# Patient Record
Sex: FEMALE | Race: WHITE | NOT HISPANIC OR LATINO
[De-identification: names, ages, dates, MRNs, and addresses within clinical notes are randomized per-mention and may not be internally consistent; named-entity substitution may affect disease eponyms.]

---

## 2018-05-13 PROBLEM — Z00.00 ENCOUNTER FOR PREVENTIVE HEALTH EXAMINATION: Status: ACTIVE | Noted: 2018-05-13

## 2018-06-12 ENCOUNTER — APPOINTMENT (OUTPATIENT)
Dept: CARDIOLOGY | Facility: CLINIC | Age: 78
End: 2018-06-12
Payer: MEDICARE

## 2018-06-12 ENCOUNTER — NON-APPOINTMENT (OUTPATIENT)
Age: 78
End: 2018-06-12

## 2018-06-12 VITALS
BODY MASS INDEX: 38.82 KG/M2 | WEIGHT: 233 LBS | DIASTOLIC BLOOD PRESSURE: 82 MMHG | SYSTOLIC BLOOD PRESSURE: 142 MMHG | HEART RATE: 82 BPM | HEIGHT: 65 IN

## 2018-06-12 DIAGNOSIS — Z79.899 OTHER LONG TERM (CURRENT) DRUG THERAPY: ICD-10-CM

## 2018-06-12 DIAGNOSIS — R07.9 CHEST PAIN, UNSPECIFIED: ICD-10-CM

## 2018-06-12 DIAGNOSIS — H35.30 UNSPECIFIED MACULAR DEGENERATION: ICD-10-CM

## 2018-06-12 DIAGNOSIS — Z86.79 PERSONAL HISTORY OF OTHER DISEASES OF THE CIRCULATORY SYSTEM: ICD-10-CM

## 2018-06-12 PROCEDURE — 99205 OFFICE O/P NEW HI 60 MIN: CPT

## 2018-06-12 PROCEDURE — 93000 ELECTROCARDIOGRAM COMPLETE: CPT

## 2018-06-12 PROCEDURE — 36415 COLL VENOUS BLD VENIPUNCTURE: CPT

## 2018-06-13 LAB
ALBUMIN SERPL ELPH-MCNC: 4.4 G/DL
ALP BLD-CCNC: 54 U/L
ALT SERPL-CCNC: 23 U/L
ANION GAP SERPL CALC-SCNC: 19 MMOL/L
AST SERPL-CCNC: 24 U/L
BASOPHILS # BLD AUTO: 0.02 K/UL
BASOPHILS NFR BLD AUTO: 0.3 %
BILIRUB SERPL-MCNC: 0.7 MG/DL
BUN SERPL-MCNC: 16 MG/DL
CALCIUM SERPL-MCNC: 9.6 MG/DL
CHLORIDE SERPL-SCNC: 103 MMOL/L
CHOLEST SERPL-MCNC: 165 MG/DL
CHOLEST/HDLC SERPL: 3.1 RATIO
CO2 SERPL-SCNC: 22 MMOL/L
CREAT SERPL-MCNC: 0.97 MG/DL
EOSINOPHIL # BLD AUTO: 0.08 K/UL
EOSINOPHIL NFR BLD AUTO: 1.2 %
GLUCOSE SERPL-MCNC: 90 MG/DL
HBA1C MFR BLD HPLC: 5.5 %
HCT VFR BLD CALC: 46.9 %
HDLC SERPL-MCNC: 54 MG/DL
HGB BLD-MCNC: 14.7 G/DL
IMM GRANULOCYTES NFR BLD AUTO: 0.1 %
LDLC SERPL CALC-MCNC: 91 MG/DL
LYMPHOCYTES # BLD AUTO: 2.15 K/UL
LYMPHOCYTES NFR BLD AUTO: 31.3 %
MAGNESIUM SERPL-MCNC: 2.1 MG/DL
MAN DIFF?: NORMAL
MCHC RBC-ENTMCNC: 29.9 PG
MCHC RBC-ENTMCNC: 31.3 GM/DL
MCV RBC AUTO: 95.5 FL
MONOCYTES # BLD AUTO: 0.43 K/UL
MONOCYTES NFR BLD AUTO: 6.3 %
NEUTROPHILS # BLD AUTO: 4.17 K/UL
NEUTROPHILS NFR BLD AUTO: 60.8 %
NT-PROBNP SERPL-MCNC: 269 PG/ML
PLATELET # BLD AUTO: 245 K/UL
POTASSIUM SERPL-SCNC: 4.7 MMOL/L
PROT SERPL-MCNC: 7.3 G/DL
RBC # BLD: 4.91 M/UL
RBC # FLD: 14 %
SODIUM SERPL-SCNC: 144 MMOL/L
TRIGL SERPL-MCNC: 102 MG/DL
TSH SERPL-ACNC: 2.02 UIU/ML
WBC # FLD AUTO: 6.86 K/UL

## 2018-09-10 ENCOUNTER — APPOINTMENT (OUTPATIENT)
Dept: CARDIOLOGY | Facility: CLINIC | Age: 78
End: 2018-09-10

## 2018-10-04 ENCOUNTER — APPOINTMENT (OUTPATIENT)
Dept: CARDIOLOGY | Facility: CLINIC | Age: 78
End: 2018-10-04

## 2019-01-08 VITALS
HEART RATE: 74 BPM | RESPIRATION RATE: 18 BRPM | OXYGEN SATURATION: 97 % | SYSTOLIC BLOOD PRESSURE: 179 MMHG | TEMPERATURE: 98 F | DIASTOLIC BLOOD PRESSURE: 79 MMHG

## 2019-01-08 RX ORDER — CHLORHEXIDINE GLUCONATE 213 G/1000ML
1 SOLUTION TOPICAL ONCE
Qty: 0 | Refills: 0 | Status: DISCONTINUED | OUTPATIENT
Start: 2019-01-09 | End: 2019-01-09

## 2019-01-08 NOTE — H&P ADULT - ASSESSMENT
77 y/o F, Yakut speaking, with PMHx of HTN, new CHF diagnosis from recent admission @ [facility] on 12/2018 (however EF normal), moderate AS, and Pulm HTN who presented to Dr. Aguirre for f/u c/o persistent SOB since discharge. The patient was diagnosed as new onset heart failure but EF is normal, but given the patient does have moderate AS and pulmonary HTN, her symptoms are more likely secondary to aortic stenosis. She reports that she has been experiencing shortness of breath with associated chest tightness progressively worsening over the last 2 years. In light of patient's risk factors, CCS class III-IV anginal equivalent symptoms, and echocardiogram findings, the pt is referred to [facility] for recommended right and left cardiac catheterization to rule out underlying CAD and further investigate the patient's aortic stenosis/pulmonary HTN.     Patient given ? IVF @40cc/hr for pre-cath hydration.     ASA III and Mallampati III  Consented with use of [place]  #[id number]  Risks & benefits of procedure and alternative therapy have been explained to the patient including but not limited to: allergic reaction, bleeding w/possible need for blood transfusion, infection, renal and vascular compromise, limb damage, arrhythmia, stroke, vessel dissection/perforation, Myocardial infarction, emergent CABG. Informed consent obtained and in chart. 77 y/o F, Afghan speaking, with PMHx of HTN, new CHF diagnosis from recent admission @ [facility] on 12/2018 (however EF normal), moderate AS, and Pulm HTN who presented to Dr. Aguirre for f/u c/o persistent SOB since discharge. The patient was diagnosed as new onset heart failure but EF is normal, but given the patient does have moderate AS and pulmonary HTN, her symptoms are more likely secondary to aortic stenosis. She reports that she has been experiencing shortness of breath with associated chest tightness progressively worsening over the last 2 years. In light of patient's risk factors, CCS class III-IV anginal equivalent symptoms, and echocardiogram findings, the pt is referred to [facility] for recommended right and left cardiac catheterization to rule out underlying CAD and further investigate the patient's aortic stenosis/pulmonary HTN.     Patient loaded with ASA 325mg and Plavix 600mg prior to procedure as per Dr. Aguirre. IVF @40cc/hr for pre-cath hydration.     ASA III and Mallampati III  Consented with use of Afghan  #[id number]  Risks & benefits of procedure and alternative therapy have been explained to the patient including but not limited to: allergic reaction, bleeding w/possible need for blood transfusion, infection, renal and vascular compromise, limb damage, arrhythmia, stroke, vessel dissection/perforation, Myocardial infarction, emergent CABG. Informed consent obtained and in chart.

## 2019-01-08 NOTE — H&P ADULT - HISTORY OF PRESENT ILLNESS
**** SKELETON *****      79yo F with PMHx of HTN, CHF (recent admission @ [facility] on 12/2018), moderate AS, and Pulm HTN who presented to Dr. Aguirre for f/u c/o persistent SOB since discharge. Pt denies CP, palpitations...... During her admission she had an ECHO (12/3/18) revealing EF 55-60%, mild concentric LVH, moderate LA dilation, moderate AS, mild MR, moderate TR, RV systolic pressure 75mmHg.    In the setting of the pts risk factors, CCS class ___ anginal symptoms and abnormal ECHO, pt is referred to [facility] for recommended right and left cardiac catheterization. 79 y/o F with PMHx of HTN, new CHF diagnosis from recent admission @ [facility] on 12/2018, however EF normal, moderate AS, and Pulm HTN who presented to Dr. Aguirre for f/u c/o persistent SOB since discharge. Pt denies CP, palpitations...... During her admission she had an ECHO (12/3/18) revealing EF 55-60%, mild concentric LVH, moderate LA dilation, moderate AS, mild MR, moderate TR, RV systolic pressure 75mmHg. The patient was diagnosed as new onset heart failure but EF is normal, but given the patient does have moderate AS and pulmonary HTN, her symptoms are more likely secondary to aortic stenosis.      In light of patient's risk factors, CCS class III anginal equivalent symptoms and echocardiogram findings, the pt is referred to [facility] for recommended right and left cardiac catheterization to rule out underlying CAD and further investigate the patient's aortic stenosis/pulmonary HTN. 79 y/o F, Nepali speaking, with PMHx of HTN, new CHF diagnosis from recent admission @ [facility] on 12/2018 (however EF normal), moderate AS, and Pulm HTN who presented to Dr. Aguirre for f/u c/o persistent SOB since discharge. During her admission she had an ECHO (12/3/18) revealing EF 55-60%, mild concentric LVH, moderate LA dilation, moderate AS, mild MR, moderate TR, RV systolic pressure 75mmHg. The patient was diagnosed as new onset heart failure but EF is normal, but given the patient does have moderate AS and pulmonary HTN, her symptoms are more likely secondary to aortic stenosis. She reports that she has been experiencing shortness of breath with associated chest tightness progressively worsening over the last 2 years. She can only walk 1/2 block before having to stop secondary to SOB, symptoms relieved with rest. She reports that most recently she has been experiencing intermittent chest tightness at rest lasting several minutes.     In light of patient's risk factors, CCS class III-IV anginal equivalent symptoms, and echocardiogram findings, the pt is referred to [facility] for recommended right and left cardiac catheterization to rule out underlying CAD and further investigate the patient's aortic stenosis/pulmonary HTN.

## 2019-01-09 ENCOUNTER — OUTPATIENT (OUTPATIENT)
Dept: OUTPATIENT SERVICES | Facility: HOSPITAL | Age: 79
LOS: 1 days | Discharge: NURSING, REHAB OR LONG-TERM CARE | End: 2019-01-09
Payer: MEDICARE

## 2019-01-09 LAB
ALBUMIN SERPL ELPH-MCNC: 4.3 G/DL — SIGNIFICANT CHANGE UP (ref 3.3–5)
ALP SERPL-CCNC: 63 U/L — SIGNIFICANT CHANGE UP (ref 40–120)
ALT FLD-CCNC: 17 U/L — SIGNIFICANT CHANGE UP (ref 10–45)
ANION GAP SERPL CALC-SCNC: 11 MMOL/L — SIGNIFICANT CHANGE UP (ref 5–17)
APPEARANCE UR: CLEAR — SIGNIFICANT CHANGE UP
APTT BLD: 31 SEC — SIGNIFICANT CHANGE UP (ref 27.5–36.3)
AST SERPL-CCNC: 14 U/L — SIGNIFICANT CHANGE UP (ref 10–40)
BACTERIA # UR AUTO: PRESENT /HPF
BASOPHILS NFR BLD AUTO: 0.3 % — SIGNIFICANT CHANGE UP (ref 0–2)
BILIRUB SERPL-MCNC: 0.6 MG/DL — SIGNIFICANT CHANGE UP (ref 0.2–1.2)
BILIRUB UR-MCNC: NEGATIVE — SIGNIFICANT CHANGE UP
BLD GP AB SCN SERPL QL: NEGATIVE — SIGNIFICANT CHANGE UP
BUN SERPL-MCNC: 21 MG/DL — SIGNIFICANT CHANGE UP (ref 7–23)
CALCIUM SERPL-MCNC: 9.3 MG/DL — SIGNIFICANT CHANGE UP (ref 8.4–10.5)
CHLORIDE SERPL-SCNC: 103 MMOL/L — SIGNIFICANT CHANGE UP (ref 96–108)
CHOLEST SERPL-MCNC: 136 MG/DL — SIGNIFICANT CHANGE UP (ref 10–199)
CK MB CFR SERPL CALC: 1.2 NG/ML — SIGNIFICANT CHANGE UP (ref 0–6.7)
CK SERPL-CCNC: 82 U/L — SIGNIFICANT CHANGE UP (ref 25–170)
CO2 SERPL-SCNC: 25 MMOL/L — SIGNIFICANT CHANGE UP (ref 22–31)
COLOR SPEC: YELLOW — SIGNIFICANT CHANGE UP
COMMENT - URINE: SIGNIFICANT CHANGE UP
COMMENT - URINE: SIGNIFICANT CHANGE UP
CREAT SERPL-MCNC: 1.01 MG/DL — SIGNIFICANT CHANGE UP (ref 0.5–1.3)
DIFF PNL FLD: ABNORMAL
EOSINOPHIL NFR BLD AUTO: 6.8 % — HIGH (ref 0–6)
EPI CELLS # UR: SIGNIFICANT CHANGE UP /HPF (ref 0–5)
GLUCOSE SERPL-MCNC: 112 MG/DL — HIGH (ref 70–99)
GLUCOSE UR QL: NEGATIVE — SIGNIFICANT CHANGE UP
HBA1C BLD-MCNC: 5.3 % — SIGNIFICANT CHANGE UP (ref 4–5.6)
HCT VFR BLD CALC: 40.7 % — SIGNIFICANT CHANGE UP (ref 34.5–45)
HDLC SERPL-MCNC: 44 MG/DL — LOW
HGB BLD-MCNC: 13.4 G/DL — SIGNIFICANT CHANGE UP (ref 11.5–15.5)
INR BLD: 1.09 — SIGNIFICANT CHANGE UP (ref 0.88–1.16)
KETONES UR-MCNC: NEGATIVE — SIGNIFICANT CHANGE UP
LEUKOCYTE ESTERASE UR-ACNC: ABNORMAL
LIPID PNL WITH DIRECT LDL SERPL: 72 MG/DL — SIGNIFICANT CHANGE UP
LYMPHOCYTES # BLD AUTO: 15.7 % — SIGNIFICANT CHANGE UP (ref 13–44)
MCHC RBC-ENTMCNC: 30.3 PG — SIGNIFICANT CHANGE UP (ref 27–34)
MCHC RBC-ENTMCNC: 32.9 G/DL — SIGNIFICANT CHANGE UP (ref 32–36)
MCV RBC AUTO: 92.1 FL — SIGNIFICANT CHANGE UP (ref 80–100)
MONOCYTES NFR BLD AUTO: 7.3 % — SIGNIFICANT CHANGE UP (ref 2–14)
NEUTROPHILS NFR BLD AUTO: 69.9 % — SIGNIFICANT CHANGE UP (ref 43–77)
NITRITE UR-MCNC: NEGATIVE — SIGNIFICANT CHANGE UP
PH UR: 6 — SIGNIFICANT CHANGE UP (ref 5–8)
PLATELET # BLD AUTO: 289 K/UL — SIGNIFICANT CHANGE UP (ref 150–400)
POTASSIUM SERPL-MCNC: 4.2 MMOL/L — SIGNIFICANT CHANGE UP (ref 3.5–5.3)
POTASSIUM SERPL-SCNC: 4.2 MMOL/L — SIGNIFICANT CHANGE UP (ref 3.5–5.3)
PROT SERPL-MCNC: 7.8 G/DL — SIGNIFICANT CHANGE UP (ref 6–8.3)
PROT UR-MCNC: NEGATIVE MG/DL — SIGNIFICANT CHANGE UP
PROTHROM AB SERPL-ACNC: 12.4 SEC — SIGNIFICANT CHANGE UP (ref 10–12.9)
RBC # BLD: 4.42 M/UL — SIGNIFICANT CHANGE UP (ref 3.8–5.2)
RBC # FLD: 13.1 % — SIGNIFICANT CHANGE UP (ref 10.3–16.9)
RBC CASTS # UR COMP ASSIST: < 5 /HPF — SIGNIFICANT CHANGE UP
RH IG SCN BLD-IMP: POSITIVE — SIGNIFICANT CHANGE UP
SODIUM SERPL-SCNC: 139 MMOL/L — SIGNIFICANT CHANGE UP (ref 135–145)
SP GR SPEC: <=1.005 — SIGNIFICANT CHANGE UP (ref 1–1.03)
TOTAL CHOLESTEROL/HDL RATIO MEASUREMENT: 3.1 RATIO — LOW (ref 3.3–7.1)
TRIGL SERPL-MCNC: 100 MG/DL — SIGNIFICANT CHANGE UP (ref 10–149)
TSH SERPL-MCNC: 1.43 UIU/ML — SIGNIFICANT CHANGE UP (ref 0.35–4.94)
UROBILINOGEN FLD QL: 0.2 E.U./DL — SIGNIFICANT CHANGE UP
WBC # BLD: 7.4 K/UL — SIGNIFICANT CHANGE UP (ref 3.8–10.5)
WBC # FLD AUTO: 7.4 K/UL — SIGNIFICANT CHANGE UP (ref 3.8–10.5)
WBC UR QL: > 10 /HPF

## 2019-01-09 PROCEDURE — 74174 CTA ABD&PLVS W/CONTRAST: CPT | Mod: 26

## 2019-01-09 PROCEDURE — 81001 URINALYSIS AUTO W/SCOPE: CPT

## 2019-01-09 PROCEDURE — 70450 CT HEAD/BRAIN W/O DYE: CPT

## 2019-01-09 PROCEDURE — 86900 BLOOD TYPING SEROLOGIC ABO: CPT

## 2019-01-09 PROCEDURE — 93460 R&L HRT ART/VENTRICLE ANGIO: CPT

## 2019-01-09 PROCEDURE — 74174 CTA ABD&PLVS W/CONTRAST: CPT

## 2019-01-09 PROCEDURE — 93880 EXTRACRANIAL BILAT STUDY: CPT | Mod: 26

## 2019-01-09 PROCEDURE — 93880 EXTRACRANIAL BILAT STUDY: CPT

## 2019-01-09 PROCEDURE — 86901 BLOOD TYPING SEROLOGIC RH(D): CPT

## 2019-01-09 PROCEDURE — 80061 LIPID PANEL: CPT

## 2019-01-09 PROCEDURE — 94150 VITAL CAPACITY TEST: CPT

## 2019-01-09 PROCEDURE — 85730 THROMBOPLASTIN TIME PARTIAL: CPT

## 2019-01-09 PROCEDURE — 99205 OFFICE O/P NEW HI 60 MIN: CPT

## 2019-01-09 PROCEDURE — 82550 ASSAY OF CK (CPK): CPT

## 2019-01-09 PROCEDURE — 36415 COLL VENOUS BLD VENIPUNCTURE: CPT

## 2019-01-09 PROCEDURE — 84443 ASSAY THYROID STIM HORMONE: CPT

## 2019-01-09 PROCEDURE — 70450 CT HEAD/BRAIN W/O DYE: CPT | Mod: 26

## 2019-01-09 PROCEDURE — 82553 CREATINE MB FRACTION: CPT

## 2019-01-09 PROCEDURE — C1894: CPT

## 2019-01-09 PROCEDURE — 86850 RBC ANTIBODY SCREEN: CPT

## 2019-01-09 PROCEDURE — C1887: CPT

## 2019-01-09 PROCEDURE — 80053 COMPREHEN METABOLIC PANEL: CPT

## 2019-01-09 PROCEDURE — 83036 HEMOGLOBIN GLYCOSYLATED A1C: CPT

## 2019-01-09 PROCEDURE — 93005 ELECTROCARDIOGRAM TRACING: CPT

## 2019-01-09 PROCEDURE — 93010 ELECTROCARDIOGRAM REPORT: CPT

## 2019-01-09 PROCEDURE — C1769: CPT

## 2019-01-09 PROCEDURE — 85610 PROTHROMBIN TIME: CPT

## 2019-01-09 PROCEDURE — 75573 CT HRT C+ STRUX CGEN HRT DS: CPT | Mod: 26

## 2019-01-09 PROCEDURE — 75573 CT HRT C+ STRUX CGEN HRT DS: CPT

## 2019-01-09 PROCEDURE — 94010 BREATHING CAPACITY TEST: CPT | Mod: 26

## 2019-01-09 PROCEDURE — 85025 COMPLETE CBC W/AUTO DIFF WBC: CPT

## 2019-01-09 RX ORDER — ASPIRIN/CALCIUM CARB/MAGNESIUM 324 MG
325 TABLET ORAL ONCE
Qty: 0 | Refills: 0 | Status: COMPLETED | OUTPATIENT
Start: 2019-01-09 | End: 2019-01-09

## 2019-01-09 RX ORDER — CLOPIDOGREL BISULFATE 75 MG/1
600 TABLET, FILM COATED ORAL ONCE
Qty: 0 | Refills: 0 | Status: COMPLETED | OUTPATIENT
Start: 2019-01-09 | End: 2019-01-09

## 2019-01-09 RX ORDER — HYDRALAZINE HCL 50 MG
50 TABLET ORAL ONCE
Qty: 0 | Refills: 0 | Status: COMPLETED | OUTPATIENT
Start: 2019-01-09 | End: 2019-01-09

## 2019-01-09 RX ORDER — ACETAMINOPHEN 500 MG
650 TABLET ORAL ONCE
Qty: 0 | Refills: 0 | Status: COMPLETED | OUTPATIENT
Start: 2019-01-09 | End: 2019-01-09

## 2019-01-09 RX ORDER — SODIUM CHLORIDE 9 MG/ML
500 INJECTION INTRAMUSCULAR; INTRAVENOUS; SUBCUTANEOUS
Qty: 0 | Refills: 0 | Status: DISCONTINUED | OUTPATIENT
Start: 2019-01-09 | End: 2019-01-09

## 2019-01-09 RX ORDER — LISINOPRIL 2.5 MG/1
10 TABLET ORAL ONCE
Qty: 0 | Refills: 0 | Status: DISCONTINUED | OUTPATIENT
Start: 2019-01-09 | End: 2019-01-09

## 2019-01-09 RX ORDER — LISINOPRIL 2.5 MG/1
1 TABLET ORAL
Qty: 0 | Refills: 0 | COMMUNITY

## 2019-01-09 RX ORDER — BUDESONIDE AND FORMOTEROL FUMARATE DIHYDRATE 160; 4.5 UG/1; UG/1
2 AEROSOL RESPIRATORY (INHALATION)
Qty: 0 | Refills: 0 | COMMUNITY

## 2019-01-09 RX ORDER — BOSENTAN 125 MG/1
1 TABLET, FILM COATED ORAL
Qty: 0 | Refills: 0 | COMMUNITY

## 2019-01-09 RX ORDER — LISINOPRIL 2.5 MG/1
10 TABLET ORAL ONCE
Qty: 0 | Refills: 0 | Status: COMPLETED | OUTPATIENT
Start: 2019-01-09 | End: 2019-01-09

## 2019-01-09 RX ORDER — CARVEDILOL PHOSPHATE 80 MG/1
25 CAPSULE, EXTENDED RELEASE ORAL ONCE
Qty: 0 | Refills: 0 | Status: COMPLETED | OUTPATIENT
Start: 2019-01-09 | End: 2019-01-09

## 2019-01-09 RX ADMIN — LISINOPRIL 10 MILLIGRAM(S): 2.5 TABLET ORAL at 18:07

## 2019-01-09 RX ADMIN — CLOPIDOGREL BISULFATE 600 MILLIGRAM(S): 75 TABLET, FILM COATED ORAL at 09:59

## 2019-01-09 RX ADMIN — SODIUM CHLORIDE 40 MILLILITER(S): 9 INJECTION INTRAMUSCULAR; INTRAVENOUS; SUBCUTANEOUS at 10:00

## 2019-01-09 RX ADMIN — Medication 50 MILLIGRAM(S): at 18:07

## 2019-01-09 RX ADMIN — CARVEDILOL PHOSPHATE 25 MILLIGRAM(S): 80 CAPSULE, EXTENDED RELEASE ORAL at 18:07

## 2019-01-09 RX ADMIN — Medication 650 MILLIGRAM(S): at 18:41

## 2019-01-09 RX ADMIN — Medication 325 MILLIGRAM(S): at 09:59

## 2019-01-09 NOTE — CONSULT NOTE ADULT - SUBJECTIVE AND OBJECTIVE BOX
Daughter, Jaelyn at bedside, helped with translation     78 year old Luxembourger speaking female with a history of obesity, uncontrolled hypertension, Asthma (without history of intubations), chronic diastolic heart failure with severe aortic stenosis presented today for ambulatory cardiac catherization who is now being evaluated for further evaluation of her valvular disease.     As per the patient’s daughter, the patient has been experiencing dyspnea upon minimal exertion for the last year worsening within the last couple of months. She gets SOB after walking less than ½ block. Symptoms are relieved after a few minutes of rest.  SOB is sometimes associated with intermittent chest tightness. She denies any SOB at rest, palpitations, syncope, dizziness, or LE edema.     The patient was recently admitted to [facility] with uncontrolled HTN with SBP in the 200s and SOB. ECHO on 12/3/18 which showed EF 55% with moderate AS.     Cardiac catherization performed today on 1/9/2019 showed normal coronary arteries with severe aortic stenosis with a mean gradient of 48.1.     The patient lives with her  & extended family in a house. She does not need a walker or cane to ambulate. She remains independent in all her ADLs.     which confirmed severe aortic stenosis with mean gradient of 48       Review of Systems:  Other Review of Systems: All other review of systems negative, except as noted in HPI	      Allergies and Intolerances:        Allergies:  	No Known Allergies:     Home Medications:   * Patient Currently Takes Medications as of 09-Jan-2019 09:46 documented in Structured Notes  · 	carvedilol 25 mg oral tablet: 1 tab(s) orally 2 times a day, Last Dose Taken:    · 	lisinopril 10 mg oral tablet: 1 tab(s) orally once a day, Last Dose Taken:    · 	Ecotrin Adult Low Strength 81 mg oral delayed release tablet: 1 tab(s) orally once a day, Last Dose Taken:    · 	hydrALAZINE 50 mg oral tablet: orally 3 times a day, Last Dose Taken:    · 	hydroCHLOROthiazide 25 mg oral tablet: orally 2 times a day, Last Dose Taken:    · 	umeclidinium 62.5 mcg/inh inhalation powder: Last Dose Taken:    · 	fluticasone-salmeterol 100 mcg-50 mcg inhalation powder: 1 puff(s) inhaled 2 times a day, As Needed, Last Dose Taken:      Patient History:    Past Medical History:  Aortic stenosis    CHF (congestive heart failure)    HTN (hypertension)    Pulmonary HTN.     Past Surgical History:  No significant past surgical history.     Family History:  No pertinent family history in first degree relatives.     Social History:  Social History (marital status, living situation, occupation, tobacco use, alcohol and drug use, and sexual history): Denies smoking, ETOH, or illicit drug use.	     Tobacco Screening:  · Core Measure Site	No	  · Has the patient used tobacco in the past 30 days?	No	    Risk Assessment:    Present on Admission:  Deep Venous Thrombosis	no	  Pulmonary Embolus	no	     Heart Failure:  Does this patient have a history of or has been diagnosed with heart failure? yes.     LV Function Assessment (LVS function was evaluated before arrival and/or during hospitalization) yes.     Is the Ejection Fraction >40% ? yes.     normal LV function.       T/HR/RR/BP:  · Temp (F)	98.5 Degrees F	  · Temp (C)	36.9 Degrees C	  · Heart Rate	74 /min	  · Respiration Rate (breaths/min)	18 /min	  · BP Systolic	 179 mm Hg	  · BP Diastolic	79 mm Hg	  · BP Noninvasive Mean	112 mm Hg	  · SpO2 (%)	97 %	  · O2 delivery	room air	     Physical Exam:  · Constitutional	detailed exam	  · Constitutional Details	obese	  · Eyes	EOMI; PERRL; no drainage or redness	  · ENMT	No oral lesions; no gross abnormalities	  · Neck	No bruits; no thyromegaly or nodules	  · Breasts	not examined	  · Back	No deformity or limitation of movement	  · Respiratory	Breath Sounds equal & clear to percussion & auscultation, no accessory muscle use	  · Cardiovascular	detailed exam	  · Cardiovascular Details	murmur; systolic murmur @RUSB	  · Murmur Timing	systolic	  · Character of Systolic Murmur	crescendo-decrescendo	  · Gastrointestinal	Soft, non-tender, no hepatosplenomegaly, normal bowel sounds	  · Extremities	No cyanosis, clubbing or edema	  · Vascular	detailed exam	  · Carotid Pulse	right normal; left normal; 2+ B/L	  · Radial Pulse	right normal; left normal; 2+ B/L, right radial band in place 	  · Femoral Pulse	right normal; left normal; 2+ B/L	  · DP Pulse	right normal; left normal; 2+ B/L	  · PT Pulse	right normal; left normal; 2+ B/L	  · Neurological	Alert & oriented; no sensory, motor or coordination deficits, normal reflexes	  · Skin	detailed exam	  · Skin Comments	B/L inguinal rash and excoriations, R>L	  · Lymph Nodes	not examined	  · Musculoskeletal	No joint pain, swelling or deformity; no limitation of movement	  · Psychiatric	Affect and characteristics of appearance, verbalizations, behaviors are appropriate	       Labs and Results:  Labs, Radiology, Cardiology, and Other Results: EKG: NSR @ 72 bpm, no evidence of ischemic changes Daughter, Jaelyn at bedside, helped with translation     78 year old Cambodian speaking female with a history of obesity, uncontrolled hypertension, Asthma (without history of intubations), chronic diastolic heart failure with severe aortic stenosis presented today for ambulatory cardiac catherization who is now being evaluated for further evaluation of her valvular disease.     As per the patient’s daughter, the patient has been experiencing dyspnea upon minimal exertion for the last year worsening within the last couple of months. She gets SOB after walking less than ½ block. Symptoms are relieved after a few minutes of rest.  SOB is sometimes associated with intermittent chest tightness. She denies any SOB at rest, palpitations, syncope, dizziness, or LE edema.     The patient was recently admitted to [facility] with uncontrolled HTN with SBP in the 200s and SOB. ECHO on 12/3/18 which showed EF 55% with moderate AS.     Cardiac catherization performed today on 1/9/2019 showed normal coronary arteries with severe aortic stenosis with a mean gradient of 48.1.     The patient lives with her  & extended family in a house. She does not need a walker or cane to ambulate. She remains independent in all her ADLs.     which confirmed severe aortic stenosis with mean gradient of 48       Review of Systems:  Other Review of Systems: All other review of systems negative, except as noted in HPI	      Allergies and Intolerances:        Allergies:  	No Known Allergies:     Home Medications:   * Patient Currently Takes Medications as of 09-Jan-2019 09:46 documented in Structured Notes  · 	carvedilol 25 mg oral tablet: 1 tab(s) orally 2 times a day, Last Dose Taken:    · 	lisinopril 10 mg oral tablet: 1 tab(s) orally once a day, Last Dose Taken:    · 	Ecotrin Adult Low Strength 81 mg oral delayed release tablet: 1 tab(s) orally once a day, Last Dose Taken:    · 	hydrALAZINE 50 mg oral tablet: orally 3 times a day, Last Dose Taken:    · 	hydroCHLOROthiazide 25 mg oral tablet: orally 2 times a day, Last Dose Taken:    · 	umeclidinium 62.5 mcg/inh inhalation powder: Last Dose Taken:    · 	fluticasone-salmeterol 100 mcg-50 mcg inhalation powder: 1 puff(s) inhaled 2 times a day, As Needed, Last Dose Taken:      Patient History:    Past Medical History:  Aortic stenosis    CHF (congestive heart failure)    HTN (hypertension)    Pulmonary HTN.     Past Surgical History:  No significant past surgical history.     Family History:  No pertinent family history in first degree relatives.     Social History:  Social History (marital status, living situation, occupation, tobacco use, alcohol and drug use, and sexual history): Denies smoking, ETOH, or illicit drug use.	     Tobacco Screening:  · Core Measure Site	No	  · Has the patient used tobacco in the past 30 days?	No	    Risk Assessment:    Present on Admission:  Deep Venous Thrombosis	no	  Pulmonary Embolus	no	     Heart Failure:  Does this patient have a history of or has been diagnosed with heart failure? yes.     LV Function Assessment (LVS function was evaluated before arrival and/or during hospitalization) yes.     Is the Ejection Fraction >40% ? yes.     normal LV function.       T/HR/RR/BP:  · Temp (F)	98.5 Degrees F	  · Temp (C)	36.9 Degrees C	  · Heart Rate	74 /min	  · Respiration Rate (breaths/min)	18 /min	  · BP Systolic	 179 mm Hg	  · BP Diastolic	79 mm Hg	  · BP Noninvasive Mean	112 mm Hg	  · SpO2 (%)	97 %	  · O2 delivery	room air	     Physical Exam:  · Constitutional	detailed exam	  · Constitutional Details	obese	  · Eyes	EOMI; PERRL; no drainage or redness	  · ENMT	No oral lesions; no gross abnormalities	  · Neck	No bruits; no thyromegaly or nodules	  · Breasts	not examined	  · Back	No deformity or limitation of movement	  · Respiratory	Breath Sounds equal & clear to percussion & auscultation, no accessory muscle use	  · Cardiovascular	detailed exam	  · Cardiovascular Details	murmur; systolic murmur @RUSB	  · Murmur Timing	systolic	  · Character of Systolic Murmur	crescendo-decrescendo	  · Gastrointestinal	Soft, non-tender, no hepatosplenomegaly, normal bowel sounds	  · Extremities	No cyanosis, clubbing or edema	  · Vascular	detailed exam	  · Carotid Pulse	right normal; left normal; 2+ B/L	  · Radial Pulse	right normal; left normal; 2+ B/L, right radial band in place 	  · Femoral Pulse	right normal; left normal; 2+ B/L	  · DP Pulse	right normal; left normal; 2+ B/L	  · PT Pulse	right normal; left normal; 2+ B/L	  · Neurological	Alert & oriented; no sensory, motor or coordination deficits, normal reflexes	  · Skin	detailed exam	  · Skin Comments	B/L inguinal rash and excoriations, R>L	  · Lymph Nodes	not examined	  · Musculoskeletal	No joint pain, swelling or deformity; no limitation of movement	  · Psychiatric	Affect and characteristics of appearance, verbalizations, behaviors are appropriate	       Labs and Results:  Labs, Radiology, Cardiology, and Other Results: EKG: NSR @ 72 bpm, no evidence of ischemic changes	    KCCQ completed on 1/9/19    5MWT on 1/9/19 was unable to be completed, patient currently on bedrest post catherization

## 2019-01-09 NOTE — CONSULT NOTE ADULT - SUBJECTIVE AND OBJECTIVE BOX
Daughter, Jaelyn at bedside, helped with translation     78 year old Montserratian speaking female with a history of obesity, uncontrolled hypertension, Asthma (without history of intubations), chronic diastolic heart failure with severe aortic stenosis presented today for ambulatory cardiac catherization who is now being evaluated for further evaluation of her valvular disease.  SAVR vs. TAVR     As per the patient’s daughter, the patient has been experiencing dyspnea upon minimal exertion for the last year worsening within the last couple of months. She gets SOB after walking less than ½ block. Symptoms are relieved after a few minutes of rest.  SOB is sometimes associated with intermittent chest tightness. She denies any SOB at rest, palpitations, syncope, dizziness, or LE edema.     The patient was recently admitted to [facility] with uncontrolled HTN with SBP in the 200s and SOB. ECHO on 12/3/18 which showed EF 55% with moderate AS.     Cardiac catherization performed today on 1/9/2019 showed normal coronary arteries with severe aortic stenosis with a mean gradient of 48.1.     The patient lives with her  & extended family in a house. She does not need a walker or cane to ambulate. She remains independent in all her ADLs.     which confirmed severe aortic stenosis with mean gradient of 48       Review of Systems:  Other Review of Systems: All other review of systems negative, except as noted in HPI	      Allergies and Intolerances:        Allergies:  	No Known Allergies:     Home Medications:   * Patient Currently Takes Medications as of 09-Jan-2019 09:46 documented in Structured Notes  · 	carvedilol 25 mg oral tablet: 1 tab(s) orally 2 times a day, Last Dose Taken:    · 	lisinopril 10 mg oral tablet: 1 tab(s) orally once a day, Last Dose Taken:    · 	Ecotrin Adult Low Strength 81 mg oral delayed release tablet: 1 tab(s) orally once a day, Last Dose Taken:    · 	hydrALAZINE 50 mg oral tablet: orally 3 times a day, Last Dose Taken:    · 	hydroCHLOROthiazide 25 mg oral tablet: orally 2 times a day, Last Dose Taken:    · 	umeclidinium 62.5 mcg/inh inhalation powder: Last Dose Taken:    · 	fluticasone-salmeterol 100 mcg-50 mcg inhalation powder: 1 puff(s) inhaled 2 times a day, As Needed, Last Dose Taken:      Patient History:    Past Medical History:  Aortic stenosis    CHF (congestive heart failure)    HTN (hypertension)    Pulmonary HTN.     Past Surgical History:  No significant past surgical history.     Family History:  No pertinent family history in first degree relatives.     Social History:  Social History (marital status, living situation, occupation, tobacco use, alcohol and drug use, and sexual history): Denies smoking, ETOH, or illicit drug use.	     Tobacco Screening:  · Core Measure Site	No	  · Has the patient used tobacco in the past 30 days?	No	    Risk Assessment:    Present on Admission:  Deep Venous Thrombosis	no	  Pulmonary Embolus	no	     Heart Failure:  Does this patient have a history of or has been diagnosed with heart failure? yes.     LV Function Assessment (LVS function was evaluated before arrival and/or during hospitalization) yes.     Is the Ejection Fraction >40% ? yes.     normal LV function.       T/HR/RR/BP:  · Temp (F)	98.5 Degrees F	  · Temp (C)	36.9 Degrees C	  · Heart Rate	74 /min	  · Respiration Rate (breaths/min)	18 /min	  · BP Systolic	 179 mm Hg	  · BP Diastolic	79 mm Hg	  · BP Noninvasive Mean	112 mm Hg	  · SpO2 (%)	97 %	  · O2 delivery	room air	     Physical Exam:  · Constitutional	detailed exam	  · Constitutional Details	obese	  · Eyes	EOMI; PERRL; no drainage or redness	  · ENMT	No oral lesions; no gross abnormalities	  · Neck	No bruits; no thyromegaly or nodules	  · Breasts	not examined	  · Back	No deformity or limitation of movement	  · Respiratory	Breath Sounds equal & clear to percussion & auscultation, no accessory muscle use	  · Cardiovascular	detailed exam	  · Cardiovascular Details	murmur; systolic murmur @RUSB	  · Murmur Timing	systolic	  · Character of Systolic Murmur	crescendo-decrescendo	  · Gastrointestinal	Soft, non-tender, no hepatosplenomegaly, normal bowel sounds	  · Extremities	No cyanosis, clubbing or edema	  · Vascular	detailed exam	  · Carotid Pulse	right normal; left normal; 2+ B/L	  · Radial Pulse	right normal; left normal; 2+ B/L, right radial band in place 	  · Femoral Pulse	right normal; left normal; 2+ B/L	  · DP Pulse	right normal; left normal; 2+ B/L	  · PT Pulse	right normal; left normal; 2+ B/L	  · Neurological	Alert & oriented; no sensory, motor or coordination deficits, normal reflexes	  · Skin	detailed exam	  · Skin Comments	B/L inguinal rash and excoriations, R>L	  · Lymph Nodes	not examined	  · Musculoskeletal	No joint pain, swelling or deformity; no limitation of movement	  · Psychiatric	Affect and characteristics of appearance, verbalizations, behaviors are appropriate	       Labs and Results:  Labs, Radiology, Cardiology, and Other Results: EKG: NSR @ 72 bpm, no evidence of ischemic changes

## 2019-01-09 NOTE — CONSULT NOTE ADULT - ATTENDING COMMENTS
79yo F with chronic diastolic HF, severe AS, high risk for surgery.  Candidate for TAVR after appropriate w/u.  Risks and benefits explained to the patient and the family members.

## 2019-01-09 NOTE — CONSULT NOTE ADULT - ASSESSMENT
78 year old Moroccan speaking female with a history of obesity, uncontrolled hypertension, Asthma (without history of intubations), chronic diastolic heart failure with severe aortic stenosis presented today for ambulatory cardiac catherization who is now being evaluated for further evaluation of her valvular disease. The patient is clinically stable, NYHA Class III.  The cardiac catherization was reviewed by Dr. Alejo which confirmed severe aortic stenosis with mean gradient of 48 mmHg. Given the patient's symptoms and abnormal cardiac catherization, Dr. Alejo recommends intervening on the patient's aortic valve and believes the patient is a good candidate for TAVR.  The 78 year old Mongolian speaking female with a history of obesity, uncontrolled hypertension, Asthma (without history of intubations), chronic diastolic heart failure with severe aortic stenosis presented today for ambulatory cardiac catherization who is now being evaluated for further evaluation of her valvular disease. The patient is clinically stable, NYHA Class III.  The cardiac catherization was reviewed by Dr. Alejo which confirmed severe aortic stenosis with mean gradient of 48 mmHg. Given the patient's symptoms and abnormal cardiac catherization, Dr. Alejo recommends intervening on the patient's aortic valve and believes the patient is a good candidate for TAVR.  The patient was also evaluated by Dr. Ansari from [facility] who deemed the patient high risk for SAVR given age and comorbidities. The patient will have all pre-TAVR testing today including TAVR CTs, carotids, PFTs. The TAVR procedure, including the risks and the benefits, was explained to the patient and she agrees to proceed. All questions answered.     1) preTAVR testing today.  2) Pending review of TAVR CTs, will schedule for TAVR in 1-2 weeks.

## 2019-01-09 NOTE — CONSULT NOTE ADULT - ASSESSMENT
78 year old Micronesian speaking female with a history of obesity, uncontrolled hypertension, Asthma (without history of intubations), chronic diastolic heart failure with severe aortic stenosis presented today for ambulatory cardiac catherization who is now being evaluated for further evaluation of her valvular disease. The patient is clinically stable, NYHA Class III.     Severe Aortic stenosis by cardiac catherization;  high risk for SAVR given age and comorbidities.        Recommend TAVR

## 2019-01-09 NOTE — PROGRESS NOTE ADULT - SUBJECTIVE AND OBJECTIVE BOX
Interventional Cardiology PA SDA Discharge Note    Patient without complaints.     Afebrile, VSS    Ext:    	  		Right             Radial : No   hematoma,  No   bleeding, dressing; C/D/I      Pulses:    intact RAD to baseline     A/P:      79 y/o F, Solomon Islander speaking, with PMHx of HTN, new CHF diagnosis from recent admission @ [facility] on 12/2018 (however EF normal), moderate AS, and Pulm HTN who presented to Dr. Aguirre for f/u c/o persistent SOB since discharge. During her admission she had an ECHO (12/3/18) revealing EF 55-60%, mild concentric LVH, moderate LA dilation, moderate AS, mild MR, moderate TR, RV systolic pressure 75mmHg. The patient was diagnosed as new onset heart failure but EF is normal, but given the patient does have moderate AS and pulmonary HTN, her symptoms are more likely secondary to aortic stenosis. She reports that she has been experiencing shortness of breath with associated chest tightness progressively worsening over the last 2 years. She can only walk 1/2 block before having to stop secondary to SOB, symptoms relieved with rest. She reports that most recently she has been experiencing intermittent chest tightness at rest lasting several minutes.  In light of patient's risk factors, CCS class III-IV anginal equivalent symptoms, and echocardiogram findings, the pt is referred to [facility] for recommended right and left cardiac catheterization to rule out underlying CAD and further investigate the patient's aortic stenosis/pulmonary HTN.  Underwent R/L heart catheterization on 1/9/19 revealing LMCA normal, LAD with minor luminal irregularities, LCx large and normal, RCA large and normal. PA 18, RV 73/13 (21), PA 70/34 (49), PCWP 26, CO 7.3 L/min, CI 3.7, TPG 23, severe AS (mean LV/Ao gradient 48.1 mmHg, SETH 1.1 cm2). Pt referred to Dr. Moon for BAV.        1.	Stable for discharge as per attending Dr. Aguirre after bed rest, pt voids, groin/wrist stable and 30 minutes of ambulation.  2.	Follow-up with Cardiologist Dr. Aguirre and Cardiothoracic Surgeon Dr. Moon in 1-2 weeks  3.	Discharged forms signed and copies in chart Interventional Cardiology PA SDA Discharge Note    Patient without complaints.     Afebrile, VSS    Ext:    	  		Right             Radial : No   hematoma,  No   bleeding, dressing; C/D/I      Pulses:    intact RAD to baseline     A/P:      79 y/o F, Monegasque speaking, with PMHx of HTN, new CHF diagnosis from recent admission @ [facility] on 12/2018 (however EF normal), moderate AS, and Pulm HTN who presented to Dr. Aguirre for f/u c/o persistent SOB since discharge. During her admission she had an ECHO (12/3/18) revealing EF 55-60%, mild concentric LVH, moderate LA dilation, moderate AS, mild MR, moderate TR, RV systolic pressure 75mmHg. The patient was diagnosed as new onset heart failure but EF is normal, but given the patient does have moderate AS and pulmonary HTN, her symptoms are more likely secondary to aortic stenosis. She reports that she has been experiencing shortness of breath with associated chest tightness progressively worsening over the last 2 years. She can only walk 1/2 block before having to stop secondary to SOB, symptoms relieved with rest. She reports that most recently she has been experiencing intermittent chest tightness at rest lasting several minutes.  In light of patient's risk factors, CCS class III-IV anginal equivalent symptoms, and echocardiogram findings, the pt is referred to [facility] for recommended right and left cardiac catheterization to rule out underlying CAD and further investigate the patient's aortic stenosis/pulmonary HTN.  Underwent R/L heart catheterization on 1/9/19 revealing LMCA normal, LAD with minor luminal irregularities, LCx large and normal, RCA large and normal. PA 18, RV 73/13 (21), PA 70/34 (49), PCWP 26, CO 7.3 L/min, CI 3.7, TPG 23, severe AS (mean LV/Ao gradient 48.1 mmHg, SETH 1.1 cm2). Pt referred to Dr. Alejo for BAV and undergoing pre-op w/u prior to discharge.      1.	Stable for discharge as per attending Dr. Aguirre after bed rest, pt voids, wrist stable and 30 minutes of ambulation.  2.	Follow-up with Cardiologist Dr. Aguirre and Cardiothoracic Surgeon Dr. Alejo in 1-2 weeks  3.	Discharged forms signed and copies in chart

## 2019-01-10 DIAGNOSIS — I35.0 NONRHEUMATIC AORTIC (VALVE) STENOSIS: ICD-10-CM

## 2019-01-10 DIAGNOSIS — I10 ESSENTIAL (PRIMARY) HYPERTENSION: ICD-10-CM

## 2019-01-10 DIAGNOSIS — Z79.82 LONG TERM (CURRENT) USE OF ASPIRIN: ICD-10-CM

## 2019-01-10 DIAGNOSIS — E78.5 HYPERLIPIDEMIA, UNSPECIFIED: ICD-10-CM

## 2019-01-10 DIAGNOSIS — I25.10 ATHEROSCLEROTIC HEART DISEASE OF NATIVE CORONARY ARTERY WITHOUT ANGINA PECTORIS: ICD-10-CM

## 2019-01-10 DIAGNOSIS — I27.20 PULMONARY HYPERTENSION, UNSPECIFIED: ICD-10-CM

## 2019-01-23 PROBLEM — I10 ESSENTIAL (PRIMARY) HYPERTENSION: Chronic | Status: ACTIVE | Noted: 2019-01-08

## 2019-01-23 PROBLEM — I35.0 NONRHEUMATIC AORTIC (VALVE) STENOSIS: Chronic | Status: ACTIVE | Noted: 2019-01-08

## 2019-02-05 ENCOUNTER — APPOINTMENT (OUTPATIENT)
Dept: CARDIOTHORACIC SURGERY | Facility: HOSPITAL | Age: 79
End: 2019-02-05
Payer: MEDICARE

## 2019-02-05 ENCOUNTER — INPATIENT (INPATIENT)
Facility: HOSPITAL | Age: 79
LOS: 1 days | Discharge: HOME HEALTH | DRG: 267 | End: 2019-02-07
Attending: INTERNAL MEDICINE | Admitting: INTERNAL MEDICINE
Payer: MEDICARE

## 2019-02-05 VITALS
RESPIRATION RATE: 16 BRPM | DIASTOLIC BLOOD PRESSURE: 91 MMHG | WEIGHT: 222.01 LBS | OXYGEN SATURATION: 98 % | SYSTOLIC BLOOD PRESSURE: 198 MMHG | HEART RATE: 83 BPM | TEMPERATURE: 99 F | HEIGHT: 70 IN

## 2019-02-05 LAB
ALBUMIN SERPL ELPH-MCNC: 3.7 G/DL — SIGNIFICANT CHANGE UP (ref 3.3–5)
ALBUMIN SERPL ELPH-MCNC: 4.6 G/DL — SIGNIFICANT CHANGE UP (ref 3.3–5)
ALP SERPL-CCNC: 39 U/L — LOW (ref 40–120)
ALP SERPL-CCNC: 55 U/L — SIGNIFICANT CHANGE UP (ref 40–120)
ALT FLD-CCNC: 10 U/L — SIGNIFICANT CHANGE UP (ref 10–45)
ALT FLD-CCNC: 13 U/L — SIGNIFICANT CHANGE UP (ref 10–45)
ANION GAP SERPL CALC-SCNC: 10 MMOL/L — SIGNIFICANT CHANGE UP (ref 5–17)
ANION GAP SERPL CALC-SCNC: 13 MMOL/L — SIGNIFICANT CHANGE UP (ref 5–17)
APTT BLD: 27.8 SEC — SIGNIFICANT CHANGE UP (ref 27.5–36.3)
APTT BLD: 32.1 SEC — SIGNIFICANT CHANGE UP (ref 27.5–36.3)
AST SERPL-CCNC: 13 U/L — SIGNIFICANT CHANGE UP (ref 10–40)
AST SERPL-CCNC: 13 U/L — SIGNIFICANT CHANGE UP (ref 10–40)
BILIRUB SERPL-MCNC: 0.5 MG/DL — SIGNIFICANT CHANGE UP (ref 0.2–1.2)
BILIRUB SERPL-MCNC: 0.6 MG/DL — SIGNIFICANT CHANGE UP (ref 0.2–1.2)
BUN SERPL-MCNC: 18 MG/DL — SIGNIFICANT CHANGE UP (ref 7–23)
BUN SERPL-MCNC: 20 MG/DL — SIGNIFICANT CHANGE UP (ref 7–23)
CALCIUM SERPL-MCNC: 8.7 MG/DL — SIGNIFICANT CHANGE UP (ref 8.4–10.5)
CALCIUM SERPL-MCNC: 9.9 MG/DL — SIGNIFICANT CHANGE UP (ref 8.4–10.5)
CHLORIDE SERPL-SCNC: 104 MMOL/L — SIGNIFICANT CHANGE UP (ref 96–108)
CHLORIDE SERPL-SCNC: 107 MMOL/L — SIGNIFICANT CHANGE UP (ref 96–108)
CO2 SERPL-SCNC: 24 MMOL/L — SIGNIFICANT CHANGE UP (ref 22–31)
CO2 SERPL-SCNC: 27 MMOL/L — SIGNIFICANT CHANGE UP (ref 22–31)
CREAT SERPL-MCNC: 0.97 MG/DL — SIGNIFICANT CHANGE UP (ref 0.5–1.3)
CREAT SERPL-MCNC: 1.02 MG/DL — SIGNIFICANT CHANGE UP (ref 0.5–1.3)
GAS PNL BLDA: SIGNIFICANT CHANGE UP
GAS PNL BLDA: SIGNIFICANT CHANGE UP
GLUCOSE BLDC GLUCOMTR-MCNC: 102 MG/DL — HIGH (ref 70–99)
GLUCOSE BLDC GLUCOMTR-MCNC: 95 MG/DL — SIGNIFICANT CHANGE UP (ref 70–99)
GLUCOSE SERPL-MCNC: 103 MG/DL — HIGH (ref 70–99)
GLUCOSE SERPL-MCNC: 104 MG/DL — HIGH (ref 70–99)
HCT VFR BLD CALC: 35.7 % — SIGNIFICANT CHANGE UP (ref 34.5–45)
HCT VFR BLD CALC: 40.3 % — SIGNIFICANT CHANGE UP (ref 34.5–45)
HGB BLD-MCNC: 11.6 G/DL — SIGNIFICANT CHANGE UP (ref 11.5–15.5)
HGB BLD-MCNC: 13.4 G/DL — SIGNIFICANT CHANGE UP (ref 11.5–15.5)
INR BLD: 1.03 — SIGNIFICANT CHANGE UP (ref 0.88–1.16)
INR BLD: 1.15 — SIGNIFICANT CHANGE UP (ref 0.88–1.16)
MAGNESIUM SERPL-MCNC: 1.9 MG/DL — SIGNIFICANT CHANGE UP (ref 1.6–2.6)
MAGNESIUM SERPL-MCNC: 2.2 MG/DL — SIGNIFICANT CHANGE UP (ref 1.6–2.6)
MCHC RBC-ENTMCNC: 30.1 PG — SIGNIFICANT CHANGE UP (ref 27–34)
MCHC RBC-ENTMCNC: 30.2 PG — SIGNIFICANT CHANGE UP (ref 27–34)
MCHC RBC-ENTMCNC: 32.5 GM/DL — SIGNIFICANT CHANGE UP (ref 32–36)
MCHC RBC-ENTMCNC: 33.3 G/DL — SIGNIFICANT CHANGE UP (ref 32–36)
MCV RBC AUTO: 91 FL — SIGNIFICANT CHANGE UP (ref 80–100)
MCV RBC AUTO: 92.5 FL — SIGNIFICANT CHANGE UP (ref 80–100)
NRBC # BLD: 0 /100 WBCS — SIGNIFICANT CHANGE UP (ref 0–0)
NT-PROBNP SERPL-SCNC: 362 PG/ML — HIGH (ref 0–300)
PHOSPHATE SERPL-MCNC: 3.8 MG/DL — SIGNIFICANT CHANGE UP (ref 2.5–4.5)
PLATELET # BLD AUTO: 164 K/UL — SIGNIFICANT CHANGE UP (ref 150–400)
PLATELET # BLD AUTO: 225 K/UL — SIGNIFICANT CHANGE UP (ref 150–400)
POTASSIUM SERPL-MCNC: 3.8 MMOL/L — SIGNIFICANT CHANGE UP (ref 3.5–5.3)
POTASSIUM SERPL-MCNC: 4 MMOL/L — SIGNIFICANT CHANGE UP (ref 3.5–5.3)
POTASSIUM SERPL-SCNC: 3.8 MMOL/L — SIGNIFICANT CHANGE UP (ref 3.5–5.3)
POTASSIUM SERPL-SCNC: 4 MMOL/L — SIGNIFICANT CHANGE UP (ref 3.5–5.3)
PROT SERPL-MCNC: 6.7 G/DL — SIGNIFICANT CHANGE UP (ref 6–8.3)
PROT SERPL-MCNC: 8.2 G/DL — SIGNIFICANT CHANGE UP (ref 6–8.3)
PROTHROM AB SERPL-ACNC: 11.6 SEC — SIGNIFICANT CHANGE UP (ref 10–12.9)
PROTHROM AB SERPL-ACNC: 13 SEC — HIGH (ref 10–12.9)
RBC # BLD: 3.86 M/UL — SIGNIFICANT CHANGE UP (ref 3.8–5.2)
RBC # BLD: 4.43 M/UL — SIGNIFICANT CHANGE UP (ref 3.8–5.2)
RBC # FLD: 13.2 % — SIGNIFICANT CHANGE UP (ref 10.3–14.5)
RBC # FLD: 13.3 % — SIGNIFICANT CHANGE UP (ref 10.3–16.9)
SODIUM SERPL-SCNC: 141 MMOL/L — SIGNIFICANT CHANGE UP (ref 135–145)
SODIUM SERPL-SCNC: 144 MMOL/L — SIGNIFICANT CHANGE UP (ref 135–145)
WBC # BLD: 4 K/UL — SIGNIFICANT CHANGE UP (ref 3.8–10.5)
WBC # BLD: 4.69 K/UL — SIGNIFICANT CHANGE UP (ref 3.8–10.5)
WBC # FLD AUTO: 4 K/UL — SIGNIFICANT CHANGE UP (ref 3.8–10.5)
WBC # FLD AUTO: 4.69 K/UL — SIGNIFICANT CHANGE UP (ref 3.8–10.5)

## 2019-02-05 PROCEDURE — 93306 TTE W/DOPPLER COMPLETE: CPT | Mod: 26

## 2019-02-05 PROCEDURE — 33361 REPLACE AORTIC VALVE PERQ: CPT | Mod: 62,Q0

## 2019-02-05 PROCEDURE — 99233 SBSQ HOSP IP/OBS HIGH 50: CPT | Mod: 25

## 2019-02-05 PROCEDURE — 71045 X-RAY EXAM CHEST 1 VIEW: CPT | Mod: 26

## 2019-02-05 PROCEDURE — 93010 ELECTROCARDIOGRAM REPORT: CPT

## 2019-02-05 RX ORDER — CLOPIDOGREL BISULFATE 75 MG/1
75 TABLET, FILM COATED ORAL DAILY
Qty: 0 | Refills: 0 | Status: DISCONTINUED | OUTPATIENT
Start: 2019-02-06 | End: 2019-02-07

## 2019-02-05 RX ORDER — SODIUM CHLORIDE 9 MG/ML
250 INJECTION INTRAMUSCULAR; INTRAVENOUS; SUBCUTANEOUS
Qty: 0 | Refills: 0 | Status: DISCONTINUED | OUTPATIENT
Start: 2019-02-05 | End: 2019-02-06

## 2019-02-05 RX ORDER — SODIUM CHLORIDE 9 MG/ML
1000 INJECTION INTRAMUSCULAR; INTRAVENOUS; SUBCUTANEOUS
Qty: 0 | Refills: 0 | Status: DISCONTINUED | OUTPATIENT
Start: 2019-02-05 | End: 2019-02-06

## 2019-02-05 RX ORDER — UMECLIDINIUM 62.5 UG/1
0 AEROSOL, POWDER ORAL
Qty: 0 | Refills: 0 | COMMUNITY

## 2019-02-05 RX ORDER — CLOPIDOGREL BISULFATE 75 MG/1
300 TABLET, FILM COATED ORAL ONCE
Qty: 0 | Refills: 0 | Status: COMPLETED | OUTPATIENT
Start: 2019-02-05 | End: 2019-02-05

## 2019-02-05 RX ORDER — DEXTROSE 50 % IN WATER 50 %
25 SYRINGE (ML) INTRAVENOUS
Qty: 0 | Refills: 0 | Status: DISCONTINUED | OUTPATIENT
Start: 2019-02-05 | End: 2019-02-05

## 2019-02-05 RX ORDER — MAGNESIUM SULFATE 500 MG/ML
1 VIAL (ML) INJECTION ONCE
Qty: 0 | Refills: 0 | Status: COMPLETED | OUTPATIENT
Start: 2019-02-05 | End: 2019-02-05

## 2019-02-05 RX ORDER — DEXTROSE 50 % IN WATER 50 %
50 SYRINGE (ML) INTRAVENOUS
Qty: 0 | Refills: 0 | Status: DISCONTINUED | OUTPATIENT
Start: 2019-02-05 | End: 2019-02-05

## 2019-02-05 RX ORDER — ATORVASTATIN CALCIUM 80 MG/1
20 TABLET, FILM COATED ORAL AT BEDTIME
Qty: 0 | Refills: 0 | Status: DISCONTINUED | OUTPATIENT
Start: 2019-02-05 | End: 2019-02-06

## 2019-02-05 RX ORDER — ASPIRIN/CALCIUM CARB/MAGNESIUM 324 MG
325 TABLET ORAL ONCE
Qty: 0 | Refills: 0 | Status: DISCONTINUED | OUTPATIENT
Start: 2019-02-05 | End: 2019-02-05

## 2019-02-05 RX ORDER — ASPIRIN/CALCIUM CARB/MAGNESIUM 324 MG
81 TABLET ORAL DAILY
Qty: 0 | Refills: 0 | Status: DISCONTINUED | OUTPATIENT
Start: 2019-02-06 | End: 2019-02-07

## 2019-02-05 RX ORDER — BUDESONIDE AND FORMOTEROL FUMARATE DIHYDRATE 160; 4.5 UG/1; UG/1
2 AEROSOL RESPIRATORY (INHALATION)
Qty: 0 | Refills: 0 | Status: DISCONTINUED | OUTPATIENT
Start: 2019-02-05 | End: 2019-02-06

## 2019-02-05 RX ORDER — CEFAZOLIN SODIUM 1 G
2000 VIAL (EA) INJECTION EVERY 8 HOURS
Qty: 0 | Refills: 0 | Status: DISCONTINUED | OUTPATIENT
Start: 2019-02-05 | End: 2019-02-05

## 2019-02-05 RX ORDER — CEFAZOLIN SODIUM 1 G
2000 VIAL (EA) INJECTION EVERY 8 HOURS
Qty: 0 | Refills: 0 | Status: COMPLETED | OUTPATIENT
Start: 2019-02-05 | End: 2019-02-07

## 2019-02-05 RX ORDER — GLUCAGON INJECTION, SOLUTION 0.5 MG/.1ML
1 INJECTION, SOLUTION SUBCUTANEOUS ONCE
Qty: 0 | Refills: 0 | Status: DISCONTINUED | OUTPATIENT
Start: 2019-02-05 | End: 2019-02-06

## 2019-02-05 RX ORDER — INSULIN HUMAN 100 [IU]/ML
1 INJECTION, SOLUTION SUBCUTANEOUS
Qty: 100 | Refills: 0 | Status: DISCONTINUED | OUTPATIENT
Start: 2019-02-05 | End: 2019-02-05

## 2019-02-05 RX ORDER — POTASSIUM CHLORIDE 20 MEQ
20 PACKET (EA) ORAL ONCE
Qty: 0 | Refills: 0 | Status: COMPLETED | OUTPATIENT
Start: 2019-02-05 | End: 2019-02-05

## 2019-02-05 RX ORDER — DEXTROSE 50 % IN WATER 50 %
15 SYRINGE (ML) INTRAVENOUS ONCE
Qty: 0 | Refills: 0 | Status: DISCONTINUED | OUTPATIENT
Start: 2019-02-05 | End: 2019-02-07

## 2019-02-05 RX ORDER — INSULIN LISPRO 100/ML
VIAL (ML) SUBCUTANEOUS
Qty: 0 | Refills: 0 | Status: DISCONTINUED | OUTPATIENT
Start: 2019-02-05 | End: 2019-02-07

## 2019-02-05 RX ORDER — PANTOPRAZOLE SODIUM 20 MG/1
40 TABLET, DELAYED RELEASE ORAL DAILY
Qty: 0 | Refills: 0 | Status: DISCONTINUED | OUTPATIENT
Start: 2019-02-05 | End: 2019-02-05

## 2019-02-05 RX ORDER — HEPARIN SODIUM 5000 [USP'U]/ML
5000 INJECTION INTRAVENOUS; SUBCUTANEOUS EVERY 8 HOURS
Qty: 0 | Refills: 0 | Status: DISCONTINUED | OUTPATIENT
Start: 2019-02-05 | End: 2019-02-07

## 2019-02-05 RX ORDER — ACETAMINOPHEN 500 MG
1000 TABLET ORAL ONCE
Qty: 0 | Refills: 0 | Status: COMPLETED | OUTPATIENT
Start: 2019-02-05 | End: 2019-02-05

## 2019-02-05 RX ORDER — SODIUM CHLORIDE 9 MG/ML
1000 INJECTION, SOLUTION INTRAVENOUS
Qty: 0 | Refills: 0 | Status: DISCONTINUED | OUTPATIENT
Start: 2019-02-05 | End: 2019-02-07

## 2019-02-05 RX ORDER — CHLORHEXIDINE GLUCONATE 213 G/1000ML
1 SOLUTION TOPICAL
Qty: 0 | Refills: 0 | Status: DISCONTINUED | OUTPATIENT
Start: 2019-02-05 | End: 2019-02-06

## 2019-02-05 RX ORDER — INFLUENZA VIRUS VACCINE 15; 15; 15; 15 UG/.5ML; UG/.5ML; UG/.5ML; UG/.5ML
0.5 SUSPENSION INTRAMUSCULAR ONCE
Qty: 0 | Refills: 0 | Status: DISCONTINUED | OUTPATIENT
Start: 2019-02-05 | End: 2019-02-06

## 2019-02-05 RX ADMIN — Medication 100 MILLIEQUIVALENT(S): at 16:25

## 2019-02-05 RX ADMIN — Medication 400 MILLIGRAM(S): at 17:16

## 2019-02-05 RX ADMIN — HEPARIN SODIUM 5000 UNIT(S): 5000 INJECTION INTRAVENOUS; SUBCUTANEOUS at 22:09

## 2019-02-05 RX ADMIN — Medication 2000 MILLIGRAM(S): at 20:25

## 2019-02-05 RX ADMIN — SODIUM CHLORIDE 250 MILLILITER(S): 9 INJECTION INTRAMUSCULAR; INTRAVENOUS; SUBCUTANEOUS at 18:39

## 2019-02-05 RX ADMIN — CHLORHEXIDINE GLUCONATE 1 APPLICATION(S): 213 SOLUTION TOPICAL at 22:07

## 2019-02-05 RX ADMIN — Medication 100 GRAM(S): at 18:41

## 2019-02-05 RX ADMIN — ATORVASTATIN CALCIUM 20 MILLIGRAM(S): 80 TABLET, FILM COATED ORAL at 22:09

## 2019-02-05 RX ADMIN — Medication 1000 MILLIGRAM(S): at 17:40

## 2019-02-05 RX ADMIN — CLOPIDOGREL BISULFATE 300 MILLIGRAM(S): 75 TABLET, FILM COATED ORAL at 18:42

## 2019-02-05 NOTE — PROGRESS NOTE ADULT - SUBJECTIVE AND OBJECTIVE BOX
9La acceptance note    Operation / Date:  2/5/19 TF TAVR ( rachael) EF Nl    SUBJECTIVE ASSESSMENT:  78y Female seen and examined. Daugther at bedside. Feels well, denies fever, chest pain, palpitations, SOB, abdominal pain, n/v.        Vital Signs Last 24 Hrs  T(C): 35.9 (05 Feb 2019 14:40), Max: 37.2 (05 Feb 2019 10:34)  T(F): 96.6 (05 Feb 2019 14:40), Max: 99 (05 Feb 2019 10:34)  HR: 70 (05 Feb 2019 16:00) (70 - 83)  BP: 198/91 (05 Feb 2019 10:34) (198/91 - 198/91)  BP(mean): --  RR: 16 (05 Feb 2019 16:00) (16 - 18)  SpO2: 100% (05 Feb 2019 16:00) (92% - 100%)  I&O's Detail    05 Feb 2019 07:01  -  05 Feb 2019 16:04  --------------------------------------------------------  IN:    sodium chloride 0.9%.: 5 mL  Total IN: 5 mL    OUT:  Total OUT: 0 mL    Total NET: 5 mL          CHEST TUBE:  YNo  SERVANDO DRAIN:  NO  EPICARDIAL WIRES: No.  TIE DOWNS: No.  VENTURA: No.    PHYSICAL EXAM:    General: Patient lying comfortably in bed, no acute distress     Neurological: Alert and oriented. No focal neurological deficits     Cardiovascular: S1S2, RRR, no murmurs appreciated on exam     Respiratory: Auscultated from anterior, Clear to ausculation bilaterally, no wheeze/rhonchi/rales    Gastrointestinal: + BS, soft, non tender, non distended     Extremities: Warm and well perfused. No edema, no calf tenderness     Vascular: 2+ Peripheral pulses b/l     Incision Sites: B/l groin: soft, no hematoma, TVP in R groin.  L radial A-line.  R radial band: CDI.     LABS:                        11.6   4.69  )-----------( 164      ( 05 Feb 2019 14:59 )             35.7       COUMADIN:  Yes/No. REASON: .    PT/INR - ( 05 Feb 2019 15:00 )   PT: 13.0 sec;   INR: 1.15          PTT - ( 05 Feb 2019 15:00 )  PTT:27.8 sec    02-05    141  |  107  |  18  ----------------------------<  103<H>  3.8   |  24  |  0.97    Ca    8.7      05 Feb 2019 14:59  Phos  3.8     02-05  Mg     1.9     02-05    TPro  6.7  /  Alb  3.7  /  TBili  0.5  /  DBili  x   /  AST  13  /  ALT  10  /  AlkPhos  39<L>  02-05          MEDICATIONS  (STANDING):  aspirin 325 milliGRAM(s) Oral once  ceFAZolin  Injectable. 2000 milliGRAM(s) IV Push every 8 hours  chlorhexidine 2% Cloths 1 Application(s) Topical <User Schedule>  clopidogrel Tablet 300 milliGRAM(s) Oral once  dextrose 50% Injectable 50 milliLiter(s) IV Push every 15 minutes  dextrose 50% Injectable 25 milliLiter(s) IV Push every 15 minutes  heparin  Injectable 5000 Unit(s) SubCutaneous every 8 hours  influenza   Vaccine 0.5 milliLiter(s) IntraMuscular once  insulin Infusion 1 Unit(s)/Hr (1 mL/Hr) IV Continuous <Continuous>  pantoprazole  Injectable 40 milliGRAM(s) IV Push daily  sodium chloride 0.9%. 1000 milliLiter(s) (10 mL/Hr) IV Continuous <Continuous>    MEDICATIONS  (PRN):        RADIOLOGY & ADDITIONAL TESTS:

## 2019-02-05 NOTE — PROGRESS NOTE ADULT - ASSESSMENT
78 year old Yakut speaking female with a history of obesity, uncontrolled hypertension, Asthma (without history of intubations), chronic diastolic heart failure previously evaluated by Dr. Alejo for severe aortic stenosis. As per the patient’s daughter, the patient has been experiencing dyspnea upon minimal exertion for the last year worsening within the last couple of months. She gets SOB after walking less than ½ block. Symptoms are relieved after a few minutes of rest. The patient was recently admitted to [facility] with uncontrolled HTN with SBP in the 200s and SOB. ECHO on 12/3/18 which showed EF 55% with moderate AS. Cardiac catherization performed on 1/9/2019 showed normal coronary arteries with severe aortic stenosis with a mean gradient of 48. On 2/5/19 patient underwent TF TAVR (rachael) EF Nl.  Brought to [facility] as mini ICU, extubated.    A/P:  Neurovascular: No delirium. Pain well controlled with current regimen.  -PRN's.    Cardiovascular: Hemodynamically stable. HR controlled.  Hx HTn, chornic diastolic HF, severe AS s/p TAVR (rachael) EF Nl  -post op EKG NSR QRS 98ms, will f/u EKG at 6pm, if unchanged will remove TVP per Dr. Alejo  -Continue ASA 81mg daily, plavix 75mg daily tomorrow, statin  - this AM, Plavix 300mg PO tonight when taking PO per dr. Alejo  -Normotensive currently, 130s by arterial line, f/u BP meds  -monitor hr/bp/tele    Respiratory: 02 Sat = 95% HFNC. Hx asthma, MARK per report  -Encourage ambulation, C+DB and Use of IS 10x / hr while awake.  -CXR- stable  -ABG 7.37, 42,78- per Dr. Blum started on HFNC, f/u ABG  -cont nebs    GI: Stable.  -protonix for GI PPX.  -Continue bowel regimen when PO  -ADAT    Renal / : BUN/Cr 18/0.97  -Monitor renal function.  -Monitor I/O's.  -Replete lytes PRN  -no cantu    Endocrine:    -A1c 5.3-ISS  -TSH WNL    Hematologic: H&H 11/35  -f/u AM CBC    ID: Afebrile, WBC 4.6  -complete periop abx  -Observe for SIRS/Sepsis Syndrome.    Prophylaxis:  -DVT prophylaxis with 5000 SubQ Heparin q8h.  -SCD's    Disposition:  -MINI ICU

## 2019-02-05 NOTE — H&P ADULT - ASSESSMENT
78 year old Stateless speaking female with a history of obesity, uncontrolled hypertension, Asthma (without history of intubations), chronic diastolic heart failure with severe aortic stenosis presented today for planned TAVR. The patient is clinically stable, NYHA Class III. High risk for SAVR given age and comorbidities.      Admit under Dr. Alejo via same day surgery.   Consent signed, placed on chart.   Risks/benefits reviewed, patient understands and agrees.   please check CBC, CBP, Mg, Ph, coags, and pBNP on arrival    T&S ordered and blood products placed on hold for OR.    mg to be given in pre op holding   to 9 east post op

## 2019-02-05 NOTE — H&P ADULT - NSHPSOCIALHISTORY_GEN_ALL_CORE
Social History  Smoker:   NO      ETOH Use:   NO   Ilicit Drug Use:   NO  Occupation: doesn't work  Assistant Devices:   None  Lives with:  and extended family

## 2019-02-05 NOTE — H&P ADULT - NSHPREVIEWOFSYSTEMS_GEN_ALL_CORE
Review of Systems  CONSTITUTIONAL:  Denies Fevers / chills, sweats, fatigue, weight loss, weight gain                                      NEURO:  Denies parathesias, seizures, syncope, confusion                                                                                EYES:  Denies Blurry vision, discharge, pain, loss of vision                                                                                    ENMT:  Denies Difficulty hearing, vertigo, dysphagia, epistaxis, recent dental work                                       CV: +MANRIQUE, chest tightness Denies palpitations, orthopnea                                                                                          RESPIRATORY:  Denies Wheezing, SOB, cough / sputum, hemoptysis                                                                GI:  Denies Nausea, vommiting, diarrhea, constipation, melena, difficulty swallowing                                               : Denies Hematuria, dysuria, urgency, incontinence                                                                                         MUSKULOSKELETAL:  Denies arthritis, joint swelling, muscle weakness                                                             SKIN/BREAST:  Denies rash, itching, yenny loss, masses                                                                                            PSYCH:  Denies depresion, anxiety, suicidal ideation                                                                                               HEME/LYMPH:  Denies bruises easily, enlarged lymph nodes, tender lymph nodes                                        ENDOCRINE:  Denies cold intolerance, heat intolerance, polydipsia

## 2019-02-05 NOTE — CHART NOTE - NSCHARTNOTEFT_GEN_A_CORE
Per Dr. Alejo, R femoral TVP and cordis removed, manual pressure held for 20 minutes and hemostasis achieved, without incident. Patient tolerated procedure.

## 2019-02-05 NOTE — CHART NOTE - NSCHARTNOTEFT_GEN_A_CORE
79 yo F with a hx of obesity, uncontrolled hypertension, Asthma (without history of intubations), chronic diastolic heart failure.  The patient has been experiencing dyspnea upon minimal exertion for the last year worsening within the last couple of months. Symptoms are relieved after a few minutes of rest.  SOB is sometimes associated with intermittent chest tightness. The patient was recently admitted to [facility] with uncontrolled HTN with SBP in the 200s and SOB. ECHO on 12/3/18 which showed EF 55% with moderate AS. Cardiac catherization performed on 1/9/2019 showed normal coronary arteries with severe aortic stenosis with a mean gradient of 48.1. She was evaluated by MDT and it was decided to pursue TAVR.  The patient presents today for TAVR.  She was consented and brought to the hybrid lab.  MAC anesthesia performed by anesthesia with radial antonino.  Time out was performed.  The patient was prepped in usual sterile fashion.  R femoral vein 6Fr locking sheath was placed with TVP advanced to RV apex and thresholds tested.  5F Raabe sheath was inserted into the left CFA and 5F pigtail was advanced into the ascending aorta, placed at the level of the right coronary cusp. 8F right CFA sheath was inserted and preclose performed with Proglide system x2. 5Fr pigtail was advanced to the NCC.  Aortography was used to determine coplanar angle. 8F sheath was upsized for the 14F Jain E-sheath over a 0.035” Lunderquist wire. 6Fr slender sheath was inserted into the R radial artery. Heparin administered per laboratory protocol. A Ben[place]ton embolic protection device was delivered over an 0.014" BMW wire and positioned within the great vessels.  The aortic valve was crossed using a 5F AL1 catheter/0.035" Straight stiff glidewire, which was advanced into the LV. Using an exchange length 0.035" J wire, the AL1 catheter was exchanged for a 5F pigtail. An 0.035" Confida wire was positioned within the LV.  The Commander delivery system was advanced with the 23 mm JAMAR 3 valve positioned across the annulus and deployed under rapid pacing. TTE showed trace PVL and aortogram revealed similar findings.  Hemodynamics demonstrated PG and MG < 2 mmHg.  The sentinel embolic protection device was recaptured at this point.  Protamine was administered and the 14F sheath was removed and proglide sutures secured. Hemostasis of L CFA via proglide system. Hemostasis of the R radial was achieved with TR band with 11 ccs of air. The patient's rhythm briefly changed to a LBBB after device deployment but this improved by the end of the case.  It was decided to leave the TVP in place as a result.  The patient tolerated the procedure well.  No complications.    Conclusion:  - Severe symptomatic aortic stenosis with chronic diastolic heart failure s/p successful transfemoral TAVR with 23 mm Jamar III valve; HTN; asthma    Recommendations:  - Routine post cath care  - TR band off in 3 hours  - Aspirin 81 mg daily, plavix 75 mg daily  - TTE in AM  - Maintain TVP, monitor rhythm, ECG in AM.   - Antibiotics per protocol

## 2019-02-05 NOTE — H&P ADULT - NSHPPHYSICALEXAM_GEN_ALL_CORE
T:   HR:   BP:  SpO2:    Physical Exam  CONSTITUTIONAL:                                                              WNL  NEURO:                                                                       WNL                      EYES:                                                                                WNL  ENMT:                                                                               WNL  CV:  +systolic murmur @RUSB  RESPIRATORY:                                                                 WNL  GI:                                                                                     WNL  : VENTURA + / -                                                                  WNL  MUSKULOSKELETAL:                                                       WNL  SKIN / BREAST:                                                                  WNL T: 99.0  HR: 83 NSR  BP: 190/91  SpO2: 99% on room air    Physical Exam  CONSTITUTIONAL:                                                              WNL  NEURO:                                                                       WNL                      EYES:                                                                                WNL  ENMT:                                                                               WNL  CV:  +systolic murmur @RUSB  RESPIRATORY:                                                                 WNL  GI:                                                                                     WNL  : VENTURA + / -                                                                  WNL  MUSKULOSKELETAL:                                                       WNL  SKIN / BREAST:                                                                  WNL

## 2019-02-05 NOTE — H&P ADULT - HISTORY OF PRESENT ILLNESS
Daughter, Jaelyn at bedside, helped with translation     78 year old Swazi speaking female with a history of obesity, uncontrolled hypertension, Asthma (without history of intubations), chronic diastolic heart failure previously evaluated by Dr. Alejo for severe aortic stenosis.    As per the patient’s daughter, the patient has been experiencing dyspnea upon minimal exertion for the last year worsening within the last couple of months. She gets SOB after walking less than ½ block. Symptoms are relieved after a few minutes of rest.  SOB is sometimes associated with intermittent chest tightness. She denies any SOB at rest, palpitations, syncope, dizziness, or LE edema.     The patient was recently admitted to [facility] with uncontrolled HTN with SBP in the 200s and SOB. ECHO on 12/3/18 which showed EF 55% with moderate AS.     Cardiac catherization performed on 1/9/2019 showed normal coronary arteries with severe aortic stenosis with a mean gradient of 48.1.     The patient lives with her  & extended family in a house. She does not need a walker or cane to ambulate. She remains independent in all her ADLs.     Patient seen in same day holding area; Reports no changes to PMHx or medications since last seen by our team. Denies acute or current SOB, chest pain, palpitation, N/V/D, fever/chills, recent illness, or any other concerning symptoms.     Of note patient has not taken her ASA 81 mg for 2 days, she will be given  mg in pre op holding.  She last took her coreg and lisinopril yesterday and took hydralazine this am prior to coming to hospital.

## 2019-02-05 NOTE — H&P ADULT - NSHPLABSRESULTS_GEN_ALL_CORE
EKG: NSR @ 72 bpm, no evidence of ischemic changes    CXR: not done, discussed with Dr. Alejo, and does not need one since had CT chest     CT Head non con 1/9/19  IMPRESSION:     No acute intracranial hemorrhage or CT evidence of recent transcortical   infarction.      CTA C/A/P 1/9/19  IMPRESSION:     4 cm ascending aortic aneurysm. Thickened aortic valve with mild   calcifications.    Mild calcific plaque and tortuosity of the distal aorta and left iliac   arteries. Additional aortic measurements and details as above.    9 mm right lung nodule. Pulmonary consultation and  3 months follow-up   versus PET CT are recommended.    CTA heart 1/9/19  Impression:   1.  Please note this study was not optimized for the evaluation of the   coronary arteries.  2.  Non obstructive disease of proximal LAD, mid LAD, proximal LCX.  3.  Less than 50% stenosis of the LM.  4.  The rest of the coronary segments appear normal.  5.  Mitral annular calcification  6.  Calcification of the aortic valve.  7.  Atherosclerotic disease of the aorta.    Echo report in chart and reviewed    US carotid dopplers  IMPRESSION:  No hemodynamically significant stenosis of either carotid arterial system.

## 2019-02-05 NOTE — PATIENT PROFILE ADULT - NSPROMUTPARTICIPCAREFT_GEN_A_NUR
----- Message from Javier Amador MD sent at 3/22/2018 11:39 AM EDT -----  Contact: JOSELINE (PHARMACIST) WITH [facility] [place] [phone]  Her David looks okay for the hydrocodone and I'm going to continue to prescribe that.      ----- Message -----  From: Shereen Iverson MA  Sent: 3/22/2018  10:29 AM  To: MD DR. DEWAYNE Rivera JAMIE, A [facility]AC[facility] [place], CALLED TODAY ABOUT PATIENT. SHE SAID THAT WHEN THEY RAN HER DAVID FOR HER ADDERALL AND HYDROCODONE, THERE WERE SOME RED FLAGS. SHE SAID THEY ARE THE 5TH PHARMACY THAT PATIENT HAS USED IN THE LAST YEAR TO FILL HER MEDICINES. ALSO, THEY SAID THAT THIS WILL BE THE ONLY TIME THEY WILL FILL HER HYDROCODONE BECAUSE PATIENT EITHER NEEDS TO GO TO PAIN MANAGEMENT FOR THIS, OR HAVE AN APPT WITH YOU TO DISCUSS HOW TO WEAN HER OFF OF THIS DUE TO SOMETHING REGARDING THE SHRUTHI. PLEASE ADVISE WHAT I SHOULD DO, AS PHARMACIST WOULD LIKE A CALL BACK REGARDING WHAT THE NEXT STEP IS GOING TO BE FOR THE PATIENT AS FAR AS HER HYDROCODONE GOES. THANK YOU.          none

## 2019-02-06 LAB
ALBUMIN SERPL ELPH-MCNC: 3.5 G/DL — SIGNIFICANT CHANGE UP (ref 3.3–5)
ALP SERPL-CCNC: 44 U/L — SIGNIFICANT CHANGE UP (ref 40–120)
ALT FLD-CCNC: 9 U/L — LOW (ref 10–45)
ANION GAP SERPL CALC-SCNC: 13 MMOL/L — SIGNIFICANT CHANGE UP (ref 5–17)
APTT BLD: 31.1 SEC — SIGNIFICANT CHANGE UP (ref 27.5–36.3)
AST SERPL-CCNC: 15 U/L — SIGNIFICANT CHANGE UP (ref 10–40)
BILIRUB SERPL-MCNC: 0.6 MG/DL — SIGNIFICANT CHANGE UP (ref 0.2–1.2)
BUN SERPL-MCNC: 14 MG/DL — SIGNIFICANT CHANGE UP (ref 7–23)
CALCIUM SERPL-MCNC: 8.9 MG/DL — SIGNIFICANT CHANGE UP (ref 8.4–10.5)
CHLORIDE SERPL-SCNC: 104 MMOL/L — SIGNIFICANT CHANGE UP (ref 96–108)
CO2 SERPL-SCNC: 23 MMOL/L — SIGNIFICANT CHANGE UP (ref 22–31)
CREAT SERPL-MCNC: 0.87 MG/DL — SIGNIFICANT CHANGE UP (ref 0.5–1.3)
GAS PNL BLDA: SIGNIFICANT CHANGE UP
GLUCOSE BLDC GLUCOMTR-MCNC: 103 MG/DL — HIGH (ref 70–99)
GLUCOSE BLDC GLUCOMTR-MCNC: 121 MG/DL — HIGH (ref 70–99)
GLUCOSE BLDC GLUCOMTR-MCNC: 88 MG/DL — SIGNIFICANT CHANGE UP (ref 70–99)
GLUCOSE BLDC GLUCOMTR-MCNC: 94 MG/DL — SIGNIFICANT CHANGE UP (ref 70–99)
GLUCOSE SERPL-MCNC: 104 MG/DL — HIGH (ref 70–99)
HCT VFR BLD CALC: 34.7 % — SIGNIFICANT CHANGE UP (ref 34.5–45)
HCT VFR BLD CALC: 36.2 % — SIGNIFICANT CHANGE UP (ref 34.5–45)
HGB BLD-MCNC: 11.3 G/DL — LOW (ref 11.5–15.5)
HGB BLD-MCNC: 11.9 G/DL — SIGNIFICANT CHANGE UP (ref 11.5–15.5)
INR BLD: 1.12 — SIGNIFICANT CHANGE UP (ref 0.88–1.16)
LACTATE SERPL-SCNC: 0.7 MMOL/L — SIGNIFICANT CHANGE UP (ref 0.5–2)
MAGNESIUM SERPL-MCNC: 2.2 MG/DL — SIGNIFICANT CHANGE UP (ref 1.6–2.6)
MCHC RBC-ENTMCNC: 30.5 PG — SIGNIFICANT CHANGE UP (ref 27–34)
MCHC RBC-ENTMCNC: 31.2 PG — SIGNIFICANT CHANGE UP (ref 27–34)
MCHC RBC-ENTMCNC: 32.6 GM/DL — SIGNIFICANT CHANGE UP (ref 32–36)
MCHC RBC-ENTMCNC: 32.9 GM/DL — SIGNIFICANT CHANGE UP (ref 32–36)
MCV RBC AUTO: 93.5 FL — SIGNIFICANT CHANGE UP (ref 80–100)
MCV RBC AUTO: 94.8 FL — SIGNIFICANT CHANGE UP (ref 80–100)
NRBC # BLD: 0 /100 WBCS — SIGNIFICANT CHANGE UP (ref 0–0)
NRBC # BLD: 0 /100 WBCS — SIGNIFICANT CHANGE UP (ref 0–0)
PHOSPHATE SERPL-MCNC: 4.3 MG/DL — SIGNIFICANT CHANGE UP (ref 2.5–4.5)
PLATELET # BLD AUTO: 148 K/UL — LOW (ref 150–400)
PLATELET # BLD AUTO: 159 K/UL — SIGNIFICANT CHANGE UP (ref 150–400)
POTASSIUM SERPL-MCNC: 3.7 MMOL/L — SIGNIFICANT CHANGE UP (ref 3.5–5.3)
POTASSIUM SERPL-SCNC: 3.7 MMOL/L — SIGNIFICANT CHANGE UP (ref 3.5–5.3)
PROT SERPL-MCNC: 6.4 G/DL — SIGNIFICANT CHANGE UP (ref 6–8.3)
PROTHROM AB SERPL-ACNC: 12.7 SEC — SIGNIFICANT CHANGE UP (ref 10–12.9)
RBC # BLD: 3.71 M/UL — LOW (ref 3.8–5.2)
RBC # BLD: 3.82 M/UL — SIGNIFICANT CHANGE UP (ref 3.8–5.2)
RBC # FLD: 13.2 % — SIGNIFICANT CHANGE UP (ref 10.3–14.5)
RBC # FLD: 13.2 % — SIGNIFICANT CHANGE UP (ref 10.3–14.5)
SODIUM SERPL-SCNC: 140 MMOL/L — SIGNIFICANT CHANGE UP (ref 135–145)
WBC # BLD: 4.28 K/UL — SIGNIFICANT CHANGE UP (ref 3.8–10.5)
WBC # BLD: 4.34 K/UL — SIGNIFICANT CHANGE UP (ref 3.8–10.5)
WBC # FLD AUTO: 4.28 K/UL — SIGNIFICANT CHANGE UP (ref 3.8–10.5)
WBC # FLD AUTO: 4.34 K/UL — SIGNIFICANT CHANGE UP (ref 3.8–10.5)

## 2019-02-06 PROCEDURE — 93306 TTE W/DOPPLER COMPLETE: CPT | Mod: 26

## 2019-02-06 PROCEDURE — 93010 ELECTROCARDIOGRAM REPORT: CPT

## 2019-02-06 PROCEDURE — 71045 X-RAY EXAM CHEST 1 VIEW: CPT | Mod: 26

## 2019-02-06 RX ORDER — ALBUMIN HUMAN 25 %
250 VIAL (ML) INTRAVENOUS ONCE
Qty: 0 | Refills: 0 | Status: COMPLETED | OUTPATIENT
Start: 2019-02-06 | End: 2019-02-06

## 2019-02-06 RX ORDER — POTASSIUM CHLORIDE 20 MEQ
20 PACKET (EA) ORAL ONCE
Qty: 0 | Refills: 0 | Status: COMPLETED | OUTPATIENT
Start: 2019-02-06 | End: 2019-02-06

## 2019-02-06 RX ORDER — ACETAMINOPHEN 500 MG
1000 TABLET ORAL ONCE
Qty: 0 | Refills: 0 | Status: COMPLETED | OUTPATIENT
Start: 2019-02-06 | End: 2019-02-06

## 2019-02-06 RX ORDER — POTASSIUM CHLORIDE 20 MEQ
20 PACKET (EA) ORAL
Qty: 0 | Refills: 0 | Status: DISCONTINUED | OUTPATIENT
Start: 2019-02-06 | End: 2019-02-06

## 2019-02-06 RX ORDER — HYDRALAZINE HCL 50 MG
25 TABLET ORAL EVERY 8 HOURS
Qty: 0 | Refills: 0 | Status: DISCONTINUED | OUTPATIENT
Start: 2019-02-06 | End: 2019-02-06

## 2019-02-06 RX ORDER — CARVEDILOL PHOSPHATE 80 MG/1
3.12 CAPSULE, EXTENDED RELEASE ORAL EVERY 12 HOURS
Qty: 0 | Refills: 0 | Status: DISCONTINUED | OUTPATIENT
Start: 2019-02-06 | End: 2019-02-07

## 2019-02-06 RX ORDER — FUROSEMIDE 40 MG
20 TABLET ORAL DAILY
Qty: 0 | Refills: 0 | Status: DISCONTINUED | OUTPATIENT
Start: 2019-02-06 | End: 2019-02-07

## 2019-02-06 RX ORDER — ACETAMINOPHEN 500 MG
650 TABLET ORAL EVERY 6 HOURS
Qty: 0 | Refills: 0 | Status: DISCONTINUED | OUTPATIENT
Start: 2019-02-06 | End: 2019-02-07

## 2019-02-06 RX ORDER — CARVEDILOL PHOSPHATE 80 MG/1
6.25 CAPSULE, EXTENDED RELEASE ORAL EVERY 12 HOURS
Qty: 0 | Refills: 0 | Status: DISCONTINUED | OUTPATIENT
Start: 2019-02-06 | End: 2019-02-06

## 2019-02-06 RX ORDER — SODIUM CHLORIDE 9 MG/ML
3 INJECTION INTRAMUSCULAR; INTRAVENOUS; SUBCUTANEOUS EVERY 8 HOURS
Qty: 0 | Refills: 0 | Status: DISCONTINUED | OUTPATIENT
Start: 2019-02-06 | End: 2019-02-07

## 2019-02-06 RX ORDER — POTASSIUM CHLORIDE 20 MEQ
40 PACKET (EA) ORAL ONCE
Qty: 0 | Refills: 0 | Status: DISCONTINUED | OUTPATIENT
Start: 2019-02-06 | End: 2019-02-06

## 2019-02-06 RX ORDER — PANTOPRAZOLE SODIUM 20 MG/1
40 TABLET, DELAYED RELEASE ORAL
Qty: 0 | Refills: 0 | Status: DISCONTINUED | OUTPATIENT
Start: 2019-02-06 | End: 2019-02-07

## 2019-02-06 RX ORDER — DOCUSATE SODIUM 100 MG
100 CAPSULE ORAL THREE TIMES A DAY
Qty: 0 | Refills: 0 | Status: DISCONTINUED | OUTPATIENT
Start: 2019-02-06 | End: 2019-02-07

## 2019-02-06 RX ADMIN — Medication 20 MILLIEQUIVALENT(S): at 07:46

## 2019-02-06 RX ADMIN — Medication 20 MILLIEQUIVALENT(S): at 06:57

## 2019-02-06 RX ADMIN — Medication 2000 MILLIGRAM(S): at 12:00

## 2019-02-06 RX ADMIN — HEPARIN SODIUM 5000 UNIT(S): 5000 INJECTION INTRAVENOUS; SUBCUTANEOUS at 21:46

## 2019-02-06 RX ADMIN — CHLORHEXIDINE GLUCONATE 1 APPLICATION(S): 213 SOLUTION TOPICAL at 05:53

## 2019-02-06 RX ADMIN — Medication 1000 MILLIGRAM(S): at 04:30

## 2019-02-06 RX ADMIN — Medication 125 MILLILITER(S): at 21:15

## 2019-02-06 RX ADMIN — SODIUM CHLORIDE 3 MILLILITER(S): 9 INJECTION INTRAMUSCULAR; INTRAVENOUS; SUBCUTANEOUS at 13:52

## 2019-02-06 RX ADMIN — HEPARIN SODIUM 5000 UNIT(S): 5000 INJECTION INTRAVENOUS; SUBCUTANEOUS at 05:50

## 2019-02-06 RX ADMIN — Medication 650 MILLIGRAM(S): at 21:45

## 2019-02-06 RX ADMIN — PANTOPRAZOLE SODIUM 40 MILLIGRAM(S): 20 TABLET, DELAYED RELEASE ORAL at 12:15

## 2019-02-06 RX ADMIN — SODIUM CHLORIDE 3 MILLILITER(S): 9 INJECTION INTRAMUSCULAR; INTRAVENOUS; SUBCUTANEOUS at 21:46

## 2019-02-06 RX ADMIN — Medication 100 MILLIGRAM(S): at 21:45

## 2019-02-06 RX ADMIN — Medication 2000 MILLIGRAM(S): at 21:46

## 2019-02-06 RX ADMIN — Medication 650 MILLIGRAM(S): at 12:15

## 2019-02-06 RX ADMIN — Medication 125 MILLILITER(S): at 21:55

## 2019-02-06 RX ADMIN — CARVEDILOL PHOSPHATE 3.12 MILLIGRAM(S): 80 CAPSULE, EXTENDED RELEASE ORAL at 09:55

## 2019-02-06 RX ADMIN — Medication 500 MILLILITER(S): at 13:03

## 2019-02-06 RX ADMIN — BUDESONIDE AND FORMOTEROL FUMARATE DIHYDRATE 2 PUFF(S): 160; 4.5 AEROSOL RESPIRATORY (INHALATION) at 05:51

## 2019-02-06 RX ADMIN — CLOPIDOGREL BISULFATE 75 MILLIGRAM(S): 75 TABLET, FILM COATED ORAL at 12:15

## 2019-02-06 RX ADMIN — Medication 400 MILLIGRAM(S): at 03:55

## 2019-02-06 RX ADMIN — Medication 81 MILLIGRAM(S): at 12:15

## 2019-02-06 RX ADMIN — Medication 20 MILLIGRAM(S): at 07:34

## 2019-02-06 RX ADMIN — Medication 650 MILLIGRAM(S): at 22:15

## 2019-02-06 RX ADMIN — Medication 650 MILLIGRAM(S): at 12:30

## 2019-02-06 RX ADMIN — Medication 2000 MILLIGRAM(S): at 04:30

## 2019-02-06 NOTE — PHYSICAL THERAPY INITIAL EVALUATION ADULT - CRITERIA FOR SKILLED THERAPEUTIC INTERVENTIONS
functional limitations in following categories/anticipated discharge recommendation/rehab potential/impairments found/therapy frequency

## 2019-02-06 NOTE — CHART NOTE - NSCHARTNOTEFT_GEN_A_CORE
Called to patients bedside by RN because of blurry vision.  Patient seen and examined with  [id number].  Patient c/o new lightheadedness and blurry vision. Neuro exam, nonfocal, reports no weakness.  Patient given low dose coreg this AM per Dr. Alejo. BP at visit 150s.  Chart reviewed, discussed with Dr. Alejo, likely relative hypotension, no need for kelly push, if persists, may require CT head.  Will continue to assess. Maintain -170.  She remains hemodynamic stable.    Patient also c/o of low back pain and b/l groin pain, since last night. CBC stable, groins soft, no hematoma noted, extremities warm and well perfused.  Dr. Alejo aware, pain meds prn and continue to monitor.

## 2019-02-06 NOTE — PHYSICAL THERAPY INITIAL EVALUATION ADULT - PERTINENT HX OF CURRENT PROBLEM, REHAB EVAL
78 year old female patient who has been experiencing dyspnea upon minimal exertion for the last year worsening within the last couple of months. She gets SOB after walking less than ½ block. Symptoms are relieved after a few minutes of rest.  SOB is sometimes associated with intermittent chest tightness. She denies any SOB at rest, palpitations, syncope, dizziness, or LE edema.

## 2019-02-06 NOTE — PROGRESS NOTE ADULT - ASSESSMENT
78 year old [place]bourgish speaking female with a history of obesity, uncontrolled hypertension, Asthma (without history of intubations), chronic diastolic heart failure previously evaluated by Dr. Alejo for severe aortic stenosis. As per the patient’s daughter, the patient has been experiencing dyspnea upon minimal exertion for the last year worsening within the last couple of months. She gets SOB after walking less than ½ block. Symptoms are relieved after a few minutes of rest. The patient was recently admitted to [facility] with uncontrolled HTN with SBP in the 200s and SOB. ECHO on 12/3/18 which showed EF 55% with moderate AS. Cardiac catherization performed on 1/9/2019 showed normal coronary arteries with severe aortic stenosis with a mean gradient of 48. On 2/5/19 patient underwent TF TAVR (rachael) EF Nl. Intraop transient bundle, narrow on arrival to [facility].  TVP removed POD0. Today POD1.     A/P:  Neurovascular: No delirium. Pain well controlled with current regimen.  -PRN's.    Cardiovascular: Hemodynamically stable. HR controlled.  Hx HTn, chornic diastolic HF, severe AS s/p TAVR (rachael) EF Nl  -post op EKG NSR QRS 98ms, will f/u EKG at 6pm, if unchanged will remove TVP per Dr. Alejo  -Continue ASA 81mg daily, plavix 75mg daily tomorrow, statin  - this AM, Plavix 300mg PO tonight when taking PO per dr. Alejo  -Normotensive currently, 130s by arterial line, f/u BP meds  -monitor hr/bp/tele    Respiratory: 02 Sat = 95% HFNC. Hx asthma, MARK per report  -Encourage ambulation, C+DB and Use of IS 10x / hr while awake.  -CXR- stable  -ABG 7.37, 42,78- per Dr. Blum started on HFNC, f/u ABG  -cont nebs    GI: Stable.  -protonix for GI PPX.  -Continue bowel regimen when PO  -ADAT    Renal / : BUN/Cr 18/0.97  -Monitor renal function.  -Monitor I/O's.  -Replete lytes PRN  -no cantu    Endocrine:    -A1c 5.3-ISS  -TSH WNL    Hematologic: H&H 11/35  -f/u AM CBC    ID: Afebrile, WBC 4.6  -complete periop abx  -Observe for SIRS/Sepsis Syndrome.    Prophylaxis:  -DVT prophylaxis with 5000 SubQ Heparin q8h.  -SCD's    Disposition:  -[facility] 78 year old [place] speaking female with a history of obesity, uncontrolled hypertension, Asthma (without history of intubations), chronic diastolic heart failure previously evaluated by Dr. Alejo for severe aortic stenosis. As per the patient’s daughter, the patient has been experiencing dyspnea upon minimal exertion for the last year worsening within the last couple of months. She gets SOB after walking less than ½ block. Symptoms are relieved after a few minutes of rest. The patient was recently admitted to [facility] with uncontrolled HTN with SBP in the 200s and SOB. ECHO on 12/3/18 which showed EF 55% with moderate AS. Cardiac catherization performed on 1/9/2019 showed normal coronary arteries with severe aortic stenosis with a mean gradient of 48. On 2/5/19 patient underwent TF TAVR (rachael) EF Nl. Intraop transient bundle, narrow on arrival to [facility].  TVP removed POD0. Today POD1. Echo: EF 75%, Hyperdynamic LV, no AI, peak 25mmHg, mean 13mmHg.    A/P:  Neurovascular: No delirium. Pain well controlled with current regimen.  -PRN's.    Cardiovascular: Hemodynamically stable. HR controlled.  Hx HTn, chornic diastolic HF, severe AS s/p TAVR (rachael) EF Nl  -Continue ASA 81mg daily, plavix 75mg daily tomorrow, atorvastatin 20mg qhs  -Dizziness this AM 2/2 relative hypotension after starting low dose coreg/lasix 20mg PO per Dr. Alejo.  Neuro nonfocal, c/o blurry vision and headache, Dr. Alejo aware, given back albumin after Echo results. Symptoms resolving, feels better.  Continue to monitor, no need for head CT, invasive monitoring per Dr. Alejo. Allow permissive -160s  -Continue to monitor BP  -monitor hr/bp/tele    Respiratory: 02 Sat = 98% on RA. Hx asthma, MARK per report  -Encourage ambulation, C+DB and Use of IS 10x / hr while awake.  -CXR- mild congestion  -Lasix 20mg PO started this AM per Dr. Alejo.  -cont nebs    GI: Stable.  -protonix for GI PPX.  -Continue bowel regimen  -PO diet    Renal / : BUN/Cr14/0.87  -Monitor renal function.  -Monitor I/O's.  -Replete lytes PRN    Endocrine:    -A1c 5.3-ISS  -TSH WNL    Hematologic: H&H 11/35  -f/u AM CBC    ID: Afebrile, WBC 4  -complete periop abx  -Observe for SIRS/Sepsis Syndrome.    Prophylaxis:  -DVT prophylaxis with 5000 SubQ Heparin q8h.  -SCD's    Disposition:  -[facility]

## 2019-02-06 NOTE — PROGRESS NOTE ADULT - SUBJECTIVE AND OBJECTIVE BOX
Patient discussed on morning rounds with Dr. Alejo    Operation / Date: 2/5/19 TF TAVR (Jamar) EF 75%    SUBJECTIVE ASSESSMENT:  78y Female seen and examined.  [id number]. This morning patient c/o of dizziness and b/l groin back.  Since then it has improved. She is ambulating with PT, tolderating PO Diet, + gas, no BM. Denies fever, chest pain ,palpitations, SOB, abdominal pain, n/v, weakness, LOC, change in speech.       Vital Signs Last 24 Hrs  T(C): 37.1 (06 Feb 2019 14:01), Max: 37.1 (06 Feb 2019 14:01)  T(F): 98.7 (06 Feb 2019 14:01), Max: 98.7 (06 Feb 2019 14:01)  HR: 84 (06 Feb 2019 14:12) (58 - 102)  BP: 162/75 (06 Feb 2019 14:12) (115/60 - 184/80)  BP(mean): 102 (06 Feb 2019 14:12) (84 - 132)  RR: 16 (06 Feb 2019 12:10) (11 - 159)  SpO2: 98% (06 Feb 2019 14:12) (92% - 100%)  I&O's Detail    05 Feb 2019 07:01  -  06 Feb 2019 07:00  --------------------------------------------------------  IN:    IV PiggyBack: 480 mL    Oral Fluid: 90 mL    sodium chloride 0.9%: 250 mL    sodium chloride 0.9%: 80 mL  Total IN: 900 mL    OUT:    Voided: 700 mL  Total OUT: 700 mL    Total NET: 200 mL      06 Feb 2019 07:01  -  06 Feb 2019 14:37  --------------------------------------------------------  IN:    Oral Fluid: 60 mL    sodium chloride 0.9%: 5 mL  Total IN: 65 mL    OUT:    Voided: 500 mL  Total OUT: 500 mL    Total NET: -435 mL      PHYSICAL EXAM:    General: Patient lying comfortably in bed, no acute distress     Neurological: Alert and oriented. No focal neurological deficits 5/5 UE B/l and LE b/l strength. CN grossly intact.     Cardiovascular: S1S2, RRR, no murmurs appreciated on exam     Respiratory: Clear to ausculation bilaterally, no wheeze/rhonchi/rales    Gastrointestinal: + BS, soft, non tender, non distended     Extremities: Warm and well perfused. Trace b/l LE edema., no calf tenderness     Vascular: 2+ Peripheral pulses b/l     Incision Sites: B/l groin: CDi, soft no hematoma, no bruit, +TTP.     LABS:                        11.9   4.34  )-----------( 148      ( 06 Feb 2019 10:24 )             36.2       COUMADIN:  No    PT/INR - ( 06 Feb 2019 03:35 )   PT: 12.7 sec;   INR: 1.12          PTT - ( 06 Feb 2019 03:35 )  PTT:31.1 sec    02-06    140  |  104  |  14  ----------------------------<  104<H>  3.7   |  23  |  0.87    Ca    8.9      06 Feb 2019 03:35  Phos  4.3     02-06  Mg     2.2     02-06    TPro  6.4  /  Alb  3.5  /  TBili  0.6  /  DBili  x   /  AST  15  /  ALT  9<L>  /  AlkPhos  44  02-06          MEDICATIONS  (STANDING):  albumin human  5% IVPB 250 milliLiter(s) IV Intermittent once  aspirin enteric coated 81 milliGRAM(s) Oral daily  carvedilol 3.125 milliGRAM(s) Oral every 12 hours  ceFAZolin  Injectable. 2000 milliGRAM(s) IV Push every 8 hours  clopidogrel Tablet 75 milliGRAM(s) Oral daily  dextrose 5%. 1000 milliLiter(s) (50 mL/Hr) IV Continuous <Continuous>  docusate sodium 100 milliGRAM(s) Oral three times a day  furosemide    Tablet 20 milliGRAM(s) Oral daily  heparin  Injectable 5000 Unit(s) SubCutaneous every 8 hours  insulin lispro (HumaLOG) corrective regimen sliding scale   SubCutaneous Before meals and at bedtime  pantoprazole    Tablet 40 milliGRAM(s) Oral before breakfast  sodium chloride 0.9% lock flush 3 milliLiter(s) IV Push every 8 hours    MEDICATIONS  (PRN):  acetaminophen   Tablet .. 650 milliGRAM(s) Oral every 6 hours PRN Moderate Pain (4 - 6)  dextrose 40% Gel 15 Gram(s) Oral once PRN Blood Glucose LESS THAN 70 milliGRAM(s)/deciliter        RADIOLOGY & ADDITIONAL TESTS: Patient discussed on morning rounds with Dr. Alejo    Operation / Date: 2/5/19 TF TAVR (Jamar) EF 75%    SUBJECTIVE ASSESSMENT:  78y Female seen and examined.  [id number]. This morning patient c/o of dizziness and b/l groin back.  Since then it has improved. She is ambulating with PT, tolderating PO Diet, + gas, no BM. Denies fever, chest pain ,palpitations, SOB, abdominal pain, n/v, weakness, LOC, change in speech.       Vital Signs Last 24 Hrs  T(C): 37.1 (06 Feb 2019 14:01), Max: 37.1 (06 Feb 2019 14:01)  T(F): 98.7 (06 Feb 2019 14:01), Max: 98.7 (06 Feb 2019 14:01)  HR: 84 (06 Feb 2019 14:12) (58 - 102)  BP: 162/75 (06 Feb 2019 14:12) (115/60 - 184/80)  BP(mean): 102 (06 Feb 2019 14:12) (84 - 132)  RR: 16 (06 Feb 2019 12:10) (11 - 159)  SpO2: 98% (06 Feb 2019 14:12) (92% - 100%)  I&O's Detail    05 Feb 2019 07:01  -  06 Feb 2019 07:00  --------------------------------------------------------  IN:    IV PiggyBack: 480 mL    Oral Fluid: 90 mL    sodium chloride 0.9%: 250 mL    sodium chloride 0.9%: 80 mL  Total IN: 900 mL    OUT:    Voided: 700 mL  Total OUT: 700 mL    Total NET: 200 mL      06 Feb 2019 07:01  -  06 Feb 2019 14:37  --------------------------------------------------------  IN:    Oral Fluid: 60 mL    sodium chloride 0.9%: 5 mL  Total IN: 65 mL    OUT:    Voided: 500 mL  Total OUT: 500 mL    Total NET: -435 mL      PHYSICAL EXAM:    General: Patient lying comfortably in bed, no acute distress     Neurological: Alert and oriented. No focal neurological deficits 5/5 UE B/l and LE b/l strength. CN grossly intact.     Cardiovascular: S1S2, RRR, no murmurs appreciated on exam     Respiratory: Clear to ausculation bilaterally, no wheeze/rhonchi/rales    Gastrointestinal: + BS, soft, non tender, non distended     Extremities: Warm and well perfused. Trace b/l LE edema., no calf tenderness     Vascular: 2+ Peripheral pulses b/l     Incision Sites: B/l groin: CDi, soft no hematoma, no bruit, +TTP.     LABS:                        11.9   4.34  )-----------( 148      ( 06 Feb 2019 10:24 )             36.2       COUMADIN:  No    PT/INR - ( 06 Feb 2019 03:35 )   PT: 12.7 sec;   INR: 1.12          PTT - ( 06 Feb 2019 03:35 )  PTT:31.1 sec    02-06    140  |  104  |  14  ----------------------------<  104<H>  3.7   |  23  |  0.87    Ca    8.9      06 Feb 2019 03:35  Phos  4.3     02-06  Mg     2.2     02-06    TPro  6.4  /  Alb  3.5  /  TBili  0.6  /  DBili  x   /  AST  15  /  ALT  9<L>  /  AlkPhos  44  02-06          MEDICATIONS  (STANDING):  albumin human  5% IVPB 250 milliLiter(s) IV Intermittent once  aspirin enteric coated 81 milliGRAM(s) Oral daily  carvedilol 3.125 milliGRAM(s) Oral every 12 hours  ceFAZolin  Injectable. 2000 milliGRAM(s) IV Push every 8 hours  clopidogrel Tablet 75 milliGRAM(s) Oral daily  dextrose 5%. 1000 milliLiter(s) (50 mL/Hr) IV Continuous <Continuous>  docusate sodium 100 milliGRAM(s) Oral three times a day  furosemide    Tablet 20 milliGRAM(s) Oral daily  heparin  Injectable 5000 Unit(s) SubCutaneous every 8 hours  insulin lispro (HumaLOG) corrective regimen sliding scale   SubCutaneous Before meals and at bedtime  pantoprazole    Tablet 40 milliGRAM(s) Oral before breakfast  sodium chloride 0.9% lock flush 3 milliLiter(s) IV Push every 8 hours    MEDICATIONS  (PRN):  acetaminophen   Tablet .. 650 milliGRAM(s) Oral every 6 hours PRN Moderate Pain (4 - 6)  dextrose 40% Gel 15 Gram(s) Oral once PRN Blood Glucose LESS THAN 70 milliGRAM(s)/deciliter        RADIOLOGY & ADDITIONAL TESTS:  < from: Echocardiogram (02.06.19 @ 11:34) >  There is moderate concentric left ventricular hypertrophy. The left   ventricular   wall motion is normal.  The left ventricle is hyperdynamic and the   overall   ejection fraction is increased (>75%).  The right ventricle is normal in   size   and function.The left atrial size is normal. The mitral inflow pattern is   consistent with impaired left ventricular relaxation with mildly   elevated left   atrial pressure (8-14mmHg).  Right atrial size is normal.Status post   TAVR. 23   mm Jamar Valve is seen in the aortic position. No aortic regurgitation   noted.   The peak pressure gradient is 25 mmHg. The mean pressure gradient is 13   mmHg.   LVOT diameter = 2.0  The calculated aortic valve area using the   continuity   equation is 2.2 cm2.Mitral annular calcification noted. Structurally   normal   mitral valve. No mitral regurgitation noted.  Structurally normal   tricuspid   valve. There is trace tricuspid regurgitation. There was insufficient TR   detectedfrom which to calculate pulmonary artery systolic pressure.   Structurally normal pulmonic valve. No pulmonic regurgitation   noted.There is   no pericardial effusion.The ascending aorta is mild - moderately dilated   and   measures 4.4 cm.    < end of copied text >

## 2019-02-06 NOTE — PHYSICAL THERAPY INITIAL EVALUATION ADULT - ADDITIONAL COMMENTS
Pt has been a home ambulator 2/2 sever SOB and poor endurance. Pt denies DME use, hx of falls and reports having 5 stairs to enter home. Pt lives in 3 unit home that her daughter owns and thus is surrounded by family who she says "spoil" her. Pt understand some English, mostly Indonesian speaking.

## 2019-02-07 ENCOUNTER — TRANSCRIPTION ENCOUNTER (OUTPATIENT)
Age: 79
End: 2019-02-07

## 2019-02-07 VITALS — TEMPERATURE: 97 F

## 2019-02-07 LAB
ALBUMIN SERPL ELPH-MCNC: 3.9 G/DL — SIGNIFICANT CHANGE UP (ref 3.3–5)
ALP SERPL-CCNC: 38 U/L — LOW (ref 40–120)
ALT FLD-CCNC: 6 U/L — LOW (ref 10–45)
ANION GAP SERPL CALC-SCNC: 9 MMOL/L — SIGNIFICANT CHANGE UP (ref 5–17)
AST SERPL-CCNC: 12 U/L — SIGNIFICANT CHANGE UP (ref 10–40)
BILIRUB SERPL-MCNC: 0.6 MG/DL — SIGNIFICANT CHANGE UP (ref 0.2–1.2)
BUN SERPL-MCNC: 14 MG/DL — SIGNIFICANT CHANGE UP (ref 7–23)
CALCIUM SERPL-MCNC: 9.2 MG/DL — SIGNIFICANT CHANGE UP (ref 8.4–10.5)
CHLORIDE SERPL-SCNC: 106 MMOL/L — SIGNIFICANT CHANGE UP (ref 96–108)
CO2 SERPL-SCNC: 26 MMOL/L — SIGNIFICANT CHANGE UP (ref 22–31)
CREAT SERPL-MCNC: 0.89 MG/DL — SIGNIFICANT CHANGE UP (ref 0.5–1.3)
GLUCOSE BLDC GLUCOMTR-MCNC: 102 MG/DL — HIGH (ref 70–99)
GLUCOSE BLDC GLUCOMTR-MCNC: 129 MG/DL — HIGH (ref 70–99)
GLUCOSE SERPL-MCNC: 99 MG/DL — SIGNIFICANT CHANGE UP (ref 70–99)
HCT VFR BLD CALC: 31.7 % — LOW (ref 34.5–45)
HGB BLD-MCNC: 10.6 G/DL — LOW (ref 11.5–15.5)
MAGNESIUM SERPL-MCNC: 2.1 MG/DL — SIGNIFICANT CHANGE UP (ref 1.6–2.6)
MCHC RBC-ENTMCNC: 31.3 PG — SIGNIFICANT CHANGE UP (ref 27–34)
MCHC RBC-ENTMCNC: 33.4 GM/DL — SIGNIFICANT CHANGE UP (ref 32–36)
MCV RBC AUTO: 93.5 FL — SIGNIFICANT CHANGE UP (ref 80–100)
NRBC # BLD: 0 /100 WBCS — SIGNIFICANT CHANGE UP (ref 0–0)
PLATELET # BLD AUTO: 135 K/UL — LOW (ref 150–400)
POTASSIUM SERPL-MCNC: 4.2 MMOL/L — SIGNIFICANT CHANGE UP (ref 3.5–5.3)
POTASSIUM SERPL-SCNC: 4.2 MMOL/L — SIGNIFICANT CHANGE UP (ref 3.5–5.3)
PROT SERPL-MCNC: 6.6 G/DL — SIGNIFICANT CHANGE UP (ref 6–8.3)
RBC # BLD: 3.39 M/UL — LOW (ref 3.8–5.2)
RBC # FLD: 12.7 % — SIGNIFICANT CHANGE UP (ref 10.3–14.5)
SODIUM SERPL-SCNC: 141 MMOL/L — SIGNIFICANT CHANGE UP (ref 135–145)
WBC # BLD: 3.62 K/UL — LOW (ref 3.8–10.5)
WBC # FLD AUTO: 3.62 K/UL — LOW (ref 3.8–10.5)

## 2019-02-07 PROCEDURE — 93010 ELECTROCARDIOGRAM REPORT: CPT

## 2019-02-07 PROCEDURE — 71045 X-RAY EXAM CHEST 1 VIEW: CPT | Mod: 26

## 2019-02-07 PROCEDURE — 99239 HOSP IP/OBS DSCHRG MGMT >30: CPT

## 2019-02-07 PROCEDURE — 99238 HOSP IP/OBS DSCHRG MGMT 30/<: CPT

## 2019-02-07 RX ORDER — LISINOPRIL 2.5 MG/1
1 TABLET ORAL
Qty: 30 | Refills: 0 | OUTPATIENT
Start: 2019-02-07 | End: 2019-03-08

## 2019-02-07 RX ORDER — POLYETHYLENE GLYCOL 3350 17 G/17G
17 POWDER, FOR SOLUTION ORAL
Qty: 119 | Refills: 0
Start: 2019-02-07 | End: 2019-02-13

## 2019-02-07 RX ORDER — DOCUSATE SODIUM 100 MG
1 CAPSULE ORAL
Qty: 21 | Refills: 0 | OUTPATIENT
Start: 2019-02-07 | End: 2019-02-13

## 2019-02-07 RX ORDER — BACITRACIN ZINC 500 UNIT/G
1 OINTMENT IN PACKET (EA) TOPICAL
Qty: 1 | Refills: 0 | OUTPATIENT
Start: 2019-02-07 | End: 2019-03-08

## 2019-02-07 RX ORDER — LISINOPRIL 2.5 MG/1
5 TABLET ORAL DAILY
Qty: 0 | Refills: 0 | Status: DISCONTINUED | OUTPATIENT
Start: 2019-02-07 | End: 2019-02-07

## 2019-02-07 RX ORDER — CLOPIDOGREL BISULFATE 75 MG/1
1 TABLET, FILM COATED ORAL
Qty: 30 | Refills: 0 | OUTPATIENT
Start: 2019-02-07 | End: 2019-03-08

## 2019-02-07 RX ORDER — CARVEDILOL PHOSPHATE 80 MG/1
1 CAPSULE, EXTENDED RELEASE ORAL
Qty: 0 | Refills: 0 | COMMUNITY

## 2019-02-07 RX ORDER — LISINOPRIL 2.5 MG/1
1 TABLET ORAL
Qty: 0 | Refills: 0 | COMMUNITY

## 2019-02-07 RX ORDER — ATORVASTATIN CALCIUM 80 MG/1
1 TABLET, FILM COATED ORAL
Qty: 30 | Refills: 0 | OUTPATIENT
Start: 2019-02-07 | End: 2019-03-08

## 2019-02-07 RX ORDER — HYDROCHLOROTHIAZIDE 25 MG
25 TABLET ORAL DAILY
Qty: 0 | Refills: 0 | Status: DISCONTINUED | OUTPATIENT
Start: 2019-02-07 | End: 2019-02-07

## 2019-02-07 RX ORDER — ACETAMINOPHEN 500 MG
2 TABLET ORAL
Qty: 240 | Refills: 0 | OUTPATIENT
Start: 2019-02-07 | End: 2019-03-08

## 2019-02-07 RX ORDER — ASPIRIN/CALCIUM CARB/MAGNESIUM 324 MG
1 TABLET ORAL
Qty: 0 | Refills: 0 | COMMUNITY

## 2019-02-07 RX ORDER — ASPIRIN/CALCIUM CARB/MAGNESIUM 324 MG
1 TABLET ORAL
Qty: 30 | Refills: 0 | OUTPATIENT
Start: 2019-02-07 | End: 2019-03-08

## 2019-02-07 RX ORDER — CARVEDILOL PHOSPHATE 80 MG/1
1 CAPSULE, EXTENDED RELEASE ORAL
Qty: 60 | Refills: 0 | OUTPATIENT
Start: 2019-02-07 | End: 2019-03-08

## 2019-02-07 RX ORDER — HYDROCHLOROTHIAZIDE 25 MG
12.5 TABLET ORAL DAILY
Qty: 0 | Refills: 0 | Status: DISCONTINUED | OUTPATIENT
Start: 2019-02-07 | End: 2019-02-07

## 2019-02-07 RX ORDER — BACITRACIN ZINC 500 UNIT/G
1 OINTMENT IN PACKET (EA) TOPICAL DAILY
Qty: 0 | Refills: 0 | Status: DISCONTINUED | OUTPATIENT
Start: 2019-02-07 | End: 2019-02-07

## 2019-02-07 RX ORDER — HYDRALAZINE HCL 50 MG
0 TABLET ORAL
Qty: 0 | Refills: 0 | COMMUNITY

## 2019-02-07 RX ORDER — PANTOPRAZOLE SODIUM 20 MG/1
1 TABLET, DELAYED RELEASE ORAL
Qty: 30 | Refills: 0 | OUTPATIENT
Start: 2019-02-07 | End: 2019-03-08

## 2019-02-07 RX ADMIN — HEPARIN SODIUM 5000 UNIT(S): 5000 INJECTION INTRAVENOUS; SUBCUTANEOUS at 05:49

## 2019-02-07 RX ADMIN — Medication 100 MILLIGRAM(S): at 05:49

## 2019-02-07 RX ADMIN — LISINOPRIL 5 MILLIGRAM(S): 2.5 TABLET ORAL at 08:50

## 2019-02-07 RX ADMIN — Medication 25 MILLIGRAM(S): at 11:27

## 2019-02-07 RX ADMIN — SODIUM CHLORIDE 3 MILLILITER(S): 9 INJECTION INTRAMUSCULAR; INTRAVENOUS; SUBCUTANEOUS at 05:44

## 2019-02-07 RX ADMIN — CARVEDILOL PHOSPHATE 3.12 MILLIGRAM(S): 80 CAPSULE, EXTENDED RELEASE ORAL at 06:42

## 2019-02-07 RX ADMIN — Medication 650 MILLIGRAM(S): at 03:15

## 2019-02-07 RX ADMIN — Medication 2000 MILLIGRAM(S): at 05:43

## 2019-02-07 RX ADMIN — Medication 20 MILLIGRAM(S): at 05:49

## 2019-02-07 RX ADMIN — PANTOPRAZOLE SODIUM 40 MILLIGRAM(S): 20 TABLET, DELAYED RELEASE ORAL at 06:42

## 2019-02-07 RX ADMIN — Medication 1 APPLICATION(S): at 11:27

## 2019-02-07 RX ADMIN — CLOPIDOGREL BISULFATE 75 MILLIGRAM(S): 75 TABLET, FILM COATED ORAL at 11:27

## 2019-02-07 RX ADMIN — Medication 81 MILLIGRAM(S): at 11:26

## 2019-02-07 RX ADMIN — Medication 650 MILLIGRAM(S): at 02:56

## 2019-02-07 NOTE — DISCHARGE NOTE ADULT - PATIENT PORTAL LINK FT
You can access the [facility] Patient Portal, offered by [facility], by registering with the following website: [email or web address]

## 2019-02-07 NOTE — DISCHARGE NOTE ADULT - PLAN OF CARE
s/p TAVR - Please follow up with Dr. Alejo on 2/20/19 at 2:30 PM.  The office is located at [facility], [facility], 4th floor. Call us with any questions #[phone].    - Please follow up with Dr. Hussain Peraza on  2/18/19 at 1:15 PM. His office location is listed below.   - Please continue to apply lotion to your feet daily, you should follow up with a podiatrist if your feet continue to have burning sensation.   - Please continue to take your blood pressure at home daily, if you notice your blood pressure elevated > 150 mmHg please call Dr. Alejo's office to adjust your blood pressure medications.       -Walk daily as tolerated and use your incentive spirometer every hour.    -No driving or strenuous activity/exercise for 6 weeks, or until cleared by your surgeon.    -Gently clean your incisions with anti-bacterial soap and water, pat dry.  You may leave them open to air.    -Call your doctor if you have shortness of breath, chest pain not relieved by pain medication, dizziness, fever >101.5, or increased redness or drainage from incisions. - Please follow up with Dr. Alejo on 2/20/19 at 2:30 PM.  The office is located at [facility], [facility], 4th floor. Call us with any questions #[phone].    - Please follow up with Dr. Hussain Peraza on  2/18/19 at 1:15 PM. His office location is listed below.   - Please continue to apply lotion to your feet daily, you should follow up with a podiatrist if your feet continue to have burning sensation.     - Please continue to take your blood pressure at home daily, if you notice your blood pressure elevated > 180 mmHg please call Dr. Alejo's office to adjust your blood pressure medications. Please call Dr. Alejo's office tomorrow and Monday 2/11/19 with your blood pressure readings.     -Walk daily as tolerated and use your incentive spirometer every hour.    -No driving or strenuous activity/exercise for 6 weeks, or until cleared by your surgeon.    -Gently clean your incisions with anti-bacterial soap and water, pat dry.  You may leave them open to air.    -Call your doctor if you have shortness of breath, chest pain not relieved by pain medication, dizziness, fever >101.5, or increased redness or drainage from incisions.

## 2019-02-07 NOTE — DISCHARGE NOTE ADULT - CARE PLAN
Principal Discharge DX:	CHF (congestive heart failure) Principal Discharge DX:	CHF (congestive heart failure)  Goal:	s/p TAVR  Assessment and plan of treatment:	- Please follow up with Dr. Alejo on 2/20/19 at 2:30 PM.  The office is located at [facility], [facility], 4th floor. Call us with any questions #[phone].    - Please follow up with Dr. Hussain Peraza on  2/18/19 at 1:15 PM. His office location is listed below.   - Please continue to apply lotion to your feet daily, you should follow up with a podiatrist if your feet continue to have burning sensation.   - Please continue to take your blood pressure at home daily, if you notice your blood pressure elevated > 150 mmHg please call Dr. Alejo's office to adjust your blood pressure medications.       -Walk daily as tolerated and use your incentive spirometer every hour.    -No driving or strenuous activity/exercise for 6 weeks, or until cleared by your surgeon.    -Gently clean your incisions with anti-bacterial soap and water, pat dry.  You may leave them open to air.    -Call your doctor if you have shortness of breath, chest pain not relieved by pain medication, dizziness, fever >101.5, or increased redness or drainage from incisions. Principal Discharge DX:	CHF (congestive heart failure)  Goal:	s/p TAVR  Assessment and plan of treatment:	- Please follow up with Dr. Alejo on 2/20/19 at 2:30 PM.  The office is located at [facility], [facility], 4th floor. Call us with any questions #[phone].    - Please follow up with Dr. Hussain Peraza on  2/18/19 at 1:15 PM. His office location is listed below.   - Please continue to apply lotion to your feet daily, you should follow up with a podiatrist if your feet continue to have burning sensation.     - Please continue to take your blood pressure at home daily, if you notice your blood pressure elevated > 180 mmHg please call Dr. Alejo's office to adjust your blood pressure medications. Please call Dr. Alejo's office tomorrow and Monday 2/11/19 with your blood pressure readings.     -Walk daily as tolerated and use your incentive spirometer every hour.    -No driving or strenuous activity/exercise for 6 weeks, or until cleared by your surgeon.    -Gently clean your incisions with anti-bacterial soap and water, pat dry.  You may leave them open to air.    -Call your doctor if you have shortness of breath, chest pain not relieved by pain medication, dizziness, fever >101.5, or increased redness or drainage from incisions.

## 2019-02-07 NOTE — DISCHARGE NOTE ADULT - MEDICATION SUMMARY - MEDICATIONS TO TAKE
I will START or STAY ON the medications listed below when I get home from the hospital:    aspirin 81 mg oral delayed release tablet  -- 1 tab(s) by mouth once a day  -- Indication: For Heart disease    acetaminophen 325 mg oral tablet  -- 2 tab(s) by mouth every 6 hours, As needed, Moderate Pain (4 - 6)  -- Indication: For As needed for mild pain    lisinopril 5 mg oral tablet  -- 1 tab(s) by mouth once a day  -- Indication: For Blood pressure    clopidogrel 75 mg oral tablet  -- 1 tab(s) by mouth once a day  -- Indication: For Heart Disease    carvedilol 3.125 mg oral tablet  -- 1 tab(s) by mouth every 12 hours  -- Indication: For Blood pressure    fluticasone-salmeterol 100 mcg-50 mcg inhalation powder  -- 1 puff(s) inhaled 2 times a day, As Needed  -- Indication: For As needed for SOB    bacitracin 500 units/g topical ointment  -- 1 application on skin once a day to bilateral feet  -- Indication: For Dry skin on feet    hydroCHLOROthiazide 25 mg oral tablet  -- 1 tab(s) by mouth once a day  -- Indication: For Blood pressure    docusate sodium 100 mg oral capsule  -- 1 cap(s) by mouth 3 times a day  -- Indication: For Stool softener, hold for loose stool    MiraLax oral powder for reconstitution  -- 17 gram(s) by mouth once a day, As Needed -for constipation   -- Dilute this medication with liquid before administration.  It is very important that you take or use this exactly as directed.  Do not skip doses or discontinue unless directed by your doctor.    -- Indication: For As needed for constipation    pantoprazole 40 mg oral delayed release tablet  -- 1 tab(s) by mouth once a day (before a meal)  -- Indication: For Acid reflux I will START or STAY ON the medications listed below when I get home from the hospital:    aspirin 81 mg oral delayed release tablet  -- 1 tab(s) by mouth once a day  -- Indication: For Heart disease    acetaminophen 325 mg oral tablet  -- 2 tab(s) by mouth every 6 hours, As needed, Moderate Pain (4 - 6)  -- Indication: For As needed for mild pain    lisinopril 5 mg oral tablet  -- 1 tab(s) by mouth once a day  -- Indication: For Blood pressure    atorvastatin 20 mg oral tablet  -- 1 tab(s) by mouth once a day (at bedtime)   -- Avoid grapefruit and grapefruit juice while taking this medication.  Do not take this drug if you are pregnant.  It is very important that you take or use this exactly as directed.  Do not skip doses or discontinue unless directed by your doctor.  Obtain medical advice before taking any non-prescription drugs as some may affect the action of this medication.  Take with food or milk.    -- Indication: For CHolesterol    clopidogrel 75 mg oral tablet  -- 1 tab(s) by mouth once a day  -- Indication: For Heart Disease    carvedilol 3.125 mg oral tablet  -- 1 tab(s) by mouth every 12 hours  -- Indication: For Blood pressure    fluticasone-salmeterol 100 mcg-50 mcg inhalation powder  -- 1 puff(s) inhaled 2 times a day, As Needed  -- Indication: For As needed for SOB    bacitracin 500 units/g topical ointment  -- 1 application on skin once a day to bilateral feet  -- Indication: For Dry skin on feet    hydroCHLOROthiazide 25 mg oral tablet  -- 1 tab(s) by mouth once a day  -- Indication: For Blood pressure    docusate sodium 100 mg oral capsule  -- 1 cap(s) by mouth 3 times a day  -- Indication: For Stool softener, hold for loose stool    MiraLax oral powder for reconstitution  -- 17 gram(s) by mouth once a day, As Needed -for constipation   -- Dilute this medication with liquid before administration.  It is very important that you take or use this exactly as directed.  Do not skip doses or discontinue unless directed by your doctor.    -- Indication: For As needed for constipation    pantoprazole 40 mg oral delayed release tablet  -- 1 tab(s) by mouth once a day (before a meal)  -- Indication: For Acid reflux

## 2019-02-07 NOTE — PROGRESS NOTE ADULT - SUBJECTIVE AND OBJECTIVE BOX
Patient discussed on morning rounds with Dr. Alejo      Operation / Date: 2/5/19 TAVR (jamar)     Surgeon: Dr. Alejo     Referring Physician: Dr. Hussain Yen     SUBJECTIVE ASSESSMENT:  Patient seen this morning at bedside, doing well and not offering any complaints at this time. Has not had a bowel movement since surgery but is passing gas from below, tolerating PO without nausea or vomiting. Denies any chest pain or shortness of breath.     Hospital Course:  78 year old female, with PMHx uncontrolled HTN, asthma, chronic diastolic CHF evaluated by Dr. Alejo for aortic stenosis. She presented to [facility] on 2/5/19 for elective procedure and underwent transfemoral TAVR with jamar valve on 2/5/19 with Dr. Alejo. Procedure was uncomplicated, she was transferred to  as mini ICU post procedure. Groin TVP was removed on POD#0. On POD#1 patient had episode of dizziness with vision change following coreg and lasix which resolved with fluid resuscitation. She continued to remain stable, no further episodes of dizziness and as per Dr. Alejo is ready for discharge home on POD#2. On day of discharge, her blood pressure medications were adjusted and she will be discharged home on coreg 3.125mg BID, Lisinopril 5mg and HTCZ 25mg.     Vital Signs Last 24 Hrs  T(C): 36.2 (07 Feb 2019 09:52), Max: 37.1 (06 Feb 2019 14:01)  T(F): 97.2 (07 Feb 2019 09:52), Max: 98.7 (06 Feb 2019 14:01)  HR: 68 (07 Feb 2019 12:38) (68 - 102)  BP: 148/78 (07 Feb 2019 08:52) (122/67 - 192/79)  BP(mean): 116 (07 Feb 2019 08:52) (66 - 147)  RR: 18 (07 Feb 2019 12:38) (14 - 21)  SpO2: 96% (07 Feb 2019 12:38) (91% - 100%)  I&O's Detail    06 Feb 2019 07:01  -  07 Feb 2019 07:00  --------------------------------------------------------  IN:    Albumin 5%  - 250 mL: 500 mL    IV PiggyBack: 200 mL    Oral Fluid: 1135 mL    sodium chloride 0.9%: 5 mL  Total IN: 1840 mL    OUT:    Voided: 1900 mL  Total OUT: 1900 mL    Total NET: -60 mL      07 Feb 2019 07:01  -  07 Feb 2019 12:55  --------------------------------------------------------  IN:    Oral Fluid: 720 mL  Total IN: 720 mL    OUT:    Voided: 800 mL  Total OUT: 800 mL    Total NET: -80 mL    EPICARDIAL WIRES REMOVED: n/a   TIE DOWNS REMOVED: n/a     PHYSICAL EXAM:    General: Patient sitting comfortably in a chair, no acute distress     Neurological: Alert and oriented. No focal neurological deficits     Cardiovascular: S1S2, RRR, no murmurs appreciated on exam     Respiratory: Clear to ausculation bilaterally     Gastrointestinal: Abdomen soft, non tender, non distended     Extremities: Warm and well perfused. 1+ non pitting edema bilaterally. No calf tenderness   + bilateral feet erythema with cracked skin     Vascular: Peripheral pulses 2+ bilaterally     Incision Sites: Bilateral groin sites C/D/I, no drainage, surrounding erythema or hematoma     LABS:                        10.6   3.62  )-----------( 135      ( 07 Feb 2019 05:30 )             31.7       COUMADIN:  No    PT/INR - ( 06 Feb 2019 03:35 )   PT: 12.7 sec;   INR: 1.12          PTT - ( 06 Feb 2019 03:35 )  PTT:31.1 sec    02-07    141  |  106  |  14  ----------------------------<  99  4.2   |  26  |  0.89    Ca    9.2      07 Feb 2019 05:30  Phos  4.3     02-06  Mg     2.1     02-07    TPro  6.6  /  Alb  3.9  /  TBili  0.6  /  DBili  x   /  AST  12  /  ALT  6<L>  /  AlkPhos  38<L>  02-07          MEDICATIONS  (STANDING): See Med Rec     Discharge CXR: < from: Xray Chest 1 View- PORTABLE-Routine (02.06.19 @ 05:04) >  Support Devices: Mediastinal drains removed  Heart: Cardiomegaly. TAVR. Overall stable.  Mediastinum:  Unremarkable  Lungs/Airways: Question mild pulmonary vascular congestion.  Bones/Soft tissues: Unremarkable    < end of copied text >    Discharge ECHO: < from: Echocardiogram (02.06.19 @ 11:34) >  There is moderate concentric left ventricular hypertrophy. The left   ventricular   wall motion is normal.  The left ventricle is hyperdynamic and the   overall   ejection fraction is increased (>75%).  The right ventricle is normal in   size   and function.The left atrial size is normal. The mitral inflow pattern is   consistent with impaired left ventricular relaxation with mildly   elevated left   atrial pressure (8-14mmHg).  Right atrial size is normal.Status post   TAVR. 23   mm Jamar Valve is seen in the aortic position. No aortic regurgitation   noted.   The peak pressure gradient is 25 mmHg. The mean pressure gradient is 13   mmHg.   LVOT diameter = 2.0  The calculated aortic valve area using the   continuity   equation is 2.2 cm2.Mitral annular calcification noted. Structurally   normal   mitral valve. No mitral regurgitation noted.  Structurally normal   tricuspid   valve. There is trace tricuspid regurgitation. There was insufficient TR   detectedfrom which to calculate pulmonary artery systolic pressure.   Structurally normal pulmonic valve. No pulmonic regurgitation   noted.There is   no pericardial effusion.The ascending aorta is mild - moderately dilated   and   measures 4.4 cm.    < end of copied text >    - Please follow up with Dr. Alejo on 2/20/19 at 2:30 PM.  The office is located at [facility], [facility], 4th floor. Call us with any questions #[phone].  - Please follow up with Dr. Hussain Peraza on  2/18/19 at 1:15 PM. His office location is listed below.   - Please continue to apply lotion to your feet daily, you should follow up with a podiatrist if your feet continue to have burning sensation.   - Please continue to take your blood pressure at home daily, if you notice your blood pressure elevated > 180 mmHg please call Dr. Alejo's office to adjust your blood pressure medications. Please call Dr. Alejo's office tomorrow and Monday 2/11/19 with your blood pressure readings.

## 2019-02-07 NOTE — DISCHARGE NOTE ADULT - HOSPITAL COURSE
78 year old female, with PMHx uncontrolled HTN, asthma, chronic diastolic CHF evaluated by Dr. Alejo for aortic stenosis. She presented to [facility] on 2/5/19 for elective procedure and underwent transfemoral TAVR with rachael valve on 2/5/19 with Dr. Alejo. Procedure was uncomplicated, she was transferred to  as mini ICU post procedure. Groin TVP was removed on POD#0. On POD#1 patient had episode of dizziness with vision change following coreg and lasix which resolved with fluid resuscitation. She continued to remain stable, no further episodes of dizziness and as per Dr. Alejo is ready for discharge home on POD#2. On day of discharge, her blood pressure medications were adjusted and she will be discharged home on _______. 78 year old female, with PMHx uncontrolled HTN, asthma, chronic diastolic CHF evaluated by Dr. Alejo for aortic stenosis. She presented to [facility] on 2/5/19 for elective procedure and underwent transfemoral TAVR with rachael valve on 2/5/19 with Dr. Alejo. Procedure was uncomplicated, she was transferred to  as mini ICU post procedure. Groin TVP was removed on POD#0. On POD#1 patient had episode of dizziness with vision change following coreg and lasix which resolved with fluid resuscitation. She continued to remain stable, no further episodes of dizziness and as per Dr. Alejo is ready for discharge home on POD#2. On day of discharge, her blood pressure medications were adjusted and she will be discharged home on coreg 3.125mg BID, Lisinopril 5mg and HTCZ 25mg.

## 2019-02-07 NOTE — DISCHARGE NOTE ADULT - MEDICATION SUMMARY - MEDICATIONS TO STOP TAKING
I will STOP taking the medications listed below when I get home from the hospital:    carvedilol 25 mg oral tablet  -- 1 tab(s) by mouth 2 times a day    lisinopril 10 mg oral tablet  -- 1 tab(s) by mouth once a day    hydrALAZINE 50 mg oral tablet  -- orally 3 times a day    hydroCHLOROthiazide 25 mg oral tablet  -- orally 2 times a day

## 2019-02-07 NOTE — DISCHARGE NOTE ADULT - CARE PROVIDER_API CALL
Solomon Alejo)  Cardiology; Interventional Cardiology  [address], [address] [place] [address]  Phone: [phone]  Fax: [phone]  Follow Up Time:     Hussain Yen)  Cardiology  Vascular  [address] [address]  Phone: [phone]  Fax: [phone]  Follow Up Time:

## 2019-02-12 ENCOUNTER — INPATIENT (INPATIENT)
Facility: HOSPITAL | Age: 79
LOS: 3 days | Discharge: HOME | DRG: 809 | End: 2019-02-16
Attending: INTERNAL MEDICINE | Admitting: INTERNAL MEDICINE
Payer: MEDICARE

## 2019-02-12 VITALS
HEART RATE: 68 BPM | RESPIRATION RATE: 18 BRPM | SYSTOLIC BLOOD PRESSURE: 140 MMHG | DIASTOLIC BLOOD PRESSURE: 61 MMHG | OXYGEN SATURATION: 100 %

## 2019-02-12 DIAGNOSIS — I35.0 NONRHEUMATIC AORTIC (VALVE) STENOSIS: ICD-10-CM

## 2019-02-12 DIAGNOSIS — Z95.2 PRESENCE OF PROSTHETIC HEART VALVE: Chronic | ICD-10-CM

## 2019-02-12 LAB
ALBUMIN SERPL ELPH-MCNC: 3.8 G/DL — SIGNIFICANT CHANGE UP (ref 3.3–5)
ALP SERPL-CCNC: 44 U/L — SIGNIFICANT CHANGE UP (ref 40–120)
ALT FLD-CCNC: 11 U/L — SIGNIFICANT CHANGE UP (ref 10–45)
ANION GAP SERPL CALC-SCNC: 14 MMOL/L — SIGNIFICANT CHANGE UP (ref 5–17)
APPEARANCE UR: CLEAR — SIGNIFICANT CHANGE UP
APTT BLD: 31.5 SEC — SIGNIFICANT CHANGE UP (ref 27.5–36.3)
AST SERPL-CCNC: 13 U/L — SIGNIFICANT CHANGE UP (ref 10–40)
BASOPHILS # BLD AUTO: 0.05 K/UL — SIGNIFICANT CHANGE UP (ref 0–0.2)
BASOPHILS NFR BLD AUTO: 1.8 % — SIGNIFICANT CHANGE UP (ref 0–2)
BILIRUB SERPL-MCNC: 0.7 MG/DL — SIGNIFICANT CHANGE UP (ref 0.2–1.2)
BILIRUB UR-MCNC: NEGATIVE — SIGNIFICANT CHANGE UP
BLD GP AB SCN SERPL QL: NEGATIVE — SIGNIFICANT CHANGE UP
BUN SERPL-MCNC: 20 MG/DL — SIGNIFICANT CHANGE UP (ref 7–23)
CALCIUM SERPL-MCNC: 8.8 MG/DL — SIGNIFICANT CHANGE UP (ref 8.4–10.5)
CHLORIDE SERPL-SCNC: 101 MMOL/L — SIGNIFICANT CHANGE UP (ref 96–108)
CO2 SERPL-SCNC: 25 MMOL/L — SIGNIFICANT CHANGE UP (ref 22–31)
COLOR SPEC: YELLOW — SIGNIFICANT CHANGE UP
CREAT SERPL-MCNC: 1.27 MG/DL — SIGNIFICANT CHANGE UP (ref 0.5–1.3)
CRP SERPL-MCNC: 5.29 MG/DL — HIGH (ref 0–0.4)
DIFF PNL FLD: ABNORMAL
EOSINOPHIL # BLD AUTO: 0.27 K/UL — SIGNIFICANT CHANGE UP (ref 0–0.5)
EOSINOPHIL NFR BLD AUTO: 10.7 % — HIGH (ref 0–6)
GLUCOSE SERPL-MCNC: 132 MG/DL — HIGH (ref 70–99)
GLUCOSE UR QL: NEGATIVE — SIGNIFICANT CHANGE UP
HBA1C BLD-MCNC: 5 % — SIGNIFICANT CHANGE UP (ref 4–5.6)
HCT VFR BLD CALC: 32.9 % — LOW (ref 34.5–45)
HGB BLD-MCNC: 10.8 G/DL — LOW (ref 11.5–15.5)
INR BLD: 1.09 — SIGNIFICANT CHANGE UP (ref 0.88–1.16)
KETONES UR-MCNC: NEGATIVE — SIGNIFICANT CHANGE UP
LEUKOCYTE ESTERASE UR-ACNC: NEGATIVE — SIGNIFICANT CHANGE UP
LYMPHOCYTES # BLD AUTO: 1.64 K/UL — SIGNIFICANT CHANGE UP (ref 1–3.3)
LYMPHOCYTES # BLD AUTO: 65.2 % — HIGH (ref 13–44)
MAGNESIUM SERPL-MCNC: 1.9 MG/DL — SIGNIFICANT CHANGE UP (ref 1.6–2.6)
MCHC RBC-ENTMCNC: 30.4 PG — SIGNIFICANT CHANGE UP (ref 27–34)
MCHC RBC-ENTMCNC: 32.8 GM/DL — SIGNIFICANT CHANGE UP (ref 32–36)
MCV RBC AUTO: 92.7 FL — SIGNIFICANT CHANGE UP (ref 80–100)
MONOCYTES # BLD AUTO: 0.47 K/UL — SIGNIFICANT CHANGE UP (ref 0–0.9)
MONOCYTES NFR BLD AUTO: 18.7 % — HIGH (ref 2–14)
NEUTROPHILS # BLD AUTO: 0.09 K/UL — LOW (ref 1.8–7.4)
NEUTROPHILS NFR BLD AUTO: 2.7 % — LOW (ref 43–77)
NITRITE UR-MCNC: NEGATIVE — SIGNIFICANT CHANGE UP
NT-PROBNP SERPL-SCNC: 294 PG/ML — SIGNIFICANT CHANGE UP (ref 0–300)
PH UR: 6 — SIGNIFICANT CHANGE UP (ref 5–8)
PLATELET # BLD AUTO: 191 K/UL — SIGNIFICANT CHANGE UP (ref 150–400)
POTASSIUM SERPL-MCNC: 3.4 MMOL/L — LOW (ref 3.5–5.3)
POTASSIUM SERPL-SCNC: 3.4 MMOL/L — LOW (ref 3.5–5.3)
PROT SERPL-MCNC: 7.2 G/DL — SIGNIFICANT CHANGE UP (ref 6–8.3)
PROT UR-MCNC: NEGATIVE MG/DL — SIGNIFICANT CHANGE UP
PROTHROM AB SERPL-ACNC: 12.3 SEC — SIGNIFICANT CHANGE UP (ref 10–12.9)
RBC # BLD: 3.55 M/UL — LOW (ref 3.8–5.2)
RBC # FLD: 12.8 % — SIGNIFICANT CHANGE UP (ref 10.3–14.5)
RH IG SCN BLD-IMP: POSITIVE — SIGNIFICANT CHANGE UP
SODIUM SERPL-SCNC: 140 MMOL/L — SIGNIFICANT CHANGE UP (ref 135–145)
SP GR SPEC: <=1.005 — SIGNIFICANT CHANGE UP (ref 1–1.03)
TROPONIN T SERPL-MCNC: <0.01 NG/ML — SIGNIFICANT CHANGE UP (ref 0–0.01)
UROBILINOGEN FLD QL: 0.2 E.U./DL — SIGNIFICANT CHANGE UP
WBC # BLD: 2.52 K/UL — LOW (ref 3.8–10.5)
WBC # FLD AUTO: 2.52 K/UL — LOW (ref 3.8–10.5)

## 2019-02-12 PROCEDURE — 99233 SBSQ HOSP IP/OBS HIGH 50: CPT

## 2019-02-12 PROCEDURE — 71045 X-RAY EXAM CHEST 1 VIEW: CPT | Mod: 26

## 2019-02-12 RX ORDER — HYDROCHLOROTHIAZIDE 25 MG
25 TABLET ORAL DAILY
Qty: 0 | Refills: 0 | Status: DISCONTINUED | OUTPATIENT
Start: 2019-02-12 | End: 2019-02-13

## 2019-02-12 RX ORDER — ASPIRIN/CALCIUM CARB/MAGNESIUM 324 MG
81 TABLET ORAL DAILY
Qty: 0 | Refills: 0 | Status: DISCONTINUED | OUTPATIENT
Start: 2019-02-12 | End: 2019-02-16

## 2019-02-12 RX ORDER — ACETAMINOPHEN 500 MG
650 TABLET ORAL EVERY 6 HOURS
Qty: 0 | Refills: 0 | Status: DISCONTINUED | OUTPATIENT
Start: 2019-02-12 | End: 2019-02-16

## 2019-02-12 RX ORDER — CLOPIDOGREL BISULFATE 75 MG/1
75 TABLET, FILM COATED ORAL DAILY
Qty: 0 | Refills: 0 | Status: DISCONTINUED | OUTPATIENT
Start: 2019-02-12 | End: 2019-02-13

## 2019-02-12 RX ORDER — CARVEDILOL PHOSPHATE 80 MG/1
3.12 CAPSULE, EXTENDED RELEASE ORAL EVERY 12 HOURS
Qty: 0 | Refills: 0 | Status: DISCONTINUED | OUTPATIENT
Start: 2019-02-12 | End: 2019-02-16

## 2019-02-12 RX ORDER — HEPARIN SODIUM 5000 [USP'U]/ML
5000 INJECTION INTRAVENOUS; SUBCUTANEOUS EVERY 8 HOURS
Qty: 0 | Refills: 0 | Status: DISCONTINUED | OUTPATIENT
Start: 2019-02-12 | End: 2019-02-16

## 2019-02-12 RX ORDER — LISINOPRIL 2.5 MG/1
5 TABLET ORAL DAILY
Qty: 0 | Refills: 0 | Status: DISCONTINUED | OUTPATIENT
Start: 2019-02-12 | End: 2019-02-15

## 2019-02-12 RX ADMIN — HEPARIN SODIUM 5000 UNIT(S): 5000 INJECTION INTRAVENOUS; SUBCUTANEOUS at 22:22

## 2019-02-12 RX ADMIN — CARVEDILOL PHOSPHATE 3.12 MILLIGRAM(S): 80 CAPSULE, EXTENDED RELEASE ORAL at 22:22

## 2019-02-12 NOTE — H&P ADULT - PROBLEM SELECTOR PLAN 1
Admit to [id number] under Dr. Alejo.      HTN, continue w/ PO anti-hypertensives, with intermittent IV doses as needed.      F/U admission labs (CBC, CMP, coags, T&S)    F/U admission CXR, EKG.

## 2019-02-12 NOTE — H&P ADULT - ASSESSMENT
This is an 78 year old female, with PMHx uncontrolled HTN, asthma, chronic HFpEF, aortic stenosis s/p transfemoral TAVR with rachael valve on 2/5/19 with Dr. Alejo.  She was discharged home on POD#2 on coreg 3.125mg BID, Lisinopril 5mg and HTCZ 25mg.  She presented to an OSH ED with sudden onset central chest pain and headache and home BP of 220 systolic.  In ED BP was 194/54.  She was given ASA 325mg and nitroglycerin spray which lowered the severity of her chest pain.  EKG was negative for ST changes, and troponins were 0.1-->0.1.  She had also been c/o headache, blurred vision, dizziness and tremors along with her chest pain that began on the day of presentation (2/11/19) at 4:00am.  She denied any SOB, PND, palpitations, orthopnea, fever/chills or cough.  In the ED she was febrile up to 101.3 and WBC was 2.04.  Lactic acid 1.9-->1.7.  Sepsis protocol was initiated and she was placed on neutropenic isolation.  She is now being transferred to [facility] for further evaluation in light of her recent TAVR.

## 2019-02-12 NOTE — H&P ADULT - NSHPREVIEWOFSYSTEMS_GEN_ALL_CORE
Review of Systems  CONSTITUTIONAL:  Denies Fevers / chills, sweats, fatigue, weight loss, weight gain                                      NEURO:  +headache, +dizziness.  Denies parethesias, seizures, syncope, confusion                                                                                EYES:  +Blurry vision.  Denies discharge, pain, loss of vision                                                                                    ENMT:  Denies Difficulty hearing, vertigo, dysphagia, epistaxis, recent dental work                                       CV:  +Chest pain.  Denies palpitations, MANRIQUE, orthopnea                                                                                          RESPIRATORY:  Denies Wheezing, SOB, cough / sputum, hemoptysis                                                                GI:  Denies Nausea, vomiting, diarrhea, constipation, melena, difficulty swallowing                                               : Denies Hematuria, dysuria, urgency, incontinence                                                                                         MUSCULOSKELETAL:  Denies arthritis, joint swelling, muscle weakness                                                             SKIN/BREAST:  Denies rash, itching, hair loss, masses                                                                                            PSYCH:  Denies depression, anxiety, suicidal ideation                                                                                               HEME/LYMPH:  Denies bruises easily, enlarged lymph nodes, tender lymph nodes                                        ENDOCRINE:  Denies cold intolerance, heat intolerance, polydipsia

## 2019-02-12 NOTE — H&P ADULT - HISTORY OF PRESENT ILLNESS
This is an 78 year old female, with PMHx uncontrolled HTN, asthma, chronic HFpEF, aortic stenosis s/p transfemoral TAVR with rachael valve on 2/5/19 with Dr. Alejo.  She was discharged home on POD#2 on coreg 3.125mg BID, Lisinopril 5mg and HTCZ 25mg.  She presented to an OSH ED with sudden onset central chest pain and headache and home BP of 220 systolic.  In ED BP was 194/54.  She was given ASA 325mg and nitroglycerin spray which lowered the severity of her chest pain.  EKG was negative for ST changes, and troponins were 0.1-->0.1.  She had also been c/o headache, blurred vision, dizziness and tremors along with her chest pain that began on the day of presentation (2/11/19) at 4:00am.  She denied any SOB, PND, palpitations, orthopnea, fever/chills or cough.  In the ED she was febrile up to 101.3 and WBC was 2.04.  Lactic acid 1.9-->1.7.  Sepsis protocol was initiated and she was placed on neutropenic isolation.  She is now being transferred to [facility] for further evaluation in light of her recent TAVR.

## 2019-02-12 NOTE — H&P ADULT - NSHPPHYSICALEXAM_GEN_ALL_CORE
GEN: NAD, looks comfortable  Psych: Mood appropriate  Neuro: A&Ox3.  No focal deficits.  Moving all extremities.   HEENT: No obvious abnormalities  CV: S1S2, regular, no murmurs appreciated.  No carotid bruits.  No JVD  Lungs: Clear B/L.  No wheezing, rales or rhonchi  ABD: Soft, non-tender, non-distended.  +Bowel sounds  EXT: Warm and well perfused.  No peripheral edema noted  Musculoskeletal: Moving all extremities with normal ROM, no joint swelling  PV: Pedal pulses palpable

## 2019-02-13 DIAGNOSIS — J44.9 CHRONIC OBSTRUCTIVE PULMONARY DISEASE, UNSPECIFIED: ICD-10-CM

## 2019-02-13 DIAGNOSIS — I50.32 CHRONIC DIASTOLIC (CONGESTIVE) HEART FAILURE: ICD-10-CM

## 2019-02-13 DIAGNOSIS — I35.0 NONRHEUMATIC AORTIC (VALVE) STENOSIS: ICD-10-CM

## 2019-02-13 DIAGNOSIS — J45.909 UNSPECIFIED ASTHMA, UNCOMPLICATED: ICD-10-CM

## 2019-02-13 DIAGNOSIS — I97.790 OTHER INTRAOPERATIVE CARDIAC FUNCTIONAL DISTURBANCES DURING CARDIAC SURGERY: ICD-10-CM

## 2019-02-13 DIAGNOSIS — E66.9 OBESITY, UNSPECIFIED: ICD-10-CM

## 2019-02-13 DIAGNOSIS — Z00.6 ENCOUNTER FOR EXAMINATION FOR NORMAL COMPARISON AND CONTROL IN CLINICAL RESEARCH PROGRAM: ICD-10-CM

## 2019-02-13 DIAGNOSIS — I11.0 HYPERTENSIVE HEART DISEASE WITH HEART FAILURE: ICD-10-CM

## 2019-02-13 DIAGNOSIS — I44.7 LEFT BUNDLE-BRANCH BLOCK, UNSPECIFIED: ICD-10-CM

## 2019-02-13 DIAGNOSIS — I95.81 POSTPROCEDURAL HYPOTENSION: ICD-10-CM

## 2019-02-13 LAB
ALBUMIN SERPL ELPH-MCNC: 3.9 G/DL — SIGNIFICANT CHANGE UP (ref 3.3–5)
ALP SERPL-CCNC: 42 U/L — SIGNIFICANT CHANGE UP (ref 40–120)
ALT FLD-CCNC: 10 U/L — SIGNIFICANT CHANGE UP (ref 10–45)
ANION GAP SERPL CALC-SCNC: 13 MMOL/L — SIGNIFICANT CHANGE UP (ref 5–17)
APTT BLD: 32.5 SEC — SIGNIFICANT CHANGE UP (ref 27.5–36.3)
AST SERPL-CCNC: 12 U/L — SIGNIFICANT CHANGE UP (ref 10–40)
BASOPHILS # BLD AUTO: 0.03 K/UL — SIGNIFICANT CHANGE UP (ref 0–0.2)
BASOPHILS NFR BLD AUTO: 1.2 % — SIGNIFICANT CHANGE UP (ref 0–2)
BILIRUB SERPL-MCNC: 0.6 MG/DL — SIGNIFICANT CHANGE UP (ref 0.2–1.2)
BUN SERPL-MCNC: 20 MG/DL — SIGNIFICANT CHANGE UP (ref 7–23)
CALCIUM SERPL-MCNC: 9.2 MG/DL — SIGNIFICANT CHANGE UP (ref 8.4–10.5)
CHLORIDE SERPL-SCNC: 100 MMOL/L — SIGNIFICANT CHANGE UP (ref 96–108)
CK MB CFR SERPL CALC: 1 NG/ML — SIGNIFICANT CHANGE UP (ref 0–6.7)
CK SERPL-CCNC: 68 U/L — SIGNIFICANT CHANGE UP (ref 25–170)
CO2 SERPL-SCNC: 25 MMOL/L — SIGNIFICANT CHANGE UP (ref 22–31)
CREAT SERPL-MCNC: 1.06 MG/DL — SIGNIFICANT CHANGE UP (ref 0.5–1.3)
EOSINOPHIL # BLD AUTO: 0.24 K/UL — SIGNIFICANT CHANGE UP (ref 0–0.5)
EOSINOPHIL NFR BLD AUTO: 9.6 % — HIGH (ref 0–6)
GLUCOSE SERPL-MCNC: 155 MG/DL — HIGH (ref 70–99)
HCT VFR BLD CALC: 33.3 % — LOW (ref 34.5–45)
HGB BLD-MCNC: 10.6 G/DL — LOW (ref 11.5–15.5)
IMM GRANULOCYTES NFR BLD AUTO: 0.4 % — SIGNIFICANT CHANGE UP (ref 0–1.5)
INR BLD: 1.11 — SIGNIFICANT CHANGE UP (ref 0.88–1.16)
LYMPHOCYTES # BLD AUTO: 1.43 K/UL — SIGNIFICANT CHANGE UP (ref 1–3.3)
LYMPHOCYTES # BLD AUTO: 57 % — HIGH (ref 13–44)
MAGNESIUM SERPL-MCNC: 2 MG/DL — SIGNIFICANT CHANGE UP (ref 1.6–2.6)
MCHC RBC-ENTMCNC: 30.4 PG — SIGNIFICANT CHANGE UP (ref 27–34)
MCHC RBC-ENTMCNC: 31.8 GM/DL — LOW (ref 32–36)
MCV RBC AUTO: 95.4 FL — SIGNIFICANT CHANGE UP (ref 80–100)
MONOCYTES # BLD AUTO: 0.61 K/UL — SIGNIFICANT CHANGE UP (ref 0–0.9)
MONOCYTES NFR BLD AUTO: 24.3 % — HIGH (ref 2–14)
NEUTROPHILS # BLD AUTO: 0.19 K/UL — LOW (ref 1.8–7.4)
NEUTROPHILS NFR BLD AUTO: 7.5 % — LOW (ref 43–77)
NRBC # BLD: 0 /100 WBCS — SIGNIFICANT CHANGE UP (ref 0–0)
PHOSPHATE SERPL-MCNC: 3.6 MG/DL — SIGNIFICANT CHANGE UP (ref 2.5–4.5)
PLATELET # BLD AUTO: 192 K/UL — SIGNIFICANT CHANGE UP (ref 150–400)
POTASSIUM SERPL-MCNC: 3.8 MMOL/L — SIGNIFICANT CHANGE UP (ref 3.5–5.3)
POTASSIUM SERPL-SCNC: 3.8 MMOL/L — SIGNIFICANT CHANGE UP (ref 3.5–5.3)
PROT SERPL-MCNC: 6.9 G/DL — SIGNIFICANT CHANGE UP (ref 6–8.3)
PROTHROM AB SERPL-ACNC: 12.6 SEC — SIGNIFICANT CHANGE UP (ref 10–12.9)
RBC # BLD: 3.49 M/UL — LOW (ref 3.8–5.2)
RBC # FLD: 13.2 % — SIGNIFICANT CHANGE UP (ref 10.3–14.5)
SODIUM SERPL-SCNC: 138 MMOL/L — SIGNIFICANT CHANGE UP (ref 135–145)
TROPONIN T SERPL-MCNC: <0.01 NG/ML — SIGNIFICANT CHANGE UP (ref 0–0.01)
WBC # BLD: 2.51 K/UL — LOW (ref 3.8–10.5)
WBC # FLD AUTO: 2.51 K/UL — LOW (ref 3.8–10.5)

## 2019-02-13 PROCEDURE — 99223 1ST HOSP IP/OBS HIGH 75: CPT

## 2019-02-13 PROCEDURE — 99222 1ST HOSP IP/OBS MODERATE 55: CPT | Mod: GC

## 2019-02-13 RX ORDER — POTASSIUM CHLORIDE 20 MEQ
40 PACKET (EA) ORAL ONCE
Qty: 0 | Refills: 0 | Status: COMPLETED | OUTPATIENT
Start: 2019-02-13 | End: 2019-02-13

## 2019-02-13 RX ORDER — HYDRALAZINE HCL 50 MG
10 TABLET ORAL ONCE
Qty: 0 | Refills: 0 | Status: COMPLETED | OUTPATIENT
Start: 2019-02-13 | End: 2019-02-13

## 2019-02-13 RX ORDER — CEFEPIME 1 G/1
2000 INJECTION, POWDER, FOR SOLUTION INTRAMUSCULAR; INTRAVENOUS EVERY 8 HOURS
Qty: 0 | Refills: 0 | Status: DISCONTINUED | OUTPATIENT
Start: 2019-02-13 | End: 2019-02-15

## 2019-02-13 RX ORDER — PIPERACILLIN AND TAZOBACTAM 4; .5 G/20ML; G/20ML
4.5 INJECTION, POWDER, LYOPHILIZED, FOR SOLUTION INTRAVENOUS EVERY 8 HOURS
Qty: 0 | Refills: 0 | Status: DISCONTINUED | OUTPATIENT
Start: 2019-02-13 | End: 2019-02-13

## 2019-02-13 RX ORDER — CEFEPIME 1 G/1
2000 INJECTION, POWDER, FOR SOLUTION INTRAMUSCULAR; INTRAVENOUS ONCE
Qty: 0 | Refills: 0 | Status: COMPLETED | OUTPATIENT
Start: 2019-02-13 | End: 2019-02-13

## 2019-02-13 RX ORDER — CEFEPIME 1 G/1
INJECTION, POWDER, FOR SOLUTION INTRAMUSCULAR; INTRAVENOUS
Qty: 0 | Refills: 0 | Status: DISCONTINUED | OUTPATIENT
Start: 2019-02-13 | End: 2019-02-15

## 2019-02-13 RX ORDER — FILGRASTIM 480MCG/1.6
300 VIAL (ML) INJECTION ONCE
Qty: 0 | Refills: 0 | Status: COMPLETED | OUTPATIENT
Start: 2019-02-13 | End: 2019-02-13

## 2019-02-13 RX ORDER — PANTOPRAZOLE SODIUM 20 MG/1
40 TABLET, DELAYED RELEASE ORAL
Qty: 0 | Refills: 0 | Status: DISCONTINUED | OUTPATIENT
Start: 2019-02-13 | End: 2019-02-16

## 2019-02-13 RX ORDER — BUDESONIDE AND FORMOTEROL FUMARATE DIHYDRATE 160; 4.5 UG/1; UG/1
2 AEROSOL RESPIRATORY (INHALATION)
Qty: 0 | Refills: 0 | Status: DISCONTINUED | OUTPATIENT
Start: 2019-02-13 | End: 2019-02-15

## 2019-02-13 RX ORDER — SODIUM CHLORIDE 9 MG/ML
500 INJECTION, SOLUTION INTRAVENOUS ONCE
Qty: 0 | Refills: 0 | Status: COMPLETED | OUTPATIENT
Start: 2019-02-13 | End: 2019-02-13

## 2019-02-13 RX ORDER — POLYETHYLENE GLYCOL 3350 17 G/17G
17 POWDER, FOR SOLUTION ORAL DAILY
Qty: 0 | Refills: 0 | Status: DISCONTINUED | OUTPATIENT
Start: 2019-02-13 | End: 2019-02-16

## 2019-02-13 RX ADMIN — HEPARIN SODIUM 5000 UNIT(S): 5000 INJECTION INTRAVENOUS; SUBCUTANEOUS at 05:53

## 2019-02-13 RX ADMIN — Medication 650 MILLIGRAM(S): at 06:25

## 2019-02-13 RX ADMIN — Medication 10 MILLIGRAM(S): at 18:28

## 2019-02-13 RX ADMIN — HEPARIN SODIUM 5000 UNIT(S): 5000 INJECTION INTRAVENOUS; SUBCUTANEOUS at 12:51

## 2019-02-13 RX ADMIN — Medication 650 MILLIGRAM(S): at 06:05

## 2019-02-13 RX ADMIN — CEFEPIME 100 MILLIGRAM(S): 1 INJECTION, POWDER, FOR SOLUTION INTRAMUSCULAR; INTRAVENOUS at 17:46

## 2019-02-13 RX ADMIN — CEFEPIME 100 MILLIGRAM(S): 1 INJECTION, POWDER, FOR SOLUTION INTRAMUSCULAR; INTRAVENOUS at 22:12

## 2019-02-13 RX ADMIN — POLYETHYLENE GLYCOL 3350 17 GRAM(S): 17 POWDER, FOR SOLUTION ORAL at 13:54

## 2019-02-13 RX ADMIN — Medication 81 MILLIGRAM(S): at 09:15

## 2019-02-13 RX ADMIN — Medication 650 MILLIGRAM(S): at 01:00

## 2019-02-13 RX ADMIN — Medication 40 MILLIEQUIVALENT(S): at 05:53

## 2019-02-13 RX ADMIN — Medication 650 MILLIGRAM(S): at 00:24

## 2019-02-13 RX ADMIN — BUDESONIDE AND FORMOTEROL FUMARATE DIHYDRATE 2 PUFF(S): 160; 4.5 AEROSOL RESPIRATORY (INHALATION) at 18:10

## 2019-02-13 RX ADMIN — HEPARIN SODIUM 5000 UNIT(S): 5000 INJECTION INTRAVENOUS; SUBCUTANEOUS at 22:19

## 2019-02-13 RX ADMIN — PANTOPRAZOLE SODIUM 40 MILLIGRAM(S): 20 TABLET, DELAYED RELEASE ORAL at 13:53

## 2019-02-13 RX ADMIN — LISINOPRIL 5 MILLIGRAM(S): 2.5 TABLET ORAL at 05:53

## 2019-02-13 RX ADMIN — Medication 300 MICROGRAM(S): at 18:54

## 2019-02-13 RX ADMIN — Medication 650 MILLIGRAM(S): at 17:48

## 2019-02-13 RX ADMIN — CARVEDILOL PHOSPHATE 3.12 MILLIGRAM(S): 80 CAPSULE, EXTENDED RELEASE ORAL at 05:53

## 2019-02-13 RX ADMIN — CARVEDILOL PHOSPHATE 3.12 MILLIGRAM(S): 80 CAPSULE, EXTENDED RELEASE ORAL at 17:46

## 2019-02-13 RX ADMIN — Medication 25 MILLIGRAM(S): at 05:53

## 2019-02-13 RX ADMIN — PIPERACILLIN AND TAZOBACTAM 200 GRAM(S): 4; .5 INJECTION, POWDER, LYOPHILIZED, FOR SOLUTION INTRAVENOUS at 12:51

## 2019-02-13 RX ADMIN — SODIUM CHLORIDE 500 MILLILITER(S): 9 INJECTION, SOLUTION INTRAVENOUS at 05:57

## 2019-02-13 RX ADMIN — PIPERACILLIN AND TAZOBACTAM 200 GRAM(S): 4; .5 INJECTION, POWDER, LYOPHILIZED, FOR SOLUTION INTRAVENOUS at 05:56

## 2019-02-13 NOTE — CONSULT NOTE ADULT - ASSESSMENT
ASSESSMENT:  78F with PMH of HTN, Diastolic HF, Severe Aortic stenosis s/p TAVR (2/5/19) presented initially to [place] for fever, headache and central chest pain- patient transferred from [place] in setting of recent TAVR and neutropenic fever.     PLAN: 	  #Neutropenic Fever  Patient complaining of cough. No dysuria or suprapubic tenderness. +Travel to [facility]. No sick contacts. No hx of Neutropenia and not neutropenic on last admission. May be medication induced.   -Switch Zosyn to Cefepime for Pseudomonal coverage as well as in setting of cardiac hx cefepime better for volume status control.   -Please send off procalcitonin  - Please send off fungal studies: beta-d-glucan, galactomannan, serum cryp  -Please send of viral studies:  HIV and RVP      Discussed with Primary team. ID team 1 will continue to follow.
BRITTANY DE LA CRUZ is a 78y Female with history of hypertension, asthma, chronic congestive heart failure, aortic stenosis, s/p TAVR on 2/5/19, discharged on post-op day 2 on Coreg, Lisinopril, HCTZ, aspirin and Clopidogrel and returned on 2/12 with complaints of chest pain, headache, spiking fever, shaking chills and dizziness. She was found to have a blood pressure of 220 systolic and labs were significant for a WBC of 2.04. Prior to the TAVR, the patient had a completely normal CBC with WBC 7.4, Hgb 13.4, HCT 40.7, platelets 289,000. On February 5, the patient received Clopidogrel for the procedure and after. At time of discharge, WBC was 3.62, Hgb 10.6 and platelet count 135,000. The patient was fully cultured on this admission and begun on antibiotics. Cultures and CXR are negative thus far for source of infection. Today, WBC is 2.51, Hgb 10.6, Hct 33.3 and platelets 192K. The absolute neutrophil count is 188. Clopidogrel has been discontinued and antibiotics continue.  .  Severe neutropenia - likely medication induced, i.e. Clopidogrel Rare cases reported of progressive neutropenia and pancytopenia occurring within a short time frame after initiating Clopidogrel. Must consider sepsis as well as acute viral illness as possible etiologies.  Anemia - due to present acute illness and post TAVR  Plan- agree with discontinuation of Clopidogrel. Would give Neupogen 300 micrograms subcutaneously daily. Maintain neutropenic precautions. Daily CBC with daily differential to calculate ANC. Continue antibiotics as per ID. Discussed in detail with cardiovascular PA Anup Small. Will follow with you. Thank you for consultation.

## 2019-02-13 NOTE — CONSULT NOTE ADULT - SUBJECTIVE AND OBJECTIVE BOX
ID CONSULT NOTE    HPI: 78F with PMH of HTN, Diastolic HF, Severe Aortic stenosis s/p TAVR (2/5/19) presented initially to [facility] for fever, headache and central chest pain. Patient reported that she felt generally unwell overall. Chest pain was associated with deep breathing, cough and exertion. Noted that BP was elevated to SBP 190s for which was loaded with asa and given nitroglycerin spray with improved CP. EKG without ST changes, trop at 0.1 but stable. Reported that also she also had cough/nasal congestion. Patient had noted to be febrile with WBC at 2.04- and noted to be neutropenia. Patient started on Abx and was transferred to [facility] in setting of recent TAVR. Noted to have WBC of 2.52 on presentation with ANC of 67. Patient was started on Zosyn. ID consulted in setting of neutropenic fever. Patient was started on HCTZ, Plavix, Carvedilol, atorvastatin, lisinopril, ASA and protonix upon discharge from last admission.     Interval history: States that has had congestion/cough. No abdominal pain. + constipation but no diarrhea/loose stools. Denies dysuria, hematuria or suprapubic tenderness.     Social: Recent travel + for [place]. Denies Tobacco. + glass of wine occassionally. Denies IV or other drugs.     [place]  ID: [id number]    OBJECTIVE  Vitals:  T(C): 36.4 (02-13-19 @ 14:14), Max: 36.4 (02-13-19 @ 14:14)  HR: 64 (02-13-19 @ 12:50) (56 - 68)  BP: 132/60 (02-13-19 @ 12:50) (113/56 - 143/67)  RR: 18 (02-13-19 @ 12:50) (17 - 28)  SpO2: 99% (02-13-19 @ 12:50) (89% - 100%)  Wt(kg): --    I/O:  I&O's Summary    12 Feb 2019 07:01  -  13 Feb 2019 07:00  --------------------------------------------------------  IN: 925 mL / OUT: 1050 mL / NET: -125 mL    13 Feb 2019 07:01  -  13 Feb 2019 16:37  --------------------------------------------------------  IN: 0 mL / OUT: 1600 mL / NET: -1600 mL        PHYSICAL EXAM:  Appearance: NAD. Speaking in full sentences.   HEENT: Conjunctiva clear.   Cardiovascular: RRR  Respiratory: Lungs with fine crackles at bases.   Gastrointestinal:  Soft, nontender. Non-distended. Non-rigid.	  Extremities: No edema b/l. No erythema b/l. LE WWP b/l.  Neurologic:  Alert and awake. Moving all extremities. Following commands. Making eye contact.  	  LABS:                        10.6   2.51  )-----------( 192      ( 13 Feb 2019 10:17 )             33.3     02-13    138  |  100  |  20  ----------------------------<  155<H>  3.8   |  25  |  1.06    Ca    9.2      13 Feb 2019 10:17  Phos  3.6     02-13  Mg     2.0     02-13    TPro  6.9  /  Alb  3.9  /  TBili  0.6  /  DBili  x   /  AST  12  /  ALT  10  /  AlkPhos  42  02-13    PT/INR - ( 13 Feb 2019 10:17 )   PT: 12.6 sec;   INR: 1.11          PTT - ( 13 Feb 2019 10:17 )  PTT:32.5 sec  Urinalysis Basic - ( 12 Feb 2019 22:20 )    Color: Yellow / Appearance: Clear / SG: <=1.005 / pH: x  Gluc: x / Ketone: NEGATIVE  / Bili: Negative / Urobili: 0.2 E.U./dL   Blood: x / Protein: NEGATIVE mg/dL / Nitrite: NEGATIVE   Leuk Esterase: NEGATIVE / RBC: < 5 /HPF / WBC 5-10 /HPF   Sq Epi: x / Non Sq Epi: 0-5 /HPF / Bacteria: Present /HPF        RADIOLOGY & ADDITIONAL TESTS:  Reviewed .    MEDICATIONS  (STANDING):  aspirin enteric coated 81 milliGRAM(s) Oral daily  carvedilol 3.125 milliGRAM(s) Oral every 12 hours  cefepime   IVPB      cefepime   IVPB 2000 milliGRAM(s) IV Intermittent once  cefepime   IVPB 2000 milliGRAM(s) IV Intermittent every 8 hours  heparin  Injectable 5000 Unit(s) SubCutaneous every 8 hours  lisinopril 5 milliGRAM(s) Oral daily  pantoprazole    Tablet 40 milliGRAM(s) Oral before breakfast  polyethylene glycol 3350 17 Gram(s) Oral daily    MEDICATIONS  (PRN):  acetaminophen   Tablet .. 650 milliGRAM(s) Oral every 6 hours PRN Mild Pain (1 - 3)
Hematology Oncology Consult Note    The patient was seen and examined    BRITTANY DE LA CRUZ is a 78y Female with history of hypertension, asthma, chronic congestive heart failure, aortic stenosis, s/p TAVR on 19, discharged on post-op day 2 on Coreg, Lisinopril, HCTZ, aspirin and PLavix and returned on  with complaints of chest pain, headache, spiking fever, shaking chills and dizziness. She was found to have a blood pressure of 220 systolic and labs were significant for a WBC of 2.04. Prior to the TAVR, the patient had a completely normal CBC with WBC 7.4, Hgb 13.4, HCT 40.7, platelets 289,000. On , the patient received Clopridogrel for the procedure and after. At time of discharge, WBC was 3.62, Hgb 10.6 and platelet count 135,000. The patient was fully cultured on this admission and begun on antibiotics. Cultures and CXR are negative thus far for source of infection. Today, WBC is 2.51, Hgb 10.6, Hct 33.3 and platelets 192K. The absolute neutrophil count is 188. Clopridogrel has been discontinued and antibiotics continue.  .    Interval History:  The patient appears comfortable at rest.    The patient denies chest pain or SOB.  No nausea/vomiting/fevers/chills/night sweats at the present time.  Has fatigue, headaches/dizziness.  Appetite is stable without weight loss.  No abdominal pain/diarrhea/constipation.  No melena or hematochezia.    No dysuria/hematuria.  No history of easy bruising/bleeding.  No gingival bleeding or epistaxis.  No leg pain or leg swelling.    ROS is otherwise negative.    PAST MEDICAL & SURGICAL HISTORY:  CHF (congestive heart failure)  Pulmonary HTN  Aortic stenosis  HTN (hypertension)  S/P TAVR (transcatheter aortic valve replacement)      Allergies:Allergies    No Known Allergies    Intolerances          Medications:MEDICATIONS  (STANDING):  aspirin enteric coated 81 milliGRAM(s) Oral daily  buDESOnide  80 MICROgram(s)/formoterol 4.5 MICROgram(s) Inhaler 2 Puff(s) Inhalation two times a day  carvedilol 3.125 milliGRAM(s) Oral every 12 hours  cefepime   IVPB      cefepime   IVPB 2000 milliGRAM(s) IV Intermittent every 8 hours  heparin  Injectable 5000 Unit(s) SubCutaneous every 8 hours  lisinopril 5 milliGRAM(s) Oral daily  pantoprazole    Tablet 40 milliGRAM(s) Oral before breakfast  polyethylene glycol 3350 17 Gram(s) Oral daily    MEDICATIONS  (PRN):  acetaminophen   Tablet .. 650 milliGRAM(s) Oral every 6 hours PRN Mild Pain (1 - 3)    aspirin enteric coated 81 milliGRAM(s) Oral daily  heparin  Injectable 5000 Unit(s) SubCutaneous every 8 hours        Social History:    FAMILY HISTORY:  No pertinent family history in first degree relatives      PHYSICAL EXAM:    T(F): 97.5 (19 @ 14:14), Max: 97.5 (19 @ 14:14)  HR: 72 (19 @ 18:08) (56 - 72)  BP: 163/77 (19 @ 18:08) (113/56 - 169/77)  RR: 19 (19 @ 18:08) (17 - 28)  SpO2: 96% (19 @ 18:08) (89% - 100%)  Wt(kg): --    Daily     Daily Weight in k.5 (2019 05:47)    Gen: well developed, obese, comfortable  HEENT: normocephalic/atraumatic, mild conjunctival pallor, no scleral icterus, no oral thrush/mucosal bleeding/mucositis  Neck: supple, no masses, no JVD  Cardiovascular: RR, nl S1S2, has murmur  Respiratory: clear air entry b/l anteriorly  Gastrointestinal: BS+, obese,soft, NT/ND, no masses, no splenomegaly, no hepatomegaly, no evidence for ascites  Extremities: no clubbing/cyanosis, trace LE edema, no calf tenderness, SCDs in place  Neurological: no focal deficits  Skin: no rash on visible skin, excessive ecchymoses or petechiae  Lymph Nodes:  no significant peripheral adenopathy   Musculoskeletal:  full ROM  Psychiatric:  mood stable            Labs:                          10.6   2.51  )-----------( 192      ( 2019 10:17 )             33.3     CBC Full  -  ( 2019 10:17 )  WBC Count : 2.51 K/uL  Hemoglobin : 10.6 g/dL  Hematocrit : 33.3 %  Platelet Count - Automated : 192 K/uL  Mean Cell Volume : 95.4 fl  Mean Cell Hemoglobin : 30.4 pg  Mean Cell Hemoglobin Concentration : 31.8 gm/dL  Auto Neutrophil # : 0.19 K/uL  Auto Lymphocyte # : 1.43 K/uL  Auto Monocyte # : 0.61 K/uL  Auto Eosinophil # : 0.24 K/uL  Auto Basophil # : 0.03 K/uL  Auto Neutrophil % : 7.5 %  Auto Lymphocyte % : 57.0 %  Auto Monocyte % : 24.3 %  Auto Eosinophil % : 9.6 %  Auto Basophil % : 1.2 %    PT/INR - ( 2019 10:17 )   PT: 12.6 sec;   INR: 1.11          PTT - ( 2019 10:17 )  PTT:32.5 sec    02-    138  |  100  |  20  ----------------------------<  155<H>  3.8   |  25  |  1.06    Ca    9.2      2019 10:17  Phos  3.6     02-13  Mg     2.0     02-13    TPro  6.9  /  Alb  3.9  /  TBili  0.6  /  DBili  x   /  AST  12  /  ALT  10  /  AlkPhos  42  02-13      Urinalysis Basic - ( 2019 22:20 )    Color: Yellow / Appearance: Clear / SG: <=1.005 / pH: x  Gluc: x / Ketone: NEGATIVE  / Bili: Negative / Urobili: 0.2 E.U./dL   Blood: x / Protein: NEGATIVE mg/dL / Nitrite: NEGATIVE   Leuk Esterase: NEGATIVE / RBC: < 5 /HPF / WBC 5-10 /HPF   Sq Epi: x / Non Sq Epi: 0-5 /HPF / Bacteria: Present /HPF        Other Labs:    Cultures:    Pathology:    Imaging Studies:

## 2019-02-13 NOTE — PROVIDER CONTACT NOTE (CHANGE IN STATUS NOTIFICATION) - ASSESSMENT
pt complained of chest pain, but pt is due for bp med, molly chapman aware. pt given routine meds, and ordered for an inhaler as per molly chapman. as per pa, do not do ekg or labs, as he feels its more respiratory related with pt. monitor closely.

## 2019-02-13 NOTE — PROGRESS NOTE ADULT - SUBJECTIVE AND OBJECTIVE BOX
Patient discussed on morning rounds with Dr. Alejo.    Operation / Date: TAVR on 02/05/2019    SUBJECTIVE ASSESSMENT:  Patient seen and examined today at bedside without complaints.     Vital Signs Last 24 Hours  T(C): 36.2 (13 Feb 2019 09:03), Max: 36.3 (13 Feb 2019 05:47)  T(F): 97.2 (13 Feb 2019 09:03), Max: 97.4 (13 Feb 2019 05:47)  HR: 64 (13 Feb 2019 12:50) (56 - 68)  BP: 132/60 (13 Feb 2019 12:50) (113/56 - 143/67)  BP(mean): 78 (13 Feb 2019 05:10) (78 - 96)  RR: 18 (13 Feb 2019 12:50) (17 - 28)  SpO2: 99% (13 Feb 2019 12:50) (89% - 100%)  I&O's Detail    12 Feb 2019 07:01  -  13 Feb 2019 07:00  --------------------------------------------------------  IN:    IV PiggyBack: 100 mL    Lactated Ringers IV Bolus: 500 mL    Oral Fluid: 325 mL  Total IN: 925 mL    OUT:    Voided: 1050 mL  Total OUT: 1050 mL    Total NET: -125 mL      13 Feb 2019 07:01  -  13 Feb 2019 13:26  --------------------------------------------------------  IN:  Total IN: 0 mL    OUT:    Voided: 400 mL  Total OUT: 400 mL    Total NET: -400 mL    CHEST TUBE: No.   SERVANDO DRAIN: No.  EPICARDIAL WIRES: No.  TIE DOWNS: No.  VENTURA: No.    PHYSICAL EXAM:  Appearance: Normal	  HEENT:   Normal oral mucosa, PERRL, EOMI	  Neck: Supple, - JVD; Carotid Bruit   Cardiovascular: Normal S1 S2. No JVD. No murmurs.  Respiratory: Lungs clear to auscultation B/L. No Rales, Rhonchi, Wheezing.	  Gastrointestinal:  Soft, non-tender, + BS.	  Skin: No rashes. No ecchymoses. No cyanosis.  Extremities: Normal range of motion, no clubbing, cyanosis or edema.  Vascular: Peripheral pulses palpable 2+ bilaterally.  Neurologic: Non-focal.  Psychiatry: A & O x 3, mood & affect appropriate.     Blood:                        10.6   2.51  )-----------( 192      ( 13 Feb 2019 10:17 )             33.3     COUMADIN: No.    PT/INR - ( 13 Feb 2019 10:17 )   PT: 12.6 seconds;   INR: 1.11     PTT - ( 13 Feb 2019 10:17 )  PTT:32.5 seconds    02-13    138  |  100  |  20  ----------------------------<  155<H>  3.8   |  25  |  1.06    Phos  3.6     02-13  Mg     2.0     02-13    Urinalysis Basic - ( 12 Feb 2019 22:20 )    Color: Yellow / Appearance: Clear / SG: <=1.005 / pH: x  Gluc: x / Ketone: NEGATIVE  / Bili: Negative / Urobili: 0.2 E.U./dL   Blood: x / Protein: NEGATIVE mg/dL / Nitrite: NEGATIVE   Leuk Esterase: NEGATIVE / RBC: < 5 /HPF / WBC 5-10 /HPF   Sq Epi: x / Non Sq Epi: 0-5 /HPF / Bacteria: Present /HPF    MEDICATIONS  (STANDING):  aspirin enteric coated 81 milliGRAM(s) Oral daily  carvedilol 3.125 milliGRAM(s) Oral every 12 hours  heparin  Injectable 5000 Unit(s) SubCutaneous every 8 hours  lisinopril 5 milliGRAM(s) Oral daily  piperacillin/tazobactam IVPB. 4.5 Gram(s) IV Intermittent every 8 hours    MEDICATIONS  (PRN):  acetaminophen   Tablet .. 650 milliGRAM(s) Oral every 6 hours PRN Mild Pain (1 - 3)    RADIOLOGY & ADDITIONAL TESTS:  See EMR.

## 2019-02-13 NOTE — CONSULT NOTE ADULT - ATTENDING COMMENTS
Agree with above.  Her neutropenia is acute.  Suspect this could be iatrogenic.  She is having neutropenic fevers.  Her chief complaint is her breathing (SOB, cough).  Can stop zosyn and start cefepime for neutropenic coverage.  Cefepime is also less fluid overloading.  Send off viral and fungal studies as above.  Check procalcitonin and sputum culture     ID service will follow

## 2019-02-13 NOTE — PROGRESS NOTE ADULT - ASSESSMENT
This is an 78 year old female, with history of uncontrolled HTN, asthma, chronic HFpEF, aortic stenosis s/p transfemoral TAVR with rachael valve on 02/05/2019 with Dr. Alejo here at [facility]. She was discharged home on POD#2 on coreg 3.125mg BID, Lisinopril 5mg and HTCZ 25mg. She presented to an OSH ED with sudden onset central chest pain and headache and home BP of 220 systolic. In ED BP was 194/54. She was given ASA 325mg and nitroglycerin spray which lowered the severity of her chest pain.  EKG was negative for ST changes, and troponins were 0.1-->0.1.  She had also been c/o headache, blurred vision, dizziness and tremors along with her chest pain that began on the day of presentation (02/11/2019) at 4:00am. She denied any SOB, PND, palpitations, orthopnea, fever/chills or cough.  In the ED she was febrile up to 101.3 and WBC was 2.04.  Lactic acid 1.9-->1.7.  Sepsis protocol was initiated and she was placed on neutropenic isolation.  Patient now transferred to [facility] for further evaluation in light of her recent TAVR. Patient without complaints.     Neurovascular: No delirium. Pain well controlled with current regimen.  -PRN's.    Cardiovascular: Hemodynamically stable. HR controlled. Uncontrolled HTN history, chronic HFpEF, aortic stenosis s/p transfemoral TAVR with rachael valve on 02/05/2019 with Dr. Alejo.   -CTM.    Respiratory: 02 Sat = 98% on RA.  -If on oxygen wean to RA from for O2 Sat > 93%.  -Encourage C+DB and Use of IS 10x / hr while awake.  -CXR.    GI: Stable.  -PPX.  -PO Diet.    Renal / : Stable.  -Monitor renal function.  -Monitor I/O's.    Endocrine: Stable.  -CTM.     Hematologic: See ID.  -CBC.  -Coagulation Panel.    ID: Neutropenic fever. Cause unknown. Plavix held for possible cause.  -Temperature.  -CBC.  -Observe for SIRS/Sepsis Syndrome.  -Hematology consulted f/u recommendations.   -ID consulted f/u recommendations.   -ABX..  -Blood Cx. Pending.   -UA negative.     Prophylaxis:  -DVT prophylaxis with 5000 SubQ Heparin q8h.  -SCD's.    Disposition:  -9L for frequent monitoring.

## 2019-02-14 LAB
ALBUMIN SERPL ELPH-MCNC: 3.8 G/DL — SIGNIFICANT CHANGE UP (ref 3.3–5)
ALP SERPL-CCNC: 43 U/L — SIGNIFICANT CHANGE UP (ref 40–120)
ALT FLD-CCNC: 10 U/L — SIGNIFICANT CHANGE UP (ref 10–45)
ANION GAP SERPL CALC-SCNC: 13 MMOL/L — SIGNIFICANT CHANGE UP (ref 5–17)
APTT BLD: 33.3 SEC — SIGNIFICANT CHANGE UP (ref 27.5–36.3)
AST SERPL-CCNC: 12 U/L — SIGNIFICANT CHANGE UP (ref 10–40)
BASOPHILS # BLD AUTO: 0.07 K/UL — SIGNIFICANT CHANGE UP (ref 0–0.2)
BASOPHILS NFR BLD AUTO: 0.9 % — SIGNIFICANT CHANGE UP (ref 0–2)
BILIRUB SERPL-MCNC: 0.5 MG/DL — SIGNIFICANT CHANGE UP (ref 0.2–1.2)
BUN SERPL-MCNC: 17 MG/DL — SIGNIFICANT CHANGE UP (ref 7–23)
CALCIUM SERPL-MCNC: 9.6 MG/DL — SIGNIFICANT CHANGE UP (ref 8.4–10.5)
CHLORIDE SERPL-SCNC: 101 MMOL/L — SIGNIFICANT CHANGE UP (ref 96–108)
CO2 SERPL-SCNC: 25 MMOL/L — SIGNIFICANT CHANGE UP (ref 22–31)
CREAT SERPL-MCNC: 1.01 MG/DL — SIGNIFICANT CHANGE UP (ref 0.5–1.3)
CRYPTOC AG FLD QL: NEGATIVE — SIGNIFICANT CHANGE UP
EOSINOPHIL # BLD AUTO: 0.19 K/UL — SIGNIFICANT CHANGE UP (ref 0–0.5)
EOSINOPHIL NFR BLD AUTO: 2.6 % — SIGNIFICANT CHANGE UP (ref 0–6)
EXTRA LAVENDER TOP TUBE: SIGNIFICANT CHANGE UP
GLUCOSE SERPL-MCNC: 98 MG/DL — SIGNIFICANT CHANGE UP (ref 70–99)
HCT VFR BLD CALC: 33.5 % — LOW (ref 34.5–45)
HGB BLD-MCNC: 10.7 G/DL — LOW (ref 11.5–15.5)
HIV 1+2 AB+HIV1 P24 AG SERPL QL IA: SIGNIFICANT CHANGE UP
INR BLD: 1.08 — SIGNIFICANT CHANGE UP (ref 0.88–1.16)
LYMPHOCYTES # BLD AUTO: 1.95 K/UL — SIGNIFICANT CHANGE UP (ref 1–3.3)
LYMPHOCYTES # BLD AUTO: 26.3 % — SIGNIFICANT CHANGE UP (ref 13–44)
MAGNESIUM SERPL-MCNC: 2 MG/DL — SIGNIFICANT CHANGE UP (ref 1.6–2.6)
MCHC RBC-ENTMCNC: 30.2 PG — SIGNIFICANT CHANGE UP (ref 27–34)
MCHC RBC-ENTMCNC: 31.9 GM/DL — LOW (ref 32–36)
MCV RBC AUTO: 94.6 FL — SIGNIFICANT CHANGE UP (ref 80–100)
MONOCYTES # BLD AUTO: 1.04 K/UL — HIGH (ref 0–0.9)
MONOCYTES NFR BLD AUTO: 14 % — SIGNIFICANT CHANGE UP (ref 2–14)
NEUTROPHILS # BLD AUTO: 4.03 K/UL — SIGNIFICANT CHANGE UP (ref 1.8–7.4)
NEUTROPHILS NFR BLD AUTO: 27.2 % — LOW (ref 43–77)
NRBC # BLD: 0 /100 WBCS — SIGNIFICANT CHANGE UP (ref 0–0)
PHOSPHATE SERPL-MCNC: 3.5 MG/DL — SIGNIFICANT CHANGE UP (ref 2.5–4.5)
PLATELET # BLD AUTO: 214 K/UL — SIGNIFICANT CHANGE UP (ref 150–400)
POTASSIUM SERPL-MCNC: 4 MMOL/L — SIGNIFICANT CHANGE UP (ref 3.5–5.3)
POTASSIUM SERPL-SCNC: 4 MMOL/L — SIGNIFICANT CHANGE UP (ref 3.5–5.3)
PROCALCITONIN SERPL-MCNC: 0.04 NG/ML — SIGNIFICANT CHANGE UP (ref 0.02–0.1)
PROT SERPL-MCNC: 7 G/DL — SIGNIFICANT CHANGE UP (ref 6–8.3)
PROTHROM AB SERPL-ACNC: 12.2 SEC — SIGNIFICANT CHANGE UP (ref 10–12.9)
RBC # BLD: 3.54 M/UL — LOW (ref 3.8–5.2)
RBC # FLD: 12.9 % — SIGNIFICANT CHANGE UP (ref 10.3–14.5)
SODIUM SERPL-SCNC: 139 MMOL/L — SIGNIFICANT CHANGE UP (ref 135–145)
WBC # BLD: 7.41 K/UL — SIGNIFICANT CHANGE UP (ref 3.8–10.5)
WBC # FLD AUTO: 7.41 K/UL — SIGNIFICANT CHANGE UP (ref 3.8–10.5)

## 2019-02-14 PROCEDURE — 99233 SBSQ HOSP IP/OBS HIGH 50: CPT

## 2019-02-14 PROCEDURE — 99232 SBSQ HOSP IP/OBS MODERATE 35: CPT | Mod: GC

## 2019-02-14 PROCEDURE — 93306 TTE W/DOPPLER COMPLETE: CPT | Mod: 26

## 2019-02-14 RX ADMIN — POLYETHYLENE GLYCOL 3350 17 GRAM(S): 17 POWDER, FOR SOLUTION ORAL at 12:46

## 2019-02-14 RX ADMIN — CEFEPIME 100 MILLIGRAM(S): 1 INJECTION, POWDER, FOR SOLUTION INTRAMUSCULAR; INTRAVENOUS at 22:43

## 2019-02-14 RX ADMIN — CEFEPIME 100 MILLIGRAM(S): 1 INJECTION, POWDER, FOR SOLUTION INTRAMUSCULAR; INTRAVENOUS at 13:39

## 2019-02-14 RX ADMIN — CEFEPIME 100 MILLIGRAM(S): 1 INJECTION, POWDER, FOR SOLUTION INTRAMUSCULAR; INTRAVENOUS at 06:06

## 2019-02-14 RX ADMIN — BUDESONIDE AND FORMOTEROL FUMARATE DIHYDRATE 2 PUFF(S): 160; 4.5 AEROSOL RESPIRATORY (INHALATION) at 17:50

## 2019-02-14 RX ADMIN — BUDESONIDE AND FORMOTEROL FUMARATE DIHYDRATE 2 PUFF(S): 160; 4.5 AEROSOL RESPIRATORY (INHALATION) at 06:06

## 2019-02-14 RX ADMIN — HEPARIN SODIUM 5000 UNIT(S): 5000 INJECTION INTRAVENOUS; SUBCUTANEOUS at 06:31

## 2019-02-14 RX ADMIN — LISINOPRIL 5 MILLIGRAM(S): 2.5 TABLET ORAL at 06:06

## 2019-02-14 RX ADMIN — HEPARIN SODIUM 5000 UNIT(S): 5000 INJECTION INTRAVENOUS; SUBCUTANEOUS at 13:38

## 2019-02-14 RX ADMIN — Medication 81 MILLIGRAM(S): at 12:45

## 2019-02-14 RX ADMIN — CARVEDILOL PHOSPHATE 3.12 MILLIGRAM(S): 80 CAPSULE, EXTENDED RELEASE ORAL at 17:50

## 2019-02-14 RX ADMIN — CARVEDILOL PHOSPHATE 3.12 MILLIGRAM(S): 80 CAPSULE, EXTENDED RELEASE ORAL at 06:06

## 2019-02-14 RX ADMIN — PANTOPRAZOLE SODIUM 40 MILLIGRAM(S): 20 TABLET, DELAYED RELEASE ORAL at 06:06

## 2019-02-14 RX ADMIN — HEPARIN SODIUM 5000 UNIT(S): 5000 INJECTION INTRAVENOUS; SUBCUTANEOUS at 22:42

## 2019-02-14 NOTE — DIETITIAN INITIAL EVALUATION ADULT. - OTHER INFO
79 yo/female with PMHx HTN, asthma, AS s/p TAVR on 2/5/19, readmitted with CP/HA w/c/f neutropenic fever (possibly 2/2 plavix reaction?). Pt seen in room, awake, alert, very pleasant. [place]  ID#[id number]. Pt endorses fair appetite and intake over past few days/week since leaving hospital. Observed 100% intake on breakfast tray, pt endorses good appetite at this time. No N/V/C/D reported at this time. +BM. NKFA. No difficulty chewing or swallowing. Skin noted w/surgical wound. Back and chest pain endorsed; especially w/exertion- RN aware. Pt asking for list of foods to eat upon D/C, receptive to DASH/TLC diet handout (Daughter able to translate english handout for her).

## 2019-02-14 NOTE — DIETITIAN INITIAL EVALUATION ADULT. - ENERGY NEEDS
Height 70"; #; #; 146%IBW  Ideal body weight used for calculations as pt >120% of IBW. Needs estimated for maintenance in older adults; increased for infection/wound healing

## 2019-02-14 NOTE — PROGRESS NOTE ADULT - SUBJECTIVE AND OBJECTIVE BOX
The patient was seen and examined.      BRITTANY DE LA CRUZ is a 78y Female with history of hypertension, asthma, chronic congestive heart failure, aortic stenosis, s/p TAVR on 19, discharged on post-op day 2 on Coreg, Lisinopril, HCTZ, aspirin and Clopidogrel and returned on  with complaints of chest pain, headache, spiking fever, shaking chills and dizziness. She was found to have a blood pressure of 220 systolic and labs were significant for a WBC of 2.04. Prior to the TAVR, the patient had a completely normal CBC with WBC 7.4, Hgb 13.4, HCT 40.7, platelets 289,000. On , the patient received Clopidogrel for the procedure and after. At time of discharge, WBC was 3.62, Hgb 10.6 and platelet count 135,000. The patient was fully cultured on this admission and begun on antibiotics. Cultures and CXR are negative thus far for source of infection. Today, WBC is 7.41, Hgb 10.7, Hct 33.5 and platelets 214K.  Clopidogrel has been discontinued and antibiotics continue.  .        Allergies    No Known Allergies    Intolerances        Medications:  MEDICATIONS  (STANDING):  aspirin enteric coated 81 milliGRAM(s) Oral daily  buDESOnide  80 MICROgram(s)/formoterol 4.5 MICROgram(s) Inhaler 2 Puff(s) Inhalation two times a day  carvedilol 3.125 milliGRAM(s) Oral every 12 hours  cefepime   IVPB      cefepime   IVPB 2000 milliGRAM(s) IV Intermittent every 8 hours  heparin  Injectable 5000 Unit(s) SubCutaneous every 8 hours  lisinopril 5 milliGRAM(s) Oral daily  pantoprazole    Tablet 40 milliGRAM(s) Oral before breakfast  polyethylene glycol 3350 17 Gram(s) Oral daily    MEDICATIONS  (PRN):  acetaminophen   Tablet .. 650 milliGRAM(s) Oral every 6 hours PRN Mild Pain (1 - 3)    aspirin enteric coated 81 milliGRAM(s) Oral daily  heparin  Injectable 5000 Unit(s) SubCutaneous every 8 hours        Interval History: The patient c/o headache when using incentive spirometry, left chest and shoulder pain.  No nausea/vomiting/fevers/chills/night sweats.  No fatigue, has  headaches.  Appetite is stable without weight loss.  No abdominal pain/diarrhea/constipation.  No melena or hematochezia.    No dysuria/hematuria.  No history of easy bruising/bleeding.  No gingival bleeding or epistaxis.  No leg pain or leg swelling.    ROS is otherwise negative.    PHYSICAL EXAM:    T(F): 97 (19 @ 09:54), Max: 98.2 (19 @ 22:07)  HR: 76 (19 @ 05:19) (64 - 86)  BP: 143/65 (19 @ 05:19) (132/60 - 175/77)  RR: 27 (19 @ 05:19) (18 - 40)  SpO2: 98% (19 @ 05:19) (93% - 99%)  Wt(kg): --    Daily     Daily Weight in k.4 (2019 06:19)    Gen: well developed, obese, comfortable  HEENT: normocephalic/atraumatic, mild conjunctival pallor, no scleral icterus, no oral thrush/mucosal bleeding/mucositis  Neck: supple, no masses, no JVD  Cardiovascular: RR, nl S1S2  Respiratory: clear air entry b/l  Gastrointestinal: BS+, obese, soft, NT/ND, no masses, no splenomegaly, no hepatomegaly, no evidence for ascites  Extremities: no clubbing/cyanosis, has LE edema, no calf tenderness  Neurological: no focal deficits  Skin: no rash on visible skin, excessive ecchymoses or petechiae  Lymph Nodes:  no significant peripheral adenopathy   Musculoskeletal:  full ROM  Psychiatric:  mood stable        Labs:                          10.7   7.41  )-----------( 214      ( 2019 05:57 )             33.5     CBC Full  -  ( 2019 05:57 )  WBC Count : 7.41 K/uL  Hemoglobin : 10.7 g/dL  Hematocrit : 33.5 %  Platelet Count - Automated : 214 K/uL  Mean Cell Volume : 94.6 fl  Mean Cell Hemoglobin : 30.2 pg  Mean Cell Hemoglobin Concentration : 31.9 gm/dL  Auto Neutrophil # : x  Auto Lymphocyte # : x  Auto Monocyte # : x  Auto Eosinophil # : x  Auto Basophil # : x  Auto Neutrophil % : x  Auto Lymphocyte % : x  Auto Monocyte % : x  Auto Eosinophil % : x  Auto Basophil % : x    PT/INR - ( 2019 05:57 )   PT: 12.2 sec;   INR: 1.08          PTT - ( 2019 05:57 )  PTT:33.3 sec    -    139  |  101  |  17  ----------------------------<  98  4.0   |  25  |  1.01    Ca    9.6      2019 05:57  Phos  3.5     -  Mg     2.0     -    TPro  7.0  /  Alb  3.8  /  TBili  0.5  /  DBili  x   /  AST  12  /  ALT  10  /  AlkPhos  43  02-14      Urinalysis Basic - ( 2019 22:20 )    Color: Yellow / Appearance: Clear / SG: <=1.005 / pH: x  Gluc: x / Ketone: NEGATIVE  / Bili: Negative / Urobili: 0.2 E.U./dL   Blood: x / Protein: NEGATIVE mg/dL / Nitrite: NEGATIVE   Leuk Esterase: NEGATIVE / RBC: < 5 /HPF / WBC 5-10 /HPF   Sq Epi: x / Non Sq Epi: 0-5 /HPF / Bacteria: Present /HPF        Other Labs:    Cultures:    Pathology:    Imaging Studies:

## 2019-02-14 NOTE — PROGRESS NOTE ADULT - ATTENDING COMMENTS
Agree with above.  Patient afebrile with no source.  WBC has improved.  Procalcitonin is 0.04 which makes bacterial pneumonia less likely.    Can continue Cefepime for now.  If there is no evidence of active infection and she is afebrile x 48 hours and she has recovered her WBC, can stop cefepime and neutropenic fever work up    ID team 1 will follow

## 2019-02-14 NOTE — PROGRESS NOTE ADULT - SUBJECTIVE AND OBJECTIVE BOX
ID CONSULT NOTE    Interval History:     OBJECTIVE  Vitals:  T(C): 36.1 (02-14-19 @ 09:54), Max: 36.8 (02-13-19 @ 22:07)  HR: 72 (02-14-19 @ 09:00) (68 - 86)  BP: 138/73 (02-14-19 @ 09:00) (138/73 - 175/77)  RR: 34 (02-14-19 @ 09:00) (19 - 40)  SpO2: 92% (02-14-19 @ 09:00) (92% - 98%)  Wt(kg): --    I/O:  I&O's Summary    13 Feb 2019 07:01  -  14 Feb 2019 07:00  --------------------------------------------------------  IN: 0 mL / OUT: 2900 mL / NET: -2900 mL    14 Feb 2019 07:01  -  14 Feb 2019 13:42  --------------------------------------------------------  IN: 880 mL / OUT: 200 mL / NET: 680 mL        PHYSICAL EXAM:  Appearance: NAD. Speaking in full sentences.   HEENT: PERRL. No pallor noted.  Conjunctiva clear b/l. Moist oral mucosa.  Cardiovascular: RRR with no murmurs.  Respiratory: Lungs CTAB.   Gastrointestinal:  Soft, nontender. Non-distended. Non-rigid.	  Extremities: No edema b/l. No erythema b/l. LE WWP b/l.  Vascular: DP present.  Neurologic:  Alert and awake. Moving all extremities. Following commands. Making eye contact.  	  LABS:                        10.7   7.41  )-----------( 214      ( 14 Feb 2019 05:57 )             33.5     02-14    139  |  101  |  17  ----------------------------<  98  4.0   |  25  |  1.01    Ca    9.6      14 Feb 2019 05:57  Phos  3.5     02-14  Mg     2.0     02-14    TPro  7.0  /  Alb  3.8  /  TBili  0.5  /  DBili  x   /  AST  12  /  ALT  10  /  AlkPhos  43  02-14    PT/INR - ( 14 Feb 2019 05:57 )   PT: 12.2 sec;   INR: 1.08          PTT - ( 14 Feb 2019 05:57 )  PTT:33.3 sec  Urinalysis Basic - ( 12 Feb 2019 22:20 )    Color: Yellow / Appearance: Clear / SG: <=1.005 / pH: x  Gluc: x / Ketone: NEGATIVE  / Bili: Negative / Urobili: 0.2 E.U./dL   Blood: x / Protein: NEGATIVE mg/dL / Nitrite: NEGATIVE   Leuk Esterase: NEGATIVE / RBC: < 5 /HPF / WBC 5-10 /HPF   Sq Epi: x / Non Sq Epi: 0-5 /HPF / Bacteria: Present /HPF        RADIOLOGY & ADDITIONAL TESTS:  Reviewed .    MEDICATIONS  (STANDING):  aspirin enteric coated 81 milliGRAM(s) Oral daily  buDESOnide  80 MICROgram(s)/formoterol 4.5 MICROgram(s) Inhaler 2 Puff(s) Inhalation two times a day  carvedilol 3.125 milliGRAM(s) Oral every 12 hours  cefepime   IVPB      cefepime   IVPB 2000 milliGRAM(s) IV Intermittent every 8 hours  heparin  Injectable 5000 Unit(s) SubCutaneous every 8 hours  lisinopril 5 milliGRAM(s) Oral daily  pantoprazole    Tablet 40 milliGRAM(s) Oral before breakfast  polyethylene glycol 3350 17 Gram(s) Oral daily    MEDICATIONS  (PRN):  acetaminophen   Tablet .. 650 milliGRAM(s) Oral every 6 hours PRN Mild Pain (1 - 3) ID CONSULT NOTE    Interval History: Reports cough and L shoulder pain. Otherwise no fevers, chills. No dysuria. ROS negative.    OBJECTIVE  Vitals:  T(C): 36.1 (02-14-19 @ 09:54), Max: 36.8 (02-13-19 @ 22:07)  HR: 72 (02-14-19 @ 09:00) (68 - 86)  BP: 138/73 (02-14-19 @ 09:00) (138/73 - 175/77)  RR: 34 (02-14-19 @ 09:00) (19 - 40)  SpO2: 92% (02-14-19 @ 09:00) (92% - 98%)  Wt(kg): --    I/O:  I&O's Summary    13 Feb 2019 07:01  -  14 Feb 2019 07:00  --------------------------------------------------------  IN: 0 mL / OUT: 2900 mL / NET: -2900 mL    14 Feb 2019 07:01  -  14 Feb 2019 13:42  --------------------------------------------------------  IN: 880 mL / OUT: 200 mL / NET: 680 mL        PHYSICAL EXAM:  Appearance: NAD. Speaking in full sentences.   HEENT: PERRL. No pallor noted.  Conjunctiva clear b/l. Moist oral mucosa.  Cardiovascular: RRR with no murmurs.  Respiratory: Lungs CTAB.   Gastrointestinal:  Soft, nontender. Non-distended. Non-rigid.	  Extremities: No edema b/l. No erythema b/l. LE WWP b/l.  Vascular: DP present.  Neurologic:  Alert and awake. Moving all extremities. Following commands. Making eye contact.  	  LABS:                        10.7   7.41  )-----------( 214      ( 14 Feb 2019 05:57 )             33.5     02-14    139  |  101  |  17  ----------------------------<  98  4.0   |  25  |  1.01    Ca    9.6      14 Feb 2019 05:57  Phos  3.5     02-14  Mg     2.0     02-14    TPro  7.0  /  Alb  3.8  /  TBili  0.5  /  DBili  x   /  AST  12  /  ALT  10  /  AlkPhos  43  02-14    PT/INR - ( 14 Feb 2019 05:57 )   PT: 12.2 sec;   INR: 1.08          PTT - ( 14 Feb 2019 05:57 )  PTT:33.3 sec  Urinalysis Basic - ( 12 Feb 2019 22:20 )    Color: Yellow / Appearance: Clear / SG: <=1.005 / pH: x  Gluc: x / Ketone: NEGATIVE  / Bili: Negative / Urobili: 0.2 E.U./dL   Blood: x / Protein: NEGATIVE mg/dL / Nitrite: NEGATIVE   Leuk Esterase: NEGATIVE / RBC: < 5 /HPF / WBC 5-10 /HPF   Sq Epi: x / Non Sq Epi: 0-5 /HPF / Bacteria: Present /HPF        RADIOLOGY & ADDITIONAL TESTS:  Reviewed .    MEDICATIONS  (STANDING):  aspirin enteric coated 81 milliGRAM(s) Oral daily  buDESOnide  80 MICROgram(s)/formoterol 4.5 MICROgram(s) Inhaler 2 Puff(s) Inhalation two times a day  carvedilol 3.125 milliGRAM(s) Oral every 12 hours  cefepime   IVPB      cefepime   IVPB 2000 milliGRAM(s) IV Intermittent every 8 hours  heparin  Injectable 5000 Unit(s) SubCutaneous every 8 hours  lisinopril 5 milliGRAM(s) Oral daily  pantoprazole    Tablet 40 milliGRAM(s) Oral before breakfast  polyethylene glycol 3350 17 Gram(s) Oral daily    MEDICATIONS  (PRN):  acetaminophen   Tablet .. 650 milliGRAM(s) Oral every 6 hours PRN Mild Pain (1 - 3)

## 2019-02-14 NOTE — PROGRESS NOTE ADULT - ASSESSMENT
This is an 78 year old female, with history of uncontrolled HTN, asthma, chronic HFpEF, aortic stenosis s/p transfemoral TAVR with rachael valve on 02/05/2019 with Dr. Alejo here at [facility]. She was discharged home on POD#2 on coreg 3.125mg BID, Lisinopril 5mg and HTCZ 25mg. She presented to an OSH ED with sudden onset central chest pain and headache and home BP of 220 systolic. In ED BP was 194/54. She was given ASA 325mg and nitroglycerin spray which lowered the severity of her chest pain.  EKG was negative for ST changes, and troponins were 0.1-->0.1.  She had also been c/o headache, blurred vision, dizziness and tremors along with her chest pain that began on the day of presentation (02/11/2019) at 4:00am. She denied any SOB, PND, palpitations, orthopnea, fever/chills or cough.  In the ED she was febrile up to 101.3 and WBC was 2.04.  Lactic acid 1.9-->1.7.  Sepsis protocol was initiated and she was placed on neutropenic isolation.  Patient transferred to [facility] for further evaluation in light of her recent TAVR. Started on neupogen, and wbc count responded appropriately and WNL. Heme/onc following, appreciate recs.      Neurovascular: No delirium. Pain well controlled with current regimen.  -Tylenol PRN's.    Cardiovascular: Hemodynamically stable. HR controlled.  - PMHx of Uncontrolled HTN history, chronic HFpEF, aortic stenosis s/p transfemoral TAVR with rachael valve on 02/05/2019 with Dr. Alejo.   - Echo today, f/u results.   - Plavix held, pancytopenia possibly due to initiation of plavix therapy.  - Continue aspirin 81mg PO qd, lisinopril 5mg PO qd and carvedilol 3.125 BID  - Continue to monitor HR/BP/Tele.     Respiratory: 02 Sat = 98% on RA.  -If on oxygen wean to RA from for O2 Sat > 93%.  -Encourage C+DB and Use of IS 10x / hr while awake.  -CXR stable.     GI: Stable.  -GI PPx with pantoprazole.   -PO Diet.    Renal / : BUN/Cr: 17/1.01  -Monitor renal function.  -Monitor I/O's.    Endocrine: Stable.  -A1c: 5.0  - TSH 1.433    Hematologic: H&H: 10.7/33.5 today. See ID for WBC info.   - Heme/Onc consulted - neutropenia likely drug induced - possibly plavix.   - continue neupogen 300mcg qd.  -CBC with diff in AM.     ID: Neutropenic fever. Cause unknown. Plavix held for possible cause.  - Afebrile.   -WBC: 7.41 this AM. On admission WBC: 2.52. Started on neupogen.   -Observe for SIRS/Sepsis Syndrome.  - ID following, appreciate recs.  - Continue cefepime.   - Procalcitonin 0.04.   - HIV neg.  -Blood Cx NGTD.  -UA negative.     Prophylaxis:  -DVT prophylaxis with 5000 SubQ Heparin q8h.   -SCD's.    Disposition:  - Home when medically stable.

## 2019-02-14 NOTE — PROGRESS NOTE ADULT - ASSESSMENT
BRITTANY DE LA CRUZ is a 78y Female with history of hypertension, asthma, chronic congestive heart failure, aortic stenosis, s/p TAVR on 2/5/19, discharged on post-op day 2 on Coreg, Lisinopril, HCTZ, aspirin and Clopidogrel and returned on 2/12 with complaints of chest pain, headache, spiking fever, shaking chills and dizziness. She was found to have a blood pressure of 220 systolic and labs were significant for a WBC of 2.04. Prior to the TAVR, the patient had a completely normal CBC with WBC 7.4, Hgb 13.4, HCT 40.7, platelets 289,000. On February 5, the patient received Clopidogrel for the procedure and after. At time of discharge, WBC was 3.62, Hgb 10.6 and platelet count 135,000. The patient was fully cultured on this admission and begun on antibiotics. Cultures and CXR are negative thus far for source of infection. Today, WBC is 7.41, Hgb 10.7, Hct 33.5 and platelets 214K.  Clopidogrel has been discontinued and antibiotics continue.  .  Severe neutropenia - likely medication induced, i.e. Clopidogrel Rare cases reported of progressive neutropenia and pancytopenia occurring within a short time frame after initiating Clopidogrel. Must consider sepsis as well as acute viral illness as possible etiologies.  Anemia - due to present acute illness and post TAVR  Plan- agree with discontinuation of Clopidogrel. Would give Neupogen 300 micrograms subcutaneously daily. Maintain neutropenic precautions for ANC less than 500.. Daily CBC with daily differential to calculate ANC. Continue antibiotics as per ID.

## 2019-02-14 NOTE — PROGRESS NOTE ADULT - ASSESSMENT
ASSESSMENT:  78F with PMH of HTN, Diastolic HF, Severe Aortic stenosis s/p TAVR (2/5/19) presented initially to [facility] for fever, headache and central chest pain- patient transferred from [facility] in setting of recent TAVR and neutropenic fever. ID consulted for Neutropenic fever.     PLAN: 	  #Neutropenic Fever  Patient complaining of cough. +Travel to [facility]. No sick contacts. No hx of Neutropenia and not neutropenic on last admission. S/p neupogen for which improved ANC 4000 (From 190s) Crypto negative.  HIV negative. Source of infection may be pulmonary but CXR appears overloaded no obvious infiltrate/consolidation. Etiology of neutropenia may be iatrogenic.   -c/w cefepime at this time. Pending another day of assessment of fever and work up of source for duration of therapy   -Please send off procalcitonin  - F/u fungal studies: beta-d-glucan, galactomannan  -Please send off RVP.        Discussed with Primary team. ID team 1 will continue to follow. ASSESSMENT:  78F with PMH of HTN, Diastolic HF, Severe Aortic stenosis s/p TAVR (2/5/19) presented initially to [place] for fever, headache and central chest pain- patient transferred from [place] in setting of recent TAVR and neutropenic fever. ID consulted for Neutropenic fever.     PLAN: 	  #Neutropenic Fever  Patient complaining of cough. +Travel to [facility]. No sick contacts. No hx of Neutropenia and not neutropenic on last admission. S/p neupogen for which improved ANC 4000 (From 190s) Crypto negative.  HIV negative. Source of infection may be pulmonary but CXR appears overloaded no obvious infiltrate/consolidation. Etiology of neutropenia may be iatrogenic.   -c/w cefepime at this time. Pending another day of assessment of fever and work up of source for duration of therapy   - F/u fungal studies: beta-d-glucan, galactomannan  -Please send off RVP.        Discussed with Primary team. ID team 1 will continue to follow.

## 2019-02-14 NOTE — DIETITIAN INITIAL EVALUATION ADULT. - NS AS NUTRI INTERV ED CONTENT
Nutrition relationship to health/disease/Purpose of the nutrition education/Recommended modifications

## 2019-02-14 NOTE — PROGRESS NOTE ADULT - SUBJECTIVE AND OBJECTIVE BOX
Patient discussed on morning rounds with Dr. Alejo    Operation / Date: TAVR on 02/05/2019    SUBJECTIVE ASSESSMENT:  78y Female seen and examined bedside. Patient complaining of generalized left shoulder pain, she states this started a few days ago and has not gotten worse, however she notices it improves during the daytime. Denies chest pain, SOB, palpitations, hemopytsis, N/V/D, abdominal pain, dizziness, fever or chills. Patient is ambulating, tolerating PO diet, and voiding spontaneously.          Vital Signs Last 24 Hrs  T(C): 36.1 (14 Feb 2019 16:38), Max: 36.8 (13 Feb 2019 22:07)  T(F): 97 (14 Feb 2019 16:38), Max: 98.2 (13 Feb 2019 22:07)  HR: 74 (14 Feb 2019 13:30) (68 - 86)  BP: 132/63 (14 Feb 2019 13:30) (132/63 - 175/77)  BP(mean): 89 (14 Feb 2019 00:10) (89 - 111)  RR: 28 (14 Feb 2019 13:30) (19 - 40)  SpO2: 96% (14 Feb 2019 13:30) (92% - 98%)  I&O's Detail    13 Feb 2019 07:01  -  14 Feb 2019 07:00  --------------------------------------------------------  IN:  Total IN: 0 mL    OUT:    Voided: 2900 mL  Total OUT: 2900 mL    Total NET: -2900 mL      14 Feb 2019 07:01  -  14 Feb 2019 16:40  --------------------------------------------------------  IN:    IV PiggyBack: 50 mL    Oral Fluid: 880 mL  Total IN: 930 mL    OUT:    Voided: 200 mL  Total OUT: 200 mL    Total NET: 730 mL      CHEST TUBE: No.   SERVANDO DRAIN: No.  EPICARDIAL WIRES: No.  TIE DOWNS: No.  VENTURA: No.    PHYSICAL EXAM:    General: Patient lying comfortably in bed, no acute distress     Neurological: Alert and oriented. No focal neurological deficits     Cardiovascular: S1S2, RRR, no murmurs appreciated on exam     Respiratory: Clear to ausculation bilaterally, no wheezes rales or rhonchi.     Gastrointestinal: Abdomen soft, non tender, non distended, +BS    Extremities: Warm and well perfused. No peripheral edema or calf tenderness     Vascular: Peripheral pulses 2+ b/l.    Incision Sites: groins stable.     LABS:                        10.7   7.41  )-----------( 214      ( 14 Feb 2019 05:57 )             33.5       COUMADIN:  No.   PT/INR - ( 14 Feb 2019 05:57 )   PT: 12.2 sec;   INR: 1.08          PTT - ( 14 Feb 2019 05:57 )  PTT:33.3 sec    02-14    139  |  101  |  17  ----------------------------<  98  4.0   |  25  |  1.01    Ca    9.6      14 Feb 2019 05:57  Phos  3.5     02-14  Mg     2.0     02-14    TPro  7.0  /  Alb  3.8  /  TBili  0.5  /  DBili  x   /  AST  12  /  ALT  10  /  AlkPhos  43  02-14      Urinalysis Basic - ( 12 Feb 2019 22:20 )    Color: Yellow / Appearance: Clear / SG: <=1.005 / pH: x  Gluc: x / Ketone: NEGATIVE  / Bili: Negative / Urobili: 0.2 E.U./dL   Blood: x / Protein: NEGATIVE mg/dL / Nitrite: NEGATIVE   Leuk Esterase: NEGATIVE / RBC: < 5 /HPF / WBC 5-10 /HPF   Sq Epi: x / Non Sq Epi: 0-5 /HPF / Bacteria: Present /HPF        MEDICATIONS  (STANDING):  aspirin enteric coated 81 milliGRAM(s) Oral daily  buDESOnide  80 MICROgram(s)/formoterol 4.5 MICROgram(s) Inhaler 2 Puff(s) Inhalation two times a day  carvedilol 3.125 milliGRAM(s) Oral every 12 hours  cefepime   IVPB      cefepime   IVPB 2000 milliGRAM(s) IV Intermittent every 8 hours  heparin  Injectable 5000 Unit(s) SubCutaneous every 8 hours  lisinopril 5 milliGRAM(s) Oral daily  pantoprazole    Tablet 40 milliGRAM(s) Oral before breakfast  polyethylene glycol 3350 17 Gram(s) Oral daily    MEDICATIONS  (PRN):  acetaminophen   Tablet .. 650 milliGRAM(s) Oral every 6 hours PRN Mild Pain (1 - 3)        RADIOLOGY & ADDITIONAL TESTS:    < from: Xray Chest 1 View- PORTABLE-Routine (02.12.19 @ 21:07) >    EXAM:  XR CHEST PORTABLE ROUTINE 1V                          PROCEDURE DATE:  02/12/2019          INTERPRETATION:  XR CHEST PORTABLE ROUTINE 1V dated 2/12/2019 9:07 PM    CLINICAL INFORMATION: sepsis    COMPARISON: February 7, 2019    FINDINGS: Heart size is stable. Pulmonary venous congestion is improved.   No focal consolidation or pleural effusion. No pneumothorax. Mild left   base atelectasis.    IMPRESSION: Interval improvement of pulmonary venous congestion. Mild   left base atelectasis.    < end of copied text >

## 2019-02-15 ENCOUNTER — TRANSCRIPTION ENCOUNTER (OUTPATIENT)
Age: 79
End: 2019-02-15

## 2019-02-15 LAB
ANION GAP SERPL CALC-SCNC: 10 MMOL/L — SIGNIFICANT CHANGE UP (ref 5–17)
BASOPHILS # BLD AUTO: 0.05 K/UL — SIGNIFICANT CHANGE UP (ref 0–0.2)
BASOPHILS NFR BLD AUTO: 0.4 % — SIGNIFICANT CHANGE UP (ref 0–2)
BUN SERPL-MCNC: 15 MG/DL — SIGNIFICANT CHANGE UP (ref 7–23)
CALCIUM SERPL-MCNC: 9.7 MG/DL — SIGNIFICANT CHANGE UP (ref 8.4–10.5)
CHLORIDE SERPL-SCNC: 101 MMOL/L — SIGNIFICANT CHANGE UP (ref 96–108)
CO2 SERPL-SCNC: 28 MMOL/L — SIGNIFICANT CHANGE UP (ref 22–31)
CREAT SERPL-MCNC: 0.98 MG/DL — SIGNIFICANT CHANGE UP (ref 0.5–1.3)
EOSINOPHIL # BLD AUTO: 0.36 K/UL — SIGNIFICANT CHANGE UP (ref 0–0.5)
EOSINOPHIL NFR BLD AUTO: 2.8 % — SIGNIFICANT CHANGE UP (ref 0–6)
GALACTOMANNAN AG SERPL-ACNC: <0.5 INDEX — SIGNIFICANT CHANGE UP
GLUCOSE SERPL-MCNC: 103 MG/DL — HIGH (ref 70–99)
HCT VFR BLD CALC: 33.5 % — LOW (ref 34.5–45)
HGB BLD-MCNC: 10.6 G/DL — LOW (ref 11.5–15.5)
IMM GRANULOCYTES NFR BLD AUTO: 0.8 % — SIGNIFICANT CHANGE UP (ref 0–1.5)
LYMPHOCYTES # BLD AUTO: 1.98 K/UL — SIGNIFICANT CHANGE UP (ref 1–3.3)
LYMPHOCYTES # BLD AUTO: 15.7 % — SIGNIFICANT CHANGE UP (ref 13–44)
MAGNESIUM SERPL-MCNC: 2.1 MG/DL — SIGNIFICANT CHANGE UP (ref 1.6–2.6)
MCHC RBC-ENTMCNC: 30 PG — SIGNIFICANT CHANGE UP (ref 27–34)
MCHC RBC-ENTMCNC: 31.6 GM/DL — LOW (ref 32–36)
MCV RBC AUTO: 94.9 FL — SIGNIFICANT CHANGE UP (ref 80–100)
MONOCYTES # BLD AUTO: 1.09 K/UL — HIGH (ref 0–0.9)
MONOCYTES NFR BLD AUTO: 8.6 % — SIGNIFICANT CHANGE UP (ref 2–14)
NEUTROPHILS # BLD AUTO: 9.07 K/UL — HIGH (ref 1.8–7.4)
NEUTROPHILS NFR BLD AUTO: 71.7 % — SIGNIFICANT CHANGE UP (ref 43–77)
NRBC # BLD: 0 /100 WBCS — SIGNIFICANT CHANGE UP (ref 0–0)
PLATELET # BLD AUTO: 210 K/UL — SIGNIFICANT CHANGE UP (ref 150–400)
POTASSIUM SERPL-MCNC: 4.2 MMOL/L — SIGNIFICANT CHANGE UP (ref 3.5–5.3)
POTASSIUM SERPL-SCNC: 4.2 MMOL/L — SIGNIFICANT CHANGE UP (ref 3.5–5.3)
RBC # BLD: 3.53 M/UL — LOW (ref 3.8–5.2)
RBC # FLD: 13.2 % — SIGNIFICANT CHANGE UP (ref 10.3–14.5)
SODIUM SERPL-SCNC: 139 MMOL/L — SIGNIFICANT CHANGE UP (ref 135–145)
WBC # BLD: 12.65 K/UL — HIGH (ref 3.8–10.5)
WBC # FLD AUTO: 12.65 K/UL — HIGH (ref 3.8–10.5)

## 2019-02-15 PROCEDURE — 99233 SBSQ HOSP IP/OBS HIGH 50: CPT

## 2019-02-15 PROCEDURE — 99232 SBSQ HOSP IP/OBS MODERATE 35: CPT | Mod: GC

## 2019-02-15 PROCEDURE — 71045 X-RAY EXAM CHEST 1 VIEW: CPT | Mod: 26

## 2019-02-15 RX ORDER — LISINOPRIL 2.5 MG/1
10 TABLET ORAL DAILY
Qty: 0 | Refills: 0 | Status: DISCONTINUED | OUTPATIENT
Start: 2019-02-15 | End: 2019-02-16

## 2019-02-15 RX ORDER — HYDRALAZINE HCL 50 MG
5 TABLET ORAL ONCE
Qty: 0 | Refills: 0 | Status: COMPLETED | OUTPATIENT
Start: 2019-02-15 | End: 2019-02-15

## 2019-02-15 RX ORDER — HYDROCHLOROTHIAZIDE 25 MG
12.5 TABLET ORAL DAILY
Qty: 0 | Refills: 0 | Status: DISCONTINUED | OUTPATIENT
Start: 2019-02-15 | End: 2019-02-16

## 2019-02-15 RX ORDER — TIOTROPIUM BROMIDE 18 UG/1
1 CAPSULE ORAL; RESPIRATORY (INHALATION) DAILY
Qty: 0 | Refills: 0 | Status: DISCONTINUED | OUTPATIENT
Start: 2019-02-15 | End: 2019-02-16

## 2019-02-15 RX ORDER — LISINOPRIL 2.5 MG/1
5 TABLET ORAL ONCE
Qty: 0 | Refills: 0 | Status: COMPLETED | OUTPATIENT
Start: 2019-02-15 | End: 2019-02-15

## 2019-02-15 RX ADMIN — BUDESONIDE AND FORMOTEROL FUMARATE DIHYDRATE 2 PUFF(S): 160; 4.5 AEROSOL RESPIRATORY (INHALATION) at 05:15

## 2019-02-15 RX ADMIN — HEPARIN SODIUM 5000 UNIT(S): 5000 INJECTION INTRAVENOUS; SUBCUTANEOUS at 15:12

## 2019-02-15 RX ADMIN — CEFEPIME 100 MILLIGRAM(S): 1 INJECTION, POWDER, FOR SOLUTION INTRAMUSCULAR; INTRAVENOUS at 05:14

## 2019-02-15 RX ADMIN — Medication 650 MILLIGRAM(S): at 07:58

## 2019-02-15 RX ADMIN — LISINOPRIL 5 MILLIGRAM(S): 2.5 TABLET ORAL at 05:14

## 2019-02-15 RX ADMIN — Medication 5 MILLIGRAM(S): at 11:26

## 2019-02-15 RX ADMIN — CARVEDILOL PHOSPHATE 3.12 MILLIGRAM(S): 80 CAPSULE, EXTENDED RELEASE ORAL at 18:02

## 2019-02-15 RX ADMIN — HEPARIN SODIUM 5000 UNIT(S): 5000 INJECTION INTRAVENOUS; SUBCUTANEOUS at 05:14

## 2019-02-15 RX ADMIN — LISINOPRIL 10 MILLIGRAM(S): 2.5 TABLET ORAL at 18:01

## 2019-02-15 RX ADMIN — Medication 650 MILLIGRAM(S): at 07:11

## 2019-02-15 RX ADMIN — PANTOPRAZOLE SODIUM 40 MILLIGRAM(S): 20 TABLET, DELAYED RELEASE ORAL at 07:06

## 2019-02-15 RX ADMIN — POLYETHYLENE GLYCOL 3350 17 GRAM(S): 17 POWDER, FOR SOLUTION ORAL at 11:11

## 2019-02-15 RX ADMIN — BUDESONIDE AND FORMOTEROL FUMARATE DIHYDRATE 2 PUFF(S): 160; 4.5 AEROSOL RESPIRATORY (INHALATION) at 18:50

## 2019-02-15 RX ADMIN — HEPARIN SODIUM 5000 UNIT(S): 5000 INJECTION INTRAVENOUS; SUBCUTANEOUS at 21:53

## 2019-02-15 RX ADMIN — Medication 12.5 MILLIGRAM(S): at 18:10

## 2019-02-15 RX ADMIN — LISINOPRIL 5 MILLIGRAM(S): 2.5 TABLET ORAL at 18:01

## 2019-02-15 RX ADMIN — Medication 81 MILLIGRAM(S): at 11:11

## 2019-02-15 RX ADMIN — CARVEDILOL PHOSPHATE 3.12 MILLIGRAM(S): 80 CAPSULE, EXTENDED RELEASE ORAL at 05:14

## 2019-02-15 NOTE — DISCHARGE NOTE ADULT - HOSPITAL COURSE
Readmit for HTN crisis (200/120) / chills / fever / leukopenia.    s/p transfemoral TAVR with rachael valve on 02/05/2019 with Dr. Alejo (EF > 75%).     PMHx uncontrolled HTN, asthma, chronic HFpEF, aortic stenosis s/p transfemoral TAVR with rachael valve on 2/5/19 with Dr. Alejo.  Comes to ED [facility] hypertensive emergency and troponinemia.  Transferred to [facility] from [place]. 78 year old F, PMHx uncontrolled HTN, asthma, chronic HFpEF, aortic stenosis s/p transfemoral TAVR with rachael valve on 02/05/2019 with Dr. Alejo here at [facility]. She was discharged home on POD#2 on coreg 3.125mg BID, Lisinopril 5mg and HTCZ 25mg. She presented to an OSH ED with chest pain, and headache, w/ home BP of 220 systolic. In ED BP was 194/54. EKG was negative for ST changes, and troponins were 0.1-->0.1. In the ED she was febrile up to 101.3 and WBC was 2.04.  Lactic acid 1.9-->1.7.  Sepsis protocol was initiated and she was placed on neutropenic isolation.  Patient transferred to [facility] for further evaluation in light of her recent TAVR. Steven, Dr. Beavers, and ID  consulted. She was started on neupogen, and wbc count responded appropriately and WNL. Neutropenia thought to be 2/2 plavix therapy, therefore it was d/c'd. HTN regimen titrated. Today patient feels well, she is ambulating on RA without assistance, using IS, tolerating PO diet and having normal Bms. Her WBC is 11.65 and BP controlled. Per Dr. Green patient is ready for discharge.

## 2019-02-15 NOTE — DISCHARGE NOTE ADULT - CARE PROVIDER_API CALL
Solomon Alejo)  Cardiology; Interventional Cardiology  [address] [place] [address]  Phone: [phone]  Fax: [phone]  Follow Up Time:     Micheline Beavers)  Hematology; Internal Medicine; Medical Oncology  [address], [address]  [place] [address]  Phone: [phone]  Fax: [phone]  Follow Up Time: Solomon Alejo)  Cardiology; Interventional Cardiology  [address], [address] [place] [address]  Phone: [phone]  Fax: [phone]  Follow Up Time:     Micheline Beavers)  Hematology; Internal Medicine; Medical Oncology  [address], [address]  [place] [address]  Phone: [phone]  Fax: [phone]  Follow Up Time:     Hussain Yen)  Cardiology  Vascular  [address] [address]  Phone: [phone]  Fax: [phone]  Follow Up Time:

## 2019-02-15 NOTE — DISCHARGE NOTE ADULT - CARE PROVIDERS DIRECT ADDRESSES
,[email or web address],DirectAddress_Unknown ,[email or web address],DirectAddress_Unknown,DirectAddress_Unknown

## 2019-02-15 NOTE — DISCHARGE NOTE ADULT - PROVIDER TOKENS
PROVIDER:[TOKEN:[[id number]]],PROVIDER:[TOKEN:[[id number]]] PROVIDER:[TOKEN:[[id number]]],PROVIDER:[TOKEN:[[id number]]],PROVIDER:[TOKEN:[[id number]]]

## 2019-02-15 NOTE — DISCHARGE NOTE ADULT - CARE PLAN
Principal Discharge DX:	HTN (hypertension)  Goal:	To feel better  Assessment and plan of treatment:	-Please follow up with  ___on ___.  The office is located at [facility], [facility], 4th floor. Call us with any questions, #[phone].    -Walk daily as tolerated and use your incentive spirometer every hour.    -No driving or strenuous activity/exercise for 6 weeks, or until cleared by your surgeon.    -You may shower.  Be sure to gently clean your incisions with anti-bacterial soap and water daily, pat dry.  You may leave them open to air.    -Call your doctor if you have shortness of breath, chest pain not relieved by pain medication, dizziness, fever >101.5, or increased redness or drainage from incisions. Principal Discharge DX:	HTN (hypertension)  Goal:	To feel better  Assessment and plan of treatment:	-Please follow up with Dr. Alejo on 2/20/19 at 2:30pm.  The office is located at [facility], 4th floor. Call us with any questions, #[phone].  -Please follow up with Dr. Hussain Peraza (referring MD) on  2/18/19 at 1:15 PM. His office location is listed below.  -Please follow up with Dr. Beavers (Hemetologist) within 1-2 weeks of discharge. Her office information is listed below. If out office does not call you on Tuesday, please call us to schedule an appointment.  -Please continue to take your blood pressure at home daily, if you notice your blood pressure elevated > 180 mmHg please call Dr. Alejo's office to adjust your blood pressure medications. Please call Dr. Alejo's office on Tuesday 2/19/19 with your blood pressure readings.     -Walk daily as tolerated and use your incentive spirometer every hour.    -No driving or strenuous activity/exercise for 6 weeks, or until cleared by your surgeon.    -You may shower.  Be sure to gently clean your incisions with anti-bacterial soap and water daily, pat dry.  You may leave them open to air.    -Call your doctor if you have shortness of breath, chest pain not relieved by pain medication, dizziness, fever >101.5, or increased redness or drainage from incisions.

## 2019-02-15 NOTE — DISCHARGE NOTE ADULT - PATIENT PORTAL LINK FT
You can access the [facility] Patient Portal, offered by [facility], by registering with the following website: [email or web address]

## 2019-02-15 NOTE — PROGRESS NOTE ADULT - SUBJECTIVE AND OBJECTIVE BOX
INTERVAL HPI/OVERNIGHT EVENTS:    Patient was seen and examined at bedside.  Afebrile.  Feels tired.  No SOB or cough     CONSTITUTIONAL:  Negative fever or chills, feels tired, good appetite  EYES:  Negative  blurry vision or double vision  CARDIOVASCULAR:  Negative for chest pain or palpitations  RESPIRATORY:  Negative for cough, wheezing, or SOB   GASTROINTESTINAL:  Negative for nausea, vomiting, diarrhea, constipation, or abdominal pain  GENITOURINARY:  Negative frequency, urgency or dysuria  NEUROLOGIC:  No headache, confusion, dizziness, lightheadedness      ANTIBIOTICS/RELEVANT:    MEDICATIONS  (STANDING):  aspirin enteric coated 81 milliGRAM(s) Oral daily  buDESOnide  80 MICROgram(s)/formoterol 4.5 MICROgram(s) Inhaler 2 Puff(s) Inhalation two times a day  carvedilol 3.125 milliGRAM(s) Oral every 12 hours  cefepime   IVPB      cefepime   IVPB 2000 milliGRAM(s) IV Intermittent every 8 hours  heparin  Injectable 5000 Unit(s) SubCutaneous every 8 hours  lisinopril 5 milliGRAM(s) Oral daily  pantoprazole    Tablet 40 milliGRAM(s) Oral before breakfast  polyethylene glycol 3350 17 Gram(s) Oral daily    MEDICATIONS  (PRN):  acetaminophen   Tablet .. 650 milliGRAM(s) Oral every 6 hours PRN Mild Pain (1 - 3)        Vital Signs Last 24 Hrs  T(C): 36.6 (15 Feb 2019 09:00), Max: 37 (14 Feb 2019 20:30)  T(F): 97.8 (15 Feb 2019 09:00), Max: 98.6 (14 Feb 2019 20:30)  HR: 68 (15 Feb 2019 11:37) (62 - 80)  BP: 154/70 (15 Feb 2019 11:37) (109/54 - 194/83)  BP(mean): 101 (15 Feb 2019 11:37) (77 - 119)  RR: 18 (15 Feb 2019 11:37) (18 - 40)  SpO2: 96% (15 Feb 2019 11:37) (95% - 100%)    PHYSICAL EXAM:  Constitutional:  obese female, non-toxic  Eyes: no icterus   Ear/Nose/Throat:  sinus tenderness on percussion	  Neck:  supple  Respiratory: bibasilar crackles   Cardiovascular: S1S2 RRR, no murmurs  Gastrointestinal:soft, (+) BS, no HSM  Extremities:no edema   Vascular: DP Pulse:	right normal; left normal      LABS:                        10.6   12.65 )-----------( 210      ( 15 Feb 2019 07:06 )             33.5     02-15    139  |  101  |  15  ----------------------------<  103<H>  4.2   |  28  |  0.98    Ca    9.7      15 Feb 2019 07:05  Phos  3.5     02-14  Mg     2.1     02-15    TPro  7.0  /  Alb  3.8  /  TBili  0.5  /  DBili  x   /  AST  12  /  ALT  10  /  AlkPhos  43  02-14    PT/INR - ( 14 Feb 2019 05:57 )   PT: 12.2 sec;   INR: 1.08          PTT - ( 14 Feb 2019 05:57 )  PTT:33.3 sec      MICROBIOLOGY:    Culture - Blood (02.12.19 @ 23:08)    Specimen Source: .Blood Blood-Venous    Culture Results:   No growth at 2 days.      Culture - Blood (02.12.19 @ 22:31)    Specimen Source: .Blood Blood-Venous    Culture Results:   No growth at 2 days.      RADIOLOGY & ADDITIONAL STUDIES:    < from: Xray Chest 1 View- PORTABLE-Routine (02.12.19 @ 21:07) >  : Interval improvement of pulmonary venous congestion. Mild   left base atelectasis.    < end of copied text >

## 2019-02-15 NOTE — PROGRESS NOTE ADULT - ASSESSMENT
IMPRESSION:  Neutropenic fever - resolved.  The fevers have resolved x 48 hours.  Her WBC are elevated.  No source of infection    Recommend:  1.  Neutropenic fever resolved - can stop cefepime now as no source identified    ID will sign off.  Reconsult as needed

## 2019-02-15 NOTE — DISCHARGE NOTE ADULT - PLAN OF CARE
To feel better -Please follow up with  ___on ___.  The office is located at [facility], [facility], 4th floor. Call us with any questions, #[phone].    -Walk daily as tolerated and use your incentive spirometer every hour.    -No driving or strenuous activity/exercise for 6 weeks, or until cleared by your surgeon.    -You may shower.  Be sure to gently clean your incisions with anti-bacterial soap and water daily, pat dry.  You may leave them open to air.    -Call your doctor if you have shortness of breath, chest pain not relieved by pain medication, dizziness, fever >101.5, or increased redness or drainage from incisions. -Please follow up with Dr. Alejo on 2/20/19 at 2:30pm.  The office is located at [facility], [facility], 4th floor. Call us with any questions, #[phone].  -Please follow up with Dr. Hussain Peraza (referring MD) on  2/18/19 at 1:15 PM. His office location is listed below.  -Please follow up with Dr. Beavers (Hemetologist) within 1-2 weeks of discharge. Her office information is listed below. If out office does not call you on Tuesday, please call us to schedule an appointment.  -Please continue to take your blood pressure at home daily, if you notice your blood pressure elevated > 180 mmHg please call Dr. Alejo's office to adjust your blood pressure medications. Please call Dr. Alejo's office on Tuesday 2/19/19 with your blood pressure readings.     -Walk daily as tolerated and use your incentive spirometer every hour.    -No driving or strenuous activity/exercise for 6 weeks, or until cleared by your surgeon.    -You may shower.  Be sure to gently clean your incisions with anti-bacterial soap and water daily, pat dry.  You may leave them open to air.    -Call your doctor if you have shortness of breath, chest pain not relieved by pain medication, dizziness, fever >101.5, or increased redness or drainage from incisions.

## 2019-02-15 NOTE — PROGRESS NOTE ADULT - SUBJECTIVE AND OBJECTIVE BOX
The patient was seen and examined.      BRITTANY DE LA CRUZ is a 78y Female with history of hypertension, asthma, chronic congestive heart failure, aortic stenosis, s/p TAVR on 19, discharged on post-op day 2 on Coreg, Lisinopril, HCTZ, aspirin and Clopidogrel and returned on  with complaints of chest pain, headache, spiking fever, shaking chills and dizziness. She was found to have a blood pressure of 220 systolic and labs were significant for a WBC of 2.04. Prior to the TAVR, the patient had a completely normal CBC with WBC 7.4, Hgb 13.4, HCT 40.7, platelets 289,000. On , the patient received Clopidogrel for the procedure and after. At time of discharge, WBC was 3.62, Hgb 10.6 and platelet count 135,000. The patient was fully cultured on this admission and begun on antibiotics. Cultures and CXR are negative thus far for source of infection. The patient received one dose of filgrastim. Today, WBC is 12.65 , Hgb 10.6, Hct 33.5 and platelets 210K. The ANC is 9070.  Clopidogrel has been discontinued.  .      Allergies    No Known Allergies    Intolerances        Medications:  MEDICATIONS  (STANDING):  aspirin enteric coated 81 milliGRAM(s) Oral daily  buDESOnide  80 MICROgram(s)/formoterol 4.5 MICROgram(s) Inhaler 2 Puff(s) Inhalation two times a day  carvedilol 3.125 milliGRAM(s) Oral every 12 hours  cefepime   IVPB      cefepime   IVPB 2000 milliGRAM(s) IV Intermittent every 8 hours  heparin  Injectable 5000 Unit(s) SubCutaneous every 8 hours  lisinopril 5 milliGRAM(s) Oral daily  pantoprazole    Tablet 40 milliGRAM(s) Oral before breakfast  polyethylene glycol 3350 17 Gram(s) Oral daily    MEDICATIONS  (PRN):  acetaminophen   Tablet .. 650 milliGRAM(s) Oral every 6 hours PRN Mild Pain (1 - 3)    aspirin enteric coated 81 milliGRAM(s) Oral daily  heparin  Injectable 5000 Unit(s) SubCutaneous every 8 hours        Interval History:  Patient is feeling better but complains of fatigue.  The patient denies chest pain or SOB.  No nausea/vomiting/fevers/chills/night sweats.  Has fatigue, no headaches/dizziness.  Appetite is stable without weight loss.  No abdominal pain/diarrhea/ has constipation.  No melena or hematochezia.    No dysuria/hematuria.  No history of easy bruising/bleeding.  No gingival bleeding or epistaxis.  No leg pain or leg swelling.    ROS is otherwise negative.    PHYSICAL EXAM:    T(F): 97.8 (02-15-19 @ 09:00), Max: 98.6 (19 @ 20:30)  HR: 68 (02-15-19 @ 11:37) (62 - 80)  BP: 154/70 (02-15-19 @ 11:37) (109/54 - 194/83)  RR: 18 (02-15-19 @ 11:37) (18 - 40)  SpO2: 96% (02-15-19 @ 11:37) (95% - 100%)  Wt(kg): --    Daily     Daily Weight in k (2019 14:30)    Gen: well developed, obese, comfortable  HEENT: normocephalic/atraumatic, no conjunctival pallor, no scleral icterus, no oral thrush/mucosal bleeding/mucositis  Neck: supple, no masses, no JVD  Cardiovascular: RR, nl S1S2, no murmurs/rubs/gallops  Respiratory: clear air entry b/l anteriorly  Gastrointestinal: BS+, obese soft, NT/ND, no masses, no splenomegaly, no hepatomegaly, no evidence for ascites  Extremities: no clubbing/cyanosis, trace edema, no calf tenderness  Neurological: no focal deficits  Skin: no rash on visible skin, excessive ecchymoses or petechiae  Lymph Nodes:  no significant peripheral adenopathy   Musculoskeletal:  full ROM  Psychiatric:  mood stable        Labs:                          10.6   12.65 )-----------( 210      ( 15 Feb 2019 07:06 )             33.5     CBC Full  -  ( 15 Feb 2019 07:06 )  WBC Count : 12.65 K/uL  Hemoglobin : 10.6 g/dL  Hematocrit : 33.5 %  Platelet Count - Automated : 210 K/uL  Mean Cell Volume : 94.9 fl  Mean Cell Hemoglobin : 30.0 pg  Mean Cell Hemoglobin Concentration : 31.6 gm/dL  Auto Neutrophil # : 9.07 K/uL  Auto Lymphocyte # : 1.98 K/uL  Auto Monocyte # : 1.09 K/uL  Auto Eosinophil # : 0.36 K/uL  Auto Basophil # : 0.05 K/uL  Auto Neutrophil % : 71.7 %  Auto Lymphocyte % : 15.7 %  Auto Monocyte % : 8.6 %  Auto Eosinophil % : 2.8 %  Auto Basophil % : 0.4 %    PT/INR - ( 2019 05:57 )   PT: 12.2 sec;   INR: 1.08          PTT - ( 2019 05:57 )  PTT:33.3 sec    02-15    139  |  101  |  15  ----------------------------<  103<H>  4.2   |  28  |  0.98    Ca    9.7      15 Feb 2019 07:05  Phos  3.5     -  Mg     2.1     02-15    TPro  7.0  /  Alb  3.8  /  TBili  0.5  /  DBili  x   /  AST  12  /  ALT  10  /  AlkPhos  43  02-14          Other Labs:    Cultures:    Pathology:    Imaging Studies:

## 2019-02-15 NOTE — PROGRESS NOTE ADULT - ASSESSMENT
This is an 78 year old female, with history of uncontrolled HTN, asthma, chronic HFpEF, aortic stenosis s/p transfemoral TAVR with rachael valve on 02/05/2019 with Dr. Alejo here at [facility]. She was discharged home on POD#2 on coreg 3.125mg BID, Lisinopril 5mg and HTCZ 25mg. She presented to an OSH ED with sudden onset central chest pain and headache and home BP of 220 systolic. In ED BP was 194/54. She was given ASA 325mg and nitroglycerin spray which lowered the severity of her chest pain.  EKG was negative for ST changes, and troponins were 0.1-->0.1.  She had also been c/o headache, blurred vision, dizziness and tremors along with her chest pain that began on the day of presentation (02/11/2019) at 4:00am. She denied any SOB, PND, palpitations, orthopnea, fever/chills or cough.  In the ED she was febrile up to 101.3 and WBC was 2.04.  Lactic acid 1.9-->1.7.  Sepsis protocol was initiated and she was placed on neutropenic isolation.  Patient transferred to [facility] for further evaluation in light of her recent TAVR. Started on neupogen, and wbc count responded appropriately and WNL. Heme/onc following, appreciate recs.      Neurovascular: No delirium. Pain well controlled with current regimen.  -Tylenol PRN's.    Cardiovascular: Hemodynamically stable. HR controlled.  - PMHx of Uncontrolled HTN history, chronic HFpEF, aortic stenosis s/p transfemoral TAVR with rachael valve on 02/05/2019 with Dr. Alejo.   - Echo results above.   - Plavix held, pancytopenia possibly due to initiation of plavix therapy.  - Continue aspirin 81mg PO qd, lisinopril 5mg PO qd and carvedilol 3.125 BID  - Continue to monitor HR/BP/Tele.     Respiratory: 02 Sat = 98% on RA.  -If on oxygen wean to RA from for O2 Sat > 93%.  -Encourage C+DB and Use of IS 10x / hr while awake.  -CXR stable.     GI: Stable.  -GI PPx with pantoprazole.   -PO Diet.    Renal / : BUN/Cr: 15/0.98  -Monitor renal function.  -Monitor I/O's.    Endocrine: Stable.  -A1c: 5.0  - TSH 1.433    Hematologic: H&H: 10.6/33.5 today. See ID for WBC info.   - Heme/Onc following - neutropenia likely drug induced - plavix.   - Responded well to one dose of Filgrastim. Now normal ANC.  -CBC with diff in AM.     ID: Neutropenic fever on admission. Cause unknown, CXR and UA neg. Plavix held for possible cause.  - Afebrile.   -WBC: 12.65 this AM. On admission WBC: 2.52.   -Observe for SIRS/Sepsis Syndrome.  - ID signed off - neutropenic fever resolved.   - Procalcitonin 0.04.   - HIV neg.  -Blood Cx NGTD.  -UA negative.     Prophylaxis:  -DVT prophylaxis with 5000 SubQ Heparin q8h.   -SCD's.    Disposition:  - Home when medically stable likely tomorrow.

## 2019-02-15 NOTE — PROGRESS NOTE ADULT - SUBJECTIVE AND OBJECTIVE BOX
Patient discussed on morning rounds with Dr. Alejo    Operation / Date: TAVR on 02/05/2019. readmit with neutropenia/fever/HTN crisis.    SUBJECTIVE ASSESSMENT:  78y Female seen and examined bedside. Patient states, "I feel much better today." She continues to complain of left shoulder  pain but says it is significantly better today. She also reports that her headache has subsided. Denies chest pain, SOB, palpitations, hemopytsis, N/V/D, abdominal pain, dizziness, fever or chills.       Vital Signs Last 24 Hrs  T(C): 36.4 (15 Feb 2019 13:51), Max: 37 (14 Feb 2019 20:30)  T(F): 97.6 (15 Feb 2019 13:51), Max: 98.6 (14 Feb 2019 20:30)  HR: 68 (15 Feb 2019 13:00) (62 - 80)  BP: 188/82 (15 Feb 2019 13:00) (109/54 - 194/83)  BP(mean): 118 (15 Feb 2019 13:00) (77 - 119)  RR: 31 (15 Feb 2019 13:00) (18 - 40)  SpO2: 99% (15 Feb 2019 13:00) (95% - 100%)  I&O's Detail    14 Feb 2019 07:01  -  15 Feb 2019 07:00  --------------------------------------------------------  IN:    IV PiggyBack: 150 mL    Oral Fluid: 1080 mL  Total IN: 1230 mL    OUT:    Voided: 1300 mL  Total OUT: 1300 mL    Total NET: -70 mL      15 Feb 2019 07:01  -  15 Feb 2019 14:24  --------------------------------------------------------  IN:    Oral Fluid: 300 mL  Total IN: 300 mL    OUT:    Voided: 500 mL  Total OUT: 500 mL    Total NET: -200 mL      CHEST TUBE: No.   SERVANDO DRAIN: No.  EPICARDIAL WIRES: No.  TIE DOWNS: No.  VENTURA: No.    PHYSICAL EXAM:    General: Patient lying comfortably in bed, no acute distress     Neurological: Alert and oriented. No focal neurological deficits     Cardiovascular: S1S2, RRR, no murmurs appreciated on exam     Respiratory: Clear to ausculation bilaterally, no wheezes rales or rhonchi.     Gastrointestinal: Abdomen soft, non tender, non distended, +BS    Extremities: Warm and well perfused. No peripheral edema or calf tenderness     Vascular: Peripheral pulses 2+ b/l.    Incision Sites: groins stable.         LABS:                        10.6   12.65 )-----------( 210      ( 15 Feb 2019 07:06 )             33.5       COUMADIN:  No.   PT/INR - ( 14 Feb 2019 05:57 )   PT: 12.2 sec;   INR: 1.08          PTT - ( 14 Feb 2019 05:57 )  PTT:33.3 sec    02-15    139  |  101  |  15  ----------------------------<  103<H>  4.2   |  28  |  0.98    Ca    9.7      15 Feb 2019 07:05  Phos  3.5     02-14  Mg     2.1     02-15    TPro  7.0  /  Alb  3.8  /  TBili  0.5  /  DBili  x   /  AST  12  /  ALT  10  /  AlkPhos  43  02-14          MEDICATIONS  (STANDING):  aspirin enteric coated 81 milliGRAM(s) Oral daily  buDESOnide  80 MICROgram(s)/formoterol 4.5 MICROgram(s) Inhaler 2 Puff(s) Inhalation two times a day  carvedilol 3.125 milliGRAM(s) Oral every 12 hours  cefepime   IVPB      cefepime   IVPB 2000 milliGRAM(s) IV Intermittent every 8 hours  heparin  Injectable 5000 Unit(s) SubCutaneous every 8 hours  lisinopril 5 milliGRAM(s) Oral daily  pantoprazole    Tablet 40 milliGRAM(s) Oral before breakfast  polyethylene glycol 3350 17 Gram(s) Oral daily    MEDICATIONS  (PRN):  acetaminophen   Tablet .. 650 milliGRAM(s) Oral every 6 hours PRN Mild Pain (1 - 3)        RADIOLOGY & ADDITIONAL TESTS:    < from: Xray Chest 1 View- PORTABLE-Routine (02.15.19 @ 04:59) >    IMPRESSION: No focal infiltrates. Mild pulmonary vascular congestion.    < end of copied text >    < from: Echocardiogram (02.14.19 @ 16:57) >    EXAM:  ECHOCARDIOGRAM (CARDIOL)                          PROCEDURE DATE:  02/14/2019          INTERPRETATION:  Patient Height: 177.0 cm  Patient Weight: 85.0 kg  Heart Rate: 80 bpm  Systolic Pressure: 130 mmHg  Diastolic Pressure: 70 mmHg  BSA: 2.0m^2  Interpretation Summary  There is moderate concentric left ventricular hypertrophy.The left   ventricular   wall motion is normal.The left ventricle is hyperdynamic and the overall   ejection fraction is increased (>75%).  The right ventricle is normal in   size   and function.The left atrium is moderately dilated.The left atrial volume   index is 43 cc/m2 (normal <34cc/m2)Right atrial size is normal.Status   post   TAVR. 23 mm Jamar Valve is seen in the aortic position. No aortic   regurgitation noted. The peak pressure gradient is 25 mmHg. The mean   pressure   gradient is 13 mmHg. LVOT diameter = 2.0  The calculated aortic valve   area   using the continuity equation is 2.2 cm2.No aortic regurgitation   noted.The   peak pressure gradient is 39 mmHg.The mean pressure gradient is 22   mmHg.The   calculated aortic valve area using the continuity equation is 1.8   cm2.Structurally normal mitral valve.No mitral regurgitation   noted.Structurally normal tricuspid valve.No tricuspid regurgitation   noted.There was insufficient TR detected from which to calculate   pulmonary   artery systolic pressure.  Structurally normal pulmonic valve.No pulmonic   regurgitation noted.The ascending aorta is mild - moderately dilated and   measures 4.4 cm.There is no pericardial effusion.    < end of copied text >

## 2019-02-15 NOTE — PROGRESS NOTE ADULT - ASSESSMENT
BRITTANY DE LA CRUZ is a 78y Female with history of hypertension, asthma, chronic congestive heart failure, aortic stenosis, s/p TAVR on 2/5/19, discharged on post-op day 2 on Coreg, Lisinopril, HCTZ, aspirin and Clopidogrel and returned on 2/12 with complaints of chest pain, headache, spiking fever, shaking chills and dizziness. She was found to have a blood pressure of 220 systolic and labs were significant for a WBC of 2.04. Prior to the TAVR, the patient had a completely normal CBC with WBC 7.4, Hgb 13.4, HCT 40.7, platelets 289,000. On February 5, the patient received Clopidogrel for the procedure and after. At time of discharge, WBC was 3.62, Hgb 10.6 and platelet count 135,000. The patient was fully cultured on this admission and begun on antibiotics. Cultures and CXR are negative thus far for source of infection. Today, WBC is , Hgb 10.7, Hct 33.5 and platelets 214K.  Clopidogrel has been discontinued.  .  Severe neutropenia - likely medication induced, i.e. Clopidogrel Rare cases reported of progressive neutropenia and pancytopenia occurring within a short time frame after initiating Clopidogrel. Now normal with normal ANC after one dose of Filgrastim.   Anemia - due to present  illness and post TAVR  Plan- Agree with discontinuation of Clopidogrel.   Daily CBC with daily differential to calculate ANC. Hematologically much improved. Discussed with MEET Richard.

## 2019-02-15 NOTE — DISCHARGE NOTE ADULT - MEDICATION SUMMARY - MEDICATIONS TO TAKE
I will START or STAY ON the medications listed below when I get home from the hospital:    Aspirin Enteric Coated 81 mg oral delayed release tablet  -- 1 tab(s) by mouth once a day   -- Swallow whole.  Do not crush.  Take with food or milk.    -- Indication: For Heart disease    acetaminophen 325 mg oral tablet  -- 2 tab(s) by mouth every 6 hours, As needed, Mild Pain (1 - 3)  -- Indication: For Pain    lisinopril 10 mg oral tablet  -- 1 tab(s) by mouth once a day  -- Indication: For Blood pressure    atorvastatin 20 mg oral tablet  -- 1 tab(s) by mouth once a day (at bedtime)   -- Avoid grapefruit and grapefruit juice while taking this medication.  Do not take this drug if you are pregnant.  It is very important that you take or use this exactly as directed.  Do not skip doses or discontinue unless directed by your doctor.  Obtain medical advice before taking any non-prescription drugs as some may affect the action of this medication.  Take with food or milk.    -- Indication: For Heart disease    carvedilol 3.125 mg oral tablet  -- 1 tab(s) by mouth every 12 hours  -- Indication: For Blood pressure    fluticasone-salmeterol 100 mcg-50 mcg inhalation powder  -- 1 puff(s) inhaled 2 times a day, As Needed  -- Indication: For COPD    nystatin 100,000 units/g topical powder  -- 1 application on skin 2 times a day  -- Indication: For Rash    hydroCHLOROthiazide 25 mg oral tablet  -- 1 tab(s) by mouth once a day  -- Indication: For Blood pressure    MiraLax oral powder for reconstitution  -- 17 gram(s) by mouth once a day, As Needed -for constipation   -- Dilute this medication with liquid before administration.  It is very important that you take or use this exactly as directed.  Do not skip doses or discontinue unless directed by your doctor.    -- Indication: For Laxative- as needed for constipation    docusate sodium 100 mg oral capsule  -- 1 cap(s) by mouth 3 times a day  -- Indication: For Stool softener - hold for loose stool    pantoprazole 40 mg oral delayed release tablet  -- 1 tab(s) by mouth once a day (before a meal)  -- Indication: For Stomach protection I will START or STAY ON the medications listed below when I get home from the hospital:    Aspirin Enteric Coated 81 mg oral delayed release tablet  -- 1 tab(s) by mouth once a day   -- Swallow whole.  Do not crush.  Take with food or milk.    -- Indication: For Heart disease    acetaminophen 325 mg oral tablet  -- 2 tab(s) by mouth every 6 hours, As needed, Mild Pain (1 - 3)  -- Indication: For Pain    lisinopril 10 mg oral tablet  -- 1 tab(s) by mouth once a day  -- Indication: For Blood pressure    atorvastatin 20 mg oral tablet  -- 1 tab(s) by mouth once a day (at bedtime)   -- Avoid grapefruit and grapefruit juice while taking this medication.  Do not take this drug if you are pregnant.  It is very important that you take or use this exactly as directed.  Do not skip doses or discontinue unless directed by your doctor.  Obtain medical advice before taking any non-prescription drugs as some may affect the action of this medication.  Take with food or milk.    -- Indication: For Heart disease    carvedilol 3.125 mg oral tablet  -- 1 tab(s) by mouth every 12 hours  -- Indication: For Blood pressure    Spiriva Respimat 1.25 mcg/inh inhalation aerosol  -- 2 puff(s) inhaled once a day   -- Check with your doctor before becoming pregnant.  For inhalation only.    -- Indication: For COPD    nystatin 100,000 units/g topical powder  -- 1 application on skin 2 times a day  -- Indication: For Rash    hydroCHLOROthiazide 25 mg oral tablet  -- 1 tab(s) by mouth once a day  -- Indication: For Blood pressure    MiraLax oral powder for reconstitution  -- 17 gram(s) by mouth once a day, As Needed -for constipation   -- Dilute this medication with liquid before administration.  It is very important that you take or use this exactly as directed.  Do not skip doses or discontinue unless directed by your doctor.    -- Indication: For Laxative- as needed for constipation    docusate sodium 100 mg oral capsule  -- 1 cap(s) by mouth 3 times a day  -- Indication: For Stool softener - hold for loose stool    pantoprazole 40 mg oral delayed release tablet  -- 1 tab(s) by mouth once a day (before a meal)  -- Indication: For Stomach protection

## 2019-02-15 NOTE — DISCHARGE NOTE ADULT - MEDICATION SUMMARY - MEDICATIONS TO STOP TAKING
I will STOP taking the medications listed below when I get home from the hospital:    lisinopril 5 mg oral tablet  -- 1 tab(s) by mouth once a day    clopidogrel 75 mg oral tablet  -- 1 tab(s) by mouth once a day

## 2019-02-16 VITALS — TEMPERATURE: 98 F

## 2019-02-16 LAB
ANION GAP SERPL CALC-SCNC: 12 MMOL/L — SIGNIFICANT CHANGE UP (ref 5–17)
BASOPHILS # BLD AUTO: 0.06 K/UL — SIGNIFICANT CHANGE UP (ref 0–0.2)
BASOPHILS NFR BLD AUTO: 0.5 % — SIGNIFICANT CHANGE UP (ref 0–2)
BUN SERPL-MCNC: 17 MG/DL — SIGNIFICANT CHANGE UP (ref 7–23)
CALCIUM SERPL-MCNC: 9.6 MG/DL — SIGNIFICANT CHANGE UP (ref 8.4–10.5)
CHLORIDE SERPL-SCNC: 103 MMOL/L — SIGNIFICANT CHANGE UP (ref 96–108)
CO2 SERPL-SCNC: 27 MMOL/L — SIGNIFICANT CHANGE UP (ref 22–31)
CREAT SERPL-MCNC: 0.93 MG/DL — SIGNIFICANT CHANGE UP (ref 0.5–1.3)
EOSINOPHIL # BLD AUTO: 0.36 K/UL — SIGNIFICANT CHANGE UP (ref 0–0.5)
EOSINOPHIL NFR BLD AUTO: 3.1 % — SIGNIFICANT CHANGE UP (ref 0–6)
FUNGITELL: 53 PG/ML — SIGNIFICANT CHANGE UP
GLUCOSE SERPL-MCNC: 110 MG/DL — HIGH (ref 70–99)
HCT VFR BLD CALC: 33.8 % — LOW (ref 34.5–45)
HGB BLD-MCNC: 10.9 G/DL — LOW (ref 11.5–15.5)
IMM GRANULOCYTES NFR BLD AUTO: 1.2 % — SIGNIFICANT CHANGE UP (ref 0–1.5)
LYMPHOCYTES # BLD AUTO: 2.43 K/UL — SIGNIFICANT CHANGE UP (ref 1–3.3)
LYMPHOCYTES # BLD AUTO: 20.9 % — SIGNIFICANT CHANGE UP (ref 13–44)
MAGNESIUM SERPL-MCNC: 2.2 MG/DL — SIGNIFICANT CHANGE UP (ref 1.6–2.6)
MCHC RBC-ENTMCNC: 30.4 PG — SIGNIFICANT CHANGE UP (ref 27–34)
MCHC RBC-ENTMCNC: 32.2 GM/DL — SIGNIFICANT CHANGE UP (ref 32–36)
MCV RBC AUTO: 94.2 FL — SIGNIFICANT CHANGE UP (ref 80–100)
MONOCYTES # BLD AUTO: 0.92 K/UL — HIGH (ref 0–0.9)
MONOCYTES NFR BLD AUTO: 7.9 % — SIGNIFICANT CHANGE UP (ref 2–14)
NEUTROPHILS # BLD AUTO: 7.74 K/UL — HIGH (ref 1.8–7.4)
NEUTROPHILS NFR BLD AUTO: 66.4 % — SIGNIFICANT CHANGE UP (ref 43–77)
NRBC # BLD: 0 /100 WBCS — SIGNIFICANT CHANGE UP (ref 0–0)
PLATELET # BLD AUTO: 212 K/UL — SIGNIFICANT CHANGE UP (ref 150–400)
POTASSIUM SERPL-MCNC: 4.1 MMOL/L — SIGNIFICANT CHANGE UP (ref 3.5–5.3)
POTASSIUM SERPL-SCNC: 4.1 MMOL/L — SIGNIFICANT CHANGE UP (ref 3.5–5.3)
RBC # BLD: 3.59 M/UL — LOW (ref 3.8–5.2)
RBC # FLD: 12.9 % — SIGNIFICANT CHANGE UP (ref 10.3–14.5)
SODIUM SERPL-SCNC: 142 MMOL/L — SIGNIFICANT CHANGE UP (ref 135–145)
WBC # BLD: 11.65 K/UL — HIGH (ref 3.8–10.5)
WBC # FLD AUTO: 11.65 K/UL — HIGH (ref 3.8–10.5)

## 2019-02-16 PROCEDURE — 99239 HOSP IP/OBS DSCHRG MGMT >30: CPT

## 2019-02-16 PROCEDURE — 99238 HOSP IP/OBS DSCHRG MGMT 30/<: CPT

## 2019-02-16 RX ORDER — ATORVASTATIN CALCIUM 80 MG/1
1 TABLET, FILM COATED ORAL
Qty: 30 | Refills: 0
Start: 2019-02-16 | End: 2019-03-17

## 2019-02-16 RX ORDER — CARVEDILOL PHOSPHATE 80 MG/1
1 CAPSULE, EXTENDED RELEASE ORAL
Qty: 60 | Refills: 0
Start: 2019-02-16 | End: 2019-03-17

## 2019-02-16 RX ORDER — FLUTICASONE PROPIONATE AND SALMETEROL 50; 250 UG/1; UG/1
1 POWDER ORAL; RESPIRATORY (INHALATION)
Qty: 1 | Refills: 0 | OUTPATIENT
Start: 2019-02-16 | End: 2019-03-17

## 2019-02-16 RX ORDER — NYSTATIN CREAM 100000 [USP'U]/G
1 CREAM TOPICAL
Qty: 0 | Refills: 0 | Status: DISCONTINUED | OUTPATIENT
Start: 2019-02-16 | End: 2019-02-16

## 2019-02-16 RX ORDER — LISINOPRIL 2.5 MG/1
1 TABLET ORAL
Qty: 30 | Refills: 0
Start: 2019-02-16 | End: 2019-03-17

## 2019-02-16 RX ORDER — NYSTATIN CREAM 100000 [USP'U]/G
1 CREAM TOPICAL
Qty: 1 | Refills: 0
Start: 2019-02-16 | End: 2019-02-22

## 2019-02-16 RX ORDER — PANTOPRAZOLE SODIUM 20 MG/1
1 TABLET, DELAYED RELEASE ORAL
Qty: 30 | Refills: 0
Start: 2019-02-16 | End: 2019-03-17

## 2019-02-16 RX ORDER — ASPIRIN/CALCIUM CARB/MAGNESIUM 324 MG
1 TABLET ORAL
Qty: 30 | Refills: 0
Start: 2019-02-16 | End: 2019-03-17

## 2019-02-16 RX ORDER — DOCUSATE SODIUM 100 MG
1 CAPSULE ORAL
Qty: 21 | Refills: 0
Start: 2019-02-16 | End: 2019-02-22

## 2019-02-16 RX ORDER — HYDROCHLOROTHIAZIDE 25 MG
12.5 TABLET ORAL ONCE
Qty: 0 | Refills: 0 | Status: COMPLETED | OUTPATIENT
Start: 2019-02-16 | End: 2019-02-16

## 2019-02-16 RX ORDER — TIOTROPIUM BROMIDE 18 UG/1
2 CAPSULE ORAL; RESPIRATORY (INHALATION)
Qty: 1 | Refills: 0
Start: 2019-02-16 | End: 2019-03-17

## 2019-02-16 RX ORDER — FLUTICASONE PROPIONATE AND SALMETEROL 50; 250 UG/1; UG/1
1 POWDER ORAL; RESPIRATORY (INHALATION)
Qty: 0 | Refills: 0 | COMMUNITY

## 2019-02-16 RX ORDER — ACETAMINOPHEN 500 MG
2 TABLET ORAL
Qty: 80 | Refills: 0
Start: 2019-02-16 | End: 2019-02-25

## 2019-02-16 RX ADMIN — LISINOPRIL 10 MILLIGRAM(S): 2.5 TABLET ORAL at 05:18

## 2019-02-16 RX ADMIN — CARVEDILOL PHOSPHATE 3.12 MILLIGRAM(S): 80 CAPSULE, EXTENDED RELEASE ORAL at 05:14

## 2019-02-16 RX ADMIN — HEPARIN SODIUM 5000 UNIT(S): 5000 INJECTION INTRAVENOUS; SUBCUTANEOUS at 05:15

## 2019-02-16 RX ADMIN — Medication 12.5 MILLIGRAM(S): at 05:14

## 2019-02-16 RX ADMIN — Medication 650 MILLIGRAM(S): at 06:02

## 2019-02-16 RX ADMIN — Medication 650 MILLIGRAM(S): at 05:30

## 2019-02-16 RX ADMIN — Medication 12.5 MILLIGRAM(S): at 13:17

## 2019-02-16 RX ADMIN — PANTOPRAZOLE SODIUM 40 MILLIGRAM(S): 20 TABLET, DELAYED RELEASE ORAL at 06:32

## 2019-02-16 RX ADMIN — TIOTROPIUM BROMIDE 1 CAPSULE(S): 18 CAPSULE ORAL; RESPIRATORY (INHALATION) at 06:02

## 2019-02-16 RX ADMIN — Medication 81 MILLIGRAM(S): at 08:55

## 2019-02-16 NOTE — PROGRESS NOTE ADULT - ASSESSMENT
-Please follow up with Dr. Alejo on 2/20/19 at 2:30pm.  The office is located at [facility], [facility], 4th floor. Call us with any questions, #[phone].  -Please follow up with Dr. Hussain Peraza (referring MD) on  2/18/19 at 1:15 PM. His office location is listed below.  -Please follow up with Dr. Beavers (Hemetologist) within 1-2 weeks of discharge. Her office information is listed below. If out office does not call you on Tuesday, please call us to schedule an appointment.  -Please continue to take your blood pressure at home daily, if you notice your blood pressure elevated > 180 mmHg please call Dr. Alejo's office to adjust your blood pressure medications. Please call Dr. Alejo's office on Tuesday 2/19/19 with your blood pressure readings.   -Walk daily as tolerated and use your incentive spirometer every hour.  -No driving or strenuous activity/exercise for 6 weeks, or until cleared by your surgeon.  -You may shower.  Be sure to gently clean your incisions with anti-bacterial soap and water daily, pat dry.  You may leave them open to air.  -Call your doctor if you have shortness of breath, chest pain not relieved by pain medication, dizziness, fever >101.5, or increased redness or drainage from incisions.

## 2019-02-16 NOTE — PROGRESS NOTE ADULT - PROVIDER SPECIALTY LIST ADULT
Heme/Onc
Heme/Onc
Infectious Disease
Infectious Disease
Structural Heart

## 2019-02-16 NOTE — PROGRESS NOTE ADULT - SUBJECTIVE AND OBJECTIVE BOX
Patient discussed on morning rounds with Dr. Green    Operation / Date: TAVR on 02/05/2019. readmit with neutropenia/fever/HTN crisis.    Surgeon: Dr. Alejo    Referring Physician: Dr. Armendariz    SUBJECTIVE ASSESSMENT:  78y Female seen and examined. Interpretation via daugther at bedside. Feels well, is ambulating, using iS, toleratign PO diet, normal BMs. Denies fever, chest pain, palpitations, SOB, abdominal pain, n/v, dizziness, lightheaded.     Hospital Course:  78 year old F, PMHx uncontrolled HTN, asthma, chronic HFpEF, aortic stenosis s/p transfemoral TAVR with jamar valve on 02/05/2019 with Dr. Alejo here at [facility]. She was discharged home on POD#2 on coreg 3.125mg BID, Lisinopril 5mg and HTCZ 25mg. She presented to an OSH ED with chest pain, and headache, w/ home BP of 220 systolic. In ED BP was 194/54. EKG was negative for ST changes, and troponins were 0.1-->0.1. In the ED she was febrile up to 101.3 and WBC was 2.04.  Lactic acid 1.9-->1.7.  Sepsis protocol was initiated and she was placed on neutropenic isolation.  Patient transferred to [facility] for further evaluation in light of her recent TAVR. Heme, Dr. Beavers, and ID  consulted. She was started on neupogen, and wbc count responded appropriately and WNL. Neutropenia thought to be 2/2 plavix therapy, therefore it was d/c'd. HTN regimen titrated. Today patient feels well, she is ambulating on RA without assistance, using IS, tolerating PO diet and having normal Bms. Her WBC is 11.65 and BP controlled. Per Dr. Green patient is ready for discharge.      Vital Signs Last 24 Hrs  T(C): 36.2 (16 Feb 2019 10:48), Max: 36.6 (15 Feb 2019 16:00)  T(F): 97.2 (16 Feb 2019 10:48), Max: 97.9 (15 Feb 2019 20:16)  HR: 72 (16 Feb 2019 14:11) (58 - 89)  BP: 159/73 (16 Feb 2019 14:11) (113/58 - 167/77)  BP(mean): 105 (16 Feb 2019 14:11) (83 - 110)  RR: 19 (16 Feb 2019 14:11) (18 - 29)  SpO2: 97% (16 Feb 2019 14:11) (97% - 98%)  I&O's Detail    15 Feb 2019 07:01  -  16 Feb 2019 07:00  --------------------------------------------------------  IN:    Oral Fluid: 500 mL  Total IN: 500 mL    OUT:    Voided: 1100 mL  Total OUT: 1100 mL    Total NET: -600 mL      PHYSICAL EXAM:    General: Patient lying comfortably in bed, no acute distress     Neurological: Alert and oriented. No focal neurological deficits     Cardiovascular: S1S2, RRR, no murmurs appreciated on exam     Respiratory: Clear to ausculation bilaterally, no wheeze/rhonchi/rales    Gastrointestinal: + BS, soft, non tender, non distended     Extremities: Warm and well perfused. No edema, no calf tenderness     Vascular: 2+ Peripheral pulses b/l     Incision Sites: b/l groin: soft, no hematoma.     LABS:                        10.9   11.65 )-----------( 212      ( 16 Feb 2019 05:42 )             33.8       COUMADIN: No        02-16    142  |  103  |  17  ----------------------------<  110<H>  4.1   |  27  |  0.93    Ca    9.6      16 Feb 2019 05:42  Mg     2.2     02-16            MEDICATIONS  (STANDING):  Aspirin Enteric Coated 81 mg oral delayed release tablet  -- 1 tab(s) by mouth once a day   -- Swallow whole.  Do not crush.  Take with food or milk.    -- Indication: For Heart disease    acetaminophen 325 mg oral tablet  -- 2 tab(s) by mouth every 6 hours, As needed, Mild Pain (1 - 3)  -- Indication: For Pain    lisinopril 10 mg oral tablet  -- 1 tab(s) by mouth once a day  -- Indication: For Blood pressure    atorvastatin 20 mg oral tablet  -- 1 tab(s) by mouth once a day (at bedtime)   -- Avoid grapefruit and grapefruit juice while taking this medication.  Do not take this drug if you are pregnant.  It is very important that you take or use this exactly as directed.  Do not skip doses or discontinue unless directed by your doctor.  Obtain medical advice before taking any non-prescription drugs as some may affect the action of this medication.  Take with food or milk.    -- Indication: For Heart disease    carvedilol 3.125 mg oral tablet  -- 1 tab(s) by mouth every 12 hours  -- Indication: For Blood pressure    fluticasone-salmeterol 100 mcg-50 mcg inhalation powder  -- 1 puff(s) inhaled 2 times a day, As Needed  -- Indication: For COPD    nystatin 100,000 units/g topical powder  -- 1 application on skin 2 times a day  -- Indication: For Rash    hydroCHLOROthiazide 25 mg oral tablet  -- 1 tab(s) by mouth once a day  -- Indication: For Blood pressure    MiraLax oral powder for reconstitution  -- 17 gram(s) by mouth once a day, As Needed -for constipation   -- Dilute this medication with liquid before administration.  It is very important that you take or use this exactly as directed.  Do not skip doses or discontinue unless directed by your doctor.    -- Indication: For Laxative- as needed for constipation    docusate sodium 100 mg oral capsule  -- 1 cap(s) by mouth 3 times a day  -- Indication: For Stool softener - hold for loose stool    pantoprazole 40 mg oral delayed release tablet  -- 1 tab(s) by mouth once a day (before a meal)  -- Indication: For Stomach protection.       Discharge CXR:  < from: Xray Chest 1 View- PORTABLE-Routine (02.15.19 @ 04:59) >  FINDINGS: There is mild pulmonary vascular congestion.  There is no focal   consolidation, pneumothorax, or pleural effusion.  Although evaluation is   limited on AP view, the cardiac silhouette appears normal in size.      IMPRESSION: No focal infiltrates. Mild pulmonary vascular congestion.    < end of copied text >      Discharge ECHO:  < from: Echocardiogram (02.14.19 @ 16:57) >  There is moderate concentric left ventricular hypertrophy.The left   ventricular   wall motion is normal.The left ventricle is hyperdynamic and the overall   ejection fraction is increased (>75%).  The right ventricle is normal in   size   and function.The left atrium is moderately dilated.The left atrial volume   index is 43 cc/m2 (normal <34cc/m2)Right atrial size is normal.Status   post   TAVR. 23 mm Jamar Valve is seen in the aortic position. No aortic   regurgitation noted. The peak pressure gradient is 25 mmHg. The mean   pressure   gradient is 13 mmHg. LVOT diameter = 2.0  The calculated aortic valve   area   using the continuity equation is 2.2 cm2.No aortic regurgitation   noted.The   peak pressure gradient is 39 mmHg.The mean pressure gradient is 22   mmHg.The   calculated aortic valve area using the continuity equation is 1.8   cm2.Structurally normal mitral valve.No mitral regurgitation   noted.Structurally normal tricuspid valve.No tricuspid regurgitation   noted.There was insufficient TR detected from which to calculate   pulmonary   artery systolic pressure.  Structurally normal pulmonic valve.No pulmonic   regurgitation noted.The ascending aorta is mild - moderately dilated and   measures 4.4 cm.There is no pericardial effusion.    < end of copied text > Patient discussed on morning rounds with Dr. Green    Operation / Date: TAVR on 02/05/2019. readmit with neutropenia/fever/HTN crisis.    Surgeon: Dr. Alejo    Referring Physician: Dr. Armendariz    SUBJECTIVE ASSESSMENT:  78y Female seen and examined. Interpretation via daugther at bedside. Feels well, is ambulating, using iS, toleratign PO diet, normal BMs. Denies fever, chest pain, palpitations, SOB, abdominal pain, n/v, dizziness, lightheaded.     Hospital Course:  78 year old F, PMHx uncontrolled HTN, asthma, chronic HFpEF, aortic stenosis s/p transfemoral TAVR with jamar valve on 02/05/2019 with Dr. Alejo here at [facility]. She was discharged home on POD#2 on coreg 3.125mg BID, Lisinopril 5mg and HTCZ 25mg. She presented to an OSH ED with chest pain, and headache, w/ home BP of 220 systolic. In ED BP was 194/54. EKG was negative for ST changes, and troponins were 0.1-->0.1. In the ED she was febrile up to 101.3 and WBC was 2.04.  Lactic acid 1.9-->1.7.  Sepsis protocol was initiated and she was placed on neutropenic isolation.  Patient transferred to [facility] for further evaluation in light of her recent TAVR. Heme, Dr. Beavers, and ID  consulted. She was started on neupogen, and wbc count responded appropriately and WNL. Neutropenia thought to be 2/2 plavix therapy, therefore it was d/c'd. HTN regimen titrated. Today patient feels well, she is ambulating on RA without assistance, using IS, tolerating PO diet and having normal Bms. Her WBC is 11.65 and BP controlled. Per Dr. Green patient is ready for discharge.      Vital Signs Last 24 Hrs  T(C): 36.2 (16 Feb 2019 10:48), Max: 36.6 (15 Feb 2019 16:00)  T(F): 97.2 (16 Feb 2019 10:48), Max: 97.9 (15 Feb 2019 20:16)  HR: 72 (16 Feb 2019 14:11) (58 - 89)  BP: 159/73 (16 Feb 2019 14:11) (113/58 - 167/77)  BP(mean): 105 (16 Feb 2019 14:11) (83 - 110)  RR: 19 (16 Feb 2019 14:11) (18 - 29)  SpO2: 97% (16 Feb 2019 14:11) (97% - 98%)  I&O's Detail    15 Feb 2019 07:01  -  16 Feb 2019 07:00  --------------------------------------------------------  IN:    Oral Fluid: 500 mL  Total IN: 500 mL    OUT:    Voided: 1100 mL  Total OUT: 1100 mL    Total NET: -600 mL      PHYSICAL EXAM:    General: Patient lying comfortably in bed, no acute distress     Neurological: Alert and oriented. No focal neurological deficits     Cardiovascular: S1S2, RRR, no murmurs appreciated on exam     Respiratory: Clear to ausculation bilaterally, no wheeze/rhonchi/rales    Gastrointestinal: + BS, soft, non tender, non distended     Extremities: Warm and well perfused. No edema, no calf tenderness     Vascular: 2+ Peripheral pulses b/l     Incision Sites: b/l groin: soft, no hematoma.     LABS:                        10.9   11.65 )-----------( 212      ( 16 Feb 2019 05:42 )             33.8       COUMADIN: No        02-16    142  |  103  |  17  ----------------------------<  110<H>  4.1   |  27  |  0.93    Ca    9.6      16 Feb 2019 05:42  Mg     2.2     02-16            MEDICATIONS  (STANDING):  Aspirin Enteric Coated 81 mg oral delayed release tablet  -- 1 tab(s) by mouth once a day   -- Swallow whole.  Do not crush.  Take with food or milk.    -- Indication: For Heart disease    acetaminophen 325 mg oral tablet  -- 2 tab(s) by mouth every 6 hours, As needed, Mild Pain (1 - 3)  -- Indication: For Pain    lisinopril 10 mg oral tablet  -- 1 tab(s) by mouth once a day  -- Indication: For Blood pressure    atorvastatin 20 mg oral tablet  -- 1 tab(s) by mouth once a day (at bedtime)   -- Avoid grapefruit and grapefruit juice while taking this medication.  Do not take this drug if you are pregnant.  It is very important that you take or use this exactly as directed.  Do not skip doses or discontinue unless directed by your doctor.  Obtain medical advice before taking any non-prescription drugs as some may affect the action of this medication.  Take with food or milk.    -- Indication: For Heart disease    carvedilol 3.125 mg oral tablet  -- 1 tab(s) by mouth every 12 hours  -- Indication: For Blood pressure    Spiriva Respimat 1.25 mcg/inh inhalation aerosol  -- 2 puff(s) inhaled once a day   -- Check with your doctor before becoming pregnant.  For inhalation only.    -- Indication: For COPD    nystatin 100,000 units/g topical powder  -- 1 application on skin 2 times a day  -- Indication: For Rash    hydroCHLOROthiazide 25 mg oral tablet  -- 1 tab(s) by mouth once a day  -- Indication: For Blood pressure    MiraLax oral powder for reconstitution  -- 17 gram(s) by mouth once a day, As Needed -for constipation   -- Dilute this medication with liquid before administration.  It is very important that you take or use this exactly as directed.  Do not skip doses or discontinue unless directed by your doctor.    -- Indication: For Laxative- as needed for constipation    docusate sodium 100 mg oral capsule  -- 1 cap(s) by mouth 3 times a day  -- Indication: For Stool softener - hold for loose stool    pantoprazole 40 mg oral delayed release tablet  -- 1 tab(s) by mouth once a day (before a meal)  -- Indication: For Stomach protection.    Discharge CXR:  < from: Xray Chest 1 View- PORTABLE-Routine (02.15.19 @ 04:59) >  FINDINGS: There is mild pulmonary vascular congestion.  There is no focal   consolidation, pneumothorax, or pleural effusion.  Although evaluation is   limited on AP view, the cardiac silhouette appears normal in size.      IMPRESSION: No focal infiltrates. Mild pulmonary vascular congestion.    < end of copied text >      Discharge ECHO:  < from: Echocardiogram (02.14.19 @ 16:57) >  There is moderate concentric left ventricular hypertrophy.The left   ventricular   wall motion is normal.The left ventricle is hyperdynamic and the overall   ejection fraction is increased (>75%).  The right ventricle is normal in   size   and function.The left atrium is moderately dilated.The left atrial volume   index is 43 cc/m2 (normal <34cc/m2)Right atrial size is normal.Status   post   TAVR. 23 mm Jamar Valve is seen in the aortic position. No aortic   regurgitation noted. The peak pressure gradient is 25 mmHg. The mean   pressure   gradient is 13 mmHg. LVOT diameter = 2.0  The calculated aortic valve   area   using the continuity equation is 2.2 cm2.No aortic regurgitation   noted.The   peak pressure gradient is 39 mmHg.The mean pressure gradient is 22   mmHg.The   calculated aortic valve area using the continuity equation is 1.8   cm2.Structurally normal mitral valve.No mitral regurgitation   noted.Structurally normal tricuspid valve.No tricuspid regurgitation   noted.There was insufficient TR detected from which to calculate   pulmonary   artery systolic pressure.  Structurally normal pulmonic valve.No pulmonic   regurgitation noted.The ascending aorta is mild - moderately dilated and   measures 4.4 cm.There is no pericardial effusion.    < end of copied text >

## 2019-02-17 LAB
CULTURE RESULTS: SIGNIFICANT CHANGE UP
SPECIMEN SOURCE: SIGNIFICANT CHANGE UP

## 2019-02-18 LAB
CULTURE RESULTS: SIGNIFICANT CHANGE UP
SPECIMEN SOURCE: SIGNIFICANT CHANGE UP

## 2019-02-20 ENCOUNTER — APPOINTMENT (OUTPATIENT)
Dept: CARDIOTHORACIC SURGERY | Facility: CLINIC | Age: 79
End: 2019-02-20

## 2019-02-25 ENCOUNTER — APPOINTMENT (OUTPATIENT)
Dept: CARDIOTHORACIC SURGERY | Facility: CLINIC | Age: 79
End: 2019-02-25
Payer: MEDICARE

## 2019-02-25 ENCOUNTER — OUTPATIENT (OUTPATIENT)
Dept: OUTPATIENT SERVICES | Facility: HOSPITAL | Age: 79
LOS: 1 days | End: 2019-02-25
Payer: MEDICARE

## 2019-02-25 VITALS
TEMPERATURE: 97.5 F | HEART RATE: 73 BPM | BODY MASS INDEX: 36.61 KG/M2 | SYSTOLIC BLOOD PRESSURE: 155 MMHG | RESPIRATION RATE: 18 BRPM | WEIGHT: 220 LBS | DIASTOLIC BLOOD PRESSURE: 72 MMHG | OXYGEN SATURATION: 96 %

## 2019-02-25 DIAGNOSIS — R50.81 FEVER PRESENTING WITH CONDITIONS CLASSIFIED ELSEWHERE: ICD-10-CM

## 2019-02-25 DIAGNOSIS — T45.525A ADVERSE EFFECT OF ANTITHROMBOTIC DRUGS, INITIAL ENCOUNTER: ICD-10-CM

## 2019-02-25 DIAGNOSIS — J44.9 CHRONIC OBSTRUCTIVE PULMONARY DISEASE, UNSPECIFIED: ICD-10-CM

## 2019-02-25 DIAGNOSIS — Z79.82 LONG TERM (CURRENT) USE OF ASPIRIN: ICD-10-CM

## 2019-02-25 DIAGNOSIS — D70.2 OTHER DRUG-INDUCED AGRANULOCYTOSIS: ICD-10-CM

## 2019-02-25 DIAGNOSIS — I16.9 HYPERTENSIVE CRISIS, UNSPECIFIED: ICD-10-CM

## 2019-02-25 DIAGNOSIS — D64.89 OTHER SPECIFIED ANEMIAS: ICD-10-CM

## 2019-02-25 DIAGNOSIS — Z95.2 PRESENCE OF PROSTHETIC HEART VALVE: Chronic | ICD-10-CM

## 2019-02-25 DIAGNOSIS — J45.909 UNSPECIFIED ASTHMA, UNCOMPLICATED: ICD-10-CM

## 2019-02-25 DIAGNOSIS — Z95.3 PRESENCE OF XENOGENIC HEART VALVE: ICD-10-CM

## 2019-02-25 DIAGNOSIS — I50.32 CHRONIC DIASTOLIC (CONGESTIVE) HEART FAILURE: ICD-10-CM

## 2019-02-25 DIAGNOSIS — I11.0 HYPERTENSIVE HEART DISEASE WITH HEART FAILURE: ICD-10-CM

## 2019-02-25 LAB
ALBUMIN SERPL ELPH-MCNC: 4.4 G/DL — SIGNIFICANT CHANGE UP (ref 3.3–5)
ALP SERPL-CCNC: 64 U/L — SIGNIFICANT CHANGE UP (ref 40–120)
ALT FLD-CCNC: 12 U/L — SIGNIFICANT CHANGE UP (ref 10–45)
ANION GAP SERPL CALC-SCNC: 13 MMOL/L — SIGNIFICANT CHANGE UP (ref 5–17)
AST SERPL-CCNC: 14 U/L — SIGNIFICANT CHANGE UP (ref 10–40)
BASOPHILS # BLD AUTO: 0.07 K/UL — SIGNIFICANT CHANGE UP (ref 0–0.2)
BASOPHILS NFR BLD AUTO: 0.9 % — SIGNIFICANT CHANGE UP (ref 0–2)
BILIRUB SERPL-MCNC: 0.4 MG/DL — SIGNIFICANT CHANGE UP (ref 0.2–1.2)
BUN SERPL-MCNC: 17 MG/DL — SIGNIFICANT CHANGE UP (ref 7–23)
CALCIUM SERPL-MCNC: 9.6 MG/DL — SIGNIFICANT CHANGE UP (ref 8.4–10.5)
CHLORIDE SERPL-SCNC: 103 MMOL/L — SIGNIFICANT CHANGE UP (ref 96–108)
CO2 SERPL-SCNC: 25 MMOL/L — SIGNIFICANT CHANGE UP (ref 22–31)
CREAT SERPL-MCNC: 0.87 MG/DL — SIGNIFICANT CHANGE UP (ref 0.5–1.3)
EOSINOPHIL # BLD AUTO: 0.21 K/UL — SIGNIFICANT CHANGE UP (ref 0–0.5)
EOSINOPHIL NFR BLD AUTO: 2.7 % — SIGNIFICANT CHANGE UP (ref 0–6)
GLUCOSE SERPL-MCNC: 107 MG/DL — HIGH (ref 70–99)
HCT VFR BLD CALC: 36 % — SIGNIFICANT CHANGE UP (ref 34.5–45)
HGB BLD-MCNC: 11.7 G/DL — SIGNIFICANT CHANGE UP (ref 11.5–15.5)
IMM GRANULOCYTES NFR BLD AUTO: 0.3 % — SIGNIFICANT CHANGE UP (ref 0–1.5)
LYMPHOCYTES # BLD AUTO: 1.64 K/UL — SIGNIFICANT CHANGE UP (ref 1–3.3)
LYMPHOCYTES # BLD AUTO: 21 % — SIGNIFICANT CHANGE UP (ref 13–44)
MCHC RBC-ENTMCNC: 30.4 PG — SIGNIFICANT CHANGE UP (ref 27–34)
MCHC RBC-ENTMCNC: 32.5 GM/DL — SIGNIFICANT CHANGE UP (ref 32–36)
MCV RBC AUTO: 93.5 FL — SIGNIFICANT CHANGE UP (ref 80–100)
MONOCYTES # BLD AUTO: 0.39 K/UL — SIGNIFICANT CHANGE UP (ref 0–0.9)
MONOCYTES NFR BLD AUTO: 5 % — SIGNIFICANT CHANGE UP (ref 2–14)
NEUTROPHILS # BLD AUTO: 5.48 K/UL — SIGNIFICANT CHANGE UP (ref 1.8–7.4)
NEUTROPHILS NFR BLD AUTO: 70.1 % — SIGNIFICANT CHANGE UP (ref 43–77)
NRBC # BLD: 0 /100 WBCS — SIGNIFICANT CHANGE UP (ref 0–0)
PLATELET # BLD AUTO: 205 K/UL — SIGNIFICANT CHANGE UP (ref 150–400)
POTASSIUM SERPL-MCNC: 4.7 MMOL/L — SIGNIFICANT CHANGE UP (ref 3.5–5.3)
POTASSIUM SERPL-SCNC: 4.7 MMOL/L — SIGNIFICANT CHANGE UP (ref 3.5–5.3)
PROT SERPL-MCNC: 7.6 G/DL — SIGNIFICANT CHANGE UP (ref 6–8.3)
RBC # BLD: 3.85 M/UL — SIGNIFICANT CHANGE UP (ref 3.8–5.2)
RBC # FLD: 13.3 % — SIGNIFICANT CHANGE UP (ref 10.3–14.5)
SODIUM SERPL-SCNC: 141 MMOL/L — SIGNIFICANT CHANGE UP (ref 135–145)
WBC # BLD: 7.81 K/UL — SIGNIFICANT CHANGE UP (ref 3.8–10.5)
WBC # FLD AUTO: 7.81 K/UL — SIGNIFICANT CHANGE UP (ref 3.8–10.5)

## 2019-02-25 PROCEDURE — 80053 COMPREHEN METABOLIC PANEL: CPT

## 2019-02-25 PROCEDURE — 85025 COMPLETE CBC W/AUTO DIFF WBC: CPT

## 2019-02-25 PROCEDURE — 36415 COLL VENOUS BLD VENIPUNCTURE: CPT

## 2019-02-25 PROCEDURE — 99215 OFFICE O/P EST HI 40 MIN: CPT

## 2019-02-26 PROCEDURE — 85027 COMPLETE CBC AUTOMATED: CPT

## 2019-02-26 PROCEDURE — C1887: CPT

## 2019-02-26 PROCEDURE — 83880 ASSAY OF NATRIURETIC PEPTIDE: CPT

## 2019-02-26 PROCEDURE — 93306 TTE W/DOPPLER COMPLETE: CPT

## 2019-02-26 PROCEDURE — 87040 BLOOD CULTURE FOR BACTERIA: CPT

## 2019-02-26 PROCEDURE — 80048 BASIC METABOLIC PNL TOTAL CA: CPT

## 2019-02-26 PROCEDURE — 85730 THROMBOPLASTIN TIME PARTIAL: CPT

## 2019-02-26 PROCEDURE — 83735 ASSAY OF MAGNESIUM: CPT

## 2019-02-26 PROCEDURE — 82803 BLOOD GASES ANY COMBINATION: CPT

## 2019-02-26 PROCEDURE — C1894: CPT

## 2019-02-26 PROCEDURE — 86900 BLOOD TYPING SEROLOGIC ABO: CPT

## 2019-02-26 PROCEDURE — 84100 ASSAY OF PHOSPHORUS: CPT

## 2019-02-26 PROCEDURE — 82962 GLUCOSE BLOOD TEST: CPT

## 2019-02-26 PROCEDURE — 82330 ASSAY OF CALCIUM: CPT

## 2019-02-26 PROCEDURE — 83605 ASSAY OF LACTIC ACID: CPT

## 2019-02-26 PROCEDURE — 84132 ASSAY OF SERUM POTASSIUM: CPT

## 2019-02-26 PROCEDURE — C1769: CPT

## 2019-02-26 PROCEDURE — 86850 RBC ANTIBODY SCREEN: CPT

## 2019-02-26 PROCEDURE — 86140 C-REACTIVE PROTEIN: CPT

## 2019-02-26 PROCEDURE — 86901 BLOOD TYPING SEROLOGIC RH(D): CPT

## 2019-02-26 PROCEDURE — 94640 AIRWAY INHALATION TREATMENT: CPT

## 2019-02-26 PROCEDURE — 82553 CREATINE MB FRACTION: CPT

## 2019-02-26 PROCEDURE — 82550 ASSAY OF CK (CPK): CPT

## 2019-02-26 PROCEDURE — 93005 ELECTROCARDIOGRAM TRACING: CPT

## 2019-02-26 PROCEDURE — 84484 ASSAY OF TROPONIN QUANT: CPT

## 2019-02-26 PROCEDURE — 80053 COMPREHEN METABOLIC PANEL: CPT

## 2019-02-26 PROCEDURE — C1889: CPT

## 2019-02-26 PROCEDURE — 86403 PARTICLE AGGLUT ANTBDY SCRN: CPT

## 2019-02-26 PROCEDURE — P9045: CPT

## 2019-02-26 PROCEDURE — 85610 PROTHROMBIN TIME: CPT

## 2019-02-26 PROCEDURE — 71045 X-RAY EXAM CHEST 1 VIEW: CPT

## 2019-02-26 PROCEDURE — 83036 HEMOGLOBIN GLYCOSYLATED A1C: CPT

## 2019-02-26 PROCEDURE — 81001 URINALYSIS AUTO W/SCOPE: CPT

## 2019-02-26 PROCEDURE — C1884: CPT

## 2019-02-26 PROCEDURE — 97161 PT EVAL LOW COMPLEX 20 MIN: CPT

## 2019-02-26 PROCEDURE — 86923 COMPATIBILITY TEST ELECTRIC: CPT

## 2019-02-26 PROCEDURE — 84145 PROCALCITONIN (PCT): CPT

## 2019-02-26 PROCEDURE — 87389 HIV-1 AG W/HIV-1&-2 AB AG IA: CPT

## 2019-02-26 PROCEDURE — 36415 COLL VENOUS BLD VENIPUNCTURE: CPT

## 2019-02-26 PROCEDURE — 85025 COMPLETE CBC W/AUTO DIFF WBC: CPT

## 2019-02-26 PROCEDURE — 84295 ASSAY OF SERUM SODIUM: CPT

## 2019-02-26 PROCEDURE — C1760: CPT

## 2019-02-26 NOTE — PHYSICAL EXAM
[Normal Appearance] : normal appearance [General Appearance - In No Acute Distress] : no acute distress [Normal Conjunctiva] : the conjunctiva exhibited no abnormalities [Normal Oral Mucosa] : normal oral mucosa [Normal Jugular Venous V Waves Present] : normal jugular venous V waves present [] : no respiratory distress [Respiration, Rhythm And Depth] : normal respiratory rhythm and effort [Auscultation Breath Sounds / Voice Sounds] : lungs were clear to auscultation bilaterally [Heart Rate And Rhythm] : heart rate and rhythm were normal [Heart Sounds] : normal S1 and S2 [Murmurs] : no murmurs present [Edema] : no peripheral edema present [Bowel Sounds] : normal bowel sounds [Abdomen Soft] : soft [Abdomen Tenderness] : non-tender [Abnormal Walk] : normal gait [Skin Color & Pigmentation] : normal skin color and pigmentation [Oriented To Time, Place, And Person] : oriented to person, place, and time

## 2019-02-26 NOTE — HISTORY OF PRESENT ILLNESS
[FreeTextEntry1] : 78 year old [place]ak[place] Speaking female with a history of obesity, uncontrolled hypertension, asthma (no history of intubations) and chronic diastolic heart failure with severe aortic stenosis s/p TF TAVR on 02/5/19 with Jamar 3(23 mm, commercial) who was readmitted with hypertensive urgency and neutropenia (felt to be secondary to Plavix) and presents for follow up after discharge. \par \par The patient tolerated the procedure the TAVR procedure without complications. She was discharged home on POD #2. About one week later, the patient presented to the [facility] ED c/o chest pain and headache with an elevated BP at home. In the ED, her SBP was over 200. She was noted to be febrile to 101.3 and neutropenic with WBC 2.04. Urine and blood cultures were negative. She was started on Neupogen and WBC responded appropriately. The neutropenia was felt to be secondary to Plavix therapy and it was discontinued. The patient was discharged home on 2/16.\par \par Since discharge, the patient states she is feeling better. Her SOB has improved but she still notes MANRIQUE when walking. Her SBP remains uncontrolled at home; ranges 110-170. She has not been taking HCTZ since discharge. The patient denies chest pain, orthopnea, PND, syncope, LE edema and palpitations. She reports some dizziness with changes in position.

## 2019-02-26 NOTE — REVIEW OF SYSTEMS
[Shortness Of Breath] : no shortness of breath [Dyspnea on exertion] : dyspnea during exertion [Chest  Pressure] : no chest pressure [Chest Pain] : no chest pain [Lower Ext Edema] : no extremity edema [Leg Claudication] : no intermittent leg claudication [Palpitations] : no palpitations [Dizziness] : dizziness [Tremor] : no tremor was seen [Numbness (Hypesthesia)] : no numbness [Convulsions] : no convulsions [Tingling (Paresthesia)] : no tingling [Negative] : Psychiatric

## 2019-02-27 DIAGNOSIS — I35.0 NONRHEUMATIC AORTIC (VALVE) STENOSIS: ICD-10-CM

## 2019-02-27 DIAGNOSIS — O99.89 OTHER SPECIFIED DISEASES AND CONDITIONS COMPLICATING PREGNANCY, CHILDBIRTH AND THE PUERPERIUM: ICD-10-CM

## 2019-03-31 ENCOUNTER — FORM ENCOUNTER (OUTPATIENT)
Age: 79
End: 2019-03-31

## 2019-04-01 ENCOUNTER — APPOINTMENT (OUTPATIENT)
Dept: CARDIOTHORACIC SURGERY | Facility: CLINIC | Age: 79
End: 2019-04-01
Payer: MEDICARE

## 2019-04-01 ENCOUNTER — OUTPATIENT (OUTPATIENT)
Dept: OUTPATIENT SERVICES | Facility: HOSPITAL | Age: 79
LOS: 1 days | End: 2019-04-01
Payer: MEDICARE

## 2019-04-01 DIAGNOSIS — Z95.2 PRESENCE OF PROSTHETIC HEART VALVE: Chronic | ICD-10-CM

## 2019-04-01 DIAGNOSIS — I35.9 NONRHEUMATIC AORTIC VALVE DISORDER, UNSPECIFIED: ICD-10-CM

## 2019-04-01 PROCEDURE — 99214 OFFICE O/P EST MOD 30 MIN: CPT

## 2019-04-01 PROCEDURE — 93306 TTE W/DOPPLER COMPLETE: CPT

## 2019-04-01 PROCEDURE — 93010 ELECTROCARDIOGRAM REPORT: CPT | Mod: NC

## 2019-04-01 PROCEDURE — 93005 ELECTROCARDIOGRAM TRACING: CPT

## 2019-04-01 PROCEDURE — 93306 TTE W/DOPPLER COMPLETE: CPT | Mod: 26

## 2019-04-03 VITALS — SYSTOLIC BLOOD PRESSURE: 187 MMHG | DIASTOLIC BLOOD PRESSURE: 85 MMHG | HEART RATE: 70 BPM | OXYGEN SATURATION: 99 %

## 2019-04-03 NOTE — HISTORY OF PRESENT ILLNESS
[FreeTextEntry1] : 78 year old [place] Speaking female with a history of obesity, uncontrolled hypertension, asthma (no history of intubations) and chronic diastolic heart failure with severe aortic stenosis s/p TF TAVR on 02/5/19 with Jamar 3(23 mm, commercial) who was readmitted with hypertensive urgency and neutropenia (felt to be secondary to Plavix) and presents for her one month follow up. \par \par Since her last visit, the patient states she continues to feel better. Her breathing has improved. She denies any CP, SOB at rest or with exertion, palpitations, dizziness, syncope, or LE edema. The patient states she has been taking her BP at home and it ranges between 150-160s.  The patient never started Coreg 6.25 mg PO BID as instructed at her last appt- and has been taking only 3.125 mg PO daily.

## 2019-04-03 NOTE — REVIEW OF SYSTEMS
[Shortness Of Breath] : no shortness of breath [Dyspnea on exertion] : not dyspnea during exertion [Chest  Pressure] : no chest pressure [Chest Pain] : no chest pain [Lower Ext Edema] : no extremity edema [Leg Claudication] : no intermittent leg claudication [Palpitations] : no palpitations [Tremor] : no tremor was seen [Numbness (Hypesthesia)] : no numbness [Convulsions] : no convulsions [Tingling (Paresthesia)] : no tingling

## 2019-04-29 ENCOUNTER — MEDICATION RENEWAL (OUTPATIENT)
Age: 79
End: 2019-04-29

## 2019-04-30 ENCOUNTER — MEDICATION RENEWAL (OUTPATIENT)
Age: 79
End: 2019-04-30

## 2019-05-15 ENCOUNTER — APPOINTMENT (OUTPATIENT)
Dept: PULMONOLOGY | Facility: CLINIC | Age: 79
End: 2019-05-15
Payer: MEDICARE

## 2019-05-15 VITALS
HEART RATE: 67 BPM | DIASTOLIC BLOOD PRESSURE: 88 MMHG | RESPIRATION RATE: 12 BRPM | BODY MASS INDEX: 37.32 KG/M2 | TEMPERATURE: 99.1 F | OXYGEN SATURATION: 95 % | WEIGHT: 224 LBS | SYSTOLIC BLOOD PRESSURE: 140 MMHG | HEIGHT: 65 IN

## 2019-05-15 PROCEDURE — 94729 DIFFUSING CAPACITY: CPT

## 2019-05-15 PROCEDURE — 94727 GAS DIL/WSHOT DETER LNG VOL: CPT

## 2019-05-15 PROCEDURE — 94060 EVALUATION OF WHEEZING: CPT

## 2019-05-15 PROCEDURE — 99205 OFFICE O/P NEW HI 60 MIN: CPT | Mod: 25

## 2019-05-15 NOTE — HISTORY OF PRESENT ILLNESS
[Difficulty Breathing During Exertion] : stable dyspnea on exertion [Cough] : stable coughing [Wheezing] : denies wheezing [Feelings Of Weakness On Exertion] : stable exercise intolerance [Regional Soft Tissue Swelling Both Lower Extremities] : stable lower extremity edema [Chest Pain Or Discomfort] : denies chest pain [Fever] : denies fever [Wt Gain ___ Lbs] : recent [unfilled] ~Upound(s) weight gain [Oxygen] : the patient uses supplemental oxygen [Oxygen Rate  ___ lpm] : Oxygen rate is [unfilled] lpm [NC] : Nasal Cannula [24 hrs] : 24 hours/day [5  -  Severe] : 5, severe [Class III - Marked Limitation in Activity] : III [Never] : was never a smoker [Adherent] : the patient is adherent with ~his/her~ medication regimen [None] : None [Goals--Doing Well] : the patient is doing well with ~his/her~ goals [Obstructive Sleep Apnea] : obstructive sleep apnea [Snoring] : snoring [Witnessed Apneas] : witnessed sleep apnea [Daytime Somnolence] : daytime somnolence [Frequent Nocturnal Awakening] : frequent nocturnal awakening [ESS: ___] : ESS score [unfilled] [Unintentional Sleep While Inactive] : unintentional sleep while inactive [Awakes Unrefreshed] : awakening unrefreshed [Wt Loss ___ Lbs] : no recent weight loss [Good Control] : peak flow has been poor [More Frequent Use Needed Recently] : Patient reports no recent increase in frequency of [Side Effects] : ~He/She~ denies medication side effects [Unintentional Sleep while Active] : no unintentional sleep while active [Awakes with Headache] : no headache upon awakening [Awakening With Dry Mouth] : no dry mouth upon awakening [FreeTextEntry1] : she is for a second opinion.  She is sob walking a small distance.  She knows she is overweight and hard to carry the portable oxygen tank.  She had sputum in the morning.  She does not move much because she does not breath.  She improved after she was on the inhalers in the hospital.  She was on Spiriva when she was in [facility] but was not approved when she came here.  She felt better after the valve procedure.  she used to feel big stone on the chest and that is gone.  She was never have been a second hand smoker She snores at night

## 2019-05-15 NOTE — REVIEW OF SYSTEMS
[Cough] : cough [Sputum] : sputum  [Dyspnea] : dyspnea [Negative] : Sleep Disorder [Hemoptysis] : no hemoptysis [Chest Tightness] : no chest tightness [Pleuritic Pain] : no pleuritic pain [Frequent URIs] : no frequent upper respiratory infections [Wheezing] : no wheezing [Chronically Infected with _____] : no chronic infections

## 2019-05-15 NOTE — PHYSICAL EXAM
[General Appearance - Well Developed] : well developed [General Appearance - Well Nourished] : well nourished [Normal Appearance] : normal appearance [Well Groomed] : well groomed [No Deformities] : no deformities [General Appearance - In No Acute Distress] : no acute distress [Eyelids - No Xanthelasma] : the eyelids demonstrated no xanthelasmas [Normal Conjunctiva] : the conjunctiva exhibited no abnormalities [Neck Cervical Mass (___cm)] : no neck mass was observed [Normal Oropharynx] : normal oropharynx [Neck Appearance] : the appearance of the neck was normal [Jugular Venous Distention Increased] : there was no jugular-venous distention [Thyroid Diffuse Enlargement] : the thyroid was not enlarged [Thyroid Nodule] : there were no palpable thyroid nodules [Heart Rate And Rhythm] : heart rate and rhythm were normal [Heart Sounds] : normal S1 and S2 [Murmurs] : no murmurs present [Auscultation Breath Sounds / Voice Sounds] : lungs were clear to auscultation bilaterally [Respiration, Rhythm And Depth] : normal respiratory rhythm and effort [Exaggerated Use Of Accessory Muscles For Inspiration] : no accessory muscle use [Abdomen Soft] : soft [Abdomen Tenderness] : non-tender [Gait - Sufficient For Exercise Testing] : the gait was sufficient for exercise testing [Abdomen Mass (___ Cm)] : no abdominal mass palpated [Abnormal Walk] : normal gait [Nail Clubbing] : no clubbing of the fingernails [Cyanosis, Localized] : no localized cyanosis [Petechial Hemorrhages (___cm)] : no petechial hemorrhages [Skin Turgor] : normal skin turgor [Skin Color & Pigmentation] : normal skin color and pigmentation [Deep Tendon Reflexes (DTR)] : deep tendon reflexes were 2+ and symmetric [Sensation] : the sensory exam was normal to light touch and pinprick [] : no rash [Impaired Insight] : insight and judgment were intact [Oriented To Time, Place, And Person] : oriented to person, place, and time [No Focal Deficits] : no focal deficits [Affect] : the affect was normal

## 2019-05-29 ENCOUNTER — APPOINTMENT (OUTPATIENT)
Dept: PULMONOLOGY | Facility: CLINIC | Age: 79
End: 2019-05-29
Payer: MEDICARE

## 2019-05-30 ENCOUNTER — APPOINTMENT (OUTPATIENT)
Dept: PULMONOLOGY | Facility: CLINIC | Age: 79
End: 2019-05-30
Payer: MEDICARE

## 2019-05-30 VITALS
HEART RATE: 72 BPM | TEMPERATURE: 98.6 F | WEIGHT: 226 LBS | SYSTOLIC BLOOD PRESSURE: 134 MMHG | DIASTOLIC BLOOD PRESSURE: 84 MMHG | HEIGHT: 65 IN | OXYGEN SATURATION: 95 % | BODY MASS INDEX: 37.65 KG/M2

## 2019-05-30 PROCEDURE — 99214 OFFICE O/P EST MOD 30 MIN: CPT

## 2019-05-30 NOTE — PROCEDURE
[FreeTextEntry1] : Echo 4/190 PASP 29. LVEF 61%. \par \par \par EXAM: CT HEART CONGENITAL IC \par \par *** ADDENDUM 01/10/2019 *** \par \par CT Angiogram of the chest was performed with intravenous contrast. \par 100ml of Omnipaque 350 was injected intravenously. None was discarded. \par Coronal, Saggital and 3D reformats were submitted. \par \par Clinical information: Preop for aortic valve replacement, assess vascular \par anatomy \par \par Comparison: None \par \par FINDINGS: \par \par VESSELS: \par \par Thoracic aorta demonstrates mild calcified mural plaque of the descending \par segment. No intramural hematoma or dissection. Borderline aneurysmal \par ascending aorta measuring up to 4 cm. The aorta measures 2.8 cm at the \par sinuses of Valsalva, 2.2 cm at the arch, 2.5 cm descending thoracic. \par \par INCIDENTAL: \par \par CHEST WALL AND LOWER NECK: Within normal limits \par MEDIASTINUM AND PATRICIA: Within normal limits \par HEART: Thickened and partially calcified aortic valve. Cardiomegaly. \par LUNG AND LARGE AIRWAYS: Bilateral subcentimeter nodules the largest 0.9 cm \par peripheral right lower lobe on 505:55. \par \par LIVER: Within normal limits \par BILE DUCTS: Normal caliber \par GALLBLADDER: No calcified gallstones. Normal caliber wall \par BONES: Within normal limits \par \par IMPRESSION: \par \par 4 cm ascending aortic aneurysm. Thickened aortic valve with mild \par calcifications. \par \par 9 mm right lung nodule. Pulmonary consultation and 3 months follow-up \par versus PET CT are recommended. \par \par Other incidental comments as above. \par \par \par \par *** END OF ADDENDUM 01/10/2019 ***

## 2019-05-30 NOTE — REVIEW OF SYSTEMS
[Hemoptysis] : no hemoptysis [Chest Tightness] : no chest tightness [Pleuritic Pain] : no pleuritic pain [Frequent URIs] : no frequent upper respiratory infections [Wheezing] : no wheezing [Chronically Infected with _____] : no chronic infections

## 2019-05-30 NOTE — HISTORY OF PRESENT ILLNESS
[Wt Loss ___ Lbs] : no recent weight loss [Good Control] : peak flow has been poor [More Frequent Use Needed Recently] : Patient reports no recent increase in frequency of [Side Effects] : ~He/She~ denies medication side effects [Unintentional Sleep while Active] : no unintentional sleep while active [Awakes with Headache] : no headache upon awakening [Awakening With Dry Mouth] : no dry mouth upon awakening [FreeTextEntry1] : she is for a second opinion.  She is sob walking a small distance.  She knows she is overweight and hard to carry the portable oxygen tank.  She had sputum in the morning.  She does not move much because she does not breath.  She improved after she was on the inhalers in the hospital.  She was on Spiriva when she was in [facility] but was not approved when she came here.  She felt better after the valve procedure.  she used to feel big stone on the chest and that is gone.  She was never have been a second hand smoker She snores at night\par \par [5/30/19\par Taking Spiriva 2 puffs daily. Overall patient endorses that her breathing has improved since starting the spiriva. Coughing up more phlegm (white). No wheezing. Denies fever or chills. Not using rescue inhaler \par Can take about 10 steps before she has to stop to catch her breath; but states that her knee pain also makes walking difficult.

## 2019-05-30 NOTE — DISCUSSION/SUMMARY
[FreeTextEntry1] : \par COPD\par - Currently categorized as Gold category  1; FEV1 90%. Overall, patient's breathing is improving with the Spiriva. Patient to continue Spiriva 2 puffs daily & rescue inhaler as needed. PFTs 5/15/19 demonstrates mild obstruction, no response to bronchodilators & mildly decreased DLCO. Lung volumes normal. \par - Advised patient to start walking more as tolerated. We discussed how her dyspnea is multifactorial & due to morbid obesity, deconditioning, & underlying lung disease.  \par - CAT score 8/40\par \par Pulmonary nodule\par - 9 mm right lung nodule from CTA Jan 2019. Repeat CT chest to ensure stability/decreased size of nodule. \par \par MARK\par - Patient to schedule sleep study to evaluate for obstructive sleep apnea\par \par RTC in 3 months

## 2019-06-10 ENCOUNTER — APPOINTMENT (OUTPATIENT)
Dept: SLEEP CENTER | Facility: HOSPITAL | Age: 79
End: 2019-06-10

## 2019-06-10 ENCOUNTER — OUTPATIENT (OUTPATIENT)
Dept: OUTPATIENT SERVICES | Facility: HOSPITAL | Age: 79
LOS: 1 days | End: 2019-06-10
Payer: MEDICARE

## 2019-06-10 DIAGNOSIS — G47.33 OBSTRUCTIVE SLEEP APNEA (ADULT) (PEDIATRIC): ICD-10-CM

## 2019-06-10 DIAGNOSIS — Z95.2 PRESENCE OF PROSTHETIC HEART VALVE: Chronic | ICD-10-CM

## 2019-06-10 PROCEDURE — 95810 POLYSOM 6/> YRS 4/> PARAM: CPT

## 2019-06-10 PROCEDURE — 95810 POLYSOM 6/> YRS 4/> PARAM: CPT | Mod: 26

## 2019-06-15 ENCOUNTER — APPOINTMENT (OUTPATIENT)
Dept: CT IMAGING | Facility: HOSPITAL | Age: 79
End: 2019-06-15
Payer: MEDICARE

## 2019-06-17 ENCOUNTER — FORM ENCOUNTER (OUTPATIENT)
Age: 79
End: 2019-06-17

## 2019-06-18 ENCOUNTER — APPOINTMENT (OUTPATIENT)
Dept: CT IMAGING | Facility: HOSPITAL | Age: 79
End: 2019-06-18

## 2019-06-18 ENCOUNTER — OUTPATIENT (OUTPATIENT)
Dept: OUTPATIENT SERVICES | Facility: HOSPITAL | Age: 79
LOS: 1 days | End: 2019-06-18
Payer: MEDICARE

## 2019-06-18 DIAGNOSIS — Z95.2 PRESENCE OF PROSTHETIC HEART VALVE: Chronic | ICD-10-CM

## 2019-06-18 PROCEDURE — 71250 CT THORAX DX C-: CPT | Mod: 26

## 2019-06-18 PROCEDURE — 71250 CT THORAX DX C-: CPT

## 2019-07-01 ENCOUNTER — OTHER (OUTPATIENT)
Age: 79
End: 2019-07-01

## 2019-08-02 ENCOUNTER — APPOINTMENT (OUTPATIENT)
Dept: SLEEP CENTER | Facility: HOSPITAL | Age: 79
End: 2019-08-02

## 2019-08-02 ENCOUNTER — OUTPATIENT (OUTPATIENT)
Dept: OUTPATIENT SERVICES | Facility: HOSPITAL | Age: 79
LOS: 1 days | End: 2019-08-02
Payer: MEDICARE

## 2019-08-02 DIAGNOSIS — G47.33 OBSTRUCTIVE SLEEP APNEA (ADULT) (PEDIATRIC): ICD-10-CM

## 2019-08-02 DIAGNOSIS — Z95.2 PRESENCE OF PROSTHETIC HEART VALVE: Chronic | ICD-10-CM

## 2019-08-02 PROCEDURE — 95811 POLYSOM 6/>YRS CPAP 4/> PARM: CPT

## 2019-08-02 PROCEDURE — 95811 POLYSOM 6/>YRS CPAP 4/> PARM: CPT | Mod: 26

## 2019-08-17 NOTE — DISCHARGE NOTE ADULT - LEARNING BEHAVIORAL ACTIVITIES TO COPE WITH URGES. FOR EXAMPLE, DISTRACTION AND CHANGING ROUTINES.
POSTOPERTIVE INSTRUCTIONS:      ACTIVITIES   Limit your activity for the rest of the day.  Resume normal activities starting tomorrow. DIET   Start with cool clear liquids (water, apple juice, Pepsi, Coke, gingerale,  Popsicles).  Advance your diet as tolerated to a regular diet.  AVOID HOT DRINKS like coffee, tea, and soup on the day of surgery. SPECIAL CARE NEEDED   Apply bacitracin ointment to the nostrils twice daily for the next week. You will likely see some bloody drainage, and that is normal.  If there is severe bleeding, spray Afrin (OTC) in the nostril and pinch the nose for 5 minutes. If he develops excessive congestion and crusting in the nose, saline sprays (OTC) can be helpful over the first week. MEDICATIONS  Resume your normal medications as prescribed by your primary doctor. Nasal saline spray   Use 2 sprays into each nostril every 1-2 hours if needed for congestion or crusting in the nostril. 3. Afrin Nasal Spray (oxymetazoline)   If you see severe bleeding, you may use 2 sprays into each nostril twice a  day. USE ONLY FOR 3 DAYS. Antibiotic: ____Bacitracin ointment___________.  Begin the morning after surgery. Apply to both nostrils twice daily  Pain medication: _____Tylenol or ibuprofen (OTC)______.  take as directed on the bottle if needed for pain        WHEN TO CALL YOUR DOCTOR   A temperature greater than 100 F or 38 C.  Swelling over the face (forehead, eyes, nose, or cheeks).  Blurred vision.  Stiff neck.  Extreme drowsiness after the first day.  Inability to urinate 8 hours after surgery.  Vomiting lasting more than 4 hours.  Any other symptoms which concern you. If you have any questions or concerns call my clinic at [phone]. Call 101 if you have chest pain or shortness of breath    FOLLOW UP:  Please call Dr. Edgar Davis office on Monday morning after 8am to set up a return appointment for 7-10 days from now. Nenita Caldwell, MD  [facility]  [address], [address], [address]   U) [phone] (R) [phone] Statement Selected

## 2019-10-22 ENCOUNTER — APPOINTMENT (OUTPATIENT)
Dept: PULMONOLOGY | Facility: CLINIC | Age: 79
End: 2019-10-22
Payer: MEDICARE

## 2019-10-22 VITALS
HEIGHT: 65 IN | DIASTOLIC BLOOD PRESSURE: 84 MMHG | SYSTOLIC BLOOD PRESSURE: 130 MMHG | TEMPERATURE: 98.1 F | OXYGEN SATURATION: 97 % | WEIGHT: 226 LBS | BODY MASS INDEX: 37.65 KG/M2 | HEART RATE: 77 BPM

## 2019-10-22 PROCEDURE — 99214 OFFICE O/P EST MOD 30 MIN: CPT

## 2019-10-22 NOTE — PHYSICAL EXAM
[General Appearance - Well Developed] : well developed [Normal Appearance] : normal appearance [Well Groomed] : well groomed [General Appearance - Well Nourished] : well nourished [No Deformities] : no deformities [General Appearance - In No Acute Distress] : no acute distress [Normal Conjunctiva] : the conjunctiva exhibited no abnormalities [Eyelids - No Xanthelasma] : the eyelids demonstrated no xanthelasmas [Normal Oropharynx] : normal oropharynx [Neck Appearance] : the appearance of the neck was normal [Neck Cervical Mass (___cm)] : no neck mass was observed [Jugular Venous Distention Increased] : there was no jugular-venous distention [Thyroid Diffuse Enlargement] : the thyroid was not enlarged [Thyroid Nodule] : there were no palpable thyroid nodules [Heart Rate And Rhythm] : heart rate and rhythm were normal [Heart Sounds] : normal S1 and S2 [Murmurs] : no murmurs present [Respiration, Rhythm And Depth] : normal respiratory rhythm and effort [Exaggerated Use Of Accessory Muscles For Inspiration] : no accessory muscle use [Auscultation Breath Sounds / Voice Sounds] : lungs were clear to auscultation bilaterally [Abdomen Soft] : soft [Abdomen Tenderness] : non-tender [Abdomen Mass (___ Cm)] : no abdominal mass palpated [Abnormal Walk] : normal gait [Gait - Sufficient For Exercise Testing] : the gait was sufficient for exercise testing [Nail Clubbing] : no clubbing of the fingernails [Petechial Hemorrhages (___cm)] : no petechial hemorrhages [Cyanosis, Localized] : no localized cyanosis [Deep Tendon Reflexes (DTR)] : deep tendon reflexes were 2+ and symmetric [Sensation] : the sensory exam was normal to light touch and pinprick [No Focal Deficits] : no focal deficits [Oriented To Time, Place, And Person] : oriented to person, place, and time [Impaired Insight] : insight and judgment were intact [Affect] : the affect was normal [Skin Color & Pigmentation] : normal skin color and pigmentation [Skin Turgor] : normal skin turgor [] : no rash

## 2019-10-22 NOTE — REASON FOR VISIT
[Follow-Up] : a follow-up visit [COPD] : COPD [Shortness of Breath] : shortness of Breath [Family Member] : family member

## 2019-10-22 NOTE — HISTORY OF PRESENT ILLNESS
[Difficulty Breathing During Exertion] : stable dyspnea on exertion [Feelings Of Weakness On Exertion] : stable exercise intolerance [Cough] : stable coughing [Wheezing] : denies wheezing [Chest Pain Or Discomfort] : denies chest pain [Regional Soft Tissue Swelling Both Lower Extremities] : stable lower extremity edema [Wt Gain ___ Lbs] : recent [unfilled] ~Upound(s) weight gain [Fever] : denies fever [Oxygen] : the patient uses supplemental oxygen [Oxygen Rate  ___ lpm] : Oxygen rate is [unfilled] lpm [NC] : Nasal Cannula [24 hrs] : 24 hours/day [5  -  Severe] : 5, severe [Class III - Marked Limitation in Activity] : III [Never] : was never a smoker [None] : None [Adherent] : the patient is adherent with ~his/her~ medication regimen [Goals--Doing Well] : the patient is doing well with ~his/her~ goals [Obstructive Sleep Apnea] : obstructive sleep apnea [Snoring] : snoring [Witnessed Apneas] : witnessed sleep apnea [Frequent Nocturnal Awakening] : frequent nocturnal awakening [Daytime Somnolence] : daytime somnolence [ESS: ___] : ESS score [unfilled] [Unintentional Sleep While Inactive] : unintentional sleep while inactive [Awakes Unrefreshed] : awakening unrefreshed [Wt Loss ___ Lbs] : no recent weight loss [Good Control] : peak flow has been poor [More Frequent Use Needed Recently] : Patient reports no recent increase in frequency of [Side Effects] : ~He/She~ denies medication side effects [Unintentional Sleep while Active] : no unintentional sleep while active [Awakes with Headache] : no headache upon awakening [Awakening With Dry Mouth] : no dry mouth upon awakening [FreeTextEntry1] : she is doing well.  Her nose is dry from the oxygen.  She was in ER once for the BP. She is using the inhaler.  She is walking doing her daily activities with no problem.  No swelling of the leg but the problem is with the knees.  Her appetite is good.

## 2019-10-22 NOTE — ASSESSMENT
[FreeTextEntry1] : COPD\par \par The patient is clinically stable. The patient tolerated the inhaler. Baseline oxygen saturation was normal. Patient had increase in her exercise capacity. Patient refused flu vaccination. Continue Spiriva.\par \par Pulmonary nodule the patient is scheduled for CT scan of the chest in November. Patient is to followup after the CT scan of the chest\par \par Obstructive sleep apnea\par \par I discussed with her the patient a titration study. I ordered his CPAP mask. Informed the daughter to contact me within 2 weeks if she does not hear from the provider

## 2019-12-02 ENCOUNTER — FORM ENCOUNTER (OUTPATIENT)
Age: 79
End: 2019-12-02

## 2019-12-03 ENCOUNTER — APPOINTMENT (OUTPATIENT)
Dept: CT IMAGING | Facility: HOSPITAL | Age: 79
End: 2019-12-03
Payer: MEDICARE

## 2019-12-03 ENCOUNTER — OUTPATIENT (OUTPATIENT)
Dept: OUTPATIENT SERVICES | Facility: HOSPITAL | Age: 79
LOS: 1 days | End: 2019-12-03
Payer: MEDICARE

## 2019-12-03 DIAGNOSIS — Z95.2 PRESENCE OF PROSTHETIC HEART VALVE: Chronic | ICD-10-CM

## 2019-12-03 PROCEDURE — 71250 CT THORAX DX C-: CPT

## 2019-12-03 PROCEDURE — 71250 CT THORAX DX C-: CPT | Mod: 26

## 2019-12-12 ENCOUNTER — APPOINTMENT (OUTPATIENT)
Dept: PULMONOLOGY | Facility: CLINIC | Age: 79
End: 2019-12-12
Payer: MEDICARE

## 2019-12-12 VITALS
BODY MASS INDEX: 37.99 KG/M2 | DIASTOLIC BLOOD PRESSURE: 90 MMHG | TEMPERATURE: 98.4 F | OXYGEN SATURATION: 98 % | WEIGHT: 228 LBS | HEIGHT: 65 IN | HEART RATE: 65 BPM | SYSTOLIC BLOOD PRESSURE: 140 MMHG

## 2019-12-12 DIAGNOSIS — Z28.21 IMMUNIZATION NOT CARRIED OUT BECAUSE OF PATIENT REFUSAL: ICD-10-CM

## 2019-12-12 DIAGNOSIS — R05 COUGH: ICD-10-CM

## 2019-12-12 PROCEDURE — 99214 OFFICE O/P EST MOD 30 MIN: CPT

## 2019-12-12 NOTE — REVIEW OF SYSTEMS
[Sputum] : sputum  [Cough] : cough [Dyspnea] : dyspnea [Negative] : Pulmonary Hypertension [Chest Tightness] : no chest tightness [Hemoptysis] : no hemoptysis [Pleuritic Pain] : no pleuritic pain [Wheezing] : no wheezing [Frequent URIs] : no frequent upper respiratory infections [Chronically Infected with _____] : no chronic infections

## 2019-12-12 NOTE — ASSESSMENT
[FreeTextEntry1] : COPD\par \par The patient is clinically stable. The patient tolerated the inhaler. Baseline oxygen saturation was normal. Patient had increase in her exercise capacity and doing physical therapy. Patient refused flu vaccination. Continue Spiriva. CAT score today 16.  Pt given sample of spiriva and provided correct return demonstration on inhaler use. \par \par Currently categorized as Gold category  1; FEV1 90%. PFTs 5/15/19 demonstrates mild obstruction, no response to bronchodilators & mildly decreased DLCO. Lung volumes normal. \par \par Pulmonary nodule stable there is new area of tree and bud nodules inflammatory in nature.  I provided the pt with nebulizer and 3% saline. Follow up with CT scan in 6 months.  Ordered for 6/2020.\par \par Obstructive sleep apnea\par \par Pt is non compliant with CPAP mask and not tolerating.  She is using the oxygen at night\par \par Walked her hallway maintained oxygen saturation at 97% and HR 84 after ambulation in the hallway. Pt to use oxygen at night

## 2019-12-12 NOTE — REASON FOR VISIT
[Follow-Up] : a follow-up visit [Shortness of Breath] : shortness of Breath [COPD] : COPD [Family Member] : family member

## 2019-12-12 NOTE — HISTORY OF PRESENT ILLNESS
[Difficulty Breathing During Exertion] : stable dyspnea on exertion [Wheezing] : denies wheezing [Feelings Of Weakness On Exertion] : stable exercise intolerance [Cough] : stable coughing [Wt Gain ___ Lbs] : recent [unfilled] ~Upound(s) weight gain [Chest Pain Or Discomfort] : denies chest pain [Fever] : denies fever [Regional Soft Tissue Swelling Both Lower Extremities] : stable lower extremity edema [Oxygen] : the patient uses supplemental oxygen [Oxygen Rate  ___ lpm] : Oxygen rate is [unfilled] lpm [NC] : Nasal Cannula [5  -  Severe] : 5, severe [Class III - Marked Limitation in Activity] : III [24 hrs] : 24 hours/day [Never] : was never a smoker [None] : None [Adherent] : the patient is adherent with ~his/her~ medication regimen [Goals--Doing Well] : the patient is doing well with ~his/her~ goals [Obstructive Sleep Apnea] : obstructive sleep apnea [Snoring] : snoring [Witnessed Apneas] : witnessed sleep apnea [Frequent Nocturnal Awakening] : frequent nocturnal awakening [Daytime Somnolence] : daytime somnolence [ESS: ___] : ESS score [unfilled] [Awakes Unrefreshed] : awakening unrefreshed [Unintentional Sleep While Inactive] : unintentional sleep while inactive [Wt Loss ___ Lbs] : no recent weight loss [More Frequent Use Needed Recently] : Patient reports no recent increase in frequency of [Good Control] : peak flow has been poor [Side Effects] : ~He/She~ denies medication side effects [Unintentional Sleep while Active] : no unintentional sleep while active [Awakes with Headache] : no headache upon awakening [Awakening With Dry Mouth] : no dry mouth upon awakening [FreeTextEntry1] : she is doing well.  She likes the Spiriva sample and feels it has helped her breathing. Once a week she is doing physical therapy and she trying to walk daily.  She uses oxygen as needed. She is not watching her diet.    \par \par She has some occasional cough with sneezing.  With some mucus production in the morning and after she eats with clear sputum.  Denies post nasal gtt or heartburn, fever, wheezing.  She has occasional chest discomfort with burning sensation.  She has difficulty lying flat without 2-3 pillows.  \par \par She has a CPAP machine but can't tolerate.  She uses the oxygen at night. She wakes up coughing but not SOB.

## 2019-12-12 NOTE — PHYSICAL EXAM
[General Appearance - Well Developed] : well developed [General Appearance - Well Nourished] : well nourished [Well Groomed] : well groomed [Normal Appearance] : normal appearance [General Appearance - In No Acute Distress] : no acute distress [No Deformities] : no deformities [Eyelids - No Xanthelasma] : the eyelids demonstrated no xanthelasmas [Normal Conjunctiva] : the conjunctiva exhibited no abnormalities [Neck Cervical Mass (___cm)] : no neck mass was observed [Neck Appearance] : the appearance of the neck was normal [Normal Oropharynx] : normal oropharynx [Thyroid Diffuse Enlargement] : the thyroid was not enlarged [Thyroid Nodule] : there were no palpable thyroid nodules [Jugular Venous Distention Increased] : there was no jugular-venous distention [Heart Rate And Rhythm] : heart rate and rhythm were normal [Heart Sounds] : normal S1 and S2 [Murmurs] : no murmurs present [Respiration, Rhythm And Depth] : normal respiratory rhythm and effort [Exaggerated Use Of Accessory Muscles For Inspiration] : no accessory muscle use [Auscultation Breath Sounds / Voice Sounds] : lungs were clear to auscultation bilaterally [Abdomen Soft] : soft [Abdomen Tenderness] : non-tender [Abdomen Mass (___ Cm)] : no abdominal mass palpated [Abnormal Walk] : normal gait [Gait - Sufficient For Exercise Testing] : the gait was sufficient for exercise testing [Nail Clubbing] : no clubbing of the fingernails [Cyanosis, Localized] : no localized cyanosis [Petechial Hemorrhages (___cm)] : no petechial hemorrhages [No Focal Deficits] : no focal deficits [Deep Tendon Reflexes (DTR)] : deep tendon reflexes were 2+ and symmetric [Sensation] : the sensory exam was normal to light touch and pinprick [Impaired Insight] : insight and judgment were intact [Oriented To Time, Place, And Person] : oriented to person, place, and time [Skin Turgor] : normal skin turgor [Affect] : the affect was normal [Skin Color & Pigmentation] : normal skin color and pigmentation [] : no rash

## 2020-01-31 RX ORDER — SOFT LENS DISINFECTANT
SOLUTION, NON-ORAL MISCELLANEOUS
Qty: 1 | Refills: 0 | Status: ACTIVE | COMMUNITY
Start: 2019-12-12 | End: 1900-01-01

## 2020-05-26 RX ORDER — TIOTROPIUM BROMIDE INHALATION SPRAY 3.12 UG/1
2.5 SPRAY, METERED RESPIRATORY (INHALATION) DAILY
Qty: 1 | Refills: 11 | Status: ACTIVE | COMMUNITY
Start: 2019-12-12 | End: 1900-01-01

## 2020-06-02 ENCOUNTER — APPOINTMENT (OUTPATIENT)
Dept: PULMONOLOGY | Facility: CLINIC | Age: 80
End: 2020-06-02

## 2020-06-02 ENCOUNTER — RESULT REVIEW (OUTPATIENT)
Age: 80
End: 2020-06-02

## 2020-06-02 ENCOUNTER — OUTPATIENT (OUTPATIENT)
Dept: OUTPATIENT SERVICES | Facility: HOSPITAL | Age: 80
LOS: 1 days | End: 2020-06-02

## 2020-06-02 ENCOUNTER — APPOINTMENT (OUTPATIENT)
Dept: CT IMAGING | Facility: CLINIC | Age: 80
End: 2020-06-02
Payer: MEDICARE

## 2020-06-02 DIAGNOSIS — Z95.2 PRESENCE OF PROSTHETIC HEART VALVE: Chronic | ICD-10-CM

## 2020-06-02 PROCEDURE — 71250 CT THORAX DX C-: CPT | Mod: 26

## 2020-06-05 ENCOUNTER — APPOINTMENT (OUTPATIENT)
Dept: PULMONOLOGY | Facility: CLINIC | Age: 80
End: 2020-06-05

## 2020-09-30 NOTE — PRE-OP CHECKLIST - WEIGHT IN LBS
----- Message from CHRYSTAL Good sent at 9/30/2020  9:42 AM CDT -----  Thyroid is normal continue with current medications   222

## 2020-11-13 ENCOUNTER — INPATIENT (INPATIENT)
Facility: HOSPITAL | Age: 80
LOS: 30 days | Discharge: HOME | DRG: 264 | End: 2020-12-14
Attending: COLON & RECTAL SURGERY | Admitting: INTERNAL MEDICINE
Payer: MEDICARE

## 2020-11-13 ENCOUNTER — TRANSCRIPTION ENCOUNTER (OUTPATIENT)
Age: 80
End: 2020-11-13

## 2020-11-13 VITALS
RESPIRATION RATE: 18 BRPM | TEMPERATURE: 98 F | SYSTOLIC BLOOD PRESSURE: 224 MMHG | HEIGHT: 70 IN | WEIGHT: 225.97 LBS | OXYGEN SATURATION: 99 % | HEART RATE: 79 BPM | DIASTOLIC BLOOD PRESSURE: 111 MMHG

## 2020-11-13 DIAGNOSIS — I50.32 CHRONIC DIASTOLIC (CONGESTIVE) HEART FAILURE: ICD-10-CM

## 2020-11-13 DIAGNOSIS — I35.0 NONRHEUMATIC AORTIC (VALVE) STENOSIS: ICD-10-CM

## 2020-11-13 DIAGNOSIS — J45.909 UNSPECIFIED ASTHMA, UNCOMPLICATED: ICD-10-CM

## 2020-11-13 DIAGNOSIS — Z95.2 PRESENCE OF PROSTHETIC HEART VALVE: Chronic | ICD-10-CM

## 2020-11-13 DIAGNOSIS — R07.9 CHEST PAIN, UNSPECIFIED: ICD-10-CM

## 2020-11-13 DIAGNOSIS — R51.9 HEADACHE, UNSPECIFIED: ICD-10-CM

## 2020-11-13 DIAGNOSIS — I16.0 HYPERTENSIVE URGENCY: ICD-10-CM

## 2020-11-13 LAB
ALBUMIN SERPL ELPH-MCNC: 4.4 G/DL — SIGNIFICANT CHANGE UP (ref 3.3–5)
ALP SERPL-CCNC: 71 U/L — SIGNIFICANT CHANGE UP (ref 40–120)
ALT FLD-CCNC: 40 U/L — SIGNIFICANT CHANGE UP (ref 10–45)
ANION GAP SERPL CALC-SCNC: 12 MMOL/L — SIGNIFICANT CHANGE UP (ref 5–17)
ANION GAP SERPL CALC-SCNC: 13 MMOL/L — SIGNIFICANT CHANGE UP (ref 5–17)
APPEARANCE UR: CLEAR — SIGNIFICANT CHANGE UP
APTT BLD: 25.8 SEC — LOW (ref 27.5–35.5)
AST SERPL-CCNC: 29 U/L — SIGNIFICANT CHANGE UP (ref 10–40)
BACTERIA # UR AUTO: PRESENT /HPF
BASOPHILS # BLD AUTO: 0.04 K/UL — SIGNIFICANT CHANGE UP (ref 0–0.2)
BASOPHILS NFR BLD AUTO: 0.6 % — SIGNIFICANT CHANGE UP (ref 0–2)
BILIRUB SERPL-MCNC: 0.4 MG/DL — SIGNIFICANT CHANGE UP (ref 0.2–1.2)
BILIRUB UR-MCNC: NEGATIVE — SIGNIFICANT CHANGE UP
BUN SERPL-MCNC: 24 MG/DL — HIGH (ref 7–23)
BUN SERPL-MCNC: 25 MG/DL — HIGH (ref 7–23)
CALCIUM SERPL-MCNC: 9.3 MG/DL — SIGNIFICANT CHANGE UP (ref 8.4–10.5)
CALCIUM SERPL-MCNC: 9.7 MG/DL — SIGNIFICANT CHANGE UP (ref 8.4–10.5)
CHLORIDE SERPL-SCNC: 100 MMOL/L — SIGNIFICANT CHANGE UP (ref 96–108)
CHLORIDE SERPL-SCNC: 102 MMOL/L — SIGNIFICANT CHANGE UP (ref 96–108)
CHOLEST SERPL-MCNC: 96 MG/DL — SIGNIFICANT CHANGE UP
CK MB CFR SERPL CALC: 1.6 NG/ML — SIGNIFICANT CHANGE UP (ref 0–6.7)
CK SERPL-CCNC: 73 U/L — SIGNIFICANT CHANGE UP (ref 25–170)
CO2 SERPL-SCNC: 25 MMOL/L — SIGNIFICANT CHANGE UP (ref 22–31)
CO2 SERPL-SCNC: 29 MMOL/L — SIGNIFICANT CHANGE UP (ref 22–31)
COLOR SPEC: YELLOW — SIGNIFICANT CHANGE UP
CREAT SERPL-MCNC: 1.05 MG/DL — SIGNIFICANT CHANGE UP (ref 0.5–1.3)
CREAT SERPL-MCNC: 1.2 MG/DL — SIGNIFICANT CHANGE UP (ref 0.5–1.3)
DIFF PNL FLD: ABNORMAL
EOSINOPHIL # BLD AUTO: 0.2 K/UL — SIGNIFICANT CHANGE UP (ref 0–0.5)
EOSINOPHIL NFR BLD AUTO: 3 % — SIGNIFICANT CHANGE UP (ref 0–6)
EPI CELLS # UR: SIGNIFICANT CHANGE UP /HPF (ref 0–5)
GLUCOSE SERPL-MCNC: 113 MG/DL — HIGH (ref 70–99)
GLUCOSE SERPL-MCNC: 116 MG/DL — HIGH (ref 70–99)
GLUCOSE UR QL: NEGATIVE — SIGNIFICANT CHANGE UP
HCT VFR BLD CALC: 34.4 % — LOW (ref 34.5–45)
HCT VFR BLD CALC: 37.3 % — SIGNIFICANT CHANGE UP (ref 34.5–45)
HDLC SERPL-MCNC: 41 MG/DL — LOW
HGB BLD-MCNC: 11 G/DL — LOW (ref 11.5–15.5)
HGB BLD-MCNC: 12 G/DL — SIGNIFICANT CHANGE UP (ref 11.5–15.5)
IMM GRANULOCYTES NFR BLD AUTO: 0.3 % — SIGNIFICANT CHANGE UP (ref 0–1.5)
INR BLD: 1.04 — SIGNIFICANT CHANGE UP (ref 0.88–1.16)
KETONES UR-MCNC: NEGATIVE — SIGNIFICANT CHANGE UP
LEUKOCYTE ESTERASE UR-ACNC: ABNORMAL
LIPID PNL WITH DIRECT LDL SERPL: 39 MG/DL — SIGNIFICANT CHANGE UP
LYMPHOCYTES # BLD AUTO: 1.79 K/UL — SIGNIFICANT CHANGE UP (ref 1–3.3)
LYMPHOCYTES # BLD AUTO: 27 % — SIGNIFICANT CHANGE UP (ref 13–44)
MAGNESIUM SERPL-MCNC: 2.2 MG/DL — SIGNIFICANT CHANGE UP (ref 1.6–2.6)
MCHC RBC-ENTMCNC: 29.9 PG — SIGNIFICANT CHANGE UP (ref 27–34)
MCHC RBC-ENTMCNC: 30.1 PG — SIGNIFICANT CHANGE UP (ref 27–34)
MCHC RBC-ENTMCNC: 32 GM/DL — SIGNIFICANT CHANGE UP (ref 32–36)
MCHC RBC-ENTMCNC: 32.2 GM/DL — SIGNIFICANT CHANGE UP (ref 32–36)
MCV RBC AUTO: 93.5 FL — SIGNIFICANT CHANGE UP (ref 80–100)
MCV RBC AUTO: 93.5 FL — SIGNIFICANT CHANGE UP (ref 80–100)
MONOCYTES # BLD AUTO: 0.58 K/UL — SIGNIFICANT CHANGE UP (ref 0–0.9)
MONOCYTES NFR BLD AUTO: 8.7 % — SIGNIFICANT CHANGE UP (ref 2–14)
NEUTROPHILS # BLD AUTO: 4 K/UL — SIGNIFICANT CHANGE UP (ref 1.8–7.4)
NEUTROPHILS NFR BLD AUTO: 60.4 % — SIGNIFICANT CHANGE UP (ref 43–77)
NITRITE UR-MCNC: NEGATIVE — SIGNIFICANT CHANGE UP
NON HDL CHOLESTEROL: 55 MG/DL — SIGNIFICANT CHANGE UP
NRBC # BLD: 0 /100 WBCS — SIGNIFICANT CHANGE UP (ref 0–0)
NRBC # BLD: 0 /100 WBCS — SIGNIFICANT CHANGE UP (ref 0–0)
NT-PROBNP SERPL-SCNC: 244 PG/ML — SIGNIFICANT CHANGE UP (ref 0–300)
PH UR: 6.5 — SIGNIFICANT CHANGE UP (ref 5–8)
PLATELET # BLD AUTO: 202 K/UL — SIGNIFICANT CHANGE UP (ref 150–400)
PLATELET # BLD AUTO: 202 K/UL — SIGNIFICANT CHANGE UP (ref 150–400)
POTASSIUM SERPL-MCNC: 4 MMOL/L — SIGNIFICANT CHANGE UP (ref 3.5–5.3)
POTASSIUM SERPL-MCNC: 4.8 MMOL/L — SIGNIFICANT CHANGE UP (ref 3.5–5.3)
POTASSIUM SERPL-SCNC: 4 MMOL/L — SIGNIFICANT CHANGE UP (ref 3.5–5.3)
POTASSIUM SERPL-SCNC: 4.8 MMOL/L — SIGNIFICANT CHANGE UP (ref 3.5–5.3)
PROT SERPL-MCNC: 7.5 G/DL — SIGNIFICANT CHANGE UP (ref 6–8.3)
PROT UR-MCNC: NEGATIVE MG/DL — SIGNIFICANT CHANGE UP
PROTHROM AB SERPL-ACNC: 12.5 SEC — SIGNIFICANT CHANGE UP (ref 10.6–13.6)
RBC # BLD: 3.68 M/UL — LOW (ref 3.8–5.2)
RBC # BLD: 3.99 M/UL — SIGNIFICANT CHANGE UP (ref 3.8–5.2)
RBC # FLD: 13.2 % — SIGNIFICANT CHANGE UP (ref 10.3–14.5)
RBC # FLD: 13.3 % — SIGNIFICANT CHANGE UP (ref 10.3–14.5)
RBC CASTS # UR COMP ASSIST: < 5 /HPF — SIGNIFICANT CHANGE UP
SARS-COV-2 RNA SPEC QL NAA+PROBE: SIGNIFICANT CHANGE UP
SODIUM SERPL-SCNC: 140 MMOL/L — SIGNIFICANT CHANGE UP (ref 135–145)
SODIUM SERPL-SCNC: 141 MMOL/L — SIGNIFICANT CHANGE UP (ref 135–145)
SP GR SPEC: 1.01 — SIGNIFICANT CHANGE UP (ref 1–1.03)
TRIGL SERPL-MCNC: 81 MG/DL — SIGNIFICANT CHANGE UP
TROPONIN T SERPL-MCNC: <0.01 NG/ML — SIGNIFICANT CHANGE UP (ref 0–0.01)
TROPONIN T SERPL-MCNC: <0.01 NG/ML — SIGNIFICANT CHANGE UP (ref 0–0.01)
UROBILINOGEN FLD QL: 0.2 E.U./DL — SIGNIFICANT CHANGE UP
WBC # BLD: 6.63 K/UL — SIGNIFICANT CHANGE UP (ref 3.8–10.5)
WBC # BLD: 6.76 K/UL — SIGNIFICANT CHANGE UP (ref 3.8–10.5)
WBC # FLD AUTO: 6.63 K/UL — SIGNIFICANT CHANGE UP (ref 3.8–10.5)
WBC # FLD AUTO: 6.76 K/UL — SIGNIFICANT CHANGE UP (ref 3.8–10.5)
WBC UR QL: < 5 /HPF — SIGNIFICANT CHANGE UP

## 2020-11-13 PROCEDURE — 71046 X-RAY EXAM CHEST 2 VIEWS: CPT | Mod: 26

## 2020-11-13 PROCEDURE — 99222 1ST HOSP IP/OBS MODERATE 55: CPT

## 2020-11-13 PROCEDURE — 93010 ELECTROCARDIOGRAM REPORT: CPT

## 2020-11-13 PROCEDURE — 99223 1ST HOSP IP/OBS HIGH 75: CPT

## 2020-11-13 PROCEDURE — 99285 EMERGENCY DEPT VISIT HI MDM: CPT

## 2020-11-13 PROCEDURE — 93306 TTE W/DOPPLER COMPLETE: CPT | Mod: 26

## 2020-11-13 RX ORDER — TIOTROPIUM BROMIDE 18 UG/1
1 CAPSULE ORAL; RESPIRATORY (INHALATION) DAILY
Refills: 0 | Status: DISCONTINUED | OUTPATIENT
Start: 2020-11-13 | End: 2020-12-02

## 2020-11-13 RX ORDER — HYDRALAZINE HCL 50 MG
1 TABLET ORAL
Qty: 0 | Refills: 0 | DISCHARGE

## 2020-11-13 RX ORDER — CARVEDILOL PHOSPHATE 80 MG/1
6.25 CAPSULE, EXTENDED RELEASE ORAL ONCE
Refills: 0 | Status: COMPLETED | OUTPATIENT
Start: 2020-11-13 | End: 2020-11-13

## 2020-11-13 RX ORDER — HYDROCHLOROTHIAZIDE 25 MG
12.5 TABLET ORAL DAILY
Refills: 0 | Status: DISCONTINUED | OUTPATIENT
Start: 2020-11-14 | End: 2020-11-20

## 2020-11-13 RX ORDER — ENOXAPARIN SODIUM 100 MG/ML
40 INJECTION SUBCUTANEOUS DAILY
Refills: 0 | Status: DISCONTINUED | OUTPATIENT
Start: 2020-11-13 | End: 2020-11-14

## 2020-11-13 RX ORDER — INFLUENZA VIRUS VACCINE 15; 15; 15; 15 UG/.5ML; UG/.5ML; UG/.5ML; UG/.5ML
0.5 SUSPENSION INTRAMUSCULAR ONCE
Refills: 0 | Status: DISCONTINUED | OUTPATIENT
Start: 2020-11-13 | End: 2020-11-13

## 2020-11-13 RX ORDER — ACETAMINOPHEN 500 MG
1000 TABLET ORAL ONCE
Refills: 0 | Status: COMPLETED | OUTPATIENT
Start: 2020-11-13 | End: 2020-11-13

## 2020-11-13 RX ORDER — ASPIRIN/CALCIUM CARB/MAGNESIUM 324 MG
81 TABLET ORAL DAILY
Refills: 0 | Status: DISCONTINUED | OUTPATIENT
Start: 2020-11-13 | End: 2020-11-19

## 2020-11-13 RX ORDER — ATORVASTATIN CALCIUM 80 MG/1
20 TABLET, FILM COATED ORAL AT BEDTIME
Refills: 0 | Status: DISCONTINUED | OUTPATIENT
Start: 2020-11-13 | End: 2020-12-02

## 2020-11-13 RX ORDER — POLYETHYLENE GLYCOL 3350 17 G/17G
17 POWDER, FOR SOLUTION ORAL DAILY
Refills: 0 | Status: DISCONTINUED | OUTPATIENT
Start: 2020-11-13 | End: 2020-12-02

## 2020-11-13 RX ORDER — CARVEDILOL PHOSPHATE 80 MG/1
6.25 CAPSULE, EXTENDED RELEASE ORAL EVERY 12 HOURS
Refills: 0 | Status: DISCONTINUED | OUTPATIENT
Start: 2020-11-13 | End: 2020-11-17

## 2020-11-13 RX ORDER — INFLUENZA VIRUS VACCINE 15; 15; 15; 15 UG/.5ML; UG/.5ML; UG/.5ML; UG/.5ML
0.7 SUSPENSION INTRAMUSCULAR ONCE
Refills: 0 | Status: COMPLETED | OUTPATIENT
Start: 2020-11-13 | End: 2020-11-13

## 2020-11-13 RX ORDER — INFLUENZA VIRUS VACCINE 15; 15; 15; 15 UG/.5ML; UG/.5ML; UG/.5ML; UG/.5ML
0.7 SUSPENSION INTRAMUSCULAR ONCE
Refills: 0 | Status: DISCONTINUED | OUTPATIENT
Start: 2020-11-13 | End: 2020-12-14

## 2020-11-13 RX ORDER — FUROSEMIDE 40 MG
20 TABLET ORAL ONCE
Refills: 0 | Status: COMPLETED | OUTPATIENT
Start: 2020-11-13 | End: 2020-11-13

## 2020-11-13 RX ORDER — LISINOPRIL 2.5 MG/1
10 TABLET ORAL DAILY
Refills: 0 | Status: DISCONTINUED | OUTPATIENT
Start: 2020-11-13 | End: 2020-11-20

## 2020-11-13 RX ADMIN — Medication 1000 MILLIGRAM(S): at 10:50

## 2020-11-13 RX ADMIN — CARVEDILOL PHOSPHATE 6.25 MILLIGRAM(S): 80 CAPSULE, EXTENDED RELEASE ORAL at 01:30

## 2020-11-13 RX ADMIN — Medication 400 MILLIGRAM(S): at 01:29

## 2020-11-13 RX ADMIN — Medication 1 CAPSULE(S): at 07:18

## 2020-11-13 RX ADMIN — CARVEDILOL PHOSPHATE 6.25 MILLIGRAM(S): 80 CAPSULE, EXTENDED RELEASE ORAL at 05:46

## 2020-11-13 RX ADMIN — TIOTROPIUM BROMIDE 1 CAPSULE(S): 18 CAPSULE ORAL; RESPIRATORY (INHALATION) at 11:56

## 2020-11-13 RX ADMIN — ATORVASTATIN CALCIUM 20 MILLIGRAM(S): 80 TABLET, FILM COATED ORAL at 21:30

## 2020-11-13 RX ADMIN — CARVEDILOL PHOSPHATE 6.25 MILLIGRAM(S): 80 CAPSULE, EXTENDED RELEASE ORAL at 17:45

## 2020-11-13 RX ADMIN — Medication 81 MILLIGRAM(S): at 09:52

## 2020-11-13 RX ADMIN — Medication 400 MILLIGRAM(S): at 09:52

## 2020-11-13 RX ADMIN — Medication 1 CAPSULE(S): at 05:43

## 2020-11-13 RX ADMIN — Medication 1000 MILLIGRAM(S): at 03:21

## 2020-11-13 RX ADMIN — ENOXAPARIN SODIUM 40 MILLIGRAM(S): 100 INJECTION SUBCUTANEOUS at 12:24

## 2020-11-13 RX ADMIN — LISINOPRIL 10 MILLIGRAM(S): 2.5 TABLET ORAL at 05:46

## 2020-11-13 RX ADMIN — Medication 20 MILLIGRAM(S): at 05:25

## 2020-11-13 NOTE — ED ADULT NURSE NOTE - NSIMPLEMENTINTERV_GEN_ALL_ED
Implemented All Universal Safety Interventions:  [place] to call system. Call bell, personal items and telephone within reach. Instruct patient to call for assistance. Room bathroom lighting operational. Non-slip footwear when patient is off stretcher. Physically safe environment: no spills, clutter or unnecessary equipment. Stretcher in lowest position, wheels locked, appropriate side rails in place.

## 2020-11-13 NOTE — DISCHARGE NOTE PROVIDER - HOSPITAL COURSE
in progress INCOMPLETE    78 year old Botswanan/English speaking F, PMHx of uncontrolled HTN, hyperlipidemia, asthma (denies hospitalizations, intubations), MARK (on CPAP, noncompliant), chronic diastolic CHF (EF:61% by Echo 4/2019), aortic stenosis s/p transfemoral TAVR with rachael valve on 02/05/2019 with Dr. Alejo @[facility], recent hospitalization at [facility] (for HTN/headache, no changes made to medications, negative CT head/MRI brain, discharged on 11/4/20) who presents to [facility] ED 11/13/20 c/o intermittent chest pain, elevated BP x 1 week and LE edema. In ED, BP:224/111. Patient treated with Coreg 6.25mg PO x 1 dose with improvement in BP 140s/90s and Lasix 20mg IV x1. Patient admitted to [facility] cardiac telemetry for management of hypertensive urgency.   On admission, HTN was managed on home medications of Coreg 6.25mg PO BID, Lisinopril 10mg PO QD, home HCTZ 12.5mg PO QD. Pt should continue on discharge.  Hospital course c/b repeat TTE 11/13 showing: Normal BiV fxn/size, moderate LVH, Grade II LV Diastolic Dysfunction, PEAK TRANSAORTIC GRADIENT 61.78mmHg, mildly dilated LA, no pulmHTN, moderately dilated ascending aorta. (gradient previously 14mmHg on 4/2019). Structural CT surgery consulted and recommended and stated increased gradient was due to either thrombus or failed valve. Structural CT scan 11/16: ___________. Heparin gtt was started 11/14 in the event that increased gradient was due to thrombus and pt experienced an episode of BRBPR x1. GI was consulted and stated that episode was likely 2/2 to supratherapetic PTT. Cleared pt for heparin use if indicated at a lower dose. **************  Patient was seen and examined at the bedside. No complaints at this time. AM labs WNL. No overnight events on tele. VSS. As per  ____, pt is stable for discharge home with instruction to follow up with Dr. Aguirre in 1-2 weeks.   Ms. Segovia is a 78 year old Yoruba/English speaking F, PMHx of uncontrolled HTN, hyperlipidemia, asthma (denies hospitalizations, intubations), MARK (on CPAP, noncompliant), chronic diastolic CHF (EF:61% by Echo 4/2019), aortic stenosis s/p transfemoral TAVR with rachael valve on 02/05/2019 with Dr. Alejo @[facility], recent hospitalization at [facility] (for HTN/headache, no changes made to medications, negative CT head/MRI brain, discharged on 11/4/20) who presents to [facility] ED 11/13/20 c/o intermittent chest pain, elevated BP x 1 week and LE edema. In ED, BP:224/111. Patient treated with Coreg 6.25mg PO x 1 dose with improvement in BP 140s/90s and Lasix 20mg IV x1. Patient admitted to [facility] cardiac telemetry for management of hypertensive urgency.   On admission, HTN was managed on home medications of Coreg 6.25mg PO BID, Lisinopril 10mg PO QD, home HCTZ 12.5mg PO QD. Pt should continue on discharge.  Hospital course c/b repeat TTE 11/13 showing: Normal BiV fxn/size, moderate LVH, Grade II LV Diastolic Dysfunction, PEAK TRANSAORTIC GRADIENT 61.78mmHg, mildly dilated LA, no pulmHTN, moderately dilated ascending aorta. (gradient previously 14mmHg on 4/2019). Structural CT surgery consulted and recommended and stated increased gradient was due to either thrombus or failed valve. Heparin gtt was started 11/14 in the event that increased gradient was due to thrombus and pt experienced an episode of BRBPR x1. GI was consulted and stated that episode was likely 2/2 to supratherapetic PTT. Cleared pt for heparin use if indicated at a lower dose.  On 12/2/2020, Ms. Segovia was transferred to the general surgery service for laparoscopic subtotal colectomy. The surgery was uncomplicated and the patient was successfully extubated in the PACU without any other concerns. From a general surgery perspective, the patient had a normal postoperative course that involved normal return of bowel function, tolerating a low fiber diet well, and good pain control. Cardiology was closely following the patient during the postop period and during that time, Eliquis was held due to bleeding risk.   On 12/10/2020, Ms. Segovia experienced a notable nosebleed for which ENT was consulted. ENT scoped the patient and noted full hemostasis after Afrin administration and silver nitrate application. The patient continued to have persistent headache over the next day, and a head CT was ordered to rule out intracranial hemorrhage, which was negative.  Ms. Segovia was evaluated for discharge on 12/___/2020. At that time, she was doing well and the follow up plans were communicated with her. She required follow up with Dr. Sanford as an outpatient for surgical management of her cancer as well as the cardiology department for both structural heart follow up as well as cardiology follow up.        Ms. Segovia is a 78 year old Armenian/English speaking F, PMHx of uncontrolled HTN, hyperlipidemia, asthma (denies hospitalizations, intubations), MARK (on CPAP, noncompliant), chronic diastolic CHF (EF:61% by Echo 4/2019), aortic stenosis s/p transfemoral TAVR with rachael valve on 02/05/2019 with Dr. Alejo @[facility], recent hospitalization at [facility] (for HTN/headache, no changes made to medications, negative CT head/MRI brain, discharged on 11/4/20) who presents to [facility] ED 11/13/20 c/o intermittent chest pain, elevated BP x 1 week and LE edema. In ED, BP:224/111. Patient treated with Coreg 6.25mg PO x 1 dose with improvement in BP 140s/90s and Lasix 20mg IV x1. Patient admitted to [facility] cardiac telemetry for management of hypertensive urgency.   On admission, HTN was managed on home medications of Coreg 6.25mg PO BID, Lisinopril 10mg PO QD, home HCTZ 12.5mg PO QD. Pt should continue on discharge.  Hospital course c/b repeat TTE 11/13 showing: Normal BiV fxn/size, moderate LVH, Grade II LV Diastolic Dysfunction, PEAK TRANSAORTIC GRADIENT 61.78mmHg, mildly dilated LA, no pulmHTN, moderately dilated ascending aorta. (gradient previously 14mmHg on 4/2019). Structural CT surgery consulted and recommended and stated increased gradient was due to either thrombus or failed valve. Heparin gtt was started 11/14 in the event that increased gradient was due to thrombus and pt experienced an episode of BRBPR x1. GI was consulted and stated that episode was likely 2/2 to supratherapetic PTT. Cleared pt for heparin use if indicated at a lower dose.  On 12/2/2020, Ms. Segovia was transferred to the general surgery service for laparoscopic subtotal colectomy. The surgery was uncomplicated and the patient was successfully extubated in the PACU without any other concerns. From a general surgery perspective, the patient had a normal postoperative course that involved normal return of bowel function, tolerating a low fiber diet well, and good pain control. Cardiology was closely following the patient during the postop period and during that time, Eliquis was held due to bleeding risk.   On 12/10/2020, Ms. Segovia experienced a notable nosebleed for which ENT was consulted. ENT scoped the patient and noted full hemostasis after Afrin administration and silver nitrate application. The patient continued to have persistent headache over the next day, and a head CT was ordered to rule out intracranial hemorrhage, which was negative.  Ms. Segovia was evaluated for discharge on 12/14/2020. At that time, she was doing well and the follow up plans were communicated with her. She required follow up with Dr. Sanford as an outpatient for surgical management of her cancer as well as the cardiology department for both structural heart follow up as well as cardiology follow up.        Ms. Segovia is a 78 year old Hebrew/English speaking F, PMHx of uncontrolled HTN, hyperlipidemia, asthma (denies hospitalizations, intubations), MARK (on CPAP, noncompliant), chronic diastolic CHF (EF:61% by Echo 4/2019), aortic stenosis s/p transfemoral TAVR with rachael valve on 02/05/2019 with Dr. Alejo @[facility], recent hospitalization at [facility] (for HTN/headache, no changes made to medications, negative CT head/MRI brain, discharged on 11/4/20) who presents to [facility] ED 11/13/20 c/o intermittent chest pain, elevated BP x 1 week and LE edema. In ED, BP:224/111. Patient treated with Coreg 6.25mg PO x 1 dose with improvement in BP 140s/90s and Lasix 20mg IV x1. Patient admitted to [facility] cardiac telemetry for management of hypertensive urgency.   On admission, HTN was managed on home medications of Coreg 6.25mg PO BID, Lisinopril 10mg PO QD, home HCTZ 12.5mg PO QD. Pt should continue on discharge.  Hospital course c/b repeat TTE 11/13 showing: Normal BiV fxn/size, moderate LVH, Grade II LV Diastolic Dysfunction, PEAK TRANSAORTIC GRADIENT 61.78mmHg, mildly dilated LA, no pulmHTN, moderately dilated ascending aorta. (gradient previously 14mmHg on 4/2019). Structural CT surgery consulted and recommended and stated increased gradient was due to either thrombus or failed valve. Heparin gtt was started 11/14 in the event that increased gradient was due to thrombus and pt experienced an episode of BRBPR x1. GI was consulted and stated that episode was likely 2/2 to supratherapetic PTT. Cleared pt for heparin use if indicated at a lower dose.  On 12/2/2020, Ms. Segovia was transferred to the general surgery service for laparoscopic subtotal colectomy. The surgery was uncomplicated and the patient was successfully extubated in the PACU without any other concerns. From a general surgery perspective, the patient had a normal postoperative course that involved normal return of bowel function, tolerating a low fiber diet well, and good pain control. Cardiology was closely following the patient during the postop period and during that time, Eliquis was held due to bleeding risk.   On 12/10/2020, Ms. Segovia experienced a notable nosebleed for which ENT was consulted. ENT scoped the patient and noted full hemostasis after Afrin administration and silver nitrate application. The patient continued to have persistent headache over the next day, and a head CT was ordered to rule out intracranial hemorrhage, which was negative.  On 12/13/2020, patient had a hemoglobin of 6.4 from 6.9, patient was given 1 unit of PRBC, repeat hemoglobin returned with 7.3. Patient was complaining of burning abdominal chest pain, EKG was done x 2, cardiology consulted for possible T wave inversion, repeat EKG was normal, as per cardiology nothing to do. From cardiologies perspective as per Dr. Jasso as of 12/14/2020, she was cleared and stable to be sent home and to follow up with Dr. Aguirre (her own cardiologist outpatient). Ms. Segovia was evaluated for discharge on 12/14/2020. At that time, she was doing well and the follow up plans were communicated with her. She required follow up with Dr. Sanford as an outpatient for surgical management of her cancer as well as the cardiology department for both structural heart follow up as well as cardiology follow up.

## 2020-11-13 NOTE — DISCHARGE NOTE PROVIDER - NSDCFUADDINST_GEN_ALL_CORE_FT
Please use a notebook to keep track of when you take your medications. Also keep track of you blood pressure trends in the notebook. Bring the notebook with you to your follow up appointment with your cardiologist. Ms. Segovia,    Thanks for your visit to [facility]. After your discharge, please follow up in the clinic with three important doctors.    1) Your cardiologist, Dr. Aguirre, for follow up of your heart conditions.    2) Your structural heart doctor, Dr. Alejo, for management of your heart conditions.    3) Your surgeon, Dr. Sanford for management of your colorectal cancer.    After your discharge, it is important to follow up with these appointments. Please call the numbers above to schedule your appointments.    Please continue the medications as they are outlined on your discharge medication reconciliation form.     If you experience any new symptoms, please return to the ED.

## 2020-11-13 NOTE — PROGRESS NOTE ADULT - SUBJECTIVE AND OBJECTIVE BOX
Interventional Cardiology PA Adult Progress Note    Subjective Assessment:  	  MEDICATIONS:  carvedilol 6.25 milliGRAM(s) Oral every 12 hours  lisinopril 10 milliGRAM(s) Oral daily  tiotropium 18 MICROgram(s) Capsule 1 Capsule(s) Inhalation daily  polyethylene glycol 3350 17 Gram(s) Oral daily  atorvastatin 20 milliGRAM(s) Oral at bedtime  aspirin enteric coated 81 milliGRAM(s) Oral daily  enoxaparin Injectable 40 milliGRAM(s) SubCutaneous daily  influenza  Vaccine (HIGH DOSE) 0.7 milliLiter(s) IntraMuscular once      PHYSICAL EXAM:  TELEMETRY:  T(C): 36.7 (11-13-20 @ 13:58), Max: 36.7 (11-13-20 @ 00:51)  HR: 70 (11-13-20 @ 17:18) (64 - 79)  BP: 159/74 (11-13-20 @ 17:18) (123/60 - 224/111)  RR: 19 (11-13-20 @ 17:18) (18 - 19)  SpO2: 94% (11-13-20 @ 17:18) (94% - 99%)  Wt(kg): --  I&O's Summary    12 Nov 2020 07:01  -  13 Nov 2020 07:00  --------------------------------------------------------  IN: 0 mL / OUT: 300 mL / NET: -300 mL    13 Nov 2020 07:01  -  13 Nov 2020 18:44  --------------------------------------------------------  IN: 380 mL / OUT: 1400 mL / NET: -1020 mL      Height (cm): 177.8 (11-13 @ 03:58)  Weight (kg): 102.5 (11-13 @ 03:58)  BMI (kg/m2): 32.4 (11-13 @ 03:58)  BSA (m2): 2.2 (11-13 @ 03:58)  Gonzalez:  Central/PICC/Mid Line:                                         Appearance: Normal	  HEENT:   Normal oral mucosa, PERRL, EOMI	  Neck: Supple, + JVD/ - JVD; Carotid Bruit   Cardiovascular: Normal S1 S2, No JVD, No murmurs,   Respiratory: Lungs clear to auscultation/Decreased Breath Sounds/No Rales, Rhonchi, Wheezing	  Gastrointestinal:  Soft, Non-tender, + BS	  Skin: No rashes, No ecchymoses, No cyanosis  Extremities: Normal range of motion, No clubbing, cyanosis or edema  Vascular: Peripheral pulses palpable 2+ bilaterally  Neurologic: Non-focal  Psychiatry: A & O x 3, Mood & affect appropriate      LABS:	 	  CARDIAC MARKERS:                      11.0   6.76  )-----------( 202      ( 13 Nov 2020 08:40 )             34.4     140  |  102  |  24<H>  ----------------------------<  116<H>  4.0   |  25  |  1.05    Ca    9.3      13 Nov 2020 08:40  Mg     2.2     11-13    TPro  7.5  /  Alb  4.4  /  TBili  0.4  /  DBili  x   /  AST  29  /  ALT  40  /  AlkPhos  71  11-13    proBNP: Serum Pro-Brain Natriuretic Peptide: 244 pg/mL (11-13 @ 01:15)    PT/INR - ( 13 Nov 2020 01:16 )   PT: 12.5 sec;   INR: 1.04          PTT - ( 13 Nov 2020 01:16 )  PTT:25.8 sec     Interventional Cardiology PA Adult Progress Note    Subjective Assessment: Pt seen and evaluated at bedside. Pt reports intermittent headache.   	  MEDICATIONS:  carvedilol 6.25 milliGRAM(s) Oral every 12 hours  lisinopril 10 milliGRAM(s) Oral daily  tiotropium 18 MICROgram(s) Capsule 1 Capsule(s) Inhalation daily  polyethylene glycol 3350 17 Gram(s) Oral daily  atorvastatin 20 milliGRAM(s) Oral at bedtime  aspirin enteric coated 81 milliGRAM(s) Oral daily  enoxaparin Injectable 40 milliGRAM(s) SubCutaneous daily  influenza  Vaccine (HIGH DOSE) 0.7 milliLiter(s) IntraMuscular once      PHYSICAL EXAM:  TELEMETRY:  T(C): 36.7 (11-13-20 @ 13:58), Max: 36.7 (11-13-20 @ 00:51)  HR: 70 (11-13-20 @ 17:18) (64 - 79)  BP: 159/74 (11-13-20 @ 17:18) (123/60 - 224/111)  RR: 19 (11-13-20 @ 17:18) (18 - 19)  SpO2: 94% (11-13-20 @ 17:18) (94% - 99%)  Wt(kg): --  I&O's Summary    12 Nov 2020 07:01  -  13 Nov 2020 07:00  --------------------------------------------------------  IN: 0 mL / OUT: 300 mL / NET: -300 mL    13 Nov 2020 07:01  -  13 Nov 2020 18:44  --------------------------------------------------------  IN: 380 mL / OUT: 1400 mL / NET: -1020 mL      Height (cm): 177.8 (11-13 @ 03:58)  Weight (kg): 102.5 (11-13 @ 03:58)  BMI (kg/m2): 32.4 (11-13 @ 03:58)  BSA (m2): 2.2 (11-13 @ 03:58)  Gonzalez:  Central/PICC/Mid Line:                                         Appearance: Normal	  HEENT:   Normal oral mucosa, PERRL, EOMI	  Neck: Supple, - JVD; no Carotid Bruit   Cardiovascular: Normal S1 S2, No JVD, notable holosystolic murmur,   Respiratory: lungs clear to auscultation; suboptimal auscultory exam due to body habitus.   Gastrointestinal:  Soft, Non-tender, + BS	  Skin: No rashes, No ecchymoses, No cyanosis  Extremities: Normal range of motion, No clubbing, cyanosis or edema  Vascular: Peripheral pulses palpable 2+ bilaterally  Neurologic: Non-focal  Psychiatry: A & O x 3, Mood & affect appropriate      LABS:	 	  CARDIAC MARKERS:                      11.0   6.76  )-----------( 202      ( 13 Nov 2020 08:40 )             34.4     140  |  102  |  24<H>  ----------------------------<  116<H>  4.0   |  25  |  1.05    Ca    9.3      13 Nov 2020 08:40  Mg     2.2     11-13    TPro  7.5  /  Alb  4.4  /  TBili  0.4  /  DBili  x   /  AST  29  /  ALT  40  /  AlkPhos  71  11-13    proBNP: Serum Pro-Brain Natriuretic Peptide: 244 pg/mL (11-13 @ 01:15)    PT/INR - ( 13 Nov 2020 01:16 )   PT: 12.5 sec;   INR: 1.04          PTT - ( 13 Nov 2020 01:16 )  PTT:25.8 sec

## 2020-11-13 NOTE — PHYSICAL THERAPY INITIAL EVALUATION ADULT - GENERAL OBSERVATIONS, REHAB EVAL
Pt rcvd in semi supine with +tele, +prima fit, +bed alarm, +hep lock. FIM gait = 5. Pt tolerated session fairly well. Pt c/o HA and dizziness with ambulation, satting 80-90% on RA. Pt A&Ox3, motivated, Mongolian speaking.

## 2020-11-13 NOTE — H&P ADULT - PROBLEM SELECTOR PLAN 3
clinically stable, sating 97-99% on RA, lungs with diminished BS@bases, no JVD or LE edema.   --CXR revealed mild pulmonary vascular congestion. . Will give Lasix 20mg IV x 1 dose.  --reassess in AM need for further standing diuretic. clinically stable, sating 97-99% on RA, lungs with diminished BS@bases, no JVD, trace bilateral LE edema.   --CXR revealed mild pulmonary vascular congestion. . Will give Lasix 20mg IV x 1 dose.  --reassess in AM need for further standing diuretic. Continue ACE, strict I/Os and daily weights.

## 2020-11-13 NOTE — ED ADULT NURSE NOTE - OTHER COMPLAINTS
elevated BP at home, also c/o L sided CP x 1 week. On hypertensive medications, reports compliant. Recent hospitalization at [facility] (11/9)

## 2020-11-13 NOTE — PHYSICAL THERAPY INITIAL EVALUATION ADULT - ADDITIONAL COMMENTS
Pt lives in an apt with elevator, no obligatory MADYSON. Pt states she could ambulate half a block before SOB, no AD. She reports not going outside much in past few months due to COVID-19 pandemic. Pt's family is willing to assist as needed.

## 2020-11-13 NOTE — H&P ADULT - NSICDXPASTMEDICALHX_GEN_ALL_CORE_FT
PAST MEDICAL HISTORY:  Aortic stenosis     CHF (congestive heart failure)     HTN (hypertension)     Pulmonary HTN

## 2020-11-13 NOTE — PROGRESS NOTE ADULT - PROBLEM SELECTOR PLAN 6
- Continue Spiriva inhaler daily      VTE PPx: Lovenox subcut  PT consulted: Home w/ Home PT  Patient lives with daughter Jaelyn, whom is her HHA (40 hrs/week).    CODE: Full.

## 2020-11-13 NOTE — PROGRESS NOTE ADULT - PROBLEM SELECTOR PLAN 2
- ON ADMIT: BP 220s/110s.   - BP well controlled on home regimen.   - CONTINUE: Coreg 6.25mg PO BID, Lisinopril 10mg PO QD.   - Initially held home HCTZ 12.5mg PO QD, as pt received Lasix in ED.   - STARTING home HCTZ 12.5mg PO QD tomorrow.   - Continue to monitor BP; Uptitrate medications if needed.

## 2020-11-13 NOTE — PROGRESS NOTE ADULT - PROBLEM SELECTOR PLAN 4
- Euvolemic on exam; satting well on RA, No JVD.   - CXR w/ mild pulm vascular congestion.   - s/p Lasix 20mg IV x1 in the Ed.   - No need for further Lasix.   - Strict I/Os, daily weights, continue ACE.

## 2020-11-13 NOTE — CONSULT NOTE ADULT - SUBJECTIVE AND OBJECTIVE BOX
Attending: Dr. Alejo     Requesting Physician: Dr. Kessler     HISTORY OF PRESENT ILLNESS:  This is an 79 y/o Albanian speaking female w/ a PMHx of uncontrollde HTN, HLD, asthma (no hospitalizations in past), ?MARK (noncompliant with CPAP), chronic diastolic CHF (Ef 61%), AS (s/p TAVR rachael valve with Dr. Ortez on 2019) who was recently hospitalized at [facility]  to hypertensive emergency (HTN w/ HA). CT head/MRI brain at that time negative and discharged 20. Patient presented to [facility] ED on 20 with complaints of intermittent CP and elevated BP for 1 week. CP pain radiates to arm, below her breast, and substernal. The pain is worse with lying down. The pain is nearly resolved when sitting upright. States that it "comes and goes" and rates it as 6/10 at its worst. Also complains of associated HA.  Endorses mild MANRIQUE. Denies any palpitations, wheezing, abd pain, n/v/d/c, orthopnea, fevers or chills.     PAST MEDICAL & SURGICAL HISTORY:  CHF (congestive heart failure)  Pulmonary HTN  Aortic stenosis  HTN (hypertension)  S/P TAVR (transcatheter aortic valve replacement)    MEDICATIONS  (STANDING):  aspirin enteric coated 81 milliGRAM(s) Oral daily  atorvastatin 20 milliGRAM(s) Oral at bedtime  carvedilol 6.25 milliGRAM(s) Oral every 12 hours  enoxaparin Injectable 40 milliGRAM(s) SubCutaneous daily  influenza  Vaccine (HIGH DOSE) 0.7 milliLiter(s) IntraMuscular once  lisinopril 10 milliGRAM(s) Oral daily  polyethylene glycol 3350 17 Gram(s) Oral daily  tiotropium 18 MICROgram(s) Capsule 1 Capsule(s) Inhalation daily    MEDICATIONS  (PRN):    Allergies  Plavix (Other (Mod to Severe))  Intolerances    SOCIAL HISTORY:  Smoker:  no  ETOH use:  no  Ilicit Drug use: no  Assisted device use (Cane / Walker):  no   Live with: no     FAMILY HISTORY:  No pertinent family history in first degree relatives    Review of Systems:  CONSTITUTIONAL: Denies fevers / chills, sweats, fatigue, weight loss, weight gain                                       NEURO:  Denies parathesias, seizures, syncope, confusion                                                                                  EYES:  Denies blurry vision, discharge, pain, loss of vision                                                                                    ENMT:  Denies difficulty hearing, vertigo, dysphagia, epistaxis, recent dental work                                       CV:  +chest pain, palpitations, MANRIQUE, orthopnea                                                                                           RESPIRATORY:  +SOB, Denies wheezing, cough / sputum, hemoptysis                                                               GI:  Denies nausea, vomiting, diarrhea, constipation, melena                                                                          : Denies hematuria, dysuria, urgency, incontinence                                                                                          MUSKULOSKELETAL:  Denies arthritis, joint swelling, muscle weakness                                                             SKIN/BREAST:  Denies rash, itching, hair loss, masses                                                                                              PSYCH:  Denies depression, anxiety, suicidal ideation                                                                                                HEME/LYMPH:  Denies bruises easily, enlarged lymph nodes, tender lymph nodes                                          ENDOCRINE:  Denies cold intolerance, heat intolerance, polydipsia                                                                      Vital Signs Last 24 Hrs  T(C): 36.7 (2020 13:58), Max: 36.7 (2020 00:51)  T(F): 98 (2020 13:58), Max: 98.1 (2020 00:51)  HR: 70 (2020 17:18) (64 - 79)  BP: 159/74 (2020 17:18) (123/60 - 224/111)  BP(mean): 107 (2020 17:18) (84 - 108)  RR: 19 (2020 17:18) (18 - 19)  SpO2: 94% (2020 17:18) (94% - 99%)    PHYSICAL EXAM:  General: well appearing sitting in bed in NAD   Neurological: AOx3. Motor skills grossly intact  Cardiovascular: Normal S1/S2. Regular rate/rhythm. 4/6 holosystolic murmur heard best at the right upper sternal border.   Respiratory: mild crackles heard at both lung bases, otherwise lungs CTA bilaterally. No wheezing or rales  Gastrointestinal: +BS in all 4 quadrants. Mildly distended. Soft. Non-tender  Extremities: Strength 5/5 b/l upper/lower extremities. Sensation grossly intact upper/lower extremities. Trace LE edema. No calf tenderness.  Vascular: Radial 2+bilaterally, DP 2+ b/l  Incision Sites: none                                                             LABS:             11.0   6.76  )-----------( 202      ( 2020 08:40 )             34.4     11-13    140  |  102  |  24<H>  ----------------------------<  116<H>  4.0   |  25  |  1.05    Ca    9.3      2020 08:40  Mg     2.2     11-13    TPro  7.5  /  Alb  4.4  /  TBili  0.4  /  DBili  x   /  AST  29  /  ALT  40  /  AlkPhos  71  11-1  PT/INR - ( 2020 01:16 )   PT: 12.5 sec;   INR: 1.04          PTT - ( 2020 01:16 )  PTT:25.8 sec  Urinalysis Basic - ( 2020 07:08 )    Color: Yellow / Appearance: Clear / S.010 / pH: x  Gluc: x / Ketone: NEGATIVE  / Bili: Negative / Urobili: 0.2 E.U./dL   Blood: x / Protein: NEGATIVE mg/dL / Nitrite: NEGATIVE   Leuk Esterase: Trace / RBC: < 5 /HPF / WBC < 5 /HPF   Sq Epi: x / Non Sq Epi: 0-5 /HPF / Bacteria: Present /HPF    CARDIAC MARKERS ( 2020 08:40 )  x     / <0.01 ng/mL / 73 U/L / x     / 1.6 ng/mL  CARDIAC MARKERS ( 2020 01:15 )  x     / <0.01 ng/mL / x     / x     / x          RADIOLOGY & ADDITIONAL STUDIES:  Structural heart CT: pending at this time (to be completed on Monday)

## 2020-11-13 NOTE — H&P ADULT - PROBLEM SELECTOR PLAN 6
continue Spiriva inhaler daily    VTE PPx: Lovenox subcut  PT consulted: F/U recs  Patient lives with daughter Jaelyn, whom is her HHA (40 hrs/week).    CODE: Full

## 2020-11-13 NOTE — PHYSICAL THERAPY INITIAL EVALUATION ADULT - PERTINENT HX OF CURRENT PROBLEM, REHAB EVAL
78 year old New Zealander speaking F, PMHx of uncontrolled HTN, hyperlipidemia, asthma, MARK?, chronic diastolic CHF, aortic stenosis s/p transfemoral TAVR with rachael valve on 02/05/19 presents to [facility] ED 11/13/20 c/o intermittent chest pain and elevated BP x 1 week. Patient describes the chest pain as being nonexertional midsternal chest pressure with radiation to RUE, 6/10 in severity.

## 2020-11-13 NOTE — PROGRESS NOTE ADULT - PROBLEM SELECTOR PLAN 1
- s/p transfemoral TAVR with jamar valve on 02/05/2019 with Dr. Alejo @[facility].  - ECHO (11/13/20): Normal BiV fxn/size, moderate LVH, Grade II LV Diastolic Dysfunction, PEAK TRANSAORTIC GRADIENT 61.78mmHg, mildly dilated LA, no pulmHTN, moderately dilated ascending aorta.   - Due to transaortic gradient significantly elevated, CTS (structural) consulted.   - PLAN: per CTS patient needs Structural CT Scan (will be done on MONDAY) to assess if there is a thrombus on aortic valve; F/U CTS recs after structural CT scan is collected.         *** Prior Echo 4/1/2019 revealed moderate concentric LVH, normal LV wall motion, EF:61%. Jamar valve in aortic position, no AR, peak pressure gradient 29mmHg, mean pressure gradient 14mmHg, trace MR, mild TR, PASP 29mmHg.

## 2020-11-13 NOTE — PHYSICAL THERAPY INITIAL EVALUATION ADULT - ACTIVE RANGE OF MOTION EXAMINATION, REHAB EVAL
bilateral lower extremity Active ROM was WNL (within normal limits)/hannah. upper extremity Active ROM was WNL (within normal limits)

## 2020-11-13 NOTE — H&P ADULT - PROBLEM SELECTOR PLAN 4
s/p transfemoral TAVR with jamar valve on 02/05/2019 with Dr. Alejo @[facility].    *** Prior Echo 4/1/2019 revealed moderate concentric LVH, normal LV wall motion, EF:61%. Jamar valve in aortic position, no AR, peak pressure gradient 29mmHg, mean pressure gradient 14mmHg, trace MR, mild TR, PASP 29mmHg.

## 2020-11-13 NOTE — H&P ADULT - ASSESSMENT
78 year old Indonesian speaking F, PMHx of uncontrolled HTN, hyperlipidemia, asthma, chronic diastolic CHF (EF:61% by Echo 4/2019), aortic stenosis s/p transfemoral TAVR with rachael valve on 02/05/2019 with Dr. Alejo @[facility] who presents to [facility] ED 11/13/20 c/o intermittent chest pain and elevated BP x 1 week, BP:224/111, EKG revealed NSR@78BPM with 1st degree AVB, no acute ST/T wave changes. CXR revealed evidence of mild pulmonary vascular congestion. Bibasilar scarring/subsegmental atelectasis. Labs notable for: BUN/Cr 25/1.20, Cardiac enzymes negative x 1 set, , COVID PCR pending.   Patient treated with Coreg 6.25mg PO x 1 dose with improvement in BP 140s/90s.   Patient currently stable, [facility] for management of hypertensive urgency.

## 2020-11-13 NOTE — DISCHARGE NOTE PROVIDER - NSDCCPCAREPLAN_GEN_ALL_CORE_FT
PRINCIPAL DISCHARGE DIAGNOSIS  Diagnosis: Chest pain, unspecified type  Assessment and Plan of Treatment: You presented to the hospital endorsing chest pain. We believe that this was secondary to a signficantly elevated blood pressure. We gave you your home blood pressure medications, and you responded well to them. Your blood pressure is better controlled now. It is important that you have close follow up with your cardiologist, Dr. Aguirre, to monitor your blood pressure and make changes to your blood pressure regimen as needed. For  now, we want you to continue taking: Lisinopril 10mg by mouth once daily. Carvedilol 6.25mg by mouth twice daily, and Hydrochlorothiazide 12.5mg by mouth once daily. It is very important that you take these medications with good compliance. We encourage you to keep track of when you take your medications and monitor your blood pressure. You should write all of this in a notebook to bring with you to your follow up appointment with Dr. Aguirre.   Please make an appointment with Dr. Aguirre within 1-2 weeks of discharge from the hospital.      SECONDARY DISCHARGE DIAGNOSES  Diagnosis: Chronic diastolic congestive heart failure  Assessment and Plan of Treatment: You have dastolic heart failure, which is when your heart muscle is stiff. This can cause fluid to back up into your lungs. You should continue taking your home Lasix 40mg by mouth daily.    Diagnosis: Headache  Assessment and Plan of Treatment: You endorsed having a headache during this admission. You have been given Tylenol and Fioricet. In light of your recent CT and MRI being normal, we do not have acute concerns with your headaches at this time. Please follow up closely with your primary care provider, Dr. Mccloud, in order to further manage your headaches if they persist.    Diagnosis: Asthma  Assessment and Plan of Treatment: Please continue managing your asthma as directed by your outpatient provider.     PRINCIPAL DISCHARGE DIAGNOSIS  Diagnosis: Chest pain, unspecified type  Assessment and Plan of Treatment: You presented to the hospital endorsing chest pain. We believe that this was secondary to a signficantly elevated blood pressure. We gave you your home blood pressure medications, and you responded well to them. Your blood pressure is better controlled now. It is important that you have close follow up with your cardiologist, Dr. Aguirre, to monitor your blood pressure and make changes to your blood pressure regimen as needed. For  now, we want you to continue taking: Lisinopril 10mg by mouth once daily. Carvedilol 6.25mg by mouth twice daily, and Hydrochlorothiazide 12.5mg by mouth once daily. It is very important that you take these medications with good compliance. We encourage you to keep track of when you take your medications and monitor your blood pressure. You should write all of this in a notebook to bring with you to your follow up appointment with Dr. Aguirre.   Please make an appointment with Dr. Aguirre within 1-2 weeks of discharge from the hospital.      SECONDARY DISCHARGE DIAGNOSES  Diagnosis: Aortic stenosis  Assessment and Plan of Treatment: We repeated and echocardiogram on this admission which showed your valve was not working properly. The CT scan of your heart showed that _____.***********    Diagnosis: BRBPR (bright red blood per rectum)  Assessment and Plan of Treatment: You were found to have blood in your stool on this admission after we started a blood thinning medication. This likely occurred because your blood was too thin. We corrected this and your blood count is stable  -Please continue to monitor yourself for blood in your stool and if you notice any consult your PCP or go to the nearest ER for evaluation.    Diagnosis: Chronic diastolic congestive heart failure  Assessment and Plan of Treatment: You have dastolic heart failure, which is when your heart muscle is stiff. This can cause fluid to back up into your lungs.   Guide to CHF management  -Please weigh yourself daily: if you have gained more than 2-3 lbs in one day or 5 lbs in one week contact your doctor immediately as you may be retaining water weight  -Please restrict your salt intake to less than 2 grams a day  -If you develop worsening shortening of breath, leg swelling, fatigue, chest pain, difficulty sleeping at night due to shortness of breath, contact your cardiologist immediately.    Diagnosis: Asthma  Assessment and Plan of Treatment: Please continue managing your asthma as directed by your outpatient provider.    Diagnosis: Headache  Assessment and Plan of Treatment: You endorsed having a headache during this admission. You have been given Tylenol and Fioricet. In light of your recent CT and MRI being normal, we do not have acute concerns with your headaches at this time. Please follow up closely with your primary care provider, Dr. Mccloud, in order to further manage your headaches if they persist.     PRINCIPAL DISCHARGE DIAGNOSIS  Diagnosis: Chest pain, unspecified type  Assessment and Plan of Treatment: You presented to the hospital endorsing chest pain. We believe that this was secondary to a signficantly elevated blood pressure. We gave you your home blood pressure medications, and you responded well to them. Your blood pressure is better controlled now. It is important that you have close follow up with your cardiologist, Dr. Aguirre, to monitor your blood pressure and make changes to your blood pressure regimen as needed. For  now, we want you to continue taking: Lisinopril 10mg by mouth once daily. Hydrochlorothiazide 12.5mg by mouth once daily. It is very important that you take these medications with good compliance. We encourage you to keep track of when you take your medications and monitor your blood pressure. You should write all of this in a notebook to bring with you to your follow up appointment with Dr. Aguirre.   Please make an appointment with Dr. Aguirre within 1-2 weeks of discharge from the hospital.      SECONDARY DISCHARGE DIAGNOSES  Diagnosis: Colon cancer  Assessment and Plan of Treatment:     Diagnosis: BRBPR (bright red blood per rectum)  Assessment and Plan of Treatment: You were found to have blood in your stool on this admission after we started a blood thinning medication. This likely occurred because your blood was too thin. We corrected this and your blood count is stable  -Please continue to monitor yourself for blood in your stool and if you notice any consult your PCP or go to the nearest ER for evaluation.    Diagnosis: Aortic stenosis  Assessment and Plan of Treatment: We repeated and echocardiogram on this admission which showed your valve was not working properly. The CT scan of your heart showed that _____.***********    Diagnosis: Chronic diastolic congestive heart failure  Assessment and Plan of Treatment: You have dastolic heart failure, which is when your heart muscle is stiff. This can cause fluid to back up into your lungs.   Guide to CHF management  -Please weigh yourself daily: if you have gained more than 2-3 lbs in one day or 5 lbs in one week contact your doctor immediately as you may be retaining water weight  -Please restrict your salt intake to less than 2 grams a day  -If you develop worsening shortening of breath, leg swelling, fatigue, chest pain, difficulty sleeping at night due to shortness of breath, contact your cardiologist immediately.    Diagnosis: Asthma  Assessment and Plan of Treatment: Please continue managing your asthma as directed by your outpatient provider.    Diagnosis: Headache  Assessment and Plan of Treatment: You endorsed having a headache during this admission. You have been given Tylenol and Fioricet. In light of your recent CT and MRI being normal, we do not have acute concerns with your headaches at this time. Please follow up closely with your primary care provider, Dr. Mccloud, in order to further manage your headaches if they persist.     PRINCIPAL DISCHARGE DIAGNOSIS  Diagnosis: Chest pain, unspecified type  Assessment and Plan of Treatment: You presented to the hospital endorsing chest pain. We believe that this was secondary to a signficantly elevated blood pressure. We gave you your home blood pressure medications, and you responded well to them. Your blood pressure is better controlled now. It is important that you have close follow up with your cardiologist, Dr. Aguirre, to monitor your blood pressure and make changes to your blood pressure regimen as needed. For  now, we want you to continue taking: Lisinopril 10mg by mouth once daily. Hydrochlorothiazide 12.5mg by mouth once daily. It is very important that you take these medications with good compliance. We encourage you to keep track of when you take your medications and monitor your blood pressure. You should write all of this in a notebook to bring with you to your follow up appointment with Dr. Aguirre.   Please make an appointment with Dr. Aguirre within 1-2 weeks of discharge from the hospital.      SECONDARY DISCHARGE DIAGNOSES  Diagnosis: Colon cancer  Assessment and Plan of Treatment:     Diagnosis: BRBPR (bright red blood per rectum)  Assessment and Plan of Treatment: You were found to have blood in your stool on this admission after we started a blood thinning medication. This likely occurred because your blood was too thin. We corrected this and your blood count is stable  -Please continue to monitor yourself for blood in your stool and if you notice any consult your PCP or go to the nearest ER for evaluation.    Diagnosis: Aortic stenosis  Assessment and Plan of Treatment:     Diagnosis: Chronic diastolic congestive heart failure  Assessment and Plan of Treatment: You have dastolic heart failure, which is when your heart muscle is stiff. This can cause fluid to back up into your lungs.   Guide to CHF management  -Please weigh yourself daily: if you have gained more than 2-3 lbs in one day or 5 lbs in one week contact your doctor immediately as you may be retaining water weight  -Please restrict your salt intake to less than 2 grams a day  -If you develop worsening shortening of breath, leg swelling, fatigue, chest pain, difficulty sleeping at night due to shortness of breath, contact your cardiologist immediately.    Diagnosis: Asthma  Assessment and Plan of Treatment: Please continue managing your asthma as directed by your outpatient provider.    Diagnosis: Headache  Assessment and Plan of Treatment: You endorsed having a headache during this admission. You have been given Tylenol and Fioricet. In light of your recent CT and MRI being normal, we do not have acute concerns with your headaches at this time. Please follow up closely with your primary care provider, Dr. Mccloud, in order to further manage your headaches if they persist.     PRINCIPAL DISCHARGE DIAGNOSIS  Diagnosis: Chest pain, unspecified type  Assessment and Plan of Treatment: You presented to the hospital endorsing chest pain. We believe that this was secondary to a signficantly elevated blood pressure. We gave you your home blood pressure medications, and you responded well to them. Your blood pressure is better controlled now. It is important that you have close follow up with your cardiologist, Dr. Aguirre, to monitor your blood pressure and make changes to your blood pressure regimen as needed. For  now, we want you to continue taking: Lisinopril 10mg by mouth once daily. Hydrochlorothiazide 25mg by mouth once daily, toprolol 50mg daily, lisinopril 20mg daily. It is very important that you take these medications with good compliance. We encourage you to keep track of when you take your medications and monitor your blood pressure. You should write all of this in a notebook to bring with you to your follow up appointment with Dr. Aguirre.   Please make an appointment with Dr. Aguirre within 1-2 weeks of discharge from the hospital.      SECONDARY DISCHARGE DIAGNOSES  Diagnosis: Colon cancer  Assessment and Plan of Treatment:     Diagnosis: BRBPR (bright red blood per rectum)  Assessment and Plan of Treatment: You were found to have blood in your stool on this admission after we started a blood thinning medication. This likely occurred because your blood was too thin. We corrected this and your blood count is stable  -Please continue to monitor yourself for blood in your stool and if you notice any consult your PCP or go to the nearest ER for evaluation.    Diagnosis: Aortic stenosis  Assessment and Plan of Treatment:     Diagnosis: Chronic diastolic congestive heart failure  Assessment and Plan of Treatment: You have dastolic heart failure, which is when your heart muscle is stiff. This can cause fluid to back up into your lungs.   Guide to CHF management  -Please weigh yourself daily: if you have gained more than 2-3 lbs in one day or 5 lbs in one week contact your doctor immediately as you may be retaining water weight  -Please restrict your salt intake to less than 2 grams a day  -If you develop worsening shortening of breath, leg swelling, fatigue, chest pain, difficulty sleeping at night due to shortness of breath, contact your cardiologist immediately.    Diagnosis: Asthma  Assessment and Plan of Treatment: Please continue managing your asthma as directed by your outpatient provider.    Diagnosis: Headache  Assessment and Plan of Treatment: You endorsed having a headache during this admission. You have been given Tylenol and Fioricet. In light of your recent CT and MRI being normal, we do not have acute concerns with your headaches at this time. Please follow up closely with your primary care provider, Dr. Mccloud, in order to further manage your headaches if they persist.

## 2020-11-13 NOTE — PROGRESS NOTE ADULT - PROBLEM SELECTOR PLAN 5
- Pt presented w/ HA.   - Intermittently w/ HA.   - No focal neurodeficits.   - Responds well to IV Tylenol and Fiorocet.   - Continue to monitor.     ** Of note, patient reportedly had normal CT head/MRI Brain@[facility] ~1 week ago.

## 2020-11-13 NOTE — ED PROVIDER NOTE - CLINICAL SUMMARY MEDICAL DECISION MAKING FREE TEXT BOX
left sided chest pain, also elevated BP, no focal neuro defects, no resp distress  -check labs, ekg  -carvedilol  -acetaminophen  -cxr

## 2020-11-13 NOTE — H&P ADULT - ATTENDING COMMENTS
Initial attending contact date 11/13/20   . See PA note written above for details. I reviewed the PA documentation. I have personally seen and examined this patient. I reviewed vitals, labs, medications, cardiac studies, and additional imaging. I agree with the above PA's findings and plans as written above with the following additions/statements.    -Pt admitted with Atypical CP: EKG without ischemic changes. Trop neg x 2, Normal coronaries by cath 2019  -Hypertensive urgency: BP now normalized , Cont carvedilol/lisinopril   -Awaiting ECHO - if normal AV gradients, plan to DC today with fu with Dr Aguirre

## 2020-11-13 NOTE — H&P ADULT - NSHPOUTPATIENTPROVIDERS_GEN_ALL_CORE
Structural heart- Dr. Alejo  Cardiologist- Dr. Hussain Yen Structural heart- Dr. Alejo  Cardiologist- Dr. Carla Joshua- Jaelyn [phone]

## 2020-11-13 NOTE — ED PROVIDER NOTE - OBJECTIVE STATEMENT
80F hx CHF, aortic stenosis (s/p TAVR), htn, pulm htn, c/o elevated BP.  per daughter pt's BP has been very difficult to control over past 2 weeks. states pt on lisinopril 10mg, carvedilol 6.25mg bid, hctz 12.5mg. states pt recently admitted at [facility] for elevated BP and HA.  daughter states she had CT and MRI and was discharged home without any change to her medication.  tonight states she was having left sided chest pain and her BP was elevated.  felt some SOB with pain.  no LE swelling. no vomiting.

## 2020-11-13 NOTE — PROGRESS NOTE ADULT - SUBJECTIVE AND OBJECTIVE BOX
Interventional Cardiology PA Adult Progress Note    Subjective Assessment:  	  MEDICATIONS:  carvedilol 6.25 milliGRAM(s) Oral every 12 hours  lisinopril 10 milliGRAM(s) Oral daily      tiotropium 18 MICROgram(s) Capsule 1 Capsule(s) Inhalation daily      polyethylene glycol 3350 17 Gram(s) Oral daily    atorvastatin 20 milliGRAM(s) Oral at bedtime    aspirin enteric coated 81 milliGRAM(s) Oral daily  enoxaparin Injectable 40 milliGRAM(s) SubCutaneous daily  influenza  Vaccine (HIGH DOSE) 0.7 milliLiter(s) IntraMuscular once      	    [PHYSICAL EXAM:  TELEMETRY:  T(C): 36.5 (11-13-20 @ 06:20), Max: 36.7 (11-13-20 @ 00:51)  HR: 70 (11-13-20 @ 06:20) (64 - 79)  BP: 170/87 (11-13-20 @ 06:20) (128/61 - 224/111)  RR: 18 (11-13-20 @ 06:20) (18 - 18)  SpO2: 96% (11-13-20 @ 06:20) (96% - 99%)  Wt(kg): --  I&O's Summary    12 Nov 2020 07:01  -  13 Nov 2020 07:00  --------------------------------------------------------  IN: 0 mL / OUT: 300 mL / NET: -300 mL      Height (cm): 177.8 (11-13 @ 03:58)  Weight (kg): 102.5 (11-13 @ 03:58)  BMI (kg/m2): 32.4 (11-13 @ 03:58)  BSA (m2): 2.2 (11-13 @ 03:58)  Gonzalez:  Central/PICC/Mid Line:                                         Appearance: Normal	  HEENT:   Normal oral mucosa, PERRL, EOMI	  Neck: Supple, + JVD/ - JVD; Carotid Bruit   Cardiovascular: Normal S1 S2, No JVD, No murmurs,   Respiratory: Lungs clear to auscultation/Decreased Breath Sounds/No Rales, Rhonchi, Wheezing	  Gastrointestinal:  Soft, Non-tender, + BS	  Skin: No rashes, No ecchymoses, No cyanosis  Extremities: Normal range of motion, No clubbing, cyanosis or edema  Vascular: Peripheral pulses palpable 2+ bilaterally  Neurologic: Non-focal  Psychiatry: A & O x 3, Mood & affect appropriate      	    ECG:  	  RADIOLOGY:   DIAGNOSTIC TESTING:  [ ] Echocardiogram:  [ ]  Catheterization:  [ ] Stress Test:    [ ] IRMA  OTHER: 	    LABS:	 	  CARDIAC MARKERS:                                  12.0   6.63  )-----------( 202      ( 13 Nov 2020 01:16 )             37.3     11-13    141  |  100  |  25<H>  ----------------------------<  113<H>  4.8   |  29  |  1.20    Ca    9.7      13 Nov 2020 01:15    TPro  7.5  /  Alb  4.4  /  TBili  0.4  /  DBili  x   /  AST  29  /  ALT  40  /  AlkPhos  71  11-13    proBNP: Serum Pro-Brain Natriuretic Peptide: 244 pg/mL (11-13 @ 01:15)    Lipid Profile:   HgA1c:   TSH:   PT/INR - ( 13 Nov 2020 01:16 )   PT: 12.5 sec;   INR: 1.04          PTT - ( 13 Nov 2020 01:16 )  PTT:25.8 sec    ASSESSMENT/PLAN: 	        DVT ppx:  Dispo:

## 2020-11-13 NOTE — DISCHARGE NOTE PROVIDER - CARE PROVIDER_API CALL
Keshav Aguirre)  Cardiology; Internal Medicine  [address] [place] [address]  Phone: [phone]  Fax: [phone]  Follow Up Time:    Keshav Aguirre)  Cardiology; Internal Medicine  [address] [place] [address]  Phone: [phone]  Fax: [phone]  Follow Up Time:     Solomon Alejo)  Cardiology; Interventional Cardiology  [address] [place] [address]  Phone: [phone]  Fax: [phone]  Follow Up Time: 1 week    Teresa Sanford  COLON/RECTAL SURGERY  [address] [facility], [address] [place] [address]  Phone: [phone]  Fax: [phone]  Follow Up Time: 1 week

## 2020-11-13 NOTE — PHYSICAL THERAPY INITIAL EVALUATION ADULT - CRITERIA FOR SKILLED THERAPEUTIC INTERVENTIONS
functional limitations in following categories/impairments found/therapy frequency/anticipated equipment needs at discharge/anticipated discharge recommendation/risk reduction/prevention/rehab potential/predicted duration of therapy intervention

## 2020-11-13 NOTE — ED ADULT TRIAGE NOTE - OTHER COMPLAINTS
elevated BP at home, also c/o L sided CP x 1 week. On hypertensive medications, reports compliant. Recent hospitalization at [facility] (11/9)

## 2020-11-13 NOTE — ED ADULT NURSE NOTE - OBJECTIVE STATEMENT
pt to ED for chest pain radiating Right hand, high blood pressure started appx 10 days ago. pt on HTN meds, compliant. h/o valve replacement 2yrs ago @[facility], recently [facility]. denies sob, dizziness, numbness.

## 2020-11-13 NOTE — DISCHARGE NOTE PROVIDER - CARE PROVIDERS DIRECT ADDRESSES
,DirectAddress_Unknown ,DirectAddress_Unknown,[email or web address]

## 2020-11-13 NOTE — DISCHARGE NOTE PROVIDER - NSDCFUADDAPPT_GEN_ALL_CORE_FT
(1) Please follow up with cardiologist, Dr. Aguirre, within 1-2 weeks of discharge home from the hospital.   (2) Please make an appointment with your primary care provider, Dr. Mccloud, for further outpatient work up of your headaches if they persist. (1) Please follow up with cardiologist, Dr. Aguirre, within 1-2 weeks of discharge home from the hospital.   (2) Please make an appointment with your primary care provider, Dr. Mccloud, for further outpatient work up of your headaches if they persist.   (3) Please follow up with structural heart doctor, Dr. Alejo, within 1-2 weeks of discharge home from the hospital.

## 2020-11-13 NOTE — H&P ADULT - PROBLEM SELECTOR PLAN 5
continue Spiriva inhaler daily    VTE PPx: Lovenox subcut  PT consulted: F/U recs improved after receiving IV Tylenol and PO Fiorocet in ED.  --no focal neurodeficits, will continue to monitor.  --Patient reportedly had normal CT head/MRI Brain@[facility] ~1 week ago.

## 2020-11-13 NOTE — DISCHARGE NOTE PROVIDER - NSDCCPTREATMENT_GEN_ALL_CORE_FT
PRINCIPAL PROCEDURE  Procedure: Laparoscopic assisted subtotal colectomy  Findings and Treatment:        PRINCIPAL PROCEDURE  Procedure: Laparoscopic assisted subtotal colectomy  Findings and Treatment: Warning Signs:  Please call your doctor or nurse practitioner if you experience the following:  *You experience new chest pain, pressure, squeezing or tightness.  *New or worsening cough, shortness of breath, or wheeze.  *If you are vomiting and cannot keep down fluids or your medications.  *You are getting dehydrated due to continued vomiting, diarrhea, or other reasons. Signs of dehydration include dry mouth, rapid heartbeat, or feeling dizzy or faint when standing.  *You see blood or dark/black material when you vomit or have a bowel movement.  *You experience burning when you urinate, have blood in your urine, or experience a discharge.  *Your pain is not improving within 8-12 hours or is not gone within 24 hours. Call or return immediately if your pain is getting worse, changes location, or moves to your chest or back.  *You have shaking chills, or fever greater than 101.5 degrees Fahrenheit or 38 degrees Celsius.  *Any change in your symptoms, or any new symptoms that concern you.       PRINCIPAL PROCEDURE  Procedure: Laparoscopic assisted subtotal colectomy  Findings and Treatment: Please continue a LOW-FIBER DIET. Listed below are some foods you may eat and those you should avoid.   --Allowed foods:  White bread without nuts and seeds  White rice, plain white pasta, and crackers  Refined hot cereals, such as Cream of Wheat, or cold cereals with less than 1 gram of fiber per serving  Pancakes or waffles made from white refined flour  Most canned or well-cooked vegetables and fruits without skins or seeds  Fruit and vegetable juice with little or no pulp, fruit-flavored drinks, and flavored coombs  Tender meat, poultry, fish, eggs and tofu  Milk and foods made from milk — such as yogurt, pudding, ice cream, cheeses and sour cream — if tolerated  Butter, margarine, oils and salad dressings without seeds  --Foods to avoid:  Whole-wheat or whole-grain breads, cereals and pasta  Brown or wild rice and other whole grains, such as oats, kasha, barley and quinoa  Dried fruits and prune juice  Raw fruit, including those with seeds, skin or membranes, such as berries  Raw or undercooked vegetables, including corn  Dried beans, peas and lentils  Seeds and nuts and foods containing them, including peanut butter and other nut butters  Coconut  Popcorn

## 2020-11-13 NOTE — H&P ADULT - HISTORY OF PRESENT ILLNESS
78 year old Polish speaking F, PMHx of uncontrolled HTN, hyperlipidemia, asthma (denies hospitalizations, intubations), chronic diastolic CHF (EF:61% by Echo 4/2019), aortic stenosis s/p transfemoral TAVR with rachael valve on 02/05/2019 with Dr. Alejo @[facility], recent hospitalization at [facility] (for HTN/headache, no changes made to medications, discharged on 11/4/20) who presents to [facility] ED 11/13/20 c/o intermittent chest pain and elevated BP x 1 week. Patient describes the chest pain as being nonexertional left sided chest pressure with radiation to LUE, 5/10 in severity. Associated symptoms including SOB. Patient denies any N/V, diaphoresis, palpitations, dizziness, headache, PND, orthopnea, LE edema, recent travel or sick contacts. Patient reports compliance with her medications.   In ED, BP:224/111, HR: 70s, RR:18, Temp:98.1F, O2 sat: 99% RA. EKG revealed NSR@78BPM with 1st degree AVB, no acute ST/T wave changes. CXR revealed evidence of mild pulmonary vascular congestion. Bibasilar scarring/subsegmental atelectasis. Labs notable for: BUN/Cr 25/1.20, Cardiac enzymes negative x 1 set, , COVID PCR pending.   Patient treated with Coreg 6.25mg PO x 1 dose with improvement in BP 140s/90s.   Patient currently stable, admitted to [facility] cardiac telemetry for management of hypertensive urgency.     CATH Hx:  @[facility] 1/09/19: Diagnostic right and left heart catheterization revealing LMCA normal, LAD with minor luminal irregularities, LCx large and normal, RCA large and normal. PA 18, RV 73/13 (21), PA 70/34 (49), PCWP 26, CO 7.3 L/min, CI 3.7, TPG 23, severe AS (mean LV/Ao gradient 48.1 mmHg, SETH 1.1 cm2).    78 year old Lao speaking F, PMHx of uncontrolled HTN, hyperlipidemia, asthma (denies hospitalizations, intubations), MARK? (on CPAP, noncompliant), chronic diastolic CHF (EF:61% by Echo 4/2019), aortic stenosis s/p transfemoral TAVR with rachael valve on 02/05/2019 with Dr. Alejo @[facility], recent hospitalization at [facility] (for HTN/headache, no changes made to medications, negative CT head/MRI brain, discharged on 11/4/20) who presents to [facility] ED 11/13/20 c/o intermittent chest pain and elevated BP x 1 week. Patient describes the chest pain as being nonexertional midsternal chest pressure with radiation to RUE, 6/10 in severity. Associated symptoms including SOB, headache and dizziness. Patient denies any N/V, diaphoresis, palpitations, PND, recent travel or sick contacts. Patient admits to chronic bilateral LE edema and 2 pillow orthopnea. Patient reports compliance with her medications.   In ED, BP:224/111, HR: 70s, RR:18, Temp:98.1F, O2 sat: 99% RA. EKG revealed NSR@78BPM with 1st degree AVB, no acute ST/T wave changes. CXR revealed evidence of mild pulmonary vascular congestion. Bibasilar scarring/subsegmental atelectasis. Labs notable for: BUN/Cr 25/1.20, Cardiac enzymes negative x 1 set, , COVID PCR pending.   Patient treated with Coreg 6.25mg PO x 1 dose with improvement in BP 140s/90s.   Patient currently stable, admitted to [facility] cardiac telemetry for management of hypertensive urgency.     CATH Hx:  @[facility] 1/09/19: Diagnostic right and left heart catheterization revealing LMCA normal, LAD with minor luminal irregularities, LCx large and normal, RCA large and normal. PA 18, RV 73/13 (21), PA 70/34 (49), PCWP 26, CO 7.3 L/min, CI 3.7, TPG 23, severe AS (mean LV/Ao gradient 48.1 mmHg, SETH 1.1 cm2).

## 2020-11-13 NOTE — CONSULT NOTE ADULT - ASSESSMENT
This is an 79 y/o Malian speaking female w/ a PMHx of uncontrolled HTN, HLD, asthma (no hospitalizations in past), ?MARK (noncompliant with CPAP), chronic diastolic CHF (Ef 61%), AS (s/p TAVR rachael valve with Dr. Ortez on 2/5/2019) who was recently hospitalized at [facility] 2/2 to hypertensive emergency (HTN w/ HA). CT head/MRI brain at that time negative and discharged 11/4/20. Patient presented to [facility] ED on 11/13/20 with complaints of intermittent CP and elevated BP for 1 week. CP pain radiates to arm, below her breast, and substernal. The pain is worse with lying down. The pain is nearly resolved when sitting upright. States that it "comes and goes" and rates it as 6/10 at its worst. Also complains of associated HA.  Endorses mild MANRIQUE. Denies any palpitations, wheezing, abd pain, n/v/d/c, orthopnea, fevers or chills.     Plan:  Problem 1: aortic stenosis   -s/p TAVR valve in 2019 with Dr. Alejo   -Elevated gradients on TTE this admission in the setting of HTN and CHF exacerbation  -Need to r/o thrombus on leaflet of TAVR valve   -Needs Structural CT scan (order placed, needs to be completed on Monday)   -Case discussed with Dr. Alejo     Problem 2: HTN/hypertensive urgency   -c/w with care per primary team  -c/w coreg, lisinopril   -monitor BP closely and for signs of end organ dysfunction in the setting of hypertensive urgency     Problem 3: HLD   -c/w atorvastatin 20mg at bedtime  -care per primary team     Problem 4: HFpEF   -normal EF (61%)   -c/w BB, ACEI  -diuresis as needed   -care per primary team     Case discussed with Dr. Alejo. Structural heart team will continue to follow. Plan discussed with primary team and agree with current recommendations.    This is an 79 y/o Turkish speaking female w/ a PMHx of uncontrolled HTN, HLD, asthma (no hospitalizations in past), ?MARK (noncompliant with CPAP), chronic diastolic CHF (Ef 61%), AS (s/p TAVR jamar valve with Dr. Ortez on 2/5/2019) who was recently hospitalized at [facility] 2/2 to hypertensive emergency (HTN w/ HA). CT head/MRI brain at that time negative and discharged 11/4/20. Patient presented to [facility] ED on 11/13/20 with complaints of intermittent CP and elevated BP for 1 week. CP pain radiates to arm, below her breast, and substernal. The pain is worse with lying down. The pain is nearly resolved when sitting upright. States that it "comes and goes" and rates it as 6/10 at its worst. Also complains of associated HA.  Endorses mild MANRIQUE. Denies any palpitations, wheezing, abd pain, n/v/d/c, orthopnea, fevers or chills.     Plan:  Problem 1: aortic stenosis (bioprosthetic valve stenosis)  -s/p TAVR valve in 2019 with Dr. Alejo with a Jamar 23mm  -Elevated gradients on TTE this admission in the setting of HTN and CHF exacerbation  -Need to r/o thrombus on leaflet of TAVR valve   -Needs Structural CT scan (order placed, needs to be completed on Monday)   -Case discussed with Dr. Alejo as well    Problem 2: HTN/hypertensive urgency   -c/w with care per primary team  -c/w coreg, lisinopril   -monitor BP closely and for signs of end organ dysfunction in the setting of hypertensive urgency     Problem 3: HLD   -c/w atorvastatin 20mg at bedtime  -care per primary team     Problem 4: HFpEF   -normal EF (61%)   -c/w BB, ACEI  -diuresis as needed   -care per primary team

## 2020-11-13 NOTE — H&P ADULT - PROBLEM SELECTOR PLAN 1
BP 220s/110s upon admission, improved to 140s/90s after receiving Coreg 6.25mg PO x 1 dose in ED.   --will resume home regimen of Coreg 6.25mg PO q 12hrs, Lisinopril 10mg PO daily and Hydralazine 50mg PO TID. Holding home HCTZ 12.5mg for now. Will uptitrate meds PRN. BP 220s/110s upon admission, improved to 140s/90s after receiving Coreg 6.25mg PO x 1 dose in ED.   --will resume home regimen of Coreg 6.25mg PO q 12hrs and Lisinopril 10mg PO daily. Holding home HCTZ 12.5mg for now given patient received Lasix IV x 1 dose. Will uptitrate meds PRN.

## 2020-11-13 NOTE — PROGRESS NOTE ADULT - PROBLEM SELECTOR PLAN 3
- Likely 2/2 HTN urgency; Currently CP free.   - EKG SR w/ 1st degree AV block.   - CE (-).   - Dx R/LHC 1/9/19 non-obstructive (full report below).       **Recent diagnostic R+L heart cath@[facility] 1/9/19 revealed LMCA normal, LAD with minor luminal irregularities, LCx large and normal, RCA large and normal. PA 18, RV 73/13 (21), PA 70/34 (49), PCWP 26, CO 7.3 L/min, CI 3.7, TPG 23, severe AS (mean LV/Ao gradient 48.1 mmHg, SETH 1.1 cm2).

## 2020-11-13 NOTE — PROGRESS NOTE ADULT - ASSESSMENT
78 year old Liberian speaking F, PMHx of uncontrolled HTN, HLD, asthma, chronic diastolic CHF (EF:61% by Echo 4/2019), aortic stenosis s/p transfemoral TAVR with rachael valve on 02/05/2019 with Dr. Alejo @[facility] who presents to [facility] ED 11/13/20 c/o intermittent chest pain and elevated BP x 1 week, BP:224/111, EKG revealed NSR@78BPM with 1st degree AVB, no acute ST/T wave changes. CXR revealed evidence of mild pulmonary vascular congestion. Bibasilar scarring/subsegmental atelectasis. Labs notable for: BUN/Cr 25/1.20, Cardiac enzymes negative x 1 set, , COVID PCR pending.     Pt now admitted to [facility]. BP well controlled on current home regimen. Continue home regimen.     ECHO 11/13/2020 showed elevated gradient over aortic valve (double since last echo). CTS (structural) consulted, and planning for structural CT on Monday. F/U recs

## 2020-11-13 NOTE — ED ADULT NURSE NOTE - CHPI ED NUR SYMPTOMS NEG
no vomiting/no back pain/no shortness of breath/no congestion/no dizziness/no fever/no syncope/no nausea/no diaphoresis/no chills

## 2020-11-13 NOTE — DISCHARGE NOTE PROVIDER - NSDCMRMEDTOKEN_GEN_ALL_CORE_FT
Aspirin Enteric Coated 81 mg oral delayed release tablet: 1 tab(s) orally once a day   atorvastatin 20 mg oral tablet: 1 tab(s) orally once a day (at bedtime)   Coreg 6.25 mg oral tablet: 1 tab(s) orally 2 times a day  hydroCHLOROthiazide 12.5 mg oral capsule: 1 cap(s) orally once a day  lisinopril 10 mg oral tablet: 1 tab(s) orally once a day  MiraLax oral powder for reconstitution: 17 gram(s) orally once a day, As Needed -for constipation   Spiriva Respimat 1.25 mcg/inh inhalation aerosol: 2 puff(s) inhaled once a day    apixaban 5 mg oral tablet: 1 tab(s) orally every 12 hours  Aspirin Enteric Coated 81 mg oral delayed release tablet: 1 tab(s) orally once a day   atorvastatin 20 mg oral tablet: 1 tab(s) orally once a day (at bedtime)   hydroCHLOROthiazide 12.5 mg oral capsule: 1 cap(s) orally once a day  lisinopril 10 mg oral tablet: 1 tab(s) orally once a day  MiraLax oral powder for reconstitution: 17 gram(s) orally once a day, As Needed -for constipation   Spiriva Respimat 1.25 mcg/inh inhalation aerosol: 2 puff(s) inhaled once a day    apixaban 5 mg oral tablet: 1 tab(s) orally every 12 hours  atorvastatin 20 mg oral tablet: 1 tab(s) orally once a day (at bedtime)   hydroCHLOROthiazide 25 mg oral tablet: 1 tab(s) orally once a day  lisinopril 10 mg oral tablet: 1 tab(s) orally once a day  MiraLax oral powder for reconstitution: 17 gram(s) orally once a day, As Needed -for constipation   Spiriva Respimat 1.25 mcg/inh inhalation aerosol: 2 puff(s) inhaled once a day    apixaban 5 mg oral tablet: 1 tab(s) orally every 12 hours  atorvastatin 20 mg oral tablet: 1 tab(s) orally once a day (at bedtime)   hydroCHLOROthiazide 25 mg oral tablet: 1 tab(s) orally once a day   lisinopril 20 mg oral tablet: 1 tab(s) orally once a day   pantoprazole 40 mg oral delayed release tablet: 1 tab(s) orally once a day   Spiriva Respimat 1.25 mcg/inh inhalation aerosol: 2 puff(s) inhaled once a day   Toprol-XL 50 mg oral tablet, extended release: 1 tab(s) orally once a day

## 2020-11-13 NOTE — PHYSICAL THERAPY INITIAL EVALUATION ADULT - IMPAIRMENTS FOUND, PT EVAL
ergonomics and body mechanics/ventilation and respiration/gas exchange/muscle strength/gait, locomotion, and balance/aerobic capacity/endurance

## 2020-11-14 LAB
ALBUMIN SERPL ELPH-MCNC: 4 G/DL — SIGNIFICANT CHANGE UP (ref 3.3–5)
ALP SERPL-CCNC: 59 U/L — SIGNIFICANT CHANGE UP (ref 40–120)
ALT FLD-CCNC: 25 U/L — SIGNIFICANT CHANGE UP (ref 10–45)
ANION GAP SERPL CALC-SCNC: 15 MMOL/L — SIGNIFICANT CHANGE UP (ref 5–17)
APTT BLD: >200 SEC — CRITICAL HIGH (ref 27.5–35.5)
AST SERPL-CCNC: 18 U/L — SIGNIFICANT CHANGE UP (ref 10–40)
BILIRUB SERPL-MCNC: 0.5 MG/DL — SIGNIFICANT CHANGE UP (ref 0.2–1.2)
BUN SERPL-MCNC: 22 MG/DL — SIGNIFICANT CHANGE UP (ref 7–23)
CALCIUM SERPL-MCNC: 9 MG/DL — SIGNIFICANT CHANGE UP (ref 8.4–10.5)
CHLORIDE SERPL-SCNC: 102 MMOL/L — SIGNIFICANT CHANGE UP (ref 96–108)
CO2 SERPL-SCNC: 25 MMOL/L — SIGNIFICANT CHANGE UP (ref 22–31)
CREAT SERPL-MCNC: 1.03 MG/DL — SIGNIFICANT CHANGE UP (ref 0.5–1.3)
GLUCOSE SERPL-MCNC: 100 MG/DL — HIGH (ref 70–99)
HCT VFR BLD CALC: 34.5 % — SIGNIFICANT CHANGE UP (ref 34.5–45)
HCT VFR BLD CALC: 36.3 % — SIGNIFICANT CHANGE UP (ref 34.5–45)
HGB BLD-MCNC: 11.1 G/DL — LOW (ref 11.5–15.5)
HGB BLD-MCNC: 11.5 G/DL — SIGNIFICANT CHANGE UP (ref 11.5–15.5)
MAGNESIUM SERPL-MCNC: 2.2 MG/DL — SIGNIFICANT CHANGE UP (ref 1.6–2.6)
MCHC RBC-ENTMCNC: 29.6 PG — SIGNIFICANT CHANGE UP (ref 27–34)
MCHC RBC-ENTMCNC: 30.1 PG — SIGNIFICANT CHANGE UP (ref 27–34)
MCHC RBC-ENTMCNC: 31.7 GM/DL — LOW (ref 32–36)
MCHC RBC-ENTMCNC: 32.2 GM/DL — SIGNIFICANT CHANGE UP (ref 32–36)
MCV RBC AUTO: 93.3 FL — SIGNIFICANT CHANGE UP (ref 80–100)
MCV RBC AUTO: 93.5 FL — SIGNIFICANT CHANGE UP (ref 80–100)
NRBC # BLD: 0 /100 WBCS — SIGNIFICANT CHANGE UP (ref 0–0)
NRBC # BLD: 0 /100 WBCS — SIGNIFICANT CHANGE UP (ref 0–0)
PLATELET # BLD AUTO: 211 K/UL — SIGNIFICANT CHANGE UP (ref 150–400)
PLATELET # BLD AUTO: 253 K/UL — SIGNIFICANT CHANGE UP (ref 150–400)
POTASSIUM SERPL-MCNC: 4.3 MMOL/L — SIGNIFICANT CHANGE UP (ref 3.5–5.3)
POTASSIUM SERPL-SCNC: 4.3 MMOL/L — SIGNIFICANT CHANGE UP (ref 3.5–5.3)
PROT SERPL-MCNC: 7.2 G/DL — SIGNIFICANT CHANGE UP (ref 6–8.3)
RBC # BLD: 3.69 M/UL — LOW (ref 3.8–5.2)
RBC # BLD: 3.89 M/UL — SIGNIFICANT CHANGE UP (ref 3.8–5.2)
RBC # FLD: 13.4 % — SIGNIFICANT CHANGE UP (ref 10.3–14.5)
RBC # FLD: 13.7 % — SIGNIFICANT CHANGE UP (ref 10.3–14.5)
SODIUM SERPL-SCNC: 142 MMOL/L — SIGNIFICANT CHANGE UP (ref 135–145)
WBC # BLD: 6.61 K/UL — SIGNIFICANT CHANGE UP (ref 3.8–10.5)
WBC # BLD: 9.96 K/UL — SIGNIFICANT CHANGE UP (ref 3.8–10.5)
WBC # FLD AUTO: 6.61 K/UL — SIGNIFICANT CHANGE UP (ref 3.8–10.5)
WBC # FLD AUTO: 9.96 K/UL — SIGNIFICANT CHANGE UP (ref 3.8–10.5)

## 2020-11-14 PROCEDURE — 99222 1ST HOSP IP/OBS MODERATE 55: CPT

## 2020-11-14 PROCEDURE — 99232 SBSQ HOSP IP/OBS MODERATE 35: CPT

## 2020-11-14 RX ORDER — HEPARIN SODIUM 5000 [USP'U]/ML
8500 INJECTION INTRAVENOUS; SUBCUTANEOUS EVERY 6 HOURS
Refills: 0 | Status: DISCONTINUED | OUTPATIENT
Start: 2020-11-14 | End: 2020-11-15

## 2020-11-14 RX ORDER — HEPARIN SODIUM 5000 [USP'U]/ML
4000 INJECTION INTRAVENOUS; SUBCUTANEOUS EVERY 6 HOURS
Refills: 0 | Status: DISCONTINUED | OUTPATIENT
Start: 2020-11-14 | End: 2020-11-15

## 2020-11-14 RX ORDER — HEPARIN SODIUM 5000 [USP'U]/ML
INJECTION INTRAVENOUS; SUBCUTANEOUS
Qty: 25000 | Refills: 0 | Status: DISCONTINUED | OUTPATIENT
Start: 2020-11-14 | End: 2020-11-15

## 2020-11-14 RX ORDER — HEPARIN SODIUM 5000 [USP'U]/ML
8500 INJECTION INTRAVENOUS; SUBCUTANEOUS ONCE
Refills: 0 | Status: COMPLETED | OUTPATIENT
Start: 2020-11-14 | End: 2020-11-14

## 2020-11-14 RX ADMIN — CARVEDILOL PHOSPHATE 6.25 MILLIGRAM(S): 80 CAPSULE, EXTENDED RELEASE ORAL at 18:07

## 2020-11-14 RX ADMIN — ENOXAPARIN SODIUM 40 MILLIGRAM(S): 100 INJECTION SUBCUTANEOUS at 11:26

## 2020-11-14 RX ADMIN — ATORVASTATIN CALCIUM 20 MILLIGRAM(S): 80 TABLET, FILM COATED ORAL at 21:07

## 2020-11-14 RX ADMIN — Medication 81 MILLIGRAM(S): at 09:32

## 2020-11-14 RX ADMIN — HEPARIN SODIUM 1800 UNIT(S)/HR: 5000 INJECTION INTRAVENOUS; SUBCUTANEOUS at 12:13

## 2020-11-14 RX ADMIN — HEPARIN SODIUM 8500 UNIT(S): 5000 INJECTION INTRAVENOUS; SUBCUTANEOUS at 12:13

## 2020-11-14 RX ADMIN — HEPARIN SODIUM 1500 UNIT(S)/HR: 5000 INJECTION INTRAVENOUS; SUBCUTANEOUS at 21:07

## 2020-11-14 RX ADMIN — HEPARIN SODIUM 0 UNIT(S)/HR: 5000 INJECTION INTRAVENOUS; SUBCUTANEOUS at 19:25

## 2020-11-14 RX ADMIN — CARVEDILOL PHOSPHATE 6.25 MILLIGRAM(S): 80 CAPSULE, EXTENDED RELEASE ORAL at 05:29

## 2020-11-14 RX ADMIN — Medication 12.5 MILLIGRAM(S): at 05:29

## 2020-11-14 RX ADMIN — TIOTROPIUM BROMIDE 1 CAPSULE(S): 18 CAPSULE ORAL; RESPIRATORY (INHALATION) at 09:31

## 2020-11-14 RX ADMIN — LISINOPRIL 10 MILLIGRAM(S): 2.5 TABLET ORAL at 05:29

## 2020-11-14 RX ADMIN — POLYETHYLENE GLYCOL 3350 17 GRAM(S): 17 POWDER, FOR SOLUTION ORAL at 09:32

## 2020-11-14 NOTE — PROGRESS NOTE ADULT - ASSESSMENT
78 year old Serbian speaking F, PMHx of uncontrolled HTN, HLD, asthma, chronic diastolic CHF (EF:61% by Echo 4/2019), aortic stenosis s/p transfemoral TAVR with rachael valve on 02/05/2019 with Dr. Alejo @[facility] who presents to [facility] ED 11/13/20 c/o intermittent chest pain and elevated BP x 1 week, BP:224/111, EKG revealed NSR@78BPM with 1st degree AVB, no acute ST/T wave changes. CXR revealed evidence of mild pulmonary vascular congestion. Bibasilar scarring/subsegmental atelectasis. Labs notable for: BUN/Cr 25/1.20, Cardiac enzymes negative x 1 set, , COVID PCR pending.     Pt now admitted to [facility]. BP well controlled on current home regimen. Continue home regimen.     ECHO 11/13/2020 showed elevated gradient over aortic valve (double since last echo). CTS (structural) consulted, and planning for structural CT on Monday. F/U recs    78 year old Libyan speaking F, PMHx of uncontrolled HTN, HLD, asthma, chronic diastolic CHF (EF:61% by Echo 4/2019), aortic stenosis s/p transfemoral TAVR with rachael valve on 02/05/2019 with Dr. Alejo @[facility] who presents to [facility] ED 11/13/20 c/o intermittent chest pain and elevated BP x 1 week. BP initially elevated on arrival however has been stable on home meds. ECHO 11/13/2020 showed elevated gradient over aortic valve of 61.78 (doubled since last echo). Dr. Alejo consulted, and planning for structural CT on Monday, limited TTE to evaluate gradient and heparin gtt in the event that worsening gradient was caused by thrombosis.

## 2020-11-14 NOTE — PROGRESS NOTE ADULT - PROBLEM SELECTOR PLAN 4
- Euvolemic on exam; satting well on RA, No JVD.   - CXR w/ mild pulm vascular congestion.   - s/p Lasix 20mg IV x1 in the Ed.   - No need for further Lasix.   - Strict I/Os, daily weights, continue ACE. - Euvolemic on exam; satting well on RA, No JVD.   - s/p Lasix 20mg IV x1 in the ED. No need for further Lasix.   - Strict I/Os, daily weights, continue ACE.

## 2020-11-14 NOTE — PROGRESS NOTE ADULT - PROBLEM SELECTOR PLAN 1
- s/p transfemoral TAVR with jamar valve on 02/05/2019 with Dr. Alejo @[facility].  - ECHO (11/13/20): Normal BiV fxn/size, moderate LVH, Grade II LV Diastolic Dysfunction, PEAK TRANSAORTIC GRADIENT 61.78mmHg, mildly dilated LA, no pulmHTN, moderately dilated ascending aorta.   - Due to transaortic gradient significantly elevated, CTS (structural) consulted.   - PLAN: per CTS patient needs Structural CT Scan (will be done on MONDAY) to assess if there is a thrombus on aortic valve; F/U CTS recs after structural CT scan is collected.         *** Prior Echo 4/1/2019 revealed moderate concentric LVH, normal LV wall motion, EF:61%. Jamar valve in aortic position, no AR, peak pressure gradient 29mmHg, mean pressure gradient 14mmHg, trace MR, mild TR, PASP 29mmHg. - s/p transfemoral TAVR with rachael valve on 02/05/2019 with Dr. Alejo @[facility].  - ECHO (11/13/20): Normal BiV fxn/size, moderate LVH, Grade II LV Diastolic Dysfunction, PEAK TRANSAORTIC GRADIENT 61.78mmHg, mildly dilated LA, no pulmHTN, moderately dilated ascending aorta. (previously 14mmHg on 4/2019)  -Dr. Alejo rec: Structural CT Scan (will be done on MONDAY) to assess if there is a thrombus on aortic valve, heparin gtt and limited TTE to access gradient. F/U CTS recs after structural CT scan is collected.

## 2020-11-14 NOTE — PROGRESS NOTE ADULT - PROBLEM SELECTOR PLAN 2
- ON ADMIT: BP 220s/110s.   - BP well controlled on home regimen.   - CONTINUE: Coreg 6.25mg PO BID, Lisinopril 10mg PO QD.   - Initially held home HCTZ 12.5mg PO QD, as pt received Lasix in ED.   - STARTING home HCTZ 12.5mg PO QD tomorrow.   - Continue to monitor BP; Uptitrate medications if needed. - BP well controlled on home regimen.   - CONTINUE: Coreg 6.25mg PO BID, Lisinopril 10mg PO QD, home HCTZ 12.5mg PO QD

## 2020-11-14 NOTE — PROGRESS NOTE ADULT - PROBLEM SELECTOR PLAN 5
- Pt presented w/ HA.   - Intermittently w/ HA.   - No focal neurodeficits.   - Responds well to IV Tylenol and Fiorocet.   - Continue to monitor.     ** Of note, patient reportedly [facility] CT [facility] week ago. - Intermittently w/ HA. No focal neurodeficits.   - Responds well to IV Tylenol and Fiorocet.   ** Of note, patient reportedly had normal CT head/MRI Brain @ [facility] ~1 week ago.

## 2020-11-14 NOTE — PROGRESS NOTE ADULT - SUBJECTIVE AND OBJECTIVE BOX
Interventional Cardiology PA Adult Progress Note    Subjective Assessment:  	  MEDICATIONS:  carvedilol 6.25 milliGRAM(s) Oral every 12 hours  hydrochlorothiazide 12.5 milliGRAM(s) Oral daily  lisinopril 10 milliGRAM(s) Oral daily      tiotropium 18 MICROgram(s) Capsule 1 Capsule(s) Inhalation daily      polyethylene glycol 3350 17 Gram(s) Oral daily    atorvastatin 20 milliGRAM(s) Oral at bedtime    aspirin enteric coated 81 milliGRAM(s) Oral daily  enoxaparin Injectable 40 milliGRAM(s) SubCutaneous daily  influenza  Vaccine (HIGH DOSE) 0.7 milliLiter(s) IntraMuscular once      	    [PHYSICAL EXAM:  TELEMETRY:  T(C): 37.2 (11-14-20 @ 09:54), Max: 37.2 (11-14-20 @ 09:54)  HR: 65 (11-14-20 @ 09:28) (65 - 82)  BP: 106/69 (11-14-20 @ 09:28) (106/53 - 162/71)  RR: 16 (11-14-20 @ 09:28) (16 - 19)  SpO2: 96% (11-14-20 @ 09:28) (93% - 97%)  Wt(kg): --  I&O's Summary    13 Nov 2020 07:01  -  14 Nov 2020 07:00  --------------------------------------------------------  IN: 380 mL / OUT: 1800 mL / NET: -1420 mL    14 Nov 2020 07:01  -  14 Nov 2020 11:33  --------------------------------------------------------  IN: 200 mL / OUT: 0 mL / NET: 200 mL        Gonzalez:  Central/PICC/Mid Line:                                         Appearance: Normal	  HEENT:   Normal oral mucosa, PERRL, EOMI	  Neck: Supple, + JVD/ - JVD; Carotid Bruit   Cardiovascular: Normal S1 S2, No JVD, No murmurs,   Respiratory: Lungs clear to auscultation/Decreased Breath Sounds/No Rales, Rhonchi, Wheezing	  Gastrointestinal:  Soft, Non-tender, + BS	  Skin: No rashes, No ecchymoses, No cyanosis  Extremities: Normal range of motion, No clubbing, cyanosis or edema  Vascular: Peripheral pulses palpable 2+ bilaterally  Neurologic: Non-focal  Psychiatry: A & O x 3, Mood & affect appropriate      	    ECG:  	  RADIOLOGY:   DIAGNOSTIC TESTING:  [ ] Echocardiogram:  [ ]  Catheterization:  [ ] Stress Test:    [ ] IRMA  OTHER: 	    LABS:	 	  CARDIAC MARKERS:                                  11.1   6.61  )-----------( 211      ( 14 Nov 2020 06:22 )             34.5     11-14    142  |  102  |  22  ----------------------------<  100<H>  4.3   |  25  |  1.03    Ca    9.0      14 Nov 2020 06:22  Mg     2.2     11-14    TPro  7.2  /  Alb  4.0  /  TBili  0.5  /  DBili  x   /  AST  18  /  ALT  25  /  AlkPhos  59  11-14    proBNP:   Lipid Profile:   HgA1c:   TSH:   PT/INR - ( 13 Nov 2020 01:16 )   PT: 12.5 sec;   INR: 1.04          PTT - ( 13 Nov 2020 01:16 )  PTT:25.8 sec    ASSESSMENT/PLAN: 	        DVT ppx:  Dispo:     Interventional Cardiology PA Adult Progress Note    Subjective Assessment: Patient was seen and examined at the bedside. No complaints at this time. Aware of plan for structural CT scan on Monday. All other ROS negative except those listed in subjective assessment.  	  MEDICATIONS:  carvedilol 6.25 milliGRAM(s) Oral every 12 hours  hydrochlorothiazide 12.5 milliGRAM(s) Oral daily  lisinopril 10 milliGRAM(s) Oral daily  tiotropium 18 MICROgram(s) Capsule 1 Capsule(s) Inhalation daily  polyethylene glycol 3350 17 Gram(s) Oral daily  atorvastatin 20 milliGRAM(s) Oral at bedtime  aspirin enteric coated 81 milliGRAM(s) Oral daily  enoxaparin Injectable 40 milliGRAM(s) SubCutaneous daily  influenza  Vaccine (HIGH DOSE) 0.7 milliLiter(s) IntraMuscular once    [PHYSICAL EXAM:  TELEMETRY:  T(C): 37.2 (11-14-20 @ 09:54), Max: 37.2 (11-14-20 @ 09:54)  HR: 65 (11-14-20 @ 09:28) (65 - 82)  BP: 106/69 (11-14-20 @ 09:28) (106/53 - 162/71)  RR: 16 (11-14-20 @ 09:28) (16 - 19)  SpO2: 96% (11-14-20 @ 09:28) (93% - 97%)  I&O's Summary    13 Nov 2020 07:01  -  14 Nov 2020 07:00  --------------------------------------------------------  IN: 380 mL / OUT: 1800 mL / NET: -1420 mL    14 Nov 2020 07:01  -  14 Nov 2020 11:33  --------------------------------------------------------  IN: 200 mL / OUT: 0 mL / NET: 200 mL                                         Appearance: Normal		  Neck: Supple, - JVD; no Carotid Bruit b/l  Cardiovascular: Normal S1 S2, No M/R/G  Respiratory: Lungs clear to auscultation/No Rales, Rhonchi, Wheezing, crackles  Gastrointestinal:  Soft, Non-tender, ND + BS x4	  Extremities: Normal range of motion, No clubbing, cyanosis or edema b/l upper or lower extremties  Vascular: Peripheral pulses palpable 2+ bilaterally carotids, radial, DP/PT  Neurologic: A & O x 3, Mood & affect appropriate      	    ECG:  	  RADIOLOGY:   DIAGNOSTIC TESTING:  [x ] Echocardiogram: < from: TTE Echo Complete w/o Contrast w/ Doppler (11.13.20 @ 16:09) >  CONCLUSIONS:     1. Normal left and right ventricular size and systolic function.   2. Moderate symmetric left ventricular hypertrophy.   3. Hyperdynamic left ventricular systolic function.   4. 23 mm Jamar valve is noted in the aortic position without any aortic regurgitation. The peak transvalvular velocity is 3.93 m/s, the mean transvalvular gradient is 35.00 mmHg, and the LVOT/AV velocity ratio is 0.30. The peak transaortic gradient is 61.78 mmHg. The transaortic gradients are elevated even in the setting of a TAVR.   5. Grade II left ventricular diastolic dysfunction.   6. Normal right ventricular size and systolic function.   7. Mildly dilated left atrium.   8. No evidence of pulmonary hypertension.   9. No pericardial effusion.  10. Moderately dilated ascending aorta.  11. Compared to the previous TTE performed on 4/1/2019, the transaortic gradients are higher.    < end of copied text >    [ ]  Catheterization:  [ ] Stress Test:    [ ] IRMA  OTHER: 	    LABS:	 	  CARDIAC MARKERS:                        11.1   6.61  )-----------( 211      ( 14 Nov 2020 06:22 )             34.5     11-14    142  |  102  |  22  ----------------------------<  100<H>  4.3   |  25  |  1.03    Ca    9.0      14 Nov 2020 06:22  Mg     2.2     11-14    TPro  7.2  /  Alb  4.0  /  TBili  0.5  /  DBili  x   /  AST  18  /  ALT  25  /  AlkPhos  59  11-14  PT/INR - ( 13 Nov 2020 01:16 )   PT: 12.5 sec;   INR: 1.04          PTT - ( 13 Nov 2020 01:16 )  PTT:25.8 sec

## 2020-11-14 NOTE — PROGRESS NOTE ADULT - PROBLEM SELECTOR PLAN 3
- Likely 2/2 HTN urgency; Currently CP free.   - EKG SR w/ 1st degree AV block.   - CE (-).   - Dx R/LHC 1/9/19 non-obstructive (full report below).       **Recent diagnostic R+L heart cath@[facility] 1/9/19 revealed LMCA normal, LAD with minor luminal irregularities, LCx large and normal, RCA large and normal. PA 18, RV 73/13 (21), PA 70/34 (49), PCWP 26, CO 7.3 L/min, CI 3.7, TPG 23, severe AS (mean LV/Ao gradient 48.1 mmHg, SETH 1.1 cm2). - Likely 2/2 HTN urgency; Currently CP free.   - EKG SR w/ 1st degree AV block. CE (-).   - Dx R/LHC 1/9/19 non-obstructive (full report below).   **Recent diagnostic R+L heart cath@[facility] 1/9/19 revealed LMCA normal, LAD with minor luminal irregularities, LCx large and normal, RCA large and normal. PA 18, RV 73/13 (21), PA 70/34 (49), PCWP 26, CO 7.3 L/min, CI 3.7, TPG 23, severe AS (mean LV/Ao gradient 48.1 mmHg, SETH 1.1 cm2).

## 2020-11-15 DIAGNOSIS — K62.5 HEMORRHAGE OF ANUS AND RECTUM: ICD-10-CM

## 2020-11-15 LAB
ANION GAP SERPL CALC-SCNC: 13 MMOL/L — SIGNIFICANT CHANGE UP (ref 5–17)
APTT BLD: 122.6 SEC — CRITICAL HIGH (ref 27.5–35.5)
BLD GP AB SCN SERPL QL: NEGATIVE — SIGNIFICANT CHANGE UP
BUN SERPL-MCNC: 32 MG/DL — HIGH (ref 7–23)
CALCIUM SERPL-MCNC: 9 MG/DL — SIGNIFICANT CHANGE UP (ref 8.4–10.5)
CHLORIDE SERPL-SCNC: 99 MMOL/L — SIGNIFICANT CHANGE UP (ref 96–108)
CO2 SERPL-SCNC: 26 MMOL/L — SIGNIFICANT CHANGE UP (ref 22–31)
CREAT SERPL-MCNC: 1.09 MG/DL — SIGNIFICANT CHANGE UP (ref 0.5–1.3)
GLUCOSE SERPL-MCNC: 105 MG/DL — HIGH (ref 70–99)
HCT VFR BLD CALC: 31.4 % — LOW (ref 34.5–45)
HCT VFR BLD CALC: 32.3 % — LOW (ref 34.5–45)
HCT VFR BLD CALC: 34.9 % — SIGNIFICANT CHANGE UP (ref 34.5–45)
HGB BLD-MCNC: 10.3 G/DL — LOW (ref 11.5–15.5)
HGB BLD-MCNC: 10.3 G/DL — LOW (ref 11.5–15.5)
HGB BLD-MCNC: 10.9 G/DL — LOW (ref 11.5–15.5)
MAGNESIUM SERPL-MCNC: 2 MG/DL — SIGNIFICANT CHANGE UP (ref 1.6–2.6)
MCHC RBC-ENTMCNC: 29.3 PG — SIGNIFICANT CHANGE UP (ref 27–34)
MCHC RBC-ENTMCNC: 29.5 PG — SIGNIFICANT CHANGE UP (ref 27–34)
MCHC RBC-ENTMCNC: 30 PG — SIGNIFICANT CHANGE UP (ref 27–34)
MCHC RBC-ENTMCNC: 31.2 GM/DL — LOW (ref 32–36)
MCHC RBC-ENTMCNC: 31.9 GM/DL — LOW (ref 32–36)
MCHC RBC-ENTMCNC: 32.8 GM/DL — SIGNIFICANT CHANGE UP (ref 32–36)
MCV RBC AUTO: 91.5 FL — SIGNIFICANT CHANGE UP (ref 80–100)
MCV RBC AUTO: 92 FL — SIGNIFICANT CHANGE UP (ref 80–100)
MCV RBC AUTO: 94.3 FL — SIGNIFICANT CHANGE UP (ref 80–100)
NRBC # BLD: 0 /100 WBCS — SIGNIFICANT CHANGE UP (ref 0–0)
PLATELET # BLD AUTO: 205 K/UL — SIGNIFICANT CHANGE UP (ref 150–400)
PLATELET # BLD AUTO: 214 K/UL — SIGNIFICANT CHANGE UP (ref 150–400)
PLATELET # BLD AUTO: 236 K/UL — SIGNIFICANT CHANGE UP (ref 150–400)
POTASSIUM SERPL-MCNC: 4.1 MMOL/L — SIGNIFICANT CHANGE UP (ref 3.5–5.3)
POTASSIUM SERPL-SCNC: 4.1 MMOL/L — SIGNIFICANT CHANGE UP (ref 3.5–5.3)
RBC # BLD: 3.43 M/UL — LOW (ref 3.8–5.2)
RBC # BLD: 3.51 M/UL — LOW (ref 3.8–5.2)
RBC # BLD: 3.7 M/UL — LOW (ref 3.8–5.2)
RBC # FLD: 13.4 % — SIGNIFICANT CHANGE UP (ref 10.3–14.5)
RBC # FLD: 13.5 % — SIGNIFICANT CHANGE UP (ref 10.3–14.5)
RBC # FLD: 13.5 % — SIGNIFICANT CHANGE UP (ref 10.3–14.5)
RH IG SCN BLD-IMP: POSITIVE — SIGNIFICANT CHANGE UP
SODIUM SERPL-SCNC: 138 MMOL/L — SIGNIFICANT CHANGE UP (ref 135–145)
WBC # BLD: 7.4 K/UL — SIGNIFICANT CHANGE UP (ref 3.8–10.5)
WBC # BLD: 7.61 K/UL — SIGNIFICANT CHANGE UP (ref 3.8–10.5)
WBC # BLD: 8.92 K/UL — SIGNIFICANT CHANGE UP (ref 3.8–10.5)
WBC # FLD AUTO: 7.4 K/UL — SIGNIFICANT CHANGE UP (ref 3.8–10.5)
WBC # FLD AUTO: 7.61 K/UL — SIGNIFICANT CHANGE UP (ref 3.8–10.5)
WBC # FLD AUTO: 8.92 K/UL — SIGNIFICANT CHANGE UP (ref 3.8–10.5)

## 2020-11-15 PROCEDURE — 99233 SBSQ HOSP IP/OBS HIGH 50: CPT

## 2020-11-15 PROCEDURE — 99232 SBSQ HOSP IP/OBS MODERATE 35: CPT

## 2020-11-15 PROCEDURE — 99222 1ST HOSP IP/OBS MODERATE 55: CPT

## 2020-11-15 RX ORDER — HEPARIN SODIUM 5000 [USP'U]/ML
1500 INJECTION INTRAVENOUS; SUBCUTANEOUS
Qty: 25000 | Refills: 0 | Status: DISCONTINUED | OUTPATIENT
Start: 2020-11-15 | End: 2020-11-15

## 2020-11-15 RX ORDER — ACETAMINOPHEN 500 MG
650 TABLET ORAL ONCE
Refills: 0 | Status: COMPLETED | OUTPATIENT
Start: 2020-11-15 | End: 2020-11-15

## 2020-11-15 RX ORDER — HEPARIN SODIUM 5000 [USP'U]/ML
8500 INJECTION INTRAVENOUS; SUBCUTANEOUS EVERY 6 HOURS
Refills: 0 | Status: DISCONTINUED | OUTPATIENT
Start: 2020-11-15 | End: 2020-11-15

## 2020-11-15 RX ORDER — HEPARIN SODIUM 5000 [USP'U]/ML
INJECTION INTRAVENOUS; SUBCUTANEOUS
Qty: 25000 | Refills: 0 | Status: DISCONTINUED | OUTPATIENT
Start: 2020-11-15 | End: 2020-11-15

## 2020-11-15 RX ORDER — HEPARIN SODIUM 5000 [USP'U]/ML
4000 INJECTION INTRAVENOUS; SUBCUTANEOUS EVERY 6 HOURS
Refills: 0 | Status: DISCONTINUED | OUTPATIENT
Start: 2020-11-15 | End: 2020-11-15

## 2020-11-15 RX ADMIN — LISINOPRIL 10 MILLIGRAM(S): 2.5 TABLET ORAL at 06:00

## 2020-11-15 RX ADMIN — POLYETHYLENE GLYCOL 3350 17 GRAM(S): 17 POWDER, FOR SOLUTION ORAL at 12:40

## 2020-11-15 RX ADMIN — TIOTROPIUM BROMIDE 1 CAPSULE(S): 18 CAPSULE ORAL; RESPIRATORY (INHALATION) at 09:53

## 2020-11-15 RX ADMIN — CARVEDILOL PHOSPHATE 6.25 MILLIGRAM(S): 80 CAPSULE, EXTENDED RELEASE ORAL at 18:33

## 2020-11-15 RX ADMIN — Medication 12.5 MILLIGRAM(S): at 06:00

## 2020-11-15 RX ADMIN — Medication 81 MILLIGRAM(S): at 09:53

## 2020-11-15 RX ADMIN — Medication 650 MILLIGRAM(S): at 12:38

## 2020-11-15 RX ADMIN — ATORVASTATIN CALCIUM 20 MILLIGRAM(S): 80 TABLET, FILM COATED ORAL at 21:08

## 2020-11-15 RX ADMIN — Medication 650 MILLIGRAM(S): at 13:24

## 2020-11-15 RX ADMIN — CARVEDILOL PHOSPHATE 6.25 MILLIGRAM(S): 80 CAPSULE, EXTENDED RELEASE ORAL at 06:00

## 2020-11-15 NOTE — PROGRESS NOTE ADULT - PROBLEM SELECTOR PLAN 5
- Intermittently w/ HA. No focal neurodeficits.   - Responds well to IV Tylenol and Fiorocet.   ** Of note, patient reportedly had normal CT head/MRI Brain @ [facility] ~1 week ago. - Euvolemic on exam; satting well on RA, No JVD.   - s/p Lasix 20mg IV x1 in the ED. No need for further Lasix.   - Strict I/Os, daily weights, continue ACE. - Euvolemic on exam; satting well on RA  - s/p Lasix 20mg IV x1 in the ED. No need for further Lasix.   - Strict I/Os, daily weights, continue ACE.

## 2020-11-15 NOTE — CONSULT NOTE ADULT - SUBJECTIVE AND OBJECTIVE BOX
GASTROENTEROLOGY CONSULT NOTE  HPI:  78 year old Thai speaking F, PMHx of uncontrolled HTN, hyperlipidemia, asthma (denies hospitalizations, intubations), MARK? (on CPAP, noncompliant), chronic diastolic CHF (EF:61% by Echo 4/2019), aortic stenosis s/p transfemoral TAVR with rachael valve on 02/05/2019 with Dr. Alejo @[facility], recent hospitalization at [facility] (for HTN/headache, no changes made to medications, negative CT head/MRI brain, discharged on 11/4/20) who presents to [facility] ED 11/13/20 c/o intermittent chest pain and elevated BP x 1 week. Patient describes the chest pain as being nonexertional midsternal chest pressure with radiation to RUE, 6/10 in severity. Associated symptoms including SOB, headache and dizziness. Patient denies any N/V, diaphoresis, palpitations, PND, recent travel or sick contacts. Patient admits to chronic bilateral LE edema and 2 pillow orthopnea. Patient reports compliance with her medications.   In ED, BP:224/111, HR: 70s, RR:18, Temp:98.1F, O2 sat: 99% RA. EKG revealed NSR@78BPM with 1st degree AVB, no acute ST/T wave changes. CXR revealed evidence of mild pulmonary vascular congestion. Bibasilar scarring/subsegmental atelectasis. Labs notable for: BUN/Cr 25/1.20, Cardiac enzymes negative x 1 set, , COVID PCR pending.   Patient treated with Coreg 6.25mg PO x 1 dose with improvement in BP 140s/90s.   Patient currently stable, admitted to [facility] cardiac telemetry for management of hypertensive urgency.     CATH Hx:  @[facility] 1/09/19: Diagnostic right and left heart catheterization revealing LMCA normal, LAD with minor luminal irregularities, LCx large and normal, RCA large and normal. PA 18, RV 73/13 (21), PA 70/34 (49), PCWP 26, CO 7.3 L/min, CI 3.7, TPG 23, severe AS (mean LV/Ao gradient 48.1 mmHg, SETH 1.1 cm2).    (13 Nov 2020 03:58)    GI consulted for BRBPR. Patient seen and examined at bedside.     Allergies    Plavix (Other (Mod to Severe))    Intolerances      Home Medications:  Coreg 6.25 mg oral tablet: 1 tab(s) orally 2 times a day (13 Nov 2020 05:03)  hydroCHLOROthiazide 12.5 mg oral capsule: 1 cap(s) orally once a day (13 Nov 2020 05:03)    MEDICATIONS:  MEDICATIONS  (STANDING):  aspirin enteric coated 81 milliGRAM(s) Oral daily  atorvastatin 20 milliGRAM(s) Oral at bedtime  carvedilol 6.25 milliGRAM(s) Oral every 12 hours  hydrochlorothiazide 12.5 milliGRAM(s) Oral daily  influenza  Vaccine (HIGH DOSE) 0.7 milliLiter(s) IntraMuscular once  lisinopril 10 milliGRAM(s) Oral daily  polyethylene glycol 3350 17 Gram(s) Oral daily  tiotropium 18 MICROgram(s) Capsule 1 Capsule(s) Inhalation daily    MEDICATIONS  (PRN):    PAST MEDICAL & SURGICAL HISTORY:  CHF (congestive heart failure)    Pulmonary HTN    Aortic stenosis    HTN (hypertension)    S/P TAVR (transcatheter aortic valve replacement)      FAMILY HISTORY:  No pertinent family history in first degree relatives      SOCIAL HISTORY:  Tobacco: [ ] Current, [ ] Former, [ ] Never; Pack Years:  Alcohol:  Illicit Drugs:    REVIEW OF SYSTEMS:  CONSTITUTIONAL: No weakness, fevers or chills  HEENT: No visual changes; No vertigo or throat pain   NECK: No pain or stiffness  RESPIRATORY: No cough, wheezing, hemoptysis; No shortness of breath  CARDIOVASCULAR: No chest pain or palpitations  GASTROINTESTINAL: As above.  GENITOURINARY: No dysuria, frequency or hematuria  NEUROLOGICAL: No numbness or weakness  SKIN: No itching, burning, rashes, or lesions   All other 10 review of systems is negative unless indicated above.    Vital Signs Last 24 Hrs  T(C): 36.3 (15 Nov 2020 13:06), Max: 36.8 (15 Nov 2020 09:20)  T(F): 97.3 (15 Nov 2020 13:06), Max: 98.2 (15 Nov 2020 09:20)  HR: 74 (15 Nov 2020 12:25) (64 - 79)  BP: 167/72 (15 Nov 2020 12:25) (109/58 - 167/72)  BP(mean): 104 (15 Nov 2020 12:25) (79 - 110)  RR: 20 (15 Nov 2020 12:25) (16 - 20)  SpO2: 96% (15 Nov 2020 12:25) (95% - 98%)    11-14 @ 07:01  -  11-15 @ 07:00  --------------------------------------------------------  IN: 558 mL / OUT: 750 mL / NET: -192 mL    11-15 @ 07:01  -  11-15 @ 14:25  --------------------------------------------------------  IN: 390 mL / OUT: 700 mL / NET: -310 mL        PHYSICAL EXAM:    General: Well developed; well nourished; in no acute distress  Eyes: Anicteric sclerae, moist conjunctivae  HENT: Moist mucous membranes  Neck: Trachea midline, supple  Lungs: Normal respiratory effort, no intercostal retractions  Cardiovascular: RRR  Abdomen: Soft, non-tender non-distended; Normal bowel sounds; No rebound or guarding  Extremities: Normal range of motion, No clubbing, cyanosis or edema  Neurological: Alert and oriented x3  Skin: Warm and dry. No obvious rash    LABS:                        10.3   7.61  )-----------( 205      ( 15 Nov 2020 06:35 )             32.3     11-15    138  |  99  |  32<H>  ----------------------------<  105<H>  4.1   |  26  |  1.09    Ca    9.0      15 Nov 2020 06:35  Mg     2.0     11-15    TPro  7.2  /  Alb  4.0  /  TBili  0.5  /  DBili  x   /  AST  18  /  ALT  25  /  AlkPhos  59  11-14        PTT - ( 15 Nov 2020 00:59 )  PTT:122.6 sec    RADIOLOGY & ADDITIONAL STUDIES:     Reviewed GASTROENTEROLOGY CONSULT NOTE  HPI:  78 year old Albanian speaking F, PMHx of uncontrolled HTN, hyperlipidemia, asthma (denies hospitalizations, intubations), MARK? (on CPAP, noncompliant), chronic diastolic CHF (EF:61% by Echo 4/2019), aortic stenosis s/p transfemoral TAVR with rachael valve on 02/05/2019 with Dr. Alejo @[facility], recent hospitalization at [facility] (for HTN/headache, no changes made to medications, negative CT head/MRI brain, discharged on 11/4/20) who presents to [facility] ED 11/13/20 c/o intermittent chest pain and elevated BP x 1 week. Patient describes the chest pain as being nonexertional midsternal chest pressure with radiation to RUE, 6/10 in severity. Associated symptoms including SOB, headache and dizziness. Patient denies any N/V, diaphoresis, palpitations, PND, recent travel or sick contacts. Patient admits to chronic bilateral LE edema and 2 pillow orthopnea. Patient reports compliance with her medications.   In ED, BP:224/111, HR: 70s, RR:18, Temp:98.1F, O2 sat: 99% RA. EKG revealed NSR@78BPM with 1st degree AVB, no acute ST/T wave changes. CXR revealed evidence of mild pulmonary vascular congestion. Bibasilar scarring/subsegmental atelectasis. Labs notable for: BUN/Cr 25/1.20, Cardiac enzymes negative x 1 set, , COVID PCR pending.   Patient treated with Coreg 6.25mg PO x 1 dose with improvement in BP 140s/90s.   Patient currently stable, admitted to [facility] cardiac telemetry for management of hypertensive urgency.     CATH Hx:  @[facility] 1/09/19: Diagnostic right and left heart catheterization revealing LMCA normal, LAD with minor luminal irregularities, LCx large and normal, RCA large and normal. PA 18, RV 73/13 (21), PA 70/34 (49), PCWP 26, CO 7.3 L/min, CI 3.7, TPG 23, severe AS (mean LV/Ao gradient 48.1 mmHg, SETH 1.1 cm2).    (13 Nov 2020 03:58)    GI consulted for BRBPR. Patient seen and examined at bedside.     Allergies    Plavix (Other (Mod to Severe))    Intolerances      Home Medications:  Coreg 6.25 mg oral tablet: 1 tab(s) orally 2 times a day (13 Nov 2020 05:03)  hydroCHLOROthiazide 12.5 mg oral capsule: 1 cap(s) orally once a day (13 Nov 2020 05:03)    MEDICATIONS:  MEDICATIONS  (STANDING):  aspirin enteric coated 81 milliGRAM(s) Oral daily  atorvastatin 20 milliGRAM(s) Oral at bedtime  carvedilol 6.25 milliGRAM(s) Oral every 12 hours  hydrochlorothiazide 12.5 milliGRAM(s) Oral daily  influenza  Vaccine (HIGH DOSE) 0.7 milliLiter(s) IntraMuscular once  lisinopril 10 milliGRAM(s) Oral daily  polyethylene glycol 3350 17 Gram(s) Oral daily  tiotropium 18 MICROgram(s) Capsule 1 Capsule(s) Inhalation daily    MEDICATIONS  (PRN):    PAST MEDICAL & SURGICAL HISTORY:  CHF (congestive heart failure)    Pulmonary HTN    Aortic stenosis    HTN (hypertension)    S/P TAVR (transcatheter aortic valve replacement)      FAMILY HISTORY:  No pertinent family history in first degree relatives      SOCIAL HISTORY:  Tobacco: None  Alcohol: None  Illicit Drugs: None    REVIEW OF SYSTEMS:  CONSTITUTIONAL: No weakness, fevers or chills  HEENT: No visual changes; No vertigo or throat pain   NECK: No pain or stiffness  RESPIRATORY: No cough, wheezing, hemoptysis; No shortness of breath  CARDIOVASCULAR: No chest pain or palpitations  GASTROINTESTINAL: As above.  GENITOURINARY: No dysuria, frequency or hematuria  NEUROLOGICAL: No numbness or weakness  SKIN: No itching, burning, rashes, or lesions   All other 10 review of systems is negative unless indicated above.    Vital Signs Last 24 Hrs  T(C): 36.3 (15 Nov 2020 13:06), Max: 36.8 (15 Nov 2020 09:20)  T(F): 97.3 (15 Nov 2020 13:06), Max: 98.2 (15 Nov 2020 09:20)  HR: 74 (15 Nov 2020 12:25) (64 - 79)  BP: 167/72 (15 Nov 2020 12:25) (109/58 - 167/72)  BP(mean): 104 (15 Nov 2020 12:25) (79 - 110)  RR: 20 (15 Nov 2020 12:25) (16 - 20)  SpO2: 96% (15 Nov 2020 12:25) (95% - 98%)    11-14 @ 07:01  -  11-15 @ 07:00  --------------------------------------------------------  IN: 558 mL / OUT: 750 mL / NET: -192 mL    11-15 @ 07:01  -  11-15 @ 14:25  --------------------------------------------------------  IN: 390 mL / OUT: 700 mL / NET: -310 mL        PHYSICAL EXAM:    General: Well developed; well nourished; in no acute distress  Eyes: Anicteric sclerae, moist conjunctivae  HENT: Moist mucous membranes  Neck: Trachea midline, supple  Lungs: Normal respiratory effort, no intercostal retractions  Cardiovascular: RRR  Abdomen: Soft, non-tender non-distended; Normal bowel sounds; No rebound or guarding  Extremities: Normal range of motion, No clubbing, cyanosis or edema  Neurological: Alert and oriented x3  Skin: Warm and dry. No obvious rash    LABS:                        10.3   7.61  )-----------( 205      ( 15 Nov 2020 06:35 )             32.3     11-15    138  |  99  |  32<H>  ----------------------------<  105<H>  4.1   |  26  |  1.09    Ca    9.0      15 Nov 2020 06:35  Mg     2.0     11-15    TPro  7.2  /  Alb  4.0  /  TBili  0.5  /  DBili  x   /  AST  18  /  ALT  25  /  AlkPhos  59  11-14        PTT - ( 15 Nov 2020 00:59 )  PTT:122.6 sec    RADIOLOGY & ADDITIONAL STUDIES:     Reviewed

## 2020-11-15 NOTE — PROGRESS NOTE ADULT - SUBJECTIVE AND OBJECTIVE BOX
Patietn with BRBPR while having a BM. PTT Earlier was supratheraptuic. Heparin held. Will check CBC and will need GI Eval. Can consider resuming heparin at lower dose in AM. HD stable.

## 2020-11-15 NOTE — CONSULT NOTE ADULT - ASSESSMENT
78 year old Greenlandic speaking F, PMHx of uncontrolled HTN, hyperlipidemia, asthma (denies hospitalizations, intubations), MARK? (on CPAP, noncompliant), chronic diastolic CHF (EF:61% by Echo 4/2019), aortic stenosis s/p transfemoral TAVR with rachael valve on 02/05/2019 with Dr. Alejo @[facility], recent hospitalization at [facility] (for HTN/headache, no changes made to medications, negative CT head/MRI brain, discharged on 11/4/20) who presents to [facility] ED 11/13/20 c/o intermittent chest pain and elevated BP x 1 week. GI consulted for BRBPR.     #Hematochezia  - occurred in setting of supratherapeutic aPTT; though patient states did have some episodes at OSH  - Hgb trended down slightly to 10.3 from 11.5  - hemodynamically stable  - if patient needs AC, can restart at lower dose and try to avoid any excursions into supratherapeutic territory; if she has hematochezia on the lower dose as well, then will assess role of endoscopic evaluation     Indigo Gonzalez MD  PGY-4, Gastroenterology Fellow  pager: [phone]

## 2020-11-15 NOTE — PROGRESS NOTE ADULT - PROBLEM SELECTOR PLAN 3
- Likely 2/2 HTN urgency; Currently CP free.   - EKG SR w/ 1st degree AV block. CE (-).   - Dx R/LHC 1/9/19 non-obstructive (full report below).   **Recent diagnostic R+L heart cath@[facility] 1/9/19 revealed LMCA normal, LAD with minor luminal irregularities, LCx large and normal, RCA large and normal. PA 18, RV 73/13 (21), PA 70/34 (49), PCWP 26, CO 7.3 L/min, CI 3.7, TPG 23, severe AS (mean LV/Ao gradient 48.1 mmHg, SETH 1.1 cm2). - BP well controlled on home regimen.   - CONTINUE: Coreg 6.25mg PO BID, Lisinopril 10mg PO QD, home HCTZ 12.5mg PO QD

## 2020-11-15 NOTE — PROGRESS NOTE ADULT - ASSESSMENT
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 would hold heparin gtt for now (no definitive diagnosis of leaflet thrombosis  -Consult GI for BRBPR (several stools per pt/nurse with no blood)  -BP controlled now on current regimen  -hypertensive urgency now controlled with coreg/lisinopril  -HLD on atorvastatin

## 2020-11-15 NOTE — PROVIDER CONTACT NOTE (CHANGE IN STATUS NOTIFICATION) - BACKGROUND
Pt on heparin drip for possible aortic valve thrombus, most recent aPTT was >200, drip was held for 1 hr then restarted at 11/14/2020 @ 2030

## 2020-11-15 NOTE — PROGRESS NOTE ADULT - PROBLEM SELECTOR PLAN 6
- Continue Spiriva inhaler daily      VTE PPx: Lovenox subcut  PT consulted: Home w/ Home PT  Patient lives with daughter Jaelyn, whom is her HHA (40 hrs/week).    CODE: Full. - Intermittently w/ HA. No focal neurodeficits.   - Responds well to IV Tylenol and Fiorocet.   ** Of note, patient reportedly had normal CT head/MRI Brain @ [facility] ~1 week ago.

## 2020-11-15 NOTE — PROGRESS NOTE ADULT - SUBJECTIVE AND OBJECTIVE BOX
Subjective:  - No complaints this morning  - Events/Chart from overnight reviewed    PAST MEDICAL & SURGICAL HISTORY:  CHF (congestive heart failure)    Pulmonary HTN    Aortic stenosis    HTN (hypertension)    S/P TAVR (transcatheter aortic valve replacement)        Vital Signs Last 24 Hrs  T(C): 36.8 (15 Nov 2020 09:20), Max: 36.8 (15 Nov 2020 09:20)  T(F): 98.2 (15 Nov 2020 09:20), Max: 98.2 (15 Nov 2020 09:20)  HR: 64 (15 Nov 2020 09:) (64 - 79)  BP: 109/58 (15 Nov 2020 09:23) (109/58 - 167/71)  BP(mean): 79 (15 Nov 2020 09:23) (79 - 110)  RR: 20 (15 Nov 2020 09:23) (16 - 20)  SpO2: 96% (15 Nov 2020 09:23) (95% - 98%)   I&O's Detail    2020 07:01  -  15 Nov 2020 07:00  --------------------------------------------------------  IN:    Heparin Infusion: 108 mL    Oral Fluid: 450 mL  Total IN: 558 mL    OUT:    Voided (mL): 750 mL  Total OUT: 750 mL    Total NET: -192 mL      15 Nov 2020 07:01  -  15 Nov 2020 10:21  --------------------------------------------------------  IN:    Oral Fluid: 215 mL  Total IN: 215 mL    OUT:    Voided (mL): 500 mL  Total OUT: 500 mL    Total NET: -285 mL        Daily     Daily Weight in k.9 (15 Nov 2020 05:20)    Physical Exam:   GEN: NAD, AAOx3  HEENT: MMM, no icterus  CV: +YOVANA with soft S2  Lung: CTAB  Abd: soft, mildly distended, +BS  Ext: no c/c/e, no groin hematoma  Neuro: no focal neuro deficit    MEDICATIONS  (STANDING):  aspirin enteric coated 81 milliGRAM(s) Oral daily  atorvastatin 20 milliGRAM(s) Oral at bedtime  carvedilol 6.25 milliGRAM(s) Oral every 12 hours  heparin  Infusion.  Unit(s)/Hr (18 mL/Hr) IV Continuous <Continuous>  hydrochlorothiazide 12.5 milliGRAM(s) Oral daily  influenza  Vaccine (HIGH DOSE) 0.7 milliLiter(s) IntraMuscular once  lisinopril 10 milliGRAM(s) Oral daily  polyethylene glycol 3350 17 Gram(s) Oral daily  tiotropium 18 MICROgram(s) Capsule 1 Capsule(s) Inhalation daily      LABS:                        10.3   7.61  )-----------( 205      ( 15 Nov 2020 06:35 )             32.3     11-15    138  |  99  |  32<H>  ----------------------------<  105<H>  4.1   |  26  |  1.09    Ca    9.0      15 Nov 2020 06:35  Mg     2.0     11-15    TPro  7.2  /  Alb  4.0  /  TBili  0.5  /  DBili  x   /  AST  18  /  ALT  25  /  AlkPhos  59  11-14        PTT - ( 15 Nov 2020 00:59 )  PTT:122.6 sec       Subjective:  - Despite several BMs, pt still with complaints of constipation and an episode of BRBPR overnight  - no CP/SOB  - Events/Chart from overnight reviewed    PAST MEDICAL & SURGICAL HISTORY:  CHF (congestive heart failure)    Pulmonary HTN    Aortic stenosis    HTN (hypertension)    S/P TAVR (transcatheter aortic valve replacement)        Vital Signs Last 24 Hrs  T(C): 36.8 (15 Nov 2020 09:20), Max: 36.8 (15 Nov 2020 09:20)  T(F): 98.2 (15 Nov 2020 09:20), Max: 98.2 (15 Nov 2020 09:20)  HR: 64 (15 Nov 2020 09:23) (64 - 79)  BP: 109/58 (15 Nov 2020 09:) (109/58 - 167/71)  BP(mean): 79 (15 Nov 2020 09:) (79 - 110)  RR: 20 (15 Nov 2020 09:23) (16 - 20)  SpO2: 96% (15 Nov 2020 09:23) (95% - 98%)   I&O's Detail    2020 07:01  -  15 Nov 2020 07:00  --------------------------------------------------------  IN:    Heparin Infusion: 108 mL    Oral Fluid: 450 mL  Total IN: 558 mL    OUT:    Voided (mL): 750 mL  Total OUT: 750 mL    Total NET: -192 mL      15 Nov 2020 07:01  -  15 Nov 2020 10:21  --------------------------------------------------------  IN:    Oral Fluid: 215 mL  Total IN: 215 mL    OUT:    Voided (mL): 500 mL  Total OUT: 500 mL    Total NET: -285 mL        Daily     Daily Weight in k.9 (15 Nov 2020 05:20)    Physical Exam:   GEN: NAD, AAOx3  HEENT: MMM, no icterus  CV: +YOVANA with soft S2  Lung: CTAB  Abd: soft, mildly distended, +BS  Ext: no c/c/e, no groin hematoma  Neuro: no focal neuro deficit    MEDICATIONS  (STANDING):  aspirin enteric coated 81 milliGRAM(s) Oral daily  atorvastatin 20 milliGRAM(s) Oral at bedtime  carvedilol 6.25 milliGRAM(s) Oral every 12 hours  heparin  Infusion.  Unit(s)/Hr (18 mL/Hr) IV Continuous <Continuous>  hydrochlorothiazide 12.5 milliGRAM(s) Oral daily  influenza  Vaccine (HIGH DOSE) 0.7 milliLiter(s) IntraMuscular once  lisinopril 10 milliGRAM(s) Oral daily  polyethylene glycol 3350 17 Gram(s) Oral daily  tiotropium 18 MICROgram(s) Capsule 1 Capsule(s) Inhalation daily      LABS:                        10.3   7.61  )-----------( 205      ( 15 Nov 2020 06:35 )             32.3     11-15    138  |  99  |  32<H>  ----------------------------<  105<H>  4.1   |  26  |  1.09    Ca    9.0      15 Nov 2020 06:35  Mg     2.0     -15    TPro  7.2  /  Alb  4.0  /  TBili  0.5  /  DBili  x   /  AST  18  /  ALT  25  /  AlkPhos  59  11-14        PTT - ( 15 Nov 2020 00:59 )  PTT:122.6 sec

## 2020-11-15 NOTE — PROGRESS NOTE ADULT - PROBLEM SELECTOR PLAN 4
- Euvolemic on exam; satting well on RA, No JVD.   - s/p Lasix 20mg IV x1 in the ED. No need for further Lasix.   - Strict I/Os, daily weights, continue ACE. - Likely 2/2 HTN urgency; Currently CP free.   - EKG SR w/ 1st degree AV block. CE (-).   - Dx R/LHC 1/9/19 non-obstructive (full report below).   **Recent diagnostic R+L heart cath@[facility] 1/9/19 revealed LMCA normal, LAD with minor luminal irregularities, LCx large and normal, RCA large and normal. PA 18, RV 73/13 (21), PA 70/34 (49), PCWP 26, CO 7.3 L/min, CI 3.7, TPG 23, severe AS (mean LV/Ao gradient 48.1 mmHg, SETH 1.1 cm2). - Likely 2/2 HTN urgency; RESOLVED  - Dx R/LHC 1/9/19 non-obstructive (full report below).   **Recent diagnostic R+L heart cath@[facility] 1/9/19 revealed LMCA normal, LAD with minor luminal irregularities, LCx large and normal, RCA large and normal. PA 18, RV 73/13 (21), PA 70/34 (49), PCWP 26, CO 7.3 L/min, CI 3.7, TPG 23, severe AS (mean LV/Ao gradient 48.1 mmHg, SETH 1.1 cm2).

## 2020-11-15 NOTE — PROGRESS NOTE ADULT - ASSESSMENT
78 year old Emirati speaking F, PMHx of uncontrolled HTN, HLD, asthma, chronic diastolic CHF (EF:61% by Echo 4/2019), aortic stenosis s/p transfemoral TAVR with archael valve on 02/05/2019 with Dr. Alejo @[facility] who presents to [facility] ED 11/13/20 c/o intermittent chest pain and elevated BP x 1 week. BP initially elevated on arrival however has been stable on home meds. ECHO 11/13/2020 showed elevated gradient over aortic valve of 61.78 (doubled since last echo). Dr. Alejo consulted, and planning for structural CT on Monday, limited TTE to evaluate gradient and heparin gtt in the event that worsening gradient was caused by thrombosis.    78 year old Russian speaking F, PMHx of uncontrolled HTN, HLD, asthma, chronic diastolic CHF (EF:61% by Echo 4/2019), aortic stenosis s/p transfemoral TAVR with rachael valve on 02/05/2019 with Dr. Alejo @[facility] who presents to [facility] ED 11/13/20 c/o intermittent chest pain and elevated BP x 1 week. BP initially elevated on arrival however has been stable on home meds. ECHO 11/13/2020 showed elevated gradient over aortic valve of 61.78 (doubled since last echo). Dr. Alejo consulted, planning for structural CT on Monday (NPO after midnight), limited TTE to evaluate gradient. GI consulted 11/15 in the setting of x1 episode of BRBPR in the setting of supratherapeutic PTT. Per GI can resume heparin gtt in mean time, f/u further recs. 78 year old Puerto Rican speaking F, PMHx of uncontrolled HTN, HLD, asthma, chronic diastolic CHF (EF:61% by Echo 4/2019), aortic stenosis s/p transfemoral TAVR with rachael valve on 02/05/2019 with Dr. Alejo @[facility] who presents to [facility] ED 11/13/20 c/o intermittent chest pain and elevated BP x 1 week. BP initially elevated on arrival however has been stable on home meds. ECHO 11/13/2020 showed elevated gradient over aortic valve of 61.78 (doubled since last echo). Dr. Alejo consulted, planning for structural CT on Monday (NPO after midnight), limited TTE to evaluate gradient. GI consulted 11/15 in the setting of x1 episode of BRBPR in the setting of supratherapeutic PTT. Per CTS hold heparin gtt and will re-eval after CT.

## 2020-11-15 NOTE — PROGRESS NOTE ADULT - PROBLEM SELECTOR PLAN 2
- BP well controlled on home regimen.   - CONTINUE: Coreg 6.25mg PO BID, Lisinopril 10mg PO QD, home HCTZ 12.5mg PO QD -x1 episode of BRBPR on 11/14PM 2/2 to supratherapeutic PTT on heparin gtt  -Per patient, has had a few episodes of BPBPR however this is the first episode here  -GI consulted, f/u recs

## 2020-11-15 NOTE — PROGRESS NOTE ADULT - SUBJECTIVE AND OBJECTIVE BOX
Interventional Cardiology PA Adult Progress Note    Subjective Assessment:  	  MEDICATIONS:  carvedilol 6.25 milliGRAM(s) Oral every 12 hours  hydrochlorothiazide 12.5 milliGRAM(s) Oral daily  lisinopril 10 milliGRAM(s) Oral daily      tiotropium 18 MICROgram(s) Capsule 1 Capsule(s) Inhalation daily      polyethylene glycol 3350 17 Gram(s) Oral daily    atorvastatin 20 milliGRAM(s) Oral at bedtime    aspirin enteric coated 81 milliGRAM(s) Oral daily  heparin   Injectable 8500 Unit(s) IV Push every 6 hours PRN  heparin   Injectable 4000 Unit(s) IV Push every 6 hours PRN  influenza  Vaccine (HIGH DOSE) 0.7 milliLiter(s) IntraMuscular once      	    [PHYSICAL EXAM:  TELEMETRY:  T(C): 36.7 (11-14-20 @ 21:54), Max: 37.2 (11-14-20 @ 09:54)  HR: 71 (11-15-20 @ 05:20) (65 - 79)  BP: 126/60 (11-15-20 @ 05:20) (106/69 - 167/71)  RR: 20 (11-15-20 @ 05:20) (16 - 20)  SpO2: 95% (11-15-20 @ 05:20) (95% - 98%)  Wt(kg): --  I&O's Summary    14 Nov 2020 07:01  -  15 Nov 2020 07:00  --------------------------------------------------------  IN: 558 mL / OUT: 750 mL / NET: -192 mL        Gonzalez:  Central/PICC/Mid Line:                                         Appearance: Normal	  HEENT:   Normal oral mucosa, PERRL, EOMI	  Neck: Supple, + JVD/ - JVD; Carotid Bruit   Cardiovascular: Normal S1 S2, No JVD, No murmurs,   Respiratory: Lungs clear to auscultation/Decreased Breath Sounds/No Rales, Rhonchi, Wheezing	  Gastrointestinal:  Soft, Non-tender, + BS	  Skin: No rashes, No ecchymoses, No cyanosis  Extremities: Normal range of motion, No clubbing, cyanosis or edema  Vascular: Peripheral pulses palpable 2+ bilaterally  Neurologic: Non-focal  Psychiatry: A & O x 3, Mood & affect appropriate      	    ECG:  	  RADIOLOGY:   DIAGNOSTIC TESTING:  [ ] Echocardiogram:  [ ]  Catheterization:  [ ] Stress Test:    [ ] IRMA  OTHER: 	    LABS:	 	  CARDIAC MARKERS:                                  10.3   7.61  )-----------( 205      ( 15 Nov 2020 06:35 )             32.3     11-15    138  |  99  |  32<H>  ----------------------------<  105<H>  4.1   |  26  |  1.09    Ca    9.0      15 Nov 2020 06:35  Mg     2.0     11-15    TPro  7.2  /  Alb  4.0  /  TBili  0.5  /  DBili  x   /  AST  18  /  ALT  25  /  AlkPhos  59  11-14    proBNP:   Lipid Profile:   HgA1c:   TSH:   PTT - ( 15 Nov 2020 00:59 )  PTT:122.6 sec    ASSESSMENT/PLAN: 	        DVT ppx:  Dispo:     Interventional Cardiology PA Adult Progress Note    Subjective Assessment: Patient seen and examined at the bedside. C/o of being nervous to use the bathroom in light of BRBPR yesterday evening. However, mentioned she has had several episodes the past few days since [facility]. Denies any CP/SOB. All other ROS negative except those listed in subjective assessment .  	  MEDICATIONS:  carvedilol 6.25 milliGRAM(s) Oral every 12 hours  hydrochlorothiazide 12.5 milliGRAM(s) Oral daily  lisinopril 10 milliGRAM(s) Oral daily  tiotropium 18 MICROgram(s) Capsule 1 Capsule(s) Inhalation daily  polyethylene glycol 3350 17 Gram(s) Oral daily  atorvastatin 20 milliGRAM(s) Oral at bedtime  aspirin enteric coated 81 milliGRAM(s) Oral daily  heparin   Injectable 8500 Unit(s) IV Push every 6 hours PRN  heparin   Injectable 4000 Unit(s) IV Push every 6 hours PRN  influenza  Vaccine (HIGH DOSE) 0.7 milliLiter(s) IntraMuscular once  	    [PHYSICAL EXAM:  TELEMETRY:  T(C): 36.7 (11-14-20 @ 21:54), Max: 37.2 (11-14-20 @ 09:54)  HR: 71 (11-15-20 @ 05:20) (65 - 79)  BP: 126/60 (11-15-20 @ 05:20) (106/69 - 167/71)  RR: 20 (11-15-20 @ 05:20) (16 - 20)  SpO2: 95% (11-15-20 @ 05:20) (95% - 98%)  I&O's Summary    14 Nov 2020 07:01  -  15 Nov 2020 07:00  --------------------------------------------------------  IN: 558 mL / OUT: 750 mL / NET: -192 mL                                        Appearance: Normal		  Neck: Supple, - JVD; no Carotid Bruit b/l  Cardiovascular: Normal S1 S2, No M/R/G  Respiratory: Lungs clear to auscultation/No Rales, Rhonchi, Wheezing, crackles  Gastrointestinal:  Soft, Non-tender, ND + BS x4	  Extremities: Normal range of motion, No clubbing, cyanosis or edema b/l upper or lower extremties  Vascular: Peripheral pulses palpable 2+ bilaterally carotids, radial, DP/PT  Neurologic: A & O x 3, Mood & affect appropriate  	    ECG:  	  RADIOLOGY:   DIAGNOSTIC TESTING:  [ x] Echocardiogram: < from: TTE Echo Complete w/o Contrast w/ Doppler (11.13.20 @ 16:09) >  CONCLUSIONS:     1. Normal left and right ventricular size and systolic function.   2. Moderate symmetric left ventricular hypertrophy.   3. Hyperdynamic left ventricular systolic function.   4. 23 mm Jamar valve is noted in the aortic position without any aortic regurgitation. The peak transvalvular velocity is 3.93 m/s, the mean transvalvular gradient is 35.00 mmHg, and the LVOT/AV velocity ratio is 0.30. The peak transaortic gradient is 61.78 mmHg. The transaortic gradients are elevated even in the setting of a TAVR.   5. Grade II left ventricular diastolic dysfunction.   6. Normal right ventricular size and systolic function.   7. Mildly dilated left atrium.   8. No evidence of pulmonary hypertension.   9. No pericardial effusion.  10. Moderately dilated ascending aorta.  11. Compared to the previous TTE performed on 4/1/2019, the transaortic gradients are higher.    < end of copied text >    [ ]  Catheterization:  [ ] Stress Test:    [ ] IRMA  OTHER: 	    LABS:	 	  CARDIAC MARKERS:                        10.3   7.61  )-----------( 205      ( 15 Nov 2020 06:35 )             32.3     11-15    138  |  99  |  32<H>  ----------------------------<  105<H>  4.1   |  26  |  1.09    Ca    9.0      15 Nov 2020 06:35  Mg     2.0     11-15    TPro  7.2  /  Alb  4.0  /  TBili  0.5  /  DBili  x   /  AST  18  /  ALT  25  /  AlkPhos  59  11-14  PTT - ( 15 Nov 2020 00:59 )  PTT:122.6 sec

## 2020-11-15 NOTE — PROGRESS NOTE ADULT - PROBLEM SELECTOR PLAN 1
- s/p transfemoral TAVR with rachael valve on 02/05/2019 with Dr. Alejo @[facility].  - ECHO (11/13/20): Normal BiV fxn/size, moderate LVH, Grade II LV Diastolic Dysfunction, PEAK TRANSAORTIC GRADIENT 61.78mmHg, mildly dilated LA, no pulmHTN, moderately dilated ascending aorta. (previously 14mmHg on 4/2019)  -Dr. Alejo rec: Structural CT Scan (will be done on MONDAY) to assess if there is a thrombus on aortic valve, heparin gtt and limited TTE to access gradient. F/U CTS recs after structural CT scan is collected. - s/p transfemoral TAVR with rachael valve on 02/05/2019 with Dr. Alejo @[facility].  - ECHO (11/13/20): Normal BiV fxn/size, moderate LVH, Grade II LV Diastolic Dysfunction, PEAK TRANSAORTIC GRADIENT 61.78mmHg, mildly dilated LA, no pulmHTN, moderately dilated ascending aorta. (previously 14mmHg on 4/2019)  -NPO after midnight for CTS recommended Structural CT Scan on Monday to assess if there is a thrombus on aortic valve, heparin gtt and limited TTE to access gradient. F/U CTS recs after structural CT scan is collected. - s/p transfemoral TAVR with rachael valve on 02/05/2019 with Dr. Alejo @[facility].  - ECHO (11/13/20): Normal BiV fxn/size, moderate LVH, Grade II LV Diastolic Dysfunction, PEAK TRANSAORTIC GRADIENT 61.78mmHg, mildly dilated LA, no pulmHTN, moderately dilated ascending aorta. (previously 14mmHg on 4/2019)  -NPO after midnight for CTS recommended Structural CT Scan on Monday to assess if there is a thrombus on aortic valve, heparin gtt and limited TTE to access gradient. F/U CTS recs after structural CT scan is collected.  -Per CTS, HOLD heparin gtt and restart if indicated by CT scan

## 2020-11-16 LAB
ANION GAP SERPL CALC-SCNC: 13 MMOL/L — SIGNIFICANT CHANGE UP (ref 5–17)
APTT BLD: 34 SEC — SIGNIFICANT CHANGE UP (ref 27.5–35.5)
BUN SERPL-MCNC: 28 MG/DL — HIGH (ref 7–23)
CALCIUM SERPL-MCNC: 9 MG/DL — SIGNIFICANT CHANGE UP (ref 8.4–10.5)
CHLORIDE SERPL-SCNC: 100 MMOL/L — SIGNIFICANT CHANGE UP (ref 96–108)
CK MB CFR SERPL CALC: 1.1 NG/ML — SIGNIFICANT CHANGE UP (ref 0–6.7)
CK SERPL-CCNC: 66 U/L — SIGNIFICANT CHANGE UP (ref 25–170)
CO2 SERPL-SCNC: 24 MMOL/L — SIGNIFICANT CHANGE UP (ref 22–31)
CREAT SERPL-MCNC: 1.16 MG/DL — SIGNIFICANT CHANGE UP (ref 0.5–1.3)
GLUCOSE SERPL-MCNC: 105 MG/DL — HIGH (ref 70–99)
HCT VFR BLD CALC: 31.8 % — LOW (ref 34.5–45)
HCT VFR BLD CALC: 32.2 % — LOW (ref 34.5–45)
HCT VFR BLD CALC: 34 % — LOW (ref 34.5–45)
HGB BLD-MCNC: 10.2 G/DL — LOW (ref 11.5–15.5)
HGB BLD-MCNC: 10.4 G/DL — LOW (ref 11.5–15.5)
HGB BLD-MCNC: 10.9 G/DL — LOW (ref 11.5–15.5)
INR BLD: 1.13 — SIGNIFICANT CHANGE UP (ref 0.88–1.16)
MAGNESIUM SERPL-MCNC: 2 MG/DL — SIGNIFICANT CHANGE UP (ref 1.6–2.6)
MCHC RBC-ENTMCNC: 29.5 PG — SIGNIFICANT CHANGE UP (ref 27–34)
MCHC RBC-ENTMCNC: 29.6 PG — SIGNIFICANT CHANGE UP (ref 27–34)
MCHC RBC-ENTMCNC: 30 PG — SIGNIFICANT CHANGE UP (ref 27–34)
MCHC RBC-ENTMCNC: 32.1 GM/DL — SIGNIFICANT CHANGE UP (ref 32–36)
MCHC RBC-ENTMCNC: 32.1 GM/DL — SIGNIFICANT CHANGE UP (ref 32–36)
MCHC RBC-ENTMCNC: 32.3 GM/DL — SIGNIFICANT CHANGE UP (ref 32–36)
MCV RBC AUTO: 91.5 FL — SIGNIFICANT CHANGE UP (ref 80–100)
MCV RBC AUTO: 92.4 FL — SIGNIFICANT CHANGE UP (ref 80–100)
MCV RBC AUTO: 93.5 FL — SIGNIFICANT CHANGE UP (ref 80–100)
NRBC # BLD: 0 /100 WBCS — SIGNIFICANT CHANGE UP (ref 0–0)
PLATELET # BLD AUTO: 192 K/UL — SIGNIFICANT CHANGE UP (ref 150–400)
PLATELET # BLD AUTO: 233 K/UL — SIGNIFICANT CHANGE UP (ref 150–400)
PLATELET # BLD AUTO: 243 K/UL — SIGNIFICANT CHANGE UP (ref 150–400)
POTASSIUM SERPL-MCNC: 4.3 MMOL/L — SIGNIFICANT CHANGE UP (ref 3.5–5.3)
POTASSIUM SERPL-SCNC: 4.3 MMOL/L — SIGNIFICANT CHANGE UP (ref 3.5–5.3)
PROTHROM AB SERPL-ACNC: 13.5 SEC — SIGNIFICANT CHANGE UP (ref 10.6–13.6)
RBC # BLD: 3.4 M/UL — LOW (ref 3.8–5.2)
RBC # BLD: 3.52 M/UL — LOW (ref 3.8–5.2)
RBC # BLD: 3.68 M/UL — LOW (ref 3.8–5.2)
RBC # FLD: 13.6 % — SIGNIFICANT CHANGE UP (ref 10.3–14.5)
RBC # FLD: 13.7 % — SIGNIFICANT CHANGE UP (ref 10.3–14.5)
RBC # FLD: 13.7 % — SIGNIFICANT CHANGE UP (ref 10.3–14.5)
SODIUM SERPL-SCNC: 137 MMOL/L — SIGNIFICANT CHANGE UP (ref 135–145)
TROPONIN T SERPL-MCNC: <0.01 NG/ML — SIGNIFICANT CHANGE UP (ref 0–0.01)
WBC # BLD: 6.84 K/UL — SIGNIFICANT CHANGE UP (ref 3.8–10.5)
WBC # BLD: 7.43 K/UL — SIGNIFICANT CHANGE UP (ref 3.8–10.5)
WBC # BLD: 7.64 K/UL — SIGNIFICANT CHANGE UP (ref 3.8–10.5)
WBC # FLD AUTO: 6.84 K/UL — SIGNIFICANT CHANGE UP (ref 3.8–10.5)
WBC # FLD AUTO: 7.43 K/UL — SIGNIFICANT CHANGE UP (ref 3.8–10.5)
WBC # FLD AUTO: 7.64 K/UL — SIGNIFICANT CHANGE UP (ref 3.8–10.5)

## 2020-11-16 PROCEDURE — 93308 TTE F-UP OR LMTD: CPT | Mod: 26

## 2020-11-16 PROCEDURE — 75573 CT HRT C+ STRUX CGEN HRT DS: CPT | Mod: 26

## 2020-11-16 PROCEDURE — 99232 SBSQ HOSP IP/OBS MODERATE 35: CPT

## 2020-11-16 PROCEDURE — 93321 DOPPLER ECHO F-UP/LMTD STD: CPT | Mod: 26

## 2020-11-16 RX ORDER — PANTOPRAZOLE SODIUM 20 MG/1
40 TABLET, DELAYED RELEASE ORAL
Refills: 0 | Status: DISCONTINUED | OUTPATIENT
Start: 2020-11-16 | End: 2020-12-02

## 2020-11-16 RX ADMIN — CARVEDILOL PHOSPHATE 6.25 MILLIGRAM(S): 80 CAPSULE, EXTENDED RELEASE ORAL at 18:26

## 2020-11-16 RX ADMIN — PANTOPRAZOLE SODIUM 40 MILLIGRAM(S): 20 TABLET, DELAYED RELEASE ORAL at 21:55

## 2020-11-16 RX ADMIN — Medication 30 MILLILITER(S): at 21:55

## 2020-11-16 RX ADMIN — Medication 81 MILLIGRAM(S): at 11:24

## 2020-11-16 RX ADMIN — Medication 12.5 MILLIGRAM(S): at 05:21

## 2020-11-16 RX ADMIN — TIOTROPIUM BROMIDE 1 CAPSULE(S): 18 CAPSULE ORAL; RESPIRATORY (INHALATION) at 10:29

## 2020-11-16 RX ADMIN — LISINOPRIL 10 MILLIGRAM(S): 2.5 TABLET ORAL at 05:21

## 2020-11-16 RX ADMIN — POLYETHYLENE GLYCOL 3350 17 GRAM(S): 17 POWDER, FOR SOLUTION ORAL at 18:26

## 2020-11-16 RX ADMIN — CARVEDILOL PHOSPHATE 6.25 MILLIGRAM(S): 80 CAPSULE, EXTENDED RELEASE ORAL at 05:21

## 2020-11-16 RX ADMIN — ATORVASTATIN CALCIUM 20 MILLIGRAM(S): 80 TABLET, FILM COATED ORAL at 21:56

## 2020-11-16 NOTE — PROGRESS NOTE ADULT - PROBLEM SELECTOR PLAN 1
- s/p transfemoral TAVR with rachael valve on 02/05/2019 with Dr. Alejo @[facility].  - ECHO (11/13/20): Normal BiV fxn/size, moderate LVH, Grade II LV Diastolic Dysfunction, PEAK TRANSAORTIC GRADIENT 61.78mmHg, mildly dilated LA, no pulmHTN, moderately dilated ascending aorta. (previously 14mmHg on 4/2019)  -NPO after midnight for CTS recommended Structural CT Scan on Monday to assess if there is a thrombus on aortic valve, heparin gtt and limited TTE to access gradient. F/U CTS recs after structural CT scan is collected.  -Per CTS, HOLD heparin gtt and restart if indicated by CT scan - s/p transfemoral TAVR with rachael valve on 02/05/2019 with Dr. Alejo @[facility].  - ECHO (11/13/20): Normal BiV fxn/size, moderate LVH, Grade II LV Diastolic Dysfunction, PEAK TRANSAORTIC GRADIENT 61.78mmHg, mildly dilated LA, no pulmHTN, moderately dilated ascending aorta. (previously 14mmHg on 4/2019)  -Structural CT scan: no thrombus seen. Heparin gtt not required.  -Limited TTE 11/16: gradient 71mmHg  -F/u CTS recs on repeat TAVR vs repair - s/p transfemoral TAVR with rachael valve on 02/05/2019 with Dr. Alejo @[facility].  - ECHO (11/13/20): Normal BiV fxn/size, moderate LVH, Grade II LV Diastolic Dysfunction, PEAK TRANSAORTIC GRADIENT 61.78mmHg, mildly dilated LA, no pulmHTN, moderately dilated ascending aorta. (previously 14mmHg on 4/2019)  -Structural CT scan: no thrombus seen. Heparin gtt not required.  -Limited TTE 11/16: gradient 71mmHg  -NPO after midnight for CTS recommended IRMA. F/u continued CTS recs

## 2020-11-16 NOTE — PROGRESS NOTE ADULT - PROBLEM SELECTOR PLAN 2
-x1 episode of BRBPR on 11/14PM 2/2 to supratherapeutic PTT on heparin gtt  -Per patient, has had a few episodes of BPBPR however this is the first episode here  -GI consulted, f/u recs -x2 episodes of BRBPR on 11/14PM and 11/15 PM 2/2 to supratherapeutic PTT on heparin gtt. Drip since d/c and not indicated as there is no AV thrombus  -Per patient, has had a few episodes of BPBPR however this is the first episode here  -GI consulted: feel episodes likely 2/2 to supratherapetic PTT. Rec repeat labs at 4pm to ensure stable Hb. Can add AC if indicated so long as PTT stable -x2 episodes of BRBPR on 11/14PM and 11/15 PM 2/2 to supratherapeutic PTT on heparin gtt. Drip since d/c and not indicated as there is no AV thrombus  -Per patient, has had a few episodes of BPBPR however this is the first episode here  -GI consulted: feel episodes likely 2/2 to supratherapetic PTT. Repeat labs at 4pm again stable with normalized PTT. Can add AC if indicated so long as PTT stable. -x2 episodes of BRBPR on 11/14PM and 11/15 PM 2/2 to supratherapeutic PTT on heparin gtt. Drip since d/c and not indicated as there is no AV thrombus  -Per patient, has had a few episodes of BPBPR however this is the first episode here  -GI consulted: feel episodes likely 2/2 to supratherapetic PTT. Repeat labs at 4pm again stable with normalized PTT. Can add AC if indicated so long as PTT stable.  -PA alerted pt had 3rd episode of BRBPR @ 6pm on 11/16 - STAT CBC ordered; f/u results  -F/u GI recs -- will likely need EGD/Colonoscopy prior to any surgical intervention.

## 2020-11-16 NOTE — PROGRESS NOTE ADULT - ASSESSMENT
This is an 79 y/o Venezuelan speaking female w/ a PMHx of uncontrolled HTN, HLD, asthma (no hospitalizations in past), ?MARK (noncompliant with CPAP), chronic diastolic CHF (Ef 61%), AS (s/p TAVR jamar valve with Dr. Alejo on 2/5/2019) who was recently hospitalized at [facility] 2/2 to hypertensive emergency (HTN w/ HA). CT head/MRI brain at that time negative and discharged 11/4/20. Patient presented to [facility] ED on 11/13/20 with complaints of intermittent CP and elevated BP for 1 week. Structural heart consulted in setting previosu TAVR with elevated AV gradients on TTE.    Problem 1: aortic stenosis (bioprosthetic valve stenosis)  -s/p TAVR valve in 2019 with Dr. Alejo with a Jamar 23mm  -Elevated gradients on TTE this admission in the setting of HTN and CHF exacerbation  -Need to r/o thrombus on leaflet of TAVR valve   -Structural scan to be completed today 11/16, will follow up results  -Case discussed with Dr. Alejo as well    Problem 2: HTN/hypertensive urgency   -c/w with care per primary team  -c/w coreg, lisinopril   -monitor BP closely and for signs of end organ dysfunction in the setting of hypertensive urgency     Problem 3: HLD   -c/w atorvastatin 20mg at bedtime  -care per primary team     Problem 4: HFpEF   -normal EF (61%)   -c/w BB, ACEI  -diuresis as needed   -care per primary team     Problem 5: BRBPR  -Would hold heparin gtt at this time (no definite diagnosis of thrombosis)  -H&H stable  -Gastro consulted, appreciate reccs   This is an 79 y/o Ethiopian speaking female w/ a PMHx of uncontrolled HTN, HLD, asthma (no hospitalizations in past), ?MARK (noncompliant with CPAP), chronic diastolic CHF (Ef 61%), AS (s/p TAVR jamar valve with Dr. Alejo on 2/5/2019) who was recently hospitalized at [facility] 2/2 to hypertensive emergency (HTN w/ HA). CT head/MRI brain at that time negative and discharged 11/4/20. Patient presented to [facility] ED on 11/13/20 with complaints of intermittent CP and elevated BP for 1 week. Structural heart consulted in setting previosu TAVR with elevated AV gradients on TTE.    Problem 1: aortic stenosis (bioprosthetic valve stenosis)  -s/p TAVR valve in 2019 with Dr. Alejo with a Jamar 23mm  -Elevated gradients on TTE this admission in the setting of HTN and CHF exacerbation  -CT structural completed, no thrombus noted on CT   -NPO after midnight for IRMA to evaluate valve    Problem 2: HTN/hypertensive urgency   -c/w with care per primary team  -c/w coreg, lisinopril   -monitor BP closely and for signs of end organ dysfunction in the setting of hypertensive urgency     Problem 3: HLD   -c/w atorvastatin 20mg at bedtime  -care per primary team     Problem 4: HFpEF   -normal EF (61%)   -c/w BB, ACEI  -diuresis as needed   -care per primary team     Problem 5: BRBPR  -Would hold heparin gtt at this time (no definite diagnosis of thrombosis)  -H&H stable  -Gastro consulted, appreciate reccs

## 2020-11-16 NOTE — PROGRESS NOTE ADULT - SUBJECTIVE AND OBJECTIVE BOX
Interventional Cardiology PA Adult Progress Note    Subjective Assessment:  	  MEDICATIONS:  carvedilol 6.25 milliGRAM(s) Oral every 12 hours  hydrochlorothiazide 12.5 milliGRAM(s) Oral daily  lisinopril 10 milliGRAM(s) Oral daily      tiotropium 18 MICROgram(s) Capsule 1 Capsule(s) Inhalation daily      polyethylene glycol 3350 17 Gram(s) Oral daily    atorvastatin 20 milliGRAM(s) Oral at bedtime    aspirin enteric coated 81 milliGRAM(s) Oral daily  influenza  Vaccine (HIGH DOSE) 0.7 milliLiter(s) IntraMuscular once      	    [PHYSICAL EXAM:  TELEMETRY:  T(C): 36 (11-16-20 @ 09:03), Max: 36.8 (11-15-20 @ 22:01)  HR: 65 (11-16-20 @ 09:35) (64 - 74)  BP: 123/64 (11-16-20 @ 09:35) (116/57 - 167/72)  RR: 17 (11-16-20 @ 09:35) (16 - 20)  SpO2: 95% (11-16-20 @ 09:35) (94% - 96%)  Wt(kg): --  I&O's Summary    15 Nov 2020 07:01  -  16 Nov 2020 07:00  --------------------------------------------------------  IN: 390 mL / OUT: 2700 mL / NET: -2310 mL    16 Nov 2020 07:01  -  16 Nov 2020 11:19  --------------------------------------------------------  IN: 0 mL / OUT: 400 mL / NET: -400 mL        Gonzalez:  Central/PICC/Mid Line:                                         Appearance: Normal	  HEENT:   Normal oral mucosa, PERRL, EOMI	  Neck: Supple, + JVD/ - JVD; Carotid Bruit   Cardiovascular: Normal S1 S2, No JVD, No murmurs,   Respiratory: Lungs clear to auscultation/Decreased Breath Sounds/No Rales, Rhonchi, Wheezing	  Gastrointestinal:  Soft, Non-tender, + BS	  Skin: No rashes, No ecchymoses, No cyanosis  Extremities: Normal range of motion, No clubbing, cyanosis or edema  Vascular: Peripheral pulses palpable 2+ bilaterally  Neurologic: Non-focal  Psychiatry: A & O x 3, Mood & affect appropriate      	    ECG:  	  RADIOLOGY:   DIAGNOSTIC TESTING:  [ ] Echocardiogram:  [ ]  Catheterization:  [ ] Stress Test:    [ ] IRMA  OTHER: 	    LABS:	 	  CARDIAC MARKERS:                                  10.2   6.84  )-----------( 192      ( 16 Nov 2020 05:28 )             31.8     11-16    137  |  100  |  28<H>  ----------------------------<  105<H>  4.3   |  24  |  1.16    Ca    9.0      16 Nov 2020 05:28  Mg     2.0     11-16      proBNP:   Lipid Profile:   HgA1c:   TSH:   PTT - ( 15 Nov 2020 00:59 )  PTT:122.6 sec    ASSESSMENT/PLAN: 	        DVT ppx:  Dispo:     Interventional Cardiology PA Adult Progress Note    Subjective Assessment: Patient was seen and examined at the bedside. No complaints at this time. Aware of plan for CT scan today   	  MEDICATIONS:  carvedilol 6.25 milliGRAM(s) Oral every 12 hours  hydrochlorothiazide 12.5 milliGRAM(s) Oral daily  lisinopril 10 milliGRAM(s) Oral daily      tiotropium 18 MICROgram(s) Capsule 1 Capsule(s) Inhalation daily      polyethylene glycol 3350 17 Gram(s) Oral daily    atorvastatin 20 milliGRAM(s) Oral at bedtime    aspirin enteric coated 81 milliGRAM(s) Oral daily  influenza  Vaccine (HIGH DOSE) 0.7 milliLiter(s) IntraMuscular once      	    [PHYSICAL EXAM:  TELEMETRY:  T(C): 36 (11-16-20 @ 09:03), Max: 36.8 (11-15-20 @ 22:01)  HR: 65 (11-16-20 @ 09:35) (64 - 74)  BP: 123/64 (11-16-20 @ 09:35) (116/57 - 167/72)  RR: 17 (11-16-20 @ 09:35) (16 - 20)  SpO2: 95% (11-16-20 @ 09:35) (94% - 96%)  Wt(kg): --  I&O's Summary    15 Nov 2020 07:01  -  16 Nov 2020 07:00  --------------------------------------------------------  IN: 390 mL / OUT: 2700 mL / NET: -2310 mL    16 Nov 2020 07:01  -  16 Nov 2020 11:19  --------------------------------------------------------  IN: 0 mL / OUT: 400 mL / NET: -400 mL        Gonzalez:  Central/PICC/Mid Line:                                         Appearance: Normal	  HEENT:   Normal oral mucosa, PERRL, EOMI	  Neck: Supple, + JVD/ - JVD; Carotid Bruit   Cardiovascular: Normal S1 S2, No JVD, No murmurs,   Respiratory: Lungs clear to auscultation/Decreased Breath Sounds/No Rales, Rhonchi, Wheezing	  Gastrointestinal:  Soft, Non-tender, + BS	  Skin: No rashes, No ecchymoses, No cyanosis  Extremities: Normal range of motion, No clubbing, cyanosis or edema  Vascular: Peripheral pulses palpable 2+ bilaterally  Neurologic: Non-focal  Psychiatry: A & O x 3, Mood & affect appropriate      	    ECG:  	  RADIOLOGY:   DIAGNOSTIC TESTING:  [ ] Echocardiogram:  [ ]  Catheterization:  [ ] Stress Test:    [ ] IRMA  OTHER: 	    LABS:	 	  CARDIAC MARKERS:                                  10.2   6.84  )-----------( 192      ( 16 Nov 2020 05:28 )             31.8     11-16    137  |  100  |  28<H>  ----------------------------<  105<H>  4.3   |  24  |  1.16    Ca    9.0      16 Nov 2020 05:28  Mg     2.0     11-16      proBNP:   Lipid Profile:   HgA1c:   TSH:   PTT - ( 15 Nov 2020 00:59 )  PTT:122.6 sec    ASSESSMENT/PLAN: 	        DVT ppx:  Dispo:     Interventional Cardiology PA Adult Progress Note    Subjective Assessment: Patient was seen and examined at the bedside. No complaints at this time. Aware of plan for CT scan today and GI re-eval in light of second episode of BRBPR. All other ROS negative except those listed in subjective assessment.   	  MEDICATIONS:  carvedilol 6.25 milliGRAM(s) Oral every 12 hours  hydrochlorothiazide 12.5 milliGRAM(s) Oral daily  lisinopril 10 milliGRAM(s) Oral daily  tiotropium 18 MICROgram(s) Capsule 1 Capsule(s) Inhalation daily  polyethylene glycol 3350 17 Gram(s) Oral daily  atorvastatin 20 milliGRAM(s) Oral at bedtime  aspirin enteric coated 81 milliGRAM(s) Oral daily  influenza  Vaccine (HIGH DOSE) 0.7 milliLiter(s) IntraMuscular once	    [PHYSICAL EXAM:  TELEMETRY:  T(C): 36 (11-16-20 @ 09:03), Max: 36.8 (11-15-20 @ 22:01)  HR: 65 (11-16-20 @ 09:35) (64 - 74)  BP: 123/64 (11-16-20 @ 09:35) (116/57 - 167/72)  RR: 17 (11-16-20 @ 09:35) (16 - 20)  SpO2: 95% (11-16-20 @ 09:35) (94% - 96%)  I&O's Summary    15 Nov 2020 07:01  -  16 Nov 2020 07:00  --------------------------------------------------------  IN: 390 mL / OUT: 2700 mL / NET: -2310 mL    16 Nov 2020 07:01  -  16 Nov 2020 11:19  --------------------------------------------------------  IN: 0 mL / OUT: 400 mL / NET: -400 mL                                  Appearance: Normal		  Neck: Supple, - JVD; no Carotid Bruit b/l  Cardiovascular: Normal S1 S2, No M/R/G  Respiratory: Lungs clear to auscultation/No Rales, Rhonchi, Wheezing, crackles  Gastrointestinal:  Soft, Non-tender, ND + BS x4	  Extremities: Normal range of motion, No clubbing, cyanosis or edema b/l upper or lower extremties  Vascular: Peripheral pulses palpable 2+ bilaterally carotids, radial, DP/PT  Neurologic: A & O x 3, Mood & affect appropriate  	    ECG:  	  RADIOLOGY:   DIAGNOSTIC TESTING:  [ x] Echocardiogram: < from: TTE Echo Complete w/o Contrast w/ Doppler (11.13.20 @ 16:09) >  CONCLUSIONS:     1. Normal left and right ventricular size and systolic function.   2. Moderate symmetric left ventricular hypertrophy.   3. Hyperdynamic left ventricular systolic function.   4. 23 mm Jamar valve is noted in the aortic position without any aortic regurgitation. The peak transvalvular velocity is 3.93 m/s, the mean transvalvular gradient is 35.00 mmHg, and the LVOT/AV velocity ratio is 0.30. The peak transaortic gradient is 61.78 mmHg. The transaortic gradients are elevated even in the setting of a TAVR.   5. Grade II left ventricular diastolic dysfunction.   6. Normal right ventricular size and systolic function.   7. Mildly dilated left atrium.   8. No evidence of pulmonary hypertension.   9. No pericardial effusion.  10. Moderately dilated ascending aorta.  11. Compared to the previous TTE performed on 4/1/2019, the transaortic gradients are higher.    < end of copied text >    [ ]  Catheterization:  [ ] Stress Test:    [ ] IRAM  OTHER: 	    LABS:	 	  CARDIAC MARKERS:                     10.2   6.84  )-----------( 192      ( 16 Nov 2020 05:28 )             31.8     11-16    137  |  100  |  28<H>  ----------------------------<  105<H>  4.3   |  24  |  1.16    Ca    9.0      16 Nov 2020 05:28  Mg     2.0     11-16       Interventional Cardiology PA Adult Progress Note    Subjective Assessment: Patient was seen and examined at the bedside. No complaints at this time. Aware of plan for CT scan today and GI re-eval in light of second episode of BRBPR. All other ROS negative except those listed in subjective assessment.   	  MEDICATIONS:  carvedilol 6.25 milliGRAM(s) Oral every 12 hours  hydrochlorothiazide 12.5 milliGRAM(s) Oral daily  lisinopril 10 milliGRAM(s) Oral daily  tiotropium 18 MICROgram(s) Capsule 1 Capsule(s) Inhalation daily  polyethylene glycol 3350 17 Gram(s) Oral daily  atorvastatin 20 milliGRAM(s) Oral at bedtime  aspirin enteric coated 81 milliGRAM(s) Oral daily  influenza  Vaccine (HIGH DOSE) 0.7 milliLiter(s) IntraMuscular once	    [PHYSICAL EXAM:  TELEMETRY:  T(C): 36 (11-16-20 @ 09:03), Max: 36.8 (11-15-20 @ 22:01)  HR: 65 (11-16-20 @ 09:35) (64 - 74)  BP: 123/64 (11-16-20 @ 09:35) (116/57 - 167/72)  RR: 17 (11-16-20 @ 09:35) (16 - 20)  SpO2: 95% (11-16-20 @ 09:35) (94% - 96%)  I&O's Summary    15 Nov 2020 07:01  -  16 Nov 2020 07:00  --------------------------------------------------------  IN: 390 mL / OUT: 2700 mL / NET: -2310 mL    16 Nov 2020 07:01  -  16 Nov 2020 11:19  --------------------------------------------------------  IN: 0 mL / OUT: 400 mL / NET: -400 mL                                  Appearance: Normal		  Neck: Supple, - JVD; no Carotid Bruit b/l  Cardiovascular: Normal S1 S2, No M/R/G  Respiratory: Lungs clear to auscultation/No Rales, Rhonchi, Wheezing, crackles  Gastrointestinal:  Soft, Non-tender, ND + BS x4	  Extremities: Normal range of motion, No clubbing, cyanosis or edema b/l upper or lower extremties  Vascular: Peripheral pulses palpable 2+ bilaterally carotids, radial, DP/PT  Neurologic: A & O x 3, Mood & affect appropriate  	    ECG:  	  RADIOLOGY:   DIAGNOSTIC TESTING:  [ x] Echocardiogram: < from: TTE Echo Complete w/o Contrast w/ Doppler (11.13.20 @ 16:09) >  CONCLUSIONS:     1. Normal left and right ventricular size and systolic function.   2. Moderate symmetric left ventricular hypertrophy.   3. Hyperdynamic left ventricular systolic function.   4. 23 mm Jamar valve is noted in the aortic position without any aortic regurgitation. The peak transvalvular velocity is 3.93 m/s, the mean transvalvular gradient is 35.00 mmHg, and the LVOT/AV velocity ratio is 0.30. The peak transaortic gradient is 61.78 mmHg. The transaortic gradients are elevated even in the setting of a TAVR.   5. Grade II left ventricular diastolic dysfunction.   6. Normal right ventricular size and systolic function.   7. Mildly dilated left atrium.   8. No evidence of pulmonary hypertension.   9. No pericardial effusion.  10. Moderately dilated ascending aorta.  11. Compared to the previous TTE performed on 4/1/2019, the transaortic gradients are higher.    < end of copied text >    [ ]  Catheterization:  [ ] Stress Test:    [ ] IRMA  OTHER: 	    [ ] Structural CT: < from: CT Heart Congenital w/ IV Cont (11.16.20 @ 12:02) >  Impression:  1.  Please note this study was not optimized for the evaluation of the coronary arteries.  2.  Non obstructive coronary artery disease.  3.  Bioprosthetic aortic valve. No thrombus visualized on aortic valve leaflets.  4.  Calcification of the mitral valve annulus.    < end of copied text >    [ ] Limited TTE 11/16: < from: TTE Limited Echo w/o Cont (11.16.20 @ 12:57) >  CONCLUSIONS:     1. Limited study obtained for evaluation of aortic valve gradients.   2. Hyperdynamic left ventricular systolic function with an intra-cavitary gradient of 31 mmHg.   3. 23 mm Jamar valve is noted in the aortic position without any aortic regurgitation. The peak transvalvular velocity is 4.20 m/s, the mean transvalvular gradient is 35.00 mmHg, and the LVOT/AV velocity ratio is 0.38. The peak transaortic gradient is 70.56 mmHg. The transaortic gradients are elevated, even in the setting of a TAVR. However, hyperdynamic LVEF may contribute to the elevation of the transaortic gradients.   4. Normal right ventricular size and systolic function.   5. No pericardial effusion.   6. Compared to the previous TTE performed on 11/13/2020, there have been no significant interval changes.      < end of copied text >    LABS:	 	  CARDIAC MARKERS:                     10.2   6.84  )-----------( 192      ( 16 Nov 2020 05:28 )             31.8     11-16    137  |  100  |  28<H>  ----------------------------<  105<H>  4.3   |  24  |  1.16    Ca    9.0      16 Nov 2020 05:28  Mg     2.0     11-16

## 2020-11-16 NOTE — PROGRESS NOTE ADULT - SUBJECTIVE AND OBJECTIVE BOX
Patient discussed on morning rounds with       Operation / Date:     SUBJECTIVE ASSESSMENT:  80y Female         Vital Signs Last 24 Hrs  T(C): 36 (16 Nov 2020 09:03), Max: 36.8 (15 Nov 2020 22:01)  T(F): 96.8 (16 Nov 2020 09:03), Max: 98.2 (15 Nov 2020 22:01)  HR: 65 (16 Nov 2020 09:35) (64 - 74)  BP: 123/64 (16 Nov 2020 09:35) (116/57 - 167/72)  BP(mean): 88 (16 Nov 2020 09:35) (78 - 104)  RR: 17 (16 Nov 2020 09:35) (16 - 20)  SpO2: 95% (16 Nov 2020 09:35) (94% - 96%)  I&O's Detail    15 Nov 2020 07:01  -  16 Nov 2020 07:00  --------------------------------------------------------  IN:    Oral Fluid: 390 mL  Total IN: 390 mL    OUT:    Voided (mL): 2700 mL  Total OUT: 2700 mL    Total NET: -2310 mL      16 Nov 2020 07:01  -  16 Nov 2020 12:08  --------------------------------------------------------  IN:  Total IN: 0 mL    OUT:    Voided (mL): 400 mL  Total OUT: 400 mL    Total NET: -400 mL          CHEST TUBE:  Yes/No. AIR LEAKS: Yes/No. Suction / H2O SEAL.   SERVANDO DRAIN:  Yes/No.  EPICARDIAL WIRES: Yes/No.  TIE DOWNS: Yes/No.  VENTURA: Yes/No.    PHYSICAL EXAM:  Neuro: A+O x 3, non-focal, speech clear and intact  HEENT: PERRL, EOMI, oral mucosa pink and moist  Neck: supple, no JVD  CV: regular rate, regular rhythm, +S1S2, +Systolic murmur  Pulm/chest: lung sounds CTA and equal bilaterally, no accessory muscle use noted  Abd: soft, NT, ND, +BS  Ext: QUIROS x 4, no C/C/E  Skin: warm, well perfused, no rashes     LABS:                        10.2   6.84  )-----------( 192      ( 16 Nov 2020 05:28 )             31.8       COUMADIN:  Yes/No. REASON: .    PTT - ( 15 Nov 2020 00:59 )  PTT:122.6 sec    11-16    137  |  100  |  28<H>  ----------------------------<  105<H>  4.3   |  24  |  1.16    Ca    9.0      16 Nov 2020 05:28  Mg     2.0     11-16            MEDICATIONS  (STANDING):  aspirin enteric coated 81 milliGRAM(s) Oral daily  atorvastatin 20 milliGRAM(s) Oral at bedtime  carvedilol 6.25 milliGRAM(s) Oral every 12 hours  hydrochlorothiazide 12.5 milliGRAM(s) Oral daily  influenza  Vaccine (HIGH DOSE) 0.7 milliLiter(s) IntraMuscular once  lisinopril 10 milliGRAM(s) Oral daily  polyethylene glycol 3350 17 Gram(s) Oral daily  tiotropium 18 MICROgram(s) Capsule 1 Capsule(s) Inhalation daily    MEDICATIONS  (PRN):   Patient discussed on morning rounds with Dr. Alejo      SUBJECTIVE ASSESSMENT:  80y Female seen and discussed on rounds the afternoon. Patient tearful, very concerned about readmission for hypertension. Discussed t need for IRMA discussed with patient. Patient states headache and shortness of breath better, Patient denies dizziness, vision changes, chest pain, palpitations, cough, n/v/d,  calf tenderness.       Vital Signs Last 24 Hrs  T(C): 36 (16 Nov 2020 09:03), Max: 36.8 (15 Nov 2020 22:01)  T(F): 96.8 (16 Nov 2020 09:03), Max: 98.2 (15 Nov 2020 22:01)  HR: 65 (16 Nov 2020 09:35) (64 - 74)  BP: 123/64 (16 Nov 2020 09:35) (116/57 - 167/72)  BP(mean): 88 (16 Nov 2020 09:35) (78 - 104)  RR: 17 (16 Nov 2020 09:35) (16 - 20)  SpO2: 95% (16 Nov 2020 09:35) (94% - 96%)  I&O's Detail    15 Nov 2020 07:01  -  16 Nov 2020 07:00  --------------------------------------------------------  IN:    Oral Fluid: 390 mL  Total IN: 390 mL    OUT:    Voided (mL): 2700 mL  Total OUT: 2700 mL    Total NET: -2310 mL      16 Nov 2020 07:01  -  16 Nov 2020 12:08  --------------------------------------------------------  IN:  Total IN: 0 mL    OUT:    Voided (mL): 400 mL  Total OUT: 400 mL    Total NET: -400 mL      PHYSICAL EXAM:  Neuro: A+O x 3, non-focal, speech clear and intact  HEENT: PERRL, EOMI, oral mucosa pink and moist  Neck: supple, no JVD  CV: regular rate, regular rhythm, +S1S2, +Systolic murmur  Pulm/chest: lung sounds CTA and equal bilaterally, no accessory muscle use noted  Abd: soft, NT, ND, +BS  Ext: QUIROS x 4, +1 lower extremity swelling bilaterally  Skin: warm, well perfused, no rashes     LABS:                        10.2   6.84  )-----------( 192      ( 16 Nov 2020 05:28 )             31.8       COUMADIN:  Yes/No. REASON: .    PTT - ( 15 Nov 2020 00:59 )  PTT:122.6 sec    11-16    137  |  100  |  28<H>  ----------------------------<  105<H>  4.3   |  24  |  1.16    Ca    9.0      16 Nov 2020 05:28  Mg     2.0     11-16        MEDICATIONS  (STANDING):  aspirin enteric coated 81 milliGRAM(s) Oral daily  atorvastatin 20 milliGRAM(s) Oral at bedtime  carvedilol 6.25 milliGRAM(s) Oral every 12 hours  hydrochlorothiazide 12.5 milliGRAM(s) Oral daily  influenza  Vaccine (HIGH DOSE) 0.7 milliLiter(s) IntraMuscular once  lisinopril 10 milliGRAM(s) Oral daily  polyethylene glycol 3350 17 Gram(s) Oral daily  tiotropium 18 MICROgram(s) Capsule 1 Capsule(s) Inhalation daily    MEDICATIONS  (PRN):

## 2020-11-16 NOTE — PROGRESS NOTE ADULT - ASSESSMENT
78 year old Tuvaluan speaking F, PMHx of uncontrolled HTN, hyperlipidemia, asthma (denies hospitalizations, intubations), MARK? (on CPAP, noncompliant), chronic diastolic CHF (EF:61% by Echo 4/2019), aortic stenosis s/p transfemoral TAVR with rachael valve on 02/05/2019 with Dr. Alejo @[facility], recent hospitalization at [facility] (for HTN/headache, no changes made to medications, negative CT head/MRI brain, discharged on 11/4/20) who presents to [facility] ED 11/13/20 c/o intermittent chest pain and elevated BP x 1 week. GI consulted for BRBPR.     #Hematochezia  - occurred in setting of supratherapeutic aPTT;   - Hgb stable between 10-11  - hemodynamically stable  - if patient needs AC, can restart at lower dose and try to avoid any excursions into supratherapeutic territory; if she has hematochezia on the lower dose as well, then will assess role of endoscopic evaluation    78 year old Irish speaking F, PMHx of uncontrolled HTN, hyperlipidemia, asthma (denies hospitalizations, intubations), MARK? (on CPAP, noncompliant), chronic diastolic CHF (EF:61% by Echo 4/2019), aortic stenosis s/p transfemoral TAVR with rachael valve on 02/05/2019 with Dr. Alejo @[facility], recent hospitalization at [facility] (for HTN/headache, no changes made to medications, negative CT head/MRI brain, discharged on 11/4/20) who presents to [facility] ED 11/13/20 c/o intermittent chest pain and elevated BP x 1 week. GI consulted for BRBPR.     #Hematochezia  - occurred in setting of supratherapeutic aPTT;   - Hgb stable between 10-11. Pt reports last episode was last night and none since then  - hemodynamically stable  - if patient needs AC, can restart at lower dose and try to avoid any excursions into supratherapeutic territory; if she has hematochezia on the lower dose as well, then will assess role of endoscopic evaluation   - will sign off for now.    Recommendations discussed with primary team  Plan discussed with GI service attending    Cordell Lindsey MD  PGY-4 GI fellow  Pager: [phone]

## 2020-11-16 NOTE — PROGRESS NOTE ADULT - ASSESSMENT
78 year old Luxembourger speaking F, PMHx of uncontrolled HTN, HLD, asthma, chronic diastolic CHF (EF:61% by Echo 4/2019), aortic stenosis s/p transfemoral TAVR with rachael valve on 02/05/2019 with Dr. Alejo @[facility] who presents to [facility] ED 11/13/20 c/o intermittent chest pain and elevated BP x 1 week. BP initially elevated on arrival however has been stable on home meds. ECHO 11/13/2020 showed elevated gradient over aortic valve of 61.78 (doubled since last echo). Dr. Alejo consulted, planning for structural CT on Monday (NPO after midnight), limited TTE to evaluate gradient. GI consulted 11/15 in the setting of x1 episode of BRBPR in the setting of supratherapeutic PTT. Per CTS hold heparin gtt and will re-eval after CT. 78 year old Citizen of [place] speaking F, PMHx of uncontrolled HTN, HLD, asthma, chronic diastolic CHF (EF:61% by Echo 4/2019), aortic stenosis s/p transfemoral TAVR with rachael valve on 02/05/2019 with Dr. Alejo @[facility] who presents to [facility] ED 11/13/20 c/o intermittent chest pain and elevated BP x 1 week. BP initially elevated on arrival however has been stable on home meds. ECHO 11/13/2020 showed elevated gradient over aortic valve of 61.78 (doubled since last echo). Dr. Alejo consulted, s/p structural CT, f/u results. Limited TTE to evaluate gradient. GI consulted 11/15 in the setting of x1 episode of BRBPR in the setting of supratherapeutic PTT. Per CTS hold heparin gtt and will re-eval after CT. 78 year old Citizen of [place] speaking F, PMHx of uncontrolled HTN, HLD, asthma, chronic diastolic CHF (EF:61% by Echo 4/2019), aortic stenosis s/p transfemoral TAVR with rachael valve on 02/05/2019 with Dr. Alejo @[facility] who presents to [facility] ED 11/13/20 c/o intermittent chest pain and elevated BP x 1 week. BP initially elevated on arrival however has been stable on home meds. ECHO 11/13/2020 showed elevated gradient over aortic valve of 61.78 (doubled since last echo). Dr. Alejo consulted. S/p structural CT: negative for thrombus, Limited TTE 11/16: aortic valve gradient 71mmHg. GI consulted 11/15 in the setting of x2 episodes of BRBPR in the setting of supratherapeutic PTT when on heparin gtt. Drip d/c in the setting of no thrombus. F/u CTS on repeat TAVR vs AV repair. 78 year old Lao speaking F, PMHx of uncontrolled HTN, HLD, asthma, chronic diastolic CHF (EF:61% by Echo 4/2019), aortic stenosis s/p transfemoral TAVR with rachael valve on 02/05/2019 with Dr. Alejo @[facility] who presents to [facility] ED 11/13/20 c/o intermittent chest pain and elevated BP x 1 week. BP initially elevated on arrival however has been stable on home meds. ECHO 11/13/2020 showed elevated gradient over aortic valve of 61.78 (doubled since last echo). Dr. Alejo consulted. S/p structural CT: negative for thrombus, Limited TTE 11/16: aortic valve gradient 71mmHg. GI consulted 11/15 in the setting of x2 episodes of BRBPR in the setting of supratherapeutic PTT when on heparin gtt. Drip d/c in the setting of no thrombus. NPO after midnight for CTS recommended IRMA to again r/o thrombus. F/u further CTS recs. 78 year old Latvian speaking F, PMHx of uncontrolled HTN, HLD, asthma, chronic diastolic CHF (EF:61% by Echo 4/2019), aortic stenosis s/p transfemoral TAVR with rachale valve on 02/05/2019 with Dr. Alejo @[facility] who presents to [facility] ED 11/13/20 c/o intermittent chest pain and elevated BP x 1 week. BP initially elevated on arrival however has been stable on home meds. ECHO 11/13/2020 showed elevated gradient over aortic valve of 61.78 (doubled since last echo). Dr. Alejo consulted. S/p structural CT: negative for thrombus, Limited TTE 11/16: aortic valve gradient 71mmHg. GI consulted 11/15 in the setting of x3 episodes of BRBPR in the setting of supratherapeutic PTT when on heparin gtt. Hb has been stable however will likely need EGD/Colonoscopy prior to any surgical intervention. Drip d/c in the setting of no thrombus. NPO after midnight for CTS recommended IRMA to again r/o thrombus. F/u further CTS/GI recs.

## 2020-11-16 NOTE — PROGRESS NOTE ADULT - PROBLEM SELECTOR PLAN 3
- BP well controlled on home regimen.   - CONTINUE: Coreg 6.25mg PO BID, Lisinopril 10mg PO QD, home HCTZ 12.5mg PO QD

## 2020-11-16 NOTE — PROGRESS NOTE ADULT - PROBLEM SELECTOR PLAN 6
- Intermittently w/ HA. No focal neurodeficits.   - Responds well to IV Tylenol and Fiorocet.   ** Of note, patient reportedly had normal CT head/MRI Brain @ [facility] ~1 week ago.

## 2020-11-16 NOTE — PROGRESS NOTE ADULT - PROBLEM SELECTOR PLAN 4
- Likely 2/2 HTN urgency; RESOLVED  - Dx R/LHC 1/9/19 non-obstructive (full report below).   **Recent diagnostic R+L heart cath@[facility] 1/9/19 revealed LMCA normal, LAD with minor luminal irregularities, LCx large and normal, RCA large and normal. PA 18, RV 73/13 (21), PA 70/34 (49), PCWP 26, CO 7.3 L/min, CI 3.7, TPG 23, severe AS (mean LV/Ao gradient 48.1 mmHg, SETH 1.1 cm2).

## 2020-11-16 NOTE — PROGRESS NOTE ADULT - SUBJECTIVE AND OBJECTIVE BOX
GASTROENTEROLOGY PROGRESS NOTE  Patient seen and examined at bedside. Pt reported no BM since yday    PERTINENT REVIEW OF SYSTEMS:  As noted above    Allergies    Plavix (Other (Mod to Severe))    Intolerances      MEDICATIONS:  MEDICATIONS  (STANDING):  aspirin enteric coated 81 milliGRAM(s) Oral daily  atorvastatin 20 milliGRAM(s) Oral at bedtime  carvedilol 6.25 milliGRAM(s) Oral every 12 hours  hydrochlorothiazide 12.5 milliGRAM(s) Oral daily  influenza  Vaccine (HIGH DOSE) 0.7 milliLiter(s) IntraMuscular once  lisinopril 10 milliGRAM(s) Oral daily  polyethylene glycol 3350 17 Gram(s) Oral daily  tiotropium 18 MICROgram(s) Capsule 1 Capsule(s) Inhalation daily    MEDICATIONS  (PRN):    Vital Signs Last 24 Hrs  T(C): 35.9 (16 Nov 2020 14:07), Max: 36.8 (15 Nov 2020 22:01)  T(F): 96.7 (16 Nov 2020 14:07), Max: 98.2 (15 Nov 2020 22:01)  HR: 60 (16 Nov 2020 11:10) (60 - 73)  BP: 118/55 (16 Nov 2020 11:10) (116/57 - 146/65)  BP(mean): 78 (16 Nov 2020 11:10) (78 - 94)  RR: 22 (16 Nov 2020 11:10) (16 - 22)  SpO2: 95% (16 Nov 2020 09:35) (94% - 95%)    11-15 @ 07:01  -  11-16 @ 07:00  --------------------------------------------------------  IN: 390 mL / OUT: 2700 mL / NET: -2310 mL    11-16 @ 07:01  -  11-16 @ 14:30  --------------------------------------------------------  IN: 0 mL / OUT: 400 mL / NET: -400 mL      PHYSICAL EXAM:    General: Well developed; well nourished; in no acute distress  HEENT: MMM, conjunctiva and sclera clear  Gastrointestinal: Soft, obese, non-tender non-distended; No rebound or guarding  Skin: Warm and dry. No obvious rash    LABS:                        10.2   6.84  )-----------( 192      ( 16 Nov 2020 05:28 )             31.8     11-16    137  |  100  |  28<H>  ----------------------------<  105<H>  4.3   |  24  |  1.16    Ca    9.0      16 Nov 2020 05:28  Mg     2.0     11-16      PTT - ( 15 Nov 2020 00:59 )  PTT:122.6 sec                  RADIOLOGY & ADDITIONAL STUDIES:  Reviewed

## 2020-11-17 DIAGNOSIS — R50.9 FEVER, UNSPECIFIED: ICD-10-CM

## 2020-11-17 LAB
ANION GAP SERPL CALC-SCNC: 16 MMOL/L — SIGNIFICANT CHANGE UP (ref 5–17)
APPEARANCE UR: CLEAR — SIGNIFICANT CHANGE UP
BACTERIA # UR AUTO: PRESENT /HPF
BILIRUB UR-MCNC: NEGATIVE — SIGNIFICANT CHANGE UP
BUN SERPL-MCNC: 27 MG/DL — HIGH (ref 7–23)
CALCIUM SERPL-MCNC: 9.5 MG/DL — SIGNIFICANT CHANGE UP (ref 8.4–10.5)
CHLORIDE SERPL-SCNC: 96 MMOL/L — SIGNIFICANT CHANGE UP (ref 96–108)
CO2 SERPL-SCNC: 24 MMOL/L — SIGNIFICANT CHANGE UP (ref 22–31)
COLOR SPEC: YELLOW — SIGNIFICANT CHANGE UP
COMMENT - URINE: SIGNIFICANT CHANGE UP
CREAT SERPL-MCNC: 1.19 MG/DL — SIGNIFICANT CHANGE UP (ref 0.5–1.3)
DIFF PNL FLD: ABNORMAL
EPI CELLS # UR: SIGNIFICANT CHANGE UP /HPF (ref 0–5)
GLUCOSE SERPL-MCNC: 113 MG/DL — HIGH (ref 70–99)
GLUCOSE UR QL: NEGATIVE — SIGNIFICANT CHANGE UP
HCT VFR BLD CALC: 37.4 % — SIGNIFICANT CHANGE UP (ref 34.5–45)
HGB BLD-MCNC: 11.9 G/DL — SIGNIFICANT CHANGE UP (ref 11.5–15.5)
KETONES UR-MCNC: NEGATIVE — SIGNIFICANT CHANGE UP
LEUKOCYTE ESTERASE UR-ACNC: ABNORMAL
MAGNESIUM SERPL-MCNC: 2.2 MG/DL — SIGNIFICANT CHANGE UP (ref 1.6–2.6)
MCHC RBC-ENTMCNC: 29.6 PG — SIGNIFICANT CHANGE UP (ref 27–34)
MCHC RBC-ENTMCNC: 31.8 GM/DL — LOW (ref 32–36)
MCV RBC AUTO: 93 FL — SIGNIFICANT CHANGE UP (ref 80–100)
NITRITE UR-MCNC: NEGATIVE — SIGNIFICANT CHANGE UP
NRBC # BLD: 0 /100 WBCS — SIGNIFICANT CHANGE UP (ref 0–0)
PH UR: 7.5 — SIGNIFICANT CHANGE UP (ref 5–8)
PLATELET # BLD AUTO: 213 K/UL — SIGNIFICANT CHANGE UP (ref 150–400)
POTASSIUM SERPL-MCNC: 4.6 MMOL/L — SIGNIFICANT CHANGE UP (ref 3.5–5.3)
POTASSIUM SERPL-SCNC: 4.6 MMOL/L — SIGNIFICANT CHANGE UP (ref 3.5–5.3)
PROT UR-MCNC: NEGATIVE MG/DL — SIGNIFICANT CHANGE UP
RBC # BLD: 4.02 M/UL — SIGNIFICANT CHANGE UP (ref 3.8–5.2)
RBC # FLD: 13.5 % — SIGNIFICANT CHANGE UP (ref 10.3–14.5)
RBC CASTS # UR COMP ASSIST: < 5 /HPF — SIGNIFICANT CHANGE UP
SODIUM SERPL-SCNC: 136 MMOL/L — SIGNIFICANT CHANGE UP (ref 135–145)
SP GR SPEC: 1.01 — SIGNIFICANT CHANGE UP (ref 1–1.03)
UROBILINOGEN FLD QL: 0.2 E.U./DL — SIGNIFICANT CHANGE UP
WBC # BLD: 11.23 K/UL — HIGH (ref 3.8–10.5)
WBC # FLD AUTO: 11.23 K/UL — HIGH (ref 3.8–10.5)
WBC UR QL: < 5 /HPF — SIGNIFICANT CHANGE UP

## 2020-11-17 PROCEDURE — 93320 DOPPLER ECHO COMPLETE: CPT | Mod: 26

## 2020-11-17 PROCEDURE — 76377 3D RENDER W/INTRP POSTPROCES: CPT | Mod: 26

## 2020-11-17 PROCEDURE — 93312 ECHO TRANSESOPHAGEAL: CPT | Mod: 26

## 2020-11-17 PROCEDURE — 71045 X-RAY EXAM CHEST 1 VIEW: CPT | Mod: 26

## 2020-11-17 PROCEDURE — 99232 SBSQ HOSP IP/OBS MODERATE 35: CPT

## 2020-11-17 RX ORDER — ACETAMINOPHEN 500 MG
650 TABLET ORAL ONCE
Refills: 0 | Status: COMPLETED | OUTPATIENT
Start: 2020-11-17 | End: 2020-11-17

## 2020-11-17 RX ORDER — ACETAMINOPHEN 500 MG
650 TABLET ORAL EVERY 6 HOURS
Refills: 0 | Status: DISCONTINUED | OUTPATIENT
Start: 2020-11-17 | End: 2020-12-02

## 2020-11-17 RX ORDER — CARVEDILOL PHOSPHATE 80 MG/1
6.25 CAPSULE, EXTENDED RELEASE ORAL EVERY 12 HOURS
Refills: 0 | Status: DISCONTINUED | OUTPATIENT
Start: 2020-11-18 | End: 2020-11-21

## 2020-11-17 RX ORDER — SODIUM CHLORIDE 9 MG/ML
500 INJECTION INTRAMUSCULAR; INTRAVENOUS; SUBCUTANEOUS
Refills: 0 | Status: DISCONTINUED | OUTPATIENT
Start: 2020-11-17 | End: 2020-11-17

## 2020-11-17 RX ORDER — CARVEDILOL PHOSPHATE 80 MG/1
6.25 CAPSULE, EXTENDED RELEASE ORAL ONCE
Refills: 0 | Status: COMPLETED | OUTPATIENT
Start: 2020-11-17 | End: 2020-11-17

## 2020-11-17 RX ADMIN — Medication 1 CAPSULE(S): at 21:06

## 2020-11-17 RX ADMIN — Medication 1 CAPSULE(S): at 16:00

## 2020-11-17 RX ADMIN — Medication 81 MILLIGRAM(S): at 15:00

## 2020-11-17 RX ADMIN — ATORVASTATIN CALCIUM 20 MILLIGRAM(S): 80 TABLET, FILM COATED ORAL at 21:06

## 2020-11-17 RX ADMIN — POLYETHYLENE GLYCOL 3350 17 GRAM(S): 17 POWDER, FOR SOLUTION ORAL at 15:00

## 2020-11-17 RX ADMIN — LISINOPRIL 10 MILLIGRAM(S): 2.5 TABLET ORAL at 01:07

## 2020-11-17 RX ADMIN — Medication 650 MILLIGRAM(S): at 09:58

## 2020-11-17 RX ADMIN — TIOTROPIUM BROMIDE 1 CAPSULE(S): 18 CAPSULE ORAL; RESPIRATORY (INHALATION) at 11:04

## 2020-11-17 RX ADMIN — PANTOPRAZOLE SODIUM 40 MILLIGRAM(S): 20 TABLET, DELAYED RELEASE ORAL at 17:24

## 2020-11-17 RX ADMIN — Medication 650 MILLIGRAM(S): at 01:00

## 2020-11-17 RX ADMIN — Medication 650 MILLIGRAM(S): at 00:24

## 2020-11-17 RX ADMIN — Medication 1 CAPSULE(S): at 22:00

## 2020-11-17 RX ADMIN — Medication 1 CAPSULE(S): at 15:00

## 2020-11-17 RX ADMIN — CARVEDILOL PHOSPHATE 6.25 MILLIGRAM(S): 80 CAPSULE, EXTENDED RELEASE ORAL at 06:20

## 2020-11-17 RX ADMIN — PANTOPRAZOLE SODIUM 40 MILLIGRAM(S): 20 TABLET, DELAYED RELEASE ORAL at 06:20

## 2020-11-17 RX ADMIN — Medication 650 MILLIGRAM(S): at 11:00

## 2020-11-17 RX ADMIN — Medication 12.5 MILLIGRAM(S): at 06:20

## 2020-11-17 NOTE — PROGRESS NOTE ADULT - SUBJECTIVE AND OBJECTIVE BOX
Patient discussed on morning rounds with Dr. Alejo    SUBJECTIVE ASSESSMENT:  80y Female following by Structural Heart for AS found on TTE. Patient with IRMA completed today.         Vital Signs Last 24 Hrs  T(C): 36.2 (2020 21:19), Max: 38.4 (2020 10:26)  T(F): 97.1 (2020 21:19), Max: 101.2 (2020 10:26)  HR: 79 (2020 20:20) (71 - 85)  BP: 94/52 (2020 20:20) (86/43 - 197/77)  BP(mean): 70 (2020 20:20) (58 - 110)  RR: 18 (2020 20:20) (16 - 22)  SpO2: 93% (2020 20:20) (90% - 100%)  I&O's Detail    2020 07:01  -  2020 07:00  --------------------------------------------------------  IN:    Oral Fluid: 680 mL  Total IN: 680 mL    OUT:    Voided (mL): 1800 mL  Total OUT: 1800 mL    Total NET: -1120 mL      2020 07:01  -  2020 22:05  --------------------------------------------------------  IN:  Total IN: 0 mL    OUT:    Voided (mL): 450 mL  Total OUT: 450 mL    Total NET: -450 mL        PHYSICAL EXAM:  Neuro: A+O x 3, non-focal, speech clear and intact  HEENT: PERRL, EOMI, oral mucosa pink and moist  Neck: supple, no JVD  CV: regular rate, regular rhythm, +S1S2, +Systolic murmur  Pulm/chest: lung sounds CTA and equal bilaterally, no accessory muscle use noted  Abd: soft, NT, ND, +BS  Ext: QUIROS x 4, +1 lower extremity swelling bilaterally  Skin: warm, well perfused, no rashes         LABS:                        11.9   11.23 )-----------( 213      ( 2020 10:57 )             37.4       COUMADIN:  Yes/No. REASON: .    PT/INR - ( 2020 17:01 )   PT: 13.5 sec;   INR: 1.13          PTT - ( 2020 17:01 )  PTT:34.0 sec        136  |  96  |  27<H>  ----------------------------<  113<H>  4.6   |  24  |  1.19    Ca    9.5      2020 10:57  Mg     2.2             Urinalysis Basic - ( 2020 11:17 )    Color: Yellow / Appearance: Clear / S.015 / pH: x  Gluc: x / Ketone: NEGATIVE  / Bili: Negative / Urobili: 0.2 E.U./dL   Blood: x / Protein: NEGATIVE mg/dL / Nitrite: NEGATIVE   Leuk Esterase: Moderate / RBC: < 5 /HPF / WBC < 5 /HPF   Sq Epi: x / Non Sq Epi: 0-5 /HPF / Bacteria: Present /HPF        MEDICATIONS  (STANDING):  aspirin enteric coated 81 milliGRAM(s) Oral daily  atorvastatin 20 milliGRAM(s) Oral at bedtime  hydrochlorothiazide 12.5 milliGRAM(s) Oral daily  influenza  Vaccine (HIGH DOSE) 0.7 milliLiter(s) IntraMuscular once  lisinopril 10 milliGRAM(s) Oral daily  pantoprazole  Injectable 40 milliGRAM(s) IV Push two times a day  polyethylene glycol 3350 17 Gram(s) Oral daily  tiotropium 18 MICROgram(s) Capsule 1 Capsule(s) Inhalation daily    MEDICATIONS  (PRN):  acetaminophen   Tablet .. 650 milliGRAM(s) Oral every 6 hours PRN Mild Pain (1 - 3), Moderate Pain (4 - 6)  acetaminophen 300 mG/butalbital 50 mG/ caffeine 40 mG 1 Capsule(s) Oral every 6 hours PRN Severe headache  aluminum hydroxide/magnesium hydroxide/simethicone Suspension 30 milliLiter(s) Oral every 6 hours PRN Dyspepsia        RADIOLOGY & ADDITIONAL TESTS:    < from: TTE Limited Echo w/o Cont (20 @ 12:57) >  FINDINGS:    Left Ventricle:  Left ventricular hypertrophy present. Hyperdynamicleft ventricular systolic function with cavity obliteration resulting in an intra-cavitary gradient of 31.00 mmHg. Left ventricular ejection fraction is >75%.    Right Ventricle:  The right ventricle is normal in size. Right ventricular systolic function is normal.    Aortic Valve:  23 mm Jamar valve is noted in the aortic position without any aortic regurgitation. The peak transvalvular velocity is 4.20 m/s, the mean transvalvular gradient is 35.00 mmHg, and the LVOT/AV velocity ratio is 0.38. The peak transaortic gradient is 70.56 mmHg.    Pericardium:  No pericardial effusion is seen.    ---  Scott Heck MD    Electronically signed by Scott Heck MD  Signature Date/Time: 2020/1:51:09 PM        *** Final ***      < end of copied text >    ______________________________________________________  < from: IRMA w/Doppler (20 @ 12:38) >  CONCLUSIONS:     1. Normal left and right ventricular size and systolic function.   2. Mildly dilated left atrium.   3. No LA/RA/MAIK/RAA thrombus seen.   4. No evidence of an intracardiac shunt.   5. 23 mm Jamar valve is noted in the aortic position without any aortic regurgitation.   6. There is slight thickening at the base of the right cusp with probably mild restriction in excursion. The other prosthetic leaflets appear normal.   7. No evidence of pulmonary hypertension.   8. No pericardial effusion.   9. See TTE performed 2020 for TAVR gradients, which were elevated.    -------------  SPECTRAL DOPPLER ANALYSIS:    Mitral Valve:  MV Mean Grad: 2.5 mmHg MV Area, PHT:    Tricuspid Valve and PA/RV Systolic Pressure: TR Max Velocity: 1.90 m/s RA Pressure: RVSP/PASP:      ------------  FINDINGS:    Left Ventricle:  The left ventricle is normal in size and systolic function with a calculated ejection fraction of 65-70%.    Right Ventricle:  The right ventricle is normal in size. Right ventricular systolic function is normal.    Left Atrium:  The left atrium is mildly dilated. No thrombus seen in the left atrium or in the left atrial appendage. Spectral Doppler reveals normal left atrial appendage velocities.    Right Atrium:  The right atrium is normal in size.    Interatrial Septum:  The interatrial septum appears intact. Color flow Doppler reveals no evidence of an interatrial shunt.    Aortic Valve:  23 mm Jamar valve is noted in the aortic position without any aortic regurgitation. There is slight thickening at the base of the right cusp with probably mild restriction in excursion. The other prosthetic leaflets appear normal.    Mitral Valve:  The mitral valve is mildly thickened and calcified. There is mild mitral annular calcification. The mean transvalvular gradient is 2.50 mmHg at a heart rate of 79 bpm. There is trace mitral regurgitation.    Tricuspid Valve:  Structurally normal tricuspid valve with normal leaflet excursion. There is trace tricuspid regurgitation.    Pulmonic Valve:  Structurally normal pulmonic valve with normal leaflet excursion. There is no evidence of pulmonic regurgitation.    Pericardium:  No pericardial effusion is seen. Prominent pericardial fat pad is seen anterior to the right ventricle.    --------------------------------------------------------------------------------  Cat Vaughn MD    Electronically signed by Cat Vaughn MD  Signature Date/Time: 2020/2:56:05 PM        *** Final ***    < end of copied text >      ____________________________________________________________________________  `< from: CT Heart Congenital w/ IV Cont (20 @ 12:02) >  FINDINGS:      Impression:  1.  Please note this study was not optimized for the evaluation of the coronary arteries.  2.  Non obstructive coronary artery disease.  3.  Bioprosthetic aortic valve. No thrombus visualized on aortic valve leaflets.  4.  Calcification of the mitral valve annulus.    Please see separate radiology report for non-coronary findings.  ALLA CASH NP, [facility]      Visualized lungs/pleura: 6 mm solid subpleural nodule in the lingula (4013: 124), tubular nodules along the anterior segment of right upper lobe along the bronchovascular bundle(4013:66-69), are unchanged since 2019, and likely inflammatory. Subsegmental atelectasis in the right middle lobe. Mild cylindrical bronchiectasis in the left lower lobe. No pleural effusion.  Heart and vessels: The heart is normal in size.  No pericardial effusion is seen.   Status post transvascular aortic valvular replacement. Aortic aneurysm of ascending aorta, 4.3 cm, unchanged since prior study. Moderate calcified plaques in the aorta. Pulmonary arteries normal in caliber. No filling defect in the visualized pulmonary arteries.  Mediastium: Mildly prominent right hilar nodes, may be reactive.  Visualized upper abdomen:No focal abnormality.  Soft tissue and bones:Degenerative change in the spine.`    IMPRESSION:  Aortic aneurysm of ascending aorta, stable since prior study.    *** END OF ADDENDUM 2020  ***    < end of copied text >

## 2020-11-17 NOTE — PROGRESS NOTE ADULT - PROBLEM SELECTOR PLAN 6
- Intermittently w/ HA. No focal neurodeficits.   - Responds well to IV Tylenol and Fiorocet.   ** Of note, patient reportedly had normal CT head/MRI Brain @ [facility] ~1 week ago. patient became febrile to 101.2 x1 on 11/17AM w/ associated leukocytosis 11.23.  - Unclear source of infection  - possible UTI, UA: Moderate LE, small blood, bacteria present.  f/u Urine CX  - CT Chest done 11/6:  no signs of infiltrates  - CXR ordered, f/u results  - Blood CX performed, f/u results.   - No Abx for now, monitor fevers.

## 2020-11-17 NOTE — PROGRESS NOTE ADULT - PROBLEM SELECTOR PLAN 7
- Continue Spiriva inhaler daily      VTE PPx: Lovenox subcut  PT consulted: Home w/ Home PT  Patient lives with daughter Jaelyn, whom is her HHA (40 hrs/week).    CODE: Full. - Intermittently w/ HA. No focal neurodeficits.   - Responds well to IV Tylenol and Fiorocet. ordered PRN.   ** Of note, patient reportedly had normal CT head/MRI Brain @ [facility] ~1 week ago.

## 2020-11-17 NOTE — PROGRESS NOTE ADULT - SUBJECTIVE AND OBJECTIVE BOX
Interventional Cardiology PA Adult Progress Note    Subjective Assessment: Patient seen and examined at bedside, she is not feeling well today, w/ a HA, states its her blood pressure despite normal BP readings.  Patient states last BRBPR noted around 12pm when patient straining to have BM  ROS negative except per HPI and subjective  	  MEDICATIONS:  carvedilol 6.25 milliGRAM(s) Oral every 12 hours  hydrochlorothiazide 12.5 milliGRAM(s) Oral daily  lisinopril 10 milliGRAM(s) Oral daily  tiotropium 18 MICROgram(s) Capsule 1 Capsule(s) Inhalation daily  acetaminophen   Tablet .. 650 milliGRAM(s) Oral every 6 hours PRN  acetaminophen 300 mG/butalbital 50 mG/ caffeine 40 mG 1 Capsule(s) Oral every 6 hours PRN  aluminum hydroxide/magnesium hydroxide/simethicone Suspension 30 milliLiter(s) Oral every 6 hours PRN  pantoprazole  Injectable 40 milliGRAM(s) IV Push two times a day  polyethylene glycol 3350 17 Gram(s) Oral daily  atorvastatin 20 milliGRAM(s) Oral at bedtime  aspirin enteric coated 81 milliGRAM(s) Oral daily  influenza  Vaccine (HIGH DOSE) 0.7 milliLiter(s) IntraMuscular once    [PHYSICAL EXAM:  TELEMETRY:  T(C): 38.4 (11-17-20 @ 10:26), Max: 38.4 (11-17-20 @ 10:26)  HR: 82 (11-17-20 @ 14:07) (63 - 85)  BP: 120/59 (11-17-20 @ 14:07) (120/59 - 197/77)  RR: 19 (11-17-20 @ 14:07) (16 - 24)  SpO2: 97% (11-17-20 @ 14:07) (93% - 100%)    I&O's Summary    16 Nov 2020 07:01  -  17 Nov 2020 07:00  --------------------------------------------------------  IN: 680 mL / OUT: 1800 mL / NET: -1120 mL    17 Nov 2020 07:01  -  17 Nov 2020 15:15  --------------------------------------------------------  IN: 0 mL / OUT: 450 mL / NET: -450 mL                                         Appearance: Normal	  HEENT:   Normal oral mucosa, PERRL, EOMI	  Neck: Supple,  - JVD; Carotid Bruit   Cardiovascular: Normal S1 S2, No JVD, No murmurs,   Respiratory: Lungs clear to auscultation//No Rales, Rhonchi, Wheezing	  Gastrointestinal:  Soft, Non-tender, + BS	  Skin: No rashes, No ecchymoses, No cyanosis  Extremities: Normal range of motion, No clubbing, cyanosis or edema  Vascular: Peripheral pulses palpable 2+ bilaterally  Neurologic: Non-focal  Psychiatry: A & O x 3, Mood & affect appropriate    	    LABS:	 	  CARDIAC MARKERS:                          11.9   11.23 )-----------( 213      ( 17 Nov 2020 10:57 )             37.4     11-17    136  |  96  |  27<H>  ----------------------------<  113<H>  4.6   |  24  |  1.19    Ca    9.5      17 Nov 2020 10:57  Mg     2.2     11-17        PT/INR - ( 16 Nov 2020 17:01 )   PT: 13.5 sec;   INR: 1.13          PTT - ( 16 Nov 2020 17:01 )  PTT:34.0 sec    ASSESSMENT/PLAN:

## 2020-11-17 NOTE — PROGRESS NOTE ADULT - PROBLEM SELECTOR PROBLEM 3
Triston's blood sugars were higher than usual; however, on physical exam, Edyta Greenwood has significant lipohypertrophy of b/l upper extremities where Edyta Greenwood always gives his insulin injections  Concern that this is effecting insulin action:   1  Adjust insulin doses:  --Basaglar:  21 units  --Admelo unit per 8 grams of carbohydrates  --Admelo unit per  100 mg/dL  2  Edyta Greenwood must allow mom and dad to give insulin anywhere other than his arms for the next 3 months  2  A1c today is 8%  3  Yearly screening labs completed 2019  4  Follow up in three months     Send in blood sugars in 2 weeks     Hypertensive urgency

## 2020-11-17 NOTE — PROGRESS NOTE ADULT - PROBLEM SELECTOR PLAN 1
- s/p transfemoral TAVR with rachael valve on 02/05/2019 with Dr. Alejo @[facility].  - ECHO (11/13/20): Normal BiV fxn/size, moderate LVH, Grade II LV Diastolic Dysfunction, PEAK TRANSAORTIC GRADIENT 61.78mmHg, mildly dilated LA, no pulmHTN, moderately dilated ascending aorta. (previously 14mmHg on 4/2019)  -Structural CT scan: no thrombus seen. Heparin gtt not required.  -Limited TTE 11/16: gradient 71mmHg  -NPO after midnight for CTS recommended IRMA. F/u continued CTS recs - s/p transfemoral TAVR with rachael valve on 02/05/2019 with Dr. Alejo @[facility].  - ECHO (11/13/20): Normal BiV fxn/size, moderate LVH, Grade II LV Diastolic Dysfunction, PEAK TRANSAORTIC GRADIENT 61.78mmHg, mildly dilated LA, no pulmHTN, moderately dilated ascending aorta. (previously 14mmHg on 4/2019)  - Structural CT scan: no thrombus seen. Heparin gtt stopped.   -Limited TTE 11/16: gradient 71mmHg  -s/p IMRA 11/17:   -NPO after midnight for CTS recommended IRMA. F/u continued CTS recs - s/p transfemoral TAVR with jamar valve on 02/05/2019 with Dr. Alejo @[facility].  - ECHO (11/13/20): Normal BiV fxn/size, moderate LVH, Grade II LV Diastolic Dysfunction, PEAK TRANSAORTIC GRADIENT 61.78mmHg, mildly dilated LA, no pulmHTN, moderately dilated ascending aorta. (previously 14mmHg on 4/2019)  - Structural CT scan: no thrombus seen. Heparin gtt stopped.   -Limited TTE 11/16: gradient 71mmHg  -s/p IRMA 11/17: No LA/RA/MAIK/RAA thrombus seen, No evidence of an intracardiac shunt, 23 mm Jamar valve is noted in the aortic position without any aortic regurgitation, There is slight thickening at the base of the right cusp with probably mild restriction in excursion. The other prosthetic leaflets appear normal.  - f/u CTS recs regarding IRMA read.

## 2020-11-17 NOTE — PROGRESS NOTE ADULT - PROBLEM SELECTOR PLAN 2
-x2 episodes of BRBPR on 11/14PM and 11/15 PM 2/2 to supratherapeutic PTT on heparin gtt. Drip since d/c and not indicated as there is no AV thrombus  -Per patient, has had a few episodes of BPBPR however this is the first episode here  -GI consulted: feel episodes likely 2/2 to supratherapetic PTT. Repeat labs at 4pm again stable with normalized PTT. Can add AC if indicated so long as PTT stable.  -PA alerted pt had 3rd episode of BRBPR @ 6pm on 11/16 - STAT CBC ordered; f/u results  -F/u GI recs -- will likely need EGD/Colonoscopy prior to any surgical intervention. - multiple episodes of BRBPR on 11/14PM and 11/15 PM 2/2 to supratherapeutic PTT on heparin gtt. (has had prior to admission).   - GI service consulted.  Likely in setting supratherapeutic INR, H/H stable.  Per GI no intervention at this time.  - Patient w/ additional episodes or BRBPR in evening on 11/17 after heparin gtt was off and PTT stabalized.  Per patient occurred w/ straining during BM  - GI reconsulted.  Per recs likely 2/2 hemorrhoid.  H/H remains stable.  No further work up indicated.  Signed off.

## 2020-11-17 NOTE — PROGRESS NOTE ADULT - ASSESSMENT
78 year old Hebrew speaking F, PMHx of uncontrolled HTN, HLD, asthma, chronic diastolic CHF (EF:61% by Echo 4/2019), aortic stenosis s/p transfemoral TAVR with rachael valve on 02/05/2019 with Dr. Alejo @[facility] who presents to [facility] ED 11/13/20 c/o intermittent chest pain and elevated BP x 1 week. BP initially elevated on arrival however has been stable on home meds. ECHO 11/13/2020 showed elevated gradient over aortic valve of 61.78 (doubled since last echo). Dr. Alejo consulted. S/p structural CT: negative for thrombus, Limited TTE 11/16: aortic valve gradient 71mmHg. GI consulted 11/15 in the setting of x3 episodes of BRBPR in the setting of supratherapeutic PTT when on heparin gtt. Hb has been stable however will likely need EGD/Colonoscopy prior to any surgical intervention. Drip d/c in the setting of no thrombus. NPO after midnight for CTS recommended IRMA to again r/o thrombus. F/u further CTS/GI recs. 78 year old Serbian speaking F, PMHx of uncontrolled HTN, HLD, asthma, chronic diastolic CHF (EF:61% by Echo 4/2019), aortic stenosis s/p transfemoral TAVR with rachael valve on 02/05/2019 with Dr. Alejo @[facility] who presents to [facility] ED 11/13/20 c/o intermittent chest pain and elevated BP x 1 week. BP initially elevated on arrival however has been stable on home meds. ECHO 11/13/2020 showed elevated gradient over aortic valve of 61.78 (doubled since last echo). Dr. Alejo consulted. S/p structural CT: negative for thrombus, Limited TTE 11/16: aortic valve gradient 71mmHg. GI consulted 11/15 in the setting of  BRBPR in the setting of supratherapeutic PTT while on heparin gtt w/ continued episodes after stopping A/C, w/ stable H/H, and likely 2/2 hemorrhoids and no further recs per GI team.  s/p IRMA as recommended by CTS.  Course complicated by fever and leukocytosis on 11/17.

## 2020-11-17 NOTE — PROGRESS NOTE ADULT - PROBLEM SELECTOR PLAN 5
- Euvolemic on exam; satting well on RA  - s/p Lasix 20mg IV x1 in the ED. No need for further Lasix.   - Strict I/Os, daily weights, continue ACE.

## 2020-11-17 NOTE — PROGRESS NOTE ADULT - PROBLEM SELECTOR PLAN 4
- Likely 2/2 HTN urgency; RESOLVED  - Dx R/LHC 1/9/19 non-obstructive (full report below).   **Recent diagnostic R+L heart cath@[facility] 1/9/19 revealed LMCA normal, LAD with minor luminal irregularities, LCx large and normal, RCA large and normal. PA 18, RV 73/13 (21), PA 70/34 (49), PCWP 26, CO 7.3 L/min, CI 3.7, TPG 23, severe AS (mean LV/Ao gradient 48.1 mmHg, SETH 1.1 cm2).

## 2020-11-17 NOTE — PROGRESS NOTE ADULT - PROBLEM SELECTOR PLAN 3
- BP well controlled on home regimen.   - CONTINUE: Coreg 6.25mg PO BID, Lisinopril 10mg PO QD, home HCTZ 12.5mg PO QD BP stable.   - CONTINUE: Coreg 6.25mg PO BID, Lisinopril 10mg PO QD, home HCTZ 12.5mg PO QD

## 2020-11-17 NOTE — PROGRESS NOTE ADULT - ASSESSMENT
his is an 79 y/o Guatemalan speaking female w/ a PMHx of uncontrolled HTN, HLD, asthma (no hospitalizations in past), ?MARK (noncompliant with CPAP), chronic diastolic CHF (Ef 61%), AS (s/p TAVR jamar valve with Dr. Alejo on 2/5/2019) who was recently hospitalized at [facility] 2/2 to hypertensive emergency (HTN w/ HA). CT head/MRI brain at that time negative and discharged 11/4/20. Patient presented to [facility] ED on 11/13/20 with complaints of intermittent CP and elevated BP for 1 week. Structural heart consulted in setting previou TAVR with elevated AV gradients on TTE.    Problem 1: aortic stenosis (bioprosthetic valve stenosis)  -s/p TAVR valve in 2019 with Dr. Alejo with a Jamar 23mm  -Elevated gradients on TTE this admission in the setting of HTN and CHF exacerbation  -CT structural completed, no thrombus noted on CT   -IRMA completed, as above will be reviewed by structural team    Problem 2: HTN/hypertensive urgency   -c/w with care per primary team  -c/w coreg, lisinopril   -monitor BP closely and for signs of end organ dysfunction in the setting of hypertensive urgency     Problem 3: HLD   -c/w atorvastatin 20mg at bedtime  -care per primary team     Problem 4: HFpEF   -normal EF (61%)   -c/w BB, ACEI  -diuresis as needed   -care per primary team     Problem 5: BRBPR  -Would hold heparin gtt at this time (no definite diagnosis of thrombosis)  -H&H stable  -Gastro consulted, appreciate reccs    Problem 6: FEver  -Febrile to 101.2 this morning  -Agree with pan culture with possible surgical intervention  -care per primary team   his is an 81 y/o [place]inean speaking female w/ a PMHx of uncontrolled HTN, HLD, asthma (no hospitalizations in past), ?MARK (noncompliant with CPAP), chronic diastolic CHF (Ef 61%), AS (s/p TAVR jamar valve with Dr. Alejo on 2/5/2019) who was recently hospitalized at [facility] 2/2 to hypertensive emergency (HTN w/ HA). CT head/MRI brain at that time negative and discharged 11/4/20. Patient presented to [facility] ED on 11/13/20 with complaints of intermittent CP and elevated BP for 1 week. Structural heart consulted in setting previou TAVR with elevated AV gradients on TTE.    Problem 1: aortic stenosis (bioprosthetic valve stenosis)  -s/p TAVR valve in 2019 with Dr. Alejo with a Jamar 23mm  -Elevated gradients on TTE this admission in the setting of HTN and CHF exacerbation  -CT structural completed, no thrombus noted on CT   -IRMA completed, as above will be reviewed by structural team    Problem 2: HTN/hypertensive urgency   -c/w with care per primary team  -c/w coreg, lisinopril   -monitor BP closely and for signs of end organ dysfunction in the setting of hypertensive urgency     Problem 3: HLD   -c/w atorvastatin 20mg at bedtime  -care per primary team     Problem 4: HFpEF   -normal EF (61%)   -c/w BB, ACEI  -diuresis as needed   -care per primary team     Problem 5: BRBPR  -H&H stable  -Gastro consulted, appreciate reccs    Problem 6: FEver  -Febrile to 101.2 this morning  -Agree with pan culture with possible surgical intervention  -care per primary team

## 2020-11-18 LAB
ANION GAP SERPL CALC-SCNC: 14 MMOL/L — SIGNIFICANT CHANGE UP (ref 5–17)
BUN SERPL-MCNC: 35 MG/DL — HIGH (ref 7–23)
CALCIUM SERPL-MCNC: 8.7 MG/DL — SIGNIFICANT CHANGE UP (ref 8.4–10.5)
CHLORIDE SERPL-SCNC: 97 MMOL/L — SIGNIFICANT CHANGE UP (ref 96–108)
CO2 SERPL-SCNC: 25 MMOL/L — SIGNIFICANT CHANGE UP (ref 22–31)
CREAT SERPL-MCNC: 1.77 MG/DL — HIGH (ref 0.5–1.3)
GLUCOSE SERPL-MCNC: 107 MG/DL — HIGH (ref 70–99)
HCT VFR BLD CALC: 29.2 % — LOW (ref 34.5–45)
HGB BLD-MCNC: 9.4 G/DL — LOW (ref 11.5–15.5)
MAGNESIUM SERPL-MCNC: 2.3 MG/DL — SIGNIFICANT CHANGE UP (ref 1.6–2.6)
MCHC RBC-ENTMCNC: 29.8 PG — SIGNIFICANT CHANGE UP (ref 27–34)
MCHC RBC-ENTMCNC: 32.2 GM/DL — SIGNIFICANT CHANGE UP (ref 32–36)
MCV RBC AUTO: 92.7 FL — SIGNIFICANT CHANGE UP (ref 80–100)
NRBC # BLD: 0 /100 WBCS — SIGNIFICANT CHANGE UP (ref 0–0)
PLATELET # BLD AUTO: 194 K/UL — SIGNIFICANT CHANGE UP (ref 150–400)
POTASSIUM SERPL-MCNC: 4.5 MMOL/L — SIGNIFICANT CHANGE UP (ref 3.5–5.3)
POTASSIUM SERPL-SCNC: 4.5 MMOL/L — SIGNIFICANT CHANGE UP (ref 3.5–5.3)
RBC # BLD: 3.15 M/UL — LOW (ref 3.8–5.2)
RBC # FLD: 14.1 % — SIGNIFICANT CHANGE UP (ref 10.3–14.5)
SODIUM SERPL-SCNC: 136 MMOL/L — SIGNIFICANT CHANGE UP (ref 135–145)
WBC # BLD: 6.91 K/UL — SIGNIFICANT CHANGE UP (ref 3.8–10.5)
WBC # FLD AUTO: 6.91 K/UL — SIGNIFICANT CHANGE UP (ref 3.8–10.5)

## 2020-11-18 PROCEDURE — 70450 CT HEAD/BRAIN W/O DYE: CPT | Mod: 26

## 2020-11-18 PROCEDURE — 99232 SBSQ HOSP IP/OBS MODERATE 35: CPT

## 2020-11-18 PROCEDURE — 99233 SBSQ HOSP IP/OBS HIGH 50: CPT

## 2020-11-18 RX ORDER — SODIUM CHLORIDE 9 MG/ML
500 INJECTION INTRAMUSCULAR; INTRAVENOUS; SUBCUTANEOUS
Refills: 0 | Status: DISCONTINUED | OUTPATIENT
Start: 2020-11-18 | End: 2020-11-18

## 2020-11-18 RX ORDER — SOD SULF/SODIUM/NAHCO3/KCL/PEG
4000 SOLUTION, RECONSTITUTED, ORAL ORAL ONCE
Refills: 0 | Status: DISCONTINUED | OUTPATIENT
Start: 2020-11-18 | End: 2020-11-18

## 2020-11-18 RX ORDER — SOD SULF/SODIUM/NAHCO3/KCL/PEG
4000 SOLUTION, RECONSTITUTED, ORAL ORAL ONCE
Refills: 0 | Status: COMPLETED | OUTPATIENT
Start: 2020-11-18 | End: 2020-11-18

## 2020-11-18 RX ORDER — SODIUM CHLORIDE 9 MG/ML
250 INJECTION INTRAMUSCULAR; INTRAVENOUS; SUBCUTANEOUS
Refills: 0 | Status: DISCONTINUED | OUTPATIENT
Start: 2020-11-18 | End: 2020-11-18

## 2020-11-18 RX ADMIN — SODIUM CHLORIDE 50 MILLILITER(S): 9 INJECTION INTRAMUSCULAR; INTRAVENOUS; SUBCUTANEOUS at 11:47

## 2020-11-18 RX ADMIN — Medication 1 CAPSULE(S): at 06:54

## 2020-11-18 RX ADMIN — CARVEDILOL PHOSPHATE 6.25 MILLIGRAM(S): 80 CAPSULE, EXTENDED RELEASE ORAL at 10:30

## 2020-11-18 RX ADMIN — PANTOPRAZOLE SODIUM 40 MILLIGRAM(S): 20 TABLET, DELAYED RELEASE ORAL at 18:00

## 2020-11-18 RX ADMIN — Medication 12.5 MILLIGRAM(S): at 10:30

## 2020-11-18 RX ADMIN — Medication 81 MILLIGRAM(S): at 10:31

## 2020-11-18 RX ADMIN — LISINOPRIL 10 MILLIGRAM(S): 2.5 TABLET ORAL at 10:30

## 2020-11-18 RX ADMIN — CARVEDILOL PHOSPHATE 6.25 MILLIGRAM(S): 80 CAPSULE, EXTENDED RELEASE ORAL at 21:16

## 2020-11-18 RX ADMIN — TIOTROPIUM BROMIDE 1 CAPSULE(S): 18 CAPSULE ORAL; RESPIRATORY (INHALATION) at 11:47

## 2020-11-18 RX ADMIN — PANTOPRAZOLE SODIUM 40 MILLIGRAM(S): 20 TABLET, DELAYED RELEASE ORAL at 05:34

## 2020-11-18 RX ADMIN — SODIUM CHLORIDE 50 MILLILITER(S): 9 INJECTION INTRAMUSCULAR; INTRAVENOUS; SUBCUTANEOUS at 05:34

## 2020-11-18 RX ADMIN — Medication 1 CAPSULE(S): at 09:52

## 2020-11-18 RX ADMIN — POLYETHYLENE GLYCOL 3350 17 GRAM(S): 17 POWDER, FOR SOLUTION ORAL at 11:47

## 2020-11-18 RX ADMIN — ATORVASTATIN CALCIUM 20 MILLIGRAM(S): 80 TABLET, FILM COATED ORAL at 21:16

## 2020-11-18 RX ADMIN — Medication 4000 MILLILITER(S): at 19:27

## 2020-11-18 NOTE — DIETITIAN INITIAL EVALUATION ADULT. - PROBLEM SELECTOR PLAN 4
s/p transfemoral TAVR with jamar valve on 02/05/2019 with Dr. Alejo @[facility].    *** Prior Echo 4/1/2019 revealed moderate concentric LVH, normal LV wall motion, EF:61%. Jamar valve in aortic position, no AR, peak pressure gradient 29mmHg, mean pressure gradient 14mmHg, trace MR, mild TR, PASP 29mmHg.

## 2020-11-18 NOTE — PROGRESS NOTE ADULT - SUBJECTIVE AND OBJECTIVE BOX
GASTROENTEROLOGY PROGRESS NOTE  Patient seen and examined at bedside. No BM in 2 days. Last BM with BRB 2 days back. Hb gradually trended down from 12 to 9.4.     PERTINENT REVIEW OF SYSTEMS:  As noted above    Allergies    Plavix (Other (Mod to Severe))    Intolerances      MEDICATIONS:  MEDICATIONS  (STANDING):  aspirin enteric coated 81 milliGRAM(s) Oral daily  atorvastatin 20 milliGRAM(s) Oral at bedtime  carvedilol 6.25 milliGRAM(s) Oral every 12 hours  hydrochlorothiazide 12.5 milliGRAM(s) Oral daily  influenza  Vaccine (HIGH DOSE) 0.7 milliLiter(s) IntraMuscular once  lisinopril 10 milliGRAM(s) Oral daily  pantoprazole  Injectable 40 milliGRAM(s) IV Push two times a day  polyethylene glycol 3350 17 Gram(s) Oral daily  sodium chloride 0.9%. 250 milliLiter(s) (50 mL/Hr) IV Continuous <Continuous>  tiotropium 18 MICROgram(s) Capsule 1 Capsule(s) Inhalation daily    MEDICATIONS  (PRN):  acetaminophen   Tablet .. 650 milliGRAM(s) Oral every 6 hours PRN Mild Pain (1 - 3), Moderate Pain (4 - 6)  acetaminophen 300 mG/butalbital 50 mG/ caffeine 40 mG 1 Capsule(s) Oral every 6 hours PRN Severe headache  aluminum hydroxide/magnesium hydroxide/simethicone Suspension 30 milliLiter(s) Oral every 6 hours PRN Dyspepsia    Vital Signs Last 24 Hrs  T(C): 37.1 (2020 13:24), Max: 37.3 (2020 10:12)  T(F): 98.7 (2020 13:24), Max: 99.1 (2020 10:12)  HR: 77 (2020 16:12) (74 - 95)  BP: 111/74 (2020 16:12) (88/53 - 186/81)  BP(mean): 91 (2020 13:10) (66 - 91)  RR: 16 (2020 16:12) (16 - 18)  SpO2: 98% (2020 16:12) (93% - 99%)    11-17 @ 07:01  -  -18 @ 07:00  --------------------------------------------------------  IN: 100 mL / OUT: 650 mL / NET: -550 mL     @ 07:01  -   @ 18:07  --------------------------------------------------------  IN: 300 mL / OUT: 0 mL / NET: 300 mL      PHYSICAL EXAM:    General: Well developed; obese; in no acute distress  HEENT: MMM, conjunctiva and sclera clear  Gastrointestinal: Soft non-tender non-distended. No rebound or guarding  Skin: Warm and dry. No obvious rash    LABS:                        9.4    6.91  )-----------( 194      ( 2020 05:59 )             29.2         136  |  97  |  35<H>  ----------------------------<  107<H>  4.5   |  25  |  1.77<H>    Ca    8.7      2020 05:59  Mg     2.3                 Urinalysis Basic - ( 2020 11:17 )    Color: Yellow / Appearance: Clear / S.015 / pH: x  Gluc: x / Ketone: NEGATIVE  / Bili: Negative / Urobili: 0.2 E.U./dL   Blood: x / Protein: NEGATIVE mg/dL / Nitrite: NEGATIVE   Leuk Esterase: Moderate / RBC: < 5 /HPF / WBC < 5 /HPF   Sq Epi: x / Non Sq Epi: 0-5 /HPF / Bacteria: Present /HPF                Culture - Blood (collected 2020 15:07)  Source: .Blood Blood-Peripheral  Preliminary Report (2020 16:01):    No growth at 1 day.    Culture - Blood (collected 2020 15:07)  Source: .Blood Blood-Peripheral  Preliminary Report (2020 16:01):    No growth at 1 day.      RADIOLOGY & ADDITIONAL STUDIES:  Reviewed

## 2020-11-18 NOTE — PROGRESS NOTE ADULT - PROBLEM SELECTOR PLAN 1
- s/p transfemoral TAVR with jamar valve on 02/05/2019 with Dr. Alejo @[facility].  - ECHO (11/13/20): Normal BiV fxn/size, moderate LVH, Grade II LV Diastolic Dysfunction, PEAK TRANSAORTIC GRADIENT 61.78mmHg, mildly dilated LA, no pulmHTN, moderately dilated ascending aorta. (previously 14mmHg on 4/2019)  - Structural CT scan: no thrombus seen. Heparin gtt stopped.   -Limited TTE 11/16: gradient 71mmHg  -s/p IRMA 11/17: No LA/RA/MAIK/RAA thrombus seen, No evidence of an intracardiac shunt, 23 mm Jamar valve is noted in the aortic position without any aortic regurgitation, There is slight thickening at the base of the right cusp with probably mild restriction in excursion. The other prosthetic leaflets appear normal.  - f/u CTS recs regarding IRMA read. - s/p transfemoral TAVR with Jamar valve on 02/05/2019 with Dr. Alejo @[facility].  - ECHO (11/13/20): Normal BiV fxn/size, moderate LVH, Grade II LV Diastolic Dysfunction, PEAK TRANSAORTIC GRADIENT 61.78mmHg, mildly dilated LA, no pulm HTN, moderately dilated ascending aorta. (previously 14mmHg on 4/2019)  - Structural CT scan: no thrombus seen.    -Limited TTE 11/16: gradient 71mmHg  -s/p IRMA 11/17: No LA/RA/MAIK/RAA thrombus seen, No evidence of an intracardiac shunt, 23 mm Jamar valve is noted in the aortic position without any aortic regurgitation, There is slight thickening at the base of the right cusp with probably mild restriction in excursion. The other prosthetic leaflets appear normal.  - Given slight suspicious for thrombus at base of R cusp, per Structural Heart Dr Alejo, would start NOAC if cleared by GI  - Follow up with Dr. Alejo on 12/21/2020 at 10:00AM  -consider discontinuing Aspirin if no longer indicated

## 2020-11-18 NOTE — PROGRESS NOTE ADULT - PROBLEM SELECTOR PLAN 3
BP stable.   - CONTINUE: Coreg 6.25mg PO BID, Lisinopril 10mg PO QD, home HCTZ 12.5mg PO QD Hypotensive SBP 80s this AM.   - S/p IVF hydration NS 50cc/hr x 8hr   - CONTINUE: Coreg 6.25mg PO BID, Lisinopril 10mg PO QD, home HCTZ 12.5mg PO QD

## 2020-11-18 NOTE — PROGRESS NOTE ADULT - PROBLEM SELECTOR PLAN 6
patient became febrile to 101.2 x1 on 11/17AM w/ associated leukocytosis 11.23.  - Unclear source of infection  - possible UTI, UA: Moderate LE, small blood, bacteria present.  f/u Urine CX  - CT Chest done 11/6:  no signs of infiltrates  - CXR ordered, f/u results  - Blood CX performed, f/u results.   - No Abx for now, monitor fevers. Patient became febrile to 101.2 on 11/17AM w/ associated leukocytosis 11.23.  - Self resolved  - Unclear source of infection  - possible UTI, UA: Moderate LE, small blood, bacteria present.  F/u Urine Cx  - CT Chest done 11/6:  no signs of infiltrates  - CXR w/o infiltrates  - Blood Cx NGTD  - No Abx for now, monitor fevers and WBC.

## 2020-11-18 NOTE — DIETITIAN INITIAL EVALUATION ADULT. - PROBLEM SELECTOR PLAN 3
clinically stable, sating 97-99% on RA, lungs with diminished BS@bases, no JVD, trace bilateral LE edema.   --CXR revealed mild pulmonary vascular congestion. . Will give Lasix 20mg IV x 1 dose.  --reassess in AM need for further standing diuretic. Continue ACE, strict I/Os and daily weights.

## 2020-11-18 NOTE — PROGRESS NOTE ADULT - ASSESSMENT
79 y/o [place]an/English speaking female with PMHx of uncontrolled HTN, HLD, asthma (no hospitalizations in past), ?MARK (noncompliant with CPAP), chronic diastolic CHF (EF 61%), AS (s/p TAVR rachael valve with Dr. Alejo on 2/5/2019) who was recently hospitalized at [facility] 2/2 to hypertensive emergency (HTN w/ HA). CT head/MRI brain at that time negative and patient was discharged 11/4/20. Patient presented to [facility] ED on 11/13/20 with complaints of intermittent CP and elevated BP x1 week. Structural Heart Disease consulted in setting of previous TAVR, with now elevated AV gradients on TTE performed on 11/16/20, revealing 23 mm Rachael valve is noted in the aortic position without any aortic regurgitation. The peak transvalvular velocity is 4.20 m/s, the mean transvalvular gradient is 35.00 mmHg, and the LVOT/AV velocity ratio is 0.38. The peak transaortic gradient is 70.56 mmHg. Patient underwent IRMA 11/17/20 revealing normal left and right ventricular size and systolic function. No LA/RA/MAIK/RAA thrombus seen. No evidence of an intracardiac shunt. 23 mm Rachael valve is noted in the aortic position without any aortic regurgitation. There is slight thickening at the base of the right cusp with probably mild restriction in excursion. The other prosthetic leaflets appear normal.    Plan:  Problem 1: s/p TAVR  - Case discussed and patient seen at bedside with Dr. Alejo.   - Patient is s/p TAVR valve in 2019 with Dr. Alejo with a Rachael 23mm, elevated gradients on TTE this admission in the setting of HTN and CHF exacerbation  - IRMA performed on 11/17/20 revealed no intracardiac thrombus, but slight thickening at the base of the right cusp with probably mild restriction in excursion.  - CT cardiac results above.     Recommendations:   - Given slight suspicion of thrombus at the base of the right cusp; recommending A/C, (may need renal adjustment), pending GI clearance.   - Continue BP control (caution with dropping BP too aggressively), HF management, and plan for patient to follow up with Dr. Alejo in 1 month for repeat ECHO.   - There is no further indication for continuing aspirin, from a structural heart standpoint, consider discontinuing Aspirin if no other indication per primary team.       Problem 2: HLD   -c/w atorvastatin 20mg at bedtime  -care per primary team     Problem 3: HFpEF   -normal EF   -c/w BB, ACEi  -diuresis as needed   -care per primary team     Problem 4: BRBPR  -H&H stable  -GI consulted, appreciate recs  - GI clearance prior to NOAC    - on 11/16/20 GI recs included ok for restarting AC at lower dose with avoidance of supratherapeutic territory     81 y/o [place]/English speaking female with PMHx of uncontrolled HTN, HLD, asthma (no hospitalizations in past), ?MARK (noncompliant with CPAP), chronic diastolic CHF (EF 61%), AS (s/p TAVR rachael valve with Dr. Alejo on 2/5/2019) who was recently hospitalized at [facility] 2/2 to hypertensive emergency (HTN w/ HA). CT head/MRI brain at that time negative and patient was discharged 11/4/20. Patient presented to [facility] ED on 11/13/20 with complaints of intermittent CP and elevated BP x1 week. Structural Heart Disease consulted in setting of previous TAVR, with now elevated AV gradients on TTE performed on 11/16/20, revealing 23 mm Rachael valve is noted in the aortic position without any aortic regurgitation. The peak transvalvular velocity is 4.20 m/s, the mean transvalvular gradient is 35.00 mmHg, and the LVOT/AV velocity ratio is 0.38. The peak transaortic gradient is 70.56 mmHg. Patient underwent IRMA 11/17/20 revealing normal left and right ventricular size and systolic function. No LA/RA/MAIK/RAA thrombus seen. No evidence of an intracardiac shunt. 23 mm Rachael valve is noted in the aortic position without any aortic regurgitation. There is slight thickening at the base of the right cusp with probably mild restriction in excursion. The other prosthetic leaflets appear normal.    Plan:  Problem 1: s/p TAVR  - Case discussed and patient seen at bedside with Dr. Alejo.   - Patient is s/p TAVR valve in 2019 with Dr. Alejo with a Rachael 23mm, elevated gradients on TTE this admission in the setting of HTN and CHF exacerbation  - IRMA performed on 11/17/20 revealed no intracardiac thrombus, but slight thickening at the base of the right cusp with probably mild restriction in excursion.  - CT cardiac results above.     Recommendations:   - Given slight suspicion of thrombus at the base of the right cusp; recommending A/C, (may need renal adjustment), pending GI clearance.   - Continue BP control (caution with dropping BP too aggressively), HF management, and plan for patient to follow up with Dr. Alejo in 1 month for repeat ECHO.            -Please follow up with Dr. Alejo on 12/21/2020 at 10:00AM.             -The office is located at [facility], [facility], 4th floor. Call us with any questions #[phone].  - There is no further indication for continuing aspirin, from a structural heart standpoint, consider discontinuing Aspirin if no other indication per primary team.       Problem 2: HLD   -c/w atorvastatin 20mg at bedtime  -care per primary team     Problem 3: HFpEF   -normal EF   -c/w BB, ACEi  -diuresis as needed   -care per primary team     Problem 4: BRBPR  -H&H stable  -GI consulted, appreciate recs  - GI clearance prior to NOAC    - on 11/16/20 GI recs included ok for restarting AC at lower dose with avoidance of supratherapeutic territory     79 y/o Sudanese/English speaking female with PMHx of uncontrolled HTN, HLD, asthma (no hospitalizations in past), ?MARK (noncompliant with CPAP), chronic diastolic CHF (EF 61%), AS (s/p TAVR rachael valve with Dr. Alejo on 2/5/2019) who was recently hospitalized at [facility] 2/2 to hypertensive emergency (HTN w/ HA). CT head/MRI brain at that time negative and patient was discharged 11/4/20. Patient presented to [facility] ED on 11/13/20 with complaints of intermittent CP and elevated BP x1 week. Structural Heart Disease consulted in setting of previous TAVR, with now elevated AV gradients on TTE performed on 11/16/20, revealing 23 mm Rachael valve is noted in the aortic position without any aortic regurgitation. The peak transvalvular velocity is 4.20 m/s, the mean transvalvular gradient is 35.00 mmHg, and the LVOT/AV velocity ratio is 0.38. The peak transaortic gradient is 70.56 mmHg. Patient underwent IRMA 11/17/20 revealing normal left and right ventricular size and systolic function. No LA/RA/MAIK/RAA thrombus seen. No evidence of an intracardiac shunt. 23 mm Rachael valve is noted in the aortic position without any aortic regurgitation. There is slight thickening at the base of the right cusp with probably mild restriction in excursion. The other prosthetic leaflets appear normal.    Plan:  Problem 1: s/p TAVR  - Case discussed and patient seen at bedside with Dr. Alejo.   - Patient is s/p TAVR valve in 2019 with Dr. Alejo with a Rachael 23mm, elevated gradients on TTE this admission in the setting of HTN and CHF exacerbation  - IRMA performed on 11/17/20 revealed no intracardiac thrombus, but slight thickening at the base of the right cusp with probably mild restriction in excursion.  - CT cardiac results above.     Recommendations:   - Given slight suspicion of thrombus at the base of the right cusp; recommending A/C, (may need renal adjustment), pending GI clearance.   - Continue BP control (caution with dropping BP too aggressively), HF management    Problem 2: HLD   -c/w atorvastatin 20mg at bedtime  -care per primary team     Problem 3: HFpEF   -normal EF   -c/w BB, ACEi  -diuresis as needed   -care per primary team     Problem 4: BRBPR  -H&H stable  -GI consulted, appreciate recs  - GI clearance prior to NOAC    - on 11/16/20 GI recs included ok for restarting AC at lower dose with avoidance of supratherapeutic territory

## 2020-11-18 NOTE — DIETITIAN INITIAL EVALUATION ADULT. - PROBLEM SELECTOR PLAN 5
improved after receiving IV Tylenol and PO Fiorocet in ED.  --no focal neurodeficits, will continue to monitor.  --Patient reportedly had normal CT head/MRI Brain@[facility] ~1 week ago.

## 2020-11-18 NOTE — PROGRESS NOTE ADULT - PROBLEM SELECTOR PLAN 7
- Intermittently w/ HA. No focal neurodeficits.   - Responds well to IV Tylenol and Fiorocet. ordered PRN.   ** Of note, patient reportedly had normal CT head/MRI Brain @ [facility] ~1 week ago. - Intermittently w/ HA. No focal neurodeficits.   - Responds well to IV Tylenol and Fiorocet, ordered PRN.   ** Of note, patient reportedly had normal CT head/MRI Brain @ [facility] ~1 week ago.  -Given recurrent HA/dizziness. Repeat CT Head 11/18/20 w/ no evidence of acute intracranial hemorrhage or acute transcortical infarct.

## 2020-11-18 NOTE — DIETITIAN INITIAL EVALUATION ADULT. - OTHER INFO
78 year old Yi speaking F, PMHx of uncontrolled HTN, hyperlipidemia, asthma (denies hospitalizations, intubations), MARK? (on CPAP, noncompliant), chronic diastolic CHF (EF:61% by Echo 4/2019), aortic stenosis s/p transfemoral TAVR with rachael valve on 02/05/2019, recent hospitalization at [facility] (for HTN/headache, no changes made to medications, negative CT head/MRI brain, discharged on 11/4/20) who presents to [facility] ED 11/13/20 c/o intermittent chest pain and elevated BP x1 week. Pt admitted to [facility] cardiac telemetry for management of hypertensive urgency - BP 220s/110s upon admission, improved to 140s/90s after receiving Coreg 6.25mg PO x 1 dose in ED. ECHO 11/13/2020 showed elevated gradient over aortic valve of 61.78 (doubled since last echo). S/p structural CT: negative for thrombus, Limited TTE 11/16: aortic valve gradient 71mmHg. GI consulted 11/15 in the setting of  BRBPR in the setting of supratherapeutic PTT while on heparin gtt with continued episodes after stopping A/C - likely 2/2 hemorrhoids and no further recs per GI team. Now s/p IRMA as recommended by CTS; F/u CTS recs regarding IRMA read. Pt Course complicated by fever and leukocytosis on 11/17. +BM 11/18. No pain. Quique 20. 1+ Edema (right leg; left leg). No pressure ulcers.  Spoke with RN/pt,  phone #[phone]. BP today 132/65. Pt reports good PO intake PTA. Pt reports eating mostly home cooked meals and denies salt use. Reports making homemade soup, denies broth or stock use. Pt does seem to consume high salt items however such a sausage and various meats, cheese. Pt denies consuming fatty foods however also likes fried items. Pt does not feel diet is an issues. Reports High BP O/A 2/2 lack of teeth (?). Despite lack of teeth on top, pt denies issues chewing/ no issues swallowing, NKFA. Ordered for DASH TLC diet, good PO remains. UBW 220pounds, denies wt changes. Admit wt of 226pounds noted, suggesting 6+ increase from stated wt. Assume wt differences 2/2 fluid shfits in CHF Pt.  Please see Below for RD Recs.

## 2020-11-18 NOTE — PROGRESS NOTE ADULT - ASSESSMENT
78 year old Yoruba speaking F, PMHx of uncontrolled HTN, hyperlipidemia, asthma (denies hospitalizations, intubations), MARK? (on CPAP, noncompliant), chronic diastolic CHF (EF:61% by Echo 4/2019), aortic stenosis s/p transfemoral TAVR with rachael valve on 02/05/2019 with Dr. Alejo @[facility], recent hospitalization at [facility] (for HTN/headache, no changes made to medications, negative CT head/MRI brain, discharged on 11/4/20) who presents to [facility] ED 11/13/20 c/o intermittent chest pain and elevated BP x 1 week. GI consulted for BRBPR.     #Hematochezia  # Anemia  - occurred in setting of supratherapeutic aPTT;   - No BM in 2 days. Last BM with BRB 2 days back. Hb gradually trended down from 12 to 9.4. hemodynamically stable  - EGD/colonoscopy tomorrow.  - Please order split-dose prep with 4L GoLytely: starting now  - Clear liquid diet for now. NPO past MN except medications with sips of water and remainining GoLytely). Strict NPO (not even clears) starting 6 am tomorrow.  - Given the suspicion of thrombus at the base of the right cusp by cards please provide risk assessment for sedation and endoscopic work up.  Recommendations discussed with primary team  Plan discussed with GI service attending    Cordell Lindsey MD  PGY-4 GI fellow  Pager: [phone]

## 2020-11-18 NOTE — PROGRESS NOTE ADULT - PROBLEM SELECTOR PLAN 8
- Continue Spiriva inhaler daily    VTE PPx: Lovenox subcut  PT consulted: Home w/ Home PT  Patient lives with daughter Jaelyn, whom is her HHA (40 hrs/week).  CODE: Full. - Continue Spiriva inhaler daily    VTE PPx: Lovenox subQ  PT consulted: Home w/ Home PT  Patient lives with daughter Jaelyn, whom is her HHA (40 hrs/week).  CODE: Full code

## 2020-11-18 NOTE — PROGRESS NOTE ADULT - SUBJECTIVE AND OBJECTIVE BOX
Patient seen with Dr. Alejo    Operation / Date: Recent TAVR (rachael) on 2019 with Dr. Alejo. Structural Heart Disease consulted for recent TAVR and new findings of elevated AV gradients on echocardiogram.     SUBJECTIVE ASSESSMENT:  80y Female seen ambulating in the hu with physical therapy. Patient c/o feeling "winded" and c/o MANRIQUE. She is saturating 97% on RA with ambulation. Also complained of severe headache yesterday (SBP at the time 90mmHg), which improved today (pre-PT ambulation SBP 120mmHg, and post-PT ambulation SBP 180mmHg).         Vital Signs Last 24 Hrs  T(C): 37.3 (2020 10:12), Max: 37.3 (2020 10:12)  T(F): 99.1 (2020 10:12), Max: 99.1 (2020 10:12)  HR: 74 (2020 10:35) (71 - 95)  BP: 122/61 (2020 10:35) (86/43 - 134/63)  BP(mean): 85 (2020 10:35) (58 - 90)  RR: 16 (2020 10:35) (16 - 19)  SpO2: 97% (2020 10:35) (90% - 99%)  I&O's Detail    2020 07:01  -  2020 07:00  --------------------------------------------------------  IN:    sodium chloride 0.9%: 100 mL  Total IN: 100 mL    OUT:    Voided (mL): 650 mL  Total OUT: 650 mL    Total NET: -550 mL          CHEST TUBE:  No.  SERVANDO DRAIN:  No.  EPICARDIAL WIRES: No.  TIE DOWNS: No.  VENTURA:No.    PHYSICAL EXAM:    General: Patient sitting comfortably in the chair, no acute distress   Neurological: Alert and oriented. No focal neurological deficits   Cardiovascular: S1S2, RRR, +YOVANA heard at RUSB   Respiratory: Clear to ausculation bilaterally, no wheezes, rales or rhonchi  Gastrointestinal: Abdomen soft, non tender, non distended   Extremities: Warm and well perfused. trace peripheral edema b/l, nor calf tenderness bilaterally  Vascular: Peripheral pulses palpable bilaterally  LABS:                        9.4    6.91  )-----------( 194      ( 2020 05:59 )             29.2       COUMADIN:  No.    PT/INR - ( 2020 17:01 )   PT: 13.5 sec;   INR: 1.13          PTT - ( 2020 17:01 )  PTT:34.0 sec        136  |  97  |  35<H>  ----------------------------<  107<H>  4.5   |  25  |  1.77<H>    Ca    8.7      2020 05:59  Mg     2.3     18        Urinalysis Basic - ( 2020 11:17 )    Color: Yellow / Appearance: Clear / S.015 / pH: x  Gluc: x / Ketone: NEGATIVE  / Bili: Negative / Urobili: 0.2 E.U./dL   Blood: x / Protein: NEGATIVE mg/dL / Nitrite: NEGATIVE   Leuk Esterase: Moderate / RBC: < 5 /HPF / WBC < 5 /HPF   Sq Epi: x / Non Sq Epi: 0-5 /HPF / Bacteria: Present /HPF        MEDICATIONS  (STANDING):  aspirin enteric coated 81 milliGRAM(s) Oral daily  atorvastatin 20 milliGRAM(s) Oral at bedtime  carvedilol 6.25 milliGRAM(s) Oral every 12 hours  hydrochlorothiazide 12.5 milliGRAM(s) Oral daily  influenza  Vaccine (HIGH DOSE) 0.7 milliLiter(s) IntraMuscular once  lisinopril 10 milliGRAM(s) Oral daily  pantoprazole  Injectable 40 milliGRAM(s) IV Push two times a day  polyethylene glycol 3350 17 Gram(s) Oral daily  sodium chloride 0.9%. 250 milliLiter(s) (50 mL/Hr) IV Continuous <Continuous>  tiotropium 18 MICROgram(s) Capsule 1 Capsule(s) Inhalation daily    MEDICATIONS  (PRN):  acetaminophen   Tablet .. 650 milliGRAM(s) Oral every 6 hours PRN Mild Pain (1 - 3), Moderate Pain (4 - 6)  acetaminophen 300 mG/butalbital 50 mG/ caffeine 40 mG 1 Capsule(s) Oral every 6 hours PRN Severe headache  aluminum hydroxide/magnesium hydroxide/simethicone Suspension 30 milliLiter(s) Oral every 6 hours PRN Dyspepsia        RADIOLOGY & ADDITIONAL TESTS:      < from: TTE Limited Echo w/o Cont (20 @ 12:57) >  FINDINGS:    Left Ventricle:  Left ventricular hypertrophy present. Hyperdynamicleft ventricular systolic function with cavity obliteration resulting in an intra-cavitary gradient of 31.00 mmHg. Left ventricular ejection fraction is >75%.    Right Ventricle:  The right ventricle is normal in size. Right ventricular systolic function is normal.    Aortic Valve:  23 mm Rachael valve is noted in the aortic position without any aortic regurgitation. The peak transvalvular velocity is 4.20 m/s, the mean transvalvular gradient is 35.00 mmHg, and the LVOT/AV velocity ratio is 0.38. The peak transaortic gradient is 70.56 mmHg.    Pericardium:  No pericardial effusion is seen.    ---  Scott Heck MD    Electronically signed by Scott Heck MD  Signature Date/Time: 2020/1:51:09 PM        *** Final ***      < end of copied text >    ______________________________________________________  < from: IRMA w/Doppler (20 @ 12:38) >  CONCLUSIONS:     1. Normal left and right ventricular size and systolic function.   2. Mildly dilated left atrium.   3. No LA/RA/MAIK/RAA thrombus seen.   4. No evidence of an intracardiac shunt.   5. 23 mm Rachael valve is noted in the aortic position without any aortic regurgitation.   6. There is slight thickening at the base of the right cusp with probably mild restriction in excursion. The other prosthetic leaflets appear normal.   7. No evidence of pulmonary hypertension.   8. No pericardial effusion.   9. See TTE performed 2020 for TAVR gradients, which were elevated.    -------------  SPECTRAL DOPPLER ANALYSIS:    Mitral Valve:  MV Mean Grad: 2.5 mmHg MV Area, PHT:    Tricuspid Valve and PA/RV Systolic Pressure: TR Max Velocity: 1.90 m/s RA Pressure: RVSP/PASP:      ------------  FINDINGS:    Left Ventricle:  The left ventricle is normal in size and systolic function with a calculated ejection fraction of 65-70%.    Right Ventricle:  The right ventricle is normal in size. Right ventricular systolic function is normal.    Left Atrium:  The left atrium is mildly dilated. No thrombus seen in the left atrium or in the left atrial appendage. Spectral Doppler reveals normal left atrial appendage velocities.    Right Atrium:  The right atrium is normal in size.    Interatrial Septum:  The interatrial septum appears intact. Color flow Doppler reveals no evidence of an interatrial shunt.    Aortic Valve:  23 mm Rachael valve is noted in the aortic position without any aortic regurgitation. There is slight thickening at the base of the right cusp with probably mild restriction in excursion. The other prosthetic leaflets appear normal.    Mitral Valve:  The mitral valve is mildly thickened and calcified. There is mild mitral annular calcification. The mean transvalvular gradient is 2.50 mmHg at a heart rate of 79 bpm. There is trace mitral regurgitation.    Tricuspid Valve:  Structurally normal tricuspid valve with normal leaflet excursion. There is trace tricuspid regurgitation.    Pulmonic Valve:  Structurally normal pulmonic valve with normal leaflet excursion. There is no evidence of pulmonic regurgitation.    Pericardium:  No pericardial effusion is seen. Prominent pericardial fat pad is seen anterior to the right ventricle.    --------------------------------------------------------------------------------  Cat Vaughn MD    Electronically signed by Cat Vaughn MD  Signature Date/Time: 2020/2:56:05 PM        *** Final ***    < end of copied text >      ____________________________________________________________________________  `< from: CT Heart Congenital w/ IV Cont (20 @ 12:02) >  FINDINGS:      Impression:  1.  Please note this study was not optimized for the evaluation of the coronary arteries.  2.  Non obstructive coronary artery disease.  3.  Bioprosthetic aortic valve. No thrombus visualized on aortic valve leaflets.  4.  Calcification of the mitral valve annulus.    Please see separate radiology report for non-coronary findings.  ALLA CASH NP, [facility]  IMPRESSION:  Aortic aneurysm of ascending aorta, stable since prior study.    *** END OF ADDENDUM 2020  ***    < end of copied text >

## 2020-11-18 NOTE — DIETITIAN INITIAL EVALUATION ADULT. - PROBLEM SELECTOR PLAN 1
BP 220s/110s upon admission, improved to 140s/90s after receiving Coreg 6.25mg PO x 1 dose in ED.   --will resume home regimen of Coreg 6.25mg PO q 12hrs and Lisinopril 10mg PO daily. Holding home HCTZ 12.5mg for now given patient received Lasix IV x 1 dose. Will uptitrate meds PRN.

## 2020-11-18 NOTE — DIETITIAN INITIAL EVALUATION ADULT. - PROBLEM SELECTOR PLAN 2
likely in setting of HTN urgency, currently chest pain free. EKG SR with 1st degree AVB, no ischemic changes. Cardiac enzymes negative x 1 set.  --will F/U cardiac enzymes@5AM.  --obtain Echocardiogram    **Recent diagnostic R+L heart cath@[facility] 1/9/19 revealed LMCA normal, LAD with minor luminal irregularities, LCx large and normal, RCA large and normal. PA 18, RV 73/13 (21), PA 70/34 (49), PCWP 26, CO 7.3 L/min, CI 3.7, TPG 23, severe AS (mean LV/Ao gradient 48.1 mmHg, SETH 1.1 cm2).

## 2020-11-18 NOTE — PROGRESS NOTE ADULT - PROBLEM SELECTOR PLAN 2
- multiple episodes of BRBPR on 11/14PM and 11/15 PM 2/2 to supratherapeutic PTT on heparin gtt. (has had prior to admission).   - GI service consulted.  Likely in setting supratherapeutic INR, H/H stable.  Per GI no intervention at this time.  - Patient w/ additional episodes or BRBPR in evening on 11/17 after heparin gtt was off and PTT stabalized.  Per patient occurred w/ straining during BM  - GI reconsulted.  Per recs likely 2/2 hemorrhoid.  H/H remains stable.  No further work up indicated.  Signed off.

## 2020-11-18 NOTE — PROGRESS NOTE ADULT - SUBJECTIVE AND OBJECTIVE BOX
CARDIOLOGY NP PROGRESS NOTE    Subjective: Pt seen and examined at bedside. Reports feeling pounding HA and dizziness earlier this AM. Denies chest pain, sob, lightheadedness, dizziness, fever, chills, n/v.  Remainder ROS otherwise negative.    Overnight Events:  Hypotensive w/ SBPs in high 80s at 520am. Pt asx. Received IV fluids NS 50cc/hr x 4 hours started. BP medications pushed to 10am and night nurse told to endorse to day nurse to stagger the medications if SBP goes above 110 at 10am.    TELEMETRY: SR 70-80s       VITAL SIGNS:  T(C): 37.1 (11-18-20 @ 13:24), Max: 37.3 (11-18-20 @ 10:12)  HR: 82 (11-18-20 @ 13:10) (71 - 95)  BP: 132/65 (11-18-20 @ 13:10) (86/43 - 186/81)  RR: 16 (11-18-20 @ 13:10) (16 - 18)  SpO2: 97% (11-18-20 @ 13:10) (90% - 99%)  Wt(kg): --    I&O's Summary    17 Nov 2020 07:01  -  18 Nov 2020 07:00  --------------------------------------------------------  IN: 100 mL / OUT: 650 mL / NET: -550 mL    18 Nov 2020 07:01  -  18 Nov 2020 15:10  --------------------------------------------------------  IN: 300 mL / OUT: 0 mL / NET: 300 mL          PHYSICAL EXAM:    General: A/ox 3, No acute Distress  Neck: Supple, NO JVD  Cardiac: S1 S2, grade II/VI systolic murmur   Pulmonary: CTAB, Breathing unlabored, No Rhonchi/Rales/Wheezing  Abdomen: Soft, Non -tender, +BS x 4 quads  Extremities: No Rashes, No edema  Neuro: A/o x 3, No focal deficits          LABS:                          9.4    6.91  )-----------( 194      ( 18 Nov 2020 05:59 )             29.2                              11-18    136  |  97  |  35<H>  ----------------------------<  107<H>  4.5   |  25  |  1.77<H>    Ca    8.7      18 Nov 2020 05:59  Mg     2.3     11-18                              PT/INR - ( 16 Nov 2020 17:01 )   PT: 13.5 sec;   INR: 1.13          PTT - ( 16 Nov 2020 17:01 )  PTT:34.0 sec  CAPILLARY BLOOD GLUCOSE        CARDIAC MARKERS ( 16 Nov 2020 22:21 )  x     / <0.01 ng/mL / 66 U/L / x     / 1.1 ng/mL          Allergies:  Plavix (Other (Mod to Severe))    MEDICATIONS  (STANDING):  aspirin enteric coated 81 milliGRAM(s) Oral daily  atorvastatin 20 milliGRAM(s) Oral at bedtime  carvedilol 6.25 milliGRAM(s) Oral every 12 hours  hydrochlorothiazide 12.5 milliGRAM(s) Oral daily  influenza  Vaccine (HIGH DOSE) 0.7 milliLiter(s) IntraMuscular once  lisinopril 10 milliGRAM(s) Oral daily  pantoprazole  Injectable 40 milliGRAM(s) IV Push two times a day  polyethylene glycol 3350 17 Gram(s) Oral daily  sodium chloride 0.9%. 250 milliLiter(s) (50 mL/Hr) IV Continuous <Continuous>  tiotropium 18 MICROgram(s) Capsule 1 Capsule(s) Inhalation daily    MEDICATIONS  (PRN):  acetaminophen   Tablet .. 650 milliGRAM(s) Oral every 6 hours PRN Mild Pain (1 - 3), Moderate Pain (4 - 6)  acetaminophen 300 mG/butalbital 50 mG/ caffeine 40 mG 1 Capsule(s) Oral every 6 hours PRN Severe headache  aluminum hydroxide/magnesium hydroxide/simethicone Suspension 30 milliLiter(s) Oral every 6 hours PRN Dyspepsia        DIAGNOSTIC TESTS:        CARDIOLOGY NP PROGRESS NOTE    Subjective: Pt seen and examined at bedside. Reports feeling pounding HA and dizziness earlier this AM. Denies chest pain, sob, lightheadedness, dizziness, fever, chills, n/v.  Remainder ROS otherwise negative.    Overnight Events:  Hypotensive w/ SBPs in high 80s at 520am. Pt asx. Received IV fluids NS 50cc/hr x 4 hours started. BP medications rescheduled to 10am and night nurse told to endorse to day nurse to stagger the medications if SBP goes above 110 at 10am.    TELEMETRY: SR 70-80s       VITAL SIGNS:  T(C): 37.1 (11-18-20 @ 13:24), Max: 37.3 (11-18-20 @ 10:12)  HR: 82 (11-18-20 @ 13:10) (71 - 95)  BP: 132/65 (11-18-20 @ 13:10) (86/43 - 186/81)  RR: 16 (11-18-20 @ 13:10) (16 - 18)  SpO2: 97% (11-18-20 @ 13:10) (90% - 99%)  Wt(kg): --    I&O's Summary    17 Nov 2020 07:01  -  18 Nov 2020 07:00  --------------------------------------------------------  IN: 100 mL / OUT: 650 mL / NET: -550 mL    18 Nov 2020 07:01  -  18 Nov 2020 15:10  --------------------------------------------------------  IN: 300 mL / OUT: 0 mL / NET: 300 mL          PHYSICAL EXAM:    General: A/ox 3, No acute Distress  Neck: Supple, NO JVD  Cardiac: S1 S2, grade II/VI systolic murmur   Pulmonary: CTAB, Breathing unlabored, No Rhonchi/Rales/Wheezing  Abdomen: Soft, Non -tender, +BS x 4 quads  Extremities: No Rashes, No edema  Neuro: A/o x 3, No focal deficits          LABS:                          9.4    6.91  )-----------( 194      ( 18 Nov 2020 05:59 )             29.2                              11-18    136  |  97  |  35<H>  ----------------------------<  107<H>  4.5   |  25  |  1.77<H>    Ca    8.7      18 Nov 2020 05:59  Mg     2.3     11-18                              PT/INR - ( 16 Nov 2020 17:01 )   PT: 13.5 sec;   INR: 1.13          PTT - ( 16 Nov 2020 17:01 )  PTT:34.0 sec  CAPILLARY BLOOD GLUCOSE        CARDIAC MARKERS ( 16 Nov 2020 22:21 )  x     / <0.01 ng/mL / 66 U/L / x     / 1.1 ng/mL          Allergies:  Plavix (Other (Mod to Severe))    MEDICATIONS  (STANDING):  aspirin enteric coated 81 milliGRAM(s) Oral daily  atorvastatin 20 milliGRAM(s) Oral at bedtime  carvedilol 6.25 milliGRAM(s) Oral every 12 hours  hydrochlorothiazide 12.5 milliGRAM(s) Oral daily  influenza  Vaccine (HIGH DOSE) 0.7 milliLiter(s) IntraMuscular once  lisinopril 10 milliGRAM(s) Oral daily  pantoprazole  Injectable 40 milliGRAM(s) IV Push two times a day  polyethylene glycol 3350 17 Gram(s) Oral daily  sodium chloride 0.9%. 250 milliLiter(s) (50 mL/Hr) IV Continuous <Continuous>  tiotropium 18 MICROgram(s) Capsule 1 Capsule(s) Inhalation daily    MEDICATIONS  (PRN):  acetaminophen   Tablet .. 650 milliGRAM(s) Oral every 6 hours PRN Mild Pain (1 - 3), Moderate Pain (4 - 6)  acetaminophen 300 mG/butalbital 50 mG/ caffeine 40 mG 1 Capsule(s) Oral every 6 hours PRN Severe headache  aluminum hydroxide/magnesium hydroxide/simethicone Suspension 30 milliLiter(s) Oral every 6 hours PRN Dyspepsia        DIAGNOSTIC TESTS:

## 2020-11-18 NOTE — PROGRESS NOTE ADULT - ASSESSMENT
78 year old Swedish speaking F, PMHx of uncontrolled HTN, HLD, asthma, chronic diastolic CHF (EF:61% by Echo 4/2019), aortic stenosis s/p transfemoral TAVR with rachael valve on 02/05/2019 with Dr. Alejo @[facility] who presents to [facility] ED 11/13/20 c/o intermittent chest pain and elevated BP x 1 week. BP initially elevated on arrival however has been stable on home meds. ECHO 11/13/2020 showed elevated gradient over aortic valve of 61.78 (doubled since last echo). Dr. Alejo consulted. S/p structural CT: negative for thrombus, Limited TTE 11/16: aortic valve gradient 71mmHg. GI consulted 11/15 in the setting of  BRBPR in the setting of supratherapeutic PTT while on heparin gtt w/ continued episodes after stopping A/C, w/ stable H/H, and likely 2/2 hemorrhoids and no further recs per GI team.  s/p IRMA as recommended by CTS.  Course complicated by fever and leukocytosis on 11/17. 78 year old Maltese speaking F, PMHx of uncontrolled HTN, HLD, asthma, chronic diastolic CHF (EF 61% by Echo 4/2019), aortic stenosis s/p transfemoral TAVR with Jamar valve on 02/05/2019 with Dr. Alejo @[facility] who presents to [facility] ED 11/13/20 c/o intermittent chest pain and elevated BP x 1 week. BP initially elevated on arrival however has been stable on home meds. ECHO 11/13/2020 showed elevated gradient over aortic valve of 61.78 (doubled since last echo). Dr. Alejo consulted. S/p structural CT: negative for thrombus, Limited TTE 11/16: aortic valve gradient 71mmHg. GI consulted 11/15 in the setting of BRBPR in the setting of supratherapeutic PTT while on heparin gtt w/ continued episodes after stopping A/C, likely 2/2 hemorrhoids. Now w/ drop in H/h, GI team following. IRMA w/o thrombus, but noting MV thickening. Course complicated by fever and leukocytosis on 11/17. 78 year old Irish speaking F, PMHx of uncontrolled HTN, HLD, asthma, chronic diastolic CHF (EF 61% by Echo 4/2019), aortic stenosis s/p transfemoral TAVR with Jamar valve on 02/05/2019 with Dr. Alejo @[facility] who presents to [facility] ED 11/13/20 c/o intermittent chest pain and elevated BP x 1 week. BP initially elevated on arrival however has been stable on home meds. ECHO 11/13/2020 showed elevated gradient over aortic valve of 61.78 (doubled since last echo). Dr. Alejo consulted. S/p structural CT: negative for thrombus, Limited TTE 11/16: elevated aortic valve gradient 71mmHg. GI consulted 11/15 in the setting of BRBPR in the setting of supratherapeutic PTT while on heparin gtt w/ continued episodes after stopping A/C, likely 2/2 hemorrhoids. IRMA noting AV thickening suspicious for thrombus, to start AC pending GI clearance given acute drop in H/h, GI team reconsulted. Hospital course complicated by fever 101.2 and leukocytosis on 11/17, now resolved.

## 2020-11-18 NOTE — PROGRESS NOTE ADULT - PROBLEM SELECTOR PLAN 4
- Likely 2/2 HTN urgency; RESOLVED  - Dx R/LHC 1/9/19 non-obstructive (full report below).   **Recent diagnostic R+L heart cath@[facility] 1/9/19 revealed LMCA normal, LAD with minor luminal irregularities, LCx large and normal, RCA large and normal. PA 18, RV 73/13 (21), PA 70/34 (49), PCWP 26, CO 7.3 L/min, CI 3.7, TPG 23, severe AS (mean LV/Ao gradient 48.1 mmHg, SETH 1.1 cm2).

## 2020-11-18 NOTE — DIETITIAN INITIAL EVALUATION ADULT. - OTHER CALCULATIONS
IBW used to calculate energy needs due to pt's current body weight exceeding 120% of IBW, adjusted for age and CHF - recommend lower end of kcal needs, fluids per team

## 2020-11-19 ENCOUNTER — RESULT REVIEW (OUTPATIENT)
Age: 80
End: 2020-11-19

## 2020-11-19 LAB
ANION GAP SERPL CALC-SCNC: 13 MMOL/L — SIGNIFICANT CHANGE UP (ref 5–17)
APTT BLD: 30.3 SEC — SIGNIFICANT CHANGE UP (ref 27.5–35.5)
BLD GP AB SCN SERPL QL: NEGATIVE — SIGNIFICANT CHANGE UP
BUN SERPL-MCNC: 29 MG/DL — HIGH (ref 7–23)
CALCIUM SERPL-MCNC: 8.7 MG/DL — SIGNIFICANT CHANGE UP (ref 8.4–10.5)
CHLORIDE SERPL-SCNC: 104 MMOL/L — SIGNIFICANT CHANGE UP (ref 96–108)
CO2 SERPL-SCNC: 24 MMOL/L — SIGNIFICANT CHANGE UP (ref 22–31)
CREAT SERPL-MCNC: 1.17 MG/DL — SIGNIFICANT CHANGE UP (ref 0.5–1.3)
CULTURE RESULTS: SIGNIFICANT CHANGE UP
GLUCOSE SERPL-MCNC: 137 MG/DL — HIGH (ref 70–99)
HCT VFR BLD CALC: 28.7 % — LOW (ref 34.5–45)
HGB BLD-MCNC: 9.5 G/DL — LOW (ref 11.5–15.5)
INR BLD: 1.09 — SIGNIFICANT CHANGE UP (ref 0.88–1.16)
MAGNESIUM SERPL-MCNC: 2.4 MG/DL — SIGNIFICANT CHANGE UP (ref 1.6–2.6)
MCHC RBC-ENTMCNC: 30.2 PG — SIGNIFICANT CHANGE UP (ref 27–34)
MCHC RBC-ENTMCNC: 33.1 GM/DL — SIGNIFICANT CHANGE UP (ref 32–36)
MCV RBC AUTO: 91.1 FL — SIGNIFICANT CHANGE UP (ref 80–100)
NRBC # BLD: 0 /100 WBCS — SIGNIFICANT CHANGE UP (ref 0–0)
PHOSPHATE SERPL-MCNC: 3.6 MG/DL — SIGNIFICANT CHANGE UP (ref 2.5–4.5)
PLATELET # BLD AUTO: 191 K/UL — SIGNIFICANT CHANGE UP (ref 150–400)
POTASSIUM SERPL-MCNC: 4 MMOL/L — SIGNIFICANT CHANGE UP (ref 3.5–5.3)
POTASSIUM SERPL-SCNC: 4 MMOL/L — SIGNIFICANT CHANGE UP (ref 3.5–5.3)
PROTHROM AB SERPL-ACNC: 13 SEC — SIGNIFICANT CHANGE UP (ref 10.6–13.6)
RBC # BLD: 3.15 M/UL — LOW (ref 3.8–5.2)
RBC # FLD: 13.8 % — SIGNIFICANT CHANGE UP (ref 10.3–14.5)
RH IG SCN BLD-IMP: POSITIVE — SIGNIFICANT CHANGE UP
SODIUM SERPL-SCNC: 141 MMOL/L — SIGNIFICANT CHANGE UP (ref 135–145)
SPECIMEN SOURCE: SIGNIFICANT CHANGE UP
WBC # BLD: 5.56 K/UL — SIGNIFICANT CHANGE UP (ref 3.8–10.5)
WBC # FLD AUTO: 5.56 K/UL — SIGNIFICANT CHANGE UP (ref 3.8–10.5)

## 2020-11-19 PROCEDURE — 88305 TISSUE EXAM BY PATHOLOGIST: CPT | Mod: 26

## 2020-11-19 PROCEDURE — 45380 COLONOSCOPY AND BIOPSY: CPT

## 2020-11-19 PROCEDURE — 43235 EGD DIAGNOSTIC BRUSH WASH: CPT

## 2020-11-19 RX ORDER — SODIUM CHLORIDE 9 MG/ML
250 INJECTION INTRAMUSCULAR; INTRAVENOUS; SUBCUTANEOUS
Refills: 0 | Status: DISCONTINUED | OUTPATIENT
Start: 2020-11-20 | End: 2020-11-20

## 2020-11-19 RX ORDER — SODIUM CHLORIDE 9 MG/ML
250 INJECTION INTRAMUSCULAR; INTRAVENOUS; SUBCUTANEOUS
Refills: 0 | Status: DISCONTINUED | OUTPATIENT
Start: 2020-11-19 | End: 2020-11-19

## 2020-11-19 RX ORDER — IOHEXOL 300 MG/ML
30 INJECTION, SOLUTION INTRAVENOUS ONCE
Refills: 0 | Status: COMPLETED | OUTPATIENT
Start: 2020-11-20 | End: 2020-11-20

## 2020-11-19 RX ORDER — IOHEXOL 300 MG/ML
30 INJECTION, SOLUTION INTRAVENOUS ONCE
Refills: 0 | Status: DISCONTINUED | OUTPATIENT
Start: 2020-11-19 | End: 2020-11-19

## 2020-11-19 RX ADMIN — LISINOPRIL 10 MILLIGRAM(S): 2.5 TABLET ORAL at 05:48

## 2020-11-19 RX ADMIN — ATORVASTATIN CALCIUM 20 MILLIGRAM(S): 80 TABLET, FILM COATED ORAL at 21:26

## 2020-11-19 RX ADMIN — Medication 650 MILLIGRAM(S): at 22:15

## 2020-11-19 RX ADMIN — CARVEDILOL PHOSPHATE 6.25 MILLIGRAM(S): 80 CAPSULE, EXTENDED RELEASE ORAL at 21:25

## 2020-11-19 RX ADMIN — Medication 12.5 MILLIGRAM(S): at 05:49

## 2020-11-19 RX ADMIN — TIOTROPIUM BROMIDE 1 CAPSULE(S): 18 CAPSULE ORAL; RESPIRATORY (INHALATION) at 13:55

## 2020-11-19 RX ADMIN — CARVEDILOL PHOSPHATE 6.25 MILLIGRAM(S): 80 CAPSULE, EXTENDED RELEASE ORAL at 13:55

## 2020-11-19 RX ADMIN — Medication 650 MILLIGRAM(S): at 21:31

## 2020-11-19 RX ADMIN — Medication 100 MILLIGRAM(S): at 19:32

## 2020-11-19 RX ADMIN — POLYETHYLENE GLYCOL 3350 17 GRAM(S): 17 POWDER, FOR SOLUTION ORAL at 13:56

## 2020-11-19 RX ADMIN — PANTOPRAZOLE SODIUM 40 MILLIGRAM(S): 20 TABLET, DELAYED RELEASE ORAL at 05:50

## 2020-11-19 RX ADMIN — PANTOPRAZOLE SODIUM 40 MILLIGRAM(S): 20 TABLET, DELAYED RELEASE ORAL at 17:26

## 2020-11-19 NOTE — PROGRESS NOTE ADULT - PROBLEM SELECTOR PLAN 2
- multiple episodes of BRBPR on 11/14PM and 11/15 PM 2/2 to supratherapeutic PTT on heparin gtt. (has had prior to admission).   - GI service consulted.  Likely in setting supratherapeutic INR, H/H stable.  Per GI no intervention at this time.  - Patient w/ additional episodes or BRBPR in evening on 11/17 after heparin gtt was off and PTT stabalized.  Per patient occurred w/ straining during BM  - GI reconsulted.  Per recs likely 2/2 hemorrhoid.  H/H remains stable.  No further work up indicated.  Signed off. - multiple episodes of BRBPR on 11/14PM and 11/15 PM 2/2 to supratherapeutic PTT on heparin gtt. (has had prior to admission).  Recurrent episodes of BRBPR in evening on 11/17 after heparin gtt was off and PTT stabilized.  Per patient occurred w/ straining during BM  - GI reconsulted.   -S/p EGD revealing mild gastropathy.  -S/p Colonoscopy revealing Four large colonic mass lesions with the most proximal one near the cecum (150 cm from the anal verge) and the most proximal one in the transverse colon (100 cm from the anal verge) s/p multiple biopsies and tattooing of the proximal and distal mass. Ulceration and contact bleeding noted form the mass lesions  - Follow up pathology.  -Pending Hemonc and surgical evaluation for likely colon cancer  - F/u CEA   - Pending CT chest/abdomen/pelvis/chest w/ PO and IV contrast  -High risk of GI bleeding on anticoagulation therapy given above findings

## 2020-11-19 NOTE — CONSULT NOTE ADULT - SUBJECTIVE AND OBJECTIVE BOX
CONSULT NOTE    HPI:  78 year old Irish speaking F, PMHx of uncontrolled HTN, hyperlipidemia, asthma (denies hospitalizations, intubations), MARK? (on CPAP, noncompliant), chronic diastolic CHF (EF:61% by Echo 4/2019), aortic stenosis s/p transfemoral TAVR with rachael valve on 02/05/2019 with Dr. Alejo @[facility], recent hospitalization at [facility] (for HTN/headache, no changes made to medications, negative CT head/MRI brain, discharged on 11/4/20) who presents to [facility] ED 11/13/20 c/o intermittent chest pain and elevated BP x 1 week. Patient describes the chest pain as being nonexertional midsternal chest pressure with radiation to RUE, 6/10 in severity. Associated symptoms including SOB, headache and dizziness. Patient denies any N/V, diaphoresis, palpitations, PND, recent travel or sick contacts. Patient admits to chronic bilateral LE edema and 2 pillow orthopnea. Patient reports compliance with her medications.   Surgery Addendum Surgery consulted for evaluation and recs for mass on colonoscopy Patient noted w/ BRBPR after start of AC. GI consulted. Colonoscopy performed on today revealed 4 large colonic mass lesions most proximal near the cecum and the most distal 100cm form the anal verge. Biopsies taken and tattooing of the proximal and distal mass. Ulceration and contact bleeding noted form the masses. When assessed states no additional episodes of bleeding or blood per rectum. Tolerating diet w/ mild lower abdominal pain, no rebound or guarding. Passing flatus.        PAST MEDICAL & SURGICAL HISTORY:  CHF (congestive heart failure)    Pulmonary HTN    Aortic stenosis    HTN (hypertension)    S/P TAVR (transcatheter aortic valve replacement)        PAST SURGICAL HISTORY:     REVIEW OF SYSTEMS:   Pertinent positives/negatives noted in HPI.     HOME MEDICATIONS:    ALLERGIES:  Allergies    Plavix (Other (Mod to Severe))    Intolerances        SOCIAL HISTORY:    FAMILY HISTORY:  No pertinent family history in first degree relatives        Vital Signs Last 24 Hrs  T(C): 36.2 (19 Nov 2020 17:42), Max: 37.1 (18 Nov 2020 21:29)  T(F): 97.2 (19 Nov 2020 17:42), Max: 98.7 (18 Nov 2020 21:29)  HR: 73 (19 Nov 2020 16:35) (63 - 94)  BP: 142/74 (19 Nov 2020 16:35) (142/68 - 167/85)  BP(mean): 102 (19 Nov 2020 16:35) (90 - 119)  RR: 18 (19 Nov 2020 16:35) (16 - 18)  SpO2: 97% (19 Nov 2020 16:35) (97% - 99%)    PHYSICAL EXAM:  General: NAD, resting comfortably in bed  C/V: NSR  Pulm: Nonlabored breathing, no respiratory distress  Abd: soft, non-distended, mild lower abdominal tenderness, most prominent in left  ESTEFANY: External hemorrhoids, no palpable internal masses, stool on glove, no blood  Extrem: WWP, no edema,  Neuro: A/O x 3, CNs II-XII grossly intact, no focal deficits, normal sensation  Pulses: palpable distal pulses     LABS:                        9.5    5.56  )-----------( 191      ( 19 Nov 2020 05:39 )             28.7     11-19    141  |  104  |  29<H>  ----------------------------<  137<H>  4.0   |  24  |  1.17    Ca    8.7      19 Nov 2020 05:39  Phos  3.6     11-19  Mg     2.4     11-19      PT/INR - ( 19 Nov 2020 05:39 )   PT: 13.0 sec;   INR: 1.09          PTT - ( 19 Nov 2020 05:39 )  PTT:30.3 sec      RADIOLOGY AND ADDITIONAL STUDIES

## 2020-11-19 NOTE — CONSULT NOTE ADULT - ASSESSMENT
78 year old Peruvian speaking F, PMHx of uncontrolled HTN, hyperlipidemia, asthma (denies hospitalizations, intubations), MARK? (on CPAP, noncompliant), chronic diastolic CHF (EF:61% by Echo 4/2019), aortic stenosis s/p transfemoral TAVR with rachael valve on 02/05/2019 with Dr. Alejo admitted for CP and  hypertensive urgency. Surgery consulted for management of colonic mass.    Recommend CT Chest A/P  Recommend CEA  Will f/u final pathology  Anticoagulation per primary team  Team 1c will continue to follow  Plan discussed w/ attending and chief resident

## 2020-11-19 NOTE — PROGRESS NOTE ADULT - PROBLEM SELECTOR PLAN 7
- Intermittently w/ HA. No focal neurodeficits.   - Responds well to IV Tylenol and Fiorocet, ordered PRN.   ** Of note, patient reportedly had normal CT head/MRI Brain @ [facility] ~1 week ago.  -Given recurrent HA/dizziness. Repeat CT Head 11/18/20 w/ no evidence of acute intracranial hemorrhage or acute transcortical infarct.

## 2020-11-19 NOTE — PROGRESS NOTE ADULT - PROBLEM SELECTOR PLAN 3
Hypotensive SBP 80s this AM.   - S/p IVF hydration NS 50cc/hr x 8hr   - CONTINUE: Coreg 6.25mg PO BID, Lisinopril 10mg PO QD, home HCTZ 12.5mg PO QD Hypotensive SBP 80s 11/18, resolved s/p IVF hydration NS 50cc/hr x 8hr   - CONTINUE: Coreg 6.25mg PO BID, Lisinopril 10mg PO QD, home HCTZ 12.5mg PO QD

## 2020-11-19 NOTE — PROGRESS NOTE ADULT - PROBLEM SELECTOR PLAN 4
- Likely 2/2 HTN urgency; RESOLVED  - Dx R/LHC 1/9/19 non-obstructive (full report below).   **Recent diagnostic R+L [email or web address] 1/9/19 revealed LMCA normal, LAD with minor luminal irregularities, LCx large and normal, RCA large and normal. PA 18, RV 73/13 (21), PA 70/34 (49), PCWP 26, CO 7.3 L/min, CI 3.7, TPG 23, severe AS (mean LV/Ao gradient 48.1 mmHg, SETH 1.1 cm2).

## 2020-11-19 NOTE — PROGRESS NOTE ADULT - PROBLEM SELECTOR PLAN 1
- s/p transfemoral TAVR with Jamar valve on 02/05/2019 with Dr. Alejo @[facility].  - ECHO (11/13/20): Normal BiV fxn/size, moderate LVH, Grade II LV Diastolic Dysfunction, PEAK TRANSAORTIC GRADIENT 61.78mmHg, mildly dilated LA, no pulm HTN, moderately dilated ascending aorta. (previously 14mmHg on 4/2019)  - Structural CT scan: no thrombus seen.    -Limited TTE 11/16: gradient 71mmHg  -s/p IRMA 11/17: No LA/RA/MAIK/RAA thrombus seen, No evidence of an intracardiac shunt, 23 mm Jamar valve is noted in the aortic position without any aortic regurgitation, There is slight thickening at the base of the right cusp with probably mild restriction in excursion. The other prosthetic leaflets appear normal.  - Given slight suspicious for thrombus at base of R cusp, per Structural Heart Dr Alejo, would start NOAC if cleared by GI  - Follow up with Dr. Alejo on 12/21/2020 at 10:00AM  -consider discontinuing Aspirin if no longer indicated - s/p transfemoral TAVR with Jamar valve on 02/05/2019 with Dr. Alejo @[facility].  - ECHO (11/13/20): Normal BiV fxn/size, moderate LVH, Grade II LV Diastolic Dysfunction, PEAK TRANSAORTIC GRADIENT 61.78mmHg, mildly dilated LA, no pulm HTN, moderately dilated ascending aorta. (previously 14mmHg on 4/2019)  - Structural CT scan: no thrombus seen.    -Limited TTE 11/16: gradient 71mmHg  -s/p IRMA 11/17: No LA/RA/MAIK/RAA thrombus seen, No evidence of an intracardiac shunt, 23 mm Jamar valve is noted in the aortic position without any aortic regurgitation, There is slight thickening at the base of the right cusp with probably mild restriction in excursion. The other prosthetic leaflets appear normal.  - Given slight suspicious for thrombus at base of R cusp, per Structural Heart Dr Alejo, would like to start NOAC if cleared by GI  - NOT cleared to start NOAC given colonic mass lesions found on Cscope  - Follow up with Dr. Alejo on 12/21/2020 at 10:00AM  -D/c'ed Aspirin, not indicated

## 2020-11-19 NOTE — PROGRESS NOTE ADULT - ASSESSMENT
78 year old German speaking F, PMHx of uncontrolled HTN, HLD, asthma, chronic diastolic CHF (EF 61% by Echo 4/2019), aortic stenosis s/p transfemoral TAVR with Jamar valve on 02/05/2019 with Dr. Alejo @[facility] who presents to [facility] ED 11/13/20 c/o intermittent chest pain and elevated BP x 1 week. BP initially elevated on arrival however has been stable on home meds. ECHO 11/13/2020 showed elevated gradient over aortic valve of 61.78 (doubled since last echo). Dr. Alejo consulted. S/p structural CT: negative for thrombus, Limited TTE 11/16: elevated aortic valve gradient 71mmHg. GI consulted 11/15 in the setting of BRBPR in the setting of supratherapeutic PTT while on heparin gtt w/ continued episodes after stopping A/C, likely 2/2 hemorrhoids. IRMA noting AV thickening suspicious for thrombus, to start AC pending GI clearance given acute drop in H/h, GI team reconsulted. Hospital course complicated by fever 101.2 and leukocytosis on 11/17, now resolved. 78 year old Bulgarian speaking F, PMHx of uncontrolled HTN, HLD, asthma, chronic diastolic CHF (EF 61% by Echo 4/2019), aortic stenosis s/p transfemoral TAVR with Jamar valve on 02/05/2019 with Dr. Alejo @[facility] who presents to [facility] ED 11/13/20 c/o intermittent chest pain and elevated BP x 1 week. BP initially elevated on arrival however has been stable on home meds. ECHO 11/13/2020 showed elevated gradient over aortic valve of 61.78 (doubled since last echo). Dr. Alejo consulted. S/p structural CT: negative for thrombus, Limited TTE 11/16: elevated aortic valve gradient 71mmHg. GI consulted 11/15 in the setting of BRBPR in the setting of supratherapeutic PTT while on heparin gtt w/ continued episodes after stopping A/C, likely 2/2 hemorrhoids. IRMA noting AV thickening suspicious for thrombus, recommend to start AC pending GI clearance given acute drop in H/h. GI team reconsulted, s/p EGD/colonoscopy revealing 4 large colonic masses concerning for colon CA. Awaiting further workup of CT Chest/A/P, surgery and oncology consult. Hospital course also complicated by fever 101.2 and leukocytosis on 11/17, now resolved.

## 2020-11-19 NOTE — PROGRESS NOTE ADULT - PROBLEM SELECTOR PLAN 8
- Continue Spiriva inhaler daily    VTE PPx: Lovenox subQ  PT consulted: Home w/ Home PT  Patient lives with daughter Jaelyn, whom is her HHA (40 hrs/week).  CODE: Full code - Continue Spiriva inhaler daily    VTE PPx: SCDs  PT consulted: Home w/ Home PT  Patient lives with daughter Jaelyn, whom is her HHA (40 hrs/week).  CODE: Full code

## 2020-11-19 NOTE — CHART NOTE - NSCHARTNOTEFT_GEN_A_CORE
81 y/o Lithuanian/English speaking female with PMHx of uncontrolled HTN, HLD, asthma (no hospitalizations in past), ?MARK (noncompliant with CPAP), chronic diastolic CHF (EF 61%), AS (s/p TAVR rachael valve with Dr. Alejo on 2/5/2019) who was recently hospitalized at [facility] 2/2 to hypertensive emergency (HTN w/ HA). CT head/MRI brain at that time negative and patient was discharged 11/4/20. Patient presented to [facility] ED on 11/13/20 with complaints of intermittent CP and elevated BP x1 week. Structural Heart Disease consulted in setting of previous TAVR, with now elevated AV gradients on TTE performed on 11/16/20, revealing 23 mm Rachael valve in the aortic position without any aortic regurgitation. The peak transvalvular velocity is 4.20 m/s, the mean transvalvular gradient is 35.00 mmHg, and the LVOT/AV velocity ratio is 0.38. The peak transaortic gradient is 70.56 mmHg. Patient underwent IRMA 11/17/20 revealing normal left and right ventricular size and systolic function. No LA/RA/MAIK/RAA thrombus seen. No evidence of an intracardiac shunt. 23 mm Rachael valve is noted in the aortic position without any aortic regurgitation. There is slight thickening at the base of the right cusp with probably mild restriction in excursion. The other prosthetic leaflets appear normal. Of note while inpatient GI was consulted for BRBPRN in the setting of subtherapeutic PTT while on heparin gtt. The patient underwent a colonoscopy on 11/19 which revealed four large colonic mass lesions concerning for colon cancer.     Plan:  - Case discussed with Dr. Alejo/ Dr. Rogers   - Colonoscopy revealed large friable masses  - Patient is therefore contraindicated for anticoagulation given elevated risk of bleeding   - Continue BP control, HF management per primary team  - Pending discussion for potential further procedural intervention for thickened aortic valve/ suspicion of thrombus at base of right cusp 81 y/o Gabonese/English speaking female with PMHx of uncontrolled HTN, HLD, asthma (no hospitalizations in past), ?MARK (noncompliant with CPAP), chronic diastolic CHF (EF 61%), AS (s/p TAVR rachael valve with Dr. Alejo on 2/5/2019) who was recently hospitalized at [facility] 2/2 to hypertensive emergency (HTN w/ HA). CT head/MRI brain at that time negative and patient was discharged 11/4/20. Patient presented to [facility] ED on 11/13/20 with complaints of intermittent CP and elevated BP x1 week. Structural Heart Disease consulted in setting of previous TAVR, with now elevated AV gradients on TTE performed on 11/16/20, revealing 23 mm Rachael valve in the aortic position without any aortic regurgitation. The peak transvalvular velocity is 4.20 m/s, the mean transvalvular gradient is 35.00 mmHg, and the LVOT/AV velocity ratio is 0.38. The peak transaortic gradient is 70.56 mmHg. Patient underwent IRMA 11/17/20 revealing normal left and right ventricular size and systolic function. No LA/RA/MAIK/RAA thrombus seen. No evidence of an intracardiac shunt. 23 mm Rachael valve is noted in the aortic position without any aortic regurgitation. There is slight thickening at the base of the right cusp with probably mild restriction in excursion. The other prosthetic leaflets appear normal. Of note while inpatient GI was consulted for BRBPRN in the setting of subtherapeutic PTT while on heparin gtt. The patient underwent a colonoscopy on 11/19 which revealed four large colonic mass lesions concerning for colon cancer.     Plan:  - Case discussed with Dr. Alejo/ Dr. Rogers   - Colonoscopy revealed large friable masses  - Patient is therefore contraindicated for anticoagulation given elevated risk of bleeding   - Continue BP control, HF management per primary team  - Pending discussion for potential further procedural intervention for thickened aortic valve/ suspicion of thrombus at base of right cusp    Patient seen and examined with PA/fellow bedside and discussed during rounds.  PA/fellow note read, including vitals, physical findings, laboratory data, and radiological reports.   Revisions included below. Direct personal management at bedside and extensive interpretation of the data performed.  Plan was outlined and discussed in details with the multidisciplinary team.  Decision making of high complexity.     81 y/o Gabonese speaking female w/ a PMHx of uncontrolled HTN, HLD, asthma (no hospitalizations in past), ?MARK (noncompliant with CPAP), chronic diastolic CHF (Ef 61%), AS (s/p TAVR rachael valve 2/5/2019) who was recently hospitalized at [facility] 2/2 to hypertensive emergency (HTN w/ HA). CT head/MRI brain at that time negative. Patient presented to [facility] ED on 11/13/20 with complaints of intermittent CP and elevated BP for 1 week. TTE revealed elevated gradients across of TAVR THV increased from baseline mean 14mmHg to 32mmHg, nml EF. CTA performed with likely leaflet hypoattentuation at base of leaflets with remaining portion of leaflets not hypoattentuated. 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 s/p heparin gtt complicated by GIB. IRMA showed thickening at base with normal remaining portion of leaflets. Colonscopy revealed 4 large colonic friable masses concerning for malignancy.  -CTA abd/pelvis, PET; onco/onco sx evaluatio  -needs a/c to see if resolution of TAVR valve findings howevere limited by GIB/colonic malignancy  -continuing to evaluate for medical management with definitive GI procedure with cardiac anesthesia vs TAVR Mary Ann/high risk SAVR    I was physically present for the key portions of the evaluation and management (E/M) service provided.  I agree with the above history, physical, and plan which I have reviewed and edited where appropriate.     15 minutes spent on total encounter; more than 50% of the visit was spent counseling and/or coordinating care by the attending physician.

## 2020-11-19 NOTE — PROGRESS NOTE ADULT - PROBLEM SELECTOR PLAN 6
Patient became febrile to 101.2 on 11/17AM w/ associated leukocytosis 11.23.  - Self resolved  - Unclear source of infection  - possible UTI, UA: Moderate LE, small blood, bacteria present.  F/u Urine Cx  - CT Chest done 11/6:  no signs of infiltrates  - CXR w/o infiltrates  - Blood Cx NGTD  - No Abx for now, monitor fevers and WBC.

## 2020-11-19 NOTE — PROGRESS NOTE ADULT - SUBJECTIVE AND OBJECTIVE BOX
CARDIOLOGY NP PROGRESS NOTE    Subjective: Pt seen and examined at bedside. Had more BRBPR last night, drank bowel prep. Denies chest pain, sob, lightheadedness, dizziness, palpitations, fever, chills. Remainder ROS otherwise negative.    Overnight Events: NPO s/p MN for EGD/C-scope today.    TELEMETRY: SR 70s         VITAL SIGNS:  T(C): 36.3 (11-19-20 @ 04:30), Max: 37.1 (11-18-20 @ 13:24)  HR: 86 (11-19-20 @ 05:47) (76 - 94)  BP: 145/63 (11-19-20 @ 05:47) (111/74 - 186/81)  RR: 16 (11-19-20 @ 05:47) (16 - 16)  SpO2: 99% (11-19-20 @ 05:47) (97% - 99%)  Wt(kg): --    I&O's Summary    18 Nov 2020 07:01  -  19 Nov 2020 07:00  --------------------------------------------------------  IN: 330 mL / OUT: 0 mL / NET: 330 mL    19 Nov 2020 07:01  -  19 Nov 2020 11:15  --------------------------------------------------------  IN: 0 mL / OUT: 300 mL / NET: -300 mL          PHYSICAL EXAM:    General: A/ox 3, No acute Distress  Neck: Supple, NO JVD  Cardiac: S1 S2, grade II/VI systolic murmur   Pulmonary: CTAB, Breathing unlabored, No Rhonchi/Rales/Wheezing  Abdomen: Soft, Non -tender, +BS x 4 quads  Extremities: No Rashes, No edema  Neuro: A/o x 3, No focal deficits          LABS:                          9.5    5.56  )-----------( 191      ( 19 Nov 2020 05:39 )             28.7                              11-19    141  |  104  |  29<H>  ----------------------------<  137<H>  4.0   |  24  |  1.17    Ca    8.7      19 Nov 2020 05:39  Phos  3.6     11-19  Mg     2.4     11-19                              PT/INR - ( 19 Nov 2020 05:39 )   PT: 13.0 sec;   INR: 1.09          PTT - ( 19 Nov 2020 05:39 )  PTT:30.3 sec  CAPILLARY BLOOD GLUCOSE                Allergies:  Plavix (Other (Mod to Severe))    MEDICATIONS  (STANDING):  aspirin enteric coated 81 milliGRAM(s) Oral daily  atorvastatin 20 milliGRAM(s) Oral at bedtime  carvedilol 6.25 milliGRAM(s) Oral every 12 hours  hydrochlorothiazide 12.5 milliGRAM(s) Oral daily  influenza  Vaccine (HIGH DOSE) 0.7 milliLiter(s) IntraMuscular once  lisinopril 10 milliGRAM(s) Oral daily  pantoprazole  Injectable 40 milliGRAM(s) IV Push two times a day  polyethylene glycol 3350 17 Gram(s) Oral daily  tiotropium 18 MICROgram(s) Capsule 1 Capsule(s) Inhalation daily    MEDICATIONS  (PRN):  acetaminophen   Tablet .. 650 milliGRAM(s) Oral every 6 hours PRN Mild Pain (1 - 3), Moderate Pain (4 - 6)  acetaminophen 300 mG/butalbital 50 mG/ caffeine 40 mG 1 Capsule(s) Oral every 6 hours PRN Severe headache  aluminum hydroxide/magnesium hydroxide/simethicone Suspension 30 milliLiter(s) Oral every 6 hours PRN Dyspepsia        DIAGNOSTIC TESTS:        CARDIOLOGY NP PROGRESS NOTE    Subjective: Pt seen and examined at bedside. Had more BRBPR last night, drank bowel prep last night. Denies chest pain, sob, lightheadedness, dizziness, palpitations, fever, chills. Remainder ROS otherwise negative.    Overnight Events: NPO s/p MN for EGD/C-scope today.    TELEMETRY: SR 70s         VITAL SIGNS:  T(C): 36.3 (11-19-20 @ 04:30), Max: 37.1 (11-18-20 @ 13:24)  HR: 86 (11-19-20 @ 05:47) (76 - 94)  BP: 145/63 (11-19-20 @ 05:47) (111/74 - 186/81)  RR: 16 (11-19-20 @ 05:47) (16 - 16)  SpO2: 99% (11-19-20 @ 05:47) (97% - 99%)  Wt(kg): --    I&O's Summary    18 Nov 2020 07:01  -  19 Nov 2020 07:00  --------------------------------------------------------  IN: 330 mL / OUT: 0 mL / NET: 330 mL    19 Nov 2020 07:01  -  19 Nov 2020 11:15  --------------------------------------------------------  IN: 0 mL / OUT: 300 mL / NET: -300 mL          PHYSICAL EXAM:    General: A/ox 3, No acute Distress  Neck: Supple, NO JVD  Cardiac: S1 S2, grade II/VI systolic murmur   Pulmonary: CTAB, Breathing unlabored, No Rhonchi/Rales/Wheezing  Abdomen: Soft, Non -tender, +BS x 4 quads  Extremities: No Rashes, No edema  Neuro: A/o x 3, No focal deficits          LABS:                          9.5    5.56  )-----------( 191      ( 19 Nov 2020 05:39 )             28.7                              11-19    141  |  104  |  29<H>  ----------------------------<  137<H>  4.0   |  24  |  1.17    Ca    8.7      19 Nov 2020 05:39  Phos  3.6     11-19  Mg     2.4     11-19                              PT/INR - ( 19 Nov 2020 05:39 )   PT: 13.0 sec;   INR: 1.09          PTT - ( 19 Nov 2020 05:39 )  PTT:30.3 sec  CAPILLARY BLOOD GLUCOSE                Allergies:  Plavix (Other (Mod to Severe))    MEDICATIONS  (STANDING):  aspirin enteric coated 81 milliGRAM(s) Oral daily  atorvastatin 20 milliGRAM(s) Oral at bedtime  carvedilol 6.25 milliGRAM(s) Oral every 12 hours  hydrochlorothiazide 12.5 milliGRAM(s) Oral daily  influenza  Vaccine (HIGH DOSE) 0.7 milliLiter(s) IntraMuscular once  lisinopril 10 milliGRAM(s) Oral daily  pantoprazole  Injectable 40 milliGRAM(s) IV Push two times a day  polyethylene glycol 3350 17 Gram(s) Oral daily  tiotropium 18 MICROgram(s) Capsule 1 Capsule(s) Inhalation daily    MEDICATIONS  (PRN):  acetaminophen   Tablet .. 650 milliGRAM(s) Oral every 6 hours PRN Mild Pain (1 - 3), Moderate Pain (4 - 6)  acetaminophen 300 mG/butalbital 50 mG/ caffeine 40 mG 1 Capsule(s) Oral every 6 hours PRN Severe headache  aluminum hydroxide/magnesium hydroxide/simethicone Suspension 30 milliLiter(s) Oral every 6 hours PRN Dyspepsia        DIAGNOSTIC TESTS:

## 2020-11-19 NOTE — CHART NOTE - NSCHARTNOTEFT_GEN_A_CORE
EGD:    Normal except mild gastropathy.     Colonoscopy:    impressions:	  1. Four large colonic mass lesions with the most proximal one near the cecum (150 cm from the anal verge) and the most proximal one in the transverse colon (100 cm from the anal verge) s/p multiple biopsies and tattooing of the proximal and distal mass.   2. Ulceration and contact bleeding noted form the mass lesions.               recommendation:	  1. Follow up pathology.  2. Hemonc and surgical evaluation for likely colon cancer  3. CEA, Contrast CT abdomen/pelvis/chest (time it prior to HD)  4. Weigh risk/benefits of GI bleeding vs stroke for anticoagulation therapy. EGD:    Normal except mild gastropathy.     Colonoscopy:    impressions:	  1. Four large colonic mass lesions with the most proximal one near the cecum (150 cm from the anal verge) and the most distal one in the transverse colon (100 cm from the anal verge) s/p multiple biopsies and tattooing of the proximal and distal mass.   2. Ulceration and contact bleeding noted form the mass lesions.               recommendation:	  1. Follow up pathology.  2. Hemonc and surgical evaluation for likely colon cancer  3. CEA, Contrast CT abdomen/pelvis/chest (time it prior to HD)  4. Weigh risk/benefits of GI bleeding vs stroke for anticoagulation therapy.

## 2020-11-20 DIAGNOSIS — I48.91 UNSPECIFIED ATRIAL FIBRILLATION: ICD-10-CM

## 2020-11-20 LAB
ANION GAP SERPL CALC-SCNC: 13 MMOL/L — SIGNIFICANT CHANGE UP (ref 5–17)
BUN SERPL-MCNC: 31 MG/DL — HIGH (ref 7–23)
CALCIUM SERPL-MCNC: 9 MG/DL — SIGNIFICANT CHANGE UP (ref 8.4–10.5)
CEA SERPL-MCNC: 23.1 NG/ML — HIGH (ref 0–3.8)
CHLORIDE SERPL-SCNC: 100 MMOL/L — SIGNIFICANT CHANGE UP (ref 96–108)
CO2 SERPL-SCNC: 25 MMOL/L — SIGNIFICANT CHANGE UP (ref 22–31)
CREAT SERPL-MCNC: 1.31 MG/DL — HIGH (ref 0.5–1.3)
GLUCOSE SERPL-MCNC: 123 MG/DL — HIGH (ref 70–99)
HCT VFR BLD CALC: 28.8 % — LOW (ref 34.5–45)
HGB BLD-MCNC: 9.3 G/DL — LOW (ref 11.5–15.5)
MAGNESIUM SERPL-MCNC: 2.2 MG/DL — SIGNIFICANT CHANGE UP (ref 1.6–2.6)
MCHC RBC-ENTMCNC: 29.5 PG — SIGNIFICANT CHANGE UP (ref 27–34)
MCHC RBC-ENTMCNC: 32.3 GM/DL — SIGNIFICANT CHANGE UP (ref 32–36)
MCV RBC AUTO: 91.4 FL — SIGNIFICANT CHANGE UP (ref 80–100)
NRBC # BLD: 0 /100 WBCS — SIGNIFICANT CHANGE UP (ref 0–0)
PLATELET # BLD AUTO: 223 K/UL — SIGNIFICANT CHANGE UP (ref 150–400)
POTASSIUM SERPL-MCNC: 4.2 MMOL/L — SIGNIFICANT CHANGE UP (ref 3.5–5.3)
POTASSIUM SERPL-SCNC: 4.2 MMOL/L — SIGNIFICANT CHANGE UP (ref 3.5–5.3)
RBC # BLD: 3.15 M/UL — LOW (ref 3.8–5.2)
RBC # FLD: 13.9 % — SIGNIFICANT CHANGE UP (ref 10.3–14.5)
SARS-COV-2 IGG SERPL QL IA: POSITIVE
SARS-COV-2 IGM SERPL IA-ACNC: 4.2 INDEX — HIGH
SODIUM SERPL-SCNC: 138 MMOL/L — SIGNIFICANT CHANGE UP (ref 135–145)
SURGICAL PATHOLOGY STUDY: SIGNIFICANT CHANGE UP
WBC # BLD: 9.57 K/UL — SIGNIFICANT CHANGE UP (ref 3.8–10.5)
WBC # FLD AUTO: 9.57 K/UL — SIGNIFICANT CHANGE UP (ref 3.8–10.5)

## 2020-11-20 PROCEDURE — 99222 1ST HOSP IP/OBS MODERATE 55: CPT | Mod: GC

## 2020-11-20 PROCEDURE — 99232 SBSQ HOSP IP/OBS MODERATE 35: CPT

## 2020-11-20 PROCEDURE — 93010 ELECTROCARDIOGRAM REPORT: CPT

## 2020-11-20 PROCEDURE — 74177 CT ABD & PELVIS W/CONTRAST: CPT | Mod: 26

## 2020-11-20 PROCEDURE — 71260 CT THORAX DX C+: CPT | Mod: 26

## 2020-11-20 RX ORDER — DILTIAZEM HCL 120 MG
30 CAPSULE, EXT RELEASE 24 HR ORAL EVERY 6 HOURS
Refills: 0 | Status: DISCONTINUED | OUTPATIENT
Start: 2020-11-20 | End: 2020-11-21

## 2020-11-20 RX ORDER — METOPROLOL TARTRATE 50 MG
5 TABLET ORAL ONCE
Refills: 0 | Status: COMPLETED | OUTPATIENT
Start: 2020-11-20 | End: 2020-11-20

## 2020-11-20 RX ORDER — SODIUM CHLORIDE 9 MG/ML
500 INJECTION INTRAMUSCULAR; INTRAVENOUS; SUBCUTANEOUS
Refills: 0 | Status: DISCONTINUED | OUTPATIENT
Start: 2020-11-20 | End: 2020-11-21

## 2020-11-20 RX ORDER — DILTIAZEM HCL 120 MG
10 CAPSULE, EXT RELEASE 24 HR ORAL ONCE
Refills: 0 | Status: COMPLETED | OUTPATIENT
Start: 2020-11-20 | End: 2020-11-20

## 2020-11-20 RX ADMIN — Medication 10 MILLIGRAM(S): at 17:49

## 2020-11-20 RX ADMIN — Medication 100 MILLIGRAM(S): at 02:58

## 2020-11-20 RX ADMIN — TIOTROPIUM BROMIDE 1 CAPSULE(S): 18 CAPSULE ORAL; RESPIRATORY (INHALATION) at 11:57

## 2020-11-20 RX ADMIN — Medication 5 MILLIGRAM(S): at 16:30

## 2020-11-20 RX ADMIN — CARVEDILOL PHOSPHATE 6.25 MILLIGRAM(S): 80 CAPSULE, EXTENDED RELEASE ORAL at 22:31

## 2020-11-20 RX ADMIN — PANTOPRAZOLE SODIUM 40 MILLIGRAM(S): 20 TABLET, DELAYED RELEASE ORAL at 06:45

## 2020-11-20 RX ADMIN — ATORVASTATIN CALCIUM 20 MILLIGRAM(S): 80 TABLET, FILM COATED ORAL at 22:31

## 2020-11-20 RX ADMIN — Medication 12.5 MILLIGRAM(S): at 06:46

## 2020-11-20 RX ADMIN — Medication 5 MILLIGRAM(S): at 15:55

## 2020-11-20 RX ADMIN — Medication 100 MILLIGRAM(S): at 00:19

## 2020-11-20 RX ADMIN — PANTOPRAZOLE SODIUM 40 MILLIGRAM(S): 20 TABLET, DELAYED RELEASE ORAL at 16:30

## 2020-11-20 RX ADMIN — Medication 30 MILLIGRAM(S): at 19:48

## 2020-11-20 RX ADMIN — Medication 100 MILLIGRAM(S): at 19:48

## 2020-11-20 RX ADMIN — SODIUM CHLORIDE 50 MILLILITER(S): 9 INJECTION INTRAMUSCULAR; INTRAVENOUS; SUBCUTANEOUS at 11:56

## 2020-11-20 RX ADMIN — IOHEXOL 30 MILLILITER(S): 300 INJECTION, SOLUTION INTRAVENOUS at 07:26

## 2020-11-20 RX ADMIN — LISINOPRIL 10 MILLIGRAM(S): 2.5 TABLET ORAL at 06:46

## 2020-11-20 RX ADMIN — Medication 30 MILLIGRAM(S): at 23:41

## 2020-11-20 RX ADMIN — CARVEDILOL PHOSPHATE 6.25 MILLIGRAM(S): 80 CAPSULE, EXTENDED RELEASE ORAL at 15:09

## 2020-11-20 NOTE — PROGRESS NOTE ADULT - PROBLEM SELECTOR PLAN 5
- Euvolemic on exam; satting well on RA  - s/p Lasix 20mg IV x1 in the ED. No need for further Lasix.   - Strict I/Os, daily weights, continue ACE. - Likely 2/2 HTN urgency; RESOLVED  - Dx R/LHC 1/9/19 non-obstructive (full report below).   **Recent diagnostic R+L heart [email or web address] 1/9/19 revealed LMCA normal, LAD with minor luminal irregularities, LCx large and normal, RCA large and normal. PA 18, RV 73/13 (21), PA 70/34 (49), PCWP 26, CO 7.3 L/min, CI 3.7, TPG 23, severe AS (mean LV/Ao gradient 48.1 mmHg, SETH 1.1 cm2).

## 2020-11-20 NOTE — PROGRESS NOTE ADULT - PROBLEM SELECTOR PLAN 3
Hypotensive SBP 80s 11/18, resolved s/p IVF hydration NS 50cc/hr x 8hr   - CONTINUE: Coreg 6.25mg PO BID, Lisinopril 10mg PO QD, home HCTZ 12.5mg PO QD Prior hx of palpitations per pt, unclear if previous Hx of Afib.  Found to be in Afib RVR w/ -130s after CT Scan on 11/20, given Lopressor 5mg IV x 2 w/ minimal improvement in HR. Given Cardizem 10mg IV x1 w/ HR improved to 90-120s.   -CHADsVASc 4, HASBLED 3.   -c/w coreg 6.25mg BID  -Started PO Cardizem 30mg PO q6h  -As discussed w/ Dr Ruiz, given new findings of 4 large friable colonic lesions and BRBRP even while off anticoagulation, pt is high risk for major bleeding and not appropriate to be started on AC

## 2020-11-20 NOTE — PROGRESS NOTE ADULT - SUBJECTIVE AND OBJECTIVE BOX
CARDIOLOGY NP PROGRESS NOTE    Subjective:   Remainder ROS otherwise negative.    Overnight Events:     TELEMETRY:    EKG:      VITAL SIGNS:  T(C): 36.2 (11-20-20 @ 09:16), Max: 37.3 (11-19-20 @ 21:35)  HR: 125 (11-20-20 @ 17:48) (61 - 135)  BP: 121/85 (11-20-20 @ 17:48) (110/72 - 161/70)  RR: 19 (11-20-20 @ 17:48) (18 - 19)  SpO2: 95% (11-20-20 @ 17:48) (93% - 100%)  Wt(kg): --    I&O's Summary    19 Nov 2020 07:01  -  20 Nov 2020 07:00  --------------------------------------------------------  IN: 0 mL / OUT: 300 mL / NET: -300 mL          PHYSICAL EXAM:    General: A/ox 3, No acute Distress  Neck: Supple, NO JVD  Cardiac: S1 S2, No M/R/G  Pulmonary: CTAB, Breathing unlabored, No Rhonchi/Rales/Wheezing  Abdomen: Soft, Non -tender, +BS x 4 quads  Extremities: No Rashes, No edema  Neuro: A/o x 3, No focal deficits          LABS:                          9.3    9.57  )-----------( 223      ( 20 Nov 2020 07:54 )             28.8                              11-20    138  |  100  |  31<H>  ----------------------------<  123<H>  4.2   |  25  |  1.31<H>    Ca    9.0      20 Nov 2020 07:53  Phos  3.6     11-19  Mg     2.2     11-20                              PT/INR - ( 19 Nov 2020 05:39 )   PT: 13.0 sec;   INR: 1.09          PTT - ( 19 Nov 2020 05:39 )  PTT:30.3 sec  CAPILLARY BLOOD GLUCOSE                Allergies:  Plavix (Other (Mod to Severe))    MEDICATIONS  (STANDING):  atorvastatin 20 milliGRAM(s) Oral at bedtime  carvedilol 6.25 milliGRAM(s) Oral every 12 hours  hydrochlorothiazide 12.5 milliGRAM(s) Oral daily  influenza  Vaccine (HIGH DOSE) 0.7 milliLiter(s) IntraMuscular once  lisinopril 10 milliGRAM(s) Oral daily  pantoprazole  Injectable 40 milliGRAM(s) IV Push two times a day  polyethylene glycol 3350 17 Gram(s) Oral daily  sodium chloride 0.9%. 500 milliLiter(s) (50 mL/Hr) IV Continuous <Continuous>  tiotropium 18 MICROgram(s) Capsule 1 Capsule(s) Inhalation daily    MEDICATIONS  (PRN):  acetaminophen   Tablet .. 650 milliGRAM(s) Oral every 6 hours PRN Mild Pain (1 - 3), Moderate Pain (4 - 6)  acetaminophen 300 mG/butalbital 50 mG/ caffeine 40 mG 1 Capsule(s) Oral every 6 hours PRN Severe headache  aluminum hydroxide/magnesium hydroxide/simethicone Suspension 30 milliLiter(s) Oral every 6 hours PRN Dyspepsia  benzonatate 100 milliGRAM(s) Oral three times a day PRN Cough  guaiFENesin   Syrup  (Sugar-Free) 100 milliGRAM(s) Oral every 6 hours PRN Cough        DIAGNOSTIC TESTS:        CARDIOLOGY NP PROGRESS NOTE    Subjective: Pt seen and examined at bedside. Reports feeling well this AM, Went for CT Scan chest/a/p and after CT Scan felt palpitations, dizziness and HA. Found to be in Afib RVR w/ -130s. Remainder ROS otherwise negative.    Overnight Events: NPO s/p MN for CT chest/a/p w/ contrast.  Found to be in Afib RVR w/ -130s after CT Scan, given Lopressor 5mg IV x 2 w/ minimal improvement in HR. Given Cardizem 10mg IV x1 w/ HR improved to 90-120s. Started PO Cardizem 30mg PO q6h and D/c'ed lisinopril and HCTz.    TELEMETRY: SR 50-60s in AM, currently in Afib RVR w/ -130s           VITAL SIGNS:  T(C): 36.2 (11-20-20 @ 09:16), Max: 37.3 (11-19-20 @ 21:35)  HR: 125 (11-20-20 @ 17:48) (61 - 135)  BP: 121/85 (11-20-20 @ 17:48) (110/72 - 161/70)  RR: 19 (11-20-20 @ 17:48) (18 - 19)  SpO2: 95% (11-20-20 @ 17:48) (93% - 100%)  Wt(kg): --    I&O's Summary    19 Nov 2020 07:01  -  20 Nov 2020 07:00  --------------------------------------------------------  IN: 0 mL / OUT: 300 mL / NET: -300 mL          PHYSICAL EXAM:    General: A/ox 3, No acute Distress, obese  Neck: Supple, NO JVD  Cardiac: S1 S2, grade II/VI systolic murmur   Pulmonary: CTAB, Breathing unlabored, No Rhonchi/Rales/Wheezing  Abdomen: Soft, Non -tender, +BS x 4 quads  Extremities: No Rashes, No edema  Neuro: A/o x 3, No focal deficits          LABS:                          9.3    9.57  )-----------( 223      ( 20 Nov 2020 07:54 )             28.8                              11-20    138  |  100  |  31<H>  ----------------------------<  123<H>  4.2   |  25  |  1.31<H>    Ca    9.0      20 Nov 2020 07:53  Phos  3.6     11-19  Mg     2.2     11-20                              PT/INR - ( 19 Nov 2020 05:39 )   PT: 13.0 sec;   INR: 1.09          PTT - ( 19 Nov 2020 05:39 )  PTT:30.3 sec  CAPILLARY BLOOD GLUCOSE                Allergies:  Plavix (Other (Mod to Severe))    MEDICATIONS  (STANDING):  atorvastatin 20 milliGRAM(s) Oral at bedtime  carvedilol 6.25 milliGRAM(s) Oral every 12 hours  hydrochlorothiazide 12.5 milliGRAM(s) Oral daily  influenza  Vaccine (HIGH DOSE) 0.7 milliLiter(s) IntraMuscular once  lisinopril 10 milliGRAM(s) Oral daily  pantoprazole  Injectable 40 milliGRAM(s) IV Push two times a day  polyethylene glycol 3350 17 Gram(s) Oral daily  sodium chloride 0.9%. 500 milliLiter(s) (50 mL/Hr) IV Continuous <Continuous>  tiotropium 18 MICROgram(s) Capsule 1 Capsule(s) Inhalation daily    MEDICATIONS  (PRN):  acetaminophen   Tablet .. 650 milliGRAM(s) Oral every 6 hours PRN Mild Pain (1 - 3), Moderate Pain (4 - 6)  acetaminophen 300 mG/butalbital 50 mG/ caffeine 40 mG 1 Capsule(s) Oral every 6 hours PRN Severe headache  aluminum hydroxide/magnesium hydroxide/simethicone Suspension 30 milliLiter(s) Oral every 6 hours PRN Dyspepsia  benzonatate 100 milliGRAM(s) Oral three times a day PRN Cough  guaiFENesin   Syrup  (Sugar-Free) 100 milliGRAM(s) Oral every 6 hours PRN Cough        DIAGNOSTIC TESTS:     < from: CT Abdomen and Pelvis w/ Oral Cont and w/ IV Cont (11.20.20 @ 14:01) >  IMPRESSION:    2 cm segment of annular wall thickening of transverse colon, suspicious for neoplasm.    2.6 cm focal area of wall thickening in the ascending colon may also represent another neoplasm.    No colonic obstruction.    No evidence of distant metastasis.    Status post TAVR. Mild cardiomegaly and mild pulmonary congestion.    < end of copied text >

## 2020-11-20 NOTE — PROGRESS NOTE ADULT - PROBLEM SELECTOR PLAN 7
- Intermittently w/ HA. No focal neurodeficits.   - Responds well to IV Tylenol and Fiorocet, ordered PRN.   ** Of note, patient reportedly had normal CT head/MRI Brain @ [facility] ~1 week ago.  -Given recurrent HA/dizziness. Repeat CT Head 11/18/20 w/ no evidence of acute intracranial hemorrhage or acute transcortical infarct. Patient became febrile to 101.2 on 11/17AM w/ associated leukocytosis 11.23.  - Self resolved  - Unclear source of infection  - possible UTI, UA: Moderate LE, small blood, bacteria present.  F/u Urine Cx  - CT Chest done 11/6:  no signs of infiltrates  - CXR w/o infiltrates  - Blood Cx NGTD  - No Abx for now, monitor fevers and WBC.

## 2020-11-20 NOTE — PROGRESS NOTE ADULT - ASSESSMENT
78 year old Burmese speaking F, PMHx of uncontrolled HTN, hyperlipidemia, asthma (denies hospitalizations, intubations), MARK? (on CPAP, noncompliant), chronic diastolic CHF (EF:61% by Echo 4/2019), aortic stenosis s/p transfemoral TAVR with rachael valve on 02/05/2019 with Dr. Alejo admitted for CP and hypertensive urgency. S/p colonoscopy 11/19 which revealed 4 large colonic masses (from cecum to transverse colon) biopsies taken. CEA elevated 23.1. Surgery consulted for management of colonic mass.    F/u CT Chest A/P  Will f/u final pathology  Anticoagulation per primary team  Team 1c will continue to follow  D/w attending

## 2020-11-20 NOTE — PROGRESS NOTE ADULT - ASSESSMENT
78 year old Spanish speaking F, PMHx of uncontrolled HTN, HLD, asthma, chronic diastolic CHF (EF 61% by Echo 4/2019), aortic stenosis s/p transfemoral TAVR with Jamar valve on 02/05/2019 with Dr. Alejo @[facility] who presents to [facility] ED 11/13/20 c/o intermittent chest pain and elevated BP x 1 week. BP initially elevated on arrival however has been stable on home meds. ECHO 11/13/2020 showed elevated gradient over aortic valve of 61.78 (doubled since last echo). Dr. Alejo consulted. S/p structural CT: negative for thrombus, Limited TTE 11/16: elevated aortic valve gradient 71mmHg. GI consulted 11/15 in the setting of BRBPR in the setting of supratherapeutic PTT while on heparin gtt w/ continued episodes after stopping A/C, likely 2/2 hemorrhoids. IRMA noting AV thickening suspicious for thrombus, recommend to start AC pending GI clearance given acute drop in H/h. GI team reconsulted, s/p EGD/colonoscopy revealing 4 large colonic masses concerning for colon CA. Awaiting further workup of CT Chest/A/P, surgery and oncology consult. Hospital course also complicated by fever 101.2 and leukocytosis on 11/17, now resolved. 78 year old Danish speaking F, PMHx of uncontrolled HTN, HLD, asthma, chronic diastolic CHF (EF 61% by Echo 4/2019), aortic stenosis s/p transfemoral TAVR with Jamar valve on 02/05/2019 with Dr. Alejo @[facility] who presents to [facility] ED 11/13/20 c/o intermittent chest pain and elevated BP x 1 week. BP initially elevated on arrival however has been stable on home meds. ECHO 11/13/2020 showed elevated gradient over aortic valve of 61.78 (doubled since last echo). Dr. Alejo consulted. S/p structural CT: negative for thrombus, Limited TTE 11/16: elevated aortic valve gradient 71mmHg. GI consulted 11/15 in the setting of BRBPR in the setting of supratherapeutic PTT while on heparin gtt w/ continued episodes after stopping A/C, likely 2/2 hemorrhoids. IRMA noting AV thickening suspicious for thrombus, recommend to start AC pending GI clearance given acute drop in H/h. GI team reconsulted, s/p EGD/colonoscopy revealing 4 large colonic masses concerning for colon CA. Unable to be on AC given high risk for bleeding. Awaiting further workup of colon masses, surgery and oncology consults. Hospital course also complicated by transient fever 101.2 and leukocytosis on 11/17, self resolved; course c/b symptomatic Afib -130s. 78 year old Romansh speaking F, PMHx of uncontrolled HTN, HLD, asthma, chronic diastolic CHF (EF 61% by Echo 4/2019), aortic stenosis s/p transfemoral TAVR with Jamar valve on 02/05/2019 with Dr. Alejo @[facility] who presents to [facility] ED 11/13/20 c/o intermittent chest pain and elevated BP x 1 week. BP initially elevated on arrival however has been stable on home meds. ECHO 11/13/2020 showed elevated gradient over aortic valve of 61.78 (doubled since last echo). Dr. Alejo consulted. S/p structural CT: negative for thrombus, Limited TTE 11/16: elevated aortic valve gradient 71mmHg. GI consulted 11/15 in the setting of BRBPR in the setting of supratherapeutic PTT while on heparin gtt w/ continued episodes after stopping A/C, likely 2/2 hemorrhoids. IRMA noting AV thickening suspicious for thrombus, recommend to start AC pending GI clearance given acute drop in H/h. GI team reconsulted, s/p EGD/colonoscopy revealing 4 large colonic masses concerning for colon CA. Unable to be on AC given high risk for bleeding. Awaiting further workup of colon masses, surgery and oncology consults. Hospital course also complicated by transient fever 101.2 and leukocytosis on 11/17, self resolved; course c/b symptomatic Afib -130s, started on rate control strategy.

## 2020-11-20 NOTE — PROGRESS NOTE ADULT - PROBLEM SELECTOR PLAN 1
- s/p transfemoral TAVR with Jamar valve on 02/05/2019 with Dr. Alejo @[facility].  - ECHO (11/13/20): Normal BiV fxn/size, moderate LVH, Grade II LV Diastolic Dysfunction, PEAK TRANSAORTIC GRADIENT 61.78mmHg, mildly dilated LA, no pulm HTN, moderately dilated ascending aorta. (previously 14mmHg on 4/2019)  - Structural CT scan: no thrombus seen.    -Limited TTE 11/16: gradient 71mmHg  -s/p IRMA 11/17: No LA/RA/MAIK/RAA thrombus seen, No evidence of an intracardiac shunt, 23 mm Jamar valve is noted in the aortic position without any aortic regurgitation, There is slight thickening at the base of the right cusp with probably mild restriction in excursion. The other prosthetic leaflets appear normal.  - Given slight suspicious for thrombus at base of R cusp, per Structural Heart Dr Alejo, would like to start NOAC if cleared by GI  - NOT cleared to start NOAC given colonic mass lesions found on Cscope  - Follow up with Dr. Alejo on 12/21/2020 at 10:00AM  -D/c'ed Aspirin, not indicated - s/p transfemoral TAVR with Jamar valve on 02/05/2019 with Dr. Alejo @[facility].  - ECHO (11/13/20): Normal BiV fxn/size, moderate LVH, Grade II LV Diastolic Dysfunction, PEAK TRANSAORTIC GRADIENT 61.78mmHg, mildly dilated LA, no pulm HTN, moderately dilated ascending aorta. (previously 14mmHg on 4/2019)  - Structural CT scan: no thrombus seen.    -Limited TTE 11/16: gradient 71mmHg  -s/p IRMA 11/17: No LA/RA/MAIK/RAA thrombus seen, No evidence of an intracardiac shunt, 23 mm Jamar valve is noted in the aortic position without any aortic regurgitation, There is slight thickening at the base of the right cusp with probably mild restriction in excursion. The other prosthetic leaflets appear normal.  - Given slight suspicious for thrombus at base of R cusp, per Structural Heart Dr Alejo, would like to start NOAC if cleared by GI.   - Given high risk for bleeding in setting of 4 large friable colonic lesions, pt is NOT cleared to start NOAC given high risk of bleeding   -D/c'ed Aspirin per Dr Ruiz, not indicated at this time  -F/u Dr Alejo for potential further procedural intervention for thickened aortic valve/ suspicion of thrombus at base of right cusp. Will need further discussion w/ Oncology and colorectal surgery

## 2020-11-20 NOTE — PROGRESS NOTE ADULT - PROBLEM SELECTOR PLAN 2
- multiple episodes of BRBPR on 11/14PM and 11/15 PM 2/2 to supratherapeutic PTT on heparin gtt. (has had prior to admission).  Recurrent episodes of BRBPR in evening on 11/17 after heparin gtt was off and PTT stabilized.  Per patient occurred w/ straining during BM  - GI reconsulted.   -S/p EGD revealing mild gastropathy.  -S/p Colonoscopy revealing Four large colonic mass lesions with the most proximal one near the cecum (150 cm from the anal verge) and the most proximal one in the transverse colon (100 cm from the anal verge) s/p multiple biopsies and tattooing of the proximal and distal mass. Ulceration and contact bleeding noted form the mass lesions  - Follow up pathology.  -Pending Hemonc and surgical evaluation for likely colon cancer  - F/u CEA   - Pending CT chest/abdomen/pelvis/chest w/ PO and IV contrast  -High risk of GI bleeding on anticoagulation therapy given above findings - multiple episodes of BRBPR on 11/14PM and 11/15 PM 2/2 to supratherapeutic PTT on heparin gtt. (has had prior to admission). Also had recurrent episodes of BRBPR in evening on 11/17 after heparin gtt was off and PTT stabilized.  Per patient occurred w/ straining during BM  - GI following  -S/p EGD revealing mild gastropathy.  -S/p Colonoscopy revealing Four large colonic mass lesions with the most proximal one near the cecum (150 cm from the anal verge) and the most proximal one in the transverse colon (100 cm from the anal verge) s/p multiple biopsies and tattooing of the proximal and distal mass. Ulceration and contact bleeding noted form the mass lesions  - Follow up pathology.  -Pending Heme/onc and surgical evaluation for likely colon cancer  - elevated CEA 23.1  - CT chest/abdomen/pelvis 11/20 s/f 2 cm segment of annular wall thickening of transverse colon, suspicious for neoplasm. 2.6 cm focal area of wall thickening in the ascending colon may also represent another neoplasm. No colonic obstruction. No evidence of distant metastasis.  -High risk of GI bleeding on anticoagulation therapy given above findings - multiple episodes of BRBPR on 11/14PM and 11/15 PM 2/2 to supratherapeutic PTT on heparin gtt. (has had prior to admission). Also had recurrent episodes of BRBPR in evening on 11/17 after heparin gtt was off and PTT stabilized.  Per patient occurred w/ straining during BM  - GI following  -S/p EGD revealing mild gastropathy.  -S/p Colonoscopy revealing Four large colonic mass lesions with the most proximal one near the cecum (150 cm from the anal verge) and the most proximal one in the transverse colon (100 cm from the anal verge) s/p multiple biopsies and tattooing of the proximal and distal mass. Ulceration and contact bleeding noted form the mass lesions  - Follow up pathology.  - Pending Heme/onc and Colorectal surgical evaluation for likely colon cancer  - elevated CEA 23.1  - CT chest/abdomen/pelvis 11/20 s/f 2 cm segment of annular wall thickening of transverse colon, suspicious for neoplasm. 2.6 cm focal area of wall thickening in the ascending colon may also represent another neoplasm. No colonic obstruction. No evidence of distant metastasis.  -High risk of GI bleeding on anticoagulation therapy given above findings

## 2020-11-20 NOTE — PROGRESS NOTE ADULT - SUBJECTIVE AND OBJECTIVE BOX
GASTROENTEROLOGY PROGRESS NOTE  Patient seen and examined at bedside. No new complaints    PERTINENT REVIEW OF SYSTEMS:  As noted above    Allergies    Plavix (Other (Mod to Severe))    Intolerances      MEDICATIONS:  MEDICATIONS  (STANDING):  atorvastatin 20 milliGRAM(s) Oral at bedtime  carvedilol 6.25 milliGRAM(s) Oral every 12 hours  diltiazem    Tablet 30 milliGRAM(s) Oral every 6 hours  influenza  Vaccine (HIGH DOSE) 0.7 milliLiter(s) IntraMuscular once  pantoprazole  Injectable 40 milliGRAM(s) IV Push two times a day  polyethylene glycol 3350 17 Gram(s) Oral daily  sodium chloride 0.9%. 500 milliLiter(s) (50 mL/Hr) IV Continuous <Continuous>  tiotropium 18 MICROgram(s) Capsule 1 Capsule(s) Inhalation daily    MEDICATIONS  (PRN):  acetaminophen   Tablet .. 650 milliGRAM(s) Oral every 6 hours PRN Mild Pain (1 - 3), Moderate Pain (4 - 6)  acetaminophen 300 mG/butalbital 50 mG/ caffeine 40 mG 1 Capsule(s) Oral every 6 hours PRN Severe headache  aluminum hydroxide/magnesium hydroxide/simethicone Suspension 30 milliLiter(s) Oral every 6 hours PRN Dyspepsia  benzonatate 100 milliGRAM(s) Oral three times a day PRN Cough  guaiFENesin   Syrup  (Sugar-Free) 100 milliGRAM(s) Oral every 6 hours PRN Cough    Vital Signs Last 24 Hrs  T(C): 36.9 (20 Nov 2020 18:23), Max: 37.3 (19 Nov 2020 21:35)  T(F): 98.4 (20 Nov 2020 18:23), Max: 99.1 (19 Nov 2020 21:35)  HR: 125 (20 Nov 2020 17:48) (61 - 135)  BP: 121/85 (20 Nov 2020 17:48) (110/72 - 161/70)  BP(mean): 100 (20 Nov 2020 17:48) (80 - 112)  RR: 19 (20 Nov 2020 17:48) (18 - 19)  SpO2: 95% (20 Nov 2020 17:48) (93% - 100%)    11-19 @ 07:01  -  11-20 @ 07:00  --------------------------------------------------------  IN: 0 mL / OUT: 300 mL / NET: -300 mL    11-20 @ 07:01  -  11-20 @ 19:29  --------------------------------------------------------  IN: 180 mL / OUT: 0 mL / NET: 180 mL      PHYSICAL EXAM:    General: Well developed; obese; in no acute distress  HEENT: MMM, conjunctiva and sclera clear  Gastrointestinal: Soft non-tender non-distended; No rebound or guarding  Skin: Warm and dry. No obvious rash    LABS:                        9.3    9.57  )-----------( 223      ( 20 Nov 2020 07:54 )             28.8     11-20    138  |  100  |  31<H>  ----------------------------<  123<H>  4.2   |  25  |  1.31<H>    Ca    9.0      20 Nov 2020 07:53  Phos  3.6     11-19  Mg     2.2     11-20      PT/INR - ( 19 Nov 2020 05:39 )   PT: 13.0 sec;   INR: 1.09          PTT - ( 19 Nov 2020 05:39 )  PTT:30.3 sec                  Culture - Urine (collected 18 Nov 2020 01:20)  Source: .Urine Clean Catch (Midstream)  Final Report (19 Nov 2020 08:40):    Specimen appears CONTAMINATED. Lab suggests repeat clean catch specimen.      RADIOLOGY & ADDITIONAL STUDIES:  Reviewed

## 2020-11-20 NOTE — PROGRESS NOTE ADULT - PROBLEM SELECTOR PLAN 8
- Continue Spiriva inhaler daily    VTE PPx: SCDs  PT consulted: Home w/ Home PT  Patient lives with daughter Jaelyn, whom is her HHA (40 hrs/week).  CODE: Full code - Intermittently w/ HA. No focal neurodeficits.   - Responds well to IV Tylenol and Fiorocet, ordered PRN.   ** Of note, patient reportedly had normal CT head/MRI Brain @ [facility] ~1 week ago.  -Given recurrent HA/dizziness. Repeat CT Head 11/18/20 w/ no evidence of acute intracranial hemorrhage or acute transcortical infarct.

## 2020-11-20 NOTE — CONSULT NOTE ADULT - SUBJECTIVE AND OBJECTIVE BOX
[facility]    BRITTANY DE LA CRUZ  MRN-[id number]    HPI: 78 year old Khmer woman with PMHx of uncontrolled HTN, hyperlipidemia, asthma (denies hospitalizations, intubations), MARK? (on CPAP, noncompliant), chronic diastolic CHF (EF:61% by Echo 2019), aortic stenosis s/p transfemoral TAVR with rachael valve on 2019 with Dr. Alejo @[facility], recent hospitalization at [facility] (for HTN/headache) admitted to [facility] with intermittent chest pain and elevated BP x 1 week. She developed hematochezia while on anticoagulation. Colonoscopy performed on  demonstrated four large colonic mass lesions with the most proximal one near the cecum (150 cm from the anal verge) and the most distal one in the transverse colon (100 cm from the anal verge) s/p multiple biopsies and tattooing of the proximal and distal mass.   CEA 23.1  The patient never had screening colonoscopy  No family history of GI malignancies.   Daughter  of breast cancer at the age 40    ROS:  + chest pain and SOB on admission   No nausea/vomiting  + lower abdminal pain  + hematochezia  No weight loss  + fatigue  No leg pain or leg swelling.    ROS is otherwise negative.    PMH/PSH:  PAST MEDICAL & SURGICAL HISTORY:  CHF (congestive heart failure)    Pulmonary HTN    Aortic stenosis    HTN (hypertension)    S/P TAVR (transcatheter aortic valve replacement)    No significant past surgical history    Medications:  MEDICATIONS  (STANDING):  atorvastatin 20 milliGRAM(s) Oral at bedtime  carvedilol 6.25 milliGRAM(s) Oral every 12 hours  hydrochlorothiazide 12.5 milliGRAM(s) Oral daily  influenza  Vaccine (HIGH DOSE) 0.7 milliLiter(s) IntraMuscular once  lisinopril 10 milliGRAM(s) Oral daily  pantoprazole  Injectable 40 milliGRAM(s) IV Push two times a day  polyethylene glycol 3350 17 Gram(s) Oral daily  sodium chloride 0.9%. 250 milliLiter(s) (50 mL/Hr) IV Continuous <Continuous>  tiotropium 18 MICROgram(s) Capsule 1 Capsule(s) Inhalation daily    MEDICATIONS  (PRN):  acetaminophen   Tablet .. 650 milliGRAM(s) Oral every 6 hours PRN Mild Pain (1 - 3), Moderate Pain (4 - 6)  acetaminophen 300 mG/butalbital 50 mG/ caffeine 40 mG 1 Capsule(s) Oral every 6 hours PRN Severe headache  aluminum hydroxide/magnesium hydroxide/simethicone Suspension 30 milliLiter(s) Oral every 6 hours PRN Dyspepsia  benzonatate 100 milliGRAM(s) Oral three times a day PRN Cough  guaiFENesin   Syrup  (Sugar-Free) 100 milliGRAM(s) Oral every 6 hours PRN Cough      Plavix (Other (Mod to Severe))    Allergies    Plavix (Other (Mod to Severe))    Intolerances    Exam:  Vital Signs Last 24 Hrs  T(C): 36.2 (20 @ 04:30), Max: 37.3 (20 @ 21:35)  T(F): 97.1 (20 @ 04:30), Max: 99.1 (20 @ 21:35)  HR: 61 (20 @ 04:54) (61 - 74)  BP: 113/60 (20 @ 04:54) (113/60 - 166/78)  BP(mean): 80 (20 @ 04:54) (80 - 112)  RR: 18 (20 @ 04:54) (18 - 18)  SpO2: 100% (- @ 04:54) (97% - 100%)  elderly obese woman  HEENT: pink mucosae, anicteric sclerae  No palpable peripheral lymphadenopathy  COR: regular rhythm rate, no murmurs, rubs or gallops  PULMO: clear to auscultation B/L  ABD: soft, no palpable masses, no splenomegaly  EXT: no edema  NEURO: intact    Labs:  CBC Full  -  ( 2020 05:39 )  WBC Count : 5.56 K/uL  RBC Count : 3.15 M/uL  Hemoglobin : 9.5 g/dL  Hematocrit : 28.7 %  Platelet Count - Automated : 191 K/uL  Mean Cell Volume : 91.1 fl  Mean Cell Hemoglobin : 30.2 pg  Mean Cell Hemoglobin Concentration : 33.1 gm/dL  Auto Neutrophil # : x  Auto Lymphocyte # : x  Auto Monocyte # : x  Auto Eosinophil # : x  Auto Basophil # : x  Auto Neutrophil % : x  Auto Lymphocyte % : x  Auto Monocyte % : x  Auto Eosinophil % : x  Auto Basophil % : x    PT/INR - ( 2020 05:39 )   PT: 13.0 sec;   INR: 1.09          PTT - ( 2020 05:39 )  PTT:30.3 sec        141  |  104  |  29<H>  ----------------------------<  137<H>  4.0   |  24  |  1.17    Ca    8.7      2020 05:39  Phos  3.6       Mg     2.4         Pertinent imaging studies: reviewed    Assessment:    Colon masses    Plan:  Newly discovered multiple suspicious colon masses  CEA 23.1  Await pathology  Surgical evaluation in progress  The patient will need staging work up CT C/AP and PET scan  Further recommendations to follow after the above work up is completed    Thank you    Stefano Mckeon MD  [phone]

## 2020-11-20 NOTE — PROGRESS NOTE ADULT - PROBLEM SELECTOR PLAN 4
- Likely 2/2 HTN urgency; RESOLVED  - Dx R/LHC 1/9/19 non-obstructive (full report below).   **Recent diagnostic R+L heart cath@[facility] 1/9/19 revealed LMCA normal, LAD with minor luminal irregularities, LCx large and normal, RCA large and normal. PA 18, RV 73/13 (21), PA 70/34 (49), PCWP 26, CO 7.3 L/min, CI 3.7, TPG 23, severe AS (mean LV/Ao gradient 48.1 mmHg, SETH 1.1 cm2). Hypotensive SBP 80s 11/18, resolved s/p IVF hydration NS 50cc/hr x 8hr   - CONTINUE: Coreg 6.25mg PO BID  -start Cardizem 30mg q6h  -D/c'ed Lisinopril 10mg PO QD, home HCTZ 12.5mg PO QD to allow for uptitration of cardizem for rate control

## 2020-11-20 NOTE — PROGRESS NOTE ADULT - ASSESSMENT
78 year old Turkish speaking F, PMHx of uncontrolled HTN, hyperlipidemia, asthma (denies hospitalizations, intubations), MARK? (on CPAP, noncompliant), chronic diastolic CHF (EF:61% by Echo 4/2019), aortic stenosis s/p transfemoral TAVR with rachael valve on 02/05/2019 with Dr. Alejo @[facility], recent hospitalization at [facility] (for HTN/headache, no changes made to medications, negative CT head/MRI brain, discharged on 11/4/20) who presents to [facility] ED 11/13/20 c/o intermittent chest pain and elevated BP x 1 week. GI consulted for BRBPR.     #Hematochezia  # Anemia  occurred in setting of supratherapeutic aPTT;     s/p EGD and cscopy on 11/19/20:    EGD: Normal except mild gastropathy.     Colonoscopy:	  1. Four large colonic mass lesions with the most proximal one near the cecum (150 cm from the anal verge) and the most distal one in the transverse colon (100 cm from the anal verge) s/p multiple biopsies and tattooing of the proximal and distal mass.   2. Ulceration and contact bleeding noted form the mass lesions.        Path: moderately diff adenoca in ascending and transverse lesions. CEA elevated           No distant mets on CT  - further care per Hemonc and surgical teams- PPI daily  - will sign off. call back PRN    Recommendations discussed with primary team  Plan discussed with GI service attending    Cordell Lindsey MD  PGY-4 GI fellow  Pager: [phone]

## 2020-11-20 NOTE — PROGRESS NOTE ADULT - PROBLEM SELECTOR PLAN 9
- Continue Spiriva inhaler daily    VTE PPx: SCDs  PT consulted: Home w/ Home PT  Patient lives with daughter Jaelyn, whom is her HHA (40 hrs/week).  CODE: Full code

## 2020-11-20 NOTE — PROGRESS NOTE ADULT - SUBJECTIVE AND OBJECTIVE BOX
SUBJECTIVE: Patient feels well overall and states that she has a lot of energy. OOB to chair. +BM/F, no reported blood. C/o mild headache and mild generalized abd discomfort which changes with positioning. Also states she had chest pain overnight however this has resolved.     carvedilol 6.25 milliGRAM(s) Oral every 12 hours  hydrochlorothiazide 12.5 milliGRAM(s) Oral daily  lisinopril 10 milliGRAM(s) Oral daily    MEDICATIONS  (PRN):  acetaminophen   Tablet .. 650 milliGRAM(s) Oral every 6 hours PRN Mild Pain (1 - 3), Moderate Pain (4 - 6)  acetaminophen 300 mG/butalbital 50 mG/ caffeine 40 mG 1 Capsule(s) Oral every 6 hours PRN Severe headache  aluminum hydroxide/magnesium hydroxide/simethicone Suspension 30 milliLiter(s) Oral every 6 hours PRN Dyspepsia  benzonatate 100 milliGRAM(s) Oral three times a day PRN Cough  guaiFENesin   Syrup  (Sugar-Free) 100 milliGRAM(s) Oral every 6 hours PRN Cough      I&O's Detail    19 Nov 2020 07:01  -  20 Nov 2020 07:00  --------------------------------------------------------  IN:  Total IN: 0 mL    OUT:    Voided (mL): 300 mL  Total OUT: 300 mL    Total NET: -300 mL          Vital Signs Last 24 Hrs  T(C): 36.2 (20 Nov 2020 09:16), Max: 37.3 (19 Nov 2020 21:35)  T(F): 97.2 (20 Nov 2020 09:16), Max: 99.1 (19 Nov 2020 21:35)  HR: 61 (20 Nov 2020 08:49) (61 - 74)  BP: 161/70 (20 Nov 2020 08:49) (113/60 - 166/78)  BP(mean): 80 (20 Nov 2020 04:54) (80 - 112)  RR: 18 (20 Nov 2020 08:49) (18 - 18)  SpO2: 100% (20 Nov 2020 08:49) (97% - 100%)    GENERAL: NAD, Resting comfortably in bed, awake, opens eyes spontaneously  HEENT: NCAT, MMM, Normal conjunctiva, PERRL  RESP: Nonlabored breathing, No respiratory distress  CARD: Normal rate, Normal peripheral perfusion  GI: Soft, ND, NT, No guarding, No rebound tenderness, scattered ecchymoses   EXTREM: WWP, No edema, No gross deformity of extremities  SKIN: No rashes, no lesions  NEURO: AAOx3, No focal motor or sensory deficits  PSYCH: Affect and characteristics of appearance, verbalizations, and behaviors are appropriate    LABS:                        9.3    9.57  )-----------( 223      ( 20 Nov 2020 07:54 )             28.8     11-20    138  |  100  |  31<H>  ----------------------------<  123<H>  4.2   |  25  |  1.31<H>    Ca    9.0      20 Nov 2020 07:53  Phos  3.6     11-19  Mg     2.2     11-20      PT/INR - ( 19 Nov 2020 05:39 )   PT: 13.0 sec;   INR: 1.09          PTT - ( 19 Nov 2020 05:39 )  PTT:30.3 sec      RADIOLOGY & ADDITIONAL STUDIES:      Culture - Urine (collected 11-18-20 @ 01:20)  Source: .Urine Clean Catch (Midstream)  Final Report (11-19-20 @ 08:40):    Specimen appears CONTAMINATED. Lab suggests repeat clean catch specimen.    Culture - Blood (collected 11-17-20 @ 15:07)  Source: .Blood Blood-Peripheral  Preliminary Report (11-19-20 @ 16:00):    No growth at 2 days.    Culture - Blood (collected 11-17-20 @ 15:07)  Source: .Blood Blood-Peripheral  Preliminary Report (11-19-20 @ 16:00):    No growth at 2 days.

## 2020-11-21 LAB
ANION GAP SERPL CALC-SCNC: 12 MMOL/L — SIGNIFICANT CHANGE UP (ref 5–17)
BUN SERPL-MCNC: 32 MG/DL — HIGH (ref 7–23)
CALCIUM SERPL-MCNC: 9.1 MG/DL — SIGNIFICANT CHANGE UP (ref 8.4–10.5)
CHLORIDE SERPL-SCNC: 101 MMOL/L — SIGNIFICANT CHANGE UP (ref 96–108)
CO2 SERPL-SCNC: 24 MMOL/L — SIGNIFICANT CHANGE UP (ref 22–31)
CREAT SERPL-MCNC: 1.31 MG/DL — HIGH (ref 0.5–1.3)
GLUCOSE SERPL-MCNC: 105 MG/DL — HIGH (ref 70–99)
HCT VFR BLD CALC: 30.8 % — LOW (ref 34.5–45)
HGB BLD-MCNC: 9.7 G/DL — LOW (ref 11.5–15.5)
MAGNESIUM SERPL-MCNC: 2.1 MG/DL — SIGNIFICANT CHANGE UP (ref 1.6–2.6)
MCHC RBC-ENTMCNC: 29.4 PG — SIGNIFICANT CHANGE UP (ref 27–34)
MCHC RBC-ENTMCNC: 31.5 GM/DL — LOW (ref 32–36)
MCV RBC AUTO: 93.3 FL — SIGNIFICANT CHANGE UP (ref 80–100)
NRBC # BLD: 0 /100 WBCS — SIGNIFICANT CHANGE UP (ref 0–0)
PHOSPHATE SERPL-MCNC: 4.1 MG/DL — SIGNIFICANT CHANGE UP (ref 2.5–4.5)
PLATELET # BLD AUTO: 232 K/UL — SIGNIFICANT CHANGE UP (ref 150–400)
POTASSIUM SERPL-MCNC: 3.9 MMOL/L — SIGNIFICANT CHANGE UP (ref 3.5–5.3)
POTASSIUM SERPL-SCNC: 3.9 MMOL/L — SIGNIFICANT CHANGE UP (ref 3.5–5.3)
RBC # BLD: 3.3 M/UL — LOW (ref 3.8–5.2)
RBC # FLD: 13.9 % — SIGNIFICANT CHANGE UP (ref 10.3–14.5)
SODIUM SERPL-SCNC: 137 MMOL/L — SIGNIFICANT CHANGE UP (ref 135–145)
WBC # BLD: 7.96 K/UL — SIGNIFICANT CHANGE UP (ref 3.8–10.5)
WBC # FLD AUTO: 7.96 K/UL — SIGNIFICANT CHANGE UP (ref 3.8–10.5)

## 2020-11-21 PROCEDURE — 99233 SBSQ HOSP IP/OBS HIGH 50: CPT

## 2020-11-21 RX ORDER — DIGOXIN 250 MCG
0.5 TABLET ORAL ONCE
Refills: 0 | Status: COMPLETED | OUTPATIENT
Start: 2020-11-21 | End: 2020-11-21

## 2020-11-21 RX ORDER — METOPROLOL TARTRATE 50 MG
25 TABLET ORAL EVERY 6 HOURS
Refills: 0 | Status: DISCONTINUED | OUTPATIENT
Start: 2020-11-21 | End: 2020-11-21

## 2020-11-21 RX ORDER — METOPROLOL TARTRATE 50 MG
25 TABLET ORAL EVERY 6 HOURS
Refills: 0 | Status: DISCONTINUED | OUTPATIENT
Start: 2020-11-21 | End: 2020-11-22

## 2020-11-21 RX ORDER — SODIUM CHLORIDE 9 MG/ML
1000 INJECTION INTRAMUSCULAR; INTRAVENOUS; SUBCUTANEOUS
Refills: 0 | Status: DISCONTINUED | OUTPATIENT
Start: 2020-11-21 | End: 2020-11-21

## 2020-11-21 RX ADMIN — Medication 100 MILLIGRAM(S): at 02:55

## 2020-11-21 RX ADMIN — Medication 100 MILLIGRAM(S): at 16:25

## 2020-11-21 RX ADMIN — PANTOPRAZOLE SODIUM 40 MILLIGRAM(S): 20 TABLET, DELAYED RELEASE ORAL at 18:35

## 2020-11-21 RX ADMIN — CARVEDILOL PHOSPHATE 6.25 MILLIGRAM(S): 80 CAPSULE, EXTENDED RELEASE ORAL at 11:09

## 2020-11-21 RX ADMIN — Medication 0.5 MILLIGRAM(S): at 21:26

## 2020-11-21 RX ADMIN — POLYETHYLENE GLYCOL 3350 17 GRAM(S): 17 POWDER, FOR SOLUTION ORAL at 11:59

## 2020-11-21 RX ADMIN — Medication 25 MILLIGRAM(S): at 23:03

## 2020-11-21 RX ADMIN — Medication 30 MILLIGRAM(S): at 06:05

## 2020-11-21 RX ADMIN — Medication 30 MILLIGRAM(S): at 18:35

## 2020-11-21 RX ADMIN — SODIUM CHLORIDE 100 MILLILITER(S): 9 INJECTION INTRAMUSCULAR; INTRAVENOUS; SUBCUTANEOUS at 12:11

## 2020-11-21 RX ADMIN — PANTOPRAZOLE SODIUM 40 MILLIGRAM(S): 20 TABLET, DELAYED RELEASE ORAL at 06:05

## 2020-11-21 RX ADMIN — ATORVASTATIN CALCIUM 20 MILLIGRAM(S): 80 TABLET, FILM COATED ORAL at 23:03

## 2020-11-21 RX ADMIN — Medication 25 MILLIGRAM(S): at 16:25

## 2020-11-21 RX ADMIN — TIOTROPIUM BROMIDE 1 CAPSULE(S): 18 CAPSULE ORAL; RESPIRATORY (INHALATION) at 12:00

## 2020-11-21 NOTE — PROGRESS NOTE ADULT - SUBJECTIVE AND OBJECTIVE BOX
Patient discussed on morning rounds with Dr. Alejo    SUBJECTIVE ASSESSMENT:  80y Female resting in bed.  Pt seen/examined with Dr. Alejo.  Patient's daughter called on speakerphone to help translate and discuss plan.  Pt feeling well.  No events overnight.  No complaints.  Pt has no SOB at rest, orthopnea, PND, chest pain/tightness, dizziness.        Vital Signs Last 24 Hrs  T(C): 36.4 (21 Nov 2020 09:40), Max: 37.6 (21 Nov 2020 05:40)  T(F): 97.6 (21 Nov 2020 09:40), Max: 99.6 (21 Nov 2020 05:40)  HR: 137 (21 Nov 2020 12:03) (109 - 139)  BP: 95/70 (21 Nov 2020 12:03) (86/57 - 146/92)  BP(mean): 78 (21 Nov 2020 12:03) (69 - 112)  RR: 16 (21 Nov 2020 12:03) (16 - 19)  SpO2: 96% (21 Nov 2020 12:03) (93% - 99%)  I&O's Detail    20 Nov 2020 07:01  -  21 Nov 2020 07:00  --------------------------------------------------------  IN:    Oral Fluid: 180 mL  Total IN: 180 mL    OUT:    Voided (mL): 400 mL  Total OUT: 400 mL    Total NET: -220 mL      21 Nov 2020 07:01  -  21 Nov 2020 14:03  --------------------------------------------------------  IN:    sodium chloride 0.9%: 100 mL  Total IN: 100 mL    OUT:  Total OUT: 0 mL    Total NET: 100 mL          CHEST TUBE:  No.  SERVANDO DRAIN:  No.  EPICARDIAL WIRES: No.  TIE DOWNS:/No.  VENTURA: No.    PHYSICAL EXAM:    General: NAD    Neurological: A&O x 3 no focal defects     Cardiovascular: RRR + Harsh YOVANA R sternal border     Respiratory: decreased breath sounds at bases, otherwise CTA     Gastrointestinal: obese, soft, no palpable masses     Extremities: +1 edema lower extremities bilaterally    Vascular: extremities warm    Incision Sites: none     LABS:                        9.7    7.96  )-----------( 232      ( 21 Nov 2020 07:29 )             30.8         11-21    137  |  101  |  32<H>  ----------------------------<  105<H>  3.9   |  24  |  1.31<H>    Ca    9.1      21 Nov 2020 07:29  Phos  4.1     11-21  Mg     2.1     11-21            MEDICATIONS  (STANDING):  atorvastatin 20 milliGRAM(s) Oral at bedtime  diltiazem    Tablet 30 milliGRAM(s) Oral every 6 hours  influenza  Vaccine (HIGH DOSE) 0.7 milliLiter(s) IntraMuscular once  pantoprazole  Injectable 40 milliGRAM(s) IV Push two times a day  polyethylene glycol 3350 17 Gram(s) Oral daily  sodium chloride 0.9%. 1000 milliLiter(s) (100 mL/Hr) IV Continuous <Continuous>  tiotropium 18 MICROgram(s) Capsule 1 Capsule(s) Inhalation daily    MEDICATIONS  (PRN):  acetaminophen   Tablet .. 650 milliGRAM(s) Oral every 6 hours PRN Mild Pain (1 - 3), Moderate Pain (4 - 6)  acetaminophen 300 mG/butalbital 50 mG/ caffeine 40 mG 1 Capsule(s) Oral every 6 hours PRN Severe headache  aluminum hydroxide/magnesium hydroxide/simethicone Suspension 30 milliLiter(s) Oral every 6 hours PRN Dyspepsia  benzonatate 100 milliGRAM(s) Oral three times a day PRN Cough  guaiFENesin   Syrup  (Sugar-Free) 100 milliGRAM(s) Oral every 6 hours PRN Cough        RADIOLOGY & ADDITIONAL TESTS:

## 2020-11-21 NOTE — PROGRESS NOTE ADULT - ASSESSMENT
78 year old Slovak speaking F, PMHx of uncontrolled HTN, HLD, asthma, chronic diastolic CHF (EF 61% by Echo 4/2019), aortic stenosis s/p transfemoral TAVR with Jamar valve on 02/05/2019 with Dr. Alejo @[facility] who presents to [facility] ED 11/13/20 c/o intermittent chest pain and elevated BP x 1 week. BP initially elevated on arrival however has been stable on home meds. ECHO 11/13/2020 showed elevated gradient over aortic valve of 61.78 (doubled since last echo). Dr. Alejo consulted. S/p structural CT: negative for thrombus, Limited TTE 11/16: elevated aortic valve gradient 71mmHg. GI consulted 11/15 in the setting of BRBPR in the setting of supratherapeutic PTT while on heparin gtt w/ continued episodes after stopping A/C, likely 2/2 hemorrhoids. IRMA noting AV thickening suspicious for thrombus, recommend to start AC pending GI clearance given acute drop in H/h. GI team reconsulted, s/p EGD/colonoscopy revealing 4 large colonic masses concerning for colon CA. Unable to be on AC given high risk for bleeding. Awaiting further workup of colon masses, and recs from surgery and oncology consults. Hospital course c/b new Afib -130s, w/ hypotension, started on rate control strategy.

## 2020-11-21 NOTE — PROGRESS NOTE ADULT - PROBLEM SELECTOR PLAN 3
Prior hx of palpitations per pt, unclear if previous Hx of Afib.  Found to be in Afib RVR w/ -130s after CT Scan on 11/20, given Lopressor 5mg IV x 2 w/ minimal improvement in HR. Given Cardizem 10mg IV x1 w/ HR improved to 90-120s.   - CHADsVASc 4, HASBLED 3. Holding A/C in setting of bleeding risk.   - Coreg discontinued due to lower BP  - Continue Cardizem 30mg PO q6hrs as BP tolerates.  - Started on lopressor 25mg PO 6hrs for better rate control if blood pressure is tolerating.  - Consider Digoxin.

## 2020-11-21 NOTE — PROGRESS NOTE ADULT - PROBLEM SELECTOR PLAN 7
Patient became febrile to 101.2 on 11/17AM w/ associated leukocytosis 11.23.  - Self resolved  - Unclear source of infection  - possible UTI, UA: Moderate LE, small blood, bacteria present.  Urine CX contaminated.   - CT Chest done 11/6:  no signs of infiltrates  - CXR w/o infiltrates  - Blood Cx NGTD  - No Abx for now, monitor fevers and WBC.

## 2020-11-21 NOTE — PROGRESS NOTE ADULT - PROBLEM SELECTOR PLAN 1
- s/p transfemoral TAVR with Jamar valve on 02/05/2019 with Dr. Alejo @[facility].  - ECHO (11/13/20): Normal BiV fxn/size, moderate LVH, Grade II LV Diastolic Dysfunction, PEAK TRANSAORTIC GRADIENT 61.78mmHg, mildly dilated LA, no pulm HTN, moderately dilated ascending aorta. (previously 14mmHg on 4/2019)  - Structural CT scan: no thrombus seen.    - Limited TTE 11/16: gradient 71mmHg  -s/p IRMA 11/17: No LA/RA/MAIK/RAA thrombus seen, No evidence of an intracardiac shunt, 23 mm Jamar valve is noted in the aortic position without any aortic regurgitation, There is slight thickening at the base of the right cusp with probably mild restriction in excursion. The other prosthetic leaflets appear normal.  - Given slight suspicious for thrombus at base of R cusp, per Structural Heart Dr Alejo, would like to start NOAC if cleared by GI.   - Given high risk for bleeding in setting of 4 large friable colonic lesions, pt is NOT cleared to start NOAC given high risk of bleeding   -F/u Dr Alejo for potential further procedural intervention for thickened aortic valve/ suspicion of thrombus at base of right cusp. Will need further discussion w/ Oncology and colorectal surgery

## 2020-11-21 NOTE — PROGRESS NOTE ADULT - PROBLEM SELECTOR PLAN 8
- Intermittently w/ HA. No focal neurodeficits.   - Responds well to IV Tylenol and Fiorocet, ordered PRN.   ** Of note, patient reportedly had normal CT head/MRI Brain @ [facility] ~1 week ago.  -Given recurrent HA/dizziness. Repeat CT Head 11/18/20 w/ no evidence of acute intracranial hemorrhage or acute transcortical infarct.

## 2020-11-21 NOTE — PROGRESS NOTE ADULT - PROBLEM SELECTOR PLAN 4
Initially admitted w/ hypertensive emergency, now having episodes of hypotension.   - Hypotensive SBP 80s 11/18, resolved s/p IVF hydration NS 50cc/hr x 8hr  - Again hypotensive SBP 80-100s w/ Rapid Afib.    - Discontinued Coreg 6.25mg PO BID, Lisinopril 10mg PO QD, and home HCTZ 12.5mg PO QD.   - continue Cardizem 30mg q6h, and started on Toprol XL 25mg PO q6hrs as BP is tolerating.

## 2020-11-21 NOTE — PROGRESS NOTE ADULT - PROBLEM SELECTOR PLAN 2
- multiple episodes of BRBPR on 11/14PM and 11/15 PM 2/2 to supratherapeutic PTT on heparin gtt. (has had prior to admission). Also had recurrent episodes of BRBPR in evening on 11/17 after heparin gtt was off and PTT stabilized.  Per patient occurred w/ straining during BM  - S/p EGD revealing mild gastropathy.  - S/p Colonoscopy revealing Four large colonic mass lesions with the most proximal one near the cecum (150 cm from the anal verge) and the most proximal one in the transverse colon (100 cm from the anal verge) s/p multiple biopsies and tattooing of the proximal and distal mass. Ulceration and contact bleeding noted form the mass lesions  - pathology consistent with adenocarcinoma  - elevated CEA 23.1  - CT chest/abdomen/pelvis 11/20 s/f 2 cm segment of annular wall thickening of transverse colon, suspicious for neoplasm. 2.6 cm focal area of wall thickening in the ascending colon may also represent another neoplasm. No colonic obstruction. No evidence of distant metastasis.  - High risk of GI bleeding on anticoagulation therapy given above findings  - GI service signed off.   - Pending Heme/onc and Colorectal surgical evaluation for likely colon cancer  - Per Onc recs, PET Scan ordered for staging (NPO on Sunday night)

## 2020-11-21 NOTE — PROGRESS NOTE ADULT - ASSESSMENT
78 year old Uzbek speaking F, PMHx of uncontrolled HTN, hyperlipidemia, asthma (denies hospitalizations, intubations), MARK? (on CPAP, noncompliant), chronic diastolic CHF (EF:61% by Echo 4/2019), aortic stenosis s/p transfemoral TAVR with rachael valve on 02/05/2019 with Dr. Alejo @[facility], recent hospitalization at [facility] (for HTN/headache, no changes made to medications, negative CT head/MRI brain, discharged on 11/4/20) who presents to [facility] ED 11/13/20 c/o intermittent chest pain and elevated BP x 1 week. GI consulted for BRBPR.     #Hematochezia  # Anemia  occurred in setting of supratherapeutic aPTT;     s/p EGD and cscopy on 11/19/20:  EGD: Normal except mild gastropathy.   Colonoscopy:	  1. Four large colonic mass lesions with the most proximal one near the cecum (150 cm from the anal verge) and the most distal one in the transverse colon (100 cm from the anal verge) s/p multiple biopsies and tattooing of the proximal and distal mass.   2. Ulceration and contact bleeding noted form the mass lesions.        Path: moderately diff adenoca in ascending and transverse lesions.   CEA elevated        No distant mets on CT  - discussed the findings at length with the patient in her language (Uzbek) using  services  - further care per Hemonc and surgical teams- PPI daily  - will sign off. call back PRN    Recommendations discussed with primary team  Plan discussed with GI service attending    Cordell Lindsey MD  PGY-4 GI fellow  Pager: [phone]

## 2020-11-21 NOTE — PROGRESS NOTE ADULT - ASSESSMENT
79 y/o [place]araguan/English speaking female with PMHx of uncontrolled HTN, HLD, asthma (no hospitalizations in past), ?MARK (noncompliant with CPAP), chronic diastolic CHF (EF 61%), AS (s/p TAVR rachael valve with Dr. Alejo on 2/5/2019) who was recently hospitalized at [facility] 2/2 to hypertensive emergency (HTN w/ HA). CT head/MRI brain at that time negative and patient was discharged 11/4/20. Patient presented to [facility] ED on 11/13/20 with complaints of intermittent CP and elevated BP x1 week. Structural Heart Disease consulted in setting of previous TAVR, with now elevated AV gradients on TTE performed on 11/16/20, revealing 23 mm Rachael valve in the aortic position without any aortic regurgitation. The peak transvalvular velocity is 4.20 m/s, the mean transvalvular gradient is 35.00 mmHg, and the LVOT/AV velocity ratio is 0.38. The peak transaortic gradient is 70.56 mmHg. Patient underwent IRMA 11/17/20 revealing normal left and right ventricular size and systolic function. No LA/RA/MAIK/RAA thrombus seen. No evidence of an intracardiac shunt. 23 mm Rachael valve is noted in the aortic position without any aortic regurgitation. There is slight thickening at the base of the right cusp with probably mild restriction in excursion. The other prosthetic leaflets appear normal. Of note while inpatient GI was consulted for BRBPRN in the setting of subtherapeutic PTT while on heparin gtt. The patient underwent a colonoscopy on 11/19 which revealed four large colonic mass lesions concerning for colon cancer.     Assesment:  80y Female        Plan:  Problem 1: Thickness at base of right coronary cusp of TAVR  - Await Heme/Onc workup and plan for treatment   - Holding anticoagulation in light of large friable masses on colonoscopy  - Plan: Anticoagulate vs Cath and determine gradients vs Valve in Valve TAVR     Problem 2:  Colon masses  - Await full workup, staging, prognosis, treatment plan per Heme/Onc, Surg/Onc, GI    Problem 3: HTN   - Management per primary team      I have reviewed clinical labs tests and reports, radiology tests and reports, as well as old patient medical records, and discussed with the referring physician.

## 2020-11-21 NOTE — PROGRESS NOTE ADULT - PROBLEM SELECTOR PROBLEM 1
Patient Information     Patient Name MRN Sex Jewels Valerio [id number] Female 1957      Discharge Summaries by Wander Meza MD at 2017  1:28 PM     Author:  Wander Meza MD Service:  (none) Author Type:  Physician     Filed:  2017  1:49 PM Date of Service:  2017  1:28 PM Status:  Signed     :  Wander Meza MD (Physician)            HOSPITAL DISCHARGE SUMMARY    Patient Name: Jewels Ferrer  YOB: 1957  Age: 60 y.o.  Medical Record Number: [id number]  Primary Physician: Brock Browne MD  Phone: [phone]  Admission Date: 2017  Discharge Date/Date of Service: 2017    She will be discharged from [facility] to home.    DISCHARGE DIAGNOSIS:  Cholelithiasis with acute on chronic cholelithiasis, acute, POA    Assessment: sx's of abd pain began yesterday and worsened after eating dinner last night; no pain this morning, last pain med given at 9:30 last night; normal WBC count times two; normal AST, ALT and t bile times two; CT Abd/Pelv showed:      Severe obstructive sleep apnea,chronic    Assessment: pt declines CPAP use at home    HYPERTENSION, chronic    Assessment: acutely controlled    Paroxysmal atrial fibrillation, chronic    Assessment: 2017 ECG shows NSR; chronic anticoagulation with xarelto; xarelto last taken the morning of 2017    Diastolic heart failure, chronic    Assessment: stable; normal LVEF s/p TTE 2017     Non-rheumatic mitral regurgitation, chronic    Assessment: mild to moderate s/p TTE 2017    RBBB (right bundle branch block), chronic     Hx of G E R D    BRIEF HOSPITAL COURSE:  Pt developed stomach pains this past year.  She was found to have gall stones.  She was evaluated by Dr York at [facility] in early 2017.  She was told to come back if the stomach pain came back.  The day of her ER visit she started to note upper stomach pain.  It got worse later in the evening after eating at  "her mothers birthday party.  She came to the ER.  CT of Abd/Pelv showed: Gallbladder wall thickening with pericholecystic fluid.  Additionally there is layering gallbladder sludge or gallstones.  Findings are suggestive of acute cholecystitis.  Lab assessment showed: normal cbc and cmp.  She was given dilaudid iv 0.5 mg once in the ER.  She was assessed by Dr Hayden who placed her on med/surg.  She was given ivf's and clear liquid diet.  Over her 36 hour hospital stay she needed dilaudid 0.4 mg iv once yesterday afternoon.  She has done well since.  She has had 2 full liquid meals today without any nausea or pain.  She feels ready to go home today with the plan of returning on Monday for surgery with Dr Arvizu, arranged by Dr Hayden.    DISCHARGE EXAM:   General Appearance: comfortable  Chest/Respiratory Exam: CTA bilaterally; no crackles, wheezes or rhonchi  Gastrointestinal Exam: not distended; normal bs; no tenderness with palp in all quadrants    PROCEDURES PERFORMED DURING HOSPITALIZATION: None    COMPLICATIONS IN HOSPITAL: None    PERTINENT FINDINGS/RESULTS AT DISCHARGE:   /55  Pulse 68  Temp 96.2  F (35.7  C)  Resp 18  Ht 1.6 m (5' 3\")  Wt 60.8 kg (134 lb 0.6 oz)  SpO2 92%  Breastfeeding? No  BMI 23.74 kg/m2   Vitals:        12/08/17 2220 12/09/17 0046 12/09/17 0304 12/09/17 0851   BP:   119/55    Pulse: 62  67 68   Resp: 17  18 18   Temp: 97.7  F (36.5  C)  96.2  F (35.7  C)    SpO2: 96%  98% 92%   Weight:  60.8 kg (134 lb 0.6 oz)     Height:         Patient Vitals for the past 72 hrs:   Weight   12/09/17 0046 60.8 kg (134 lb 0.6 oz)   12/08/17 0054 60.1 kg (132 lb 7.9 oz)   12/07/17 2016 59 kg (130 lb)       Latest Laboratory Results:  CBC: Recent Labs        12/08/17 0416 12/07/17 2126   WBC  7.6  8.5   HGB  12.5  14.1   HCT  38.5  43.0   MCV  85  85   PLT  215  231     INR:   Recent Labs       12/08/17 0416   INR  1.0    Recent Labs        12/08/17   0416  12/07/17   2126   SODIUM  " 141  143   POTASSIUM  4.0  4.0   CHLORIDE  106  105   QR1IDWKF  27  25   BUN  12  17   CREATININE  0.40 L  0.49 L   GLUCOSE  149 H  155 H   CALCIUM  9.0  9.3   MAGNESIUM  2.1  2.0     Recent Labs        12/08/17   0416  12/07/17   2126   ALBUMIN  3.4 L  3.6   BILITOTAL  0.3  0.3   ALT  19  22   AST  19  21   PROTEIN  4.8 L  6.2 L     IMPORTANT PENDING TEST RESULTS: None    CONDITION AT DISCHARGE:    Improving    DISCHARGE ORDERS     Your Home Medicines      CHANGE how you take these medicines       Instructions    acetaminophen 500 mg tablet   What changed:  how much to take   Commonly known as:  TYLENOL EXTRA STRENGTH    Take 1-2 tablets by mouth every 6 hours if needed. Max acetaminophen dose: 4000mg in 24 hrs.         CONTINUE taking these medicines       Instructions    atorvastatin 10 mg tablet   For diagnoses:  Pure hypercholesterolemia   Commonly known as:  LIPITOR    Take 1 tablet by mouth at bedtime.       furosemide 20 mg tablet   For diagnoses:  Pulmonary hypertension, Diastolic heart failure, unspecified heart failure chronicity (HC)   Commonly known as:  LASIX    Take 1 tablet by mouth every morning.   Doctor's comments:  Please call Jewels and let her know when Rx is ready to be picked up.  Thank you       lisinopril 5 mg tablet   For diagnoses:  Pulmonary hypertension   Commonly known as:  PRINIVIL; ZESTRIL    Take 1 tablet by mouth once daily.       metoprolol tartrate 25 mg tablet   For diagnoses:  Pulmonary hypertension   Commonly known as:  LOPRESSOR    TAKE 1 TABLET BY MOUTH TWICE DAILY         STOP taking these medicines          XARELTO 20 mg tablet   Generic drug:  rivaroxaban            Where to get your medicines      Prescriptions for these medicines or supplies were NOT printed nor sent to your preferred pharmacy. Check with your doctor if you have questions.     ! Check with your doctor if you have questions.      acetaminophen 500 mg tablet           After Discharge Orders and  Aortic stenosis Instructions     Follow up appointment(s):       Return on Monday, , as instructed by nursing staff in order to have surgery with [facility].       Other Diet       Important: low fat diet.  Follow instructions provided by your nurse.       Up as Tolerated       Slowly return to your regular level of activity. Save your energy by spreading out activities that make you tired.  Rest as needed.       When should you be concerned?       Your health care provider is:  Brock Browne MD    Please call your health care provider if:    - you feel you are getting worse or having an increase in problems    - nausea (upset stomach) and vomiting and/or diarrhea that will not stop  - severe pain that is not relieved by medicine, rest or ice    Call 911 if you feel you are having a medical emergency.       Why you were at the hospital       You were in the hospital for monitoring and control of stomach pain from your gallbladder.               DME: none.    Total time spent on date of discharge for evaluation of patient/review of medications/review of final test results/review of pending test results/coordination of care/discharge plannin minutes    Physician(s) in addition to primary physician who should receive a copy:  Dr Arvizu, Gen Surg  Dr Hayden, Gen Surg    Wander Meza MD

## 2020-11-21 NOTE — PROGRESS NOTE ADULT - SUBJECTIVE AND OBJECTIVE BOX
GASTROENTEROLOGY PROGRESS NOTE  Patient seen and examined at bedside. No new complaints.    PERTINENT REVIEW OF SYSTEMS:  As noted above    Allergies    Plavix (Other (Mod to Severe))    Intolerances      MEDICATIONS:  MEDICATIONS  (STANDING):  atorvastatin 20 milliGRAM(s) Oral at bedtime  diltiazem    Tablet 30 milliGRAM(s) Oral every 6 hours  influenza  Vaccine (HIGH DOSE) 0.7 milliLiter(s) IntraMuscular once  metoprolol tartrate 25 milliGRAM(s) Oral every 6 hours  pantoprazole  Injectable 40 milliGRAM(s) IV Push two times a day  polyethylene glycol 3350 17 Gram(s) Oral daily  sodium chloride 0.9%. 1000 milliLiter(s) (100 mL/Hr) IV Continuous <Continuous>  tiotropium 18 MICROgram(s) Capsule 1 Capsule(s) Inhalation daily    MEDICATIONS  (PRN):  acetaminophen   Tablet .. 650 milliGRAM(s) Oral every 6 hours PRN Mild Pain (1 - 3), Moderate Pain (4 - 6)  acetaminophen 300 mG/butalbital 50 mG/ caffeine 40 mG 1 Capsule(s) Oral every 6 hours PRN Severe headache  aluminum hydroxide/magnesium hydroxide/simethicone Suspension 30 milliLiter(s) Oral every 6 hours PRN Dyspepsia  benzonatate 100 milliGRAM(s) Oral three times a day PRN Cough  guaiFENesin   Syrup  (Sugar-Free) 100 milliGRAM(s) Oral every 6 hours PRN Cough    Vital Signs Last 24 Hrs  T(C): 37.1 (21 Nov 2020 14:27), Max: 37.6 (21 Nov 2020 05:40)  T(F): 98.7 (21 Nov 2020 14:27), Max: 99.6 (21 Nov 2020 05:40)  HR: 134 (21 Nov 2020 15:28) (109 - 139)  BP: 128/77 (21 Nov 2020 15:28) (86/57 - 136/66)  BP(mean): 96 (21 Nov 2020 15:28) (69 - 100)  RR: 19 (21 Nov 2020 15:28) (16 - 19)  SpO2: 97% (21 Nov 2020 15:28) (93% - 98%)    11-20 @ 07:01  -  11-21 @ 07:00  --------------------------------------------------------  IN: 180 mL / OUT: 400 mL / NET: -220 mL    11-21 @ 07:01  -  11-21 @ 16:18  --------------------------------------------------------  IN: 500 mL / OUT: 0 mL / NET: 500 mL      PHYSICAL EXAM:    General: Well developed; well nourished; in no acute distress  HEENT: MMM, conjunctiva and sclera clear  Gastrointestinal: Soft non-tender non-distended; Normal bowel sounds; No hepatosplenomegaly. No rebound or guarding  Skin: Warm and dry. No obvious rash    LABS:                        9.7    7.96  )-----------( 232      ( 21 Nov 2020 07:29 )             30.8     11-21    137  |  101  |  32<H>  ----------------------------<  105<H>  3.9   |  24  |  1.31<H>    Ca    9.1      21 Nov 2020 07:29  Phos  4.1     11-21  Mg     2.1     11-21                        RADIOLOGY & ADDITIONAL STUDIES:  Reviewed

## 2020-11-21 NOTE — PROGRESS NOTE ADULT - SUBJECTIVE AND OBJECTIVE BOX
Interventional Cardiology PA Adult Progress Note    Subjective Assessment: Patient seen and examined at bedside, she is feeling well without complaints  denies any dizziness.   ROS negative except per HPI and subjective  	  MEDICATIONS:  diltiazem    Tablet 30 milliGRAM(s) Oral every 6 hours  metoprolol tartrate 25 milliGRAM(s) Oral every 6 hours  benzonatate 100 milliGRAM(s) Oral three times a day PRN  guaiFENesin   Syrup  (Sugar-Free) 100 milliGRAM(s) Oral every 6 hours PRN  tiotropium 18 MICROgram(s) Capsule 1 Capsule(s) Inhalation daily  acetaminophen   Tablet .. 650 milliGRAM(s) Oral every 6 hours PRN  acetaminophen 300 mG/butalbital 50 mG/ caffeine 40 mG 1 Capsule(s) Oral every 6 hours PRN  aluminum hydroxide/magnesium hydroxide/simethicone Suspension 30 milliLiter(s) Oral every 6 hours PRN  pantoprazole  Injectable 40 milliGRAM(s) IV Push two times a day  polyethylene glycol 3350 17 Gram(s) Oral daily  atorvastatin 20 milliGRAM(s) Oral at bedtime  influenza  Vaccine (HIGH DOSE) 0.7 milliLiter(s) IntraMuscular once  sodium chloride 0.9%. 1000 milliLiter(s) IV Continuous <Continuous>    [PHYSICAL EXAM:  TELEMETRY:  T(C): 37.1 (11-21-20 @ 14:27), Max: 37.6 (11-21-20 @ 05:40)  HR: 134 (11-21-20 @ 15:28) (109 - 139)  BP: 128/77 (11-21-20 @ 15:28) (86/57 - 146/92)  RR: 19 (11-21-20 @ 15:28) (16 - 19)  SpO2: 97% (11-21-20 @ 15:28) (93% - 98%)    I&O's Summary    20 Nov 2020 07:01  -  21 Nov 2020 07:00  --------------------------------------------------------  IN: 180 mL / OUT: 400 mL / NET: -220 mL    21 Nov 2020 07:01  -  21 Nov 2020 15:37  --------------------------------------------------------  IN: 100 mL / OUT: 0 mL / NET: 100 mL                                     Appearance: Normal	  HEENT:   Normal oral mucosa, PERRL, EOMI	  Neck: Supple, - JVD; Carotid Bruit   Cardiovascular: Normal S1 S2, No JVD, No murmurs,   Respiratory: Lungs clear to auscultation//No Rales, Rhonchi, Wheezing	  Gastrointestinal:  Soft, Non-tender, + BS	  Skin: No rashes, No ecchymoses, No cyanosis  Extremities: Normal range of motion, No clubbing, cyanosis or edema  Vascular: Peripheral pulses palpable 2+ bilaterally  Neurologic: Non-focal  Psychiatry: A & O x 3, Mood & affect appropriate        LABS:	 	  CARDIAC MARKERS:                            9.7    7.96  )-----------( 232      ( 21 Nov 2020 07:29 )             30.8     11-21    137  |  101  |  32<H>  ----------------------------<  105<H>  3.9   |  24  |  1.31<H>    Ca    9.1      21 Nov 2020 07:29  Phos  4.1     11-21  Mg     2.1     11-21      proBNP:   Lipid Profile:   HgA1c:   TSH:       ASSESSMENT/PLAN: 	        DVT ppx:  Dispo:

## 2020-11-22 LAB
ANION GAP SERPL CALC-SCNC: 7 MMOL/L — SIGNIFICANT CHANGE UP (ref 5–17)
BLD GP AB SCN SERPL QL: NEGATIVE — SIGNIFICANT CHANGE UP
BUN SERPL-MCNC: 27 MG/DL — HIGH (ref 7–23)
CALCIUM SERPL-MCNC: 8.9 MG/DL — SIGNIFICANT CHANGE UP (ref 8.4–10.5)
CHLORIDE SERPL-SCNC: 108 MMOL/L — SIGNIFICANT CHANGE UP (ref 96–108)
CO2 SERPL-SCNC: 23 MMOL/L — SIGNIFICANT CHANGE UP (ref 22–31)
CREAT SERPL-MCNC: 1.32 MG/DL — HIGH (ref 0.5–1.3)
CULTURE RESULTS: SIGNIFICANT CHANGE UP
CULTURE RESULTS: SIGNIFICANT CHANGE UP
DIGOXIN SERPL-MCNC: 1.2 NG/ML — SIGNIFICANT CHANGE UP (ref 0.8–2)
GLUCOSE SERPL-MCNC: 117 MG/DL — HIGH (ref 70–99)
HCT VFR BLD CALC: 31.6 % — LOW (ref 34.5–45)
HCT VFR BLD CALC: 33.9 % — LOW (ref 34.5–45)
HGB BLD-MCNC: 10.1 G/DL — LOW (ref 11.5–15.5)
HGB BLD-MCNC: 11 G/DL — LOW (ref 11.5–15.5)
MAGNESIUM SERPL-MCNC: 1.9 MG/DL — SIGNIFICANT CHANGE UP (ref 1.6–2.6)
MCHC RBC-ENTMCNC: 29.4 PG — SIGNIFICANT CHANGE UP (ref 27–34)
MCHC RBC-ENTMCNC: 30 PG — SIGNIFICANT CHANGE UP (ref 27–34)
MCHC RBC-ENTMCNC: 32 GM/DL — SIGNIFICANT CHANGE UP (ref 32–36)
MCHC RBC-ENTMCNC: 32.4 GM/DL — SIGNIFICANT CHANGE UP (ref 32–36)
MCV RBC AUTO: 92.1 FL — SIGNIFICANT CHANGE UP (ref 80–100)
MCV RBC AUTO: 92.4 FL — SIGNIFICANT CHANGE UP (ref 80–100)
NRBC # BLD: 0 /100 WBCS — SIGNIFICANT CHANGE UP (ref 0–0)
NRBC # BLD: 0 /100 WBCS — SIGNIFICANT CHANGE UP (ref 0–0)
PLATELET # BLD AUTO: 227 K/UL — SIGNIFICANT CHANGE UP (ref 150–400)
PLATELET # BLD AUTO: 268 K/UL — SIGNIFICANT CHANGE UP (ref 150–400)
POTASSIUM SERPL-MCNC: 4.3 MMOL/L — SIGNIFICANT CHANGE UP (ref 3.5–5.3)
POTASSIUM SERPL-SCNC: 4.3 MMOL/L — SIGNIFICANT CHANGE UP (ref 3.5–5.3)
RBC # BLD: 3.43 M/UL — LOW (ref 3.8–5.2)
RBC # BLD: 3.67 M/UL — LOW (ref 3.8–5.2)
RBC # FLD: 13.6 % — SIGNIFICANT CHANGE UP (ref 10.3–14.5)
RBC # FLD: 13.7 % — SIGNIFICANT CHANGE UP (ref 10.3–14.5)
RH IG SCN BLD-IMP: POSITIVE — SIGNIFICANT CHANGE UP
SODIUM SERPL-SCNC: 138 MMOL/L — SIGNIFICANT CHANGE UP (ref 135–145)
SPECIMEN SOURCE: SIGNIFICANT CHANGE UP
SPECIMEN SOURCE: SIGNIFICANT CHANGE UP
WBC # BLD: 7.94 K/UL — SIGNIFICANT CHANGE UP (ref 3.8–10.5)
WBC # BLD: 8.98 K/UL — SIGNIFICANT CHANGE UP (ref 3.8–10.5)
WBC # FLD AUTO: 7.94 K/UL — SIGNIFICANT CHANGE UP (ref 3.8–10.5)
WBC # FLD AUTO: 8.98 K/UL — SIGNIFICANT CHANGE UP (ref 3.8–10.5)

## 2020-11-22 PROCEDURE — 99233 SBSQ HOSP IP/OBS HIGH 50: CPT

## 2020-11-22 RX ORDER — MAGNESIUM OXIDE 400 MG ORAL TABLET 241.3 MG
400 TABLET ORAL ONCE
Refills: 0 | Status: DISCONTINUED | OUTPATIENT
Start: 2020-11-22 | End: 2020-11-22

## 2020-11-22 RX ORDER — METOPROLOL TARTRATE 50 MG
50 TABLET ORAL THREE TIMES A DAY
Refills: 0 | Status: DISCONTINUED | OUTPATIENT
Start: 2020-11-22 | End: 2020-11-22

## 2020-11-22 RX ORDER — DIGOXIN 250 MCG
0.12 TABLET ORAL DAILY
Refills: 0 | Status: DISCONTINUED | OUTPATIENT
Start: 2020-11-23 | End: 2020-11-23

## 2020-11-22 RX ORDER — DIGOXIN 250 MCG
0.25 TABLET ORAL DAILY
Refills: 0 | Status: DISCONTINUED | OUTPATIENT
Start: 2020-11-22 | End: 2020-11-22

## 2020-11-22 RX ORDER — METOPROLOL TARTRATE 50 MG
50 TABLET ORAL EVERY 6 HOURS
Refills: 0 | Status: DISCONTINUED | OUTPATIENT
Start: 2020-11-22 | End: 2020-11-29

## 2020-11-22 RX ORDER — MAGNESIUM OXIDE 400 MG ORAL TABLET 241.3 MG
400 TABLET ORAL ONCE
Refills: 0 | Status: COMPLETED | OUTPATIENT
Start: 2020-11-22 | End: 2020-11-22

## 2020-11-22 RX ADMIN — MAGNESIUM OXIDE 400 MG ORAL TABLET 400 MILLIGRAM(S): 241.3 TABLET ORAL at 12:44

## 2020-11-22 RX ADMIN — Medication 30 MILLILITER(S): at 17:33

## 2020-11-22 RX ADMIN — Medication 650 MILLIGRAM(S): at 20:50

## 2020-11-22 RX ADMIN — Medication 25 MILLIGRAM(S): at 06:45

## 2020-11-22 RX ADMIN — Medication 100 MILLIGRAM(S): at 09:39

## 2020-11-22 RX ADMIN — Medication 0.25 MILLIGRAM(S): at 10:23

## 2020-11-22 RX ADMIN — PANTOPRAZOLE SODIUM 40 MILLIGRAM(S): 20 TABLET, DELAYED RELEASE ORAL at 06:45

## 2020-11-22 RX ADMIN — Medication 50 MILLIGRAM(S): at 12:44

## 2020-11-22 RX ADMIN — Medication 650 MILLIGRAM(S): at 21:34

## 2020-11-22 RX ADMIN — ATORVASTATIN CALCIUM 20 MILLIGRAM(S): 80 TABLET, FILM COATED ORAL at 20:49

## 2020-11-22 RX ADMIN — POLYETHYLENE GLYCOL 3350 17 GRAM(S): 17 POWDER, FOR SOLUTION ORAL at 12:44

## 2020-11-22 RX ADMIN — Medication 50 MILLIGRAM(S): at 20:49

## 2020-11-22 RX ADMIN — Medication 100 MILLIGRAM(S): at 15:07

## 2020-11-22 RX ADMIN — Medication 30 MILLILITER(S): at 09:39

## 2020-11-22 RX ADMIN — TIOTROPIUM BROMIDE 1 CAPSULE(S): 18 CAPSULE ORAL; RESPIRATORY (INHALATION) at 09:32

## 2020-11-22 RX ADMIN — Medication 100 MILLIGRAM(S): at 03:41

## 2020-11-22 RX ADMIN — PANTOPRAZOLE SODIUM 40 MILLIGRAM(S): 20 TABLET, DELAYED RELEASE ORAL at 17:33

## 2020-11-22 NOTE — PROGRESS NOTE ADULT - PROBLEM SELECTOR PLAN 7
RESOLVED; Patient became febrile to 101.2 on 11/17 AM w/ associated leukocytosis 11.23  - self resolved  - unclear source of infection  - possible UTI, UA w/ moderate leuks, small blood, bacteria present, and Urine CX contaminated  - CT Chest 11/6: no signs of infiltrates  - CXR w/o infiltrates  - Blood Cx NGTD  - No Abx for now, monitor fevers and WBC.

## 2020-11-22 NOTE — PROGRESS NOTE ADULT - PROBLEM SELECTOR PLAN 1
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 Alejo at [facility]  - Echo 11/13/2020: normal BiV fxn/size, moderate LVH, grade II LV diastolic dysfunction, PEAK TRANSAORTIC GRADIENT 61.78 mmHg, mildly dilated LA, no pulm HTN, moderately dilated ascending aorta (previously 14mmHg on 4/2019)  - Structural CT scan: no thrombus seen.    - Limited TTE 11/16:  aortic valve gradient 71 mmHg  - IRMA 11/17: no LA/RA/MAIK/RAA thrombus seen, no evidence of an intracardiac shunt, 23 mm Jamar valve is noted in the aortic position w/o any aortic regurgitation, slight thickening at the base of the right cusp w/ probably mild restriction in excursion, other prosthetic leaflets appear normal  - given slight suspicious for thrombus at base of R cusp, per Structural Heart Dr Alejo, would like to start NOAC if cleared by GI  - HOWEVER, given high risk for bleeding in setting of 4 large friable colonic lesions, patient is NOT cleared to start NOAC given high risk of bleeding   - F/u Dr. Alejo for potential further procedural intervention for thickened aortic valve/suspicion of thrombus at base of right cusp; will need further discussion w/ Oncology and colorectal surgery

## 2020-11-22 NOTE — PROGRESS NOTE ADULT - PROBLEM SELECTOR PLAN 2
Multiple episodes of BRBPR on 11/14 PM and 11/15 PM due to supratherapeutic PTT on Heparin gtt (has had prior to admission)  - also had recurrent episodes of BRBPR in evening on 11/17 after Heparin gtt was off and PTT stabilized, and per patient occurred w/ straining during BM  - s/p EGD revealing mild gastropathy  - s/p Colonoscopy revealing four large colonic mass lesions w/ the most proximal one near the cecum (150 cm from the anal verge) and the most proximal one in the transverse colon (100 cm from the anal verge), s/p multiple biopsies and tattooing of the proximal and distal mass; ulceration and contact bleeding noted from the mass lesions  - pathology consistent w/ adenocarcinoma  - elevated CEA 23.1  - CT Chest/Abdomen/Pelvis 11/20: 2 cm segment of annular wall thickening of transverse colon, suspicious for neoplasm, 2.6 cm focal area of wall thickening in the ascending colon may also represent another neoplasm, no colonic obstruction, no evidence of distant metastasis  - high risk of GI bleeding on anticoagulation therapy given above findings  - GI service signed off.   - Pending Heme/Onc and Colorectal surgical evaluation for likely colon cancer  - Per Onc recs, PET Scan ordered for staging, NPO after MN

## 2020-11-22 NOTE — PROGRESS NOTE ADULT - PROBLEM SELECTOR PLAN 8
Intermittently w/ HA, but no focal neurodeficits  - Responds well to Tylenol and Fiorocet, ordered PRN   - Of note, patient reportedly had normal CT Head/MRI Brain at [facility] ~1 week ago  - given recurrent HA/dizziness, repeat CT Head 11/18/2020 w/ no evidence of acute intracranial hemorrhage or acute transcortical infarct

## 2020-11-22 NOTE — PROGRESS NOTE ADULT - ASSESSMENT
77 y/o Cameroonian speaking female, w/ PMHx uncontrolled HTN, HLD, asthma, chronic diastolic CHF (EF 61% by Echo 4/2019), and aortic stenosis (s/p transfemoral TAVR with Jamar valve on 02/05/2019 with Dr. Alejo at [facility]) who presented to [facility] ED 11/13/2020 c/o intermittent chest pain and elevated BP x 1 week. BP initially elevated on arrival; however, BP has been stable on home meds since admission. Echocardiogram 11/13/2020 showed elevated gradient over aortic valve of 61.78 (doubled since last echo). Dr. Alejo consulted and patient is s/p structural CT which was negative for thrombus. Limited TTE 11/16/2020 elevated aortic valve gradient 71 mmHg. GI consulted on 11/15/2020 due to BRBPR in the setting of supratherapeutic PTT while on Heparin gtt w/ continued episodes after stopping A/C, likely 2/2 hemorrhoids. IRMA noting AV thickening suspicious for thrombus, recommend to start AC pending GI clearance given acute drop in H/H. GI team reconsulted, patient now s/p EGD/Colonoscopy revealing 4 large colonic masses concerning for colon cancer; therefore, patient is unable to be on AC given high risk for bleeding. Patient is now awaiting further workup of colon masses, and recommendations from surgery and oncology consults. Hospital course further c/b new on set A-fib w/ RVR and -130s, w/ associated hypotension, and patient started on rate control strategy. On 11/21/2020 evening, patient's HR remained in 130's w/ hypotension despite IV fluids and Lopressor 25 mg TID. As per Dr. Deleon, patient was given Digoxin 0.5 mg IV x 1 and digoxin level was checked 6 hours later (ordered for AM labs). Digoxin level on 11/22 AM was 1.2 and patient was initiated on Digoxin 0.25 mg daily, and Lopressor was uptitrated to 50 mg TID.

## 2020-11-22 NOTE — PROGRESS NOTE ADULT - SUBJECTIVE AND OBJECTIVE BOX
Interventional Cardiology PA Adult Progress Note    C.C.: Chest pain in setting of Hypertensive Urgency     Subjective Assessment: Patient seen and examined at bedside this morning. Patient appears in good spirits and denies any complaints upon exam, stating she feels well.     ROS Negative except as per Subjective and HPI  	  MEDICATIONS:  digoxin     Tablet 0.25 milliGRAM(s) Oral daily  metoprolol tartrate 50 milliGRAM(s) Oral three times a day  benzonatate 100 milliGRAM(s) Oral three times a day PRN  guaiFENesin   Syrup  (Sugar-Free) 100 milliGRAM(s) Oral every 6 hours PRN  tiotropium 18 MICROgram(s) Capsule 1 Capsule(s) Inhalation daily  acetaminophen   Tablet .. 650 milliGRAM(s) Oral every 6 hours PRN  acetaminophen 300 mG/butalbital 50 mG/ caffeine 40 mG 1 Capsule(s) Oral every 6 hours PRN  aluminum hydroxide/magnesium hydroxide/simethicone Suspension 30 milliLiter(s) Oral every 6 hours PRN  pantoprazole  Injectable 40 milliGRAM(s) IV Push two times a day  polyethylene glycol 3350 17 Gram(s) Oral daily  atorvastatin 20 milliGRAM(s) Oral at bedtime  influenza  Vaccine (HIGH DOSE) 0.7 milliLiter(s) IntraMuscular once      PHYSICAL EXAM:  TELEMETRY: No overnight events, however HR remained elevated.   T(C): 36.6 (11-22-20 @ 09:01), Max: 37.3 (11-21-20 @ 21:15)  HR: 110 (11-22-20 @ 10:26) (110 - 137)  BP: 121/77 (11-22-20 @ 10:26) (76/55 - 141/78)  RR: 18 (11-22-20 @ 10:26) (16 - 19)  SpO2: 95% (11-22-20 @ 10:26) (95% - 98%)  Wt(kg): --  I&O's Summary    21 Nov 2020 07:01  -  22 Nov 2020 07:00  --------------------------------------------------------  IN: 1160 mL / OUT: 550 mL / NET: 610 mL    22 Nov 2020 07:01  -  22 Nov 2020 11:28  --------------------------------------------------------  IN: 180 mL / OUT: 0 mL / NET: 180 mL                                          Appearance: NAD  HEENT: Normal oral mucosa, PERRL, EOMI	  Neck: Supple, - JVD  Cardiovascular: Normal S1/S2, No JVD, No murmurs   Respiratory: Lungs clear to auscultation, No Rales, Rhonchi, Wheezing	  Gastrointestinal:  Soft, Non-tender  Skin: No rashes, No ecchymoses, No cyanosis  Extremities: Normal range of motion, No clubbing, cyanosis or edema  Vascular: Peripheral pulses palpable 2+ bilaterally  Neurologic: Non-focal  Psychiatry: A & O x 3, Mood & affect appropriate    	    ECG:  	  RADIOLOGY:   DIAGNOSTIC TESTING:  [ ] Echocardiogram:  [ ] Catheterization:  [ ] Stress Test:    [ ] IRMA  OTHER: 	    LABS:	 	                 10.1   7.94  )-----------( 227      ( 22 Nov 2020 04:25 )             31.6     11-22    138  |  108  |  27<H>  ----------------------------<  117<H>  4.3   |  23  |  1.32<H>    Ca    8.9      22 Nov 2020 04:25  Phos  4.1     11-21  Mg     1.9     11-22

## 2020-11-22 NOTE — PROGRESS NOTE ADULT - PROBLEM SELECTOR PLAN 6
Euvolemic on exam; satting well on RA  - s/p Lasix 20mg IV x1 in the ED; no need for further Lasix.   - strict I/Os, daily weights, continue ACE.

## 2020-11-22 NOTE — PROGRESS NOTE ADULT - PROBLEM SELECTOR PLAN 4
Initially admitted w/ hypertensive emergency, now having episodes of hypotension  - hypotensive SBP 80s 11/18, resolved s/p IVF hydration NS 50cc/hr x 8hr  - again hypotensive SBP 80-100s w/ Rapid A-Fib on 11/21  - discontinued Coreg 6.25 mg PO BID, Lisinopril 10mg PO QD, and home HCTZ 12.5mg PO QD.   - current regimen: Lopressor 50 mg TID

## 2020-11-22 NOTE — PROGRESS NOTE ADULT - PROBLEM SELECTOR PLAN 3
Prior hx of palpitations per patient, unclear if previous Hx of A-fib  - found to be in A-fib w/ RVR w/ -130s after CT Scan on 11/20, given Lopressor 5 mg IV x 2 w/ minimal improvement in HR; given Cardizem 10 mg IV x 1 w/ HR improved to 90-120s.   - CHADsVASc 4, HASBLED 3; HOWEVER, holding A/C in setting of bleeding risk   - Coreg discontinued due to lower BP  - initially started on Lopressor 25 mg PO 6hrs for better rate control if blood pressure is tolerating  - overnight 11/21, given Digoxin 0.5 mg IV and Digoxin Level w/ morning labs 1.2   - on 11/22, uptitrated to Lopressor 50 mg TID and started on Digoxin 0.25 mg daily   - continue to monitor HR

## 2020-11-23 LAB
ANION GAP SERPL CALC-SCNC: 12 MMOL/L — SIGNIFICANT CHANGE UP (ref 5–17)
BUN SERPL-MCNC: 25 MG/DL — HIGH (ref 7–23)
CALCIUM SERPL-MCNC: 9.1 MG/DL — SIGNIFICANT CHANGE UP (ref 8.4–10.5)
CHLORIDE SERPL-SCNC: 104 MMOL/L — SIGNIFICANT CHANGE UP (ref 96–108)
CO2 SERPL-SCNC: 23 MMOL/L — SIGNIFICANT CHANGE UP (ref 22–31)
CREAT SERPL-MCNC: 1.09 MG/DL — SIGNIFICANT CHANGE UP (ref 0.5–1.3)
GLUCOSE SERPL-MCNC: 112 MG/DL — HIGH (ref 70–99)
HCT VFR BLD CALC: 35.3 % — SIGNIFICANT CHANGE UP (ref 34.5–45)
HGB BLD-MCNC: 11.3 G/DL — LOW (ref 11.5–15.5)
MAGNESIUM SERPL-MCNC: 2.1 MG/DL — SIGNIFICANT CHANGE UP (ref 1.6–2.6)
MCHC RBC-ENTMCNC: 29.4 PG — SIGNIFICANT CHANGE UP (ref 27–34)
MCHC RBC-ENTMCNC: 32 GM/DL — SIGNIFICANT CHANGE UP (ref 32–36)
MCV RBC AUTO: 91.9 FL — SIGNIFICANT CHANGE UP (ref 80–100)
NRBC # BLD: 0 /100 WBCS — SIGNIFICANT CHANGE UP (ref 0–0)
PLATELET # BLD AUTO: 321 K/UL — SIGNIFICANT CHANGE UP (ref 150–400)
POTASSIUM SERPL-MCNC: 4.7 MMOL/L — SIGNIFICANT CHANGE UP (ref 3.5–5.3)
POTASSIUM SERPL-SCNC: 4.7 MMOL/L — SIGNIFICANT CHANGE UP (ref 3.5–5.3)
RBC # BLD: 3.84 M/UL — SIGNIFICANT CHANGE UP (ref 3.8–5.2)
RBC # FLD: 13.6 % — SIGNIFICANT CHANGE UP (ref 10.3–14.5)
SODIUM SERPL-SCNC: 139 MMOL/L — SIGNIFICANT CHANGE UP (ref 135–145)
WBC # BLD: 10.56 K/UL — HIGH (ref 3.8–10.5)
WBC # FLD AUTO: 10.56 K/UL — HIGH (ref 3.8–10.5)

## 2020-11-23 PROCEDURE — 99233 SBSQ HOSP IP/OBS HIGH 50: CPT

## 2020-11-23 PROCEDURE — 78815 PET IMAGE W/CT SKULL-THIGH: CPT | Mod: 26

## 2020-11-23 RX ORDER — DILTIAZEM HCL 120 MG
30 CAPSULE, EXT RELEASE 24 HR ORAL EVERY 8 HOURS
Refills: 0 | Status: DISCONTINUED | OUTPATIENT
Start: 2020-11-23 | End: 2020-11-26

## 2020-11-23 RX ORDER — NYSTATIN CREAM 100000 [USP'U]/G
1 CREAM TOPICAL
Refills: 0 | Status: DISCONTINUED | OUTPATIENT
Start: 2020-11-23 | End: 2020-12-02

## 2020-11-23 RX ORDER — DILTIAZEM HCL 120 MG
30 CAPSULE, EXT RELEASE 24 HR ORAL EVERY 8 HOURS
Refills: 0 | Status: DISCONTINUED | OUTPATIENT
Start: 2020-11-23 | End: 2020-11-23

## 2020-11-23 RX ORDER — DIGOXIN 250 MCG
0.12 TABLET ORAL ONCE
Refills: 0 | Status: COMPLETED | OUTPATIENT
Start: 2020-11-23 | End: 2020-11-23

## 2020-11-23 RX ORDER — NYSTATIN CREAM 100000 [USP'U]/G
1 CREAM TOPICAL ONCE
Refills: 0 | Status: DISCONTINUED | OUTPATIENT
Start: 2020-11-23 | End: 2020-11-23

## 2020-11-23 RX ADMIN — NYSTATIN CREAM 1 APPLICATION(S): 100000 CREAM TOPICAL at 17:01

## 2020-11-23 RX ADMIN — Medication 30 MILLIGRAM(S): at 15:02

## 2020-11-23 RX ADMIN — Medication 50 MILLIGRAM(S): at 05:15

## 2020-11-23 RX ADMIN — PANTOPRAZOLE SODIUM 40 MILLIGRAM(S): 20 TABLET, DELAYED RELEASE ORAL at 05:58

## 2020-11-23 RX ADMIN — Medication 50 MILLIGRAM(S): at 11:23

## 2020-11-23 RX ADMIN — Medication 50 MILLIGRAM(S): at 17:04

## 2020-11-23 RX ADMIN — PANTOPRAZOLE SODIUM 40 MILLIGRAM(S): 20 TABLET, DELAYED RELEASE ORAL at 17:04

## 2020-11-23 RX ADMIN — Medication 30 MILLIGRAM(S): at 21:52

## 2020-11-23 RX ADMIN — Medication 0.12 MILLIGRAM(S): at 09:48

## 2020-11-23 RX ADMIN — Medication 100 MILLIGRAM(S): at 21:55

## 2020-11-23 RX ADMIN — Medication 0.12 MILLIGRAM(S): at 05:19

## 2020-11-23 RX ADMIN — ATORVASTATIN CALCIUM 20 MILLIGRAM(S): 80 TABLET, FILM COATED ORAL at 21:52

## 2020-11-23 RX ADMIN — TIOTROPIUM BROMIDE 1 CAPSULE(S): 18 CAPSULE ORAL; RESPIRATORY (INHALATION) at 09:48

## 2020-11-23 RX ADMIN — NYSTATIN CREAM 1 APPLICATION(S): 100000 CREAM TOPICAL at 05:15

## 2020-11-23 NOTE — PROGRESS NOTE ADULT - PROBLEM SELECTOR PLAN 2
Multiple episodes of BRBPR on 11/14 PM and 11/15 PM due to supratherapeutic PTT on Heparin gtt (has had prior to admission)  - also had recurrent episodes of BRBPR in evening on 11/17 after Heparin gtt was off and PTT stabilized, and per patient occurred w/ straining during BM  - s/p EGD revealing mild gastropathy  - s/p Colonoscopy revealing four large colonic mass lesions w/ the most proximal one near the cecum (150 cm from the anal verge) and the most proximal one in the transverse colon (100 cm from the anal verge), s/p multiple biopsies and tattooing of the proximal and distal mass; ulceration and contact bleeding noted from the mass lesions  - pathology consistent w/ adenocarcinoma  - elevated CEA 23.1  - CT Chest/Abdomen/Pelvis 11/20: 2 cm segment of annular wall thickening of transverse colon, suspicious for neoplasm, 2.6 cm focal area of wall thickening in the ascending colon may also represent another neoplasm, no colonic obstruction, no evidence of distant metastasis  - high risk of GI bleeding on anticoagulation therapy given above findings  - GI service signed off.   - Pending Heme/Onc and Colorectal surgical evaluation for likely colon cancer  - PET Scan performed, results consistent w/ known malignacy and no evidence of spread  - f/u Heme/Onc and Surgery for further recommendations

## 2020-11-23 NOTE — PROGRESS NOTE ADULT - ASSESSMENT
79 y/o Ghanaian/English speaking female with PMHx of uncontrolled HTN, HLD, asthma (no hospitalizations in past), ?MARK (noncompliant with CPAP), chronic diastolic CHF (EF 61%), AS (s/p TAVR rachael valve with Dr. Alejo on 2/5/2019) who was recently hospitalized at [facility] 2/2 to hypertensive emergency (HTN w/ HA). CT head/MRI brain at that time negative and patient was discharged 11/4/20. Patient presented to [facility] ED on 11/13/20 with complaints of intermittent CP and elevated BP x1 week. Structural Heart Disease consulted in setting of previous TAVR. TTE 11/16/20 demonstrated MG 35.00 mmHg PG 70, and the LVOT/AV velocity ratio is 0.38. IRMA 11/17/20 revealing normal left and right ventricular size and systolic function, no LA/RA/MAIK/RAA thrombus seen, no aortic regurgitation, slight thickening at the base of the right cusp with probably mild restriction in excursion. The other prosthetic leaflets appear normal. Of note while inpatient GI was consulted for BRBPRN in the setting of subtherapeutic PTT while on heparin gtt. The patient underwent a colonoscopy on 11/19 which revealed four large colonic mass lesions concerning for colon cancer.     Plan:  Problem 1: Thickness at base of right coronary cusp of TAVR  - Discussed with Dr. Alejo, thickening likely represents thrombus  - Not cleared for AC per GI findings of large friable colonic mass, at risk for bleed  - Valve in valve TAVR would be high risk given patients anatomy and proximity of RCA.  - Will defer to general surgery for OR plan to resect mass. After the operation will treat thrombus with AC. No surgical intervention for TAVR thickening planned at this time.     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  - PET scan preformed today. Focal FDG uptake in transverse and ascending colon. Uptake in basilar pulmonary lymph node, paratracheal lymph nodes, hilar lymph nodes and peripancreatic lymph nodes likely benign per radiology report  - F/u plan for OR and treatment by General Surgery/Oncology teams    Problem 3: HTN   - Continue Management per primary team    Problem 4: Afib with RVR  - Continue to titrate beta blocker/cardizem as tolerated   - Holding AC 2/2 colonic mass       I have reviewed clinical labs tests and reports, radiology tests and reports, as well as old patient medical records, and discussed with the referring physician.       79 y/o Lebanese/English speaking female with PMHx of uncontrolled HTN, HLD, asthma (no hospitalizations in past), ?MARK (noncompliant with CPAP), chronic diastolic CHF (EF 61%), AS (s/p TAVR rachael valve with Dr. Alejo on 2/5/2019) who was recently hospitalized at [facility] 2/2 to hypertensive emergency (HTN w/ HA). CT head/MRI brain at that time negative and patient was discharged 11/4/20. Patient presented to [facility] ED on 11/13/20 with complaints of intermittent CP and elevated BP x1 week. Structural Heart Disease consulted in setting of previous TAVR. TTE 11/16/20 demonstrated MG 35.00 mmHg PG 70, and the LVOT/AV velocity ratio is 0.38. IRMA 11/17/20 revealing normal left and right ventricular size and systolic function, no LA/RA/MAIK/RAA thrombus seen, no aortic regurgitation, slight thickening at the base of the right cusp with probably mild restriction in excursion. The other prosthetic leaflets appear normal. Of note while inpatient GI was consulted for BRBPRN in the setting of subtherapeutic PTT while on heparin gtt. The patient underwent a colonoscopy on 11/19 which revealed four large colonic mass lesions concerning for colon cancer.     Plan:  Problem 1: Thickness at base of right coronary cusp of TAVR  - Discussed with Dr. Alejo, thickening likely represents thrombus  - Not cleared for AC per GI findings of large friable colonic mass, at risk for bleed  - Valve in valve TAVR would be high risk given patients anatomy and proximity of RCA.  - Will defer to general surgery for OR plan to resect mass. After the operation will treat thrombus with AC. No surgical intervention for TAVR thickening planned at this time.     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  - PET scan preformed today. Focal FDG uptake in transverse and ascending colon. Uptake in basilar pulmonary lymph node, paratracheal lymph nodes, hilar lymph nodes and peripancreatic lymph nodes likely benign per radiology report  - F/u plan for OR and treatment by General Surgery/Oncology teams  - If pt to go to OR with general surgery recommend cardiac anesthesiologist for procedure     Problem 3: HTN   - Continue Management per primary team    Problem 4: Afib with RVR  - Continue to titrate beta blocker/cardizem as tolerated   - Holding AC 2/2 colonic mass       I have reviewed clinical labs tests and reports, radiology tests and reports, as well as old patient medical records, and discussed with the referring physician.

## 2020-11-23 NOTE — PROGRESS NOTE ADULT - SUBJECTIVE AND OBJECTIVE BOX
SUBJECTIVE: Patient seen and examined bedside by chief resident.    digoxin     Tablet 0.125 milliGRAM(s) Oral daily  metoprolol tartrate 50 milliGRAM(s) Oral every 6 hours    MEDICATIONS  (PRN):  acetaminophen   Tablet .. 650 milliGRAM(s) Oral every 6 hours PRN Mild Pain (1 - 3), Moderate Pain (4 - 6)  acetaminophen 300 mG/butalbital 50 mG/ caffeine 40 mG 1 Capsule(s) Oral every 6 hours PRN Severe headache  aluminum hydroxide/magnesium hydroxide/simethicone Suspension 30 milliLiter(s) Oral every 6 hours PRN Dyspepsia  benzonatate 100 milliGRAM(s) Oral three times a day PRN Cough  guaiFENesin   Syrup  (Sugar-Free) 100 milliGRAM(s) Oral every 6 hours PRN Cough      I&O's Detail    21 Nov 2020 07:01  -  22 Nov 2020 07:00  --------------------------------------------------------  IN:    Oral Fluid: 360 mL    sodium chloride 0.9%: 800 mL  Total IN: 1160 mL    OUT:    Voided (mL): 550 mL  Total OUT: 550 mL    Total NET: 610 mL      22 Nov 2020 07:01  -  23 Nov 2020 06:01  --------------------------------------------------------  IN:    Oral Fluid: 840 mL  Total IN: 840 mL    OUT:    Voided (mL): 1000 mL  Total OUT: 1000 mL    Total NET: -160 mL          Vital Signs Last 24 Hrs  T(C): 36.5 (23 Nov 2020 04:30), Max: 37.3 (22 Nov 2020 17:26)  T(F): 97.7 (23 Nov 2020 04:30), Max: 99.1 (22 Nov 2020 17:26)  HR: 104 (23 Nov 2020 05:20) (104 - 129)  BP: 119/75 (23 Nov 2020 05:20) (115/83 - 170/95)  BP(mean): 93 (23 Nov 2020 05:20) (93 - 122)  RR: 19 (23 Nov 2020 05:20) (18 - 20)  SpO2: 98% (23 Nov 2020 05:20) (95% - 98%)    General: NAD, resting comfortably in bed  C/V: Regular rate  Pulm: Nonlabored breathing, no respiratory distress  Abd: soft, ND, NT  Extrem: WWP, no edema, SCDs in place    LABS:                        11.0   8.98  )-----------( 268      ( 22 Nov 2020 22:33 )             33.9     11-22    138  |  108  |  27<H>  ----------------------------<  117<H>  4.3   |  23  |  1.32<H>    Ca    8.9      22 Nov 2020 04:25  Phos  4.1     11-21  Mg     1.9     11-22            RADIOLOGY & ADDITIONAL STUDIES:  CT Abdomen and Pelvis w/ Oral Cont and w/ IV Cont:   EXAM:  CT ABDOMEN AND PELVIS OC IC                          EXAM:  CT CHEST OC IC                          PROCEDURE DATE:  11/20/2020          INTERPRETATION:  CT of the chest, abdomen and pelvis with contrast dated 11/20/2020 2:01 PM    INDICATION: Multiple colonic mass is seen on colonoscopy, biopsy demonstrated adenocarcinoma.    TECHNIQUE: CT of the chest, abdomen and pelvis was performed using intravenous contrast.  Axial, sagittal and coronal images were produced and reviewed.    INTRAVENOUS CONTRAST: Optiray 350. 95 cc injected. 5 cc discarded.    PRIOR STUDIES: 11/16/2020, 6/2/2020 and 1/9/2019    FINDINGS: The patient is status post TAVR. The heart is mildly enlarged. There is lipomatous hypertrophy of interatrial septum. Ascending aorta is again aneurysmal measuring 4.4 cm. No pericardial effusion is seen. Mildly enlarged right paratracheal lymph node measuring 1.2 cm short axis is unchanged since 2019. There are again mildly enlarged bilateral hilar lymph nodes. There is no axillary lymphadenopathy.    Evaluation of the pulmonary parenchyma his limited by respiratory motion. Lungs are hypoinflated. Groundglass opacities in both lungs could be due to congestion and air trapping. Atelectatic changes are seen in the lung bases. Thickened interlobular septae are noted in the lung bases likely due to congestion. 6 mm nodule in the right lower lobe image 201 series 4 is unchanged. 11 mm nodule in the right lower lobe image 180 series 4 is unchanged. No pleural effusions are seen.    Images of the upper abdomen demonstrate no focal hepatic abnormalities.   No radiopaque stones are seen in the gallbladder.  The pancreas is normal in appearance.  Spleen is borderline enlarged measuring 13.8 cm. No focal splenic abnormalities are seen.    The right adrenal gland is unremarkable. Stable hypodense nodular thickening of the left adrenal gland. There is a 1.1 cm cyst in the right kidney. The kidneys are otherwise unremarkable. No aortic aneurysm is seen. No retroperitoneallymphadenopathy is seen.    Evaluation of the bowel demonstrates a 2 cm segment of annular thickening of the midportion of the transverse colon. There is a 2.6 cm focal area of wall thickening in the ascending colon. There is no bowel obstruction or free air. No ascites is seen. No enlarged mesenteric lymph nodes are identified. A normal appendix is visible.    Images of the pelvis demonstrate no bladder abnormality. No uterine or adnexal mass is seen.    Evaluation of the osseous structures demonstrates scoliosis and degenerative changes.      IMPRESSION:    2 cm segment of annular wall thickening of transverse colon, suspicious for neoplasm.    2.6 cm focal area of wall thickening in the ascending colon may also represent another neoplasm.    No colonic obstruction.    No evidence of distant metastasis.    Status post TAVR. Mild cardiomegaly and mild pulmonary congestion.      Pathology:  1. Ascending colon mass, biopsy:  -Moderately differentiated adenocarcinoma    2. Transverse colon lesion, biopsy:  -Superficial fragments of villous adenoma    3. Transverse colon mass 3, biopsy:  -Moderately differentiated adenocarcinoma    4. Transverse colon mass 4, biopsy:  -Fragments of tubular adenoma with focal high-grade  dysplasia   SUBJECTIVE: Patient c/o headache and mild abdominal pain (infrequent, right sided), BM with blood 2 days ago, tolerating diet, NPO for PET today. No chest pain or SOB, no palpitations.    digoxin     Tablet 0.125 milliGRAM(s) Oral daily  metoprolol tartrate 50 milliGRAM(s) Oral every 6 hours    MEDICATIONS  (PRN):  acetaminophen   Tablet .. 650 milliGRAM(s) Oral every 6 hours PRN Mild Pain (1 - 3), Moderate Pain (4 - 6)  acetaminophen 300 mG/butalbital 50 mG/ caffeine 40 mG 1 Capsule(s) Oral every 6 hours PRN Severe headache  aluminum hydroxide/magnesium hydroxide/simethicone Suspension 30 milliLiter(s) Oral every 6 hours PRN Dyspepsia  benzonatate 100 milliGRAM(s) Oral three times a day PRN Cough  guaiFENesin   Syrup  (Sugar-Free) 100 milliGRAM(s) Oral every 6 hours PRN Cough      I&O's Detail    21 Nov 2020 07:01  -  22 Nov 2020 07:00  --------------------------------------------------------  IN:    Oral Fluid: 360 mL    sodium chloride 0.9%: 800 mL  Total IN: 1160 mL    OUT:    Voided (mL): 550 mL  Total OUT: 550 mL    Total NET: 610 mL      22 Nov 2020 07:01  -  23 Nov 2020 06:01  --------------------------------------------------------  IN:    Oral Fluid: 840 mL  Total IN: 840 mL    OUT:    Voided (mL): 1000 mL  Total OUT: 1000 mL    Total NET: -160 mL          Vital Signs Last 24 Hrs  T(C): 36.5 (23 Nov 2020 04:30), Max: 37.3 (22 Nov 2020 17:26)  T(F): 97.7 (23 Nov 2020 04:30), Max: 99.1 (22 Nov 2020 17:26)  HR: 104 (23 Nov 2020 05:20) (104 - 129)  BP: 119/75 (23 Nov 2020 05:20) (115/83 - 170/95)  BP(mean): 93 (23 Nov 2020 05:20) (93 - 122)  RR: 19 (23 Nov 2020 05:20) (18 - 20)  SpO2: 98% (23 Nov 2020 05:20) (95% - 98%)    General: NAD, resting comfortably in chair  C/V: Tachycardic  Pulm: Nonlabored breathing, no respiratory distress  Abd: soft, ND, slightly TTP R sided abdomen, b/l ecchymosis   Extrem: WWP, no edema, SCDs in place    LABS:                        11.0   8.98  )-----------( 268      ( 22 Nov 2020 22:33 )             33.9     11-22    138  |  108  |  27<H>  ----------------------------<  117<H>  4.3   |  23  |  1.32<H>    Ca    8.9      22 Nov 2020 04:25  Phos  4.1     11-21  Mg     1.9     11-22            RADIOLOGY & ADDITIONAL STUDIES:  CT Abdomen and Pelvis w/ Oral Cont and w/ IV Cont:   EXAM:  CT ABDOMEN AND PELVIS OC IC                          EXAM:  CT CHEST OC IC                          PROCEDURE DATE:  11/20/2020          INTERPRETATION:  CT of the chest, abdomen and pelvis with contrast dated 11/20/2020 2:01 PM    INDICATION: Multiple colonic mass is seen on colonoscopy, biopsy demonstrated adenocarcinoma.    TECHNIQUE: CT of the chest, abdomen and pelvis was performed using intravenous contrast.  Axial, sagittal and coronal images were produced and reviewed.    INTRAVENOUS CONTRAST: Optiray 350. 95 cc injected. 5 cc discarded.    PRIOR STUDIES: 11/16/2020, 6/2/2020 and 1/9/2019    FINDINGS: The patient is status post TAVR. The heart is mildly enlarged. There is lipomatous hypertrophy of interatrial septum. Ascending aorta is again aneurysmal measuring 4.4 cm. No pericardial effusion is seen. Mildly enlarged right paratracheal lymph node measuring 1.2 cm short axis is unchanged since 2019. There are again mildly enlarged bilateral hilar lymph nodes. There is no axillary lymphadenopathy.    Evaluation of the pulmonary parenchyma his limited by respiratory motion. Lungs are hypoinflated. Groundglass opacities in both lungs could be due to congestion and air trapping. Atelectatic changes are seen in the lung bases. Thickened interlobular septae are noted in the lung bases likely due to congestion. 6 mm nodule in the right lower lobe image 201 series 4 is unchanged. 11 mm nodule in the right lower lobe image 180 series 4 is unchanged. No pleural effusions are seen.    Images of the upper abdomen demonstrate no focal hepatic abnormalities.   No radiopaque stones are seen in the gallbladder.  The pancreas is normal in appearance.  Spleen is borderline enlarged measuring 13.8 cm. No focal splenic abnormalities are seen.    The right adrenal gland is unremarkable. Stable hypodense nodular thickening of the left adrenal gland. There is a 1.1 cm cyst in the right kidney. The kidneys are otherwise unremarkable. No aortic aneurysm is seen. No retroperitoneallymphadenopathy is seen.    Evaluation of the bowel demonstrates a 2 cm segment of annular thickening of the midportion of the transverse colon. There is a 2.6 cm focal area of wall thickening in the ascending colon. There is no bowel obstruction or free air. No ascites is seen. No enlarged mesenteric lymph nodes are identified. A normal appendix is visible.    Images of the pelvis demonstrate no bladder abnormality. No uterine or adnexal mass is seen.    Evaluation of the osseous structures demonstrates scoliosis and degenerative changes.      IMPRESSION:    2 cm segment of annular wall thickening of transverse colon, suspicious for neoplasm.    2.6 cm focal area of wall thickening in the ascending colon may also represent another neoplasm.    No colonic obstruction.    No evidence of distant metastasis.    Status post TAVR. Mild cardiomegaly and mild pulmonary congestion.      Pathology:  1. Ascending colon mass, biopsy:  -Moderately differentiated adenocarcinoma    2. Transverse colon lesion, biopsy:  -Superficial fragments of villous adenoma    3. Transverse colon mass 3, biopsy:  -Moderately differentiated adenocarcinoma    4. Transverse colon mass 4, biopsy:  -Fragments of tubular adenoma with focal high-grade  dysplasia

## 2020-11-23 NOTE — PROGRESS NOTE ADULT - SUBJECTIVE AND OBJECTIVE BOX
Patient discussed on morning rounds with Dr. Alejo     Operation / Date:  s/p TAVR 2019    SUBJECTIVE ASSESSMENT:  80y Female seen and examined after PET scan. She feels well, offers no new complaints.         Vital Signs Last 24 Hrs  T(C): 36.3 (23 Nov 2020 17:30), Max: 36.8 (23 Nov 2020 09:51)  T(F): 97.4 (23 Nov 2020 17:30), Max: 98.2 (23 Nov 2020 09:51)  HR: 116 (23 Nov 2020 17:05) (101 - 129)  BP: 124/88 (23 Nov 2020 17:05) (114/72 - 170/95)  BP(mean): 99 (23 Nov 2020 17:05) (86 - 122)  RR: 17 (23 Nov 2020 17:05) (17 - 19)  SpO2: 95% (23 Nov 2020 17:05) (95% - 98%)  I&O's Detail    22 Nov 2020 07:01  -  23 Nov 2020 07:00  --------------------------------------------------------  IN:    Oral Fluid: 840 mL  Total IN: 840 mL    OUT:    Voided (mL): 1800 mL  Total OUT: 1800 mL    Total NET: -960 mL      23 Nov 2020 07:01  -  23 Nov 2020 18:57  --------------------------------------------------------  IN:  Total IN: 0 mL    OUT:    Oral Fluid: 0 mL    Voided (mL): 900 mL  Total OUT: 900 mL    Total NET: -900 mL          CHEST TUBE:  no   SERVANDO DRAIN:  No.  EPICARDIAL WIRES: No.  TIE DOWNS:No.  VENTURA: No.    PHYSICAL EXAM:  General: Sitting in chair, NAD  Neurological: A&O x3, no focal deficits  Cardiovascular: Tachycardic irregular rhythm   Respiratory: Non-labored breathing  Gastrointestinal:  soft, NT/ND   Extremities: WWP, no pitting edema, no calf tenderness or erythema    Vascular: 2+radial pulses b/l, 2+DP pulses b/l    Incision: MDI, CD&I, no discharge, no evidence of dehiscence, no sternal click     General:     Neurological:    Cardiovascular:    Respiratory:    Gastrointestinal:    Extremities:    Vascular:    Incision Sites:    LABS:                        11.3   10.56 )-----------( 321      ( 23 Nov 2020 06:31 )             35.3       COUMADIN:  Yes/No. REASON: .        11-23    139  |  104  |  25<H>  ----------------------------<  112<H>  4.7   |  23  |  1.09    Ca    9.1      23 Nov 2020 06:31  Mg     2.1     11-23            MEDICATIONS  (STANDING):  atorvastatin 20 milliGRAM(s) Oral at bedtime  diltiazem    Tablet 30 milliGRAM(s) Oral every 8 hours  influenza  Vaccine (HIGH DOSE) 0.7 milliLiter(s) IntraMuscular once  metoprolol tartrate 50 milliGRAM(s) Oral every 6 hours  nystatin Powder 1 Application(s) Topical two times a day  pantoprazole  Injectable 40 milliGRAM(s) IV Push two times a day  polyethylene glycol 3350 17 Gram(s) Oral daily  tiotropium 18 MICROgram(s) Capsule 1 Capsule(s) Inhalation daily    MEDICATIONS  (PRN):  acetaminophen   Tablet .. 650 milliGRAM(s) Oral every 6 hours PRN Mild Pain (1 - 3), Moderate Pain (4 - 6)  acetaminophen 300 mG/butalbital 50 mG/ caffeine 40 mG 1 Capsule(s) Oral every 6 hours PRN Severe headache  aluminum hydroxide/magnesium hydroxide/simethicone Suspension 30 milliLiter(s) Oral every 6 hours PRN Dyspepsia  benzonatate 100 milliGRAM(s) Oral three times a day PRN Cough  guaiFENesin   Syrup  (Sugar-Free) 100 milliGRAM(s) Oral every 6 hours PRN Cough        RADIOLOGY & ADDITIONAL TESTS:     Patient discussed on morning rounds with Dr. Alejo     Operation / Date:  s/p TAVR 2019    SUBJECTIVE ASSESSMENT:  80y Female seen and examined after PET scan. She feels well, offers no new complaints.         Vital Signs Last 24 Hrs  T(C): 36.3 (23 Nov 2020 17:30), Max: 36.8 (23 Nov 2020 09:51)  T(F): 97.4 (23 Nov 2020 17:30), Max: 98.2 (23 Nov 2020 09:51)  HR: 116 (23 Nov 2020 17:05) (101 - 129)  BP: 124/88 (23 Nov 2020 17:05) (114/72 - 170/95)  BP(mean): 99 (23 Nov 2020 17:05) (86 - 122)  RR: 17 (23 Nov 2020 17:05) (17 - 19)  SpO2: 95% (23 Nov 2020 17:05) (95% - 98%)  I&O's Detail    22 Nov 2020 07:01  -  23 Nov 2020 07:00  --------------------------------------------------------  IN:    Oral Fluid: 840 mL  Total IN: 840 mL    OUT:    Voided (mL): 1800 mL  Total OUT: 1800 mL    Total NET: -960 mL      23 Nov 2020 07:01  -  23 Nov 2020 18:57  --------------------------------------------------------  IN:  Total IN: 0 mL    OUT:    Oral Fluid: 0 mL    Voided (mL): 900 mL  Total OUT: 900 mL    Total NET: -900 mL          CHEST TUBE:  no   SERVANDO DRAIN:  No.  EPICARDIAL WIRES: No.  TIE DOWNS:No.  VENTURA: No.    PHYSICAL EXAM:  General: Sitting in chair, NAD  HEENT: NCAT MMM  Neck: supple   Neurological: A&O x3, no focal deficits  Cardiovascular: Tachycardic irregular rhythm   Respiratory: Non-labored breathing  Gastrointestinal:  soft, NT/ND   Extremities: P    LABS:                        11.3   10.56 )-----------( 321      ( 23 Nov 2020 06:31 )             35.3       COUMADIN:  No        11-23    139  |  104  |  25<H>  ----------------------------<  112<H>  4.7   |  23  |  1.09    Ca    9.1      23 Nov 2020 06:31  Mg     2.1     11-23            MEDICATIONS  (STANDING):  atorvastatin 20 milliGRAM(s) Oral at bedtime  diltiazem    Tablet 30 milliGRAM(s) Oral every 8 hours  influenza  Vaccine (HIGH DOSE) 0.7 milliLiter(s) IntraMuscular once  metoprolol tartrate 50 milliGRAM(s) Oral every 6 hours  nystatin Powder 1 Application(s) Topical two times a day  pantoprazole  Injectable 40 milliGRAM(s) IV Push two times a day  polyethylene glycol 3350 17 Gram(s) Oral daily  tiotropium 18 MICROgram(s) Capsule 1 Capsule(s) Inhalation daily    MEDICATIONS  (PRN):  acetaminophen   Tablet .. 650 milliGRAM(s) Oral every 6 hours PRN Mild Pain (1 - 3), Moderate Pain (4 - 6)  acetaminophen 300 mG/butalbital 50 mG/ caffeine 40 mG 1 Capsule(s) Oral every 6 hours PRN Severe headache  aluminum hydroxide/magnesium hydroxide/simethicone Suspension 30 milliLiter(s) Oral every 6 hours PRN Dyspepsia  benzonatate 100 milliGRAM(s) Oral three times a day PRN Cough  guaiFENesin   Syrup  (Sugar-Free) 100 milliGRAM(s) Oral every 6 hours PRN Cough        RADIOLOGY & ADDITIONAL TESTS:  IRMA: Thickening around R coronary cusp

## 2020-11-23 NOTE — PROGRESS NOTE ADULT - ASSESSMENT
77 y/o Jamaican speaking female, w/ PMHx uncontrolled HTN, HLD, asthma, chronic diastolic CHF (EF 61% by Echo 4/2019), and aortic stenosis (s/p transfemoral TAVR with Jamar valve on 02/05/2019 with Dr. Alejo at [facility]) who presented to [facility] ED 11/13/2020 c/o intermittent chest pain and elevated BP x 1 week. BP initially elevated on arrival; however, BP has been stable on home meds since admission. Echocardiogram 11/13/2020 showed elevated gradient over aortic valve of 61.78 (doubled since last echo). Dr. Alejo consulted and patient is s/p structural CT which was negative for thrombus. Limited TTE 11/16/2020 elevated aortic valve gradient 71 mmHg. GI consulted on 11/15/2020 due to BRBPR in the setting of supratherapeutic PTT while on Heparin gtt w/ continued episodes after stopping A/C, likely 2/2 hemorrhoids. IRMA noting AV thickening suspicious for thrombus, recommend to start AC pending GI clearance given acute drop in H/H. GI team reconsulted, patient now s/p EGD/Colonoscopy revealing 4 large colonic masses concerning for colon cancer; therefore, patient is unable to be on AC given high risk for bleeding. Patient is now awaiting further workup of colon masses, and recommendations from surgery and oncology consults. Hospital course further c/b new on set A-fib w/ RVR and -130s, w/ associated hypotension, and patient started on rate control strategy. On 11/21/2020 evening, patient's HR remained in 130's w/ hypotension despite IV fluids and Lopressor 25 mg TID. Patient initially started on Digoxin as per Dr. Deleon's recommendations; however, patient noted to have mild rash on her upper extremities and stomach and complaining of itching on 11/23 and this was thought to be due to an allergic reaction to Digoxin and, thus, Digoxin was discontinued. Patient's Lopressor uptitrated to 50 mg TID on 11/22 and patient was initiated on Cardizem 30 mg q8 hours as per Dr. Kessler's recommendations on 11/23. CTS was consulted for elevated aortic valve gradients and stated nothing to do while inpatient and follow up as an outpatient.  PET scan for staging of colon cancer performed on 11/23 which showed focal uptake in transverse colon/ascending colon corresponds w/ known malignancy and no evidence of spread. Heme/Onc and Surgery made aware and further plans for intervention/treatment are to be determined.

## 2020-11-23 NOTE — PROGRESS NOTE ADULT - PROBLEM SELECTOR PLAN 1
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 Alejo at [facility]  - Echo 11/13/2020: normal BiV fxn/size, moderate LVH, grade II LV diastolic dysfunction, PEAK TRANSAORTIC GRADIENT 61.78 mmHg, mildly dilated LA, no pulm HTN, moderately dilated ascending aorta (previously 14mmHg on 4/2019)  - Structural CT scan: no thrombus seen.    - Limited TTE 11/16:  aortic valve gradient 71 mmHg  - IRMA 11/17: no LA/RA/MAIK/RAA thrombus seen, no evidence of an intracardiac shunt, 23 mm Jamar valve is noted in the aortic position w/o any aortic regurgitation, slight thickening at the base of the right cusp w/ probably mild restriction in excursion, other prosthetic leaflets appear normal  - given slight suspicious for thrombus at base of R cusp, per Structural Heart Dr Alejo, would like to start NOAC if cleared by GI  - HOWEVER, given high risk for bleeding in setting of 4 large friable colonic lesions, patient is NOT cleared to start NOAC given high risk of bleeding   - CTS stated nothing to do while inpatient, follow up as outpatient for further evaluation and management

## 2020-11-23 NOTE — PROGRESS NOTE ADULT - PROBLEM SELECTOR PLAN 8
Intermittently w/ HA, but no focal neurodeficits  - Responds well to Tylenol and Fiorocet, ordered PRN   - Of note, patient reportedly had normal CT Head/MRI Brain at [facility] ~1 week ago  - given recurrent HA/dizziness, repeat CT Head 11/18/2020 w/ no evidence of acute intracranial hemorrhage or acute transcortical infarct

## 2020-11-23 NOTE — CHART NOTE - NSCHARTNOTEFT_GEN_A_CORE
Admitting Diagnosis:   Patient is a 80y old  Female who presents with a chief complaint of HTN urgency (2020 06:01)      PAST MEDICAL & SURGICAL HISTORY:  CHF (congestive heart failure)    Pulmonary HTN    Aortic stenosis    HTN (hypertension)    S/P TAVR (transcatheter aortic valve replacement)        Current Nutrition Order:  NPO MN for PET Scan --> advanced back to DASH after test    PO Intake: Good (%) [   ]  Fair (50-75%) [   ] Poor (<25%) [   ]- NA     GI Issues: No N/V/C/D reported at this time.   Discomfort from gas  BM     Pain: No pain endorsed    Skin Integrity: Quique 22, no edema present  Intact pressure-wise     Labs:       139  |  104  |  25<H>  ----------------------------<  112<H>  4.7   |  23  |  1.09    Ca    9.1      2020 06:31  Mg     2.1           CAPILLARY BLOOD GLUCOSE      POCT Blood Glucose.: 108 mg/dL (2020 11:15)      Medications:  MEDICATIONS  (STANDING):  atorvastatin 20 milliGRAM(s) Oral at bedtime  diltiazem    Tablet 30 milliGRAM(s) Oral every 8 hours  influenza  Vaccine (HIGH DOSE) 0.7 milliLiter(s) IntraMuscular once  metoprolol tartrate 50 milliGRAM(s) Oral every 6 hours  nystatin Powder 1 Application(s) Topical two times a day  pantoprazole  Injectable 40 milliGRAM(s) IV Push two times a day  polyethylene glycol 3350 17 Gram(s) Oral daily  tiotropium 18 MICROgram(s) Capsule 1 Capsule(s) Inhalation daily    MEDICATIONS  (PRN):  acetaminophen   Tablet .. 650 milliGRAM(s) Oral every 6 hours PRN Mild Pain (1 - 3), Moderate Pain (4 - 6)  acetaminophen 300 mG/butalbital 50 mG/ caffeine 40 mG 1 Capsule(s) Oral every 6 hours PRN Severe headache  aluminum hydroxide/magnesium hydroxide/simethicone Suspension 30 milliLiter(s) Oral every 6 hours PRN Dyspepsia  benzonatate 100 milliGRAM(s) Oral three times a day PRN Cough  guaiFENesin   Syrup  (Sugar-Free) 100 milliGRAM(s) Oral every 6 hours PRN Cough      Weight:  Daily     Daily Weight in k.4 (2020 04:30)    Weight Change: 1kg weight loss from admit, likely fluid loss from diuresis     Nutrition Focused Physical Exam: Completed [   ]  Not Pertinent [X   ]    Estimated energy needs: Height 70"; .5kg (admit); IBW 68kg; 151%IBW; BMI 32.4  IBW used to calculate energy needs due to pt's current body weight exceeding 120% of IBW, adjusted for age, 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 Fluids per team 2/2 CHF  Calories: 25-30 kcal/kg = 6117-4659 kcal/day  Protein: 1.2-1.4 g/kg = 82-95g protein/day     Subjective: 78 year old Malian speaking F, PMHx of uncontrolled HTN, hyperlipidemia, asthma (denies hospitalizations, intubations), MARK? (on CPAP, noncompliant), chronic diastolic CHF (EF:61% by Echo 2019), aortic stenosis s/p transfemoral TAVR with rachael valve on 2019 with Dr. Alejo @[facility], recent hospitalization at [facility] (for HTN/headache, no changes made to medications, negative CT head/MRI brain, discharged on 20) who presents to [facility] ED 20 c/o intermittent chest pain and elevated BP x 1 week. GI consulted for BRBPR. S/p EGD/C-scope on  and found to have 4 large colon masses, which were biopsied. Pathology significant for moderately diff adenoca in ascending and transverse lesions. CEA elevated. S/p PET scan today. Pt continues to be in Afib- started on lopressor and dig. Pt seen in room s/p PET scan, awake, alert, very pleasant. She declined use of  at this time. She reported eating well over the past few days, and was looking forward to eating this afternoon (NPO from MN on). She denied N/V/C/D or pain. She did report some abdominal discomfort from gas build up, but felt that moving around would help. Afebrile this AM. Spent time reviewing protein options for her, and encouraged her to continue with low sodium foods. Tiana SPENCERL. Will continue to follow per RD protocol.     Previous Nutrition Diagnosis:  Overweight/Obesity RT presumed intake>EER AEB BMI 32.4.   Active [ X  ]  Resolved [   ]    If resolved, new PES:  Increased protein-calorie needs RT increased demand for protein-calorie intake AEB hypermetabolic state    Goal: Pt will consistently meet % of EER through healthy PO intake     Recommendations:  1. Please add Ensure Pudding BID (340kcal, 8g pro) and encourage PO intake  2. Monitor lytes and replete prn  3. Pain and bowel regimens per team   4. Reinforce diet ed as appropriate   *d/w PA     Education: Discussed low sodium and high protein foods.     Risk Level: High [   ] Moderate [ X  ] Low [   ]

## 2020-11-23 NOTE — PROGRESS NOTE ADULT - PROBLEM SELECTOR PLAN 3
Prior hx of palpitations per patient, unclear if previous Hx of A-fib  - found to be in A-fib w/ RVR w/ -130s after CT Scan on 11/20, given Lopressor 5 mg IV x 2 w/ minimal improvement in HR; given Cardizem 10 mg IV x 1 w/ HR improved to 90-120s.   - CHADsVASc 4, HASBLED 3; HOWEVER, holding A/C in setting of bleeding risk   - Coreg discontinued due to lower BP  - initially started on Lopressor 25 mg PO 6hrs for better rate control if blood pressure is tolerating  - overnight 11/21, given Digoxin 0.5 mg IV and Digoxin Level w/ morning labs 1.2   - on 11/22, uptitrated to Lopressor 50 mg TID and started on Digoxin 0.25 mg daily  - on 11/23, patient developed mild rash and itching sensation and thought to be in the setting of allergic reaction to Digoxin   - Digoxin d/c, patient started on Cardizem 30 mg q8 hours    - continue to monitor HR

## 2020-11-23 NOTE — PROGRESS NOTE ADULT - PROBLEM SELECTOR PLAN 9
- Continue Spiriva inhaler daily      VTE PPx: SCDs  PT consulted: Home w/ Home PT  Patient lives with daughter Jaelyn, whom is her HHA (40 hrs/week).  CODE: Full code  Dispo: pending further Heme/Onc and Surgery recs

## 2020-11-23 NOTE — PROGRESS NOTE ADULT - ASSESSMENT
78 year old Ecuadorean speaking F, PMHx of uncontrolled HTN, hyperlipidemia, asthma (denies hospitalizations, intubations), MARK? (on CPAP, noncompliant), chronic diastolic CHF (EF:61% by Echo 4/2019), aortic stenosis s/p transfemoral TAVR with rachael valve on 02/05/2019 with Dr. Alejo admitted for CP and hypertensive urgency. S/p colonoscopy 11/19 which revealed 4 large colonic masses (from cecum to transverse colon) biopsies taken. CEA elevated 23.1. No evidence of distant mets on CT. Surgery consulted for management of colonic mass. Currently with rapid afib.    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  - Need to determine whether cardiac issues would prevent pt from going to OR for colon mass  - Anticoagulation per primary team  - Team 1c will continue to follow  - Further recommendations pending discussion with attending 78 year old East Timorese speaking F, PMHx of uncontrolled HTN, hyperlipidemia, asthma (denies hospitalizations, intubations), MARK? (on CPAP, noncompliant), chronic diastolic CHF (EF:61% by Echo 4/2019), aortic stenosis s/p transfemoral TAVR with rachael valve on 02/05/2019 with Dr. Alejo admitted for CP and hypertensive urgency. S/p colonoscopy 11/19 which revealed 4 large colonic masses (from cecum to transverse colon) biopsies taken. CEA elevated 23.1. No evidence of distant mets on CT. Surgery consulted for management of colonic mass. Currently with rapid afib.    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  - Need to determine whether cardiac issues would prevent pt from going to OR for colon mass  - Anticoagulation per primary team  - Team 1c will continue to follow  - Further recommendations pending discussion with attending

## 2020-11-23 NOTE — PROGRESS NOTE ADULT - PROBLEM SELECTOR PLAN 4
Initially admitted w/ hypertensive emergency, now having episodes of hypotension  - hypotensive SBP 80s 11/18, resolved s/p IVF hydration NS 50cc/hr x 8hr  - again hypotensive SBP 80-100s w/ Rapid A-Fib on 11/21  - discontinued Coreg 6.25 mg PO BID, Lisinopril 10mg PO QD, and home HCTZ 12.5mg PO QD.   - current regimen: Lopressor 50 mg TID, Cardizem 30 mg q8 hours

## 2020-11-23 NOTE — PROGRESS NOTE ADULT - SUBJECTIVE AND OBJECTIVE BOX
Interventional Cardiology PA Adult Progress Note    C.C.: Chest Pain    Subjective Assessment: Patient seen and examined at bedside this morning. Patient in good spirits upon exam, well appearing, and denies any active complaints.     ROS Negative except as per Subjective and HPI  	  MEDICATIONS:  diltiazem    Tablet 30 milliGRAM(s) Oral every 8 hours  metoprolol tartrate 50 milliGRAM(s) Oral every 6 hours  benzonatate 100 milliGRAM(s) Oral three times a day PRN  guaiFENesin   Syrup  (Sugar-Free) 100 milliGRAM(s) Oral every 6 hours PRN  tiotropium 18 MICROgram(s) Capsule 1 Capsule(s) Inhalation daily  acetaminophen   Tablet .. 650 milliGRAM(s) Oral every 6 hours PRN  acetaminophen 300 mG/butalbital 50 mG/ caffeine 40 mG 1 Capsule(s) Oral every 6 hours PRN  aluminum hydroxide/magnesium hydroxide/simethicone Suspension 30 milliLiter(s) Oral every 6 hours PRN  pantoprazole  Injectable 40 milliGRAM(s) IV Push two times a day  polyethylene glycol 3350 17 Gram(s) Oral daily  atorvastatin 20 milliGRAM(s) Oral at bedtime  influenza  Vaccine (HIGH DOSE) 0.7 milliLiter(s) IntraMuscular once  nystatin Powder 1 Application(s) Topical two times a day      PHYSICAL EXAM:  TELEMETRY: No overnight events.   T(C): 36.8 (11-23-20 @ 09:51), Max: 37.3 (11-22-20 @ 17:26)  HR: 111 (11-23-20 @ 14:53) (101 - 129)  BP: 153/86 (11-23-20 @ 14:53) (114/72 - 170/95)  RR: 18 (11-23-20 @ 14:53) (18 - 19)  SpO2: 98% (11-23-20 @ 14:53) (97% - 98%)  Wt(kg): --  I&O's Summary    22 Nov 2020 07:01  -  23 Nov 2020 07:00  --------------------------------------------------------  IN: 840 mL / OUT: 1800 mL / NET: -960 mL    23 Nov 2020 07:01  -  23 Nov 2020 16:12  --------------------------------------------------------  IN: 0 mL / OUT: 900 mL / NET: -900 mL                                           Appearance: NAD  HEENT: Normal oral mucosa, PERRL, EOMI	  Neck: Supple, - JVD   Cardiovascular: Irregularly irregular rhythm, No JVD, No murmurs  Respiratory: Lungs clear to auscultation, No Rales, Rhonchi, Wheezing	  Gastrointestinal:  Soft, Non-tender	  Skin: Mild rash noted on bilateral arms and stomach  Extremities: Normal range of motion, No clubbing, cyanosis or edema  Vascular: Peripheral pulses palpable 2+ bilaterally  Neurologic: Non-focal  Psychiatry: A & O x 3, Mood & affect appropriate      	    ECG:  	  RADIOLOGY:   DIAGNOSTIC TESTING:  [ ] Echocardiogram:  [ ] Catheterization:  [ ] Stress Test:    [ ] IRMA  	    LABS:	 	                 11.3   10.56 )-----------( 321      ( 23 Nov 2020 06:31 )             35.3     11-23    139  |  104  |  25<H>  ----------------------------<  112<H>  4.7   |  23  |  1.09    Ca    9.1      23 Nov 2020 06:31  Mg     2.1     11-23

## 2020-11-24 LAB
ANION GAP SERPL CALC-SCNC: 14 MMOL/L — SIGNIFICANT CHANGE UP (ref 5–17)
BUN SERPL-MCNC: 22 MG/DL — SIGNIFICANT CHANGE UP (ref 7–23)
CALCIUM SERPL-MCNC: 8.8 MG/DL — SIGNIFICANT CHANGE UP (ref 8.4–10.5)
CHLORIDE SERPL-SCNC: 103 MMOL/L — SIGNIFICANT CHANGE UP (ref 96–108)
CO2 SERPL-SCNC: 22 MMOL/L — SIGNIFICANT CHANGE UP (ref 22–31)
CREAT SERPL-MCNC: 1.07 MG/DL — SIGNIFICANT CHANGE UP (ref 0.5–1.3)
GLUCOSE SERPL-MCNC: 113 MG/DL — HIGH (ref 70–99)
HCT VFR BLD CALC: 32.8 % — LOW (ref 34.5–45)
HGB BLD-MCNC: 10.4 G/DL — LOW (ref 11.5–15.5)
MAGNESIUM SERPL-MCNC: 2 MG/DL — SIGNIFICANT CHANGE UP (ref 1.6–2.6)
MCHC RBC-ENTMCNC: 29.4 PG — SIGNIFICANT CHANGE UP (ref 27–34)
MCHC RBC-ENTMCNC: 31.7 GM/DL — LOW (ref 32–36)
MCV RBC AUTO: 92.7 FL — SIGNIFICANT CHANGE UP (ref 80–100)
NRBC # BLD: 0 /100 WBCS — SIGNIFICANT CHANGE UP (ref 0–0)
PLATELET # BLD AUTO: 208 K/UL — SIGNIFICANT CHANGE UP (ref 150–400)
POTASSIUM SERPL-MCNC: 4.5 MMOL/L — SIGNIFICANT CHANGE UP (ref 3.5–5.3)
POTASSIUM SERPL-SCNC: 4.5 MMOL/L — SIGNIFICANT CHANGE UP (ref 3.5–5.3)
RBC # BLD: 3.54 M/UL — LOW (ref 3.8–5.2)
RBC # FLD: 13.7 % — SIGNIFICANT CHANGE UP (ref 10.3–14.5)
SODIUM SERPL-SCNC: 139 MMOL/L — SIGNIFICANT CHANGE UP (ref 135–145)
WBC # BLD: 8.73 K/UL — SIGNIFICANT CHANGE UP (ref 3.8–10.5)
WBC # FLD AUTO: 8.73 K/UL — SIGNIFICANT CHANGE UP (ref 3.8–10.5)

## 2020-11-24 PROCEDURE — 99233 SBSQ HOSP IP/OBS HIGH 50: CPT

## 2020-11-24 RX ORDER — DIPHENHYDRAMINE HCL 50 MG
12.5 CAPSULE ORAL ONCE
Refills: 0 | Status: COMPLETED | OUTPATIENT
Start: 2020-11-24 | End: 2020-11-24

## 2020-11-24 RX ORDER — HYDROCORTISONE 1 %
1 OINTMENT (GRAM) TOPICAL
Refills: 0 | Status: DISCONTINUED | OUTPATIENT
Start: 2020-11-24 | End: 2020-12-02

## 2020-11-24 RX ORDER — DIPHENHYDRAMINE HCL 50 MG
25 CAPSULE ORAL EVERY 4 HOURS
Refills: 0 | Status: DISCONTINUED | OUTPATIENT
Start: 2020-11-24 | End: 2020-12-02

## 2020-11-24 RX ADMIN — Medication 100 MILLIGRAM(S): at 07:10

## 2020-11-24 RX ADMIN — Medication 12.5 MILLIGRAM(S): at 07:51

## 2020-11-24 RX ADMIN — Medication 50 MILLIGRAM(S): at 05:49

## 2020-11-24 RX ADMIN — ATORVASTATIN CALCIUM 20 MILLIGRAM(S): 80 TABLET, FILM COATED ORAL at 21:14

## 2020-11-24 RX ADMIN — NYSTATIN CREAM 1 APPLICATION(S): 100000 CREAM TOPICAL at 17:02

## 2020-11-24 RX ADMIN — Medication 25 MILLIGRAM(S): at 13:20

## 2020-11-24 RX ADMIN — NYSTATIN CREAM 1 APPLICATION(S): 100000 CREAM TOPICAL at 05:49

## 2020-11-24 RX ADMIN — PANTOPRAZOLE SODIUM 40 MILLIGRAM(S): 20 TABLET, DELAYED RELEASE ORAL at 17:01

## 2020-11-24 RX ADMIN — Medication 1 APPLICATION(S): at 17:01

## 2020-11-24 RX ADMIN — PANTOPRAZOLE SODIUM 40 MILLIGRAM(S): 20 TABLET, DELAYED RELEASE ORAL at 05:49

## 2020-11-24 RX ADMIN — TIOTROPIUM BROMIDE 1 CAPSULE(S): 18 CAPSULE ORAL; RESPIRATORY (INHALATION) at 10:49

## 2020-11-24 RX ADMIN — Medication 650 MILLIGRAM(S): at 22:15

## 2020-11-24 RX ADMIN — Medication 50 MILLIGRAM(S): at 00:05

## 2020-11-24 RX ADMIN — Medication 50 MILLIGRAM(S): at 21:15

## 2020-11-24 RX ADMIN — Medication 100 MILLIGRAM(S): at 21:15

## 2020-11-24 RX ADMIN — Medication 650 MILLIGRAM(S): at 21:15

## 2020-11-24 RX ADMIN — Medication 50 MILLIGRAM(S): at 13:20

## 2020-11-24 RX ADMIN — Medication 30 MILLIGRAM(S): at 21:14

## 2020-11-24 RX ADMIN — Medication 30 MILLIGRAM(S): at 16:43

## 2020-11-24 RX ADMIN — Medication 30 MILLIGRAM(S): at 05:49

## 2020-11-24 NOTE — PROGRESS NOTE ADULT - PROBLEM SELECTOR PLAN 8
Intermittently w/ HA, but no focal neurodeficits  - Responds well to Tylenol and Fiorocet, ordered PRN   - Of note, patient reportedly had normal CT Head/MRI Brain at [facility] ~1 week ago  - given recurrent HA/dizziness, repeat CT Head 11/18/2020 w/ no evidence of acute intracranial hemorrhage or acute transcortical infarct

## 2020-11-24 NOTE — CHART NOTE - NSCHARTNOTEFT_GEN_A_CORE
81 y/o Rwandan/English speaking female with PMHx of uncontrolled HTN, HLD, asthma (no hospitalizations in past), ?MARK (noncompliant with CPAP), chronic diastolic CHF (EF 61%), AS (s/p TAVR rachael valve with Dr. Alejo on 2/5/2019) who was recently hospitalized at [facility] 2/2 to hypertensive emergency (HTN w/ HA). CT head/MRI brain at that time negative and patient was discharged 11/4/20. Patient presented to [facility] ED on 11/13/20 with complaints of intermittent CP and elevated BP x1 week. Structural Heart Disease consulted in setting of previous TAVR. TTE 11/16/20 demonstrated MG 35.00 mmHg PG 70, and the LVOT/AV velocity ratio is 0.38. IRMA 11/17/20 revealing normal left and right ventricular size and systolic function, no LA/RA/MAIK/RAA thrombus seen, no aortic regurgitation, slight thickening at the base of the right cusp with probably mild restriction in excursion. The other prosthetic leaflets appear normal. Of note while inpatient GI was consulted for BRBPRN in the setting of subtherapeutic PTT while on heparin gtt. The patient underwent a colonoscopy on 11/19 which revealed four large, friable colonic mass lesions concerning for colon cancer.       Plan:   - Case discussed with Dr. Alejo  - Patient is planned for OR with general surgery for GI onco surgery   - Please ensure patient has cardiac anesthesia for procedure  - Initiate AC for suspected leaflet thrombus of AV once patient is s/p GI surgery and can tolerate   - Valve in valve TAVR high risk given patient's anatomy and proximity of RCA  - No intervention at this time, will follow leaflet thrombus as outpatient and treat with AC once cleared  - Structural heart to sign off, please reconsult as indicated  - Please make outpatient appointment with Dr. Alejo once patient planned for discharge

## 2020-11-24 NOTE — PROGRESS NOTE ADULT - ASSESSMENT
78 year old Armenian speaking F, PMHx of uncontrolled HTN, hyperlipidemia, asthma (denies hospitalizations, intubations), MARK? (on CPAP, noncompliant), chronic diastolic CHF (EF:61% by Echo 4/2019), aortic stenosis s/p transfemoral TAVR with rachael valve on 02/05/2019 with Dr. Alejo admitted for CP and hypertensive urgency. S/p colonoscopy 11/19 which revealed 4 large colonic masses (from cecum to transverse colon) biopsies taken. CEA elevated 23.1. No evidence of distant mets on CT. Surgery consulted for management of colonic mass.     PET scan: Focal FDG uptake in transverse and ascending colon. 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    Recommendations:   - Patient needs formal medical clearance/risk stratification, and medical optimization prior to proceeding with subtotal colectomy. Possible OR next week.   - Anticoagulation per primary team  - Team 1c will continue to follow  - D/w attending

## 2020-11-24 NOTE — PROGRESS NOTE ADULT - PROBLEM SELECTOR PLAN 1
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 Alejo at [facility]  - Echo 11/13/2020: normal BiV fxn/size, moderate LVH, grade II LV diastolic dysfunction, PEAK TRANSAORTIC GRADIENT 61.78 mmHg, mildly dilated LA, no pulm HTN, moderately dilated ascending aorta (previously 14mmHg on 4/2019)  - Structural CT scan: no thrombus seen.    - Limited TTE 11/16:  aortic valve gradient 71 mmHg  - IRMA 11/17: no LA/RA/MAIK/RAA thrombus seen, no evidence of an intracardiac shunt, 23 mm Jamar valve is noted in the aortic position w/o any aortic regurgitation, slight thickening at the base of the right cusp w/ probably mild restriction in excursion, other prosthetic leaflets appear normal  - given slight suspicious for thrombus at base of R cusp, per Structural Heart Dr Alejo, would like to start NOAC if cleared by GI  - HOWEVER, given high risk for bleeding in setting of 4 large friable colonic lesions, patient is NOT cleared to start NOAC given high risk of bleeding   - CTS stated nothing to do while inpatient, follow up as outpatient for further evaluation and management

## 2020-11-24 NOTE — PROGRESS NOTE ADULT - SUBJECTIVE AND OBJECTIVE BOX
SUBJECTIVE: Patient has no new complaints today. Reports mild R sided abd discomfort and headache, unchanged from yesterday. Denies CP, SOB, palpitations, dizziness, lightheadedness. Had BM this AM without blood. Currently NPO but previously tolerating diet no N/V.     diltiazem    Tablet 30 milliGRAM(s) Oral every 8 hours  metoprolol tartrate 50 milliGRAM(s) Oral every 6 hours    MEDICATIONS  (PRN):  acetaminophen   Tablet .. 650 milliGRAM(s) Oral every 6 hours PRN Mild Pain (1 - 3), Moderate Pain (4 - 6)  acetaminophen 300 mG/butalbital 50 mG/ caffeine 40 mG 1 Capsule(s) Oral every 6 hours PRN Severe headache  aluminum hydroxide/magnesium hydroxide/simethicone Suspension 30 milliLiter(s) Oral every 6 hours PRN Dyspepsia  benzonatate 100 milliGRAM(s) Oral three times a day PRN Cough  guaiFENesin   Syrup  (Sugar-Free) 100 milliGRAM(s) Oral every 6 hours PRN Cough      I&O's Detail    23 Nov 2020 07:01  -  24 Nov 2020 07:00  --------------------------------------------------------  IN:  Total IN: 0 mL    OUT:    Oral Fluid: 0 mL    Voided (mL): 1100 mL  Total OUT: 1100 mL    Total NET: -1100 mL          Vital Signs Last 24 Hrs  T(C): 36.7 (24 Nov 2020 05:00), Max: 36.8 (23 Nov 2020 09:51)  T(F): 98.1 (24 Nov 2020 05:00), Max: 98.2 (23 Nov 2020 09:51)  HR: 63 (24 Nov 2020 05:23) (63 - 121)  BP: 123/62 (24 Nov 2020 05:23) (114/72 - 153/86)  BP(mean): 87 (24 Nov 2020 05:23) (86 - 116)  RR: 18 (24 Nov 2020 05:23) (17 - 18)  SpO2: 97% (24 Nov 2020 05:23) (95% - 98%)    GENERAL: NAD, Resting comfortably in chair, awake, opens eyes spontaneously  HEENT: NCAT, MMM, Normal conjunctiva, PERRL  RESP: Nonlabored breathing, No respiratory distress  CARD: Normal rate, Normal peripheral perfusion  GI: Soft, ND, slightly TTP R sided abdomen, No guarding, No rebound tenderness  EXTREM: WWP, No edema, No gross deformity of extremities  SKIN: No rashes, no lesions  NEURO: AAOx3, No focal motor or sensory deficits  PSYCH: Affect and characteristics of appearance, verbalizations, and behaviors are appropriate    LABS:                        10.4   8.73  )-----------( 208      ( 24 Nov 2020 06:29 )             32.8     11-24    139  |  103  |  22  ----------------------------<  113<H>  4.5   |  22  |  1.07    Ca    8.8      24 Nov 2020 06:29  Mg     2.0     11-24            RADIOLOGY & ADDITIONAL STUDIES:  EXAM: PETCT SKUL-THI ONC FDG INIT    PROCEDURE DATE: 11/23/2020        INTERPRETATION: PET-CT Scan    History: 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 Baseline study to help determine initial treatment strategy.    Procedure: Following at least 4 hour fasting, the patient's blood glucose was 108 mg/dl. The patient was injected with 4. mCi of F-18-FDG.    After resting in a quiet room for about one hour, the patient was positioned on the scanning table and images were obtained using standard PET/CT technique from the mid thigh to the skull base.    Simultaneous low-dose CT scanning is used for attenuation correction and localization.    PET images were reconstructed in axial, sagittal and coronal planes using CT based attenuation correction. Images were displayed as PET, CT and fused data sets as well as maximum intensity pixel projections.    The standard uptake values (SUV) reported below are maximum values within the region of interest, expressed in gm/mL.    Comparison: CT chest, abdomen and pelvis 11/20/2020, CT chest 6/18/2019, CT coronary and CT abdomen pelvis 1/9/2019    Findings:    Lower head and neck: Normal.    Lungs and large airways: There is mild FDG uptake within an 8 mm right lower lobe pulmonary nodule (series 4 image 51) (SUV 3.2), this nodule is stable since CT chest 1/9/2019. Additional smaller non-FDG avid including a 4 mm right upper lobe pulmonary nodule (series 4 image 36) also stable from 6/18/2019.    Pleura: No pleural effusion.    Mediastinum and hilar regions: There is FDG uptake within bilateral hilar lymph nodes with the highest SUV 9.1 on the left. There is FDG uptake within a 1.2 cm right paratracheal lymph node (SUV 5.9).    Heart and pericardium: Status post TAVR. No pericardial effusion.    Vessels: Atherosclerotic disease of aorta is major branches.    Liver: Normal.    Gallbladder: No radiopaque stones gallbladder.    Spleen: Normal.    Pancreas: Normal.    Adrenal glands: Mild FDG uptake within nodular thickening of the left adrenal gland which is stable since 1/9/2019 and is favored to represent benign etiology.    Kidneys: Normal.    Abdominal and pelvic adenopathy: There is FDG uptake within a 8mm peripancreatic lymph node (series 3 image 76, SUV 6.2).    Ascites: None.    Gastrointestinal tract: Focal FDG uptake within the transverse colon (SUV 22.8) corresponding to patient's known malignancy. Focal FDG uptake within the ascending colon (SUV 41.5) corresponding to patient's known malignancy.    Pelvic organs: No abnormal FDG uptake    Soft tissues: There is mild FDG uptake within subcentimeter bilateral axillary lymph nodes (highest SUV 3.7 within a left axillary lymph node.    Bones: No pathology FDG uptake      Impression:    Focal FDG uptake within the transverse colon and ascending colon corresponding to patient's known malignancy.    Focal FDG uptake within an 8 mm right basilar pulmonary lymph node which is stable dating back to 1/9/2019 and is favored to represent benign etiology. Additional subcentimeter pulmonary nodules are below PET resolution.    Symmetric FDG uptake within bilateral hilar lymph nodes as well as a right paratracheal lymph node. Mild FDG uptake within nonenlarged bilateral axillary lymph nodes. The right paratracheal lymph node has been stable since 1/9/2019. Given the symmetric appearance of the hilar lymph nodes and the stability of the right paratracheal lymph node, may represent postinfectious/inflammatory etiology. Attention on follow-up imaging is recommended.    FDG uptake within an 8 mm peripancreatic lymph node which is stable since 1/9/2019 and again favored to represent benign in etiology although attention on follow-up imaging is recommended.

## 2020-11-24 NOTE — PROGRESS NOTE ADULT - ASSESSMENT
79 y/o [place] speaking female, w/ PMHx uncontrolled HTN, HLD, asthma, chronic diastolic CHF (EF 61% by Echo 4/2019), and aortic stenosis (s/p transfemoral TAVR with Jamar valve on 02/05/2019 with Dr. Alejo at [facility]) who presented to [facility] ED 11/13/2020 c/o intermittent chest pain and elevated BP x 1 week. BP initially elevated on arrival; however, BP has been stable on home meds since admission. Echocardiogram 11/13/2020 showed elevated gradient over aortic valve of 61.78 (doubled since last echo). Dr. Alejo consulted and patient is s/p structural CT which was negative for thrombus. Limited TTE 11/16/2020 elevated aortic valve gradient 71 mmHg. GI consulted on 11/15/2020 due to BRBPR in the setting of supratherapeutic PTT while on Heparin gtt w/ continued episodes after stopping A/C, likely 2/2 hemorrhoids. IRMA noting AV thickening suspicious for thrombus, recommend to start AC pending GI clearance given acute drop in H/H. GI team reconsulted, patient now s/p EGD/Colonoscopy revealing 4 large colonic masses concerning for colon cancer; therefore, patient is unable to be on AC given high risk for bleeding. Patient is now awaiting further workup of colon masses, and recommendations from surgery and oncology consults. Hospital course further c/b new on set A-fib w/ RVR and -130s, w/ associated hypotension, and patient started on rate control strategy. On 11/21/2020 evening, patient's HR remained in 130's w/ hypotension despite IV fluids and Lopressor 25 mg TID. Patient initially started on Digoxin as per Dr. Deleon's recommendations; however, patient developed rash and itching likely due to allergic reaction to Digoxin. Patient continued to complain of this sensation on 11/24 and was started on PRN Benadryl as well as Hydrocortisone topical cream. Patient was initiated on Cardizem 30 mg q8 hours and uptitrated to Lopressor 50 mg q6 hours, and on 11/24 AM patient was noted to have converted to NSR. PET scan was performed on 11/23 and awaiting Heme/Onc attending to review results for further recommendations. Heme/Onc and Surgery continuing to follow for management of colon cancer/colon masses.

## 2020-11-24 NOTE — PROGRESS NOTE ADULT - PROBLEM SELECTOR PLAN 3
RESOLVED, NOW IN NSR; Prior hx of palpitations per patient, unclear if previous Hx of A-fib  - found to be in A-fib w/ RVR w/ -130s after CT Scan on 11/20, given Lopressor 5 mg IV x 2 w/ minimal improvement in HR; given Cardizem 10 mg IV x 1 w/ HR improved to 90-120s.   - CHADsVASc 4, HASBLED 3; HOWEVER, holding A/C in setting of bleeding risk   - Coreg discontinued due to lower BP  - initially started on Lopressor 25 mg PO 6hrs for better rate control if blood pressure is tolerating  - overnight 11/21, given Digoxin 0.5 mg IV and Digoxin Level w/ morning labs 1.2   - on 11/22, uptitrated to Lopressor 50 mg q6 hours and started on Digoxin 0.25 mg daily  - on 11/23, patient developed mild rash and itching sensation and thought to be in the setting of allergic reaction to Digoxin   - Digoxin d/c, patient started on Cardizem 30 mg q8 hours    - continue to monitor HR, now in NSR

## 2020-11-24 NOTE — PROGRESS NOTE ADULT - SUBJECTIVE AND OBJECTIVE BOX
Interventional Cardiology PA Adult Progress Note    C.C.: Chest Pain    Subjective Assessment: Patient seen and examined at bedside this morning. Patient denies any active complaints, states her itching from yesterday is improving after Benadryl and being given lotion, and patient is in good spirits.     ROS Negative except as per Subjective and HPI  	  MEDICATIONS:  diltiazem    Tablet 30 milliGRAM(s) Oral every 8 hours  metoprolol tartrate 50 milliGRAM(s) Oral every 6 hours  benzonatate 100 milliGRAM(s) Oral three times a day PRN  guaiFENesin   Syrup  (Sugar-Free) 100 milliGRAM(s) Oral every 6 hours PRN  tiotropium 18 MICROgram(s) Capsule 1 Capsule(s) Inhalation daily  acetaminophen   Tablet .. 650 milliGRAM(s) Oral every 6 hours PRN  acetaminophen 300 mG/butalbital 50 mG/ caffeine 40 mG 1 Capsule(s) Oral every 6 hours PRN  aluminum hydroxide/magnesium hydroxide/simethicone Suspension 30 milliLiter(s) Oral every 6 hours PRN  pantoprazole  Injectable 40 milliGRAM(s) IV Push two times a day  polyethylene glycol 3350 17 Gram(s) Oral daily  atorvastatin 20 milliGRAM(s) Oral at bedtime  influenza  Vaccine (HIGH DOSE) 0.7 milliLiter(s) IntraMuscular once  nystatin Powder 1 Application(s) Topical two times a day      PHYSICAL EXAM:  TELEMETRY: No overnight events, patient is now in NSR.   T(C): 36.8 (11-24-20 @ 10:00), Max: 36.8 (11-24-20 @ 10:00)  HR: 70 (11-24-20 @ 09:09) (63 - 121)  BP: 143/62 (11-24-20 @ 09:09) (114/72 - 153/86)  RR: 16 (11-24-20 @ 09:09) (16 - 18)  SpO2: 98% (11-24-20 @ 09:09) (95% - 98%)  Wt(kg): --  I&O's Summary    23 Nov 2020 07:01  -  24 Nov 2020 07:00  --------------------------------------------------------  IN: 0 mL / OUT: 1100 mL / NET: -1100 mL      Height (cm): 177.8 (11-24 @ 07:04)  Weight (kg): 101 (11-24 @ 07:04)  BMI (kg/m2): 31.9 (11-24 @ 07:04)  BSA (m2): 2.18 (11-24 @ 07:04)                                         Appearance: Normal	  HEENT:   Normal oral mucosa, PERRL, EOMI	  Neck: Supple, + JVD/ - JVD; Carotid Bruit   Cardiovascular: Normal S1 S2, No JVD, No murmurs,   Respiratory: Lungs clear to auscultation/Decreased Breath Sounds/No Rales, Rhonchi, Wheezing	  Gastrointestinal:  Soft, Non-tender, + BS	  Skin: No rashes, No ecchymoses, No cyanosis  Extremities: Normal range of motion, No clubbing, cyanosis or edema  Vascular: Peripheral pulses palpable 2+ bilaterally  Neurologic: Non-focal  Psychiatry: A & O x 3, Mood & affect appropriate      	    ECG:  	  RADIOLOGY:   DIAGNOSTIC TESTING:  [ ] Echocardiogram:  [ ] Catheterization:  [ ] Stress Test:    [ ] IRMA  	    LABS:	 	                 10.4   8.73  )-----------( 208      ( 24 Nov 2020 06:29 )             32.8     11-24    139  |  103  |  22  ----------------------------<  113<H>  4.5   |  22  |  1.07    Ca    8.8      24 Nov 2020 06:29  Mg     2.0     11-24           Interventional Cardiology PA Adult Progress Note    C.C.: Chest Pain    Subjective Assessment: Patient seen and examined at bedside this morning. Patient denies any active complaints, states her itching from yesterday is improving after Benadryl and being given lotion, and patient is in good spirits.     ROS Negative except as per Subjective and HPI  	  MEDICATIONS:  diltiazem    Tablet 30 milliGRAM(s) Oral every 8 hours  metoprolol tartrate 50 milliGRAM(s) Oral every 6 hours  benzonatate 100 milliGRAM(s) Oral three times a day PRN  guaiFENesin   Syrup  (Sugar-Free) 100 milliGRAM(s) Oral every 6 hours PRN  tiotropium 18 MICROgram(s) Capsule 1 Capsule(s) Inhalation daily  acetaminophen   Tablet .. 650 milliGRAM(s) Oral every 6 hours PRN  acetaminophen 300 mG/butalbital 50 mG/ caffeine 40 mG 1 Capsule(s) Oral every 6 hours PRN  aluminum hydroxide/magnesium hydroxide/simethicone Suspension 30 milliLiter(s) Oral every 6 hours PRN  pantoprazole  Injectable 40 milliGRAM(s) IV Push two times a day  polyethylene glycol 3350 17 Gram(s) Oral daily  atorvastatin 20 milliGRAM(s) Oral at bedtime  influenza  Vaccine (HIGH DOSE) 0.7 milliLiter(s) IntraMuscular once  nystatin Powder 1 Application(s) Topical two times a day      PHYSICAL EXAM:  TELEMETRY: No overnight events, patient is now in NSR.   T(C): 36.8 (11-24-20 @ 10:00), Max: 36.8 (11-24-20 @ 10:00)  HR: 70 (11-24-20 @ 09:09) (63 - 121)  BP: 143/62 (11-24-20 @ 09:09) (114/72 - 153/86)  RR: 16 (11-24-20 @ 09:09) (16 - 18)  SpO2: 98% (11-24-20 @ 09:09) (95% - 98%)  Wt(kg): --  I&O's Summary    23 Nov 2020 07:01  -  24 Nov 2020 07:00  --------------------------------------------------------  IN: 0 mL / OUT: 1100 mL / NET: -1100 mL      Height (cm): 177.8 (11-24 @ 07:04)  Weight (kg): 101 (11-24 @ 07:04)  BMI (kg/m2): 31.9 (11-24 @ 07:04)  BSA (m2): 2.18 (11-24 @ 07:04)                                         Appearance: NAD	  HEENT: Normal oral mucosa, PERRL, EOMI	  Neck: Supple, - JVD  Cardiovascular: Normal S1/S2, No JVD, No murmurs  Respiratory: Lungs clear to auscultation, No Rales, Rhonchi, Wheezing	  Gastrointestinal:  Soft, Non-tender, + BS	  Skin: Mild diffuse erythematous rash   Extremities: Normal range of motion, No clubbing, cyanosis or edema  Vascular: Peripheral pulses palpable 2+ bilaterally  Neurologic: Non-focal  Psychiatry: A & O x 3, Mood & affect appropriate      	    ECG:  	  RADIOLOGY:   DIAGNOSTIC TESTING:  [ ] Echocardiogram:  [ ] Catheterization:  [ ] Stress Test:    [ ] IRMA  	    LABS:	 	                 10.4   8.73  )-----------( 208      ( 24 Nov 2020 06:29 )             32.8     11-24    139  |  103  |  22  ----------------------------<  113<H>  4.5   |  22  |  1.07    Ca    8.8      24 Nov 2020 06:29  Mg     2.0     11-24

## 2020-11-25 LAB
ANION GAP SERPL CALC-SCNC: 12 MMOL/L — SIGNIFICANT CHANGE UP (ref 5–17)
BASOPHILS # BLD AUTO: 0.04 K/UL — SIGNIFICANT CHANGE UP (ref 0–0.2)
BASOPHILS NFR BLD AUTO: 0.5 % — SIGNIFICANT CHANGE UP (ref 0–2)
BUN SERPL-MCNC: 20 MG/DL — SIGNIFICANT CHANGE UP (ref 7–23)
CALCIUM SERPL-MCNC: 8.8 MG/DL — SIGNIFICANT CHANGE UP (ref 8.4–10.5)
CHLORIDE SERPL-SCNC: 101 MMOL/L — SIGNIFICANT CHANGE UP (ref 96–108)
CO2 SERPL-SCNC: 24 MMOL/L — SIGNIFICANT CHANGE UP (ref 22–31)
CREAT SERPL-MCNC: 1.13 MG/DL — SIGNIFICANT CHANGE UP (ref 0.5–1.3)
EOSINOPHIL # BLD AUTO: 0.3 K/UL — SIGNIFICANT CHANGE UP (ref 0–0.5)
EOSINOPHIL NFR BLD AUTO: 4 % — SIGNIFICANT CHANGE UP (ref 0–6)
GLUCOSE SERPL-MCNC: 114 MG/DL — HIGH (ref 70–99)
HCT VFR BLD CALC: 29.5 % — LOW (ref 34.5–45)
HGB BLD-MCNC: 9.1 G/DL — LOW (ref 11.5–15.5)
IMM GRANULOCYTES NFR BLD AUTO: 0.5 % — SIGNIFICANT CHANGE UP (ref 0–1.5)
LYMPHOCYTES # BLD AUTO: 1.57 K/UL — SIGNIFICANT CHANGE UP (ref 1–3.3)
LYMPHOCYTES # BLD AUTO: 21.2 % — SIGNIFICANT CHANGE UP (ref 13–44)
MAGNESIUM SERPL-MCNC: 2.1 MG/DL — SIGNIFICANT CHANGE UP (ref 1.6–2.6)
MCHC RBC-ENTMCNC: 28.8 PG — SIGNIFICANT CHANGE UP (ref 27–34)
MCHC RBC-ENTMCNC: 30.8 GM/DL — LOW (ref 32–36)
MCV RBC AUTO: 93.4 FL — SIGNIFICANT CHANGE UP (ref 80–100)
MONOCYTES # BLD AUTO: 0.7 K/UL — SIGNIFICANT CHANGE UP (ref 0–0.9)
MONOCYTES NFR BLD AUTO: 9.4 % — SIGNIFICANT CHANGE UP (ref 2–14)
NEUTROPHILS # BLD AUTO: 4.76 K/UL — SIGNIFICANT CHANGE UP (ref 1.8–7.4)
NEUTROPHILS NFR BLD AUTO: 64.4 % — SIGNIFICANT CHANGE UP (ref 43–77)
NRBC # BLD: 0 /100 WBCS — SIGNIFICANT CHANGE UP (ref 0–0)
PLATELET # BLD AUTO: 255 K/UL — SIGNIFICANT CHANGE UP (ref 150–400)
POTASSIUM SERPL-MCNC: 4.4 MMOL/L — SIGNIFICANT CHANGE UP (ref 3.5–5.3)
POTASSIUM SERPL-SCNC: 4.4 MMOL/L — SIGNIFICANT CHANGE UP (ref 3.5–5.3)
RBC # BLD: 3.16 M/UL — LOW (ref 3.8–5.2)
RBC # FLD: 13.9 % — SIGNIFICANT CHANGE UP (ref 10.3–14.5)
SODIUM SERPL-SCNC: 137 MMOL/L — SIGNIFICANT CHANGE UP (ref 135–145)
WBC # BLD: 7.41 K/UL — SIGNIFICANT CHANGE UP (ref 3.8–10.5)
WBC # FLD AUTO: 7.41 K/UL — SIGNIFICANT CHANGE UP (ref 3.8–10.5)

## 2020-11-25 PROCEDURE — 99223 1ST HOSP IP/OBS HIGH 75: CPT | Mod: GC

## 2020-11-25 PROCEDURE — 99233 SBSQ HOSP IP/OBS HIGH 50: CPT | Mod: GC,57

## 2020-11-25 PROCEDURE — 99233 SBSQ HOSP IP/OBS HIGH 50: CPT

## 2020-11-25 RX ADMIN — Medication 650 MILLIGRAM(S): at 20:32

## 2020-11-25 RX ADMIN — Medication 30 MILLIGRAM(S): at 13:49

## 2020-11-25 RX ADMIN — Medication 50 MILLIGRAM(S): at 05:25

## 2020-11-25 RX ADMIN — PANTOPRAZOLE SODIUM 40 MILLIGRAM(S): 20 TABLET, DELAYED RELEASE ORAL at 18:06

## 2020-11-25 RX ADMIN — Medication 1 APPLICATION(S): at 05:15

## 2020-11-25 RX ADMIN — Medication 30 MILLIGRAM(S): at 05:14

## 2020-11-25 RX ADMIN — Medication 25 MILLIGRAM(S): at 09:50

## 2020-11-25 RX ADMIN — NYSTATIN CREAM 1 APPLICATION(S): 100000 CREAM TOPICAL at 18:07

## 2020-11-25 RX ADMIN — POLYETHYLENE GLYCOL 3350 17 GRAM(S): 17 POWDER, FOR SOLUTION ORAL at 12:37

## 2020-11-25 RX ADMIN — Medication 50 MILLIGRAM(S): at 12:37

## 2020-11-25 RX ADMIN — NYSTATIN CREAM 1 APPLICATION(S): 100000 CREAM TOPICAL at 05:15

## 2020-11-25 RX ADMIN — TIOTROPIUM BROMIDE 1 CAPSULE(S): 18 CAPSULE ORAL; RESPIRATORY (INHALATION) at 09:23

## 2020-11-25 RX ADMIN — Medication 25 MILLIGRAM(S): at 18:06

## 2020-11-25 RX ADMIN — PANTOPRAZOLE SODIUM 40 MILLIGRAM(S): 20 TABLET, DELAYED RELEASE ORAL at 05:14

## 2020-11-25 RX ADMIN — Medication 1 APPLICATION(S): at 18:06

## 2020-11-25 RX ADMIN — ATORVASTATIN CALCIUM 20 MILLIGRAM(S): 80 TABLET, FILM COATED ORAL at 21:26

## 2020-11-25 RX ADMIN — Medication 100 MILLIGRAM(S): at 18:10

## 2020-11-25 RX ADMIN — Medication 650 MILLIGRAM(S): at 21:09

## 2020-11-25 NOTE — PROGRESS NOTE ADULT - PROBLEM SELECTOR PLAN 2
Multiple episodes of BRBPR on 11/14 PM and 11/15 PM due to supratherapeutic PTT on Heparin gtt (has had prior to admission)  - also had recurrent episodes of BRBPR in evening on 11/17 after Heparin gtt was off and PTT stabilized, and per patient occurred w/ straining during BM  - s/p EGD revealing mild gastropathy  - s/p Colonoscopy revealing four large colonic mass lesions w/ the most proximal one near the cecum (150 cm from the anal verge) and the most proximal one in the transverse colon (100 cm from the anal verge), s/p multiple biopsies and tattooing of the proximal and distal mass; ulceration and contact bleeding noted from the mass lesions  - pathology consistent w/ adenocarcinoma  - elevated CEA 23.1  - CT Chest/Abdomen/Pelvis 11/20: 2 cm segment of annular wall thickening of transverse colon, suspicious for neoplasm, 2.6 cm focal area of wall thickening in the ascending colon may also represent another neoplasm, no colonic obstruction, no evidence of distant metastasis  - high risk of GI bleeding on anticoagulation therapy given above findings  - GI service signed off.   - Pending Heme/Onc and Colorectal surgical evaluation for likely colon cancer  - PET Scan performed, results consistent w/ known malignacy and no evidence of spread  - f/u Heme/Onc and Surgery for further recommendations Multiple episodes of BRBPR on 11/14 PM and 11/15 PM due to supratherapeutic PTT on Heparin gtt (has had prior to admission)  - also had recurrent episodes of BRBPR in evening on 11/17 after Heparin gtt was off and PTT stabilized, and per patient occurred w/ straining during BM  - s/p EGD revealing mild gastropathy  - s/p Colonoscopy revealing four large colonic mass lesions w/ the most proximal one near the cecum (150 cm from the anal verge) and the most proximal one in the transverse colon (100 cm from the anal verge), s/p multiple biopsies and tattooing of the proximal and distal mass; ulceration and contact bleeding noted from the mass lesions  - pathology consistent w/ adenocarcinoma  - elevated CEA 23.1  - CT Chest/Abdomen/Pelvis 11/20: 2 cm segment of annular wall thickening of transverse colon, suspicious for neoplasm, 2.6 cm focal area of wall thickening in the ascending colon may also represent another neoplasm, no colonic obstruction, no evidence of distant metastasis  - high risk of GI bleeding on anticoagulation therapy given above findings  - GI service signed off   - PET Scan performed, results consistent w/ known malignacy and no evidence of spread  - Surgery planning for colectomy 12/2/2020   - Cardiac clearance attestation written by Dr. Kessler, Pulm and medicine consulted for clearance  - Heme/onc recommending possible tissue evaluation of hilar nodes, most likely after colectomy  -Continue to follow Heme/onc and surgery rec’s, and f/u Pulm and medicine clearance

## 2020-11-25 NOTE — PROGRESS NOTE ADULT - PROBLEM SELECTOR PLAN 8
Intermittently w/ HA, but no focal neurodeficits  - Responds well to Tylenol and Fiorocet, ordered PRN   - Of note, patient reportedly had normal CT Head/MRI Brain at [facility] ~1 week ago  - given recurrent HA/dizziness, repeat CT Head 11/18/2020 w/ no evidence of acute intracranial hemorrhage or acute transcortical infarct

## 2020-11-25 NOTE — PROGRESS NOTE ADULT - ASSESSMENT
79 y/o [place]ian speaking female, w/ PMHx uncontrolled HTN, HLD, asthma, chronic diastolic CHF (EF 61% by Echo 4/2019), and aortic stenosis (s/p transfemoral TAVR with Jamar valve on 02/05/2019 with Dr. Alejo at [facility]) who presented to [facility] ED 11/13/2020 c/o intermittent chest pain and elevated BP x 1 week. BP initially elevated on arrival; however, BP has been stable on home meds since admission. Echocardiogram 11/13/2020 showed elevated gradient over aortic valve of 61.78 (doubled since last echo). Dr. Alejo consulted and patient is s/p structural CT which was negative for thrombus. Limited TTE 11/16/2020 elevated aortic valve gradient 71 mmHg. GI consulted on 11/15/2020 due to BRBPR in the setting of supratherapeutic PTT while on Heparin gtt w/ continued episodes after stopping A/C, likely 2/2 hemorrhoids. IRMA noting AV thickening suspicious for thrombus, recommend to start AC pending GI clearance given acute drop in H/H. GI team reconsulted, patient now s/p EGD/Colonoscopy revealing 4 large colonic masses concerning for colon cancer; therefore, patient is unable to be on AC given high risk for bleeding. Patient is now awaiting further workup of colon masses, and recommendations from surgery and oncology consults. Hospital course further c/b new on set A-fib w/ RVR and -130s, w/ associated hypotension, and patient started on rate control strategy. On 11/21/2020 evening, patient's HR remained in 130's w/ hypotension despite IV fluids and Lopressor 25 mg TID. Patient initially started on Digoxin as per Dr. Deleon's recommendations; however, patient developed rash and itching likely due to allergic reaction to Digoxin. Patient continued to complain of this sensation on 11/24 and was started on PRN Benadryl as well as Hydrocortisone topical cream. Patient was initiated on Cardizem 30 mg q8 hours and uptitrated to Lopressor 50 mg q6 hours, and on 11/24 AM patient was noted to have converted to NSR. PET scan was performed on 11/23 and surgery now recommending colon resection 12/2/2020, with prior cardiac/pulm/medicine clearance.

## 2020-11-25 NOTE — PROGRESS NOTE ADULT - SUBJECTIVE AND OBJECTIVE BOX
SUBJECTIVE: Patient c/o itchiness (diffuse, mild). Denies abd and chest pain. Poor appetite but no N/V when eating. +flatus, +BM (nonbloody). Denies SOB, palpitations, dizziness, lightheadedness.     diltiazem    Tablet 30 milliGRAM(s) Oral every 8 hours  metoprolol tartrate 50 milliGRAM(s) Oral every 6 hours    MEDICATIONS  (PRN):  acetaminophen   Tablet .. 650 milliGRAM(s) Oral every 6 hours PRN Mild Pain (1 - 3), Moderate Pain (4 - 6)  acetaminophen 300 mG/butalbital 50 mG/ caffeine 40 mG 1 Capsule(s) Oral every 6 hours PRN Severe headache  aluminum hydroxide/magnesium hydroxide/simethicone Suspension 30 milliLiter(s) Oral every 6 hours PRN Dyspepsia  benzonatate 100 milliGRAM(s) Oral three times a day PRN Cough  diphenhydrAMINE 25 milliGRAM(s) Oral every 4 hours PRN Rash and/or Itching  guaiFENesin   Syrup  (Sugar-Free) 100 milliGRAM(s) Oral every 6 hours PRN Cough      I&O's Detail    23 Nov 2020 07:01  -  24 Nov 2020 07:00  --------------------------------------------------------  IN:  Total IN: 0 mL    OUT:    Oral Fluid: 0 mL    Voided (mL): 1100 mL  Total OUT: 1100 mL    Total NET: -1100 mL          Vital Signs Last 24 Hrs  T(C): 37.2 (24 Nov 2020 21:26), Max: 37.2 (24 Nov 2020 13:28)  T(F): 98.9 (24 Nov 2020 21:26), Max: 98.9 (24 Nov 2020 13:28)  HR: 63 (25 Nov 2020 05:24) (53 - 70)  BP: 139/63 (25 Nov 2020 05:11) (115/54 - 143/62)  BP(mean): 90 (25 Nov 2020 05:11) (78 - 98)  RR: 18 (25 Nov 2020 05:24) (16 - 18)  SpO2: 96% (25 Nov 2020 05:24) (96% - 98%)    GENERAL: NAD, Resting comfortably in chair, awake, opens eyes spontaneously  HEENT: NCAT, MMM, Normal conjunctiva, PERRL  RESP: Nonlabored breathing, No respiratory distress  CARD: Normal rate, Normal peripheral perfusion  GI: Soft, ND, slightly TTP R sided abdomen, No guarding, No rebound tenderness  EXTREM: WWP, No edema, No gross deformity of extremities  SKIN: No rashes, no lesions  NEURO: AAOx3, No focal motor or sensory deficits  PSYCH: Affect and characteristics of appearance, verbalizations, and behaviors are appropriate    LABS:                        10.4   8.73  )-----------( 208      ( 24 Nov 2020 06:29 )             32.8     11-24    139  |  103  |  22  ----------------------------<  113<H>  4.5   |  22  |  1.07    Ca    8.8      24 Nov 2020 06:29  Mg     2.0     11-24            RADIOLOGY & ADDITIONAL STUDIES:

## 2020-11-25 NOTE — PROGRESS NOTE ADULT - PROBLEM SELECTOR PLAN 9
- Continue Spiriva inhaler daily      VTE PPx: SCDs  PT consulted: Home w/ Home PT  Patient lives with daughter Jaelyn, whom is her HHA (40 hrs/week).  CODE: Full code  Dispo: pending further Heme/Onc and Surgery recs - Continue Spiriva inhaler daily      VTE PPx: SCDs  PT consulted: Home w/ Home PT  Patient lives with daughter Jaelyn, whom is her HHA (40 hrs/week).  CODE: Full code  Dispo: colectomy planned for 12/2/2020 with prior pulm/medicine clearance

## 2020-11-25 NOTE — PROGRESS NOTE ADULT - SUBJECTIVE AND OBJECTIVE BOX
Interventional Cardiology PA Adult Progress Note    C.C.:     Subjective Assessment:      ROS Negative except as per Subjective and HPI  	  MEDICATIONS:  diltiazem    Tablet 30 milliGRAM(s) Oral every 8 hours  metoprolol tartrate 50 milliGRAM(s) Oral every 6 hours      benzonatate 100 milliGRAM(s) Oral three times a day PRN  guaiFENesin   Syrup  (Sugar-Free) 100 milliGRAM(s) Oral every 6 hours PRN  tiotropium 18 MICROgram(s) Capsule 1 Capsule(s) Inhalation daily    acetaminophen   Tablet .. 650 milliGRAM(s) Oral every 6 hours PRN  acetaminophen 300 mG/butalbital 50 mG/ caffeine 40 mG 1 Capsule(s) Oral every 6 hours PRN  diphenhydrAMINE 25 milliGRAM(s) Oral every 4 hours PRN    aluminum hydroxide/magnesium hydroxide/simethicone Suspension 30 milliLiter(s) Oral every 6 hours PRN  pantoprazole  Injectable 40 milliGRAM(s) IV Push two times a day  polyethylene glycol 3350 17 Gram(s) Oral daily    atorvastatin 20 milliGRAM(s) Oral at bedtime    hydrocortisone 1% Cream 1 Application(s) Topical two times a day  influenza  Vaccine (HIGH DOSE) 0.7 milliLiter(s) IntraMuscular once  nystatin Powder 1 Application(s) Topical two times a day      	    [PHYSICAL EXAM:  TELEMETRY:  T(C): 36.7 (11-25-20 @ 10:04), Max: 37.2 (11-24-20 @ 13:28)  HR: 52 (11-25-20 @ 08:15) (52 - 69)  BP: 125/61 (11-25-20 @ 08:15) (115/54 - 141/63)  RR: 18 (11-25-20 @ 08:15) (16 - 18)  SpO2: 94% (11-25-20 @ 08:15) (94% - 98%)  Wt(kg): --  I&O's Summary    25 Nov 2020 07:01  -  25 Nov 2020 10:53  --------------------------------------------------------  IN: 240 mL / OUT: 0 mL / NET: 240 mL        Gonzalez:  Central/PICC/Mid Line:                                         Appearance: Normal	  HEENT:   Normal oral mucosa, PERRL, EOMI	  Neck: Supple, + JVD/ - JVD; Carotid Bruit   Cardiovascular: Normal S1 S2, No JVD, No murmurs,   Respiratory: Lungs clear to auscultation/Decreased Breath Sounds/No Rales, Rhonchi, Wheezing	  Gastrointestinal:  Soft, Non-tender, + BS	  Skin: No rashes, No ecchymoses, No cyanosis  Extremities: Normal range of motion, No clubbing, cyanosis or edema  Vascular: Peripheral pulses palpable 2+ bilaterally  Neurologic: Non-focal  Psychiatry: A & O x 3, Mood & affect appropriate      	    ECG:  	  RADIOLOGY:   DIAGNOSTIC TESTING:  [ ] Echocardiogram:  [ ]  Catheterization:  [ ] Stress Test:    [ ] IRMA  OTHER: 	    LABS:	 	  CARDIAC MARKERS:                                  9.1    7.41  )-----------( 255      ( 25 Nov 2020 06:48 )             29.5     11-25    137  |  101  |  20  ----------------------------<  114<H>  4.4   |  24  |  1.13    Ca    8.8      25 Nov 2020 06:48  Mg     2.1     11-25      proBNP:   Lipid Profile:   HgA1c:   TSH:       ASSESSMENT/PLAN: 	        DVT ppx:  Dispo:     Interventional Cardiology PA Adult Progress Note    Subjective Assessment:  Pt seen and examined at bedside this am. She denies CP, SOB, palpitations, orthopnea. Pt does endorse mild, R lower back pain.     ROS Negative except as per Subjective and HPI  	  MEDICATIONS:  diltiazem    Tablet 30 milliGRAM(s) Oral every 8 hours  metoprolol tartrate 50 milliGRAM(s) Oral every 6 hours  benzonatate 100 milliGRAM(s) Oral three times a day PRN  guaiFENesin   Syrup  (Sugar-Free) 100 milliGRAM(s) Oral every 6 hours PRN  tiotropium 18 MICROgram(s) Capsule 1 Capsule(s) Inhalation daily  acetaminophen   Tablet .. 650 milliGRAM(s) Oral every 6 hours PRN  acetaminophen 300 mG/butalbital 50 mG/ caffeine 40 mG 1 Capsule(s) Oral every 6 hours PRN  diphenhydrAMINE 25 milliGRAM(s) Oral every 4 hours PRN  aluminum hydroxide/magnesium hydroxide/simethicone Suspension 30 milliLiter(s) Oral every 6 hours PRN  pantoprazole  Injectable 40 milliGRAM(s) IV Push two times a day  polyethylene glycol 3350 17 Gram(s) Oral daily  atorvastatin 20 milliGRAM(s) Oral at bedtime  hydrocortisone 1% Cream 1 Application(s) Topical two times a day  influenza  Vaccine (HIGH DOSE) 0.7 milliLiter(s) IntraMuscular once  nystatin Powder 1 Application(s) Topical two times a day  	    [PHYSICAL EXAM:  TELEMETRY:  T(C): 36.7 (11-25-20 @ 10:04), Max: 37.2 (11-24-20 @ 13:28)  HR: 52 (11-25-20 @ 08:15) (52 - 69)  BP: 125/61 (11-25-20 @ 08:15) (115/54 - 141/63)  RR: 18 (11-25-20 @ 08:15) (16 - 18)  SpO2: 94% (11-25-20 @ 08:15) (94% - 98%)  Wt(kg): --  I&O's Summary    25 Nov 2020 07:01  -  25 Nov 2020 10:53  --------------------------------------------------------  IN: 240 mL / OUT: 0 mL / NET: 240 mL        Gonzalez:  Central/PICC/Mid Line:                                         Appearance: Normal	  HEENT:   Normal oral mucosa, PERRL, EOMI	  Neck: Supple, - JVD  Cardiovascular: Normal S1 S2, No murmurs,   Respiratory: Lungs clear to auscultation. No Rales, Rhonchi, Wheezing	  Gastrointestinal:  Soft, Non-tender, + BS	  Skin: No rashes, No ecchymoses, No cyanosis  Extremities: Trace edema in LE b/l  Neurologic: Non-focal  Psychiatry: A & O x 3, Mood & affect appropriate      	    ECG:  	  RADIOLOGY:   DIAGNOSTIC TESTING:  [ ] Echocardiogram:  [ ]  Catheterization:  [ ] Stress Test:    [ ] IRMA  OTHER: 	    LABS:	 	  CARDIAC MARKERS:                                  9.1    7.41  )-----------( 255      ( 25 Nov 2020 06:48 )             29.5     11-25    137  |  101  |  20  ----------------------------<  114<H>  4.4   |  24  |  1.13    Ca    8.8      25 Nov 2020 06:48  Mg     2.1     11-25      proBNP:   Lipid Profile:   HgA1c:   TSH:       ASSESSMENT/PLAN: 	        DVT ppx:  Dispo:

## 2020-11-25 NOTE — CONSULT NOTE ADULT - ASSESSMENT
78 year old Persian speaking F, PMHx of uncontrolled HTN, hyperlipidemia, COPD(denies hospitalizations, intubations), MARK (on CPAP, noncompliant), chronic diastolic CHF (EF:61% by Echo 4/2019), aortic stenosis s/p transfemoral TAVR with rachael valve on 02/05/2019 with Dr. Alejo @[facility], recent hospitalization at [facility] (for HTN/headache, no changes made to medications, negative CT head/MRI brain, discharged on 11/4/20) who presents to [facility] ED 11/13/20 c/o intermittent chest pain and elevated BP x 1 week found to have new GGO bilateral lower lobes pulmonary consult for preoperative clearance for subtotal colectomy for adenocarcinoma of colon.     #Preoperative clearance for subtotal colectomy     Mallimpadi score  RCRI  mets   Ariscat  Leia Branch    #COPD      #MARK   78 year old Irish speaking F, PMHx of uncontrolled HTN, hyperlipidemia, COPD(denies hospitalizations, intubations), MARK (on CPAP, noncompliant), chronic diastolic CHF (EF:61% by Echo 4/2019), aortic stenosis s/p transfemoral TAVR with rachael valve on 02/05/2019 with Dr. Alejo @[facility], recent hospitalization at [facility] (for HTN/headache, no changes made to medications, negative CT head/MRI brain, discharged on 11/4/20) who presents to [facility] ED 11/13/20 c/o intermittent chest pain and elevated BP x 1 week found to have new GGO bilateral lower lobes pulmonary consult for preoperative clearance for subtotal colectomy for adenocarcinoma of colon.     #Preoperative clearance for subtotal colectomy     Mallimpadi score class 3  Ariscat 58, 42.1 % risk of postoperative pulmonary complications respiratory failure, respiratory infection, pleural effusion, atelectasis, pneumothorax, bronchospasm, aspiration pneumonitis (high risk)  Arozullah score 26, 5% risk postoperative respiratory failure   High risk procedure, mets score 4  encourage IS after procedure, if wheezing albuterol inhaler  if signs of hypoxia without wheezing, CPAP machine after procedure  minimize sedation postoperative, MARK precautions    #COPD  -continue with spiriva 2 puffs per day, if wheezing can give proair prn    #MARK  -patient dx w/ sleep studies in 2019 outpatient, desaturation 80% at nighttime, requires 5 cm H20 on outpatient records, postoperative patient will need MARK protocol, if signs of desaturation will need CPAP machine. Keep patient     #B/L GGO and pulmonary nodules  -differentials includes atelectasis, encourage IS, less likely fluid overload as patient without JVD or LE swelling  -6 mm nodule RLL and 11 mm nodule RLL, PET scan with uptake on RLL nodule 8 mm and parapancreatic 8 mm lesion, stable since 1/2019.      78 year old Occitan speaking F, PMHx of uncontrolled HTN, hyperlipidemia, COPD(denies hospitalizations, intubations), MARK (on CPAP, noncompliant), chronic diastolic CHF (EF:61% by Echo 4/2019), aortic stenosis s/p transfemoral TAVR with rachael valve on 02/05/2019 with Dr. Alejo @[facility], recent hospitalization at [facility] (for HTN/headache, no changes made to medications, negative CT head/MRI brain, discharged on 11/4/20) who presents to [facility] ED 11/13/20 c/o intermittent chest pain and elevated BP x 1 week found to have new GGO bilateral lower lobes pulmonary consult for preoperative clearance for subtotal colectomy for adenocarcinoma of colon.     #Preoperative clearance for subtotal colectomy     Mallimpadi score class 3  Ariscat 58, 42.1 % risk of postoperative pulmonary complications respiratory failure, respiratory infection, pleural effusion, atelectasis, pneumothorax, bronchospasm, aspiration pneumonitis (high risk)  Arozullah score 26, 5% risk postoperative respiratory failure   High risk procedure, mets score 4  encourage IS after procedure, if wheezing albuterol inhaler  if signs of hypoxia without wheezing, CPAP machine after procedure  minimize sedation postoperative, MARK precautions    #COPD  -continue with spiriva 2 puffs per day, if wheezing can give proair prn    #MARK  -patient dx w/ sleep studies in 2019 outpatient, desaturation 80% at nighttime, requires 5 cm H20 on outpatient records, postoperative patient will need MARK protocol, if signs of desaturation will need CPAP machine. Keep patient     #B/L GGO and pulmonary nodules  -differentials includes atelectasis vs prior COVID infection (igG positive), vs less likely fluid overload as no JVD or LE swelling  -6 mm nodule RLL and 11 mm nodule RLL, PET scan with uptake on RLL nodule 8 mm and parapancreatic 8 mm lesion, stable since 1/2019.      78 year old Azeri speaking F, PMHx of uncontrolled HTN, hyperlipidemia, COPD(denies hospitalizations, intubations), MARK (on CPAP, noncompliant), chronic diastolic CHF (EF:61% by Echo 4/2019), aortic stenosis s/p transfemoral TAVR with rachael valve on 02/05/2019 with Dr. Alejo @[facility], recent hospitalization at [facility] (for HTN/headache, no changes made to medications, negative CT head/MRI brain, discharged on 11/4/20) who presents to [facility] ED 11/13/20 c/o intermittent chest pain and elevated BP x 1 week found to have new GGO bilateral lower lobes pulmonary consult for preoperative clearance for subtotal colectomy for adenocarcinoma of colon.     #Preoperative clearance for subtotal colectomy     Mallimpadi score class 3  Ariscat 58, 42.1 % risk of postoperative pulmonary complications respiratory failure, respiratory infection, pleural effusion, atelectasis, pneumothorax, bronchospasm, aspiration pneumonitis (high risk)  Arozullah score 26, 5% risk postoperative respiratory failure   High risk procedure, mets score 4  encourage IS after procedure, if wheezing albuterol nebulizer prn  minimize sedation postoperative, extubate to bipap, MARK protocol.    #COPD  -continue with spiriva 2 puffs per day, if wheezing can give albuterol nebulizer prn    #MARK  -patient dx w/ sleep studies in 2019 outpatient, desaturation 80% at nighttime, requires 5 cm H20 on outpatient records  -extubate to bipap as above, MARK protocol    #B/L GGO and pulmonary nodules  -differentials includes atelectasis vs pulmonary congestion less likely prior COVID infection (igG positive)  -manage fluid status per cardiology team given AS  -6 mm nodule RLL and 11 mm nodule RLL, PET scan with uptake on RLL nodule 8 mm and parapancreatic 8 mm lesion, stable since 1/2019 can have outpatient follow up.      78 year old Welsh speaking F, PMHx of uncontrolled HTN, hyperlipidemia, COPD(denies hospitalizations, intubations), MARK (on CPAP, noncompliant), chronic diastolic CHF (EF:61% by Echo 4/2019), aortic stenosis s/p transfemoral TAVR with rachael valve on 02/05/2019 with Dr. Alejo @[facility], recent hospitalization at [facility] (for HTN/headache, no changes made to medications, negative CT head/MRI brain, discharged on 11/4/20) who presents to [facility] ED 11/13/20 c/o intermittent chest pain and elevated BP x 1 week found to have new GGO bilateral lower lobes pulmonary consult for preoperative clearance for subtotal colectomy for adenocarcinoma of colon.     #Preoperative clearance for subtotal colectomy   Patient has stable COPD, currently well controlled and not in exacerbation. Patient additionally has MARK, noncompliant with home CPAP.   -recommend continuing Spiriva with duonebs PRN for wheezing/SOB  -recommend observing patient for 4 hours post procedure in setting of MARK     #COPD  as above    #MARK  as above    #B/L GGO and pulmonary nodules  -differentials includes atelectasis vs pulmonary congestion less likely prior COVID infection (igG positive)  -manage fluid status per cardiology team given AS  -6 mm nodule RLL and 11 mm nodule RLL, PET scan with uptake on RLL nodule 8 mm and parapancreatic 8 mm lesion, stable since 1/2019 can have outpatient follow up.      unavailable when evaluating patient. Will re-evaluate on Friday.

## 2020-11-25 NOTE — PROGRESS NOTE ADULT - PROBLEM SELECTOR PLAN 4
Initially admitted w/ hypertensive emergency, now having episodes of hypotension  - hypotensive SBP 80s 11/18, resolved s/p IVF hydration NS 50cc/hr x 8hr  - again hypotensive SBP 80-100s w/ Rapid A-Fib on 11/21  - discontinued Coreg 6.25 mg PO BID, Lisinopril 10mg PO QD, and home HCTZ 12.5mg PO QD.   - current regimen: Lopressor 50 mg q6 hours, Cardizem 30 mg q8 hours Initially admitted w/ hypertensive emergency, now having episodes of hypotension  - hypotensive SBP 80s 11/18, resolved s/p IVF hydration NS 50cc/hr x 8hr  - again hypotensive SBP 80-100s w/ Rapid A-Fib on 11/21  - discontinued Coreg 6.25 mg PO BID, Lisinopril 10mg PO QD, and home HCTZ 12.5mg PO QD.   - current regimen: Lopressor 50 mg q6 hours, Cardizem 30 mg q8 hours.

## 2020-11-25 NOTE — CONSULT NOTE ADULT - SUBJECTIVE AND OBJECTIVE BOX
INCOMPLETE NOTE  HPI:  78 year old Macedonian speaking F, PMHx of uncontrolled HTN, hyperlipidemia, COPD(denies hospitalizations, intubations), MARK (on CPAP, noncompliant), chronic diastolic CHF (EF:61% by Echo 4/2019), aortic stenosis s/p transfemoral TAVR with rachael valve on 02/05/2019 with Dr. Alejo @[facility], recent hospitalization at [facility] (for HTN/headache, no changes made to medications, negative CT head/MRI brain, discharged on 11/4/20) who presents to [facility] ED 11/13/20 c/o intermittent chest pain and elevated BP x 1 week. Patient had chest pain as nonexertional and midsternal in nature. She states that her pain is now since resolved. She had a IRMA that shows increased transaortic valvular gradient of 62 mm Hg from 31 mm Hg 2-2019. She was on heparin gtt due to thickened aortic valve concern for thrombus and afib w/ RVR, but developed BRBPR. After GI did c-scope and EGD found 4 large colonic masses concern for cancer. 11/21 pt was started on digoxin due to hypotension w/ lopressor despite fluids, but developed rash and this was d/chrissie. Patient 11/24 on lopressor 50 Q6 and cardizem 30 Q8 tolerated well and is NSR. She follows outpatient w/ Dr. More for MARK, but does not use mask due to discomfort. She had PFTs that shows FEV1 90%, DLCO mildly reduced c/w obstructive pattern but not reversible w/ bronchodilators. She is GOLD class 1 for COPD (5-2019), uses spiriva.     CATH Hx:  @[facility] 1/09/19: Diagnostic right and left heart catheterization revealing LMCA normal, LAD with minor luminal irregularities, LCx large and normal, RCA large and normal. PA 18, RV 73/13 (21), PA 70/34 (49), PCWP 26, CO 7.3 L/min, CI 3.7, TPG 23, severe AS (mean LV/Ao gradient 48.1 mmHg, SETH 1.1 cm2).    (13 Nov 2020 03:58    Vital Signs Last 24 Hrs  T(C): 37.3 (25 Nov 2020 13:49), Max: 37.3 (25 Nov 2020 13:49)  T(F): 99.2 (25 Nov 2020 13:49), Max: 99.2 (25 Nov 2020 13:49)  HR: 62 (25 Nov 2020 12:14) (52 - 63)  BP: 156/77 (25 Nov 2020 12:14) (115/54 - 156/77)  BP(mean): 106 (25 Nov 2020 12:14) (78 - 106)  RR: 18 (25 Nov 2020 12:14) (16 - 18)  SpO2: 97% (25 Nov 2020 12:14) (94% - 98%) room air      Allergies  Digox (Rash; Urticaria; Hives)  Plavix (Other (Mod to Severe))          FAMILY HISTORY:  No pertinent family history in first degree relatives      PAST MEDICAL & SURGICAL HISTORY:  CHF (congestive heart failure)    Pulmonary HTN    Aortic stenosis  HTN (hypertension)  S/P TAVR (transcatheter aortic valve replacement)      SOCIAL HISTORY:    PHYSICAL EXAM    Lab Results:                        9.1    7.41  )-----------( 255      ( 25 Nov 2020 06:48 )             29.5     11-25    137  |  101  |  20  ----------------------------<  114<H>  4.4   |  24  |  1.13    Ca    8.8      25 Nov 2020 06:48  Mg     2.1     11-25        < from: CT Chest w/ Oral Cont and w/ IV Cont (11.20.20 @ 14:01) >  IMPRESSION:    2 cm segment of annular wall thickening of transverse colon, suspicious for neoplasm.    2.6 cm focal area of wall thickening in the ascending colon may also represent another neoplasm.    No colonic obstruction.    No evidence of distant metastasis.    Status post TAVR. Mild cardiomegaly and mild pulmonary congestion.          Thank you for the opportunity to participate in the care of this patient.    < end of copied text >     INCOMPLETE NOTE    Peruvian  used  HPI:  78 year old Peruvian speaking F, PMHx of uncontrolled HTN, hyperlipidemia, COPD(denies hospitalizations, intubations), MARK (on CPAP, noncompliant), chronic diastolic CHF (EF:61% by Echo 4/2019), aortic stenosis s/p transfemoral TAVR with rachael valve on 02/05/2019 with Dr. Alejo @[facility], recent hospitalization at [facility] (for HTN/headache, no changes made to medications, negative CT head/MRI brain, discharged on 11/4/20) who presents to [facility] ED 11/13/20 c/o intermittent chest pain and elevated BP x 1 week. Patient had chest pain as nonexertional and midsternal in nature. She states that her pain is now since resolved. She had a IRMA that shows increased transaortic valvular gradient of 62 mm Hg from 31 mm Hg 2-2019. She was on heparin gtt due to thickened aortic valve concern for thrombus and afib w/ RVR, but developed BRBPR. After GI did c-scope and EGD found 4 large colonic masses concern for cancer. 11/21 pt was started on digoxin due to hypotension w/ lopressor despite fluids, but developed rash and this was d/chrissie. Patient 11/24 on lopressor 50 Q6 and cardizem 30 Q8 tolerated well and is NSR.     She follows outpatient w/ Dr. More for MARK, but does not use mask due to discomfort.  She had PFTs that shows FEV1 90%, DLCO mildly reduced c/w obstructive pattern but not reversible w/ bronchodilators. She is GOLD class 1 for COPD (5-2019), uses spiriva for COPD 2 puffs daily. Denies having ever been intubated for respiratory difficulty. 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   Can walk 1/2 a block but then gets tired after.     ROS: Denies joint swelling, skin rashes, fevers, chills, nausea, vomiting, or diarrhea  Has dry cough occasionally.       CATH Hx:  @[facility] 1/09/19: Diagnostic right and left heart catheterization revealing LMCA normal, LAD with minor luminal irregularities, LCx large and normal, RCA large and normal. PA 18, RV 73/13 (21), PA 70/34 (49), PCWP 26, CO 7.3 L/min, CI 3.7, TPG 23, severe AS (mean LV/Ao gradient 48.1 mmHg, SETH 1.1 cm2).    (13 Nov 2020 03:58    Vital Signs Last 24 Hrs  T(C): 37.3 (25 Nov 2020 13:49), Max: 37.3 (25 Nov 2020 13:49)  T(F): 99.2 (25 Nov 2020 13:49), Max: 99.2 (25 Nov 2020 13:49)  HR: 62 (25 Nov 2020 12:14) (52 - 63)  BP: 156/77 (25 Nov 2020 12:14) (115/54 - 156/77)  BP(mean): 106 (25 Nov 2020 12:14) (78 - 106)  RR: 18 (25 Nov 2020 12:14) (16 - 18)  SpO2: 97% (25 Nov 2020 12:14) (94% - 98%) room air    Allergies  Digox (Rash; Urticaria; Hives)  Plavix (Other (Mod to Severe))      FAMILY HISTORY:  Father passed away from stroke  Sister has anemia  Sister uses spiriva as well      PAST MEDICAL & SURGICAL HISTORY:  dCHF (congestive heart failure)  MARK  COPD  AS    Aortic stenosis  HTN (hypertension)  S/P TAVR (transcatheter aortic valve replacement)      SOCIAL HISTORY:  denies tobacco, illicit substances, or heavy alcohol use    PHYSICAL EXAM  PHYSICAL EXAM:  GENERAL: NAD, well-developed  HEAD:  Atraumatic, Normocephalic  EYES: EOMI, PERRLA, conjunctiva and sclera clear  NECK: Supple, No JVD  CHEST/LUNG: Clear to auscultation bilaterally; No wheeze, saturation 97% RA, and 94-96% on ambulation RA.   HEART: Regular rate and rhythm; No murmurs, rubs, or gallops  ABDOMEN: Soft, Nontender, Nondistended; Bowel sounds present  EXTREMITIES:  2+ Peripheral Pulses, No clubbing, cyanosis, or edema  PSYCH: AAOx3  NEUROLOGY: non-focal  SKIN: No rashes or lesions    Lab Results:                        9.1    7.41  )-----------( 255      ( 25 Nov 2020 06:48 )             29.5     11-25    137  |  101  |  20  ----------------------------<  114<H>  4.4   |  24  |  1.13    Ca    8.8      25 Nov 2020 06:48  Mg     2.1     11-25        < from: CT Chest w/ Oral Cont and w/ IV Cont (11.20.20 @ 14:01) >  IMPRESSION:    2 cm segment of annular wall thickening of transverse colon, suspicious for neoplasm.    2.6 cm focal area of wall thickening in the ascending colon may also represent another neoplasm.    No colonic obstruction.    No evidence of distant metastasis.    Status post TAVR. Mild cardiomegaly and mild pulmonary congestion.      Thank you for the opportunity to participate in the care of this patient.    < end of copied text >     Nigerian  used  HPI:  78 year old Nigerian speaking F, PMHx of uncontrolled HTN, hyperlipidemia, COPD(denies hospitalizations, intubations), MARK (on CPAP, noncompliant), chronic diastolic CHF (EF:61% by Echo 4/2019), aortic stenosis s/p transfemoral TAVR with rachael valve on 02/05/2019 with Dr. Alejo @[facility], recent hospitalization at [facility] (for HTN/headache, no changes made to medications, negative CT head/MRI brain, discharged on 11/4/20) who presents to [facility] ED 11/13/20 c/o intermittent chest pain and elevated BP x 1 week. Patient had chest pain as nonexertional and midsternal in nature. She states that her pain is now since resolved. She had a IRMA that shows increased transaortic valvular gradient of 62 mm Hg from 31 mm Hg 2-2019. She was on heparin gtt due to thickened aortic valve concern for thrombus and afib w/ RVR, but developed BRBPR. After GI did c-scope and EGD found 4 large colonic masses concern for cancer. 11/21 pt was started on digoxin due to hypotension w/ lopressor despite fluids, but developed rash and this was d/chrissie. Patient 11/24 on lopressor 50 Q6 and cardizem 30 Q8 tolerated well and is NSR.     She follows outpatient w/ Dr. More for MARK, but does not use mask due to discomfort.  She had PFTs that shows FEV1 90%, DLCO mildly reduced c/w obstructive pattern but not reversible w/ bronchodilators. She is GOLD class 1 for COPD (5-2019), uses spiriva for COPD 2 puffs daily. Denies having ever been intubated for respiratory difficulty. 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   Can walk 1/2 a block but then gets tired after.     ROS: Denies joint swelling, skin rashes, fevers, chills, nausea, vomiting, or diarrhea  Has dry cough occasionally.       CATH Hx:  @[facility] 1/09/19: Diagnostic right and left heart catheterization revealing LMCA normal, LAD with minor luminal irregularities, LCx large and normal, RCA large and normal. PA 18, RV 73/13 (21), PA 70/34 (49), PCWP 26, CO 7.3 L/min, CI 3.7, TPG 23, severe AS (mean LV/Ao gradient 48.1 mmHg, SETH 1.1 cm2).    (13 Nov 2020 03:58    Vital Signs Last 24 Hrs  T(C): 37.3 (25 Nov 2020 13:49), Max: 37.3 (25 Nov 2020 13:49)  T(F): 99.2 (25 Nov 2020 13:49), Max: 99.2 (25 Nov 2020 13:49)  HR: 62 (25 Nov 2020 12:14) (52 - 63)  BP: 156/77 (25 Nov 2020 12:14) (115/54 - 156/77)  BP(mean): 106 (25 Nov 2020 12:14) (78 - 106)  RR: 18 (25 Nov 2020 12:14) (16 - 18)  SpO2: 97% (25 Nov 2020 12:14) (94% - 98%) room air    Allergies  Digox (Rash; Urticaria; Hives)  Plavix (Other (Mod to Severe))      FAMILY HISTORY:  Father passed away from stroke  Sister has anemia  Sister uses spiriva as well      PAST MEDICAL & SURGICAL HISTORY:  dCHF (congestive heart failure)  MARK  COPD  AS    Aortic stenosis  HTN (hypertension)  S/P TAVR (transcatheter aortic valve replacement)      SOCIAL HISTORY:  denies tobacco, illicit substances, or heavy alcohol use    PHYSICAL EXAM  PHYSICAL EXAM:  GENERAL: NAD, well-developed  HEAD:  Atraumatic, Normocephalic  EYES: EOMI, PERRLA, conjunctiva and sclera clear  NECK: Supple, No JVD  CHEST/LUNG: Clear to auscultation bilaterally; No wheeze, saturation 97% RA, and 94-96% on ambulation RA.   HEART: Regular rate and rhythm; No murmurs, rubs, or gallops  ABDOMEN: Soft, Nontender, Nondistended; Bowel sounds present  EXTREMITIES:  2+ Peripheral Pulses, No clubbing, cyanosis, or edema  PSYCH: AAOx3  NEUROLOGY: non-focal  SKIN: No rashes or lesions    Lab Results:                        9.1    7.41  )-----------( 255      ( 25 Nov 2020 06:48 )             29.5     11-25    137  |  101  |  20  ----------------------------<  114<H>  4.4   |  24  |  1.13    Ca    8.8      25 Nov 2020 06:48  Mg     2.1     11-25        < from: CT Chest w/ Oral Cont and w/ IV Cont (11.20.20 @ 14:01) >  IMPRESSION:    2 cm segment of annular wall thickening of transverse colon, suspicious for neoplasm.    2.6 cm focal area of wall thickening in the ascending colon may also represent another neoplasm.    No colonic obstruction.    No evidence of distant metastasis.    Status post TAVR. Mild cardiomegaly and mild pulmonary congestion.      Thank you for the opportunity to participate in the care of this patient.    < end of copied text >

## 2020-11-25 NOTE — PROGRESS NOTE ADULT - PROBLEM SELECTOR PLAN 3
RESOLVED, NOW IN NSR; Prior hx of palpitations per patient, unclear if previous Hx of A-fib  - found to be in A-fib w/ RVR w/ -130s after CT Scan on 11/20, given Lopressor 5 mg IV x 2 w/ minimal improvement in HR; given Cardizem 10 mg IV x 1 w/ HR improved to 90-120s.   - CHADsVASc 4, HASBLED 3; HOWEVER, holding A/C in setting of bleeding risk   - Coreg discontinued due to lower BP  - initially started on Lopressor 25 mg PO 6hrs for better rate control if blood pressure is tolerating  - overnight 11/21, given Digoxin 0.5 mg IV and Digoxin Level w/ morning labs 1.2   - on 11/22, uptitrated to Lopressor 50 mg q6 hours and started on Digoxin 0.25 mg daily  - on 11/23, patient developed mild rash and itching sensation and thought to be in the setting of allergic reaction to Digoxin   - Digoxin d/c, patient started on Cardizem 30 mg q8 hours    - continue to monitor HR, now in NSR RESOLVED, NOW IN NSR; Prior hx of palpitations per patient, unclear if previous Hx of A-fib  - found to be in A-fib w/ RVR w/ -130s after CT Scan on 11/20, given Lopressor 5 mg IV x 2 w/ minimal improvement in HR; given Cardizem 10 mg IV x 1 w/ HR improved to 90-120s.   - CHADsVASc 4, HASBLED 3; HOWEVER, holding A/C in setting of bleeding risk   - Coreg discontinued due to lower BP  - overnight 11/21, given Digoxin 0.5 mg IV and Digoxin Level w/ morning labs 1.2   - on 11/22 started on Digoxin 0.25 mg daily  - on 11/23, patient developed mild rash and itching sensation and thought to be in the setting of allergic reaction to Digoxin   - Digoxin d/c, patient started on Cardizem 30 mg q8 hours    - c/w Lopressor 50mg Q6H  - continue to monitor HR, now in NSR.

## 2020-11-25 NOTE — PROGRESS NOTE ADULT - SUBJECTIVE AND OBJECTIVE BOX
[facility]    BRITTANY DE LA CRUZ  MRN-[id number]    INTERVAL HX: The patient feels OK. LEss SOB. No abdominal pain. 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 PET scan with mild uptake in the hilar nodes    HPI: 78 year old Azerbaijani woman with PMHx of uncontrolled HTN, hyperlipidemia, asthma (denies hospitalizations, intubations), MARK? (on CPAP, noncompliant), chronic diastolic CHF (EF:61% by Echo 2019), aortic stenosis s/p transfemoral TAVR with rachael valve on 2019 with Dr. Alejo @[facility], recent hospitalization at [facility] (for HTN/headache) admitted to [facility] with intermittent chest pain and elevated BP x 1 week. She developed hematochezia while on anticoagulation. Colonoscopy performed on  demonstrated four large colonic mass lesions with the most proximal one near the cecum (150 cm from the anal verge) and the most distal one in the transverse colon (100 cm from the anal verge) s/p multiple biopsies and tattooing of the proximal and distal mass.   CEA 23.1  The patient never had screening colonoscopy  No family history of GI malignancies.   Daughter  of breast cancer at the age 40    ROS:  + chest pain and SOB on admission   No nausea/vomiting  + lower abdminal pain  + hematochezia  No weight loss  + fatigue  No leg pain or leg swelling.    ROS is otherwise negative.    PMH/PSH:  PAST MEDICAL & SURGICAL HISTORY:  CHF (congestive heart failure)    Pulmonary HTN    Aortic stenosis    HTN (hypertension)    S/P TAVR (transcatheter aortic valve replacement)    No significant past surgical history    Medications:  MEDICATIONS  (STANDING):  atorvastatin 20 milliGRAM(s) Oral at bedtime  carvedilol 6.25 milliGRAM(s) Oral every 12 hours  hydrochlorothiazide 12.5 milliGRAM(s) Oral daily  influenza  Vaccine (HIGH DOSE) 0.7 milliLiter(s) IntraMuscular once  lisinopril 10 milliGRAM(s) Oral daily  pantoprazole  Injectable 40 milliGRAM(s) IV Push two times a day  polyethylene glycol 3350 17 Gram(s) Oral daily  sodium chloride 0.9%. 250 milliLiter(s) (50 mL/Hr) IV Continuous <Continuous>  tiotropium 18 MICROgram(s) Capsule 1 Capsule(s) Inhalation daily    MEDICATIONS  (PRN):  acetaminophen   Tablet .. 650 milliGRAM(s) Oral every 6 hours PRN Mild Pain (1 - 3), Moderate Pain (4 - 6)  acetaminophen 300 mG/butalbital 50 mG/ caffeine 40 mG 1 Capsule(s) Oral every 6 hours PRN Severe headache  aluminum hydroxide/magnesium hydroxide/simethicone Suspension 30 milliLiter(s) Oral every 6 hours PRN Dyspepsia  benzonatate 100 milliGRAM(s) Oral three times a day PRN Cough  guaiFENesin   Syrup  (Sugar-Free) 100 milliGRAM(s) Oral every 6 hours PRN Cough      Plavix (Other (Mod to Severe))    Allergies    Plavix (Other (Mod to Severe))    Intolerances    Exam:  Vital Signs Last 24 Hrs  T(C): 36.8 (2020 06:27), Max: 37.2 (2020 13:28)  T(F): 98.2 (2020 06:27), Max: 98.9 (2020 13:28)  HR: 63 (2020 05:24) (53 - 70)  BP: 139/63 (2020 05:11) (115/54 - 143/62)  BP(mean): 90 (2020 05:11) (78 - 98)  RR: 18 (2020 05:24) (16 - 18)  SpO2: 96% (2020 05:24) (96% - 98%)  elderly obese woman  HEENT: pink mucosae, anicteric sclerae  No palpable peripheral lymphadenopathy  COR: regular rhythm rate, no murmurs, rubs or gallops  PULMO: clear to auscultation B/L  ABD: soft, no palpable masses, no splenomegaly  EXT: no edema  NEURO: intact    Labs:                        9.1    7.41  )-----------( 255      ( 2020 06:48 )             29.5         CBC Full  -  ( 2020 06:48 )  WBC Count : 7.41 K/uL  RBC Count : 3.16 M/uL  Hemoglobin : 9.1 g/dL  Hematocrit : 29.5 %  Platelet Count - Automated : 255 K/uL  Mean Cell Volume : 93.4 fl  Mean Cell Hemoglobin : 28.8 pg  Mean Cell Hemoglobin Concentration : 30.8 gm/dL  Auto Neutrophil # : 4.76 K/uL  Auto Lymphocyte # : 1.57 K/uL  Auto Monocyte # : 0.70 K/uL  Auto Eosinophil # : 0.30 K/uL  Auto Basophil # : 0.04 K/uL  Auto Neutrophil % : 64.4 %  Auto Lymphocyte % : 21.2 %  Auto Monocyte % : 9.4 %  Auto Eosinophil % : 4.0 %  Auto Basophil % : 0.5 %            139  |  103  |  22  ----------------------------<  113<H>  4.5   |  22  |  1.07    Ca    8.8      2020 06:29  Mg     2.0                   Pertinent imaging studies: reviewed    Assessment:    Colon masses    Plan:  Newly diagnosed colon cancer  We reviewed and discussed path and PET scan  Additional molecular studies will be requested (MSI, KRAS, NRAS, BRAF)  FDG uptake in hilar nodes most suspicious for inflammatory etiology  We might want to consider tissue evaluation, but the patient will need colon resection due to ongoing bleeding and need for anticoagulation   Management was discussed with colorectal surgeon Dr. Sanford and cardiology Dr. Kessler    Thank you    Stefano Mckeon MD  [phone]

## 2020-11-25 NOTE — CONSULT NOTE ADULT - ATTENDING COMMENTS
Seen/discussed with fellow on 11/15  Agree with above
79 y/o F with pmhx of severe AS s/p TAVR in 2/2019 with a Jamar 23mmHg here with acute on chronic diastolic heart failure in the setting of elevated AV gradients on TTE  -AV gradient mean from 13mmHg to 35mmHg  -Likely leaflet thrombosis vs bioprosthetic valve failure   -Plan for CT Structural on Monday and possibly IRMA pending those results  -Would start heparin gtt   -BP controlled now on current regimen  -NO Plavix (neutropenia last admission from plavix)
COPD, MARK.  Well controlled copd, stable to undergo planned surgery, may use nebs pots procedure  Noncompliant with cpap, standard MARK perioperative precautions.

## 2020-11-25 NOTE — PROGRESS NOTE ADULT - ASSESSMENT
78 year old Danish speaking F, PMHx of uncontrolled HTN, hyperlipidemia, asthma (denies hospitalizations, intubations), MARK? (on CPAP, noncompliant), chronic diastolic CHF (EF:61% by Echo 4/2019), aortic stenosis s/p transfemoral TAVR with rachael valve on 02/05/2019 with Dr. Alejo admitted for CP and hypertensive urgency. S/p colonoscopy 11/19 which revealed 4 large colonic masses (from cecum to transverse colon) biopsies taken. CEA elevated 23.1. No evidence of distant mets on CT. Surgery consulted for management of colonic mass.     PET scan: Focal FDG uptake in transverse and ascending colon. 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    Recommendations:   - Patient needs formal medical clearance/risk stratification, and medical optimization prior to proceeding with laparoscopic possible open partial colectomy, possible subtotal colectomy. Possible OR next week.   - Anticoagulation per primary team  - Team 1c will continue to follow  - D/w attending

## 2020-11-25 NOTE — PROGRESS NOTE ADULT - PROBLEM SELECTOR PLAN 1
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 Alejo at [facility]  - Echo 11/13/2020: normal BiV fxn/size, moderate LVH, grade II LV diastolic dysfunction, PEAK TRANSAORTIC GRADIENT 61.78 mmHg, mildly dilated LA, no pulm HTN, moderately dilated ascending aorta (previously 14mmHg on 4/2019)  - Structural CT scan: no thrombus seen.    - Limited TTE 11/16:  aortic valve gradient 71 mmHg  - IRMA 11/17: no LA/RA/MAIK/RAA thrombus seen, no evidence of an intracardiac shunt, 23 mm Jamar valve is noted in the aortic position w/o any aortic regurgitation, slight thickening at the base of the right cusp w/ probably mild restriction in excursion, other prosthetic leaflets appear normal  - given slight suspicious for thrombus at base of R cusp, per Structural Heart Dr Alejo, would like to start NOAC if cleared by GI  - HOWEVER, given high risk for bleeding in setting of 4 large friable colonic lesions, patient is NOT cleared to start NOAC given high risk of bleeding   - CTS stated nothing to do while inpatient, follow up as outpatient for further evaluation and management S/p transfemoral TAVR w/ Jamar valve on 02/05/2019 w/ Dr. Alejo at [facility]  - Echo 11/13/2020: normal BiV fxn/size, moderate LVH, grade II LV diastolic dysfunction, PEAK TRANSAORTIC GRADIENT 61.78 mmHg, mildly dilated LA, no pulm HTN, moderately dilated ascending aorta (previously 14mmHg on 4/2019)  - Structural CT scan: no thrombus seen.    - Limited TTE 11/16:  aortic valve gradient 71 mmHg  - Official IRMA 11/17: no thrombus seen, no evidence of an intracardiac shunt, 23 mm Jamar valve is noted in the aortic position w/o any aortic regurgitation, slight thickening at the base of the right cusp w/ probably mild restriction in excursion, other prosthetic leaflets appear normal  - given slight suspicious for thrombus at base of R cusp, per Structural Heart Dr Alejo, would like to start NOAC if cleared by GI  - HOWEVER, given high risk for bleeding in setting of 4 large friable colonic lesions, patient is NOT cleared to start NOAC given high risk of bleeding   - CTS stated nothing to do while inpatient, follow up as outpatient for further evaluation and management.

## 2020-11-26 LAB
ANION GAP SERPL CALC-SCNC: 10 MMOL/L — SIGNIFICANT CHANGE UP (ref 5–17)
BUN SERPL-MCNC: 15 MG/DL — SIGNIFICANT CHANGE UP (ref 7–23)
CALCIUM SERPL-MCNC: 9 MG/DL — SIGNIFICANT CHANGE UP (ref 8.4–10.5)
CHLORIDE SERPL-SCNC: 102 MMOL/L — SIGNIFICANT CHANGE UP (ref 96–108)
CO2 SERPL-SCNC: 26 MMOL/L — SIGNIFICANT CHANGE UP (ref 22–31)
CREAT SERPL-MCNC: 1.08 MG/DL — SIGNIFICANT CHANGE UP (ref 0.5–1.3)
GLUCOSE SERPL-MCNC: 125 MG/DL — HIGH (ref 70–99)
HCT VFR BLD CALC: 30.2 % — LOW (ref 34.5–45)
HGB BLD-MCNC: 9.5 G/DL — LOW (ref 11.5–15.5)
MAGNESIUM SERPL-MCNC: 2.1 MG/DL — SIGNIFICANT CHANGE UP (ref 1.6–2.6)
MCHC RBC-ENTMCNC: 29.7 PG — SIGNIFICANT CHANGE UP (ref 27–34)
MCHC RBC-ENTMCNC: 31.5 GM/DL — LOW (ref 32–36)
MCV RBC AUTO: 94.4 FL — SIGNIFICANT CHANGE UP (ref 80–100)
NRBC # BLD: 0 /100 WBCS — SIGNIFICANT CHANGE UP (ref 0–0)
PLATELET # BLD AUTO: 243 K/UL — SIGNIFICANT CHANGE UP (ref 150–400)
POTASSIUM SERPL-MCNC: 4.4 MMOL/L — SIGNIFICANT CHANGE UP (ref 3.5–5.3)
POTASSIUM SERPL-SCNC: 4.4 MMOL/L — SIGNIFICANT CHANGE UP (ref 3.5–5.3)
RBC # BLD: 3.2 M/UL — LOW (ref 3.8–5.2)
RBC # FLD: 13.8 % — SIGNIFICANT CHANGE UP (ref 10.3–14.5)
SODIUM SERPL-SCNC: 138 MMOL/L — SIGNIFICANT CHANGE UP (ref 135–145)
WBC # BLD: 7.16 K/UL — SIGNIFICANT CHANGE UP (ref 3.8–10.5)
WBC # FLD AUTO: 7.16 K/UL — SIGNIFICANT CHANGE UP (ref 3.8–10.5)

## 2020-11-26 PROCEDURE — 99233 SBSQ HOSP IP/OBS HIGH 50: CPT

## 2020-11-26 RX ORDER — DILTIAZEM HCL 120 MG
30 CAPSULE, EXT RELEASE 24 HR ORAL
Refills: 0 | Status: DISCONTINUED | OUTPATIENT
Start: 2020-11-26 | End: 2020-11-29

## 2020-11-26 RX ADMIN — Medication 30 MILLIGRAM(S): at 06:21

## 2020-11-26 RX ADMIN — ATORVASTATIN CALCIUM 20 MILLIGRAM(S): 80 TABLET, FILM COATED ORAL at 21:12

## 2020-11-26 RX ADMIN — PANTOPRAZOLE SODIUM 40 MILLIGRAM(S): 20 TABLET, DELAYED RELEASE ORAL at 06:22

## 2020-11-26 RX ADMIN — Medication 100 MILLIGRAM(S): at 21:17

## 2020-11-26 RX ADMIN — PANTOPRAZOLE SODIUM 40 MILLIGRAM(S): 20 TABLET, DELAYED RELEASE ORAL at 18:01

## 2020-11-26 RX ADMIN — Medication 30 MILLIGRAM(S): at 14:37

## 2020-11-26 RX ADMIN — Medication 1 APPLICATION(S): at 06:21

## 2020-11-26 RX ADMIN — NYSTATIN CREAM 1 APPLICATION(S): 100000 CREAM TOPICAL at 06:21

## 2020-11-26 RX ADMIN — NYSTATIN CREAM 1 APPLICATION(S): 100000 CREAM TOPICAL at 18:01

## 2020-11-26 RX ADMIN — Medication 50 MILLIGRAM(S): at 18:01

## 2020-11-26 RX ADMIN — POLYETHYLENE GLYCOL 3350 17 GRAM(S): 17 POWDER, FOR SOLUTION ORAL at 08:59

## 2020-11-26 RX ADMIN — Medication 50 MILLIGRAM(S): at 06:21

## 2020-11-26 RX ADMIN — Medication 25 MILLIGRAM(S): at 00:42

## 2020-11-26 RX ADMIN — Medication 100 MILLIGRAM(S): at 08:59

## 2020-11-26 RX ADMIN — Medication 1 APPLICATION(S): at 18:01

## 2020-11-26 RX ADMIN — Medication 50 MILLIGRAM(S): at 12:30

## 2020-11-26 RX ADMIN — TIOTROPIUM BROMIDE 1 CAPSULE(S): 18 CAPSULE ORAL; RESPIRATORY (INHALATION) at 08:59

## 2020-11-26 RX ADMIN — Medication 50 MILLIGRAM(S): at 23:57

## 2020-11-26 NOTE — PROGRESS NOTE ADULT - PROBLEM SELECTOR PLAN 3
In NSR currently  Prior hx of palpitations per patient, unclear if previous Hx of A-fib  - found to be in A-fib w/ RVR w/ -130s after CT Scan on 11/20, given Lopressor 5 mg IV x 2 w/ minimal improvement in HR; given Cardizem 10 mg IV x 1 w/ HR improved to 90-120s.   - CHADsVASc 4, HASBLED 3; HOWEVER, holding A/C in setting of bleeding risk   - Coreg discontinued due to lower BP  - overnight 11/21, given Digoxin 0.5 mg IV and Digoxin Level w/ morning labs 1.2   - on 11/22 started on Digoxin 0.25 mg daily  - on 11/23, patient developed mild rash and itching sensation and thought to be in the setting of allergic reaction to Digoxin   - Digoxin d/c, patient started on Cardizem   - c/w cardizem 30mg BID (dec from TID on 11/26)  - c/w Lopressor 50mg Q6H  - continue to monitor HR, now in NSR.  -call EP in am for possible amio initiation

## 2020-11-26 NOTE — PROGRESS NOTE ADULT - PROBLEM SELECTOR PLAN 2
Multiple episodes of BRBPR on 11/14 PM and 11/15 PM due to supratherapeutic PTT on Heparin gtt (has had prior to admission)  - also had recurrent episodes of BRBPR in evening on 11/17 after Heparin gtt was off and PTT stabilized, and per patient occurred w/ straining during BM  - s/p EGD revealing mild gastropathy  - s/p Colonoscopy revealing four large colonic mass lesions w/ the most proximal one near the cecum (150 cm from the anal verge) and the most proximal one in the transverse colon (100 cm from the anal verge), s/p multiple biopsies and tattooing of the proximal and distal mass; ulceration and contact bleeding noted from the mass lesions  - pathology consistent w/ adenocarcinoma  - elevated CEA 23.1  - CT Chest/Abdomen/Pelvis 11/20: 2 cm segment of annular wall thickening of transverse colon, suspicious for neoplasm, 2.6 cm focal area of wall thickening in the ascending colon may also represent another neoplasm, no colonic obstruction, no evidence of distant metastasis  - high risk of GI bleeding on anticoagulation therapy given above findings  - GI service signed off   - PET Scan performed, results consistent w/ known malignacy and no evidence of spread  - Surgery planning for colectomy 12/2/2020   - Cardiac clearance attestation written by Dr. Kessler, Pulm and medicine consulted for clearance  - Heme/onc recommending possible tissue evaluation of hilar nodes, most likely after colectomy  -Continue to follow Heme/onc and surgery rec’s, and f/u Pulm and medicine clearance

## 2020-11-26 NOTE — PROGRESS NOTE ADULT - SUBJECTIVE AND OBJECTIVE BOX
SUBJECTIVE: Patient seen and examined bedside. Denies abd and chest pain. -N/-V; reports not having a huge appetite. +flatus, +BM (nonbloody). Denies SOB, palpitations, dizziness, lightheadedness.     diltiazem    Tablet 30 milliGRAM(s) Oral every 8 hours  metoprolol tartrate 50 milliGRAM(s) Oral every 6 hours    MEDICATIONS  (PRN):  acetaminophen   Tablet .. 650 milliGRAM(s) Oral every 6 hours PRN Mild Pain (1 - 3), Moderate Pain (4 - 6)  acetaminophen 300 mG/butalbital 50 mG/ caffeine 40 mG 1 Capsule(s) Oral every 6 hours PRN Severe headache  aluminum hydroxide/magnesium hydroxide/simethicone Suspension 30 milliLiter(s) Oral every 6 hours PRN Dyspepsia  benzonatate 100 milliGRAM(s) Oral three times a day PRN Cough  diphenhydrAMINE 25 milliGRAM(s) Oral every 4 hours PRN Rash and/or Itching  guaiFENesin   Syrup  (Sugar-Free) 100 milliGRAM(s) Oral every 6 hours PRN Cough    PHYSICAL EXAM  GENERAL: NAD, Resting comfortably in chair, awake, opens eyes spontaneously  HEENT: NCAT, MMM, Normal conjunctiva, PERRL  RESP: Nonlabored breathing, No respiratory distress  CARD: Normal rate, Normal peripheral perfusion  GI: Soft, ND, slightly TTP R sided abdomen, No guarding, No rebound tenderness  EXTREM: WWP, No edema, No gross deformity of extremities  SKIN: No rashes, no lesions  NEURO: AAOx3, No focal motor or sensory deficits  PSYCH: Affect and characteristics of appearance, verbalizations, and behaviors are appropriate      I&O's Detail    25 Nov 2020 07:01  -  26 Nov 2020 07:00  --------------------------------------------------------  IN:    Oral Fluid: 358 mL  Total IN: 358 mL    OUT:  Total OUT: 0 mL    Total NET: 358 mL      26 Nov 2020 07:01  -  26 Nov 2020 13:18  --------------------------------------------------------  IN:    Oral Fluid: 710 mL  Total IN: 710 mL    OUT:    Voided (mL): 450 mL  Total OUT: 450 mL    Total NET: 260 mL          Vital Signs Last 24 Hrs  T(C): 37.3 (26 Nov 2020 09:50), Max: 37.3 (25 Nov 2020 13:49)  T(F): 99.2 (26 Nov 2020 09:50), Max: 99.2 (25 Nov 2020 13:49)  HR: 63 (26 Nov 2020 12:30) (51 - 63)  BP: 192/77 (26 Nov 2020 12:30) (123/58 - 192/77)  BP(mean): 111 (26 Nov 2020 12:30) (83 - 111)  RR: 16 (26 Nov 2020 12:30) (16 - 18)  SpO2: 96% (26 Nov 2020 12:30) (91% - 97%)    General: NAD, resting comfortably in bed  C/V: NSR  Pulm: Nonlabored breathing, no respiratory distress  Abd: soft, NT/ND  Extrem: WWP, no edema, SCDs in place    LABS:                        9.5    7.16  )-----------( 243      ( 26 Nov 2020 08:59 )             30.2     11-26    138  |  102  |  15  ----------------------------<  125<H>  4.4   |  26  |  1.08    Ca    9.0      26 Nov 2020 08:59  Mg     2.1     11-26

## 2020-11-26 NOTE — PROGRESS NOTE ADULT - ASSESSMENT
78 year old Guatemalan speaking F, PMHx of uncontrolled HTN, hyperlipidemia, asthma (denies hospitalizations, intubations), MARK? (on CPAP, noncompliant), chronic diastolic CHF (EF:61% by Echo 4/2019), aortic stenosis s/p transfemoral TAVR with rachael valve on 02/05/2019 with Dr. Alejo admitted for CP and hypertensive urgency. S/p colonoscopy 11/19 which revealed 4 large colonic masses (from cecum to transverse colon) biopsies taken. CEA elevated 23.1. No evidence of distant mets on CT. Surgery consulted for management of colonic mass.     PET scan: Focal FDG uptake in transverse and ascending colon. 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    Recommendations:   - Patient needs formal medical clearance/risk stratification, and medical optimization prior to proceeding with laparoscopic possible open partial colectomy, possible subtotal colectomy. Possible OR next week (12/2/20).  - Anticoagulation per primary team  - Team 1c will continue to follow

## 2020-11-26 NOTE — PROGRESS NOTE ADULT - PROBLEM SELECTOR PLAN 1
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 Alejo at [facility]  - Echo 11/13/2020: normal BiV fxn/size, moderate LVH, grade II LV diastolic dysfunction, PEAK TRANSAORTIC GRADIENT 61.78 mmHg, mildly dilated LA, no pulm HTN, moderately dilated ascending aorta (previously 14mmHg on 4/2019)  - Structural CT scan: no thrombus seen.    - Limited TTE 11/16:  aortic valve gradient 71 mmHg  - Official IRMA 11/17: no thrombus seen, no evidence of an intracardiac shunt, 23 mm Jamar valve is noted in the aortic position w/o any aortic regurgitation, slight thickening at the base of the right cusp w/ probably mild restriction in excursion, other prosthetic leaflets appear normal  - given slight suspicious for thrombus at base of R cusp, per Structural Heart Dr Alejo, would like to start NOAC if cleared by GI  - HOWEVER, given high risk for bleeding in setting of 4 large friable colonic lesions, patient is NOT cleared to start NOAC given high risk of bleeding   - CTS stated nothing to do while inpatient, follow up as outpatient for further evaluation and management.

## 2020-11-26 NOTE — PROGRESS NOTE ADULT - PROBLEM SELECTOR PLAN 8
Intermittently w/ HA, but no focal neurodeficits  - Responds well to Tylenol and Fiorocet, ordered PRN   - Of note, patient reportedly had normal CT Head/MRI Brain at [facility] ~1 week ago  - given recurrent HA/dizziness, repeat CT Head 11/18/2020 w/ no evidence of acute intracranial hemorrhage or acute transcortical infarct

## 2020-11-26 NOTE — PROGRESS NOTE ADULT - ASSESSMENT
79 y/o Gibraltarian speaking female, w/ PMHx uncontrolled HTN, HLD, asthma, chronic diastolic CHF (EF 61% by Echo 4/2019), and aortic stenosis (s/p transfemoral TAVR with Jamar valve on 02/05/2019 with Dr. Alejo at [facility]) who presented to [facility] ED 11/13/2020 c/o intermittent chest pain and elevated BP x 1 week. BP initially elevated on arrival; however, BP has been stable on home meds since admission. Echocardiogram 11/13/2020 showed elevated gradient over aortic valve of 61.78 (doubled since last echo). Dr. Alejo consulted and patient is s/p structural CT which was negative for thrombus. Limited TTE 11/16/2020 elevated aortic valve gradient 71 mmHg. GI consulted on 11/15/2020 due to BRBPR in the setting of supratherapeutic PTT while on Heparin gtt w/ continued episodes after stopping A/C, likely 2/2 hemorrhoids. IRMA noting AV thickening suspicious for thrombus, recommend to start AC pending GI clearance given acute drop in H/H. GI team reconsulted, patient now s/p EGD/Colonoscopy revealing 4 large colonic masses concerning for colon cancer; therefore, patient is unable to be on AC given high risk for bleeding. Patient is now awaiting further workup of colon masses, and recommendations from surgery and oncology consults. Hospital course further c/b new on set A-fib w/ RVR and -130s, w/ associated hypotension, and patient started on rate control strategy. On 11/21/2020 evening, patient's HR remained in 130's w/ hypotension despite IV fluids and Lopressor 25 mg TID. Patient initially started on Digoxin as per Dr. Deleon's recommendations; however, patient developed rash and itching likely due to allergic reaction to Digoxin. Patient continued to complain of this sensation on 11/24 and was started on PRN Benadryl as well as Hydrocortisone topical cream. Patient was initiated on Cardizem 30 mg q8 hours and uptitrated to Lopressor 50 mg q6 hours, and on 11/24 AM patient was noted to have converted to NSR. PET scan was performed on 11/23 and surgery now recommending colon resection 12/2/2020, with prior cardiac/pulm/medicine clearance.

## 2020-11-26 NOTE — PROGRESS NOTE ADULT - PROBLEM SELECTOR PLAN 4
Initially admitted w/ hypertensive emergency, now having episodes of hypotension  - hypotensive SBP 80s 11/18, resolved s/p IVF hydration NS 50cc/hr x 8hr  - again hypotensive SBP 80-100s w/ Rapid A-Fib on 11/21  - discontinued Coreg 6.25 mg PO BID, Lisinopril 10mg PO QD, and home HCTZ 12.5mg PO QD.   - current regimen: Lopressor 50 mg q6 hours, Cardizem 30 mg q8 hours.

## 2020-11-26 NOTE — PROGRESS NOTE ADULT - SUBJECTIVE AND OBJECTIVE BOX
Interventional Cardiology PA Adult Progress Note    Subjective Assessment:  Pt seen and examined at bedside this am. She is without complaints.     ROS Negative except as per Subjective and HPI  	  MEDICATIONS:  diltiazem    Tablet 30 milliGRAM(s) Oral every 8 hours  metoprolol tartrate 50 milliGRAM(s) Oral every 6 hours  benzonatate 100 milliGRAM(s) Oral three times a day PRN  guaiFENesin   Syrup  (Sugar-Free) 100 milliGRAM(s) Oral every 6 hours PRN  tiotropium 18 MICROgram(s) Capsule 1 Capsule(s) Inhalation daily  acetaminophen   Tablet .. 650 milliGRAM(s) Oral every 6 hours PRN  acetaminophen 300 mG/butalbital 50 mG/ caffeine 40 mG 1 Capsule(s) Oral every 6 hours PRN  diphenhydrAMINE 25 milliGRAM(s) Oral every 4 hours PRN  aluminum hydroxide/magnesium hydroxide/simethicone Suspension 30 milliLiter(s) Oral every 6 hours PRN  pantoprazole  Injectable 40 milliGRAM(s) IV Push two times a day  polyethylene glycol 3350 17 Gram(s) Oral daily  atorvastatin 20 milliGRAM(s) Oral at bedtime  hydrocortisone 1% Cream 1 Application(s) Topical two times a day  influenza  Vaccine (HIGH DOSE) 0.7 milliLiter(s) IntraMuscular once  nystatin Powder 1 Application(s) Topical two times a day      	    [PHYSICAL EXAM:  TELEMETRY:  T(C): 37.2 (11-26-20 @ 16:57), Max: 37.3 (11-26-20 @ 09:50)  HR: 62 (11-26-20 @ 18:19) (53 - 63)  BP: 140/60 (11-26-20 @ 18:19) (123/66 - 192/77)  RR: 16 (11-26-20 @ 18:19) (16 - 18)  SpO2: 96% (11-26-20 @ 18:19) (96% - 97%)  Wt(kg): --  I&O's Summary    25 Nov 2020 07:01  -  26 Nov 2020 07:00  --------------------------------------------------------  IN: 358 mL / OUT: 0 mL / NET: 358 mL    26 Nov 2020 07:01  -  26 Nov 2020 18:54  --------------------------------------------------------  IN: 710 mL / OUT: 450 mL / NET: 260 mL        Gonzalez:  Central/PICC/Mid Line:                                         Appearance: Normal	  HEENT:   Normal oral mucosa, PERRL, EOMI	  Neck: Supple,- JVD;    Cardiovascular: Normal S1 S2, No murmurs,   Respiratory: Lungs clear to auscultation. No Rales, Rhonchi, Wheezing	  Gastrointestinal:  Soft, Non-tender, + BS	  Skin: No rashes, No ecchymoses, No cyanosis  Extremities: Normal range of motion, No clubbing, cyanosis or edema  Vascular: Peripheral pulses palpable 2+ bilaterally  Neurologic: Non-focal  Psychiatry: A & O x 3, Mood & affect appropriate      	    ECG:  	  RADIOLOGY:   DIAGNOSTIC TESTING:  [ ] Echocardiogram:  [ ]  Catheterization:  [ ] Stress Test:    [ ] IRMA  OTHER: 	    LABS:	 	  CARDIAC MARKERS:                                  9.5    7.16  )-----------( 243      ( 26 Nov 2020 08:59 )             30.2     11-26    138  |  102  |  15  ----------------------------<  125<H>  4.4   |  26  |  1.08    Ca    9.0      26 Nov 2020 08:59  Mg     2.1     11-26      proBNP:   Lipid Profile:   HgA1c:   TSH:       ASSESSMENT/PLAN: 	        DVT ppx:  Dispo:

## 2020-11-26 NOTE — PROGRESS NOTE ADULT - PROBLEM SELECTOR PLAN 9
- Continue Spiriva inhaler daily      VTE PPx: SCDs  PT consulted: Home w/ Home PT  Patient lives with daughter Jaelyn, whom is her HHA (40 hrs/week).  CODE: Full code  Dispo: colectomy planned for 12/2/2020 with prior pulm/medicine clearance

## 2020-11-27 LAB
ANION GAP SERPL CALC-SCNC: 10 MMOL/L — SIGNIFICANT CHANGE UP (ref 5–17)
BUN SERPL-MCNC: 18 MG/DL — SIGNIFICANT CHANGE UP (ref 7–23)
CALCIUM SERPL-MCNC: 8.9 MG/DL — SIGNIFICANT CHANGE UP (ref 8.4–10.5)
CHLORIDE SERPL-SCNC: 103 MMOL/L — SIGNIFICANT CHANGE UP (ref 96–108)
CO2 SERPL-SCNC: 26 MMOL/L — SIGNIFICANT CHANGE UP (ref 22–31)
CREAT SERPL-MCNC: 1.11 MG/DL — SIGNIFICANT CHANGE UP (ref 0.5–1.3)
GLUCOSE SERPL-MCNC: 102 MG/DL — HIGH (ref 70–99)
HCT VFR BLD CALC: 28.2 % — LOW (ref 34.5–45)
HCT VFR BLD CALC: 29.4 % — LOW (ref 34.5–45)
HGB BLD-MCNC: 8.9 G/DL — LOW (ref 11.5–15.5)
HGB BLD-MCNC: 9.2 G/DL — LOW (ref 11.5–15.5)
MAGNESIUM SERPL-MCNC: 2.1 MG/DL — SIGNIFICANT CHANGE UP (ref 1.6–2.6)
MCHC RBC-ENTMCNC: 29.2 PG — SIGNIFICANT CHANGE UP (ref 27–34)
MCHC RBC-ENTMCNC: 29.9 PG — SIGNIFICANT CHANGE UP (ref 27–34)
MCHC RBC-ENTMCNC: 31.3 GM/DL — LOW (ref 32–36)
MCHC RBC-ENTMCNC: 31.6 GM/DL — LOW (ref 32–36)
MCV RBC AUTO: 93.3 FL — SIGNIFICANT CHANGE UP (ref 80–100)
MCV RBC AUTO: 94.6 FL — SIGNIFICANT CHANGE UP (ref 80–100)
NRBC # BLD: 0 /100 WBCS — SIGNIFICANT CHANGE UP (ref 0–0)
NRBC # BLD: 0 /100 WBCS — SIGNIFICANT CHANGE UP (ref 0–0)
PLATELET # BLD AUTO: 204 K/UL — SIGNIFICANT CHANGE UP (ref 150–400)
PLATELET # BLD AUTO: 205 K/UL — SIGNIFICANT CHANGE UP (ref 150–400)
POTASSIUM SERPL-MCNC: 4.4 MMOL/L — SIGNIFICANT CHANGE UP (ref 3.5–5.3)
POTASSIUM SERPL-SCNC: 4.4 MMOL/L — SIGNIFICANT CHANGE UP (ref 3.5–5.3)
RBC # BLD: 2.98 M/UL — LOW (ref 3.8–5.2)
RBC # BLD: 3.15 M/UL — LOW (ref 3.8–5.2)
RBC # FLD: 13.6 % — SIGNIFICANT CHANGE UP (ref 10.3–14.5)
RBC # FLD: 13.8 % — SIGNIFICANT CHANGE UP (ref 10.3–14.5)
SODIUM SERPL-SCNC: 139 MMOL/L — SIGNIFICANT CHANGE UP (ref 135–145)
WBC # BLD: 6.67 K/UL — SIGNIFICANT CHANGE UP (ref 3.8–10.5)
WBC # BLD: 6.96 K/UL — SIGNIFICANT CHANGE UP (ref 3.8–10.5)
WBC # FLD AUTO: 6.67 K/UL — SIGNIFICANT CHANGE UP (ref 3.8–10.5)
WBC # FLD AUTO: 6.96 K/UL — SIGNIFICANT CHANGE UP (ref 3.8–10.5)

## 2020-11-27 PROCEDURE — 99233 SBSQ HOSP IP/OBS HIGH 50: CPT

## 2020-11-27 PROCEDURE — 99232 SBSQ HOSP IP/OBS MODERATE 35: CPT | Mod: GC

## 2020-11-27 RX ORDER — FUROSEMIDE 40 MG
40 TABLET ORAL ONCE
Refills: 0 | Status: COMPLETED | OUTPATIENT
Start: 2020-11-27 | End: 2020-11-27

## 2020-11-27 RX ORDER — FAMOTIDINE 10 MG/ML
20 INJECTION INTRAVENOUS DAILY
Refills: 0 | Status: DISCONTINUED | OUTPATIENT
Start: 2020-11-27 | End: 2020-11-27

## 2020-11-27 RX ORDER — HYDROCHLOROTHIAZIDE 25 MG
12.5 TABLET ORAL DAILY
Refills: 0 | Status: DISCONTINUED | OUTPATIENT
Start: 2020-11-27 | End: 2020-11-29

## 2020-11-27 RX ADMIN — Medication 650 MILLIGRAM(S): at 16:02

## 2020-11-27 RX ADMIN — Medication 650 MILLIGRAM(S): at 21:51

## 2020-11-27 RX ADMIN — Medication 50 MILLIGRAM(S): at 17:59

## 2020-11-27 RX ADMIN — ATORVASTATIN CALCIUM 20 MILLIGRAM(S): 80 TABLET, FILM COATED ORAL at 21:49

## 2020-11-27 RX ADMIN — Medication 40 MILLIGRAM(S): at 10:18

## 2020-11-27 RX ADMIN — Medication 30 MILLIGRAM(S): at 17:59

## 2020-11-27 RX ADMIN — Medication 650 MILLIGRAM(S): at 01:30

## 2020-11-27 RX ADMIN — Medication 1 APPLICATION(S): at 17:59

## 2020-11-27 RX ADMIN — PANTOPRAZOLE SODIUM 40 MILLIGRAM(S): 20 TABLET, DELAYED RELEASE ORAL at 06:54

## 2020-11-27 RX ADMIN — NYSTATIN CREAM 1 APPLICATION(S): 100000 CREAM TOPICAL at 07:01

## 2020-11-27 RX ADMIN — NYSTATIN CREAM 1 APPLICATION(S): 100000 CREAM TOPICAL at 17:59

## 2020-11-27 RX ADMIN — Medication 650 MILLIGRAM(S): at 22:23

## 2020-11-27 RX ADMIN — Medication 12.5 MILLIGRAM(S): at 12:36

## 2020-11-27 RX ADMIN — Medication 650 MILLIGRAM(S): at 15:13

## 2020-11-27 RX ADMIN — Medication 1 APPLICATION(S): at 07:01

## 2020-11-27 RX ADMIN — Medication 30 MILLIGRAM(S): at 06:54

## 2020-11-27 RX ADMIN — TIOTROPIUM BROMIDE 1 CAPSULE(S): 18 CAPSULE ORAL; RESPIRATORY (INHALATION) at 09:31

## 2020-11-27 RX ADMIN — Medication 100 MILLIGRAM(S): at 18:07

## 2020-11-27 RX ADMIN — PANTOPRAZOLE SODIUM 40 MILLIGRAM(S): 20 TABLET, DELAYED RELEASE ORAL at 17:59

## 2020-11-27 RX ADMIN — Medication 650 MILLIGRAM(S): at 00:31

## 2020-11-27 RX ADMIN — POLYETHYLENE GLYCOL 3350 17 GRAM(S): 17 POWDER, FOR SOLUTION ORAL at 09:32

## 2020-11-27 NOTE — PROGRESS NOTE ADULT - ASSESSMENT
77 y/o Bhutanese speaking female, w/ PMHx uncontrolled HTN, HLD, asthma, chronic diastolic CHF (EF 61% by Echo 4/2019), and aortic stenosis (s/p transfemoral TAVR with Jamar valve on 02/05/2019 with Dr. Alejo at [facility]) who presented to [facility] ED 11/13/2020 c/o intermittent chest pain and elevated BP x 1 week. BP initially elevated on arrival; however, BP has been stable on home meds since admission. Echocardiogram 11/13/2020 showed elevated gradient over aortic valve of 61.78 (doubled since last echo). Dr. Alejo consulted and patient is s/p structural CT which was negative for thrombus. Limited TTE 11/16/2020 elevated aortic valve gradient 71 mmHg. GI consulted on 11/15/2020 due to BRBPR in the setting of supratherapeutic PTT while on Heparin gtt w/ continued episodes after stopping A/C, likely 2/2 hemorrhoids. IRMA noting AV thickening suspicious for thrombus, recommend to start AC pending GI clearance given acute drop in H/H. GI team reconsulted, patient now s/p EGD/Colonoscopy revealing 4 large colonic masses concerning for colon cancer; therefore, patient is unable to be on AC given high risk for bleeding. Patient is now awaiting further workup of colon masses, and recommendations from surgery and oncology consults. Hospital course further c/b new on set A-fib w/ RVR and -130s, w/ associated hypotension, and patient started on rate control strategy. On 11/21/2020 evening, patient's HR remained in 130's w/ hypotension despite IV fluids and Lopressor 25 mg TID. Patient initially started on Digoxin as per Dr. Deleon's recommendations; however, patient developed rash and itching likely due to allergic reaction to Digoxin. Patient continued to complain of this sensation on 11/24 and was started on PRN Benadryl as well as Hydrocortisone topical cream. Patient was initiated on Cardizem 30 mg q8 hours and uptitrated to Lopressor 50 mg q6 hours, and on 11/24 AM patient was noted to have converted to NSR. PET scan was performed on 11/23 and surgery now recommending colon resection 12/2/2020, with prior cardiac/pulm/medicine clearance.      77 y/o Armenian speaking female, w/ PMHx uncontrolled HTN, HLD, asthma, chronic diastolic CHF (EF 61% by Echo 4/2019), and aortic stenosis (s/p transfemoral TAVR with Jamar valve on 02/05/2019 with Dr. Alejo at [facility]) who presented to [facility] ED 11/13/2020 c/o intermittent chest pain and elevated BP x 1 week. BP initially elevated on arrival; however, BP has been stable on home meds since admission. Echocardiogram 11/13/2020 showed elevated gradient over aortic valve of 61.78 (doubled since last echo). Dr. Alejo consulted and patient is s/p structural CT which was negative for thrombus. Limited TTE 11/16/2020 elevated aortic valve gradient 71 mmHg. GI consulted on 11/15/2020 due to BRBPR in the setting of supratherapeutic PTT while on Heparin gtt w/ continued episodes after stopping A/C, likely 2/2 hemorrhoids. IRMA noting AV thickening suspicious for thrombus, recommend to start AC pending GI clearance given acute drop in H/H. GI team reconsulted, patient now s/p EGD/Colonoscopy revealing 4 large colonic masses concerning for colon cancer; therefore, patient is unable to be on AC given high risk for bleeding. Patient is now awaiting further workup of colon masses, and recommendations from surgery and oncology consults. Hospital course further c/b new on set A-fib w/ RVR and -130s, w/ associated hypotension, and patient started on rate control strategy. On 11/21/2020 evening, patient's HR remained in 130's w/ hypotension despite IV fluids and Lopressor 25 mg TID. Patient initially started on Digoxin as per Dr. Deleon's recommendations; however, patient developed rash and itching likely due to allergic reaction to Digoxin. Patient continued to complain of this sensation on 11/24 and was started on PRN Benadryl as well as Hydrocortisone topical cream. Patient was initiated on Cardizem 30 mg q8 hours and uptitrated to Lopressor 50 mg q6 hours, and on 11/24 AM patient was noted to have converted to NSR. PET scan was performed on 11/23 and surgery now recommending colon resection 12/2/2020, with prior cardiac/pulm/medicine clearance. 79 y/o Malian speaking female, w/ PMHx uncontrolled HTN, HLD, asthma, chronic diastolic CHF (EF 61% by Echo 4/2019), and aortic stenosis (s/p transfemoral TAVR with Jamar valve on 02/05/2019 with Dr. Alejo at [facility]) who presented to [facility] ED 11/13/2020 c/o intermittent chest pain and elevated BP x 1 week. BP initially elevated on arrival; however, BP has been stable on home meds since admission. Echocardiogram 11/13/2020 showed elevated gradient over aortic valve of 61.78 (doubled since last echo). Dr. Alejo consulted and patient is s/p structural CT which was negative for thrombus. Limited TTE 11/16/2020 elevated aortic valve gradient 71 mmHg. GI consulted on 11/15/2020 due to BRBPR in the setting of supratherapeutic PTT while on Heparin gtt w/ continued episodes after stopping A/C, likely 2/2 hemorrhoids. IRMA noting AV thickening suspicious for thrombus, recommend to start AC pending GI clearance given acute drop in H/H. GI team reconsulted, patient now s/p EGD/Colonoscopy revealing 4 large colonic masses concerning for colon cancer; therefore, patient is unable to be on AC given high risk for bleeding. Patient is now awaiting further workup of colon masses, and recommendations from surgery and oncology consults. Hospital course further c/b new on set A-fib w/ RVR and -130s, w/ associated hypotension, and patient started on rate control strategy. On 11/21/2020 evening, patient's HR remained in 130's w/ hypotension despite IV fluids and Lopressor 25 mg TID. Patient initially started on Digoxin as per Dr. Deleon's recommendations; however, patient developed rash and itching likely due to allergic reaction to Digoxin. Patient continued to complain of this sensation on 11/24 and was started on PRN Benadryl as well as Hydrocortisone topical cream. Patient was initiated on Cardizem 30 mg q8 hours and uptitrated to Lopressor 50 mg q6 hours, and on 11/24 AM patient was noted to have converted to NSR. PET scan was performed on 11/23 and surgery now recommending colon resection 12/2/2020. 79 y/o [place] Irish speaking female, w/ PMHx uncontrolled HTN, HLD, asthma, chronic diastolic CHF (EF 61% by Echo 4/2019), and aortic stenosis (s/p transfemoral TAVR with Jamar valve 02/2019 with Dr. Alejo) admitted for hypertensive urgency with CP, but BPs have been labile throughout admission. Echo 11/13 showed elevated gradient over aortic valve of 61.78 (doubled since last echo). Dr. Alejo consulted and patient is s/p structural CT which was negative for thrombus. Limited TTE 11/16 elevated aortic valve gradient 71 mmHg. Hospital course c/b on 11/15 due to BRBPR while on Heparin gtt w/ continued episodes after stopping A/C. IRMA noting AV thickening suspicious for thrombus, recommend to start AC pending GI clearance given acute drop in H/H. GI team consulted for a/c, patient now s/p EGD/Colonoscopy revealing 4 large colonic masses concerning for colon cancer; therefore AC c/i. Heme/onc and surgery consulted for further w/u. Hospital course further c/b new on set A-fib w/ RVR and -130s, w/ associated hypotension, and patient started on rate control strategy. Digoxin was started on 11/21 given difficult rate control, but d/c after pt developed rash which has since cleared. Patient is now rate controlled w/ Cardizem 30 mg q12h and Lopressor 50mg q6h and converted to NSR on 11/24. PET scan was performed on 11/23 and surgery now recommending colon resection 12/2/2020.

## 2020-11-27 NOTE — PROGRESS NOTE ADULT - SUBJECTIVE AND OBJECTIVE BOX
Interventional Cardiology PA Adult Progress Note    **INCOMPLETE**    Subjective Assessment:  O/N: Pt bradycardic to 37 in am while sleeping.   Pt seen and examined at bedside this am. She reports feeling well. Denies CP, SOB, palpitations, lightheadedness, dizziness, orthopnea, subjective fevers, chills, cough.    ROS Negative except as per Subjective and HPI  	  MEDICATIONS:  diltiazem    Tablet 30 milliGRAM(s) Oral two times a day  metoprolol tartrate 50 milliGRAM(s) Oral every 6 hours  benzonatate 100 milliGRAM(s) Oral three times a day PRN  guaiFENesin   Syrup  (Sugar-Free) 100 milliGRAM(s) Oral every 6 hours PRN  tiotropium 18 MICROgram(s) Capsule 1 Capsule(s) Inhalation daily  acetaminophen   Tablet .. 650 milliGRAM(s) Oral every 6 hours PRN  acetaminophen 300 mG/butalbital 50 mG/ caffeine 40 mG 1 Capsule(s) Oral every 6 hours PRN  diphenhydrAMINE 25 milliGRAM(s) Oral every 4 hours PRN  aluminum hydroxide/magnesium hydroxide/simethicone Suspension 30 milliLiter(s) Oral every 6 hours PRN  pantoprazole  Injectable 40 milliGRAM(s) IV Push two times a day  polyethylene glycol 3350 17 Gram(s) Oral daily  atorvastatin 20 milliGRAM(s) Oral at bedtime  hydrocortisone 1% Cream 1 Application(s) Topical two times a day  influenza  Vaccine (HIGH DOSE) 0.7 milliLiter(s) IntraMuscular once  nystatin Powder 1 Application(s) Topical two times a day	    [PHYSICAL EXAM:  TELEMETRY:  T(C): 36.5 (11-27-20 @ 04:30), Max: 37.3 (11-26-20 @ 09:50)  HR: 55 (11-27-20 @ 05:00) (53 - 63)  BP: 131/60 (11-27-20 @ 05:00) (131/60 - 192/77)  RR: 18 (11-27-20 @ 05:00) (16 - 18)  SpO2: 96% (11-27-20 @ 05:00) (94% - 99%)  Wt(kg): --  I&O's Summary    26 Nov 2020 07:01  -  27 Nov 2020 07:00  --------------------------------------------------------  IN: 910 mL / OUT: 450 mL / NET: 460 mL        Gonzalez:  Central/PICC/Mid Line:                                         Appearance: Normal	  HEENT:   Normal oral mucosa, PERRL, EOMI	  Neck: Supple, - JVD  Cardiovascular: Normal  No murmurs,   Respiratory: Lungs clear to auscultation. No Rales, Rhonchi, Wheezing	  Gastrointestinal:  Soft, Non-tender, + BS	  Skin: No rashes, No ecchymoses, No cyanosis  Extremities: Normal range of motion, No clubbing, cyanosis or edema  Neurologic: Non-focal  Psychiatry: A & O x 3, Mood & affect appropriate      	    ECG:  	  RADIOLOGY:   DIAGNOSTIC TESTING:  [ ] Echocardiogram:  [ ]  Catheterization:  [ ] Stress Test:    [ ] IRMA  OTHER: 	    LABS:	 	  CARDIAC MARKERS:                                  8.9    6.96  )-----------( 204      ( 27 Nov 2020 05:48 )             28.2     11-27    139  |  103  |  18  ----------------------------<  102<H>  4.4   |  26  |  1.11    Ca    8.9      27 Nov 2020 05:48  Mg     2.1     11-27      proBNP:   Lipid Profile:   HgA1c:   TSH:       ASSESSMENT/PLAN: 	        DVT ppx:  Dispo:     Interventional Cardiology PA Adult Progress Note    Subjective Assessment:  O/N: Pt bradycardic to 37 in am while sleeping.   Pt seen and examined at bedside this am. She reports "chest burning" in the middle of the night. Also admits to some dizziness. Denies SOB, palpitations, orthopnea, n/d.    ROS Negative except as per Subjective and HPI  	  MEDICATIONS:  diltiazem    Tablet 30 milliGRAM(s) Oral two times a day  metoprolol tartrate 50 milliGRAM(s) Oral every 6 hours  benzonatate 100 milliGRAM(s) Oral three times a day PRN  guaiFENesin   Syrup  (Sugar-Free) 100 milliGRAM(s) Oral every 6 hours PRN  tiotropium 18 MICROgram(s) Capsule 1 Capsule(s) Inhalation daily  acetaminophen   Tablet .. 650 milliGRAM(s) Oral every 6 hours PRN  acetaminophen 300 mG/butalbital 50 mG/ caffeine 40 mG 1 Capsule(s) Oral every 6 hours PRN  diphenhydrAMINE 25 milliGRAM(s) Oral every 4 hours PRN  aluminum hydroxide/magnesium hydroxide/simethicone Suspension 30 milliLiter(s) Oral every 6 hours PRN  pantoprazole  Injectable 40 milliGRAM(s) IV Push two times a day  polyethylene glycol 3350 17 Gram(s) Oral daily  atorvastatin 20 milliGRAM(s) Oral at bedtime  hydrocortisone 1% Cream 1 Application(s) Topical two times a day  influenza  Vaccine (HIGH DOSE) 0.7 milliLiter(s) IntraMuscular once  nystatin Powder 1 Application(s) Topical two times a day	    [PHYSICAL EXAM:  TELEMETRY:  T(C): 36.5 (11-27-20 @ 04:30), Max: 37.3 (11-26-20 @ 09:50)  HR: 55 (11-27-20 @ 05:00) (53 - 63)  BP: 131/60 (11-27-20 @ 05:00) (131/60 - 192/77)  RR: 18 (11-27-20 @ 05:00) (16 - 18)  SpO2: 96% (11-27-20 @ 05:00) (94% - 99%)  Wt(kg): --  I&O's Summary    26 Nov 2020 07:01  -  27 Nov 2020 07:00  --------------------------------------------------------  IN: 910 mL / OUT: 450 mL / NET: 460 mL        Gonzalez:  Central/PICC/Mid Line:                                         Appearance: Normal	  HEENT:   Normal oral mucosa, PERRL, EOMI	  Neck: Supple, - JVD  Cardiovascular: Normal  No murmurs,   Respiratory: Lungs clear to auscultation. No Rales, Rhonchi, Wheezing	  Gastrointestinal:  Soft, Non-tender, + BS	  Skin: No rashes, No ecchymoses, No cyanosis  Extremities: 1+ pitting edema in LE b/l  Neurologic: Non-focal  Psychiatry: A & O x 3, Mood & affect appropriate      	    ECG:  	  RADIOLOGY:   DIAGNOSTIC TESTING:  [ ] Echocardiogram:  [ ]  Catheterization:  [ ] Stress Test:    [ ] IRMA  OTHER: 	    LABS:	 	  CARDIAC MARKERS:                                  8.9    6.96  )-----------( 204      ( 27 Nov 2020 05:48 )             28.2     11-27    139  |  103  |  18  ----------------------------<  102<H>  4.4   |  26  |  1.11    Ca    8.9      27 Nov 2020 05:48  Mg     2.1     11-27      proBNP:   Lipid Profile:   HgA1c:   TSH:       ASSESSMENT/PLAN: 	        DVT ppx:  Dispo:

## 2020-11-27 NOTE — PROGRESS NOTE ADULT - PROBLEM SELECTOR PLAN 6
Euvolemic on exam; satting well on RA  - s/p Lasix 20mg IV x1 in the ED; no need for further Lasix.   - strict I/Os, daily weights, continue ACE. - s/p Lasix 20mg IV x1 in the ED  - Pt with slightly worsened LE edema (1+) on exam 11/27. Lungs CTA, no JVD, no SOB -- lasix 40mg IV push x 1 ordered, and home HCTZ 12.5mg QD restarted  - strict I/Os, daily weights - Echo with normal LV/RV size/systolic function. Grade II diastolic dyfunction and AS as above  - Home HCTZ 12.5mg qd held 2/2 hypotension originally  - Pt euvolemic throughout majority of admission, only requiring one dose lasix 20mg IVP in ED  - 11/27: Pt with slightly worsened LE edema (1+) on exam . Lungs CTA, no JVD or SOB -- one time dose of lasix 40mg IV push given  -Home HCTZ 12.5mg qd restarted 11/27  - strict I/Os, daily weights

## 2020-11-27 NOTE — PROGRESS NOTE ADULT - SUBJECTIVE AND OBJECTIVE BOX
PULMONARY CONSULT SERVICE FOLLOW-UP NOTE    INTERVAL HPI:  Reviewed chart and overnight events; patient seen and examined at bedside. Patient states she feels unwell overall, complains of mild SOB and abdominal pain.     MEDICATIONS:  Pulmonary:  benzonatate 100 milliGRAM(s) Oral three times a day PRN  guaiFENesin   Syrup  (Sugar-Free) 100 milliGRAM(s) Oral every 6 hours PRN  tiotropium 18 MICROgram(s) Capsule 1 Capsule(s) Inhalation daily    Antimicrobials:    Anticoagulants:    Cardiac:  diltiazem    Tablet 30 milliGRAM(s) Oral two times a day  hydrochlorothiazide 12.5 milliGRAM(s) Oral daily  metoprolol tartrate 50 milliGRAM(s) Oral every 6 hours      Allergies    Digox (Rash; Urticaria; Hives)  Plavix (Other (Mod to Severe))    Intolerances    Vital Signs Last 24 Hrs  T(C): 36.6 (27 Nov 2020 18:19), Max: 37.3 (26 Nov 2020 21:06)  T(F): 97.9 (27 Nov 2020 18:19), Max: 99.2 (26 Nov 2020 21:06)  HR: 68 (27 Nov 2020 17:54) (51 - 87)  BP: 168/69 (27 Nov 2020 17:54) (131/60 - 195/88)  BP(mean): 97 (27 Nov 2020 12:20) (87 - 101)  RR: 17 (27 Nov 2020 17:54) (16 - 18)  SpO2: 98% (27 Nov 2020 17:54) (94% - 99%)    11-26 @ 07:01 - 11-27 @ 07:00  --------------------------------------------------------  IN: 910 mL / OUT: 450 mL / NET: 460 mL    11-27 @ 07:01 - 11-27 @ 19:43  --------------------------------------------------------  IN: 400 mL / OUT: 1400 mL / NET: -1000 mL    PHYSICAL EXAM:  Constitutional: Obese, WDWN  HEENT: NC/AT; PERRL, anicteric sclera; MMM  Neck: supple  Cardiovascular: +S1/S2, RRR  Respiratory: CTA B/L; no W/R/R  Gastrointestinal: soft, NT/ND  Extremities: WWP; no edema, clubbing or cyanosis  Vascular: 2+ radial pulses B/L  Neurological: awake and alert; QUIROS    LABS:  CBC Full  -  ( 27 Nov 2020 17:09 )  WBC Count : 6.67 K/uL  RBC Count : 3.15 M/uL  Hemoglobin : 9.2 g/dL  Hematocrit : 29.4 %  Platelet Count - Automated : 205 K/uL  Mean Cell Volume : 93.3 fl  Mean Cell Hemoglobin : 29.2 pg  Mean Cell Hemoglobin Concentration : 31.3 gm/dL  Auto Neutrophil # : x  Auto Lymphocyte # : x  Auto Monocyte # : x  Auto Eosinophil # : x  Auto Basophil # : x  Auto Neutrophil % : x  Auto Lymphocyte % : x  Auto Monocyte % : x  Auto Eosinophil % : x  Auto Basophil % : x    11-27    139  |  103  |  18  ----------------------------<  102<H>  4.4   |  26  |  1.11    Ca    8.9      27 Nov 2020 05:48  Mg     2.1     11-27    RADIOLOGY & ADDITIONAL STUDIES:

## 2020-11-27 NOTE — PROGRESS NOTE ADULT - PROBLEM SELECTOR PLAN 3
In NSR currently  Prior hx of palpitations per patient, unclear if previous Hx of A-fib  - found to be in A-fib w/ RVR w/ -130s after CT Scan on 11/20, given Lopressor 5 mg IV x 2 w/ minimal improvement in HR; given Cardizem 10 mg IV x 1 w/ HR improved to 90-120s.   - CHADsVASc 4, HASBLED 3; HOWEVER, holding A/C in setting of bleeding risk   - Coreg discontinued due to lower BP  - overnight 11/21, given Digoxin 0.5 mg IV and Digoxin Level w/ morning labs 1.2   - on 11/22 started on Digoxin 0.25 mg daily  - on 11/23, patient developed mild rash and itching sensation and thought to be in the setting of allergic reaction to Digoxin   - Digoxin d/c, patient started on Cardizem   - c/w cardizem 30mg BID (dec from TID on 11/26)  - c/w Lopressor 50mg Q6H  - continue to monitor HR, now in NSR.  -call EP in am for possible amio initiation In NSR currently rates 50s-60s   Prior hx of palpitations per patient, unclear if previous Hx of A-fib  - found to be in A-fib w/ RVR w/ -130s after CT Scan on 11/20, given Lopressor 5 mg IV x 2 w/ minimal improvement in HR; given Cardizem 10 mg IV x 1 w/ HR improved to 90-120s.   - CHADsVASc 4, HASBLED 3; HOWEVER, holding A/C in setting of bleeding risk   - Coreg discontinued due to lower BP  - overnight 11/21, given Digoxin 0.5 mg IV and Digoxin Level w/ morning labs 1.2   - on 11/22 started on Digoxin 0.25 mg daily  - on 11/23, patient developed mild rash and itching sensation and thought to be in the setting of allergic reaction to Digoxin   - Digoxin d/c, patient started on Cardizem   - c/w cardizem 30mg BID (dec from TID on 11/26)  - c/w Lopressor 50mg Q6H  - continue to monitor HR, now in NSR. In NSR currently rates 50s-60s   Prior hx of palpitations per patient, unclear if previous Hx of A-fib  - found to be in A-fib w/ RVR w/ -130s after CT Scan on 11/20, rate was difficult to control, but eventually controlled w/ diltiazem and lopressor  - CHADsVASc 4, HASBLED 3; HOWEVER, holding A/C in setting of bleeding risk   - Coreg discontinued due to lower BP  - Digoxin started but d/c'd as pt developed a rash   - c/w cardizem 30mg BID (dec from TID on 11/26)  - c/w Lopressor 50mg Q6H  - continue to monitor HR, now in NSR.

## 2020-11-27 NOTE — PROGRESS NOTE ADULT - PROBLEM SELECTOR PLAN 4
Initially admitted w/ hypertensive emergency, now having episodes of hypotension  - hypotensive SBP 80s 11/18, resolved s/p IVF hydration NS 50cc/hr x 8hr  - again hypotensive SBP 80-100s w/ Rapid A-Fib on 11/21  - discontinued Coreg 6.25 mg PO BID, Lisinopril 10mg PO QD, and home HCTZ 12.5mg PO QD.   - current regimen: Lopressor 50 mg q6 hours, Cardizem 30 mg q8 hours. Initially admitted w/ hypertensive emergency, now having episodes of hypotension  - hypotensive SBP 80s 11/18, resolved s/p IVF hydration NS 50cc/hr x 8hr  - again hypotensive SBP 80-100s w/ Rapid A-Fib on 11/21  - discontinued Coreg 6.25 mg PO BID, Lisinopril 10mg PO QD  - current regimen: Lopressor 50 mg q6 hours, Cardizem 30 mg q12 hours, HCTZ 12.5 mg daily. Initially admitted w/ hypertensive emergency, then having episodes of hypotension  - hypotensive SBP 80s 11/18, resolved s/p IVF hydration NS 50cc/hr x 8hr  - again hypotensive SBP 80-100s 2/2 Rapid A-Fib on 11/21  - discontinued Coreg 6.25 mg PO BID, Lisinopril 10mg PO QD  - current regimen: Lopressor 50 mg q6 hours, Cardizem 30 mg q12 hours, HCTZ 12.5 mg daily. Initially admitted w/ hypertensive urgency with CP then having episodes of hypotension  - hypotensive SBP 80s 11/18, resolved with gentle hydration  - again hypotensive SBP 80-100s 2/2 Rapid A-Fib on 11/21  - discontinued Coreg 6.25 mg PO BID, Lisinopril 10mg PO QD  - current regimen: Lopressor 50 mg q6 hours, Cardizem 30 mg q12 hours, HCTZ 12.5 mg daily.

## 2020-11-27 NOTE — PROGRESS NOTE ADULT - PROBLEM SELECTOR PLAN 5
RESOLVED; Likely due to HTN urgency  - diagnostic R/LHC 1/9/2019 w/ non-obstructive disease Pt presenting with CP likely secondary to HTN urgency  - diagnostic R/LHC 1/9/2019 w/ non-obstructive disease  - pain has subsided. but pt endorsing burning CP on 11/26 at night, likely GERD  - pt on protonix

## 2020-11-27 NOTE — PROGRESS NOTE ADULT - ASSESSMENT
78 year old Maori speaking F, PMHx of uncontrolled HTN, hyperlipidemia, COPD(denies hospitalizations, intubations), MARK (on CPAP, noncompliant), chronic diastolic CHF (EF:61% by Echo 4/2019), aortic stenosis s/p transfemoral TAVR with rachael valve on 02/05/2019 with Dr. Alejo @[facility], recent hospitalization at [facility] (for HTN/headache, no changes made to medications, negative CT head/MRI brain, discharged on 11/4/20) who presents to [facility] ED 11/13/20 c/o intermittent chest pain and elevated BP x 1 week found to have new GGO bilateral lower lobes pulmonary consult for preoperative clearance for subtotal colectomy for adenocarcinoma of colon.     #Preoperative clearance for subtotal colectomy   Patient has stable COPD, currently well controlled and not in exacerbation. Patient additionally has MARK, noncompliant with home CPAP.   -recommend continuing Spiriva with duonebs PRN for wheezing/SOB  -recommend duonebs post procedure  -recommend observing patient for 4 hours post procedure in setting of MARK     #COPD  as above    #MARK  as above    #B/L GGO and pulmonary nodules  -differentials includes atelectasis vs pulmonary congestion less likely prior COVID infection (igG positive)  -manage fluid status per cardiology team given AS  -6 mm nodule RLL and 11 mm nodule RLL, PET scan with uptake on RLL nodule 8 mm and parapancreatic 8 mm lesion, stable since 1/2019 can have outpatient follow up.     -s/e/d with Dr. Mcclain

## 2020-11-27 NOTE — PROGRESS NOTE ADULT - PROBLEM SELECTOR PLAN 9
- Continue Spiriva inhaler daily      VTE PPx: SCDs  PT consulted: Home w/ Home PT  Patient lives with daughter Jaelyn, whom is her HHA (40 hrs/week).  CODE: Full code  Dispo: colectomy planned for 12/2/2020 with prior pulm/medicine clearance - Continue Spiriva inhaler daily      VTE PPx: SCDs  PT consulted: Home w/ Home PT  Patient lives with daughter Jaelyn, whom is her HHA (40 hrs/week).  CODE: Full code  Dispo: colectomy planned for 12/2/2020

## 2020-11-27 NOTE — PROGRESS NOTE ADULT - PROBLEM SELECTOR PLAN 7
RESOLVED; Patient became febrile to 101.2 on 11/17 AM w/ associated leukocytosis 11.23  - self resolved  - unclear source of infection  - possible UTI, UA w/ moderate leuks, small blood, bacteria present, and Urine CX contaminated  - CT Chest 11/6: no signs of infiltrates  - CXR w/o infiltrates  - Blood Cx NGTD  - No Abx for now, monitor fevers and WBC. Intermittently w/ HA, but no focal neurodeficits  - Responds well to Tylenol and Fiorocet, ordered PRN   - Of note, patient reportedly had normal CT Head/MRI Brain at [facility] ~1 week ago  - given recurrent HA/dizziness, repeat CT Head 11/18/2020 w/ no evidence of acute intracranial hemorrhage or acute transcortical infarct Intermittently w/ HA, but no focal neurodeficits  - Responds well to Tylenol and Fiorocet, ordered PRN   - Of note, patient reportedly had normal CT Head/MRI Brain at [facility] within last month  - given recurrent HA/dizziness, repeat CT Head 11/18/2020 w/ no evidence of acute intracranial hemorrhage or acute transcortical infarct

## 2020-11-27 NOTE — PROGRESS NOTE ADULT - PROBLEM SELECTOR PLAN 1
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 Alejo at [facility]  - Echo 11/13/2020: normal BiV fxn/size, moderate LVH, grade II LV diastolic dysfunction, PEAK TRANSAORTIC GRADIENT 61.78 mmHg, mildly dilated LA, no pulm HTN, moderately dilated ascending aorta (previously 14mmHg on 4/2019)  - Structural CT scan: no thrombus seen.    - Limited TTE 11/16:  aortic valve gradient 71 mmHg  - Official IRMA 11/17: no thrombus seen, no evidence of an intracardiac shunt, 23 mm Jamar valve is noted in the aortic position w/o any aortic regurgitation, slight thickening at the base of the right cusp w/ probably mild restriction in excursion, other prosthetic leaflets appear normal  - given slight suspicious for thrombus at base of R cusp, per Structural Heart Dr Alejo, would like to start NOAC if cleared by GI  - HOWEVER, given high risk for bleeding in setting of 4 large friable colonic lesions, patient is NOT cleared to start NOAC given high risk of bleeding   - CTS stated nothing to do while inpatient, follow up as outpatient for further evaluation and management. 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 Alejo at [facility]  - Echo 11/13/2020: normal BiV fxn/size, moderate LVH, grade II LV diastolic dysfunction, PEAK TRANSAORTIC GRADIENT 61.78 mmHg, mildly dilated LA, no pulm HTN, moderately dilated ascending aorta (previously 14mmHg on 4/2019)  - Structural CT scan: no thrombus seen.    - Limited TTE 11/16:  aortic valve gradient 71 mmHg  - Official IRMA 11/17: no thrombus seen, no evidence of an intracardiac shunt, 23 mm Jamar valve is noted in the aortic position w/o any aortic regurgitation, slight thickening at the base of the right cusp w/ probably mild restriction in excursion, other prosthetic leaflets appear normal  - given slight suspicious for thrombus at base of R cusp, per Structural Heart Dr Alejo, but pt not cleared by GI for a/c given friable colonic lesions  - HOWEVER, given high risk for bleeding in setting of 4 large friable colonic lesions, patient is NOT cleared to start NOAC given high risk of bleeding   - CTS stated nothing to do while inpatient, follow up as outpatient for further evaluation and management. 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 Alejo at [facility]  - Echo 11/13/2020: normal BiV fxn/size, moderate LVH, grade II LV diastolic dysfunction, PEAK TRANSAORTIC GRADIENT 61.78 mmHg, mildly dilated LA, no pulm HTN, moderately dilated ascending aorta (previously 14mmHg on 4/2019)  - Structural CT scan: no thrombus seen.    - Limited TTE 11/16:  aortic valve gradient 71 mmHg  - Official IRMA 11/17: no thrombus seen, no evidence of an intracardiac shunt, 23 mm Jamar valve is noted in the aortic position w/o any aortic regurgitation, slight thickening at the base of the right cusp w/ probably mild restriction in excursion, other prosthetic leaflets appear normal  - given slight suspicious for thrombus at base of R cusp, per Structural Heart Dr Alejo, but pt not cleared by GI for a/c given friable colonic lesions  - CTS stated nothing to do while inpatient, follow up as outpatient for further evaluation and management. 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 Alejo at [facility]  - Echo 11/13/2020 showed PEAK TRANSAORTIC GRADIENT 61.78 mmHg  - Structural CT scan: no thrombus seen.    - Limited TTE 11/16 showed aortic valve gradient 71 mmHg  - Official IRMA 11/17 revealed no thrombus, no shunts, and Jamar valve with slight thickening at base of right cusp with probable mild restricted excursion with otherwise no abnormalities  - slight suspicion for thrombus at base of R cusp but pt not cleared by GI for a/c given friable colonic lesions  - CTS stated nothing to do while inpatient, follow up as outpatient for further evaluation and management.

## 2020-11-27 NOTE — PROGRESS NOTE ADULT - PROBLEM SELECTOR PLAN 8
Intermittently w/ HA, but no focal neurodeficits  - Responds well to Tylenol and Fiorocet, ordered PRN   - Of note, patient reportedly had normal CT Head/MRI Brain at [facility] ~1 week ago  - given recurrent HA/dizziness, repeat CT Head 11/18/2020 w/ no evidence of acute intracranial hemorrhage or acute transcortical infarct - Continue Spiriva inhaler daily      VTE PPx: SCDs  PT consulted: Home w/ Home PT  Patient lives with daughter Jaelyn, whom is her HHA (40 hrs/week).  CODE: Full code  Dispo: colectomy planned for 12/2/2020

## 2020-11-28 LAB
A1C WITH ESTIMATED AVERAGE GLUCOSE RESULT: 5.4 % — SIGNIFICANT CHANGE UP (ref 4–5.6)
ALBUMIN SERPL ELPH-MCNC: 3.4 G/DL — SIGNIFICANT CHANGE UP (ref 3.3–5)
ALP SERPL-CCNC: 58 U/L — SIGNIFICANT CHANGE UP (ref 40–120)
ALT FLD-CCNC: 25 U/L — SIGNIFICANT CHANGE UP (ref 10–45)
ANION GAP SERPL CALC-SCNC: 12 MMOL/L — SIGNIFICANT CHANGE UP (ref 5–17)
AST SERPL-CCNC: 18 U/L — SIGNIFICANT CHANGE UP (ref 10–40)
BASOPHILS # BLD AUTO: 0.04 K/UL — SIGNIFICANT CHANGE UP (ref 0–0.2)
BASOPHILS NFR BLD AUTO: 0.7 % — SIGNIFICANT CHANGE UP (ref 0–2)
BILIRUB SERPL-MCNC: 0.4 MG/DL — SIGNIFICANT CHANGE UP (ref 0.2–1.2)
BUN SERPL-MCNC: 16 MG/DL — SIGNIFICANT CHANGE UP (ref 7–23)
CALCIUM SERPL-MCNC: 9.1 MG/DL — SIGNIFICANT CHANGE UP (ref 8.4–10.5)
CHLORIDE SERPL-SCNC: 102 MMOL/L — SIGNIFICANT CHANGE UP (ref 96–108)
CO2 SERPL-SCNC: 29 MMOL/L — SIGNIFICANT CHANGE UP (ref 22–31)
CREAT SERPL-MCNC: 1.1 MG/DL — SIGNIFICANT CHANGE UP (ref 0.5–1.3)
EOSINOPHIL # BLD AUTO: 0.21 K/UL — SIGNIFICANT CHANGE UP (ref 0–0.5)
EOSINOPHIL NFR BLD AUTO: 3.9 % — SIGNIFICANT CHANGE UP (ref 0–6)
ESTIMATED AVERAGE GLUCOSE: 108 MG/DL — SIGNIFICANT CHANGE UP (ref 68–114)
GLUCOSE SERPL-MCNC: 106 MG/DL — HIGH (ref 70–99)
HCT VFR BLD CALC: 29 % — LOW (ref 34.5–45)
HGB BLD-MCNC: 9.1 G/DL — LOW (ref 11.5–15.5)
IMM GRANULOCYTES NFR BLD AUTO: 0.4 % — SIGNIFICANT CHANGE UP (ref 0–1.5)
LYMPHOCYTES # BLD AUTO: 1.37 K/UL — SIGNIFICANT CHANGE UP (ref 1–3.3)
LYMPHOCYTES # BLD AUTO: 25.3 % — SIGNIFICANT CHANGE UP (ref 13–44)
MAGNESIUM SERPL-MCNC: 2.1 MG/DL — SIGNIFICANT CHANGE UP (ref 1.6–2.6)
MCHC RBC-ENTMCNC: 29.1 PG — SIGNIFICANT CHANGE UP (ref 27–34)
MCHC RBC-ENTMCNC: 31.4 GM/DL — LOW (ref 32–36)
MCV RBC AUTO: 92.7 FL — SIGNIFICANT CHANGE UP (ref 80–100)
MONOCYTES # BLD AUTO: 0.62 K/UL — SIGNIFICANT CHANGE UP (ref 0–0.9)
MONOCYTES NFR BLD AUTO: 11.5 % — SIGNIFICANT CHANGE UP (ref 2–14)
NEUTROPHILS # BLD AUTO: 3.15 K/UL — SIGNIFICANT CHANGE UP (ref 1.8–7.4)
NEUTROPHILS NFR BLD AUTO: 58.2 % — SIGNIFICANT CHANGE UP (ref 43–77)
NRBC # BLD: 0 /100 WBCS — SIGNIFICANT CHANGE UP (ref 0–0)
PLATELET # BLD AUTO: 190 K/UL — SIGNIFICANT CHANGE UP (ref 150–400)
POTASSIUM SERPL-MCNC: 4 MMOL/L — SIGNIFICANT CHANGE UP (ref 3.5–5.3)
POTASSIUM SERPL-SCNC: 4 MMOL/L — SIGNIFICANT CHANGE UP (ref 3.5–5.3)
PROT SERPL-MCNC: 6.5 G/DL — SIGNIFICANT CHANGE UP (ref 6–8.3)
RBC # BLD: 3.13 M/UL — LOW (ref 3.8–5.2)
RBC # FLD: 13.9 % — SIGNIFICANT CHANGE UP (ref 10.3–14.5)
SODIUM SERPL-SCNC: 143 MMOL/L — SIGNIFICANT CHANGE UP (ref 135–145)
WBC # BLD: 5.41 K/UL — SIGNIFICANT CHANGE UP (ref 3.8–10.5)
WBC # FLD AUTO: 5.41 K/UL — SIGNIFICANT CHANGE UP (ref 3.8–10.5)

## 2020-11-28 PROCEDURE — 99233 SBSQ HOSP IP/OBS HIGH 50: CPT

## 2020-11-28 RX ADMIN — Medication 1 APPLICATION(S): at 06:13

## 2020-11-28 RX ADMIN — Medication 650 MILLIGRAM(S): at 21:27

## 2020-11-28 RX ADMIN — Medication 30 MILLILITER(S): at 21:29

## 2020-11-28 RX ADMIN — Medication 30 MILLIGRAM(S): at 06:11

## 2020-11-28 RX ADMIN — ATORVASTATIN CALCIUM 20 MILLIGRAM(S): 80 TABLET, FILM COATED ORAL at 21:24

## 2020-11-28 RX ADMIN — Medication 30 MILLIGRAM(S): at 17:16

## 2020-11-28 RX ADMIN — TIOTROPIUM BROMIDE 1 CAPSULE(S): 18 CAPSULE ORAL; RESPIRATORY (INHALATION) at 11:24

## 2020-11-28 RX ADMIN — PANTOPRAZOLE SODIUM 40 MILLIGRAM(S): 20 TABLET, DELAYED RELEASE ORAL at 06:11

## 2020-11-28 RX ADMIN — POLYETHYLENE GLYCOL 3350 17 GRAM(S): 17 POWDER, FOR SOLUTION ORAL at 11:23

## 2020-11-28 RX ADMIN — Medication 650 MILLIGRAM(S): at 22:27

## 2020-11-28 RX ADMIN — Medication 50 MILLIGRAM(S): at 17:16

## 2020-11-28 RX ADMIN — NYSTATIN CREAM 1 APPLICATION(S): 100000 CREAM TOPICAL at 17:19

## 2020-11-28 RX ADMIN — Medication 12.5 MILLIGRAM(S): at 06:11

## 2020-11-28 RX ADMIN — Medication 50 MILLIGRAM(S): at 11:23

## 2020-11-28 RX ADMIN — PANTOPRAZOLE SODIUM 40 MILLIGRAM(S): 20 TABLET, DELAYED RELEASE ORAL at 17:15

## 2020-11-28 RX ADMIN — NYSTATIN CREAM 1 APPLICATION(S): 100000 CREAM TOPICAL at 06:13

## 2020-11-28 NOTE — PROGRESS NOTE ADULT - PROBLEM SELECTOR PLAN 6
- Echo with normal LV/RV size/systolic function. Grade II diastolic dyfunction and AS as above  - Home HCTZ 12.5mg qd held 2/2 hypotension originally  - Pt euvolemic throughout majority of admission, only requiring one dose lasix 20mg IVP in ED  - 11/27: Pt with slightly worsened LE edema (1+) on exam . Lungs CTA, no JVD or SOB -- one time dose of lasix 40mg IV push given  -Home HCTZ 12.5mg qd restarted 11/27  - strict I/Os, daily weights - Echo with normal LV/RV size/systolic function. Grade II diastolic dyfunction and AS as above  - s/p Lasix 20 mg stat at initial admit, requiring no further diuresis until 11/27; pt found with +1 B/L LE Edema and mildly elevated BP, s/p Lasix 40mg IV x 1 dose.   -Euvolemic on exam today, no SOB/JVD, no increased WOB, satting 96 % RA.     -Restarted HCTZ 12.5mg daily.   - strict I/Os, daily weights, net negative 1.2L in 24H

## 2020-11-28 NOTE — PROGRESS NOTE ADULT - PROBLEM SELECTOR PLAN 8
- Continue Spiriva inhaler daily      VTE PPx: SCDs  PT consulted: Home w/ Home PT  Patient lives with daughter Jaelyn, whom is her HHA (40 hrs/week).  CODE: Full code  Dispo: colectomy planned for 12/2/2020 - Continue Spiriva inhaler daily      VTE PPx: SCDs  PT evaluated:  Recs for home PT  Patient lives with daughter Jaelyn, whom is her HHA (40 hrs/week).  CODE: Full code  Dispo: Elective Colectomy 12/2/2020

## 2020-11-28 NOTE — PROGRESS NOTE ADULT - PROBLEM SELECTOR PLAN 4
Initially admitted w/ hypertensive urgency with CP then having episodes of hypotension  - hypotensive SBP 80s 11/18, resolved with gentle hydration  - again hypotensive SBP 80-100s 2/2 Rapid A-Fib on 11/21  - discontinued Coreg 6.25 mg PO BID, Lisinopril 10mg PO QD  - current regimen: Lopressor 50 mg q6 hours, Cardizem 30 mg q12 hours, HCTZ 12.5 mg daily. -Resolved, now with stable BP (130 -140's)  -Labile BP noted during most of admit.  One episode of Hypotension noted on 11/18 (SBP 80s).  Resolved with gentle hydration  -D/C Coreg 6.25 mg PO BID, switched to L  -Lisinopril 10mg PO QD and   - current regimen: Lopressor 50 mg q6 hours, Cardizem 30 mg q12 hours, HCTZ 12.5 mg daily. -Resolved, now with stable BP (130 -140's)  -Labile BP noted during admit.   One episode of Hypotension noted on 11/18 (SBP 80s).  Resolved with gentle hydration  -D/C Coreg 6.25 mg PO BID, now switched to Lopressor 50mg q6h  -c/w Lisinopril 10mg PO QD   -Restarted hctz 12.5mg daily on 11/28  -Monitor BP

## 2020-11-28 NOTE — PROGRESS NOTE ADULT - ASSESSMENT
77 y/o American speaking female, w/ PMHx uncontrolled HTN, HLD, asthma, chronic diastolic CHF (EF 61% by Echo 4/2019), and aortic stenosis (s/p transfemoral TAVR with Jamar valve 02/2019 with Dr. Alejo) admitted with CP and  hypertensive urgency,  Hospital course complicated with GIB after initiation of Heparin 2/2 Thrombus on IRMA. Subsequent EGD/Colonoscopy revealed 4 large colonic masses, with PET Scan concerning for cancer. Pt also found with new onset AFIB, Digoxin initiated for rate control, now D/C 2/2 rash.  Pt now awaiting Colectomy on Wednesday, 12/2/20.      79 y/o Japanese speaking female, w/ PMHx uncontrolled HTN, HLD, asthma, chronic diastolic CHF (EF 61% by Echo 4/2019), and aortic stenosis (s/p transfemoral TAVR with Jamar valve 02/2019 with Dr. Alejo) admitted with CP and  Hypertensive urgency,  Hospital course complicated with GIB after initiation of Heparin 2/2 Thrombus on IRMA. Subsequent EGD/Colonoscopy revealed 4 large colonic masses, with PET Scan concerning for cancer. Pt also found with new onset AFIB, Digoxin initiated for rate control, now D/C 2/2 rash.  Pt now awaiting Colectomy on Wednesday, 12/2/20.      79 y/o [place] speaking female, w/ PMHx uncontrolled HTN, HLD, asthma, chronic diastolic CHF (EF 61% by Echo 4/2019), and aortic stenosis (s/p transfemoral TAVR with Jamar valve 02/2019 with Dr. Alejo) admitted with CP and  Hypertensive urgency,  Hospital course complicated with GIB after initiation of Heparin 2/2 Thrombus on IRMA. Subsequent EGD/Colonoscopy revealed 4 large colonic masses, with PET Scan concerning for cancer. Pt also found with new onset AFIB, Digoxin initiated for rate control, D/C 2/2 rash, now maintaining NSR.  Pt  medically optimized from a cardiac standpoint, now awaiting Colectomy on Wednesday, 12/2/20.  Awaiting official medicine clearance.       77 y/o [place] speaking female, w/ PMHx uncontrolled HTN, HLD, asthma, chronic diastolic CHF (EF 61% by Echo 4/2019), and aortic stenosis (s/p transfemoral TAVR with Jamar valve 02/2019 with Dr. Alejo) admitted with CP and  Hypertensive urgency,  Hospital course complicated with GIB after initiation of Heparin 2/2 Thrombus on IRMA. Subsequent EGD/Colonoscopy revealed 4 large colonic masses, with PET Scan concerning for cancer. Pt also found with new onset AFIB, Digoxin initiated for rate control, D/C 2/2 rash, now maintaining NSR.  Pt  medically optimized from a cardiac standpoint, now awaiting Colectomy on Wednesday, 12/2/20.  Awaiting official medicine clearance for surgery.

## 2020-11-28 NOTE — PROGRESS NOTE ADULT - PROBLEM SELECTOR PLAN 7
Intermittently w/ HA, but no focal neurodeficits  - Responds well to Tylenol and Fiorocet, ordered PRN   - Of note, patient reportedly had normal CT Head/MRI Brain at [facility] within last month  - given recurrent HA/dizziness, repeat CT Head 11/18/2020 w/ no evidence of acute intracranial hemorrhage or acute transcortical infarct

## 2020-11-28 NOTE — PROGRESS NOTE ADULT - PROBLEM SELECTOR PLAN 1
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 Alejo at [facility]  - Echo 11/13/2020 showed PEAK TRANSAORTIC GRADIENT 61.78 mmHg  - Structural CT scan: no thrombus seen.    - Limited TTE 11/16 showed aortic valve gradient 71 mmHg  - Official IRMA 11/17 revealed no thrombus, no shunts, and Jamar valve with slight thickening at base of right cusp with probable mild restricted excursion with otherwise no abnormalities  - slight suspicion for thrombus at base of R cusp but pt not cleared by GI for a/c given friable colonic lesions  - CTS stated nothing to do while inpatient, follow up as outpatient for further evaluation and management. 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 Alejo at [facility]  -Echo 11/13/2020 showed PEAK TRANSAORTIC GRADIENT 61.78 mmHg  -Structural CT scan: no thrombus seen.    -Limited TTE 11/16 showed aortic valve gradient 71 mmHg  -Official IRMA 11/17 revealed no thrombus, no shunts, and Jamar valve with slight thickening at base of right cusp with probable mild restricted excursion with otherwise no abnormalities  -slight suspicion for thrombus at base of R cusp but pt not cleared by GI for a/c given friable colonic lesions  -CTS stated nothing to do while inpatient, follow up as outpatient for further evaluation and management.

## 2020-11-28 NOTE — PROGRESS NOTE ADULT - PROBLEM SELECTOR PLAN 2
Pt with multiple episodes of BRBPR prior to and during admission  - s/p EGD revealing mild gastropathy  - s/p Colonoscopy revealing four large colonic mass lesions from cecum to transverse colon  - Biopsy pathology consistent w/ adenocarcinoma  - elevated CEA 23.1  - CT Chest/Abdomen/Pelvis 11/20 again with suspicion for neoplasm in ascending and transverse colon as well as ground glass opacities b/l in lungs   - GI service signed off   - PET Scan consistent w/ known malignacy and no evidence of spread (multiple other areas up uptake without suspicion of malignancy in report)  - Surgery planning for colectomy 12/2/2020   - Pt has been cleared by primary team and pulm, states medicine clearance not needed  - Heme/onc stating possible tissue evaluation of hilar nodes, most likely after colectomy  -H/H downtrending over past few days, currently 8.9/28  -Repeat Hgb 9.2, likely stable  -Will monitor and transfuse for hgb<8.5  -Continue to follow Heme/onc and surgery rec’s Multiple episodes of BRBPR prior to and during admission, H/H downtrending over past few days, now stable (9.1/29.0).  Consider transfusion 1U PRBC if hgb < 8.5 in anticipation of upcoming surgery.  -s/p EGD revealing mild gastropathy.   -s/p Colonoscopy revealing four large colonic mass lesions from cecum to transverse colon  -Biopsy pathology consistent w/ adenocarcinoma  -Elevated CEA 23.1  -CT Chest/Abdomen/Pelvis 11/20 again with suspicion for neoplasm in ascending and transverse colon as well as ground glass opacities B/L in lungs   -PET Scan consistent w/ known malignancy and no evidence of spread (multiple other areas up uptake without suspicion of malignancy in report)  -Planned Colectomy 12/2/2020 with surgery.  Cleared from Cardiology and Pulm standpoint. Awaiting official medicine clearance.    -Heme/onc following:  Will possibly do tissue evaluation of hilar nodes, post Colectomy  -Continue to follow Heme/onc and surgery recs. Multiple episodes of BRBPR prior to and during admission, H/H downtrending over past few days (9.5 to 8.9), now stable today (9.1/29.0).    -Consider transfusion 1U PRBC if hgb < 8.5 in anticipation of upcoming surgery.  -s/p EGD revealing mild gastropathy.   -s/p Colonoscopy revealing four large colonic mass lesions from cecum to transverse colon  -Biopsy pathology consistent w/ adenocarcinoma  -Elevated CEA 23.1  -CT Chest/Abdomen/Pelvis 11/20 again with suspicion for neoplasm in ascending and transverse colon as well as ground glass opacities B/L in lungs   -PET Scan consistent w/ known malignancy and no evidence of spread (multiple other areas up uptake without suspicion of malignancy in report)  -Planned Colectomy 12/2/2020 with surgery.  Cleared from Cardiology and Pulm standpoint. Awaiting official medicine clearance.    -Heme/onc following:  Will possibly do tissue evaluation of hilar nodes, post Colectomy  -Continue to follow Heme/onc and surgery recs.

## 2020-11-28 NOTE — PROGRESS NOTE ADULT - PROBLEM SELECTOR PLAN 3
In NSR currently rates 50s-60s   Prior hx of palpitations per patient, unclear if previous Hx of A-fib  - found to be in A-fib w/ RVR w/ -130s after CT Scan on 11/20, rate was difficult to control, but eventually controlled w/ diltiazem and lopressor  - CHADsVASc 4, HASBLED 3; HOWEVER, holding A/C in setting of bleeding risk   - Coreg discontinued due to lower BP  - Digoxin started but d/c'd as pt developed a rash   - c/w cardizem 30mg BID (dec from TID on 11/26)  - c/w Lopressor 50mg Q6H  - continue to monitor HR, now in NSR. -NSR currently rates 50s-60s. Asx   -Prior hx of palpitations per patient, unclear if previous Hx of A-fib  -Found in  AF rVR, -130s after CT Scan on 11/20, difficulty controlling on Dilitazem and Lopressor, Digoxin initiated, now D/C 2/2 rash.   -Coreg D/C 2/2 to low BP, switched to Lopressor 50mg q6h for rate control.   -c/w cardizem 30mg BID (dec from TID on 11/26)  -CHADsVASc 4, HASBLED 3; HOWEVER, holding A/C in setting of bleeding risk   -c/w Tele monitor -NSR currently rates 50s-60s. Asx   -Prior hx of palpitations per patient, unclear if previous Hx of A-fib  -Found in AF rVR, -130s after CT Scan on 11/20, difficulty controlling HR on Dilitazem and Lopressor, Digoxin initiated, now D/C 2/2 rash.   -Coreg D/C 2/2 to low BP, switched to Lopressor 50mg q6h for rate control.   -c/w Diltiazem 30mg BID (dec from TID on 11/26)  -CHADsVASc 4, HASBLED 3; HOWEVER, holding A/C in setting of bleeding risk   -c/w Tele monitor

## 2020-11-28 NOTE — PROGRESS NOTE ADULT - PROBLEM SELECTOR PLAN 5
Pt presenting with CP likely secondary to HTN urgency  - diagnostic R/LHC 1/9/2019 w/ non-obstructive disease  - pain has subsided. but pt endorsing burning CP on 11/26 at night, likely GERD  - pt on protonix Pt presenting with CP likely secondary to HTN urgency, now resolved.   - s/p Diagnostic R/LHC 1/9/2019 w/ non-obstructive disease  - h/o GERD, one burning CP overnight on 11/26 is possibly in the setting of GERD.    - c/w Pantoprazole 40mg IV BID.

## 2020-11-28 NOTE — PROGRESS NOTE ADULT - SUBJECTIVE AND OBJECTIVE BOX
S: Pt seen and examined bedside.  Patient denies C/P, SOB, N/V, dizziness, palpitations, and diaphoresis.  Pt denies fever/chills, dysuria, abdominal pain, diarrhea, and cough  12 Point ROS otherwise negative except as per HPI/subjective.     O: Vital Signs Last 24 Hrs  T(C): 36.9 (2020 06:59), Max: 36.9 (2020 13:27)  T(F): 98.4 (2020 06:59), Max: 98.4 (2020 13:27)  HR: 56 (2020 05:34) (51 - 87)  BP: 137/64 (2020 05:34) (137/64 - 195/88)  BP(mean): 97 (2020 12:20) (92 - 97)  RR: 16 (2020 05:34) (16 - 18)  SpO2: 93% (2020 05:34) (93% - 98%)    PHYSICAL EXAM:  GEN: NAD  HEENT: No JVD  PULM:  CTA B/L  CARD:  RRR, S1 and S2   ABD: +BS, NT, soft/ND	  EXT: No Edema B/L LE  NEURO: A+Ox3, no focal deficit  PSYCH: Mood Appropriate    LABS:                        9.1    5.41  )-----------( 190      ( 2020 06:18 )             29.0         143  |  102  |  16  ----------------------------<  106<H>  4.0   |  29  |  1.10    Ca    9.1      2020 06:18  Mg     2.1         TPro  6.5  /  Alb  3.4  /  TBili  0.4  /  DBili  x   /  AST  18  /  ALT  25  /  AlkPhos  58   @ 07:01  -   @ 07:00  --------------------------------------------------------  IN: 600 mL / OUT: 1800 mL / NET: -1200 mL      Daily     Daily Weight in k.2 (2020 06:59)   S: Pt seen, found sitting up comfortably in chair eating breakfast at bedside. Pt currently denies C/P, SOB, palpitations, diaphoresis, dizziness, fever/chills, cough, dysuria, abdominal pain, diarrhea or any other complaint at this time.   12 Point ROS otherwise negative except as per HPI/subjective.     O: Vital Signs Last 24 Hrs  T(C): 36.9 (2020 06:59), Max: 36.9 (2020 13:27)  T(F): 98.4 (2020 06:59), Max: 98.4 (2020 13:27)  HR: 56 (2020 05:34) (51 - 87)  BP: 137/64 (2020 05:34) (137/64 - 195/88)  BP(mean): 97 (2020 12:20) (92 - 97)  RR: 16 (2020 05:34) (16 - 18)  SpO2: 93% (2020 05:34) (93% - 98%)    PHYSICAL EXAM:  GEN: NAD  HEENT: Supple, No JVD B/L  PULM:  CTA B/L, no RRW B/L  CARD:  SR, S1 and S2   ABD: +BS, NT, soft/ND	  EXT: +1 B/L LE Edema   NEURO: A+Ox3, no focal deficit  PSYCH: Mood Appropriate    LABS:                        9.1    5.41  )-----------( 190      ( 2020 06:18 )             29.0         143  |  102  |  16  ----------------------------<  106<H>  4.0   |  29  |  1.10    Ca    9.1      2020 06:18  Mg     2.1         TPro  6.5  /  Alb  3.4  /  TBili  0.4  /  DBili  x   /  AST  18  /  ALT  25  /  AlkPhos  58   @ 07:01  -   @ 07:00  --------------------------------------------------------  IN: 600 mL / OUT: 1800 mL / NET: -1200 mL    Daily Weight in k.2 (2020 06:59)   S: Pt seen, found sitting up comfortably in chair eating breakfast at bedside. Pt currently denies C/P, SOB, palpitations, diaphoresis, dizziness, fever/chills, cough, dysuria, abdominal pain, diarrhea or any other complaint at this time.     12 Point ROS otherwise negative except as per HPI/subjective.     O: Vital Signs Last 24 Hrs  T(C): 36.9 (2020 06:59), Max: 36.9 (2020 13:27)  T(F): 98.4 (2020 06:59), Max: 98.4 (2020 13:27)  HR: 56 (2020 05:34) (51 - 87)  BP: 137/64 (2020 05:34) (137/64 - 195/88)  BP(mean): 97 (2020 12:20) (92 - 97)  RR: 16 (2020 05:34) (16 - 18)  SpO2: 93% (2020 05:34) (93% - 98%)    PHYSICAL EXAM:  GEN: NAD  HEENT: Supple, No JVD B/L  PULM:  CTA B/L, no RRW B/L  CARD:  SR, S1 and S2   ABD: +BS, NT, soft/ND	  EXT: +1 B/L LE Edema   NEURO: A+Ox3, no focal deficit  PSYCH: Mood Appropriate    LABS:                        9.1    5.41  )-----------( 190      ( 2020 06:18 )             29.0         143  |  102  |  16  ----------------------------<  106<H>  4.0   |  29  |  1.10    Ca    9.1      2020 06:18  Mg     2.1         TPro  6.5  /  Alb  3.4  /  TBili  0.4  /  DBili  x   /  AST  18  /  ALT  25  /  AlkPhos  58   @ 07:01  -   @ 07:00  --------------------------------------------------------  IN: 600 mL / OUT: 1800 mL / NET: -1200 mL    Daily Weight in k.2 (2020 06:59)

## 2020-11-29 LAB
ANION GAP SERPL CALC-SCNC: 13 MMOL/L — SIGNIFICANT CHANGE UP (ref 5–17)
BUN SERPL-MCNC: 12 MG/DL — SIGNIFICANT CHANGE UP (ref 7–23)
CALCIUM SERPL-MCNC: 9.3 MG/DL — SIGNIFICANT CHANGE UP (ref 8.4–10.5)
CHLORIDE SERPL-SCNC: 100 MMOL/L — SIGNIFICANT CHANGE UP (ref 96–108)
CK MB CFR SERPL CALC: 1.3 NG/ML — SIGNIFICANT CHANGE UP (ref 0–6.7)
CK SERPL-CCNC: 41 U/L — SIGNIFICANT CHANGE UP (ref 25–170)
CO2 SERPL-SCNC: 25 MMOL/L — SIGNIFICANT CHANGE UP (ref 22–31)
CREAT SERPL-MCNC: 1.13 MG/DL — SIGNIFICANT CHANGE UP (ref 0.5–1.3)
GLUCOSE SERPL-MCNC: 106 MG/DL — HIGH (ref 70–99)
HCT VFR BLD CALC: 31.6 % — LOW (ref 34.5–45)
HGB BLD-MCNC: 10.1 G/DL — LOW (ref 11.5–15.5)
MAGNESIUM SERPL-MCNC: 2.1 MG/DL — SIGNIFICANT CHANGE UP (ref 1.6–2.6)
MCHC RBC-ENTMCNC: 29.8 PG — SIGNIFICANT CHANGE UP (ref 27–34)
MCHC RBC-ENTMCNC: 32 GM/DL — SIGNIFICANT CHANGE UP (ref 32–36)
MCV RBC AUTO: 93.2 FL — SIGNIFICANT CHANGE UP (ref 80–100)
NRBC # BLD: 0 /100 WBCS — SIGNIFICANT CHANGE UP (ref 0–0)
PLATELET # BLD AUTO: 242 K/UL — SIGNIFICANT CHANGE UP (ref 150–400)
POTASSIUM SERPL-MCNC: 4 MMOL/L — SIGNIFICANT CHANGE UP (ref 3.5–5.3)
POTASSIUM SERPL-SCNC: 4 MMOL/L — SIGNIFICANT CHANGE UP (ref 3.5–5.3)
RBC # BLD: 3.39 M/UL — LOW (ref 3.8–5.2)
RBC # FLD: 13.9 % — SIGNIFICANT CHANGE UP (ref 10.3–14.5)
SODIUM SERPL-SCNC: 138 MMOL/L — SIGNIFICANT CHANGE UP (ref 135–145)
TROPONIN T SERPL-MCNC: 0.02 NG/ML — HIGH (ref 0–0.01)
WBC # BLD: 5.86 K/UL — SIGNIFICANT CHANGE UP (ref 3.8–10.5)
WBC # FLD AUTO: 5.86 K/UL — SIGNIFICANT CHANGE UP (ref 3.8–10.5)

## 2020-11-29 PROCEDURE — 99233 SBSQ HOSP IP/OBS HIGH 50: CPT

## 2020-11-29 PROCEDURE — 93010 ELECTROCARDIOGRAM REPORT: CPT

## 2020-11-29 RX ORDER — ACETAMINOPHEN 500 MG
1000 TABLET ORAL ONCE
Refills: 0 | Status: COMPLETED | OUTPATIENT
Start: 2020-11-29 | End: 2020-11-29

## 2020-11-29 RX ORDER — KETOROLAC TROMETHAMINE 30 MG/ML
15 SYRINGE (ML) INJECTION ONCE
Refills: 0 | Status: DISCONTINUED | OUTPATIENT
Start: 2020-11-29 | End: 2020-11-29

## 2020-11-29 RX ORDER — METOPROLOL TARTRATE 50 MG
50 TABLET ORAL EVERY 6 HOURS
Refills: 0 | Status: DISCONTINUED | OUTPATIENT
Start: 2020-11-29 | End: 2020-12-02

## 2020-11-29 RX ORDER — HYDROCHLOROTHIAZIDE 25 MG
25 TABLET ORAL DAILY
Refills: 0 | Status: DISCONTINUED | OUTPATIENT
Start: 2020-11-30 | End: 2020-12-02

## 2020-11-29 RX ORDER — HYDRALAZINE HCL 50 MG
25 TABLET ORAL ONCE
Refills: 0 | Status: COMPLETED | OUTPATIENT
Start: 2020-11-29 | End: 2020-11-29

## 2020-11-29 RX ORDER — HYDRALAZINE HCL 50 MG
25 TABLET ORAL
Refills: 0 | Status: DISCONTINUED | OUTPATIENT
Start: 2020-11-29 | End: 2020-12-02

## 2020-11-29 RX ADMIN — Medication 650 MILLIGRAM(S): at 07:45

## 2020-11-29 RX ADMIN — Medication 50 MILLIGRAM(S): at 06:07

## 2020-11-29 RX ADMIN — Medication 50 MILLIGRAM(S): at 17:20

## 2020-11-29 RX ADMIN — Medication 1 APPLICATION(S): at 18:07

## 2020-11-29 RX ADMIN — Medication 100 MILLIGRAM(S): at 06:21

## 2020-11-29 RX ADMIN — Medication 650 MILLIGRAM(S): at 15:42

## 2020-11-29 RX ADMIN — Medication 650 MILLIGRAM(S): at 18:07

## 2020-11-29 RX ADMIN — PANTOPRAZOLE SODIUM 40 MILLIGRAM(S): 20 TABLET, DELAYED RELEASE ORAL at 06:07

## 2020-11-29 RX ADMIN — Medication 30 MILLIGRAM(S): at 06:07

## 2020-11-29 RX ADMIN — NYSTATIN CREAM 1 APPLICATION(S): 100000 CREAM TOPICAL at 06:08

## 2020-11-29 RX ADMIN — Medication 25 MILLIGRAM(S): at 22:29

## 2020-11-29 RX ADMIN — Medication 25 MILLIGRAM(S): at 12:30

## 2020-11-29 RX ADMIN — Medication 15 MILLIGRAM(S): at 06:21

## 2020-11-29 RX ADMIN — Medication 12.5 MILLIGRAM(S): at 06:07

## 2020-11-29 RX ADMIN — Medication 1000 MILLIGRAM(S): at 02:03

## 2020-11-29 RX ADMIN — PANTOPRAZOLE SODIUM 40 MILLIGRAM(S): 20 TABLET, DELAYED RELEASE ORAL at 17:20

## 2020-11-29 RX ADMIN — Medication 15 MILLIGRAM(S): at 06:51

## 2020-11-29 RX ADMIN — ATORVASTATIN CALCIUM 20 MILLIGRAM(S): 80 TABLET, FILM COATED ORAL at 22:29

## 2020-11-29 RX ADMIN — Medication 50 MILLIGRAM(S): at 11:44

## 2020-11-29 RX ADMIN — Medication 30 MILLILITER(S): at 17:28

## 2020-11-29 RX ADMIN — Medication 1 APPLICATION(S): at 06:08

## 2020-11-29 RX ADMIN — Medication 1000 MILLIGRAM(S): at 01:13

## 2020-11-29 RX ADMIN — Medication 650 MILLIGRAM(S): at 11:31

## 2020-11-29 RX ADMIN — POLYETHYLENE GLYCOL 3350 17 GRAM(S): 17 POWDER, FOR SOLUTION ORAL at 15:46

## 2020-11-29 RX ADMIN — NYSTATIN CREAM 1 APPLICATION(S): 100000 CREAM TOPICAL at 18:07

## 2020-11-29 NOTE — PROGRESS NOTE ADULT - PROBLEM SELECTOR PLAN 7
Intermittently w/ HA, but no focal neurodeficits  - Responds well to Tylenol and Fiorocet, ordered PRN   - Of note, patient reportedly had normal CT Head/MRI Brain at [facility] within last month  - given recurrent HA/dizziness, repeat CT Head 11/18/2020 w/ no evidence of acute intracranial hemorrhage or acute transcortical infarct Intermittently w/ HA, but no focal neuro deficits  - Responds well to Tylenol and Fiorocet, ordered PRN   - Of note, patient reportedly had normal CT Head/MRI Brain at [facility] within last month  - given recurrent HA/dizziness, repeat CT Head 11/18/2020 w/ no evidence of acute intracranial hemorrhage or acute transcortical infarct Intermittently w/ HA, but no focal neuro deficits.  repeat CT Head 11/18/2020 w/ no evidence of acute intracranial hemorrhage or acute transcortical infarct  - Responds well to Tylenol and Fiorocet, ordered PRN

## 2020-11-29 NOTE — PROGRESS NOTE ADULT - PROBLEM SELECTOR PLAN 2
Multiple episodes of BRBPR prior to and during admission, H/H downtrending over past few days (9.5 to 8.9), now stable today at Hb 10.1/31.6  -Consider transfusion 1U PRBC if hgb < 8.5 in anticipation of upcoming surgery.  -s/p EGD revealing mild gastropathy.   -s/p Colonoscopy revealing four large colonic mass lesions from cecum to transverse colon  -Biopsy pathology consistent w/ adenocarcinoma  -Elevated CEA 23.1  -CT Chest/Abdomen/Pelvis 11/20 again with suspicion for neoplasm in ascending and transverse colon as well as ground glass opacities B/L in lungs   -PET Scan consistent w/ known malignancy and no evidence of spread (multiple other areas up uptake without suspicion of malignancy in report)  -Planned Colectomy 12/2/2020 with surgery.  Cleared from Cardiology and Pulm standpoint. Awaiting official medicine clearance.    -Heme/onc following:  Will possibly do tissue evaluation of hilar nodes, post Colectomy  -Continue to follow Heme/onc and surgery recs.

## 2020-11-29 NOTE — PROGRESS NOTE ADULT - PROBLEM SELECTOR PLAN 1
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 Alejo at [facility]  -Echo 11/13/2020 showed PEAK TRANSAORTIC GRADIENT 61.78 mmHg  -Structural CT scan: no thrombus seen.    -Limited TTE 11/16 showed aortic valve gradient 71 mmHg  -Official IRMA 11/17 revealed no thrombus, no shunts, and Jamar valve with slight thickening at base of right cusp with probable mild restricted excursion with otherwise no abnormalities  -slight suspicion for thrombus at base of R cusp but pt not cleared by GI for a/c given friable colonic lesions  -CTS stated nothing to do while inpatient, follow up as outpatient for further evaluation and management.

## 2020-11-29 NOTE — PROGRESS NOTE ADULT - PROBLEM SELECTOR PLAN 4
-Resolved, now with stable BP (130 -140's)  -Labile BP noted during admit.   One episode of Hypotension noted on 11/18 (SBP 80s).  Resolved with gentle hydration  -D/C Coreg 6.25 mg PO BID, now switched to Lopressor 50mg q6h  -c/w Lisinopril 10mg PO QD   -Restarted hctz 12.5mg daily on 11/28  -Monitor BP Pt with intermittent elevated BPs with SBP 180s. Holding ACEi perioperatively due to risk of vasoplegia  - c/w Lopressor 50mg q6h w/ hold HR <50, SBP <110  - started hydralazine 25mg TID w/ hold HR <50, SBP <110  - c/w HCTZ 12.5mg daily on 11/28  - Monitor BP

## 2020-11-29 NOTE — PROGRESS NOTE ADULT - SUBJECTIVE AND OBJECTIVE BOX
O/N Events: Around midnight, patient complained of LUQ abdominal pain, musculoskeletal type pain, which was reproducible on palpation. 1g IV tylenol given and patient again complained of similar pain in the morning, for which he was 15mg IV toradol x1 given.     Subjective/ROS: Denies HA, CP, SOB, n/v, changes in bowel/urinary habits.  12pt ROS otherwise negative.    VITALS  Vital Signs Last 24 Hrs  T(C): 36.3 (29 Nov 2020 04:23), Max: 37 (28 Nov 2020 22:08)  T(F): 97.3 (29 Nov 2020 04:23), Max: 98.6 (28 Nov 2020 22:08)  HR: 63 (29 Nov 2020 05:38) (55 - 65)  BP: 192/86 (29 Nov 2020 05:38) (121/59 - 192/86)  BP(mean): 124 (29 Nov 2020 05:38) (85 - 124)  RR: 18 (29 Nov 2020 05:38) (16 - 18)  SpO2: 97% (29 Nov 2020 05:38) (95% - 98%)    CAPILLARY BLOOD GLUCOSE          PHYSICAL EXAM  General: A&Ox3; NAD  Head: NC/AT; MMM; PERRL; EOMI;  Neck: Supple; no JVD  Respiratory: CTA B/L; no wheezes/crackles   Cardiovascular: Regular rhythm/rate; S1/S2   Gastrointestinal: Soft; NTND; normoactive BS  Extremities: WWP; 1+ bilateral LE edema  Neurological:  CNII-XII grossly intact; no obvious focal deficits    MEDICATIONS  (STANDING):  atorvastatin 20 milliGRAM(s) Oral at bedtime  diltiazem    Tablet 30 milliGRAM(s) Oral two times a day  hydrochlorothiazide 12.5 milliGRAM(s) Oral daily  hydrocortisone 1% Cream 1 Application(s) Topical two times a day  influenza  Vaccine (HIGH DOSE) 0.7 milliLiter(s) IntraMuscular once  metoprolol tartrate 50 milliGRAM(s) Oral every 6 hours  nystatin Powder 1 Application(s) Topical two times a day  pantoprazole  Injectable 40 milliGRAM(s) IV Push two times a day  polyethylene glycol 3350 17 Gram(s) Oral daily  tiotropium 18 MICROgram(s) Capsule 1 Capsule(s) Inhalation daily    MEDICATIONS  (PRN):  acetaminophen   Tablet .. 650 milliGRAM(s) Oral every 6 hours PRN Mild Pain (1 - 3), Moderate Pain (4 - 6)  acetaminophen 300 mG/butalbital 50 mG/ caffeine 40 mG 1 Capsule(s) Oral every 6 hours PRN Severe headache  aluminum hydroxide/magnesium hydroxide/simethicone Suspension 30 milliLiter(s) Oral every 6 hours PRN Dyspepsia  benzonatate 100 milliGRAM(s) Oral three times a day PRN Cough  diphenhydrAMINE 25 milliGRAM(s) Oral every 4 hours PRN Rash and/or Itching  guaiFENesin   Syrup  (Sugar-Free) 100 milliGRAM(s) Oral every 6 hours PRN Cough      Digox (Rash; Urticaria; Hives)  Plavix (Other (Mod to Severe))      LABS                        10.1   5.86  )-----------( 242      ( 29 Nov 2020 06:16 )             31.6     11-29    138  |  100  |  12  ----------------------------<  106<H>  4.0   |  25  |  1.13    Ca    9.3      29 Nov 2020 06:15  Mg     2.1     11-29    TPro  6.5  /  Alb  3.4  /  TBili  0.4  /  DBili  x   /  AST  18  /  ALT  25  /  AlkPhos  58  11-28              IMAGING/EKG/ETC  EKG:  Xray:  CT:  MRI: O/N Events: Around midnight, patient complained of LUQ abdominal pain, musculoskeletal type pain, which was reproducible on palpation. 1g IV tylenol given and patient again complained of similar pain in the morning, for which he was 15mg IV toradol x1 given.     Subjective/ROS: States her abdominal pain is improved but still reports residual  Denies HA, CP, SOB, n/v, changes in bowel/urinary habits.  12pt ROS otherwise negative.    VITALS  Vital Signs Last 24 Hrs  T(C): 36.3 (29 Nov 2020 04:23), Max: 37 (28 Nov 2020 22:08)  T(F): 97.3 (29 Nov 2020 04:23), Max: 98.6 (28 Nov 2020 22:08)  HR: 63 (29 Nov 2020 05:38) (55 - 65)  BP: 192/86 (29 Nov 2020 05:38) (121/59 - 192/86)  BP(mean): 124 (29 Nov 2020 05:38) (85 - 124)  RR: 18 (29 Nov 2020 05:38) (16 - 18)  SpO2: 97% (29 Nov 2020 05:38) (95% - 98%)    CAPILLARY BLOOD GLUCOSE          PHYSICAL EXAM  General: A&Ox3; NAD  Head: NC/AT; MMM; PERRL; EOMI;  Neck: Supple; no JVD  Respiratory: CTA B/L; no wheezes/crackles   Cardiovascular: Regular rhythm/rate; S1/S2   Gastrointestinal: Soft; NTND; normoactive BS  Extremities: WWP; 1+ bilateral LE edema  Neurological:  CNII-XII grossly intact; no obvious focal deficits    MEDICATIONS  (STANDING):  atorvastatin 20 milliGRAM(s) Oral at bedtime  diltiazem    Tablet 30 milliGRAM(s) Oral two times a day  hydrochlorothiazide 12.5 milliGRAM(s) Oral daily  hydrocortisone 1% Cream 1 Application(s) Topical two times a day  influenza  Vaccine (HIGH DOSE) 0.7 milliLiter(s) IntraMuscular once  metoprolol tartrate 50 milliGRAM(s) Oral every 6 hours  nystatin Powder 1 Application(s) Topical two times a day  pantoprazole  Injectable 40 milliGRAM(s) IV Push two times a day  polyethylene glycol 3350 17 Gram(s) Oral daily  tiotropium 18 MICROgram(s) Capsule 1 Capsule(s) Inhalation daily    MEDICATIONS  (PRN):  acetaminophen   Tablet .. 650 milliGRAM(s) Oral every 6 hours PRN Mild Pain (1 - 3), Moderate Pain (4 - 6)  acetaminophen 300 mG/butalbital 50 mG/ caffeine 40 mG 1 Capsule(s) Oral every 6 hours PRN Severe headache  aluminum hydroxide/magnesium hydroxide/simethicone Suspension 30 milliLiter(s) Oral every 6 hours PRN Dyspepsia  benzonatate 100 milliGRAM(s) Oral three times a day PRN Cough  diphenhydrAMINE 25 milliGRAM(s) Oral every 4 hours PRN Rash and/or Itching  guaiFENesin   Syrup  (Sugar-Free) 100 milliGRAM(s) Oral every 6 hours PRN Cough      Digox (Rash; Urticaria; Hives)  Plavix (Other (Mod to Severe))      LABS                        10.1   5.86  )-----------( 242      ( 29 Nov 2020 06:16 )             31.6     11-29    138  |  100  |  12  ----------------------------<  106<H>  4.0   |  25  |  1.13    Ca    9.3      29 Nov 2020 06:15  Mg     2.1     11-29    TPro  6.5  /  Alb  3.4  /  TBili  0.4  /  DBili  x   /  AST  18  /  ALT  25  /  AlkPhos  58  11-28              IMAGING/EKG/ETC  EKG:  Xray:  CT:  MRI: O/N Events: Around midnight, patient complained of LUQ abdominal pain, musculoskeletal type pain, which was reproducible on palpation. 1g IV tylenol given and patient again complained of similar pain in the morning, for which he was 15mg IV toradol x1 given.     Subjective/ROS: States her abdominal pain is improved but still rates at 6/10, pain responded to toradol. Denies any alleviating or exacerbating factors. Has been having regular BMs. Also reports a mild headache this morning, but denies any CP, SOB, n/v, changes in bowel/urinary habits.  12pt ROS otherwise negative.    VITALS  Vital Signs Last 24 Hrs  T(C): 36.3 (29 Nov 2020 04:23), Max: 37 (28 Nov 2020 22:08)  T(F): 97.3 (29 Nov 2020 04:23), Max: 98.6 (28 Nov 2020 22:08)  HR: 63 (29 Nov 2020 05:38) (55 - 65)  BP: 192/86 (29 Nov 2020 05:38) (121/59 - 192/86)  BP(mean): 124 (29 Nov 2020 05:38) (85 - 124)  RR: 18 (29 Nov 2020 05:38) (16 - 18)  SpO2: 97% (29 Nov 2020 05:38) (95% - 98%)    CAPILLARY BLOOD GLUCOSE          PHYSICAL EXAM  General: A&Ox3; NAD  Head: NC/AT; MMM; PERRL; EOMI;  Neck: Supple; no JVD  Respiratory: CTA B/L; no wheezes/crackles   Cardiovascular: Regular rhythm/rate; S1/S2   Gastrointestinal: Soft; mild LUQ/Left lower posterior chest tenderness, no rebound or guarding; normoactive BS  Extremities: WWP; 1+ bilateral LE edema  Neurological:  CNII-XII grossly intact; no obvious focal deficits    MEDICATIONS  (STANDING):  atorvastatin 20 milliGRAM(s) Oral at bedtime  diltiazem    Tablet 30 milliGRAM(s) Oral two times a day  hydrochlorothiazide 12.5 milliGRAM(s) Oral daily  hydrocortisone 1% Cream 1 Application(s) Topical two times a day  influenza  Vaccine (HIGH DOSE) 0.7 milliLiter(s) IntraMuscular once  metoprolol tartrate 50 milliGRAM(s) Oral every 6 hours  nystatin Powder 1 Application(s) Topical two times a day  pantoprazole  Injectable 40 milliGRAM(s) IV Push two times a day  polyethylene glycol 3350 17 Gram(s) Oral daily  tiotropium 18 MICROgram(s) Capsule 1 Capsule(s) Inhalation daily    MEDICATIONS  (PRN):  acetaminophen   Tablet .. 650 milliGRAM(s) Oral every 6 hours PRN Mild Pain (1 - 3), Moderate Pain (4 - 6)  acetaminophen 300 mG/butalbital 50 mG/ caffeine 40 mG 1 Capsule(s) Oral every 6 hours PRN Severe headache  aluminum hydroxide/magnesium hydroxide/simethicone Suspension 30 milliLiter(s) Oral every 6 hours PRN Dyspepsia  benzonatate 100 milliGRAM(s) Oral three times a day PRN Cough  diphenhydrAMINE 25 milliGRAM(s) Oral every 4 hours PRN Rash and/or Itching  guaiFENesin   Syrup  (Sugar-Free) 100 milliGRAM(s) Oral every 6 hours PRN Cough      Digox (Rash; Urticaria; Hives)  Plavix (Other (Mod to Severe))      LABS                        10.1   5.86  )-----------( 242      ( 29 Nov 2020 06:16 )             31.6     11-29    138  |  100  |  12  ----------------------------<  106<H>  4.0   |  25  |  1.13    Ca    9.3      29 Nov 2020 06:15  Mg     2.1     11-29    TPro  6.5  /  Alb  3.4  /  TBili  0.4  /  DBili  x   /  AST  18  /  ALT  25  /  AlkPhos  58  11-28              IMAGING/EKG/ETC  EKG:  Xray:  CT:  MRI: O/N Events: Around midnight, patient complained of LUQ abdominal pain, musculoskeletal type pain, which was reproducible on palpation. 1g IV tylenol given and patient again complained of similar pain in the morning, for which he was 15mg IV toradol x1 given.     Subjective/ROS: States her abdominal pain is improved but still rates at 6/10, pain responded to toradol. Denies any alleviating or exacerbating factors. Has been having regular BMs. Also reports a mild headache this morning, but denies any CP, SOB, n/v, changes in bowel/urinary habits.  12pt ROS otherwise negative.    VITALS  Vital Signs Last 24 Hrs  T(C): 36.3 (29 Nov 2020 04:23), Max: 37 (28 Nov 2020 22:08)  T(F): 97.3 (29 Nov 2020 04:23), Max: 98.6 (28 Nov 2020 22:08)  HR: 63 (29 Nov 2020 05:38) (55 - 65)  BP: 192/86 (29 Nov 2020 05:38) (121/59 - 192/86)  BP(mean): 124 (29 Nov 2020 05:38) (85 - 124)  RR: 18 (29 Nov 2020 05:38) (16 - 18)  SpO2: 97% (29 Nov 2020 05:38) (95% - 98%)    CAPILLARY BLOOD GLUCOSE    PHYSICAL EXAM  General: A&Ox3; NAD  Head: NC/AT; MMM; PERRL; EOMI;  Neck: Supple; no JVD  Respiratory: CTA B/L; no wheezes/crackles   Cardiovascular: Regular rhythm/rate; S1/S2   Gastrointestinal: Soft; mild LUQ/Left lower posterior chest tenderness, no rebound or guarding; normoactive BS  Extremities: WWP; 1+ bilateral LE edema  Neurological:  CNII-XII grossly intact; no obvious focal deficits    MEDICATIONS  (STANDING):  atorvastatin 20 milliGRAM(s) Oral at bedtime  diltiazem    Tablet 30 milliGRAM(s) Oral two times a day  hydrochlorothiazide 12.5 milliGRAM(s) Oral daily  hydrocortisone 1% Cream 1 Application(s) Topical two times a day  influenza  Vaccine (HIGH DOSE) 0.7 milliLiter(s) IntraMuscular once  metoprolol tartrate 50 milliGRAM(s) Oral every 6 hours  nystatin Powder 1 Application(s) Topical two times a day  pantoprazole  Injectable 40 milliGRAM(s) IV Push two times a day  polyethylene glycol 3350 17 Gram(s) Oral daily  tiotropium 18 MICROgram(s) Capsule 1 Capsule(s) Inhalation daily    MEDICATIONS  (PRN):  acetaminophen   Tablet .. 650 milliGRAM(s) Oral every 6 hours PRN Mild Pain (1 - 3), Moderate Pain (4 - 6)  acetaminophen 300 mG/butalbital 50 mG/ caffeine 40 mG 1 Capsule(s) Oral every 6 hours PRN Severe headache  aluminum hydroxide/magnesium hydroxide/simethicone Suspension 30 milliLiter(s) Oral every 6 hours PRN Dyspepsia  benzonatate 100 milliGRAM(s) Oral three times a day PRN Cough  diphenhydrAMINE 25 milliGRAM(s) Oral every 4 hours PRN Rash and/or Itching  guaiFENesin   Syrup  (Sugar-Free) 100 milliGRAM(s) Oral every 6 hours PRN Cough      Digox (Rash; Urticaria; Hives)  Plavix (Other (Mod to Severe))      LABS                        10.1   5.86  )-----------( 242      ( 29 Nov 2020 06:16 )             31.6     11-29    138  |  100  |  12  ----------------------------<  106<H>  4.0   |  25  |  1.13    Ca    9.3      29 Nov 2020 06:15  Mg     2.1     11-29    TPro  6.5  /  Alb  3.4  /  TBili  0.4  /  DBili  x   /  AST  18  /  ALT  25  /  AlkPhos  58  11-28

## 2020-11-29 NOTE — PROGRESS NOTE ADULT - PROBLEM SELECTOR PLAN 8
- Continue Spiriva inhaler daily      VTE PPx: SCDs  PT evaluated:  Recs for home PT  Patient lives with daughter Jaelyn, whom is her HHA (40 hrs/week).  CODE: Full code  Dispo: Elective Colectomy 12/2/2020

## 2020-11-29 NOTE — PROGRESS NOTE ADULT - ASSESSMENT
80F, [place] speaking PMHx uncontrolled HTN, HLD, asthma, chronic diastolic CHF (EF 61% by Echo 4/2019), and aortic stenosis (s/p transfemoral TAVR with Jamar valve 02/2019 with Dr. Alejo) admitted with CP and  Hypertensive urgency,  Hospital course complicated with GIB after initiation of Heparin 2/2 Thrombus on IRMA. Subsequent EGD/Colonoscopy revealed 4 large colonic masses, with PET Scan concerning for cancer. Pt also found with new onset AFIB, Digoxin initiated for rate control, D/C 2/2 rash, now maintaining NSR.  Pt  medically optimized from a cardiac standpoint, now awaiting Colectomy on Wednesday, 12/2/20.  Awaiting official medicine clearance for surgery.     **INCOMPLETE** 80F, [place] speaking PMHx uncontrolled HTN, HLD, asthma, chronic diastolic CHF (EF 61% by Echo 4/2019), and aortic stenosis (s/p transfemoral TAVR with Jamar valve 02/2019 with Dr. Alejo) admitted with CP and  Hypertensive urgency,  Hospital course complicated with GIB after initiation of Heparin 2/2 Thrombus on IRMA. Subsequent EGD/Colonoscopy revealed 4 large colonic masses, with PET Scan concerning for cancer. Pt also found with new onset AFIB, Digoxin initiated for rate control, D/C 2/2 rash, now maintaining NSR.  Pt  medically optimized from a cardiac standpoint, now awaiting Colectomy on Wednesday, 12/2/20.  Awaiting official medicine clearance for surgery.

## 2020-11-29 NOTE — PROGRESS NOTE ADULT - PROBLEM SELECTOR PLAN 3
-NSR currently rates 50s-60s. Asx   -Prior hx of palpitations per patient, unclear if previous Hx of A-fib  -Found in AF rVR, -130s after CT Scan on 11/20, difficulty controlling HR on Dilitazem and Lopressor, Digoxin initiated, now D/C 2/2 rash.   -Coreg D/C 2/2 to low BP, switched to Lopressor 50mg q6h for rate control.   -c/w Diltiazem 30mg BID (dec from TID on 11/26)  -CHADsVASc 4, HASBLED 3; HOWEVER, holding A/C in setting of bleeding risk   -c/w Tele monitor

## 2020-11-29 NOTE — PROGRESS NOTE ADULT - PROBLEM SELECTOR PLAN 5
Pt presenting with CP likely secondary to HTN urgency, now resolved.   - s/p Diagnostic R/LHC 1/9/2019 w/ non-obstructive disease  - h/o GERD, one burning CP overnight on 11/26 is possibly in the setting of GERD.    - c/w Pantoprazole 40mg IV BID.

## 2020-11-29 NOTE — PROGRESS NOTE ADULT - PROBLEM SELECTOR PLAN 6
- Echo with normal LV/RV size/systolic function. Grade II diastolic dyfunction and AS as above  - s/p Lasix 20 mg stat at initial admit, requiring no further diuresis until 11/27; pt found with +1 B/L LE Edema and mildly elevated BP, s/p Lasix 40mg IV x 1 dose.   -Euvolemic on exam today, no SOB/JVD, no increased WOB, satting 96 % RA.     -Restarted HCTZ 12.5mg daily.   - strict I/Os, daily weights, net negative 1.2L in 24H Echo with normal LV/RV size/systolic function. Grade II diastolic dysfunction and AS as above. s/p Lasix 20 mg stat at initial admit, requiring no further diuresis until 11/27; pt found with +1 B/L LE Edema and mildly elevated BP, s/p Lasix 40mg IV x 1 dose.   -Euvolemic on exam today, no SOB/JVD, no increased WOB, satting 96 % RA.     -Restarted HCTZ 12.5mg daily.   - strict I/Os, daily weights, net negative 1.2L in 24H

## 2020-11-30 LAB
ANION GAP SERPL CALC-SCNC: 12 MMOL/L — SIGNIFICANT CHANGE UP (ref 5–17)
BASOPHILS # BLD AUTO: 0.05 K/UL — SIGNIFICANT CHANGE UP (ref 0–0.2)
BASOPHILS NFR BLD AUTO: 0.8 % — SIGNIFICANT CHANGE UP (ref 0–2)
BUN SERPL-MCNC: 16 MG/DL — SIGNIFICANT CHANGE UP (ref 7–23)
CALCIUM SERPL-MCNC: 8.8 MG/DL — SIGNIFICANT CHANGE UP (ref 8.4–10.5)
CHLORIDE SERPL-SCNC: 100 MMOL/L — SIGNIFICANT CHANGE UP (ref 96–108)
CK MB CFR SERPL CALC: 1 NG/ML — SIGNIFICANT CHANGE UP (ref 0–6.7)
CK SERPL-CCNC: 44 U/L — SIGNIFICANT CHANGE UP (ref 25–170)
CO2 SERPL-SCNC: 28 MMOL/L — SIGNIFICANT CHANGE UP (ref 22–31)
CREAT SERPL-MCNC: 1.19 MG/DL — SIGNIFICANT CHANGE UP (ref 0.5–1.3)
EOSINOPHIL # BLD AUTO: 0.22 K/UL — SIGNIFICANT CHANGE UP (ref 0–0.5)
EOSINOPHIL NFR BLD AUTO: 3.6 % — SIGNIFICANT CHANGE UP (ref 0–6)
GLUCOSE SERPL-MCNC: 105 MG/DL — HIGH (ref 70–99)
HCT VFR BLD CALC: 29 % — LOW (ref 34.5–45)
HGB BLD-MCNC: 9 G/DL — LOW (ref 11.5–15.5)
IMM GRANULOCYTES NFR BLD AUTO: 0.3 % — SIGNIFICANT CHANGE UP (ref 0–1.5)
LYMPHOCYTES # BLD AUTO: 1.38 K/UL — SIGNIFICANT CHANGE UP (ref 1–3.3)
LYMPHOCYTES # BLD AUTO: 22.3 % — SIGNIFICANT CHANGE UP (ref 13–44)
MAGNESIUM SERPL-MCNC: 2.5 MG/DL — SIGNIFICANT CHANGE UP (ref 1.6–2.6)
MCHC RBC-ENTMCNC: 29 PG — SIGNIFICANT CHANGE UP (ref 27–34)
MCHC RBC-ENTMCNC: 31 GM/DL — LOW (ref 32–36)
MCV RBC AUTO: 93.5 FL — SIGNIFICANT CHANGE UP (ref 80–100)
MONOCYTES # BLD AUTO: 0.61 K/UL — SIGNIFICANT CHANGE UP (ref 0–0.9)
MONOCYTES NFR BLD AUTO: 9.9 % — SIGNIFICANT CHANGE UP (ref 2–14)
NEUTROPHILS # BLD AUTO: 3.91 K/UL — SIGNIFICANT CHANGE UP (ref 1.8–7.4)
NEUTROPHILS NFR BLD AUTO: 63.1 % — SIGNIFICANT CHANGE UP (ref 43–77)
NRBC # BLD: 0 /100 WBCS — SIGNIFICANT CHANGE UP (ref 0–0)
PHOSPHATE SERPL-MCNC: 4.2 MG/DL — SIGNIFICANT CHANGE UP (ref 2.5–4.5)
PLATELET # BLD AUTO: 192 K/UL — SIGNIFICANT CHANGE UP (ref 150–400)
POTASSIUM SERPL-MCNC: 4.2 MMOL/L — SIGNIFICANT CHANGE UP (ref 3.5–5.3)
POTASSIUM SERPL-SCNC: 4.2 MMOL/L — SIGNIFICANT CHANGE UP (ref 3.5–5.3)
RBC # BLD: 3.1 M/UL — LOW (ref 3.8–5.2)
RBC # FLD: 13.9 % — SIGNIFICANT CHANGE UP (ref 10.3–14.5)
SODIUM SERPL-SCNC: 140 MMOL/L — SIGNIFICANT CHANGE UP (ref 135–145)
TROPONIN T SERPL-MCNC: 0.01 NG/ML — SIGNIFICANT CHANGE UP (ref 0–0.01)
WBC # BLD: 6.19 K/UL — SIGNIFICANT CHANGE UP (ref 3.8–10.5)
WBC # FLD AUTO: 6.19 K/UL — SIGNIFICANT CHANGE UP (ref 3.8–10.5)

## 2020-11-30 RX ORDER — NEOMYCIN SULFATE 500 MG/1
1000 TABLET ORAL
Refills: 0 | Status: DISCONTINUED | OUTPATIENT
Start: 2020-11-30 | End: 2020-12-01

## 2020-11-30 RX ORDER — METRONIDAZOLE 500 MG
500 TABLET ORAL
Refills: 0 | Status: DISCONTINUED | OUTPATIENT
Start: 2020-12-01 | End: 2020-12-01

## 2020-11-30 RX ORDER — SOD SULF/SODIUM/NAHCO3/KCL/PEG
4000 SOLUTION, RECONSTITUTED, ORAL ORAL
Refills: 0 | Status: DISCONTINUED | OUTPATIENT
Start: 2020-12-01 | End: 2020-12-01

## 2020-11-30 RX ADMIN — POLYETHYLENE GLYCOL 3350 17 GRAM(S): 17 POWDER, FOR SOLUTION ORAL at 13:49

## 2020-11-30 RX ADMIN — Medication 50 MILLIGRAM(S): at 18:04

## 2020-11-30 RX ADMIN — Medication 25 MILLIGRAM(S): at 07:08

## 2020-11-30 RX ADMIN — ATORVASTATIN CALCIUM 20 MILLIGRAM(S): 80 TABLET, FILM COATED ORAL at 22:26

## 2020-11-30 RX ADMIN — PANTOPRAZOLE SODIUM 40 MILLIGRAM(S): 20 TABLET, DELAYED RELEASE ORAL at 18:04

## 2020-11-30 RX ADMIN — Medication 25 MILLIGRAM(S): at 07:09

## 2020-11-30 RX ADMIN — Medication 25 MILLIGRAM(S): at 13:49

## 2020-11-30 RX ADMIN — Medication 50 MILLIGRAM(S): at 05:49

## 2020-11-30 RX ADMIN — Medication 50 MILLIGRAM(S): at 13:49

## 2020-11-30 RX ADMIN — Medication 50 MILLIGRAM(S): at 00:06

## 2020-11-30 RX ADMIN — Medication 1 APPLICATION(S): at 05:49

## 2020-11-30 RX ADMIN — NEOMYCIN SULFATE 1000 MILLIGRAM(S): 500 TABLET ORAL at 22:26

## 2020-11-30 RX ADMIN — Medication 1 APPLICATION(S): at 18:03

## 2020-11-30 RX ADMIN — Medication 25 MILLIGRAM(S): at 22:26

## 2020-11-30 RX ADMIN — TIOTROPIUM BROMIDE 1 CAPSULE(S): 18 CAPSULE ORAL; RESPIRATORY (INHALATION) at 10:04

## 2020-11-30 RX ADMIN — NYSTATIN CREAM 1 APPLICATION(S): 100000 CREAM TOPICAL at 18:03

## 2020-11-30 RX ADMIN — PANTOPRAZOLE SODIUM 40 MILLIGRAM(S): 20 TABLET, DELAYED RELEASE ORAL at 05:46

## 2020-11-30 RX ADMIN — NYSTATIN CREAM 1 APPLICATION(S): 100000 CREAM TOPICAL at 07:10

## 2020-11-30 NOTE — PROGRESS NOTE ADULT - PROBLEM SELECTOR PLAN 4
Pt presenting with CP likely secondary to HTN urgency, now resolved.   - s/p Diagnostic R/LHC 1/9/2019 w/ non-obstructive disease  - h/o GERD, one burning CP overnight on 11/26 is possibly in the setting of GERD.    - c/w Pantoprazole 40mg IV BID. Presented w/CP likely secondary to HTN urgency, now resolved.   - s/p Diagnostic R/LHC 1/9/2019 w/ non-obstructive disease  - h/o GERD, one burning CP overnight on 11/26 is possibly in the setting of GERD.    - c/w Pantoprazole 40mg IV BID.

## 2020-11-30 NOTE — PROGRESS NOTE ADULT - PROBLEM SELECTOR PLAN 2
-NSR currently rates 50s-60s. Asx   -Prior hx of palpitations per patient, unclear if previous Hx of A-fib  -Found in AF rVR, -130s after CT Scan on 11/20, difficulty controlling HR on Dilitazem and Lopressor, Digoxin initiated, now D/C 2/2 rash.   -Coreg D/C 2/2 to low BP, switched to Lopressor 50mg q6h for rate control.   -c/w Diltiazem 30mg BID (dec from TID on 11/26)  -CHADsVASc 4, HASBLED 3; HOWEVER, holding A/C in setting of bleeding risk   -c/w Tele monitor Had new onset Afib RVR after CT Scan on 11/20. Prior hx palpitations, however, unclear if hx Afib.     NSR currently rates 50s-60s. Asx     difficulty controlling HR on Dilitazem and Lopressor, Digoxin initiated, now D/C 2/2 rash.   -Coreg D/C 2/2 to low BP, switched to Lopressor 50mg q6h for rate control.   -c/w Diltiazem 30mg BID (dec from TID on 11/26)  -CHADsVASc 4, HASBLED 3; HOWEVER, holding A/C in setting of bleeding risk   -c/w Tele monitor Had new onset Afib RVR after CT Scan on 11/20. Prior hx palpitations, however, unclear if hx Afib.  - difficulty controlling HR on Diltiazem and Lopressor, Digoxin initiated, now D/C 2/2 rash.   - Coreg D/C 2/2 to low BP, switched to Lopressor 50mg q6h for rate control.   - CHADsVASc 4, HASBLED 3; HOWEVER, holding A/C in setting of bleeding risk   - remains in SR (HR 60s)

## 2020-11-30 NOTE — CHART NOTE - NSCHARTNOTEFT_GEN_A_CORE
Admitting Diagnosis:   Patient is a 80y old  Female who presents with a chief complaint of HTN urgency (2020 12:04)    PAST MEDICAL & SURGICAL HISTORY:  CHF (congestive heart failure)  Pulmonary HTN  Aortic stenosis  HTN (hypertension)  S/P TAVR (transcatheter aortic valve replacement)    Current Nutrition Order: Full Liquid diet    PO Intake: Good (%) [   ]  Fair (50-75%) [ x  ] Poor (<25%) [   ]. Pt reports not consuming breakfast, ate Vanilla pudding and orange jello for lunch. Bedside tray had untouched applesauce and juice.    GI Issues:   NO N/V/C at this time.  Pt reports +Diarrhea x1 this morning.     Pain:  pt not reporting pain at this time.    Skin Integrity:  Rash on breast  trace edema on b/l foot.  No pressure ulcers  Quique Score 21    Labs:       140  |  100  |  16  ----------------------------<  105<H>  4.2   |  28  |  1.19    Ca    8.8      2020 06:20  Phos  4.2     11-30  Mg     2.5     11-30      CAPILLARY BLOOD GLUCOSE  POCT Blood Glucose.: 143 mg/dL (2020 17:09)    Medications:  MEDICATIONS  (STANDING):  atorvastatin 20 milliGRAM(s) Oral at bedtime  hydrALAZINE 25 milliGRAM(s) Oral <User Schedule>  hydrochlorothiazide 25 milliGRAM(s) Oral daily  hydrocortisone 1% Cream 1 Application(s) Topical two times a day  influenza  Vaccine (HIGH DOSE) 0.7 milliLiter(s) IntraMuscular once  metoprolol tartrate 50 milliGRAM(s) Oral every 6 hours  nystatin Powder 1 Application(s) Topical two times a day  pantoprazole  Injectable 40 milliGRAM(s) IV Push two times a day  polyethylene glycol 3350 17 Gram(s) Oral daily  tiotropium 18 MICROgram(s) Capsule 1 Capsule(s) Inhalation daily    MEDICATIONS  (PRN):  acetaminophen   Tablet .. 650 milliGRAM(s) Oral every 6 hours PRN Mild Pain (1 - 3), Moderate Pain (4 - 6)  acetaminophen 300 mG/butalbital 50 mG/ caffeine 40 mG 1 Capsule(s) Oral every 6 hours PRN Severe headache  aluminum hydroxide/magnesium hydroxide/simethicone Suspension 30 milliLiter(s) Oral every 6 hours PRN Dyspepsia  benzonatate 100 milliGRAM(s) Oral three times a day PRN Cough  diphenhydrAMINE 25 milliGRAM(s) Oral every 4 hours PRN Rash and/or Itching  guaiFENesin   Syrup  (Sugar-Free) 100 milliGRAM(s) Oral every 6 hours PRN Cough      Weight:  Daily Weight in k.4 (2020 06:44)  Daily Weight in k.4 (2020 04:30)    Weight Change: 1kg weight loss from admit, likely fluid loss from diuresis     Nutrition Focused Physical Exam: Completed [   ]  Not Pertinent [X   ]    Estimated energy needs:   Height 70"; .5kg (admit); IBW 68kg; 151%IBW; BMI 32.4  IBW used to calculate energy needs due to pt's current body weight exceeding 120% of IBW, adjusted for age, 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 Fluids per team 2/2 CHF  Calories: 25-30 kcal/kg = 6807-6227 kcal/day  Protein: 1.2-1.4 g/kg = 82-95g protein/day     Subjective:   78 year old Swazi speaking F, PMHx of uncontrolled HTN, hyperlipidemia, asthma (denies hospitalizations, intubations), MARK? (on CPAP, noncompliant), chronic diastolic CHF (EF:61% by Echo 2019), aortic stenosis s/p transfemoral TAVR with rachael valve on 2019 with Dr. Alejo @[facility], recent hospitalization at [facility] (for HTN/headache, no changes made to medications, negative CT head/MRI brain, discharged on 20) who presents to [facility] ED 20 c/o intermittent chest pain and elevated BP x 1 week. GI consulted for BRBPR. S/p EGD/C-scope on  and found to have 4 large colon masses, which were biopsied. Pathology significant for moderately diff adenoca in ascending and transverse lesions. Pt continues to be in Afib- started on lopressor and dig. Pt developed rash and itching likely due to allergic reaction to Digoxin. Pt continued to complain of this sensation on  and was started on PRN Benadryl as well as Hydrocortisone topical cream. Pt was initiated on Cardizem and uptitrated to Lopressor 50 mg q6 hours, and on  AM patient was noted to have converted to NSR. Pt is s/p PET scan performed on  and surgery now recommending colon resection 2020. Pt on bowel prep with Full Liquid diet today () and bowel prep tomorrow() with Clear Liquid Diet in am w/ Golytely 400ml.    Visited pt in room, resting in bed. Pt currently on full liquid diet in preparation for surgery . Pt was able to communicate in English and denies eating breakfast, consuming only vanilla pudding and orange jello for lunch, noted applesauce/juice at bedside tray. Pt denies N/V/C, with diarrhea x1 this AM. Pt denies pain at this time. Discussed diet progression after surgery, pt indicated understanding non-verbally. Please see below for recs. RD to f/u per protocol.    Previous Nutrition Diagnosis:  Increased protein-calorie needs RT increased demand for protein-calorie intake AEB hypermetabolic state    Active [x ]  Resolved [   ]    If resolved, new PES: Inadequate Oral intake RT pt meeting <75% of EER AEB pt on FLD.    Goal: Pt to meet % of EER when medically feasible.     Recommendations:  1. Recommend advancing diet to DASH/TLC when medically feasible.  >> Appreciate RD consult for diet recs.  2. Monitor lytes and replete prn  3. Pain and bowel regimens per team   4. Reinforce diet ed as appropriate     Education: Discussed diet progression s/p colon resection.    Risk Level: High [ x  ] Moderate [ ] Low [   ]. Admitting Diagnosis:   Patient is a 80y old  Female who presents with a chief complaint of HTN urgency (2020 12:04)    PAST MEDICAL & SURGICAL HISTORY:  CHF (congestive heart failure)  Pulmonary HTN  Aortic stenosis  HTN (hypertension)  S/P TAVR (transcatheter aortic valve replacement)    Current Nutrition Order: CstCHO - No snack, Full Liquid diet     PO Intake: Good (%) [   ]  Fair (50-75%) [   ] Poor (<25%) [  x ]. Pt reports not consuming breakfast, ate Vanilla pudding and orange jello for lunch. Bedside tray had untouched applesauce and juice.    GI Issues:   NO N/V/C at this time.  Pt reports +Diarrhea x1 this morning.     Pain:  pt not reporting pain at this time.    Skin Integrity:  Rash on breast  trace edema on b/l foot.  No pressure ulcers  Quique Score 21    Labs:   11-30    140  |  100  |  16  ----------------------------<  105<H>  4.2   |  28  |  1.19    Ca    8.8      2020 06:20  Phos  4.2     11-30  Mg     2.5     11-30      CAPILLARY BLOOD GLUCOSE  POCT Blood Glucose.: 143 mg/dL (2020 17:09)    Medications:  MEDICATIONS  (STANDING):  atorvastatin 20 milliGRAM(s) Oral at bedtime  hydrALAZINE 25 milliGRAM(s) Oral <User Schedule>  hydrochlorothiazide 25 milliGRAM(s) Oral daily  hydrocortisone 1% Cream 1 Application(s) Topical two times a day  influenza  Vaccine (HIGH DOSE) 0.7 milliLiter(s) IntraMuscular once  metoprolol tartrate 50 milliGRAM(s) Oral every 6 hours  nystatin Powder 1 Application(s) Topical two times a day  pantoprazole  Injectable 40 milliGRAM(s) IV Push two times a day  polyethylene glycol 3350 17 Gram(s) Oral daily  tiotropium 18 MICROgram(s) Capsule 1 Capsule(s) Inhalation daily    MEDICATIONS  (PRN):  acetaminophen   Tablet .. 650 milliGRAM(s) Oral every 6 hours PRN Mild Pain (1 - 3), Moderate Pain (4 - 6)  acetaminophen 300 mG/butalbital 50 mG/ caffeine 40 mG 1 Capsule(s) Oral every 6 hours PRN Severe headache  aluminum hydroxide/magnesium hydroxide/simethicone Suspension 30 milliLiter(s) Oral every 6 hours PRN Dyspepsia  benzonatate 100 milliGRAM(s) Oral three times a day PRN Cough  diphenhydrAMINE 25 milliGRAM(s) Oral every 4 hours PRN Rash and/or Itching  guaiFENesin   Syrup  (Sugar-Free) 100 milliGRAM(s) Oral every 6 hours PRN Cough      Weight:  Daily Weight in k.4 (2020 06:44)  Daily Weight in k.4 (2020 04:30)    Weight Change: 1kg weight loss from admit, likely fluid loss from diuresis     Nutrition Focused Physical Exam: Completed [   ]  Not Pertinent [X   ]    Estimated energy needs:   Height 70"; .5kg (admit); IBW 68kg; 151%IBW; BMI 32.4  IBW used to calculate energy needs due to pt's current body weight exceeding 120% of IBW, adjusted for age, 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 Fluids per team 2/2 CHF  Calories: 25-30 kcal/kg = 6987-5098 kcal/day  Protein: 1.2-1.4 g/kg = 82-95g protein/day     Subjective:   78 year old Maori speaking F, PMHx of uncontrolled HTN, hyperlipidemia, asthma (denies hospitalizations, intubations), MARK? (on CPAP, noncompliant), chronic diastolic CHF (EF:61% by Echo 2019), aortic stenosis s/p transfemoral TAVR with rachael valve on 2019 with Dr. Alejo @[facility], recent hospitalization at [facility] (for HTN/headache, no changes made to medications, negative CT head/MRI brain, discharged on 20) who presents to [facility] ED 20 c/o intermittent chest pain and elevated BP x 1 week. GI consulted for BRBPR. S/p EGD/C-scope on  and found to have 4 large colon masses, which were biopsied. Pathology significant for moderately diff adenoca in ascending and transverse lesions. Pt continues to be in Afib- started on lopressor and dig. Pt developed rash and itching likely due to allergic reaction to Digoxin. Pt continued to complain of this sensation on  and was started on PRN Benadryl as well as Hydrocortisone topical cream. Pt was initiated on Cardizem and uptitrated to Lopressor 50 mg q6 hours, and on  AM patient was noted to have converted to NSR. Pt is s/p PET scan performed on  and surgery now recommending colon resection 2020. Pt on bowel prep with Full Liquid diet today () and bowel prep tomorrow() with Clear Liquid Diet in am w/ Golytely 400ml.    Visited pt in room, resting in bed. Pt currently on full liquid diet in preparation for surgery . Pt was able to communicate in English and denies eating breakfast, consuming only vanilla pudding and orange jello for lunch, noted applesauce/juice at bedside tray. Pt denies N/V/C, with diarrhea x1 this AM. Pt denies pain at this time. Discussed diet progression after surgery, pt indicated understanding non-verbally. Please see below for recs. RD to f/u per protocol.    Previous Nutrition Diagnosis:  Increased protein-calorie needs RT increased demand for protein-calorie intake AEB hypermetabolic state    Active [x ]  Resolved [   ]    If resolved, new PES: Inadequate Oral intake RT pt meeting <75% of EER AEB pt on FLD.    Goal: Pt to meet % of EER when medically feasible.     Recommendations:  1. Recommend advancing diet to CLD when medically feasible post surgery.  >> Recommend advancing to low-fiber DASH/TLC diet when medically feasible. Appreciate RD consult for diet recs.  2. Monitor lytes and replete prn  3. Pain and bowel regimens per team   4. Reinforce diet ed as appropriate   5. RD to remain available prn.    Education: Discussed diet progression s/p colon resection.    Risk Level: High [ x  ] Moderate [ ] Low [   ].

## 2020-11-30 NOTE — PROGRESS NOTE ADULT - PROBLEM SELECTOR PLAN 5
Pt with intermittent elevated BPs with SBP 180s. Holding ACEi perioperatively due to risk of vasoplegia  - c/w Lopressor 50mg q6h w/ hold HR <50, SBP <110  - started hydralazine 25mg TID w/ hold HR <50, SBP <110  - c/w HCTZ 12.5mg daily on 11/28  - Monitor BP S/p transfemoral TAVR w/ Jamar valve on 02/05/2019 w/ Dr. Alejo at [facility]  -Echo 11/13/2020 showed PEAK TRANSAORTIC GRADIENT 61.78 mmHg  -Structural CT scan: no thrombus seen.    -Limited TTE 11/16 showed aortic valve gradient 71 mmHg  -Official IRMA 11/17 revealed no thrombus, no shunts, and Jamar valve with slight thickening at base of right cusp with probable mild restricted excursion with otherwise no abnormalities  -slight suspicion for thrombus at base of R cusp but pt not cleared by GI for a/c given friable colonic lesions  -CTS stated nothing to do while inpatient, follow up as outpatient for further evaluation and management. 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 Alejo at [facility]  - Echo 11/13/2020 showed PEAK TRANSAORTIC GRADIENT 61.78 mmHg  - Structural CT scan: no thrombus seen.    - Limited TTE 11/16 showed aortic valve gradient 71 mmHg  - Official IRMA 11/17 revealed no thrombus, no shunts, and Jamar valve with slight thickening at base of right cusp with probable mild restricted excursion with otherwise no abnormalities  - slight suspicion for thrombus at base of R cusp but pt not cleared by GI for a/c given friable colonic lesions  - CTS stated nothing to do while inpatient, follow up as outpatient for further evaluation and management.

## 2020-11-30 NOTE — PROGRESS NOTE ADULT - SUBJECTIVE AND OBJECTIVE BOX
----- Message from Brenden May MD sent at 11/27/2017 12:49 PM CST -----  Results reviewed     STATUS POST:      INTERVAL HPI/OVERNIGHT EVENTS:      SUBJECTIVE:     hydrALAZINE 25 milliGRAM(s) Oral <User Schedule>  hydrochlorothiazide 25 milliGRAM(s) Oral daily  metoprolol tartrate 50 milliGRAM(s) Oral every 6 hours      Vital Signs Last 24 Hrs  T(C): 36.2 (30 Nov 2020 04:05), Max: 37.1 (29 Nov 2020 08:51)  T(F): 97.2 (30 Nov 2020 04:05), Max: 98.7 (29 Nov 2020 08:51)  HR: 60 (30 Nov 2020 05:44) (51 - 62)  BP: 123/64 (30 Nov 2020 05:44) (115/69 - 181/77)  BP(mean): 92 (29 Nov 2020 17:18) (83 - 111)  RR: 16 (30 Nov 2020 05:44) (16 - 18)  SpO2: 95% (30 Nov 2020 05:44) (94% - 100%)  I&O's Detail    28 Nov 2020 07:01  -  29 Nov 2020 07:00  --------------------------------------------------------  IN:    Oral Fluid: 900 mL  Total IN: 900 mL    OUT:    Voided (mL): 2195 mL  Total OUT: 2195 mL    Total NET: -1295 mL      29 Nov 2020 07:01  -  30 Nov 2020 06:53  --------------------------------------------------------  IN:    Oral Fluid: 540 mL  Total IN: 540 mL    OUT:    Voided (mL): 150 mL  Total OUT: 150 mL    Total NET: 390 mL          Physical Exam    LABS:                        10.1   5.86  )-----------( 242      ( 29 Nov 2020 06:16 )             31.6     11-29    138  |  100  |  12  ----------------------------<  106<H>  4.0   |  25  |  1.13    Ca    9.3      29 Nov 2020 06:15  Mg     2.1     11-29            RADIOLOGY & ADDITIONAL STUDIES:   SUBJECTIVE: Examine at the bedside resting comfortably. Nausea but no emesis. Non bloody BM overnight. Tolerating diet.     hydrALAZINE 25 milliGRAM(s) Oral <User Schedule>  hydrochlorothiazide 25 milliGRAM(s) Oral daily  metoprolol tartrate 50 milliGRAM(s) Oral every 6 hours      Vital Signs Last 24 Hrs  T(C): 36.2 (30 Nov 2020 04:05), Max: 37.1 (29 Nov 2020 08:51)  T(F): 97.2 (30 Nov 2020 04:05), Max: 98.7 (29 Nov 2020 08:51)  HR: 60 (30 Nov 2020 05:44) (51 - 62)  BP: 123/64 (30 Nov 2020 05:44) (115/69 - 181/77)  BP(mean): 92 (29 Nov 2020 17:18) (83 - 111)  RR: 16 (30 Nov 2020 05:44) (16 - 18)  SpO2: 95% (30 Nov 2020 05:44) (94% - 100%)  I&O's Detail    28 Nov 2020 07:01  -  29 Nov 2020 07:00  --------------------------------------------------------  IN:    Oral Fluid: 900 mL  Total IN: 900 mL    OUT:    Voided (mL): 2195 mL  Total OUT: 2195 mL    Total NET: -1295 mL      29 Nov 2020 07:01  -  30 Nov 2020 06:53  --------------------------------------------------------  IN:    Oral Fluid: 540 mL  Total IN: 540 mL    OUT:    Voided (mL): 150 mL  Total OUT: 150 mL    Total NET: 390 mL    PHYSICAL EXAM:  General: NAD, resting comfortably in bed  C/V: NSR  Pulm: Nonlabored breathing, no respiratory distress  Abd: soft, non-distended, mild epigastric tenderness, no rebound or guarding   Extrem: WWP, no edema,  Neuro: A/O x 3, CNs II-XII grossly intact, no focal deficits, normal sensation  Pulses: palpable distal pulses     LABS:                        10.1   5.86  )-----------( 242      ( 29 Nov 2020 06:16 )             31.6     11-29    138  |  100  |  12  ----------------------------<  106<H>  4.0   |  25  |  1.13    Ca    9.3      29 Nov 2020 06:15  Mg     2.1     11-29            RADIOLOGY & ADDITIONAL STUDIES:

## 2020-11-30 NOTE — PROGRESS NOTE ADULT - PROBLEM SELECTOR PLAN 3
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 Alejo at [facility]  -Echo 11/13/2020 showed PEAK TRANSAORTIC GRADIENT 61.78 mmHg  -Structural CT scan: no thrombus seen.    -Limited TTE 11/16 showed aortic valve gradient 71 mmHg  -Official IRMA 11/17 revealed no thrombus, no shunts, and Jamar valve with slight thickening at base of right cusp with probable mild restricted excursion with otherwise no abnormalities  -slight suspicion for thrombus at base of R cusp but pt not cleared by GI for a/c given friable colonic lesions  -CTS stated nothing to do while inpatient, follow up as outpatient for further evaluation and management. Pt with intermittent elevated BPs with SBP 180s. Holding ACEi perioperatively due to risk of vasoplegia  - c/w Lopressor 50mg q6h w/ hold HR <50, SBP <110  - started hydralazine 25mg TID w/ hold HR <50, SBP <110  - c/w HCTZ 12.5mg daily on 11/28  - Monitor BP Pt with intermittent elevated BPs- SBP 180s during hospitalization. Holding ACEi perioperatively due to risk of vasoplegia  - SBP 120s-140s  - c/w Lopressor 50mg q6h w/ hold HR <50, SBP <110  - c/w hydralazine 25mg TID w/ hold HR <50, SBP <110  - c/w HCTZ 25mg qd

## 2020-11-30 NOTE — PROGRESS NOTE ADULT - SUBJECTIVE AND OBJECTIVE BOX
CARDIOLOGY NP PROGRESS NOTE    Subjective: Received pt awake in bed in NAD. States her abdominal pain has improved and only has pain/tenderness with palpation. Denies CP/SOB, dizziness/diaphoresis, n/v, palpitations.   Remainder ROS otherwise negative.    Overnight Events:  No acute events overnight     TELEMETRY: SR (HR 60s)        VITAL SIGNS:  T(C): 36.8 (11-30-20 @ 10:54), Max: 36.8 (11-30-20 @ 10:54)  HR: 54 (11-30-20 @ 08:37) (54 - 62)  BP: 138/68 (11-30-20 @ 08:37) (123/64 - 161/72)  RR: 18 (11-30-20 @ 08:37) (16 - 18)  SpO2: 97% (11-30-20 @ 08:37) (94% - 100%)  Wt(kg): --    I&O's Summary    29 Nov 2020 07:01  -  30 Nov 2020 07:00  --------------------------------------------------------  IN: 540 mL / OUT: 750 mL / NET: -210 mL    30 Nov 2020 07:01  -  30 Nov 2020 12:05  --------------------------------------------------------  IN: 180 mL / OUT: 0 mL / NET: 180 mL          PHYSICAL EXAM:    General: A/ox 3, No acute Distress  Neck: Supple, NO JVD  Cardiac: S1 S2, No M/R/G  Pulmonary: CTAB, Breathing unlabored, No Rhonchi/Rales/Wheezing  Abdomen: Soft, Tender w/palpation.  +BS x 4 quads  Extremities: No Rashes, +1 edema BL LE   Neuro: A/o x 3, No focal deficits          LABS:                          9.0    6.19  )-----------( 192      ( 30 Nov 2020 06:20 )             29.0                              11-30    140  |  100  |  16  ----------------------------<  105<H>  4.2   |  28  |  1.19    Ca    8.8      30 Nov 2020 06:20  Phos  4.2     11-30  Mg     2.5     11-30                                CAPILLARY BLOOD GLUCOSE      POCT Blood Glucose.: 143 mg/dL (29 Nov 2020 17:09)    CARDIAC MARKERS ( 30 Nov 2020 06:20 )  x     / 0.01 ng/mL / 44 U/L / x     / 1.0 ng/mL  CARDIAC MARKERS ( 29 Nov 2020 18:30 )  x     / 0.02 ng/mL / 41 U/L / x     / 1.3 ng/mL  CARDIAC MARKERS ( 29 Nov 2020 11:21 )  x     / 0.02 ng/mL / 59 U/L / x     / 1.3 ng/mL          Allergies:  Digox (Rash; Urticaria; Hives)  Plavix (Other (Mod to Severe))    MEDICATIONS  (STANDING):  atorvastatin 20 milliGRAM(s) Oral at bedtime  hydrALAZINE 25 milliGRAM(s) Oral <User Schedule>  hydrochlorothiazide 25 milliGRAM(s) Oral daily  hydrocortisone 1% Cream 1 Application(s) Topical two times a day  influenza  Vaccine (HIGH DOSE) 0.7 milliLiter(s) IntraMuscular once  metoprolol tartrate 50 milliGRAM(s) Oral every 6 hours  nystatin Powder 1 Application(s) Topical two times a day  pantoprazole  Injectable 40 milliGRAM(s) IV Push two times a day  polyethylene glycol 3350 17 Gram(s) Oral daily  tiotropium 18 MICROgram(s) Capsule 1 Capsule(s) Inhalation daily    MEDICATIONS  (PRN):  acetaminophen   Tablet .. 650 milliGRAM(s) Oral every 6 hours PRN Mild Pain (1 - 3), Moderate Pain (4 - 6)  acetaminophen 300 mG/butalbital 50 mG/ caffeine 40 mG 1 Capsule(s) Oral every 6 hours PRN Severe headache  aluminum hydroxide/magnesium hydroxide/simethicone Suspension 30 milliLiter(s) Oral every 6 hours PRN Dyspepsia  benzonatate 100 milliGRAM(s) Oral three times a day PRN Cough  diphenhydrAMINE 25 milliGRAM(s) Oral every 4 hours PRN Rash and/or Itching  guaiFENesin   Syrup  (Sugar-Free) 100 milliGRAM(s) Oral every 6 hours PRN Cough        DIAGNOSTIC TESTS:

## 2020-11-30 NOTE — PROGRESS NOTE ADULT - ASSESSMENT
Plan discussed with patient; plan discussed previously with daughter  Plan for OR Wednesday 12/2/2020  Full liquid diet today  Bowel prep tomorrrow  Medical/cardiology clearance  Discussed with chief surgical resident Dr Martinez

## 2020-11-30 NOTE — CHART NOTE - NSCHARTNOTEFT_GEN_A_CORE
Plan for patient to go for Colectomy on Wednesday 12/2/209. Has known hx diastolic HF (EF 61%)- s/p IV Lasix 20mg initially on admission, and is now euvolemic. In terms of her Afib- she is now on metoprolol tartrate 50mg q6H for rate control (HR 60s); CHADsVaSc 4 and HASBLED 3, however, holding A/C in setting of her risk for bleeding. Patient cleared from Cardiology for Surgery as d/w Dr. Ruiz. Plan for patient to go for Colectomy on Wednesday 12/2/209. Has known hx diastolic HF (EF 61%)- s/p IV Lasix 20mg initially on admission, and is now euvolemic. In terms of her Afib- she is now on metoprolol tartrate 50mg q6H for rate control (HR 60s); CHADsVaSc 4 and HASBLED 3, however, holding A/C in setting of her risk for bleeding.  She also has known hx AS s/p transfemoral TAVR w/Jamar valve 2/5/2019 w/Dr. Alejo- ECHO this admission showed peak gradient 61.78 on 11/13/20 and ECHO 11/16 w/aortic valve gradient 71mmhg. Per CT surgery nothing to do inpatient and will follow up outpatient. Patient cleared from Cardiology for Surgery as d/w Dr. Ruiz. Plan for patient to go for Colectomy on Wednesday 12/2/20. Has known hx diastolic HF (EF 61%)- s/p IV Lasix 20mg initially on admission, and is now euvolemic. In terms of her Afib- she is now on metoprolol tartrate 50mg q6H for rate control (HR 60s); CHADsVaSc 4 and HASBLED 3, however, holding A/C in setting of her risk for bleeding.  She also has known hx AS s/p transfemoral TAVR w/Jamar valve 2/5/2019 w/Dr. Alejo- ECHO this admission showed peak gradient 61.78 on 11/13/20 and ECHO 11/16 w/aortic valve gradient 71mmhg. Per CT surgery nothing to do inpatient and will follow up outpatient. Patient cleared from Cardiology for Surgery as d/w Dr. Ruiz. Plan for patient to go for Colectomy on Wednesday 12/2/20. Has known hx diastolic HF (EF 61%)- s/p IV Lasix 20mg initially on admission, and is now euvolemic. In terms of her Afib- she is now on metoprolol tartrate 50mg q6H for rate control (HR 60s); CHADsVaSc 4 and HASBLED 3, however, holding A/C in setting of her risk for bleeding.  She also has known hx AS s/p transfemoral TAVR w/Jamar valve 2/5/2019 w/Dr. Alejo- ECHO this admission showed peak gradient 61.78 on 11/13/20 and ECHO 11/16 w/aortic valve gradient 71mmhg suggesting bioprosthetic valve stenosis. She is moderate risk for this high risk surgery. She is optimized medically. Per CT surgery nothing to do inpatient and will follow up outpatient.

## 2020-11-30 NOTE — PROGRESS NOTE ADULT - PROBLEM SELECTOR PLAN 7
Intermittently w/ HA, but no focal neuro deficits.  repeat CT Head 11/18/2020 w/ no evidence of acute intracranial hemorrhage or acute transcortical infarct  - Responds well to Tylenol and Fiorocet, ordered PRN Intermittently w/ HA, but no focal neuro deficits.  repeat CT Head 11/18/2020 w/ no evidence of acute intracranial hemorrhage or acute transcortical infarct  - Responds well to Tylenol and Fioricet, ordered PRN

## 2020-11-30 NOTE — PROGRESS NOTE ADULT - PROBLEM SELECTOR PLAN 6
Echo with normal LV/RV size/systolic function. Grade II diastolic dysfunction and AS as above. s/p Lasix 20 mg stat at initial admit, requiring no further diuresis until 11/27; pt found with +1 B/L LE Edema and mildly elevated BP, s/p Lasix 40mg IV x 1 dose.   -Euvolemic on exam today, no SOB/JVD, no increased WOB, satting 96 % RA.     -Restarted HCTZ 12.5mg daily.   - strict I/Os, daily weights, net negative 1.2L in 24H Known hx diastolic HF. s/p Lasix 20 mg stat at initial admit, requiring no further diuresis until 11/27;  s/p Lasix 40mg IV, now euvolemic on exam.   - Echo 11/13: normal LV/RV size/systolic function. Grade II diastolic dysfunction and AS as above.   - c/w HCTZ 25mg qd  - strict I/Os, daily weights, net negative 9L since admission

## 2020-11-30 NOTE — PROGRESS NOTE ADULT - PROBLEM SELECTOR PLAN 1
Multiple episodes of BRBPR prior to and during admission, H/H downtrending over past few days (9.5 to 8.9), now stable today at Hb 10.1/31.6  -Consider transfusion 1U PRBC if hgb < 8.5 in anticipation of upcoming surgery.  -s/p EGD revealing mild gastropathy.   -s/p Colonoscopy revealing four large colonic mass lesions from cecum to transverse colon  -Biopsy pathology consistent w/ adenocarcinoma  -Elevated CEA 23.1  -CT Chest/Abdomen/Pelvis 11/20 again with suspicion for neoplasm in ascending and transverse colon as well as ground glass opacities B/L in lungs   -PET Scan consistent w/ known malignancy and no evidence of spread (multiple other areas up uptake without suspicion of malignancy in report)  -Planned Colectomy 12/2/2020 with surgery.  Cleared from Cardiology and Pulm standpoint. Awaiting official medicine clearance.    -Heme/onc following:  Will possibly do tissue evaluation of hilar nodes, post Colectomy  -Continue to follow Heme/onc and surgery recs. Multiple episodes BRBPR prior to and during admission, H/H downtrended to 8.9.   - hgb 9 today.   - Consider transfusion 1U PRBC if hgb < 8.5 in anticipation of upcoming surgery.  - s/p EGD revealing mild gastropathy.   - s/p Colonoscopy revealing four large colonic mass lesions from cecum to transverse colon  - Biopsy pathology consistent w/ adenocarcinoma  - Elevated CEA 23.1  - CT Chest/Abdomen/Pelvis 11/20 again with suspicion for neoplasm in ascending and transverse colon as well as ground glass opacities B/L in lungs   - PET Scan consistent w/ known malignancy and no evidence of spread (multiple other areas up uptake without suspicion of malignancy in report)  - Planned Colectomy 12/2/2020 with surgery.  Cleared from Cardiology and Pulm standpoint  - Heme/onc following:  Will possibly do tissue evaluation of hilar nodes, post Colectomy

## 2020-11-30 NOTE — PROGRESS NOTE ADULT - ASSESSMENT
78 year old East Timorese speaking F, PMHx of uncontrolled HTN, hyperlipidemia, asthma (denies hospitalizations, intubations), MARK? (on CPAP, noncompliant), chronic diastolic CHF (EF:61% by Echo 4/2019), aortic stenosis s/p transfemoral TAVR with rachael valve on 02/05/2019 with Dr. Alejo admitted for CP and hypertensive urgency. S/p colonoscopy 11/19 which revealed 4 large colonic masses (from cecum to transverse colon) biopsies taken. CEA elevated 23.1. No evidence of distant mets on CT. Surgery consulted for management of colonic mass. Clinically stable. Planned for OR Wednesday 12/2/20      -Official documented cardiology or medicine pre operative risk stratification  -Full Liquid diet today  -Bowel prep tomorrow CLD in am w/ Golytely 400ml, Neomycin 1gm PO @ 1pm, 2pm, 10pm, and metronidazole 500mg PO @ 1pm 2pm 10pm   -Plan discussed w/ attending and chief resident

## 2020-11-30 NOTE — PROGRESS NOTE ADULT - SUBJECTIVE AND OBJECTIVE BOX
CRS Attending  Patient seen and examined on morning rounds  Discussed with weekend covering provider Dr Montoya    Patient without complaints  Having bowel movements    Vital Signs Last 24 Hrs  T(C): 36.2 (30 Nov 2020 04:05), Max: 37.1 (29 Nov 2020 08:51)  T(F): 97.2 (30 Nov 2020 04:05), Max: 98.7 (29 Nov 2020 08:51)  HR: 60 (30 Nov 2020 05:44) (51 - 62)  BP: 123/64 (30 Nov 2020 05:44) (115/69 - 181/77)  BP(mean): 92 (29 Nov 2020 17:18) (83 - 111)  RR: 16 (30 Nov 2020 05:44) (16 - 18)  SpO2: 95% (30 Nov 2020 05:44) (94% - 100%)    I&O's Summary    29 Nov 2020 07:01  -  30 Nov 2020 07:00  --------------------------------------------------------  IN: 540 mL / OUT: 750 mL / NET: -210 mL    Gen NAD  Abdomen obese, soft                          9.0    6.19  )-----------( 192      ( 30 Nov 2020 06:20 )             29.0   11-30    140  |  100  |  16  ----------------------------<  105<H>  4.2   |  28  |  1.19    Ca    8.8      30 Nov 2020 06:20  Phos  4.2     11-30  Mg     2.5     11-30

## 2020-11-30 NOTE — PROGRESS NOTE ADULT - ASSESSMENT
80F, Citizen of [place] speaking PMHx uncontrolled HTN, HLD, asthma, chronic diastolic CHF (EF 61% by Echo 4/2019), and aortic stenosis (s/p transfemoral TAVR with Jamar valve 02/2019 with Dr. Alejo) admitted 11/13/20 w/CP and HTN urgency whose hospital course c/b GIB after initiation Heparin 2/2 Thrombus on IRMA- s/p EGD/Colonoscopy w/4 large colonic masses w/PET scan concerning for CA. Hospital course also c/b new onset Afib s/p dig, now in SR, awaiting colectomy Wednesday 12/2/20.      80F, Turkmen speaking PMHx uncontrolled HTN, HLD, asthma, chronic diastolic CHF (EF 61% by Echo 4/2019), and aortic stenosis (s/p transfemoral TAVR with Jamar valve 02/2019 with Dr. Alejo) admitted 11/13/20 w/CP and HTN urgency whose hospital course c/b GIB after initiation Heparin 2/2 Thrombus on IRMA- s/p EGD/Colonoscopy w/4 large colonic masses w/PET scan concerning for CA. Hospital course also c/b new onset Afib s/p dig, now in SR, awaiting colectomy Wednesday 12/2/20.

## 2020-12-01 ENCOUNTER — TRANSCRIPTION ENCOUNTER (OUTPATIENT)
Age: 80
End: 2020-12-01

## 2020-12-01 LAB
ANION GAP SERPL CALC-SCNC: 13 MMOL/L — SIGNIFICANT CHANGE UP (ref 5–17)
BUN SERPL-MCNC: 14 MG/DL — SIGNIFICANT CHANGE UP (ref 7–23)
CALCIUM SERPL-MCNC: 9.1 MG/DL — SIGNIFICANT CHANGE UP (ref 8.4–10.5)
CHLORIDE SERPL-SCNC: 96 MMOL/L — SIGNIFICANT CHANGE UP (ref 96–108)
CO2 SERPL-SCNC: 27 MMOL/L — SIGNIFICANT CHANGE UP (ref 22–31)
CREAT SERPL-MCNC: 1.18 MG/DL — SIGNIFICANT CHANGE UP (ref 0.5–1.3)
GLUCOSE SERPL-MCNC: 102 MG/DL — HIGH (ref 70–99)
HCT VFR BLD CALC: 30.9 % — LOW (ref 34.5–45)
HGB BLD-MCNC: 9.6 G/DL — LOW (ref 11.5–15.5)
MAGNESIUM SERPL-MCNC: 2.3 MG/DL — SIGNIFICANT CHANGE UP (ref 1.6–2.6)
MCHC RBC-ENTMCNC: 29.1 PG — SIGNIFICANT CHANGE UP (ref 27–34)
MCHC RBC-ENTMCNC: 31.1 GM/DL — LOW (ref 32–36)
MCV RBC AUTO: 93.6 FL — SIGNIFICANT CHANGE UP (ref 80–100)
NRBC # BLD: 0 /100 WBCS — SIGNIFICANT CHANGE UP (ref 0–0)
PLATELET # BLD AUTO: 223 K/UL — SIGNIFICANT CHANGE UP (ref 150–400)
POTASSIUM SERPL-MCNC: 4.4 MMOL/L — SIGNIFICANT CHANGE UP (ref 3.5–5.3)
POTASSIUM SERPL-SCNC: 4.4 MMOL/L — SIGNIFICANT CHANGE UP (ref 3.5–5.3)
RBC # BLD: 3.3 M/UL — LOW (ref 3.8–5.2)
RBC # FLD: 13.9 % — SIGNIFICANT CHANGE UP (ref 10.3–14.5)
SODIUM SERPL-SCNC: 136 MMOL/L — SIGNIFICANT CHANGE UP (ref 135–145)
WBC # BLD: 6.65 K/UL — SIGNIFICANT CHANGE UP (ref 3.8–10.5)
WBC # FLD AUTO: 6.65 K/UL — SIGNIFICANT CHANGE UP (ref 3.8–10.5)

## 2020-12-01 PROCEDURE — 93010 ELECTROCARDIOGRAM REPORT: CPT

## 2020-12-01 RX ORDER — SOD SULF/SODIUM/NAHCO3/KCL/PEG
2000 SOLUTION, RECONSTITUTED, ORAL ORAL ONCE
Refills: 0 | Status: COMPLETED | OUTPATIENT
Start: 2020-12-01 | End: 2020-12-01

## 2020-12-01 RX ORDER — METRONIDAZOLE 500 MG
1000 TABLET ORAL
Refills: 0 | Status: COMPLETED | OUTPATIENT
Start: 2020-12-01 | End: 2020-12-01

## 2020-12-01 RX ORDER — NEOMYCIN SULFATE 500 MG/1
1000 TABLET ORAL
Refills: 0 | Status: COMPLETED | OUTPATIENT
Start: 2020-12-01 | End: 2020-12-01

## 2020-12-01 RX ADMIN — NEOMYCIN SULFATE 1000 MILLIGRAM(S): 500 TABLET ORAL at 15:20

## 2020-12-01 RX ADMIN — NYSTATIN CREAM 1 APPLICATION(S): 100000 CREAM TOPICAL at 17:52

## 2020-12-01 RX ADMIN — Medication 25 MILLIGRAM(S): at 06:00

## 2020-12-01 RX ADMIN — Medication 50 MILLIGRAM(S): at 12:03

## 2020-12-01 RX ADMIN — Medication 30 MILLILITER(S): at 12:03

## 2020-12-01 RX ADMIN — PANTOPRAZOLE SODIUM 40 MILLIGRAM(S): 20 TABLET, DELAYED RELEASE ORAL at 17:52

## 2020-12-01 RX ADMIN — Medication 50 MILLIGRAM(S): at 17:52

## 2020-12-01 RX ADMIN — Medication 1000 MILLIGRAM(S): at 15:20

## 2020-12-01 RX ADMIN — Medication 1000 MILLIGRAM(S): at 23:09

## 2020-12-01 RX ADMIN — Medication 25 MILLIGRAM(S): at 06:01

## 2020-12-01 RX ADMIN — Medication 30 MILLILITER(S): at 17:50

## 2020-12-01 RX ADMIN — Medication 2000 MILLILITER(S): at 07:04

## 2020-12-01 RX ADMIN — Medication 1 APPLICATION(S): at 17:52

## 2020-12-01 RX ADMIN — Medication 1000 MILLIGRAM(S): at 17:51

## 2020-12-01 RX ADMIN — Medication 50 MILLIGRAM(S): at 23:10

## 2020-12-01 RX ADMIN — TIOTROPIUM BROMIDE 1 CAPSULE(S): 18 CAPSULE ORAL; RESPIRATORY (INHALATION) at 10:08

## 2020-12-01 RX ADMIN — Medication 50 MILLIGRAM(S): at 06:01

## 2020-12-01 RX ADMIN — Medication 25 MILLIGRAM(S): at 15:19

## 2020-12-01 RX ADMIN — Medication 5 MILLIGRAM(S): at 10:08

## 2020-12-01 RX ADMIN — NEOMYCIN SULFATE 1000 MILLIGRAM(S): 500 TABLET ORAL at 17:51

## 2020-12-01 RX ADMIN — PANTOPRAZOLE SODIUM 40 MILLIGRAM(S): 20 TABLET, DELAYED RELEASE ORAL at 06:00

## 2020-12-01 RX ADMIN — Medication 25 MILLIGRAM(S): at 21:22

## 2020-12-01 RX ADMIN — ATORVASTATIN CALCIUM 20 MILLIGRAM(S): 80 TABLET, FILM COATED ORAL at 21:22

## 2020-12-01 RX ADMIN — NEOMYCIN SULFATE 1000 MILLIGRAM(S): 500 TABLET ORAL at 23:10

## 2020-12-01 RX ADMIN — Medication 50 MILLIGRAM(S): at 00:14

## 2020-12-01 RX ADMIN — Medication 30 MILLILITER(S): at 06:01

## 2020-12-01 RX ADMIN — NYSTATIN CREAM 1 APPLICATION(S): 100000 CREAM TOPICAL at 06:02

## 2020-12-01 RX ADMIN — Medication 1 APPLICATION(S): at 06:02

## 2020-12-01 RX ADMIN — Medication 30 MILLILITER(S): at 00:13

## 2020-12-01 RX ADMIN — POLYETHYLENE GLYCOL 3350 17 GRAM(S): 17 POWDER, FOR SOLUTION ORAL at 12:03

## 2020-12-01 NOTE — PROGRESS NOTE ADULT - PROBLEM SELECTOR PLAN 1
Multiple episodes BRBPR prior to and during admission, H/H downtrended to 8.9.   - hgb 9.6 today.   - Consider transfusion 1U PRBC if hgb < 8.5 in anticipation of upcoming surgery.  - s/p EGD revealing mild gastropathy.   - s/p Colonoscopy revealing four large colonic mass lesions from cecum to transverse colon  - Biopsy pathology consistent w/ adenocarcinoma  - Elevated CEA 23.1  - CT Chest/Abdomen/Pelvis 11/20 again with suspicion for neoplasm in ascending and transverse colon as well as ground glass opacities B/L in lungs   - PET Scan consistent w/ known malignancy and no evidence of spread (multiple other areas up uptake without suspicion of malignancy in report)  - Planned Colectomy 12/2/2020 with surgery.  Cleared from Cardiology and Pulm standpoint  - Heme/onc following:  Will possibly do tissue evaluation of hilar nodes, post Colectomy

## 2020-12-01 NOTE — PROGRESS NOTE ADULT - ASSESSMENT
80F, Solomon Islander speaking PMHx uncontrolled HTN, HLD, asthma, chronic diastolic CHF (EF 61% by Echo 4/2019), and aortic stenosis (s/p transfemoral TAVR with Jamar valve 02/2019 with Dr. Alejo) admitted 11/13/20 w/CP and HTN urgency whose hospital course c/b new onset Afib s/p dig, now in SR and also c/b GIB after initiation Heparin 2/2 Thrombus on IRMA- s/p EGD/Colonoscopy w/4 large colonic masses w/PET scan concerning for CA, awaiting colectomy Wednesday 12/2/20.

## 2020-12-01 NOTE — PROGRESS NOTE ADULT - PROBLEM SELECTOR PLAN 7
Intermittently w/ HA, but no focal neuro deficits.  repeat CT Head 11/18/2020 w/ no evidence of acute intracranial hemorrhage or acute transcortical infarct  - Responds well to Tylenol and Fioricet, ordered PRN

## 2020-12-01 NOTE — PROGRESS NOTE ADULT - PROBLEM SELECTOR PLAN 4
Presented w/CP likely secondary to HTN urgency, now resolved.   - s/p Diagnostic R/LHC 1/9/2019 w/ non-obstructive disease  - h/o GERD, one burning CP overnight on 11/26 is possibly in the setting of GERD.  - c/o intermittent CP throughout hospitalization- no acute ishcemic changes noted on EKG     - c/w Pantoprazole 40mg IV BID.  - c/w Maalox PRN

## 2020-12-01 NOTE — PROGRESS NOTE ADULT - PROBLEM SELECTOR PLAN 5
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 Alejo at [facility]  - Echo 11/13/2020 showed PEAK TRANSAORTIC GRADIENT 61.78 mmHg  - Structural CT scan: no thrombus seen.    - Limited TTE 11/16 showed aortic valve gradient 71 mmHg  - Official IRMA 11/17 revealed no thrombus, no shunts, and Jamar valve with slight thickening at base of right cusp with probable mild restricted excursion with otherwise no abnormalities  - slight suspicion for thrombus at base of R cusp but pt not cleared by GI for a/c given friable colonic lesions  - CTS stated nothing to do while inpatient, follow up as outpatient for further evaluation and management.

## 2020-12-01 NOTE — PROGRESS NOTE ADULT - SUBJECTIVE AND OBJECTIVE BOX
INTERVAL HPI/OVERNIGHT EVENTS: mild cp o/n EKG unchanged, trop in AM 0.01.     SUBJECTIVE: Examined at the bedside resting comfortably. Denies nausea and emesis. Mild lower abdominal discomfort unchanged. Passing flatus w/ 3 dark stools. Tolerating diet.    hydrALAZINE 25 milliGRAM(s) Oral <User Schedule>  hydrochlorothiazide 25 milliGRAM(s) Oral daily  metoprolol tartrate 50 milliGRAM(s) Oral every 6 hours  metroNIDAZOLE    Tablet 500 milliGRAM(s) Oral <User Schedule>  neomycin 1000 milliGRAM(s) Oral <User Schedule>      Vital Signs Last 24 Hrs  T(C): 36.4 (01 Dec 2020 06:08), Max: 37.2 (30 Nov 2020 21:49)  T(F): 97.6 (01 Dec 2020 06:08), Max: 99 (30 Nov 2020 21:49)  HR: 67 (01 Dec 2020 05:58) (52 - 86)  BP: 165/74 (01 Dec 2020 05:58) (127/61 - 165/74)  BP(mean): 88 (30 Nov 2020 18:01) (88 - 98)  RR: 18 (01 Dec 2020 05:58) (18 - 20)  SpO2: 96% (01 Dec 2020 05:58) (94% - 99%)  I&O's Detail    30 Nov 2020 07:01  -  01 Dec 2020 07:00  --------------------------------------------------------  IN:    Oral Fluid: 300 mL  Total IN: 300 mL    OUT:    Voided (mL): 1600 mL  Total OUT: 1600 mL    Total NET: -1300 mL      PHYSICAL EXAM:  General: NAD, resting comfortably in bed  C/V: NSR  Pulm: Nonlabored breathing, no respiratory distress  Abd: soft, non-distended, mild tenderness, unchanged, no rebound or guarding  Extrem: WWP, no edema,  Pulses: palpable distal pulses     LABS:                        9.0    6.19  )-----------( 192      ( 30 Nov 2020 06:20 )             29.0     11-30    140  |  100  |  16  ----------------------------<  105<H>  4.2   |  28  |  1.19    Ca    8.8      30 Nov 2020 06:20  Phos  4.2     11-30  Mg     2.5     11-30            RADIOLOGY & ADDITIONAL STUDIES:

## 2020-12-01 NOTE — PROGRESS NOTE ADULT - PROBLEM SELECTOR PLAN 6
Known hx diastolic HF. s/p Lasix 20 mg stat at initial admit, requiring no further diuresis until 11/27;  s/p Lasix 40mg IV, now euvolemic on exam.   - Echo 11/13: normal LV/RV size/systolic function. Grade II diastolic dysfunction and AS as above.   - c/w HCTZ 25mg qd  - strict I/Os, daily weights, net negative 10.7L since admission

## 2020-12-01 NOTE — PROGRESS NOTE ADULT - PROBLEM SELECTOR PLAN 3
Pt with intermittent elevated BPs- SBP 180s during hospitalization. Holding ACEi perioperatively due to risk of vasoplegia  - SBP 140s-160s  - c/w metoprolol tartrate 50mg q6h  - c/w hydralazine 25mg TID   - c/w HCTZ 25mg qd

## 2020-12-01 NOTE — PROGRESS NOTE ADULT - PROBLEM SELECTOR PLAN 2
Had new onset Afib RVR after CT Scan on 11/20. Prior hx palpitations, however, unclear if hx Afib.  - difficulty controlling HR on Diltiazem and Lopressor, Digoxin initiated, now D/C 2/2 rash.   - c/w metoprolol tartrate 50mg q6h for rate control.   - CHADsVASc 4, HASBLED 3; HOWEVER, holding A/C in setting of bleeding risk   - remains in SB- SR (HR 50s- 60s)

## 2020-12-01 NOTE — PROGRESS NOTE ADULT - SUBJECTIVE AND OBJECTIVE BOX
CARDIOLOGY NP PROGRESS NOTE    Subjective:  Received pt awake in chair in NAD. Patient started bowel prep- states feeling well. Denies CP/SOB, dizziness/diaphoresis, n/v, palpitations.  Remainder ROS otherwise negative.    Overnight Events:  : Pt c/o same intermittent cp throughout admission. EKG nonischemic. PRN maalox given    TELEMETRY: SB- SR (HR 50s-60s)        VITAL SIGNS:  T(C): 36.6 (12-01-20 @ 13:06), Max: 37.2 (11-30-20 @ 21:49)  HR: 74 (12-01-20 @ 12:22) (52 - 76)  BP: 163/91 (12-01-20 @ 12:22) (127/61 - 165/74)  RR: 18 (12-01-20 @ 12:02) (18 - 20)  SpO2: 96% (12-01-20 @ 12:22) (94% - 99%)  Wt(kg): --    I&O's Summary    30 Nov 2020 07:01  -  01 Dec 2020 07:00  --------------------------------------------------------  IN: 300 mL / OUT: 1600 mL / NET: -1300 mL          PHYSICAL EXAM:    General: A/ox 3, No acute Distress  Neck: Supple, NO JVD  Cardiac: S1 S2, No M/R/G  Pulmonary: CTAB, Breathing unlabored, No Rhonchi/Rales/Wheezing  Abdomen: Soft, Tender w/palpation.  +BS x 4 quads  Extremities: No Rashes, +1 edema BL LE   Neuro: A/o x 3, No focal deficits          LABS:                          9.6    6.65  )-----------( 223      ( 01 Dec 2020 07:45 )             30.9                              12-01    136  |  96  |  14  ----------------------------<  102<H>  4.4   |  27  |  1.18    Ca    9.1      01 Dec 2020 07:45  Phos  4.2     11-30  Mg     2.3     12-01                                CAPILLARY BLOOD GLUCOSE        CARDIAC MARKERS ( 30 Nov 2020 06:20 )  x     / 0.01 ng/mL / 44 U/L / x     / 1.0 ng/mL  CARDIAC MARKERS ( 29 Nov 2020 18:30 )  x     / 0.02 ng/mL / 41 U/L / x     / 1.3 ng/mL          Allergies:  Digox (Rash; Urticaria; Hives)  Plavix (Other (Mod to Severe))    MEDICATIONS  (STANDING):  atorvastatin 20 milliGRAM(s) Oral at bedtime  hydrALAZINE 25 milliGRAM(s) Oral <User Schedule>  hydrochlorothiazide 25 milliGRAM(s) Oral daily  hydrocortisone 1% Cream 1 Application(s) Topical two times a day  influenza  Vaccine (HIGH DOSE) 0.7 milliLiter(s) IntraMuscular once  metoprolol tartrate 50 milliGRAM(s) Oral every 6 hours  metroNIDAZOLE    Tablet 1000 milliGRAM(s) Oral <User Schedule>  neomycin 1000 milliGRAM(s) Oral <User Schedule>  nystatin Powder 1 Application(s) Topical two times a day  pantoprazole  Injectable 40 milliGRAM(s) IV Push two times a day  polyethylene glycol 3350 17 Gram(s) Oral daily  tiotropium 18 MICROgram(s) Capsule 1 Capsule(s) Inhalation daily    MEDICATIONS  (PRN):  acetaminophen   Tablet .. 650 milliGRAM(s) Oral every 6 hours PRN Mild Pain (1 - 3), Moderate Pain (4 - 6)  acetaminophen 300 mG/butalbital 50 mG/ caffeine 40 mG 1 Capsule(s) Oral every 6 hours PRN Severe headache  aluminum hydroxide/magnesium hydroxide/simethicone Suspension 30 milliLiter(s) Oral every 6 hours PRN Dyspepsia  benzonatate 100 milliGRAM(s) Oral three times a day PRN Cough  diphenhydrAMINE 25 milliGRAM(s) Oral every 4 hours PRN Rash and/or Itching  guaiFENesin   Syrup  (Sugar-Free) 100 milliGRAM(s) Oral every 6 hours PRN Cough        DIAGNOSTIC TESTS:

## 2020-12-01 NOTE — PROGRESS NOTE ADULT - ASSESSMENT
78 year old Somali speaking F, PMHx of uncontrolled HTN, hyperlipidemia, asthma (denies hospitalizations, intubations), MARK? (on CPAP, noncompliant), chronic diastolic CHF (EF:61% by Echo 4/2019), aortic stenosis s/p transfemoral TAVR with rachael valve on 02/05/2019 with Dr. Alejo admitted for CP and hypertensive urgency. S/p colonoscopy 11/19 which revealed 4 large colonic masses (from cecum to transverse colon) biopsies taken. CEA elevated 23.1. No evidence of distant mets on CT. Surgery consulted for management of colonic mass. Clinically stable. Planned for OR Wednesday 12/2/20      -CLD in am w/ Bowel Prep: Golytely 400ml + Dulcolax suppository, Neomycin 1gm PO @ 1pm, 2pm, 10pm, and metronidazole 500mg PO @ 1pm 2pm 10pm   -NPO after midnight  -Pre op: CBC BMP, Mg, Phos, Coags (PT, PTT, INR), Type and Screen  -Plan discussed w/ attending and chief resident 78 year old Panamanian speaking F, PMHx of uncontrolled HTN, hyperlipidemia, asthma (denies hospitalizations, intubations), MARK? (on CPAP, noncompliant), chronic diastolic CHF (EF:61% by Echo 4/2019), aortic stenosis s/p transfemoral TAVR with rachael valve on 02/05/2019 with Dr. Alejo admitted for CP and hypertensive urgency. S/p colonoscopy 11/19 which revealed 4 large colonic masses (from cecum to transverse colon) biopsies taken. CEA elevated 23.1. No evidence of distant mets on CT. Surgery consulted for management of colonic mass. Clinically stable. Planned for OR Wednesday 12/2/20      -CLD in am w/ Bowel Prep: Golytely 400ml + Dulcolax tablet @ 10am, Neomycin 1gm PO @ 3pm, 7pm, 111pm, and metronidazole 500mg PO @ 3pm 7pm 11pm   -NPO after midnight  -Pre op: CBC BMP, Mg, Phos, Coags (PT, PTT, INR), Type and Screen  -Plan discussed w/ attending and chief resident

## 2020-12-02 ENCOUNTER — APPOINTMENT (OUTPATIENT)
Dept: COLORECTAL SURGERY | Facility: HOSPITAL | Age: 80
End: 2020-12-02
Payer: MEDICARE

## 2020-12-02 ENCOUNTER — RESULT REVIEW (OUTPATIENT)
Age: 80
End: 2020-12-02

## 2020-12-02 LAB
ALBUMIN SERPL ELPH-MCNC: 3.3 G/DL — SIGNIFICANT CHANGE UP (ref 3.3–5)
ALP SERPL-CCNC: 55 U/L — SIGNIFICANT CHANGE UP (ref 40–120)
ALT FLD-CCNC: 20 U/L — SIGNIFICANT CHANGE UP (ref 10–45)
ANION GAP SERPL CALC-SCNC: 13 MMOL/L — SIGNIFICANT CHANGE UP (ref 5–17)
ANION GAP SERPL CALC-SCNC: 16 MMOL/L — SIGNIFICANT CHANGE UP (ref 5–17)
APTT BLD: 31.1 SEC — SIGNIFICANT CHANGE UP (ref 27.5–35.5)
AST SERPL-CCNC: 21 U/L — SIGNIFICANT CHANGE UP (ref 10–40)
BASE EXCESS BLDA CALC-SCNC: -2.7 MMOL/L — LOW (ref -2–3)
BILIRUB SERPL-MCNC: 0.6 MG/DL — SIGNIFICANT CHANGE UP (ref 0.2–1.2)
BLD GP AB SCN SERPL QL: NEGATIVE — SIGNIFICANT CHANGE UP
BUN SERPL-MCNC: 13 MG/DL — SIGNIFICANT CHANGE UP (ref 7–23)
BUN SERPL-MCNC: 14 MG/DL — SIGNIFICANT CHANGE UP (ref 7–23)
CALCIUM SERPL-MCNC: 8.4 MG/DL — SIGNIFICANT CHANGE UP (ref 8.4–10.5)
CALCIUM SERPL-MCNC: 9 MG/DL — SIGNIFICANT CHANGE UP (ref 8.4–10.5)
CHLORIDE SERPL-SCNC: 101 MMOL/L — SIGNIFICANT CHANGE UP (ref 96–108)
CHLORIDE SERPL-SCNC: 104 MMOL/L — SIGNIFICANT CHANGE UP (ref 96–108)
CO2 SERPL-SCNC: 20 MMOL/L — LOW (ref 22–31)
CO2 SERPL-SCNC: 23 MMOL/L — SIGNIFICANT CHANGE UP (ref 22–31)
CREAT SERPL-MCNC: 1.04 MG/DL — SIGNIFICANT CHANGE UP (ref 0.5–1.3)
CREAT SERPL-MCNC: 1.26 MG/DL — SIGNIFICANT CHANGE UP (ref 0.5–1.3)
GLUCOSE BLDC GLUCOMTR-MCNC: 179 MG/DL — HIGH (ref 70–99)
GLUCOSE BLDC GLUCOMTR-MCNC: 183 MG/DL — HIGH (ref 70–99)
GLUCOSE BLDC GLUCOMTR-MCNC: 185 MG/DL — HIGH (ref 70–99)
GLUCOSE SERPL-MCNC: 184 MG/DL — HIGH (ref 70–99)
GLUCOSE SERPL-MCNC: 99 MG/DL — SIGNIFICANT CHANGE UP (ref 70–99)
HCO3 BLDA-SCNC: 22 MMOL/L — SIGNIFICANT CHANGE UP (ref 21–28)
HCT VFR BLD CALC: 29.8 % — LOW (ref 34.5–45)
HCT VFR BLD CALC: 31.2 % — LOW (ref 34.5–45)
HGB BLD-MCNC: 9.6 G/DL — LOW (ref 11.5–15.5)
HGB BLD-MCNC: 9.8 G/DL — LOW (ref 11.5–15.5)
INR BLD: 1.07 — SIGNIFICANT CHANGE UP (ref 0.88–1.16)
MAGNESIUM SERPL-MCNC: 1.7 MG/DL — SIGNIFICANT CHANGE UP (ref 1.6–2.6)
MAGNESIUM SERPL-MCNC: 2.1 MG/DL — SIGNIFICANT CHANGE UP (ref 1.6–2.6)
MCHC RBC-ENTMCNC: 29.5 PG — SIGNIFICANT CHANGE UP (ref 27–34)
MCHC RBC-ENTMCNC: 30.1 PG — SIGNIFICANT CHANGE UP (ref 27–34)
MCHC RBC-ENTMCNC: 31.4 GM/DL — LOW (ref 32–36)
MCHC RBC-ENTMCNC: 32.2 GM/DL — SIGNIFICANT CHANGE UP (ref 32–36)
MCV RBC AUTO: 93.4 FL — SIGNIFICANT CHANGE UP (ref 80–100)
MCV RBC AUTO: 94 FL — SIGNIFICANT CHANGE UP (ref 80–100)
NRBC # BLD: 0 /100 WBCS — SIGNIFICANT CHANGE UP (ref 0–0)
NRBC # BLD: 0 /100 WBCS — SIGNIFICANT CHANGE UP (ref 0–0)
PCO2 BLDA: 39 MMHG — SIGNIFICANT CHANGE UP (ref 32–45)
PH BLDA: 7.37 — SIGNIFICANT CHANGE UP (ref 7.35–7.45)
PHOSPHATE SERPL-MCNC: 5 MG/DL — HIGH (ref 2.5–4.5)
PLATELET # BLD AUTO: 212 K/UL — SIGNIFICANT CHANGE UP (ref 150–400)
PLATELET # BLD AUTO: 233 K/UL — SIGNIFICANT CHANGE UP (ref 150–400)
PO2 BLDA: 99 MMHG — SIGNIFICANT CHANGE UP (ref 83–108)
POTASSIUM SERPL-MCNC: 3.8 MMOL/L — SIGNIFICANT CHANGE UP (ref 3.5–5.3)
POTASSIUM SERPL-MCNC: 4.1 MMOL/L — SIGNIFICANT CHANGE UP (ref 3.5–5.3)
POTASSIUM SERPL-SCNC: 3.8 MMOL/L — SIGNIFICANT CHANGE UP (ref 3.5–5.3)
POTASSIUM SERPL-SCNC: 4.1 MMOL/L — SIGNIFICANT CHANGE UP (ref 3.5–5.3)
PROT SERPL-MCNC: 6.8 G/DL — SIGNIFICANT CHANGE UP (ref 6–8.3)
PROTHROM AB SERPL-ACNC: 12.8 SEC — SIGNIFICANT CHANGE UP (ref 10.6–13.6)
RBC # BLD: 3.19 M/UL — LOW (ref 3.8–5.2)
RBC # BLD: 3.32 M/UL — LOW (ref 3.8–5.2)
RBC # FLD: 13.6 % — SIGNIFICANT CHANGE UP (ref 10.3–14.5)
RBC # FLD: 13.8 % — SIGNIFICANT CHANGE UP (ref 10.3–14.5)
RH IG SCN BLD-IMP: POSITIVE — SIGNIFICANT CHANGE UP
SAO2 % BLDA: 97 % — SIGNIFICANT CHANGE UP (ref 95–100)
SODIUM SERPL-SCNC: 137 MMOL/L — SIGNIFICANT CHANGE UP (ref 135–145)
SODIUM SERPL-SCNC: 140 MMOL/L — SIGNIFICANT CHANGE UP (ref 135–145)
WBC # BLD: 16.22 K/UL — HIGH (ref 3.8–10.5)
WBC # BLD: 6.96 K/UL — SIGNIFICANT CHANGE UP (ref 3.8–10.5)
WBC # FLD AUTO: 16.22 K/UL — HIGH (ref 3.8–10.5)
WBC # FLD AUTO: 6.96 K/UL — SIGNIFICANT CHANGE UP (ref 3.8–10.5)

## 2020-12-02 PROCEDURE — 99232 SBSQ HOSP IP/OBS MODERATE 35: CPT | Mod: GC

## 2020-12-02 PROCEDURE — 71045 X-RAY EXAM CHEST 1 VIEW: CPT | Mod: 26

## 2020-12-02 PROCEDURE — 44213 LAP MOBIL SPLENIC FL ADD-ON: CPT | Mod: GC

## 2020-12-02 PROCEDURE — 44205 LAP COLECTOMY PART W/ILEUM: CPT | Mod: GC

## 2020-12-02 PROCEDURE — 99222 1ST HOSP IP/OBS MODERATE 55: CPT | Mod: GC

## 2020-12-02 PROCEDURE — 88309 TISSUE EXAM BY PATHOLOGIST: CPT | Mod: 26

## 2020-12-02 PROCEDURE — 71045 X-RAY EXAM CHEST 1 VIEW: CPT | Mod: 26,77

## 2020-12-02 RX ORDER — KETOROLAC TROMETHAMINE 30 MG/ML
15 SYRINGE (ML) INJECTION EVERY 8 HOURS
Refills: 0 | Status: DISCONTINUED | OUTPATIENT
Start: 2020-12-02 | End: 2020-12-03

## 2020-12-02 RX ORDER — HYDROMORPHONE HYDROCHLORIDE 2 MG/ML
0.5 INJECTION INTRAMUSCULAR; INTRAVENOUS; SUBCUTANEOUS EVERY 6 HOURS
Refills: 0 | Status: DISCONTINUED | OUTPATIENT
Start: 2020-12-02 | End: 2020-12-03

## 2020-12-02 RX ORDER — DEXTROSE 50 % IN WATER 50 %
12.5 SYRINGE (ML) INTRAVENOUS ONCE
Refills: 0 | Status: DISCONTINUED | OUTPATIENT
Start: 2020-12-02 | End: 2020-12-06

## 2020-12-02 RX ORDER — ACETAMINOPHEN 500 MG
650 TABLET ORAL EVERY 6 HOURS
Refills: 0 | Status: DISCONTINUED | OUTPATIENT
Start: 2020-12-02 | End: 2020-12-03

## 2020-12-02 RX ORDER — HEPARIN SODIUM 5000 [USP'U]/ML
5000 INJECTION INTRAVENOUS; SUBCUTANEOUS EVERY 8 HOURS
Refills: 0 | Status: DISCONTINUED | OUTPATIENT
Start: 2020-12-02 | End: 2020-12-07

## 2020-12-02 RX ORDER — SODIUM CHLORIDE 9 MG/ML
1000 INJECTION, SOLUTION INTRAVENOUS
Refills: 0 | Status: DISCONTINUED | OUTPATIENT
Start: 2020-12-02 | End: 2020-12-06

## 2020-12-02 RX ORDER — DEXTROSE 50 % IN WATER 50 %
25 SYRINGE (ML) INTRAVENOUS ONCE
Refills: 0 | Status: DISCONTINUED | OUTPATIENT
Start: 2020-12-02 | End: 2020-12-06

## 2020-12-02 RX ORDER — DEXTROSE 50 % IN WATER 50 %
15 SYRINGE (ML) INTRAVENOUS ONCE
Refills: 0 | Status: DISCONTINUED | OUTPATIENT
Start: 2020-12-02 | End: 2020-12-06

## 2020-12-02 RX ORDER — GLUCAGON INJECTION, SOLUTION 0.5 MG/.1ML
1 INJECTION, SOLUTION SUBCUTANEOUS ONCE
Refills: 0 | Status: DISCONTINUED | OUTPATIENT
Start: 2020-12-02 | End: 2020-12-06

## 2020-12-02 RX ORDER — SODIUM CHLORIDE 9 MG/ML
1000 INJECTION, SOLUTION INTRAVENOUS
Refills: 0 | Status: DISCONTINUED | OUTPATIENT
Start: 2020-12-02 | End: 2020-12-05

## 2020-12-02 RX ORDER — GABAPENTIN 400 MG/1
600 CAPSULE ORAL ONCE
Refills: 0 | Status: DISCONTINUED | OUTPATIENT
Start: 2020-12-02 | End: 2020-12-02

## 2020-12-02 RX ORDER — BUPIVACAINE 13.3 MG/ML
20 INJECTION, SUSPENSION, LIPOSOMAL INFILTRATION ONCE
Refills: 0 | Status: DISCONTINUED | OUTPATIENT
Start: 2020-12-02 | End: 2020-12-14

## 2020-12-02 RX ORDER — ALBUTEROL 90 UG/1
1 AEROSOL, METERED ORAL EVERY 4 HOURS
Refills: 0 | Status: DISCONTINUED | OUTPATIENT
Start: 2020-12-02 | End: 2020-12-14

## 2020-12-02 RX ORDER — METOPROLOL TARTRATE 50 MG
50 TABLET ORAL EVERY 6 HOURS
Refills: 0 | Status: DISCONTINUED | OUTPATIENT
Start: 2020-12-02 | End: 2020-12-05

## 2020-12-02 RX ORDER — INSULIN LISPRO 100/ML
VIAL (ML) SUBCUTANEOUS
Refills: 0 | Status: DISCONTINUED | OUTPATIENT
Start: 2020-12-02 | End: 2020-12-06

## 2020-12-02 RX ORDER — TIOTROPIUM BROMIDE 18 UG/1
1 CAPSULE ORAL; RESPIRATORY (INHALATION) DAILY
Refills: 0 | Status: DISCONTINUED | OUTPATIENT
Start: 2020-12-02 | End: 2020-12-14

## 2020-12-02 RX ORDER — ACETAMINOPHEN 500 MG
1000 TABLET ORAL ONCE
Refills: 0 | Status: DISCONTINUED | OUTPATIENT
Start: 2020-12-02 | End: 2020-12-02

## 2020-12-02 RX ORDER — HEPARIN SODIUM 5000 [USP'U]/ML
5000 INJECTION INTRAVENOUS; SUBCUTANEOUS ONCE
Refills: 0 | Status: DISCONTINUED | OUTPATIENT
Start: 2020-12-02 | End: 2020-12-02

## 2020-12-02 RX ORDER — PANTOPRAZOLE SODIUM 20 MG/1
40 TABLET, DELAYED RELEASE ORAL DAILY
Refills: 0 | Status: DISCONTINUED | OUTPATIENT
Start: 2020-12-02 | End: 2020-12-14

## 2020-12-02 RX ORDER — MAGNESIUM SULFATE 500 MG/ML
2 VIAL (ML) INJECTION ONCE
Refills: 0 | Status: COMPLETED | OUTPATIENT
Start: 2020-12-02 | End: 2020-12-02

## 2020-12-02 RX ORDER — IPRATROPIUM/ALBUTEROL SULFATE 18-103MCG
3 AEROSOL WITH ADAPTER (GRAM) INHALATION EVERY 6 HOURS
Refills: 0 | Status: DISCONTINUED | OUTPATIENT
Start: 2020-12-02 | End: 2020-12-14

## 2020-12-02 RX ADMIN — Medication 2: at 16:56

## 2020-12-02 RX ADMIN — HYDROMORPHONE HYDROCHLORIDE 0.5 MILLIGRAM(S): 2 INJECTION INTRAMUSCULAR; INTRAVENOUS; SUBCUTANEOUS at 17:01

## 2020-12-02 RX ADMIN — Medication 25 MILLIGRAM(S): at 05:23

## 2020-12-02 RX ADMIN — HYDROMORPHONE HYDROCHLORIDE 0.5 MILLIGRAM(S): 2 INJECTION INTRAMUSCULAR; INTRAVENOUS; SUBCUTANEOUS at 17:32

## 2020-12-02 RX ADMIN — Medication 50 MILLIGRAM(S): at 05:23

## 2020-12-02 RX ADMIN — HEPARIN SODIUM 5000 UNIT(S): 5000 INJECTION INTRAVENOUS; SUBCUTANEOUS at 22:51

## 2020-12-02 RX ADMIN — Medication 50 GRAM(S): at 17:32

## 2020-12-02 RX ADMIN — Medication 50 MILLIGRAM(S): at 22:51

## 2020-12-02 RX ADMIN — SODIUM CHLORIDE 40 MILLILITER(S): 9 INJECTION, SOLUTION INTRAVENOUS at 16:45

## 2020-12-02 RX ADMIN — Medication 2: at 23:06

## 2020-12-02 RX ADMIN — Medication 650 MILLIGRAM(S): at 23:07

## 2020-12-02 RX ADMIN — Medication 15 MILLIGRAM(S): at 22:51

## 2020-12-02 RX ADMIN — Medication 15 MILLIGRAM(S): at 23:08

## 2020-12-02 RX ADMIN — HYDROMORPHONE HYDROCHLORIDE 0.5 MILLIGRAM(S): 2 INJECTION INTRAMUSCULAR; INTRAVENOUS; SUBCUTANEOUS at 23:35

## 2020-12-02 RX ADMIN — Medication 650 MILLIGRAM(S): at 22:51

## 2020-12-02 RX ADMIN — PANTOPRAZOLE SODIUM 40 MILLIGRAM(S): 20 TABLET, DELAYED RELEASE ORAL at 05:23

## 2020-12-02 NOTE — PROGRESS NOTE ADULT - ASSESSMENT
79 y/o woman s/p sub total colectomy    Plan:  Pain/nausea control PRN  Diet: CLD/IVF  Home meds  Incentive spirometer/OOB/Ambulate  AM labs  Gonzalez

## 2020-12-02 NOTE — CHART NOTE - NSCHARTNOTEFT_GEN_A_CORE
80F, Somali speaking PMHx uncontrolled HTN, HLD, asthma, chronic diastolic CHF (EF 61% by Echo 4/2019), and aortic stenosis (s/p transfemoral TAVR with Jamar valve 02/2019 with Dr. Alejo) admitted to cardiac tele 11/13/20 w/ CP and HTN urgency whose hospital course c/b new onset Afib s/p digoxin, now in SR. Hospital course c/b GIB after initiation Heparin gtt 2/2 possible thrombus on IRMA - s/p EGD/Colonoscopy w/ 4 large colonic masses w/ PET scan concerning for CA. Pt taken to OR for colectomy earlier this AM prior to this provider's arrival. Pt to be transferred to SICU service after surgery. 80F, Martiniquais speaking PMHx uncontrolled HTN, HLD, asthma, chronic diastolic CHF (EF 61% by Echo 4/2019), and aortic stenosis (s/p transfemoral TAVR with Jamar valve 02/2019 with Dr. Alejo) admitted to cardiac tele 11/13/20 w/ CP and HTN urgency whose hospital course c/b new onset Afib s/p digoxin, now in SR. Hospital course c/b GIB after initiation Heparin gtt 2/2 possible thrombus on IRMA - s/p EGD/Colonoscopy w/ 4 large colonic masses w/ PET scan concerning for CA. Pt taken to OR for colectomy earlier this AM prior to this provider's arrival. Pt to be transferred from cardiac tele to SICU service after surgery.

## 2020-12-02 NOTE — PROGRESS NOTE ADULT - SUBJECTIVE AND OBJECTIVE BOX
Surgery Post-Op Note    Pre-Op Dx: Colon Cancer  Procedure: Laparoscopic assisted subtotal colectomy      Surgeon: Dr. Sanford    Subjective: Examined the ICU. States she has moderate abdominal pain. Denies cp, sob, nausea, emesis. No acute complaints      Vital Signs Last 24 Hrs  T(C): 36.8 (02 Dec 2020 06:25), Max: 36.8 (01 Dec 2020 18:03)  T(F): 98.2 (02 Dec 2020 06:25), Max: 98.2 (01 Dec 2020 18:03)  HR: 61 (02 Dec 2020 05:27) (54 - 61)  BP: 133/70 (02 Dec 2020 05:27) (124/69 - 152/60)  BP(mean): 92 (01 Dec 2020 17:50) (92 - 92)  RR: 18 (02 Dec 2020 05:27) (18 - 18)  SpO2: 95% (02 Dec 2020 05:27) (95% - 97%)    Physical Exam:  General: NAD, resting comfortably in bed  Pulmonary: Nonlabored breathing, no respiratory distress  Cardiovascular: NSR  Abdominal: soft, non distended, appropriately tender along incisions. midline and port incisions c/d/i w/ dermabond, no erythema or drainage  Extremities: WWP, normal strength  Neuro: A/O x 3, CNs II-XII grossly intact, no focal deficits, normal sensation  Pulses: palpable distal pulses  : Gonzalez in place w/ gupta colored urine     LABS:                        9.8    6.96  )-----------( 212      ( 02 Dec 2020 05:23 )             31.2     12-02    140  |  104  |  13  ----------------------------<  99  4.1   |  23  |  1.04    Ca    9.0      02 Dec 2020 05:23  Mg     2.1     12-02      PT/INR - ( 02 Dec 2020 06:50 )   PT: 12.8 sec;   INR: 1.07          PTT - ( 02 Dec 2020 06:50 )  PTT:31.1 sec  CAPILLARY BLOOD GLUCOSE            ABO Interpretation: O (12-02 @ 05:34)        Radiology and Additional Studies:

## 2020-12-02 NOTE — BRIEF OPERATIVE NOTE - OPERATION/FINDINGS
Laparoscopic subtotal colectomy (R colectomy, transverse colectomy, ileocolic anastomosis distal ileum to descending colon). Identified previously tattooed areas, resection taken proximal and distal to these areas >5cm, dense adhesions of splenic flexure to spleen. Anastomosis performed extracorporally with staples in a side-to-side fashion, common enterotomy closed with staples and surgical clips. Fascia closed with looped running PDS.

## 2020-12-02 NOTE — PROGRESS NOTE ADULT - ASSESSMENT
78 year old Kiswahili speaking F, PMHx of uncontrolled HTN, hyperlipidemia, COPD(denies hospitalizations, intubations), MARK (on CPAP, noncompliant), chronic diastolic CHF (EF:61% by Echo 4/2019), aortic stenosis s/p transfemoral TAVR with rachael valve on 02/05/2019 with Dr. Alejo @[facility], recent hospitalization at [facility] (for HTN/headache, no changes made to medications, negative CT head/MRI brain, discharged on 11/4/20) who presents to [facility] ED 11/13/20 c/o intermittent chest pain and elevated BP x 1 week found to have new GGO bilateral lower lobes pulmonary consult for preoperative clearance for subtotal colectomy for adenocarcinoma of colon.     #Postoperative status post colectomy  -Patient with PFTs with obstructive pattern, no history of smoking and this was not reversible w/ albuterol  -continue with home spiriva  -patinet currently not wheezing on exam    #MARK  -patient without apneic episodes status post procedure  -AHA 10.6, mild MARK  -continue MARK precautions

## 2020-12-02 NOTE — PROGRESS NOTE ADULT - ASSESSMENT
79 y/o Samoan speaking F, PMHx of uncontrolled HTN, hyperlipidemia, asthma (denies hospitalizations, intubations), MARK? (on CPAP, noncompliant), chronic diastolic CHF (EF:61% by Echo 4/2019), aortic stenosis s/p transfemoral TAVR with rachael valve on 02/05/2019 with Dr. Alejo admitted for CP and hypertensive urgency. S/p colonoscopy 11/19 which revealed 4 large colonic masses (from cecum to transverse colon) biopsies taken. CEA elevated 23.1. No evidence of distant mets on CT. Surgery consulted for management of colonic mass. Clinically stable.    Planned for Colectomy  Surgery Team 1c will continue to follow

## 2020-12-02 NOTE — CONSULT NOTE ADULT - SUBJECTIVE AND OBJECTIVE BOX
SICU CONSULT NOTE    HPI:  78 year old Luxembourger speaking F, PMHx of uncontrolled HTN, HLD, asthma, chronic diastolic CHF (EF 61% by Echo 4/2019), aortic stenosis s/p transfemoral TAVR with Jamar valve on 02/05/2019 with Dr. Alejo @[facility] who presents to [facility] ED 11/13/20 c/o intermittent chest pain and elevated BP x 1 week. BP initially elevated on arrival however has been stable on home meds. ECHO 11/13/2020 showed elevated gradient over aortic valve of 61.78 (doubled since last echo). Dr. Alejo consulted. S/p structural CT: negative for thrombus, Limited TTE 11/16: elevated aortic valve gradient 71mmHg. GI consulted 11/15 in the setting of BRBPR in the setting of supratherapeutic PTT while on heparin gtt w/ continued episodes after stopping A/C, likely 2/2 hemorrhoids. IRMA noting AV thickening suspicious for thrombus, recommend to start AC pending GI clearance given acute drop in H/h. GI team reconsulted, s/p EGD/colonoscopy revealing 4 large colonic masses concerning for colon CA. Hospital course also c/b new onset Afib s/p dig, now in SR.    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 Elevated CEA 23.1. PET showed focal FDG uptake in transverse and ascending colon. Uptake in basilar pulmonary lymph node, paratracheal lymph nodes, hilar lymph nodes and peripancreatic lymph nodes likely benign per radiology.    SICU ADDENDUM: Patient arrives to SICU extubated, off pressors, in stable condition s/p X    PAST MEDICAL & SURGICAL HISTORY:  CHF (congestive heart failure)  Pulmonary HTN  Aortic stenosis  HTN (hypertension)  S/P TAVR (transcatheter aortic valve replacement)    REVIEW OF SYSTEMS:   Pertinent positives/negatives noted in HPI.     HOME MEDICATIONS:  Home Medications:  Coreg 6.25 mg oral tablet: 1 tab(s) orally 2 times a day (13 Nov 2020 05:03)  hydroCHLOROthiazide 12.5 mg oral capsule: 1 cap(s) orally once a day (13 Nov 2020 05:03)    MEDICATIONS  (STANDING):  atorvastatin 20 milliGRAM(s) Oral at bedtime  hydrALAZINE 25 milliGRAM(s) Oral <User Schedule>  hydrochlorothiazide 25 milliGRAM(s) Oral daily  hydrocortisone 1% Cream 1 Application(s) Topical two times a day  influenza  Vaccine (HIGH DOSE) 0.7 milliLiter(s) IntraMuscular once  metoprolol tartrate 50 milliGRAM(s) Oral every 6 hours  metroNIDAZOLE    Tablet 1000 milliGRAM(s) Oral <User Schedule>  neomycin 1000 milliGRAM(s) Oral <User Schedule>  nystatin Powder 1 Application(s) Topical two times a day  pantoprazole  Injectable 40 milliGRAM(s) IV Push two times a day  polyethylene glycol 3350 17 Gram(s) Oral daily  tiotropium 18 MICROgram(s) Capsule 1 Capsule(s) Inhalation daily    ALLERGIES:  Allergies    Digox (Rash; Urticaria; Hives)  Plavix (Other (Mod to Severe))    Intolerances    FAMILY HISTORY:  No pertinent family history in first degree relatives    Vital Signs Last 24 Hrs  T(C): 36.8 (02 Dec 2020 06:25), Max: 36.8 (01 Dec 2020 18:03)  T(F): 98.2 (02 Dec 2020 06:25), Max: 98.2 (01 Dec 2020 18:03)  HR: 61 (02 Dec 2020 05:27) (54 - 61)  BP: 133/70 (02 Dec 2020 05:27) (124/69 - 152/60)  BP(mean): 92 (01 Dec 2020 17:50) (90 - 92)  RR: 18 (02 Dec 2020 05:27) (18 - 18)  SpO2: 95% (02 Dec 2020 05:27) (95% - 98%)    PHYSICAL EXAM:      LABS:                        9.8    6.96  )-----------( 212      ( 02 Dec 2020 05:23 )             31.2     12-02    140  |  104  |  13  ----------------------------<  99  4.1   |  23  |  1.04    Ca    9.0      02 Dec 2020 05:23  Mg     2.1     12-02      PT/INR - ( 02 Dec 2020 06:50 )   PT: 12.8 sec;   INR: 1.07          PTT - ( 02 Dec 2020 06:50 )  PTT:31.1 sec        78F (Luxembourger/English speaking) PMHx of uncontrolled HTN, HLD, asthma, chronic diastolic CHF, aortic stenosis s/p TAVR (02/19), initially presented to ED w/ CP and HTN on 11/13, found to have elevated AoV gradient w/ workup c/f possible thrombus. 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 Admitted to SICU for postoperative hemodynamic monitoring.     NEURO: Dilaudid, tylenol, headaches during this admission managed w/ fioricet prn (CT wnl)  CV: goal MAP >65  - new onset afib preoperatively: c/w metoprolol 50q6, s/p dig (d/c 2/2 rash), difficulty controlling HR on dilt and lopressor, CHADsVASc 4  - HTN: hypertensive urgency preop on metop, hydralazine 25 TID, hctz 25qd  - Chest pain: now resolved, possibly from GERD, R/LHC 1/9/2019 w/ non-obstructive disease, no acute ischemic changes noted on EKG preop  - Aortic stenosis: s/p TAVR 2/6/19, IRMA 11/17 showing no thrombus, no shunts, slight suspicion for thrombus at base of R cusp  - Chronic diastolic CHF: Echo 11/13: normal LV/RV size/systolic function. Grade II diastolic dysfunction and AS as above.   PULM: asthma, duonebs  GI/FEN: NPO, LR@140, Protonix, gastropathy on EGD  : Gonzalez  ENDO: ISS  HEME: Preop Hgb 9.8, f/u postop labs, transfuse for Hgb <8, BRBPR 2/2 colon masses. Heme/onc following, will possibly due tissue eval of hilar nodes post colectomy, holding AC postop  ID: Monitor for s/s of infection, WBC wnl  PPx: SCDs  LINES: PIV  WOUNDS/DRAINS:  PT/OT: Not ordered     SICU CONSULT NOTE    HPI:  78 year old Bahamian speaking F, PMHx of uncontrolled HTN, HLD, asthma, chronic diastolic CHF (EF 61% by Echo 4/2019), aortic stenosis s/p transfemoral TAVR with Jamar valve on 02/05/2019 with Dr. Alejo @[facility] who presents to [facility] ED 11/13/20 c/o intermittent chest pain and elevated BP x 1 week. BP initially elevated on arrival however has been stable on home meds. ECHO 11/13/2020 showed elevated gradient over aortic valve of 61.78 (doubled since last echo). Dr. Alejo consulted. S/p structural CT: negative for thrombus, Limited TTE 11/16: elevated aortic valve gradient 71mmHg. GI consulted 11/15 in the setting of BRBPR in the setting of supratherapeutic PTT while on heparin gtt w/ continued episodes after stopping A/C, likely 2/2 hemorrhoids. IRMA noting AV thickening suspicious for thrombus, recommend to start AC pending GI clearance given acute drop in H/h. GI team reconsulted, s/p EGD/colonoscopy revealing 4 large colonic masses concerning for colon CA. Hospital course also c/b new onset Afib s/p dig, now in SR.    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 Elevated CEA 23.1. PET showed focal FDG uptake in transverse and ascending colon. Uptake in basilar pulmonary lymph node, paratracheal lymph nodes, hilar lymph nodes and peripancreatic lymph nodes likely benign per radiology.    SICU ADDENDUM: Patient arrives to SICU extubated, off pressors, in stable condition s/p laparoscopic subtotal colectomy (R and transverse colectomy, ileocolic anastomosis distal ilium to descending colon). EBL 50, IVF 2.3L, .    PAST MEDICAL & SURGICAL HISTORY:  CHF (congestive heart failure)  Pulmonary HTN  Aortic stenosis  HTN (hypertension)  S/P TAVR (transcatheter aortic valve replacement)    REVIEW OF SYSTEMS:   Pertinent positives/negatives noted in HPI.     HOME MEDICATIONS:  Home Medications:  Coreg 6.25 mg oral tablet: 1 tab(s) orally 2 times a day (13 Nov 2020 05:03)  hydroCHLOROthiazide 12.5 mg oral capsule: 1 cap(s) orally once a day (13 Nov 2020 05:03)    MEDICATIONS  (STANDING):  atorvastatin 20 milliGRAM(s) Oral at bedtime  hydrALAZINE 25 milliGRAM(s) Oral <User Schedule>  hydrochlorothiazide 25 milliGRAM(s) Oral daily  hydrocortisone 1% Cream 1 Application(s) Topical two times a day  influenza  Vaccine (HIGH DOSE) 0.7 milliLiter(s) IntraMuscular once  metoprolol tartrate 50 milliGRAM(s) Oral every 6 hours  metroNIDAZOLE    Tablet 1000 milliGRAM(s) Oral <User Schedule>  neomycin 1000 milliGRAM(s) Oral <User Schedule>  nystatin Powder 1 Application(s) Topical two times a day  pantoprazole  Injectable 40 milliGRAM(s) IV Push two times a day  polyethylene glycol 3350 17 Gram(s) Oral daily  tiotropium 18 MICROgram(s) Capsule 1 Capsule(s) Inhalation daily    ALLERGIES:  Allergies    Digox (Rash; Urticaria; Hives)  Plavix (Other (Mod to Severe))    Intolerances    FAMILY HISTORY:  No pertinent family history in first degree relatives    Vital Signs Last 24 Hrs  T(C): 36.8 (02 Dec 2020 06:25), Max: 36.8 (01 Dec 2020 18:03)  T(F): 98.2 (02 Dec 2020 06:25), Max: 98.2 (01 Dec 2020 18:03)  HR: 61 (02 Dec 2020 05:27) (54 - 61)  BP: 133/70 (02 Dec 2020 05:27) (124/69 - 152/60)  BP(mean): 92 (01 Dec 2020 17:50) (90 - 92)  RR: 18 (02 Dec 2020 05:27) (18 - 18)  SpO2: 95% (02 Dec 2020 05:27) (95% - 98%)    PHYSICAL EXAM:      LABS:                        9.8    6.96  )-----------( 212      ( 02 Dec 2020 05:23 )             31.2     12-02    140  |  104  |  13  ----------------------------<  99  4.1   |  23  |  1.04    Ca    9.0      02 Dec 2020 05:23  Mg     2.1     12-02      PT/INR - ( 02 Dec 2020 06:50 )   PT: 12.8 sec;   INR: 1.07          PTT - ( 02 Dec 2020 06:50 )  PTT:31.1 sec        78F (Bahamian/English speaking) PMHx of uncontrolled HTN, HLD, asthma, chronic diastolic CHF, aortic stenosis s/p TAVR (02/19), initially presented to ED w/ CP and HTN on 11/13, found to have elevated AoV gradient w/ workup c/f possible thrombus. 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 (12/2). Admitted to SICU for postoperative hemodynamic monitoring.     NEURO: Dilaudid, tylenol, headaches during this admission managed w/ fioricet prn (CT wnl)  CV: goal MAP >65  - new onset afib preoperatively: c/w metoprolol 50q6, s/p dig (d/c 2/2 rash), difficulty controlling HR on dilt and lopressor, CHADsVASc 4  - HTN: hypertensive urgency preop on metop, hydralazine 25 TID, hctz 25qd  - Chest pain: now resolved, possibly from GERD, R/LHC 1/9/2019 w/ non-obstructive disease, no acute ischemic changes noted on EKG preop  - Aortic stenosis: s/p TAVR 2/6/19, IRMA 11/17 showing no thrombus, no shunts, slight suspicion for thrombus at base of R cusp  - Chronic diastolic CHF: Echo 11/13: normal LV/RV size/systolic function. Grade II diastolic dysfunction and AS as above.   PULM: asthma, duonebs  GI/FEN: NPO, LR@140, Protonix, gastropathy on EGD  : Gonzalez  ENDO: ISS  HEME: Preop Hgb 9.8, f/u postop labs, transfuse for Hgb <8, BRBPR 2/2 colon masses. Heme/onc following, will possibly due tissue eval of hilar nodes post colectomy, holding AC postop  ID: Monitor for s/s of infection, WBC wnl  PPx: SCDs  LINES: PIV  WOUNDS/DRAINS:  PT/OT: Not ordered     SICU CONSULT NOTE    HPI:  80 year old Italian speaking F, PMHx of uncontrolled HTN, HLD, asthma, chronic diastolic CHF (EF 61% by Echo 4/2019), aortic stenosis s/p transfemoral TAVR with Jamar valve on 02/05/2019 with Dr. Alejo @[facility] who presents to [facility] ED 11/13/20 c/o intermittent chest pain and elevated BP x 1 week. BP initially elevated on arrival however has been stable on home meds. ECHO 11/13/2020 showed elevated gradient over aortic valve of 61.78 (doubled since last echo). Dr. Alejo consulted. S/p structural CT: negative for thrombus, Limited TTE 11/16: elevated aortic valve gradient 71mmHg. GI consulted 11/15 in the setting of BRBPR in the setting of supratherapeutic PTT while on heparin gtt w/ continued episodes after stopping A/C, likely 2/2 hemorrhoids. IRMA noting AV thickening suspicious for thrombus, recommend to start AC pending GI clearance given acute drop in H/h. GI team reconsulted, s/p EGD/colonoscopy revealing 4 large colonic masses concerning for colon CA. Hospital course also c/b new onset Afib s/p dig, now in SR.    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 Elevated CEA 23.1. PET showed focal FDG uptake in transverse and ascending colon. Uptake in basilar pulmonary lymph node, paratracheal lymph nodes, hilar lymph nodes and peripancreatic lymph nodes likely benign per radiology.    SICU ADDENDUM: Patient arrives to SICU extubated, off pressors, in stable condition s/p laparoscopic subtotal colectomy (R and transverse colectomy, ileocolic anastomosis distal ilium to descending colon). EBL 50, IVF 2.3L, .    PAST MEDICAL & SURGICAL HISTORY:  CHF (congestive heart failure)  Pulmonary HTN  Aortic stenosis  HTN (hypertension)  S/P TAVR (transcatheter aortic valve replacement)    REVIEW OF SYSTEMS:   Pertinent positives/negatives noted in HPI.     HOME MEDICATIONS:  Home Medications:  Coreg 6.25 mg oral tablet: 1 tab(s) orally 2 times a day (13 Nov 2020 05:03)  hydroCHLOROthiazide 12.5 mg oral capsule: 1 cap(s) orally once a day (13 Nov 2020 05:03)    MEDICATIONS  (STANDING):  atorvastatin 20 milliGRAM(s) Oral at bedtime  hydrALAZINE 25 milliGRAM(s) Oral <User Schedule>  hydrochlorothiazide 25 milliGRAM(s) Oral daily  hydrocortisone 1% Cream 1 Application(s) Topical two times a day  influenza  Vaccine (HIGH DOSE) 0.7 milliLiter(s) IntraMuscular once  metoprolol tartrate 50 milliGRAM(s) Oral every 6 hours  metroNIDAZOLE    Tablet 1000 milliGRAM(s) Oral <User Schedule>  neomycin 1000 milliGRAM(s) Oral <User Schedule>  nystatin Powder 1 Application(s) Topical two times a day  pantoprazole  Injectable 40 milliGRAM(s) IV Push two times a day  polyethylene glycol 3350 17 Gram(s) Oral daily  tiotropium 18 MICROgram(s) Capsule 1 Capsule(s) Inhalation daily    ALLERGIES:  Allergies    Digox (Rash; Urticaria; Hives)  Plavix (Other (Mod to Severe))    Intolerances    FAMILY HISTORY:  No pertinent family history in first degree relatives    Vital Signs Last 24 Hrs  T(C): 36.8 (02 Dec 2020 06:25), Max: 36.8 (01 Dec 2020 18:03)  T(F): 98.2 (02 Dec 2020 06:25), Max: 98.2 (01 Dec 2020 18:03)  HR: 61 (02 Dec 2020 05:27) (54 - 61)  BP: 133/70 (02 Dec 2020 05:27) (124/69 - 152/60)  BP(mean): 92 (01 Dec 2020 17:50) (90 - 92)  RR: 18 (02 Dec 2020 05:27) (18 - 18)  SpO2: 95% (02 Dec 2020 05:27) (95% - 98%)    PHYSICAL EXAM:      LABS:                        9.8    6.96  )-----------( 212      ( 02 Dec 2020 05:23 )             31.2     12-02    140  |  104  |  13  ----------------------------<  99  4.1   |  23  |  1.04    Ca    9.0      02 Dec 2020 05:23  Mg     2.1     12-02      PT/INR - ( 02 Dec 2020 06:50 )   PT: 12.8 sec;   INR: 1.07          PTT - ( 02 Dec 2020 06:50 )  PTT:31.1 sec        80F (Italian/English speaking) PMHx of uncontrolled HTN, HLD, asthma, chronic diastolic CHF, aortic stenosis s/p TAVR (02/19), initially presented to ED w/ CP and HTN on 11/13, found to have elevated AoV gradient w/ workup c/f possible thrombus. 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 (12/2). Admitted to SICU for postoperative hemodynamic monitoring.     NEURO: Dilaudid, tylenol, headaches during this admission managed w/ fioricet prn (CT wnl)  CV: goal MAP >65  - new onset afib preoperatively: c/w metoprolol 50q6, s/p dig (d/c 2/2 rash), difficulty controlling HR on dilt and lopressor, CHADsVASc 4  - HTN: hypertensive urgency preop on metop, hydralazine 25 TID, hctz 25qd  - Chest pain: now resolved, possibly from GERD, R/LHC 1/9/2019 w/ non-obstructive disease, no acute ischemic changes noted on EKG preop  - Aortic stenosis: s/p TAVR 2/6/19, IRMA 11/17 showing no thrombus, no shunts, slight suspicion for thrombus at base of R cusp  - Chronic diastolic CHF: Echo 11/13: normal LV/RV size/systolic function. Grade II diastolic dysfunction and AS as above.   PULM: asthma, duonebs  GI/FEN: NPO, LR@140, Protonix, gastropathy on EGD  : Gonzalez  ENDO: ISS  HEME: Preop Hgb 9.8, f/u postop labs, transfuse for Hgb <8, BRBPR 2/2 colon masses. Heme/onc following, will possibly due tissue eval of hilar nodes post colectomy, holding AC postop  ID: Monitor for s/s of infection, WBC wnl  PPx: SCDs  LINES: PIV  WOUNDS/DRAINS:  PT/OT: Not ordered     SICU CONSULT NOTE    HPI:  80 year old Macanese speaking F, PMHx of uncontrolled HTN, HLD, asthma, chronic diastolic CHF (EF 61% by Echo 4/2019), aortic stenosis s/p transfemoral TAVR with Jamar valve on 02/05/2019 with Dr. Alejo @[facility] who presents to [facility] ED 11/13/20 c/o intermittent chest pain and elevated BP x 1 week. BP initially elevated on arrival however has been stable on home meds. ECHO 11/13/2020 showed elevated gradient over aortic valve of 61.78 (doubled since last echo). Dr. Alejo consulted. S/p structural CT: negative for thrombus, Limited TTE 11/16: elevated aortic valve gradient 71mmHg. GI consulted 11/15 in the setting of BRBPR in the setting of supratherapeutic PTT while on heparin gtt w/ continued episodes after stopping A/C, likely 2/2 hemorrhoids. IRMA noting AV thickening suspicious for thrombus, recommend to start AC pending GI clearance given acute drop in H/h. GI team reconsulted, s/p EGD/colonoscopy revealing 4 large colonic masses concerning for colon CA. Hospital course also c/b new onset Afib s/p dig, now in SR.    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 Elevated CEA 23.1. PET showed focal FDG uptake in transverse and ascending colon. Uptake in basilar pulmonary lymph node, paratracheal lymph nodes, hilar lymph nodes and peripancreatic lymph nodes likely benign per radiology.    SICU ADDENDUM: Patient arrives to SICU extubated, off pressors, in stable condition s/p laparoscopic subtotal colectomy (R and transverse colectomy, ileocolic anastomosis distal ilium to descending colon). EBL 50, IVF 2.3L, .    PAST MEDICAL & SURGICAL HISTORY:  CHF (congestive heart failure)  Pulmonary HTN  Aortic stenosis  HTN (hypertension)  S/P TAVR (transcatheter aortic valve replacement)    REVIEW OF SYSTEMS:   Pertinent positives/negatives noted in HPI.     HOME MEDICATIONS:  Home Medications:  Coreg 6.25 mg oral tablet: 1 tab(s) orally 2 times a day (13 Nov 2020 05:03)  hydroCHLOROthiazide 12.5 mg oral capsule: 1 cap(s) orally once a day (13 Nov 2020 05:03)    MEDICATIONS  (STANDING):  atorvastatin 20 milliGRAM(s) Oral at bedtime  hydrALAZINE 25 milliGRAM(s) Oral <User Schedule>  hydrochlorothiazide 25 milliGRAM(s) Oral daily  hydrocortisone 1% Cream 1 Application(s) Topical two times a day  influenza  Vaccine (HIGH DOSE) 0.7 milliLiter(s) IntraMuscular once  metoprolol tartrate 50 milliGRAM(s) Oral every 6 hours  metroNIDAZOLE    Tablet 1000 milliGRAM(s) Oral <User Schedule>  neomycin 1000 milliGRAM(s) Oral <User Schedule>  nystatin Powder 1 Application(s) Topical two times a day  pantoprazole  Injectable 40 milliGRAM(s) IV Push two times a day  polyethylene glycol 3350 17 Gram(s) Oral daily  tiotropium 18 MICROgram(s) Capsule 1 Capsule(s) Inhalation daily    ALLERGIES:  Allergies    Digox (Rash; Urticaria; Hives)  Plavix (Other (Mod to Severe))    Intolerances    FAMILY HISTORY:  No pertinent family history in first degree relatives    Vital Signs Last 24 Hrs  T(C): 36.8 (02 Dec 2020 06:25), Max: 36.8 (01 Dec 2020 18:03)  T(F): 98.2 (02 Dec 2020 06:25), Max: 98.2 (01 Dec 2020 18:03)  HR: 61 (02 Dec 2020 05:27) (54 - 61)  BP: 133/70 (02 Dec 2020 05:27) (124/69 - 152/60)  BP(mean): 92 (01 Dec 2020 17:50) (90 - 92)  RR: 18 (02 Dec 2020 05:27) (18 - 18)  SpO2: 95% (02 Dec 2020 05:27) (95% - 98%)    PHYSICAL EXAM:  T(C): 36.8 (12-02-20 @ 06:25), Max: 36.8 (12-01-20 @ 18:03)  HR: 61 (12-02-20 @ 05:27) (54 - 61)  BP: 133/70 (12-02-20 @ 05:27) (124/69 - 152/60)  RR: 18 (12-02-20 @ 05:27) (18 - 18)  SpO2: 95% (12-02-20 @ 05:27) (95% - 97%)    GENERAL: NAD, Resting comfortably in bed, asleep, responds to command  HEENT: NCAT, MMM, Normal conjunctiva, PERRL  RESP: Nonlabored breathing, No respiratory distress, CTAB  CARD: Normal rate, Normal peripheral perfusion  GI: Soft, obese, mildly distended, ATTP, No guarding, No rebound tenderness, incisions c/d/i  EXTREM: WWP, No edema, No gross deformity of extremities  SKIN: No rashes, no lesions  NEURO: AAOx3, No focal motor or sensory deficits    LABS:                        9.8    6.96  )-----------( 212      ( 02 Dec 2020 05:23 )             31.2     12-02    140  |  104  |  13  ----------------------------<  99  4.1   |  23  |  1.04    Ca    9.0      02 Dec 2020 05:23  Mg     2.1     12-02      PT/INR - ( 02 Dec 2020 06:50 )   PT: 12.8 sec;   INR: 1.07          PTT - ( 02 Dec 2020 06:50 )  PTT:31.1 sec        80F (Macanese/English speaking) PMHx of uncontrolled HTN, HLD, asthma, chronic diastolic CHF, aortic stenosis s/p TAVR (02/19), initially presented to ED w/ CP and HTN on 11/13, found to have elevated AoV gradient w/ workup c/f possible thrombus. 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 (12/2). Admitted to SICU for postoperative hemodynamic monitoring.     NEURO: Dilaudid, tylenol, headaches during this admission managed w/ fioricet prn (CT wnl)  CV: goal MAP >65  - new onset afib preoperatively: c/w lopressor 50q6, s/p dig (d/c 2/2 rash), CHADsVASc 4  - HTN: hypertensive urgency preop on metop, hydralazine 25 TID, hctz 25qd. Holding hydral and hctz for now  - Chest pain: now resolved, possibly from GERD, R/LHC 1/9/2019 w/ non-obstructive disease, no acute ischemic changes noted on EKG preop  - Aortic stenosis: s/p TAVR 2/6/19, IRMA 11/17 showing no thrombus, no shunts, slight suspicion for thrombus at base of R cusp  - Chronic diastolic CHF: Echo 11/13: normal LV/RV size/systolic function. Grade II diastolic dysfunction and AS as above.   PULM: asthma, duonebs  GI/FEN: NPO, LR@40, Protonix, gastropathy on EGD  : Gonzalez  ENDO: ISS  HEME: Preop Hgb 9.8, f/u postop labs, transfuse for Hgb <8, BRBPR 2/2 colon masses. Heme/onc following, will possibly due tissue eval of hilar nodes post colectomy, holding AC postop  ID: Monitor for s/s of infection, WBC wnl  PPx: SCDs, HSQ  LINES: PIVx2, antonino (12/2)   WOUNDS/DRAINS: midline incision  PT/OT: Not ordered     SICU CONSULT NOTE    HPI:  80 year old Swiss speaking F, PMHx of uncontrolled HTN, HLD, asthma, chronic diastolic CHF (EF 61% by Echo 4/2019), aortic stenosis s/p transfemoral TAVR with Jamar valve on 02/05/2019 with Dr. Alejo @[facility] who presents to [facility] ED 11/13/20 c/o intermittent chest pain and elevated BP x 1 week. BP initially elevated on arrival however has been stable on home meds. ECHO 11/13/2020 showed elevated gradient over aortic valve of 61.78 (doubled since last echo). Dr. Alejo consulted. S/p structural CT: negative for thrombus, Limited TTE 11/16: elevated aortic valve gradient 71mmHg. GI consulted 11/15 in the setting of BRBPR in the setting of supratherapeutic PTT while on heparin gtt w/ continued episodes after stopping A/C, likely 2/2 hemorrhoids. IRMA noting AV thickening suspicious for thrombus, recommend to start AC pending GI clearance given acute drop in H/h. GI team reconsulted, s/p EGD/colonoscopy revealing 4 large colonic masses concerning for colon CA. Hospital course also c/b new onset Afib s/p dig, now in SR.    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 Elevated CEA 23.1. PET showed focal FDG uptake in transverse and ascending colon. Uptake in basilar pulmonary lymph node, paratracheal lymph nodes, hilar lymph nodes and peripancreatic lymph nodes likely benign per radiology.    SICU ADDENDUM: Patient arrives to SICU extubated, off pressors, in stable condition s/p laparoscopic subtotal colectomy (R and transverse colectomy, ileocolic anastomosis distal ilium to descending colon). EBL 50, IVF 2.3L, .    PAST MEDICAL & SURGICAL HISTORY:  CHF (congestive heart failure)  Pulmonary HTN  Aortic stenosis  HTN (hypertension)  S/P TAVR (transcatheter aortic valve replacement)    REVIEW OF SYSTEMS:   Pertinent positives/negatives noted in HPI.     HOME MEDICATIONS:  Home Medications:  Coreg 6.25 mg oral tablet: 1 tab(s) orally 2 times a day (13 Nov 2020 05:03)  hydroCHLOROthiazide 12.5 mg oral capsule: 1 cap(s) orally once a day (13 Nov 2020 05:03)    MEDICATIONS  (STANDING):  atorvastatin 20 milliGRAM(s) Oral at bedtime  hydrALAZINE 25 milliGRAM(s) Oral <User Schedule>  hydrochlorothiazide 25 milliGRAM(s) Oral daily  hydrocortisone 1% Cream 1 Application(s) Topical two times a day  influenza  Vaccine (HIGH DOSE) 0.7 milliLiter(s) IntraMuscular once  metoprolol tartrate 50 milliGRAM(s) Oral every 6 hours  metroNIDAZOLE    Tablet 1000 milliGRAM(s) Oral <User Schedule>  neomycin 1000 milliGRAM(s) Oral <User Schedule>  nystatin Powder 1 Application(s) Topical two times a day  pantoprazole  Injectable 40 milliGRAM(s) IV Push two times a day  polyethylene glycol 3350 17 Gram(s) Oral daily  tiotropium 18 MICROgram(s) Capsule 1 Capsule(s) Inhalation daily    ALLERGIES:  Allergies    Digox (Rash; Urticaria; Hives)  Plavix (Other (Mod to Severe))    Intolerances    FAMILY HISTORY:  No pertinent family history in first degree relatives    Vital Signs Last 24 Hrs  T(C): 36.8 (02 Dec 2020 06:25), Max: 36.8 (01 Dec 2020 18:03)  T(F): 98.2 (02 Dec 2020 06:25), Max: 98.2 (01 Dec 2020 18:03)  HR: 61 (02 Dec 2020 05:27) (54 - 61)  BP: 133/70 (02 Dec 2020 05:27) (124/69 - 152/60)  BP(mean): 92 (01 Dec 2020 17:50) (90 - 92)  RR: 18 (02 Dec 2020 05:27) (18 - 18)  SpO2: 95% (02 Dec 2020 05:27) (95% - 98%)    PHYSICAL EXAM:  T(C): 36.8 (12-02-20 @ 06:25), Max: 36.8 (12-01-20 @ 18:03)  HR: 61 (12-02-20 @ 05:27) (54 - 61)  BP: 133/70 (12-02-20 @ 05:27) (124/69 - 152/60)  RR: 18 (12-02-20 @ 05:27) (18 - 18)  SpO2: 95% (12-02-20 @ 05:27) (95% - 97%)    GENERAL: NAD, Resting comfortably in bed, asleep, responds to command  HEENT: NCAT, MMM, Normal conjunctiva, PERRL  RESP: Nonlabored breathing, No respiratory distress, CTAB  CARD: Normal rate, Normal peripheral perfusion  GI: Soft, obese, mildly distended, ATTP, No guarding, No rebound tenderness, incisions c/d/i  EXTREM: WWP, No edema, No gross deformity of extremities  SKIN: No rashes, no lesions  NEURO: AAOx3, No focal motor or sensory deficits    LABS:                        9.8    6.96  )-----------( 212      ( 02 Dec 2020 05:23 )             31.2     12-02    140  |  104  |  13  ----------------------------<  99  4.1   |  23  |  1.04    Ca    9.0      02 Dec 2020 05:23  Mg     2.1     12-02      PT/INR - ( 02 Dec 2020 06:50 )   PT: 12.8 sec;   INR: 1.07          PTT - ( 02 Dec 2020 06:50 )  PTT:31.1 sec        80F (Swiss/English speaking) PMHx of uncontrolled HTN, HLD, asthma, chronic diastolic CHF, aortic stenosis s/p TAVR (02/19), initially presented to ED w/ CP and HTN on 11/13, found to have elevated AoV gradient w/ workup c/f possible thrombus. 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 (12/2). Admitted to SICU for postoperative hemodynamic monitoring.     NEURO: Dilaudid, tylenol, headaches during this admission managed w/ fioricet prn (CT wnl)  CV: goal MAP >65  - new onset afib preoperatively: c/w lopressor 50q6, s/p dig (d/c 2/2 rash), CHADsVASc 4  - HTN: hypertensive urgency preop on metop, hydralazine 25 TID, hctz 25qd. Holding hydral and hctz for now  - Chest pain: now resolved, possibly from GERD, R/LHC 1/9/2019 w/ non-obstructive disease, no acute ischemic changes noted on EKG preop  - Aortic stenosis: s/p TAVR 2/6/19, IRMA 11/17 showing no thrombus, no shunts, slight suspicion for thrombus at base of R cusp  - Chronic diastolic CHF: Echo 11/13: normal LV/RV size/systolic function. Grade II diastolic dysfunction and AS as above.   PULM: asthma, duonebs  GI/FEN: NPO, LR@40, Protonix, gastropathy on EGD  : Gonzalez, s/p 10 lasix at conclusion of OR  ENDO: ISS  HEME: Preop Hgb 9.8, f/u postop labs, transfuse for Hgb <8, BRBPR 2/2 colon masses. Heme/onc following, will possibly due tissue eval of hilar nodes post colectomy, holding AC postop  ID: Monitor for s/s of infection, WBC wnl  PPx: SCDs, HSQ  LINES: PIVx2, antonino (12/2)   WOUNDS/DRAINS: midline incision  PT/OT: Not ordered

## 2020-12-02 NOTE — PRE-OP CHECKLIST - 1.
floor RN informed me kulwinder to remove patient's rings on left 4 finger.Documented on surgical team monitoring flow sheet and OR Tramaine morgan was made aware floor RN Britt  informed me unable to remove patient's rings on left 4 finger.Documented on surgical team monitoring flow sheet and OR Nurse cathy was made aware

## 2020-12-02 NOTE — PROGRESS NOTE ADULT - SUBJECTIVE AND OBJECTIVE BOX
Interval history: patient had laparoscopic subtotal colectomy, minimal blood loss.   Patient is resting on room air 96%.     She follows outpatient w/ Dr. More for MARK, but does not use mask due to discomfort. Last sleep study with apnea hypopnea index 10.6 (mild)     She had PFTs that shows FEV1 90%, DLCO mildly reduced c/w obstructive pattern but not reversible w/ bronchodilators, uses spiriva for COPD 2 puffs daily. Denies having ever been intubated for       Vital Signs Last 24 Hrs  T(C): 37.3 (25 Nov 2020 13:49), Max: 37.3 (25 Nov 2020 13:49)  T(F): 99.2 (25 Nov 2020 13:49), Max: 99.2 (25 Nov 2020 13:49)  HR: 62 (25 Nov 2020 12:14) (52 - 63)  BP: 156/77 (25 Nov 2020 12:14) (115/54 - 156/77)  BP(mean): 106 (25 Nov 2020 12:14) (78 - 106)  RR: 18 (25 Nov 2020 12:14) (16 - 18)  SpO2: 97% (25 Nov 2020 12:14) (94% - 98%) room air    Allergies  Digox (Rash; Urticaria; Hives)  Plavix (Other (Mod to Severe))    FAMILY HISTORY:  Father passed away from stroke  Sister has anemia  Sister uses spiriva as well    PAST MEDICAL & SURGICAL HISTORY:  dCHF (congestive heart failure)  MARK  COPD  AS    Aortic stenosis  HTN (hypertension)  S/P TAVR (transcatheter aortic valve replacement)      SOCIAL HISTORY:  denies tobacco, illicit substances, or heavy alcohol use    PHYSICAL EXAM  PHYSICAL EXAM:  GENERAL: NAD, well-developed  HEAD:  Atraumatic, Normocephalic  EYES: EOMI, PERRLA, conjunctiva and sclera clear  NECK: Supple, No JVD  CHEST/LUNG: Clear to auscultation bilaterally; No wheezing auscultated  HEART: Regular rate and rhythm; No murmurs, rubs, or gallops  ABDOMEN: s/p colectomy with colostomy pouch  EXTREMITIES:  2+ Peripheral Pulses, No clubbing, cyanosis, or edema  SKIN: No rashes or lesions  Neuro: Patient moves all four extremities, currently sleeping post procedure.     .  LABS:                         9.6    16.22 )-----------( 233      ( 02 Dec 2020 16:21 )             29.8     12-02    137  |  101  |  14  ----------------------------<  184<H>  3.8   |  20<L>  |  1.26    Ca    8.4      02 Dec 2020 16:21  Phos  5.0     12-02  Mg     1.7     12-02    TPro  6.8  /  Alb  3.3  /  TBili  0.6  /  DBili  x   /  AST  21  /  ALT  20  /  AlkPhos  55  12-02    PT/INR - ( 02 Dec 2020 06:50 )   PT: 12.8 sec;   INR: 1.07          PTT - ( 02 Dec 2020 06:50 )  PTT:31.1 sec    < from: CT Chest w/ Oral Cont and w/ IV Cont (11.20.20 @ 14:01) >  IMPRESSION:  2 cm segment of annular wall thickening of transverse colon, suspicious for neoplasm.  2.6 cm focal area of wall thickening in the ascending colon may also represent another neoplasm.  No colonic obstruction.  No evidence of distant metastasis.  Status post TAVR. Mild cardiomegaly and mild pulmonary congestion.  Thank you for the opportunity to participate in the care of this patient.     Interval history: patient had laparoscopic subtotal colectomy, minimal blood loss.   Patient is resting on room air 96%.     She follows outpatient w/ Dr. More for MARK, but does not use mask due to discomfort. Last sleep study with apnea hypopnea index 10.6 (mild)     She had PFTs that shows FEV1 90%, DLCO mildly reduced c/w obstructive pattern but not reversible w/ bronchodilators, uses spiriva for COPD 2 puffs daily. Denies having ever been intubated for COPD      Vital Signs Last 24 Hrs  T(C): 37.3 (25 Nov 2020 13:49), Max: 37.3 (25 Nov 2020 13:49)  T(F): 99.2 (25 Nov 2020 13:49), Max: 99.2 (25 Nov 2020 13:49)  HR: 62 (25 Nov 2020 12:14) (52 - 63)  BP: 156/77 (25 Nov 2020 12:14) (115/54 - 156/77)  BP(mean): 106 (25 Nov 2020 12:14) (78 - 106)  RR: 18 (25 Nov 2020 12:14) (16 - 18)  SpO2: 97% (25 Nov 2020 12:14) (94% - 98%) room air    Allergies  Digox (Rash; Urticaria; Hives)  Plavix (Other (Mod to Severe))    FAMILY HISTORY:  Father passed away from stroke  Sister has anemia  Sister uses spiriva as well    PAST MEDICAL & SURGICAL HISTORY:  dCHF (congestive heart failure)  MARK  COPD  AS    Aortic stenosis  HTN (hypertension)  S/P TAVR (transcatheter aortic valve replacement)      SOCIAL HISTORY:  denies tobacco, illicit substances, or heavy alcohol use    PHYSICAL EXAM  PHYSICAL EXAM:  GENERAL: NAD, well-developed  HEAD:  Atraumatic, Normocephalic  EYES: EOMI, PERRLA, conjunctiva and sclera clear  NECK: Supple, No JVD  CHEST/LUNG: Clear to auscultation bilaterally; No wheezing auscultated  HEART: Regular rate and rhythm; No murmurs, rubs, or gallops  ABDOMEN: s/p colectomy with colostomy pouch  EXTREMITIES:  2+ Peripheral Pulses, No clubbing, cyanosis, or edema  SKIN: No rashes or lesions  Neuro: Patient moves all four extremities, currently sleeping post procedure.     .  LABS:                         9.6    16.22 )-----------( 233      ( 02 Dec 2020 16:21 )             29.8     12-02    137  |  101  |  14  ----------------------------<  184<H>  3.8   |  20<L>  |  1.26    Ca    8.4      02 Dec 2020 16:21  Phos  5.0     12-02  Mg     1.7     12-02    TPro  6.8  /  Alb  3.3  /  TBili  0.6  /  DBili  x   /  AST  21  /  ALT  20  /  AlkPhos  55  12-02    PT/INR - ( 02 Dec 2020 06:50 )   PT: 12.8 sec;   INR: 1.07          PTT - ( 02 Dec 2020 06:50 )  PTT:31.1 sec    < from: CT Chest w/ Oral Cont and w/ IV Cont (11.20.20 @ 14:01) >  IMPRESSION:  2 cm segment of annular wall thickening of transverse colon, suspicious for neoplasm.  2.6 cm focal area of wall thickening in the ascending colon may also represent another neoplasm.  No colonic obstruction.  No evidence of distant metastasis.  Status post TAVR. Mild cardiomegaly and mild pulmonary congestion.  Thank you for the opportunity to participate in the care of this patient.

## 2020-12-02 NOTE — PROGRESS NOTE ADULT - SUBJECTIVE AND OBJECTIVE BOX
INTERVAL HPI/OVERNIGHT EVENTS: Pre opped for AM this am     SUBJECTIVE: Examined at the bedside resting comfortably. Pain well controlled. Mildly nauseas unchanged. Denies abdominal pain, cp, sob. no acute complaints         Vital Signs Last 24 Hrs  T(C): 36.8 (02 Dec 2020 06:25), Max: 36.8 (01 Dec 2020 18:03)  T(F): 98.2 (02 Dec 2020 06:25), Max: 98.2 (01 Dec 2020 18:03)  HR: 61 (02 Dec 2020 05:27) (52 - 76)  BP: 133/70 (02 Dec 2020 05:27) (124/69 - 163/91)  BP(mean): 92 (01 Dec 2020 17:50) (90 - 92)  RR: 18 (02 Dec 2020 05:27) (18 - 18)  SpO2: 95% (02 Dec 2020 05:27) (95% - 98%)  I&O's Detail      Physical Exam  General: NAD, resting comfortably in bed  C/V: NSR  Pulm: Nonlabored breathing, no respiratory distress  Abd: soft, non-tender, non-distended.  Extrem: WWP, no edema,  Pulses: palpable distal pulses    LABS:                        9.8    6.96  )-----------( 212      ( 02 Dec 2020 05:23 )             31.2     12-02    140  |  104  |  13  ----------------------------<  99  4.1   |  23  |  1.04    Ca    9.0      02 Dec 2020 05:23  Mg     2.1     12-02      PT/INR - ( 02 Dec 2020 06:50 )   PT: 12.8 sec;   INR: 1.07          PTT - ( 02 Dec 2020 06:50 )  PTT:31.1 sec      RADIOLOGY & ADDITIONAL STUDIES:

## 2020-12-03 LAB
ALBUMIN SERPL ELPH-MCNC: 3.3 G/DL — SIGNIFICANT CHANGE UP (ref 3.3–5)
ALP SERPL-CCNC: 52 U/L — SIGNIFICANT CHANGE UP (ref 40–120)
ALT FLD-CCNC: 19 U/L — SIGNIFICANT CHANGE UP (ref 10–45)
ANION GAP SERPL CALC-SCNC: 11 MMOL/L — SIGNIFICANT CHANGE UP (ref 5–17)
APPEARANCE UR: CLEAR — SIGNIFICANT CHANGE UP
APPEARANCE UR: CLEAR — SIGNIFICANT CHANGE UP
AST SERPL-CCNC: 20 U/L — SIGNIFICANT CHANGE UP (ref 10–40)
BACTERIA # UR AUTO: PRESENT /HPF
BACTERIA # UR AUTO: PRESENT /HPF
BILIRUB SERPL-MCNC: 0.4 MG/DL — SIGNIFICANT CHANGE UP (ref 0.2–1.2)
BILIRUB UR-MCNC: NEGATIVE — SIGNIFICANT CHANGE UP
BILIRUB UR-MCNC: NEGATIVE — SIGNIFICANT CHANGE UP
BUN SERPL-MCNC: 18 MG/DL — SIGNIFICANT CHANGE UP (ref 7–23)
CALCIUM SERPL-MCNC: 8.4 MG/DL — SIGNIFICANT CHANGE UP (ref 8.4–10.5)
CHLORIDE SERPL-SCNC: 100 MMOL/L — SIGNIFICANT CHANGE UP (ref 96–108)
CHLORIDE UR-SCNC: 20 MMOL/L — SIGNIFICANT CHANGE UP
CO2 SERPL-SCNC: 24 MMOL/L — SIGNIFICANT CHANGE UP (ref 22–31)
COLOR SPEC: YELLOW — SIGNIFICANT CHANGE UP
COLOR SPEC: YELLOW — SIGNIFICANT CHANGE UP
COMMENT - URINE: SIGNIFICANT CHANGE UP
COMMENT - URINE: SIGNIFICANT CHANGE UP
CREAT ?TM UR-MCNC: 197 MG/DL — SIGNIFICANT CHANGE UP
CREAT SERPL-MCNC: 1.62 MG/DL — HIGH (ref 0.5–1.3)
DIFF PNL FLD: ABNORMAL
DIFF PNL FLD: NEGATIVE — SIGNIFICANT CHANGE UP
EPI CELLS # UR: SIGNIFICANT CHANGE UP /HPF (ref 0–5)
EPI CELLS # UR: SIGNIFICANT CHANGE UP /HPF (ref 0–5)
GLUCOSE BLDC GLUCOMTR-MCNC: 111 MG/DL — HIGH (ref 70–99)
GLUCOSE BLDC GLUCOMTR-MCNC: 129 MG/DL — HIGH (ref 70–99)
GLUCOSE BLDC GLUCOMTR-MCNC: 133 MG/DL — HIGH (ref 70–99)
GLUCOSE BLDC GLUCOMTR-MCNC: 140 MG/DL — HIGH (ref 70–99)
GLUCOSE BLDC GLUCOMTR-MCNC: 144 MG/DL — HIGH (ref 70–99)
GLUCOSE SERPL-MCNC: 144 MG/DL — HIGH (ref 70–99)
GLUCOSE UR QL: NEGATIVE — SIGNIFICANT CHANGE UP
GLUCOSE UR QL: NEGATIVE — SIGNIFICANT CHANGE UP
HCT VFR BLD CALC: 26.2 % — LOW (ref 34.5–45)
HGB BLD-MCNC: 8.4 G/DL — LOW (ref 11.5–15.5)
HYALINE CASTS # UR AUTO: ABNORMAL /LPF (ref 0–2)
KETONES UR-MCNC: NEGATIVE — SIGNIFICANT CHANGE UP
KETONES UR-MCNC: NEGATIVE — SIGNIFICANT CHANGE UP
LEUKOCYTE ESTERASE UR-ACNC: NEGATIVE — SIGNIFICANT CHANGE UP
LEUKOCYTE ESTERASE UR-ACNC: NEGATIVE — SIGNIFICANT CHANGE UP
MAGNESIUM SERPL-MCNC: 2.4 MG/DL — SIGNIFICANT CHANGE UP (ref 1.6–2.6)
MCHC RBC-ENTMCNC: 29.7 PG — SIGNIFICANT CHANGE UP (ref 27–34)
MCHC RBC-ENTMCNC: 32.1 GM/DL — SIGNIFICANT CHANGE UP (ref 32–36)
MCV RBC AUTO: 92.6 FL — SIGNIFICANT CHANGE UP (ref 80–100)
NITRITE UR-MCNC: NEGATIVE — SIGNIFICANT CHANGE UP
NITRITE UR-MCNC: NEGATIVE — SIGNIFICANT CHANGE UP
NRBC # BLD: 0 /100 WBCS — SIGNIFICANT CHANGE UP (ref 0–0)
OSMOLALITY UR: 415 MOSM/KG — SIGNIFICANT CHANGE UP (ref 300–900)
PH UR: 5 — SIGNIFICANT CHANGE UP (ref 5–8)
PH UR: 5.5 — SIGNIFICANT CHANGE UP (ref 5–8)
PHOSPHATE SERPL-MCNC: 5.3 MG/DL — HIGH (ref 2.5–4.5)
PLATELET # BLD AUTO: 203 K/UL — SIGNIFICANT CHANGE UP (ref 150–400)
POTASSIUM SERPL-MCNC: 4.2 MMOL/L — SIGNIFICANT CHANGE UP (ref 3.5–5.3)
POTASSIUM SERPL-SCNC: 4.2 MMOL/L — SIGNIFICANT CHANGE UP (ref 3.5–5.3)
PROT SERPL-MCNC: 6.6 G/DL — SIGNIFICANT CHANGE UP (ref 6–8.3)
PROT UR-MCNC: ABNORMAL MG/DL
PROT UR-MCNC: ABNORMAL MG/DL
RBC # BLD: 2.83 M/UL — LOW (ref 3.8–5.2)
RBC # FLD: 14 % — SIGNIFICANT CHANGE UP (ref 10.3–14.5)
RBC CASTS # UR COMP ASSIST: < 5 /HPF — SIGNIFICANT CHANGE UP
RBC CASTS # UR COMP ASSIST: < 5 /HPF — SIGNIFICANT CHANGE UP
SODIUM SERPL-SCNC: 135 MMOL/L — SIGNIFICANT CHANGE UP (ref 135–145)
SODIUM UR-SCNC: <20 MMOL/L — SIGNIFICANT CHANGE UP
SP GR SPEC: >=1.03 — SIGNIFICANT CHANGE UP (ref 1–1.03)
SP GR SPEC: >=1.03 — SIGNIFICANT CHANGE UP (ref 1–1.03)
UROBILINOGEN FLD QL: 0.2 E.U./DL — SIGNIFICANT CHANGE UP
UROBILINOGEN FLD QL: 0.2 E.U./DL — SIGNIFICANT CHANGE UP
UUN UR-MCNC: 425 MG/DL — SIGNIFICANT CHANGE UP
WBC # BLD: 11.83 K/UL — HIGH (ref 3.8–10.5)
WBC # FLD AUTO: 11.83 K/UL — HIGH (ref 3.8–10.5)
WBC UR QL: < 5 /HPF — SIGNIFICANT CHANGE UP
WBC UR QL: < 5 /HPF — SIGNIFICANT CHANGE UP

## 2020-12-03 PROCEDURE — 99232 SBSQ HOSP IP/OBS MODERATE 35: CPT | Mod: GC

## 2020-12-03 PROCEDURE — 99233 SBSQ HOSP IP/OBS HIGH 50: CPT | Mod: GC

## 2020-12-03 RX ORDER — HYDROMORPHONE HYDROCHLORIDE 2 MG/ML
0.25 INJECTION INTRAMUSCULAR; INTRAVENOUS; SUBCUTANEOUS EVERY 6 HOURS
Refills: 0 | Status: DISCONTINUED | OUTPATIENT
Start: 2020-12-03 | End: 2020-12-05

## 2020-12-03 RX ORDER — ONDANSETRON 8 MG/1
4 TABLET, FILM COATED ORAL EVERY 6 HOURS
Refills: 0 | Status: DISCONTINUED | OUTPATIENT
Start: 2020-12-03 | End: 2020-12-03

## 2020-12-03 RX ORDER — ONDANSETRON 8 MG/1
4 TABLET, FILM COATED ORAL EVERY 6 HOURS
Refills: 0 | Status: DISCONTINUED | OUTPATIENT
Start: 2020-12-03 | End: 2020-12-14

## 2020-12-03 RX ORDER — ACETAMINOPHEN 500 MG
1000 TABLET ORAL EVERY 6 HOURS
Refills: 0 | Status: DISCONTINUED | OUTPATIENT
Start: 2020-12-03 | End: 2020-12-14

## 2020-12-03 RX ADMIN — Medication 3 MILLILITER(S): at 09:26

## 2020-12-03 RX ADMIN — HYDROMORPHONE HYDROCHLORIDE 0.5 MILLIGRAM(S): 2 INJECTION INTRAMUSCULAR; INTRAVENOUS; SUBCUTANEOUS at 07:38

## 2020-12-03 RX ADMIN — Medication 3 MILLILITER(S): at 16:20

## 2020-12-03 RX ADMIN — Medication 3 MILLILITER(S): at 21:09

## 2020-12-03 RX ADMIN — Medication 50 MILLIGRAM(S): at 17:09

## 2020-12-03 RX ADMIN — Medication 1000 MILLIGRAM(S): at 12:00

## 2020-12-03 RX ADMIN — HYDROMORPHONE HYDROCHLORIDE 0.25 MILLIGRAM(S): 2 INJECTION INTRAMUSCULAR; INTRAVENOUS; SUBCUTANEOUS at 21:09

## 2020-12-03 RX ADMIN — Medication 3 MILLILITER(S): at 05:32

## 2020-12-03 RX ADMIN — Medication 1000 MILLIGRAM(S): at 11:24

## 2020-12-03 RX ADMIN — HYDROMORPHONE HYDROCHLORIDE 0.5 MILLIGRAM(S): 2 INJECTION INTRAMUSCULAR; INTRAVENOUS; SUBCUTANEOUS at 07:36

## 2020-12-03 RX ADMIN — HYDROMORPHONE HYDROCHLORIDE 0.25 MILLIGRAM(S): 2 INJECTION INTRAMUSCULAR; INTRAVENOUS; SUBCUTANEOUS at 22:50

## 2020-12-03 RX ADMIN — ONDANSETRON 4 MILLIGRAM(S): 8 TABLET, FILM COATED ORAL at 10:13

## 2020-12-03 RX ADMIN — Medication 50 MILLIGRAM(S): at 05:12

## 2020-12-03 RX ADMIN — Medication 15 MILLIGRAM(S): at 05:12

## 2020-12-03 RX ADMIN — Medication 650 MILLIGRAM(S): at 05:29

## 2020-12-03 RX ADMIN — Medication 15 MILLIGRAM(S): at 05:29

## 2020-12-03 RX ADMIN — HYDROMORPHONE HYDROCHLORIDE 0.5 MILLIGRAM(S): 2 INJECTION INTRAMUSCULAR; INTRAVENOUS; SUBCUTANEOUS at 00:12

## 2020-12-03 RX ADMIN — Medication 3 MILLILITER(S): at 00:10

## 2020-12-03 RX ADMIN — Medication 1000 MILLIGRAM(S): at 17:21

## 2020-12-03 RX ADMIN — HYDROMORPHONE HYDROCHLORIDE 0.25 MILLIGRAM(S): 2 INJECTION INTRAMUSCULAR; INTRAVENOUS; SUBCUTANEOUS at 14:36

## 2020-12-03 RX ADMIN — HEPARIN SODIUM 5000 UNIT(S): 5000 INJECTION INTRAVENOUS; SUBCUTANEOUS at 21:09

## 2020-12-03 RX ADMIN — HEPARIN SODIUM 5000 UNIT(S): 5000 INJECTION INTRAVENOUS; SUBCUTANEOUS at 05:12

## 2020-12-03 RX ADMIN — PANTOPRAZOLE SODIUM 40 MILLIGRAM(S): 20 TABLET, DELAYED RELEASE ORAL at 11:23

## 2020-12-03 RX ADMIN — Medication 1000 MILLIGRAM(S): at 17:10

## 2020-12-03 RX ADMIN — HYDROMORPHONE HYDROCHLORIDE 0.25 MILLIGRAM(S): 2 INJECTION INTRAMUSCULAR; INTRAVENOUS; SUBCUTANEOUS at 15:36

## 2020-12-03 RX ADMIN — HEPARIN SODIUM 5000 UNIT(S): 5000 INJECTION INTRAVENOUS; SUBCUTANEOUS at 13:52

## 2020-12-03 RX ADMIN — SODIUM CHLORIDE 40 MILLILITER(S): 9 INJECTION, SOLUTION INTRAVENOUS at 07:36

## 2020-12-03 RX ADMIN — Medication 650 MILLIGRAM(S): at 05:11

## 2020-12-03 NOTE — PROGRESS NOTE ADULT - ASSESSMENT
78 year old Kyrgyz speaking F, PMHx of uncontrolled HTN, hyperlipidemia, COPD(denies hospitalizations, intubations), MARK (on CPAP, noncompliant), chronic diastolic CHF (EF:61% by Echo 4/2019), aortic stenosis s/p transfemoral TAVR with rachael valve on 02/05/2019 with Dr. Aleoj [facility], recent hospitalization at [facility] (for HTN/headache, no changes made to medications, negative CT head/MRI brain, discharged on 11/4/20) who presents to [facility] ED 11/13/20 c/o intermittent chest pain and elevated BP x 1 week found to have new GGO bilateral lower lobes pulmonary consult for preoperative clearance for subtotal colectomy for adenocarcinoma of colon.     #Postoperative status post colectomy  -Patient with PFTs with obstructive pattern, no history of smoking and this was not reversible w/ albuterol  -continue with home spiriva + duonebs PRN   -decreased breath sounds at bases, likely atelectatic lung   -c/w incentive spirometry + analgesics   -ambulate and oob to chair   -f/u with Dr. Bere More outpatient     #MARK  -patient without apneic episodes status post procedure  -AHA 10.6, mild MARK  -continue MARK precautions    Please call with additional questions

## 2020-12-03 NOTE — PROGRESS NOTE ADULT - SUBJECTIVE AND OBJECTIVE BOX
STATUS POST:  POD # 1 s/p Lap Subtotal Colectomy      SUBJECTIVE: Examined with chief resident at the bedside, resting comfortably. This morning, she feels well; her pain is well-controlled. Mild nausea but no emesis vomiting. Passing flatus and having BMs. No acute complaints.     heparin   Injectable 5000 Unit(s) SubCutaneous every 8 hours  metoprolol tartrate 50 milliGRAM(s) Oral every 6 hours      Vital Signs Last 24 Hrs  T(C): 36.8 (03 Dec 2020 17:29), Max: 36.9 (03 Dec 2020 05:38)  T(F): 98.3 (03 Dec 2020 17:29), Max: 98.5 (03 Dec 2020 05:38)  HR: 58 (03 Dec 2020 20:28) (51 - 75)  BP: 139/60 (03 Dec 2020 20:28) (92/46 - 166/66)  BP(mean): 86 (03 Dec 2020 20:28) (66 - 95)  RR: 16 (03 Dec 2020 20:28) (11 - 31)  SpO2: 99% (03 Dec 2020 20:28) (92% - 100%)  I&O's Detail    02 Dec 2020 07:01  -  03 Dec 2020 07:00  --------------------------------------------------------  IN:    IV PiggyBack: 50 mL    Lactated Ringers: 600 mL  Total IN: 650 mL    OUT:    Indwelling Catheter - Urethral (mL): 945 mL  Total OUT: 945 mL    Total NET: -295 mL      03 Dec 2020 07:01  -  03 Dec 2020 22:03  --------------------------------------------------------  IN:    Lactated Ringers: 520 mL    Oral Fluid: 800 mL  Total IN: 1320 mL    OUT:    Indwelling Catheter - Urethral (mL): 410 mL  Total OUT: 410 mL    Total NET: 910 mL          Physical Exam  General: NAD, resting comfortably in bed  C/V: NSR  Pulm: Nonlabored breathing, no respiratory distress  Abd: soft, non-distended, appropriate incisional tenderness  Extrem: WWP, no edema,  Neuro: A/O x 3, CNs II-XII grossly intact, no focal deficits, normal sensation  Pulses: palpable distal pulses    LABS:                        8.4    11.83 )-----------( 203      ( 03 Dec 2020 05:32 )             26.2     12-03    135  |  100  |  18  ----------------------------<  144<H>  4.2   |  24  |  1.62<H>    Ca    8.4      03 Dec 2020 05:32  Phos  5.3     12-03  Mg     2.4     12-03    TPro  6.6  /  Alb  3.3  /  TBili  0.4  /  DBili  x   /  AST  20  /  ALT  19  /  AlkPhos  52  12-03    PT/INR - ( 02 Dec 2020 06:50 )   PT: 12.8 sec;   INR: 1.07          PTT - ( 02 Dec 2020 06:50 )  PTT:31.1 sec  Urinalysis Basic - ( 03 Dec 2020 16:56 )    Color: Yellow / Appearance: Clear / SG: >=1.030 / pH: x  Gluc: x / Ketone: NEGATIVE  / Bili: Negative / Urobili: 0.2 E.U./dL   Blood: x / Protein: Trace mg/dL / Nitrite: NEGATIVE   Leuk Esterase: NEGATIVE / RBC: < 5 /HPF / WBC < 5 /HPF   Sq Epi: x / Non Sq Epi: 0-5 /HPF / Bacteria: Present /HPF        RADIOLOGY & ADDITIONAL STUDIES:

## 2020-12-03 NOTE — PROGRESS NOTE ADULT - SUBJECTIVE AND OBJECTIVE BOX
PULMONARY CONSULT SERVICE FOLLOW-UP NOTE    INTERVAL HPI:  Reviewed chart and overnight events; patient seen and examined at bedside. Patient complaining of abdominal pain and tess incisional tenderness. No dyspnea or difficulty breathing. Shallow breathing due to pain. No cough, fever, chills.     MEDICATIONS:  Pulmonary:  ALBUTerol    90 MICROgram(s) HFA Inhaler 1 Puff(s) Inhalation every 4 hours  albuterol/ipratropium for Nebulization 3 milliLiter(s) Nebulizer every 6 hours  tiotropium 18 MICROgram(s) Capsule 1 Capsule(s) Inhalation daily    Antimicrobials:    Anticoagulants:  heparin   Injectable 5000 Unit(s) SubCutaneous every 8 hours    Cardiac:  metoprolol tartrate 50 milliGRAM(s) Oral every 6 hours      Allergies    Digox (Rash; Urticaria; Hives)  Plavix (Other (Mod to Severe))    Intolerances        Vital Signs Last 24 Hrs  T(C): 36.4 (03 Dec 2020 09:18), Max: 37.1 (02 Dec 2020 16:30)  T(F): 97.6 (03 Dec 2020 09:18), Max: 98.7 (02 Dec 2020 16:30)  HR: 60 (03 Dec 2020 09:00) (51 - 68)  BP: 138/63 (03 Dec 2020 09:00) (92/46 - 151/66)  BP(mean): 91 (03 Dec 2020 09:00) (66 - 98)  RR: 29 (03 Dec 2020 09:00) (11 - 29)  SpO2: 99% (03 Dec 2020 09:00) (92% - 99%)    12-02 @ 07:01 - 12-03 @ 07:00  --------------------------------------------------------  IN: 650 mL / OUT: 945 mL / NET: -295 mL    12-03 @ 07:01  -  12-03 @ 09:51  --------------------------------------------------------  IN: 40 mL / OUT: 0 mL / NET: 40 mL        PHYSICAL EXAM  PHYSICAL EXAM:  GENERAL: NAD, well-developed  HEAD:  Atraumatic, Normocephalic  EYES: EOMI, PERRLA, conjunctiva and sclera clear  NECK: Supple, No JVD  CHEST/LUNG: Decreased breath sounds b/l bases   HEART: Regular rate and rhythm; No murmurs, rubs, or gallops  ABDOMEN: s/p colectomy with colostomy pouch  EXTREMITIES:  2+ Peripheral Pulses, No clubbing, cyanosis, or edema  SKIN: No rashes or lesions  Neuro: Patient moves all four extremities, currently sleeping post procedure.     LABS:  ABG - ( 02 Dec 2020 16:21 )  pH, Arterial: 7.37  pH, Blood: x     /  pCO2: 39    /  pO2: 99    / HCO3: 22    / Base Excess: -2.7  /  SaO2: 97                  CBC Full  -  ( 03 Dec 2020 05:32 )  WBC Count : 11.83 K/uL  RBC Count : 2.83 M/uL  Hemoglobin : 8.4 g/dL  Hematocrit : 26.2 %  Platelet Count - Automated : 203 K/uL  Mean Cell Volume : 92.6 fl  Mean Cell Hemoglobin : 29.7 pg  Mean Cell Hemoglobin Concentration : 32.1 gm/dL  Auto Neutrophil # : x  Auto Lymphocyte # : x  Auto Monocyte # : x  Auto Eosinophil # : x  Auto Basophil # : x  Auto Neutrophil % : x  Auto Lymphocyte % : x  Auto Monocyte % : x  Auto Eosinophil % : x  Auto Basophil % : x    12-03    135  |  100  |  18  ----------------------------<  144<H>  4.2   |  24  |  1.62<H>    Ca    8.4      03 Dec 2020 05:32  Phos  5.3     12-03  Mg     2.4     12-03    TPro  6.6  /  Alb  3.3  /  TBili  0.4  /  DBili  x   /  AST  20  /  ALT  19  /  AlkPhos  52  12-03    PT/INR - ( 02 Dec 2020 06:50 )   PT: 12.8 sec;   INR: 1.07          PTT - ( 02 Dec 2020 06:50 )  PTT:31.1 sec                  RADIOLOGY & ADDITIONAL STUDIES:

## 2020-12-03 NOTE — CHART NOTE - NSCHARTNOTEFT_GEN_A_CORE
Admitting Diagnosis:   Patient is a 80y old  Female who presents with a chief complaint of HTN urgency (03 Dec 2020 09:51)    PAST MEDICAL & SURGICAL HISTORY:  CHF (congestive heart failure)    Pulmonary HTN    Aortic stenosis    HTN (hypertension)    S/P TAVR (transcatheter aortic valve replacement)    Current Nutrition Order:   Diet, Clear Liquid (12-02-20 @ 16:11)    PO Intake: Good (%) [   ]  Fair (50-75%) [   ] Poor (<25%) [   ]-pt drinking clear liquids per RN    GI Issues: +nausea (likely 2/2 multiple pain medications on empty stomach per MD at AM rounds), no vomiting noted, last BM 12/1, no flatus per chart    Pain: pt endorses abdominal discomfort per RN    Skin Integrity: Quique score 17; 1+ trace generalized edema    Labs:   12-03    135  |  100  |  18  ----------------------------<  144<H>  4.2   |  24  |  1.62<H>    Ca    8.4      03 Dec 2020 05:32  Phos  5.3     12-03  Mg     2.4     12-03    TPro  6.6  /  Alb  3.3  /  TBili  0.4  /  DBili  x   /  AST  20  /  ALT  19  /  AlkPhos  52  12-03    CAPILLARY BLOOD GLUCOSE  POCT Blood Glucose.: 140 mg/dL (03 Dec 2020 08:55)  POCT Blood Glucose.: 144 mg/dL (03 Dec 2020 05:19)  POCT Blood Glucose.: 185 mg/dL (02 Dec 2020 23:04)  POCT Blood Glucose.: 179 mg/dL (02 Dec 2020 21:15)  POCT Blood Glucose.: 183 mg/dL (02 Dec 2020 16:55)    Medications:  MEDICATIONS  (STANDING):  acetaminophen   Tablet .. 1000 milliGRAM(s) Oral every 6 hours  ALBUTerol    90 MICROgram(s) HFA Inhaler 1 Puff(s) Inhalation every 4 hours  albuterol/ipratropium for Nebulization 3 milliLiter(s) Nebulizer every 6 hours  BUpivacaine liposome 1.3% Injectable (no eMAR) 20 milliLiter(s) Local Injection once  dextrose 40% Gel 15 Gram(s) Oral once  dextrose 5%. 1000 milliLiter(s) (50 mL/Hr) IV Continuous <Continuous>  dextrose 5%. 1000 milliLiter(s) (100 mL/Hr) IV Continuous <Continuous>  dextrose 50% Injectable 25 Gram(s) IV Push once  dextrose 50% Injectable 12.5 Gram(s) IV Push once  dextrose 50% Injectable 25 Gram(s) IV Push once  glucagon  Injectable 1 milliGRAM(s) IntraMuscular once  heparin   Injectable 5000 Unit(s) SubCutaneous every 8 hours  influenza  Vaccine (HIGH DOSE) 0.7 milliLiter(s) IntraMuscular once  insulin lispro (ADMELOG) corrective regimen sliding scale   SubCutaneous Before meals and at bedtime  lactated ringers. 1000 milliLiter(s) (40 mL/Hr) IV Continuous <Continuous>  metoprolol tartrate 50 milliGRAM(s) Oral every 6 hours  pantoprazole  Injectable 40 milliGRAM(s) IV Push daily  tiotropium 18 MICROgram(s) Capsule 1 Capsule(s) Inhalation daily    MEDICATIONS  (PRN):  HYDROmorphone  Injectable 0.25 milliGRAM(s) IV Push every 6 hours PRN Moderate Pain (4 - 6)  ondansetron Injectable 4 milliGRAM(s) IV Push every 6 hours PRN Nausea and/or Vomiting    Weight:  101.4kg (11/13)  100.1kg (11/14)  100.9kg (11/15, 11/16, 11/17)  100.3kg (11/18)  100.4kg (11/19)  100.9kg (11/20)  100.2kg (11/21)  101.7kg (11/22)  101.4kg (11/23)  101kg (11/24)  102.3kg (11/25)  102.4kg (11/26)  102.6kg (11/27)  101.2kg (11/28)  100.9kg (11/29)  100.4kg (11/30)  100kg (12/1)  99.4kg (12/2)    Weight Change: varying weights likely 2/2 fluid shifts 2/2 hx CHF. Recommend continue to trend daily weights for further assessment.    Nutrition Focused Physical Exam: Completed [   ]  Not Pertinent [X ]    Estimated energy needs:   Height 70"; .5kg (admit); IBW 68kg; 151%IBW; BMI 32.4  IBW used to calculate energy needs due to pt's current body weight exceeding 120% of IBW, adjusted for age, 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 Fluids per team 2/2 CHF  Calories: 25-30 kcal/kg = 6431-8054 kcal/day  Protein: 1.2-1.5 g/kg = 82-102g protein/day     Subjective: 80F ([place]ian/English speaking) PMHx of uncontrolled HTN, HLD, asthma, chronic diastolic CHF, aortic stenosis s/p TAVR (02/19), initially presented to ED w/ CP and HTN on 11/13, found to have elevated AoV gradient w/ workup c/f possible thrombus. 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 Admitted to SICU for postoperative hemodynamic monitoring. BIBIANA likely post operative, plan to keep current maintenance fluids given hx of diastolic HF. MAP 69, satting well on 2L nasal canula.     Pt sleeping at this time and noted to be in pain this AM. Obtained information from RN. +nausea, no vomiting, likely 2/2 taking multiple pain medication on empty stomach. Drinking CLD. Please see below for full nutritional recommendations- d/w team. RD to monitor and f/u per protocol.    Previous Nutrition Diagnosis:  Increased protein-calorie needs RT increased demand for protein-calorie intake AEB hypermetabolic state  Active [X]  Resolved [   ]    PES: Inadequate Oral intake RT pt meeting <75% of EER AEB pt on FLD-N/A    Goal: Diet to advance within 24-48h. Pt to meet >/=75%EER PO with good tolerance when diet advanced.    Recommendations:  1. Recommend continue CLD as medically appropriate/tolerated  >>monitor need to add no concentrated phos to diet order or order a phos binder 2/2 hyperphosphatemia  >> Recommend advancing to low-fiber, DASH/TLC diet when medically feasible  2. Monitor labs (BMP, lytes); replete lytes prn  3. Trend daily weights    Education: N/A at this time, pt sleeping and in pain; RD to f/u for further diet education    Risk Level: High [ X ]  Moderate [  ]  Low [   ]

## 2020-12-03 NOTE — PROGRESS NOTE ADULT - SUBJECTIVE AND OBJECTIVE BOX
INTERVAL/OVERNIGHT EVENTS: ON: EKG is normal, NAEON    SUBJECTIVE:     POD # 1 from lap subtotal colectomy (R and transverse colon)  SICU Day # 2    Neurologic Medications  acetaminophen   Tablet .. 650 milliGRAM(s) Oral every 6 hours  HYDROmorphone  Injectable 0.5 milliGRAM(s) IV Push every 6 hours PRN Severe Pain (7 - 10)  ketorolac   Injectable 15 milliGRAM(s) IV Push every 8 hours    Respiratory Medications  ALBUTerol    90 MICROgram(s) HFA Inhaler 1 Puff(s) Inhalation every 4 hours  albuterol/ipratropium for Nebulization 3 milliLiter(s) Nebulizer every 6 hours  tiotropium 18 MICROgram(s) Capsule 1 Capsule(s) Inhalation daily    Cardiovascular Medications  metoprolol tartrate 50 milliGRAM(s) Oral every 6 hours    Gastrointestinal Medications  dextrose 5%. 1000 milliLiter(s) IV Continuous <Continuous>  dextrose 5%. 1000 milliLiter(s) IV Continuous <Continuous>  lactated ringers. 1000 milliLiter(s) IV Continuous <Continuous>  pantoprazole  Injectable 40 milliGRAM(s) IV Push daily    Genitourinary Medications    Hematologic/Oncologic Medications  heparin   Injectable 5000 Unit(s) SubCutaneous every 8 hours  influenza  Vaccine (HIGH DOSE) 0.7 milliLiter(s) IntraMuscular once    Antimicrobial/Immunologic Medications    Endocrine/Metabolic Medications  dextrose 40% Gel 15 Gram(s) Oral once  dextrose 50% Injectable 25 Gram(s) IV Push once  dextrose 50% Injectable 12.5 Gram(s) IV Push once  dextrose 50% Injectable 25 Gram(s) IV Push once  glucagon  Injectable 1 milliGRAM(s) IntraMuscular once  insulin lispro (ADMELOG) corrective regimen sliding scale   SubCutaneous Before meals and at bedtime    Topical/Other Medications  BUpivacaine liposome 1.3% Injectable (no eMAR) 20 milliLiter(s) Local Injection once      MEDICATIONS  (PRN):  HYDROmorphone  Injectable 0.5 milliGRAM(s) IV Push every 6 hours PRN Severe Pain (7 - 10)      I&O's Detail    02 Dec 2020 07:01  -  03 Dec 2020 06:24  --------------------------------------------------------  IN:    IV PiggyBack: 50 mL    Lactated Ringers: 560 mL  Total IN: 610 mL    OUT:    Indwelling Catheter - Urethral (mL): 865 mL  Total OUT: 865 mL    Total NET: -255 mL          Vital Signs Last 24 Hrs  T(C): 36.9 (03 Dec 2020 05:38), Max: 37.1 (02 Dec 2020 16:30)  T(F): 98.5 (03 Dec 2020 05:38), Max: 98.7 (02 Dec 2020 16:30)  HR: 56 (03 Dec 2020 05:00) (51 - 68)  BP: 119/58 (03 Dec 2020 05:00) (99/51 - 151/66)  BP(mean): 83 (03 Dec 2020 05:00) (74 - 98)  RR: 20 (03 Dec 2020 05:00) (11 - 22)  SpO2: 98% (03 Dec 2020 05:00) (95% - 99%)    GENERAL: NAD, resting comfortably in bed  HEENT: NCAT, MMM  C/V: Normal rate, normal peripheral perfusion  PULM: Nonlabored breathing, no respiratory distress,   ABD: Soft, ND, NT, no rebound tenderness, no guarding  EXTREM: WWP, no edema, SCDs in place  NEURO: No focal deficits    LABS:                        8.4    11.83 )-----------( 203      ( 03 Dec 2020 05:32 )             26.2     12-03    135  |  100  |  18  ----------------------------<  144<H>  4.2   |  24  |  1.62<H>    Ca    8.4      03 Dec 2020 05:32  Phos  5.3     12-03  Mg     2.4     12-03    TPro  6.6  /  Alb  3.3  /  TBili  0.4  /  DBili  x   /  AST  20  /  ALT  19  /  AlkPhos  52  12-03    PT/INR - ( 02 Dec 2020 06:50 )   PT: 12.8 sec;   INR: 1.07          PTT - ( 02 Dec 2020 06:50 )  PTT:31.1 sec      RADIOLOGY & ADDITIONAL STUDIES:     INTERVAL/OVERNIGHT EVENTS: ON: EKG is normal, NAEON    SUBJECTIVE: No issues ON per nurse. Pt reports some generalized abd pain, no other issues. Denies SOB, CP, HA, not passing flatus or BM. Romeo CLD w/o n/v.     POD # 1 from lap subtotal colectomy (R and transverse colon)  SICU Day # 2    Neurologic Medications  acetaminophen   Tablet .. 650 milliGRAM(s) Oral every 6 hours  HYDROmorphone  Injectable 0.5 milliGRAM(s) IV Push every 6 hours PRN Severe Pain (7 - 10)  ketorolac   Injectable 15 milliGRAM(s) IV Push every 8 hours    Respiratory Medications  ALBUTerol    90 MICROgram(s) HFA Inhaler 1 Puff(s) Inhalation every 4 hours  albuterol/ipratropium for Nebulization 3 milliLiter(s) Nebulizer every 6 hours  tiotropium 18 MICROgram(s) Capsule 1 Capsule(s) Inhalation daily    Cardiovascular Medications  metoprolol tartrate 50 milliGRAM(s) Oral every 6 hours    Gastrointestinal Medications  dextrose 5%. 1000 milliLiter(s) IV Continuous <Continuous>  dextrose 5%. 1000 milliLiter(s) IV Continuous <Continuous>  lactated ringers. 1000 milliLiter(s) IV Continuous <Continuous>  pantoprazole  Injectable 40 milliGRAM(s) IV Push daily    Genitourinary Medications    Hematologic/Oncologic Medications  heparin   Injectable 5000 Unit(s) SubCutaneous every 8 hours  influenza  Vaccine (HIGH DOSE) 0.7 milliLiter(s) IntraMuscular once    Antimicrobial/Immunologic Medications    Endocrine/Metabolic Medications  dextrose 40% Gel 15 Gram(s) Oral once  dextrose 50% Injectable 25 Gram(s) IV Push once  dextrose 50% Injectable 12.5 Gram(s) IV Push once  dextrose 50% Injectable 25 Gram(s) IV Push once  glucagon  Injectable 1 milliGRAM(s) IntraMuscular once  insulin lispro (ADMELOG) corrective regimen sliding scale   SubCutaneous Before meals and at bedtime    Topical/Other Medications  BUpivacaine liposome 1.3% Injectable (no eMAR) 20 milliLiter(s) Local Injection once      MEDICATIONS  (PRN):  HYDROmorphone  Injectable 0.5 milliGRAM(s) IV Push every 6 hours PRN Severe Pain (7 - 10)      I&O's Detail    02 Dec 2020 07:01  -  03 Dec 2020 06:24  --------------------------------------------------------  IN:    IV PiggyBack: 50 mL    Lactated Ringers: 560 mL  Total IN: 610 mL    OUT:    Indwelling Catheter - Urethral (mL): 865 mL  Total OUT: 865 mL    Total NET: -255 mL          Vital Signs Last 24 Hrs  T(C): 36.9 (03 Dec 2020 05:38), Max: 37.1 (02 Dec 2020 16:30)  T(F): 98.5 (03 Dec 2020 05:38), Max: 98.7 (02 Dec 2020 16:30)  HR: 56 (03 Dec 2020 05:00) (51 - 68)  BP: 119/58 (03 Dec 2020 05:00) (99/51 - 151/66)  BP(mean): 83 (03 Dec 2020 05:00) (74 - 98)  RR: 20 (03 Dec 2020 05:00) (11 - 22)  SpO2: 98% (03 Dec 2020 05:00) (95% - 99%)    GENERAL: NAD, resting comfortably in bed  HEENT: NCAT, MMM  C/V: Normal rate, normal S1/S2, holosystolic murmur over 2nd R ICS  PULM: Nonlabored breathing, no respiratory distress, CTAB, no wheezing, rales or ronchi   ABD: Soft, ND, TTP in LLQ and RUQ, no rebound tenderness, no guarding. Incisions c/d/i  EXTREM: WWP, palpable DP pulses b/l, no edema, SCDs in place  NEURO: No focal deficits    LABS:                        8.4    11.83 )-----------( 203      ( 03 Dec 2020 05:32 )             26.2     12-03    135  |  100  |  18  ----------------------------<  144<H>  4.2   |  24  |  1.62<H>    Ca    8.4      03 Dec 2020 05:32  Phos  5.3     12-03  Mg     2.4     12-03    TPro  6.6  /  Alb  3.3  /  TBili  0.4  /  DBili  x   /  AST  20  /  ALT  19  /  AlkPhos  52  12-03    PT/INR - ( 02 Dec 2020 06:50 )   PT: 12.8 sec;   INR: 1.07          PTT - ( 02 Dec 2020 06:50 )  PTT:31.1 sec      RADIOLOGY & ADDITIONAL STUDIES:     INTERVAL/OVERNIGHT EVENTS: ON: EKG is normal, NAEON    SUBJECTIVE: No issues ON per nurse. Pt reports some generalized abd pain, no other issues. Denies SOB, CP, HA, not passing flatus or BM. Romeo CLD w/o n/v.     POD # 1 from lap subtotal colectomy (R and transverse colon)  SICU Day # 2    Neurologic Medications  acetaminophen   Tablet .. 650 milliGRAM(s) Oral every 6 hours  HYDROmorphone  Injectable 0.5 milliGRAM(s) IV Push every 6 hours PRN Severe Pain (7 - 10)  ketorolac   Injectable 15 milliGRAM(s) IV Push every 8 hours    Respiratory Medications  ALBUTerol    90 MICROgram(s) HFA Inhaler 1 Puff(s) Inhalation every 4 hours  albuterol/ipratropium for Nebulization 3 milliLiter(s) Nebulizer every 6 hours  tiotropium 18 MICROgram(s) Capsule 1 Capsule(s) Inhalation daily    Cardiovascular Medications  metoprolol tartrate 50 milliGRAM(s) Oral every 6 hours    Gastrointestinal Medications  dextrose 5%. 1000 milliLiter(s) IV Continuous <Continuous>  dextrose 5%. 1000 milliLiter(s) IV Continuous <Continuous>  lactated ringers. 1000 milliLiter(s) IV Continuous <Continuous>  pantoprazole  Injectable 40 milliGRAM(s) IV Push daily    Genitourinary Medications    Hematologic/Oncologic Medications  heparin   Injectable 5000 Unit(s) SubCutaneous every 8 hours  influenza  Vaccine (HIGH DOSE) 0.7 milliLiter(s) IntraMuscular once    Antimicrobial/Immunologic Medications    Endocrine/Metabolic Medications  dextrose 40% Gel 15 Gram(s) Oral once  dextrose 50% Injectable 25 Gram(s) IV Push once  dextrose 50% Injectable 12.5 Gram(s) IV Push once  dextrose 50% Injectable 25 Gram(s) IV Push once  glucagon  Injectable 1 milliGRAM(s) IntraMuscular once  insulin lispro (ADMELOG) corrective regimen sliding scale   SubCutaneous Before meals and at bedtime    Topical/Other Medications  BUpivacaine liposome 1.3% Injectable (no eMAR) 20 milliLiter(s) Local Injection once      MEDICATIONS  (PRN):  HYDROmorphone  Injectable 0.5 milliGRAM(s) IV Push every 6 hours PRN Severe Pain (7 - 10)      I&O's Detail    02 Dec 2020 07:01  -  03 Dec 2020 06:24  --------------------------------------------------------  IN:    IV PiggyBack: 50 mL    Lactated Ringers: 560 mL  Total IN: 610 mL    OUT:    Indwelling Catheter - Urethral (mL): 865 mL  Total OUT: 865 mL    Total NET: -255 mL          Vital Signs Last 24 Hrs  T(C): 36.9 (03 Dec 2020 05:38), Max: 37.1 (02 Dec 2020 16:30)  T(F): 98.5 (03 Dec 2020 05:38), Max: 98.7 (02 Dec 2020 16:30)  HR: 56 (03 Dec 2020 05:00) (51 - 68)  BP: 119/58 (03 Dec 2020 05:00) (99/51 - 151/66)  BP(mean): 83 (03 Dec 2020 05:00) (74 - 98)  RR: 20 (03 Dec 2020 05:00) (11 - 22)  SpO2: 98% (03 Dec 2020 05:00) (95% - 99%)    GENERAL: NAD, resting comfortably in bed  HEENT: NCAT, MMM  C/V: Normal rate, normal S1/S2, crescendo/decrescendo murmur over 2nd R ICS  PULM: Nonlabored breathing, no respiratory distress, CTAB, no wheezing, rales or ronchi   ABD: Soft, ND, TTP in LLQ and RUQ, no rebound tenderness, no guarding. Incisions c/d/i  EXTREM: WWP, palpable DP pulses b/l, no edema, SCDs in place  NEURO: No focal deficits    LABS:                        8.4    11.83 )-----------( 203      ( 03 Dec 2020 05:32 )             26.2     12-03    135  |  100  |  18  ----------------------------<  144<H>  4.2   |  24  |  1.62<H>    Ca    8.4      03 Dec 2020 05:32  Phos  5.3     12-03  Mg     2.4     12-03    TPro  6.6  /  Alb  3.3  /  TBili  0.4  /  DBili  x   /  AST  20  /  ALT  19  /  AlkPhos  52  12-03    PT/INR - ( 02 Dec 2020 06:50 )   PT: 12.8 sec;   INR: 1.07          PTT - ( 02 Dec 2020 06:50 )  PTT:31.1 sec      RADIOLOGY & ADDITIONAL STUDIES:     INTERVAL/OVERNIGHT EVENTS: ON: EKG is normal, NAEON    SUBJECTIVE: No issues ON per nurse. Pt reports some generalized abd pain, no other issues. Denies SOB, CP, HA, not passing flatus or BM. Romeo CLD w/o n/v.     POD # 1 from lap subtotal colectomy (R and transverse colon)  SICU Day # 2    Neurologic Medications  acetaminophen   Tablet .. 650 milliGRAM(s) Oral every 6 hours  HYDROmorphone  Injectable 0.5 milliGRAM(s) IV Push every 6 hours PRN Severe Pain (7 - 10)  ketorolac   Injectable 15 milliGRAM(s) IV Push every 8 hours    Respiratory Medications  ALBUTerol    90 MICROgram(s) HFA Inhaler 1 Puff(s) Inhalation every 4 hours  albuterol/ipratropium for Nebulization 3 milliLiter(s) Nebulizer every 6 hours  tiotropium 18 MICROgram(s) Capsule 1 Capsule(s) Inhalation daily    Cardiovascular Medications  metoprolol tartrate 50 milliGRAM(s) Oral every 6 hours    Gastrointestinal Medications  dextrose 5%. 1000 milliLiter(s) IV Continuous <Continuous>  dextrose 5%. 1000 milliLiter(s) IV Continuous <Continuous>  lactated ringers. 1000 milliLiter(s) IV Continuous <Continuous>  pantoprazole  Injectable 40 milliGRAM(s) IV Push daily    Genitourinary Medications    Hematologic/Oncologic Medications  heparin   Injectable 5000 Unit(s) SubCutaneous every 8 hours  influenza  Vaccine (HIGH DOSE) 0.7 milliLiter(s) IntraMuscular once    Antimicrobial/Immunologic Medications    Endocrine/Metabolic Medications  dextrose 40% Gel 15 Gram(s) Oral once  dextrose 50% Injectable 25 Gram(s) IV Push once  dextrose 50% Injectable 12.5 Gram(s) IV Push once  dextrose 50% Injectable 25 Gram(s) IV Push once  glucagon  Injectable 1 milliGRAM(s) IntraMuscular once  insulin lispro (ADMELOG) corrective regimen sliding scale   SubCutaneous Before meals and at bedtime    Topical/Other Medications  BUpivacaine liposome 1.3% Injectable (no eMAR) 20 milliLiter(s) Local Injection once      MEDICATIONS  (PRN):  HYDROmorphone  Injectable 0.5 milliGRAM(s) IV Push every 6 hours PRN Severe Pain (7 - 10)      I&O's Detail    02 Dec 2020 07:01  -  03 Dec 2020 06:24  --------------------------------------------------------  IN:    IV PiggyBack: 50 mL    Lactated Ringers: 560 mL  Total IN: 610 mL    OUT:    Indwelling Catheter - Urethral (mL): 865 mL  Total OUT: 865 mL    Total NET: -255 mL          Vital Signs Last 24 Hrs  T(C): 36.9 (03 Dec 2020 05:38), Max: 37.1 (02 Dec 2020 16:30)  T(F): 98.5 (03 Dec 2020 05:38), Max: 98.7 (02 Dec 2020 16:30)  HR: 56 (03 Dec 2020 05:00) (51 - 68)  BP: 119/58 (03 Dec 2020 05:00) (99/51 - 151/66)  BP(mean): 83 (03 Dec 2020 05:00) (74 - 98)  RR: 20 (03 Dec 2020 05:00) (11 - 22)  SpO2: 98% (03 Dec 2020 05:00) (95% - 99%)    GENERAL: NAD, resting comfortably in bed  HEENT: NCAT, MMM  C/V: Normal rate, normal S1/S2, crescendo/decrescendo murmur over 2nd R ICS  PULM: Nonlabored breathing, no respiratory distress, CTAB, no wheezing, rales or ronchi   ABD: Soft, ND, TTP in LLQ and RUQ, no rebound tenderness, no guarding. Incisions c/d/i  EXTREM: WWP, palpable DP pulses b/l, no edema, SCDs in place  NEURO: No focal deficits  SKIN: no rash  : cantu in place    LABS:                        8.4    11.83 )-----------( 203      ( 03 Dec 2020 05:32 )             26.2     12-03    135  |  100  |  18  ----------------------------<  144<H>  4.2   |  24  |  1.62<H>    Ca    8.4      03 Dec 2020 05:32  Phos  5.3     12-03  Mg     2.4     12-03    TPro  6.6  /  Alb  3.3  /  TBili  0.4  /  DBili  x   /  AST  20  /  ALT  19  /  AlkPhos  52  12-03    PT/INR - ( 02 Dec 2020 06:50 )   PT: 12.8 sec;   INR: 1.07          PTT - ( 02 Dec 2020 06:50 )  PTT:31.1 sec      RADIOLOGY & ADDITIONAL STUDIES:

## 2020-12-03 NOTE — PROGRESS NOTE ADULT - ASSESSMENT
80F (Upper sor[place]/English speaking) PMHx of uncontrolled HTN, HLD, asthma, chronic diastolic CHF, aortic stenosis s/p TAVR (02/19), initially presented to ED w/ CP and HTN on 11/13, found to have elevated AoV gradient w/ workup c/f possible thrombus. 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 (12/2). Admitted to SICU for postoperative hemodynamic monitoring.     NEURO: Dilaudid, tylenol, headaches during this admission managed w/ fioricet prn (CT wnl)  CV: goal MAP >65  - new onset afib preoperatively: c/w lopressor 50q6, s/p dig (d/c 2/2 rash), CHADsVASc 4  - HTN: hypertensive urgency preop on metop, hydralazine 25 TID, hctz 25qd. Holding hydral and hctz for now  - Chest pain: now resolved, possibly from GERD, R/LHC 1/9/2019 w/ non-obstructive disease, no acute ischemic changes noted on EKG preop  - Aortic stenosis: s/p TAVR 2/6/19, IRMA 11/17 showing no thrombus, no shunts, slight suspicion for thrombus at base of R cusp  - Chronic diastolic CHF: Echo 11/13: normal LV/RV size/systolic function. Grade II diastolic dysfunction and AS as above.   PULM: asthma, duonebs  GI/FEN: NPO, LR@40, Protonix, gastropathy on EGD  : Gonzalez  ENDO: ISS  HEME: Preop Hgb 9.8, f/u postop labs, transfuse for Hgb <8, BRBPR 2/2 colon masses. Heme/onc following, will possibly due tissue eval of hilar nodes post colectomy, holding AC postop  ID: Monitor for s/s of infection, WBC wnl  PPx: SCDs, HSQ  LINES: PIVx2, antonino (12/2)   WOUNDS/DRAINS: midline incision  PT/OT: Not ordered   80F (Danish/English speaking) PMHx of uncontrolled HTN, HLD, asthma, chronic diastolic CHF, aortic stenosis s/p TAVR (02/19), initially presented to ED w/ CP and HTN on 11/13, found to have elevated AoV gradient w/ workup c/f possible thrombus. 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 (12/2). Admitted to SICU for postoperative hemodynamic monitoring. NAEON. Pt NSR w/ good BP control.    NEURO: Dilaudid, tylenol, headaches during this admission managed w/ fioricet prn (CT wnl)  CV: goal MAP >65  - new onset afib preoperatively: c/w lopressor 50q6, s/p dig (d/c 2/2 rash), CHADsVASc 4  - HTN: hypertensive urgency preop on metop, hydralazine 25 TID, hctz 25qd. Holding hydral and hctz for now  - Chest pain: now resolved, possibly from GERD, R/LHC 1/9/2019 w/ non-obstructive disease, no acute ischemic changes noted on EKG preop  - Aortic stenosis: s/p TAVR 2/6/19, IRMA 11/17 showing no thrombus, no shunts, slight suspicion for thrombus at base of R cusp  - Chronic diastolic CHF: Echo 11/13: normal LV/RV size/systolic function. Grade II diastolic dysfunction and AS as above.   PULM: asthma, duonebs  GI/FEN: NPO, LR@40, Protonix, gastropathy on EGD  : Gonzalez  ENDO: ISS  HEME: Preop Hgb 9.8, f/u postop labs, transfuse for Hgb <8, BRBPR 2/2 colon masses. Heme/onc following, will possibly due tissue eval of hilar nodes post colectomy, holding AC postop  ID: Monitor for s/s of infection, WBC wnl  PPx: SCDs, HSQ  LINES: PIVx2, antonino (12/2)   WOUNDS/DRAINS: midline incision  PT/OT: Not ordered          Incomplete!!!     80F (Romansh/English speaking) PMHx of uncontrolled HTN, HLD, asthma, chronic diastolic CHF, aortic stenosis s/p TAVR (02/19), initially presented to ED w/ CP and HTN on 11/13, found to have elevated AoV gradient w/ workup c/f possible thrombus. 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 (12/2). Admitted to SICU for postoperative hemodynamic monitoring. NAEON. Pt NSR w/ good BP control. BIBIANA likely post operative, will keep current mainentance fluids given hx of diastolic HF.    NEURO: Dilaudid, tylenol, headaches during this admission managed w/ fioricet prn (CT wnl)  CV: goal MAP >65, currently NSR  - new onset afib preoperatively: c/w lopressor 50q6, s/p dig (d/c 2/2 rash), CHADsVASc 4  - HTN: hypertensive urgency preop on metop, hydralazine 25 TID, hctz 25qd. Holding hydral and hctz for now  - Chest pain: now resolved, possibly from GERD, R/LHC 1/9/2019 w/ non-obstructive disease, no acute ischemic changes noted on EKG preop  - Aortic stenosis: s/p TAVR 2/6/19, IRMA 11/17 showing no thrombus, no shunts, slight suspicion for thrombus at base of R cusp  - Chronic diastolic CHF: Echo 11/13: normal LV/RV size/systolic function. Grade II diastolic dysfunction and AS as above.   PULM: asthma, duonebs  GI/FEN: NPO, LR@40, Protonix, gastropathy on EGD  : Keep cantu given BIBIANA. Obtain UA, urine elytes, stop toradol  ENDO: ISS  HEME: Preop Hgb 9.8, f/u postop labs, transfuse for Hgb <8, BRBPR 2/2 colon masses. Heme/onc following, will possibly due tissue eval of hilar nodes post colectomy, holding AC postop  ID: Monitor for s/s of infection, WBC wnl  PPx: SCDs, HSQ  LINES: PIVx2, antonino (12/2)   WOUNDS/DRAINS: midline incision  PT/OT: Order PT          Incomplete!!!     80F (Polish/English speaking) PMHx of uncontrolled HTN, HLD, asthma, chronic diastolic CHF, aortic stenosis s/p TAVR (02/19), initially presented to ED w/ CP and HTN on 11/13, found to have elevated AoV gradient w/ workup c/f possible thrombus. 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 (12/2). Admitted to SICU for postoperative hemodynamic monitoring. NAEON. Pt NSR w/ good BP control. BIBIANA likely post operative, will keep current mainentance fluids given hx of diastolic HF now with BIBIANA    NEURO: Dilaudid, tylenol, headaches during this admission managed w/ fioricet prn (CT wnl)  CV: goal MAP >65, currently NSR  - new onset afib preoperatively: c/w lopressor 50q6, s/p dig (d/c 2/2 rash), CHADsVASc 4  - HTN: hypertensive urgency preop on metop, hydralazine 25 TID, hctz 25qd. Holding hydral and hctz for now  - Chest pain: now resolved, possibly from GERD, R/LHC 1/9/2019 w/ non-obstructive disease, no acute ischemic changes noted on EKG preop  - Aortic stenosis: s/p TAVR 2/6/19, IRMA 11/17 showing no thrombus, no shunts, slight suspicion for thrombus at base of R cusp  - Chronic diastolic CHF: Echo 11/13: normal LV/RV size/systolic function. Grade II diastolic dysfunction and AS as above.   PULM: asthma, duonebs  GI/FEN: NPO, LR@40, Protonix, gastropathy on EGD  : Keep cantu given BIBIANA. Obtain UA, urine elytes, stop toradol  ENDO: ISS  HEME: Preop Hgb 9.8, f/u postop labs, transfuse for Hgb <8, BRBPR 2/2 colon masses. Heme/onc following, will possibly due tissue eval of hilar nodes post colectomy, holding AC postop  ID: Monitor for s/s of infection, WBC wnl  PPx: SCDs, HSQ  LINES: PIVx2, antonino (12/2)   WOUNDS/DRAINS: midline incision  PT/OT: Order PT

## 2020-12-03 NOTE — PROGRESS NOTE ADULT - ASSESSMENT
81 y/o Monegasque speaking F, PMHx of uncontrolled HTN, hyperlipidemia, asthma (denies hospitalizations, intubations), MARK? (on CPAP, noncompliant), chronic diastolic CHF (EF:61% by Echo 4/2019), aortic stenosis s/p transfemoral TAVR with rachael valve on 02/05/2019 with Dr. Alejo admitted for CP and hypertensive urgency. S/p colonoscopy 11/19 which revealed 4 large colonic masses (from cecum to transverse colon) biopsies taken. CEA elevated 23.1. No evidence of distant mets on CT. Underwent subtotal colectomy on 12/2. Clinically stable    CLD as tolerated  f/u cards recs given history  OOB and ambulating  Can d/c cantu if ambulating  Plan discussed w/ attending and chief resident

## 2020-12-04 LAB
ANION GAP SERPL CALC-SCNC: 12 MMOL/L — SIGNIFICANT CHANGE UP (ref 5–17)
BUN SERPL-MCNC: 23 MG/DL — SIGNIFICANT CHANGE UP (ref 7–23)
CALCIUM SERPL-MCNC: 8.2 MG/DL — LOW (ref 8.4–10.5)
CHLORIDE SERPL-SCNC: 97 MMOL/L — SIGNIFICANT CHANGE UP (ref 96–108)
CHLORIDE UR-SCNC: 20 MMOL/L — SIGNIFICANT CHANGE UP
CO2 SERPL-SCNC: 24 MMOL/L — SIGNIFICANT CHANGE UP (ref 22–31)
CREAT ?TM UR-MCNC: 119 MG/DL — SIGNIFICANT CHANGE UP
CREAT SERPL-MCNC: 1.2 MG/DL — SIGNIFICANT CHANGE UP (ref 0.5–1.3)
CREAT SERPL-MCNC: 2.03 MG/DL — HIGH (ref 0.5–1.3)
GLUCOSE BLDC GLUCOMTR-MCNC: 103 MG/DL — HIGH (ref 70–99)
GLUCOSE BLDC GLUCOMTR-MCNC: 106 MG/DL — HIGH (ref 70–99)
GLUCOSE BLDC GLUCOMTR-MCNC: 117 MG/DL — HIGH (ref 70–99)
GLUCOSE BLDC GLUCOMTR-MCNC: 152 MG/DL — HIGH (ref 70–99)
GLUCOSE SERPL-MCNC: 92 MG/DL — SIGNIFICANT CHANGE UP (ref 70–99)
HCT VFR BLD CALC: 27.2 % — LOW (ref 34.5–45)
HGB BLD-MCNC: 8.3 G/DL — LOW (ref 11.5–15.5)
MAGNESIUM SERPL-MCNC: 2.4 MG/DL — SIGNIFICANT CHANGE UP (ref 1.6–2.6)
MCHC RBC-ENTMCNC: 29 PG — SIGNIFICANT CHANGE UP (ref 27–34)
MCHC RBC-ENTMCNC: 30.5 GM/DL — LOW (ref 32–36)
MCV RBC AUTO: 95.1 FL — SIGNIFICANT CHANGE UP (ref 80–100)
NRBC # BLD: 0 /100 WBCS — SIGNIFICANT CHANGE UP (ref 0–0)
OSMOLALITY UR: 184 MOSM/KG — LOW (ref 300–900)
PHOSPHATE SERPL-MCNC: 4.5 MG/DL — SIGNIFICANT CHANGE UP (ref 2.5–4.5)
PLATELET # BLD AUTO: 217 K/UL — SIGNIFICANT CHANGE UP (ref 150–400)
POTASSIUM SERPL-MCNC: 3.9 MMOL/L — SIGNIFICANT CHANGE UP (ref 3.5–5.3)
POTASSIUM SERPL-SCNC: 3.9 MMOL/L — SIGNIFICANT CHANGE UP (ref 3.5–5.3)
RBC # BLD: 2.86 M/UL — LOW (ref 3.8–5.2)
RBC # FLD: 14.3 % — SIGNIFICANT CHANGE UP (ref 10.3–14.5)
SODIUM SERPL-SCNC: 133 MMOL/L — LOW (ref 135–145)
SODIUM UR-SCNC: 24 MMOL/L — SIGNIFICANT CHANGE UP
WBC # BLD: 11.39 K/UL — HIGH (ref 3.8–10.5)
WBC # FLD AUTO: 11.39 K/UL — HIGH (ref 3.8–10.5)

## 2020-12-04 PROCEDURE — 76775 US EXAM ABDO BACK WALL LIM: CPT | Mod: 26

## 2020-12-04 PROCEDURE — 99223 1ST HOSP IP/OBS HIGH 75: CPT

## 2020-12-04 PROCEDURE — 99233 SBSQ HOSP IP/OBS HIGH 50: CPT

## 2020-12-04 RX ORDER — SODIUM CHLORIDE 9 MG/ML
1000 INJECTION INTRAMUSCULAR; INTRAVENOUS; SUBCUTANEOUS ONCE
Refills: 0 | Status: COMPLETED | OUTPATIENT
Start: 2020-12-04 | End: 2020-12-04

## 2020-12-04 RX ADMIN — HEPARIN SODIUM 5000 UNIT(S): 5000 INJECTION INTRAVENOUS; SUBCUTANEOUS at 06:00

## 2020-12-04 RX ADMIN — Medication 1000 MILLIGRAM(S): at 11:48

## 2020-12-04 RX ADMIN — HYDROMORPHONE HYDROCHLORIDE 0.25 MILLIGRAM(S): 2 INJECTION INTRAMUSCULAR; INTRAVENOUS; SUBCUTANEOUS at 06:14

## 2020-12-04 RX ADMIN — Medication 1000 MILLIGRAM(S): at 23:24

## 2020-12-04 RX ADMIN — Medication 2: at 11:47

## 2020-12-04 RX ADMIN — HYDROMORPHONE HYDROCHLORIDE 0.25 MILLIGRAM(S): 2 INJECTION INTRAMUSCULAR; INTRAVENOUS; SUBCUTANEOUS at 07:14

## 2020-12-04 RX ADMIN — Medication 3 MILLILITER(S): at 04:30

## 2020-12-04 RX ADMIN — Medication 3 MILLILITER(S): at 22:00

## 2020-12-04 RX ADMIN — HEPARIN SODIUM 5000 UNIT(S): 5000 INJECTION INTRAVENOUS; SUBCUTANEOUS at 13:54

## 2020-12-04 RX ADMIN — HYDROMORPHONE HYDROCHLORIDE 0.25 MILLIGRAM(S): 2 INJECTION INTRAMUSCULAR; INTRAVENOUS; SUBCUTANEOUS at 12:13

## 2020-12-04 RX ADMIN — Medication 1000 MILLIGRAM(S): at 17:12

## 2020-12-04 RX ADMIN — HEPARIN SODIUM 5000 UNIT(S): 5000 INJECTION INTRAVENOUS; SUBCUTANEOUS at 22:00

## 2020-12-04 RX ADMIN — SODIUM CHLORIDE 500 MILLILITER(S): 9 INJECTION INTRAMUSCULAR; INTRAVENOUS; SUBCUTANEOUS at 16:06

## 2020-12-04 RX ADMIN — Medication 1000 MILLIGRAM(S): at 00:25

## 2020-12-04 RX ADMIN — Medication 50 MILLIGRAM(S): at 00:25

## 2020-12-04 RX ADMIN — Medication 50 MILLIGRAM(S): at 11:49

## 2020-12-04 RX ADMIN — Medication 1000 MILLIGRAM(S): at 05:07

## 2020-12-04 RX ADMIN — Medication 1000 MILLIGRAM(S): at 11:49

## 2020-12-04 RX ADMIN — HYDROMORPHONE HYDROCHLORIDE 0.25 MILLIGRAM(S): 2 INJECTION INTRAMUSCULAR; INTRAVENOUS; SUBCUTANEOUS at 13:40

## 2020-12-04 RX ADMIN — Medication 3 MILLILITER(S): at 15:24

## 2020-12-04 RX ADMIN — PANTOPRAZOLE SODIUM 40 MILLIGRAM(S): 20 TABLET, DELAYED RELEASE ORAL at 11:48

## 2020-12-04 RX ADMIN — Medication 1000 MILLIGRAM(S): at 17:42

## 2020-12-04 RX ADMIN — Medication 50 MILLIGRAM(S): at 17:12

## 2020-12-04 RX ADMIN — Medication 3 MILLILITER(S): at 09:25

## 2020-12-04 RX ADMIN — Medication 50 MILLIGRAM(S): at 06:14

## 2020-12-04 RX ADMIN — Medication 1000 MILLIGRAM(S): at 01:10

## 2020-12-04 NOTE — CONSULT NOTE ADULT - SUBJECTIVE AND OBJECTIVE BOX
Patient is a 80y old  Female who presents with a chief complaint of HTN urgency (04 Dec 2020 13:29)    HPI:  80F w/HFpEF (61%), AS s/p TAVR, pAF (new), HTN, HLD, asthma adm to cardiac tele on 11/13 w/chest pain & hypertensive urgency. Course c/b new onset AF and GIB after hep gtt initiation 2/2 leaflet thrombus on IRMA. 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 Patient s/p laparoscopic subtotal colectomy (R and transverse colectomy, ileocolic anastomosis) on 12/2. Noted uptrend in creatinine from baseline 1 up to 2s. UrNa <20 suggesting decreased renal perfusion. Renal consulted for non oliguric BIBIANA.    PAST MEDICAL & SURGICAL HISTORY:  CHF (congestive heart failure)    Pulmonary HTN    Aortic stenosis    HTN (hypertension)    S/P TAVR (transcatheter aortic valve replacement)        Allergies:  Digox (Rash; Urticaria; Hives)  Plavix (Other (Mod to Severe))      Home Medications:   acetaminophen   Tablet .. 1000 milliGRAM(s) Oral every 6 hours  ALBUTerol    90 MICROgram(s) HFA Inhaler 1 Puff(s) Inhalation every 4 hours  albuterol/ipratropium for Nebulization 3 milliLiter(s) Nebulizer every 6 hours  BUpivacaine liposome 1.3% Injectable (no eMAR) 20 milliLiter(s) Local Injection once  dextrose 40% Gel 15 Gram(s) Oral once  dextrose 5%. 1000 milliLiter(s) IV Continuous <Continuous>  dextrose 5%. 1000 milliLiter(s) IV Continuous <Continuous>  dextrose 50% Injectable 25 Gram(s) IV Push once  dextrose 50% Injectable 12.5 Gram(s) IV Push once  dextrose 50% Injectable 25 Gram(s) IV Push once  glucagon  Injectable 1 milliGRAM(s) IntraMuscular once  heparin   Injectable 5000 Unit(s) SubCutaneous every 8 hours  HYDROmorphone  Injectable 0.25 milliGRAM(s) IV Push every 6 hours PRN  influenza  Vaccine (HIGH DOSE) 0.7 milliLiter(s) IntraMuscular once  insulin lispro (ADMELOG) corrective regimen sliding scale   SubCutaneous Before meals and at bedtime  lactated ringers. 1000 milliLiter(s) IV Continuous <Continuous>  metoprolol tartrate 50 milliGRAM(s) Oral every 6 hours  ondansetron Injectable 4 milliGRAM(s) IV Push every 6 hours PRN  pantoprazole  Injectable 40 milliGRAM(s) IV Push daily  tiotropium 18 MICROgram(s) Capsule 1 Capsule(s) Inhalation daily      Hospital Medications:   MEDICATIONS  (STANDING):  acetaminophen   Tablet .. 1000 milliGRAM(s) Oral every 6 hours  ALBUTerol    90 MICROgram(s) HFA Inhaler 1 Puff(s) Inhalation every 4 hours  albuterol/ipratropium for Nebulization 3 milliLiter(s) Nebulizer every 6 hours  BUpivacaine liposome 1.3% Injectable (no eMAR) 20 milliLiter(s) Local Injection once  dextrose 40% Gel 15 Gram(s) Oral once  dextrose 5%. 1000 milliLiter(s) (50 mL/Hr) IV Continuous <Continuous>  dextrose 5%. 1000 milliLiter(s) (100 mL/Hr) IV Continuous <Continuous>  dextrose 50% Injectable 25 Gram(s) IV Push once  dextrose 50% Injectable 12.5 Gram(s) IV Push once  dextrose 50% Injectable 25 Gram(s) IV Push once  glucagon  Injectable 1 milliGRAM(s) IntraMuscular once  heparin   Injectable 5000 Unit(s) SubCutaneous every 8 hours  influenza  Vaccine (HIGH DOSE) 0.7 milliLiter(s) IntraMuscular once  insulin lispro (ADMELOG) corrective regimen sliding scale   SubCutaneous Before meals and at bedtime  lactated ringers. 1000 milliLiter(s) (40 mL/Hr) IV Continuous <Continuous>  metoprolol tartrate 50 milliGRAM(s) Oral every 6 hours  pantoprazole  Injectable 40 milliGRAM(s) IV Push daily  tiotropium 18 MICROgram(s) Capsule 1 Capsule(s) Inhalation daily      SOCIAL HISTORY:  Denies ETOh, Smoking,     Family History:  FAMILY HISTORY:  No pertinent family history in first degree relatives        ROS:  RESPIRATORY: No shortness of breaths  CARDIOVASCULAR: No Chest pain  ABD: + abd pain  MUSCULOSKELETAL: No leg swelling    VITALS:  T(F): 97.9 (12-04-20 @ 13:25), Max: 98.5 (12-03-20 @ 18:38)  HR: 54 (12-04-20 @ 15:14)  BP: 116/56 (12-04-20 @ 15:14)  RR: 18 (12-04-20 @ 15:14)  SpO2: 99% (12-04-20 @ 15:14)  Wt(kg): --    12-03 @ 07:01 - 12-04 @ 07:00  --------------------------------------------------------  IN: 1600 mL / OUT: 1010 mL / NET: 590 mL    12-04 @ 07:01 - 12-04 @ 15:38  --------------------------------------------------------  IN: 320 mL / OUT: 425 mL / NET: -105 mL        CAPILLARY BLOOD GLUCOSE      POCT Blood Glucose.: 152 mg/dL (04 Dec 2020 11:28)  POCT Blood Glucose.: 106 mg/dL (04 Dec 2020 06:25)  POCT Blood Glucose.: 129 mg/dL (03 Dec 2020 22:24)  POCT Blood Glucose.: 133 mg/dL (03 Dec 2020 22:06)  POCT Blood Glucose.: 111 mg/dL (03 Dec 2020 17:14)    PHYSICAL EXAM:  GENERAL: Alert, awake, oriented x3 on NC 2L  CHEST/LUNG: Bibasilar rales  HEART: Regular rate and rhythm, no murmur  ABDOMEN: Soft, nontender, non distended, clean incision  EXTREMITIES: no oedema    LABS:  12-04    133<L>  |  97  |  23  ----------------------------<  92  3.9   |  24  |  2.03<H>    Ca    8.2<L>      04 Dec 2020 05:31  Phos  4.5     12-04  Mg     2.4     12-04    TPro  6.6  /  Alb  3.3  /  TBili  0.4  /  DBili      /  AST  20  /  ALT  19  /  AlkPhos  52  12-03    Creatinine Trend: 2.03 <--, 1.62 <--, 1.26 <--, 1.04 <--, 1.18 <--, 1.19 <--, 1.13 <--, 1.10 <--                        8.3    11.39 )-----------( 217      ( 04 Dec 2020 05:31 )             27.2       Urine Studies:  Urinalysis Basic - ( 03 Dec 2020 21:59 )    Color: Yellow / Appearance: Clear / SG: >=1.030 / pH:   Gluc:  / Ketone: NEGATIVE  / Bili: Negative / Urobili: 0.2 E.U./dL   Blood:  / Protein: Trace mg/dL / Nitrite: NEGATIVE   Leuk Esterase: NEGATIVE / RBC: < 5 /HPF / WBC < 5 /HPF   Sq Epi:  / Non Sq Epi: 0-5 /HPF / Bacteria: Present /HPF      Sodium, Random Urine: <20 mmol/L (12-03 @ 16:56)  Creatinine, Random Urine: 197 mg/dL (12-03 @ 16:56)  Osmolality, Random Urine: 415 mosm/kg (12-03 @ 16:56)  Chloride, Random Urine: 20 mmol/L (12-03 @ 16:56)        12-03-20 @ 07:01  -  12-04-20 @ 07:00  --------------------------------------------------------  IN: 1600 mL / OUT: 1010 mL / NET: 590 mL    12-04-20 @ 07:01  -  12-04-20 @ 15:38  --------------------------------------------------------  IN: 320 mL / OUT: 425 mL / NET: -105 mL        RADIOLOGY & ADDITIONAL STUDIES:    EKG findings reviewed.    Imaging Personally Reviewed:  [x] YES  [ ] NO    Consultant(s) and primary physician Notes Reviewed:  [x] YES  [ ] NO    Care Discussed with Primary team/ Consultants/Other Providers [x] YES  [ ] NO    Assessment/Plan:   80F w/HFpEF (61%), AS s/p TAVR, pAF (new), HTN, HLD, asthma adm to cardiac tele on 11/13 w/chest pain & hypertensive urgency. Course c/b new onset AF and GIB after hep gtt initiation 2/2 leaflet thrombus on IRMA. 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 Patient s/p laparoscopic subtotal colectomy (R and transverse colectomy, ileocolic anastomosis) on 12/2. Noted uptrend in creatinine from baseline 1 up to 2s. UrNa <20 suggesting decreased renal perfusion. Renal consulted for non oliguric BIBIANA.    Non Oliguric BIBIANA likely perfusional vs cardiorenal vs ATN    Baseline creat- 1  UrNa suggesting low flow state to kidneys, pt on LR @40cc/hr, recommend 1L saline bolus with repeat Urlytes to see if fluid responsive  Repeat UrNa, UrCreat, UrOsm, UrCl  Trend daily BMP with phos, Urlytes  Renal imaging unremarkable; bladder scan to r/o obstruction  Dc PPI- consider H2 blocker and avoid NSAIDs and nephrotoxic agents    Electrolytes acceptable: hypoNa noted 2/2 to intravascular volume depletion  BP: controlled  Anaemia- check iron panel, ferritin  BMD: noted    Daily weights, strict I&Os, renally dose meds avoid nephrotoxic meds    Thank you for the opportunity to participate in the care of your patient. The nephrology service remains available to assist with any questions or concerns. Please feel free to reach us by paging the on-call nephrology fellow for urgent issues or as below.     Yaritza Diaz M.D.   PGY-4  Pager: [phone]

## 2020-12-04 NOTE — PROGRESS NOTE ADULT - ASSESSMENT
80F (Macedonian/English speaking) PMHx of uncontrolled HTN, HLD, asthma, chronic diastolic CHF, aortic stenosis s/p TAVR (02/19), initially presented to ED w/ CP and HTN on 11/13, found to have elevated AoV gradient w/ workup c/f possible thrombus. 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 (12/2). Admitted to SICU for postoperative hemodynamic monitoring. Pt HD stable, now stepped down to telemetry.    NEURO: Dilaudid, tylenol, headaches during this admission managed w/ fioricet prn (CT wnl)  CV: goal MAP >65  - new onset afib preoperatively: c/w lopressor 50q6, s/p dig (d/c 2/2 rash), CHADsVASc 4  - HTN: hypertensive urgency preop on metop, hydralazine 25 TID, hctz 25qd. Holding hydral and hctz for now  - Chest pain: now resolved, possibly from GERD, R/LHC 1/9/2019 w/ non-obstructive disease, no acute ischemic changes noted on EKG preop  - Aortic stenosis: s/p TAVR 2/6/19, IRMA 11/17 showing no thrombus, no shunts, slight suspicion for thrombus at base of R cusp  - Chronic diastolic CHF: Echo 11/13: normal LV/RV size/systolic function. Grade II diastolic dysfunction and AS as above.   PULM: asthma, duonebs  GI/FEN: CLD LR@40, Protonix, gastropathy on EGD  : Gonzalez removed this am, TOV 2pm  ENDO: ISS  HEME: Preop Hgb 9.8, f/u postop labs, transfuse for Hgb <8, BRBPR 2/2 colon masses. Heme/onc following, will possibly due tissue eval of hilar nodes post colectomy, holding AC postop  PPx: SCDs, HSQ  PT/OT: Ordered 12/3  Cr uptrending, f/u renal recs, f/u renal US  F/u Cards consult

## 2020-12-04 NOTE — CONSULT NOTE ADULT - SUBJECTIVE AND OBJECTIVE BOX
HPI:  78 year old Indian speaking F, PMHx of uncontrolled HTN, hyperlipidemia, asthma (denies hospitalizations, intubations), MARK? (on CPAP, noncompliant), chronic diastolic CHF (EF:61% by Echo 4/2019), aortic stenosis s/p transfemoral TAVR with rachael valve on 02/05/2019 with Dr. Alejo @[facility], recent hospitalization at [facility] (for HTN/headache, no changes made to medications, negative CT head/MRI brain, discharged on 11/4/20) who presents to [facility] ED 11/13/20 c/o intermittent chest pain and elevated BP x 1 week. Patient describes the chest pain as being nonexertional midsternal chest pressure with radiation to RUE, 6/10 in severity. Associated symptoms including SOB, headache and dizziness. Patient denies any N/V, diaphoresis, palpitations, PND, recent travel or sick contacts. Patient admits to chronic bilateral LE edema and 2 pillow orthopnea. Patient reports compliance with her medications.   In ED, BP:224/111, HR: 70s, RR:18, Temp:98.1F, O2 sat: 99% RA. EKG revealed NSR@78BPM with 1st degree AVB, no acute ST/T wave changes. CXR revealed evidence of mild pulmonary vascular congestion. Bibasilar scarring/subsegmental atelectasis. Labs notable for: BUN/Cr 25/1.20, Cardiac enzymes negative x 1 set, , COVID PCR pending.   Patient treated with Coreg 6.25mg PO x 1 dose with improvement in BP 140s/90s.   Patient currently stable, admitted to [facility] cardiac telemetry for management of hypertensive urgency.     CATH Hx:  @[facility] 1/09/19: Diagnostic right and left heart catheterization revealing LMCA normal, LAD with minor luminal irregularities, LCx large and normal, RCA large and normal. PA 18, RV 73/13 (21), PA 70/34 (49), PCWP 26, CO 7.3 L/min, CI 3.7, TPG 23, severe AS (mean LV/Ao gradient 48.1 mmHg, SETH 1.1 cm2).    (13 Nov 2020 03:58)      ROS: A 10-point review of systems was otherwise negative.    PAST MEDICAL & SURGICAL HISTORY:  CHF (congestive heart failure)    Pulmonary HTN    Aortic stenosis    HTN (hypertension)    S/P TAVR (transcatheter aortic valve replacement)        SOCIAL HISTORY:  FAMILY HISTORY:  No pertinent family history in first degree relatives        ALLERGIES: 	  Digox (Rash; Urticaria; Hives)  Plavix (Other (Mod to Severe))            MEDICATIONS:  acetaminophen   Tablet .. 1000 milliGRAM(s) Oral every 6 hours  ALBUTerol    90 MICROgram(s) HFA Inhaler 1 Puff(s) Inhalation every 4 hours  albuterol/ipratropium for Nebulization 3 milliLiter(s) Nebulizer every 6 hours  BUpivacaine liposome 1.3% Injectable (no eMAR) 20 milliLiter(s) Local Injection once  dextrose 40% Gel 15 Gram(s) Oral once  dextrose 5%. 1000 milliLiter(s) IV Continuous <Continuous>  dextrose 5%. 1000 milliLiter(s) IV Continuous <Continuous>  dextrose 50% Injectable 25 Gram(s) IV Push once  dextrose 50% Injectable 12.5 Gram(s) IV Push once  dextrose 50% Injectable 25 Gram(s) IV Push once  glucagon  Injectable 1 milliGRAM(s) IntraMuscular once  heparin   Injectable 5000 Unit(s) SubCutaneous every 8 hours  HYDROmorphone  Injectable 0.25 milliGRAM(s) IV Push every 6 hours PRN  influenza  Vaccine (HIGH DOSE) 0.7 milliLiter(s) IntraMuscular once  insulin lispro (ADMELOG) corrective regimen sliding scale   SubCutaneous Before meals and at bedtime  lactated ringers. 1000 milliLiter(s) IV Continuous <Continuous>  metoprolol tartrate 50 milliGRAM(s) Oral every 6 hours  ondansetron Injectable 4 milliGRAM(s) IV Push every 6 hours PRN  pantoprazole  Injectable 40 milliGRAM(s) IV Push daily  tiotropium 18 MICROgram(s) Capsule 1 Capsule(s) Inhalation daily      PHYSICAL EXAM:  T(C): 36.6 (12-04-20 @ 13:25), Max: 36.9 (12-03-20 @ 18:38)  HR: 64 (12-04-20 @ 12:02) (53 - 75)  BP: 117/60 (12-04-20 @ 12:02) (110/58 - 166/66)  RR: 18 (12-04-20 @ 12:02) (16 - 25)  SpO2: 99% (12-04-20 @ 12:02) (97% - 100%)  Wt(kg): --    12-03-20 @ 07:01  -  12-04-20 @ 07:00  --------------------------------------------------------  IN: 1600 mL / OUT: 1010 mL / NET: 590 mL    12-04-20 @ 07:01  -  12-04-20 @ 13:28  --------------------------------------------------------  IN: 160 mL / OUT: 200 mL / NET: -40 mL        GEN: Awake, comfortable. NAD.   HEENT: NCAT, PERRL, EOMI. Mucosa moist.   NECK: Supple, no JVD.   RESP: CTA b/l  CV: RRR, normal s1/s2. No m/r/g.  ABD: Soft, NTND. BS+  EXT: Warm. No edema, clubbing, or cyanosis.   NEURO: AAOx3. No focal deficits.    I&O's Summary    03 Dec 2020 07:01  -  04 Dec 2020 07:00  --------------------------------------------------------  IN: 1600 mL / OUT: 1010 mL / NET: 590 mL    04 Dec 2020 07:01  -  04 Dec 2020 13:28  --------------------------------------------------------  IN: 160 mL / OUT: 200 mL / NET: -40 mL        	  LABS:	 	    CARDIAC MARKERS:                                  8.3    11.39 )-----------( 217      ( 04 Dec 2020 05:31 )             27.2     12-04    133<L>  |  97  |  23  ----------------------------<  92  3.9   |  24  |  2.03<H>    Ca    8.2<L>      04 Dec 2020 05:31  Phos  4.5     12-04  Mg     2.4     12-04    TPro  6.6  /  Alb  3.3  /  TBili  0.4  /  DBili  x   /  AST  20  /  ALT  19  /  AlkPhos  52  12-03    proBNP:   Lipid Profile:   HgA1c:   TSH:     TELEMETRY: 	    ECG:  	  RADIOLOGY:   ECHO:  STRESS:  CATH:

## 2020-12-04 NOTE — PROGRESS NOTE ADULT - SUBJECTIVE AND OBJECTIVE BOX
INTERVAL HPI/OVERNIGHT EVENTS: stepped down, rey clears    STATUS POST:      POST OPERATIVE DAY #:     SUBJECTIVE: Pt seen and examined at bedside this am by surgery team. Tolerating diet, pain well controlled. Denies f/n/v/cp/sob.    MEDICATIONS  (STANDING):  acetaminophen   Tablet .. 1000 milliGRAM(s) Oral every 6 hours  ALBUTerol    90 MICROgram(s) HFA Inhaler 1 Puff(s) Inhalation every 4 hours  albuterol/ipratropium for Nebulization 3 milliLiter(s) Nebulizer every 6 hours  BUpivacaine liposome 1.3% Injectable (no eMAR) 20 milliLiter(s) Local Injection once  dextrose 40% Gel 15 Gram(s) Oral once  dextrose 5%. 1000 milliLiter(s) (50 mL/Hr) IV Continuous <Continuous>  dextrose 5%. 1000 milliLiter(s) (100 mL/Hr) IV Continuous <Continuous>  dextrose 50% Injectable 25 Gram(s) IV Push once  dextrose 50% Injectable 12.5 Gram(s) IV Push once  dextrose 50% Injectable 25 Gram(s) IV Push once  glucagon  Injectable 1 milliGRAM(s) IntraMuscular once  heparin   Injectable 5000 Unit(s) SubCutaneous every 8 hours  influenza  Vaccine (HIGH DOSE) 0.7 milliLiter(s) IntraMuscular once  insulin lispro (ADMELOG) corrective regimen sliding scale   SubCutaneous Before meals and at bedtime  lactated ringers. 1000 milliLiter(s) (40 mL/Hr) IV Continuous <Continuous>  metoprolol tartrate 50 milliGRAM(s) Oral every 6 hours  pantoprazole  Injectable 40 milliGRAM(s) IV Push daily  tiotropium 18 MICROgram(s) Capsule 1 Capsule(s) Inhalation daily    MEDICATIONS  (PRN):  HYDROmorphone  Injectable 0.25 milliGRAM(s) IV Push every 6 hours PRN Moderate Pain (4 - 6)  ondansetron Injectable 4 milliGRAM(s) IV Push every 6 hours PRN Nausea and/or Vomiting      Vital Signs Last 24 Hrs  T(C): 36.6 (04 Dec 2020 09:37), Max: 36.9 (03 Dec 2020 18:38)  T(F): 97.9 (04 Dec 2020 09:37), Max: 98.5 (03 Dec 2020 18:38)  HR: 64 (04 Dec 2020 12:02) (53 - 75)  BP: 117/60 (04 Dec 2020 12:02) (109/55 - 166/66)  BP(mean): 87 (04 Dec 2020 12:02) (73 - 95)  RR: 18 (04 Dec 2020 12:02) (16 - 31)  SpO2: 99% (04 Dec 2020 12:02) (97% - 100%)    PHYSICAL EXAM:      Constitutional: A&Ox3    Breasts:    Respiratory: non labored breathing, no respiratory distress    Cardiovascular: NSR, RRR    Gastrointestinal:                 Incision:    Genitourinary:    Extremities: (-) edema                  I&O's Detail    03 Dec 2020 07:01  -  04 Dec 2020 07:00  --------------------------------------------------------  IN:    Lactated Ringers: 800 mL    Oral Fluid: 800 mL  Total IN: 1600 mL    OUT:    Indwelling Catheter - Urethral (mL): 1010 mL  Total OUT: 1010 mL    Total NET: 590 mL      04 Dec 2020 07:01  -  04 Dec 2020 12:51  --------------------------------------------------------  IN:    Lactated Ringers: 160 mL  Total IN: 160 mL    OUT:    Voided (mL): 200 mL  Total OUT: 200 mL    Total NET: -40 mL          LABS:                        8.3    11.39 )-----------( 217      ( 04 Dec 2020 05:31 )             27.2     12-04    133<L>  |  97  |  23  ----------------------------<  92  3.9   |  24  |  2.03<H>    Ca    8.2<L>      04 Dec 2020 05:31  Phos  4.5     12-04  Mg     2.4     12-04    TPro  6.6  /  Alb  3.3  /  TBili  0.4  /  DBili  x   /  AST  20  /  ALT  19  /  AlkPhos  52  12-03      Urinalysis Basic - ( 03 Dec 2020 21:59 )    Color: Yellow / Appearance: Clear / SG: >=1.030 / pH: x  Gluc: x / Ketone: NEGATIVE  / Bili: Negative / Urobili: 0.2 E.U./dL   Blood: x / Protein: Trace mg/dL / Nitrite: NEGATIVE   Leuk Esterase: NEGATIVE / RBC: < 5 /HPF / WBC < 5 /HPF   Sq Epi: x / Non Sq Epi: 0-5 /HPF / Bacteria: Present /HPF        RADIOLOGY & ADDITIONAL STUDIES:

## 2020-12-04 NOTE — PROGRESS NOTE ADULT - ASSESSMENT
A/P: 80F w/HFpEF (61%), AS s/p TAVR, pAF (new), HTN, HLD, asthma adm to cardiac tele on 11/13 w/chest pain & hypertensive urgency. Course c/b new onset AF and GIB after hep gtt initiation 2/2 leaflet thrombus on IRMA. 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 Patient s/p laparoscopic subtotal colectomy (R and transverse colectomy, ileocolic anastomosis) on 12/2. Cardiology consult service now following for cardiac optimization post-surgery. A/P: 80F w/HFpEF (61%), AS s/p TAVR, pAF (new), HTN, HLD, asthma adm to cardiac tele on 11/13 w/chest pain & hypertensive urgency. Course c/b new onset AF and GIB after hep gtt initiation 2/2 leaflet thrombus on IRMA. 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 Patient s/p laparoscopic subtotal colectomy (R and transverse colectomy, ileocolic anastomosis) on 12/2. Cardiology consult service now following for cardiac optimization post-surgery.     #Leaflet thrombus  - would like to start full dose anticoagulation w/a NOAC (e.g. eliquis 5mg bid). Awaiting clearance from primary team    #HFpEF - appears relatively euvolemi  - hold diuretic for now    #pAF - [id number] - 4. No digoxin 2/2 rash  - c/w lopressor 50mg q6h  - will start eliquis 5mg bid as above when safe from surgical/bleeding perspective    #HTN  - hold medications for now  - will reintroduce lisinopril 10mg PO QD as needed    Recommendations are final when signed by attending.    --  Hever Duncan MD  Cardiology PGY5

## 2020-12-04 NOTE — PROGRESS NOTE ADULT - SUBJECTIVE AND OBJECTIVE BOX
INTERVAL HPI/OVERNIGHT EVENTS: stepped down, rey clears    STATUS POST:  laparoscopic subtotal colectomy (R and transverse colectomy, ileocolic anastomosis distal ileum to descending colon) 12/2    POST OPERATIVE DAY #: 2    SUBJECTIVE: Pt seen and examined at bedside this am by surgery team. Reports dry cough however no respiratory issues, encouraged IS. Tolerating diet, pain well controlled. -F/-BM. Denies f/n/v/cp/sob.    MEDICATIONS  (STANDING):  acetaminophen   Tablet .. 1000 milliGRAM(s) Oral every 6 hours  ALBUTerol    90 MICROgram(s) HFA Inhaler 1 Puff(s) Inhalation every 4 hours  albuterol/ipratropium for Nebulization 3 milliLiter(s) Nebulizer every 6 hours  BUpivacaine liposome 1.3% Injectable (no eMAR) 20 milliLiter(s) Local Injection once  dextrose 40% Gel 15 Gram(s) Oral once  dextrose 5%. 1000 milliLiter(s) (50 mL/Hr) IV Continuous <Continuous>  dextrose 5%. 1000 milliLiter(s) (100 mL/Hr) IV Continuous <Continuous>  dextrose 50% Injectable 25 Gram(s) IV Push once  dextrose 50% Injectable 12.5 Gram(s) IV Push once  dextrose 50% Injectable 25 Gram(s) IV Push once  glucagon  Injectable 1 milliGRAM(s) IntraMuscular once  heparin   Injectable 5000 Unit(s) SubCutaneous every 8 hours  influenza  Vaccine (HIGH DOSE) 0.7 milliLiter(s) IntraMuscular once  insulin lispro (ADMELOG) corrective regimen sliding scale   SubCutaneous Before meals and at bedtime  lactated ringers. 1000 milliLiter(s) (40 mL/Hr) IV Continuous <Continuous>  metoprolol tartrate 50 milliGRAM(s) Oral every 6 hours  pantoprazole  Injectable 40 milliGRAM(s) IV Push daily  tiotropium 18 MICROgram(s) Capsule 1 Capsule(s) Inhalation daily    MEDICATIONS  (PRN):  HYDROmorphone  Injectable 0.25 milliGRAM(s) IV Push every 6 hours PRN Moderate Pain (4 - 6)  ondansetron Injectable 4 milliGRAM(s) IV Push every 6 hours PRN Nausea and/or Vomiting      Vital Signs Last 24 Hrs  T(C): 36.7 (04 Dec 2020 04:19), Max: 36.9 (03 Dec 2020 18:38)  T(F): 98 (04 Dec 2020 04:19), Max: 98.5 (03 Dec 2020 18:38)  HR: 53 (04 Dec 2020 08:30) (52 - 75)  BP: 145/63 (04 Dec 2020 08:30) (99/52 - 166/66)  BP(mean): 87 (04 Dec 2020 03:52) (72 - 95)  RR: 18 (04 Dec 2020 08:30) (14 - 31)  SpO2: 99% (04 Dec 2020 08:30) (96% - 100%)    PHYSICAL EXAM:      Constitutional: A&Ox3, NAD    Respiratory: non labored breathing, no respiratory distress    Cardiovascular: NSR, RRR    Gastrointestinal: soft, nd, appropriately ttp to incisions. Dressings c/d/i. Midline incision w/ small area of induration suggestive of hematoma    Extremities: wwp, no calf tenderness or edema. SCDs in place       I&O's Detail    03 Dec 2020 07:01  -  04 Dec 2020 07:00  --------------------------------------------------------  IN:    Lactated Ringers: 800 mL    Oral Fluid: 800 mL  Total IN: 1600 mL    OUT:    Indwelling Catheter - Urethral (mL): 1010 mL  Total OUT: 1010 mL    Total NET: 590 mL          LABS:                        8.3    11.39 )-----------( 217      ( 04 Dec 2020 05:31 )             27.2     12-04    133<L>  |  97  |  23  ----------------------------<  92  3.9   |  24  |  2.03<H>    Ca    8.2<L>      04 Dec 2020 05:31  Phos  4.5     12-04  Mg     2.4     12-04    TPro  6.6  /  Alb  3.3  /  TBili  0.4  /  DBili  x   /  AST  20  /  ALT  19  /  AlkPhos  52  12-03      Urinalysis Basic - ( 03 Dec 2020 21:59 )    Color: Yellow / Appearance: Clear / SG: >=1.030 / pH: x  Gluc: x / Ketone: NEGATIVE  / Bili: Negative / Urobili: 0.2 E.U./dL   Blood: x / Protein: Trace mg/dL / Nitrite: NEGATIVE   Leuk Esterase: NEGATIVE / RBC: < 5 /HPF / WBC < 5 /HPF   Sq Epi: x / Non Sq Epi: 0-5 /HPF / Bacteria: Present /HPF        RADIOLOGY & ADDITIONAL STUDIES: INTERVAL HPI/OVERNIGHT EVENTS: stepped down, rey clears    STATUS POST:  laparoscopic subtotal colectomy (R and transverse colectomy, ileocolic anastomosis distal ileum to descending colon) 12/2    POST OPERATIVE DAY #: 2    SUBJECTIVE: Pt seen and examined at bedside this am by surgery team. Reports dry cough however no respiratory issues, encouraged IS. Tolerating diet, pain well controlled. -F/-BM. Denies f/n/v/cp/sob.    MEDICATIONS  (STANDING):  acetaminophen   Tablet .. 1000 milliGRAM(s) Oral every 6 hours  ALBUTerol    90 MICROgram(s) HFA Inhaler 1 Puff(s) Inhalation every 4 hours  albuterol/ipratropium for Nebulization 3 milliLiter(s) Nebulizer every 6 hours  BUpivacaine liposome 1.3% Injectable (no eMAR) 20 milliLiter(s) Local Injection once  dextrose 40% Gel 15 Gram(s) Oral once  dextrose 5%. 1000 milliLiter(s) (50 mL/Hr) IV Continuous <Continuous>  dextrose 5%. 1000 milliLiter(s) (100 mL/Hr) IV Continuous <Continuous>  dextrose 50% Injectable 25 Gram(s) IV Push once  dextrose 50% Injectable 12.5 Gram(s) IV Push once  dextrose 50% Injectable 25 Gram(s) IV Push once  glucagon  Injectable 1 milliGRAM(s) IntraMuscular once  heparin   Injectable 5000 Unit(s) SubCutaneous every 8 hours  influenza  Vaccine (HIGH DOSE) 0.7 milliLiter(s) IntraMuscular once  insulin lispro (ADMELOG) corrective regimen sliding scale   SubCutaneous Before meals and at bedtime  lactated ringers. 1000 milliLiter(s) (40 mL/Hr) IV Continuous <Continuous>  metoprolol tartrate 50 milliGRAM(s) Oral every 6 hours  pantoprazole  Injectable 40 milliGRAM(s) IV Push daily  tiotropium 18 MICROgram(s) Capsule 1 Capsule(s) Inhalation daily    MEDICATIONS  (PRN):  HYDROmorphone  Injectable 0.25 milliGRAM(s) IV Push every 6 hours PRN Moderate Pain (4 - 6)  ondansetron Injectable 4 milliGRAM(s) IV Push every 6 hours PRN Nausea and/or Vomiting      Vital Signs Last 24 Hrs  T(C): 36.7 (04 Dec 2020 04:19), Max: 36.9 (03 Dec 2020 18:38)  T(F): 98 (04 Dec 2020 04:19), Max: 98.5 (03 Dec 2020 18:38)  HR: 53 (04 Dec 2020 08:30) (52 - 75)  BP: 145/63 (04 Dec 2020 08:30) (99/52 - 166/66)  BP(mean): 87 (04 Dec 2020 03:52) (72 - 95)  RR: 18 (04 Dec 2020 08:30) (14 - 31)  SpO2: 99% (04 Dec 2020 08:30) (96% - 100%)    PHYSICAL EXAM:      Constitutional: A&Ox3, NAD    Respiratory: non labored breathing, no respiratory distress    Cardiovascular: NSR, RRR    Gastrointestinal: soft, nd, appropriately ttp to incisions. Dressings c/d/i. Midline incision w/ small area of induration suggestive of hematoma, appropriately ttp.    Extremities: wwp, no calf tenderness or edema. SCDs in place       I&O's Detail    03 Dec 2020 07:01  -  04 Dec 2020 07:00  --------------------------------------------------------  IN:    Lactated Ringers: 800 mL    Oral Fluid: 800 mL  Total IN: 1600 mL    OUT:    Indwelling Catheter - Urethral (mL): 1010 mL  Total OUT: 1010 mL    Total NET: 590 mL          LABS:                        8.3    11.39 )-----------( 217      ( 04 Dec 2020 05:31 )             27.2     12-04    133<L>  |  97  |  23  ----------------------------<  92  3.9   |  24  |  2.03<H>    Ca    8.2<L>      04 Dec 2020 05:31  Phos  4.5     12-04  Mg     2.4     12-04    TPro  6.6  /  Alb  3.3  /  TBili  0.4  /  DBili  x   /  AST  20  /  ALT  19  /  AlkPhos  52  12-03      Urinalysis Basic - ( 03 Dec 2020 21:59 )    Color: Yellow / Appearance: Clear / SG: >=1.030 / pH: x  Gluc: x / Ketone: NEGATIVE  / Bili: Negative / Urobili: 0.2 E.U./dL   Blood: x / Protein: Trace mg/dL / Nitrite: NEGATIVE   Leuk Esterase: NEGATIVE / RBC: < 5 /HPF / WBC < 5 /HPF   Sq Epi: x / Non Sq Epi: 0-5 /HPF / Bacteria: Present /HPF        RADIOLOGY & ADDITIONAL STUDIES:

## 2020-12-04 NOTE — PROGRESS NOTE ADULT - SUBJECTIVE AND OBJECTIVE BOX
INTERVAL HPI/OVERNIGHT EVENTS: Patient seen and examined at bedside. No acute events overnight.    VITAL SIGNS:  T(F): 97.9 (12-04-20 @ 13:25)  HR: 64 (12-04-20 @ 12:02)  BP: 117/60 (12-04-20 @ 12:02)  RR: 18 (12-04-20 @ 12:02)  SpO2: 99% (12-04-20 @ 12:02)  Wt(kg): --    12-03-20 @ 07:01  -  12-04-20 @ 07:00  --------------------------------------------------------  IN: 1600 mL / OUT: 1010 mL / NET: 590 mL    12-04-20 @ 07:01  -  12-04-20 @ 13:29  --------------------------------------------------------  IN: 160 mL / OUT: 200 mL / NET: -40 mL        PHYSICAL EXAM:    GEN: Awake, comfortable. NAD.   HEENT: NCAT, PERRL, EOMI. Mucosa moist.   NECK: Supple, no JVD.   RESP: CTA b/l  CV: RRR, normal s1/s2. No m/r/g.  ABD: Soft, NTND. BS+  EXT: Warm. No edema, clubbing, or cyanosis.   NEURO: AAOx3. No focal deficits.    MEDICATIONS  (STANDING):  acetaminophen   Tablet .. 1000 milliGRAM(s) Oral every 6 hours  ALBUTerol    90 MICROgram(s) HFA Inhaler 1 Puff(s) Inhalation every 4 hours  albuterol/ipratropium for Nebulization 3 milliLiter(s) Nebulizer every 6 hours  BUpivacaine liposome 1.3% Injectable (no eMAR) 20 milliLiter(s) Local Injection once  dextrose 40% Gel 15 Gram(s) Oral once  dextrose 5%. 1000 milliLiter(s) (50 mL/Hr) IV Continuous <Continuous>  dextrose 5%. 1000 milliLiter(s) (100 mL/Hr) IV Continuous <Continuous>  dextrose 50% Injectable 25 Gram(s) IV Push once  dextrose 50% Injectable 12.5 Gram(s) IV Push once  dextrose 50% Injectable 25 Gram(s) IV Push once  glucagon  Injectable 1 milliGRAM(s) IntraMuscular once  heparin   Injectable 5000 Unit(s) SubCutaneous every 8 hours  influenza  Vaccine (HIGH DOSE) 0.7 milliLiter(s) IntraMuscular once  insulin lispro (ADMELOG) corrective regimen sliding scale   SubCutaneous Before meals and at bedtime  lactated ringers. 1000 milliLiter(s) (40 mL/Hr) IV Continuous <Continuous>  metoprolol tartrate 50 milliGRAM(s) Oral every 6 hours  pantoprazole  Injectable 40 milliGRAM(s) IV Push daily  tiotropium 18 MICROgram(s) Capsule 1 Capsule(s) Inhalation daily    MEDICATIONS  (PRN):  HYDROmorphone  Injectable 0.25 milliGRAM(s) IV Push every 6 hours PRN Moderate Pain (4 - 6)  ondansetron Injectable 4 milliGRAM(s) IV Push every 6 hours PRN Nausea and/or Vomiting      Allergies    Digox (Rash; Urticaria; Hives)  Plavix (Other (Mod to Severe))    Intolerances        LABS:                        8.3    11.39 )-----------( 217      ( 04 Dec 2020 05:31 )             27.2     12-04    133<L>  |  97  |  23  ----------------------------<  92  3.9   |  24  |  2.03<H>    Ca    8.2<L>      04 Dec 2020 05:31  Phos  4.5     12-04  Mg     2.4     12-04    TPro  6.6  /  Alb  3.3  /  TBili  0.4  /  DBili  x   /  AST  20  /  ALT  19  /  AlkPhos  52  12-03      Urinalysis Basic - ( 03 Dec 2020 21:59 )    Color: Yellow / Appearance: Clear / SG: >=1.030 / pH: x  Gluc: x / Ketone: NEGATIVE  / Bili: Negative / Urobili: 0.2 E.U./dL   Blood: x / Protein: Trace mg/dL / Nitrite: NEGATIVE   Leuk Esterase: NEGATIVE / RBC: < 5 /HPF / WBC < 5 /HPF   Sq Epi: x / Non Sq Epi: 0-5 /HPF / Bacteria: Present /HPF            EKG:    Echo:    Cath:    NST:    RADIOLOGY & ADDITIONAL TESTS:   INTERVAL HPI/OVERNIGHT EVENTS: Patient seen and examined at bedside. No acute events overnight.    VITAL SIGNS:  T(F): 97.9 (12-04-20 @ 13:25)  HR: 64 (12-04-20 @ 12:02)  BP: 117/60 (12-04-20 @ 12:02)  RR: 18 (12-04-20 @ 12:02)  SpO2: 99% (12-04-20 @ 12:02)  Wt(kg): --    12-03-20 @ 07:01  -  12-04-20 @ 07:00  --------------------------------------------------------  IN: 1600 mL / OUT: 1010 mL / NET: 590 mL    12-04-20 @ 07:01  -  12-04-20 @ 13:29  --------------------------------------------------------  IN: 160 mL / OUT: 200 mL / NET: -40 mL        PHYSICAL EXAM:    GEN: Awake, comfortable. NAD.   HEENT: NCAT, PERRL, EOMI. Mucosa moist.   NECK: Supple, no JVD.   RESP: CTA b/l  CV: RRR, normal s1/s2. No m/r/g.  ABD: Soft, NTND. BS+  EXT: Warm. No edema, clubbing, or cyanosis.   NEURO: AAOx3. No focal deficits.    HOME MEDS:  ASA 81  Lipitor 20  Coreg 6.25 q12h  HCTZ 12.5  Lisinopril 10      MEDICATIONS  (STANDING):  acetaminophen   Tablet .. 1000 milliGRAM(s) Oral every 6 hours  ALBUTerol    90 MICROgram(s) HFA Inhaler 1 Puff(s) Inhalation every 4 hours  albuterol/ipratropium for Nebulization 3 milliLiter(s) Nebulizer every 6 hours  BUpivacaine liposome 1.3% Injectable (no eMAR) 20 milliLiter(s) Local Injection once  dextrose 40% Gel 15 Gram(s) Oral once  dextrose 5%. 1000 milliLiter(s) (50 mL/Hr) IV Continuous <Continuous>  dextrose 5%. 1000 milliLiter(s) (100 mL/Hr) IV Continuous <Continuous>  dextrose 50% Injectable 25 Gram(s) IV Push once  dextrose 50% Injectable 12.5 Gram(s) IV Push once  dextrose 50% Injectable 25 Gram(s) IV Push once  glucagon  Injectable 1 milliGRAM(s) IntraMuscular once  heparin   Injectable 5000 Unit(s) SubCutaneous every 8 hours  influenza  Vaccine (HIGH DOSE) 0.7 milliLiter(s) IntraMuscular once  insulin lispro (ADMELOG) corrective regimen sliding scale   SubCutaneous Before meals and at bedtime  lactated ringers. 1000 milliLiter(s) (40 mL/Hr) IV Continuous <Continuous>  metoprolol tartrate 50 milliGRAM(s) Oral every 6 hours  pantoprazole  Injectable 40 milliGRAM(s) IV Push daily  tiotropium 18 MICROgram(s) Capsule 1 Capsule(s) Inhalation daily    MEDICATIONS  (PRN):  HYDROmorphone  Injectable 0.25 milliGRAM(s) IV Push every 6 hours PRN Moderate Pain (4 - 6)  ondansetron Injectable 4 milliGRAM(s) IV Push every 6 hours PRN Nausea and/or Vomiting      Allergies    Digox (Rash; Urticaria; Hives)  Plavix (Other (Mod to Severe))    Intolerances        LABS:                        8.3    11.39 )-----------( 217      ( 04 Dec 2020 05:31 )             27.2     12-04    133<L>  |  97  |  23  ----------------------------<  92  3.9   |  24  |  2.03<H>    Ca    8.2<L>      04 Dec 2020 05:31  Phos  4.5     12-04  Mg     2.4     12-04    TPro  6.6  /  Alb  3.3  /  TBili  0.4  /  DBili  x   /  AST  20  /  ALT  19  /  AlkPhos  52  12-03      Urinalysis Basic - ( 03 Dec 2020 21:59 )    Color: Yellow / Appearance: Clear / SG: >=1.030 / pH: x  Gluc: x / Ketone: NEGATIVE  / Bili: Negative / Urobili: 0.2 E.U./dL   Blood: x / Protein: Trace mg/dL / Nitrite: NEGATIVE   Leuk Esterase: NEGATIVE / RBC: < 5 /HPF / WBC < 5 /HPF   Sq Epi: x / Non Sq Epi: 0-5 /HPF / Bacteria: Present /HPF            EKG:    Echo: < from: TTE Echo Complete w/o Contrast w/ Doppler (11.13.20 @ 16:09) >  --------------------------------------------------------------------------------  CONCLUSIONS:     1. Normal left and right ventricular size and systolic function.   2. Moderate symmetric left ventricular hypertrophy.   3. Hyperdynamic left ventricular systolic function.   4. 23 mm Jamar valve is noted in the aortic position without any aortic regurgitation. The peak transvalvular velocity is 3.93 m/s, the mean transvalvular gradient is 35.00 mmHg, and the LVOT/AV velocity ratio is 0.30. The peak transaortic gradient is 61.78 mmHg. The transaortic gradients are elevated even in the setting of a TAVR.   5. Grade II left ventricular diastolic dysfunction.   6. Normal right ventricular size and systolic function.   7. Mildly dilated left atrium.   8. No evidence of pulmonary hypertension.   9. No pericardial effusion.  10. Moderately dilated ascending aorta.  11. Compared to the previous TTE performed on 4/1/2019, the transaortic gradients are higher.    --------------------------------------------------------------------------------    < end of copied text >  t< from: TTE Limited Echo w/o Cont (11.16.20 @ 12:57) >  --------------------------------------------------------------------------------  CONCLUSIONS:     1. Limited study obtained for evaluation of aortic valve gradients.   2. Hyperdynamic left ventricular systolic function with an intra-cavitary gradient of 31 mmHg.   3. 23 mm Jamar valve is noted in the aortic position without any aortic regurgitation. The peak transvalvular velocity is 4.20 m/s, the mean transvalvular gradient is 35.00 mmHg, and the LVOT/AV velocity ratio is 0.38. The peak transaortic gradient is 70.56 mmHg. The transaortic gradients are elevated, even in the setting of a TAVR. However, hyperdynamic LVEF may contribute to the elevation of the transaortic gradients.   4. Normal right ventricular size and systolic function.   5. No pericardial effusion.   6. Compared to the previous TTE performed on 11/13/2020, there have been no significant interval changes.    --------------------------------------------------------------------------------    < end of copied text >    < from: IRMA w/Doppler (11.17.20 @ 12:38) >  --------------------------------------------------------------------------------    CONCLUSIONS:     1. Normal left and right ventricular size and systolic function.   2. Mildly dilated left atrium.   3. No LA/RA/MAIK/RAA thrombus seen.   4. No evidence of an intracardiac shunt.   5. 23 mm Jamar valve is noted in the aortic position without any aortic regurgitation.   6. There is slight thickening at the base of the right cusp with probably mild restriction in excursion. The other prosthetic leaflets appear normal.   7. No evidence of pulmonary hypertension.   8. No pericardial effusion.   9. See TTE performed 11/16/2020 for TAVR gradients, which were elevated.    -------------------------------------------------------------------------------    < end of copied text >      Cath: [facility] 1/09/19: Diagnostic right and left heart catheterization revealing LMCA normal, LAD with minor luminal irregularities, LCx large and normal, RCA large and normal. PA 18, RV 73/13 (21), PA 70/34 (49), PCWP 26, CO 7.3 L/min, CI 3.7, TPG 23, severe AS (mean LV/Ao gradient 48.1 mmHg, SETH 1.1 cm2).     NST:    RADIOLOGY & ADDITIONAL TESTS:   INTERVAL HPI/OVERNIGHT EVENTS: Patient seen and examined at bedside. Denies chest pain, palpitations, SOB>    VITAL SIGNS:  T(F): 97.9 (12-04-20 @ 13:25)  HR: 64 (12-04-20 @ 12:02)  BP: 117/60 (12-04-20 @ 12:02)  RR: 18 (12-04-20 @ 12:02)  SpO2: 99% (12-04-20 @ 12:02)  Wt(kg): --    12-03-20 @ 07:01  -  12-04-20 @ 07:00  --------------------------------------------------------  IN: 1600 mL / OUT: 1010 mL / NET: 590 mL    12-04-20 @ 07:01  -  12-04-20 @ 13:29  --------------------------------------------------------  IN: 160 mL / OUT: 200 mL / NET: -40 mL        PHYSICAL EXAM:    GEN: Awake, comfortable in chair. NAD.   HEENT: NCAT, PERRL, EOMI. Mucosa moist.   NECK: Supple, no JVD.   RESP: Slight crackles at bases  CV: RRR, normal s1/s2. No m/r/g.  ABD: Soft, NTND. BS+  EXT: Warm. No edema, clubbing, or cyanosis.   NEURO: AAOx3. No focal deficits.    HOME MEDS:  ASA 81  Lipitor 20  Coreg 6.25 q12h  HCTZ 12.5  Lisinopril 10      MEDICATIONS  (STANDING):  acetaminophen   Tablet .. 1000 milliGRAM(s) Oral every 6 hours  ALBUTerol    90 MICROgram(s) HFA Inhaler 1 Puff(s) Inhalation every 4 hours  albuterol/ipratropium for Nebulization 3 milliLiter(s) Nebulizer every 6 hours  BUpivacaine liposome 1.3% Injectable (no eMAR) 20 milliLiter(s) Local Injection once  dextrose 40% Gel 15 Gram(s) Oral once  dextrose 5%. 1000 milliLiter(s) (50 mL/Hr) IV Continuous <Continuous>  dextrose 5%. 1000 milliLiter(s) (100 mL/Hr) IV Continuous <Continuous>  dextrose 50% Injectable 25 Gram(s) IV Push once  dextrose 50% Injectable 12.5 Gram(s) IV Push once  dextrose 50% Injectable 25 Gram(s) IV Push once  glucagon  Injectable 1 milliGRAM(s) IntraMuscular once  heparin   Injectable 5000 Unit(s) SubCutaneous every 8 hours  influenza  Vaccine (HIGH DOSE) 0.7 milliLiter(s) IntraMuscular once  insulin lispro (ADMELOG) corrective regimen sliding scale   SubCutaneous Before meals and at bedtime  lactated ringers. 1000 milliLiter(s) (40 mL/Hr) IV Continuous <Continuous>  metoprolol tartrate 50 milliGRAM(s) Oral every 6 hours  pantoprazole  Injectable 40 milliGRAM(s) IV Push daily  tiotropium 18 MICROgram(s) Capsule 1 Capsule(s) Inhalation daily    MEDICATIONS  (PRN):  HYDROmorphone  Injectable 0.25 milliGRAM(s) IV Push every 6 hours PRN Moderate Pain (4 - 6)  ondansetron Injectable 4 milliGRAM(s) IV Push every 6 hours PRN Nausea and/or Vomiting      Allergies    Digox (Rash; Urticaria; Hives)  Plavix (Other (Mod to Severe))    Intolerances        LABS:                        8.3    11.39 )-----------( 217      ( 04 Dec 2020 05:31 )             27.2     12-04    133<L>  |  97  |  23  ----------------------------<  92  3.9   |  24  |  2.03<H>    Ca    8.2<L>      04 Dec 2020 05:31  Phos  4.5     12-04  Mg     2.4     12-04    TPro  6.6  /  Alb  3.3  /  TBili  0.4  /  DBili  x   /  AST  20  /  ALT  19  /  AlkPhos  52  12-03      Urinalysis Basic - ( 03 Dec 2020 21:59 )    Color: Yellow / Appearance: Clear / SG: >=1.030 / pH: x  Gluc: x / Ketone: NEGATIVE  / Bili: Negative / Urobili: 0.2 E.U./dL   Blood: x / Protein: Trace mg/dL / Nitrite: NEGATIVE   Leuk Esterase: NEGATIVE / RBC: < 5 /HPF / WBC < 5 /HPF   Sq Epi: x / Non Sq Epi: 0-5 /HPF / Bacteria: Present /HPF            EKG:    Echo: < from: TTE Echo Complete w/o Contrast w/ Doppler (11.13.20 @ 16:09) >  --------------------------------------------------------------------------------  CONCLUSIONS:     1. Normal left and right ventricular size and systolic function.   2. Moderate symmetric left ventricular hypertrophy.   3. Hyperdynamic left ventricular systolic function.   4. 23 mm Jamar valve is noted in the aortic position without any aortic regurgitation. The peak transvalvular velocity is 3.93 m/s, the mean transvalvular gradient is 35.00 mmHg, and the LVOT/AV velocity ratio is 0.30. The peak transaortic gradient is 61.78 mmHg. The transaortic gradients are elevated even in the setting of a TAVR.   5. Grade II left ventricular diastolic dysfunction.   6. Normal right ventricular size and systolic function.   7. Mildly dilated left atrium.   8. No evidence of pulmonary hypertension.   9. No pericardial effusion.  10. Moderately dilated ascending aorta.  11. Compared to the previous TTE performed on 4/1/2019, the transaortic gradients are higher.    --------------------------------------------------------------------------------    < end of copied text >  t< from: TTE Limited Echo w/o Cont (11.16.20 @ 12:57) >  --------------------------------------------------------------------------------  CONCLUSIONS:     1. Limited study obtained for evaluation of aortic valve gradients.   2. Hyperdynamic left ventricular systolic function with an intra-cavitary gradient of 31 mmHg.   3. 23 mm Jamar valve is noted in the aortic position without any aortic regurgitation. The peak transvalvular velocity is 4.20 m/s, the mean transvalvular gradient is 35.00 mmHg, and the LVOT/AV velocity ratio is 0.38. The peak transaortic gradient is 70.56 mmHg. The transaortic gradients are elevated, even in the setting of a TAVR. However, hyperdynamic LVEF may contribute to the elevation of the transaortic gradients.   4. Normal right ventricular size and systolic function.   5. No pericardial effusion.   6. Compared to the previous TTE performed on 11/13/2020, there have been no significant interval changes.    --------------------------------------------------------------------------------    < end of copied text >    < from: IRMA w/Doppler (11.17.20 @ 12:38) >  --------------------------------------------------------------------------------    CONCLUSIONS:     1. Normal left and right ventricular size and systolic function.   2. Mildly dilated left atrium.   3. No LA/RA/MAIK/RAA thrombus seen.   4. No evidence of an intracardiac shunt.   5. 23 mm Jamar valve is noted in the aortic position without any aortic regurgitation.   6. There is slight thickening at the base of the right cusp with probably mild restriction in excursion. The other prosthetic leaflets appear normal.   7. No evidence of pulmonary hypertension.   8. No pericardial effusion.   9. See TTE performed 11/16/2020 for TAVR gradients, which were elevated.    -------------------------------------------------------------------------------    < end of copied text >      Cath: [facility] 1/09/19: Diagnostic right and left heart catheterization revealing LMCA normal, LAD with minor luminal irregularities, LCx large and normal, RCA large and normal. PA 18, RV 73/13 (21), PA 70/34 (49), PCWP 26, CO 7.3 L/min, CI 3.7, TPG 23, severe AS (mean LV/Ao gradient 48.1 mmHg, SETH 1.1 cm2).     NST:    RADIOLOGY & ADDITIONAL TESTS:

## 2020-12-05 LAB
ANION GAP SERPL CALC-SCNC: 11 MMOL/L — SIGNIFICANT CHANGE UP (ref 5–17)
BUN SERPL-MCNC: 13 MG/DL — SIGNIFICANT CHANGE UP (ref 7–23)
CALCIUM SERPL-MCNC: 8.1 MG/DL — LOW (ref 8.4–10.5)
CHLORIDE SERPL-SCNC: 104 MMOL/L — SIGNIFICANT CHANGE UP (ref 96–108)
CO2 SERPL-SCNC: 24 MMOL/L — SIGNIFICANT CHANGE UP (ref 22–31)
CREAT SERPL-MCNC: 1.06 MG/DL — SIGNIFICANT CHANGE UP (ref 0.5–1.3)
GLUCOSE BLDC GLUCOMTR-MCNC: 101 MG/DL — HIGH (ref 70–99)
GLUCOSE BLDC GLUCOMTR-MCNC: 121 MG/DL — HIGH (ref 70–99)
GLUCOSE BLDC GLUCOMTR-MCNC: 136 MG/DL — HIGH (ref 70–99)
GLUCOSE BLDC GLUCOMTR-MCNC: 149 MG/DL — HIGH (ref 70–99)
GLUCOSE SERPL-MCNC: 89 MG/DL — SIGNIFICANT CHANGE UP (ref 70–99)
HCT VFR BLD CALC: 23.8 % — LOW (ref 34.5–45)
HGB BLD-MCNC: 7.4 G/DL — LOW (ref 11.5–15.5)
MAGNESIUM SERPL-MCNC: 2.2 MG/DL — SIGNIFICANT CHANGE UP (ref 1.6–2.6)
MCHC RBC-ENTMCNC: 29.4 PG — SIGNIFICANT CHANGE UP (ref 27–34)
MCHC RBC-ENTMCNC: 31.1 GM/DL — LOW (ref 32–36)
MCV RBC AUTO: 94.4 FL — SIGNIFICANT CHANGE UP (ref 80–100)
NRBC # BLD: 0 /100 WBCS — SIGNIFICANT CHANGE UP (ref 0–0)
PHOSPHATE SERPL-MCNC: 2.8 MG/DL — SIGNIFICANT CHANGE UP (ref 2.5–4.5)
PLATELET # BLD AUTO: 149 K/UL — LOW (ref 150–400)
POTASSIUM SERPL-MCNC: 3.8 MMOL/L — SIGNIFICANT CHANGE UP (ref 3.5–5.3)
POTASSIUM SERPL-SCNC: 3.8 MMOL/L — SIGNIFICANT CHANGE UP (ref 3.5–5.3)
RBC # BLD: 2.52 M/UL — LOW (ref 3.8–5.2)
RBC # FLD: 14 % — SIGNIFICANT CHANGE UP (ref 10.3–14.5)
SODIUM SERPL-SCNC: 139 MMOL/L — SIGNIFICANT CHANGE UP (ref 135–145)
WBC # BLD: 6.27 K/UL — SIGNIFICANT CHANGE UP (ref 3.8–10.5)
WBC # FLD AUTO: 6.27 K/UL — SIGNIFICANT CHANGE UP (ref 3.8–10.5)

## 2020-12-05 PROCEDURE — 71045 X-RAY EXAM CHEST 1 VIEW: CPT | Mod: 26

## 2020-12-05 PROCEDURE — 99232 SBSQ HOSP IP/OBS MODERATE 35: CPT | Mod: GC

## 2020-12-05 PROCEDURE — 99232 SBSQ HOSP IP/OBS MODERATE 35: CPT

## 2020-12-05 RX ORDER — METOPROLOL TARTRATE 50 MG
50 TABLET ORAL DAILY
Refills: 0 | Status: DISCONTINUED | OUTPATIENT
Start: 2020-12-05 | End: 2020-12-11

## 2020-12-05 RX ORDER — OXYCODONE HYDROCHLORIDE 5 MG/1
5 TABLET ORAL EVERY 4 HOURS
Refills: 0 | Status: DISCONTINUED | OUTPATIENT
Start: 2020-12-05 | End: 2020-12-09

## 2020-12-05 RX ORDER — METOPROLOL TARTRATE 50 MG
50 TABLET ORAL DAILY
Refills: 0 | Status: DISCONTINUED | OUTPATIENT
Start: 2020-12-05 | End: 2020-12-05

## 2020-12-05 RX ORDER — ATORVASTATIN CALCIUM 80 MG/1
20 TABLET, FILM COATED ORAL AT BEDTIME
Refills: 0 | Status: DISCONTINUED | OUTPATIENT
Start: 2020-12-05 | End: 2020-12-14

## 2020-12-05 RX ORDER — ASPIRIN/CALCIUM CARB/MAGNESIUM 324 MG
81 TABLET ORAL DAILY
Refills: 0 | Status: DISCONTINUED | OUTPATIENT
Start: 2020-12-05 | End: 2020-12-11

## 2020-12-05 RX ORDER — OXYCODONE HYDROCHLORIDE 5 MG/1
10 TABLET ORAL EVERY 6 HOURS
Refills: 0 | Status: DISCONTINUED | OUTPATIENT
Start: 2020-12-05 | End: 2020-12-11

## 2020-12-05 RX ORDER — POTASSIUM CHLORIDE 20 MEQ
20 PACKET (EA) ORAL ONCE
Refills: 0 | Status: COMPLETED | OUTPATIENT
Start: 2020-12-05 | End: 2020-12-05

## 2020-12-05 RX ORDER — HYDROMORPHONE HYDROCHLORIDE 2 MG/ML
0.5 INJECTION INTRAMUSCULAR; INTRAVENOUS; SUBCUTANEOUS ONCE
Refills: 0 | Status: DISCONTINUED | OUTPATIENT
Start: 2020-12-05 | End: 2020-12-05

## 2020-12-05 RX ADMIN — Medication 20 MILLIEQUIVALENT(S): at 08:47

## 2020-12-05 RX ADMIN — OXYCODONE HYDROCHLORIDE 10 MILLIGRAM(S): 5 TABLET ORAL at 17:46

## 2020-12-05 RX ADMIN — Medication 1000 MILLIGRAM(S): at 17:46

## 2020-12-05 RX ADMIN — Medication 3 MILLILITER(S): at 21:01

## 2020-12-05 RX ADMIN — Medication 3 MILLILITER(S): at 05:08

## 2020-12-05 RX ADMIN — HYDROMORPHONE HYDROCHLORIDE 0.5 MILLIGRAM(S): 2 INJECTION INTRAMUSCULAR; INTRAVENOUS; SUBCUTANEOUS at 09:02

## 2020-12-05 RX ADMIN — Medication 1000 MILLIGRAM(S): at 11:15

## 2020-12-05 RX ADMIN — HEPARIN SODIUM 5000 UNIT(S): 5000 INJECTION INTRAVENOUS; SUBCUTANEOUS at 05:07

## 2020-12-05 RX ADMIN — Medication 3 MILLILITER(S): at 17:45

## 2020-12-05 RX ADMIN — HEPARIN SODIUM 5000 UNIT(S): 5000 INJECTION INTRAVENOUS; SUBCUTANEOUS at 21:01

## 2020-12-05 RX ADMIN — Medication 81 MILLIGRAM(S): at 11:15

## 2020-12-05 RX ADMIN — Medication 3 MILLILITER(S): at 11:15

## 2020-12-05 RX ADMIN — Medication 1000 MILLIGRAM(S): at 12:15

## 2020-12-05 RX ADMIN — OXYCODONE HYDROCHLORIDE 10 MILLIGRAM(S): 5 TABLET ORAL at 18:46

## 2020-12-05 RX ADMIN — OXYCODONE HYDROCHLORIDE 10 MILLIGRAM(S): 5 TABLET ORAL at 12:15

## 2020-12-05 RX ADMIN — Medication 1000 MILLIGRAM(S): at 23:00

## 2020-12-05 RX ADMIN — Medication 1000 MILLIGRAM(S): at 06:17

## 2020-12-05 RX ADMIN — HEPARIN SODIUM 5000 UNIT(S): 5000 INJECTION INTRAVENOUS; SUBCUTANEOUS at 13:54

## 2020-12-05 RX ADMIN — Medication 50 MILLIGRAM(S): at 05:08

## 2020-12-05 RX ADMIN — Medication 1000 MILLIGRAM(S): at 18:46

## 2020-12-05 RX ADMIN — ATORVASTATIN CALCIUM 20 MILLIGRAM(S): 80 TABLET, FILM COATED ORAL at 21:01

## 2020-12-05 RX ADMIN — Medication 1000 MILLIGRAM(S): at 05:08

## 2020-12-05 RX ADMIN — OXYCODONE HYDROCHLORIDE 10 MILLIGRAM(S): 5 TABLET ORAL at 11:15

## 2020-12-05 RX ADMIN — PANTOPRAZOLE SODIUM 40 MILLIGRAM(S): 20 TABLET, DELAYED RELEASE ORAL at 11:14

## 2020-12-05 RX ADMIN — HYDROMORPHONE HYDROCHLORIDE 0.5 MILLIGRAM(S): 2 INJECTION INTRAMUSCULAR; INTRAVENOUS; SUBCUTANEOUS at 08:47

## 2020-12-05 RX ADMIN — Medication 1000 MILLIGRAM(S): at 23:24

## 2020-12-05 RX ADMIN — Medication 1000 MILLIGRAM(S): at 00:01

## 2020-12-05 RX ADMIN — Medication 50 MILLIGRAM(S): at 00:02

## 2020-12-05 NOTE — PROGRESS NOTE ADULT - SUBJECTIVE AND OBJECTIVE BOX
STATUS POST:  laparoscopic subtotal colectomy (R and transverse colectomy, ileocolic anastomosis distal ileum to descending colon) 12/2     SUBJECTIVE: Patient seen and examined bedside by chief resident. O/N: Cr 1.20 (2.03), good urine o/p, VSS. This AM, denies n/v, no flatus. Had one BM yesterday. Reporting abd pain.     heparin   Injectable 5000 Unit(s) SubCutaneous every 8 hours  metoprolol tartrate 50 milliGRAM(s) Oral every 6 hours      Vital Signs Last 24 Hrs  T(C): 36.8 (05 Dec 2020 05:04), Max: 37.1 (04 Dec 2020 22:15)  T(F): 98.3 (05 Dec 2020 05:04), Max: 98.8 (04 Dec 2020 22:15)  HR: 58 (05 Dec 2020 03:35) (54 - 82)  BP: 128/58 (05 Dec 2020 03:35) (116/55 - 158/70)  BP(mean): 83 (05 Dec 2020 03:35) (79 - 101)  RR: 17 (05 Dec 2020 03:35) (16 - 20)  SpO2: 100% (05 Dec 2020 03:35) (99% - 100%)  I&O's Detail    04 Dec 2020 07:01  -  05 Dec 2020 07:00  --------------------------------------------------------  IN:    Lactated Ringers: 800 mL    Sodium Chloride 0.9% Bolus: 1000 mL  Total IN: 1800 mL    OUT:    Voided (mL): 2375 mL  Total OUT: 2375 mL    Total NET: -575 mL          Physical Exam:  General: No acute distress, resting comfortably in bed  C/V: normal sinus rhythm  Pulm: Nonlabored breathing, no respiratory distress  Abd: soft, non-tender, distended, incisions clean/dry/intact.  Extrem: warm and well perfused, no edema, SCDs in place    LABS:                        7.4    6.27  )-----------( 149      ( 05 Dec 2020 05:55 )             23.8     12-05    139  |  104  |  13  ----------------------------<  89  3.8   |  24  |  1.06    Ca    8.1<L>      05 Dec 2020 05:55  Phos  2.8     12-05  Mg     2.2     12-05        Urinalysis Basic - ( 03 Dec 2020 21:59 )    Color: Yellow / Appearance: Clear / SG: >=1.030 / pH: x  Gluc: x / Ketone: NEGATIVE  / Bili: Negative / Urobili: 0.2 E.U./dL   Blood: x / Protein: Trace mg/dL / Nitrite: NEGATIVE   Leuk Esterase: NEGATIVE / RBC: < 5 /HPF / WBC < 5 /HPF   Sq Epi: x / Non Sq Epi: 0-5 /HPF / Bacteria: Present /HPF        RADIOLOGY & ADDITIONAL STUDIES:

## 2020-12-05 NOTE — PROGRESS NOTE ADULT - ASSESSMENT
80F w/HFpEF (61%), AS s/p TAVR, pAF (new), HTN, HLD, asthma adm to cardiac tele on 11/13 w/chest pain & hypertensive urgency. Course c/b new onset AF and GIB after hep gtt initiation 2/2 leaflet thrombus on IRMA. 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 Patient s/p laparoscopic subtotal colectomy (R and transverse colectomy, ileocolic anastomosis) on 12/2. Noted uptrend in creatinine from baseline 1 up to 2s. UrNa <20 suggesting decreased renal perfusion. Renal consulted for non oliguric BIBIANA.    #Non Oliguric BIBIANA likely pre-renal given improvement w/IVF     Baseline creat ~1  UrNa suggesting low flow state to kidneys, pt on LR @40cc/hr, s/p 1L saline bolus w/improvement in Cr   Repeat UrNa, UrCreat, UrOsm, UrCl daily   Trend daily BMP with phos, Urlytes  Renal imaging unremarkable; bladder scan to r/o obstruction  Dc PPI- consider H2 blocker and avoid NSAIDs and nephrotoxic agents     Electrolytes acceptable   BP: controlled  Anemia- check iron panel, ferritin  BMD: Ca and Phos noted    Thank you for the opportunity to participate in the care of your patient. The nephrology service remains available to assist with any questions or concerns. Please feel free to reach us by paging the on-call nephrology fellow for urgent issues or as below.     Deangelo Diaz M.D.   PGY-4, Nephrology Fellow   C: [phone]   P: [phone]

## 2020-12-05 NOTE — PROGRESS NOTE ADULT - SUBJECTIVE AND OBJECTIVE BOX
INTERVAL EVENTS:  No events. Tele: NSR.      MEDICATIONS  (STANDING):  acetaminophen   Tablet .. 1000 milliGRAM(s) Oral every 6 hours  ALBUTerol    90 MICROgram(s) HFA Inhaler 1 Puff(s) Inhalation every 4 hours  albuterol/ipratropium for Nebulization 3 milliLiter(s) Nebulizer every 6 hours  BUpivacaine liposome 1.3% Injectable (no eMAR) 20 milliLiter(s) Local Injection once  dextrose 40% Gel 15 Gram(s) Oral once  dextrose 5%. 1000 milliLiter(s) (50 mL/Hr) IV Continuous <Continuous>  dextrose 5%. 1000 milliLiter(s) (100 mL/Hr) IV Continuous <Continuous>  dextrose 50% Injectable 25 Gram(s) IV Push once  dextrose 50% Injectable 12.5 Gram(s) IV Push once  dextrose 50% Injectable 25 Gram(s) IV Push once  glucagon  Injectable 1 milliGRAM(s) IntraMuscular once  heparin   Injectable 5000 Unit(s) SubCutaneous every 8 hours  influenza  Vaccine (HIGH DOSE) 0.7 milliLiter(s) IntraMuscular once  insulin lispro (ADMELOG) corrective regimen sliding scale   SubCutaneous Before meals and at bedtime  lactated ringers. 1000 milliLiter(s) (40 mL/Hr) IV Continuous <Continuous>  metoprolol tartrate 50 milliGRAM(s) Oral every 6 hours  pantoprazole  Injectable 40 milliGRAM(s) IV Push daily  tiotropium 18 MICROgram(s) Capsule 1 Capsule(s) Inhalation daily    MEDICATIONS  (PRN):  ondansetron Injectable 4 milliGRAM(s) IV Push every 6 hours PRN Nausea and/or Vomiting  oxyCODONE    IR 10 milliGRAM(s) Oral every 6 hours PRN Severe Pain (7 - 10)  oxyCODONE    IR 5 milliGRAM(s) Oral every 4 hours PRN Moderate Pain (4 - 6)      Home Medications:  Coreg 6.25 mg oral tablet: 1 tab(s) orally 2 times a day (13 Nov 2020 05:03)  hydroCHLOROthiazide 12.5 mg oral capsule: 1 cap(s) orally once a day (13 Nov 2020 05:03)      Vital Signs Last 24 Hrs  T(C): 36.8 (05 Dec 2020 05:04), Max: 37.1 (04 Dec 2020 22:15)  T(F): 98.3 (05 Dec 2020 05:04), Max: 98.8 (04 Dec 2020 22:15)  HR: 52 (05 Dec 2020 08:15) (52 - 82)  BP: 138/65 (05 Dec 2020 08:15) (116/55 - 158/70)  BP(mean): 93 (05 Dec 2020 08:15) (79 - 101)  RR: 17 (05 Dec 2020 03:35) (16 - 20)  SpO2: 100% (05 Dec 2020 08:15) (99% - 100%)     PHYSICAL EXAM:  NAD  CTAB. Decreased at bases.  RRR  Trace LE edema    LABS:                        7.4    6.27  )-----------( 149      ( 05 Dec 2020 05:55 )             23.8     12-05    139  |  104  |  13  ----------------------------<  89  3.8   |  24  |  1.06    Ca    8.1<L>      05 Dec 2020 05:55  Phos  2.8     12-05  Mg     2.2     12-05            Urinalysis Basic - ( 03 Dec 2020 21:59 )    Color: Yellow / Appearance: Clear / SG: >=1.030 / pH: x  Gluc: x / Ketone: NEGATIVE  / Bili: Negative / Urobili: 0.2 E.U./dL   Blood: x / Protein: Trace mg/dL / Nitrite: NEGATIVE   Leuk Esterase: NEGATIVE / RBC: < 5 /HPF / WBC < 5 /HPF   Sq Epi: x / Non Sq Epi: 0-5 /HPF / Bacteria: Present /HPF      I&O's Summary    04 Dec 2020 07:01  -  05 Dec 2020 07:00  --------------------------------------------------------  IN: 1800 mL / OUT: 2375 mL / NET: -575 mL      A/P:  A/P: 80F w/HFpEF (61%), AS s/p TAVR, pAF (new), HTN, HLD, asthma adm to cardiac tele on 11/13 w/chest pain & hypertensive urgency. Course c/b new onset AF and GIB after hep gtt initiation 2/2 leaflet thrombus on IRMA. 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 Patient s/p laparoscopic subtotal colectomy (R and transverse colectomy, ileocolic anastomosis) on 12/2. Cardiology consult service now following for cardiac optimization post-surgery.     #Leaflet thrombus  - would like to start full dose anticoagulation w/a NOAC (e.g. eliquis 5mg bid). Awaiting clearance from primary team    #HFpEF - appears relatively euvolemic  - hold diuretic for now    #pAF - [id number] - 4. No digoxin 2/2 rash  - c/w lopressor 50mg q6h  - will start eliquis 5mg bid as above when safe from surgical/bleeding perspective    #HTN  - hold medications for now  - will reintroduce lisinopril 10mg PO QD as needed    recs not final until d/w attending  Chi Donahue MD PGY4   INTERVAL EVENTS:  No events. Tele: NSR.      MEDICATIONS  (STANDING):  acetaminophen   Tablet .. 1000 milliGRAM(s) Oral every 6 hours  ALBUTerol    90 MICROgram(s) HFA Inhaler 1 Puff(s) Inhalation every 4 hours  albuterol/ipratropium for Nebulization 3 milliLiter(s) Nebulizer every 6 hours  BUpivacaine liposome 1.3% Injectable (no eMAR) 20 milliLiter(s) Local Injection once  dextrose 40% Gel 15 Gram(s) Oral once  dextrose 5%. 1000 milliLiter(s) (50 mL/Hr) IV Continuous <Continuous>  dextrose 5%. 1000 milliLiter(s) (100 mL/Hr) IV Continuous <Continuous>  dextrose 50% Injectable 25 Gram(s) IV Push once  dextrose 50% Injectable 12.5 Gram(s) IV Push once  dextrose 50% Injectable 25 Gram(s) IV Push once  glucagon  Injectable 1 milliGRAM(s) IntraMuscular once  heparin   Injectable 5000 Unit(s) SubCutaneous every 8 hours  influenza  Vaccine (HIGH DOSE) 0.7 milliLiter(s) IntraMuscular once  insulin lispro (ADMELOG) corrective regimen sliding scale   SubCutaneous Before meals and at bedtime  lactated ringers. 1000 milliLiter(s) (40 mL/Hr) IV Continuous <Continuous>  metoprolol tartrate 50 milliGRAM(s) Oral every 6 hours  pantoprazole  Injectable 40 milliGRAM(s) IV Push daily  tiotropium 18 MICROgram(s) Capsule 1 Capsule(s) Inhalation daily    MEDICATIONS  (PRN):  ondansetron Injectable 4 milliGRAM(s) IV Push every 6 hours PRN Nausea and/or Vomiting  oxyCODONE    IR 10 milliGRAM(s) Oral every 6 hours PRN Severe Pain (7 - 10)  oxyCODONE    IR 5 milliGRAM(s) Oral every 4 hours PRN Moderate Pain (4 - 6)      Home Medications:  Coreg 6.25 mg oral tablet: 1 tab(s) orally 2 times a day (13 Nov 2020 05:03)  hydroCHLOROthiazide 12.5 mg oral capsule: 1 cap(s) orally once a day (13 Nov 2020 05:03)      Vital Signs Last 24 Hrs  T(C): 36.8 (05 Dec 2020 05:04), Max: 37.1 (04 Dec 2020 22:15)  T(F): 98.3 (05 Dec 2020 05:04), Max: 98.8 (04 Dec 2020 22:15)  HR: 52 (05 Dec 2020 08:15) (52 - 82)  BP: 138/65 (05 Dec 2020 08:15) (116/55 - 158/70)  BP(mean): 93 (05 Dec 2020 08:15) (79 - 101)  RR: 17 (05 Dec 2020 03:35) (16 - 20)  SpO2: 100% (05 Dec 2020 08:15) (99% - 100%)     PHYSICAL EXAM:  NAD  CTAB. Decreased at bases.  RRR  Trace LE edema    LABS:                        7.4    6.27  )-----------( 149      ( 05 Dec 2020 05:55 )             23.8     12-05    139  |  104  |  13  ----------------------------<  89  3.8   |  24  |  1.06    Ca    8.1<L>      05 Dec 2020 05:55  Phos  2.8     12-05  Mg     2.2     12-05            Urinalysis Basic - ( 03 Dec 2020 21:59 )    Color: Yellow / Appearance: Clear / SG: >=1.030 / pH: x  Gluc: x / Ketone: NEGATIVE  / Bili: Negative / Urobili: 0.2 E.U./dL   Blood: x / Protein: Trace mg/dL / Nitrite: NEGATIVE   Leuk Esterase: NEGATIVE / RBC: < 5 /HPF / WBC < 5 /HPF   Sq Epi: x / Non Sq Epi: 0-5 /HPF / Bacteria: Present /HPF      I&O's Summary    04 Dec 2020 07:01  -  05 Dec 2020 07:00  --------------------------------------------------------  IN: 1800 mL / OUT: 2375 mL / NET: -575 mL      A/P:  A/P: 80F w/HFpEF (61%), AS s/p TAVR, pAF (new), HTN, HLD, asthma adm to cardiac tele on 11/13 w/chest pain & hypertensive urgency. Course c/b new onset AF and GIB after hep gtt initiation 2/2 leaflet thrombus on IRMA. 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 Patient s/p laparoscopic subtotal colectomy (R and transverse colectomy, ileocolic anastomosis) on 12/2. Cardiology consult service now following for cardiac optimization post-surgery.     #Leaflet thrombus  - would like to start full dose anticoagulation w/a NOAC (e.g. eliquis 5mg bid). Awaiting clearance from primary team    #HFpEF - appears relatively euvolemic  - hold diuretic for now    #pAF - [id number] - 4. No digoxin 2/2 rash  - c/w lopressor 50mg q6h  - will start eliquis 5mg bid as above when safe from surgical/bleeding perspective    #HTN  - hold medications for now  - will reintroduce lisinopril 10mg PO QD as needed    d/w cardiology attending  Chi Donahue MD PGY4   INTERVAL EVENTS:  No events. Tele: NSR.      MEDICATIONS  (STANDING):  acetaminophen   Tablet .. 1000 milliGRAM(s) Oral every 6 hours  ALBUTerol    90 MICROgram(s) HFA Inhaler 1 Puff(s) Inhalation every 4 hours  albuterol/ipratropium for Nebulization 3 milliLiter(s) Nebulizer every 6 hours  BUpivacaine liposome 1.3% Injectable (no eMAR) 20 milliLiter(s) Local Injection once  dextrose 40% Gel 15 Gram(s) Oral once  dextrose 5%. 1000 milliLiter(s) (50 mL/Hr) IV Continuous <Continuous>  dextrose 5%. 1000 milliLiter(s) (100 mL/Hr) IV Continuous <Continuous>  dextrose 50% Injectable 25 Gram(s) IV Push once  dextrose 50% Injectable 12.5 Gram(s) IV Push once  dextrose 50% Injectable 25 Gram(s) IV Push once  glucagon  Injectable 1 milliGRAM(s) IntraMuscular once  heparin   Injectable 5000 Unit(s) SubCutaneous every 8 hours  influenza  Vaccine (HIGH DOSE) 0.7 milliLiter(s) IntraMuscular once  insulin lispro (ADMELOG) corrective regimen sliding scale   SubCutaneous Before meals and at bedtime  lactated ringers. 1000 milliLiter(s) (40 mL/Hr) IV Continuous <Continuous>  metoprolol tartrate 50 milliGRAM(s) Oral every 6 hours  pantoprazole  Injectable 40 milliGRAM(s) IV Push daily  tiotropium 18 MICROgram(s) Capsule 1 Capsule(s) Inhalation daily    MEDICATIONS  (PRN):  ondansetron Injectable 4 milliGRAM(s) IV Push every 6 hours PRN Nausea and/or Vomiting  oxyCODONE    IR 10 milliGRAM(s) Oral every 6 hours PRN Severe Pain (7 - 10)  oxyCODONE    IR 5 milliGRAM(s) Oral every 4 hours PRN Moderate Pain (4 - 6)      Home Medications:  Coreg 6.25 mg oral tablet: 1 tab(s) orally 2 times a day (13 Nov 2020 05:03)  hydroCHLOROthiazide 12.5 mg oral capsule: 1 cap(s) orally once a day (13 Nov 2020 05:03)      Vital Signs Last 24 Hrs  T(C): 36.8 (05 Dec 2020 05:04), Max: 37.1 (04 Dec 2020 22:15)  T(F): 98.3 (05 Dec 2020 05:04), Max: 98.8 (04 Dec 2020 22:15)  HR: 52 (05 Dec 2020 08:15) (52 - 82)  BP: 138/65 (05 Dec 2020 08:15) (116/55 - 158/70)  BP(mean): 93 (05 Dec 2020 08:15) (79 - 101)  RR: 17 (05 Dec 2020 03:35) (16 - 20)  SpO2: 100% (05 Dec 2020 08:15) (99% - 100%)     PHYSICAL EXAM:  NAD  CTAB. Decreased at bases.  RRR  Trace LE edema    LABS:                        7.4    6.27  )-----------( 149      ( 05 Dec 2020 05:55 )             23.8     12-05    139  |  104  |  13  ----------------------------<  89  3.8   |  24  |  1.06    Ca    8.1<L>      05 Dec 2020 05:55  Phos  2.8     12-05  Mg     2.2     12-05            Urinalysis Basic - ( 03 Dec 2020 21:59 )    Color: Yellow / Appearance: Clear / SG: >=1.030 / pH: x  Gluc: x / Ketone: NEGATIVE  / Bili: Negative / Urobili: 0.2 E.U./dL   Blood: x / Protein: Trace mg/dL / Nitrite: NEGATIVE   Leuk Esterase: NEGATIVE / RBC: < 5 /HPF / WBC < 5 /HPF   Sq Epi: x / Non Sq Epi: 0-5 /HPF / Bacteria: Present /HPF      I&O's Summary    04 Dec 2020 07:01  -  05 Dec 2020 07:00  --------------------------------------------------------  IN: 1800 mL / OUT: 2375 mL / NET: -575 mL      A/P:  A/P: 80F w/HFpEF (61%), AS s/p TAVR, pAF (new), HTN, HLD, asthma adm to cardiac tele on 11/13 w/chest pain & hypertensive urgency. Course c/b new onset AF and GIB after hep gtt initiation 2/2 leaflet thrombus on IRMA. 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 Patient s/p laparoscopic subtotal colectomy (R and transverse colectomy, ileocolic anastomosis) on 12/2. Cardiology consult service now following for cardiac optimization post-surgery.     #Leaflet thrombus  - would like to start full dose anticoagulation w/a NOAC (e.g. eliquis 5mg bid). Awaiting clearance from primary team    #HFpEF - borderline euvolemic  - stop IVF.   - start lasix 20 IV daily    #pAF - [id number] - 4. No digoxin 2/2 rash  - switch BB to toprol xl 50 daily (holding parameter HR<50)  - will start eliquis 5mg bid as above when safe from surgical/bleeding perspective    #HTN  - hold medications for now  - will reintroduce lisinopril 10mg PO QD as needed    d/w cardiology attending  Chi Donahue MD PGY4

## 2020-12-05 NOTE — PROGRESS NOTE ADULT - ASSESSMENT
80F ([place]/English speaking) PMHx of uncontrolled HTN, HLD, asthma, chronic diastolic CHF, aortic stenosis s/p TAVR (02/19), initially presented to ED w/ CP and HTN on 11/13, found to have elevated AoV gradient w/ workup c/f possible thrombus. 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 Pt tolerating CLD w/o n/v. Has appropriate abd pain. Will switch to PO pain meds. Will restart home meds as appropriate.    pain/nausea control  home meds as appropriate  CLD LR@40, Protonix, gastropathy on EGD  PPx: SCDs, HSQ  PT/OT: Ordered 12/3  Creatining downtrending (1.06 this AM)  F/u Cards consult

## 2020-12-05 NOTE — PROGRESS NOTE ADULT - SUBJECTIVE AND OBJECTIVE BOX
Patient is a 80y Female seen and evaluated at bedside.   no complaints  denies CP SOB  feels okay except NGT bothering her   BP acceptable   ~2L UOP / 24h     Meds:    acetaminophen   Tablet .. 1000 every 6 hours  ALBUTerol    90 MICROgram(s) HFA Inhaler 1 every 4 hours  albuterol/ipratropium for Nebulization 3 every 6 hours  aspirin enteric coated 81 daily  atorvastatin 20 at bedtime  BUpivacaine liposome 1.3% Injectable (no eMAR) 20 once  dextrose 40% Gel 15 once  dextrose 5%. 1000 <Continuous>  dextrose 5%. 1000 <Continuous>  dextrose 50% Injectable 25 once  dextrose 50% Injectable 25 once  dextrose 50% Injectable 12.5 once  glucagon  Injectable 1 once  heparin   Injectable 5000 every 8 hours  influenza  Vaccine (HIGH DOSE) 0.7 once  insulin lispro (ADMELOG) corrective regimen sliding scale  Before meals and at bedtime  lactated ringers. 1000 <Continuous>  metoprolol tartrate 50 every 6 hours  ondansetron Injectable 4 every 6 hours PRN  oxyCODONE    IR 10 every 6 hours PRN  oxyCODONE    IR 5 every 4 hours PRN  pantoprazole  Injectable 40 daily  tiotropium 18 MICROgram(s) Capsule 1 daily      T(C): , Max: 37.1 (12-04-20 @ 22:15)  T(F): , Max: 98.8 (12-04-20 @ 22:15)  HR: 58 (12-05-20 @ 13:52)  BP: 119/56 (12-05-20 @ 13:52)  BP(mean): 80 (12-05-20 @ 13:52)  RR: 17 (12-05-20 @ 13:52)  SpO2: 99% (12-05-20 @ 13:52)  Wt(kg): --    12-04 @ 07:01  -  12-05 @ 07:00  --------------------------------------------------------  IN: 1800 mL / OUT: 2375 mL / NET: -575 mL    12-05 @ 07:01  -  12-05 @ 15:13  --------------------------------------------------------  IN: 800 mL / OUT: 400 mL / NET: 400 mL          Review of Systems:  CONSTITUTIONAL: No fever   CARDIOVASCULAR: No chest pain or shortness of breath  GASTROINTESTINAL: No abdominal pain, No nausea or vomiting, No diarrhea  GENITOURINARY: No dysuria       PHYSICAL EXAM:  GENERAL: Alert, awake, oriented x3 on NC 2L O2   CHEST/LUNG: Bibasilar rales  HEART: Regular rate and rhythm, no murmur  ABDOMEN: Soft, nontender, non distended, clean incision  EXTREMITIES: no oedema      LABS:                        7.4    6.27  )-----------( 149      ( 05 Dec 2020 05:55 )             23.8     12-05    139  |  104  |  13  ----------------------------<  89  3.8   |  24  |  1.06    Ca    8.1<L>      05 Dec 2020 05:55  Phos  2.8     12-05  Mg     2.2     12-05          Urinalysis Basic - ( 03 Dec 2020 21:59 )    Color: Yellow / Appearance: Clear / SG: >=1.030 / pH: x  Gluc: x / Ketone: NEGATIVE  / Bili: Negative / Urobili: 0.2 E.U./dL   Blood: x / Protein: Trace mg/dL / Nitrite: NEGATIVE   Leuk Esterase: NEGATIVE / RBC: < 5 /HPF / WBC < 5 /HPF   Sq Epi: x / Non Sq Epi: 0-5 /HPF / Bacteria: Present /HPF      Osmolality, Random Urine: 184 mosm/kg (12-04 @ 18:38)  Sodium, Random Urine: 24 mmol/L (12-04 @ 16:06)  Chloride, Random Urine: 20 mmol/L (12-04 @ 16:06)  Creatinine, Random Urine: 119 mg/dL (12-04 @ 16:06)  Sodium, Random Urine: <20 mmol/L (12-03 @ 16:56)  Creatinine, Random Urine: 197 mg/dL (12-03 @ 16:56)  Osmolality, Random Urine: 415 mosm/kg (12-03 @ 16:56)  Chloride, Random Urine: 20 mmol/L (12-03 @ 16:56)        RADIOLOGY & ADDITIONAL STUDIES:

## 2020-12-06 LAB
ANION GAP SERPL CALC-SCNC: 8 MMOL/L — SIGNIFICANT CHANGE UP (ref 5–17)
BUN SERPL-MCNC: 8 MG/DL — SIGNIFICANT CHANGE UP (ref 7–23)
CALCIUM SERPL-MCNC: 8.2 MG/DL — LOW (ref 8.4–10.5)
CHLORIDE SERPL-SCNC: 104 MMOL/L — SIGNIFICANT CHANGE UP (ref 96–108)
CO2 SERPL-SCNC: 25 MMOL/L — SIGNIFICANT CHANGE UP (ref 22–31)
CREAT SERPL-MCNC: 0.9 MG/DL — SIGNIFICANT CHANGE UP (ref 0.5–1.3)
GLUCOSE SERPL-MCNC: 97 MG/DL — SIGNIFICANT CHANGE UP (ref 70–99)
HCT VFR BLD CALC: 23.6 % — LOW (ref 34.5–45)
HGB BLD-MCNC: 7.2 G/DL — LOW (ref 11.5–15.5)
MAGNESIUM SERPL-MCNC: 2 MG/DL — SIGNIFICANT CHANGE UP (ref 1.6–2.6)
MCHC RBC-ENTMCNC: 29.1 PG — SIGNIFICANT CHANGE UP (ref 27–34)
MCHC RBC-ENTMCNC: 30.5 GM/DL — LOW (ref 32–36)
MCV RBC AUTO: 95.5 FL — SIGNIFICANT CHANGE UP (ref 80–100)
NRBC # BLD: 0 /100 WBCS — SIGNIFICANT CHANGE UP (ref 0–0)
PHOSPHATE SERPL-MCNC: 2.9 MG/DL — SIGNIFICANT CHANGE UP (ref 2.5–4.5)
PLATELET # BLD AUTO: 145 K/UL — LOW (ref 150–400)
POTASSIUM SERPL-MCNC: 3.6 MMOL/L — SIGNIFICANT CHANGE UP (ref 3.5–5.3)
POTASSIUM SERPL-SCNC: 3.6 MMOL/L — SIGNIFICANT CHANGE UP (ref 3.5–5.3)
RBC # BLD: 2.47 M/UL — LOW (ref 3.8–5.2)
RBC # FLD: 14 % — SIGNIFICANT CHANGE UP (ref 10.3–14.5)
SODIUM SERPL-SCNC: 137 MMOL/L — SIGNIFICANT CHANGE UP (ref 135–145)
WBC # BLD: 5.8 K/UL — SIGNIFICANT CHANGE UP (ref 3.8–10.5)
WBC # FLD AUTO: 5.8 K/UL — SIGNIFICANT CHANGE UP (ref 3.8–10.5)

## 2020-12-06 RX ORDER — POTASSIUM PHOSPHATE, MONOBASIC POTASSIUM PHOSPHATE, DIBASIC 236; 224 MG/ML; MG/ML
15 INJECTION, SOLUTION INTRAVENOUS ONCE
Refills: 0 | Status: COMPLETED | OUTPATIENT
Start: 2020-12-06 | End: 2020-12-06

## 2020-12-06 RX ADMIN — Medication 1000 MILLIGRAM(S): at 11:22

## 2020-12-06 RX ADMIN — OXYCODONE HYDROCHLORIDE 10 MILLIGRAM(S): 5 TABLET ORAL at 11:22

## 2020-12-06 RX ADMIN — ATORVASTATIN CALCIUM 20 MILLIGRAM(S): 80 TABLET, FILM COATED ORAL at 21:00

## 2020-12-06 RX ADMIN — Medication 1000 MILLIGRAM(S): at 22:00

## 2020-12-06 RX ADMIN — Medication 3 MILLILITER(S): at 21:00

## 2020-12-06 RX ADMIN — OXYCODONE HYDROCHLORIDE 10 MILLIGRAM(S): 5 TABLET ORAL at 17:22

## 2020-12-06 RX ADMIN — OXYCODONE HYDROCHLORIDE 10 MILLIGRAM(S): 5 TABLET ORAL at 18:22

## 2020-12-06 RX ADMIN — HEPARIN SODIUM 5000 UNIT(S): 5000 INJECTION INTRAVENOUS; SUBCUTANEOUS at 21:00

## 2020-12-06 RX ADMIN — Medication 1000 MILLIGRAM(S): at 05:30

## 2020-12-06 RX ADMIN — OXYCODONE HYDROCHLORIDE 10 MILLIGRAM(S): 5 TABLET ORAL at 02:42

## 2020-12-06 RX ADMIN — Medication 1000 MILLIGRAM(S): at 22:30

## 2020-12-06 RX ADMIN — PANTOPRAZOLE SODIUM 40 MILLIGRAM(S): 20 TABLET, DELAYED RELEASE ORAL at 11:22

## 2020-12-06 RX ADMIN — HEPARIN SODIUM 5000 UNIT(S): 5000 INJECTION INTRAVENOUS; SUBCUTANEOUS at 16:40

## 2020-12-06 RX ADMIN — Medication 1000 MILLIGRAM(S): at 17:40

## 2020-12-06 RX ADMIN — Medication 3 MILLILITER(S): at 16:00

## 2020-12-06 RX ADMIN — Medication 1000 MILLIGRAM(S): at 12:24

## 2020-12-06 RX ADMIN — Medication 3 MILLILITER(S): at 11:22

## 2020-12-06 RX ADMIN — OXYCODONE HYDROCHLORIDE 10 MILLIGRAM(S): 5 TABLET ORAL at 12:24

## 2020-12-06 RX ADMIN — HEPARIN SODIUM 5000 UNIT(S): 5000 INJECTION INTRAVENOUS; SUBCUTANEOUS at 05:00

## 2020-12-06 RX ADMIN — OXYCODONE HYDROCHLORIDE 10 MILLIGRAM(S): 5 TABLET ORAL at 02:10

## 2020-12-06 RX ADMIN — Medication 50 MILLIGRAM(S): at 05:00

## 2020-12-06 RX ADMIN — Medication 3 MILLILITER(S): at 05:00

## 2020-12-06 RX ADMIN — Medication 81 MILLIGRAM(S): at 11:22

## 2020-12-06 RX ADMIN — Medication 1000 MILLIGRAM(S): at 16:40

## 2020-12-06 RX ADMIN — Medication 1000 MILLIGRAM(S): at 05:00

## 2020-12-06 RX ADMIN — POTASSIUM PHOSPHATE, MONOBASIC POTASSIUM PHOSPHATE, DIBASIC 62.5 MILLIMOLE(S): 236; 224 INJECTION, SOLUTION INTRAVENOUS at 11:23

## 2020-12-06 NOTE — PROGRESS NOTE ADULT - ASSESSMENT
79 yo F ([place]/English speaking) w/ PMH of uncontrolled HTN, HLD, asthma, chronic diastolic CHF, aortic stenosis s/p TAVR (02/19), initially presented to the ED w/ CP and HTN on 11/13, found to have elevated AoV gradient w/ workup c/f possible thrombus. 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 The patient is tolerating CLD w/o N/V. Will restart home meds as appropriate.    pain/nausea control  home meds as appropriate  CLD   Protonix  SCDs, SQH for DVT ppx.  PT/OT: Ordered 12/3  Creatinine downtrending (0.9 this AM)  OOBA/IS

## 2020-12-06 NOTE — PROGRESS NOTE ADULT - SUBJECTIVE AND OBJECTIVE BOX
POD:   Procedure:     SUBJECTIVE: Patient seen and examined by chief resident on morning rounds.        Vital Signs Last 24 Hrs  T(C): 36.8 (06 Dec 2020 05:00), Max: 36.8 (05 Dec 2020 18:05)  T(F): 98.2 (06 Dec 2020 05:00), Max: 98.2 (05 Dec 2020 18:05)  HR: 66 (06 Dec 2020 04:33) (52 - 68)  BP: 136/61 (06 Dec 2020 04:33) (119/56 - 149/67)  BP(mean): 88 (06 Dec 2020 04:33) (80 - 96)  RR: 17 (06 Dec 2020 04:33) (17 - 18)  SpO2: 99% (06 Dec 2020 04:33) (90% - 100%)    Physical Exam:  General: NAD  Pulmonary: Nonlabored breathing, no respiratory distress  Abdominal: soft, nondistended, nontender with no rebound or guarding  Extremities: WWP, normal strength, no clubbing/cyanosis/edema  Neuro: A/O x3    Lines/drains/tubes:    I&O's Summary    04 Dec 2020 07:01  -  05 Dec 2020 07:00  --------------------------------------------------------  IN: 1800 mL / OUT: 2375 mL / NET: -575 mL    05 Dec 2020 07:01  -  06 Dec 2020 06:05  --------------------------------------------------------  IN: 1000 mL / OUT: 1475 mL / NET: -475 mL        LABS:                        7.2    5.80  )-----------( 145      ( 06 Dec 2020 05:48 )             23.6     12-05    139  |  104  |  13  ----------------------------<  89  3.8   |  24  |  1.06    Ca    8.1<L>      05 Dec 2020 05:55  Phos  2.8     12-05  Mg     2.2     12-05          CAPILLARY BLOOD GLUCOSE      POCT Blood Glucose.: 149 mg/dL (05 Dec 2020 21:34)  POCT Blood Glucose.: 136 mg/dL (05 Dec 2020 16:11)  POCT Blood Glucose.: 121 mg/dL (05 Dec 2020 11:29)  POCT Blood Glucose.: 101 mg/dL (05 Dec 2020 06:15)        RADIOLOGY & ADDITIONAL STUDIES:     SUBJECTIVE: Overnight there were no significant events, this morning the patient is feeling well. Overnight CXR was done, which demonstrated atelectasis and minor cephalization. The patient was examined at the bedside by the chief resident. She denies having chest pain, SOB, nausea, vomiting, dizziness, chills.    Vital Signs Last 24 Hrs  T(C): 36.8 (06 Dec 2020 05:00), Max: 36.8 (05 Dec 2020 18:05)  T(F): 98.2 (06 Dec 2020 05:00), Max: 98.2 (05 Dec 2020 18:05)  HR: 66 (06 Dec 2020 04:33) (52 - 68)  BP: 136/61 (06 Dec 2020 04:33) (119/56 - 149/67)  BP(mean): 88 (06 Dec 2020 04:33) (80 - 96)  RR: 17 (06 Dec 2020 04:33) (17 - 18)  SpO2: 99% (06 Dec 2020 04:33) (90% - 100%)    Physical Exam:  General: NAD, resting comfortably in the bed  Pulmonary: Nonlabored breathing, no respiratory distress  Abdominal: soft, NT/ND, no rebound tenderness, guarding, rigidity, incisions C/D/I  Extremities: WWP, normal strength, no clubbing/cyanosis/edema  Neuro: AAOx3    Lines/drains/tubes:    I&O's Summary    04 Dec 2020 07:01  -  05 Dec 2020 07:00  --------------------------------------------------------  IN: 1800 mL / OUT: 2375 mL / NET: -575 mL    05 Dec 2020 07:01  -  06 Dec 2020 06:05  --------------------------------------------------------  IN: 1000 mL / OUT: 1475 mL / NET: -475 mL        LABS:                        7.2    5.80  )-----------( 145      ( 06 Dec 2020 05:48 )             23.6     12-05    139  |  104  |  13  ----------------------------<  89  3.8   |  24  |  1.06    Ca    8.1<L>      05 Dec 2020 05:55  Phos  2.8     12-05  Mg     2.2     12-05          CAPILLARY BLOOD GLUCOSE      POCT Blood Glucose.: 149 mg/dL (05 Dec 2020 21:34)  POCT Blood Glucose.: 136 mg/dL (05 Dec 2020 16:11)  POCT Blood Glucose.: 121 mg/dL (05 Dec 2020 11:29)  POCT Blood Glucose.: 101 mg/dL (05 Dec 2020 06:15)        RADIOLOGY & ADDITIONAL STUDIES:

## 2020-12-06 NOTE — PROGRESS NOTE ADULT - SUBJECTIVE AND OBJECTIVE BOX
Feels better than yesterday. No flatus or BM yet  Tolerating clears, but had mild nausea yesterday  AFVSS  Abd soft, minimal distention, Incisions clean    A/P: POD 4 subtotal colectomy.  Awaiting bowel function  1. Continue clears  2. OOB ambulating  3. SQH  4. IV is HL

## 2020-12-07 LAB
ANION GAP SERPL CALC-SCNC: 10 MMOL/L — SIGNIFICANT CHANGE UP (ref 5–17)
APTT BLD: 153.5 SEC — CRITICAL HIGH (ref 27.5–35.5)
APTT BLD: 31.6 SEC — SIGNIFICANT CHANGE UP (ref 27.5–35.5)
BUN SERPL-MCNC: 5 MG/DL — LOW (ref 7–23)
CALCIUM SERPL-MCNC: 8.3 MG/DL — LOW (ref 8.4–10.5)
CHLORIDE SERPL-SCNC: 104 MMOL/L — SIGNIFICANT CHANGE UP (ref 96–108)
CO2 SERPL-SCNC: 24 MMOL/L — SIGNIFICANT CHANGE UP (ref 22–31)
CREAT SERPL-MCNC: 0.83 MG/DL — SIGNIFICANT CHANGE UP (ref 0.5–1.3)
GLUCOSE SERPL-MCNC: 100 MG/DL — HIGH (ref 70–99)
HCT VFR BLD CALC: 23.2 % — LOW (ref 34.5–45)
HGB BLD-MCNC: 7.2 G/DL — LOW (ref 11.5–15.5)
INR BLD: 1.1 — SIGNIFICANT CHANGE UP (ref 0.88–1.16)
MAGNESIUM SERPL-MCNC: 1.9 MG/DL — SIGNIFICANT CHANGE UP (ref 1.6–2.6)
MCHC RBC-ENTMCNC: 29.8 PG — SIGNIFICANT CHANGE UP (ref 27–34)
MCHC RBC-ENTMCNC: 31 GM/DL — LOW (ref 32–36)
MCV RBC AUTO: 95.9 FL — SIGNIFICANT CHANGE UP (ref 80–100)
NRBC # BLD: 0 /100 WBCS — SIGNIFICANT CHANGE UP (ref 0–0)
PHOSPHATE SERPL-MCNC: 3.1 MG/DL — SIGNIFICANT CHANGE UP (ref 2.5–4.5)
PLATELET # BLD AUTO: 147 K/UL — LOW (ref 150–400)
POTASSIUM SERPL-MCNC: 3.6 MMOL/L — SIGNIFICANT CHANGE UP (ref 3.5–5.3)
POTASSIUM SERPL-SCNC: 3.6 MMOL/L — SIGNIFICANT CHANGE UP (ref 3.5–5.3)
PROTHROM AB SERPL-ACNC: 13.1 SEC — SIGNIFICANT CHANGE UP (ref 10.6–13.6)
RBC # BLD: 2.42 M/UL — LOW (ref 3.8–5.2)
RBC # FLD: 13.8 % — SIGNIFICANT CHANGE UP (ref 10.3–14.5)
SODIUM SERPL-SCNC: 138 MMOL/L — SIGNIFICANT CHANGE UP (ref 135–145)
WBC # BLD: 5.28 K/UL — SIGNIFICANT CHANGE UP (ref 3.8–10.5)
WBC # FLD AUTO: 5.28 K/UL — SIGNIFICANT CHANGE UP (ref 3.8–10.5)

## 2020-12-07 PROCEDURE — 99233 SBSQ HOSP IP/OBS HIGH 50: CPT

## 2020-12-07 RX ORDER — HEPARIN SODIUM 5000 [USP'U]/ML
1800 INJECTION INTRAVENOUS; SUBCUTANEOUS
Qty: 25000 | Refills: 0 | Status: DISCONTINUED | OUTPATIENT
Start: 2020-12-07 | End: 2020-12-07

## 2020-12-07 RX ORDER — POTASSIUM CHLORIDE 20 MEQ
40 PACKET (EA) ORAL ONCE
Refills: 0 | Status: COMPLETED | OUTPATIENT
Start: 2020-12-07 | End: 2020-12-07

## 2020-12-07 RX ORDER — GABAPENTIN 400 MG/1
300 CAPSULE ORAL ONCE
Refills: 0 | Status: COMPLETED | OUTPATIENT
Start: 2020-12-07 | End: 2020-12-07

## 2020-12-07 RX ORDER — HEPARIN SODIUM 5000 [USP'U]/ML
1800 INJECTION INTRAVENOUS; SUBCUTANEOUS
Qty: 25000 | Refills: 0 | Status: DISCONTINUED | OUTPATIENT
Start: 2020-12-07 | End: 2020-12-08

## 2020-12-07 RX ORDER — FUROSEMIDE 40 MG
20 TABLET ORAL DAILY
Refills: 0 | Status: DISCONTINUED | OUTPATIENT
Start: 2020-12-07 | End: 2020-12-10

## 2020-12-07 RX ORDER — HYDRALAZINE HCL 50 MG
10 TABLET ORAL ONCE
Refills: 0 | Status: COMPLETED | OUTPATIENT
Start: 2020-12-07 | End: 2020-12-07

## 2020-12-07 RX ADMIN — Medication 50 MILLIGRAM(S): at 05:09

## 2020-12-07 RX ADMIN — GABAPENTIN 300 MILLIGRAM(S): 400 CAPSULE ORAL at 23:17

## 2020-12-07 RX ADMIN — OXYCODONE HYDROCHLORIDE 10 MILLIGRAM(S): 5 TABLET ORAL at 18:40

## 2020-12-07 RX ADMIN — OXYCODONE HYDROCHLORIDE 10 MILLIGRAM(S): 5 TABLET ORAL at 17:40

## 2020-12-07 RX ADMIN — Medication 3 MILLILITER(S): at 21:56

## 2020-12-07 RX ADMIN — Medication 1000 MILLIGRAM(S): at 17:30

## 2020-12-07 RX ADMIN — Medication 10 MILLIGRAM(S): at 21:13

## 2020-12-07 RX ADMIN — OXYCODONE HYDROCHLORIDE 10 MILLIGRAM(S): 5 TABLET ORAL at 04:00

## 2020-12-07 RX ADMIN — Medication 1000 MILLIGRAM(S): at 11:39

## 2020-12-07 RX ADMIN — Medication 3 MILLILITER(S): at 05:00

## 2020-12-07 RX ADMIN — Medication 1000 MILLIGRAM(S): at 23:17

## 2020-12-07 RX ADMIN — Medication 1000 MILLIGRAM(S): at 12:41

## 2020-12-07 RX ADMIN — OXYCODONE HYDROCHLORIDE 5 MILLIGRAM(S): 5 TABLET ORAL at 22:56

## 2020-12-07 RX ADMIN — Medication 20 MILLIGRAM(S): at 10:02

## 2020-12-07 RX ADMIN — Medication 1000 MILLIGRAM(S): at 18:30

## 2020-12-07 RX ADMIN — PANTOPRAZOLE SODIUM 40 MILLIGRAM(S): 20 TABLET, DELAYED RELEASE ORAL at 11:39

## 2020-12-07 RX ADMIN — HEPARIN SODIUM 5000 UNIT(S): 5000 INJECTION INTRAVENOUS; SUBCUTANEOUS at 05:09

## 2020-12-07 RX ADMIN — OXYCODONE HYDROCHLORIDE 5 MILLIGRAM(S): 5 TABLET ORAL at 21:56

## 2020-12-07 RX ADMIN — Medication 3 MILLILITER(S): at 11:39

## 2020-12-07 RX ADMIN — Medication 81 MILLIGRAM(S): at 11:39

## 2020-12-07 RX ADMIN — Medication 3 MILLILITER(S): at 17:30

## 2020-12-07 RX ADMIN — OXYCODONE HYDROCHLORIDE 10 MILLIGRAM(S): 5 TABLET ORAL at 11:39

## 2020-12-07 RX ADMIN — Medication 1000 MILLIGRAM(S): at 05:09

## 2020-12-07 RX ADMIN — OXYCODONE HYDROCHLORIDE 10 MILLIGRAM(S): 5 TABLET ORAL at 12:41

## 2020-12-07 RX ADMIN — Medication 1000 MILLIGRAM(S): at 05:40

## 2020-12-07 RX ADMIN — Medication 40 MILLIEQUIVALENT(S): at 11:39

## 2020-12-07 RX ADMIN — HEPARIN SODIUM 18 UNIT(S)/HR: 5000 INJECTION INTRAVENOUS; SUBCUTANEOUS at 11:58

## 2020-12-07 RX ADMIN — OXYCODONE HYDROCHLORIDE 10 MILLIGRAM(S): 5 TABLET ORAL at 03:22

## 2020-12-07 RX ADMIN — ATORVASTATIN CALCIUM 20 MILLIGRAM(S): 80 TABLET, FILM COATED ORAL at 21:56

## 2020-12-07 NOTE — PROGRESS NOTE ADULT - SUBJECTIVE AND OBJECTIVE BOX
INTERVAL HPI/OVERNIGHT EVENTS: Patient seen and examined at bedside. No acute events overnight.    VITAL SIGNS:  T(F): 97.6 (12-07-20 @ 13:40)  HR: 66 (12-07-20 @ 14:09)  BP: 166/74 (12-07-20 @ 14:09)  RR: 17 (12-07-20 @ 14:09)  SpO2: 94% (12-07-20 @ 14:09)  Wt(kg): --    12-06-20 @ 07:01  -  12-07-20 @ 07:00  --------------------------------------------------------  IN: 700 mL / OUT: 2125 mL / NET: -1425 mL    12-07-20 @ 07:01  -  12-07-20 @ 16:48  --------------------------------------------------------  IN: 552 mL / OUT: 650 mL / NET: -98 mL        PHYSICAL EXAM:    GEN: Awake, comfortable. NAD.   HEENT: NCAT, PERRL, EOMI. Mucosa moist.   NECK: Supple, no JVD.   RESP: CTA b/l  CV: RRR, normal s1/s2. No m/r/g.  ABD: Soft, NTND. BS+  EXT: Warm. No edema, clubbing, or cyanosis.   NEURO: AAOx3. No focal deficits.    MEDICATIONS  (STANDING):  acetaminophen   Tablet .. 1000 milliGRAM(s) Oral every 6 hours  ALBUTerol    90 MICROgram(s) HFA Inhaler 1 Puff(s) Inhalation every 4 hours  albuterol/ipratropium for Nebulization 3 milliLiter(s) Nebulizer every 6 hours  aspirin enteric coated 81 milliGRAM(s) Oral daily  atorvastatin 20 milliGRAM(s) Oral at bedtime  BUpivacaine liposome 1.3% Injectable (no eMAR) 20 milliLiter(s) Local Injection once  furosemide   Injectable 20 milliGRAM(s) IV Push daily  heparin  Infusion 1800 Unit(s)/Hr (18 mL/Hr) IV Continuous <Continuous>  influenza  Vaccine (HIGH DOSE) 0.7 milliLiter(s) IntraMuscular once  metoprolol succinate ER 50 milliGRAM(s) Oral daily  pantoprazole  Injectable 40 milliGRAM(s) IV Push daily  tiotropium 18 MICROgram(s) Capsule 1 Capsule(s) Inhalation daily    MEDICATIONS  (PRN):  ondansetron Injectable 4 milliGRAM(s) IV Push every 6 hours PRN Nausea and/or Vomiting  oxyCODONE    IR 10 milliGRAM(s) Oral every 6 hours PRN Severe Pain (7 - 10)  oxyCODONE    IR 5 milliGRAM(s) Oral every 4 hours PRN Moderate Pain (4 - 6)      Allergies    Digox (Rash; Urticaria; Hives)  Plavix (Other (Mod to Severe))    Intolerances        LABS:                        7.2    5.28  )-----------( 147      ( 07 Dec 2020 05:53 )             23.2     12-07    138  |  104  |  5<L>  ----------------------------<  100<H>  3.6   |  24  |  0.83    Ca    8.3<L>      07 Dec 2020 05:53  Phos  3.1     12-07  Mg     1.9     12-07      PT/INR - ( 07 Dec 2020 10:26 )   PT: 13.1 sec;   INR: 1.10          PTT - ( 07 Dec 2020 10:26 )  PTT:31.6 sec          EKG:    Echo:    Cath:    NST:    RADIOLOGY & ADDITIONAL TESTS:   INTERVAL HPI/OVERNIGHT EVENTS: Patient seen and examined at bedside. Denies chest pain, palpitations, SOB.    VITAL SIGNS:  T(F): 97.6 (12-07-20 @ 13:40)  HR: 66 (12-07-20 @ 14:09)  BP: 166/74 (12-07-20 @ 14:09)  RR: 17 (12-07-20 @ 14:09)  SpO2: 94% (12-07-20 @ 14:09)  Wt(kg): --    12-06-20 @ 07:01  -  12-07-20 @ 07:00  --------------------------------------------------------  IN: 700 mL / OUT: 2125 mL / NET: -1425 mL    12-07-20 @ 07:01  -  12-07-20 @ 16:48  --------------------------------------------------------  IN: 552 mL / OUT: 650 mL / NET: -98 mL        PHYSICAL EXAM:    GEN: Awake, comfortable in chair. NAD.   HEENT: NCAT, PERRL, EOMI. Mucosa moist.   NECK: Supple, no JVD.   RESP: Slight crackles at bases  CV: RRR, normal s1/s2. No m/r/g.  ABD: Soft, NTND. BS+  EXT: Warm. Trace pedal/distal LE edema. No clubbing, or cyanosis.   NEURO: AAOx3. No focal deficits.    HOME MEDS:  ASA 81  Lipitor 20  Coreg 6.25 q12h  HCTZ 12.5  Lisinopril 10    MEDICATIONS  (STANDING):  acetaminophen   Tablet .. 1000 milliGRAM(s) Oral every 6 hours  ALBUTerol    90 MICROgram(s) HFA Inhaler 1 Puff(s) Inhalation every 4 hours  albuterol/ipratropium for Nebulization 3 milliLiter(s) Nebulizer every 6 hours  aspirin enteric coated 81 milliGRAM(s) Oral daily  atorvastatin 20 milliGRAM(s) Oral at bedtime  BUpivacaine liposome 1.3% Injectable (no eMAR) 20 milliLiter(s) Local Injection once  furosemide   Injectable 20 milliGRAM(s) IV Push daily  heparin  Infusion 1800 Unit(s)/Hr (18 mL/Hr) IV Continuous <Continuous>  influenza  Vaccine (HIGH DOSE) 0.7 milliLiter(s) IntraMuscular once  metoprolol succinate ER 50 milliGRAM(s) Oral daily  pantoprazole  Injectable 40 milliGRAM(s) IV Push daily  tiotropium 18 MICROgram(s) Capsule 1 Capsule(s) Inhalation daily    MEDICATIONS  (PRN):  ondansetron Injectable 4 milliGRAM(s) IV Push every 6 hours PRN Nausea and/or Vomiting  oxyCODONE    IR 10 milliGRAM(s) Oral every 6 hours PRN Severe Pain (7 - 10)  oxyCODONE    IR 5 milliGRAM(s) Oral every 4 hours PRN Moderate Pain (4 - 6)      Allergies    Digox (Rash; Urticaria; Hives)  Plavix (Other (Mod to Severe))    Intolerances        LABS:                        7.2    5.28  )-----------( 147      ( 07 Dec 2020 05:53 )             23.2     12-07    138  |  104  |  5<L>  ----------------------------<  100<H>  3.6   |  24  |  0.83    Ca    8.3<L>      07 Dec 2020 05:53  Phos  3.1     12-07  Mg     1.9     12-07      PT/INR - ( 07 Dec 2020 10:26 )   PT: 13.1 sec;   INR: 1.10          PTT - ( 07 Dec 2020 10:26 )  PTT:31.6 sec          EKG:    Echo: < from: TTE Echo Complete w/o Contrast w/ Doppler (11.13.20 @ 16:09) >  --------------------------------------------------------------------------------  CONCLUSIONS:     1. Normal left and right ventricular size and systolic function.   2. Moderate symmetric left ventricular hypertrophy.   3. Hyperdynamic left ventricular systolic function.   4. 23 mm Jamar valve is noted in the aortic position without any aortic regurgitation. The peak transvalvular velocity is 3.93 m/s, the mean transvalvular gradient is 35.00 mmHg, and the LVOT/AV velocity ratio is 0.30. The peak transaortic gradient is 61.78 mmHg. The transaortic gradients are elevated even in the setting of a TAVR.   5. Grade II left ventricular diastolic dysfunction.   6. Normal right ventricular size and systolic function.   7. Mildly dilated left atrium.   8. No evidence of pulmonary hypertension.   9. No pericardial effusion.  10. Moderately dilated ascending aorta.  11. Compared to the previous TTE performed on 4/1/2019, the transaortic gradients are higher.    --------------------------------------------------------------------------------    < end of copied text >  < from: TTE Limited Echo w/o Cont (11.16.20 @ 12:57) >  --------------------------------------------------------------------------------  CONCLUSIONS:     1. Limited study obtained for evaluation of aortic valve gradients.   2. Hyperdynamic left ventricular systolic function with an intra-cavitary gradient of 31 mmHg.   3. 23 mm Jamar valve is noted in the aortic position without any aortic regurgitation. The peak transvalvular velocity is 4.20 m/s, the mean transvalvular gradient is 35.00 mmHg, and the LVOT/AV velocity ratio is 0.38. The peak transaortic gradient is 70.56 mmHg. The transaortic gradients are elevated, even in the setting of a TAVR. However, hyperdynamic LVEF may contribute to the elevation of the transaortic gradients.   4. Normal right ventricular size and systolic function.   5. No pericardial effusion.   6. Compared to the previous TTE performed on 11/13/2020, there have been no significant interval changes.    --------------------------------------------------------------------------------    < end of copied text >    < from: IRMA w/Doppler (11.17.20 @ 12:38) >  --------------------------------------------------------------------------------    CONCLUSIONS:     1. Normal left and right ventricular size and systolic function.   2. Mildly dilated left atrium.   3. No LA/RA/MAIK/RAA thrombus seen.   4. No evidence of an intracardiac shunt.   5. 23 mm Jamar valve is noted in the aortic position without any aortic regurgitation.   6. There is slight thickening at the base of the right cusp with probably mild restriction in excursion. The other prosthetic leaflets appear normal.   7. No evidence of pulmonary hypertension.   8. No pericardial effusion.   9. See TTE performed 11/16/2020 for TAVR gradients, which were elevated.    -------------------------------------------------------------------------------    < end of copied text >    Cath: [facility] 1/09/19: Diagnostic right and left heart catheterization revealing LMCA normal, LAD with minor luminal irregularities, LCx large and normal, RCA large and normal. PA 18, RV 73/13 (21), PA 70/34 (49), PCWP 26, CO 7.3 L/min, CI 3.7, TPG 23, severe AS (mean LV/Ao gradient 48.1 mmHg, SETH 1.1 cm2).     NST:    RADIOLOGY & ADDITIONAL TESTS:

## 2020-12-07 NOTE — CHART NOTE - NSCHARTNOTEFT_GEN_A_CORE
Admitting Diagnosis:   Patient is a 80y old  Female who presents with a chief complaint of HTN urgency (07 Dec 2020 09:31)      PAST MEDICAL & SURGICAL HISTORY:  CHF (congestive heart failure)    Pulmonary HTN    Aortic stenosis    HTN (hypertension)    S/P TAVR (transcatheter aortic valve replacement)        Current Nutrition Order:  Low fiber (adv. this am) had clears for breakfast    PO Intake: Good (%) [   ]  Fair (50-75%) [   ] Poor (<25%) [  x ]  Had tea only this am    GI Issues:   Denies N/V  +1 small BM O/n  Ordered for zofran, protonix  Not on bowel regimen    Pain:  No pain reported  Receiving low dose oxycodone per RN    Skin Integrity:  Surgical incision    Labs:   12-07    138  |  104  |  5<L>  ----------------------------<  100<H>  3.6   |  24  |  0.83    Ca    8.3<L>      07 Dec 2020 05:53  Phos  3.1     12-07  Mg     1.9     12-07      CAPILLARY BLOOD GLUCOSE          Medications:  MEDICATIONS  (STANDING):  acetaminophen   Tablet .. 1000 milliGRAM(s) Oral every 6 hours  ALBUTerol    90 MICROgram(s) HFA Inhaler 1 Puff(s) Inhalation every 4 hours  albuterol/ipratropium for Nebulization 3 milliLiter(s) Nebulizer every 6 hours  aspirin enteric coated 81 milliGRAM(s) Oral daily  atorvastatin 20 milliGRAM(s) Oral at bedtime  BUpivacaine liposome 1.3% Injectable (no eMAR) 20 milliLiter(s) Local Injection once  furosemide   Injectable 20 milliGRAM(s) IV Push daily  heparin  Infusion 1800 Unit(s)/Hr (18 mL/Hr) IV Continuous <Continuous>  influenza  Vaccine (HIGH DOSE) 0.7 milliLiter(s) IntraMuscular once  metoprolol succinate ER 50 milliGRAM(s) Oral daily  pantoprazole  Injectable 40 milliGRAM(s) IV Push daily  tiotropium 18 MICROgram(s) Capsule 1 Capsule(s) Inhalation daily    MEDICATIONS  (PRN):  ondansetron Injectable 4 milliGRAM(s) IV Push every 6 hours PRN Nausea and/or Vomiting  oxyCODONE    IR 10 milliGRAM(s) Oral every 6 hours PRN Severe Pain (7 - 10)  oxyCODONE    IR 5 milliGRAM(s) Oral every 4 hours PRN Moderate Pain (4 - 6)    Admitted Anthropometrics:  Height: 5'10", IBW 150lbs+/-10%, %%, BMI 32    Weight: 222lbs (11/13)  Daily     Weight Change:   100.1kg (11/14)  100.9kg (11/15, 11/16, 11/17)  100.3kg (11/18)  100.4kg (11/19)  100.9kg (11/20)  100.2kg (11/21)  101.7kg (11/22)  101.4kg (11/23)  101kg (11/24)  102.3kg (11/25)  102.4kg (11/26)  102.6kg (11/27)  101.2kg (11/28)  100.9kg (11/29)  100.4kg (11/30)  100kg (12/1)  99.4kg (12/2)  Appears to be relatively stable    Estimated energy needs:   IBW used for calculations as pt >120% of IBW   Nutrient needs based on [facility] standards of care for maintenance in older adults.   Adjusted for age, adenocarcinoma post-op healing and CHF. Fluids per team 2/2 CHF  Calories: 25-30 kcal/kg = 6758-2914 kcal/day  Protein: 1.2-1.5 g/kg = 82-102g protein/day     Subjective:   80F (Burkinan/English speaking) PMHx of uncontrolled HTN, HLD, asthma, chronic diastolic CHF, aortic stenosis s/p TAVR (02/19), initially presented to ED w/ CP and HTN on 11/13, found to have elevated AoV gradient w/ workup c/f possible thrombus. 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 Admitted to SICU for postoperative hemodynamic monitoring. Stepped down to 8LA 12/3.     Pt seen in room, resting in chair- passed along phone to speak w/ daughter. Informed patient and patients daughter about diet advancing from clears to full liquids this morning and additional options available to her. Daughter expressed concern over pt saying she was feeling weak. Discussed more substantial options on a full liquid diet and likely adv to low fiber once tolerance is demonstrated. D/w team 1 adding on ONS for additional nutrition support. Order placed for MD verification for addition of Ensure High Protein. Please obtain updated weight when pt gets back in bed. RD to follow.      Previous Nutrition Diagnosis:   Inadequate Oral intake RT pt meeting <75% of EER AEB pt on FLD  Active [x   ]  Resolved [   ]    If resolved, new PES:     Goal:  Pt to consistently meet % of estimated needs PO     Recommendations:  1. Recommend adding on Ensure High Protein TID   2. Encourage more nutrient dense foods on FLD (soups, yogurt, pudding)  3. Pain and bowel regimen per team  4. BMP, BG Q6hrs, CBC per MD discretion     Education:   full liquid diet     Risk Level: High [ x  ] Moderate [   ] Low [   ]

## 2020-12-07 NOTE — PROGRESS NOTE ADULT - ASSESSMENT
A/P: 80F w/HFpEF (61%), AS s/p TAVR, pAF (new), HTN, HLD, asthma adm to cardiac tele on 11/13 w/chest pain & hypertensive urgency. Course c/b new onset AF and GIB after hep gtt initiation 2/2 leaflet thrombus on IRMA. 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 Patient s/p laparoscopic subtotal colectomy (R and transverse colectomy, ileocolic anastomosis) on 12/2. Cardiology consult service now following for cardiac optimization post-surgery.     #Leaflet thrombus  - c/w hep gtt  - will switch to NOAC (eliquis 5mg bid) at later point  - maintain Hb > 7    #HFpEF - appears relatively euvolemic  - c/w lasix 20mg IV QD    #pAF - [id number] - 4. No digoxin 2/2 rash  - c/w lopressor 50mg q6h  - c/w hep gtt; will start eliquis 5mg bid as above when safe from surgical/bleeding perspective    #HTN  - hold medications for now  - will reintroduce lisinopril 10mg PO QD as needed    Recommendations are final when signed by attending.    --  Hever Duncan MD  Cardiology PGY5

## 2020-12-07 NOTE — PROGRESS NOTE ADULT - ASSESSMENT
80F (Ukrainian/English speaking) PMHx of uncontrolled HTN, HLD, asthma, chronic diastolic CHF, aortic stenosis s/p TAVR (02/19), initially presented to ED w/ CP and HTN on 11/13, found to have elevated AoV gradient w/ workup c/f possible thrombus. 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 (12/2). Admitted to SICU for postoperative hemodynamic monitoring.     NEURO: Dilaudid, tylenol,  PULM: asthma, duonebs  GI/FEN: FLD, Protonix, gastropathy on EGD  : voiding  ENDO: ISS  ID: Monitor for s/s of infection, WBC wnl  PPx: SCDs, HSQ  LINES: PIVx2, antonino (12/2)   WOUNDS/DRAINS: midline incision  PT/OT: Ordered 12/3   80F (Cameroonian/English speaking) PMHx of uncontrolled HTN, HLD, asthma, chronic diastolic CHF, aortic stenosis s/p TAVR (02/19), initially presented to ED w/ CP and HTN on 11/13, found to have elevated AoV gradient w/ workup c/f possible thrombus. 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 (12/2). Admitted to SICU for postoperative hemodynamic monitoring.     start hep gtt and transition to Eliquis once consistent BF.   start daily lasix  NEURO: Dilaudid, tylenol,  PULM: asthma, duonebs  GI/FEN: FLD, Protonix, gastropathy on EGD  : voiding  ENDO: ISS  ID: Monitor for s/s of infection, WBC wnl  PPx: SCDs, HSQ  LINES: PIVx2, antonino (12/2)   WOUNDS/DRAINS: midline incision  PT/OT: Ordered 12/3   80F (North Korean/English speaking) PMHx of uncontrolled HTN, HLD, asthma, chronic diastolic CHF, aortic stenosis s/p TAVR (02/19), initially presented to ED w/ CP and HTN on 11/13, found to have elevated AoV gradient w/ workup c/f possible thrombus. 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 (12/2). Admitted to SICU for postoperative hemodynamic monitoring, now stepdown to tele    start hep gtt and transition to Eliquis once consistent BF.   start daily lasix  NEURO: Dilaudid, tylenol,  CV: keep hgb >7 per cards  PULM: asthma, duonebs  GI/FEN: FLD, Protonix, gastropathy on EGD  : voiding  ENDO: ISS  ID: Monitor for s/s of infection, WBC wnl  PPx: SCDs, HSQ  LINES: PIVx2, antonino (12/2)   WOUNDS/DRAINS: midline incision  PT/OT: Ordered 12/3

## 2020-12-08 LAB
ANION GAP SERPL CALC-SCNC: 9 MMOL/L — SIGNIFICANT CHANGE UP (ref 5–17)
APTT BLD: 117 SEC — HIGH (ref 27.5–35.5)
APTT BLD: 68.8 SEC — HIGH (ref 27.5–35.5)
APTT BLD: 73.2 SEC — HIGH (ref 27.5–35.5)
APTT BLD: 82.8 SEC — HIGH (ref 27.5–35.5)
BLD GP AB SCN SERPL QL: NEGATIVE — SIGNIFICANT CHANGE UP
BUN SERPL-MCNC: 6 MG/DL — LOW (ref 7–23)
CALCIUM SERPL-MCNC: 8.6 MG/DL — SIGNIFICANT CHANGE UP (ref 8.4–10.5)
CHLORIDE SERPL-SCNC: 104 MMOL/L — SIGNIFICANT CHANGE UP (ref 96–108)
CO2 SERPL-SCNC: 26 MMOL/L — SIGNIFICANT CHANGE UP (ref 22–31)
CREAT SERPL-MCNC: 0.8 MG/DL — SIGNIFICANT CHANGE UP (ref 0.5–1.3)
GLUCOSE BLDC GLUCOMTR-MCNC: 115 MG/DL — HIGH (ref 70–99)
GLUCOSE BLDC GLUCOMTR-MCNC: 115 MG/DL — HIGH (ref 70–99)
GLUCOSE SERPL-MCNC: 105 MG/DL — HIGH (ref 70–99)
HCT VFR BLD CALC: 23.1 % — LOW (ref 34.5–45)
HGB BLD-MCNC: 7.1 G/DL — LOW (ref 11.5–15.5)
MAGNESIUM SERPL-MCNC: 1.8 MG/DL — SIGNIFICANT CHANGE UP (ref 1.6–2.6)
MCHC RBC-ENTMCNC: 29.1 PG — SIGNIFICANT CHANGE UP (ref 27–34)
MCHC RBC-ENTMCNC: 30.7 GM/DL — LOW (ref 32–36)
MCV RBC AUTO: 94.7 FL — SIGNIFICANT CHANGE UP (ref 80–100)
NRBC # BLD: 0 /100 WBCS — SIGNIFICANT CHANGE UP (ref 0–0)
PHOSPHATE SERPL-MCNC: 3.4 MG/DL — SIGNIFICANT CHANGE UP (ref 2.5–4.5)
PLATELET # BLD AUTO: 149 K/UL — LOW (ref 150–400)
POTASSIUM SERPL-MCNC: 3.6 MMOL/L — SIGNIFICANT CHANGE UP (ref 3.5–5.3)
POTASSIUM SERPL-SCNC: 3.6 MMOL/L — SIGNIFICANT CHANGE UP (ref 3.5–5.3)
RBC # BLD: 2.44 M/UL — LOW (ref 3.8–5.2)
RBC # FLD: 13.8 % — SIGNIFICANT CHANGE UP (ref 10.3–14.5)
RH IG SCN BLD-IMP: POSITIVE — SIGNIFICANT CHANGE UP
SODIUM SERPL-SCNC: 139 MMOL/L — SIGNIFICANT CHANGE UP (ref 135–145)
WBC # BLD: 5.36 K/UL — SIGNIFICANT CHANGE UP (ref 3.8–10.5)
WBC # FLD AUTO: 5.36 K/UL — SIGNIFICANT CHANGE UP (ref 3.8–10.5)

## 2020-12-08 RX ORDER — MAGNESIUM SULFATE 500 MG/ML
1 VIAL (ML) INJECTION ONCE
Refills: 0 | Status: COMPLETED | OUTPATIENT
Start: 2020-12-08 | End: 2020-12-08

## 2020-12-08 RX ORDER — HEPARIN SODIUM 5000 [USP'U]/ML
1300 INJECTION INTRAVENOUS; SUBCUTANEOUS
Qty: 25000 | Refills: 0 | Status: DISCONTINUED | OUTPATIENT
Start: 2020-12-08 | End: 2020-12-09

## 2020-12-08 RX ORDER — POTASSIUM CHLORIDE 20 MEQ
40 PACKET (EA) ORAL ONCE
Refills: 0 | Status: COMPLETED | OUTPATIENT
Start: 2020-12-08 | End: 2020-12-08

## 2020-12-08 RX ORDER — HYDRALAZINE HCL 50 MG
10 TABLET ORAL ONCE
Refills: 0 | Status: COMPLETED | OUTPATIENT
Start: 2020-12-08 | End: 2020-12-08

## 2020-12-08 RX ORDER — HEPARIN SODIUM 5000 [USP'U]/ML
1300 INJECTION INTRAVENOUS; SUBCUTANEOUS
Qty: 25000 | Refills: 0 | Status: DISCONTINUED | OUTPATIENT
Start: 2020-12-08 | End: 2020-12-08

## 2020-12-08 RX ADMIN — Medication 50 MILLIGRAM(S): at 06:25

## 2020-12-08 RX ADMIN — Medication 1000 MILLIGRAM(S): at 17:09

## 2020-12-08 RX ADMIN — Medication 1000 MILLIGRAM(S): at 07:28

## 2020-12-08 RX ADMIN — Medication 1000 MILLIGRAM(S): at 18:08

## 2020-12-08 RX ADMIN — Medication 3 MILLILITER(S): at 15:43

## 2020-12-08 RX ADMIN — Medication 3 MILLILITER(S): at 22:17

## 2020-12-08 RX ADMIN — ATORVASTATIN CALCIUM 20 MILLIGRAM(S): 80 TABLET, FILM COATED ORAL at 22:17

## 2020-12-08 RX ADMIN — Medication 1000 MILLIGRAM(S): at 23:13

## 2020-12-08 RX ADMIN — Medication 20 MILLIGRAM(S): at 06:24

## 2020-12-08 RX ADMIN — Medication 40 MILLIEQUIVALENT(S): at 08:49

## 2020-12-08 RX ADMIN — PANTOPRAZOLE SODIUM 40 MILLIGRAM(S): 20 TABLET, DELAYED RELEASE ORAL at 11:58

## 2020-12-08 RX ADMIN — Medication 1000 MILLIGRAM(S): at 11:58

## 2020-12-08 RX ADMIN — Medication 1000 MILLIGRAM(S): at 06:25

## 2020-12-08 RX ADMIN — Medication 81 MILLIGRAM(S): at 11:58

## 2020-12-08 RX ADMIN — Medication 3 MILLILITER(S): at 05:24

## 2020-12-08 RX ADMIN — Medication 10 MILLIGRAM(S): at 12:42

## 2020-12-08 RX ADMIN — Medication 3 MILLILITER(S): at 10:08

## 2020-12-08 RX ADMIN — Medication 100 GRAM(S): at 08:49

## 2020-12-08 RX ADMIN — Medication 1000 MILLIGRAM(S): at 00:20

## 2020-12-08 NOTE — PROGRESS NOTE ADULT - ASSESSMENT
80F (Djiboutian/English speaking) PMHx of uncontrolled HTN, HLD, asthma, chronic diastolic CHF, aortic stenosis s/p TAVR (02/19), initially presented to ED w/ CP and HTN on 11/13, found to have elevated AoV gradient w/ workup c/f possible thrombus. 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 (12/2). Admitted to SICU for postoperative hemodynamic monitoring.     NEURO: Dilaudid, tylenol, headaches during this admission managed w/ fioricet prn (CT wnl)  CV: goal MAP >65  - new onset afib preoperatively: c/w lopressor 50q6, s/p dig (d/c 2/2 rash), CHADsVASc 4  - HTN: hypertensive urgency preop on metop, hydralazine 25 TID, hctz 25qd. Holding hydral and hctz for now  - Chest pain: now resolved, possibly from GERD, R/LHC 1/9/2019 w/ non-obstructive disease, no acute ischemic changes noted on EKG preop  - Aortic stenosis: s/p TAVR 2/6/19, IRMA 11/17 showing no thrombus, no shunts, slight suspicion for thrombus at base of R cusp  - Chronic diastolic CHF: Echo 11/13: normal LV/RV size/systolic function. Grade II diastolic dysfunction and AS as above.   PULM: asthma, duonebs  GI/FEN: CLD, Protonix, gastropathy on EGD  : voiding  ENDO: ISS  HEME: Preop Hgb 9.8, f/u postop labs, transfuse for Hgb <8, BRBPR 2/2 colon masses. Heme/onc following, will possibly due tissue eval of hilar nodes post colectomy,  ID: Monitor for s/s of infection, WBC wnl  PPx: SCDs, HSQ  LINES: PIVx2, antonino (12/2)   WOUNDS/DRAINS: midline incision  PT/OT: Home with PT

## 2020-12-08 NOTE — PROGRESS NOTE ADULT - SUBJECTIVE AND OBJECTIVE BOX
INTERVAL HPI/OVERNIGHT EVENTS: 18:00 aPTT 153.5 hep gtt held 1 hr and restarted at 15ml/hr, hydralazine 10mg x 1 sbp 171, 00:00 aPTT: 82.8, no change in hep gtt, adequate urine o/p     STATUS POST:  subtotal colectomy    POST OPERATIVE DAY #: 6    SUBJECTIVE:  pt seen at bedside, no complaints at this time. tolerating liquids. +small BM    MEDICATIONS  (STANDING):  acetaminophen   Tablet .. 1000 milliGRAM(s) Oral every 6 hours  ALBUTerol    90 MICROgram(s) HFA Inhaler 1 Puff(s) Inhalation every 4 hours  albuterol/ipratropium for Nebulization 3 milliLiter(s) Nebulizer every 6 hours  aspirin enteric coated 81 milliGRAM(s) Oral daily  atorvastatin 20 milliGRAM(s) Oral at bedtime  BUpivacaine liposome 1.3% Injectable (no eMAR) 20 milliLiter(s) Local Injection once  furosemide   Injectable 20 milliGRAM(s) IV Push daily  heparin  Infusion 1800 Unit(s)/Hr (15 mL/Hr) IV Continuous <Continuous>  influenza  Vaccine (HIGH DOSE) 0.7 milliLiter(s) IntraMuscular once  metoprolol succinate ER 50 milliGRAM(s) Oral daily  pantoprazole  Injectable 40 milliGRAM(s) IV Push daily  tiotropium 18 MICROgram(s) Capsule 1 Capsule(s) Inhalation daily    MEDICATIONS  (PRN):  ondansetron Injectable 4 milliGRAM(s) IV Push every 6 hours PRN Nausea and/or Vomiting  oxyCODONE    IR 10 milliGRAM(s) Oral every 6 hours PRN Severe Pain (7 - 10)  oxyCODONE    IR 5 milliGRAM(s) Oral every 4 hours PRN Moderate Pain (4 - 6)      Vital Signs Last 24 Hrs  T(C): 36.6 (08 Dec 2020 04:37), Max: 36.8 (07 Dec 2020 09:03)  T(F): 97.9 (08 Dec 2020 04:37), Max: 98.3 (07 Dec 2020 09:03)  HR: 70 (08 Dec 2020 04:49) (60 - 84)  BP: 144/65 (08 Dec 2020 04:49) (144/65 - 171/76)  BP(mean): 94 (08 Dec 2020 04:49) (94 - 109)  RR: 18 (08 Dec 2020 04:49) (17 - 18)  SpO2: 96% (08 Dec 2020 04:49) (94% - 100%)    PHYSICAL EXAM:      Constitutional: A&Ox3    Respiratory: non labored breathing, no respiratory distress    Cardiovascular: NSR, RRR    Gastrointestinal: soft, mildly distended, nontender, incisions c/d/i    Extremities: (-) edema                  I&O's Detail    07 Dec 2020 07:01  -  08 Dec 2020 07:00  --------------------------------------------------------  IN:    Heparin: 261 mL    Oral Fluid: 480 mL  Total IN: 741 mL    OUT:    Voided (mL): 2350 mL  Total OUT: 2350 mL    Total NET: -1609 mL          LABS:                        7.1    5.36  )-----------( 149      ( 08 Dec 2020 06:51 )             23.1     12-07    138  |  104  |  5<L>  ----------------------------<  100<H>  3.6   |  24  |  0.83    Ca    8.3<L>      07 Dec 2020 05:53  Phos  3.1     12-07  Mg     1.9     12-07      PT/INR - ( 07 Dec 2020 10:26 )   PT: 13.1 sec;   INR: 1.10          PTT - ( 08 Dec 2020 00:11 )  PTT:82.8 sec      RADIOLOGY & ADDITIONAL STUDIES:

## 2020-12-09 ENCOUNTER — TRANSCRIPTION ENCOUNTER (OUTPATIENT)
Age: 80
End: 2020-12-09

## 2020-12-09 LAB
ALBUMIN SERPL ELPH-MCNC: 3.3 G/DL — SIGNIFICANT CHANGE UP (ref 3.3–5)
ALP SERPL-CCNC: 63 U/L — SIGNIFICANT CHANGE UP (ref 40–120)
ALT FLD-CCNC: 15 U/L — SIGNIFICANT CHANGE UP (ref 10–45)
ANION GAP SERPL CALC-SCNC: 10 MMOL/L — SIGNIFICANT CHANGE UP (ref 5–17)
ANION GAP SERPL CALC-SCNC: 12 MMOL/L — SIGNIFICANT CHANGE UP (ref 5–17)
APTT BLD: 36 SEC — HIGH (ref 27.5–35.5)
APTT BLD: 71.7 SEC — HIGH (ref 27.5–35.5)
APTT BLD: 76.7 SEC — HIGH (ref 27.5–35.5)
AST SERPL-CCNC: 18 U/L — SIGNIFICANT CHANGE UP (ref 10–40)
BILIRUB SERPL-MCNC: 0.5 MG/DL — SIGNIFICANT CHANGE UP (ref 0.2–1.2)
BUN SERPL-MCNC: 6 MG/DL — LOW (ref 7–23)
BUN SERPL-MCNC: 8 MG/DL — SIGNIFICANT CHANGE UP (ref 7–23)
CALCIUM SERPL-MCNC: 8.8 MG/DL — SIGNIFICANT CHANGE UP (ref 8.4–10.5)
CALCIUM SERPL-MCNC: 9.1 MG/DL — SIGNIFICANT CHANGE UP (ref 8.4–10.5)
CHLORIDE SERPL-SCNC: 101 MMOL/L — SIGNIFICANT CHANGE UP (ref 96–108)
CHLORIDE SERPL-SCNC: 102 MMOL/L — SIGNIFICANT CHANGE UP (ref 96–108)
CK MB CFR SERPL CALC: 1.3 NG/ML — SIGNIFICANT CHANGE UP (ref 0–6.7)
CK SERPL-CCNC: 71 U/L — SIGNIFICANT CHANGE UP (ref 25–170)
CO2 SERPL-SCNC: 25 MMOL/L — SIGNIFICANT CHANGE UP (ref 22–31)
CO2 SERPL-SCNC: 26 MMOL/L — SIGNIFICANT CHANGE UP (ref 22–31)
CREAT SERPL-MCNC: 0.87 MG/DL — SIGNIFICANT CHANGE UP (ref 0.5–1.3)
CREAT SERPL-MCNC: 1.01 MG/DL — SIGNIFICANT CHANGE UP (ref 0.5–1.3)
GLUCOSE SERPL-MCNC: 121 MG/DL — HIGH (ref 70–99)
GLUCOSE SERPL-MCNC: 97 MG/DL — SIGNIFICANT CHANGE UP (ref 70–99)
HCT VFR BLD CALC: 23.1 % — LOW (ref 34.5–45)
HCT VFR BLD CALC: 24.3 % — LOW (ref 34.5–45)
HGB BLD-MCNC: 7.1 G/DL — LOW (ref 11.5–15.5)
HGB BLD-MCNC: 7.5 G/DL — LOW (ref 11.5–15.5)
INR BLD: 1.56 — HIGH (ref 0.88–1.16)
MAGNESIUM SERPL-MCNC: 1.8 MG/DL — SIGNIFICANT CHANGE UP (ref 1.6–2.6)
MCHC RBC-ENTMCNC: 28.5 PG — SIGNIFICANT CHANGE UP (ref 27–34)
MCHC RBC-ENTMCNC: 29.1 PG — SIGNIFICANT CHANGE UP (ref 27–34)
MCHC RBC-ENTMCNC: 30.7 GM/DL — LOW (ref 32–36)
MCHC RBC-ENTMCNC: 30.9 GM/DL — LOW (ref 32–36)
MCV RBC AUTO: 92.4 FL — SIGNIFICANT CHANGE UP (ref 80–100)
MCV RBC AUTO: 94.7 FL — SIGNIFICANT CHANGE UP (ref 80–100)
NRBC # BLD: 0 /100 WBCS — SIGNIFICANT CHANGE UP (ref 0–0)
NRBC # BLD: 0 /100 WBCS — SIGNIFICANT CHANGE UP (ref 0–0)
PHOSPHATE SERPL-MCNC: 3.5 MG/DL — SIGNIFICANT CHANGE UP (ref 2.5–4.5)
PLATELET # BLD AUTO: 165 K/UL — SIGNIFICANT CHANGE UP (ref 150–400)
PLATELET # BLD AUTO: 221 K/UL — SIGNIFICANT CHANGE UP (ref 150–400)
POTASSIUM SERPL-MCNC: 3.7 MMOL/L — SIGNIFICANT CHANGE UP (ref 3.5–5.3)
POTASSIUM SERPL-MCNC: 4.2 MMOL/L — SIGNIFICANT CHANGE UP (ref 3.5–5.3)
POTASSIUM SERPL-SCNC: 3.7 MMOL/L — SIGNIFICANT CHANGE UP (ref 3.5–5.3)
POTASSIUM SERPL-SCNC: 4.2 MMOL/L — SIGNIFICANT CHANGE UP (ref 3.5–5.3)
PROT SERPL-MCNC: 6.9 G/DL — SIGNIFICANT CHANGE UP (ref 6–8.3)
PROTHROM AB SERPL-ACNC: 18.3 SEC — HIGH (ref 10.6–13.6)
RBC # BLD: 2.44 M/UL — LOW (ref 3.8–5.2)
RBC # BLD: 2.63 M/UL — LOW (ref 3.8–5.2)
RBC # FLD: 13.8 % — SIGNIFICANT CHANGE UP (ref 10.3–14.5)
RBC # FLD: 14 % — SIGNIFICANT CHANGE UP (ref 10.3–14.5)
SODIUM SERPL-SCNC: 138 MMOL/L — SIGNIFICANT CHANGE UP (ref 135–145)
SODIUM SERPL-SCNC: 138 MMOL/L — SIGNIFICANT CHANGE UP (ref 135–145)
TROPONIN T SERPL-MCNC: <0.01 NG/ML — SIGNIFICANT CHANGE UP (ref 0–0.01)
WBC # BLD: 5.28 K/UL — SIGNIFICANT CHANGE UP (ref 3.8–10.5)
WBC # BLD: 6.98 K/UL — SIGNIFICANT CHANGE UP (ref 3.8–10.5)
WBC # FLD AUTO: 5.28 K/UL — SIGNIFICANT CHANGE UP (ref 3.8–10.5)
WBC # FLD AUTO: 6.98 K/UL — SIGNIFICANT CHANGE UP (ref 3.8–10.5)

## 2020-12-09 PROCEDURE — 99233 SBSQ HOSP IP/OBS HIGH 50: CPT

## 2020-12-09 PROCEDURE — 93010 ELECTROCARDIOGRAM REPORT: CPT

## 2020-12-09 RX ORDER — LABETALOL HCL 100 MG
5 TABLET ORAL ONCE
Refills: 0 | Status: COMPLETED | OUTPATIENT
Start: 2020-12-09 | End: 2020-12-09

## 2020-12-09 RX ORDER — RACEPINEPHRINE HCL 2.25 %
1 SOLUTION, NON-ORAL TOPICAL ONCE
Refills: 0 | Status: DISCONTINUED | OUTPATIENT
Start: 2020-12-09 | End: 2020-12-10

## 2020-12-09 RX ORDER — HYDRALAZINE HCL 50 MG
5 TABLET ORAL ONCE
Refills: 0 | Status: COMPLETED | OUTPATIENT
Start: 2020-12-09 | End: 2020-12-09

## 2020-12-09 RX ORDER — POTASSIUM CHLORIDE 20 MEQ
20 PACKET (EA) ORAL ONCE
Refills: 0 | Status: COMPLETED | OUTPATIENT
Start: 2020-12-09 | End: 2020-12-09

## 2020-12-09 RX ORDER — APIXABAN 2.5 MG/1
5 TABLET, FILM COATED ORAL
Refills: 0 | Status: DISCONTINUED | OUTPATIENT
Start: 2020-12-09 | End: 2020-12-10

## 2020-12-09 RX ORDER — OXYMETAZOLINE HYDROCHLORIDE 0.5 MG/ML
1 SPRAY NASAL
Refills: 0 | Status: DISCONTINUED | OUTPATIENT
Start: 2020-12-09 | End: 2020-12-10

## 2020-12-09 RX ADMIN — Medication 3 MILLILITER(S): at 10:32

## 2020-12-09 RX ADMIN — ONDANSETRON 4 MILLIGRAM(S): 8 TABLET, FILM COATED ORAL at 18:26

## 2020-12-09 RX ADMIN — Medication 20 MILLIGRAM(S): at 05:36

## 2020-12-09 RX ADMIN — PANTOPRAZOLE SODIUM 40 MILLIGRAM(S): 20 TABLET, DELAYED RELEASE ORAL at 14:09

## 2020-12-09 RX ADMIN — OXYCODONE HYDROCHLORIDE 5 MILLIGRAM(S): 5 TABLET ORAL at 12:07

## 2020-12-09 RX ADMIN — HEPARIN SODIUM 13 UNIT(S)/HR: 5000 INJECTION INTRAVENOUS; SUBCUTANEOUS at 05:00

## 2020-12-09 RX ADMIN — Medication 20 MILLIEQUIVALENT(S): at 07:33

## 2020-12-09 RX ADMIN — Medication 5 MILLIGRAM(S): at 23:50

## 2020-12-09 RX ADMIN — OXYCODONE HYDROCHLORIDE 10 MILLIGRAM(S): 5 TABLET ORAL at 05:00

## 2020-12-09 RX ADMIN — Medication 1000 MILLIGRAM(S): at 07:00

## 2020-12-09 RX ADMIN — Medication 1000 MILLIGRAM(S): at 16:05

## 2020-12-09 RX ADMIN — Medication 5 MILLIGRAM(S): at 23:39

## 2020-12-09 RX ADMIN — Medication 5 MILLIGRAM(S): at 14:55

## 2020-12-09 RX ADMIN — Medication 1000 MILLIGRAM(S): at 18:09

## 2020-12-09 RX ADMIN — OXYCODONE HYDROCHLORIDE 5 MILLIGRAM(S): 5 TABLET ORAL at 12:05

## 2020-12-09 RX ADMIN — Medication 81 MILLIGRAM(S): at 09:46

## 2020-12-09 RX ADMIN — Medication 1 APPLICATION(S): at 23:52

## 2020-12-09 RX ADMIN — Medication 1000 MILLIGRAM(S): at 09:47

## 2020-12-09 RX ADMIN — Medication 1000 MILLIGRAM(S): at 09:46

## 2020-12-09 RX ADMIN — Medication 1000 MILLIGRAM(S): at 05:36

## 2020-12-09 RX ADMIN — Medication 5 MILLIGRAM(S): at 23:22

## 2020-12-09 RX ADMIN — Medication 3 MILLILITER(S): at 16:04

## 2020-12-09 RX ADMIN — OXYCODONE HYDROCHLORIDE 10 MILLIGRAM(S): 5 TABLET ORAL at 03:11

## 2020-12-09 RX ADMIN — Medication 3 MILLILITER(S): at 05:36

## 2020-12-09 RX ADMIN — APIXABAN 5 MILLIGRAM(S): 2.5 TABLET, FILM COATED ORAL at 10:32

## 2020-12-09 RX ADMIN — OXYMETAZOLINE HYDROCHLORIDE 1 SPRAY(S): 0.5 SPRAY NASAL at 23:30

## 2020-12-09 RX ADMIN — Medication 50 MILLIGRAM(S): at 05:36

## 2020-12-09 RX ADMIN — APIXABAN 5 MILLIGRAM(S): 2.5 TABLET, FILM COATED ORAL at 16:36

## 2020-12-09 RX ADMIN — Medication 1000 MILLIGRAM(S): at 00:15

## 2020-12-09 NOTE — PROGRESS NOTE ADULT - SUBJECTIVE AND OBJECTIVE BOX
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 Reporting chest pain, no SOB or other acute complaints. Will obtain EKG stat. Reporting low appetite, pain well controlled. +F/+BM. Denies f/n/v/cp/sob.    MEDICATIONS  (STANDING):  acetaminophen   Tablet .. 1000 milliGRAM(s) Oral every 6 hours  ALBUTerol    90 MICROgram(s) HFA Inhaler 1 Puff(s) Inhalation every 4 hours  albuterol/ipratropium for Nebulization 3 milliLiter(s) Nebulizer every 6 hours  aspirin enteric coated 81 milliGRAM(s) Oral daily  atorvastatin 20 milliGRAM(s) Oral at bedtime  BUpivacaine liposome 1.3% Injectable (no eMAR) 20 milliLiter(s) Local Injection once  furosemide   Injectable 20 milliGRAM(s) IV Push daily  heparin  Infusion 1300 Unit(s)/Hr (13 mL/Hr) IV Continuous <Continuous>  influenza  Vaccine (HIGH DOSE) 0.7 milliLiter(s) IntraMuscular once  metoprolol succinate ER 50 milliGRAM(s) Oral daily  pantoprazole  Injectable 40 milliGRAM(s) IV Push daily  tiotropium 18 MICROgram(s) Capsule 1 Capsule(s) Inhalation daily    MEDICATIONS  (PRN):  ondansetron Injectable 4 milliGRAM(s) IV Push every 6 hours PRN Nausea and/or Vomiting  oxyCODONE    IR 10 milliGRAM(s) Oral every 6 hours PRN Severe Pain (7 - 10)  oxyCODONE    IR 5 milliGRAM(s) Oral every 4 hours PRN Moderate Pain (4 - 6)      Vital Signs Last 24 Hrs  T(C): 36.8 (09 Dec 2020 05:04), Max: 36.8 (08 Dec 2020 21:03)  T(F): 98.2 (09 Dec 2020 05:04), Max: 98.2 (08 Dec 2020 21:03)  HR: 70 (09 Dec 2020 05:03) (70 - 76)  BP: 134/63 (09 Dec 2020 05:03) (134/63 - 178/78)  BP(mean): 90 (09 Dec 2020 05:03) (90 - 112)  RR: 18 (09 Dec 2020 05:03) (17 - 18)  SpO2: 98% (09 Dec 2020 05:03) (97% - 100%)    PHYSICAL EXAM:      Constitutional: A&Ox3, NAD    Respiratory: non labored breathing, no respiratory distress    Cardiovascular: NSR, RRR    Gastrointestinal: abdomen soft, mildly distended, appropriately ttp to incisions. Dressings c/d/i.     Genitourinary: Primafit in place     Extremities: wwp, no calf tenderness or edema. SCDs in place       I&O's Detail    08 Dec 2020 07:01  -  09 Dec 2020 07:00  --------------------------------------------------------  IN:    Heparin: 286 mL    IV PiggyBack: 100 mL  Total IN: 386 mL    OUT:    Voided (mL): 2200 mL  Total OUT: 2200 mL    Total NET: -1814 mL          LABS:                        7.1    5.28  )-----------( 165      ( 09 Dec 2020 05:55 )             23.1     12-09    138  |  102  |  6<L>  ----------------------------<  97  3.7   |  26  |  0.87    Ca    8.8      09 Dec 2020 05:55  Phos  3.5     12-09  Mg     1.8     12-09      PT/INR - ( 07 Dec 2020 10:26 )   PT: 13.1 sec;   INR: 1.10          PTT - ( 09 Dec 2020 05:55 )  PTT:76.7 sec      RADIOLOGY & ADDITIONAL STUDIES: INTERVAL HPI/OVERNIGHT EVENTS: 20:00 aPTT: 73.2, 00:00 aPTT: 71.7, rey diet, good urine o/p, VSS     STATUS POST:  12/2: Subtotal colectomy (for 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 Reporting chest pain, no SOB or other acute complaints. Will obtain EKG stat. Reporting low appetite, pain well controlled. +F/+BM. Denies f/n/v/sob.    MEDICATIONS  (STANDING):  acetaminophen   Tablet .. 1000 milliGRAM(s) Oral every 6 hours  ALBUTerol    90 MICROgram(s) HFA Inhaler 1 Puff(s) Inhalation every 4 hours  albuterol/ipratropium for Nebulization 3 milliLiter(s) Nebulizer every 6 hours  aspirin enteric coated 81 milliGRAM(s) Oral daily  atorvastatin 20 milliGRAM(s) Oral at bedtime  BUpivacaine liposome 1.3% Injectable (no eMAR) 20 milliLiter(s) Local Injection once  furosemide   Injectable 20 milliGRAM(s) IV Push daily  heparin  Infusion 1300 Unit(s)/Hr (13 mL/Hr) IV Continuous <Continuous>  influenza  Vaccine (HIGH DOSE) 0.7 milliLiter(s) IntraMuscular once  metoprolol succinate ER 50 milliGRAM(s) Oral daily  pantoprazole  Injectable 40 milliGRAM(s) IV Push daily  tiotropium 18 MICROgram(s) Capsule 1 Capsule(s) Inhalation daily    MEDICATIONS  (PRN):  ondansetron Injectable 4 milliGRAM(s) IV Push every 6 hours PRN Nausea and/or Vomiting  oxyCODONE    IR 10 milliGRAM(s) Oral every 6 hours PRN Severe Pain (7 - 10)  oxyCODONE    IR 5 milliGRAM(s) Oral every 4 hours PRN Moderate Pain (4 - 6)      Vital Signs Last 24 Hrs  T(C): 36.8 (09 Dec 2020 05:04), Max: 36.8 (08 Dec 2020 21:03)  T(F): 98.2 (09 Dec 2020 05:04), Max: 98.2 (08 Dec 2020 21:03)  HR: 70 (09 Dec 2020 05:03) (70 - 76)  BP: 134/63 (09 Dec 2020 05:03) (134/63 - 178/78)  BP(mean): 90 (09 Dec 2020 05:03) (90 - 112)  RR: 18 (09 Dec 2020 05:03) (17 - 18)  SpO2: 98% (09 Dec 2020 05:03) (97% - 100%)    PHYSICAL EXAM:      Constitutional: A&Ox3, NAD    Respiratory: non labored breathing, no respiratory distress    Cardiovascular: NSR, RRR    Gastrointestinal: abdomen soft, mildly distended, appropriately ttp to incisions. Dressings c/d/i.     Genitourinary: Primafit in place     Extremities: wwp, no calf tenderness or edema. SCDs in place       I&O's Detail    08 Dec 2020 07:01  -  09 Dec 2020 07:00  --------------------------------------------------------  IN:    Heparin: 286 mL    IV PiggyBack: 100 mL  Total IN: 386 mL    OUT:    Voided (mL): 2200 mL  Total OUT: 2200 mL    Total NET: -1814 mL          LABS:                        7.1    5.28  )-----------( 165      ( 09 Dec 2020 05:55 )             23.1     12-09    138  |  102  |  6<L>  ----------------------------<  97  3.7   |  26  |  0.87    Ca    8.8      09 Dec 2020 05:55  Phos  3.5     12-09  Mg     1.8     12-09      PT/INR - ( 07 Dec 2020 10:26 )   PT: 13.1 sec;   INR: 1.10          PTT - ( 09 Dec 2020 05:55 )  PTT:76.7 sec      RADIOLOGY & ADDITIONAL STUDIES:

## 2020-12-09 NOTE — PROGRESS NOTE ADULT - SUBJECTIVE AND OBJECTIVE BOX
INTERVAL HPI/OVERNIGHT EVENTS: Patient seen and examined at bedside. No acute events overnight.    VITAL SIGNS:  T(F): 98.6 (12-09-20 @ 17:28)  HR: 78 (12-09-20 @ 18:19)  BP: 142/95 (12-09-20 @ 18:19)  RR: 18 (12-09-20 @ 18:19)  SpO2: 98% (12-09-20 @ 18:19)  Wt(kg): --    12-08-20 @ 07:01  -  12-09-20 @ 07:00  --------------------------------------------------------  IN: 386 mL / OUT: 2700 mL / NET: -2314 mL    12-09-20 @ 07:01  -  12-09-20 @ 18:33  --------------------------------------------------------  IN: 360 mL / OUT: 600 mL / NET: -240 mL        PHYSICAL EXAM:    GEN: Awake, comfortable. NAD.   HEENT: NCAT, PERRL, EOMI. Mucosa moist.   NECK: Supple, no JVD.   RESP: CTA b/l  CV: RRR, normal s1/s2. No m/r/g.  ABD: Soft, NTND. BS+  EXT: Warm. No edema, clubbing, or cyanosis.   NEURO: AAOx3. No focal deficits.    MEDICATIONS  (STANDING):  acetaminophen   Tablet .. 1000 milliGRAM(s) Oral every 6 hours  ALBUTerol    90 MICROgram(s) HFA Inhaler 1 Puff(s) Inhalation every 4 hours  albuterol/ipratropium for Nebulization 3 milliLiter(s) Nebulizer every 6 hours  apixaban 5 milliGRAM(s) Oral two times a day  aspirin enteric coated 81 milliGRAM(s) Oral daily  atorvastatin 20 milliGRAM(s) Oral at bedtime  BUpivacaine liposome 1.3% Injectable (no eMAR) 20 milliLiter(s) Local Injection once  furosemide   Injectable 20 milliGRAM(s) IV Push daily  influenza  Vaccine (HIGH DOSE) 0.7 milliLiter(s) IntraMuscular once  metoprolol succinate ER 50 milliGRAM(s) Oral daily  pantoprazole  Injectable 40 milliGRAM(s) IV Push daily  tiotropium 18 MICROgram(s) Capsule 1 Capsule(s) Inhalation daily    MEDICATIONS  (PRN):  ondansetron Injectable 4 milliGRAM(s) IV Push every 6 hours PRN Nausea and/or Vomiting  oxyCODONE    IR 10 milliGRAM(s) Oral every 6 hours PRN Severe Pain (7 - 10)  oxyCODONE    IR 5 milliGRAM(s) Oral every 4 hours PRN Moderate Pain (4 - 6)      Allergies    Digox (Rash; Urticaria; Hives)  Plavix (Other (Mod to Severe))    Intolerances        LABS:                        7.1    5.28  )-----------( 165      ( 09 Dec 2020 05:55 )             23.1     12-09    138  |  102  |  6<L>  ----------------------------<  97  3.7   |  26  |  0.87    Ca    8.8      09 Dec 2020 05:55  Phos  3.5     12-09  Mg     1.8     12-09      PTT - ( 09 Dec 2020 05:55 )  PTT:76.7 sec    CARDIAC MARKERS ( 09 Dec 2020 09:05 )  x     / <0.01 ng/mL / 71 U/L / x     / 1.3 ng/mL        EKG:    Echo:    Cath:    NST:    RADIOLOGY & ADDITIONAL TESTS:   INTERVAL HPI/OVERNIGHT EVENTS: Patient seen and examined at bedside. Complaining of pain in chest and epigastric region. No palpitations, no SOB.    VITAL SIGNS:  T(F): 98.6 (12-09-20 @ 17:28)  HR: 78 (12-09-20 @ 18:19)  BP: 142/95 (12-09-20 @ 18:19)  RR: 18 (12-09-20 @ 18:19)  SpO2: 98% (12-09-20 @ 18:19)  Wt(kg): --    12-08-20 @ 07:01  -  12-09-20 @ 07:00  --------------------------------------------------------  IN: 386 mL / OUT: 2700 mL / NET: -2314 mL    12-09-20 @ 07:01  -  12-09-20 @ 18:33  --------------------------------------------------------  IN: 360 mL / OUT: 600 mL / NET: -240 mL        PHYSICAL EXAM:    GEN: Awake, comfortable in chair. NAD.   HEENT: NCAT, PERRL, EOMI. Mucosa moist.   NECK: Supple, no JVD.   RESP: Slight crackles at bases  CV: RRR, normal s1/s2. No m/r/g.  ABD: Soft, NTND. BS+  EXT: Warm. Trace pedal/distal LE edema. No clubbing, or cyanosis.   NEURO: AAOx3. No focal deficits.    HOME MEDS:  ASA 81  Lipitor 20  Coreg 6.25 q12h  HCTZ 12.5  Lisinopril 10    MEDICATIONS  (STANDING):  acetaminophen   Tablet .. 1000 milliGRAM(s) Oral every 6 hours  ALBUTerol    90 MICROgram(s) HFA Inhaler 1 Puff(s) Inhalation every 4 hours  albuterol/ipratropium for Nebulization 3 milliLiter(s) Nebulizer every 6 hours  apixaban 5 milliGRAM(s) Oral two times a day  aspirin enteric coated 81 milliGRAM(s) Oral daily  atorvastatin 20 milliGRAM(s) Oral at bedtime  BUpivacaine liposome 1.3% Injectable (no eMAR) 20 milliLiter(s) Local Injection once  furosemide   Injectable 20 milliGRAM(s) IV Push daily  influenza  Vaccine (HIGH DOSE) 0.7 milliLiter(s) IntraMuscular once  metoprolol succinate ER 50 milliGRAM(s) Oral daily  pantoprazole  Injectable 40 milliGRAM(s) IV Push daily  tiotropium 18 MICROgram(s) Capsule 1 Capsule(s) Inhalation daily    MEDICATIONS  (PRN):  ondansetron Injectable 4 milliGRAM(s) IV Push every 6 hours PRN Nausea and/or Vomiting  oxyCODONE    IR 10 milliGRAM(s) Oral every 6 hours PRN Severe Pain (7 - 10)  oxyCODONE    IR 5 milliGRAM(s) Oral every 4 hours PRN Moderate Pain (4 - 6)      Allergies    Digox (Rash; Urticaria; Hives)  Plavix (Other (Mod to Severe))    Intolerances        LABS:                        7.1    5.28  )-----------( 165      ( 09 Dec 2020 05:55 )             23.1     12-09    138  |  102  |  6<L>  ----------------------------<  97  3.7   |  26  |  0.87    Ca    8.8      09 Dec 2020 05:55  Phos  3.5     12-09  Mg     1.8     12-09      PTT - ( 09 Dec 2020 05:55 )  PTT:76.7 sec    CARDIAC MARKERS ( 09 Dec 2020 09:05 )  x     / <0.01 ng/mL / 71 U/L / x     / 1.3 ng/mL        EKG: NSR    Echo: < from: TTE Echo Complete w/o Contrast w/ Doppler (11.13.20 @ 16:09) >  --------------------------------------------------------------------------------  CONCLUSIONS:     1. Normal left and right ventricular size and systolic function.   2. Moderate symmetric left ventricular hypertrophy.   3. Hyperdynamic left ventricular systolic function.   4. 23 mm Jamar valve is noted in the aortic position without any aortic regurgitation. The peak transvalvular velocity is 3.93 m/s, the mean transvalvular gradient is 35.00 mmHg, and the LVOT/AV velocity ratio is 0.30. The peak transaortic gradient is 61.78 mmHg. The transaortic gradients are elevated even in the setting of a TAVR.   5. Grade II left ventricular diastolic dysfunction.   6. Normal right ventricular size and systolic function.   7. Mildly dilated left atrium.   8. No evidence of pulmonary hypertension.   9. No pericardial effusion.  10. Moderately dilated ascending aorta.  11. Compared to the previous TTE performed on 4/1/2019, the transaortic gradients are higher.    --------------------------------------------------------------------------------    < end of copied text >  < from: TTE Limited Echo w/o Cont (11.16.20 @ 12:57) >  --------------------------------------------------------------------------------  CONCLUSIONS:     1. Limited study obtained for evaluation of aortic valve gradients.   2. Hyperdynamic left ventricular systolic function with an intra-cavitary gradient of 31 mmHg.   3. 23 mm Jamar valve is noted in the aortic position without any aortic regurgitation. The peak transvalvular velocity is 4.20 m/s, the mean transvalvular gradient is 35.00 mmHg, and the LVOT/AV velocity ratio is 0.38. The peak transaortic gradient is 70.56 mmHg. The transaortic gradients are elevated, even in the setting of a TAVR. However, hyperdynamic LVEF may contribute to the elevation of the transaortic gradients.   4. Normal right ventricular size and systolic function.   5. No pericardial effusion.   6. Compared to the previous TTE performed on 11/13/2020, there have been no significant interval changes.    --------------------------------------------------------------------------------    < end of copied text >    < from: IRMA w/Doppler (11.17.20 @ 12:38) >  --------------------------------------------------------------------------------    CONCLUSIONS:     1. Normal left and right ventricular size and systolic function.   2. Mildly dilated left atrium.   3. No LA/RA/MAIK/RAA thrombus seen.   4. No evidence of an intracardiac shunt.   5. 23 mm Jamar valve is noted in the aortic position without any aortic regurgitation.   6. There is slight thickening at the base of the right cusp with probably mild restriction in excursion. The other prosthetic leaflets appear normal.   7. No evidence of pulmonary hypertension.   8. No pericardial effusion.   9. See TTE performed 11/16/2020 for TAVR gradients, which were elevated.    -------------------------------------------------------------------------------    < end of copied text >    Cath: [facility] 1/09/19: Diagnostic right and left heart catheterization revealing LMCA normal, LAD with minor luminal irregularities, LCx large and normal, RCA large and normal. PA 18, RV 73/13 (21), PA 70/34 (49), PCWP 26, CO 7.3 L/min, CI 3.7, TPG 23, severe AS (mean LV/Ao gradient 48.1 mmHg, SETH 1.1 cm2).

## 2020-12-09 NOTE — PROGRESS NOTE ADULT - ASSESSMENT
A/P: 80F w/HFpEF (61%), AS s/p TAVR, pAF (new), HTN, HLD, asthma adm to cardiac tele on 11/13 w/chest pain & hypertensive urgency. Course c/b new onset AF and GIB after hep gtt initiation 2/2 leaflet thrombus on IRMA. 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 Patient s/p laparoscopic subtotal colectomy (R and transverse colectomy, ileocolic anastomosis) on 12/2. Cardiology consult service now following for cardiac optimization post-surgery.     #Chest pain - ECG done in morning and afternoon showed NSR. Patient's cardiac enzymes (-). Low suspicion for ACS    #Leaflet thrombus  - c/w eliquis 5mg bid  - maintain Hb > 7  - please ensure post-discharge f/u with Structural Heart    #HFpEF - appears relatively euvolemic  - d/c IV lasix and start torsemide 10mg PO QD. Can be discharged on this regimen.    #pAF - [id number] - 4. No digoxin 2/2 rash  - c/w Toprol 50 QD  - c/w eliquis 5mg bid    #HTN  - can restart lisinopril 10mg PO QD     Recommendations are final when signed by attending.    --  Hever Duncan MD  Cardiology PGY5

## 2020-12-09 NOTE — CHART NOTE - NSCHARTNOTEFT_GEN_A_CORE
Admitting Diagnosis:   Patient is a 80y old  Female who presents with a chief complaint of HTN urgency (09 Dec 2020 07:18)      PAST MEDICAL & SURGICAL HISTORY:  CHF (congestive heart failure)    Pulmonary HTN    Aortic stenosis    HTN (hypertension)    S/P TAVR (transcatheter aortic valve replacement)        Current Nutrition Order:  Low fiber    PO Intake: Good (%) [ x  ]  Fair (50-75%) [   ] Poor (<25%) [   ]  Eating everything    GI Issues:   Denies N/V  +BM/+F  Ordered for zofran, protonix  Not on bowel regimen    Pain:  No pain reported    Skin Integrity:  Surgical incision      Labs:   12-09    138  |  102  |  6<L>  ----------------------------<  97  3.7   |  26  |  0.87    Ca    8.8      09 Dec 2020 05:55  Phos  3.5     12-09  Mg     1.8     12-09      CAPILLARY BLOOD GLUCOSE      POCT Blood Glucose.: 115 mg/dL (08 Dec 2020 17:06)      Medications:  MEDICATIONS  (STANDING):  acetaminophen   Tablet .. 1000 milliGRAM(s) Oral every 6 hours  ALBUTerol    90 MICROgram(s) HFA Inhaler 1 Puff(s) Inhalation every 4 hours  albuterol/ipratropium for Nebulization 3 milliLiter(s) Nebulizer every 6 hours  apixaban 5 milliGRAM(s) Oral two times a day  aspirin enteric coated 81 milliGRAM(s) Oral daily  atorvastatin 20 milliGRAM(s) Oral at bedtime  BUpivacaine liposome 1.3% Injectable (no eMAR) 20 milliLiter(s) Local Injection once  furosemide   Injectable 20 milliGRAM(s) IV Push daily  influenza  Vaccine (HIGH DOSE) 0.7 milliLiter(s) IntraMuscular once  metoprolol succinate ER 50 milliGRAM(s) Oral daily  pantoprazole  Injectable 40 milliGRAM(s) IV Push daily  tiotropium 18 MICROgram(s) Capsule 1 Capsule(s) Inhalation daily    MEDICATIONS  (PRN):  ondansetron Injectable 4 milliGRAM(s) IV Push every 6 hours PRN Nausea and/or Vomiting  oxyCODONE    IR 10 milliGRAM(s) Oral every 6 hours PRN Severe Pain (7 - 10)  oxyCODONE    IR 5 milliGRAM(s) Oral every 4 hours PRN Moderate Pain (4 - 6)      Admitted Anthropometrics:  Height: 5'10", IBW 150lbs+/-10%, %%, BMI 32    Weight: 222lbs (11/13)  Daily     Weight Change:   100.1kg (11/14)  100.9kg (11/15, 11/16, 11/17)  100.3kg (11/18)  100.4kg (11/19)  100.9kg (11/20)  100.2kg (11/21)  101.7kg (11/22)  101.4kg (11/23)  101kg (11/24)  102.3kg (11/25)  102.4kg (11/26)  102.6kg (11/27)  101.2kg (11/28)  100.9kg (11/29)  100.4kg (11/30)  100kg (12/1)  99.4kg (12/2)  Appears to be relatively stable    Estimated energy needs:   IBW used for calculations as pt >120% of IBW   Nutrient needs based on [facility] standards of care for maintenance in older adults.   Adjusted for age, adenocarcinoma post-op healing and CHF. Fluids per team 2/2 CHF  Calories: 25-30 kcal/kg = 1951-4297 kcal/day  Protein: 1.2-1.5 g/kg = 82-102g protein/day     Subjective:   80F ([place]/English speaking) PMHx of uncontrolled HTN, HLD, asthma, chronic diastolic CHF, aortic stenosis s/p TAVR (02/19), initially presented to ED w/ CP and HTN on 11/13, found to have elevated AoV gradient w/ workup c/f possible thrombus. 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 Admitted to SICU for postoperative hemodynamic monitoring. Stepped down to 8LA 12/3. Discharge planning.     Pt seen in room, asleep in bed, not arousable to verbal stimuli. Since last RD assessment, diet has been adv. to low fiber. Observed 100% of breakfast consumed on tray table. No apparent GI distress, had both flatus and BM this am. Notable labs: BUN 6, Glu 115, 115, 49mg/dL. Continue w/ low fiber x2 weeks post-op. RD to follow.     Previous Nutrition Diagnosis:  Inadequate Oral intake RT pt meeting <75% of EER AEB pt on FLD  Active [   ]  Resolved [ x  ]    If resolved, new PES:   increased needs RT increased demand for protein post-op AEB s/p colectomy    Goal:  Pt to consistently meet % of estimated needs PO     Recommendations:  1. Continue on current diet order  2. Appreciate support and encouragement at meals  3. Pain and bowel regimen per team  4. BMP, BG Q6hrs, CBC per MD discretion     Education:   N/A asleep    Risk Level: High [  ] Moderate [ x  ] Low [   ].

## 2020-12-09 NOTE — CHART NOTE - NSCHARTNOTEFT_GEN_A_CORE
Around 11 PM, Nurse Aranda on shift called in regards to patient Rotariu whom started to experience epistaxis from her nose. Pressure was applied with a towel to the site of bleeding to help with coagulation, after 20 minutes, patient was continuously bleeding, STAT CBC/CMP/Coags were ordered, H/H returned with 7.5/24.3, Coags slightly elevated aPTT/PT/INR 36/18.3/1.56. Afrin+ nasal epinphrine + silver nitrate sticks were ordered to help with bleeding, initially starting with afrin, followed by epinephrine, and achieved hemostasis with silver nitrate. Patient blood pressure was elevated to 190s and high as 210s during the episode, Hydralazine 5mg x 2 doses was given, plus an additional 5mg labetalol was provided to help control blood pressure, achieving a systolic blood pressure of 138/65 HR 82.

## 2020-12-09 NOTE — PROGRESS NOTE ADULT - ASSESSMENT
80F ([place]wegian/English speaking) PMHx of uncontrolled HTN, HLD, asthma, chronic diastolic CHF, aortic stenosis s/p TAVR (02/19), initially presented to ED w/ CP and HTN on 11/13, found to have elevated AoV gradient w/ workup c/f possible thrombus. 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 (12/2). Admitted to SICU for postoperative hemodynamic monitoring, now stepped down to telemetry for further management.    NEURO: Dilaudid, tylenol, headaches during this admission managed w/ fioricet prn (CT wnl)  CV: goal MAP >65  - new onset afib preoperatively: c/w toprol 50qd, s/p dig (d/c 2/2 rash), CHADsVASc 4  - HTN: hypertensive urgency preop on metop, hydralazine 25 TID, hctz 25qd. Holding hydral and hctz for now  - Chest pain: now resolved, possibly from GERD, R/LHC 1/9/2019 w/ non-obstructive disease, no acute ischemic changes noted on EKG preop  - Aortic stenosis: s/p TAVR 2/6/19, IRMA 11/17 showing no thrombus, no shunts, slight suspicion for thrombus at base of R cusp  - Chronic diastolic CHF: Echo 11/13: normal LV/RV size/systolic function. Grade II diastolic dysfunction and AS as above.   PULM: asthma, duonebs  GI/FEN: LFD, Protonix, gastropathy on EGD  : voiding  ENDO: ISS  HEME: Preop Hgb 9.8, f/u postop labs, transfuse for Hgb <8, BRBPR 2/2 colon masses. Heme/onc following, will possibly due tissue eval of hilar nodes post colectomy,  ID: Monitor for s/s of infection, WBC wnl  PPx: SCDs, Hep gtt (pTT this AM therapeutic, QD pTT checks)  LINES: PIVx2, antonino (12/2)   WOUNDS/DRAINS: midline incision  PT/OT: Home with PT   80F (Malaysian/English speaking) PMHx of uncontrolled HTN, HLD, asthma, chronic diastolic CHF, aortic stenosis s/p TAVR (02/19), initially presented to ED w/ CP and HTN on 11/13, found to have elevated AoV gradient w/ workup c/f possible thrombus. 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 (12/2). Admitted to SICU for postoperative hemodynamic monitoring, now stepped down to telemetry for further management.    AM EKG unchanged, f/u stat cardiac enzymes   NEURO: Dilaudid, tylenol, headaches during this admission managed w/ fioricet prn (CT wnl)  CV: goal MAP >65  - new onset afib preoperatively: c/w toprol 50qd, s/p dig (d/c 2/2 rash), CHADsVASc 4  - HTN: hypertensive urgency preop on metop, hydralazine 25 TID, hctz 25qd. Holding hydral and hctz for now  - Chest pain: now resolved, possibly from GERD, R/LHC 1/9/2019 w/ non-obstructive disease, no acute ischemic changes noted on EKG preop  - Aortic stenosis: s/p TAVR 2/6/19, IRMA 11/17 showing no thrombus, no shunts, slight suspicion for thrombus at base of R cusp  - Chronic diastolic CHF: Echo 11/13: normal LV/RV size/systolic function. Grade II diastolic dysfunction and AS as above.   PULM: asthma, duonebs  GI/FEN: LFD, Protonix, gastropathy on EGD  : voiding  ENDO: ISS  HEME: Preop Hgb 9.8, f/u postop labs, transfuse for Hgb <8, BRBPR 2/2 colon masses. Heme/onc following, will possibly due tissue eval of hilar nodes post colectomy,  ID: Monitor for s/s of infection, WBC wnl  PPx: SCDs, Hep gtt (pTT this AM therapeutic, QD pTT checks)  LINES: PIVx2, antonino (12/2)   WOUNDS/DRAINS: midline incision  PT/OT: Home with PT

## 2020-12-09 NOTE — DISCHARGE NOTE NURSING/CASE MANAGEMENT/SOCIAL WORK - NSDCFUADDAPPT_GEN_ALL_CORE_FT
(1) Please follow up with cardiologist, Dr. Aguirre, within 1-2 weeks of discharge home from the hospital.   (2) Please make an appointment with your primary care provider, Dr. Mccloud, for further outpatient work up of your headaches if they persist.

## 2020-12-09 NOTE — DISCHARGE NOTE NURSING/CASE MANAGEMENT/SOCIAL WORK - PATIENT PORTAL LINK FT
You can access the FollowMyHealth Patient Portal offered by [facility] by registering at the following website: [email or web address]. By joining "[employer]"’s FollowMyHealth portal, you will also be able to view your health information using other applications (apps) compatible with our system.

## 2020-12-10 LAB
ANION GAP SERPL CALC-SCNC: 13 MMOL/L — SIGNIFICANT CHANGE UP (ref 5–17)
BUN SERPL-MCNC: 7 MG/DL — SIGNIFICANT CHANGE UP (ref 7–23)
CALCIUM SERPL-MCNC: 8.9 MG/DL — SIGNIFICANT CHANGE UP (ref 8.4–10.5)
CHLORIDE SERPL-SCNC: 100 MMOL/L — SIGNIFICANT CHANGE UP (ref 96–108)
CO2 SERPL-SCNC: 24 MMOL/L — SIGNIFICANT CHANGE UP (ref 22–31)
CREAT SERPL-MCNC: 0.95 MG/DL — SIGNIFICANT CHANGE UP (ref 0.5–1.3)
GLUCOSE SERPL-MCNC: 102 MG/DL — HIGH (ref 70–99)
HCT VFR BLD CALC: 24.5 % — LOW (ref 34.5–45)
HGB BLD-MCNC: 7.5 G/DL — LOW (ref 11.5–15.5)
MAGNESIUM SERPL-MCNC: 1.7 MG/DL — SIGNIFICANT CHANGE UP (ref 1.6–2.6)
MCHC RBC-ENTMCNC: 28.8 PG — SIGNIFICANT CHANGE UP (ref 27–34)
MCHC RBC-ENTMCNC: 30.6 GM/DL — LOW (ref 32–36)
MCV RBC AUTO: 94.2 FL — SIGNIFICANT CHANGE UP (ref 80–100)
NRBC # BLD: 0 /100 WBCS — SIGNIFICANT CHANGE UP (ref 0–0)
PHOSPHATE SERPL-MCNC: 3.4 MG/DL — SIGNIFICANT CHANGE UP (ref 2.5–4.5)
PLATELET # BLD AUTO: 192 K/UL — SIGNIFICANT CHANGE UP (ref 150–400)
POTASSIUM SERPL-MCNC: 3.7 MMOL/L — SIGNIFICANT CHANGE UP (ref 3.5–5.3)
POTASSIUM SERPL-SCNC: 3.7 MMOL/L — SIGNIFICANT CHANGE UP (ref 3.5–5.3)
RBC # BLD: 2.6 M/UL — LOW (ref 3.8–5.2)
RBC # FLD: 13.8 % — SIGNIFICANT CHANGE UP (ref 10.3–14.5)
SODIUM SERPL-SCNC: 137 MMOL/L — SIGNIFICANT CHANGE UP (ref 135–145)
SURGICAL PATHOLOGY STUDY: SIGNIFICANT CHANGE UP
WBC # BLD: 6.41 K/UL — SIGNIFICANT CHANGE UP (ref 3.8–10.5)
WBC # FLD AUTO: 6.41 K/UL — SIGNIFICANT CHANGE UP (ref 3.8–10.5)

## 2020-12-10 PROCEDURE — 99233 SBSQ HOSP IP/OBS HIGH 50: CPT

## 2020-12-10 RX ORDER — OXYMETAZOLINE HYDROCHLORIDE 0.5 MG/ML
1 SPRAY NASAL
Refills: 0 | Status: COMPLETED | OUTPATIENT
Start: 2020-12-10 | End: 2020-12-13

## 2020-12-10 RX ORDER — AMLODIPINE BESYLATE 2.5 MG/1
5 TABLET ORAL DAILY
Refills: 0 | Status: DISCONTINUED | OUTPATIENT
Start: 2020-12-10 | End: 2020-12-10

## 2020-12-10 RX ORDER — LABETALOL HCL 100 MG
5 TABLET ORAL ONCE
Refills: 0 | Status: COMPLETED | OUTPATIENT
Start: 2020-12-10 | End: 2020-12-10

## 2020-12-10 RX ORDER — LISINOPRIL 2.5 MG/1
10 TABLET ORAL DAILY
Refills: 0 | Status: DISCONTINUED | OUTPATIENT
Start: 2020-12-10 | End: 2020-12-10

## 2020-12-10 RX ORDER — LISINOPRIL 2.5 MG/1
20 TABLET ORAL DAILY
Refills: 0 | Status: DISCONTINUED | OUTPATIENT
Start: 2020-12-10 | End: 2020-12-11

## 2020-12-10 RX ORDER — HYDROCHLOROTHIAZIDE 25 MG
12.5 TABLET ORAL ONCE
Refills: 0 | Status: COMPLETED | OUTPATIENT
Start: 2020-12-10 | End: 2020-12-10

## 2020-12-10 RX ORDER — POTASSIUM CHLORIDE 20 MEQ
40 PACKET (EA) ORAL ONCE
Refills: 0 | Status: COMPLETED | OUTPATIENT
Start: 2020-12-10 | End: 2020-12-10

## 2020-12-10 RX ORDER — HYDROCHLOROTHIAZIDE 25 MG
12.5 TABLET ORAL DAILY
Refills: 0 | Status: DISCONTINUED | OUTPATIENT
Start: 2020-12-10 | End: 2020-12-11

## 2020-12-10 RX ORDER — MAGNESIUM SULFATE 500 MG/ML
1 VIAL (ML) INJECTION ONCE
Refills: 0 | Status: COMPLETED | OUTPATIENT
Start: 2020-12-10 | End: 2020-12-10

## 2020-12-10 RX ORDER — SODIUM CHLORIDE 0.65 %
2 AEROSOL, SPRAY (ML) NASAL
Refills: 0 | Status: DISCONTINUED | OUTPATIENT
Start: 2020-12-10 | End: 2020-12-14

## 2020-12-10 RX ADMIN — Medication 1000 MILLIGRAM(S): at 17:38

## 2020-12-10 RX ADMIN — Medication 2 SPRAY(S): at 14:48

## 2020-12-10 RX ADMIN — Medication 81 MILLIGRAM(S): at 13:10

## 2020-12-10 RX ADMIN — PANTOPRAZOLE SODIUM 40 MILLIGRAM(S): 20 TABLET, DELAYED RELEASE ORAL at 13:10

## 2020-12-10 RX ADMIN — Medication 100 GRAM(S): at 09:50

## 2020-12-10 RX ADMIN — Medication 40 MILLIEQUIVALENT(S): at 09:50

## 2020-12-10 RX ADMIN — LISINOPRIL 20 MILLIGRAM(S): 2.5 TABLET ORAL at 09:50

## 2020-12-10 RX ADMIN — Medication 1000 MILLIGRAM(S): at 18:04

## 2020-12-10 RX ADMIN — Medication 1000 MILLIGRAM(S): at 17:44

## 2020-12-10 RX ADMIN — Medication 5 MILLIGRAM(S): at 07:31

## 2020-12-10 RX ADMIN — Medication 1000 MILLIGRAM(S): at 13:09

## 2020-12-10 RX ADMIN — Medication 12.5 MILLIGRAM(S): at 19:32

## 2020-12-10 RX ADMIN — Medication 5 MILLIGRAM(S): at 05:12

## 2020-12-10 RX ADMIN — Medication 12.5 MILLIGRAM(S): at 09:50

## 2020-12-10 RX ADMIN — Medication 2 SPRAY(S): at 17:44

## 2020-12-10 RX ADMIN — Medication 10 MILLIGRAM(S): at 19:32

## 2020-12-10 RX ADMIN — OXYMETAZOLINE HYDROCHLORIDE 1 SPRAY(S): 0.5 SPRAY NASAL at 06:14

## 2020-12-10 RX ADMIN — OXYMETAZOLINE HYDROCHLORIDE 1 SPRAY(S): 0.5 SPRAY NASAL at 17:44

## 2020-12-10 RX ADMIN — Medication 20 MILLIGRAM(S): at 06:14

## 2020-12-10 RX ADMIN — Medication 3 MILLILITER(S): at 17:44

## 2020-12-10 NOTE — PROGRESS NOTE ADULT - SUBJECTIVE AND OBJECTIVE BOX
INTERVAL HPI/OVERNIGHT EVENTS: Patient seen and examined at bedside this morning. Complaining of burning discomfort from head to abdomen. Concerned about epistaxis last night.    VITAL SIGNS:  T(F): 98.2 (12-10-20 @ 13:52)  HR: 86 (12-10-20 @ 16:46)  BP: 141/65 (12-10-20 @ 16:46)  RR: 17 (12-10-20 @ 16:46)  SpO2: 95% (12-10-20 @ 16:46)  Wt(kg): --    12-09-20 @ 07:01  -  12-10-20 @ 07:00  --------------------------------------------------------  IN: 360 mL / OUT: 2400 mL / NET: -2040 mL    12-10-20 @ 07:01  -  12-10-20 @ 16:50  --------------------------------------------------------  IN: 290 mL / OUT: 0 mL / NET: 290 mL        PHYSICAL EXAM:    GEN: Awake, comfortable in chair. NAD.   HEENT: NCAT, PERRL, EOMI. Mucosa moist.   NECK: Supple, no JVD.   RESP: Slight crackles at bases  CV: RRR, normal s1/s2. No m/r/g.  ABD: Soft, NTND. BS+  EXT: Warm. Trace pedal/distal LE edema. No clubbing, or cyanosis.   NEURO: AAOx3. No focal deficits.    HOME MEDS:  ASA 81  Lipitor 20  Coreg 6.25 q12h  HCTZ 12.5  Lisinopril 10    MEDICATIONS  (STANDING):  acetaminophen   Tablet .. 1000 milliGRAM(s) Oral every 6 hours  ALBUTerol    90 MICROgram(s) HFA Inhaler 1 Puff(s) Inhalation every 4 hours  albuterol/ipratropium for Nebulization 3 milliLiter(s) Nebulizer every 6 hours  aspirin enteric coated 81 milliGRAM(s) Oral daily  atorvastatin 20 milliGRAM(s) Oral at bedtime  BUpivacaine liposome 1.3% Injectable (no eMAR) 20 milliLiter(s) Local Injection once  Epinephrine 1:1000 nasal 10 milliLiter(s) 10 milliLiter(s) Both Nostrils two times a day  furosemide   Injectable 20 milliGRAM(s) IV Push daily  influenza  Vaccine (HIGH DOSE) 0.7 milliLiter(s) IntraMuscular once  lisinopril 20 milliGRAM(s) Oral daily  metoprolol succinate ER 50 milliGRAM(s) Oral daily  oxymetazoline 0.05% Nasal Spray 1 Spray(s) Both Nostrils two times a day  pantoprazole  Injectable 40 milliGRAM(s) IV Push daily  sodium chloride 0.65% Nasal 2 Spray(s) Both Nostrils four times a day  tiotropium 18 MICROgram(s) Capsule 1 Capsule(s) Inhalation daily    MEDICATIONS  (PRN):  ondansetron Injectable 4 milliGRAM(s) IV Push every 6 hours PRN Nausea and/or Vomiting  oxyCODONE    IR 10 milliGRAM(s) Oral every 6 hours PRN Severe Pain (7 - 10)  oxyCODONE    IR 5 milliGRAM(s) Oral every 4 hours PRN Moderate Pain (4 - 6)      Allergies    Digox (Rash; Urticaria; Hives)  Plavix (Other (Mod to Severe))    Intolerances        LABS:                        7.5    6.41  )-----------( 192      ( 10 Dec 2020 06:02 )             24.5     12-10    137  |  100  |  7   ----------------------------<  102<H>  3.7   |  24  |  0.95    Ca    8.9      10 Dec 2020 06:02  Phos  3.4     12-10  Mg     1.7     12-10    TPro  6.9  /  Alb  3.3  /  TBili  0.5  /  DBili  x   /  AST  18  /  ALT  15  /  AlkPhos  63  12-09    PT/INR - ( 09 Dec 2020 23:07 )   PT: 18.3 sec;   INR: 1.56          PTT - ( 09 Dec 2020 23:07 )  PTT:36.0 sec    CARDIAC MARKERS ( 09 Dec 2020 09:05 )  x     / <0.01 ng/mL / 71 U/L / x     / 1.3 ng/mL        EKG:    Echo: < from: TTE Echo Complete w/o Contrast w/ Doppler (11.13.20 @ 16:09) >  --------------------------------------------------------------------------------  CONCLUSIONS:     1. Normal left and right ventricular size and systolic function.   2. Moderate symmetric left ventricular hypertrophy.   3. Hyperdynamic left ventricular systolic function.   4. 23 mm Jamar valve is noted in the aortic position without any aortic regurgitation. The peak transvalvular velocity is 3.93 m/s, the mean transvalvular gradient is 35.00 mmHg, and the LVOT/AV velocity ratio is 0.30. The peak transaortic gradient is 61.78 mmHg. The transaortic gradients are elevated even in the setting of a TAVR.   5. Grade II left ventricular diastolic dysfunction.   6. Normal right ventricular size and systolic function.   7. Mildly dilated left atrium.   8. No evidence of pulmonary hypertension.   9. No pericardial effusion.  10. Moderately dilated ascending aorta.  11. Compared to the previous TTE performed on 4/1/2019, the transaortic gradients are higher.    --------------------------------------------------------------------------------    < end of copied text >  < from: TTE Limited Echo w/o Cont (11.16.20 @ 12:57) >  --------------------------------------------------------------------------------  CONCLUSIONS:     1. Limited study obtained for evaluation of aortic valve gradients.   2. Hyperdynamic left ventricular systolic function with an intra-cavitary gradient of 31 mmHg.   3. 23 mm Jamar valve is noted in the aortic position without any aortic regurgitation. The peak transvalvular velocity is 4.20 m/s, the mean transvalvular gradient is 35.00 mmHg, and the LVOT/AV velocity ratio is 0.38. The peak transaortic gradient is 70.56 mmHg. The transaortic gradients are elevated, even in the setting of a TAVR. However, hyperdynamic LVEF may contribute to the elevation of the transaortic gradients.   4. Normal right ventricular size and systolic function.   5. No pericardial effusion.   6. Compared to the previous TTE performed on 11/13/2020, there have been no significant interval changes.    --------------------------------------------------------------------------------    < end of copied text >    < from: IRMA w/Doppler (11.17.20 @ 12:38) >  --------------------------------------------------------------------------------    CONCLUSIONS:     1. Normal left and right ventricular size and systolic function.   2. Mildly dilated left atrium.   3. No LA/RA/MAIK/RAA thrombus seen.   4. No evidence of an intracardiac shunt.   5. 23 mm Jamar valve is noted in the aortic position without any aortic regurgitation.   6. There is slight thickening at the base of the right cusp with probably mild restriction in excursion. The other prosthetic leaflets appear normal.   7. No evidence of pulmonary hypertension.   8. No pericardial effusion.   9. See TTE performed 11/16/2020 for TAVR gradients, which were elevated.    -------------------------------------------------------------------------------    < end of copied text >    Cath: [facility] 1/09/19: Diagnostic right and left heart catheterization revealing LMCA normal, LAD with minor luminal irregularities, LCx large and normal, RCA large and normal. PA 18, RV 73/13 (21), PA 70/34 (49), PCWP 26, CO 7.3 L/min, CI 3.7, TPG 23, severe AS (mean LV/Ao gradient 48.1 mmHg, SETH 1.1 cm2). :    Cath:    NST:    RADIOLOGY & ADDITIONAL TESTS:

## 2020-12-10 NOTE — CONSULT NOTE ADULT - REASON FOR ADMISSION
HTN urgency

## 2020-12-10 NOTE — PROGRESS NOTE ADULT - ASSESSMENT
80F ([place]/English speaking) PMHx of uncontrolled HTN, HLD, asthma, chronic diastolic CHF, aortic stenosis s/p TAVR (02/19), initially presented to ED w/ CP and HTN on 11/13, found to have elevated AoV gradient w/ workup c/f possible thrombus. 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 (12/2). Admitted to SICU for postoperative hemodynamic monitoring, now stepped down to telemetry for further management.    NEURO: Dilaudid, tylenol, headaches during this admission managed w/ fioricet prn (CT wnl)  CV: goal MAP >65  - new onset afib preoperatively: c/w toprol 50qd, s/p dig (d/c 2/2 rash), CHADsVASc 4  - HTN: hypertensive urgency preop on metop, hydralazine 25 TID, hctz 25qd. Holding hydral and hctz for now  - Chest pain: now resolved, possibly from GERD, R/LHC 1/9/2019 w/ non-obstructive disease, no acute ischemic changes noted on EKG preop  - Aortic stenosis: s/p TAVR 2/6/19, IRMA 11/17 showing no thrombus, no shunts, slight suspicion for thrombus at base of R cusp  - Chronic diastolic CHF: Echo 11/13: normal LV/RV size/systolic function. Grade II diastolic dysfunction and AS as above.   PULM: asthma, duonebs  GI/FEN: LFD, Protonix, gastropathy on EGD  : voiding  ENDO: ISS  HEME: transfuse for Hgb <8, BRBPR 2/2 colon masses. Heme/onc following, will possibly due tissue eval of hilar nodes post colectomy,  ID: Monitor for s/s of infection, WBC wnl  PPx: SCDs, Eliquis   LINES: PIVx2, antonino (12/2)   WOUNDS/DRAINS: midline incision  PT/OT: Home with PT

## 2020-12-10 NOTE — PROVIDER CONTACT NOTE (CHANGE IN STATUS NOTIFICATION) - BACKGROUND
Pt is s/p subtotal colectomy for colon CA. Recently dc'd from hep drip for new onset a-fib, and transitioned to eliquis.

## 2020-12-10 NOTE — CONSULT NOTE ADULT - SUBJECTIVE AND OBJECTIVE BOX
HPI:  80F ([place]/English speaking) PMHx of uncontrolled HTN, HLD, asthma, chronic diastolic CHF, aortic stenosis s/p TAVR (02/19), initially presented to ED w/ CP and HTN on 11/13, found to have elevated AoV gradient w/ workup c/f possible thrombus. 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 ENT consulted for epistaxis starting yesterday evening. Per primary team, SBP at that time was in 200s and epistaxis was adequately controlled by primary team with afrin and silver nitrate. Per chart review, patient was on heparin drip until 12/9 at which point she was transitioned to eliquis 5 BID, now discontinued. Pt remains on aspirin and continued to bleed this AM. H/H stable at 7.5/24, platelets wnl, INR 1.5, Cr wnl.     Allergies    Digox (Rash; Urticaria; Hives)  Plavix (Other (Mod to Severe))    Intolerances        PAST MEDICAL & SURGICAL HISTORY:  CHF (congestive heart failure)    Pulmonary HTN    Aortic stenosis    HTN (hypertension)    S/P TAVR (transcatheter aortic valve replacement)      Hematologic/Anticoagulation:  aspirin enteric coated 81 milliGRAM(s) Oral daily      Pain medications/Neuro:  acetaminophen   Tablet .. 1000 milliGRAM(s) Oral every 6 hours  ondansetron Injectable 4 milliGRAM(s) IV Push every 6 hours PRN  oxyCODONE    IR 10 milliGRAM(s) Oral every 6 hours PRN  oxyCODONE    IR 5 milliGRAM(s) Oral every 4 hours PRN      IV fluids:      Endocrine Medications:   atorvastatin 20 milliGRAM(s) Oral at bedtime      All other standing medications:   ALBUTerol    90 MICROgram(s) HFA Inhaler 1 Puff(s) Inhalation every 4 hours  albuterol/ipratropium for Nebulization 3 milliLiter(s) Nebulizer every 6 hours  BUpivacaine liposome 1.3% Injectable (no eMAR) 20 milliLiter(s) Local Injection once  Epinephrine 1:1000 nasal 10 milliLiter(s) 10 milliLiter(s) Both Nostrils two times a day  furosemide   Injectable 20 milliGRAM(s) IV Push daily  influenza  Vaccine (HIGH DOSE) 0.7 milliLiter(s) IntraMuscular once  lisinopril 20 milliGRAM(s) Oral daily  metoprolol succinate ER 50 milliGRAM(s) Oral daily  oxymetazoline 0.05% Nasal Spray 1 Spray(s) Both Nostrils two times a day  pantoprazole  Injectable 40 milliGRAM(s) IV Push daily  sodium chloride 0.65% Nasal 2 Spray(s) Both Nostrils four times a day  tiotropium 18 MICROgram(s) Capsule 1 Capsule(s) Inhalation daily      All other PRN medications:      Vital Signs Last 24 Hrs  T(C): 36.8 (10 Dec 2020 09:43), Max: 37.2 (09 Dec 2020 22:04)  T(F): 98.3 (10 Dec 2020 09:43), Max: 98.9 (09 Dec 2020 22:04)  HR: 82 (10 Dec 2020 08:15) (64 - 94)  BP: 145/65 (10 Dec 2020 08:15) (138/65 - 198/92)  BP(mean): 103 (10 Dec 2020 08:15) (97 - 118)  RR: 17 (10 Dec 2020 08:15) (17 - 18)  SpO2: 93% (10 Dec 2020 08:15) (92% - 100%)    LABS:  CBC-    12-10    137  |  100  |  7   ----------------------------<  102<H>  3.7   |  24  |  0.95    Ca    8.9      10 Dec 2020 06:02  Phos  3.4     12-10  Mg     1.7     12-10    TPro  6.9  /  Alb  3.3  /  TBili  0.5  /  DBili  x   /  AST  18  /  ALT  15  /  AlkPhos  63  12-09    Coagulation Studies-  PT/INR - ( 09 Dec 2020 23:07 )   PT: 18.3 sec;   INR: 1.56          PTT - ( 09 Dec 2020 23:07 )  PTT:36.0 sec  Endocrine Panel-  --  --  8.9 mg/dL  --  --  9.1 mg/dL  --  --  8.8 mg/dL        PHYSICAL EXAM:    ENT EXAM-   Constitutional: Well-developed, well-nourished.  NAD  Head:  normocephalic, atraumatic.   Nose:  bleeding noted along L anterior septum posterior to area previously cauterized. Clot present bilaterally,  OC/OP:  Blood clot present along posterior pharyngeal wall    MULTISYSTEM EXAM-  Neuro/Psych:  A&O x 3.    Cranial nerves: 2-12 grossly intact bilaterally.  Eyes:  EOMI, no nystagmus.  Pulm:  No dyspnea, non-labored breathing      Procedure: Control of Epistaxis  Afrin/lido soaked pledgets were applied to bilateral nasal cavities and direct pressure was held to anterior nose for ~15 minutes. Upon removal of pledgets, bilateral nasal cavities were suctioned to remove all clot. There was no active bleeding noted in the R nasal cavity. There was bleeding noted along the septum in the L nasal cavity. Silver nitrate was applied to the area of bleeding and a strip of surgicel was placed along the nasal floor, extending to nasopharynx. At this point, there was no additional bleeding anteriorly or along posterior pharyngeal wall. Hemostasis was again confirmed after ~15 minutes.

## 2020-12-10 NOTE — PROGRESS NOTE ADULT - ASSESSMENT
A/P: 80F w/HFpEF (61%), AS s/p TAVR, pAF (new), HTN, HLD, asthma adm to cardiac tele on 11/13 w/chest pain & hypertensive urgency. Course c/b new onset AF and GIB after hep gtt initiation 2/2 leaflet thrombus on IRMA. 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 Patient s/p laparoscopic subtotal colectomy (R and transverse colectomy, ileocolic anastomosis) on 12/2. Cardiology consult service now following for cardiac optimization post-surgery.    #Leaflet thrombus  - c/w eliquis 5mg bid  - maintain Hb > 7  - please ensure post-discharge f/u with Structural [facility]    #TAVR  - c/w ASA 81    #Acute on chronic HFpEF - resolving. Close to euvolemia; has some pedal/LE edema.   - d/c IV lasix and start torsemide 10mg PO QD. Can be discharged on this regimen.    #pAF - [id number] - 4. No digoxin 2/2 rash  - c/w Toprol 50 QD  - c/w eliquis 5mg bid    #HTN  - start lisinopril 20mg PO QD  - start HCTZ 12.5mg PO QD     Recommendations are final when signed by attending.    --  Hever Duncan MD  Cardiology PGY5

## 2020-12-10 NOTE — CONSULT NOTE ADULT - ASSESSMENT
80F ([place] citizen/English speaking) PMHx of uncontrolled HTN, HLD, asthma, chronic diastolic CHF, aortic stenosis s/p TAVR (02/19), initially presented to ED w/ CP and HTN on 11/13, found to have elevated AoV gradient w/ workup c/f possible thrombus. 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    Bleeding resolved at this time  Aggressive blood pressure management: epistaxis will likely recur in the event of elevated BP>160   Nasal saline sprays QID  Afrin BID x3 days only, counseled patient to use afrin if rebleeding resumes  Avoid nasal cannula--humidified face tent preferred   Consider holding aspirin if medically able  Will discuss case with Dr. Micah Moran ENT at [phone] with any questions/concerns.

## 2020-12-10 NOTE — PROGRESS NOTE ADULT - SUBJECTIVE AND OBJECTIVE BOX
INTERVAL HPI/OVERNIGHT EVENTS: Epistaxis around 10PM, stat CBC/BMP/Coags sent, H/H 7.5/24.3, SBP to 200s, HR 100s, Hydralazine 5mg x 2, Labetol 5mg x 1 give, repeat /65, HR 82, treated with Afrin, Epinephrine solution & silver nitrate sticks to stop bleeding, EKG: NSR, VSS    STATUS POST:  12/2: Subtotal colectomy (for differentiated adenocarcinoma in ascending and transverse colon)     POST OPERATIVE DAY #: 8    SUBJECTIVE: Pt seen and examined at bedside this am by surgery team. Tolerating diet, pain well controlled. Denies f/n/v/cp/sob.    MEDICATIONS  (STANDING):  acetaminophen   Tablet .. 1000 milliGRAM(s) Oral every 6 hours  ALBUTerol    90 MICROgram(s) HFA Inhaler 1 Puff(s) Inhalation every 4 hours  albuterol/ipratropium for Nebulization 3 milliLiter(s) Nebulizer every 6 hours  apixaban 5 milliGRAM(s) Oral two times a day  aspirin enteric coated 81 milliGRAM(s) Oral daily  atorvastatin 20 milliGRAM(s) Oral at bedtime  BUpivacaine liposome 1.3% Injectable (no eMAR) 20 milliLiter(s) Local Injection once  Epinephrine 1:1000 nasal 10 milliLiter(s) 10 milliLiter(s) Both Nostrils two times a day  furosemide   Injectable 20 milliGRAM(s) IV Push daily  influenza  Vaccine (HIGH DOSE) 0.7 milliLiter(s) IntraMuscular once  lisinopril 10 milliGRAM(s) Oral daily  metoprolol succinate ER 50 milliGRAM(s) Oral daily  oxymetazoline 0.05% Nasal Spray 1 Spray(s) Both Nostrils two times a day  pantoprazole  Injectable 40 milliGRAM(s) IV Push daily  tiotropium 18 MICROgram(s) Capsule 1 Capsule(s) Inhalation daily    MEDICATIONS  (PRN):  ondansetron Injectable 4 milliGRAM(s) IV Push every 6 hours PRN Nausea and/or Vomiting  oxyCODONE    IR 10 milliGRAM(s) Oral every 6 hours PRN Severe Pain (7 - 10)  oxyCODONE    IR 5 milliGRAM(s) Oral every 4 hours PRN Moderate Pain (4 - 6)      Vital Signs Last 24 Hrs  T(C): 36.8 (10 Dec 2020 04:39), Max: 37.2 (09 Dec 2020 22:04)  T(F): 98.2 (10 Dec 2020 04:39), Max: 98.9 (09 Dec 2020 22:04)  HR: 94 (10 Dec 2020 07:25) (64 - 94)  BP: 175/80 (10 Dec 2020 07:25) (138/65 - 198/92)  BP(mean): 115 (10 Dec 2020 07:25) (97 - 118)  RR: 17 (10 Dec 2020 07:25) (17 - 18)  SpO2: 95% (10 Dec 2020 07:25) (92% - 100%)    PHYSICAL EXAM:    Constitutional: A&Ox3, NAD. No signs of active epistaxis     Respiratory: non labored breathing, no respiratory distress    Cardiovascular: NSR, RRR    Gastrointestinal:  abdomen soft, mildly distended, appropriately ttp to incisions. Dressings c/d/i.     Genitourinary: Primafit in place     Extremities: wwp, no calf tenderness or edema. SCDs in place     I&O's Detail    09 Dec 2020 07:01  -  10 Dec 2020 07:00  --------------------------------------------------------  IN:    Oral Fluid: 360 mL  Total IN: 360 mL    OUT:    Voided (mL): 2400 mL  Total OUT: 2400 mL    Total NET: -2040 mL          LABS:                        7.5    6.41  )-----------( 192      ( 10 Dec 2020 06:02 )             24.5     12-10    137  |  100  |  7   ----------------------------<  102<H>  3.7   |  24  |  0.95    Ca    8.9      10 Dec 2020 06:02  Phos  3.4     12-10  Mg     1.7     12-10    TPro  6.9  /  Alb  3.3  /  TBili  0.5  /  DBili  x   /  AST  18  /  ALT  15  /  AlkPhos  63  12-09    PT/INR - ( 09 Dec 2020 23:07 )   PT: 18.3 sec;   INR: 1.56          PTT - ( 09 Dec 2020 23:07 )  PTT:36.0 sec      RADIOLOGY & ADDITIONAL STUDIES:

## 2020-12-10 NOTE — PROVIDER CONTACT NOTE (MEDICATION) - BACKGROUND
Pt had bleeding last night from the nose, some went to the back of the throat, no signs of aspiration noted.

## 2020-12-10 NOTE — PROVIDER CONTACT NOTE (CHANGE IN STATUS NOTIFICATION) - BACKGROUND
P Pt s/p subtotal colectomy for colon CA, on eliquis, started nose bleed around 22 08pm. Now with perfuse bleeding.

## 2020-12-11 LAB
ANION GAP SERPL CALC-SCNC: 14 MMOL/L — SIGNIFICANT CHANGE UP (ref 5–17)
BUN SERPL-MCNC: 11 MG/DL — SIGNIFICANT CHANGE UP (ref 7–23)
CALCIUM SERPL-MCNC: 9.3 MG/DL — SIGNIFICANT CHANGE UP (ref 8.4–10.5)
CHLORIDE SERPL-SCNC: 97 MMOL/L — SIGNIFICANT CHANGE UP (ref 96–108)
CO2 SERPL-SCNC: 25 MMOL/L — SIGNIFICANT CHANGE UP (ref 22–31)
CREAT SERPL-MCNC: 1.26 MG/DL — SIGNIFICANT CHANGE UP (ref 0.5–1.3)
GLUCOSE SERPL-MCNC: 108 MG/DL — HIGH (ref 70–99)
HCT VFR BLD CALC: 24.2 % — LOW (ref 34.5–45)
HGB BLD-MCNC: 7.4 G/DL — LOW (ref 11.5–15.5)
MAGNESIUM SERPL-MCNC: 2 MG/DL — SIGNIFICANT CHANGE UP (ref 1.6–2.6)
MCHC RBC-ENTMCNC: 28.5 PG — SIGNIFICANT CHANGE UP (ref 27–34)
MCHC RBC-ENTMCNC: 30.6 GM/DL — LOW (ref 32–36)
MCV RBC AUTO: 93.1 FL — SIGNIFICANT CHANGE UP (ref 80–100)
NRBC # BLD: 0 /100 WBCS — SIGNIFICANT CHANGE UP (ref 0–0)
PHOSPHATE SERPL-MCNC: 4.4 MG/DL — SIGNIFICANT CHANGE UP (ref 2.5–4.5)
PLATELET # BLD AUTO: 269 K/UL — SIGNIFICANT CHANGE UP (ref 150–400)
POTASSIUM SERPL-MCNC: 4.2 MMOL/L — SIGNIFICANT CHANGE UP (ref 3.5–5.3)
POTASSIUM SERPL-SCNC: 4.2 MMOL/L — SIGNIFICANT CHANGE UP (ref 3.5–5.3)
RBC # BLD: 2.6 M/UL — LOW (ref 3.8–5.2)
RBC # FLD: 14.1 % — SIGNIFICANT CHANGE UP (ref 10.3–14.5)
SODIUM SERPL-SCNC: 136 MMOL/L — SIGNIFICANT CHANGE UP (ref 135–145)
WBC # BLD: 8.52 K/UL — SIGNIFICANT CHANGE UP (ref 3.8–10.5)
WBC # FLD AUTO: 8.52 K/UL — SIGNIFICANT CHANGE UP (ref 3.8–10.5)

## 2020-12-11 PROCEDURE — 99233 SBSQ HOSP IP/OBS HIGH 50: CPT

## 2020-12-11 PROCEDURE — 70450 CT HEAD/BRAIN W/O DYE: CPT | Mod: 26

## 2020-12-11 RX ORDER — CARVEDILOL PHOSPHATE 80 MG/1
1 CAPSULE, EXTENDED RELEASE ORAL
Qty: 0 | Refills: 0 | DISCHARGE

## 2020-12-11 RX ORDER — HYDROCHLOROTHIAZIDE 25 MG
25 TABLET ORAL DAILY
Refills: 0 | Status: DISCONTINUED | OUTPATIENT
Start: 2020-12-11 | End: 2020-12-14

## 2020-12-11 RX ORDER — APIXABAN 2.5 MG/1
1 TABLET, FILM COATED ORAL
Qty: 0 | Refills: 0 | DISCHARGE
Start: 2020-12-11

## 2020-12-11 RX ORDER — APIXABAN 2.5 MG/1
5 TABLET, FILM COATED ORAL EVERY 12 HOURS
Refills: 0 | Status: DISCONTINUED | OUTPATIENT
Start: 2020-12-11 | End: 2020-12-14

## 2020-12-11 RX ORDER — METOPROLOL TARTRATE 50 MG
50 TABLET ORAL DAILY
Refills: 0 | Status: DISCONTINUED | OUTPATIENT
Start: 2020-12-11 | End: 2020-12-14

## 2020-12-11 RX ORDER — LISINOPRIL 2.5 MG/1
20 TABLET ORAL DAILY
Refills: 0 | Status: DISCONTINUED | OUTPATIENT
Start: 2020-12-11 | End: 2020-12-14

## 2020-12-11 RX ADMIN — Medication 12.5 MILLIGRAM(S): at 06:33

## 2020-12-11 RX ADMIN — Medication 3 MILLILITER(S): at 06:32

## 2020-12-11 RX ADMIN — Medication 2 SPRAY(S): at 13:45

## 2020-12-11 RX ADMIN — Medication 3 MILLILITER(S): at 17:10

## 2020-12-11 RX ADMIN — APIXABAN 5 MILLIGRAM(S): 2.5 TABLET, FILM COATED ORAL at 09:24

## 2020-12-11 RX ADMIN — Medication 3 MILLILITER(S): at 22:46

## 2020-12-11 RX ADMIN — Medication 1000 MILLIGRAM(S): at 07:28

## 2020-12-11 RX ADMIN — Medication 3 MILLILITER(S): at 12:00

## 2020-12-11 RX ADMIN — Medication 1000 MILLIGRAM(S): at 17:10

## 2020-12-11 RX ADMIN — Medication 1000 MILLIGRAM(S): at 06:32

## 2020-12-11 RX ADMIN — Medication 2 SPRAY(S): at 17:11

## 2020-12-11 RX ADMIN — OXYMETAZOLINE HYDROCHLORIDE 1 SPRAY(S): 0.5 SPRAY NASAL at 17:11

## 2020-12-11 RX ADMIN — OXYCODONE HYDROCHLORIDE 10 MILLIGRAM(S): 5 TABLET ORAL at 10:02

## 2020-12-11 RX ADMIN — OXYMETAZOLINE HYDROCHLORIDE 1 SPRAY(S): 0.5 SPRAY NASAL at 06:32

## 2020-12-11 RX ADMIN — OXYCODONE HYDROCHLORIDE 10 MILLIGRAM(S): 5 TABLET ORAL at 09:24

## 2020-12-11 RX ADMIN — ATORVASTATIN CALCIUM 20 MILLIGRAM(S): 80 TABLET, FILM COATED ORAL at 22:46

## 2020-12-11 RX ADMIN — Medication 81 MILLIGRAM(S): at 14:21

## 2020-12-11 RX ADMIN — PANTOPRAZOLE SODIUM 40 MILLIGRAM(S): 20 TABLET, DELAYED RELEASE ORAL at 14:21

## 2020-12-11 RX ADMIN — APIXABAN 5 MILLIGRAM(S): 2.5 TABLET, FILM COATED ORAL at 17:10

## 2020-12-11 RX ADMIN — Medication 1000 MILLIGRAM(S): at 17:44

## 2020-12-11 RX ADMIN — OXYCODONE HYDROCHLORIDE 10 MILLIGRAM(S): 5 TABLET ORAL at 02:00

## 2020-12-11 RX ADMIN — Medication 1000 MILLIGRAM(S): at 00:30

## 2020-12-11 RX ADMIN — Medication 2 SPRAY(S): at 06:32

## 2020-12-11 NOTE — PROGRESS NOTE ADULT - SUBJECTIVE AND OBJECTIVE BOX
INTERVAL EVENTS:    Pt seen/examined at bedside earlier today, no QUIROS o/n. C/o severe HA, worst on R-side. Denies CP, palp, SOB. Satting well on RA. No further epistaxis    MEDICATIONS  (STANDING):  acetaminophen   Tablet .. 1000 milliGRAM(s) Oral every 6 hours  ALBUTerol    90 MICROgram(s) HFA Inhaler 1 Puff(s) Inhalation every 4 hours  albuterol/ipratropium for Nebulization 3 milliLiter(s) Nebulizer every 6 hours  apixaban 5 milliGRAM(s) Oral every 12 hours  aspirin enteric coated 81 milliGRAM(s) Oral daily  atorvastatin 20 milliGRAM(s) Oral at bedtime  BUpivacaine liposome 1.3% Injectable (no eMAR) 20 milliLiter(s) Local Injection once  hydrochlorothiazide 12.5 milliGRAM(s) Oral daily  influenza  Vaccine (HIGH DOSE) 0.7 milliLiter(s) IntraMuscular once  lisinopril 20 milliGRAM(s) Oral daily  metoprolol succinate ER 50 milliGRAM(s) Oral daily  oxymetazoline 0.05% Nasal Spray 1 Spray(s) Both Nostrils two times a day  pantoprazole  Injectable 40 milliGRAM(s) IV Push daily  sodium chloride 0.65% Nasal 2 Spray(s) Both Nostrils four times a day  tiotropium 18 MICROgram(s) Capsule 1 Capsule(s) Inhalation daily    MEDICATIONS  (PRN):  ondansetron Injectable 4 milliGRAM(s) IV Push every 6 hours PRN Nausea and/or Vomiting  oxyCODONE    IR 10 milliGRAM(s) Oral every 6 hours PRN Severe Pain (7 - 10)  oxyCODONE    IR 5 milliGRAM(s) Oral every 4 hours PRN Moderate Pain (4 - 6)      Vital Signs Last 24 Hrs  T(C): 37 (11 Dec 2020 17:54), Max: 37.2 (10 Dec 2020 18:04)  T(F): 98.6 (11 Dec 2020 17:54), Max: 99 (10 Dec 2020 22:16)  HR: 82 (11 Dec 2020 16:58) (80 - 88)  BP: 130/60 (11 Dec 2020 16:58) (109/56 - 130/60)  BP(mean): 79 (11 Dec 2020 06:20) (79 - 81)  RR: 17 (11 Dec 2020 16:58) (16 - 17)  SpO2: 97% (11 Dec 2020 16:58) (94% - 99%)     PHYSICAL EXAM:  GEN: Awake, alert. NAD but tearful from HA. Obese.  HEENT: NCAT, PERRL, EOMI. Mucosa moist. No JVD.  RESP: CTA b/l  CV: RRR. Normal S1/S2. No m/r/g.  ABD: Soft. NT/ND. BS+  EXT: Warm. No edema, clubbing, or cyanosis.   NEURO: AAOx3. No focal deficits.     LABS:                        7.4    8.52  )-----------( 269      ( 11 Dec 2020 06:36 )             24.2     12-11    136  |  97  |  11  ----------------------------<  108<H>  4.2   |  25  |  1.26    Ca    9.3      11 Dec 2020 06:36  Phos  4.4     12-11  Mg     2.0     12-11    TPro  6.9  /  Alb  3.3  /  TBili  0.5  /  DBili  x   /  AST  18  /  ALT  15  /  AlkPhos  63  12-09        PT/INR - ( 09 Dec 2020 23:07 )   PT: 18.3 sec;   INR: 1.56          PTT - ( 09 Dec 2020 23:07 )  PTT:36.0 sec    I&O's Summary    10 Dec 2020 07:01  -  11 Dec 2020 07:00  --------------------------------------------------------  IN: 290 mL / OUT: 2000 mL / NET: -1710 mL    11 Dec 2020 07:01  -  11 Dec 2020 17:56  --------------------------------------------------------  IN: 320 mL / OUT: 1200 mL / NET: -880 mL      BNP  RADIOLOGY & ADDITIONAL STUDIES:    TELEMETRY: NSR, no events    EKG:

## 2020-12-11 NOTE — PROGRESS NOTE ADULT - ASSESSMENT
A/P: 80F w/HFpEF (61%), AS s/p TAVR, pAF (new), HTN, HLD, asthma adm to cardiac tele on 11/13 w/chest pain & hypertensive urgency. Course c/b new onset AF and GIB after hep gtt initiation 2/2 leaflet thrombus on IRMA. 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 Patient s/p laparoscopic subtotal colectomy (R and transverse colectomy, ileocolic anastomosis) on 12/2. Cardiology consult service now following for cardiac optimization post-surgery.    #Severe AS s/p TAVR (23mm Jamar 3) c/b Likely Leaflet Thrombus  - CCTA showed possible hypoattenuation at base of AV leaflets (HALT), IRMA w/thickening at base as well.,   - C/w Eliquis 5mg PO BID, can d/c ASA81  - Please ensure post-discharge f/u with [facility] Dr. Alejo    #HTN Urgency c/b Acute Decompensated HFpEF  - Resolved, BP better controlled, euvolemic, satting well on RA  - Diuresed 1.7L yesterday (21L for hosp course)  - Recommend d/c torsemide, increase HCTZ to home dose 25mg PO qd  - C/w Lisinopril 20mg PO qd, can uptitrate if needed as outpatient    #pAF - [id number] - 4. No digoxin 2/2 rash  - c/w Toprol 50 QD  - c/w Eliquis 5mg bid    Discharge Meds: Eliquis 5mg PO BID, Toprol 50mg PO qd, Lisinopril 20mg PO qd, HCTZ 25mg PO qd    At d/c, pt should f/u w/gen cards:  Dr. Keshav Aguirre  [address] [place] [address]  [phone]    Pt seen and d/w consult attending.    --  Brian Cheatham MD  Cardiology PGY-6

## 2020-12-11 NOTE — PROGRESS NOTE ADULT - SUBJECTIVE AND OBJECTIVE BOX
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 Reports no acute complaints, sitting up comfortably. Denies active nose bleeding. Tolerating diet, pain well controlled. +F/+BM. Denies f/n/v/cp/sob.    MEDICATIONS  (STANDING):  acetaminophen   Tablet .. 1000 milliGRAM(s) Oral every 6 hours  ALBUTerol    90 MICROgram(s) HFA Inhaler 1 Puff(s) Inhalation every 4 hours  albuterol/ipratropium for Nebulization 3 milliLiter(s) Nebulizer every 6 hours  aspirin enteric coated 81 milliGRAM(s) Oral daily  atorvastatin 20 milliGRAM(s) Oral at bedtime  BUpivacaine liposome 1.3% Injectable (no eMAR) 20 milliLiter(s) Local Injection once  hydrochlorothiazide 12.5 milliGRAM(s) Oral daily  influenza  Vaccine (HIGH DOSE) 0.7 milliLiter(s) IntraMuscular once  lisinopril 20 milliGRAM(s) Oral daily  metoprolol succinate ER 50 milliGRAM(s) Oral daily  oxymetazoline 0.05% Nasal Spray 1 Spray(s) Both Nostrils two times a day  pantoprazole  Injectable 40 milliGRAM(s) IV Push daily  sodium chloride 0.65% Nasal 2 Spray(s) Both Nostrils four times a day  tiotropium 18 MICROgram(s) Capsule 1 Capsule(s) Inhalation daily  torsemide 10 milliGRAM(s) Oral daily    MEDICATIONS  (PRN):  ondansetron Injectable 4 milliGRAM(s) IV Push every 6 hours PRN Nausea and/or Vomiting  oxyCODONE    IR 10 milliGRAM(s) Oral every 6 hours PRN Severe Pain (7 - 10)  oxyCODONE    IR 5 milliGRAM(s) Oral every 4 hours PRN Moderate Pain (4 - 6)      Vital Signs Last 24 Hrs  T(C): 37 (11 Dec 2020 05:11), Max: 37.2 (10 Dec 2020 18:04)  T(F): 98.6 (11 Dec 2020 05:11), Max: 99 (10 Dec 2020 22:16)  HR: 80 (11 Dec 2020 06:20) (80 - 86)  BP: 114/55 (11 Dec 2020 06:20) (109/56 - 147/67)  BP(mean): 79 (11 Dec 2020 06:20) (79 - 103)  RR: 17 (11 Dec 2020 06:20) (17 - 17)  SpO2: 97% (11 Dec 2020 06:20) (93% - 99%)    PHYSICAL EXAM:      Constitutional: A&Ox3, NAD. No signs of active epistaxis     Respiratory: non labored breathing, no respiratory distress    Cardiovascular: NSR, RRR    Gastrointestinal:  abdomen soft, mildly distended, appropriately ttp to incisions. Dressings c/d/i.     Genitourinary: Primafit in place     Extremities: wwp, no calf tenderness or edema. SCDs in place       I&O's Detail    10 Dec 2020 07:01  -  11 Dec 2020 07:00  --------------------------------------------------------  IN:    IV PiggyBack: 50 mL    Oral Fluid: 240 mL  Total IN: 290 mL    OUT:    Voided (mL): 2000 mL  Total OUT: 2000 mL    Total NET: -1710 mL          LABS:                        7.4    8.52  )-----------( 269      ( 11 Dec 2020 06:36 )             24.2     12-11    136  |  97  |  11  ----------------------------<  108<H>  4.2   |  25  |  1.26    Ca    9.3      11 Dec 2020 06:36  Phos  4.4     12-11  Mg     2.0     12-11    TPro  6.9  /  Alb  3.3  /  TBili  0.5  /  DBili  x   /  AST  18  /  ALT  15  /  AlkPhos  63  12-09    PT/INR - ( 09 Dec 2020 23:07 )   PT: 18.3 sec;   INR: 1.56          PTT - ( 09 Dec 2020 23:07 )  PTT:36.0 sec      RADIOLOGY & ADDITIONAL STUDIES:

## 2020-12-11 NOTE — PROGRESS NOTE ADULT - ASSESSMENT
80F ([place]/English speaking) PMHx of uncontrolled HTN, HLD, asthma, chronic diastolic CHF, aortic stenosis s/p TAVR (02/19), initially presented to ED w/ CP and HTN on 11/13, found to have elevated AoV gradient w/ workup c/f possible thrombus. 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 (12/2). Admitted to SICU for postoperative hemodynamic monitoring.     NEURO: Dilaudid, tylenol, headaches during this admission managed w/ fioricet prn (CT wnl)  CV: goal MAP >65  - new onset afib preoperatively: c/w toprol 50qd, s/p dig (d/c 2/2 rash), CHADsVASc 4  - HTN: hypertensive urgency preop on metop, hydralazine 25 TID, hctz 25qd. Holding hydral   - Chest pain: now resolved, possibly from GERD, R/LHC 1/9/2019 w/ non-obstructive disease, no acute ischemic changes noted on EKG preop  - Aortic stenosis: s/p TAVR 2/6/19, IRMA 11/17 showing no thrombus, no shunts, slight suspicion for thrombus at base of R cusp  - Chronic diastolic CHF: Echo 11/13: normal LV/RV size/systolic function. Grade II diastolic dysfunction and AS as above.   PULM: asthma, duonebs  GI/FEN: LFD, Protonix, gastropathy on EGD  : voiding  ENDO: ISS  HEME: transfuse for Hgb <8, BRBPR 2/2 colon masses. No active blood per rectum. Heme/onc following, will possible tissue eval of hilar nodes post colectomy,  ID: Monitor for s/s of infection, WBC wnl  PPx: SCDs, ASA, holding Eliquis  LINES: PIVx2, antonino (12/2)   WOUNDS/DRAINS: midline incision  PT/OT: Home with PT

## 2020-12-12 LAB
ANION GAP SERPL CALC-SCNC: 12 MMOL/L — SIGNIFICANT CHANGE UP (ref 5–17)
BLD GP AB SCN SERPL QL: NEGATIVE — SIGNIFICANT CHANGE UP
BUN SERPL-MCNC: 9 MG/DL — SIGNIFICANT CHANGE UP (ref 7–23)
CALCIUM SERPL-MCNC: 8.6 MG/DL — SIGNIFICANT CHANGE UP (ref 8.4–10.5)
CHLORIDE SERPL-SCNC: 97 MMOL/L — SIGNIFICANT CHANGE UP (ref 96–108)
CO2 SERPL-SCNC: 27 MMOL/L — SIGNIFICANT CHANGE UP (ref 22–31)
CREAT SERPL-MCNC: 1.12 MG/DL — SIGNIFICANT CHANGE UP (ref 0.5–1.3)
GLUCOSE SERPL-MCNC: 112 MG/DL — HIGH (ref 70–99)
HCT VFR BLD CALC: 22.7 % — LOW (ref 34.5–45)
HGB BLD-MCNC: 6.9 G/DL — CRITICAL LOW (ref 11.5–15.5)
MAGNESIUM SERPL-MCNC: 1.8 MG/DL — SIGNIFICANT CHANGE UP (ref 1.6–2.6)
MCHC RBC-ENTMCNC: 29 PG — SIGNIFICANT CHANGE UP (ref 27–34)
MCHC RBC-ENTMCNC: 30.4 GM/DL — LOW (ref 32–36)
MCV RBC AUTO: 95.4 FL — SIGNIFICANT CHANGE UP (ref 80–100)
NRBC # BLD: 0 /100 WBCS — SIGNIFICANT CHANGE UP (ref 0–0)
PHOSPHATE SERPL-MCNC: 4.1 MG/DL — SIGNIFICANT CHANGE UP (ref 2.5–4.5)
PLATELET # BLD AUTO: 215 K/UL — SIGNIFICANT CHANGE UP (ref 150–400)
POTASSIUM SERPL-MCNC: 4.3 MMOL/L — SIGNIFICANT CHANGE UP (ref 3.5–5.3)
POTASSIUM SERPL-SCNC: 4.3 MMOL/L — SIGNIFICANT CHANGE UP (ref 3.5–5.3)
RBC # BLD: 2.38 M/UL — LOW (ref 3.8–5.2)
RBC # FLD: 14.2 % — SIGNIFICANT CHANGE UP (ref 10.3–14.5)
RH IG SCN BLD-IMP: POSITIVE — SIGNIFICANT CHANGE UP
SODIUM SERPL-SCNC: 136 MMOL/L — SIGNIFICANT CHANGE UP (ref 135–145)
WBC # BLD: 7.2 K/UL — SIGNIFICANT CHANGE UP (ref 3.8–10.5)
WBC # FLD AUTO: 7.2 K/UL — SIGNIFICANT CHANGE UP (ref 3.8–10.5)

## 2020-12-12 RX ORDER — MAGNESIUM SULFATE 500 MG/ML
1 VIAL (ML) INJECTION ONCE
Refills: 0 | Status: COMPLETED | OUTPATIENT
Start: 2020-12-12 | End: 2020-12-12

## 2020-12-12 RX ADMIN — Medication 100 GRAM(S): at 09:21

## 2020-12-12 RX ADMIN — Medication 2 SPRAY(S): at 18:32

## 2020-12-12 RX ADMIN — OXYMETAZOLINE HYDROCHLORIDE 1 SPRAY(S): 0.5 SPRAY NASAL at 05:42

## 2020-12-12 RX ADMIN — Medication 3 MILLILITER(S): at 12:54

## 2020-12-12 RX ADMIN — Medication 2 SPRAY(S): at 05:40

## 2020-12-12 RX ADMIN — Medication 3 MILLILITER(S): at 18:31

## 2020-12-12 RX ADMIN — Medication 1000 MILLIGRAM(S): at 06:07

## 2020-12-12 RX ADMIN — Medication 1000 MILLIGRAM(S): at 00:17

## 2020-12-12 RX ADMIN — LISINOPRIL 20 MILLIGRAM(S): 2.5 TABLET ORAL at 05:40

## 2020-12-12 RX ADMIN — Medication 3 MILLILITER(S): at 23:21

## 2020-12-12 RX ADMIN — Medication 50 MILLIGRAM(S): at 12:54

## 2020-12-12 RX ADMIN — Medication 1000 MILLIGRAM(S): at 13:30

## 2020-12-12 RX ADMIN — ATORVASTATIN CALCIUM 20 MILLIGRAM(S): 80 TABLET, FILM COATED ORAL at 21:10

## 2020-12-12 RX ADMIN — Medication 3 MILLILITER(S): at 05:40

## 2020-12-12 RX ADMIN — OXYMETAZOLINE HYDROCHLORIDE 1 SPRAY(S): 0.5 SPRAY NASAL at 18:32

## 2020-12-12 RX ADMIN — Medication 2 SPRAY(S): at 12:55

## 2020-12-12 RX ADMIN — Medication 25 MILLIGRAM(S): at 05:40

## 2020-12-12 RX ADMIN — Medication 1000 MILLIGRAM(S): at 18:31

## 2020-12-12 RX ADMIN — Medication 1000 MILLIGRAM(S): at 05:40

## 2020-12-12 RX ADMIN — Medication 2 SPRAY(S): at 00:17

## 2020-12-12 RX ADMIN — PANTOPRAZOLE SODIUM 40 MILLIGRAM(S): 20 TABLET, DELAYED RELEASE ORAL at 12:55

## 2020-12-12 RX ADMIN — APIXABAN 5 MILLIGRAM(S): 2.5 TABLET, FILM COATED ORAL at 05:40

## 2020-12-12 RX ADMIN — APIXABAN 5 MILLIGRAM(S): 2.5 TABLET, FILM COATED ORAL at 18:32

## 2020-12-12 RX ADMIN — Medication 1000 MILLIGRAM(S): at 01:00

## 2020-12-12 RX ADMIN — Medication 1000 MILLIGRAM(S): at 12:54

## 2020-12-12 RX ADMIN — Medication 1000 MILLIGRAM(S): at 19:15

## 2020-12-12 NOTE — PROGRESS NOTE ADULT - ASSESSMENT
80F w/ HFpEF (61%), AS s/p TAVR, pAF (new), HTN, HLD, asthma adm to cardiac tele on 11/13 w/chest pain & hypertensive urgency. Course c/b new onset AF and GIB after hep gtt initiation 2/2 leaflet thrombus on IRMA. 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 Patient s/p laparoscopic subtotal colectomy (R and transverse colectomy, ileocolic anastomosis) on 12/2. Cardiology consult service now following for cardiac optimization post-surgery.    #Severe AS s/p TAVR (23mm Jamar 3) c/b Likely Leaflet Thrombus  - CCTA showed possible hypoattenuation at base of AV leaflets (HALT), IRMA w/thickening at base as well.,   - c/w eliquis at the discretion of surgery team considering Hb now 6.9  - DC ASA 81mg daily  - Please ensure post-discharge f/u with [facility] Dr. Alejo    #HTN Urgency c/b Acute Decompensated HFpEF  - Resolved, BP better controlled, euvolemic, satting well on RA  - c/w HCTZ 25mg daily  - C/w Lisinopril 20mg PO qd, can uptitrate if needed as outpatient    #pAF - [id number] - 4. No digoxin 2/2 rash  - c/w Toprol 50 QD  - c/w Eliquis 5mg bid    Discharge Meds: Eliquis 5mg PO BID, Toprol 50mg PO qd, Lisinopril 20mg PO qd, HCTZ 25mg PO qd    At d/c, pt should f/u w/gen cards:  Dr. Keshav Aguirre  [address] [place] [address] [phone]    Adolph Nava MD  d/w Dr. Luciano

## 2020-12-12 NOTE — PROGRESS NOTE ADULT - ASSESSMENT
80F ([place]/English speaking) PMHx of uncontrolled HTN, HLD, asthma, chronic diastolic CHF, aortic stenosis s/p TAVR (02/19), initially presented to ED w/ CP and HTN on 11/13, found to have elevated AoV gradient w/ workup c/f possible thrombus. 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 (12/2). Admitted to SICU for postoperative hemodynamic monitoring.     NEURO: Dilaudid, tylenol, headaches during this admission managed w/ fioricet prn (CT wnl)  CV: goal MAP >65  - new onset afib preoperatively: c/w toprol 50qd, s/p dig (d/c 2/2 rash), CHADsVASc 4  - HTN: hypertensive urgency preop on metop, hydralazine 25 TID, hctz 25qd. Holding hydral   - Chest pain: now resolved, possibly from GERD, R/LHC 1/9/2019 w/ non-obstructive disease, no acute ischemic changes noted on EKG preop  - Aortic stenosis: s/p TAVR 2/6/19, IRMA 11/17 showing no thrombus, no shunts, slight suspicion for thrombus at base of R cusp  - Chronic diastolic CHF: Echo 11/13: normal LV/RV size/systolic function. Grade II diastolic dysfunction and AS as above.   PULM: asthma, duonebs  GI/FEN: LFD, Protonix, gastropathy on EGD  : voiding  ENDO: ISS  HEME: transfuse for Hgb <8, BRBPR 2/2 colon masses. No active blood per rectum. Heme/onc following, will possible tissue eval of hilar nodes post colectomy,  ID: Monitor for s/s of infection, WBC wnl  PPx: SCDs, ASA, holding Eliquis  LINES: PIVx2, antonino (12/2)   WOUNDS/DRAINS: midline incision  PT/OT: Home with PT

## 2020-12-12 NOTE — PROVIDER CONTACT NOTE (CRITICAL VALUE NOTIFICATION) - ACTION/TREATMENT ORDERED:
Team will round in the AM @ 730 and will speak with the pt. Will paged the team when the pt has active nose bleed.

## 2020-12-12 NOTE — PROGRESS NOTE ADULT - ASSESSMENT
POD#10 s/p subtotal colectomy with GLADYS, HTN now improved on current antihypertensive regimen.  As per Dr. Sanford's plan, would be OK to discharge patient today now that BP better controlled and headache improved/stroke workup negative.

## 2020-12-12 NOTE — PROGRESS NOTE ADULT - SUBJECTIVE AND OBJECTIVE BOX
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 ON: BERYL, As per cards: dc'ed aspirin, inc HTCZ to 25mg daily, VSS. This AM, pt had BM, reports some abdominal pain after eating, taking PO slowly.     apixaban 5 milliGRAM(s) Oral every 12 hours  hydrochlorothiazide 25 milliGRAM(s) Oral daily  lisinopril 20 milliGRAM(s) Oral daily  metoprolol succinate ER 50 milliGRAM(s) Oral daily      Vital Signs Last 24 Hrs  T(C): 37 (12 Dec 2020 09:03), Max: 37 (11 Dec 2020 17:54)  T(F): 98.6 (12 Dec 2020 09:03), Max: 98.6 (11 Dec 2020 17:54)  HR: 90 (12 Dec 2020 08:32) (82 - 92)  BP: 130/59 (12 Dec 2020 08:32) (122/58 - 139/63)  BP(mean): 84 (12 Dec 2020 03:58) (84 - 90)  RR: 16 (12 Dec 2020 08:32) (16 - 18)  SpO2: 96% (12 Dec 2020 08:32) (95% - 99%)  I&O's Detail    11 Dec 2020 07:01  -  12 Dec 2020 07:00  --------------------------------------------------------  IN:    Oral Fluid: 950 mL  Total IN: 950 mL    OUT:    Voided (mL): 2950 mL  Total OUT: 2950 mL    Total NET: -2000 mL          Physical Exam:  General: No acute distress, resting comfortably in bed  C/V: normal sinus rhythm  Pulm: Nonlabored breathing, no respiratory distress  Abd: soft, non-tender, non-distended, incisions clean/dry/intact.  Extrem: SCDs in place    LABS:                        6.9    7.20  )-----------( 215      ( 12 Dec 2020 05:55 )             22.7     12-12    136  |  97  |  9   ----------------------------<  112<H>  4.3   |  27  |  1.12    Ca    8.6      12 Dec 2020 05:55  Phos  4.1     12-12  Mg     1.8     12-12            RADIOLOGY & ADDITIONAL STUDIES:

## 2020-12-12 NOTE — PROGRESS NOTE ADULT - SUBJECTIVE AND OBJECTIVE BOX
Cardiology Consult    O/N: no acute events  Interval History: she reports feeling a bit better. no complaints of dyspnea or chest pain. primary complaint is post op pain which she states she can manage.     OBJECTIVE  Vitals:  T(C): 37 (12-12-20 @ 09:03), Max: 37 (12-11-20 @ 17:54)  HR: 88 (12-12-20 @ 12:15) (82 - 92)  BP: 144/63 (12-12-20 @ 12:15) (122/58 - 144/63)  RR: 17 (12-12-20 @ 12:15) (16 - 18)  SpO2: 95% (12-12-20 @ 12:15) (95% - 99%)  Wt(kg): --    I/O:  I&O's Summary    11 Dec 2020 07:01  -  12 Dec 2020 07:00  --------------------------------------------------------  IN: 950 mL / OUT: 2950 mL / NET: -2000 mL    12 Dec 2020 07:01  -  12 Dec 2020 13:09  --------------------------------------------------------  IN: 100 mL / OUT: 800 mL / NET: -700 mL        PHYSICAL EXAM:  Appearance: NAD. Speaking in full sentences.   HEENT: No pallor noted.  Conjunctiva clear b/l. Moist oral mucosa.  Cardiovascular: RRR +S1 3/6 systolic murmur throughout precordium ,, diminished S2  Respiratory: Lungs CTAB. deminished BS at b/l bases  Gastrointestinal:  Soft, nontender. Non-distended. Non-rigid.	  Extremities: 2+ BLLE pitting edema to mid shin. No erythema b/l. LE WWP b/l.  Vascular: DP intact  Neurologic:  Alert and awake. Moving all extremities. Following commands.   	  LABS:                        6.9    7.20  )-----------( 215      ( 12 Dec 2020 05:55 )             22.7     12-12    136  |  97  |  9   ----------------------------<  112<H>  4.3   |  27  |  1.12    Ca    8.6      12 Dec 2020 05:55  Phos  4.1     12-12  Mg     1.8     12-12            RADIOLOGY & ADDITIONAL TESTS:  Reviewed .    MEDICATIONS  (STANDING):  acetaminophen   Tablet .. 1000 milliGRAM(s) Oral every 6 hours  ALBUTerol    90 MICROgram(s) HFA Inhaler 1 Puff(s) Inhalation every 4 hours  albuterol/ipratropium for Nebulization 3 milliLiter(s) Nebulizer every 6 hours  apixaban 5 milliGRAM(s) Oral every 12 hours  atorvastatin 20 milliGRAM(s) Oral at bedtime  BUpivacaine liposome 1.3% Injectable (no eMAR) 20 milliLiter(s) Local Injection once  hydrochlorothiazide 25 milliGRAM(s) Oral daily  influenza  Vaccine (HIGH DOSE) 0.7 milliLiter(s) IntraMuscular once  lisinopril 20 milliGRAM(s) Oral daily  metoprolol succinate ER 50 milliGRAM(s) Oral daily  oxymetazoline 0.05% Nasal Spray 1 Spray(s) Both Nostrils two times a day  pantoprazole  Injectable 40 milliGRAM(s) IV Push daily  sodium chloride 0.65% Nasal 2 Spray(s) Both Nostrils four times a day  tiotropium 18 MICROgram(s) Capsule 1 Capsule(s) Inhalation daily    MEDICATIONS  (PRN):  ondansetron Injectable 4 milliGRAM(s) IV Push every 6 hours PRN Nausea and/or Vomiting  oxyCODONE    IR 10 milliGRAM(s) Oral every 6 hours PRN Severe Pain (7 - 10)  oxyCODONE    IR 5 milliGRAM(s) Oral every 4 hours PRN Moderate Pain (4 - 6)

## 2020-12-12 NOTE — PROGRESS NOTE ADULT - ASSESSMENT
80F ([place]/English speaking) PMHx of uncontrolled HTN, HLD, asthma, chronic diastolic CHF, aortic stenosis s/p TAVR (02/19), initially presented to ED w/ CP and HTN on 11/13, found to have elevated AoV gradient w/ workup c/f possible thrombus. 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 Pt without epistaxis overnight. Had a BM this AM. Hgb 6.9 but appears chronically anemic, holding off transfusion per attending. WBC nl.    Pain/nausea control  Cards following, aspirin stopped, started on HCTZ 25mg for HTN, on eliquis  duonebs as needed for asthma  LFD, Protonix, gastropathy on EGD  ISS  SCDs/IS/OOBA  PT: Home w/ HPT

## 2020-12-12 NOTE — PROGRESS NOTE ADULT - SUBJECTIVE AND OBJECTIVE BOX
Attending Surgeon Progress Note (Covering for Dr. Sanofrd)    STATUS POST:  subtotal colectomy  POST OPERATIVE DAY #: 10    SUBJECTIVE: headache improved  Flatus: Y  Bowel movement: Y  Voiding spontaneously: Y  Pain controlled: YES   Nausea: NO            Vomiting: NO  Diarrhea: NO         OOB/Ambulating: Y    MEDICATIONS  (STANDING):  acetaminophen   Tablet .. 1000 milliGRAM(s) Oral every 6 hours  ALBUTerol    90 MICROgram(s) HFA Inhaler 1 Puff(s) Inhalation every 4 hours  albuterol/ipratropium for Nebulization 3 milliLiter(s) Nebulizer every 6 hours  apixaban 5 milliGRAM(s) Oral every 12 hours  atorvastatin 20 milliGRAM(s) Oral at bedtime  BUpivacaine liposome 1.3% Injectable (no eMAR) 20 milliLiter(s) Local Injection once  hydrochlorothiazide 25 milliGRAM(s) Oral daily  influenza  Vaccine (HIGH DOSE) 0.7 milliLiter(s) IntraMuscular once  lisinopril 20 milliGRAM(s) Oral daily  metoprolol succinate ER 50 milliGRAM(s) Oral daily  oxymetazoline 0.05% Nasal Spray 1 Spray(s) Both Nostrils two times a day  pantoprazole  Injectable 40 milliGRAM(s) IV Push daily  sodium chloride 0.65% Nasal 2 Spray(s) Both Nostrils four times a day  tiotropium 18 MICROgram(s) Capsule 1 Capsule(s) Inhalation daily    MEDICATIONS  (PRN):  ondansetron Injectable 4 milliGRAM(s) IV Push every 6 hours PRN Nausea and/or Vomiting  oxyCODONE    IR 10 milliGRAM(s) Oral every 6 hours PRN Severe Pain (7 - 10)  oxyCODONE    IR 5 milliGRAM(s) Oral every 4 hours PRN Moderate Pain (4 - 6)      Vital Signs Last 24 Hrs  T(C): 36.2 (12 Dec 2020 05:00), Max: 37 (11 Dec 2020 17:54)  T(F): 97.1 (12 Dec 2020 05:00), Max: 98.6 (11 Dec 2020 17:54)  HR: 90 (12 Dec 2020 03:58) (82 - 92)  BP: 122/58 (12 Dec 2020 03:58) (122/58 - 139/63)  BP(mean): 84 (12 Dec 2020 03:58) (84 - 90)  RR: 18 (12 Dec 2020 03:58) (16 - 18)  SpO2: 99% (12 Dec 2020 03:58) (95% - 99%)    I&O's Detail    10 Dec 2020 07:01  -  11 Dec 2020 07:00  --------------------------------------------------------  IN:    IV PiggyBack: 50 mL    Oral Fluid: 240 mL  Total IN: 290 mL    OUT:    Voided (mL): 2000 mL  Total OUT: 2000 mL    Total NET: -1710 mL      11 Dec 2020 07:01  -  12 Dec 2020 06:33  --------------------------------------------------------  IN:    Oral Fluid: 710 mL  Total IN: 710 mL    OUT:    Voided (mL): 2650 mL  Total OUT: 2650 mL    Total NET: -1940 mL          PHYSICAL EXAM:  Alert, oriented  Lungs:  CTA bilaterally, good inspiratory effort  Cor:  RRR  Abdomen:  soft, nondistended, nontender, +bowel sounds, incisions clean dry, intact with no erythema  Ext:  no edema, well-perfused      LABS:                        6.9    7.20  )-----------( 215      ( 12 Dec 2020 05:55 )             22.7     12-12    136  |  97  |  9   ----------------------------<  112<H>  4.3   |  27  |  1.12    Ca    8.6      12 Dec 2020 05:55  Phos  4.1     12-12  Mg     1.8     12-12

## 2020-12-12 NOTE — PROVIDER CONTACT NOTE (CRITICAL VALUE NOTIFICATION) - ASSESSMENT
HR 80, /91 RA 19 100%. Pt is currently on albuterol treatment, resting comfortably. No c/o of pain or discomfort at this moment. Overnight, pt had scant blood tinged sputum and from nasal upon wiping it. No active bleeding found. Surgical site CDI.

## 2020-12-12 NOTE — PROGRESS NOTE ADULT - SUBJECTIVE AND OBJECTIVE BOX
STATUS POST:       SUBJECTIVE: Patient seen and examined bedside by chief resident.     apixaban 5 milliGRAM(s) Oral every 12 hours  hydrochlorothiazide 25 milliGRAM(s) Oral daily  lisinopril 20 milliGRAM(s) Oral daily  metoprolol succinate ER 50 milliGRAM(s) Oral daily      Vital Signs Last 24 Hrs  T(C): 36.2 (12 Dec 2020 05:00), Max: 37 (11 Dec 2020 17:54)  T(F): 97.1 (12 Dec 2020 05:00), Max: 98.6 (11 Dec 2020 17:54)  HR: 90 (12 Dec 2020 03:58) (80 - 92)  BP: 122/58 (12 Dec 2020 03:58) (114/55 - 139/63)  BP(mean): 84 (12 Dec 2020 03:58) (79 - 90)  RR: 18 (12 Dec 2020 03:58) (16 - 18)  SpO2: 99% (12 Dec 2020 03:58) (95% - 99%)  I&O's Detail    10 Dec 2020 07:01  -  11 Dec 2020 07:00  --------------------------------------------------------  IN:    IV PiggyBack: 50 mL    Oral Fluid: 240 mL  Total IN: 290 mL    OUT:    Voided (mL): 2000 mL  Total OUT: 2000 mL    Total NET: -1710 mL      11 Dec 2020 07:01  -  12 Dec 2020 06:12  --------------------------------------------------------  IN:    Oral Fluid: 710 mL  Total IN: 710 mL    OUT:    Voided (mL): 2650 mL  Total OUT: 2650 mL    Total NET: -1940 mL          Physical Exam:  General: No acute distress, resting comfortably in bed  C/V: normal sinus rhythm  Pulm: Nonlabored breathing, no respiratory distress  Abd: soft, non-tender, non-distended, incisions clean/dry/intact.  Extrem: warm and well perfused, no edema, SCDs in place    LABS:                        7.4    8.52  )-----------( 269      ( 11 Dec 2020 06:36 )             24.2     12-11    136  |  97  |  11  ----------------------------<  108<H>  4.2   |  25  |  1.26    Ca    9.3      11 Dec 2020 06:36  Phos  4.4     12-11  Mg     2.0     12-11            RADIOLOGY & ADDITIONAL STUDIES:

## 2020-12-13 LAB
ANION GAP SERPL CALC-SCNC: 13 MMOL/L — SIGNIFICANT CHANGE UP (ref 5–17)
BUN SERPL-MCNC: 10 MG/DL — SIGNIFICANT CHANGE UP (ref 7–23)
CALCIUM SERPL-MCNC: 8.8 MG/DL — SIGNIFICANT CHANGE UP (ref 8.4–10.5)
CHLORIDE SERPL-SCNC: 97 MMOL/L — SIGNIFICANT CHANGE UP (ref 96–108)
CO2 SERPL-SCNC: 26 MMOL/L — SIGNIFICANT CHANGE UP (ref 22–31)
CREAT SERPL-MCNC: 1.21 MG/DL — SIGNIFICANT CHANGE UP (ref 0.5–1.3)
GLUCOSE SERPL-MCNC: 101 MG/DL — HIGH (ref 70–99)
HCT VFR BLD CALC: 21 % — CRITICAL LOW (ref 34.5–45)
HCT VFR BLD CALC: 23.8 % — LOW (ref 34.5–45)
HGB BLD-MCNC: 6.4 G/DL — CRITICAL LOW (ref 11.5–15.5)
HGB BLD-MCNC: 7.3 G/DL — LOW (ref 11.5–15.5)
MAGNESIUM SERPL-MCNC: 2.2 MG/DL — SIGNIFICANT CHANGE UP (ref 1.6–2.6)
MCHC RBC-ENTMCNC: 28.6 PG — SIGNIFICANT CHANGE UP (ref 27–34)
MCHC RBC-ENTMCNC: 29.1 PG — SIGNIFICANT CHANGE UP (ref 27–34)
MCHC RBC-ENTMCNC: 30.5 GM/DL — LOW (ref 32–36)
MCHC RBC-ENTMCNC: 30.7 GM/DL — LOW (ref 32–36)
MCV RBC AUTO: 93.8 FL — SIGNIFICANT CHANGE UP (ref 80–100)
MCV RBC AUTO: 94.8 FL — SIGNIFICANT CHANGE UP (ref 80–100)
NRBC # BLD: 0 /100 WBCS — SIGNIFICANT CHANGE UP (ref 0–0)
NRBC # BLD: 0 /100 WBCS — SIGNIFICANT CHANGE UP (ref 0–0)
PHOSPHATE SERPL-MCNC: 4.6 MG/DL — HIGH (ref 2.5–4.5)
PLATELET # BLD AUTO: 237 K/UL — SIGNIFICANT CHANGE UP (ref 150–400)
PLATELET # BLD AUTO: 237 K/UL — SIGNIFICANT CHANGE UP (ref 150–400)
POTASSIUM SERPL-MCNC: 4.3 MMOL/L — SIGNIFICANT CHANGE UP (ref 3.5–5.3)
POTASSIUM SERPL-SCNC: 4.3 MMOL/L — SIGNIFICANT CHANGE UP (ref 3.5–5.3)
RBC # BLD: 2.24 M/UL — LOW (ref 3.8–5.2)
RBC # BLD: 2.51 M/UL — LOW (ref 3.8–5.2)
RBC # FLD: 14.6 % — HIGH (ref 10.3–14.5)
RBC # FLD: 14.7 % — HIGH (ref 10.3–14.5)
SODIUM SERPL-SCNC: 136 MMOL/L — SIGNIFICANT CHANGE UP (ref 135–145)
WBC # BLD: 7.41 K/UL — SIGNIFICANT CHANGE UP (ref 3.8–10.5)
WBC # BLD: 8.67 K/UL — SIGNIFICANT CHANGE UP (ref 3.8–10.5)
WBC # FLD AUTO: 7.41 K/UL — SIGNIFICANT CHANGE UP (ref 3.8–10.5)
WBC # FLD AUTO: 8.67 K/UL — SIGNIFICANT CHANGE UP (ref 3.8–10.5)

## 2020-12-13 RX ADMIN — Medication 1000 MILLIGRAM(S): at 18:15

## 2020-12-13 RX ADMIN — Medication 1000 MILLIGRAM(S): at 01:00

## 2020-12-13 RX ADMIN — Medication 1000 MILLIGRAM(S): at 05:16

## 2020-12-13 RX ADMIN — Medication 1000 MILLIGRAM(S): at 17:45

## 2020-12-13 RX ADMIN — Medication 2 SPRAY(S): at 05:16

## 2020-12-13 RX ADMIN — APIXABAN 5 MILLIGRAM(S): 2.5 TABLET, FILM COATED ORAL at 05:16

## 2020-12-13 RX ADMIN — Medication 1000 MILLIGRAM(S): at 00:16

## 2020-12-13 RX ADMIN — Medication 3 MILLILITER(S): at 12:38

## 2020-12-13 RX ADMIN — Medication 2 SPRAY(S): at 12:39

## 2020-12-13 RX ADMIN — OXYMETAZOLINE HYDROCHLORIDE 1 SPRAY(S): 0.5 SPRAY NASAL at 05:16

## 2020-12-13 RX ADMIN — APIXABAN 5 MILLIGRAM(S): 2.5 TABLET, FILM COATED ORAL at 17:45

## 2020-12-13 RX ADMIN — ATORVASTATIN CALCIUM 20 MILLIGRAM(S): 80 TABLET, FILM COATED ORAL at 22:08

## 2020-12-13 RX ADMIN — Medication 50 MILLIGRAM(S): at 12:38

## 2020-12-13 RX ADMIN — LISINOPRIL 20 MILLIGRAM(S): 2.5 TABLET ORAL at 05:16

## 2020-12-13 RX ADMIN — Medication 2 SPRAY(S): at 00:16

## 2020-12-13 RX ADMIN — Medication 1000 MILLIGRAM(S): at 06:17

## 2020-12-13 RX ADMIN — Medication 2 SPRAY(S): at 17:45

## 2020-12-13 RX ADMIN — Medication 3 MILLILITER(S): at 05:16

## 2020-12-13 RX ADMIN — Medication 3 MILLILITER(S): at 17:45

## 2020-12-13 RX ADMIN — Medication 1000 MILLIGRAM(S): at 13:11

## 2020-12-13 RX ADMIN — Medication 25 MILLIGRAM(S): at 05:16

## 2020-12-13 RX ADMIN — Medication 1000 MILLIGRAM(S): at 12:38

## 2020-12-13 RX ADMIN — PANTOPRAZOLE SODIUM 40 MILLIGRAM(S): 20 TABLET, DELAYED RELEASE ORAL at 12:38

## 2020-12-13 NOTE — PROVIDER CONTACT NOTE (CRITICAL VALUE NOTIFICATION) - ACTION/TREATMENT ORDERED:
Will adminster 1 U RBC once the order approved. Will draw CBC again to check h&h once the transfusion completed

## 2020-12-13 NOTE — PROVIDER CONTACT NOTE (OTHER) - ACTION/TREATMENT ORDERED:
Team 1 aware; no interventions at this time; pain is thought to be r/t incision and cardiac hx will continue to monitor patient

## 2020-12-13 NOTE — PROGRESS NOTE ADULT - SUBJECTIVE AND OBJECTIVE BOX
SUBJECTIVE: Pt seen and examined at bedside with chief. Pt denies any complaints. Pain well controlled. Tolerating diet without N/V. Admits to BF. Denies cp,sob.       MEDICATIONS  (STANDING):  acetaminophen   Tablet .. 1000 milliGRAM(s) Oral every 6 hours  ALBUTerol    90 MICROgram(s) HFA Inhaler 1 Puff(s) Inhalation every 4 hours  albuterol/ipratropium for Nebulization 3 milliLiter(s) Nebulizer every 6 hours  apixaban 5 milliGRAM(s) Oral every 12 hours  atorvastatin 20 milliGRAM(s) Oral at bedtime  BUpivacaine liposome 1.3% Injectable (no eMAR) 20 milliLiter(s) Local Injection once  hydrochlorothiazide 25 milliGRAM(s) Oral daily  influenza  Vaccine (HIGH DOSE) 0.7 milliLiter(s) IntraMuscular once  lisinopril 20 milliGRAM(s) Oral daily  metoprolol succinate ER 50 milliGRAM(s) Oral daily  pantoprazole  Injectable 40 milliGRAM(s) IV Push daily  sodium chloride 0.65% Nasal 2 Spray(s) Both Nostrils four times a day  tiotropium 18 MICROgram(s) Capsule 1 Capsule(s) Inhalation daily    MEDICATIONS  (PRN):  ondansetron Injectable 4 milliGRAM(s) IV Push every 6 hours PRN Nausea and/or Vomiting      Vital Signs Last 24 Hrs  T(C): 36.8 (13 Dec 2020 05:04), Max: 37.1 (12 Dec 2020 14:24)  T(F): 98.3 (13 Dec 2020 05:04), Max: 98.7 (12 Dec 2020 14:24)  HR: 83 (13 Dec 2020 04:54) (83 - 94)  BP: 118/57 (13 Dec 2020 04:54) (113/55 - 145/69)  BP(mean): 84 (13 Dec 2020 04:54) (83 - 92)  RR: 17 (13 Dec 2020 04:54) (16 - 18)  SpO2: 94% (13 Dec 2020 04:54) (92% - 96%)    PHYSICAL EXAM:      Constitutional: A&Ox3    Respiratory: non labored breathing, no respiratory distress    Cardiovascular: NSR, RRR    Gastrointestinal: soft ND, NT                 Incision: CDI    Genitourinary: voiding     Extremities: (-) edema                  I&O's Detail    12 Dec 2020 07:01  -  13 Dec 2020 07:00  --------------------------------------------------------  IN:    IV PiggyBack: 100 mL    Oral Fluid: 240 mL  Total IN: 340 mL    OUT:    Voided (mL): 3000 mL  Total OUT: 3000 mL    Total NET: -2660 mL          LABS:                        6.4    7.41  )-----------( 237      ( 13 Dec 2020 06:21 )             21.0     12-13    136  |  97  |  10  ----------------------------<  101<H>  4.3   |  26  |  1.21    Ca    8.8      13 Dec 2020 06:21  Phos  4.6     12-13  Mg     2.2     12-13            RADIOLOGY & ADDITIONAL STUDIES:

## 2020-12-13 NOTE — PROVIDER CONTACT NOTE (OTHER) - ASSESSMENT
pt had chest pressure complaints in am, ekg was done, team 1 was made aware during change of shift. later as day went on, she complained of pressure in her chest when cardiology rounded, another ekg was done, pt was given hydralazine for high bp earlier which made bp more stable, and ekg was fine as per cardiolgy, pt stated feeling better after.
PT is awake and oriented; no acute distress noted; pt describes pain as dull and persistent and graded 7/10; pt verbalized pain has been about the same throughout the day

## 2020-12-13 NOTE — PROGRESS NOTE ADULT - ASSESSMENT
80F (Nepalese/English speaking) PMHx of uncontrolled HTN, HLD, asthma, chronic diastolic CHF, aortic stenosis s/p TAVR (02/19), initially presented to ED w/ CP and HTN on 11/13, found to have elevated AoV gradient w/ workup c/f possible thrombus. 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 (12/2). Admitted to SICU for postoperative hemodynamic monitoring now step down, clinically doing well, hgb trending down will transfused 1 unit    1 unit PRBC  post transfusion cbc  LRD  Eliquis, metop, HCTZ, lisinopril, statin  cbc in am  likely d/c tm if hgb stable  Dispo: home with HPT

## 2020-12-14 VITALS
DIASTOLIC BLOOD PRESSURE: 74 MMHG | TEMPERATURE: 98 F | SYSTOLIC BLOOD PRESSURE: 145 MMHG | HEART RATE: 81 BPM | OXYGEN SATURATION: 95 % | RESPIRATION RATE: 17 BRPM

## 2020-12-14 LAB
CK MB CFR SERPL CALC: 1.1 NG/ML — SIGNIFICANT CHANGE UP (ref 0–6.7)
CK SERPL-CCNC: 54 U/L — SIGNIFICANT CHANGE UP (ref 25–170)
HCT VFR BLD CALC: 23.8 % — LOW (ref 34.5–45)
HGB BLD-MCNC: 7.2 G/DL — LOW (ref 11.5–15.5)
MCHC RBC-ENTMCNC: 28.7 PG — SIGNIFICANT CHANGE UP (ref 27–34)
MCHC RBC-ENTMCNC: 30.3 GM/DL — LOW (ref 32–36)
MCV RBC AUTO: 94.8 FL — SIGNIFICANT CHANGE UP (ref 80–100)
NRBC # BLD: 0 /100 WBCS — SIGNIFICANT CHANGE UP (ref 0–0)
PLATELET # BLD AUTO: 244 K/UL — SIGNIFICANT CHANGE UP (ref 150–400)
RBC # BLD: 2.51 M/UL — LOW (ref 3.8–5.2)
RBC # FLD: 15.1 % — HIGH (ref 10.3–14.5)
TROPONIN T SERPL-MCNC: <0.01 NG/ML — SIGNIFICANT CHANGE UP (ref 0–0.01)
WBC # BLD: 8.86 K/UL — SIGNIFICANT CHANGE UP (ref 3.8–10.5)
WBC # FLD AUTO: 8.86 K/UL — SIGNIFICANT CHANGE UP (ref 3.8–10.5)

## 2020-12-14 PROCEDURE — 93010 ELECTROCARDIOGRAM REPORT: CPT | Mod: 76

## 2020-12-14 PROCEDURE — 93308 TTE F-UP OR LMTD: CPT | Mod: 26

## 2020-12-14 PROCEDURE — 99233 SBSQ HOSP IP/OBS HIGH 50: CPT

## 2020-12-14 PROCEDURE — 93321 DOPPLER ECHO F-UP/LMTD STD: CPT | Mod: 26

## 2020-12-14 RX ORDER — PANTOPRAZOLE SODIUM 20 MG/1
1 TABLET, DELAYED RELEASE ORAL
Qty: 30 | Refills: 0
Start: 2020-12-14 | End: 2021-01-12

## 2020-12-14 RX ORDER — FAMOTIDINE 10 MG/ML
20 INJECTION INTRAVENOUS ONCE
Refills: 0 | Status: COMPLETED | OUTPATIENT
Start: 2020-12-14 | End: 2020-12-14

## 2020-12-14 RX ORDER — METOPROLOL TARTRATE 50 MG
1 TABLET ORAL
Qty: 30 | Refills: 0
Start: 2020-12-14 | End: 2021-01-12

## 2020-12-14 RX ORDER — LISINOPRIL 2.5 MG/1
1 TABLET ORAL
Qty: 30 | Refills: 0
Start: 2020-12-14 | End: 2021-01-12

## 2020-12-14 RX ADMIN — Medication 1000 MILLIGRAM(S): at 11:31

## 2020-12-14 RX ADMIN — APIXABAN 5 MILLIGRAM(S): 2.5 TABLET, FILM COATED ORAL at 06:07

## 2020-12-14 RX ADMIN — ONDANSETRON 4 MILLIGRAM(S): 8 TABLET, FILM COATED ORAL at 01:38

## 2020-12-14 RX ADMIN — FAMOTIDINE 20 MILLIGRAM(S): 10 INJECTION INTRAVENOUS at 01:38

## 2020-12-14 RX ADMIN — APIXABAN 5 MILLIGRAM(S): 2.5 TABLET, FILM COATED ORAL at 17:09

## 2020-12-14 RX ADMIN — Medication 1000 MILLIGRAM(S): at 17:09

## 2020-12-14 RX ADMIN — Medication 3 MILLILITER(S): at 05:38

## 2020-12-14 RX ADMIN — Medication 3 MILLILITER(S): at 11:31

## 2020-12-14 RX ADMIN — LISINOPRIL 20 MILLIGRAM(S): 2.5 TABLET ORAL at 06:07

## 2020-12-14 RX ADMIN — Medication 2 SPRAY(S): at 00:15

## 2020-12-14 RX ADMIN — Medication 1000 MILLIGRAM(S): at 17:26

## 2020-12-14 RX ADMIN — Medication 1000 MILLIGRAM(S): at 07:00

## 2020-12-14 RX ADMIN — Medication 1000 MILLIGRAM(S): at 12:10

## 2020-12-14 RX ADMIN — Medication 1000 MILLIGRAM(S): at 01:00

## 2020-12-14 RX ADMIN — Medication 25 MILLIGRAM(S): at 06:07

## 2020-12-14 RX ADMIN — Medication 1000 MILLIGRAM(S): at 06:08

## 2020-12-14 RX ADMIN — PANTOPRAZOLE SODIUM 40 MILLIGRAM(S): 20 TABLET, DELAYED RELEASE ORAL at 11:31

## 2020-12-14 RX ADMIN — Medication 1000 MILLIGRAM(S): at 00:17

## 2020-12-14 RX ADMIN — Medication 3 MILLILITER(S): at 23:55

## 2020-12-14 RX ADMIN — Medication 50 MILLIGRAM(S): at 06:07

## 2020-12-14 RX ADMIN — Medication 2 SPRAY(S): at 06:07

## 2020-12-14 NOTE — PROVIDER CONTACT NOTE (CHANGE IN STATUS NOTIFICATION) - ACTION/TREATMENT ORDERED:
DC heparin drip
Pepcid ordered  Zofran given for nausea
Resident saw patient at bedside, more bleeding noted, towels provided, and pressure reinforced.
Packing placed, pressure applied, several meds ordered to stop bleed and lower BP. Hydralazine x2, Labetolol x1. Silver nitrate, affrin nasal spray, and epinephrine nasal spray (SEE MAR). Same was given. Bleeding stopped for now, BP WNL, patient placed on bedrest, and BP monitoring. Pt stabilized with close monitoring.

## 2020-12-14 NOTE — PROVIDER CONTACT NOTE (CHANGE IN STATUS NOTIFICATION) - RECOMMENDATIONS
Pt instructed to apply pressure. Recommend for resident to see patient at bedside.
RN held heparin drip
clinical assessment
Pt placed back in bed, Resident at bedside right away.

## 2020-12-14 NOTE — PROGRESS NOTE ADULT - ASSESSMENT
80F (Icelandic/English speaking) PMHx of uncontrolled HTN, HLD, asthma, chronic diastolic CHF, aortic stenosis s/p TAVR (02/19), initially presented to ED w/ CP and HTN on 11/13, found to have elevated AoV gradient w/ workup c/f possible thrombus. 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 (12/2). Admitted to SICU for postoperative hemodynamic monitoring now step down, clinically doing well, hgb trending down, s/p 1 unit of PRBC. this am hgb 7.2    LRD  Eliquis, Metop, HCTZ, Lisinopril, Statin  likely d/c today, pending cards final recs.  Dispo: home with [facility]

## 2020-12-14 NOTE — PROGRESS NOTE ADULT - ATTENDING COMMENTS
Initial attending contact date 11/23/20     . See PA note written above for details. I reviewed the PA documentation. I have personally seen and examined this patient. I reviewed vitals, labs, medications, cardiac studies, and additional imaging. I agree with the above PA's findings and plans as written above with the following additions/statements.    -Rash resolving with DC of Dig. Cont Benadryl prn with hydrocortisone cream  -P Afib: maintaining NSR.  Cont cardizem 30 mg po q 8 in addition to lopressor to maintain NSR  -Cont hold AC given colonic mass  -Euvolemic on exam, Cont to monitor Crt, if up trending hold bumex  -BP controlled  -PET performed- with hilar lymphadenopathy which will need possible bx post colon mass removal per Onc  -Elevated AV gradient noted on echo due to thickened/possible microemboli at ring of AV, however AV leaflets with normal opening. Given this, no need for immediate CTS intervention. Pt currently in NSR with cardizem/lopressor. Pt optimized medically from cardiac standpoint for colonic mass removal with cardiac anesthesia. Plan for OR next week
CRS Attending  Late note entry  Patient seen and examined on morning rounds, discussed with surgical housestaff Dr Pereira.  I had an extensive discussion with the patient, and with her daughter Jaelyn over telephone, regarding her diagnosis and treatment options. Discussed consideration for proceeding with laparoscopic possible open partial colectomy, possible subtotal colectomy.  The nature of the procedure as well as preoperative and postoperative care and expectations were discussed.  The associated risks, benefits, and alternatives of the procedure have been outlined, discussed, and reviewed with the patient and her daughter. These risks including but not limited to bleeding, infection, injury to adjacent organs, anastomotic leak, need for secondary surgery, need for stoma creation, incisional dehiscence, incisional hernia, change in bowel habits, change in urinary function, nerve injury, as well as the risk of heart and lung complications, stroke, DVT/PE and death were detailed. The possible need for ICU care postoperatively discussed.   All questions answered, the patient and her daughter expressed understanding, and consent to the planned procedure.  Case discussed with and to be reviewed by Dr Thom Joaquin,  of anesthesia. Cardiac anesthesiologist requested.  Surgical planning for colon resection next week, Wednesday 12/2/2020.  Continue supportive care and medical optimization.  Surgical team will continue to follow for further preoperative preparation and instructions.
CRS Attending  Patient seen and examined on morning rounds  Discussed with chief resident Dr Martinez - overnight elevated BP and nose bleed  Doing well from GI standpoint - denies abdominal pain, no nausea/vomit, tolerated low residue diet, had nonbloody BM last evening  Afebrile  Gen NAD  Abdomen soft obese nontender, incisions intact no erythema  - continue supportive surgical care  - f/u cardiology recs
CRS Attending  Patient seen and examined on morning rounds  No acute events overnight  C/o dizziness after receiving pain meds for incisional pain  No nausea, decreased appetite, has taken sips of water  Afebrile  VS reviewed  Gen NAD, OOBTC  Abdomen soft obese, appropriate incisional tenderness  - Clears as tolerated  - await GI function  - OOB  - ICU care appreciated  - Discussed with chief resident Dr Martinez
CRS Attending  Patient seen and examined on morning rounds, agree with housestaff assessment and plan as above  Discussed with weekend coverage Dr Méndez  Discussed with chief resident Dr Martinez  F/u cardiology recommendations  Discharge planning
CRS Attending  Patient seen and examined on morning rounds.  Discussed with surgical housestaff Dr Pereira at time of his assessment.  Medical/cardiac clearance in preparation for surgical planning.  Discussed with patient surgical management of multiple colonic masses/cancer. Recommend laparoscopic possible open partial colectomy, possible subtotal colectomy. Risks/benefits discussed with patient. All questions answered.  Will discuss with anesthesia department to coordinate care and plan for OR when patient medically optimized.  Continue supportive care per primary team.
CRS Attending  Patient seen and examined on morning rounds. Discussed over phone with patient's daughter Jaelyn, who communicated with patient in English and Lithuanian  Tolerating full liquids. Denies nausea or vomit. Requests more solid diet  Reports small BM  Voiding  Afebrile  Gen NAD, comfortable in chair, eating breakfast tray  Abdomen obese, soft, appropriate tess-incisional tenderness, no rebound  - advance diet as tolerated, trial of low residue  - cardiology input appreciate  - physical therapy  - discussed with surgical housestaff caring for zaki
CRS Attending  Patient seen on rounds  Discussed with chief resident Dr Martinez  C/o head pain -frontal, radiating to jaw and neck, no relieved with tylenol or narcotic   Tolerating diet  having BMs, nonbloody  Afebrile  Gen NAD  Abdomen soft nontender  - diet as tolerated  - continue supportive care  - CT head  - f/u cardiogy recs  - CRS weekend coverage Dr Méndez
Covering for Dr. Sanford.    Reports dizziness when standing. Passing some flatus  TOlerating clears  AFVSS  u/o excellent  Abd soft, mild distention, incision clean    Cr 1    A/P: POD 3 subtotal colectomy. Awaiting better bowel function  1. Continue clear liquids, observe distention  2. OOB, PT  3. Monitor u/o   4. SQH, IS
No postoperative complications. Empiric CPAP while in hospital, CPAP of 8 cm H2O. C/w . F/u with Dr. Bere More as outpt.
Patient optimized from pulmonary perspective. Please, see previous note.  Chest pressure seems to be in setting of uncontrolled hypertension. No change in respiratory management.
Patient seen and examined with PA/fellow bedside and discussed during rounds.  PA/fellow note read, including vitals, physical findings, laboratory data, and radiological reports.   Revisions included below. Direct personal management at bedside and extensive interpretation of the data performed.  Plan was outlined and discussed in details with the multidisciplinary team.  Decision making of high complexity.     79 y/o Albanian speaking female w/ a PMHx of uncontrolled HTN, HLD, asthma (no hospitalizations in past), ?MARK (noncompliant with CPAP), chronic diastolic CHF (Ef 61%), AS (s/p TAVR rachael valve 2/5/2019) who was recently hospitalized at [facility] 2/2 to hypertensive emergency (HTN w/ HA). CT head/MRI brain at that time negative. Patient presented to [facility] ED on 11/13/20 with complaints of intermittent CP and elevated BP for 1 week. TTE revealed elevated gradients across of TAVR THV increased from baseline mean 14mmHg to 32mmHg, nml EF. CTA performed with likely leaflet hypoattentuation at base of leaflets with remaining portion of leaflets not hypoattentuated. 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 s/p heparin gtt complicated by GIB. IRMA showed thickening at base with normal remaining portion of leaflets. Colonscopy revealed 4 large colonic friable masses concerning for malignancy.  -awaiting final onco/onco sx workup  -needs a/c to see if resolution of TAVR valve findings howevere limited by GIB/colonic malignancy  -continuing to evaluate for medical management with definitive GI procedure with cardiac anesthesia vs TAVR Mary Ann/high risk SAVR    I was physically present for the key portions of the evaluation and management (E/M) service provided.  I agree with the above history, physical, and plan which I have reviewed and edited where appropriate.     15 minutes spent on total encounter; more than 50% of the visit was spent counseling and/or coordinating care by the attending physician.
Please refer to CVD fellow note written above for details.  I reviewed the fellow's documentation, and I have personally seen and examined the patient. I have reviewed vitals, labs, medications, cardiac studies and additional imaging. I agree with the findings and plans as written
Please refer to CVD fellow note written above for details.  I reviewed the fellow's documentation, and I have personally seen and examined the patient. I have reviewed vitals, labs, medications, cardiac studies and additional imaging. I agree with the findings and plans as written above
See CVD Fellow note written above, for details. I reviewed the fellow's documentation. I have personally seen and examined this patient today on bedside consult rounds. I have reviewed vitals, labs, medications, cardiac studies and additional imaging.  I agree with the findings and plans as written above with the following additions/amendments:  Maintain cardiac regimen  Toprol 50XL daily, HCTZ 25 daily, LIsinopril 20 daily  Atorva 20, ASA 81  Case discussed with Dr. Alejo  -->repeat echo done, hbg goal >9 per CTSx   Patient to follow up with Dr. Aguirre as outpatient within 1-2 weeks of discharge   Thank you for this consult. Cardiology service will sign off at this time. Please reconsult with any additional questions.  GELY Guardado.  Cardiology Attending
-Pt. seen and examined  -Currently in no distress, but does appear overloaded on exam  -Would start Lasix 20 IV daily and uptitrate to BID as PO intake increases; Please d/c standing IVFs  -?AV leaflet thrombus, also w/ pAF but currently in NSR - Would start NOAC for A/C when appropriate post-operatively  -Can change Lopressor 50q6H to Toprol 50 daily  -Will continue to follow    Girma Jerez MD  Cardiology Attending
CRS Attending  Late note entry  Patient seen and examined on morning rounds  Abdomen soft obese appropriately tender  I&Os reviewed  Cr elevated   F/u renal lytes, US  Clears as tolerated, await GI function  tele monitoring  OOB, ambulate  Discussed with senior resident Dr Roy  CRS weekend coverage Dr rBian 12/4-12/6
CRS Attending  Patient seen and examined on rounds  No acute events  Had 2 small BMs  Advanced to full liquids and tolerating  Afebrile  Sitting comfortably in chair  Abdomen obese soft, appropriate incisional tenderness  OOB, ambulate  advance diet as tolerated  Restart anticoagulation per cardiology recommendations  Continue supportive care  Discussed with senior resident Dr Roy
CRS attending  patient seen and examined on morning rounds  discussed with housestaff Dr Marquez  Bowel prep and preop for OR tomorrow  All questions answered, patient expressed understanding and is agreeable to plan
No postoperative complications. Empiric CPAP while in hospital, CPAP of 8 cm H2O. C/w . F/u with Dr. Bere More as outpt.
Patient seen and examined with PA/fellow bedside and discussed during rounds.  PA/fellow note read, including vitals, physical findings, laboratory data, and radiological reports.   Revisions included below. Direct personal management at bedside and extensive interpretation of the data performed.  Plan was outlined and discussed in details with the multidisciplinary team.  Decision making of high complexity.     79 y/o Mongolian speaking female w/ a PMHx of uncontrolled HTN, HLD, asthma (no hospitalizations in past), ?MARK (noncompliant with CPAP), chronic diastolic CHF (Ef 61%), AS (s/p TAVR rachael valve 2/5/2019) who was recently hospitalized at [facility] 2/2 to hypertensive emergency (HTN w/ HA). CT head/MRI brain at that time negative. Patient presented to [facility] ED on 11/13/20 with complaints of intermittent CP and elevated BP for 1 week. TTE revealed elevated gradients across of TAVR THV increased from baseline mean 14mmHg to 32mmHg, nml EF. CTA performed with likely leaflet hypoattentuation at base of leaflets with remaining portion of leaflets not hypoattentuated. 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 s/p heparin gtt complicated by GIB. IRMA showed thickening at base with normal remaining portion of leaflets  -workup for GIB on a/c  -needs a/c to see if resolution of TAVR valve findigns, howevere limited by GIB  -continuing to evaluate for medical management vs TAVR Mary Ann vs high risk SAVR    I was physically present for the key portions of the evaluation and management (E/M) service provided.  I agree with the above history, physical, and plan which I have reviewed and edited where appropriate.     15 minutes spent on total encounter; more than 50% of the visit was spent counseling and/or coordinating care by the attending physician.
Please refer to CVD fellow note written above for details.  I reviewed the fellow's documentation, and I have personally seen and examined the patient. I have reviewed vitals, labs, medications, cardiac studies and additional imaging. I agree with the findings and plans as written above with the following additions/amendments:
Please refer to CVD fellow note written above for details.  I reviewed the fellow's documentation, and I have personally seen and examined the patient. I have reviewed vitals, labs, medications, cardiac studies and additional imaging. I agree with the findings and plans as written above.
CRS Attending  Discussed with surgical resident Dr Pereira at the time of his evaluation on morning rounds.  Patient currently off unit at radiology at time of afternoon rounds.  Will f/u PET results and discuss further management with patient.  Continue medical and cardiac optimization, risk stratification for surgical management.
CRS Attending  Late note entry  Seen and examined on morning rounds  Discussed with surgical housestaff and agree with above
Patient seen and examined with PA/fellow bedside and discussed during rounds.  PA/fellow note read, including vitals, physical findings, laboratory data, and radiological reports.   Revisions included below. Direct personal management at bedside and extensive interpretation of the data performed.  Plan was outlined and discussed in details with the multidisciplinary team.  Decision making of high complexity.     79 y/o Belarusian speaking female w/ a PMHx of uncontrolled HTN, HLD, asthma (no hospitalizations in past), ?MARK (noncompliant with CPAP), chronic diastolic CHF (Ef 61%), AS (s/p TAVR rachael valve 2/5/2019) who was recently hospitalized at [facility] 2/2 to hypertensive emergency (HTN w/ HA). CT head/MRI brain at that time negative. Patient presented to [facility] ED on 11/13/20 with complaints of intermittent CP and elevated BP for 1 week. TTE revealed elevated gradients across of TAVR THV increased from baseline mean 14mmHg to 32mmHg, nml EF. CTA performed with likely leaflet hypoattentuation at base of leaflets with remaining portion of leaflets not hypoattentuated. 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 s/p heparin gtt complicated by GIB. IRMA showed thickening at base with normal remaining portion of leaflets  -workup for GIB on a/c  -needs a/c to see if resolution of TAVR valve findigns, howevere limited by GIB  -continuing to evaluate for medical management vs TAVR Mary Ann vs high risk SAVR    I was physically present for the key portions of the evaluation and management (E/M) service provided.  I agree with the above history, physical, and plan which I have reviewed and edited where appropriate.     15 minutes spent on total encounter; more than 50% of the visit was spent counseling and/or coordinating care by the attending physician.
Patient seen and examined with PA/fellow bedside and discussed during rounds.  PA/fellow note read, including vitals, physical findings, laboratory data, and radiological reports.   Revisions included below. Direct personal management at bedside and extensive interpretation of the data performed.  Plan was outlined and discussed in details with the multidisciplinary team.  Decision making of high complexity.     79 y/o Sinhala speaking female w/ a PMHx of uncontrolled HTN, HLD, asthma (no hospitalizations in past), ?MARK (noncompliant with CPAP), chronic diastolic CHF (Ef 61%), AS (s/p TAVR rachael valve 2/5/2019) who was recently hospitalized at [facility] 2/2 to hypertensive emergency (HTN w/ HA). CT head/MRI brain at that time negative. Patient presented to [facility] ED on 11/13/20 with complaints of intermittent CP and elevated BP for 1 week. TTE revealed elevated gradients across of TAVR THV increased from baseline mean 14mmHg to 32mmHg, nml EF. CTA performed with likely leaflet hypoattentuation at base of leaflets with remaining portion of leaflets not hypoattentuated. 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 s/p heparin gtt complicated by GIB.   -obtain IRMA to better assess etiology of inc gradients  -workup for GIB on a/c  -will need a/c if leaflet thrombus confirmed vs TAVR Mary Ann vs high risk SAVR    I was physically present for the key portions of the evaluation and management (E/M) service provided.  I agree with the above history, physical, and plan which I have reviewed and edited where appropriate.     15 minutes spent on total encounter; more than 50% of the visit was spent counseling and/or coordinating care by the attending physician.
Patient seen and examined with PA/fellow bedside and discussed during rounds.  PA/fellow note read, including vitals, physical findings, laboratory data, and radiological reports.   Revisions included below. Direct personal management at bedside and extensive interpretation of the data performed.  Plan was outlined and discussed in details with the multidisciplinary team.  Decision making of high complexity.     79 y/o Yoruba speaking female w/ a PMHx of uncontrolled HTN, HLD, asthma (no hospitalizations in past), ?MARK (noncompliant with CPAP), chronic diastolic CHF (Ef 61%), AS (s/p TAVR rachael valve 2/5/2019) who was recently hospitalized at [facility] 2/2 to hypertensive emergency (HTN w/ HA). CT head/MRI brain at that time negative. Patient presented to [facility] ED on 11/13/20 with complaints of intermittent CP and elevated BP for 1 week. TTE revealed elevated gradients across of TAVR THV increased from baseline mean 14mmHg to 32mmHg, nml EF. CTA performed with likely leaflet hypoattentuation at base of leaflets with remaining portion of leaflets not hypoattentuated. 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 s/p heparin gtt complicated by GIB. IRMA showed thickening at base with normal remaining portion of leaflets. Colonscopy revealed 4 large colonic friable masses concerning for malignancy. CTA without evidence of metastatic disease. now in afib.  -awaiting final onco/onco sx workup - PET scan; will likely benefit from surgical resection  -TAVR Mary Ann based on CTA findings, pt would be high risk for coronary occlusion without the ability for coronary protection given that she cannot get DAPT/have stent placement; pt is also high risk for SAVR. discussed at length with CV surgyer and MDT. given above findings and TTE showed moderate-severe gradients (likely leaflet thrombus etiology), would recommend proceeding with curative GI onco surgery (awaiting final plan) with cardiac anestehsia and plan to start a/c when safe from bleeding standpoint. pt now with afib, hypercoag state given malignancy, and valve findings, all indications for a/c. will reassess TAVR THV after a/c.     I was physically present for the key portions of the evaluation and management (E/M) service provided.  I agree with the above history, physical, and plan which I have reviewed and edited where appropriate.     15 minutes spent on total encounter; more than 50% of the visit was spent counseling and/or coordinating care by the attending physician.
See fellow note written above for details. I reviewed the fellow documentation. I reviewed vitals, labs, medications, cardiac studies, and additional imaging. I agree with the above fellow's findings and plans as written above
Initial attending contact date 11/23/20     . See PA note written above for details. I reviewed the PA documentation. I have personally seen and examined this patient. I reviewed vitals, labs, medications, cardiac studies, and additional imaging. I agree with the above PA's findings and plans as written above with the following additions/statements.    -P Afib: maintaining NSR.  Cont cardizem 30 mg po q 8 in addition to lopressor to maintain NSR. Sinus leobardo in 30s however while asleep.  -Cont hold AC given colonic mass  -With 1+ pitting edema this am, Lasix 40mg IV x 1 today  -BP mildly elevated: given LE edema today, re-start HCTZ 12.5 mg po qd 11/28  -Hgb noted to trend downwards (8.9 from 9.5). Recheck H/H this afternoon. If hgb < 8.5, transfuse 1 u prbc in anticipation of upcoming surgery.  -Elevated AV gradient noted on echo due to thickened/possible microemboli at ring of AV, however AV leaflets with normal opening. Given this, no need for immediate CTS intervention. Pt currently in NSR with cardizem/lopressor. Pt optimized medically from cardiac standpoint for colonic mass removal with cardiac anesthesia. Plan for OR 12/2  -Awaiting official medicine clearance (called again today for official note)
Initial attending contact date 11/23/20     . See PA note written above for details. I reviewed the PA documentation. I have personally seen and examined this patient. I reviewed vitals, labs, medications, cardiac studies, and additional imaging. I agree with the above PA's findings and plans as written above with the following additions/statements.    -Pt with diffuse erythematous rash upper extremities, abdomen this am, started 2 days ago  -Likely allergic reaction to dig, DC Dig. Start cardizem 30 mg po q 8 in addition to lopressor for better rate control  -Cont hold AC given colonic mass  -Euvolemic on exam  -BP controlled  -PET performed today - awaiting review by Onc for staging/further tx of colon Ca including resection  -Elevated AV gradient noted on echo due to thickened/possible microemboli at ring of AV, however AV leaflets with normal opening. Given this, no need for immediate CTS intervention. With improved rate control with cardizem/lopressor pt will be optimized medically from cardiac standpoint for colonic mass removal with cardiac anesthesia.
blood pressure is better after hydration   on digoxin  increase lopressor to 50 po q 6   awaiting pet scan
rapid afib due to dehydration start ivf and lopressor  hold diltiazem  if continues to be tachy add digoxin
Initial attending contact date 11/23/20     . See PA note written above for details. I reviewed the PA documentation. I have personally seen and examined this patient. I reviewed vitals, labs, medications, cardiac studies, and additional imaging. I agree with the above PA's findings and plans as written above with the following additions/statements.    -Rash resolved with DC of Dig. Today in NSR. Cont cardizem 30 mg po q 8 in addition to lopressor to maintain NSR  -Cont hold AC given colonic mass  -Euvolemic on exam  -BP controlled  -PET performed- with hilar lymphadenopathy which will need bx post colon mass removal per Onc  -Elevated AV gradient noted on echo due to thickened/possible microemboli at ring of AV, however AV leaflets with normal opening. Given this, no need for immediate CTS intervention. Pt currently in NSR with cardizem/lopressor. Pt optimized medically from cardiac standpoint for colonic mass removal with cardiac anesthesia. Date TBD post discussion between Onc and surgery team.   -Discussed with Dr Mckeon
See the Resident note written above, for details. I reviewed the resident documentation.  I have personally seen and examined this patient today 11/29. I have reviewed vitals, labs, medications, cardiac studies and additional imaging.  I agree with the resident's findings and plans as written above with the following additions/amendments:  78F with Uncontrolled HTN, HLD, asthma, chronic diastolic CHFpEF 61% and AS s/p TAVR with Jamar valve 02/05/2019 (Sapna) who presented to [facility] ED 11/13/2020 with HTN urgency, chest pain.  Course c/b A fib with RVR, BRBPR sec to hemorrhoids, elevated AV mean gradients thought to be due to microthrombi/thickened AV ring (leaflets with normal opening), incidentally found to have colonic masses with high suspicion for CA   Patient reports vague abdominal, LUQ pain, shortness of breath and chest discomfort in context of HTN urgency 181/77 ()  Patient euvolemic and compensated on exam, saturations 98% on RA, repeat EKG 12/29 non ischemia no e/o infarction  Plan for:  Lopressor 50 q6hr, hold HR<50 SBP<110  DC Cardizem given HR low 50s  Hydralazine 25 TID for BP control  HCTZ 12.5 daily for BP control  Holding home ACEI (lininopril 10) tses op to minimize risk of vasoplegia  Holding AC given upcoming surgery for colonic mass, maintain Hbg>8.5  Troponin elevation in context of HTN urgency, will trend to peak and achieve strict BP control today  GELY Guardado.  Cardiology Attending
See PA note written above, for details. I reviewed the PA documentation.  I have personally seen and examined this patient today. I reviewed vitals, labs, medications, cardiac studies and additional imaging.  I agree with the PA's findings and plans as written above with the following additions/amendments:  STRUCTURAL CT SCAN today unrevealing  NPO pMN tonight for IRMA ingrid 11/17 as per d/w CTSx  DC heparin gtt as no definitive thrombus seen on echo imaging as of yet, will re-eval post IRMA   -->GI has cleared patient for A/C should it be needed in future  GELY Guardado.  Cardiology Attending
79 y/o uncontrolled HTN, HLD, asthma, chronic diastolic CHF (EF 61% by Echo 4/2019), aortic stenosis s/p TAVR with Jamar valve who now presents with bioprosthetic aortic valve stenosis with significantly elevated gradients and complicated by BRBPR, who is planned to undergo colonscopy. RCRI score is 1, and she is a moderate risk candidate for this low risk procedure. She underwent a IRMA that showed normal LV function and elevated transaortic gradients. No further cardiac work up needed.
See PA note written above, for details. I reviewed the PA documentation.  I have personally seen and examined this patient today. I reviewed vitals, labs, medications, cardiac studies and additional imaging.  I agree with the PA's findings and plans as written above with the following additions/amendments:  78F with Uncontrolled HTN, HLD, asthma, chronic diastolic CHFpEF 61% and AS s/p TAVR with Jamar valve 02/05/2019 (Gregsue) who presented to [facility] ED 11/13/2020 with HTN urgency, chest pain.  Course c/b A fib with RVR, BRBPR sec to hemorrhoids, elevated AV mean gradients thought to be due to microthrombi/thickened AV ring (leaflets with normal opening), incidentally found to have colonic masses with high suspicion for CA   Patient with no cardiopulmonary complaints today, anticipating surgery next week  Patient euvolemic and compensated on exam today  Plan for:  Cardizem 30mg BID, hold HR<50 SBP<110  Lopressor 50 q6hr, hold HR<50 SBP<110  Holding AC given upcoming surgery for colonic mass  Resume HCTZ 12.5 daily today 11/28  Holding home ACEI (lininopril 10) tess op to minimize risk of vasoplegia  maintain Hbg>8.5  Pulm and cards pre op risk assessments given for surgery (notes in chart). patient optimized from cardiac standpoint with no additional cardiac testing required prior to surgery planned for 12/2  GELY Guardado.  Cardiology Attending

## 2020-12-14 NOTE — PROGRESS NOTE ADULT - REASON FOR ADMISSION
HTN urgency

## 2020-12-14 NOTE — PROGRESS NOTE ADULT - NSHPATTENDINGPLANDISCUSS_GEN_ALL_CORE
the patient, CTSx Dr. Alejo and cardiac care team the patient, CTSx Dr. Alejo and primary  medical team

## 2020-12-14 NOTE — PROVIDER CONTACT NOTE (CHANGE IN STATUS NOTIFICATION) - ASSESSMENT
PT alert and oriented x4, saturated tissues noted with blood. BP elevated to the 170's. see MAR.
Pt asymptomatic, VS stable as documented
VSS, patient does not appear in distress. Patient stated it is not the first time having this type of chest pain.
Pt dripping a lot of blood from b.l nares. BP now in the 190's systolic, SEE flowsheet.

## 2020-12-14 NOTE — PROVIDER CONTACT NOTE (CHANGE IN STATUS NOTIFICATION) - SITUATION
Patient stated "chest pain" on anterior left chest wall above the breast, 10/10 "burning" pain and 9/10 sharp headache. Scant amount of blood tinged sputum consistent throughout the past few days.

## 2020-12-14 NOTE — PROGRESS NOTE ADULT - SUBJECTIVE AND OBJECTIVE BOX
Cardiology fellow Progress note    Subjective/Interval events: Overnight events reviewed. Patient denies chest pain, shortness of breath, orthopnea, paroxysmal nocturnal dyspnea, ankle swelling, lightheadedness, dizziness. Pts Hb stable in mid 7s, no more bleeding issues at this time on Eliquis     ROS: A 10-point review of systems was otherwise negative.    PHYSICAL EXAM:  T(C): 36.7 (12-14-20 @ 16:46), Max: 36.9 (12-14-20 @ 00:25)  HR: 81 (12-14-20 @ 16:46) (73 - 97)  BP: 145/74 (12-14-20 @ 16:46) (100/60 - 145/74)  RR: 17 (12-14-20 @ 16:46) (16 - 18)  SpO2: 95% (12-14-20 @ 16:46) (94% - 99%)    GEN: Awake, Breathing comfortably on. NAD.   HEENT: Mucosa moist. No JVD.   RESP: Bibasilar Inspiratory crackles.   CV: RRR, normal s1/s2. IV YOVANA @ RUSB with radiation to carotids   ABD: Soft, NTND. BS+  EXT: Warm. No edema, PP 2+  NEURO: AAOx3. No focal deficits.      I&O's Summary    13 Dec 2020 07:01  -  14 Dec 2020 07:00  --------------------------------------------------------  IN: 780 mL / OUT: 3700 mL / NET: -2920 mL    14 Dec 2020 07:01  -  14 Dec 2020 19:52  --------------------------------------------------------  IN: 360 mL / OUT: 900 mL / NET: -540 mL    - Replete electrolytes to maintain K>4, Mg>2  - Incentive Spirometry. Encourage patient to get out of bed.

## 2020-12-14 NOTE — PROGRESS NOTE ADULT - SUBJECTIVE AND OBJECTIVE BOX
SUBJECTIVE: Pt seen at bedside while eating breakfast. Pt continues to complain of burning chest pain (previously cardiac workup all negative). Tolerating diet, without N/V. Admit to flatus and normal BM's. Denies shortness of breath, fever, chills.    MEDICATIONS  (STANDING):  acetaminophen   Tablet .. 1000 milliGRAM(s) Oral every 6 hours  ALBUTerol    90 MICROgram(s) HFA Inhaler 1 Puff(s) Inhalation every 4 hours  albuterol/ipratropium for Nebulization 3 milliLiter(s) Nebulizer every 6 hours  apixaban 5 milliGRAM(s) Oral every 12 hours  atorvastatin 20 milliGRAM(s) Oral at bedtime  BUpivacaine liposome 1.3% Injectable (no eMAR) 20 milliLiter(s) Local Injection once  hydrochlorothiazide 25 milliGRAM(s) Oral daily  influenza  Vaccine (HIGH DOSE) 0.7 milliLiter(s) IntraMuscular once  lisinopril 20 milliGRAM(s) Oral daily  metoprolol succinate ER 50 milliGRAM(s) Oral daily  pantoprazole  Injectable 40 milliGRAM(s) IV Push daily  sodium chloride 0.65% Nasal 2 Spray(s) Both Nostrils four times a day  tiotropium 18 MICROgram(s) Capsule 1 Capsule(s) Inhalation daily    MEDICATIONS  (PRN):  ondansetron Injectable 4 milliGRAM(s) IV Push every 6 hours PRN Nausea and/or Vomiting      Vital Signs Last 24 Hrs  T(C): 36.8 (14 Dec 2020 05:00), Max: 36.9 (13 Dec 2020 10:15)  T(F): 98.3 (14 Dec 2020 05:00), Max: 98.5 (13 Dec 2020 10:15)  HR: 81 (14 Dec 2020 05:00) (75 - 97)  BP: 117/71 (14 Dec 2020 05:00) (117/71 - 154/65)  BP(mean): 86 (14 Dec 2020 05:00) (86 - 95)  RR: 18 (14 Dec 2020 05:00) (16 - 18)  SpO2: 95% (14 Dec 2020 05:00) (94% - 99%)    PHYSICAL EXAM:      Constitutional: A&Ox3    Respiratory: non labored breathing, no respiratory distress    Cardiovascular: NSR, RRR    Gastrointestinal: soft ND, NT                 Incision: CDI    Genitourinary: Voiding     Extremities: (-) edema                  I&O's Detail    13 Dec 2020 07:01  -  14 Dec 2020 07:00  --------------------------------------------------------  IN:    Oral Fluid: 480 mL    PRBCs (Packed Red Blood Cells): 300 mL  Total IN: 780 mL    OUT:    Voided (mL): 3700 mL  Total OUT: 3700 mL    Total NET: -2920 mL          LABS:                        7.2    8.86  )-----------( 244      ( 14 Dec 2020 05:46 )             23.8     12-13    136  |  97  |  10  ----------------------------<  101<H>  4.3   |  26  |  1.21    Ca    8.8      13 Dec 2020 06:21  Phos  4.6     12-13  Mg     2.2     12-13            RADIOLOGY & ADDITIONAL STUDIES:

## 2020-12-14 NOTE — CHART NOTE - NSCHARTNOTEFT_GEN_A_CORE
80 year old female s/p TAVR with Dr. Alejo on 2/5/2019 now admitted in the setting of colon CA s/p laparoscopic subtotal colectomy (R and transverse colectomy, ileocolic anastomosis) on 12/2/2020. Patient planned for discharge home today per primary team. Seen this afternoon at bedside, states she had episode of chest pressure and SOB overnight.     - Suspected leaflet thrombosis on recent TTE this admission, continue eliquis. Due to worsening symptoms overnight please obtain new TTE to assess aortic valve gradients prior to discharge today.     - Patient has appointment to follow up with Dr. Alejo on 12/21/2020 at 10:00 AM. The office is located at [address] [place] [address]. Patient can call [phone] with any questions 80 year old female s/p TAVR with Dr. Alejo on 2/5/2019 now admitted in the setting of colon CA s/p laparoscopic subtotal colectomy (R and transverse colectomy, ileocolic anastomosis) on 12/2/2020. Patient planned for discharge home today per primary team. Seen this afternoon at bedside, states she had episode of chest pressure and SOB overnight.     - Suspected leaflet thrombosis on recent TTE this admission, continue eliquis. Due to worsening symptoms overnight please obtain new TTE to assess aortic valve gradients prior to discharge today.   - Recommend PRBC transfusion prior to discharge,  H/H 7/23 today, would likely improve symptoms   - Patient has appointment to follow up with Dr. Alejo on 12/21/2020 at 10:00 AM. The office is located at [address] [place] [address]. Patient can call [phone] with any questions

## 2020-12-14 NOTE — PROGRESS NOTE ADULT - ASSESSMENT
Echo with severely depressed LV function with EF 25% with severe MR; aggressive diuresis with IV Diamox and IV Bumex continued and negative approximately 15 liters since admission; effusion improving as well as peripheral edema; do not expect peripheral edema to fully resolve given severe TR; CVP appears inaccurate and likely skewed given severe TR; will repeat IV Diamox and Bumex today but definitely approaching a euvolemic state; ARB/ACEI held due to marginal BP and elevated creatinine; BB held due to marginal BP as well and Dobutamine use; will monitor BP and attempt to initiate BB in next 24-48 hours depending on BP;  okay to proceed with GI procedure tomorrow from a cardiac standpoint with anesthesia use; HF with improved compensation and improved compared to admission; will remain in the ICU for now and anticipate transfer to the floor after enteroscopy completed    80F w/ HFpEF (61%), AS s/p TAVR, pAF (new), HTN, HLD, asthma adm to cardiac tele on 11/13 w/chest pain & hypertensive urgency. Course c/b new onset AF and GIB after hep gtt initiation 2/2 leaflet thrombus on IRMA. 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 Patient s/p laparoscopic subtotal colectomy (R and transverse colectomy, ileocolic anastomosis) on 12/2. Cardiology consult service now following for cardiac optimization post-surgery. 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  - c/w HCTZ 25 mg po qd, Lisinopril 20 mg po qd, Toprol XL 50 mg po qd    #pAF - [id number] - 4. No digoxin 2/2 rash  - c/w Toprol 50 QD  - c/w Eliquis 5mg bid    Discharge Meds: Eliquis 5mg PO BID, Toprol 50mg PO qd, Lisinopril 20mg PO qd, HCTZ 25mg PO qd    At d/c, pt should f/u w/gen cards:  Dr. Keshav Aguirre  [address] [place] [address]  [phone]    Will sign off at this time as there no active cardiac issues that are being managed by the cardiology consult service. Please call us back if there any further questions or concerns  Plan discussed with primary team after rounds with cardiology attending. Please refer to cardiology attending addendum for additional recs.   Thank you for allowing to participate in the care of this patient    KANA Arango  Cardiology Fellow, PGY 7

## 2020-12-14 NOTE — CHART NOTE - NSCHARTNOTESELECT_GEN_ALL_CORE
Epistaxis/Event Note
Event Note/Cardiology Clearance
GI procedure
Nutrition Follow Up/Nutrition Services
Nutrition Services
Nutrition Services
Nutrition Services/Nutrition Follow up
Nutrition Services/follow up
Off Service Note
Off Service Note/Structural Heart
Off Service Note/Structural Heart
Structural Heart Team/Off Service Note

## 2020-12-15 ENCOUNTER — NON-APPOINTMENT (OUTPATIENT)
Age: 80
End: 2020-12-15

## 2020-12-15 RX ORDER — APIXABAN 5 MG/1
5 TABLET, FILM COATED ORAL
Qty: 20 | Refills: 0 | Status: ACTIVE | COMMUNITY
Start: 2020-12-15 | End: 1900-01-01

## 2020-12-16 RX ORDER — APIXABAN 2.5 MG/1
1 TABLET, FILM COATED ORAL
Qty: 60 | Refills: 0
Start: 2020-12-16 | End: 2021-01-14

## 2020-12-18 DIAGNOSIS — I48.91 UNSPECIFIED ATRIAL FIBRILLATION: ICD-10-CM

## 2020-12-18 DIAGNOSIS — C18.9 MALIGNANT NEOPLASM OF COLON, UNSPECIFIED: ICD-10-CM
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
## 2020-12-18 DIAGNOSIS — K92.1 MELENA: ICD-10-CM

## 2020-12-18 DIAGNOSIS — Z95.3 PRESENCE OF XENOGENIC HEART VALVE: ICD-10-CM

## 2020-12-18 DIAGNOSIS — D69.6 THROMBOCYTOPENIA, UNSPECIFIED: ICD-10-CM

## 2020-12-18 DIAGNOSIS — Z91.19 PATIENT'S NONCOMPLIANCE WITH OTHER MEDICAL TREATMENT AND REGIMEN: ICD-10-CM

## 2020-12-18 DIAGNOSIS — T82.867A THROMBOSIS DUE TO CARDIAC PROSTHETIC DEVICES, IMPLANTS AND GRAFTS, INITIAL ENCOUNTER: ICD-10-CM

## 2020-12-18 DIAGNOSIS — K31.9 DISEASE OF STOMACH AND DUODENUM, UNSPECIFIED: ICD-10-CM

## 2020-12-18 DIAGNOSIS — I16.0 HYPERTENSIVE URGENCY: ICD-10-CM

## 2020-12-18 DIAGNOSIS — R79.1 ABNORMAL COAGULATION PROFILE: ICD-10-CM

## 2020-12-18 DIAGNOSIS — I50.33 ACUTE ON CHRONIC DIASTOLIC (CONGESTIVE) HEART FAILURE: ICD-10-CM

## 2020-12-18 DIAGNOSIS — I11.0 HYPERTENSIVE HEART DISEASE WITH HEART FAILURE: ICD-10-CM

## 2020-12-18 DIAGNOSIS — T45.515A ADVERSE EFFECT OF ANTICOAGULANTS, INITIAL ENCOUNTER: ICD-10-CM

## 2020-12-18 DIAGNOSIS — N39.0 URINARY TRACT INFECTION, SITE NOT SPECIFIED: ICD-10-CM
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
## 2020-12-18 DIAGNOSIS — E66.9 OBESITY, UNSPECIFIED: ICD-10-CM

## 2020-12-18 DIAGNOSIS — R51.9 HEADACHE, UNSPECIFIED: ICD-10-CM

## 2020-12-18 DIAGNOSIS — R59.9 ENLARGED LYMPH NODES, UNSPECIFIED: ICD-10-CM

## 2020-12-18 DIAGNOSIS — K21.9 GASTRO-ESOPHAGEAL REFLUX DISEASE WITHOUT ESOPHAGITIS: ICD-10-CM

## 2020-12-18 DIAGNOSIS — J98.11 ATELECTASIS: ICD-10-CM

## 2020-12-18 DIAGNOSIS — Z20.828 CONTACT WITH AND (SUSPECTED) EXPOSURE TO OTHER VIRAL COMMUNICABLE DISEASES: ICD-10-CM

## 2020-12-18 DIAGNOSIS — K66.0 PERITONEAL ADHESIONS (POSTPROCEDURAL) (POSTINFECTION): ICD-10-CM

## 2020-12-18 DIAGNOSIS — Z99.89 DEPENDENCE ON OTHER ENABLING MACHINES AND DEVICES: ICD-10-CM

## 2020-12-18 DIAGNOSIS — J45.909 UNSPECIFIED ASTHMA, UNCOMPLICATED: ICD-10-CM

## 2020-12-18 DIAGNOSIS — Z79.82 LONG TERM (CURRENT) USE OF ASPIRIN: ICD-10-CM

## 2020-12-18 DIAGNOSIS — N17.9 ACUTE KIDNEY FAILURE, UNSPECIFIED: ICD-10-CM

## 2020-12-18 DIAGNOSIS — R04.0 EPISTAXIS: ICD-10-CM

## 2020-12-18 DIAGNOSIS — E78.5 HYPERLIPIDEMIA, UNSPECIFIED: ICD-10-CM

## 2020-12-18 DIAGNOSIS — G47.33 OBSTRUCTIVE SLEEP APNEA (ADULT) (PEDIATRIC): ICD-10-CM

## 2020-12-21 ENCOUNTER — APPOINTMENT (OUTPATIENT)
Dept: CARDIOTHORACIC SURGERY | Facility: CLINIC | Age: 80
End: 2020-12-21

## 2020-12-22 ENCOUNTER — RX RENEWAL (OUTPATIENT)
Age: 80
End: 2020-12-22

## 2020-12-28 ENCOUNTER — APPOINTMENT (OUTPATIENT)
Dept: CARDIOTHORACIC SURGERY | Facility: CLINIC | Age: 80
End: 2020-12-28
Payer: MEDICARE

## 2020-12-28 ENCOUNTER — APPOINTMENT (OUTPATIENT)
Dept: COLORECTAL SURGERY | Facility: CLINIC | Age: 80
End: 2020-12-28
Payer: MEDICARE

## 2020-12-28 VITALS
HEART RATE: 94 BPM | SYSTOLIC BLOOD PRESSURE: 158 MMHG | BODY MASS INDEX: 34.99 KG/M2 | OXYGEN SATURATION: 97 % | HEIGHT: 65 IN | DIASTOLIC BLOOD PRESSURE: 83 MMHG | TEMPERATURE: 97.2 F | WEIGHT: 210 LBS

## 2020-12-28 VITALS — BODY MASS INDEX: 38.16 KG/M2 | HEIGHT: 62.2 IN

## 2020-12-28 DIAGNOSIS — C18.9 MALIGNANT NEOPLASM OF COLON, UNSPECIFIED: ICD-10-CM

## 2020-12-28 PROCEDURE — 99024 POSTOP FOLLOW-UP VISIT: CPT

## 2020-12-28 PROCEDURE — 99214 OFFICE O/P EST MOD 30 MIN: CPT | Mod: 95

## 2020-12-28 NOTE — HISTORY OF PRESENT ILLNESS
[FreeTextEntry1] : 79 yo female presents with daughter for postoperative visit \par s/p laparoscopic assisted subtotal colectomy with ileo -descending anastomosis for ascending and transverse colon cancers 12/2/2020\par \par Surgical Final Report\par \par Final Diagnosis\par Colon, right and transverse; subtotal colectomy:\par - Invasive adenocarcinoma, moderately differentiated, two separate primary tumors.\par - Tumor #1 located 37.0 cm distal to the proximal margin and is 2.9 cm in greatest dimension.\par - Tumor invades the muscularis propria.\par - No lymphovascular or perineural invasion identified.\par - Margins negative for carcinoma.\par - Tumor #2 located 64.0 cm distal to the proximal margin and is 2.7 cm in greatest dimension.\par - Tumor invades the muscularis propria.\par - No lymphovascular or perineural invasion identified.\par - Margins negative for carcinoma.\par - Fifty-one lymph nodes negative for carcinoma (0/51).\par - Multiple tubular adenoma, the largest with high-grade dysplasia and located distal to Tumor #1 and proximal to Tumor #2.\par - Appendix with fibrous obliteration of lumen.\par - Portion of ileum without significant histologic abnormality.\par - See two Synoptic Reports below.\par - Microsatellite instability (MSI) results will be issued as an addendum.\par \par Tumor 1:\par Procedure\par Subtotal colectomy\par Tumor Site\par Ascending colon\par Tumor Size\par Greatest dimension: 2.9 cm\par Macroscopic Tumor Perforation\par Not identified\par Histologic Type\par Adenocarcinoma\par Histologic Grade\par \par Surgical Final Report\par G2: Moderately differentiated\par Tumor Extension\par Tumor invades muscularis propria\par Margins\par All margins are uninvolved by invasive carcinoma, high grade\par dysplasia / intramucosal carcinoma, and low grade dysplasia\par Margins examined: Proximal, distal, mesenteric\par Treatment Effect\par No known presurgical therapy\par Lymphovascular Invasion\par Not identified\par Perineural Invasion\par Not identified\par Tumor Budding\par Low score (0-4)\par Tumor Deposits\par Not identified\par Regional Lymph Nodes\par Number of Lymph Nodes Involved: 0\par Number of Lymph Nodes Examined: 51\par Pathologic Stage Classification (pTNM, AJCC 8th Edition)\par TNM Descriptors\par \par Primary Tumor (pT)\par pT2:  Tumor invades the muscularis propria\par Regional Lymph Nodes (pN)\par pN0:  No regional lymph node metastasis\par \par Tumor 2:\par Procedure\par Subtotal colectomy\par Tumor Site\par Transverse colon\par Tumor Size\par Greatest dimension: 2.7 cm\par Macroscopic Tumor Perforation\par Not identified\par Histologic Type\par Adenocarcinoma\par Histologic Grade\par G2: Moderately differentiated\par Tumor Extension\par Tumor invades muscularis propria\par Margins\par All margins are uninvolved by invasive carcinoma, high grade\par dysplasia / intramucosal carcinoma, and low grade dysplasia\par Margins examined: Proximal, distal, mesenteric\par Treatment Effect\par \par Surgical Final Report\par No known presurgical therapy\par Lymphovascular Invasion\par Not identified\par Perineural Invasion\par Not identified\par Tumor Budding\par High score (10 or more)\par Tumor Deposits\par Not identified\par Regional Lymph Nodes\par Number of Lymph Nodes Involved: 0\par Number of Lymph Nodes Examined: 51\par Pathologic Stage Classification (pTNM, AJCC 8th Edition)\par TNM Descriptors\par Primary Tumor (pT)\par pT2:  Tumor invades the muscularis propria\par Regional Lymph Nodes (pN)\par pN0:  No regional lymph node metastasis\par \par Patient reports she is doing well. Appetite low and energy improving\par Reports watery stool approximately 2 hours after eating.\par Has been eating soup, blended chicken (due to limited dentition), dumplings, sushi, cabbage rolls. Drinks lots of juice.\par Reports abdominal cramping when moving. Denies fever, n/v or constipation\par BH: 3-4 times a day, watery\par Denies fiber supplement or anti-diarrheal use

## 2020-12-28 NOTE — ASSESSMENT
[FreeTextEntry1] : Recovering well, patient and daughter reassured reassured\par Pathology discussed. F/u with Dr Mckeon (med onc)\par Continue to liberalize diet. Increase dietary fiber, consider supplement.\par Continue to avoid heavy lifting and strenuous activity for 6-8 weeks postop\par Over the counter pain control prn\par Local wound care discussed\par F/u with cardiology - per patient and daughter they did telehealth today, and have in person f/u scheduled in January 2021\par All questions were answered, patient expressed understanding, and is agreeable to this plan.\par

## 2020-12-28 NOTE — PHYSICAL EXAM
[FreeTextEntry1] : Gen no acute distress, alert and oriented\par Psych calm, pleasant demeanor, responding appropriately to question\par Well nourished\par Comfortable in chair\par Ambulating without assistance\par Nonlabored breathing\par Abdomen obese, soft nontender, no hernias, well healing incisions intact without erythema or flucutance or induration\par

## 2020-12-31 NOTE — HISTORY OF PRESENT ILLNESS
[FreeTextEntry1] : \par This visit was provided via telehealth using real-time 2-way audio visual technology. The patient, BRITTANY DE LA CRUZ, was located at home, [address] [place] [address] , at the time of the visit. The provider was located at [address] [place] [address]. The patient, BRITTANY DE LA CRUZ, Dr. Solomon Alejo and ENA MANCINI all participated in the telehealth encounter. Verbal consent given on 12/28/2020 by BRITTANY DE LA CRUZ.\par \par 80 year old Tristanian Speaking female with a history of obesity, uncontrolled hypertension, asthma (no history of intubations) and chronic diastolic heart failure with severe aortic stenosis s/p TF TAVR on 02/5/19 with Jamar 3(23 mm, commercial) who was recently admitted on 11/13/2020 for colon CA s/p laparoscopic subtotal colectomy (R and transverse colectomy, ileocolic anastomosis) on 12/2/2020 who presents for follow up after discharge. \par \par The patient originally to [facility] complaining of elevated BP, she was found to be hypertensive with SBP 200s.  An ECHO was done which showed 23 mm Jamar 3 valve is noted in the aortic position without any aortic regurgitation. The peak transaortic velocity is 4.64 m/s, the peak gradient is 86.12 mmHg. The mean transaortic gradient is 55.00 mmHg. CT cardiac showed showed possible leaflet valve thrombus and the patient was started on IV heparin gtt.  Patient had an episode of BRBPR, GI was consulted and CT showed possible neoplasm. The patient was diagnosed with colon cancer and underwent subtotal colectomy.  \par \par Since discharge, the patient she is feeling better.  She denies any SOB at rest or with exertion, chest pressure, syncope, dizziness, or LE edema. She denies any signs of bleeding and is scheduled for GI/Heme follow up. She states her BP has been under control ranging from 120s-130s. \par

## 2020-12-31 NOTE — REVIEW OF SYSTEMS
[Dizziness] : dizziness [Negative] : Psychiatric [Shortness Of Breath] : no shortness of breath [Dyspnea on exertion] : not dyspnea during exertion [Chest  Pressure] : no chest pressure [Chest Pain] : no chest pain [Lower Ext Edema] : no extremity edema [Leg Claudication] : no intermittent leg claudication [Palpitations] : no palpitations [Tremor] : no tremor was seen [Numbness (Hypesthesia)] : no numbness [Convulsions] : no convulsions [Tingling (Paresthesia)] : no tingling

## 2021-01-05 PROCEDURE — 93308 TTE F-UP OR LMTD: CPT

## 2021-01-05 PROCEDURE — 87086 URINE CULTURE/COLONY COUNT: CPT

## 2021-01-05 PROCEDURE — 85025 COMPLETE CBC W/AUTO DIFF WBC: CPT

## 2021-01-05 PROCEDURE — 86923 COMPATIBILITY TEST ELECTRIC: CPT

## 2021-01-05 PROCEDURE — 81001 URINALYSIS AUTO W/SCOPE: CPT

## 2021-01-05 PROCEDURE — 84300 ASSAY OF URINE SODIUM: CPT

## 2021-01-05 PROCEDURE — 78815 PET IMAGE W/CT SKULL-THIGH: CPT

## 2021-01-05 PROCEDURE — 93306 TTE W/DOPPLER COMPLETE: CPT

## 2021-01-05 PROCEDURE — 82962 GLUCOSE BLOOD TEST: CPT

## 2021-01-05 PROCEDURE — 83036 HEMOGLOBIN GLYCOSYLATED A1C: CPT

## 2021-01-05 PROCEDURE — 84484 ASSAY OF TROPONIN QUANT: CPT

## 2021-01-05 PROCEDURE — 83935 ASSAY OF URINE OSMOLALITY: CPT

## 2021-01-05 PROCEDURE — 82570 ASSAY OF URINE CREATININE: CPT

## 2021-01-05 PROCEDURE — 71046 X-RAY EXAM CHEST 2 VIEWS: CPT

## 2021-01-05 PROCEDURE — P9016: CPT

## 2021-01-05 PROCEDURE — 84100 ASSAY OF PHOSPHORUS: CPT

## 2021-01-05 PROCEDURE — U0003: CPT

## 2021-01-05 PROCEDURE — 97116 GAIT TRAINING THERAPY: CPT

## 2021-01-05 PROCEDURE — 93005 ELECTROCARDIOGRAM TRACING: CPT

## 2021-01-05 PROCEDURE — 85730 THROMBOPLASTIN TIME PARTIAL: CPT

## 2021-01-05 PROCEDURE — 80061 LIPID PANEL: CPT

## 2021-01-05 PROCEDURE — 87040 BLOOD CULTURE FOR BACTERIA: CPT

## 2021-01-05 PROCEDURE — 76775 US EXAM ABDO BACK WALL LIM: CPT

## 2021-01-05 PROCEDURE — 97110 THERAPEUTIC EXERCISES: CPT

## 2021-01-05 PROCEDURE — 93321 DOPPLER ECHO F-UP/LMTD STD: CPT

## 2021-01-05 PROCEDURE — 82378 CARCINOEMBRYONIC ANTIGEN: CPT

## 2021-01-05 PROCEDURE — 99285 EMERGENCY DEPT VISIT HI MDM: CPT | Mod: 25

## 2021-01-05 PROCEDURE — 85610 PROTHROMBIN TIME: CPT

## 2021-01-05 PROCEDURE — 93312 ECHO TRANSESOPHAGEAL: CPT

## 2021-01-05 PROCEDURE — 86769 SARS-COV-2 COVID-19 ANTIBODY: CPT

## 2021-01-05 PROCEDURE — 82436 ASSAY OF URINE CHLORIDE: CPT

## 2021-01-05 PROCEDURE — 86900 BLOOD TYPING SEROLOGIC ABO: CPT

## 2021-01-05 PROCEDURE — 82550 ASSAY OF CK (CPK): CPT

## 2021-01-05 PROCEDURE — 88305 TISSUE EXAM BY PATHOLOGIST: CPT

## 2021-01-05 PROCEDURE — A9552: CPT

## 2021-01-05 PROCEDURE — 36430 TRANSFUSION BLD/BLD COMPNT: CPT

## 2021-01-05 PROCEDURE — 36415 COLL VENOUS BLD VENIPUNCTURE: CPT

## 2021-01-05 PROCEDURE — 82803 BLOOD GASES ANY COMBINATION: CPT

## 2021-01-05 PROCEDURE — 80048 BASIC METABOLIC PNL TOTAL CA: CPT

## 2021-01-05 PROCEDURE — 96365 THER/PROPH/DIAG IV INF INIT: CPT

## 2021-01-05 PROCEDURE — 75573 CT HRT C+ STRUX CGEN HRT DS: CPT

## 2021-01-05 PROCEDURE — 88309 TISSUE EXAM BY PATHOLOGIST: CPT

## 2021-01-05 PROCEDURE — 84540 ASSAY OF URINE/UREA-N: CPT

## 2021-01-05 PROCEDURE — 94640 AIRWAY INHALATION TREATMENT: CPT

## 2021-01-05 PROCEDURE — 80162 ASSAY OF DIGOXIN TOTAL: CPT

## 2021-01-05 PROCEDURE — 80053 COMPREHEN METABOLIC PANEL: CPT

## 2021-01-05 PROCEDURE — 70450 CT HEAD/BRAIN W/O DYE: CPT

## 2021-01-05 PROCEDURE — 71045 X-RAY EXAM CHEST 1 VIEW: CPT

## 2021-01-05 PROCEDURE — 86850 RBC ANTIBODY SCREEN: CPT

## 2021-01-05 PROCEDURE — 82565 ASSAY OF CREATININE: CPT

## 2021-01-05 PROCEDURE — 71260 CT THORAX DX C+: CPT

## 2021-01-05 PROCEDURE — 86901 BLOOD TYPING SEROLOGIC RH(D): CPT

## 2021-01-05 PROCEDURE — 97162 PT EVAL MOD COMPLEX 30 MIN: CPT

## 2021-01-05 PROCEDURE — 74177 CT ABD & PELVIS W/CONTRAST: CPT

## 2021-01-05 PROCEDURE — 83735 ASSAY OF MAGNESIUM: CPT

## 2021-01-05 PROCEDURE — 97164 PT RE-EVAL EST PLAN CARE: CPT

## 2021-01-05 PROCEDURE — 96366 THER/PROPH/DIAG IV INF ADDON: CPT

## 2021-01-05 PROCEDURE — 82553 CREATINE MB FRACTION: CPT

## 2021-01-05 PROCEDURE — 85027 COMPLETE CBC AUTOMATED: CPT

## 2021-01-05 PROCEDURE — 83880 ASSAY OF NATRIURETIC PEPTIDE: CPT

## 2021-02-01 ENCOUNTER — NON-APPOINTMENT (OUTPATIENT)
Age: 81
End: 2021-02-01

## 2021-02-01 ENCOUNTER — APPOINTMENT (OUTPATIENT)
Dept: CARDIOTHORACIC SURGERY | Facility: CLINIC | Age: 81
End: 2021-02-01
Payer: MEDICARE

## 2021-02-28 ENCOUNTER — FORM ENCOUNTER (OUTPATIENT)
Age: 81
End: 2021-02-28

## 2021-03-01 ENCOUNTER — APPOINTMENT (OUTPATIENT)
Dept: CARDIOTHORACIC SURGERY | Facility: CLINIC | Age: 81
End: 2021-03-01
Payer: MEDICARE

## 2021-03-01 ENCOUNTER — OUTPATIENT (OUTPATIENT)
Dept: OUTPATIENT SERVICES | Facility: HOSPITAL | Age: 81
LOS: 1 days | End: 2021-03-01
Payer: MEDICARE

## 2021-03-01 DIAGNOSIS — I50.32 CHRONIC DIASTOLIC (CONGESTIVE) HEART FAILURE: ICD-10-CM

## 2021-03-01 DIAGNOSIS — Z95.2 PRESENCE OF PROSTHETIC HEART VALVE: Chronic | ICD-10-CM

## 2021-03-01 DIAGNOSIS — I35.0 NONRHEUMATIC AORTIC (VALVE) STENOSIS: ICD-10-CM

## 2021-03-01 PROCEDURE — 93005 ELECTROCARDIOGRAM TRACING: CPT

## 2021-03-01 PROCEDURE — 99213 OFFICE O/P EST LOW 20 MIN: CPT

## 2021-03-01 PROCEDURE — 93306 TTE W/DOPPLER COMPLETE: CPT

## 2021-03-01 PROCEDURE — 93010 ELECTROCARDIOGRAM REPORT: CPT

## 2021-03-01 PROCEDURE — 93306 TTE W/DOPPLER COMPLETE: CPT | Mod: 26

## 2021-03-02 VITALS
HEART RATE: 96 BPM | OXYGEN SATURATION: 97 % | SYSTOLIC BLOOD PRESSURE: 187 MMHG | RESPIRATION RATE: 15 BRPM | DIASTOLIC BLOOD PRESSURE: 85 MMHG

## 2021-03-02 PROBLEM — I50.32 CHRONIC DIASTOLIC HEART FAILURE: Status: ACTIVE | Noted: 2021-03-02

## 2021-03-02 NOTE — PHYSICAL EXAM
[Normal Appearance] : normal appearance [General Appearance - In No Acute Distress] : no acute distress [Normal Conjunctiva] : the conjunctiva exhibited no abnormalities [Normal Jugular Venous A Waves Present] : normal jugular venous A waves present [Normal Jugular Venous V Waves Present] : normal jugular venous V waves present [Respiration, Rhythm And Depth] : normal respiratory rhythm and effort [Exaggerated Use Of Accessory Muscles For Inspiration] : no accessory muscle use [Arterial Pulses Normal] : the arterial pulses were normal [Abdomen Soft] : soft [Abdomen Tenderness] : non-tender [Abnormal Walk] : normal gait [Nail Clubbing] : no clubbing of the fingernails [Cyanosis, Localized] : no localized cyanosis [Skin Color & Pigmentation] : normal skin color and pigmentation [] : no rash [Oriented To Time, Place, And Person] : oriented to person, place, and time

## 2021-03-04 NOTE — REVIEW OF SYSTEMS
[see HPI] : see HPI [Dyspnea on exertion] : dyspnea during exertion [Palpitations] : palpitations [Negative] : Neurological [Shortness Of Breath] : no shortness of breath [Chest  Pressure] : no chest pressure [Chest Pain] : no chest pain [Lower Ext Edema] : no extremity edema [Leg Claudication] : no intermittent leg claudication [Tremor] : no tremor was seen [Numbness (Hypesthesia)] : no numbness [Convulsions] : no convulsions [Tingling (Paresthesia)] : no tingling

## 2021-03-04 NOTE — HISTORY OF PRESENT ILLNESS
[FreeTextEntry1] : 80 year old English Speaking female with a history of obesity, uncontrolled hypertension, asthma (no history of intubations) and chronic diastolic heart failure with severe aortic stenosis s/p TF TAVR on 02/5/19 with Jamar 3(23 mm, commercial) who was recently admitted on 11/13/2020 for colon CA s/p laparoscopic subtotal colectomy (R and transverse colectomy, ileocolic anastomosis) on 12/2/2020 who presents for follow up. \par \par Since her last appointment, the patient states she has been experiencing increased shortness of breath after walking a half of a block.  She states she has been taking her medications as prescribed and denies any bleeding complications.  She has been having panic attacks at home with palpitations.  She denies chest pain, shortness of breath at rest, dizziness, syncope, orthopnea, PND, or LE edema.  \par

## 2021-03-08 ENCOUNTER — APPOINTMENT (OUTPATIENT)
Dept: CARDIOTHORACIC SURGERY | Facility: CLINIC | Age: 81
End: 2021-03-08
Payer: MEDICARE

## 2021-03-08 PROCEDURE — 99214 OFFICE O/P EST MOD 30 MIN: CPT | Mod: 95

## 2021-03-10 NOTE — HISTORY OF PRESENT ILLNESS
[FreeTextEntry1] : \par This visit was provided via telehealth using real-time 2-way audio visual technology. The patient, BRITTANY DE LA CRUZ, was located at home, [address] [place] [address] , at the time of the visit. The provider was located at [address] [place] [address]. The patient, BRITTANY DE LA CRUZ, patient's daughter, Dr. Solomon Alejo and ENA MANCINI all participated in the telehealth encounter. Verbal consent given on 03/08/2021 by BRITTANY DE LA CRUZ.\par \par Daughter, helped with translation \par \par 80 year old Khmer Speaking female with a history of obesity, uncontrolled hypertension, asthma (no history of intubations) and chronic diastolic heart failure with severe aortic stenosis s/p TF TAVR on 02/5/19 with Jamar 3(23 mm, commercial) who was recently admitted on 11/13/2020 for colon CA s/p laparoscopic subtotal colectomy (R and transverse colectomy, ileocolic anastomosis) on 12/2/2020 who presents for follow up. \par \par Since her last appointment, the patient states she is feeling much better. Her breathing continues to improve and she denies any SOB at rest or with exertion, chest pain, palpitations, dizziness, syncope, or LE edema. She has been taking a BP log which ranges SBP 150s-160s with HR 80s.  She has not been started yet on coumadin as the PCP has not reached out to her. \par

## 2021-03-10 NOTE — REVIEW OF SYSTEMS
[see HPI] : see HPI [Dyspnea on exertion] : dyspnea during exertion [Palpitations] : palpitations [Negative] : Psychiatric [Shortness Of Breath] : no shortness of breath [Chest  Pressure] : no chest pressure [Chest Pain] : no chest pain [Lower Ext Edema] : no extremity edema [Leg Claudication] : no intermittent leg claudication [Tremor] : no tremor was seen [Numbness (Hypesthesia)] : no numbness [Convulsions] : no convulsions [Tingling (Paresthesia)] : no tingling

## 2021-03-15 ENCOUNTER — APPOINTMENT (OUTPATIENT)
Dept: CARDIOTHORACIC SURGERY | Facility: CLINIC | Age: 81
End: 2021-03-15
Payer: MEDICARE

## 2021-03-15 PROCEDURE — 99213 OFFICE O/P EST LOW 20 MIN: CPT | Mod: 95

## 2021-03-17 NOTE — HISTORY OF PRESENT ILLNESS
[FreeTextEntry1] : Daughter, helped with translation \par \par \par This visit was provided via telehealth using real-time 2-way audio visual technology. The patient, BRITTANY DE LA CRUZ, was located at home, [address] [place] [address] , at the time of the visit. The provider was located at [address] [place] [address]. The patient, BRITTANY DE LA CRUZ, Dr. Solomon Alejo and ENA MANCINI all participated in the telehealth encounter. Verbal consent given on 03/15/2021 by BRITTANY DE LA CRUZ.\par \par \par \par 80 year old Haitian Speaking female with a history of obesity, uncontrolled hypertension, asthma (no history of intubations) and chronic diastolic heart failure with severe aortic stenosis s/p TF TAVR on 02/5/19 with Jamar 3(23 mm, commercial) who was recently admitted on 11/13/2020 for colon CA s/p laparoscopic subtotal colectomy (R and transverse colectomy, ileocolic anastomosis) on 12/2/2020 who presents for follow up. \par \par Since her last appointment, the patient continues to improve.  She was switched from Eliquis to coumadin on 03/10, however has not had her INR checked. She did not increase her BB as recommended in her last visit.  She has been taking a blood pressure log- SBPs 130s-150s, HR 70-80s.

## 2021-04-28 ENCOUNTER — INPATIENT (INPATIENT)
Facility: HOSPITAL | Age: 81
LOS: 8 days | Discharge: HOME HEALTH | DRG: 287 | End: 2021-05-07
Attending: INTERNAL MEDICINE | Admitting: INTERNAL MEDICINE
Payer: MEDICARE

## 2021-04-28 VITALS
HEIGHT: 70 IN | HEART RATE: 107 BPM | SYSTOLIC BLOOD PRESSURE: 210 MMHG | OXYGEN SATURATION: 94 % | DIASTOLIC BLOOD PRESSURE: 110 MMHG | TEMPERATURE: 99 F | RESPIRATION RATE: 26 BRPM

## 2021-04-28 DIAGNOSIS — D64.9 ANEMIA, UNSPECIFIED: ICD-10-CM

## 2021-04-28 DIAGNOSIS — E78.5 HYPERLIPIDEMIA, UNSPECIFIED: ICD-10-CM

## 2021-04-28 DIAGNOSIS — I16.0 HYPERTENSIVE URGENCY: ICD-10-CM

## 2021-04-28 DIAGNOSIS — J81.1 CHRONIC PULMONARY EDEMA: ICD-10-CM

## 2021-04-28 DIAGNOSIS — I35.0 NONRHEUMATIC AORTIC (VALVE) STENOSIS: ICD-10-CM

## 2021-04-28 DIAGNOSIS — J44.9 CHRONIC OBSTRUCTIVE PULMONARY DISEASE, UNSPECIFIED: ICD-10-CM

## 2021-04-28 DIAGNOSIS — Z95.2 PRESENCE OF PROSTHETIC HEART VALVE: Chronic | ICD-10-CM

## 2021-04-28 DIAGNOSIS — C18.9 MALIGNANT NEOPLASM OF COLON, UNSPECIFIED: ICD-10-CM

## 2021-04-28 LAB
ALBUMIN SERPL ELPH-MCNC: 4.2 G/DL — SIGNIFICANT CHANGE UP (ref 3.3–5)
ALP SERPL-CCNC: 96 U/L — SIGNIFICANT CHANGE UP (ref 40–120)
ALT FLD-CCNC: 36 U/L — SIGNIFICANT CHANGE UP (ref 10–45)
ANION GAP SERPL CALC-SCNC: 13 MMOL/L — SIGNIFICANT CHANGE UP (ref 5–17)
APPEARANCE UR: CLEAR — SIGNIFICANT CHANGE UP
APTT BLD: 33.7 SEC — SIGNIFICANT CHANGE UP (ref 27.5–35.5)
AST SERPL-CCNC: 36 U/L — SIGNIFICANT CHANGE UP (ref 10–40)
BACTERIA # UR AUTO: PRESENT /HPF
BASE EXCESS BLDV CALC-SCNC: -1.7 MMOL/L — SIGNIFICANT CHANGE UP
BASOPHILS # BLD AUTO: 0.04 K/UL — SIGNIFICANT CHANGE UP (ref 0–0.2)
BASOPHILS NFR BLD AUTO: 0.4 % — SIGNIFICANT CHANGE UP (ref 0–2)
BILIRUB SERPL-MCNC: 0.3 MG/DL — SIGNIFICANT CHANGE UP (ref 0.2–1.2)
BILIRUB UR-MCNC: NEGATIVE — SIGNIFICANT CHANGE UP
BUN SERPL-MCNC: 14 MG/DL — SIGNIFICANT CHANGE UP (ref 7–23)
CALCIUM SERPL-MCNC: 9.3 MG/DL — SIGNIFICANT CHANGE UP (ref 8.4–10.5)
CHLORIDE SERPL-SCNC: 103 MMOL/L — SIGNIFICANT CHANGE UP (ref 96–108)
CK MB CFR SERPL CALC: 2 NG/ML — SIGNIFICANT CHANGE UP (ref 0–6.7)
CK SERPL-CCNC: 124 U/L — SIGNIFICANT CHANGE UP (ref 25–170)
CK SERPL-CCNC: 129 U/L — SIGNIFICANT CHANGE UP (ref 25–170)
CO2 SERPL-SCNC: 23 MMOL/L — SIGNIFICANT CHANGE UP (ref 22–31)
COLOR SPEC: YELLOW — SIGNIFICANT CHANGE UP
COMMENT - URINE: SIGNIFICANT CHANGE UP
CREAT SERPL-MCNC: 1.02 MG/DL — SIGNIFICANT CHANGE UP (ref 0.5–1.3)
CRP SERPL-MCNC: 2.1 MG/L — SIGNIFICANT CHANGE UP (ref 0–4)
D DIMER BLD IA.RAPID-MCNC: <150 NG/ML DDU — SIGNIFICANT CHANGE UP
DIFF PNL FLD: NEGATIVE — SIGNIFICANT CHANGE UP
EOSINOPHIL # BLD AUTO: 0.04 K/UL — SIGNIFICANT CHANGE UP (ref 0–0.5)
EOSINOPHIL NFR BLD AUTO: 0.4 % — SIGNIFICANT CHANGE UP (ref 0–6)
EPI CELLS # UR: SIGNIFICANT CHANGE UP /HPF (ref 0–5)
FERRITIN SERPL-MCNC: 13 NG/ML — LOW (ref 15–150)
FIBRINOGEN PPP-MCNC: 310 MG/DL — SIGNIFICANT CHANGE UP (ref 258–438)
GLUCOSE SERPL-MCNC: 142 MG/DL — HIGH (ref 70–99)
GLUCOSE UR QL: NEGATIVE — SIGNIFICANT CHANGE UP
HCO3 BLDV-SCNC: 24 MMOL/L — SIGNIFICANT CHANGE UP (ref 20–27)
HCT VFR BLD CALC: 26.9 % — LOW (ref 34.5–45)
HGB BLD-MCNC: 7.6 G/DL — LOW (ref 11.5–15.5)
IMM GRANULOCYTES NFR BLD AUTO: 0.4 % — SIGNIFICANT CHANGE UP (ref 0–1.5)
INR BLD: 1.88 — HIGH (ref 0.88–1.16)
KETONES UR-MCNC: NEGATIVE — SIGNIFICANT CHANGE UP
LACTATE SERPL-SCNC: 2.7 MMOL/L — HIGH (ref 0.5–2)
LDH SERPL L TO P-CCNC: 366 U/L — HIGH (ref 50–242)
LEUKOCYTE ESTERASE UR-ACNC: ABNORMAL
LYMPHOCYTES # BLD AUTO: 0.78 K/UL — LOW (ref 1–3.3)
LYMPHOCYTES # BLD AUTO: 8.7 % — LOW (ref 13–44)
MCHC RBC-ENTMCNC: 21.7 PG — LOW (ref 27–34)
MCHC RBC-ENTMCNC: 28.3 GM/DL — LOW (ref 32–36)
MCV RBC AUTO: 76.6 FL — LOW (ref 80–100)
MONOCYTES # BLD AUTO: 0.45 K/UL — SIGNIFICANT CHANGE UP (ref 0–0.9)
MONOCYTES NFR BLD AUTO: 5 % — SIGNIFICANT CHANGE UP (ref 2–14)
NEUTROPHILS # BLD AUTO: 7.6 K/UL — HIGH (ref 1.8–7.4)
NEUTROPHILS NFR BLD AUTO: 85.1 % — HIGH (ref 43–77)
NITRITE UR-MCNC: NEGATIVE — SIGNIFICANT CHANGE UP
NRBC # BLD: 0 /100 WBCS — SIGNIFICANT CHANGE UP (ref 0–0)
NT-PROBNP SERPL-SCNC: 560 PG/ML — HIGH (ref 0–300)
PCO2 BLDV: 43 MMHG — SIGNIFICANT CHANGE UP (ref 41–51)
PH BLDV: 7.36 — SIGNIFICANT CHANGE UP (ref 7.32–7.43)
PH UR: 6.5 — SIGNIFICANT CHANGE UP (ref 5–8)
PLATELET # BLD AUTO: 290 K/UL — SIGNIFICANT CHANGE UP (ref 150–400)
PO2 BLDV: 52 MMHG — SIGNIFICANT CHANGE UP
POTASSIUM SERPL-MCNC: 4.3 MMOL/L — SIGNIFICANT CHANGE UP (ref 3.5–5.3)
POTASSIUM SERPL-SCNC: 4.3 MMOL/L — SIGNIFICANT CHANGE UP (ref 3.5–5.3)
PROT SERPL-MCNC: 7.6 G/DL — SIGNIFICANT CHANGE UP (ref 6–8.3)
PROT UR-MCNC: NEGATIVE MG/DL — SIGNIFICANT CHANGE UP
PROTHROM AB SERPL-ACNC: 21.9 SEC — HIGH (ref 10.6–13.6)
RBC # BLD: 3.51 M/UL — LOW (ref 3.8–5.2)
RBC # FLD: 18.7 % — HIGH (ref 10.3–14.5)
RBC CASTS # UR COMP ASSIST: < 5 /HPF — SIGNIFICANT CHANGE UP
SAO2 % BLDV: 81 % — SIGNIFICANT CHANGE UP
SARS-COV-2 RNA SPEC QL NAA+PROBE: SIGNIFICANT CHANGE UP
SODIUM SERPL-SCNC: 139 MMOL/L — SIGNIFICANT CHANGE UP (ref 135–145)
SP GR SPEC: 1.01 — SIGNIFICANT CHANGE UP (ref 1–1.03)
TROPONIN T SERPL-MCNC: 0.01 NG/ML — SIGNIFICANT CHANGE UP (ref 0–0.01)
TROPONIN T SERPL-MCNC: 0.01 NG/ML — SIGNIFICANT CHANGE UP (ref 0–0.01)
UROBILINOGEN FLD QL: 0.2 E.U./DL — SIGNIFICANT CHANGE UP
WBC # BLD: 8.95 K/UL — SIGNIFICANT CHANGE UP (ref 3.8–10.5)
WBC # FLD AUTO: 8.95 K/UL — SIGNIFICANT CHANGE UP (ref 3.8–10.5)
WBC UR QL: < 5 /HPF — SIGNIFICANT CHANGE UP

## 2021-04-28 PROCEDURE — 71045 X-RAY EXAM CHEST 1 VIEW: CPT | Mod: 26,59

## 2021-04-28 PROCEDURE — 99291 CRITICAL CARE FIRST HOUR: CPT | Mod: CS,25

## 2021-04-28 PROCEDURE — 99221 1ST HOSP IP/OBS SF/LOW 40: CPT

## 2021-04-28 PROCEDURE — 93010 ELECTROCARDIOGRAM REPORT: CPT | Mod: 59

## 2021-04-28 PROCEDURE — 71045 X-RAY EXAM CHEST 1 VIEW: CPT | Mod: 26

## 2021-04-28 RX ORDER — METOPROLOL TARTRATE 50 MG
50 TABLET ORAL DAILY
Refills: 0 | Status: DISCONTINUED | OUTPATIENT
Start: 2021-04-28 | End: 2021-05-05

## 2021-04-28 RX ORDER — WARFARIN SODIUM 2.5 MG/1
7.5 TABLET ORAL ONCE
Refills: 0 | Status: COMPLETED | OUTPATIENT
Start: 2021-04-28 | End: 2021-04-28

## 2021-04-28 RX ORDER — TIOTROPIUM BROMIDE 18 UG/1
1 CAPSULE ORAL; RESPIRATORY (INHALATION) DAILY
Refills: 0 | Status: DISCONTINUED | OUTPATIENT
Start: 2021-04-28 | End: 2021-05-07

## 2021-04-28 RX ORDER — NITROGLYCERIN 6.5 MG
0.4 CAPSULE, EXTENDED RELEASE ORAL ONCE
Refills: 0 | Status: COMPLETED | OUTPATIENT
Start: 2021-04-28 | End: 2021-04-28

## 2021-04-28 RX ORDER — LISINOPRIL 2.5 MG/1
20 TABLET ORAL ONCE
Refills: 0 | Status: DISCONTINUED | OUTPATIENT
Start: 2021-04-28 | End: 2021-04-28

## 2021-04-28 RX ORDER — LISINOPRIL 2.5 MG/1
20 TABLET ORAL DAILY
Refills: 0 | Status: DISCONTINUED | OUTPATIENT
Start: 2021-04-28 | End: 2021-05-01

## 2021-04-28 RX ORDER — MAGNESIUM OXIDE 400 MG ORAL TABLET 241.3 MG
400 TABLET ORAL ONCE
Refills: 0 | Status: COMPLETED | OUTPATIENT
Start: 2021-04-28 | End: 2021-04-28

## 2021-04-28 RX ORDER — ACETAMINOPHEN 500 MG
650 TABLET ORAL ONCE
Refills: 0 | Status: DISCONTINUED | OUTPATIENT
Start: 2021-04-28 | End: 2021-04-28

## 2021-04-28 RX ORDER — FUROSEMIDE 40 MG
40 TABLET ORAL ONCE
Refills: 0 | Status: COMPLETED | OUTPATIENT
Start: 2021-04-28 | End: 2021-04-28

## 2021-04-28 RX ORDER — ASPIRIN/CALCIUM CARB/MAGNESIUM 324 MG
325 TABLET ORAL ONCE
Refills: 0 | Status: COMPLETED | OUTPATIENT
Start: 2021-04-28 | End: 2021-04-28

## 2021-04-28 RX ORDER — ATORVASTATIN CALCIUM 80 MG/1
20 TABLET, FILM COATED ORAL AT BEDTIME
Refills: 0 | Status: DISCONTINUED | OUTPATIENT
Start: 2021-04-28 | End: 2021-05-07

## 2021-04-28 RX ORDER — NITROGLYCERIN 6.5 MG
0.4 CAPSULE, EXTENDED RELEASE ORAL
Refills: 0 | Status: COMPLETED | OUTPATIENT
Start: 2021-04-28 | End: 2021-05-02

## 2021-04-28 RX ORDER — FUROSEMIDE 40 MG
20 TABLET ORAL ONCE
Refills: 0 | Status: COMPLETED | OUTPATIENT
Start: 2021-04-28 | End: 2021-04-28

## 2021-04-28 RX ORDER — HYDROCHLOROTHIAZIDE 25 MG
12.5 TABLET ORAL DAILY
Refills: 0 | Status: DISCONTINUED | OUTPATIENT
Start: 2021-04-28 | End: 2021-04-28

## 2021-04-28 RX ORDER — FUROSEMIDE 40 MG
40 TABLET ORAL EVERY 12 HOURS
Refills: 0 | Status: DISCONTINUED | OUTPATIENT
Start: 2021-04-29 | End: 2021-04-30

## 2021-04-28 RX ORDER — PANTOPRAZOLE SODIUM 20 MG/1
40 TABLET, DELAYED RELEASE ORAL
Refills: 0 | Status: DISCONTINUED | OUTPATIENT
Start: 2021-04-28 | End: 2021-05-05

## 2021-04-28 RX ORDER — MAGNESIUM OXIDE 400 MG ORAL TABLET 241.3 MG
400 TABLET ORAL EVERY 24 HOURS
Refills: 0 | Status: DISCONTINUED | OUTPATIENT
Start: 2021-04-29 | End: 2021-05-07

## 2021-04-28 RX ORDER — METOPROLOL TARTRATE 50 MG
50 TABLET ORAL ONCE
Refills: 0 | Status: DISCONTINUED | OUTPATIENT
Start: 2021-04-28 | End: 2021-04-28

## 2021-04-28 RX ORDER — HYDROCHLOROTHIAZIDE 25 MG
12.5 TABLET ORAL ONCE
Refills: 0 | Status: DISCONTINUED | OUTPATIENT
Start: 2021-04-28 | End: 2021-04-28

## 2021-04-28 RX ORDER — NITROGLYCERIN 6.5 MG
50 CAPSULE, EXTENDED RELEASE ORAL
Qty: 50 | Refills: 0 | Status: DISCONTINUED | OUTPATIENT
Start: 2021-04-28 | End: 2021-04-28

## 2021-04-28 RX ADMIN — Medication 40 MILLIGRAM(S): at 17:48

## 2021-04-28 RX ADMIN — MAGNESIUM OXIDE 400 MG ORAL TABLET 400 MILLIGRAM(S): 241.3 TABLET ORAL at 14:15

## 2021-04-28 RX ADMIN — Medication 0.4 MILLIGRAM(S): at 16:50

## 2021-04-28 RX ADMIN — Medication 0.4 MILLIGRAM(S): at 21:38

## 2021-04-28 RX ADMIN — Medication 15 MICROGRAM(S)/MIN: at 06:51

## 2021-04-28 RX ADMIN — LISINOPRIL 20 MILLIGRAM(S): 2.5 TABLET ORAL at 14:19

## 2021-04-28 RX ADMIN — Medication 40 MILLIGRAM(S): at 06:44

## 2021-04-28 RX ADMIN — Medication 50 MILLIGRAM(S): at 14:19

## 2021-04-28 RX ADMIN — Medication 15 MICROGRAM(S)/MIN: at 07:04

## 2021-04-28 RX ADMIN — ATORVASTATIN CALCIUM 20 MILLIGRAM(S): 80 TABLET, FILM COATED ORAL at 22:45

## 2021-04-28 RX ADMIN — Medication 0.4 MILLIGRAM(S): at 06:40

## 2021-04-28 RX ADMIN — Medication 325 MILLIGRAM(S): at 06:38

## 2021-04-28 RX ADMIN — TIOTROPIUM BROMIDE 1 CAPSULE(S): 18 CAPSULE ORAL; RESPIRATORY (INHALATION) at 22:46

## 2021-04-28 RX ADMIN — Medication 12.5 MILLIGRAM(S): at 16:13

## 2021-04-28 RX ADMIN — Medication 15 MICROGRAM(S)/MIN: at 06:41

## 2021-04-28 RX ADMIN — Medication 20 MILLIGRAM(S): at 06:43

## 2021-04-28 RX ADMIN — WARFARIN SODIUM 7.5 MILLIGRAM(S): 2.5 TABLET ORAL at 17:48

## 2021-04-28 NOTE — H&P ADULT - NSHPLABSRESULTS_GEN_ALL_CORE
CBC Full  -  ( 28 Apr 2021 06:18 )  WBC Count : 8.95 K/uL  RBC Count : 3.51 M/uL  Hemoglobin : 7.6 g/dL  Hematocrit : 26.9 %  Platelet Count - Automated : 290 K/uL  Mean Cell Volume : 76.6 fl  Mean Cell Hemoglobin : 21.7 pg  Mean Cell Hemoglobin Concentration : 28.3 gm/dL  Auto Neutrophil # : 7.60 K/uL  Auto Lymphocyte # : 0.78 K/uL  Auto Monocyte # : 0.45 K/uL  Auto Eosinophil # : 0.04 K/uL  Auto Basophil # : 0.04 K/uL  Auto Neutrophil % : 85.1 %  Auto Lymphocyte % : 8.7 %  Auto Monocyte % : 5.0 %  Auto Eosinophil % : 0.4 %  Auto Basophil % : 0.4 %    04-28    139  |  103  |  14  ----------------------------<  142<H>  4.3   |  23  |  1.02    Ca    9.3      28 Apr 2021 06:18  Phos  3.3     04-28  Mg     1.9     04-28    TPro  7.6  /  Alb  4.2  /  TBili  0.3  /  DBili  x   /  AST  36  /  ALT  36  /  AlkPhos  96  04-28    CARDIAC MARKERS ( 28 Apr 2021 06:18 )  x     / 0.01 ng/mL / 124 U/L / x     / x        COVID-19 PCR: NotDetec (28 Apr 2021 06:18)  COVID-19 PCR: NotDetec (13 Nov 2020 01:39)

## 2021-04-28 NOTE — ED PROVIDER NOTE - OBJECTIVE STATEMENT
80F PMH HTN, HLD, asthma (denies hospitalizations, intubations), MARK (on CPAP, noncompliant), CHF, AS s/p transfemoral TAVR (02/05/2019, Dr. Alejo), colectomy (?cancer) p/w SOB. Pt has 2-3d of worsening SOB. Also intermittent chest pressure (none currently), occasionally radiating to back. Also w/ cough. Also subjective fever.   Denies abd pain, NVD, urinary complaints, focal weakness/numbness, black/bloody stool.   Pt has never tested COVID+. Has not taken COVID vaccine.  meds: lisinopril 20mg qd, pantoprazole 40mg qd, HCTZ 12.5mg qam, spiriva,  metoprolol succinate 50mg qd, atorvastatin 20mg qd, mag-oxide 400mg qd, ativan 0.5mg qd, coumadin 5mg English/German, prefers daughter at bedside to assist w/ translation.   80F PMH HTN, HLD, asthma (denies hospitalizations, intubations), MARK (on CPAP, noncompliant), CHF, AS s/p transfemoral TAVR (02/05/2019, Dr. Alejo), colectomy (?cancer) p/w SOB. Pt has 2-3d of worsening SOB. Also intermittent chest pressure (none currently), occasionally radiating to back. Also w/ cough. Also subjective fever today. Usual SBP ~140s. Adherent to meds.   Denies abd pain, NVD, urinary complaints, focal weakness/numbness, black/bloody stool.   Pt has never tested COVID+. Has not taken COVID vaccine.  meds: lisinopril 20mg qd, pantoprazole 40mg qd, HCTZ 12.5mg qam, spiriva,  metoprolol succinate 50mg qd, atorvastatin 20mg qd, mag-oxide 400mg qd, ativan 0.5mg qd, coumadin 5mg

## 2021-04-28 NOTE — CONSULT NOTE ADULT - ASSESSMENT
80 year old F, Hebrew/English speaking, PMHx HTN, HLD, AS (s/p TAVR 02/2019, on Coumadin at home), COPD (on 2L home O2 at baseline), GERD, and colon cancer (s/p colectomy) who presented to [facility] ED c/o 3 days of worsening SOB/MANRIQUE and chest pressure. Patient placed on BiPAP, diuresed, and transient nitrogtt in ED with improvement transitioned to 3L NC and satting well. Patient was discontinued from Nitro gtt. Admitted to cardiology/telemetry for HF management. D, Dr. Alejo consulted for hx TAVR and evaluation.     Plan:  Problem 1: s/p TAVR  -      Problem 2:      Problem 3:      Problem 4:    I have reviewed clinical labs tests and reports, radiology tests and reports, as well as old patient medical records, and discussed with the refering physician.     80 year old F, Ukrainian/English speaking, PMHx HTN, HLD, AS (s/p TAVR 02/2019, on Coumadin at home), COPD (on 2L home O2 at baseline), GERD, and colon cancer (s/p colectomy) who presented to [facility] ED c/o 3 days of worsening SOB/MANRIQUE and chest pressure. Patient placed on BiPAP, diuresed, and transient nitrogtt in ED with improvement transitioned to 3L NC and satting well. Patient was discontinued from Nitro gtt. Admitted to cardiology/telemetry for HF management. D, Dr. Alejo consulted for hx TAVR and evaluation.     Plan:  Problem 1: s/p TAVR  -Discussed with Dr. Alejo  -Transfer patient to Dr. 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 Alejo for now.   -Continue diuresis, hold A/C for now  -Echo in AM      Problem 2: Anemia  -FOBT  -GI w/u  -hemodynamically stable, no signs of GIB      Problem 3: HTN  -Metoprolol 50mg QD, Lisinopril 20mg QD daily- consider stopping lisinopril for more room for diuresis  -D/C HCTZ      Problem 4: HLD  -Statin    I have reviewed clinical labs tests and reports, radiology tests and reports, as well as old patient medical records, and discussed with the refering physician.

## 2021-04-28 NOTE — H&P ADULT - PROBLEM SELECTOR PLAN 2
initially, improved to 140's after Lasix   - initiated on Nitro gtt by CCU fellow in ED, now off   - ordered for additional Lasix 40 mg IV once at 6 PM   - home meds: Toprol XL 50 mg daily, HCTZ 12.5 mg daily, Lisinopril 20 mg daily  - continue home medications   - montior BP  - continue telemetry monitoring

## 2021-04-28 NOTE — H&P ADULT - PROBLEM SELECTOR PLAN 3
S/p TAVR in 2019, f/u Dr. Alejo   - patient on Coumadin 7.5 mg daily at home for "spots of blood on the valve" as per patient's daughter   - per patient's daughter, patient has been seeing Dr. Alejo's team as an outpatient for possible need for further intervention   - continue Coumadin 7.5 mg  - check daily INR   - CTS following, f/u recs

## 2021-04-28 NOTE — H&P ADULT - PROBLEM SELECTOR PLAN 4
Lipid panel ordered for AM labs   - home meds: Atorvastatin 20 mg daily   - continue home medications

## 2021-04-28 NOTE — H&P ADULT - HISTORY OF PRESENT ILLNESS
79 y/o French/English speaking female w/ PMHx HTN, HLD, AS (s/p TAVR 02/2019, f/u Dr. Alejo, on Coumadin at home), COPD (on 2L home O2 at baseline), GERD, and colon cancer (s/p colectomy) who presented to [facility] ED c/o 3 days of worsening SOB/MANRIQUE and chest pressure. Patient described chest pressure as "foot stomping on chest", intermittent, w/ occasional radiation to the back but denies any other radiation of sensation. Patient also stated her SOB/MANRIQUE has worsened to the point where she now gets short of breath with just walking around the house and states she was not able to even speak this morning due to shortness of breath. Patient denied any syncope, dizziness, abdominal pain, nausea/vomiting, melena, BRBPR, LE edema, fever/chills/cough, recent travel, or known sick contacts. Patient reports that she is compliant w/ her home medications and her normal SBP ranges 140's at home.     In ED, VS initially showed temp 99 F,  bpm, /110 mmHg, 26 RR, SPO2 94% on room air. EKG showed NSR at 96 bpm w/o acute ischemic changes. Labs significant for H/H 7.6/26.9 (Hgb baseline ~7's), trop negative x 1, D-dimer negative , , and COVID PCR negative. UA showed trace leukocytes, present bacteria, negative nitrites, and patient denied any urinary symptoms. CXR showed b/l opacities/pleural effusions, left upper lobe nodular opacity, stable cardiomegaly, thoracic aortic calcification, and dextro scoliosis. Patient was initially placed on BiPAP and given Nitro SL 0.4 mg x 1, Lasix 20 mg IV x 1, Lasix 40 mg IV x 1, and initiated on Nitro gtt per CCU fellow and was being evaluated for CCU admission. Patient voided ~1.7 L in ED, SOB improved and patient denied any current chest pain symptoms, and patient was transitioned to 3L NC and satting well. Patient was discontinued from Nitro gtt. Patient is now admitted to cardiology/telemetry for further management of suspected pulmonary edema vs. fluid overload.

## 2021-04-28 NOTE — ED PROVIDER NOTE - CLINICAL SUMMARY MEDICAL DECISION MAKING FREE TEXT BOX
80F PMH HTN, HLD, asthma (denies hospitalizations, intubations), MARK (on CPAP, noncompliant), CHF, AS s/p transfemoral TAVR (02/05/2019, Dr. Alejo), colectomy (?cancer) p/w SOB. Pt has 2-3d of worsening SOB. Also intermittent chest pressure (none currently), occasionally radiating to back. Also w/ cough. Also subjective fever today. Usual SBP ~140s. Adherent to meds.   Hypertensive, mild tachycardia. Tachypnea, much worse w/ miniaml exertion, afebrile, satting well on RA. Exam as above.   Given SLN w/ minimal improvement in BP and tachypnea.  Will start on BIPAP, nitro gtt. lasix. am meds.   Labs.  ddx: Likely hypertensive emergency/pulmonary edema, possible infectious aspect.   Cards consulted.

## 2021-04-28 NOTE — H&P ADULT - NSICDXPASTMEDICALHX_GEN_ALL_CORE_FT
PAST MEDICAL HISTORY:  Aortic stenosis     COPD (chronic obstructive pulmonary disease)     GERD (gastroesophageal reflux disease)     HTN (hypertension)     Hyperlipidemia     Pulmonary HTN

## 2021-04-28 NOTE — H&P ADULT - PROBLEM SELECTOR PLAN 7
F/u Dr. More as outpatient   - home meds: Tiotropium 2.5 mcg inhalation 2 puffs daily   - ordered for Spiriva daily       VTE PPX: Coumadin   Dispo: pending CTS recs/volume status     Case d/w Dr. Ruiz

## 2021-04-28 NOTE — ED PROVIDER NOTE - PROGRESS NOTE DETAILS
Klepfish: holding po meds (starting bipap). Klepfish: pt feeling and looking much better on bipap. cxr w/ likely pulm edema. BP imroving. hgb 7.6 at baseline. Rest of labs pending. Pending reassessment/cards. Received SO from Dr. Bailey and MEET Booker, currently awaiting cards dispo. CCU vs cards tele for admission. Pt currently well appearing in NAD on bipap.

## 2021-04-28 NOTE — H&P ADULT - PROBLEM SELECTOR PLAN 1
Presented w/ 3 days of worsening MANRIQUE and intermittent chest pressure   - reports MANRIQUE w/ walking around house, worsened to the point where she could not speak this morning   - recent Echo (03/2021) showed moderate concentric LVH, EF 71%, normal RV size and function, TAVR in aortic position, mild pulm HTN (PASP 44 mmHg), pericardial fat pad anterior to RV, aortic root borderline dilated   - , trop negative x 1, D-dimer negative   - CXR showed b/l opacities/pleural effusions, left upper lobe nodular opacity, stable cardiomegaly   - COVID PCR negative, UA trace leuks and present bacteria (denies symptoms)  - initial VS significant for /110 mmHg and 26 RR  - given Lasix 60 mg IV (total) in ED, started on Nitro gtt initially, and placed on BiPAP Presented w/ 3 days of worsening MANRIQUE and intermittent chest pressure   - reports MANRIQUE w/ walking around house, worsened to the point where she could not speak this morning   - recent Echo (03/2021) showed moderate concentric LVH, EF 71%, normal RV size and function, TAVR in aortic position, mild pulm HTN (PASP 44 mmHg), pericardial fat pad anterior to RV, aortic root borderline dilated   - , trop negative x 1, D-dimer negative   - CXR showed b/l opacities/pleural effusions, left upper lobe nodular opacity, stable cardiomegaly   - COVID PCR negative, UA trace leuks and present bacteria (denies symptoms)  - initial VS significant for /110 mmHg and 26 RR  - given Lasix 60 mg IV (total) in ED, started on Nitro gtt initially, and placed on BiPAP  - patient negative ~1.7 L in ED, BP and RR improved, Nitro gtt off, weaned to 3L NC and satting 100%  - ordered for additional Lasix 40 mg IV x 1 at 6 PM   - assess volume status/need for further diuretics in morning   - no repeat echo needed   - strict I's and O's, daily weight, core measures, fluid restriction Presented w/ 3 days of worsening MANRIQUE and intermittent chest pressure   - reports MANRIQUE w/ walking around house, worsened to the point where she could not speak this morning   - cardiac cath from 01/2019 w/ non-obstructive CAD (prior to TAVR as part of workup)  - recent Echo (03/2021) showed moderate concentric LVH, EF 71%, normal RV size and function, TAVR in aortic position, mild pulm HTN (PASP 44 mmHg), pericardial fat pad anterior to RV, aortic root borderline dilated   - , trop negative x 1, D-dimer negative   - CXR showed b/l opacities/pleural effusions, left upper lobe nodular opacity, stable cardiomegaly   - COVID PCR negative, UA trace leuks and present bacteria (denies symptoms)  - initial VS significant for /110 mmHg and 26 RR  - given Lasix 60 mg IV (total) in ED, started on Nitro gtt initially, and placed on BiPAP  - patient negative ~1.7 L in ED, BP and RR improved, Nitro gtt off, weaned to 3L NC and satting 100%  - ordered for additional Lasix 40 mg IV x 1 at 6 PM   - assess volume status/need for further diuretics in morning   - no repeat echo needed   - strict I's and O's, daily weight, core measures, fluid restriction

## 2021-04-28 NOTE — ED ADULT NURSE NOTE - OBJECTIVE STATEMENT
Per pt's daughter, pt c/o elevated bp and SOB for the last 3 days. EKG done and read by Dr. Bailey.  Placed on continuous cardiac monitoring and pulse oximetry. All procedures explained to daughter since pt is Setswana speaking and daughter is translating.

## 2021-04-28 NOTE — H&P ADULT - ASSESSMENT
81 y/o Tongan/English speaking female w/ PMHx HTN, HLD, AS (s/p TAVR 02/2019, f/u Dr. Alejo, on Coumadin at home), COPD (on 2L home O2 at baseline), GERD, and colon cancer (s/p colectomy) who presented w/ worsening SOB/MANRIQUE, currently unable to walk around house w/o experiencing SOB, and associated intermittent chest pressure. Patient denied any additional symptoms and reported compliance w/ home medications. In ED, VS initially showed temp 99 F,  bpm, /110 mmHg, 26 RR, SPO2 94% on room air. EKG showed NSR at 96 bpm w/o acute ischemic changes. Labs significant for H/H 7.6/26.9 (Hgb baseline ~7's), trop negative x 1, D-dimer negative , , and COVID PCR negative. UA showed trace leukocytes, present bacteria, negative nitrites, and patient denied any urinary symptoms. CXR showed b/l opacities/pleural effusions, left upper lobe nodular opacity, stable cardiomegaly, thoracic aortic calcification, and dextro scoliosis. Patient was initially placed on BiPAP and given Nitro SL 0.4 mg x 1, Lasix 20 mg IV x 1, Lasix 40 mg IV x 1, and initiated on Nitro gtt per CCU fellow and was being evaluated for CCU admission. Patient voided ~1.7 L in ED, SOB improved and patient denied any current chest pain symptoms, and patient was transitioned to 3L NC and satting well. Patient was discontinued from Nitro gtt. Patient is now admitted to cardiology/telemetry for further management of suspected pulmonary edema vs. fluid overload.

## 2021-04-29 LAB
A1C WITH ESTIMATED AVERAGE GLUCOSE RESULT: 5.2 % — SIGNIFICANT CHANGE UP (ref 4–5.6)
ANION GAP SERPL CALC-SCNC: 13 MMOL/L — SIGNIFICANT CHANGE UP (ref 5–17)
APTT BLD: 33.1 SEC — SIGNIFICANT CHANGE UP (ref 27.5–35.5)
BASOPHILS # BLD AUTO: 0.04 K/UL — SIGNIFICANT CHANGE UP (ref 0–0.2)
BASOPHILS NFR BLD AUTO: 0.6 % — SIGNIFICANT CHANGE UP (ref 0–2)
BLD GP AB SCN SERPL QL: NEGATIVE — SIGNIFICANT CHANGE UP
BUN SERPL-MCNC: 15 MG/DL — SIGNIFICANT CHANGE UP (ref 7–23)
CALCIUM SERPL-MCNC: 9.4 MG/DL — SIGNIFICANT CHANGE UP (ref 8.4–10.5)
CHLORIDE SERPL-SCNC: 98 MMOL/L — SIGNIFICANT CHANGE UP (ref 96–108)
CHOLEST SERPL-MCNC: 116 MG/DL — SIGNIFICANT CHANGE UP
CO2 SERPL-SCNC: 28 MMOL/L — SIGNIFICANT CHANGE UP (ref 22–31)
COVID-19 SPIKE DOMAIN AB INTERP: POSITIVE
COVID-19 SPIKE DOMAIN ANTIBODY RESULT: >250 U/ML — HIGH
CREAT SERPL-MCNC: 1.09 MG/DL — SIGNIFICANT CHANGE UP (ref 0.5–1.3)
CULTURE RESULTS: SIGNIFICANT CHANGE UP
EOSINOPHIL # BLD AUTO: 0.11 K/UL — SIGNIFICANT CHANGE UP (ref 0–0.5)
EOSINOPHIL NFR BLD AUTO: 1.6 % — SIGNIFICANT CHANGE UP (ref 0–6)
ESTIMATED AVERAGE GLUCOSE: 103 MG/DL — SIGNIFICANT CHANGE UP (ref 68–114)
FERRITIN SERPL-MCNC: 14 NG/ML — LOW (ref 15–150)
GLUCOSE SERPL-MCNC: 104 MG/DL — HIGH (ref 70–99)
HCT VFR BLD CALC: 28.4 % — LOW (ref 34.5–45)
HDLC SERPL-MCNC: 47 MG/DL — LOW
HGB BLD-MCNC: 7.9 G/DL — LOW (ref 11.5–15.5)
IMM GRANULOCYTES NFR BLD AUTO: 0.4 % — SIGNIFICANT CHANGE UP (ref 0–1.5)
INR BLD: 1.77 — HIGH (ref 0.88–1.16)
IRON SATN MFR SERPL: 29 UG/DL — LOW (ref 30–160)
IRON SATN MFR SERPL: 7 % — LOW (ref 14–50)
LIPID PNL WITH DIRECT LDL SERPL: 48 MG/DL — SIGNIFICANT CHANGE UP
LYMPHOCYTES # BLD AUTO: 1.84 K/UL — SIGNIFICANT CHANGE UP (ref 1–3.3)
LYMPHOCYTES # BLD AUTO: 27 % — SIGNIFICANT CHANGE UP (ref 13–44)
MAGNESIUM SERPL-MCNC: 2 MG/DL — SIGNIFICANT CHANGE UP (ref 1.6–2.6)
MCHC RBC-ENTMCNC: 21.2 PG — LOW (ref 27–34)
MCHC RBC-ENTMCNC: 27.8 GM/DL — LOW (ref 32–36)
MCV RBC AUTO: 76.3 FL — LOW (ref 80–100)
MONOCYTES # BLD AUTO: 0.43 K/UL — SIGNIFICANT CHANGE UP (ref 0–0.9)
MONOCYTES NFR BLD AUTO: 6.3 % — SIGNIFICANT CHANGE UP (ref 2–14)
NEUTROPHILS # BLD AUTO: 4.37 K/UL — SIGNIFICANT CHANGE UP (ref 1.8–7.4)
NEUTROPHILS NFR BLD AUTO: 64.1 % — SIGNIFICANT CHANGE UP (ref 43–77)
NON HDL CHOLESTEROL: 69 MG/DL — SIGNIFICANT CHANGE UP
NRBC # BLD: 0 /100 WBCS — SIGNIFICANT CHANGE UP (ref 0–0)
PLATELET # BLD AUTO: 293 K/UL — SIGNIFICANT CHANGE UP (ref 150–400)
POTASSIUM SERPL-MCNC: 3.9 MMOL/L — SIGNIFICANT CHANGE UP (ref 3.5–5.3)
POTASSIUM SERPL-SCNC: 3.9 MMOL/L — SIGNIFICANT CHANGE UP (ref 3.5–5.3)
PROTHROM AB SERPL-ACNC: 20.7 SEC — HIGH (ref 10.6–13.6)
RBC # BLD: 3.72 M/UL — LOW (ref 3.8–5.2)
RBC # FLD: 18.9 % — HIGH (ref 10.3–14.5)
RETICS #: 85.5 K/UL — SIGNIFICANT CHANGE UP (ref 25–125)
RETICS/RBC NFR: 2.3 % — SIGNIFICANT CHANGE UP (ref 0.5–2.5)
RH IG SCN BLD-IMP: POSITIVE — SIGNIFICANT CHANGE UP
SARS-COV-2 IGG+IGM SERPL QL IA: >250 U/ML — HIGH
SARS-COV-2 IGG+IGM SERPL QL IA: POSITIVE
SODIUM SERPL-SCNC: 139 MMOL/L — SIGNIFICANT CHANGE UP (ref 135–145)
SPECIMEN SOURCE: SIGNIFICANT CHANGE UP
TIBC SERPL-MCNC: 410 UG/DL — SIGNIFICANT CHANGE UP (ref 220–430)
TRANSFERRIN SERPL-MCNC: 325 MG/DL — SIGNIFICANT CHANGE UP (ref 200–360)
TRIGL SERPL-MCNC: 107 MG/DL — SIGNIFICANT CHANGE UP
UIBC SERPL-MCNC: 381 UG/DL — HIGH (ref 110–370)
WBC # BLD: 6.82 K/UL — SIGNIFICANT CHANGE UP (ref 3.8–10.5)
WBC # FLD AUTO: 6.82 K/UL — SIGNIFICANT CHANGE UP (ref 3.8–10.5)

## 2021-04-29 PROCEDURE — 93306 TTE W/DOPPLER COMPLETE: CPT | Mod: 26

## 2021-04-29 PROCEDURE — 99233 SBSQ HOSP IP/OBS HIGH 50: CPT

## 2021-04-29 PROCEDURE — 99222 1ST HOSP IP/OBS MODERATE 55: CPT

## 2021-04-29 RX ORDER — PREGABALIN 225 MG/1
1000 CAPSULE ORAL ONCE
Refills: 0 | Status: COMPLETED | OUTPATIENT
Start: 2021-04-29 | End: 2021-04-29

## 2021-04-29 RX ORDER — FOLIC ACID 0.8 MG
1 TABLET ORAL DAILY
Refills: 0 | Status: DISCONTINUED | OUTPATIENT
Start: 2021-04-29 | End: 2021-05-07

## 2021-04-29 RX ORDER — IRON SUCROSE 20 MG/ML
200 INJECTION, SOLUTION INTRAVENOUS ONCE
Refills: 0 | Status: COMPLETED | OUTPATIENT
Start: 2021-04-29 | End: 2021-04-29

## 2021-04-29 RX ADMIN — MAGNESIUM OXIDE 400 MG ORAL TABLET 400 MILLIGRAM(S): 241.3 TABLET ORAL at 09:02

## 2021-04-29 RX ADMIN — PREGABALIN 1000 MICROGRAM(S): 225 CAPSULE ORAL at 19:04

## 2021-04-29 RX ADMIN — IRON SUCROSE 110 MILLIGRAM(S): 20 INJECTION, SOLUTION INTRAVENOUS at 19:04

## 2021-04-29 RX ADMIN — TIOTROPIUM BROMIDE 1 CAPSULE(S): 18 CAPSULE ORAL; RESPIRATORY (INHALATION) at 09:02

## 2021-04-29 RX ADMIN — Medication 1 TABLET(S): at 19:03

## 2021-04-29 RX ADMIN — PANTOPRAZOLE SODIUM 40 MILLIGRAM(S): 20 TABLET, DELAYED RELEASE ORAL at 07:05

## 2021-04-29 RX ADMIN — Medication 40 MILLIGRAM(S): at 16:02

## 2021-04-29 RX ADMIN — Medication 50 MILLIGRAM(S): at 07:05

## 2021-04-29 RX ADMIN — ATORVASTATIN CALCIUM 20 MILLIGRAM(S): 80 TABLET, FILM COATED ORAL at 21:53

## 2021-04-29 RX ADMIN — Medication 40 MILLIGRAM(S): at 07:05

## 2021-04-29 RX ADMIN — Medication 1 MILLIGRAM(S): at 19:04

## 2021-04-29 RX ADMIN — LISINOPRIL 20 MILLIGRAM(S): 2.5 TABLET ORAL at 07:05

## 2021-04-29 NOTE — PROGRESS NOTE ADULT - ASSESSMENT
80 year old F, Lao/English speaking, PMHx HTN, HLD, AS (s/p TAVR 02/2019, on Coumadin at home), COPD (on 2L home O2 at baseline), GERD, and colon cancer (s/p colectomy) who presented to [facility] ED c/o 3 days of worsening SOB/MANRIQUE and chest pressure. Patient placed on BiPAP, diuresed, and transient nitrogtt in ED with improvement transitioned to 3L NC and satting well. Patient was discontinued from Nitro gtt. Admitted to cardiology/telemetry for HF management. SHD, Dr. Alejo consulted for hx TAVR and evaluation.     ==== Neurovascular ====  -No delirium, pain well managed on current regimen  -C/w PRNs for Pain control  -Monitor neuro status    ==== Respiratory ====  -Saturates well on RA     -AM CXR stable, repeat in AM  -Encourage IS 10x/hour while awake, Cough and deep breathing exercises  -Monitor respiratory status via SpO2    ==== Cardiovascular ====  Monitor on Tele  Continue aspirin 81mg QD  Continue Statin  Continue Toprol XL 50mg QD  Continue lisinopril 20mg QD  Continue Lasix 40mg Q12hrs IV    ==== GI ====   -Tolerating PO  -Prophylaxis: Protonix  -C/w bowel regimen  -GI consult for anemia, appreciate recs    ==== /Renal ====  -BUN/Cr: 15/1.04  -Trend Cr on AM labs  -Replete electrolytes as needed    ==== ID ====   Afebrile, asymptomatic  -WBC: 6.82  -Continue to monitor for SIRS/Sepsis syndrome while inpatient    ==== Endocrine ====   -A1C: pending, no h/o DM  -TSH: pending, no h/o thyroid disease     ==== Hematologic ====   -H/H: 7.9/28.4  -CBC, chem in AM  -DVT ppx: SCDs    ==== Disposition Planning ====   Telemetry

## 2021-04-29 NOTE — CONSULT NOTE ADULT - ATTENDING COMMENTS
used .  Apparenlty only one lab since her cancer surgery.   We don't have access to this.  Story seems to fit more with a lack of iron repletion rather than a "new" anemia at this point.

## 2021-04-29 NOTE — PROGRESS NOTE ADULT - SUBJECTIVE AND OBJECTIVE BOX
Patient discussed on morning rounds with Dr. Alejo    Operation / Date: Preop Heart failure exacrebation    SUBJECTIVE ASSESSMENT:  80y Female seen and examined at bedside.  Patient with no complaints.  Denies any GI bleeding.          Vital Signs Last 24 Hrs  T(C): 36.1 (2021 10:36), Max: 36.9 (2021 18:10)  T(F): 96.9 (2021 10:36), Max: 98.5 (2021 18:10)  HR: 76 (2021 11:58) (65 - 78)  BP: 128/63 (2021 11:58) (107/55 - 145/62)  BP(mean): 89 (2021 11:58) (79 - 100)  RR: 19 (2021 11:58) (16 - 20)  SpO2: 98% (2021 11:58) (94% - 100%)  I&O's Detail    2021 07:01  -  2021 07:00  --------------------------------------------------------  IN:    Oral Fluid: 180 mL  Total IN: 180 mL    OUT:    Voided (mL): 3000 mL  Total OUT: 3000 mL    Total NET: -2820 mL      2021 07:01  -  2021 15:29  --------------------------------------------------------  IN:  Total IN: 0 mL    OUT:    Oral Fluid: 0 mL  Total OUT: 0 mL    Total NET: 0 mL      PHYSICAL EXAM:    GEN: NAD, looks comfortable  Psych: Mood appropriate  Neuro: A&Ox3.  No focal deficits.  Moving all extremities.   HEENT: No obvious abnormalities  CV: S1S2, regular, + systolic murmur appreciated.  No carotid bruits.  No JVD  Lungs: +bibasalar crackles  ABD: Soft, non-tender, non-distended.  +Bowel sounds  EXT: Warm and well perfused.  1= peripheral edema noted  Musculoskeletal: Moving all extremities with normal ROM, no joint swelling  PV: Pedal pulses palpable     LABS:                        7.9    6.82  )-----------( 293      ( 2021 06:35 )             28.4       COUMADIN:  Yes/No. REASON: .    PT/INR - ( 2021 06:35 )   PT: 20.7 sec;   INR: 1.77          PTT - ( 2021 06:35 )  PTT:33.1 sec        139  |  98  |  15  ----------------------------<  104<H>  3.9   |  28  |  1.09    Ca    9.4      2021 06:35  Phos  3.3       Mg     2.0         TPro  7.6  /  Alb  4.2  /  TBili  0.3  /  DBili  x   /  AST  36  /  ALT  36  /  AlkPhos  96        Urinalysis Basic - ( 2021 08:07 )    Color: Yellow / Appearance: Clear / S.015 / pH: x  Gluc: x / Ketone: NEGATIVE  / Bili: Negative / Urobili: 0.2 E.U./dL   Blood: x / Protein: NEGATIVE mg/dL / Nitrite: NEGATIVE   Leuk Esterase: Trace / RBC: < 5 /HPF / WBC < 5 /HPF   Sq Epi: x / Non Sq Epi: 0-5 /HPF / Bacteria: Present /HPF        MEDICATIONS  (STANDING):  atorvastatin 20 milliGRAM(s) Oral at bedtime  furosemide   Injectable 40 milliGRAM(s) IV Push every 12 hours  lisinopril 20 milliGRAM(s) Oral daily  magnesium oxide 400 milliGRAM(s) Oral every 24 hours  metoprolol succinate ER 50 milliGRAM(s) Oral daily  pantoprazole    Tablet 40 milliGRAM(s) Oral before breakfast  tiotropium 18 MICROgram(s) Capsule 1 Capsule(s) Inhalation daily    MEDICATIONS  (PRN):  LORazepam     Tablet 0.5 milliGRAM(s) Oral three times a day PRN Anxiety  nitroglycerin     SubLingual 0.4 milliGRAM(s) SubLingual every 5 minutes PRN Chest Pain        RADIOLOGY & ADDITIONAL TESTS:

## 2021-04-29 NOTE — CONSULT NOTE ADULT - ASSESSMENT
80F w/ PMHx of HTN, HLD, AS s/p TAVR on coumadin 19, COPD, chronic resp failure, colon cancer s/p subtotal colectomy 12/20 p/w 3 days of worsening SOB and chest pressure. GI consulted for iron deficiency anemia.    #FEROZ  Patient with recent hospitalization found to have colon cancer with subsequent subtotal colectomy 12/20.  At discharge, patient with hgb in the 7s.  Patient now returns with SOB/MANRIQUE, and chest pressure with continue anemia.  Ferritin <20 but patient denies melena or hematochezia.  Rectal exam was unremarkable but no stool in rectal vault.  Finding is likely chronic iron deficiency anemia, but would verify no recent lab with higher level of hemoglobin to suggest other etiology of anemia  -Trend CBC  -Continue iron repletion  -Monitor BM  -Obtain outpatient lab prior to admission  -Will need outpatient followup for surveillance colonoscopy    Recommendations d/w primary team  Case d/w svc attg

## 2021-04-30 LAB
ANION GAP SERPL CALC-SCNC: 14 MMOL/L — SIGNIFICANT CHANGE UP (ref 5–17)
APTT BLD: 32.4 SEC — SIGNIFICANT CHANGE UP (ref 27.5–35.5)
BUN SERPL-MCNC: 18 MG/DL — SIGNIFICANT CHANGE UP (ref 7–23)
CALCIUM SERPL-MCNC: 9 MG/DL — SIGNIFICANT CHANGE UP (ref 8.4–10.5)
CHLORIDE SERPL-SCNC: 98 MMOL/L — SIGNIFICANT CHANGE UP (ref 96–108)
CO2 SERPL-SCNC: 27 MMOL/L — SIGNIFICANT CHANGE UP (ref 22–31)
CREAT SERPL-MCNC: 1.17 MG/DL — SIGNIFICANT CHANGE UP (ref 0.5–1.3)
GLUCOSE SERPL-MCNC: 123 MG/DL — HIGH (ref 70–99)
HAPTOGLOB SERPL-MCNC: 109 MG/DL — SIGNIFICANT CHANGE UP (ref 34–200)
HCT VFR BLD CALC: 26.9 % — LOW (ref 34.5–45)
HGB BLD-MCNC: 7.6 G/DL — LOW (ref 11.5–15.5)
INR BLD: 1.52 — HIGH (ref 0.88–1.16)
MAGNESIUM SERPL-MCNC: 2 MG/DL — SIGNIFICANT CHANGE UP (ref 1.6–2.6)
MCHC RBC-ENTMCNC: 21.4 PG — LOW (ref 27–34)
MCHC RBC-ENTMCNC: 28.3 GM/DL — LOW (ref 32–36)
MCV RBC AUTO: 75.8 FL — LOW (ref 80–100)
NRBC # BLD: 0 /100 WBCS — SIGNIFICANT CHANGE UP (ref 0–0)
OB PNL STL: POSITIVE
PLATELET # BLD AUTO: 259 K/UL — SIGNIFICANT CHANGE UP (ref 150–400)
POTASSIUM SERPL-MCNC: 4.2 MMOL/L — SIGNIFICANT CHANGE UP (ref 3.5–5.3)
POTASSIUM SERPL-SCNC: 4.2 MMOL/L — SIGNIFICANT CHANGE UP (ref 3.5–5.3)
PROTHROM AB SERPL-ACNC: 17.9 SEC — HIGH (ref 10.6–13.6)
RBC # BLD: 3.55 M/UL — LOW (ref 3.8–5.2)
RBC # FLD: 18.6 % — HIGH (ref 10.3–14.5)
RETICS #: 84.5 K/UL — SIGNIFICANT CHANGE UP (ref 25–125)
RETICS/RBC NFR: 2.4 % — SIGNIFICANT CHANGE UP (ref 0.5–2.5)
SODIUM SERPL-SCNC: 139 MMOL/L — SIGNIFICANT CHANGE UP (ref 135–145)
TSH SERPL-MCNC: 1.23 UIU/ML — SIGNIFICANT CHANGE UP (ref 0.27–4.2)
WBC # BLD: 6.73 K/UL — SIGNIFICANT CHANGE UP (ref 3.8–10.5)
WBC # FLD AUTO: 6.73 K/UL — SIGNIFICANT CHANGE UP (ref 3.8–10.5)

## 2021-04-30 PROCEDURE — 99233 SBSQ HOSP IP/OBS HIGH 50: CPT

## 2021-04-30 PROCEDURE — 99231 SBSQ HOSP IP/OBS SF/LOW 25: CPT

## 2021-04-30 PROCEDURE — 71045 X-RAY EXAM CHEST 1 VIEW: CPT | Mod: 26

## 2021-04-30 RX ORDER — BUMETANIDE 0.25 MG/ML
1 INJECTION INTRAMUSCULAR; INTRAVENOUS EVERY 12 HOURS
Refills: 0 | Status: DISCONTINUED | OUTPATIENT
Start: 2021-04-30 | End: 2021-05-01

## 2021-04-30 RX ORDER — ACETAMINOPHEN 500 MG
650 TABLET ORAL EVERY 6 HOURS
Refills: 0 | Status: DISCONTINUED | OUTPATIENT
Start: 2021-04-30 | End: 2021-05-05

## 2021-04-30 RX ORDER — POTASSIUM CHLORIDE 20 MEQ
20 PACKET (EA) ORAL DAILY
Refills: 0 | Status: DISCONTINUED | OUTPATIENT
Start: 2021-04-30 | End: 2021-05-02

## 2021-04-30 RX ORDER — IRON SUCROSE 20 MG/ML
200 INJECTION, SOLUTION INTRAVENOUS
Refills: 0 | Status: COMPLETED | OUTPATIENT
Start: 2021-04-30 | End: 2021-05-04

## 2021-04-30 RX ORDER — IRON SUCROSE 20 MG/ML
200 INJECTION, SOLUTION INTRAVENOUS ONCE
Refills: 0 | Status: DISCONTINUED | OUTPATIENT
Start: 2021-04-30 | End: 2021-04-30

## 2021-04-30 RX ORDER — ALBUMIN HUMAN 25 %
250 VIAL (ML) INTRAVENOUS ONCE
Refills: 0 | Status: COMPLETED | OUTPATIENT
Start: 2021-04-30 | End: 2021-04-30

## 2021-04-30 RX ORDER — NYSTATIN CREAM 100000 [USP'U]/G
1 CREAM TOPICAL
Refills: 0 | Status: DISCONTINUED | OUTPATIENT
Start: 2021-04-30 | End: 2021-05-05

## 2021-04-30 RX ADMIN — LISINOPRIL 20 MILLIGRAM(S): 2.5 TABLET ORAL at 05:06

## 2021-04-30 RX ADMIN — IRON SUCROSE 110 MILLIGRAM(S): 20 INJECTION, SOLUTION INTRAVENOUS at 18:33

## 2021-04-30 RX ADMIN — Medication 650 MILLIGRAM(S): at 15:25

## 2021-04-30 RX ADMIN — BUMETANIDE 1 MILLIGRAM(S): 0.25 INJECTION INTRAMUSCULAR; INTRAVENOUS at 13:46

## 2021-04-30 RX ADMIN — MAGNESIUM OXIDE 400 MG ORAL TABLET 400 MILLIGRAM(S): 241.3 TABLET ORAL at 08:56

## 2021-04-30 RX ADMIN — ATORVASTATIN CALCIUM 20 MILLIGRAM(S): 80 TABLET, FILM COATED ORAL at 21:21

## 2021-04-30 RX ADMIN — Medication 1 TABLET(S): at 08:56

## 2021-04-30 RX ADMIN — Medication 650 MILLIGRAM(S): at 14:25

## 2021-04-30 RX ADMIN — NYSTATIN CREAM 1 APPLICATION(S): 100000 CREAM TOPICAL at 18:34

## 2021-04-30 RX ADMIN — Medication 650 MILLIGRAM(S): at 21:21

## 2021-04-30 RX ADMIN — Medication 0.5 MILLIGRAM(S): at 11:36

## 2021-04-30 RX ADMIN — TIOTROPIUM BROMIDE 1 CAPSULE(S): 18 CAPSULE ORAL; RESPIRATORY (INHALATION) at 08:58

## 2021-04-30 RX ADMIN — PANTOPRAZOLE SODIUM 40 MILLIGRAM(S): 20 TABLET, DELAYED RELEASE ORAL at 05:07

## 2021-04-30 RX ADMIN — Medication 40 MILLIGRAM(S): at 05:05

## 2021-04-30 RX ADMIN — Medication 1 MILLIGRAM(S): at 08:58

## 2021-04-30 RX ADMIN — Medication 50 MILLIGRAM(S): at 05:05

## 2021-04-30 RX ADMIN — Medication 650 MILLIGRAM(S): at 22:00

## 2021-04-30 RX ADMIN — Medication 20 MILLIEQUIVALENT(S): at 08:58

## 2021-04-30 RX ADMIN — Medication 125 MILLILITER(S): at 16:25

## 2021-04-30 NOTE — CONSULT NOTE ADULT - SUBJECTIVE AND OBJECTIVE BOX
HPI:  80F w/ PMHx of HTN, HLD, AS s/p TAVR on coumadin 19, COPD, chronic resp failure, colon cancer s/p subtotal colectomy 12/20 p/w 3 days of worsening SOB and chest pressure.  Patient reports doing well since surgery in December.  However, around 3d prior to admission, she had difficulty breathing with chest pain.  SOB worse with exertion.  Patient was admitted from suspected pulm edema vs fluid overload.  As patient was also found to have chronic anemia, GI was consulted.      Patient denies taking iron at home.  She denies having hematochezia or melena.      Allergies    Digox (Rash; Urticaria; Hives)  Plavix (Other (Mod to Severe))    Intolerances      Home Medications:  atorvastatin 20 mg oral tablet: 1 tab(s) orally once a day (28 Apr 2021 13:24)  Coumadin 7.5 mg oral tablet: 1 tab(s) orally once a day (28 Apr 2021 13:24)  hydroCHLOROthiazide 12.5 mg oral capsule: 1 cap(s) orally once a day (28 Apr 2021 13:24)  lisinopril 20 mg oral tablet: 1 tab(s) orally once a day (28 Apr 2021 13:24)  LORazepam 0.5 mg oral tablet: 1 tab(s) orally 3 times a day (28 Apr 2021 13:24)  magnesium oxide 400 mg (241.3 mg elemental magnesium) oral tablet: 1 tab(s) orally once a day (28 Apr 2021 13:24)  nitroglycerin 0.4 mg sublingual tablet: 1 tab(s) sublingual every 5 minutes, As Needed (28 Apr 2021 13:24)  Protonix 40 mg oral delayed release tablet: 1 tab(s) orally once a day (28 Apr 2021 13:24)  tiotropium 2.5 mcg/inh inhalation aerosol: 2 puff(s) inhaled once a day (28 Apr 2021 13:24)  Toprol-XL 50 mg oral tablet, extended release: 1 tab(s) orally once a day (28 Apr 2021 13:24)    MEDICATIONS:  MEDICATIONS  (STANDING):  atorvastatin 20 milliGRAM(s) Oral at bedtime  folic acid 1 milliGRAM(s) Oral daily  furosemide   Injectable 40 milliGRAM(s) IV Push every 12 hours  lisinopril 20 milliGRAM(s) Oral daily  magnesium oxide 400 milliGRAM(s) Oral every 24 hours  metoprolol succinate ER 50 milliGRAM(s) Oral daily  multivitamin 1 Tablet(s) Oral daily  pantoprazole    Tablet 40 milliGRAM(s) Oral before breakfast  tiotropium 18 MICROgram(s) Capsule 1 Capsule(s) Inhalation daily    MEDICATIONS  (PRN):  LORazepam     Tablet 0.5 milliGRAM(s) Oral three times a day PRN Anxiety  nitroglycerin     SubLingual 0.4 milliGRAM(s) SubLingual every 5 minutes PRN Chest Pain    PAST MEDICAL & SURGICAL HISTORY:  HTN (hypertension)    Aortic stenosis    Pulmonary HTN    Hyperlipidemia    COPD (chronic obstructive pulmonary disease)    GERD (gastroesophageal reflux disease)    S/P TAVR (transcatheter aortic valve replacement)      FAMILY HISTORY:  No pertinent family history in first degree relatives      SOCIAL HISTORY:  Tobacco: denies  Alcohol:denies  Illicit Drugs:denies    REVIEW OF SYSTEMS:  All other 10 review of systems is negative except as indicated in HPI    Vital Signs Last 24 Hrs  T(C): 36.6 (29 Apr 2021 18:28), Max: 36.9 (28 Apr 2021 22:04)  T(F): 97.9 (29 Apr 2021 18:28), Max: 98.5 (28 Apr 2021 22:04)  HR: 76 (29 Apr 2021 16:02) (70 - 76)  BP: 107/53 (29 Apr 2021 16:02) (107/53 - 145/62)  BP(mean): 76 (29 Apr 2021 16:02) (76 - 100)  RR: 19 (29 Apr 2021 16:02) (16 - 19)  SpO2: 98% (29 Apr 2021 16:02) (94% - 100%)    04-28 @ 07:01  -  04-29 @ 07:00  --------------------------------------------------------  IN: 180 mL / OUT: 3000 mL / NET: -2820 mL    04-29 @ 07:01 - 04-29 @ 19:48  --------------------------------------------------------  IN: 0 mL / OUT: 650 mL / NET: -650 mL        PHYSICAL EXAM:    General: Well developed; well nourished; in no acute distress  Eyes: moist conjunctivae, sclerae anicteric  HENT: Moist mucous membranes, tongue midline  Neck: Trachea midline, supple  Lungs: Normal respiratory effort and no intercostal retractions  Cardiovascular: RRR, S1S2  Abdomen: Soft, non-tender non-distended; Normal bowel sounds; No rebound or guarding  Rectal exam: No stool in rectal vault  Extremities: +edema  Neurological: Alert and oriented x3, nonfocal exam  Skin: Warm and dry. No obvious rash    LABS:                        7.9    6.82  )-----------( 293      ( 29 Apr 2021 06:35 )             28.4     04-29    139  |  98  |  15  ----------------------------<  104<H>  3.9   |  28  |  1.09    Ca    9.4      29 Apr 2021 06:35  Phos  3.3     04-28  Mg     2.0     04-29    TPro  7.6  /  Alb  4.2  /  TBili  0.3  /  DBili  x   /  AST  36  /  ALT  36  /  AlkPhos  96  04-28        PT/INR - ( 29 Apr 2021 06:35 )   PT: 20.7 sec;   INR: 1.77          PTT - ( 29 Apr 2021 06:35 )  PTT:33.1 sec    Culture - Blood (collected 28 Apr 2021 12:10)  Source: .Blood Blood-Peripheral  Preliminary Report (29 Apr 2021 13:00):    No growth at 1 day.    Culture - Blood (collected 28 Apr 2021 12:10)  Source: .Blood Blood-Peripheral  Preliminary Report (29 Apr 2021 13:00):    No growth at 1 day.    Culture - Urine (collected 28 Apr 2021 08:37)  Source: .Urine Clean Catch (Midstream)  Final Report (29 Apr 2021 08:46):    Insignificant amount of mixed growth.      RADIOLOGY & ADDITIONAL STUDIES:   Reviewed
81 y/o Danish/English speaking female w/ PMHx HTN, HLD, AS (s/p TAVR 02/2019, f/u Dr. Alejo, on Coumadin at home), COPD (on 2L home O2 at baseline), GERD, and colon cancer (s/p colectomy) who presented to [facility] ED c/o 3 days of worsening SOB/MANRIQUE and chest pressure. Patient described chest pressure as "foot stomping on chest", intermittent, w/ occasional radiation to the back but denies any other radiation of sensation. Patient also stated her SOB/MANRIQUE has worsened to the point where she now gets short of breath with just walking around the house and states she was not able to even speak this morning due to shortness of breath.    PAST MEDICAL HISTORY:  Aortic stenosis   COPD (chronic obstructive pulmonary disease)   GERD (gastroesophageal reflux disease)   HTN (hypertension)   Hyperlipidemia   Pulmonary HTN.     PAST SURGICAL HISTORY:  S/P TAVR (transcatheter aortic valve replacement).     FAMILY HISTORY:  No pertinent family history in first degree relatives.   No Pertinent Family History in first degree relatives of: CAD/MI.    Patient denied any ETOH use, tobacco use, or illicit drug use.    Home Medications:  atorvastatin 20 mg oral tablet: 1 tab(s) orally once a day (28 Apr 2021 13:24)  Coumadin 7.5 mg oral tablet: 1 tab(s) orally once a day (28 Apr 2021 13:24)  hydroCHLOROthiazide 12.5 mg oral capsule: 1 cap(s) orally once a day (28 Apr 2021 13:24)  lisinopril 20 mg oral tablet: 1 tab(s) orally once a day (28 Apr 2021 13:24)  LORazepam 0.5 mg oral tablet: 1 tab(s) orally 3 times a day (28 Apr 2021 13:24)  magnesium oxide 400 mg (241.3 mg elemental magnesium) oral tablet: 1 tab(s) orally once a day (28 Apr 2021 13:24)  nitroglycerin 0.4 mg sublingual tablet: 1 tab(s) sublingual every 5 minutes, As Needed (28 Apr 2021 13:24)  Protonix 40 mg oral delayed release tablet: 1 tab(s) orally once a day (28 Apr 2021 13:24)  tiotropium 2.5 mcg/inh inhalation aerosol: 2 puff(s) inhaled once a day (28 Apr 2021 13:24)  Toprol-XL 50 mg oral tablet, extended release: 1 tab(s) orally once a day (28 Apr 2021 13:24)    Allergies  Digox (Rash; Urticaria; Hives)  Plavix (Other (Mod to Severe)    Vital Signs Last 24 Hrs  T(C): 36.9 (30 Apr 2021 14:06), Max: 36.9 (30 Apr 2021 14:06)  T(F): 98.4 (30 Apr 2021 14:06), Max: 98.4 (30 Apr 2021 14:06)  HR: 74 (30 Apr 2021 13:42) (66 - 76)  BP: 115/56 (30 Apr 2021 13:42) (96/52 - 134/60)  BP(mean): 81 (30 Apr 2021 13:42) (76 - 87)  RR: 19 (30 Apr 2021 13:42) (16 - 19)  SpO2: 100% (30 Apr 2021 13:42) (96% - 100%)    Labs: reviewed    Imaging: reviewed            
Surgeon: Dr. Alejo    Requesting Physician: Dr. Ruiz    HISTORY OF PRESENT ILLNESS (Need 4):  80 year old F, Indonesian/English speaking, PMHx HTN, HLD, AS (s/p TAVR 2019, on Coumadin at home), COPD (on 2L home O2 at baseline), GERD, and colon cancer (s/p colectomy) who presented to [facility] ED c/o 3 days of worsening SOB/MANRIQUE and chest pressure. Patient described chest pressure as "foot stomping on chest", intermittent, w/ occasional radiation to the back but denies any other radiation of sensation. Patient also stated her SOB/MANRIQUE has worsened to the point where she now gets short of breath with just walking around the house and states she was not able to even speak this morning due to shortness of breath. Patient denied any syncope, dizziness, abdominal pain, nausea/vomiting, melena, BRBPR, LE edema, fever/chills/cough, recent travel, or known sick contacts. Patient reports that she is compliant w/ her home medications and her normal SBP ranges 140's at home.  In ED, VS initially showed temp 99 F,  bpm, /110 mmHg, 26 RR, SPO2 94% on room air. EKG showed NSR at 96 bpm w/o acute ischemic changes. Labs significant for H/H 7.6/26.9 (Hgb baseline ~7's), trop negative x 1, D-dimer negative , , and COVID PCR negative. UA showed trace leukocytes, present bacteria, negative nitrites, and patient denied any urinary symptoms. CXR showed b/l opacities/pleural effusions, left upper lobe nodular opacity, stable cardiomegaly, thoracic aortic calcification, and dextro scoliosis. Patient was initially placed on BiPAP and given Nitro SL 0.4 mg x 1, Lasix 20 mg IV x 1, Lasix 40 mg IV x 1, and initiated on Nitro gtt per CCU fellow and was being evaluated for CCU admission. Patient voided ~1.7 L in ED, SOB improved and patient denied any current chest pain symptoms, and patient was transitioned to 3L NC and satting well. Patient was discontinued from Nitro gtt. Patient is now admitted to cardiology/telemetry for further management of suspected pulmonary edema vs. fluid overload. SHD, Dr. Alejo consulted for hx TAVR and evaluation. At visit, patient daugther at  bedside, patient in NAD, saturating well on 3LNC, speaking in full sentences, HD stable. No change in medical history as noted above. She reports she is compliant with all her medications. Currently denies fever, chest pain, palpitations, SOB, abdominal pain, n/v, dizziness.       PAST MEDICAL & SURGICAL HISTORY:  HTN (hypertension)    Aortic stenosis    Pulmonary HTN    Hyperlipidemia    COPD (chronic obstructive pulmonary disease)    GERD (gastroesophageal reflux disease)    S/P TAVR (transcatheter aortic valve replacement)        MEDICATIONS  (STANDING):  atorvastatin 20 milliGRAM(s) Oral at bedtime  furosemide   Injectable 40 milliGRAM(s) IV Push once  hydrochlorothiazide 12.5 milliGRAM(s) Oral daily  lisinopril 20 milliGRAM(s) Oral daily  magnesium oxide 400 milliGRAM(s) Oral every 24 hours  metoprolol succinate ER 50 milliGRAM(s) Oral daily  nitroglycerin  Infusion 50 MICROgram(s)/Min (15 mL/Hr) IV Continuous <Continuous>  pantoprazole    Tablet 40 milliGRAM(s) Oral before breakfast  tiotropium 18 MICROgram(s) Capsule 1 Capsule(s) Inhalation daily  warfarin 7.5 milliGRAM(s) Oral once    MEDICATIONS  (PRN):  LORazepam     Tablet 0.5 milliGRAM(s) Oral three times a day PRN Anxiety  nitroglycerin     SubLingual 0.4 milliGRAM(s) SubLingual every 5 minutes PRN Chest Pain      Allergies    Digox (Rash; Urticaria; Hives)  Plavix (Other (Mod to Severe))    Intolerances        SOCIAL HISTORY:  Smoker: No  ETOH use:  NO  Ilicit Drug use:NO  Occupation: n/a  Assisted device use (Cane / Walker): none, does report in the last several days breathing has gotten so bad she has not been able to walk.   Live with: Daughter     FAMILY HISTORY:  No pertinent family history in first degree relatives        Review of Systems (Need 10):  CONSTITUTIONAL: Denies fevers / chills, sweats, fatigue, weight loss, weight gain                                       NEURO:  Denies parathesias, seizures, syncope, confusion                                                                                  EYES:  Denies blurry vision, discharge, pain, loss of vision                                                                                    ENMT:  Denies difficulty hearing, vertigo, dysphagia, epistaxis, recent dental work                                       CV:  +MANRIQUE, chest discomfort, Denies palpitations, orthopnea                                                                                           RESPIRATORY:  +SOB, Denies Wheezing, cough / sputum, hemoptysis                                                               GI:  Denies nausea, vomiting, diarrhea, constipation, melena                                                                          : Denies hematuria, dysuria, urgency, incontinence                                                                                          MUSKULOSKELETAL:  Denies arthritis, joint swelling, muscle weakness                                                             SKIN/BREAST:  Denies rash, itching, hair loss, masses                                                                                              PSYCH:  Denies depression, anxiety, suicidal ideation                                                                                                HEME/LYMPH:  Denies bruises easily, enlarged lymph nodes, tender lymph nodes                                          ENDOCRINE:  Denies cold intolerance, heat intolerance, polydipsia                                                                      Vital Signs Last 24 Hrs  T(C): 36.6 (2021 14:28), Max: 37.7 (2021 05:48)  T(F): 97.8 (2021 14:28), Max: 99.8 (2021 05:48)  HR: 79 (2021 14:28) (60 - 107)  BP: 136/66 (2021 14:28) (136/66 - 210/110)  BP(mean): 97 (2021 13:09) (97 - 110)  RR: 20 (2021 14:28) (16 - 26)  SpO2: 100% (2021 14:28) (94% - 100%)    Physical Exam (Need 8)  CONSTITUTIONAL:    Sitting comfortably in bed, nad, obese                                                                         NEURO: AAOx3, no neuro deficits noted, CN grossly intact                      EYES:   WNL  ENMT:  WNL  CV:  S1S2, rrr, +YOVANA  RESPIRATORY:   bibasilar crackles, poor effort  GI:     +Bs, soft, nt/nd  : no cantu, differed   MUSKULOSKELETAL:   WWp, 1 + b/l LE edema, no calf tenderness, palpable peripheral pulses b/l   SKIN / BREAST:           WNL                                                          LABS:                        7.6    8.95  )-----------( 290      ( 2021 06:18 )             26.9         139  |  103  |  14  ----------------------------<  142<H>  4.3   |  23  |  1.02    Ca    9.3      2021 06:18  Phos  3.3     -  Mg     1.9         TPro  7.6  /  Alb  4.2  /  TBili  0.3  /  DBili  x   /  AST  36  /  ALT  36  /  AlkPhos  96      PT/INR - ( 2021 06:18 )   PT: 21.9 sec;   INR: 1.88          PTT - ( 2021 06:18 )  PTT:33.7 sec  Urinalysis Basic - ( 2021 08:07 )    Color: Yellow / Appearance: Clear / S.015 / pH: x  Gluc: x / Ketone: NEGATIVE  / Bili: Negative / Urobili: 0.2 E.U./dL   Blood: x / Protein: NEGATIVE mg/dL / Nitrite: NEGATIVE   Leuk Esterase: Trace / RBC: < 5 /HPF / WBC < 5 /HPF   Sq Epi: x / Non Sq Epi: 0-5 /HPF / Bacteria: Present /HPF      CARDIAC MARKERS ( 2021 13:32 )  x     / 0.01 ng/mL / 129 U/L / x     / 2.0 ng/mL  CARDIAC MARKERS ( 2021 06:18 )  x     / 0.01 ng/mL / 124 U/L / x     / x              RADIOLOGY & ADDITIONAL STUDIES:  CAROTID U/S:    CXR:  xra< from: Xray Chest 1 View-PORTABLE IMMEDIATE (21 @ 06:00) >  impression: Bilateral opacities/pleural effusions. Left upper lobe nodular opacity. Stable cardiomegaly, thoracic aortic calcification. Stable bony structures. Dextro scoliosis.    < end of copied text >      CT Scan:    EKG:      TTE / IRMA:    Cardiac Cath:

## 2021-04-30 NOTE — PROGRESS NOTE ADULT - ASSESSMENT
80 year old F, Japanese/English speaking, PMHx HTN, HLD, AS (s/p TAVR 02/2019, on Coumadin at home), COPD (on 2L home O2 at baseline), GERD, and colon cancer (s/p colectomy) who presented to [facility] ED c/o 3 days of worsening SOB/MANRIQUE and chest pressure. Patient placed on BiPAP, diuresed, and transient nitrogtt in ED with improvement transitioned to 3L NC and satting well. Patient was discontinued from Nitro gtt. Admitted to cardiology/telemetry for HF management. SHD, Dr. Alejo consulted for hx TAVR and evaluation.     ==== Neurovascular ====  -No delirium, pain well managed on current regimen  -C/w PRNs for Pain control  -Monitor neuro status    ==== Respiratory ====  -Saturates well on RA     -AM CXR stable, repeat in AM  -Encourage IS 10x/hour while awake, Cough and deep breathing exercises  -Monitor respiratory status via SpO2    ==== Cardiovascular ====  Monitor on Tele  Continue aspirin 81mg QD  Continue Statin  Continue Toprol XL 50mg QD  Continue lisinopril 20mg QD  Switch to bumex 1mg BID    ==== GI ====   -Tolerating PO  -Prophylaxis: Protonix  -C/w bowel regimen  -GI consult for anemia, appreciate recs    ==== /Renal ====  -BUN/Cr: 18/1.17  -Trend Cr on AM labs  -Replete electrolytes as needed    ==== ID ====   Afebrile, asymptomatic  -WBC: 6.73  -Continue to monitor for SIRS/Sepsis syndrome while inpatient    ==== Endocrine ====   -A1C: 5.2, no h/o DM  -TSH: 1.230, no h/o thyroid disease     ==== Hematologic ====   -H/H: 7.6/26.9  -CBC, chem in AM  -DVT ppx: SCDs    ==== Disposition Planning ====   Telemetry

## 2021-04-30 NOTE — PROGRESS NOTE ADULT - SUBJECTIVE AND OBJECTIVE BOX
Pt seen and examined at bedside.  One BM overnight, uncertain if any melena or hematochezia.  Not witnessed.  Hemoglobin remains stable and now on iron repletion.  She also noted some chest discomfort overnight, feels better at the time of evaluation.     Allergies    Digox (Rash; Urticaria; Hives)  Plavix (Other (Mod to Severe))    Intolerances      MEDICATIONS:  MEDICATIONS  (STANDING):  atorvastatin 20 milliGRAM(s) Oral at bedtime  buMETAnide Injectable 1 milliGRAM(s) IV Push every 12 hours  folic acid 1 milliGRAM(s) Oral daily  iron sucrose IVPB 200 milliGRAM(s) IV Intermittent once  lisinopril 20 milliGRAM(s) Oral daily  magnesium oxide 400 milliGRAM(s) Oral every 24 hours  metoprolol succinate ER 50 milliGRAM(s) Oral daily  multivitamin 1 Tablet(s) Oral daily  pantoprazole    Tablet 40 milliGRAM(s) Oral before breakfast  potassium chloride    Tablet ER 20 milliEquivalent(s) Oral daily  tiotropium 18 MICROgram(s) Capsule 1 Capsule(s) Inhalation daily    MEDICATIONS  (PRN):  LORazepam     Tablet 0.5 milliGRAM(s) Oral three times a day PRN Anxiety  nitroglycerin     SubLingual 0.4 milliGRAM(s) SubLingual every 5 minutes PRN Chest Pain    Vital Signs Last 24 Hrs  T(C): 36.4 (30 Apr 2021 09:05), Max: 36.7 (29 Apr 2021 22:02)  T(F): 97.5 (30 Apr 2021 09:05), Max: 98 (29 Apr 2021 22:02)  HR: 67 (30 Apr 2021 05:23) (67 - 76)  BP: 112/59 (30 Apr 2021 05:23) (96/52 - 130/66)  BP(mean): 82 (30 Apr 2021 05:23) (76 - 89)  RR: 16 (30 Apr 2021 05:23) (16 - 19)  SpO2: 98% (30 Apr 2021 05:23) (98% - 98%)    04-29 @ 07:01  -  04-30 @ 07:00  --------------------------------------------------------  IN: 180 mL / OUT: 650 mL / NET: -470 mL    04-30 @ 07:01  -  04-30 @ 09:38  --------------------------------------------------------  IN: 180 mL / OUT: 0 mL / NET: 180 mL      PHYSICAL EXAM:    General: Well developed; well nourished; in no acute distress  HEENT: MMM, conjunctiva and sclera clear on NC  Gastrointestinal: Soft non-tender non-distended; No rebound or guarding  Skin: Warm and dry. No obvious rash    LABS:                        7.6    6.73  )-----------( 259      ( 30 Apr 2021 06:30 )             26.9     04-30    139  |  98  |  18  ----------------------------<  123<H>  4.2   |  27  |  1.17    Ca    9.0      30 Apr 2021 06:30  Mg     2.0     04-30      PT/INR - ( 30 Apr 2021 06:30 )   PT: 17.9 sec;   INR: 1.52          PTT - ( 30 Apr 2021 06:30 )  PTT:32.4 sec                  Culture - Blood (collected 28 Apr 2021 12:10)  Source: .Blood Blood-Peripheral  Preliminary Report (29 Apr 2021 13:00):    No growth at 1 day.    Culture - Blood (collected 28 Apr 2021 12:10)  Source: .Blood Blood-Peripheral  Preliminary Report (29 Apr 2021 13:00):    No growth at 1 day.    Culture - Urine (collected 28 Apr 2021 08:37)  Source: .Urine Clean Catch (Midstream)  Final Report (29 Apr 2021 08:46):    Insignificant amount of mixed growth.      RADIOLOGY & ADDITIONAL STUDIES: reviewed

## 2021-04-30 NOTE — PROGRESS NOTE ADULT - ASSESSMENT
80F w/ PMHx of HTN, HLD, AS s/p TAVR on coumadin 19, COPD, chronic resp failure, colon cancer s/p subtotal colectomy 12/20 p/w 3 days of worsening SOB and chest pressure. GI consulted for iron deficiency anemia.    #FEROZ  Patient with recent hospitalization found to have colon cancer with subsequent subtotal colectomy 12/20.  At discharge, patient with hgb in the 7s.  Patient now returns with SOB/MANRIQUE, and chest pressure with persistent anemia.  Ferritin <20 but patient denies melena or hematochezia.  Rectal exam was unremarkable but no stool in rectal vault.  Finding is likely chronic iron deficiency anemia, but would verify no recent lab with higher level of hemoglobin to suggest other etiology of anemia  -Trend CBC  -Continue iron repletion  -Monitor BM  -Obtain outpatient lab prior to admission  -Will need outpatient followup for surveillance colonoscopy    Please reach out to GI team if outpatient lab with hgb >8   80F w/ PMHx of HTN, HLD, AS s/p TAVR on coumadin 19, COPD, chronic resp failure, colon cancer s/p subtotal colectomy 12/20 p/w 3 days of worsening SOB and chest pressure. GI consulted for iron deficiency anemia.    #FEROZ  Patient with recent hospitalization found to have colon cancer with subsequent subtotal colectomy 12/20.  At discharge, patient with hgb in the 7s.  Patient now returns with SOB/MANRIQUE, and chest pressure with persistent anemia.  Ferritin <20 but patient denies melena or hematochezia.  Rectal exam was unremarkable but no stool in rectal vault.  Finding is likely chronic iron deficiency anemia, but would verify no recent lab with higher level of hemoglobin to suggest other etiology of anemia  -Trend CBC  -Continue iron repletion  -Monitor BM  -Obtain outpatient lab prior to admission, please notify GI team if hemoglobin higher than current baseline prior to admission  -Will need outpatient followup for surveillance colonoscopy    GI will sign off, please reconsult as needed    Recommendations d/w primary team  Case d/w svc attg   80F w/ PMHx of HTN, HLD, AS s/p TAVR on coumadin 19, COPD, chronic resp failure, colon cancer s/p subtotal colectomy 12/20 p/w 3 days of worsening SOB and chest pressure. GI consulted for iron deficiency anemia.    #FEROZ  Patient with recent hospitalization found to have colon cancer with subsequent subtotal colectomy 12/20.  At discharge, patient with hgb in the 7s.  Patient now returns with SOB/MANRIQUE, and chest pressure with persistent anemia.  Ferritin <20 but patient denies melena or hematochezia.  Rectal exam was unremarkable but no stool in rectal vault.  Finding is likely chronic iron deficiency anemia, but would verify no recent lab with higher level of hemoglobin to suggest other etiology of anemia  -Trend CBC  -Continue iron repletion  -Monitor BM  -Obtain outpatient lab prior to admission, please notify GI team if hemoglobin higher than current baseline prior to admission  -Will need outpatient followup for surveillance colonoscopy    GI will sign off, please reconsult as needed    Recommendations d/w primary team  Case d/w [facility] attg    ADDENDUM:  - called back by primary team for +FOBT  - important to note that FOBT is not a clinical test to assess for active GI hemorrhage; it is a screening tool for CRC  - furthermore, it is well documented that overutilization and inappropriate utilization of the FOBT occurs in the inpatient setting, most recently in a 2020 Shyam et al paper from the [facility]  - further yet, patient has received multiple medications during this admission that would cause a false positive on FOBT  - no changes to above mgmt due to a +FOBT  - please re-consult as needed

## 2021-04-30 NOTE — PROGRESS NOTE ADULT - SUBJECTIVE AND OBJECTIVE BOX
Patient discussed on morning rounds with Dr. Alejo    Operation / Date: management of aortic stenosis     SUBJECTIVE ASSESSMENT:  80y Female         Vital Signs Last 24 Hrs  T(C): 36.4 (30 Apr 2021 09:05), Max: 36.7 (29 Apr 2021 22:02)  T(F): 97.5 (30 Apr 2021 09:05), Max: 98 (29 Apr 2021 22:02)  HR: 72 (30 Apr 2021 08:55) (67 - 76)  BP: 132/60 (30 Apr 2021 08:55) (96/52 - 132/60)  BP(mean): 87 (30 Apr 2021 08:55) (76 - 87)  RR: 19 (30 Apr 2021 08:55) (16 - 19)  SpO2: 96% (30 Apr 2021 08:55) (96% - 98%)  I&O's Detail    29 Apr 2021 07:01  -  30 Apr 2021 07:00  --------------------------------------------------------  IN:    Oral Fluid: 180 mL  Total IN: 180 mL    OUT:    Voided (mL): 650 mL  Total OUT: 650 mL    Total NET: -470 mL      30 Apr 2021 07:01  -  30 Apr 2021 12:28  --------------------------------------------------------  IN:    Oral Fluid: 180 mL  Total IN: 180 mL    OUT:    Voided (mL): 650 mL  Total OUT: 650 mL    Total NET: -470 mL          CHEST TUBE:  Yes/No. AIR LEAKS: Yes/No. Suction / H2O SEAL.   SERVANDO DRAIN:  Yes/No.  EPICARDIAL WIRES: Yes/No.  TIE DOWNS: Yes/No.  VENTURA: Yes/No.    PHYSICAL EXAM:    General:     Neurological:    Cardiovascular:    Respiratory:    Gastrointestinal:    Extremities:    Vascular:    Incision Sites:    LABS:                        7.6    6.73  )-----------( 259      ( 30 Apr 2021 06:30 )             26.9       COUMADIN:  Yes/No. REASON: .    PT/INR - ( 30 Apr 2021 06:30 )   PT: 17.9 sec;   INR: 1.52          PTT - ( 30 Apr 2021 06:30 )  PTT:32.4 sec    04-30    139  |  98  |  18  ----------------------------<  123<H>  4.2   |  27  |  1.17    Ca    9.0      30 Apr 2021 06:30  Mg     2.0     04-30            MEDICATIONS  (STANDING):  atorvastatin 20 milliGRAM(s) Oral at bedtime  buMETAnide Injectable 1 milliGRAM(s) IV Push every 12 hours  folic acid 1 milliGRAM(s) Oral daily  iron sucrose IVPB 200 milliGRAM(s) IV Intermittent once  lisinopril 20 milliGRAM(s) Oral daily  magnesium oxide 400 milliGRAM(s) Oral every 24 hours  metoprolol succinate ER 50 milliGRAM(s) Oral daily  multivitamin 1 Tablet(s) Oral daily  pantoprazole    Tablet 40 milliGRAM(s) Oral before breakfast  potassium chloride    Tablet ER 20 milliEquivalent(s) Oral daily  tiotropium 18 MICROgram(s) Capsule 1 Capsule(s) Inhalation daily    MEDICATIONS  (PRN):  LORazepam     Tablet 0.5 milliGRAM(s) Oral three times a day PRN Anxiety  nitroglycerin     SubLingual 0.4 milliGRAM(s) SubLingual every 5 minutes PRN Chest Pain        RADIOLOGY & ADDITIONAL TESTS:

## 2021-04-30 NOTE — CONSULT NOTE ADULT - ASSESSMENT
79 yo F with hx as above being seen for anemia.     Anemia- Hgb 7.6, microcytic. Ferritin 14 and saturation of 7%.   Clear iron def.   For IV iron- Venofer 200mg every 48 hours. Scheduled for 3 doses for now. Will reassess early next week to see if needs further therapy.   B12 & Folate levels and hemolytic panel added to labs.     Stool Occult Blood testing positive. Hx of Colon ca. On anticoagulation with Warfarin.   Needs a GI eval.   If possible would stop AC or switch to a short acting agent.   Discussed with Cardio-thoracic team.     Will follow with you.  Thank you.    Ben Guzman MD  [phone]

## 2021-04-30 NOTE — PROGRESS NOTE ADULT - SUBJECTIVE AND OBJECTIVE BOX
Patient discussed on morning rounds with Dr. Alejo    Operation / Date: preop TAVR valve failure    SUBJECTIVE ASSESSMENT:  80y Female seen and examined at bedside.   #[id number].          Vital Signs Last 24 Hrs  T(C): 36.4 (30 Apr 2021 09:05), Max: 36.7 (29 Apr 2021 22:02)  T(F): 97.5 (30 Apr 2021 09:05), Max: 98 (29 Apr 2021 22:02)  HR: 72 (30 Apr 2021 08:55) (67 - 76)  BP: 132/60 (30 Apr 2021 08:55) (96/52 - 132/60)  BP(mean): 87 (30 Apr 2021 08:55) (76 - 87)  RR: 19 (30 Apr 2021 08:55) (16 - 19)  SpO2: 96% (30 Apr 2021 08:55) (96% - 98%)  I&O's Detail    29 Apr 2021 07:01  -  30 Apr 2021 07:00  --------------------------------------------------------  IN:    Oral Fluid: 180 mL  Total IN: 180 mL    OUT:    Voided (mL): 650 mL  Total OUT: 650 mL    Total NET: -470 mL      30 Apr 2021 07:01  -  30 Apr 2021 12:50  --------------------------------------------------------  IN:    Oral Fluid: 180 mL  Total IN: 180 mL    OUT:    Voided (mL): 650 mL  Total OUT: 650 mL    Total NET: -470 mL          CHEST TUBE:  Yes/No. AIR LEAKS: Yes/No. Suction / H2O SEAL.   SERVANDO DRAIN:  Yes/No.  EPICARDIAL WIRES: Yes/No.  TIE DOWNS: Yes/No.  VENTURA: Yes/No.    PHYSICAL EXAM:    General:     Neurological:    Cardiovascular:    Respiratory:    Gastrointestinal:    Extremities:    Vascular:    Incision Sites:    LABS:                        7.6    6.73  )-----------( 259      ( 30 Apr 2021 06:30 )             26.9       COUMADIN:  Yes/No. REASON: .    PT/INR - ( 30 Apr 2021 06:30 )   PT: 17.9 sec;   INR: 1.52          PTT - ( 30 Apr 2021 06:30 )  PTT:32.4 sec    04-30    139  |  98  |  18  ----------------------------<  123<H>  4.2   |  27  |  1.17    Ca    9.0      30 Apr 2021 06:30  Mg     2.0     04-30            MEDICATIONS  (STANDING):  atorvastatin 20 milliGRAM(s) Oral at bedtime  buMETAnide Injectable 1 milliGRAM(s) IV Push every 12 hours  folic acid 1 milliGRAM(s) Oral daily  iron sucrose IVPB 200 milliGRAM(s) IV Intermittent once  lisinopril 20 milliGRAM(s) Oral daily  magnesium oxide 400 milliGRAM(s) Oral every 24 hours  metoprolol succinate ER 50 milliGRAM(s) Oral daily  multivitamin 1 Tablet(s) Oral daily  pantoprazole    Tablet 40 milliGRAM(s) Oral before breakfast  potassium chloride    Tablet ER 20 milliEquivalent(s) Oral daily  tiotropium 18 MICROgram(s) Capsule 1 Capsule(s) Inhalation daily    MEDICATIONS  (PRN):  LORazepam     Tablet 0.5 milliGRAM(s) Oral three times a day PRN Anxiety  nitroglycerin     SubLingual 0.4 milliGRAM(s) SubLingual every 5 minutes PRN Chest Pain        RADIOLOGY & ADDITIONAL TESTS:     Patient discussed on morning rounds with Dr. Alejo    Operation / Date: preop TAVR valve failure    SUBJECTIVE ASSESSMENT:  80y Female seen and examined at bedside.   #[id number].  Patient states she feels better than when she arrived in the hospital.  Patient denies chest pain, shortness of breath, nausea, vomiting.        Vital Signs Last 24 Hrs  T(C): 36.4 (30 Apr 2021 09:05), Max: 36.7 (29 Apr 2021 22:02)  T(F): 97.5 (30 Apr 2021 09:05), Max: 98 (29 Apr 2021 22:02)  HR: 72 (30 Apr 2021 08:55) (67 - 76)  BP: 132/60 (30 Apr 2021 08:55) (96/52 - 132/60)  BP(mean): 87 (30 Apr 2021 08:55) (76 - 87)  RR: 19 (30 Apr 2021 08:55) (16 - 19)  SpO2: 96% (30 Apr 2021 08:55) (96% - 98%)  I&O's Detail    29 Apr 2021 07:01  -  30 Apr 2021 07:00  --------------------------------------------------------  IN:    Oral Fluid: 180 mL  Total IN: 180 mL    OUT:    Voided (mL): 650 mL  Total OUT: 650 mL    Total NET: -470 mL      30 Apr 2021 07:01  -  30 Apr 2021 12:50  --------------------------------------------------------  IN:    Oral Fluid: 180 mL  Total IN: 180 mL    OUT:    Voided (mL): 650 mL  Total OUT: 650 mL    Total NET: -470 mL        PHYSICAL EXAM:    GEN: NAD, looks comfortable  Psych: Mood appropriate  Neuro: A&Ox3.  No focal deficits.  Moving all extremities.   HEENT: No obvious abnormalities  CV: S1S2, regular, + systolic murmur appreciated.  No carotid bruits.  No JVD  Lungs: +bibasalar crackles  ABD: Soft, non-tender, non-distended.  +Bowel sounds  EXT: Warm and well perfused.  1= peripheral edema noted  Musculoskeletal: Moving all extremities with normal ROM, no joint swelling  PV: Pedal pulses palpable       LABS:                        7.6    6.73  )-----------( 259      ( 30 Apr 2021 06:30 )             26.9       COUMADIN:  No. REASON: .    PT/INR - ( 30 Apr 2021 06:30 )   PT: 17.9 sec;   INR: 1.52          PTT - ( 30 Apr 2021 06:30 )  PTT:32.4 sec    04-30    139  |  98  |  18  ----------------------------<  123<H>  4.2   |  27  |  1.17    Ca    9.0      30 Apr 2021 06:30  Mg     2.0     04-30            MEDICATIONS  (STANDING):  atorvastatin 20 milliGRAM(s) Oral at bedtime  buMETAnide Injectable 1 milliGRAM(s) IV Push every 12 hours  folic acid 1 milliGRAM(s) Oral daily  iron sucrose IVPB 200 milliGRAM(s) IV Intermittent once  lisinopril 20 milliGRAM(s) Oral daily  magnesium oxide 400 milliGRAM(s) Oral every 24 hours  metoprolol succinate ER 50 milliGRAM(s) Oral daily  multivitamin 1 Tablet(s) Oral daily  pantoprazole    Tablet 40 milliGRAM(s) Oral before breakfast  potassium chloride    Tablet ER 20 milliEquivalent(s) Oral daily  tiotropium 18 MICROgram(s) Capsule 1 Capsule(s) Inhalation daily    MEDICATIONS  (PRN):  LORazepam     Tablet 0.5 milliGRAM(s) Oral three times a day PRN Anxiety  nitroglycerin     SubLingual 0.4 milliGRAM(s) SubLingual every 5 minutes PRN Chest Pain        RADIOLOGY & ADDITIONAL TESTS:

## 2021-04-30 NOTE — PROGRESS NOTE ADULT - SUBJECTIVE AND OBJECTIVE BOX
Patient discussed on morning rounds with       Operation / Date:     SUBJECTIVE ASSESSMENT:  80y Female         Vital Signs Last 24 Hrs  T(C): 36.4 (30 Apr 2021 09:05), Max: 36.7 (29 Apr 2021 22:02)  T(F): 97.5 (30 Apr 2021 09:05), Max: 98 (29 Apr 2021 22:02)  HR: 67 (30 Apr 2021 05:23) (67 - 76)  BP: 112/59 (30 Apr 2021 05:23) (96/52 - 130/66)  BP(mean): 82 (30 Apr 2021 05:23) (76 - 89)  RR: 16 (30 Apr 2021 05:23) (16 - 19)  SpO2: 98% (30 Apr 2021 05:23) (98% - 98%)  I&O's Detail    29 Apr 2021 07:01  -  30 Apr 2021 07:00  --------------------------------------------------------  IN:    Oral Fluid: 180 mL  Total IN: 180 mL    OUT:    Voided (mL): 650 mL  Total OUT: 650 mL    Total NET: -470 mL      30 Apr 2021 07:01  -  30 Apr 2021 09:34  --------------------------------------------------------  IN:    Oral Fluid: 180 mL  Total IN: 180 mL    OUT:  Total OUT: 0 mL    Total NET: 180 mL          CHEST TUBE:  Yes/No. AIR LEAKS: Yes/No. Suction / H2O SEAL.   SERVANDO DRAIN:  Yes/No.  EPICARDIAL WIRES: Yes/No.  TIE DOWNS: Yes/No.  VENTURA: Yes/No.    PHYSICAL EXAM:    General:     Neurological:    Cardiovascular:    Respiratory:    Gastrointestinal:    Extremities:    Vascular:    Incision Sites:    LABS:                        7.6    6.73  )-----------( 259      ( 30 Apr 2021 06:30 )             26.9       COUMADIN:  Yes/No. REASON: .    PT/INR - ( 30 Apr 2021 06:30 )   PT: 17.9 sec;   INR: 1.52          PTT - ( 30 Apr 2021 06:30 )  PTT:32.4 sec    04-30    139  |  98  |  18  ----------------------------<  123<H>  4.2   |  27  |  1.17    Ca    9.0      30 Apr 2021 06:30  Mg     2.0     04-30            MEDICATIONS  (STANDING):  atorvastatin 20 milliGRAM(s) Oral at bedtime  buMETAnide Injectable 1 milliGRAM(s) IV Push every 12 hours  folic acid 1 milliGRAM(s) Oral daily  iron sucrose IVPB 200 milliGRAM(s) IV Intermittent once  lisinopril 20 milliGRAM(s) Oral daily  magnesium oxide 400 milliGRAM(s) Oral every 24 hours  metoprolol succinate ER 50 milliGRAM(s) Oral daily  multivitamin 1 Tablet(s) Oral daily  pantoprazole    Tablet 40 milliGRAM(s) Oral before breakfast  potassium chloride    Tablet ER 20 milliEquivalent(s) Oral daily  tiotropium 18 MICROgram(s) Capsule 1 Capsule(s) Inhalation daily    MEDICATIONS  (PRN):  LORazepam     Tablet 0.5 milliGRAM(s) Oral three times a day PRN Anxiety  nitroglycerin     SubLingual 0.4 milliGRAM(s) SubLingual every 5 minutes PRN Chest Pain        RADIOLOGY & ADDITIONAL TESTS:

## 2021-05-01 LAB
ANION GAP SERPL CALC-SCNC: 13 MMOL/L — SIGNIFICANT CHANGE UP (ref 5–17)
APTT BLD: 32.5 SEC — SIGNIFICANT CHANGE UP (ref 27.5–35.5)
BASOPHILS # BLD AUTO: 0.03 K/UL — SIGNIFICANT CHANGE UP (ref 0–0.2)
BASOPHILS NFR BLD AUTO: 0.5 % — SIGNIFICANT CHANGE UP (ref 0–2)
BUN SERPL-MCNC: 22 MG/DL — SIGNIFICANT CHANGE UP (ref 7–23)
CALCIUM SERPL-MCNC: 8.8 MG/DL — SIGNIFICANT CHANGE UP (ref 8.4–10.5)
CHLORIDE SERPL-SCNC: 97 MMOL/L — SIGNIFICANT CHANGE UP (ref 96–108)
CO2 SERPL-SCNC: 28 MMOL/L — SIGNIFICANT CHANGE UP (ref 22–31)
CREAT SERPL-MCNC: 1.23 MG/DL — SIGNIFICANT CHANGE UP (ref 0.5–1.3)
EOSINOPHIL # BLD AUTO: 0.12 K/UL — SIGNIFICANT CHANGE UP (ref 0–0.5)
EOSINOPHIL NFR BLD AUTO: 2 % — SIGNIFICANT CHANGE UP (ref 0–6)
FOLATE SERPL-MCNC: >20 NG/ML — SIGNIFICANT CHANGE UP
GLUCOSE SERPL-MCNC: 101 MG/DL — HIGH (ref 70–99)
HCT VFR BLD CALC: 27 % — LOW (ref 34.5–45)
HCT VFR BLD CALC: 30.4 % — LOW (ref 34.5–45)
HGB BLD-MCNC: 7.6 G/DL — LOW (ref 11.5–15.5)
HGB BLD-MCNC: 8.8 G/DL — LOW (ref 11.5–15.5)
IMM GRANULOCYTES NFR BLD AUTO: 0.3 % — SIGNIFICANT CHANGE UP (ref 0–1.5)
INR BLD: 1.37 — HIGH (ref 0.88–1.16)
LYMPHOCYTES # BLD AUTO: 1.2 K/UL — SIGNIFICANT CHANGE UP (ref 1–3.3)
LYMPHOCYTES # BLD AUTO: 20 % — SIGNIFICANT CHANGE UP (ref 13–44)
MAGNESIUM SERPL-MCNC: 2.1 MG/DL — SIGNIFICANT CHANGE UP (ref 1.6–2.6)
MCHC RBC-ENTMCNC: 21.7 PG — LOW (ref 27–34)
MCHC RBC-ENTMCNC: 22.2 PG — LOW (ref 27–34)
MCHC RBC-ENTMCNC: 28.1 GM/DL — LOW (ref 32–36)
MCHC RBC-ENTMCNC: 28.9 GM/DL — LOW (ref 32–36)
MCV RBC AUTO: 76.8 FL — LOW (ref 80–100)
MCV RBC AUTO: 76.9 FL — LOW (ref 80–100)
MONOCYTES # BLD AUTO: 0.51 K/UL — SIGNIFICANT CHANGE UP (ref 0–0.9)
MONOCYTES NFR BLD AUTO: 8.5 % — SIGNIFICANT CHANGE UP (ref 2–14)
NEUTROPHILS # BLD AUTO: 4.13 K/UL — SIGNIFICANT CHANGE UP (ref 1.8–7.4)
NEUTROPHILS NFR BLD AUTO: 68.7 % — SIGNIFICANT CHANGE UP (ref 43–77)
NRBC # BLD: 0 /100 WBCS — SIGNIFICANT CHANGE UP (ref 0–0)
NRBC # BLD: 0 /100 WBCS — SIGNIFICANT CHANGE UP (ref 0–0)
PLATELET # BLD AUTO: 263 K/UL — SIGNIFICANT CHANGE UP (ref 150–400)
PLATELET # BLD AUTO: 263 K/UL — SIGNIFICANT CHANGE UP (ref 150–400)
POTASSIUM SERPL-MCNC: 4.1 MMOL/L — SIGNIFICANT CHANGE UP (ref 3.5–5.3)
POTASSIUM SERPL-SCNC: 4.1 MMOL/L — SIGNIFICANT CHANGE UP (ref 3.5–5.3)
PROTHROM AB SERPL-ACNC: 16.2 SEC — HIGH (ref 10.6–13.6)
RBC # BLD: 3.51 M/UL — LOW (ref 3.8–5.2)
RBC # BLD: 3.96 M/UL — SIGNIFICANT CHANGE UP (ref 3.8–5.2)
RBC # FLD: 18.5 % — HIGH (ref 10.3–14.5)
RBC # FLD: 18.5 % — HIGH (ref 10.3–14.5)
SODIUM SERPL-SCNC: 138 MMOL/L — SIGNIFICANT CHANGE UP (ref 135–145)
VIT B12 SERPL-MCNC: 402 PG/ML — SIGNIFICANT CHANGE UP (ref 232–1245)
WBC # BLD: 6.01 K/UL — SIGNIFICANT CHANGE UP (ref 3.8–10.5)
WBC # BLD: 8.84 K/UL — SIGNIFICANT CHANGE UP (ref 3.8–10.5)
WBC # FLD AUTO: 6.01 K/UL — SIGNIFICANT CHANGE UP (ref 3.8–10.5)
WBC # FLD AUTO: 8.84 K/UL — SIGNIFICANT CHANGE UP (ref 3.8–10.5)

## 2021-05-01 PROCEDURE — 99232 SBSQ HOSP IP/OBS MODERATE 35: CPT

## 2021-05-01 PROCEDURE — 71045 X-RAY EXAM CHEST 1 VIEW: CPT | Mod: 26

## 2021-05-01 RX ORDER — FUROSEMIDE 40 MG
20 TABLET ORAL ONCE
Refills: 0 | Status: COMPLETED | OUTPATIENT
Start: 2021-05-01 | End: 2021-05-01

## 2021-05-01 RX ORDER — NITROGLYCERIN 6.5 MG
0.4 CAPSULE, EXTENDED RELEASE ORAL ONCE
Refills: 0 | Status: COMPLETED | OUTPATIENT
Start: 2021-05-01 | End: 2021-05-01

## 2021-05-01 RX ORDER — CEPHALEXIN 500 MG
250 CAPSULE ORAL EVERY 6 HOURS
Refills: 0 | Status: COMPLETED | OUTPATIENT
Start: 2021-05-01 | End: 2021-05-06

## 2021-05-01 RX ORDER — FUROSEMIDE 40 MG
40 TABLET ORAL EVERY 12 HOURS
Refills: 0 | Status: DISCONTINUED | OUTPATIENT
Start: 2021-05-01 | End: 2021-05-02

## 2021-05-01 RX ADMIN — Medication 20 MILLIEQUIVALENT(S): at 12:09

## 2021-05-01 RX ADMIN — Medication 650 MILLIGRAM(S): at 22:46

## 2021-05-01 RX ADMIN — NYSTATIN CREAM 1 APPLICATION(S): 100000 CREAM TOPICAL at 06:49

## 2021-05-01 RX ADMIN — Medication 20 MILLIGRAM(S): at 20:15

## 2021-05-01 RX ADMIN — Medication 1 TABLET(S): at 12:09

## 2021-05-01 RX ADMIN — Medication 50 MILLIGRAM(S): at 06:47

## 2021-05-01 RX ADMIN — LISINOPRIL 20 MILLIGRAM(S): 2.5 TABLET ORAL at 06:47

## 2021-05-01 RX ADMIN — TIOTROPIUM BROMIDE 1 CAPSULE(S): 18 CAPSULE ORAL; RESPIRATORY (INHALATION) at 12:11

## 2021-05-01 RX ADMIN — PANTOPRAZOLE SODIUM 40 MILLIGRAM(S): 20 TABLET, DELAYED RELEASE ORAL at 06:47

## 2021-05-01 RX ADMIN — Medication 40 MILLIGRAM(S): at 17:46

## 2021-05-01 RX ADMIN — Medication 1 MILLIGRAM(S): at 12:09

## 2021-05-01 RX ADMIN — BUMETANIDE 1 MILLIGRAM(S): 0.25 INJECTION INTRAMUSCULAR; INTRAVENOUS at 00:41

## 2021-05-01 RX ADMIN — MAGNESIUM OXIDE 400 MG ORAL TABLET 400 MILLIGRAM(S): 241.3 TABLET ORAL at 12:09

## 2021-05-01 RX ADMIN — Medication 0.4 MILLIGRAM(S): at 05:46

## 2021-05-01 RX ADMIN — NYSTATIN CREAM 1 APPLICATION(S): 100000 CREAM TOPICAL at 17:48

## 2021-05-01 NOTE — PROGRESS NOTE ADULT - ASSESSMENT
80 year old F, Mongolian/English speaking, PMHx HTN, HLD, AS (s/p TAVR 02/2019, on Coumadin at home), COPD (on 2L home O2 at baseline), GERD, and colon cancer (s/p colectomy) who presented to [facility] ED c/o 3 days of worsening SOB/MANRIQUE and chest pressure. Patient placed on BiPAP, diuresed, and transient nitrogtt in ED with improvement transitioned to 3L NC and satting well. Patient was discontinued from Nitro gtt. Admitted to cardiology/telemetry for HF management. SHD, Dr. Alejo consulted for hx TAVR and evaluation.     ==== Neurovascular ====  -No delirium, pain well managed on current regimen  -C/w PRNs for Pain control  -Monitor neuro status    ==== Respiratory ====  -Saturates well on RA     -AM CXR stable, repeat in AM  -Encourage IS 10x/hour while awake, Cough and deep breathing exercises  -Monitor respiratory status via SpO2    ==== Cardiovascular ====  Monitor on Tele  Continue aspirin 81mg QD  Continue Statin  Continue Toprol XL 50mg QD  Hold lisinopril  Negative 1.2L, switch to PO Lasix    ==== GI ====   -Tolerating PO  -Prophylaxis: Protonix  -C/w bowel regimen  -GI consult for anemia, appreciate recs    ==== /Renal ====  -BUN/Cr: 22/1.23  -Trend Cr on AM labs  -Replete electrolytes as needed    ==== ID ====   Afebrile, asymptomatic  -WBC: 6.01  -Continue to monitor for SIRS/Sepsis syndrome while inpatient    ==== Endocrine ====   -A1C: 5.2, no h/o DM  -TSH: 1.230, no h/o thyroid disease     ==== Hematologic ====   -H/H: 7.6/27, transfuse 2 units today with diuretics  -Positive FOB  -CBC, chem in AM  -DVT ppx: SCDs    ==== Disposition Planning ====   Telemetry

## 2021-05-01 NOTE — PROGRESS NOTE ADULT - SUBJECTIVE AND OBJECTIVE BOX
`Patient discussed on morning rounds with Dr. Alejo     Operation / Date: management of failed TAVR valve    SUBJECTIVE ASSESSMENT:  80y Female seen and examined at bedside.          Vital Signs Last 24 Hrs  T(C): 36.7 (01 May 2021 09:25), Max: 37.1 (01 May 2021 06:16)  T(F): 98 (01 May 2021 09:25), Max: 98.7 (01 May 2021 06:16)  HR: 74 (01 May 2021 08:41) (66 - 84)  BP: 122/59 (01 May 2021 08:41) (112/58 - 134/60)  BP(mean): 83 (30 Apr 2021 16:22) (77 - 87)  RR: 16 (01 May 2021 08:41) (16 - 19)  SpO2: 98% (01 May 2021 08:41) (96% - 100%)  I&O's Detail    30 Apr 2021 07:01  -  01 May 2021 07:00  --------------------------------------------------------  IN:    Oral Fluid: 600 mL  Total IN: 600 mL    OUT:    Voided (mL): 1800 mL  Total OUT: 1800 mL    Total NET: -1200 mL      01 May 2021 07:01  -  01 May 2021 11:23  --------------------------------------------------------  IN:    Oral Fluid: 180 mL  Total IN: 180 mL    OUT:  Total OUT: 0 mL    Total NET: 180 mL          CHEST TUBE:  Yes/No. AIR LEAKS: Yes/No. Suction / H2O SEAL.   SERVANDO DRAIN:  Yes/No.  EPICARDIAL WIRES: Yes/No.  TIE DOWNS: Yes/No.  VENTURA: Yes/No.    PHYSICAL EXAM:    General:     Neurological:    Cardiovascular:    Respiratory:    Gastrointestinal:    Extremities:    Vascular:    Incision Sites:    LABS:                        7.6    6.01  )-----------( 263      ( 01 May 2021 07:37 )             27.0       COUMADIN:  Yes/No. REASON: .    PT/INR - ( 01 May 2021 07:37 )   PT: 16.2 sec;   INR: 1.37          PTT - ( 01 May 2021 07:37 )  PTT:32.5 sec    05-01    138  |  97  |  22  ----------------------------<  101<H>  4.1   |  28  |  1.23    Ca    8.8      01 May 2021 07:37  Mg     2.1     05-01            MEDICATIONS  (STANDING):  atorvastatin 20 milliGRAM(s) Oral at bedtime  folic acid 1 milliGRAM(s) Oral daily  furosemide    Tablet 40 milliGRAM(s) Oral every 12 hours  iron sucrose IVPB 200 milliGRAM(s) IV Intermittent every 48 hours  magnesium oxide 400 milliGRAM(s) Oral every 24 hours  metoprolol succinate ER 50 milliGRAM(s) Oral daily  multivitamin 1 Tablet(s) Oral daily  nystatin Powder 1 Application(s) Topical two times a day  pantoprazole    Tablet 40 milliGRAM(s) Oral before breakfast  potassium chloride    Tablet ER 20 milliEquivalent(s) Oral daily  tiotropium 18 MICROgram(s) Capsule 1 Capsule(s) Inhalation daily    MEDICATIONS  (PRN):  acetaminophen   Tablet .. 650 milliGRAM(s) Oral every 6 hours PRN Moderate Pain (4 - 6)  LORazepam     Tablet 0.5 milliGRAM(s) Oral three times a day PRN Anxiety  nitroglycerin     SubLingual 0.4 milliGRAM(s) SubLingual every 5 minutes PRN Chest Pain        RADIOLOGY & ADDITIONAL TESTS:     `Patient discussed on morning rounds with Dr. Alejo     Operation / Date: management of failed TAVR valve    SUBJECTIVE ASSESSMENT:  80y Female seen and examined at bedside.  Patient complained of chest pain when walking to bathroom in early AM.  Resolved with nitro.  Now being transfused 1 unit PRBC.  Currently denies chest pain, shortness of breath, nausea, vomiting.          Vital Signs Last 24 Hrs  T(C): 36.7 (01 May 2021 09:25), Max: 37.1 (01 May 2021 06:16)  T(F): 98 (01 May 2021 09:25), Max: 98.7 (01 May 2021 06:16)  HR: 74 (01 May 2021 08:41) (66 - 84)  BP: 122/59 (01 May 2021 08:41) (112/58 - 134/60)  BP(mean): 83 (30 Apr 2021 16:22) (77 - 87)  RR: 16 (01 May 2021 08:41) (16 - 19)  SpO2: 98% (01 May 2021 08:41) (96% - 100%)  I&O's Detail    30 Apr 2021 07:01  -  01 May 2021 07:00  --------------------------------------------------------  IN:    Oral Fluid: 600 mL  Total IN: 600 mL    OUT:    Voided (mL): 1800 mL  Total OUT: 1800 mL    Total NET: -1200 mL      01 May 2021 07:01  -  01 May 2021 11:23  --------------------------------------------------------  IN:    Oral Fluid: 180 mL  Total IN: 180 mL    OUT:  Total OUT: 0 mL    Total NET: 180 mL        PHYSICAL EXAM:    GEN: NAD, looks comfortable  Psych: Mood appropriate  Neuro: A&Ox3.  No focal deficits.  Moving all extremities.   HEENT: No obvious abnormalities  CV: S1S2, regular, + systolic murmur appreciated.  No carotid bruits.  No JVD  Lungs: +bibasalar crackles  ABD: Soft, non-tender, non-distended.  +Bowel sounds  EXT: Warm and well perfused.  1+peripheral edema noted  Musculoskeletal: Moving all extremities with normal ROM, no joint swelling  PV: Pedal pulses palpable     LABS:                        7.6    6.01  )-----------( 263      ( 01 May 2021 07:37 )             27.0       COUMADIN:  No. REASON: .    PT/INR - ( 01 May 2021 07:37 )   PT: 16.2 sec;   INR: 1.37          PTT - ( 01 May 2021 07:37 )  PTT:32.5 sec    05-01    138  |  97  |  22  ----------------------------<  101<H>  4.1   |  28  |  1.23    Ca    8.8      01 May 2021 07:37  Mg     2.1     05-01            MEDICATIONS  (STANDING):  atorvastatin 20 milliGRAM(s) Oral at bedtime  folic acid 1 milliGRAM(s) Oral daily  furosemide    Tablet 40 milliGRAM(s) Oral every 12 hours  iron sucrose IVPB 200 milliGRAM(s) IV Intermittent every 48 hours  magnesium oxide 400 milliGRAM(s) Oral every 24 hours  metoprolol succinate ER 50 milliGRAM(s) Oral daily  multivitamin 1 Tablet(s) Oral daily  nystatin Powder 1 Application(s) Topical two times a day  pantoprazole    Tablet 40 milliGRAM(s) Oral before breakfast  potassium chloride    Tablet ER 20 milliEquivalent(s) Oral daily  tiotropium 18 MICROgram(s) Capsule 1 Capsule(s) Inhalation daily    MEDICATIONS  (PRN):  acetaminophen   Tablet .. 650 milliGRAM(s) Oral every 6 hours PRN Moderate Pain (4 - 6)  LORazepam     Tablet 0.5 milliGRAM(s) Oral three times a day PRN Anxiety  nitroglycerin     SubLingual 0.4 milliGRAM(s) SubLingual every 5 minutes PRN Chest Pain        RADIOLOGY & ADDITIONAL TESTS:

## 2021-05-02 LAB
ANION GAP SERPL CALC-SCNC: 12 MMOL/L — SIGNIFICANT CHANGE UP (ref 5–17)
APTT BLD: 30 SEC — SIGNIFICANT CHANGE UP (ref 27.5–35.5)
BUN SERPL-MCNC: 26 MG/DL — HIGH (ref 7–23)
CALCIUM SERPL-MCNC: 9.3 MG/DL — SIGNIFICANT CHANGE UP (ref 8.4–10.5)
CHLORIDE SERPL-SCNC: 99 MMOL/L — SIGNIFICANT CHANGE UP (ref 96–108)
CO2 SERPL-SCNC: 28 MMOL/L — SIGNIFICANT CHANGE UP (ref 22–31)
CREAT SERPL-MCNC: 1.35 MG/DL — HIGH (ref 0.5–1.3)
GLUCOSE SERPL-MCNC: 99 MG/DL — SIGNIFICANT CHANGE UP (ref 70–99)
HCT VFR BLD CALC: 31.7 % — LOW (ref 34.5–45)
HGB BLD-MCNC: 9.6 G/DL — LOW (ref 11.5–15.5)
INR BLD: 1.15 — SIGNIFICANT CHANGE UP (ref 0.88–1.16)
MAGNESIUM SERPL-MCNC: 2.2 MG/DL — SIGNIFICANT CHANGE UP (ref 1.6–2.6)
MCHC RBC-ENTMCNC: 23.5 PG — LOW (ref 27–34)
MCHC RBC-ENTMCNC: 30.3 GM/DL — LOW (ref 32–36)
MCV RBC AUTO: 77.7 FL — LOW (ref 80–100)
NRBC # BLD: 0 /100 WBCS — SIGNIFICANT CHANGE UP (ref 0–0)
PLATELET # BLD AUTO: 237 K/UL — SIGNIFICANT CHANGE UP (ref 150–400)
POTASSIUM SERPL-MCNC: 4.1 MMOL/L — SIGNIFICANT CHANGE UP (ref 3.5–5.3)
POTASSIUM SERPL-SCNC: 4.1 MMOL/L — SIGNIFICANT CHANGE UP (ref 3.5–5.3)
PROTHROM AB SERPL-ACNC: 13.7 SEC — HIGH (ref 10.6–13.6)
RBC # BLD: 4.08 M/UL — SIGNIFICANT CHANGE UP (ref 3.8–5.2)
RBC # FLD: 19.3 % — HIGH (ref 10.3–14.5)
SODIUM SERPL-SCNC: 139 MMOL/L — SIGNIFICANT CHANGE UP (ref 135–145)
WBC # BLD: 7.75 K/UL — SIGNIFICANT CHANGE UP (ref 3.8–10.5)
WBC # FLD AUTO: 7.75 K/UL — SIGNIFICANT CHANGE UP (ref 3.8–10.5)

## 2021-05-02 PROCEDURE — 71045 X-RAY EXAM CHEST 1 VIEW: CPT | Mod: 26

## 2021-05-02 PROCEDURE — 99232 SBSQ HOSP IP/OBS MODERATE 35: CPT

## 2021-05-02 RX ORDER — HEPARIN SODIUM 5000 [USP'U]/ML
5000 INJECTION INTRAVENOUS; SUBCUTANEOUS EVERY 8 HOURS
Refills: 0 | Status: DISCONTINUED | OUTPATIENT
Start: 2021-05-02 | End: 2021-05-05

## 2021-05-02 RX ORDER — ALBUMIN HUMAN 25 %
250 VIAL (ML) INTRAVENOUS ONCE
Refills: 0 | Status: COMPLETED | OUTPATIENT
Start: 2021-05-02 | End: 2021-05-02

## 2021-05-02 RX ADMIN — Medication 1 TABLET(S): at 11:04

## 2021-05-02 RX ADMIN — ATORVASTATIN CALCIUM 20 MILLIGRAM(S): 80 TABLET, FILM COATED ORAL at 07:52

## 2021-05-02 RX ADMIN — NYSTATIN CREAM 1 APPLICATION(S): 100000 CREAM TOPICAL at 18:52

## 2021-05-02 RX ADMIN — HEPARIN SODIUM 5000 UNIT(S): 5000 INJECTION INTRAVENOUS; SUBCUTANEOUS at 13:56

## 2021-05-02 RX ADMIN — Medication 40 MILLIGRAM(S): at 05:39

## 2021-05-02 RX ADMIN — Medication 250 MILLILITER(S): at 08:56

## 2021-05-02 RX ADMIN — Medication 0.4 MILLIGRAM(S): at 01:22

## 2021-05-02 RX ADMIN — NYSTATIN CREAM 1 APPLICATION(S): 100000 CREAM TOPICAL at 05:40

## 2021-05-02 RX ADMIN — IRON SUCROSE 110 MILLIGRAM(S): 20 INJECTION, SOLUTION INTRAVENOUS at 17:42

## 2021-05-02 RX ADMIN — Medication 250 MILLIGRAM(S): at 17:42

## 2021-05-02 RX ADMIN — Medication 1 MILLIGRAM(S): at 11:04

## 2021-05-02 RX ADMIN — Medication 650 MILLIGRAM(S): at 07:54

## 2021-05-02 RX ADMIN — Medication 250 MILLIGRAM(S): at 11:04

## 2021-05-02 RX ADMIN — Medication 250 MILLIGRAM(S): at 04:28

## 2021-05-02 RX ADMIN — Medication 250 MILLIGRAM(S): at 23:01

## 2021-05-02 RX ADMIN — Medication 50 MILLIGRAM(S): at 05:40

## 2021-05-02 RX ADMIN — HEPARIN SODIUM 5000 UNIT(S): 5000 INJECTION INTRAVENOUS; SUBCUTANEOUS at 23:00

## 2021-05-02 RX ADMIN — Medication 250 MILLIGRAM(S): at 07:52

## 2021-05-02 RX ADMIN — TIOTROPIUM BROMIDE 1 CAPSULE(S): 18 CAPSULE ORAL; RESPIRATORY (INHALATION) at 10:09

## 2021-05-02 RX ADMIN — MAGNESIUM OXIDE 400 MG ORAL TABLET 400 MILLIGRAM(S): 241.3 TABLET ORAL at 10:51

## 2021-05-02 RX ADMIN — ATORVASTATIN CALCIUM 20 MILLIGRAM(S): 80 TABLET, FILM COATED ORAL at 23:01

## 2021-05-02 RX ADMIN — PANTOPRAZOLE SODIUM 40 MILLIGRAM(S): 20 TABLET, DELAYED RELEASE ORAL at 05:39

## 2021-05-02 NOTE — PROGRESS NOTE ADULT - SUBJECTIVE AND OBJECTIVE BOX
Patient discussed on morning rounds with Dr. Alejo    Operation / Date: s/p TAVR 2019, readmit with failed TAVR valve    SUBJECTIVE ASSESSMENT:  80y Female assessed at bedside this morning. Patient had an episode of chest pain overnight which resolved after sublingual nitro. She denies chest pain this morning. She states her breathing is good, denies fever, chills, nausea, vomiting.     Vital Signs Last 24 Hrs  T(C): 36.6 (02 May 2021 14:00), Max: 37 (02 May 2021 10:09)  T(F): 97.9 (02 May 2021 14:00), Max: 98.6 (02 May 2021 10:09)  HR: 71 (02 May 2021 13:00) (61 - 75)  BP: 115/58 (02 May 2021 13:00) (111/55 - 154/69)  BP(mean): 83 (02 May 2021 13:00) (78 - 99)  RR: 17 (02 May 2021 13:00) (1 - 22)  SpO2: 94% (02 May 2021 13:00) (94% - 97%)  I&O's Detail    01 May 2021 07:01  -  02 May 2021 07:00  --------------------------------------------------------  IN:    Albumin 5%  - 250 mL: 125 mL    Oral Fluid: 180 mL    PRBCs (Packed Red Blood Cells): 400 mL  Total IN: 705 mL    OUT:    Voided (mL): 1400 mL  Total OUT: 1400 mL    Total NET: -695 mL      02 May 2021 07:01  -  02 May 2021 16:27  --------------------------------------------------------  IN:    Albumin 5%  - 250 mL: 250 mL  Total IN: 250 mL    OUT:    Voided (mL): 400 mL  Total OUT: 400 mL    Total NET: -150 mL    CHEST TUBE:  No  SERVANDO DRAIN:  No.  EPICARDIAL WIRES: No.  TIE DOWNS: No.  VENTURA: No.    Physical Exam  CONSTITUTIONAL: Well appearing in NAD assessed sitting at bedside   NEURO: A&OX3. No focal deficits noted, moving bilateral upper and lower extremities                    CV: RRR, +YOVANA, no rubs, no gallops  RESPIRATORY: Clear to auscultation bilateral posterior lung fields, no wheezes, rales, rhonchi   GI: +BS, NT/ND  MUSKULOSKELETAL: Trace bilateral LE edema, no calf tenderness. Left upper extremity erythematous where prior IV was. Full strength and ROM bilateral upper and lower extremities   VASCULAR: Bilateral distal pulses 2+  INCISIONS: None      LABS:                        9.6    7.75  )-----------( 237      ( 02 May 2021 06:15 )             31.7       COUMADIN:  No    PT/INR - ( 02 May 2021 06:15 )   PT: 13.7 sec;   INR: 1.15          PTT - ( 02 May 2021 06:15 )  PTT:30.0 sec    05-02    139  |  99  |  26<H>  ----------------------------<  99  4.1   |  28  |  1.35<H>    Ca    9.3      02 May 2021 06:15  Mg     2.2     05-02    MEDICATIONS  (STANDING):  atorvastatin 20 milliGRAM(s) Oral at bedtime  cephalexin 250 milliGRAM(s) Oral every 6 hours  folic acid 1 milliGRAM(s) Oral daily  heparin   Injectable 5000 Unit(s) SubCutaneous every 8 hours  iron sucrose IVPB 200 milliGRAM(s) IV Intermittent every 48 hours  magnesium oxide 400 milliGRAM(s) Oral every 24 hours  metoprolol succinate ER 50 milliGRAM(s) Oral daily  multivitamin 1 Tablet(s) Oral daily  nystatin Powder 1 Application(s) Topical two times a day  pantoprazole    Tablet 40 milliGRAM(s) Oral before breakfast  tiotropium 18 MICROgram(s) Capsule 1 Capsule(s) Inhalation daily    MEDICATIONS  (PRN):  acetaminophen   Tablet .. 650 milliGRAM(s) Oral every 6 hours PRN Moderate Pain (4 - 6)  LORazepam     Tablet 0.5 milliGRAM(s) Oral three times a day PRN Anxiety      RADIOLOGY & ADDITIONAL TESTS:  x< from: Xray Chest 1 View- PORTABLE-Routine (Xray Chest 1 View- PORTABLE-Routine in AM.) (05.02.21 @ 06:56) >    INTERPRETATION:  Clinical History: Congestion    Frontal examination of the chest demonstrates cardiomegaly. No acute infiltrates. Degenerative changes thoracic spine. Calcification aortic knob.    IMPRESSION: No acute infiltrates.    < end of copied text >    < from: TTE Echo Complete w/o Contrast w/ Doppler (04.29.21 @ 12:22) >  CONCLUSIONS:     1. Moderate symmetric leftventricular hypertrophy.   2. Hyperdynamic left ventricular systolic function.   3. Normal right ventricular size and systolic function.   4. A transcatheter aortic valve (TAVR) is noted in the aortic position. The peak transvalvular velocity is 4.10m/s, the mean transvalvular gradient is 40.00 mmHg, and the LVOT/AV velocity ratio is 0.37. The peak transaortic gradient is 67.24 mmHg. The mean transaortic gradient is 40.00 mmHg. There is no evidence of aortic regurgitation.   5. Pulmonary artery systolic pressure is 28 mmHg.   6. Trivial pericardial effusion.   7. Borderline dilated aortic root.   8. Mildly dilated ascending aorta.   9. Compared to the previous TTE performed on 3/1/2021, there have been no significant interval changes.    < end of copied text >

## 2021-05-02 NOTE — PROGRESS NOTE ADULT - ASSESSMENT
A/P:    80 year old F, Telugu/English speaking, PMHx HTN, HLD, AS (s/p TAVR 02/2019), COPD (on 2L home O2 at baseline), GERD, and colon cancer (s/p colectomy) who presented to [facility] ED 4/28 c/o 3 days of worsening SOB/MANRIQUE and chest pressure. Patient placed on BiPAP, diuresed, and transient nitrogtt in ED with improvement. Admitted to cardiology/telemetry for HF management. [facility], Dr. Alejo consulted for hx TAVR and evaluation. 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 TTE completed revealed TAVR valve with mean transvalvular gradient is 40.00 mmHg. She was transferred to [facility] service under the care of Dr. Alejo for further heart failure management and workup for possible intervention.     Neurovascular:   - No delirium. Pain well controlled with current regimen.  -Continue tylenol PRN  -     Cardiovascular:   - POD_ s/p _  -Hemodynamically stable. HR controlled (X-X)  - Hx of HTN, BP controlled (X-X)  - ASA, Plavix, BB, statin  - EKG. TTE. Cardiac Panel. Lipid Panel. BNP.      Respiratory:   - 02 Sat = 98% on RA.  -If on oxygen wean to RA from for O2 Sat > 93%.  -Encourage C+DB and Use of IS 10x / hr while awake.  -CXR.    GI:   - Stable.  -NPO after MN.  -Continue GI PPX with protonix  -PO Diet.    Renal / :  - BUN/Cr stable at X/X  -Continue to monitor renal function.  -Monitor I/O's.  - Replete electrolytes PRN    Endocrine:    -A1c.  -TSH.    Hematologic:  -H/H stable at X/X with no obvious signs of bleeding  -Coagulation Panel.    ID:  -Pt remains afebrile with no elevation in WBC or signs of infection  -Continue to monitor CBC  -Observe for SIRS/Sepsis Syndrome.    Prophylaxis:  -DVT prophylaxis with 5000 SubQ Heparin q8h.  -SCD's    Disposition:  -Home when medically appropriate.   A/P:    80 year old F, Hong Konger/English speaking, PMHx HTN, HLD, AS (s/p TAVR 02/2019), COPD (on 2L home O2 at baseline), GERD, and colon cancer (s/p colectomy) who presented to [facility] ED 4/28 c/o 3 days of worsening SOB/MANRIQUE and chest pressure. Patient placed on BiPAP, diuresed, and transient nitrogtt in ED with improvement. Admitted to cardiology/telemetry for HF management. [facility] [place], Dr. Alejo consulted for hx TAVR and evaluation. 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 TTE completed revealed TAVR valve with mean transvalvular gradient is 40.00 mmHg. She was transferred to [facility] [place] service under the care of Dr. Alejo for further heart failure management and workup for possible intervention.     Neurovascular:   - No delirium. Pain well controlled with current regimen.  -Continue tylenol PRN  - Continue lorazepam 0.5 mg PRN    Cardiovascular:   - S/p TAVR (2019), now readmitted with valve failure  - Was tried on AC as an outpatient for c/f valve thrombus, discontinued per Dr. Alejo   - Chest pain: given sublingual nitro with improvement, per Dr. Alejo no CAD on prior cath  -Hemodynamically stable. HR controlled (67-80)  - Hx of HTN, BP controlled (111//69), continue toprol XL  - HLD: Continue atorvastatin 20 mg     Respiratory:   - 02 Sat = 97% on 2L (on home O2)  -If on oxygen wean to RA from for O2 Sat > 93%.  -Encourage C+DB and Use of IS 10x / hr while awake.  -Hx COPD: continue spiriva     GI:   - Hx colon CA s/p colectomy  - GI consulted given low H/H on admission and positive FOB  - Per GI no acute workup required unless patient has melena, OK for heparin   -Continue GI PPX with protonix  -PO Diet.    Renal / :  - BUN/Cr bumped to 26/1.35 today  - Held diuresis today, will likely need to resume lasix tomorrow   -Continue to monitor renal function.  -Monitor I/O's.  - Replete electrolytes PRN    Endocrine:    -A1c 5.2, no known hx diabetes   -TSH 1.230, no known hx thyroid disease     Hematologic:  -H/H stable at 9.6/31.7 with no obvious signs of bleeding  -Coagulation Panel.  - Admitted with H/H 7.6/26.9, s/p 2U PRBC 5/3  - Hematology consulted, continue iron 200 IV q48 hr, folic acid     ID:  -Pt remains afebrile with no elevation in WBC or signs of infection  - L hand phlebitis: continue keflex   -Continue to monitor CBC  -Observe for SIRS/Sepsis Syndrome.    Prophylaxis:  -DVT prophylaxis with 5000 SubQ Heparin q8h.  -SCD's    Disposition:  -Possible procedure this week

## 2021-05-03 PROBLEM — E78.5 HYPERLIPIDEMIA, UNSPECIFIED: Chronic | Status: ACTIVE | Noted: 2021-04-28

## 2021-05-03 PROBLEM — J44.9 CHRONIC OBSTRUCTIVE PULMONARY DISEASE, UNSPECIFIED: Chronic | Status: ACTIVE | Noted: 2021-04-28

## 2021-05-03 PROBLEM — K21.9 GASTRO-ESOPHAGEAL REFLUX DISEASE WITHOUT ESOPHAGITIS: Chronic | Status: ACTIVE | Noted: 2021-04-28

## 2021-05-03 LAB
ANION GAP SERPL CALC-SCNC: 10 MMOL/L — SIGNIFICANT CHANGE UP (ref 5–17)
BLD GP AB SCN SERPL QL: NEGATIVE — SIGNIFICANT CHANGE UP
BUN SERPL-MCNC: 29 MG/DL — HIGH (ref 7–23)
CALCIUM SERPL-MCNC: 9.8 MG/DL — SIGNIFICANT CHANGE UP (ref 8.4–10.5)
CHLORIDE SERPL-SCNC: 96 MMOL/L — SIGNIFICANT CHANGE UP (ref 96–108)
CO2 SERPL-SCNC: 28 MMOL/L — SIGNIFICANT CHANGE UP (ref 22–31)
CREAT SERPL-MCNC: 1.15 MG/DL — SIGNIFICANT CHANGE UP (ref 0.5–1.3)
CULTURE RESULTS: SIGNIFICANT CHANGE UP
CULTURE RESULTS: SIGNIFICANT CHANGE UP
GLUCOSE SERPL-MCNC: 102 MG/DL — HIGH (ref 70–99)
HCT VFR BLD CALC: 34.4 % — LOW (ref 34.5–45)
HGB BLD-MCNC: 10.3 G/DL — LOW (ref 11.5–15.5)
MAGNESIUM SERPL-MCNC: 2.2 MG/DL — SIGNIFICANT CHANGE UP (ref 1.6–2.6)
MCHC RBC-ENTMCNC: 23.4 PG — LOW (ref 27–34)
MCHC RBC-ENTMCNC: 29.9 GM/DL — LOW (ref 32–36)
MCV RBC AUTO: 78 FL — LOW (ref 80–100)
NRBC # BLD: 0 /100 WBCS — SIGNIFICANT CHANGE UP (ref 0–0)
PLATELET # BLD AUTO: 259 K/UL — SIGNIFICANT CHANGE UP (ref 150–400)
POTASSIUM SERPL-MCNC: 4.1 MMOL/L — SIGNIFICANT CHANGE UP (ref 3.5–5.3)
POTASSIUM SERPL-SCNC: 4.1 MMOL/L — SIGNIFICANT CHANGE UP (ref 3.5–5.3)
RBC # BLD: 4.41 M/UL — SIGNIFICANT CHANGE UP (ref 3.8–5.2)
RBC # FLD: 20.6 % — HIGH (ref 10.3–14.5)
RH IG SCN BLD-IMP: POSITIVE — SIGNIFICANT CHANGE UP
SODIUM SERPL-SCNC: 134 MMOL/L — LOW (ref 135–145)
SPECIMEN SOURCE: SIGNIFICANT CHANGE UP
SPECIMEN SOURCE: SIGNIFICANT CHANGE UP
WBC # BLD: 6.1 K/UL — SIGNIFICANT CHANGE UP (ref 3.8–10.5)
WBC # FLD AUTO: 6.1 K/UL — SIGNIFICANT CHANGE UP (ref 3.8–10.5)

## 2021-05-03 PROCEDURE — 99232 SBSQ HOSP IP/OBS MODERATE 35: CPT

## 2021-05-03 PROCEDURE — 71045 X-RAY EXAM CHEST 1 VIEW: CPT | Mod: 26

## 2021-05-03 RX ORDER — FUROSEMIDE 40 MG
40 TABLET ORAL DAILY
Refills: 0 | Status: DISCONTINUED | OUTPATIENT
Start: 2021-05-04 | End: 2021-05-05

## 2021-05-03 RX ADMIN — Medication 1 MILLIGRAM(S): at 11:12

## 2021-05-03 RX ADMIN — Medication 1 TABLET(S): at 11:12

## 2021-05-03 RX ADMIN — Medication 650 MILLIGRAM(S): at 09:45

## 2021-05-03 RX ADMIN — Medication 250 MILLIGRAM(S): at 11:12

## 2021-05-03 RX ADMIN — Medication 250 MILLIGRAM(S): at 23:12

## 2021-05-03 RX ADMIN — Medication 250 MILLIGRAM(S): at 17:02

## 2021-05-03 RX ADMIN — TIOTROPIUM BROMIDE 1 CAPSULE(S): 18 CAPSULE ORAL; RESPIRATORY (INHALATION) at 10:00

## 2021-05-03 RX ADMIN — Medication 250 MILLIGRAM(S): at 05:11

## 2021-05-03 RX ADMIN — ATORVASTATIN CALCIUM 20 MILLIGRAM(S): 80 TABLET, FILM COATED ORAL at 22:26

## 2021-05-03 RX ADMIN — Medication 650 MILLIGRAM(S): at 09:29

## 2021-05-03 RX ADMIN — HEPARIN SODIUM 5000 UNIT(S): 5000 INJECTION INTRAVENOUS; SUBCUTANEOUS at 13:37

## 2021-05-03 RX ADMIN — NYSTATIN CREAM 1 APPLICATION(S): 100000 CREAM TOPICAL at 05:11

## 2021-05-03 RX ADMIN — NYSTATIN CREAM 1 APPLICATION(S): 100000 CREAM TOPICAL at 18:45

## 2021-05-03 RX ADMIN — HEPARIN SODIUM 5000 UNIT(S): 5000 INJECTION INTRAVENOUS; SUBCUTANEOUS at 05:11

## 2021-05-03 RX ADMIN — PANTOPRAZOLE SODIUM 40 MILLIGRAM(S): 20 TABLET, DELAYED RELEASE ORAL at 05:11

## 2021-05-03 RX ADMIN — MAGNESIUM OXIDE 400 MG ORAL TABLET 400 MILLIGRAM(S): 241.3 TABLET ORAL at 09:29

## 2021-05-03 RX ADMIN — Medication 50 MILLIGRAM(S): at 05:11

## 2021-05-03 RX ADMIN — HEPARIN SODIUM 5000 UNIT(S): 5000 INJECTION INTRAVENOUS; SUBCUTANEOUS at 22:26

## 2021-05-03 NOTE — DIETITIAN INITIAL EVALUATION ADULT. - OTHER INFO
80F Yakut/English speaking, PMHx HTN, HLD, AS (s/p TAVR 02/2019), COPD (on 2L home O2 at baseline), GERD, and colon cancer (s/p colectomy) who presented to [facility] ED 4/28 c/o 3 days of worsening SOB/MANRIQUE and chest pressure. Patient placed on BiPAP, diuresed, and transient nitro gtt in ED with improvement. Admitted to cardiology/telemetry for HF management. [facility], Dr. Alejo consulted for hx TAVR and evaluation. 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 TTE completed showing valve failure. She was transferred to [facility] service under the care of Dr. Alejo for further heart failure management and workup for possible intervention. Plan for possible repeat intervention this admission. Pt observed resting in chair, NAD, noting good appetite. +BM today, denies n/v/d/c, chewing/ swallowing issues or pain impacting intake, skin with jessica 21, 1+ edema to ankles. NKFA or wt changes noted. No restrictions follow at home. Discussed DASH diet, wanting regular food. Will follow per protocol and reinforce ed as needed.

## 2021-05-03 NOTE — PROGRESS NOTE ADULT - SUBJECTIVE AND OBJECTIVE BOX
Patient discussed on morning rounds with Dr. Alejo      Operation / Date: s/p TAVR 2019, readmit with failed TAVR valve    SUBJECTIVE ASSESSMENT:  80y Female assessed at bedside this morning. The patient states she feels well today, no further episodes of chest pain. She states she ambulated between the bathroom and bed without difficulty or SOB which is better than her baseline. She denies fever, chills, nausea, vomiting.    Vital Signs Last 24 Hrs  T(C): 36.8 (03 May 2021 12:10), Max: 37.1 (03 May 2021 08:51)  T(F): 98.3 (03 May 2021 12:10), Max: 98.7 (03 May 2021 08:51)  HR: 65 (03 May 2021 12:10) (55 - 73)  BP: 131/68 (03 May 2021 12:10) (126/63 - 143/63)  BP(mean): 93 (03 May 2021 12:10) (83 - 96)  RR: 18 (03 May 2021 12:10) (17 - 20)  SpO2: 95% (03 May 2021 12:10) (91% - 98%)  I&O's Detail    02 May 2021 07:01  -  03 May 2021 07:00  --------------------------------------------------------  IN:    Albumin 5%  - 250 mL: 250 mL  Total IN: 250 mL    OUT:    Voided (mL): 1900 mL  Total OUT: 1900 mL    Total NET: -1650 mL      03 May 2021 07:01  -  03 May 2021 16:05  --------------------------------------------------------  IN:  Total IN: 0 mL    OUT:    Voided (mL): 200 mL  Total OUT: 200 mL    Total NET: -200 mL    CHEST TUBE:  No  SERVANDO DRAIN:  No.  EPICARDIAL WIRES: No.  TIE DOWNS: No.  VENTURA: No.    Physical Exam  CONSTITUTIONAL: Well appearing in NAD assessed sitting at bedside   NEURO: A&OX3. No focal deficits noted, moving bilateral upper and lower extremities                    CV: RRR, +YOVANA, no rubs, no gallops  RESPIRATORY: Clear to auscultation bilateral posterior lung fields, no wheezes, rales, rhonchi   GI: +BS, NT/ND  MUSKULOSKELETAL: Trace bilateral LE edema, no calf tenderness. Left upper extremity erythematous where prior IV was. Full strength and ROM bilateral upper and lower extremities   VASCULAR: Bilateral distal pulses 2+  INCISIONS: None    LABS:                        10.3   6.10  )-----------( 259      ( 03 May 2021 06:47 )             34.4       COUMADIN:  No    PT/INR - ( 02 May 2021 06:15 )   PT: 13.7 sec;   INR: 1.15          PTT - ( 02 May 2021 06:15 )  PTT:30.0 sec    05-03    134<L>  |  96  |  29<H>  ----------------------------<  102<H>  4.1   |  28  |  1.15    Ca    9.8      03 May 2021 06:47  Mg     2.2     05-03    MEDICATIONS  (STANDING):  atorvastatin 20 milliGRAM(s) Oral at bedtime  cephalexin 250 milliGRAM(s) Oral every 6 hours  folic acid 1 milliGRAM(s) Oral daily  heparin   Injectable 5000 Unit(s) SubCutaneous every 8 hours  iron sucrose IVPB 200 milliGRAM(s) IV Intermittent every 48 hours  magnesium oxide 400 milliGRAM(s) Oral every 24 hours  metoprolol succinate ER 50 milliGRAM(s) Oral daily  multivitamin 1 Tablet(s) Oral daily  nystatin Powder 1 Application(s) Topical two times a day  pantoprazole    Tablet 40 milliGRAM(s) Oral before breakfast  tiotropium 18 MICROgram(s) Capsule 1 Capsule(s) Inhalation daily    MEDICATIONS  (PRN):  acetaminophen   Tablet .. 650 milliGRAM(s) Oral every 6 hours PRN Moderate Pain (4 - 6)  LORazepam     Tablet 0.5 milliGRAM(s) Oral three times a day PRN Anxiety        RADIOLOGY & ADDITIONAL TESTS:  < from: TTE Echo Complete w/o Contrast w/ Doppler (04.29.21 @ 12:22) >  CONCLUSIONS:     1. Moderate symmetric leftventricular hypertrophy.   2. Hyperdynamic left ventricular systolic function.   3. Normal right ventricular size and systolic function.   4. A transcatheter aortic valve (TAVR) is noted in the aortic position. The peak transvalvular velocity is 4.10m/s, the mean transvalvular gradient is 40.00 mmHg, and the LVOT/AV velocity ratio is 0.37. The peak transaortic gradient is 67.24 mmHg. The mean transaortic gradient is 40.00 mmHg. There is no evidence of aortic regurgitation.   5. Pulmonary artery systolic pressure is 28 mmHg.   6. Trivial pericardial effusion.   7. Borderline dilated aortic root.   8. Mildly dilated ascending aorta.   9. Compared to the previous TTE performed on 3/1/2021, there have been no significant interval changes.    < end of copied text >

## 2021-05-03 NOTE — DIETITIAN INITIAL EVALUATION ADULT. - PROBLEM SELECTOR PLAN 5
Hgb 7.6 on admission, baseline ~7 from prior admissions   - s/p colectomy for colon cancer  - denies any BRBPR/melena   - monitor for signs and symptoms of bleeding   - monitor daily CBC   - maintain active type and screen

## 2021-05-03 NOTE — DIETITIAN INITIAL EVALUATION ADULT. - OTHER CALCULATIONS
ABW used for calculations as pt between % of IBW. (114% IBW). Nutrient needs based on [facility] standards of care for maintenance in adults, adjusted for age, possible sx intervention

## 2021-05-03 NOTE — DIETITIAN INITIAL EVALUATION ADULT. - PROBLEM SELECTOR PLAN 1
Presented w/ 3 days of worsening MANRIQUE and intermittent chest pressure   - reports MANRIQUE w/ walking around house, worsened to the point where she could not speak this morning   - cardiac cath from 01/2019 w/ non-obstructive CAD (prior to TAVR as part of workup)  - recent Echo (03/2021) showed moderate concentric LVH, EF 71%, normal RV size and function, TAVR in aortic position, mild pulm HTN (PASP 44 mmHg), pericardial fat pad anterior to RV, aortic root borderline dilated   - , trop negative x 1, D-dimer negative   - CXR showed b/l opacities/pleural effusions, left upper lobe nodular opacity, stable cardiomegaly   - COVID PCR negative, UA trace leuks and present bacteria (denies symptoms)  - initial VS significant for /110 mmHg and 26 RR  - given Lasix 60 mg IV (total) in ED, started on Nitro gtt initially, and placed on BiPAP  - patient negative ~1.7 L in ED, BP and RR improved, Nitro gtt off, weaned to 3L NC and satting 100%  - ordered for additional Lasix 40 mg IV x 1 at 6 PM   - assess volume status/need for further diuretics in morning   - no repeat echo needed   - strict I's and O's, daily weight, core measures, fluid restriction

## 2021-05-03 NOTE — DIETITIAN INITIAL EVALUATION ADULT. - ADD RECOMMEND
1. Encourage intake through day 2. Manage pain prn 3. Trend wts 4. Reinforce ed 5. Monitor and replete lytes 6. Honor food preferences

## 2021-05-03 NOTE — PROGRESS NOTE ADULT - ASSESSMENT
A/P:    80 year old F, Spanish/English speaking, PMHx HTN, HLD, AS (s/p TAVR 02/2019), COPD (on 2L home O2 at baseline), GERD, and colon cancer (s/p colectomy) who presented to [facility] ED 4/28 c/o 3 days of worsening SOB/MANRIQUE and chest pressure. Patient placed on BiPAP, diuresed, and transient nitrogtt in ED with improvement. Admitted to cardiology/telemetry for HF management. [facility], Dr. Alejo consulted for hx TAVR and evaluation. 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 TTE completed revealed TAVR valve with mean transvalvular gradient is 40.00 mmHg. She was transferred to [facility] service under the care of Dr. Alejo for further heart failure management and workup for possible intervention.     Neurovascular:   - No delirium. Pain well controlled with current regimen.  -Continue tylenol PRN  - Continue lorazepam 0.5 mg PRN    Cardiovascular:   - S/p TAVR (2019), now readmitted with valve failure  - Was tried on AC as an outpatient for c/f valve thrombus, discontinued per Dr. Alejo   -Hemodynamically stable. HR controlled (61-73)  - Hx of HTN, BP controlled (115//80), continue toprol XL  - HLD: Continue atorvastatin 20 mg     Respiratory:   - 02 Sat = 97% on 2L (on home O2)  -If on oxygen wean to RA from for O2 Sat > 93%.  -Encourage C+DB and Use of IS 10x / hr while awake.  -Hx COPD: continue spiriva     GI:   - Hx colon CA s/p colectomy  - GI consulted given low H/H on admission and positive FOB  - Per GI no acute workup required unless patient has melena, OK for heparin   -Continue GI PPX with protonix  -PO Diet.    Renal / :  - BUN/Cr downtrending today 29/1.15  - Held diuresis X2 days, per Dr. Alejo resume 40 PO lasix tomorrow  -Continue to monitor renal function.  -Monitor I/O's.  - Replete electrolytes PRN    Endocrine:    -A1c 5.2, no known hx diabetes   -TSH 1.230, no known hx thyroid disease     Hematologic:  -H/H stable at 10.3/34.4 with no obvious signs of bleeding  -Coagulation Panel.  - Admitted with H/H 7.6/26.9, s/p 2U PRBC 5/3  - Hematology consulted, continue iron 200 IV q48 hr, folic acid     ID:  -Pt remains afebrile with no elevation in WBC or signs of infection  - L hand phlebitis: continue keflex   -Continue to monitor CBC  -Observe for SIRS/Sepsis Syndrome.    Prophylaxis:  -DVT prophylaxis with 5000 SubQ Heparin q8h.  -SCD's    Disposition:  -Possible procedure this week

## 2021-05-04 ENCOUNTER — APPOINTMENT (OUTPATIENT)
Dept: CARDIOTHORACIC SURGERY | Facility: HOSPITAL | Age: 81
End: 2021-05-04

## 2021-05-04 LAB
ANION GAP SERPL CALC-SCNC: 11 MMOL/L — SIGNIFICANT CHANGE UP (ref 5–17)
BUN SERPL-MCNC: 28 MG/DL — HIGH (ref 7–23)
CALCIUM SERPL-MCNC: 9.7 MG/DL — SIGNIFICANT CHANGE UP (ref 8.4–10.5)
CHLORIDE SERPL-SCNC: 101 MMOL/L — SIGNIFICANT CHANGE UP (ref 96–108)
CO2 SERPL-SCNC: 27 MMOL/L — SIGNIFICANT CHANGE UP (ref 22–31)
CREAT SERPL-MCNC: 1.22 MG/DL — SIGNIFICANT CHANGE UP (ref 0.5–1.3)
EPO SERPL-MCNC: 82.7 MIU/ML — HIGH (ref 2.6–18.5)
GLUCOSE SERPL-MCNC: 102 MG/DL — HIGH (ref 70–99)
HCT VFR BLD CALC: 33.2 % — LOW (ref 34.5–45)
HGB BLD-MCNC: 9.8 G/DL — LOW (ref 11.5–15.5)
MAGNESIUM SERPL-MCNC: 2.2 MG/DL — SIGNIFICANT CHANGE UP (ref 1.6–2.6)
MCHC RBC-ENTMCNC: 23.8 PG — LOW (ref 27–34)
MCHC RBC-ENTMCNC: 29.5 GM/DL — LOW (ref 32–36)
MCV RBC AUTO: 80.8 FL — SIGNIFICANT CHANGE UP (ref 80–100)
NRBC # BLD: 0 /100 WBCS — SIGNIFICANT CHANGE UP (ref 0–0)
PLATELET # BLD AUTO: 220 K/UL — SIGNIFICANT CHANGE UP (ref 150–400)
POTASSIUM SERPL-MCNC: 4.2 MMOL/L — SIGNIFICANT CHANGE UP (ref 3.5–5.3)
POTASSIUM SERPL-SCNC: 4.2 MMOL/L — SIGNIFICANT CHANGE UP (ref 3.5–5.3)
RBC # BLD: 4.11 M/UL — SIGNIFICANT CHANGE UP (ref 3.8–5.2)
RBC # FLD: 21.2 % — HIGH (ref 10.3–14.5)
SARS-COV-2 RNA SPEC QL NAA+PROBE: SIGNIFICANT CHANGE UP
SODIUM SERPL-SCNC: 139 MMOL/L — SIGNIFICANT CHANGE UP (ref 135–145)
WBC # BLD: 6.68 K/UL — SIGNIFICANT CHANGE UP (ref 3.8–10.5)
WBC # FLD AUTO: 6.68 K/UL — SIGNIFICANT CHANGE UP (ref 3.8–10.5)

## 2021-05-04 PROCEDURE — 71045 X-RAY EXAM CHEST 1 VIEW: CPT | Mod: 26

## 2021-05-04 RX ORDER — METOPROLOL TARTRATE 50 MG
2.5 TABLET ORAL ONCE
Refills: 0 | Status: COMPLETED | OUTPATIENT
Start: 2021-05-04 | End: 2021-05-04

## 2021-05-04 RX ORDER — CHLORHEXIDINE GLUCONATE 213 G/1000ML
10 SOLUTION TOPICAL ONCE
Refills: 0 | Status: DISCONTINUED | OUTPATIENT
Start: 2021-05-04 | End: 2021-05-05

## 2021-05-04 RX ORDER — METOPROLOL TARTRATE 50 MG
2.5 TABLET ORAL ONCE
Refills: 0 | Status: DISCONTINUED | OUTPATIENT
Start: 2021-05-04 | End: 2021-05-04

## 2021-05-04 RX ORDER — HYDRALAZINE HCL 50 MG
5 TABLET ORAL ONCE
Refills: 0 | Status: COMPLETED | OUTPATIENT
Start: 2021-05-04 | End: 2021-05-04

## 2021-05-04 RX ORDER — CHLORHEXIDINE GLUCONATE 213 G/1000ML
1 SOLUTION TOPICAL ONCE
Refills: 0 | Status: COMPLETED | OUTPATIENT
Start: 2021-05-04 | End: 2021-05-04

## 2021-05-04 RX ORDER — CHLORHEXIDINE GLUCONATE 213 G/1000ML
1 SOLUTION TOPICAL ONCE
Refills: 0 | Status: COMPLETED | OUTPATIENT
Start: 2021-05-05 | End: 2021-05-05

## 2021-05-04 RX ADMIN — HEPARIN SODIUM 5000 UNIT(S): 5000 INJECTION INTRAVENOUS; SUBCUTANEOUS at 21:53

## 2021-05-04 RX ADMIN — PANTOPRAZOLE SODIUM 40 MILLIGRAM(S): 20 TABLET, DELAYED RELEASE ORAL at 06:13

## 2021-05-04 RX ADMIN — MAGNESIUM OXIDE 400 MG ORAL TABLET 400 MILLIGRAM(S): 241.3 TABLET ORAL at 10:18

## 2021-05-04 RX ADMIN — Medication 250 MILLIGRAM(S): at 17:20

## 2021-05-04 RX ADMIN — Medication 250 MILLIGRAM(S): at 06:11

## 2021-05-04 RX ADMIN — Medication 2.5 MILLIGRAM(S): at 18:45

## 2021-05-04 RX ADMIN — NYSTATIN CREAM 1 APPLICATION(S): 100000 CREAM TOPICAL at 17:20

## 2021-05-04 RX ADMIN — CHLORHEXIDINE GLUCONATE 1 APPLICATION(S): 213 SOLUTION TOPICAL at 23:00

## 2021-05-04 RX ADMIN — IRON SUCROSE 110 MILLIGRAM(S): 20 INJECTION, SOLUTION INTRAVENOUS at 17:20

## 2021-05-04 RX ADMIN — HEPARIN SODIUM 5000 UNIT(S): 5000 INJECTION INTRAVENOUS; SUBCUTANEOUS at 13:36

## 2021-05-04 RX ADMIN — TIOTROPIUM BROMIDE 1 CAPSULE(S): 18 CAPSULE ORAL; RESPIRATORY (INHALATION) at 10:18

## 2021-05-04 RX ADMIN — NYSTATIN CREAM 1 APPLICATION(S): 100000 CREAM TOPICAL at 06:13

## 2021-05-04 RX ADMIN — Medication 5 MILLIGRAM(S): at 17:19

## 2021-05-04 RX ADMIN — HEPARIN SODIUM 5000 UNIT(S): 5000 INJECTION INTRAVENOUS; SUBCUTANEOUS at 06:11

## 2021-05-04 RX ADMIN — Medication 1 MILLIGRAM(S): at 13:36

## 2021-05-04 RX ADMIN — Medication 50 MILLIGRAM(S): at 06:12

## 2021-05-04 RX ADMIN — Medication 250 MILLIGRAM(S): at 13:35

## 2021-05-04 RX ADMIN — Medication 1 TABLET(S): at 13:36

## 2021-05-04 RX ADMIN — Medication 40 MILLIGRAM(S): at 06:17

## 2021-05-04 RX ADMIN — ATORVASTATIN CALCIUM 20 MILLIGRAM(S): 80 TABLET, FILM COATED ORAL at 21:53

## 2021-05-04 NOTE — CHART NOTE - NSCHARTNOTEFT_GEN_A_CORE
Admitting Diagnosis:   Patient is a 80y old  Female who presents with a chief complaint of SOB, Chest Pressure (03 May 2021 16:04)      PAST MEDICAL & SURGICAL HISTORY:  HTN (hypertension)    Aortic stenosis    Pulmonary HTN    Hyperlipidemia    COPD (chronic obstructive pulmonary disease)    GERD (gastroesophageal reflux disease)    S/P TAVR (transcatheter aortic valve replacement)        Current Nutrition Order: DASH/TLC        PO Intake: Good (%) [   ]  Fair (50-75%) [ x  ] Poor (<25%) [   ]- did not eat breakfast because consistency too hard, ordered pancakes and got toast - will discuss downgrading with team     GI Issues:   denies n/v/d/c    Pain:  no pain noted     Skin Integrity:  jessica 23  1+ B/L edema to ankles     Labs:   05-04    139  |  101  |  28<H>  ----------------------------<  102<H>  4.2   |  27  |  1.22    Ca    9.7      04 May 2021 06:01  Mg     2.2     05-04      CAPILLARY BLOOD GLUCOSE          Medications:  MEDICATIONS  (STANDING):  atorvastatin 20 milliGRAM(s) Oral at bedtime  cephalexin 250 milliGRAM(s) Oral every 6 hours  folic acid 1 milliGRAM(s) Oral daily  furosemide    Tablet 40 milliGRAM(s) Oral daily  heparin   Injectable 5000 Unit(s) SubCutaneous every 8 hours  iron sucrose IVPB 200 milliGRAM(s) IV Intermittent every 48 hours  magnesium oxide 400 milliGRAM(s) Oral every 24 hours  metoprolol succinate ER 50 milliGRAM(s) Oral daily  multivitamin 1 Tablet(s) Oral daily  nystatin Powder 1 Application(s) Topical two times a day  pantoprazole    Tablet 40 milliGRAM(s) Oral before breakfast  tiotropium 18 MICROgram(s) Capsule 1 Capsule(s) Inhalation daily    MEDICATIONS  (PRN):  acetaminophen   Tablet .. 650 milliGRAM(s) Oral every 6 hours PRN Moderate Pain (4 - 6)  LORazepam     Tablet 0.5 milliGRAM(s) Oral three times a day PRN Anxiety      Weight: 96.3kg     Weight Change: none     Nutrition Focused Physical Exam: Completed [   ]  Not Pertinent [ x  ]    Estimated energy needs:   ABW used for calculations as pt between % of IBW. (114% IBW). Nutrient needs based on [facility] standards of care for maintenance in adults, adjusted for age, possible sx intervention  1926-2407kcal (20-25kcal/kg)  115-134g pro (1.2-1.4g/kg pro)     Subjective:   80F Azeri/English speaking, PMHx HTN, HLD, AS (s/p TAVR 02/2019), COPD (on 2L home O2 at baseline), GERD, and colon cancer (s/p colectomy) who presented to [facility] ED 4/28 c/o 3 days of worsening SOB/MANRIQUE and chest pressure. Patient placed on BiPAP, diuresed, and transient nitro gtt in ED with improvement. Admitted to cardiology/telemetry for HF management. [facility], Dr. Alejo consulted for hx TAVR and evaluation. 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 TTE completed showing valve failure. She was transferred to [facility] service under the care of Dr. Alejo for further heart failure management and workup for possible intervention. Plan for possible repeat intervention this admission. Pt again observed resting in chair, NAD, noting good appetite but unable to chew harder foods d/t missing dentition on top teeth. Does not feel she needs soft consistency diet and is able to order appropriate foods. Feels less SOB at this time, feeling overall improved. Will follow per protocol and reinforce ed as needed.    Previous Nutrition Diagnosis: Increased nutrient needs r/t hypermetabolic state AEB pre/post-op demand     Active [ x  ]  Resolved [   ]    If resolved, new PES:     Goal: meet >75% EER    Recommendations:  1. Encourage intake through day   2. Manage pain prn   3. Trend wts   4. Reinforce ed   5. Monitor and replete lytes   6. Honor food preferences    Education: encouraged intake through day, discussed soft options     Risk Level: High [   ] Moderate [  x ] Low [   ]

## 2021-05-04 NOTE — PROGRESS NOTE ADULT - SUBJECTIVE AND OBJECTIVE BOX
Patient discussed on morning rounds with Dr. Alejo     Operation / Date: TAVR (rachael), EF nml readmitted 4/28 with HF (failed TAVR)    SUBJECTIVE ASSESSMENT:  80y Female. NAEO. Planned for intervention tomorrow.     Vital Signs Last 24 Hrs  T(C): 36.8 (04 May 2021 14:57), Max: 36.9 (03 May 2021 17:42)  T(F): 98.2 (04 May 2021 14:57), Max: 98.4 (03 May 2021 17:42)  HR: 68 (04 May 2021 13:58) (62 - 74)  BP: 188/77 (04 May 2021 13:58) (133/71 - 188/77)  BP(mean): 110 (04 May 2021 13:58) (95 - 122)  RR: 16 (04 May 2021 13:58) (16 - 19)  SpO2: 97% (04 May 2021 13:58) (95% - 97%)  I&O's Detail    03 May 2021 07:01  -  04 May 2021 07:00  --------------------------------------------------------  IN:  Total IN: 0 mL    OUT:    Voided (mL): 500 mL  Total OUT: 500 mL    Total NET: -500 mL      04 May 2021 07:01  -  04 May 2021 15:30  --------------------------------------------------------  IN:    Oral Fluid: 375 mL  Total IN: 375 mL    OUT:    Voided (mL): 2 mL  Total OUT: 2 mL    Total NET: 373 mL      CHEST TUBE:   No.    SERVANDO DRAIN:   No.  EPICARDIAL WIRES:  No.  TIE DOWNS: No.  VENTURA: No.    PHYSICAL EXAM:    General: NAD, comfortable, sitting up in chair    Neurological: AOx3, CNs grossly intact; motor skills grossly intact    Cardiovascular: regular rate and rhythm; no murmurs, rubs, gallops    Respiratory: CTABL, no wheezing, rales, rhonchi    Gastrointestinal: +BS, soft, obese, NTND    Extremities: WWP, no LE edema, no calf tenderness    Vascular: distal pulses intact b/l      LABS:                        9.8    6.68  )-----------( 220      ( 04 May 2021 06:01 )             33.2     COUMADIN:  No.      05-04    139  |  101  |  28<H>  ----------------------------<  102<H>  4.2   |  27  |  1.22    Ca    9.7      04 May 2021 06:01  Mg     2.2     05-04    MEDICATIONS  (STANDING):  atorvastatin 20 milliGRAM(s) Oral at bedtime  cephalexin 250 milliGRAM(s) Oral every 6 hours  folic acid 1 milliGRAM(s) Oral daily  furosemide    Tablet 40 milliGRAM(s) Oral daily  heparin   Injectable 5000 Unit(s) SubCutaneous every 8 hours  iron sucrose IVPB 200 milliGRAM(s) IV Intermittent every 48 hours  magnesium oxide 400 milliGRAM(s) Oral every 24 hours  metoprolol succinate ER 50 milliGRAM(s) Oral daily  multivitamin 1 Tablet(s) Oral daily  nystatin Powder 1 Application(s) Topical two times a day  pantoprazole    Tablet 40 milliGRAM(s) Oral before breakfast  tiotropium 18 MICROgram(s) Capsule 1 Capsule(s) Inhalation daily    MEDICATIONS  (PRN):  acetaminophen   Tablet .. 650 milliGRAM(s) Oral every 6 hours PRN Moderate Pain (4 - 6)  LORazepam     Tablet 0.5 milliGRAM(s) Oral three times a day PRN Anxiety        RADIOLOGY & ADDITIONAL TESTS:  < from: Xray Chest 1 View- PORTABLE-Routine (Xray Chest 1 View- PORTABLE-Routine in AM.) (05.04.21 @ 03:16) >      INTERPRETATION:  Chest, single portable view    HISTORY: CHF    Comparison: 5/3    IMPRESSION:    Support Devices: None  Heart: Cardiomegaly. Status post TAVR.  Mediastinum:  Unremarkable  Lungs/Airways: Mildly increased small left pleural effusion.  Bones/Soft tissues: Unremarkable    < end of copied text >     Planned Date of Surgery:    5/5/21                                                                                                            Surgeon: Dr. Alejo    Procedure: TAVR    HPI:  80 year old F, Scottish/English speaking, PMHx HTN, HLD, AS (s/p TAVR 02/2019), COPD (on 2L home O2 at baseline), GERD, and colon cancer (s/p colectomy) who presented to [facility] ED 4/28 c/o 3 days of worsening SOB/MANRIQUE and chest pressure. Patient placed on BiPAP, diuresed, and transient nitrogtt in ED with improvement. Admitted to cardiology/telemetry for HF management. [facility], Dr. Alejo consulted for hx TAVR and evaluation. 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 TTE completed revealed TAVR valve with mean transvalvular gradient is 40.00 mmHg. She was transferred to [facility] service under the care of Dr. Alejo for further heart failure management and workup for possible intervention.     PAST MEDICAL & SURGICAL HISTORY:  HTN (hypertension)    Aortic stenosis    Pulmonary HTN    Hyperlipidemia    COPD (chronic obstructive pulmonary disease)    GERD (gastroesophageal reflux disease)    S/P TAVR (transcatheter aortic valve replacement)        Digox (Rash; Urticaria; Hives)  Plavix (Other (Mod to Severe))      Physical Exam  T(C): 36.2 (05-04-21 @ 17:01), Max: 36.8 (05-04-21 @ 14:57)  HR: 78 (05-04-21 @ 17:41) (62 - 79)  BP: 200/77 (05-04-21 @ 17:41) (133/71 - 200/77)  RR: 20 (05-04-21 @ 17:41) (16 - 20)  SpO2: 99% (05-04-21 @ 17:41) (95% - 100%)  CONSTITUTIONAL: Well appearing in NAD assessed sitting at bedside   NEURO: A&OX3. No focal deficits noted, moving bilateral upper and lower extremities                    CV: RRR, +YOVANA, no rubs, no gallops  RESPIRATORY: Clear to auscultation bilateral posterior lung fields, no wheezes, rales, rhonchi   GI: +BS, NT/ND  MUSKULOSKELETAL: Trace bilateral LE edema, no calf tenderness. Left upper extremity erythematous where prior IV was. Full strength and ROM bilateral upper and lower extremities   VASCULAR: Bilateral distal pulses 2+  INCISIONS: None    MEDICATIONS  (STANDING):  atorvastatin 20 milliGRAM(s) Oral at bedtime  cephalexin 250 milliGRAM(s) Oral every 6 hours  chlorhexidine 0.12% Liquid 10 milliLiter(s) Swish and Spit once  chlorhexidine 4% Liquid 1 Application(s) Topical once  chlorhexidine 4% Liquid 1 Application(s) Topical once  folic acid 1 milliGRAM(s) Oral daily  furosemide    Tablet 40 milliGRAM(s) Oral daily  heparin   Injectable 5000 Unit(s) SubCutaneous every 8 hours  magnesium oxide 400 milliGRAM(s) Oral every 24 hours  metoprolol succinate ER 50 milliGRAM(s) Oral daily  multivitamin 1 Tablet(s) Oral daily  nystatin Powder 1 Application(s) Topical two times a day  pantoprazole    Tablet 40 milliGRAM(s) Oral before breakfast  tiotropium 18 MICROgram(s) Capsule 1 Capsule(s) Inhalation daily    MEDICATIONS  (PRN):  acetaminophen   Tablet .. 650 milliGRAM(s) Oral every 6 hours PRN Moderate Pain (4 - 6)  LORazepam     Tablet 0.5 milliGRAM(s) Oral three times a day PRN Anxiety      On Beta Blocker? YES     Labs:                        9.8    6.68  )-----------( 220      ( 04 May 2021 06:01 )             33.2     05-04    139  |  101  |  28<H>  ----------------------------<  102<H>  4.2   |  27  |  1.22    Ca    9.7      04 May 2021 06:01  Mg     2.2     05-04      ABO Interpretation: O (05-03-21 @ 06:51)      Hgb A1C: 5.2    EKG: in chart    CXR:  < from: Xray Chest 1 View- PORTABLE-Routine (Xray Chest 1 View- PORTABLE-Routine in AM.) (05.04.21 @ 03:16) >  INTERPRETATION:  Chest, single portable view    HISTORY: CHF    Comparison: 5/3    IMPRESSION:    Support Devices: None  Heart: Cardiomegaly. Status post TAVR.  Mediastinum:  Unremarkable  Lungs/Airways: Mildly increased small left pleural effusion.  Bones/Soft tissues: Unremarkable    < end of copied text >    Echo:  < from: TTE Echo Complete w/o Contrast w/ Doppler (04.29.21 @ 12:22) >  CONCLUSIONS:     1. Moderate symmetric leftventricular hypertrophy.   2. Hyperdynamic left ventricular systolic function.   3. Normal right ventricular size and systolic function.   4. A transcatheter aortic valve (TAVR) is noted in the aortic position. The peak transvalvular velocity is 4.10m/s, the mean transvalvular gradient is 40.00 mmHg, and the LVOT/AV velocity ratio is 0.37. The peak transaortic gradient is 67.24 mmHg. The mean transaortic gradient is 40.00 mmHg. There is no evidence of aortic regurgitation.   5. Pulmonary artery systolic pressure is 28 mmHg.   6. Trivial pericardial effusion.   7. Borderline dilated aortic root.   8. Mildly dilated ascending aorta.   9. Compared to the previous TTE performed on 3/1/2021, there have been no significant interval changes.    < end of copied text >    PFT's:   in chart    Consult in Chart?  YES   Consent in Chart? YES    Pre-op Orders Placed? YES    Blood Products Ordered? YES    NPO ordered? YES

## 2021-05-04 NOTE — PROGRESS NOTE ADULT - ASSESSMENT
80 year old F, Bol[place]ian/English speaking, PMHx HTN, HLD, AS (s/p TAVR 02/2019), COPD (on 2L home O2 at baseline), GERD, and colon cancer (s/p colectomy) who presented to [facility] ED 4/28 c/o 3 days of worsening SOB/MANRIQUE and chest pressure. Patient placed on BiPAP, diuresed, and transient nitrogtt in ED with improvement. Admitted to cardiology/telemetry for HF management. [facility], Dr. Alejo consulted for hx TAVR and evaluation. 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 TTE completed revealed TAVR valve with mean transvalvular gradient is 40.00 mmHg. She was transferred to [facility] service under the care of Dr. Alejo for further heart failure management and workup for possible intervention. Dr. Aranda brought in to evaluate patient with valve thrombus noted for further surgical plan.      Neurovascular:   - No delirium. Pain well controlled with current regimen.  - Continue tylenol PRN  - Continue lorazepam 0.5 mg PRN    Cardiovascular:   - S/p TAVR (2019), now readmitted with valve failure  - Was tried on AC as an outpatient for c/f valve thrombus, discontinued per Dr. Alejo   -Hemodynamically stable. HR controlled (61-73)  - Hx of HTN, BP controlled (115//80), continue toprol XL  - HLD: Continue atorvastatin 20 mg     Respiratory:   - 02 Sat = 97% on 2L (on home O2)  -If on oxygen wean to RA from for O2 Sat > 93%.  -Encourage C+DB and Use of IS 10x / hr while awake.  -Hx COPD: continue spiriva     GI:   - Hx colon CA s/p colectomy  - GI consulted given low H/H on admission and positive FOB  - Per GI no acute workup required unless patient has melena, OK for heparin   -Continue GI PPX with protonix  -PO Diet.    Renal / :  - BUN/Cr downtrending today 28/1.22  - Held diuresis X2 days, per Dr. Alejo resume 40 PO lasix tomorrow  - Continue to monitor renal function.  - Monitor I/O's.  - Replete electrolytes PRN    Endocrine:    -A1c 5.2, no known hx diabetes   -TSH 1.230, no known hx thyroid disease     Hematologic:  - H/H stable at 9.8/33.2 with no obvious signs of bleeding  - Coagulation Panel.  - Admitted with H/H 7.6/26.9, s/p 2U PRBC 5/3  - Hematology consulted, continue iron 200 IV q48 hr, folic acid     ID:  -Pt remains afebrile with no elevation in WBC or signs of infection; WBC 6.8  - L hand phlebitis: continue keflex   - Continue to monitor CBC  - Observe for SIRS/Sepsis Syndrome.    Prophylaxis:  -DVT prophylaxis with 5000 SubQ Heparin q8h.  -SCD's    Disposition:  -Possible procedure this week      80 year old F, Yemeni/English speaking, PMHx HTN, HLD, AS (s/p TAVR 02/2019), COPD (on 2L home O2 at baseline), GERD, and colon cancer (s/p colectomy) who presented to [facility] ED 4/28 c/o 3 days of worsening SOB/MANRIQUE and chest pressure. Patient placed on BiPAP, diuresed, and transient nitrogtt in ED with improvement. Admitted to cardiology/telemetry for HF management. [facility], Dr. Alejo consulted for hx TAVR and evaluation. 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 TTE completed revealed TAVR valve with mean transvalvular gradient is 40.00 mmHg. She was transferred to [facility] service under the care of Dr. Alejo for further heart failure management and workup for possible intervention. Dr. Aranda brought in to evaluate patient with valve thrombus noted for further surgical plan.      Neurovascular:   - No delirium. Pain well controlled with current regimen.  - Continue tylenol PRN  - Continue lorazepam 0.5 mg PRN    Cardiovascular:   - s/p TAVR (2019), now readmitted with valve failure  - Was tried on AC as an outpatient for c/f valve thrombus, discontinued per Dr. Alejo   -Hemodynamically stable. HR controlled (61-73)  - Hx of HTN, BP controlled (115//80), continue toprol XL  - HLD: Continue atorvastatin 20 mg     Respiratory:   - 02 Sat = 97% on 2L (on home O2)  - If on oxygen wean to RA from for O2 Sat > 93%.  - Encourage C+DB and Use of IS 10x / hr while awake.  - Hx COPD: continue spiriva     GI:   - Hx colon CA s/p colectomy  - GI consulted given low H/H on admission and positive FOB  - Per GI no acute workup required unless patient has melena, OK for heparin   - Continue GI PPX with protonix  - PO Diet    Renal / :  - BUN/Cr downtrending today 28/1.22  - Held diuresis X2 days, per Dr. Alejo resume 40 PO lasix tomorrow  - Continue to monitor renal function.  - Monitor I/O's.  - Replete electrolytes PRN    Endocrine:    -A1C 5.2, no known hx diabetes   -TSH 1.230, no known hx thyroid disease     Hematologic:  - H/H stable at 9.8/33.2 with no obvious signs of bleeding  - Coagulation Panel.  - Admitted with H/H 7.6/26.9, s/p 2U PRBC 5/3  - Hematology consulted, continue iron 200 IV q48 hr, folic acid     ID:  - Pt remains afebrile with no elevation in WBC or signs of infection; WBC 6.8  - L hand phlebitis: continue keflex   - Continue to monitor CBC  - Observe for SIRS/Sepsis Syndrome.    Prophylaxis:  -DVT prophylaxis with 5000 SubQ Heparin q8h.  -SCD's    Disposition:  -Possible procedure this week

## 2021-05-05 ENCOUNTER — APPOINTMENT (OUTPATIENT)
Dept: CARDIOTHORACIC SURGERY | Facility: HOSPITAL | Age: 81
End: 2021-05-05

## 2021-05-05 LAB
ALBUMIN SERPL ELPH-MCNC: 4.1 G/DL — SIGNIFICANT CHANGE UP (ref 3.3–5)
ALBUMIN SERPL ELPH-MCNC: 4.2 G/DL — SIGNIFICANT CHANGE UP (ref 3.3–5)
ALBUMIN SERPL ELPH-MCNC: 4.3 G/DL — SIGNIFICANT CHANGE UP (ref 3.3–5)
ALP SERPL-CCNC: 71 U/L — SIGNIFICANT CHANGE UP (ref 40–120)
ALP SERPL-CCNC: 71 U/L — SIGNIFICANT CHANGE UP (ref 40–120)
ALP SERPL-CCNC: 74 U/L — SIGNIFICANT CHANGE UP (ref 40–120)
ALT FLD-CCNC: 11 U/L — SIGNIFICANT CHANGE UP (ref 10–45)
ALT FLD-CCNC: 12 U/L — SIGNIFICANT CHANGE UP (ref 10–45)
ALT FLD-CCNC: 13 U/L — SIGNIFICANT CHANGE UP (ref 10–45)
ANION GAP SERPL CALC-SCNC: 11 MMOL/L — SIGNIFICANT CHANGE UP (ref 5–17)
ANION GAP SERPL CALC-SCNC: 12 MMOL/L — SIGNIFICANT CHANGE UP (ref 5–17)
ANION GAP SERPL CALC-SCNC: 13 MMOL/L — SIGNIFICANT CHANGE UP (ref 5–17)
ANISOCYTOSIS BLD QL: SIGNIFICANT CHANGE UP
APTT BLD: 37.1 SEC — HIGH (ref 27.5–35.5)
AST SERPL-CCNC: 16 U/L — SIGNIFICANT CHANGE UP (ref 10–40)
AST SERPL-CCNC: 17 U/L — SIGNIFICANT CHANGE UP (ref 10–40)
AST SERPL-CCNC: 17 U/L — SIGNIFICANT CHANGE UP (ref 10–40)
BASOPHILS # BLD AUTO: 0.05 K/UL — SIGNIFICANT CHANGE UP (ref 0–0.2)
BASOPHILS NFR BLD AUTO: 0.9 % — SIGNIFICANT CHANGE UP (ref 0–2)
BILIRUB SERPL-MCNC: 0.5 MG/DL — SIGNIFICANT CHANGE UP (ref 0.2–1.2)
BILIRUB SERPL-MCNC: 0.5 MG/DL — SIGNIFICANT CHANGE UP (ref 0.2–1.2)
BILIRUB SERPL-MCNC: 0.6 MG/DL — SIGNIFICANT CHANGE UP (ref 0.2–1.2)
BUN SERPL-MCNC: 27 MG/DL — HIGH (ref 7–23)
BUN SERPL-MCNC: 27 MG/DL — HIGH (ref 7–23)
BUN SERPL-MCNC: 29 MG/DL — HIGH (ref 7–23)
CALCIUM SERPL-MCNC: 9.4 MG/DL — SIGNIFICANT CHANGE UP (ref 8.4–10.5)
CALCIUM SERPL-MCNC: 9.4 MG/DL — SIGNIFICANT CHANGE UP (ref 8.4–10.5)
CALCIUM SERPL-MCNC: 9.7 MG/DL — SIGNIFICANT CHANGE UP (ref 8.4–10.5)
CHLORIDE SERPL-SCNC: 103 MMOL/L — SIGNIFICANT CHANGE UP (ref 96–108)
CO2 SERPL-SCNC: 23 MMOL/L — SIGNIFICANT CHANGE UP (ref 22–31)
CO2 SERPL-SCNC: 25 MMOL/L — SIGNIFICANT CHANGE UP (ref 22–31)
CO2 SERPL-SCNC: 26 MMOL/L — SIGNIFICANT CHANGE UP (ref 22–31)
CREAT SERPL-MCNC: 1 MG/DL — SIGNIFICANT CHANGE UP (ref 0.5–1.3)
CREAT SERPL-MCNC: 1.03 MG/DL — SIGNIFICANT CHANGE UP (ref 0.5–1.3)
CREAT SERPL-MCNC: 1.05 MG/DL — SIGNIFICANT CHANGE UP (ref 0.5–1.3)
EOSINOPHIL # BLD AUTO: 0.15 K/UL — SIGNIFICANT CHANGE UP (ref 0–0.5)
EOSINOPHIL NFR BLD AUTO: 2.6 % — SIGNIFICANT CHANGE UP (ref 0–6)
GAS PNL BLDA: SIGNIFICANT CHANGE UP
GIANT PLATELETS BLD QL SMEAR: PRESENT — SIGNIFICANT CHANGE UP
GLUCOSE BLDC GLUCOMTR-MCNC: 86 MG/DL — SIGNIFICANT CHANGE UP (ref 70–99)
GLUCOSE BLDC GLUCOMTR-MCNC: 99 MG/DL — SIGNIFICANT CHANGE UP (ref 70–99)
GLUCOSE SERPL-MCNC: 105 MG/DL — HIGH (ref 70–99)
GLUCOSE SERPL-MCNC: 106 MG/DL — HIGH (ref 70–99)
GLUCOSE SERPL-MCNC: 108 MG/DL — HIGH (ref 70–99)
HCT VFR BLD CALC: 34.4 % — LOW (ref 34.5–45)
HCT VFR BLD CALC: 35.7 % — SIGNIFICANT CHANGE UP (ref 34.5–45)
HCT VFR BLD CALC: 37.2 % — SIGNIFICANT CHANGE UP (ref 34.5–45)
HGB BLD-MCNC: 10.3 G/DL — LOW (ref 11.5–15.5)
HGB BLD-MCNC: 10.7 G/DL — LOW (ref 11.5–15.5)
HGB BLD-MCNC: 11 G/DL — LOW (ref 11.5–15.5)
HYPOCHROMIA BLD QL: SLIGHT — SIGNIFICANT CHANGE UP
INR BLD: 1.11 — SIGNIFICANT CHANGE UP (ref 0.88–1.16)
LYMPHOCYTES # BLD AUTO: 1.08 K/UL — SIGNIFICANT CHANGE UP (ref 1–3.3)
LYMPHOCYTES # BLD AUTO: 19.1 % — SIGNIFICANT CHANGE UP (ref 13–44)
MACROCYTES BLD QL: SLIGHT — SIGNIFICANT CHANGE UP
MAGNESIUM SERPL-MCNC: 2 MG/DL — SIGNIFICANT CHANGE UP (ref 1.6–2.6)
MAGNESIUM SERPL-MCNC: 2.1 MG/DL — SIGNIFICANT CHANGE UP (ref 1.6–2.6)
MANUAL SMEAR VERIFICATION: SIGNIFICANT CHANGE UP
MCHC RBC-ENTMCNC: 24.1 PG — LOW (ref 27–34)
MCHC RBC-ENTMCNC: 24.2 PG — LOW (ref 27–34)
MCHC RBC-ENTMCNC: 24.4 PG — LOW (ref 27–34)
MCHC RBC-ENTMCNC: 29.6 GM/DL — LOW (ref 32–36)
MCHC RBC-ENTMCNC: 29.9 GM/DL — LOW (ref 32–36)
MCHC RBC-ENTMCNC: 30 GM/DL — LOW (ref 32–36)
MCV RBC AUTO: 80.4 FL — SIGNIFICANT CHANGE UP (ref 80–100)
MCV RBC AUTO: 81.3 FL — SIGNIFICANT CHANGE UP (ref 80–100)
MCV RBC AUTO: 81.8 FL — SIGNIFICANT CHANGE UP (ref 80–100)
MICROCYTES BLD QL: SLIGHT — SIGNIFICANT CHANGE UP
MONOCYTES # BLD AUTO: 0.34 K/UL — SIGNIFICANT CHANGE UP (ref 0–0.9)
MONOCYTES NFR BLD AUTO: 6.1 % — SIGNIFICANT CHANGE UP (ref 2–14)
NEUTROPHILS # BLD AUTO: 4.03 K/UL — SIGNIFICANT CHANGE UP (ref 1.8–7.4)
NEUTROPHILS NFR BLD AUTO: 71.3 % — SIGNIFICANT CHANGE UP (ref 43–77)
NRBC # BLD: 0 /100 WBCS — SIGNIFICANT CHANGE UP (ref 0–0)
NRBC # BLD: 0 /100 WBCS — SIGNIFICANT CHANGE UP (ref 0–0)
OVALOCYTES BLD QL SMEAR: SLIGHT — SIGNIFICANT CHANGE UP
PHOSPHATE SERPL-MCNC: 4.6 MG/DL — HIGH (ref 2.5–4.5)
PLAT MORPH BLD: ABNORMAL
PLATELET # BLD AUTO: 221 K/UL — SIGNIFICANT CHANGE UP (ref 150–400)
PLATELET # BLD AUTO: 240 K/UL — SIGNIFICANT CHANGE UP (ref 150–400)
PLATELET # BLD AUTO: 244 K/UL — SIGNIFICANT CHANGE UP (ref 150–400)
POIKILOCYTOSIS BLD QL AUTO: SLIGHT — SIGNIFICANT CHANGE UP
POLYCHROMASIA BLD QL SMEAR: SLIGHT — SIGNIFICANT CHANGE UP
POTASSIUM SERPL-MCNC: 3.8 MMOL/L — SIGNIFICANT CHANGE UP (ref 3.5–5.3)
POTASSIUM SERPL-MCNC: 4 MMOL/L — SIGNIFICANT CHANGE UP (ref 3.5–5.3)
POTASSIUM SERPL-MCNC: 4 MMOL/L — SIGNIFICANT CHANGE UP (ref 3.5–5.3)
POTASSIUM SERPL-SCNC: 3.8 MMOL/L — SIGNIFICANT CHANGE UP (ref 3.5–5.3)
POTASSIUM SERPL-SCNC: 4 MMOL/L — SIGNIFICANT CHANGE UP (ref 3.5–5.3)
POTASSIUM SERPL-SCNC: 4 MMOL/L — SIGNIFICANT CHANGE UP (ref 3.5–5.3)
PROT SERPL-MCNC: 7.4 G/DL — SIGNIFICANT CHANGE UP (ref 6–8.3)
PROT SERPL-MCNC: 7.6 G/DL — SIGNIFICANT CHANGE UP (ref 6–8.3)
PROT SERPL-MCNC: 7.7 G/DL — SIGNIFICANT CHANGE UP (ref 6–8.3)
PROTHROM AB SERPL-ACNC: 13.3 SEC — SIGNIFICANT CHANGE UP (ref 10.6–13.6)
RBC # BLD: 4.28 M/UL — SIGNIFICANT CHANGE UP (ref 3.8–5.2)
RBC # BLD: 4.39 M/UL — SIGNIFICANT CHANGE UP (ref 3.8–5.2)
RBC # BLD: 4.55 M/UL — SIGNIFICANT CHANGE UP (ref 3.8–5.2)
RBC # FLD: 21.7 % — HIGH (ref 10.3–14.5)
RBC # FLD: 21.7 % — HIGH (ref 10.3–14.5)
RBC # FLD: 22.2 % — HIGH (ref 10.3–14.5)
RBC BLD AUTO: ABNORMAL
SCHISTOCYTES BLD QL AUTO: SLIGHT — SIGNIFICANT CHANGE UP
SODIUM SERPL-SCNC: 139 MMOL/L — SIGNIFICANT CHANGE UP (ref 135–145)
SODIUM SERPL-SCNC: 140 MMOL/L — SIGNIFICANT CHANGE UP (ref 135–145)
SODIUM SERPL-SCNC: 140 MMOL/L — SIGNIFICANT CHANGE UP (ref 135–145)
SPHEROCYTES BLD QL SMEAR: SLIGHT — SIGNIFICANT CHANGE UP
WBC # BLD: 5.65 K/UL — SIGNIFICANT CHANGE UP (ref 3.8–10.5)
WBC # BLD: 5.79 K/UL — SIGNIFICANT CHANGE UP (ref 3.8–10.5)
WBC # BLD: 6.49 K/UL — SIGNIFICANT CHANGE UP (ref 3.8–10.5)
WBC # FLD AUTO: 5.65 K/UL — SIGNIFICANT CHANGE UP (ref 3.8–10.5)
WBC # FLD AUTO: 5.79 K/UL — SIGNIFICANT CHANGE UP (ref 3.8–10.5)
WBC # FLD AUTO: 6.49 K/UL — SIGNIFICANT CHANGE UP (ref 3.8–10.5)

## 2021-05-05 PROCEDURE — 93306 TTE W/DOPPLER COMPLETE: CPT | Mod: 26

## 2021-05-05 PROCEDURE — 99232 SBSQ HOSP IP/OBS MODERATE 35: CPT

## 2021-05-05 PROCEDURE — 71045 X-RAY EXAM CHEST 1 VIEW: CPT | Mod: 26

## 2021-05-05 PROCEDURE — 93452 LEFT HRT CATH W/VENTRCLGRPHY: CPT | Mod: 26

## 2021-05-05 PROCEDURE — 71045 X-RAY EXAM CHEST 1 VIEW: CPT | Mod: 26,77

## 2021-05-05 RX ORDER — HEPARIN SODIUM 5000 [USP'U]/ML
5000 INJECTION INTRAVENOUS; SUBCUTANEOUS EVERY 8 HOURS
Refills: 0 | Status: DISCONTINUED | OUTPATIENT
Start: 2021-05-05 | End: 2021-05-06

## 2021-05-05 RX ORDER — GLUCAGON INJECTION, SOLUTION 0.5 MG/.1ML
1 INJECTION, SOLUTION SUBCUTANEOUS ONCE
Refills: 0 | Status: DISCONTINUED | OUTPATIENT
Start: 2021-05-05 | End: 2021-05-07

## 2021-05-05 RX ORDER — CEFAZOLIN SODIUM 1 G
2000 VIAL (EA) INJECTION EVERY 8 HOURS
Refills: 0 | Status: DISCONTINUED | OUTPATIENT
Start: 2021-05-05 | End: 2021-05-05

## 2021-05-05 RX ORDER — SODIUM CHLORIDE 9 MG/ML
1000 INJECTION INTRAMUSCULAR; INTRAVENOUS; SUBCUTANEOUS
Refills: 0 | Status: DISCONTINUED | OUTPATIENT
Start: 2021-05-05 | End: 2021-05-07

## 2021-05-05 RX ORDER — INSULIN LISPRO 100/ML
VIAL (ML) SUBCUTANEOUS
Refills: 0 | Status: DISCONTINUED | OUTPATIENT
Start: 2021-05-05 | End: 2021-05-07

## 2021-05-05 RX ORDER — METOPROLOL TARTRATE 50 MG
12.5 TABLET ORAL EVERY 12 HOURS
Refills: 0 | Status: DISCONTINUED | OUTPATIENT
Start: 2021-05-05 | End: 2021-05-06

## 2021-05-05 RX ORDER — DEXTROSE 50 % IN WATER 50 %
25 SYRINGE (ML) INTRAVENOUS ONCE
Refills: 0 | Status: DISCONTINUED | OUTPATIENT
Start: 2021-05-05 | End: 2021-05-07

## 2021-05-05 RX ORDER — ACETAMINOPHEN 500 MG
650 TABLET ORAL EVERY 6 HOURS
Refills: 0 | Status: DISCONTINUED | OUTPATIENT
Start: 2021-05-05 | End: 2021-05-07

## 2021-05-05 RX ORDER — SODIUM CHLORIDE 9 MG/ML
1000 INJECTION, SOLUTION INTRAVENOUS
Refills: 0 | Status: DISCONTINUED | OUTPATIENT
Start: 2021-05-05 | End: 2021-05-07

## 2021-05-05 RX ORDER — DEXTROSE 50 % IN WATER 50 %
15 SYRINGE (ML) INTRAVENOUS ONCE
Refills: 0 | Status: DISCONTINUED | OUTPATIENT
Start: 2021-05-05 | End: 2021-05-07

## 2021-05-05 RX ORDER — PANTOPRAZOLE SODIUM 20 MG/1
40 TABLET, DELAYED RELEASE ORAL DAILY
Refills: 0 | Status: DISCONTINUED | OUTPATIENT
Start: 2021-05-05 | End: 2021-05-07

## 2021-05-05 RX ORDER — DEXTROSE 50 % IN WATER 50 %
12.5 SYRINGE (ML) INTRAVENOUS ONCE
Refills: 0 | Status: DISCONTINUED | OUTPATIENT
Start: 2021-05-05 | End: 2021-05-07

## 2021-05-05 RX ADMIN — Medication 250 MILLIGRAM(S): at 00:19

## 2021-05-05 RX ADMIN — Medication 250 MILLIGRAM(S): at 19:10

## 2021-05-05 RX ADMIN — Medication 650 MILLIGRAM(S): at 00:00

## 2021-05-05 RX ADMIN — Medication 250 MILLIGRAM(S): at 05:38

## 2021-05-05 RX ADMIN — HEPARIN SODIUM 5000 UNIT(S): 5000 INJECTION INTRAVENOUS; SUBCUTANEOUS at 21:13

## 2021-05-05 RX ADMIN — ATORVASTATIN CALCIUM 20 MILLIGRAM(S): 80 TABLET, FILM COATED ORAL at 21:13

## 2021-05-05 RX ADMIN — Medication 650 MILLIGRAM(S): at 21:45

## 2021-05-05 RX ADMIN — Medication 250 MILLIGRAM(S): at 14:04

## 2021-05-05 RX ADMIN — TIOTROPIUM BROMIDE 1 CAPSULE(S): 18 CAPSULE ORAL; RESPIRATORY (INHALATION) at 14:05

## 2021-05-05 RX ADMIN — Medication 40 MILLIGRAM(S): at 05:37

## 2021-05-05 RX ADMIN — HEPARIN SODIUM 5000 UNIT(S): 5000 INJECTION INTRAVENOUS; SUBCUTANEOUS at 14:07

## 2021-05-05 RX ADMIN — NYSTATIN CREAM 1 APPLICATION(S): 100000 CREAM TOPICAL at 05:39

## 2021-05-05 RX ADMIN — CHLORHEXIDINE GLUCONATE 1 APPLICATION(S): 213 SOLUTION TOPICAL at 05:38

## 2021-05-05 RX ADMIN — PANTOPRAZOLE SODIUM 40 MILLIGRAM(S): 20 TABLET, DELAYED RELEASE ORAL at 14:04

## 2021-05-05 RX ADMIN — PANTOPRAZOLE SODIUM 40 MILLIGRAM(S): 20 TABLET, DELAYED RELEASE ORAL at 08:36

## 2021-05-05 RX ADMIN — MAGNESIUM OXIDE 400 MG ORAL TABLET 400 MILLIGRAM(S): 241.3 TABLET ORAL at 14:07

## 2021-05-05 RX ADMIN — Medication 250 MILLIGRAM(S): at 23:35

## 2021-05-05 RX ADMIN — Medication 12.5 MILLIGRAM(S): at 19:42

## 2021-05-05 RX ADMIN — Medication 650 MILLIGRAM(S): at 22:45

## 2021-05-05 RX ADMIN — HEPARIN SODIUM 5000 UNIT(S): 5000 INJECTION INTRAVENOUS; SUBCUTANEOUS at 05:37

## 2021-05-05 RX ADMIN — Medication 50 MILLIGRAM(S): at 05:37

## 2021-05-05 RX ADMIN — Medication 650 MILLIGRAM(S): at 01:00

## 2021-05-05 RX ADMIN — Medication 1 TABLET(S): at 14:05

## 2021-05-05 RX ADMIN — Medication 1 MILLIGRAM(S): at 14:05

## 2021-05-05 NOTE — PROGRESS NOTE ADULT - ASSESSMENT
80 year old F, Central African/English speaking, PMHx HTN, HLD, AS (s/p TAVR 02/2019), COPD (on 2L home O2 at baseline), GERD, and colon cancer (s/p colectomy) who presented to [facility] ED 4/28 c/o 3 days of worsening SOB/MANRIQUE and chest pressure. Patient placed on BiPAP, diuresed, and transient nitrogtt in ED with improvement. Admitted to cardiology/telemetry for HF management. [facility], Dr. Alejo consulted for hx TAVR and evaluation. 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 TTE completed revealed TAVR valve with mean transvalvular gradient is 40.00 mmHg. She was transferred to [facility] service under the care of Dr. Alejo for further heart failure management and workup for possible intervention. Dr. Aranda brought in to evaluate patient with valve thrombus noted for further surgical plan.      Neurovascular:   - No delirium. Pain well controlled with current regimen.  - Continue tylenol PRN  - Continue lorazepam 0.5 mg PRN    Cardiovascular:   - s/p TAVR (2019), now readmitted with valve failure  - Was tried on AC as an outpatient for c/f valve thrombus, discontinued per Dr. Alejo   -Hemodynamically stable. HR controlled (61-73)  - Hx of HTN, BP controlled (115//80), continue toprol XL  - HLD: Continue atorvastatin 20 mg   -aborted TAVR today for low gradient when crossing valve    Respiratory:   - 02 Sat = 97% on 2L (on home O2)  - If on oxygen wean to RA from for O2 Sat > 93%.  - Encourage C+DB and Use of IS 10x / hr while awake.  - Hx COPD: continue spiriva     GI:   - Hx colon CA s/p colectomy  - GI consulted given low H/H on admission and positive FOB  - Per GI no acute workup required unless patient has melena, OK for heparin   - Continue GI PPX with protonix  - PO Diet    Renal / :  - BUN/Cr downtrending today 27/1.03  - Held diuresis X2 days, per Dr. Alejo resume 40 PO lasix tomorrow  - Continue to monitor renal function.  - Monitor I/O's.  - Replete electrolytes PRN    Endocrine:    -A1C 5.2, no known hx diabetes   -TSH 1.230, no known hx thyroid disease     Hematologic:  - H/H stable at 11/37.2 with no obvious signs of bleeding  - Coagulation Panel.  - Admitted with H/H 7.6/26.9, s/p 2U PRBC 5/3  - Hematology consulted, continue iron 200 IV q48 hr, folic acid     ID:  - Pt remains afebrile with no elevation in WBC or signs of infection; WBC 6.49  - L hand phlebitis: continue keflex   - Continue to monitor CBC  - Observe for SIRS/Sepsis Syndrome.    Prophylaxis:  -DVT prophylaxis with 5000 SubQ Heparin q8h.  -SCD's    Disposition:  TAVR aborted; home tomorrow

## 2021-05-05 NOTE — PROGRESS NOTE ADULT - SUBJECTIVE AND OBJECTIVE BOX
Interval history:  No overnight events.    81 y/o [place]/English speaking female w/ PMHx HTN, HLD, AS (s/p TAVR 02/2019, f/u Dr. Alejo, on Coumadin at home), COPD (on 2L home O2 at baseline), GERD, and colon cancer (s/p colectomy) who presented to [facility] ED c/o 3 days of worsening SOB/MANRIQUE and chest pressure. Patient described chest pressure as "foot stomping on chest", intermittent, w/ occasional radiation to the back but denies any other radiation of sensation. Patient also stated her SOB/MANRIQUE has worsened to the point where she now gets short of breath with just walking around the house and states she was not able to even speak this morning due to shortness of breath.    PAST MEDICAL HISTORY:  Aortic stenosis   COPD (chronic obstructive pulmonary disease)   GERD (gastroesophageal reflux disease)   HTN (hypertension)   Hyperlipidemia   Pulmonary HTN.     PAST SURGICAL HISTORY:  S/P TAVR (transcatheter aortic valve replacement).     FAMILY HISTORY:  No pertinent family history in first degree relatives.   No Pertinent Family History in first degree relatives of: CAD/MI.    Patient denied any ETOH use, tobacco use, or illicit drug use.    Home Medications:  atorvastatin 20 mg oral tablet: 1 tab(s) orally once a day (28 Apr 2021 13:24)  Coumadin 7.5 mg oral tablet: 1 tab(s) orally once a day (28 Apr 2021 13:24)  hydroCHLOROthiazide 12.5 mg oral capsule: 1 cap(s) orally once a day (28 Apr 2021 13:24)  lisinopril 20 mg oral tablet: 1 tab(s) orally once a day (28 Apr 2021 13:24)  LORazepam 0.5 mg oral tablet: 1 tab(s) orally 3 times a day (28 Apr 2021 13:24)  magnesium oxide 400 mg (241.3 mg elemental magnesium) oral tablet: 1 tab(s) orally once a day (28 Apr 2021 13:24)  nitroglycerin 0.4 mg sublingual tablet: 1 tab(s) sublingual every 5 minutes, As Needed (28 Apr 2021 13:24)  Protonix 40 mg oral delayed release tablet: 1 tab(s) orally once a day (28 Apr 2021 13:24)  tiotropium 2.5 mcg/inh inhalation aerosol: 2 puff(s) inhaled once a day (28 Apr 2021 13:24)  Toprol-XL 50 mg oral tablet, extended release: 1 tab(s) orally once a day (28 Apr 2021 13:24)    Allergies  Digox (Rash; Urticaria; Hives)  Plavix (Other (Mod to Severe)    Vital Signs Last 24 Hrs  T(C): 36.6 (05 May 2021 05:23), Max: 37.1 (04 May 2021 22:06)  T(F): 97.9 (05 May 2021 05:23), Max: 98.7 (04 May 2021 22:06)  HR: 62 (05 May 2021 05:20) (62 - 79)  BP: 165/72 (05 May 2021 05:20) (131/61 - 200/77)  BP(mean): 103 (05 May 2021 05:20) (88 - 111)  RR: 18 (05 May 2021 05:20) (16 - 20)  SpO2: 96% (05 May 2021 05:20) (96% - 100%)    Labs: reviewed    Imaging: reviewed

## 2021-05-05 NOTE — PROGRESS NOTE ADULT - SUBJECTIVE AND OBJECTIVE BOX
Patient discussed on morning rounds with Dr. Alejo    Operation / Date:  aborted TAVR today    SUBJECTIVE ASSESSMENT:  80y Female went for TAVr, aborted for low gradient when crossed valve. Patient back to [facility]; denies sob, cp, palpitations, fevers, n/v/d/c.      Vital Signs Last 24 Hrs  T(C): 36.3 (05 May 2021 17:19), Max: 37.1 (04 May 2021 22:06)  T(F): 97.4 (05 May 2021 17:19), Max: 98.7 (04 May 2021 22:06)  HR: 79 (05 May 2021 17:00) (58 - 79)  BP: 144/70 (05 May 2021 17:00) (110/58 - 165/72)  BP(mean): 101 (05 May 2021 17:00) (78 - 103)  RR: 17 (05 May 2021 17:00) (17 - 20)  SpO2: 94% (05 May 2021 17:00) (94% - 100%)  I&O's Detail    04 May 2021 07:01  -  05 May 2021 07:00  --------------------------------------------------------  IN:    Oral Fluid: 555 mL  Total IN: 555 mL    OUT:    Voided (mL): 602 mL  Total OUT: 602 mL    Total NET: -47 mL          CHEST TUBE:  No.   SERVANDO DRAIN: No.  EPICARDIAL WIRES:  No.  TIE DOWNS: No.  VENTURA: No.    PHYSICAL EXAM:    General: NAD, sitting up in chair    Neurological: A&OX3. No focal deficits noted, moving bilateral upper and lower extremities     Cardiovascular: RRR, +YOVANA, no rubs, no gallops    Respiratory: Clear to auscultation bilateral posterior lung fields, no wheezes, rales, rhonchi     Gastrointestinal: soft, NTND, +BS    Extremities: Trace bilateral LE edema, no calf tenderness. Left upper extremity erythematous where prior IV was. Full strength and ROM bilateral upper and lower extremities     Vascular: distal pulses intact b/l    Incision Sites: groins c/d/i; no evidence of hematoma; not saturated    LABS:                        11.0   6.49  )-----------( 244      ( 05 May 2021 17:00 )             37.2       COUMADIN:  No.     PT/INR - ( 05 May 2021 13:58 )   PT: 13.3 sec;   INR: 1.11          PTT - ( 05 May 2021 13:58 )  PTT:37.1 sec    05-05    140  |  103  |  27<H>  ----------------------------<  105<H>  4.0   |  25  |  1.05    Ca    9.4      05 May 2021 13:58  Phos  4.6     05-05  Mg     2.0     05-05    TPro  7.6  /  Alb  4.2  /  TBili  0.5  /  DBili  x   /  AST  17  /  ALT  12  /  AlkPhos  71  05-05    MEDICATIONS  (STANDING):  atorvastatin 20 milliGRAM(s) Oral at bedtime  cephalexin 250 milliGRAM(s) Oral every 6 hours  dextrose 40% Gel 15 Gram(s) Oral once  dextrose 5%. 1000 milliLiter(s) (50 mL/Hr) IV Continuous <Continuous>  dextrose 5%. 1000 milliLiter(s) (100 mL/Hr) IV Continuous <Continuous>  dextrose 50% Injectable 25 Gram(s) IV Push once  dextrose 50% Injectable 12.5 Gram(s) IV Push once  dextrose 50% Injectable 25 Gram(s) IV Push once  folic acid 1 milliGRAM(s) Oral daily  glucagon  Injectable 1 milliGRAM(s) IntraMuscular once  heparin   Injectable 5000 Unit(s) SubCutaneous every 8 hours  insulin lispro (ADMELOG) corrective regimen sliding scale   SubCutaneous Before meals and at bedtime  magnesium oxide 400 milliGRAM(s) Oral every 24 hours  metoprolol tartrate 12.5 milliGRAM(s) Oral every 12 hours  multivitamin 1 Tablet(s) Oral daily  pantoprazole    Tablet 40 milliGRAM(s) Oral daily  sodium chloride 0.9%. 1000 milliLiter(s) (10 mL/Hr) IV Continuous <Continuous>  tiotropium 18 MICROgram(s) Capsule 1 Capsule(s) Inhalation daily    MEDICATIONS  (PRN):  acetaminophen   Tablet .. 650 milliGRAM(s) Oral every 6 hours PRN Mild Pain (1 - 3)  LORazepam     Tablet 0.5 milliGRAM(s) Oral three times a day PRN Anxiety        RADIOLOGY & ADDITIONAL TESTS:  < from: Xray Chest 1 View- PORTABLE-Routine (Xray Chest 1 View- PORTABLE-Routine in AM.) (05.05.21 @ 02:05) >      INTERPRETATION:  Clinical history/reason for exam: Preop.    Frontal chest.    COMPARISON: May 4, 2021.    Findings/  impression: Stable cardiomegaly,thoracic aortic calcification, TAVR.. Left basilar opacity/pleural effusion, decreased. Stable bony structures.    < end of copied text >

## 2021-05-05 NOTE — PRE-OP CHECKLIST - SELECT TESTS ORDERED
BMP/CBC/CMP/PT/PTT/Type and Screen
BMP/CBC/CMP/PT/PTT/INR/Hepatic Function/Spirometry/Type and Cross/Type and Screen/Urinalysis/HCG/CXR/Results in MD note/COVID-19

## 2021-05-05 NOTE — PROGRESS NOTE ADULT - ASSESSMENT
81 yo F with hx as above being seen for anemia.     Anemia- Hgb 7.6, microcytic. Ferritin 14 and saturation of 7%.   Clear iron def.   S/p 3 doses of IV iron- 4/30/21, 5/2/21, 5/4/21.  Very nice response to treatment Hgb up from 7.6 to 10.7.  Will reassess counts tomorrow and decide if another iron infusion should be given.  B12 & Folate ok. No hemolysis noted.     Stool Occult Blood testing positive. Hx of Colon ca. On anticoagulation with Warfarin.   S/p GI eval. No work up or intervention planned at this time.    Will follow with you.  Thank you.    Ben Guzman MD  [phone]

## 2021-05-06 ENCOUNTER — TRANSCRIPTION ENCOUNTER (OUTPATIENT)
Age: 81
End: 2021-05-06

## 2021-05-06 LAB
ALBUMIN SERPL ELPH-MCNC: 4.1 G/DL — SIGNIFICANT CHANGE UP (ref 3.3–5)
ALP SERPL-CCNC: 73 U/L — SIGNIFICANT CHANGE UP (ref 40–120)
ALT FLD-CCNC: 11 U/L — SIGNIFICANT CHANGE UP (ref 10–45)
ANION GAP SERPL CALC-SCNC: 13 MMOL/L — SIGNIFICANT CHANGE UP (ref 5–17)
APTT BLD: 41.3 SEC — HIGH (ref 27.5–35.5)
AST SERPL-CCNC: 17 U/L — SIGNIFICANT CHANGE UP (ref 10–40)
BILIRUB SERPL-MCNC: 0.7 MG/DL — SIGNIFICANT CHANGE UP (ref 0.2–1.2)
BUN SERPL-MCNC: 30 MG/DL — HIGH (ref 7–23)
CALCIUM SERPL-MCNC: 9.5 MG/DL — SIGNIFICANT CHANGE UP (ref 8.4–10.5)
CHLORIDE SERPL-SCNC: 102 MMOL/L — SIGNIFICANT CHANGE UP (ref 96–108)
CO2 SERPL-SCNC: 24 MMOL/L — SIGNIFICANT CHANGE UP (ref 22–31)
CREAT SERPL-MCNC: 1.18 MG/DL — SIGNIFICANT CHANGE UP (ref 0.5–1.3)
GLUCOSE BLDC GLUCOMTR-MCNC: 102 MG/DL — HIGH (ref 70–99)
GLUCOSE BLDC GLUCOMTR-MCNC: 105 MG/DL — HIGH (ref 70–99)
GLUCOSE BLDC GLUCOMTR-MCNC: 111 MG/DL — HIGH (ref 70–99)
GLUCOSE SERPL-MCNC: 112 MG/DL — HIGH (ref 70–99)
HCT VFR BLD CALC: 35 % — SIGNIFICANT CHANGE UP (ref 34.5–45)
HGB BLD-MCNC: 10.5 G/DL — LOW (ref 11.5–15.5)
INR BLD: 1.08 — SIGNIFICANT CHANGE UP (ref 0.88–1.16)
MAGNESIUM SERPL-MCNC: 2 MG/DL — SIGNIFICANT CHANGE UP (ref 1.6–2.6)
MCHC RBC-ENTMCNC: 24 PG — LOW (ref 27–34)
MCHC RBC-ENTMCNC: 30 GM/DL — LOW (ref 32–36)
MCV RBC AUTO: 80.1 FL — SIGNIFICANT CHANGE UP (ref 80–100)
NRBC # BLD: 0 /100 WBCS — SIGNIFICANT CHANGE UP (ref 0–0)
PHOSPHATE SERPL-MCNC: 4.1 MG/DL — SIGNIFICANT CHANGE UP (ref 2.5–4.5)
PLATELET # BLD AUTO: 229 K/UL — SIGNIFICANT CHANGE UP (ref 150–400)
POTASSIUM SERPL-MCNC: 4.2 MMOL/L — SIGNIFICANT CHANGE UP (ref 3.5–5.3)
POTASSIUM SERPL-SCNC: 4.2 MMOL/L — SIGNIFICANT CHANGE UP (ref 3.5–5.3)
PROT SERPL-MCNC: 7.7 G/DL — SIGNIFICANT CHANGE UP (ref 6–8.3)
PROTHROM AB SERPL-ACNC: 12.9 SEC — SIGNIFICANT CHANGE UP (ref 10.6–13.6)
RBC # BLD: 4.37 M/UL — SIGNIFICANT CHANGE UP (ref 3.8–5.2)
RBC # FLD: 22.5 % — HIGH (ref 10.3–14.5)
SODIUM SERPL-SCNC: 139 MMOL/L — SIGNIFICANT CHANGE UP (ref 135–145)
WBC # BLD: 6.45 K/UL — SIGNIFICANT CHANGE UP (ref 3.8–10.5)
WBC # FLD AUTO: 6.45 K/UL — SIGNIFICANT CHANGE UP (ref 3.8–10.5)

## 2021-05-06 PROCEDURE — 71045 X-RAY EXAM CHEST 1 VIEW: CPT | Mod: 26

## 2021-05-06 PROCEDURE — 93010 ELECTROCARDIOGRAM REPORT: CPT

## 2021-05-06 PROCEDURE — 99232 SBSQ HOSP IP/OBS MODERATE 35: CPT

## 2021-05-06 RX ORDER — PANTOPRAZOLE SODIUM 20 MG/1
1 TABLET, DELAYED RELEASE ORAL
Qty: 30 | Refills: 0
Start: 2021-05-06 | End: 2021-06-04

## 2021-05-06 RX ORDER — NITROGLYCERIN 6.5 MG
0.4 CAPSULE, EXTENDED RELEASE ORAL
Refills: 0 | Status: DISCONTINUED | OUTPATIENT
Start: 2021-05-06 | End: 2021-05-07

## 2021-05-06 RX ORDER — APIXABAN 2.5 MG/1
1 TABLET, FILM COATED ORAL
Qty: 60 | Refills: 0
Start: 2021-05-06 | End: 2021-06-04

## 2021-05-06 RX ORDER — POTASSIUM CHLORIDE 20 MEQ
1 PACKET (EA) ORAL
Qty: 30 | Refills: 0
Start: 2021-05-06 | End: 2021-06-04

## 2021-05-06 RX ORDER — APIXABAN 2.5 MG/1
5 TABLET, FILM COATED ORAL
Refills: 0 | Status: DISCONTINUED | OUTPATIENT
Start: 2021-05-06 | End: 2021-05-07

## 2021-05-06 RX ORDER — LISINOPRIL 2.5 MG/1
1 TABLET ORAL
Qty: 0 | Refills: 0 | DISCHARGE

## 2021-05-06 RX ORDER — METOPROLOL TARTRATE 50 MG
1 TABLET ORAL
Qty: 30 | Refills: 0
Start: 2021-05-06 | End: 2021-06-04

## 2021-05-06 RX ORDER — FOLIC ACID 0.8 MG
1 TABLET ORAL
Qty: 0 | Refills: 0 | DISCHARGE
Start: 2021-05-06 | End: 2021-06-04

## 2021-05-06 RX ORDER — METOPROLOL TARTRATE 50 MG
1 TABLET ORAL
Qty: 0 | Refills: 0 | DISCHARGE

## 2021-05-06 RX ORDER — APIXABAN 2.5 MG/1
5 TABLET, FILM COATED ORAL
Refills: 0 | Status: DISCONTINUED | OUTPATIENT
Start: 2021-05-06 | End: 2021-05-06

## 2021-05-06 RX ORDER — FOLIC ACID 0.8 MG
1 TABLET ORAL
Qty: 30 | Refills: 0
Start: 2021-05-06 | End: 2021-06-04

## 2021-05-06 RX ORDER — METOPROLOL TARTRATE 50 MG
50 TABLET ORAL DAILY
Refills: 0 | Status: DISCONTINUED | OUTPATIENT
Start: 2021-05-07 | End: 2021-05-07

## 2021-05-06 RX ORDER — NITROGLYCERIN 6.5 MG
1 CAPSULE, EXTENDED RELEASE ORAL
Qty: 0 | Refills: 0 | DISCHARGE

## 2021-05-06 RX ORDER — PANTOPRAZOLE SODIUM 20 MG/1
1 TABLET, DELAYED RELEASE ORAL
Qty: 0 | Refills: 0 | DISCHARGE

## 2021-05-06 RX ORDER — LISINOPRIL 2.5 MG/1
10 TABLET ORAL DAILY
Refills: 0 | Status: DISCONTINUED | OUTPATIENT
Start: 2021-05-06 | End: 2021-05-07

## 2021-05-06 RX ORDER — ATORVASTATIN CALCIUM 80 MG/1
1 TABLET, FILM COATED ORAL
Qty: 30 | Refills: 0
Start: 2021-05-06 | End: 2021-06-04

## 2021-05-06 RX ORDER — WARFARIN SODIUM 2.5 MG/1
1 TABLET ORAL
Qty: 0 | Refills: 0 | DISCHARGE

## 2021-05-06 RX ORDER — METOPROLOL TARTRATE 50 MG
25 TABLET ORAL ONCE
Refills: 0 | Status: COMPLETED | OUTPATIENT
Start: 2021-05-06 | End: 2021-05-06

## 2021-05-06 RX ORDER — METOPROLOL TARTRATE 50 MG
1 TABLET ORAL
Qty: 30 | Refills: 0
Start: 2021-05-06 | End: 2021-06-05

## 2021-05-06 RX ORDER — METOPROLOL TARTRATE 50 MG
25 TABLET ORAL DAILY
Refills: 0 | Status: DISCONTINUED | OUTPATIENT
Start: 2021-05-06 | End: 2021-05-06

## 2021-05-06 RX ORDER — ATORVASTATIN CALCIUM 80 MG/1
1 TABLET, FILM COATED ORAL
Qty: 0 | Refills: 0 | DISCHARGE

## 2021-05-06 RX ORDER — PANTOPRAZOLE SODIUM 20 MG/1
1 TABLET, DELAYED RELEASE ORAL
Qty: 30 | Refills: 0
Start: 2021-05-06 | End: 2021-06-05

## 2021-05-06 RX ADMIN — MAGNESIUM OXIDE 400 MG ORAL TABLET 400 MILLIGRAM(S): 241.3 TABLET ORAL at 11:03

## 2021-05-06 RX ADMIN — HEPARIN SODIUM 5000 UNIT(S): 5000 INJECTION INTRAVENOUS; SUBCUTANEOUS at 05:25

## 2021-05-06 RX ADMIN — Medication 25 MILLIGRAM(S): at 06:18

## 2021-05-06 RX ADMIN — PANTOPRAZOLE SODIUM 40 MILLIGRAM(S): 20 TABLET, DELAYED RELEASE ORAL at 13:07

## 2021-05-06 RX ADMIN — Medication 1 TABLET(S): at 13:07

## 2021-05-06 RX ADMIN — ATORVASTATIN CALCIUM 20 MILLIGRAM(S): 80 TABLET, FILM COATED ORAL at 21:24

## 2021-05-06 RX ADMIN — LISINOPRIL 10 MILLIGRAM(S): 2.5 TABLET ORAL at 17:17

## 2021-05-06 RX ADMIN — Medication 250 MILLIGRAM(S): at 13:09

## 2021-05-06 RX ADMIN — APIXABAN 5 MILLIGRAM(S): 2.5 TABLET, FILM COATED ORAL at 17:18

## 2021-05-06 RX ADMIN — TIOTROPIUM BROMIDE 1 CAPSULE(S): 18 CAPSULE ORAL; RESPIRATORY (INHALATION) at 11:03

## 2021-05-06 RX ADMIN — Medication 650 MILLIGRAM(S): at 06:25

## 2021-05-06 RX ADMIN — Medication 12.5 MILLIGRAM(S): at 05:25

## 2021-05-06 RX ADMIN — Medication 0.4 MILLIGRAM(S): at 10:20

## 2021-05-06 RX ADMIN — Medication 650 MILLIGRAM(S): at 05:25

## 2021-05-06 RX ADMIN — Medication 1 MILLIGRAM(S): at 13:07

## 2021-05-06 RX ADMIN — Medication 25 MILLIGRAM(S): at 11:03

## 2021-05-06 RX ADMIN — Medication 250 MILLIGRAM(S): at 17:18

## 2021-05-06 RX ADMIN — Medication 250 MILLIGRAM(S): at 06:16

## 2021-05-06 NOTE — DISCHARGE NOTE PROVIDER - NSDCCPCAREPLAN_GEN_ALL_CORE_FT
PRINCIPAL DISCHARGE DIAGNOSIS  Diagnosis: Hypertensive emergency  Assessment and Plan of Treatment:       SECONDARY DISCHARGE DIAGNOSES  Diagnosis: Aortic stenosis  Assessment and Plan of Treatment: Aortic stenosis    Diagnosis: Pulmonary edema  Assessment and Plan of Treatment:

## 2021-05-06 NOTE — PROGRESS NOTE ADULT - SUBJECTIVE AND OBJECTIVE BOX
Patient discussed on morning rounds with Dr. Alejo    Operation / Date: management of TAVR valve thrombus    SUBJECTIVE ASSESSMENT:  80y Female seen and examined at bedside.  Patient complained of some chest pain after ambulating.  EKG was unchanged.  Relieved after nitro SL.  Denies shortness of breath, nausea, vomiting.        Vital Signs Last 24 Hrs  T(C): 36.9 (06 May 2021 14:01), Max: 36.9 (06 May 2021 14:01)  T(F): 98.5 (06 May 2021 14:01), Max: 98.5 (06 May 2021 14:01)  HR: 73 (06 May 2021 11:45) (62 - 96)  BP: 141/85 (06 May 2021 11:30) (123/65 - 177/81)  BP(mean): 105 (06 May 2021 11:30) (80 - 116)  RR: 16 (06 May 2021 11:45) (15 - 20)  SpO2: 98% (06 May 2021 11:45) (94% - 99%)  I&O's Detail    05 May 2021 07:01  -  06 May 2021 07:00  --------------------------------------------------------  IN:    Oral Fluid: 360 mL  Total IN: 360 mL    OUT:  Total OUT: 0 mL    Total NET: 360 mL      06 May 2021 07:01  -  06 May 2021 14:58  --------------------------------------------------------  IN:    Oral Fluid: 240 mL  Total IN: 240 mL    OUT:  Total OUT: 0 mL    Total NET: 240 mL          CHEST TUBE:  No.   SERVANDO DRAIN:  No.  EPICARDIAL WIRES: No.  TIE DOWNS: No.  VENTURA: No.    PHYSICAL EXAM:    General: NAD, sitting up in chair  Neurological: A&OX3. No focal deficits noted, moving bilateral upper and lower extremities   Cardiovascular: RRR, +YOVANA, no rubs, no gallops  Respiratory: Clear to auscultation bilateral posterior lung fields, no wheezes, rales, rhonchi   Gastrointestinal: soft, NTND, +BS  Extremities: Trace bilateral LE edema, no calf tenderness. Left upper extremity erythematous where prior IV was. Full strength and ROM bilateral upper and lower extremities   Vascular: distal pulses intact b/l  Incision Sites: groins c/d/i; no evidence of hematoma; not saturated    LABS:                        10.5   6.45  )-----------( 229      ( 06 May 2021 05:49 )             35.0       COUMADIN:  Yes/No. REASON: .    PT/INR - ( 06 May 2021 05:49 )   PT: 12.9 sec;   INR: 1.08          PTT - ( 06 May 2021 05:49 )  PTT:41.3 sec    05-06    139  |  102  |  30<H>  ----------------------------<  112<H>  4.2   |  24  |  1.18    Ca    9.5      06 May 2021 05:49  Phos  4.1     05-06  Mg     2.0     05-06    TPro  7.7  /  Alb  4.1  /  TBili  0.7  /  DBili  x   /  AST  17  /  ALT  11  /  AlkPhos  73  05-06          MEDICATIONS  (STANDING):  apixaban 5 milliGRAM(s) Oral two times a day  atorvastatin 20 milliGRAM(s) Oral at bedtime  cephalexin 250 milliGRAM(s) Oral every 6 hours  dextrose 40% Gel 15 Gram(s) Oral once  dextrose 5%. 1000 milliLiter(s) (50 mL/Hr) IV Continuous <Continuous>  dextrose 5%. 1000 milliLiter(s) (100 mL/Hr) IV Continuous <Continuous>  dextrose 50% Injectable 25 Gram(s) IV Push once  dextrose 50% Injectable 12.5 Gram(s) IV Push once  dextrose 50% Injectable 25 Gram(s) IV Push once  folic acid 1 milliGRAM(s) Oral daily  glucagon  Injectable 1 milliGRAM(s) IntraMuscular once  insulin lispro (ADMELOG) corrective regimen sliding scale   SubCutaneous Before meals and at bedtime  lisinopril 10 milliGRAM(s) Oral daily  magnesium oxide 400 milliGRAM(s) Oral every 24 hours  multivitamin 1 Tablet(s) Oral daily  pantoprazole    Tablet 40 milliGRAM(s) Oral daily  sodium chloride 0.9%. 1000 milliLiter(s) (10 mL/Hr) IV Continuous <Continuous>  tiotropium 18 MICROgram(s) Capsule 1 Capsule(s) Inhalation daily    MEDICATIONS  (PRN):  acetaminophen   Tablet .. 650 milliGRAM(s) Oral every 6 hours PRN Mild Pain (1 - 3)  nitroglycerin     SubLingual 0.4 milliGRAM(s) SubLingual every 5 minutes PRN Chest Pain        RADIOLOGY & ADDITIONAL TESTS:

## 2021-05-06 NOTE — DISCHARGE NOTE PROVIDER - CARE PROVIDER_API CALL
Solomon Alejo)  Cardiology; Interventional Cardiology  [address] [place] [address]  Phone: [phone]  Fax: [phone]  Scheduled Appointment: 05/17/2021 09:45 AM    Keshav Aguirre)  Cardiology; Internal Medicine  [address] [place] [address]  Phone: [phone]  Fax: [phone]  Scheduled Appointment: 05/17/2021 11:30 AM

## 2021-05-06 NOTE — DISCHARGE NOTE PROVIDER - HOSPITAL COURSE
80 year old F, Lao/English speaking, PMHx HTN, HLD, AS (s/p TAVR 02/2019), COPD (on 2L home O2 at baseline), GERD, and colon cancer (s/p colectomy) who presented to [facility] ED 4/28 c/o 3 days of worsening SOB/MANRIQUE and chest pressure. Patient placed on BiPAP, diuresed, and transient nitrogtt in ED with improvement. Admitted to cardiology/telemetry for HF management. [facility], Dr. Alejo consulted for hx TAVR and evaluation. 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 TTE completed revealed TAVR valve with mean transvalvular gradient is 40.00 mmHg. She was transferred to [facility] service under the care of Dr. Alejo for further heart failure management and workup for possible intervention.  Patient was diuresed using IV Lasix then IV bumex, a total of 9L was removed.  Patient was taken for possible valve in valve TAVR, however the transvalvular gradient was found to be 7mmHg and procedure was aborted.  Patient was restarted on NOC post procedure.  Patient ambulating independently with room air, tolerating diet, pain controlled, urinating and having bowel movements.  Patient for discharge home.     Over 35 minutes was spent with the patient reviewing the discharge material including medications, follow up appointments, recovery, concerning symptoms, and how to contact their health care providers if they have questions 80 year old F, German/English speaking, PMHx HTN, HLD, AS (s/p TAVR 02/2019), COPD (on 2L home O2 at baseline), GERD, and colon cancer (s/p colectomy) who presented to [facility] ED 4/28 c/o 3 days of worsening SOB/MANRIQUE and chest pressure. Patient placed on BiPAP, diuresed, and transient nitrogtt in ED with improvement. Admitted to cardiology/telemetry for HF management. [facility], Dr. Alejo consulted for hx TAVR and evaluation. 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 TTE completed revealed TAVR valve with mean transvalvular gradient is 40.00 mmHg. She was transferred to [facility] service under the care of Dr. Alejo for further heart failure management and workup for possible intervention.  Patient was diuresed using IV Lasix then IV bumex, a total of 9L was removed.  Patient was taken for possible valve in valve TAVR, however the transvalvular gradient was found to be 7mmHg and procedure was aborted.  Patient was restarted on anticoagulation and her home Toprol XL dose post procedure.  Patient ambulating independently with room air, tolerating diet, pain controlled, urinating and having bowel movements.  Patient for discharge home.     Over 35 minutes was spent with the patient reviewing the discharge material including medications, follow up appointments, recovery, concerning symptoms, and how to contact their health care providers if they have questions 80 year old F, English/English speaking, PMHx HTN, HLD, AS (s/p TAVR 02/2019), COPD (on 2L home O2 at baseline), GERD, and colon cancer (s/p colectomy) who presented to [facility] ED 4/28 c/o 3 days of worsening SOB/MANRIQUE and chest pressure. Patient placed on BiPAP, diuresed, and transient nitrogtt in ED with improvement. Admitted to cardiology/telemetry for HF management. [facility], Dr. Alejo consulted for hx TAVR and evaluation. 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 TTE completed revealed TAVR valve with mean transvalvular gradient is 40.00 mmHg. She was transferred to [facility] service under the care of Dr. Aljeo for further heart failure management and workup for possible intervention.  Patient was diuresed using IV Lasix then IV bumex, a total of 9L was removed.  Patient was taken for possible valve in valve TAVR, however the transvalvular gradient was found to be 7mmHg and procedure was aborted.  Patient was restarted on anticoagulation and her home Toprol XL dose and half dose of her home lisinopril post procedure.  Patient ambulating independently with room air, tolerating diet, pain controlled, urinating and having bowel movements.  Patient for discharge home.     Over 35 minutes was spent with the patient reviewing the discharge material including medications, follow up appointments, recovery, concerning symptoms, and how to contact their health care providers if they have questions

## 2021-05-06 NOTE — DISCHARGE NOTE PROVIDER - NSDCMRMEDTOKEN_GEN_ALL_CORE_FT
apixaban 5 mg oral tablet: 1 tab(s) orally 2 times a day  atorvastatin 20 mg oral tablet: 1 tab(s) orally once a day (at bedtime)  folic acid 1 mg oral tablet: 1 tab(s) orally once a day  hydroCHLOROthiazide 12.5 mg oral capsule: 1 cap(s) orally once a day  magnesium oxide 400 mg (241.3 mg elemental magnesium) oral tablet: 1 tab(s) orally once a day  Multiple Vitamins oral tablet: 1 tab(s) orally once a day  pantoprazole 40 mg oral delayed release tablet: 1 tab(s) orally once a day  tiotropium 2.5 mcg/inh inhalation aerosol: 2 puff(s) inhaled once a day  Toprol-XL 50 mg oral tablet, extended release: 1 tab(s) orally once a day   apixaban 5 mg oral tablet: 1 tab(s) orally 2 times a day  atorvastatin 20 mg oral tablet: 1 tab(s) orally once a day (at bedtime)  folic acid 1 mg oral tablet: 1 tab(s) orally once a day  Lasix 20 mg oral tablet: 1 tab(s) orally once a day to be started on 5/7/21  magnesium oxide 400 mg (241.3 mg elemental magnesium) oral tablet: 1 tab(s) orally once a day  metoprolol succinate 50 mg oral tablet, extended release: 1 tab(s) orally once a day  Multiple Vitamins oral tablet: 1 tab(s) orally once a day  pantoprazole 40 mg oral delayed release tablet: 1 tab(s) orally once a day  potassium chloride 10 mEq oral capsule, extended release: 1 cap(s) orally once a day start on 5/7/21, take with lasix (furosemide)  tiotropium 2.5 mcg/inh inhalation aerosol: 2 puff(s) inhaled once a day   apixaban 5 mg oral tablet: 1 tab(s) orally 2 times a day  atorvastatin 20 mg oral tablet: 1 tab(s) orally once a day (at bedtime)  folic acid 1 mg oral tablet: 1 tab(s) orally once a day  furosemide 20 mg oral tablet: 1 tab(s) orally once a day  Lasix 20 mg oral tablet: 1 tab(s) orally once a day to be started on 5/7/21  lisinopril 10 mg oral tablet: 1 tab(s) orally once a day  magnesium oxide 400 mg (241.3 mg elemental magnesium) oral tablet: 1 tab(s) orally once a day  metoprolol succinate 50 mg oral tablet, extended release: 1 tab(s) orally once a day  Multiple Vitamins oral tablet: 1 tab(s) orally once a day  pantoprazole 40 mg oral delayed release tablet: 1 tab(s) orally once a day  potassium chloride 10 mEq oral capsule, extended release: 1 cap(s) orally once a day start on 5/7/21, take with lasix (furosemide)  potassium chloride 10 mEq oral tablet, extended release: 1 tab(s) orally once a day  tiotropium 2.5 mcg/inh inhalation aerosol: 2 puff(s) inhaled once a day

## 2021-05-06 NOTE — DISCHARGE NOTE PROVIDER - PROVIDER TOKENS
PROVIDER:[TOKEN:[[id number]],SCHEDULEDAPPT:[05/17/2021],SCHEDULEDAPPTTIME:[09:45 AM]],PROVIDER:[TOKEN:[[id number]],SCHEDULEDAPPT:[05/17/2021],SCHEDULEDAPPTTIME:[11:30 AM]]

## 2021-05-06 NOTE — DISCHARGE NOTE PROVIDER - NSDCFUADDINST_GEN_ALL_CORE_FT
-Walk daily as tolerated and use your incentive spirometer every hour.    -No driving or strenuous activity/exercise for 6 weeks, or until cleared by your surgeon.    -Gently clean your incisions with anti-bacterial soap and water, pat dry.  You may leave them open to air.    -Call your doctor if you have shortness of breath, chest pain not relieved by pain medication, dizziness, fever >101.5, or increased redness or drainage from incisions.     -Please check your blood pressure twice a day and record your readings.  Dr. Alejo will adjust your blood pressure medications based on those reports. -Walk daily as tolerated and use your incentive spirometer every hour.    -No driving or strenuous activity/exercise for 6 weeks, or until cleared by your surgeon.    -Gently clean your incisions with anti-bacterial soap and water, pat dry.  You may leave them open to air.    -Call your doctor if you have shortness of breath, chest pain not relieved by pain medication, dizziness, fever >101.5, or increased redness or drainage from incisions.     -Please check your blood pressure twice a day and record your readings.  Dr. Alejo will adjust your blood pressure medications based on those reports.    -Please start Lasix (Furosemide) with potassium replacement tomorrow 5/7/21.

## 2021-05-06 NOTE — DISCHARGE NOTE PROVIDER - CARE PROVIDERS DIRECT ADDRESSES
[email or web address]

## 2021-05-06 NOTE — PROGRESS NOTE ADULT - ASSESSMENT
80 year old F, Nicaraguan/English speaking, PMHx HTN, HLD, AS (s/p TAVR 02/2019), COPD (on 2L home O2 at baseline), GERD, and colon cancer (s/p colectomy) who presented to [facility] ED 4/28 c/o 3 days of worsening SOB/MANRIQUE and chest pressure. Patient placed on BiPAP, diuresed, and transient nitrogtt in ED with improvement. Admitted to cardiology/telemetry for HF management. [facility], Dr. Alejo consulted for hx TAVR and evaluation. 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 TTE completed revealed TAVR valve with mean transvalvular gradient is 40.00 mmHg. She was transferred to [facility] service under the care of Dr. Alejo for further heart failure management and workup for possible intervention. Dr. Aranda brought in to evaluate patient with valve thrombus noted for further surgical plan.  Patient underwent cardiac cath and transvalvular gradient was found to be 7mmHg, planned procedure of valve and valve TAVR    Neurovascular:   - No delirium. Pain well controlled with current regimen.  - Continue tylenol PRN  - Continue lorazepam 0.5 mg PRN    Cardiovascular:   - s/p TAVR (2019), now readmitted with valve failure  - Was tried on AC as an outpatient for c/f valve thrombus, discontinued per Dr. Alejo   -Hemodynamically stable. HR controlled (61-73)  - Hx of HTN, BP controlled (115//80), continue toprol XL, restart lisinopril 10mg this evening  - HLD: Continue atorvastatin 20 mg   -aborted TAVR today for low gradient when crossing valve    Respiratory:   - 02 Sat = 97% on 2L (on home O2)  - If on oxygen wean to RA from for O2 Sat > 93%.  - Encourage C+DB and Use of IS 10x / hr while awake.  - Hx COPD: continue spiriva     GI:   - Hx colon CA s/p colectomy  - GI consulted given low H/H on admission and positive FOB  - Per GI no acute workup required unless patient has melena, OK for heparin   - Continue GI PPX with protonix  - PO Diet    Renal / :  - BUN/Cr 30/1.18  - Held diuresis X2 days, per Dr. Alejo resume 20 PO lasix tomorrow  - Continue to monitor renal function.  - Monitor I/O's.  - Replete electrolytes PRN    Endocrine:    -A1C 5.2, no known hx diabetes   -TSH 1.230, no known hx thyroid disease     Hematologic:  - H/H stable at 10.5/35 with no obvious signs of bleeding  - Coagulation Panel.  - Admitted with H/H 7.6/26.9, s/p 2U PRBC 5/3  - Hematology consulted, continue iron 200 IV q48 hr, folic acid     ID:  - Pt remains afebrile with no elevation in WBC or signs of infection; WBC6.45  - L hand phlebitis: continue keflex   - Continue to monitor CBC  - Observe for SIRS/Sepsis Syndrome.    Prophylaxis:  -DVT prophylaxis with 5000 SubQ Heparin q8h.  -SCD's    Disposition:  Telemetry, likely home tomorrow

## 2021-05-07 ENCOUNTER — TRANSCRIPTION ENCOUNTER (OUTPATIENT)
Age: 81
End: 2021-05-07

## 2021-05-07 VITALS — TEMPERATURE: 100 F

## 2021-05-07 LAB
ANION GAP SERPL CALC-SCNC: 10 MMOL/L — SIGNIFICANT CHANGE UP (ref 5–17)
BUN SERPL-MCNC: 31 MG/DL — HIGH (ref 7–23)
CALCIUM SERPL-MCNC: 9.4 MG/DL — SIGNIFICANT CHANGE UP (ref 8.4–10.5)
CHLORIDE SERPL-SCNC: 102 MMOL/L — SIGNIFICANT CHANGE UP (ref 96–108)
CO2 SERPL-SCNC: 25 MMOL/L — SIGNIFICANT CHANGE UP (ref 22–31)
CREAT SERPL-MCNC: 1.1 MG/DL — SIGNIFICANT CHANGE UP (ref 0.5–1.3)
GLUCOSE BLDC GLUCOMTR-MCNC: 101 MG/DL — HIGH (ref 70–99)
GLUCOSE BLDC GLUCOMTR-MCNC: 90 MG/DL — SIGNIFICANT CHANGE UP (ref 70–99)
GLUCOSE SERPL-MCNC: 107 MG/DL — HIGH (ref 70–99)
HCT VFR BLD CALC: 33.2 % — LOW (ref 34.5–45)
HGB BLD-MCNC: 9.9 G/DL — LOW (ref 11.5–15.5)
MAGNESIUM SERPL-MCNC: 2.2 MG/DL — SIGNIFICANT CHANGE UP (ref 1.6–2.6)
MCHC RBC-ENTMCNC: 24.3 PG — LOW (ref 27–34)
MCHC RBC-ENTMCNC: 29.8 GM/DL — LOW (ref 32–36)
MCV RBC AUTO: 81.4 FL — SIGNIFICANT CHANGE UP (ref 80–100)
NRBC # BLD: 0 /100 WBCS — SIGNIFICANT CHANGE UP (ref 0–0)
PLATELET # BLD AUTO: 190 K/UL — SIGNIFICANT CHANGE UP (ref 150–400)
POTASSIUM SERPL-MCNC: 4.6 MMOL/L — SIGNIFICANT CHANGE UP (ref 3.5–5.3)
POTASSIUM SERPL-SCNC: 4.6 MMOL/L — SIGNIFICANT CHANGE UP (ref 3.5–5.3)
RBC # BLD: 4.08 M/UL — SIGNIFICANT CHANGE UP (ref 3.8–5.2)
RBC # FLD: 22.5 % — HIGH (ref 10.3–14.5)
SODIUM SERPL-SCNC: 137 MMOL/L — SIGNIFICANT CHANGE UP (ref 135–145)
WBC # BLD: 6.17 K/UL — SIGNIFICANT CHANGE UP (ref 3.8–10.5)
WBC # FLD AUTO: 6.17 K/UL — SIGNIFICANT CHANGE UP (ref 3.8–10.5)

## 2021-05-07 PROCEDURE — 80053 COMPREHEN METABOLIC PANEL: CPT

## 2021-05-07 PROCEDURE — 83880 ASSAY OF NATRIURETIC PEPTIDE: CPT

## 2021-05-07 PROCEDURE — 86769 SARS-COV-2 COVID-19 ANTIBODY: CPT

## 2021-05-07 PROCEDURE — C1894: CPT

## 2021-05-07 PROCEDURE — 99239 HOSP IP/OBS DSCHRG MGMT >30: CPT

## 2021-05-07 PROCEDURE — 82668 ASSAY OF ERYTHROPOIETIN: CPT

## 2021-05-07 PROCEDURE — 86901 BLOOD TYPING SEROLOGIC RH(D): CPT

## 2021-05-07 PROCEDURE — 82553 CREATINE MB FRACTION: CPT

## 2021-05-07 PROCEDURE — 93005 ELECTROCARDIOGRAM TRACING: CPT

## 2021-05-07 PROCEDURE — 86923 COMPATIBILITY TEST ELECTRIC: CPT

## 2021-05-07 PROCEDURE — 36430 TRANSFUSION BLD/BLD COMPNT: CPT

## 2021-05-07 PROCEDURE — 93306 TTE W/DOPPLER COMPLETE: CPT

## 2021-05-07 PROCEDURE — 80048 BASIC METABOLIC PNL TOTAL CA: CPT

## 2021-05-07 PROCEDURE — C1760: CPT

## 2021-05-07 PROCEDURE — C2617: CPT

## 2021-05-07 PROCEDURE — 71045 X-RAY EXAM CHEST 1 VIEW: CPT

## 2021-05-07 PROCEDURE — C1769: CPT

## 2021-05-07 PROCEDURE — U0003: CPT

## 2021-05-07 PROCEDURE — 83615 LACTATE (LD) (LDH) ENZYME: CPT

## 2021-05-07 PROCEDURE — 82550 ASSAY OF CK (CPK): CPT

## 2021-05-07 PROCEDURE — 85379 FIBRIN DEGRADATION QUANT: CPT

## 2021-05-07 PROCEDURE — 84466 ASSAY OF TRANSFERRIN: CPT

## 2021-05-07 PROCEDURE — P9016: CPT

## 2021-05-07 PROCEDURE — 94640 AIRWAY INHALATION TREATMENT: CPT

## 2021-05-07 PROCEDURE — P9045: CPT

## 2021-05-07 PROCEDURE — 82728 ASSAY OF FERRITIN: CPT

## 2021-05-07 PROCEDURE — 83735 ASSAY OF MAGNESIUM: CPT

## 2021-05-07 PROCEDURE — 83540 ASSAY OF IRON: CPT

## 2021-05-07 PROCEDURE — 85610 PROTHROMBIN TIME: CPT

## 2021-05-07 PROCEDURE — 84484 ASSAY OF TROPONIN QUANT: CPT

## 2021-05-07 PROCEDURE — 82607 VITAMIN B-12: CPT

## 2021-05-07 PROCEDURE — 84100 ASSAY OF PHOSPHORUS: CPT

## 2021-05-07 PROCEDURE — 82330 ASSAY OF CALCIUM: CPT

## 2021-05-07 PROCEDURE — 36415 COLL VENOUS BLD VENIPUNCTURE: CPT

## 2021-05-07 PROCEDURE — 83550 IRON BINDING TEST: CPT

## 2021-05-07 PROCEDURE — 82746 ASSAY OF FOLIC ACID SERUM: CPT

## 2021-05-07 PROCEDURE — 94660 CPAP INITIATION&MGMT: CPT

## 2021-05-07 PROCEDURE — 87040 BLOOD CULTURE FOR BACTERIA: CPT

## 2021-05-07 PROCEDURE — 85027 COMPLETE CBC AUTOMATED: CPT

## 2021-05-07 PROCEDURE — 99285 EMERGENCY DEPT VISIT HI MDM: CPT

## 2021-05-07 PROCEDURE — 82803 BLOOD GASES ANY COMBINATION: CPT

## 2021-05-07 PROCEDURE — 82962 GLUCOSE BLOOD TEST: CPT

## 2021-05-07 PROCEDURE — 85384 FIBRINOGEN ACTIVITY: CPT

## 2021-05-07 PROCEDURE — 80061 LIPID PANEL: CPT

## 2021-05-07 PROCEDURE — 84145 PROCALCITONIN (PCT): CPT

## 2021-05-07 PROCEDURE — U0005: CPT

## 2021-05-07 PROCEDURE — 83010 ASSAY OF HAPTOGLOBIN QUANT: CPT

## 2021-05-07 PROCEDURE — C1887: CPT

## 2021-05-07 PROCEDURE — C1889: CPT

## 2021-05-07 PROCEDURE — 85045 AUTOMATED RETICULOCYTE COUNT: CPT

## 2021-05-07 PROCEDURE — 71045 X-RAY EXAM CHEST 1 VIEW: CPT | Mod: 26

## 2021-05-07 PROCEDURE — 82272 OCCULT BLD FECES 1-3 TESTS: CPT

## 2021-05-07 PROCEDURE — 81001 URINALYSIS AUTO W/SCOPE: CPT

## 2021-05-07 PROCEDURE — 84295 ASSAY OF SERUM SODIUM: CPT

## 2021-05-07 PROCEDURE — 87086 URINE CULTURE/COLONY COUNT: CPT

## 2021-05-07 PROCEDURE — 84132 ASSAY OF SERUM POTASSIUM: CPT

## 2021-05-07 PROCEDURE — 86900 BLOOD TYPING SEROLOGIC ABO: CPT

## 2021-05-07 PROCEDURE — 85730 THROMBOPLASTIN TIME PARTIAL: CPT

## 2021-05-07 PROCEDURE — 86140 C-REACTIVE PROTEIN: CPT

## 2021-05-07 PROCEDURE — 86850 RBC ANTIBODY SCREEN: CPT

## 2021-05-07 PROCEDURE — 85025 COMPLETE CBC W/AUTO DIFF WBC: CPT

## 2021-05-07 PROCEDURE — 83036 HEMOGLOBIN GLYCOSYLATED A1C: CPT

## 2021-05-07 PROCEDURE — 83605 ASSAY OF LACTIC ACID: CPT

## 2021-05-07 PROCEDURE — 84443 ASSAY THYROID STIM HORMONE: CPT

## 2021-05-07 RX ORDER — LISINOPRIL 2.5 MG/1
1 TABLET ORAL
Qty: 0 | Refills: 0 | DISCHARGE
Start: 2021-05-07 | End: 2021-06-05

## 2021-05-07 RX ORDER — FUROSEMIDE 40 MG
20 TABLET ORAL DAILY
Refills: 0 | Status: DISCONTINUED | OUTPATIENT
Start: 2021-05-07 | End: 2021-05-07

## 2021-05-07 RX ORDER — POTASSIUM CHLORIDE 20 MEQ
10 PACKET (EA) ORAL DAILY
Refills: 0 | Status: DISCONTINUED | OUTPATIENT
Start: 2021-05-07 | End: 2021-05-07

## 2021-05-07 RX ORDER — FUROSEMIDE 40 MG
1 TABLET ORAL
Qty: 0 | Refills: 0 | DISCHARGE
Start: 2021-05-07

## 2021-05-07 RX ORDER — POTASSIUM CHLORIDE 20 MEQ
1 PACKET (EA) ORAL
Qty: 0 | Refills: 0 | DISCHARGE
Start: 2021-05-07

## 2021-05-07 RX ORDER — LISINOPRIL 2.5 MG/1
1 TABLET ORAL
Qty: 30 | Refills: 0
Start: 2021-05-07 | End: 2021-06-05

## 2021-05-07 RX ORDER — FUROSEMIDE 40 MG
1 TABLET ORAL
Qty: 30 | Refills: 0
Start: 2021-05-07 | End: 2021-06-05

## 2021-05-07 RX ADMIN — Medication 10 MILLIEQUIVALENT(S): at 11:48

## 2021-05-07 RX ADMIN — TIOTROPIUM BROMIDE 1 CAPSULE(S): 18 CAPSULE ORAL; RESPIRATORY (INHALATION) at 10:36

## 2021-05-07 RX ADMIN — Medication 1 TABLET(S): at 11:48

## 2021-05-07 RX ADMIN — LISINOPRIL 10 MILLIGRAM(S): 2.5 TABLET ORAL at 05:11

## 2021-05-07 RX ADMIN — PANTOPRAZOLE SODIUM 40 MILLIGRAM(S): 20 TABLET, DELAYED RELEASE ORAL at 11:48

## 2021-05-07 RX ADMIN — MAGNESIUM OXIDE 400 MG ORAL TABLET 400 MILLIGRAM(S): 241.3 TABLET ORAL at 10:33

## 2021-05-07 RX ADMIN — APIXABAN 5 MILLIGRAM(S): 2.5 TABLET, FILM COATED ORAL at 05:11

## 2021-05-07 RX ADMIN — Medication 20 MILLIGRAM(S): at 09:01

## 2021-05-07 RX ADMIN — Medication 50 MILLIGRAM(S): at 05:11

## 2021-05-07 RX ADMIN — Medication 1 MILLIGRAM(S): at 11:48

## 2021-05-07 NOTE — PROGRESS NOTE ADULT - NSICDXPILOT_GEN_ALL_CORE
[place]

## 2021-05-07 NOTE — DISCHARGE NOTE NURSING/CASE MANAGEMENT/SOCIAL WORK - PATIENT PORTAL LINK FT
You can access the FollowMyHealth Patient Portal offered by [facility] by registering at the following website: [email or web address]. By joining [facility]’s FollowMyHealth portal, you will also be able to view your health information using other applications (apps) compatible with our system.

## 2021-05-07 NOTE — DISCHARGE NOTE NURSING/CASE MANAGEMENT/SOCIAL WORK - NSDCFUADDAPPT_GEN_ALL_CORE_FT
-Please follow up with Dr. Alejo via Telehealth on 5/17/21 @9:45am.  The office is located at [facility], [facility], 4th floor. Call us with any questions #[phone].    -Please follow up with Dr. Aguirre on 5/17/21 @11:30am.  The office information is located above.

## 2021-05-07 NOTE — PROGRESS NOTE ADULT - REASON FOR ADMISSION
SOB, Chest Pressure

## 2021-05-07 NOTE — PROGRESS NOTE ADULT - PROVIDER SPECIALTY LIST ADULT
Structural Heart
Gastroenterology
Structural Heart
Heme/Onc
Structural Heart
Structural Heart

## 2021-05-07 NOTE — PROGRESS NOTE ADULT - ASSESSMENT
80 year old F, Greenlandic/English speaking, PMHx HTN, HLD, AS (s/p TAVR 02/2019), COPD (on 2L home O2 at baseline), GERD, and colon cancer (s/p colectomy) who presented to [facility] ED 4/28 c/o 3 days of worsening SOB/MANRIQUE and chest pressure. Patient placed on BiPAP, diuresed, and transient nitrogtt in ED with improvement. Admitted to cardiology/telemetry for HF management. [facility], Dr. Alejo consulted for hx TAVR and evaluation. 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 TTE completed revealed TAVR valve with mean transvalvular gradient is 40.00 mmHg. She was transferred to [facility] service under the care of Dr. Alejo for further heart failure management and workup for possible intervention.  Patient was diuresed using IV Lasix then IV bumex, a total of 9L was removed.  Patient was taken for possible valve in valve TAVR, however the transvalvular gradient was found to be 7mmHg and procedure was aborted.  Patient was restarted on anticoagulation and her home Toprol XL dose and half dose of her home lisinopril post procedure.  Patient ambulating independently with room air, tolerating diet, pain controlled, urinating and having bowel movements.      Patient for discharge home.    80 year old F, English/English speaking, PMHx HTN, HLD, AS (s/p TAVR 02/2019), COPD (on 2L home O2 at baseline), GERD, and colon cancer (s/p colectomy) who presented to [facility] ED 4/28 c/o 3 days of worsening SOB/MANRIQUE and chest pressure. Patient placed on BiPAP, diuresed, and transient nitrogtt in ED with improvement. Admitted to cardiology/telemetry for HF management. [facility], Dr. Alejo consulted for hx TAVR and evaluation. 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 TTE completed revealed TAVR valve with mean transvalvular gradient is 40.00 mmHg. She was transferred to [facility] service under the care of Dr. Alejo for further heart failure management and workup for possible intervention.  Patient was diuresed using IV Lasix then IV bumex, a total of 9L was removed.  Patient was taken for possible valve in valve TAVR, however the transvalvular gradient was found to be 7mmHg and procedure was aborted.  Patient was restarted on anticoagulation and her home Toprol XL dose and half dose of her home lisinopril post procedure.  Patient ambulating independently with room air, tolerating diet, pain controlled, urinating and having bowel movements.      Patient for discharge home.   Plan discussed with daughter (Jaelyn Vera @ [phone]) about discharged today.

## 2021-05-07 NOTE — PROGRESS NOTE ADULT - SUBJECTIVE AND OBJECTIVE BOX
Patient discussed on morning rounds with Dr. Alejo    Operation / Date: management of Heart failure and aortic stenosis    Surgeon: Sapna    Referring Physician: Carla    SUBJECTIVE ASSESSMENT:  80y Female seen and examined at bedside.      Hospital Course:    Vital Signs Last 24 Hrs  T(C): 36.1 (07 May 2021 09:13), Max: 37.3 (06 May 2021 17:40)  T(F): 97 (07 May 2021 09:13), Max: 99.2 (06 May 2021 17:40)  HR: 60 (07 May 2021 09:00) (60 - 96)  BP: 118/56 (07 May 2021 09:00) (108/59 - 149/76)  BP(mean): 80 (07 May 2021 09:00) (77 - 105)  RR: 18 (07 May 2021 09:00) (15 - 18)  SpO2: 98% (07 May 2021 09:00) (94% - 100%)  I&O's Detail    06 May 2021 07:01  -  07 May 2021 07:00  --------------------------------------------------------  IN:    Oral Fluid: 540 mL  Total IN: 540 mL    OUT:    Voided (mL): 700 mL  Total OUT: 700 mL    Total NET: -160 mL          EPICARDIAL WIRES REMOVED: Yes/No.  TIE DOWNS REMOVED: Yes/No.    PHYSICAL EXAM:    General:     Neurological:    Cardiovascular:    Respiratory:    Gastrointestinal:    Extremities:    Vascular:    Incision Sites:    LABS:                        9.9    6.17  )-----------( 190      ( 07 May 2021 05:55 )             33.2       COUMADIN:  Yes/No.        DOSE:                  INDICATION:                GOAL INR:    PT/INR - ( 06 May 2021 05:49 )   PT: 12.9 sec;   INR: 1.08          PTT - ( 06 May 2021 05:49 )  PTT:41.3 sec    05-07    137  |  102  |  31<H>  ----------------------------<  107<H>  4.6   |  25  |  1.10    Ca    9.4      07 May 2021 05:55  Phos  4.1     05-06  Mg     2.2     05-07    TPro  7.7  /  Alb  4.1  /  TBili  0.7  /  DBili  x   /  AST  17  /  ALT  11  /  AlkPhos  73  05-06          MEDICATIONS  (STANDING):  apixaban 5 milliGRAM(s) Oral two times a day  atorvastatin 20 milliGRAM(s) Oral at bedtime  dextrose 40% Gel 15 Gram(s) Oral once  dextrose 5%. 1000 milliLiter(s) (100 mL/Hr) IV Continuous <Continuous>  dextrose 5%. 1000 milliLiter(s) (50 mL/Hr) IV Continuous <Continuous>  dextrose 50% Injectable 25 Gram(s) IV Push once  dextrose 50% Injectable 12.5 Gram(s) IV Push once  dextrose 50% Injectable 25 Gram(s) IV Push once  folic acid 1 milliGRAM(s) Oral daily  furosemide    Tablet 20 milliGRAM(s) Oral daily  glucagon  Injectable 1 milliGRAM(s) IntraMuscular once  insulin lispro (ADMELOG) corrective regimen sliding scale   SubCutaneous Before meals and at bedtime  lisinopril 10 milliGRAM(s) Oral daily  magnesium oxide 400 milliGRAM(s) Oral every 24 hours  metoprolol succinate ER 50 milliGRAM(s) Oral daily  multivitamin 1 Tablet(s) Oral daily  pantoprazole    Tablet 40 milliGRAM(s) Oral daily  potassium chloride    Tablet ER 10 milliEquivalent(s) Oral daily  sodium chloride 0.9%. 1000 milliLiter(s) (10 mL/Hr) IV Continuous <Continuous>  tiotropium 18 MICROgram(s) Capsule 1 Capsule(s) Inhalation daily      Discharge CXR:    Discharge ECHO:     Patient discussed on morning rounds with Dr. Alejo    Operation / Date: management of Heart failure and aortic stenosis    Surgeon: Sapna    Referring Physician: Calra    SUBJECTIVE ASSESSMENT:  80y Female seen and examined at bedside.  Patient with no complaints.  Eager to go home.  Denies chest pain, shortness of breath, nausea, vomiting.    Hospital Course:  80 year old F, Mongolian/English speaking, PMHx HTN, HLD, AS (s/p TAVR 02/2019), COPD (on 2L home O2 at baseline), GERD, and colon cancer (s/p colectomy) who presented to [facility] ED 4/28 c/o 3 days of worsening SOB/MANRIQUE and chest pressure. Patient placed on BiPAP, diuresed, and transient nitrogtt in ED with improvement. Admitted to cardiology/telemetry for HF management. [facility], Dr. Alejo consulted for hx TAVR and evaluation. 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 TTE completed revealed TAVR valve with mean transvalvular gradient is 40.00 mmHg. She was transferred to [facility] service under the care of Dr. Alejo for further heart failure management and workup for possible intervention.  Patient was diuresed using IV Lasix then IV bumex, a total of 9L was removed.  Patient was taken for possible valve in valve TAVR, however the transvalvular gradient was found to be 7mmHg and procedure was aborted.  Patient was restarted on anticoagulation and her home Toprol XL dose and half dose of her home lisinopril post procedure.  Patient ambulating independently with room air, tolerating diet, pain controlled, urinating and having bowel movements.     Vital Signs Last 24 Hrs  T(C): 36.1 (07 May 2021 09:13), Max: 37.3 (06 May 2021 17:40)  T(F): 97 (07 May 2021 09:13), Max: 99.2 (06 May 2021 17:40)  HR: 60 (07 May 2021 09:00) (60 - 96)  BP: 118/56 (07 May 2021 09:00) (108/59 - 149/76)  BP(mean): 80 (07 May 2021 09:00) (77 - 105)  RR: 18 (07 May 2021 09:00) (15 - 18)  SpO2: 98% (07 May 2021 09:00) (94% - 100%)  I&O's Detail    06 May 2021 07:01  -  07 May 2021 07:00  --------------------------------------------------------  IN:    Oral Fluid: 540 mL  Total IN: 540 mL    OUT:    Voided (mL): 700 mL  Total OUT: 700 mL    Total NET: -160 mL      PHYSICAL EXAM:    General: NAD, sitting up in chair  Neurological: A&OX3. No focal deficits noted, moving bilateral upper and lower extremities   Cardiovascular: RRR, +YOVANA, no rubs, no gallops  Respiratory: Clear to auscultation bilateral posterior lung fields, no wheezes, rales, rhonchi   Gastrointestinal: soft, NTND, +BS  Extremities: Trace bilateral LE edema, no calf tenderness. Left upper extremity erythematous where prior IV was. Full strength and ROM bilateral upper and lower extremities   Vascular: distal pulses intact b/l  Incision Sites: groins c/d/i; no evidence of hematoma; not saturated  LABS:                        9.9    6.17  )-----------( 190      ( 07 May 2021 05:55 )             33.2       COUMADIN:  Yes/No.        DOSE:                  INDICATION:                GOAL INR:    PT/INR - ( 06 May 2021 05:49 )   PT: 12.9 sec;   INR: 1.08          PTT - ( 06 May 2021 05:49 )  PTT:41.3 sec    05-07    137  |  102  |  31<H>  ----------------------------<  107<H>  4.6   |  25  |  1.10    Ca    9.4      07 May 2021 05:55  Phos  4.1     05-06  Mg     2.2     05-07    TPro  7.7  /  Alb  4.1  /  TBili  0.7  /  DBili  x   /  AST  17  /  ALT  11  /  AlkPhos  73  05-06          MEDICATIONS  (STANDING):  apixaban 5 milliGRAM(s) Oral two times a day  atorvastatin 20 milliGRAM(s) Oral at bedtime  dextrose 40% Gel 15 Gram(s) Oral once  dextrose 5%. 1000 milliLiter(s) (100 mL/Hr) IV Continuous <Continuous>  dextrose 5%. 1000 milliLiter(s) (50 mL/Hr) IV Continuous <Continuous>  dextrose 50% Injectable 25 Gram(s) IV Push once  dextrose 50% Injectable 12.5 Gram(s) IV Push once  dextrose 50% Injectable 25 Gram(s) IV Push once  folic acid 1 milliGRAM(s) Oral daily  furosemide    Tablet 20 milliGRAM(s) Oral daily  glucagon  Injectable 1 milliGRAM(s) IntraMuscular once  insulin lispro (ADMELOG) corrective regimen sliding scale   SubCutaneous Before meals and at bedtime  lisinopril 10 milliGRAM(s) Oral daily  magnesium oxide 400 milliGRAM(s) Oral every 24 hours  metoprolol succinate ER 50 milliGRAM(s) Oral daily  multivitamin 1 Tablet(s) Oral daily  pantoprazole    Tablet 40 milliGRAM(s) Oral daily  potassium chloride    Tablet ER 10 milliEquivalent(s) Oral daily  sodium chloride 0.9%. 1000 milliLiter(s) (10 mL/Hr) IV Continuous <Continuous>  tiotropium 18 MICROgram(s) Capsule 1 Capsule(s) Inhalation daily      Discharge CXR:  < from: Xray Chest 1 View- PORTABLE-Routine (Xray Chest 1 View- PORTABLE-Routine in AM.) (05.06.21 @ 04:59) >  Frontal examination of the chest demonstrates cardiomegaly. No acute infiltrates. Discoid changes lung bases. Degenerative changes thoracic spine. Involving thoracic aorta.      < end of copied text >

## 2021-05-13 DIAGNOSIS — D63.0 ANEMIA IN NEOPLASTIC DISEASE: ICD-10-CM
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
## 2021-05-13 DIAGNOSIS — I16.1 HYPERTENSIVE EMERGENCY: ICD-10-CM

## 2021-05-13 DIAGNOSIS — Z88.8 ALLERGY STATUS TO OTHER DRUGS, MEDICAMENTS AND BIOLOGICAL SUBSTANCES STATUS: ICD-10-CM

## 2021-05-13 DIAGNOSIS — K21.9 GASTRO-ESOPHAGEAL REFLUX DISEASE WITHOUT ESOPHAGITIS: ICD-10-CM

## 2021-05-13 DIAGNOSIS — E78.5 HYPERLIPIDEMIA, UNSPECIFIED: ICD-10-CM

## 2021-05-13 DIAGNOSIS — G47.33 OBSTRUCTIVE SLEEP APNEA (ADULT) (PEDIATRIC): ICD-10-CM

## 2021-05-13 DIAGNOSIS — D50.9 IRON DEFICIENCY ANEMIA, UNSPECIFIED: ICD-10-CM

## 2021-05-13 DIAGNOSIS — T82.09XA OTHER MECHANICAL COMPLICATION OF HEART VALVE PROSTHESIS, INITIAL ENCOUNTER: ICD-10-CM

## 2021-05-13 DIAGNOSIS — J45.909 UNSPECIFIED ASTHMA, UNCOMPLICATED: ICD-10-CM

## 2021-05-13 DIAGNOSIS — Z79.899 OTHER LONG TERM (CURRENT) DRUG THERAPY: ICD-10-CM

## 2021-05-13 DIAGNOSIS — Z91.14 PATIENT'S OTHER NONCOMPLIANCE WITH MEDICATION REGIMEN: ICD-10-CM

## 2021-05-13 DIAGNOSIS — J44.9 CHRONIC OBSTRUCTIVE PULMONARY DISEASE, UNSPECIFIED: ICD-10-CM

## 2021-05-13 DIAGNOSIS — I11.0 HYPERTENSIVE HEART DISEASE WITH HEART FAILURE: ICD-10-CM

## 2021-05-13 DIAGNOSIS — Z53.09 PROCEDURE AND TREATMENT NOT CARRIED OUT BECAUSE OF OTHER CONTRAINDICATION: ICD-10-CM

## 2021-05-13 DIAGNOSIS — I50.33 ACUTE ON CHRONIC DIASTOLIC (CONGESTIVE) HEART FAILURE: ICD-10-CM

## 2021-05-13 DIAGNOSIS — I35.0 NONRHEUMATIC AORTIC (VALVE) STENOSIS: ICD-10-CM

## 2021-05-13 DIAGNOSIS — Z79.02 LONG TERM (CURRENT) USE OF ANTITHROMBOTICS/ANTIPLATELETS: ICD-10-CM

## 2021-05-13 DIAGNOSIS — E66.9 OBESITY, UNSPECIFIED: ICD-10-CM
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
## 2021-05-13 DIAGNOSIS — Z99.89 DEPENDENCE ON OTHER ENABLING MACHINES AND DEVICES: ICD-10-CM

## 2021-05-13 DIAGNOSIS — C18.9 MALIGNANT NEOPLASM OF COLON, UNSPECIFIED: ICD-10-CM

## 2021-05-13 DIAGNOSIS — Z99.81 DEPENDENCE ON SUPPLEMENTAL OXYGEN: ICD-10-CM

## 2021-05-13 DIAGNOSIS — I25.10 ATHEROSCLEROTIC HEART DISEASE OF NATIVE CORONARY ARTERY WITHOUT ANGINA PECTORIS: ICD-10-CM

## 2021-05-13 DIAGNOSIS — Z90.49 ACQUIRED ABSENCE OF OTHER SPECIFIED PARTS OF DIGESTIVE TRACT: ICD-10-CM

## 2021-05-13 DIAGNOSIS — Z95.2 PRESENCE OF PROSTHETIC HEART VALVE: ICD-10-CM

## 2021-05-13 DIAGNOSIS — J96.10 CHRONIC RESPIRATORY FAILURE, UNSPECIFIED WHETHER WITH HYPOXIA OR HYPERCAPNIA: ICD-10-CM

## 2021-05-17 ENCOUNTER — APPOINTMENT (OUTPATIENT)
Dept: CARDIOTHORACIC SURGERY | Facility: CLINIC | Age: 81
End: 2021-05-17

## 2021-06-14 ENCOUNTER — APPOINTMENT (OUTPATIENT)
Dept: CARDIOTHORACIC SURGERY | Facility: CLINIC | Age: 81
End: 2021-06-14
Payer: MEDICARE

## 2021-06-14 PROCEDURE — 99213 OFFICE O/P EST LOW 20 MIN: CPT | Mod: 95

## 2021-06-15 RX ORDER — MULTIVITAMIN
CAPSULE ORAL DAILY
Qty: 30 | Refills: 0 | Status: ACTIVE | COMMUNITY
Start: 2021-06-15 | End: 1900-01-01

## 2021-06-15 RX ORDER — FUROSEMIDE 20 MG/1
20 TABLET ORAL DAILY
Qty: 30 | Refills: 0 | Status: ACTIVE | COMMUNITY

## 2021-06-15 RX ORDER — LISINOPRIL 10 MG/1
10 TABLET ORAL DAILY
Qty: 1 | Refills: 2 | Status: ACTIVE | COMMUNITY

## 2021-06-15 RX ORDER — FOLIC ACID 1 MG/1
1 TABLET ORAL DAILY
Qty: 30 | Refills: 0 | Status: ACTIVE | COMMUNITY
Start: 2021-06-15 | End: 1900-01-01

## 2021-06-15 RX ORDER — POTASSIUM CHLORIDE 750 MG/1
10 CAPSULE, EXTENDED RELEASE ORAL
Qty: 30 | Refills: 0 | Status: ACTIVE | COMMUNITY
Start: 2021-06-15 | End: 1900-01-01

## 2021-06-17 NOTE — HISTORY OF PRESENT ILLNESS
[FreeTextEntry1] : \par This visit was provided via telehealth using real-time 2-way audio visual technology. The patient, BRITTANY DE LA CRUZ, was located at home, [address] [place] [address] , at the time of the visit. The provider was located at [address] [place] [address]. The patient, BRITTANY DE LA CRUZ, Dr. Solomon Alejo and patient's daughter all participated in the telehealth encounter. Verbal consent given on 06/14/2021 by BRITTANY DE LA CRUZ.\par \par \par 80 year old French Speaking female with a history of obesity, uncontrolled hypertension, asthma (no history of intubations) and chronic diastolic heart failure with severe aortic stenosis s/p TF TAVR on 02/5/19 with Jamar 3(23 mm, commercial) who was recently admitted on 11/13/2020 for colon CA s/p laparoscopic subtotal colectomy (R and transverse colectomy, ileocolic anastomosis) on 12/2/2020 who presents for a follow up visit after discharge from hospital. \par \par On 4/28 she presented to [facility] ER with complaints of 3 days of worsening SOB/MANRIQUE and chest pressure. Patient placed on BiPAP, diuresed, and transient nitro gtt in ED with improvement. [facility], Dr. Alejo consulted for hx TAVR and evaluation. 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 TTE completed revealed TAVR valve with mean transvalvular gradient is 40.00 mmHg. Patient was diuresed using IV Lasix then IV bumex, a total of 9L was removed. Patient was taken for possible valve in valve TAVR, however the transvalvular gradient was found to be 7mmHg and procedure was aborted. She was restarted on anticoagulation and her home Toprol XL dose and half dose of her home lisinopril post procedure. She was discharged home on 5/7/21.

## 2021-06-28 ENCOUNTER — EMERGENCY (EMERGENCY)
Facility: HOSPITAL | Age: 81
LOS: 1 days | Discharge: HOME | End: 2021-06-28
Attending: EMERGENCY MEDICINE | Admitting: EMERGENCY MEDICINE
Payer: MEDICARE

## 2021-06-28 VITALS
DIASTOLIC BLOOD PRESSURE: 78 MMHG | OXYGEN SATURATION: 96 % | TEMPERATURE: 98 F | SYSTOLIC BLOOD PRESSURE: 157 MMHG | RESPIRATION RATE: 18 BRPM | HEART RATE: 80 BPM

## 2021-06-28 VITALS
TEMPERATURE: 100 F | OXYGEN SATURATION: 95 % | DIASTOLIC BLOOD PRESSURE: 77 MMHG | HEIGHT: 70 IN | SYSTOLIC BLOOD PRESSURE: 128 MMHG | WEIGHT: 216.93 LBS | HEART RATE: 78 BPM | RESPIRATION RATE: 18 BRPM

## 2021-06-28 DIAGNOSIS — Z95.2 PRESENCE OF PROSTHETIC HEART VALVE: Chronic | ICD-10-CM

## 2021-06-28 DIAGNOSIS — Z79.01 LONG TERM (CURRENT) USE OF ANTICOAGULANTS: ICD-10-CM

## 2021-06-28 DIAGNOSIS — L03.031 CELLULITIS OF RIGHT TOE: ICD-10-CM

## 2021-06-28 DIAGNOSIS — K21.9 GASTRO-ESOPHAGEAL REFLUX DISEASE WITHOUT ESOPHAGITIS: ICD-10-CM

## 2021-06-28 DIAGNOSIS — L08.9 LOCAL INFECTION OF THE SKIN AND SUBCUTANEOUS TISSUE, UNSPECIFIED: ICD-10-CM

## 2021-06-28 DIAGNOSIS — I10 ESSENTIAL (PRIMARY) HYPERTENSION: ICD-10-CM

## 2021-06-28 DIAGNOSIS — Z85.038 PERSONAL HISTORY OF OTHER MALIGNANT NEOPLASM OF LARGE INTESTINE: ICD-10-CM

## 2021-06-28 DIAGNOSIS — Z88.9 ALLERGY STATUS TO UNSPECIFIED DRUGS, MEDICAMENTS AND BIOLOGICAL SUBSTANCES: ICD-10-CM

## 2021-06-28 DIAGNOSIS — E66.3 OVERWEIGHT: ICD-10-CM

## 2021-06-28 DIAGNOSIS — Z88.1 ALLERGY STATUS TO OTHER ANTIBIOTIC AGENTS STATUS: ICD-10-CM

## 2021-06-28 DIAGNOSIS — J44.9 CHRONIC OBSTRUCTIVE PULMONARY DISEASE, UNSPECIFIED: ICD-10-CM

## 2021-06-28 DIAGNOSIS — E78.5 HYPERLIPIDEMIA, UNSPECIFIED: ICD-10-CM

## 2021-06-28 LAB
ALBUMIN SERPL ELPH-MCNC: 4.2 G/DL — SIGNIFICANT CHANGE UP (ref 3.3–5)
ALP SERPL-CCNC: 87 U/L — SIGNIFICANT CHANGE UP (ref 40–120)
ALT FLD-CCNC: 12 U/L — SIGNIFICANT CHANGE UP (ref 10–45)
ANION GAP SERPL CALC-SCNC: 9 MMOL/L — SIGNIFICANT CHANGE UP (ref 5–17)
AST SERPL-CCNC: 16 U/L — SIGNIFICANT CHANGE UP (ref 10–40)
BASOPHILS # BLD AUTO: 0.03 K/UL — SIGNIFICANT CHANGE UP (ref 0–0.2)
BASOPHILS NFR BLD AUTO: 0.4 % — SIGNIFICANT CHANGE UP (ref 0–2)
BILIRUB SERPL-MCNC: 0.4 MG/DL — SIGNIFICANT CHANGE UP (ref 0.2–1.2)
BUN SERPL-MCNC: 22 MG/DL — SIGNIFICANT CHANGE UP (ref 7–23)
CALCIUM SERPL-MCNC: 9.2 MG/DL — SIGNIFICANT CHANGE UP (ref 8.4–10.5)
CHLORIDE SERPL-SCNC: 105 MMOL/L — SIGNIFICANT CHANGE UP (ref 96–108)
CO2 SERPL-SCNC: 26 MMOL/L — SIGNIFICANT CHANGE UP (ref 22–31)
CREAT SERPL-MCNC: 1.27 MG/DL — SIGNIFICANT CHANGE UP (ref 0.5–1.3)
CRP SERPL-MCNC: 7.2 MG/L — HIGH (ref 0–4)
EOSINOPHIL # BLD AUTO: 0.1 K/UL — SIGNIFICANT CHANGE UP (ref 0–0.5)
EOSINOPHIL NFR BLD AUTO: 1.4 % — SIGNIFICANT CHANGE UP (ref 0–6)
ERYTHROCYTE [SEDIMENTATION RATE] IN BLOOD: 76 MM/HR — HIGH
GLUCOSE SERPL-MCNC: 116 MG/DL — HIGH (ref 70–99)
HCT VFR BLD CALC: 37 % — SIGNIFICANT CHANGE UP (ref 34.5–45)
HGB BLD-MCNC: 11.9 G/DL — SIGNIFICANT CHANGE UP (ref 11.5–15.5)
IMM GRANULOCYTES NFR BLD AUTO: 0.3 % — SIGNIFICANT CHANGE UP (ref 0–1.5)
LACTATE SERPL-SCNC: 1.6 MMOL/L — SIGNIFICANT CHANGE UP (ref 0.5–2)
LYMPHOCYTES # BLD AUTO: 1.46 K/UL — SIGNIFICANT CHANGE UP (ref 1–3.3)
LYMPHOCYTES # BLD AUTO: 20.7 % — SIGNIFICANT CHANGE UP (ref 13–44)
MCHC RBC-ENTMCNC: 28.5 PG — SIGNIFICANT CHANGE UP (ref 27–34)
MCHC RBC-ENTMCNC: 32.2 GM/DL — SIGNIFICANT CHANGE UP (ref 32–36)
MCV RBC AUTO: 88.5 FL — SIGNIFICANT CHANGE UP (ref 80–100)
MONOCYTES # BLD AUTO: 0.51 K/UL — SIGNIFICANT CHANGE UP (ref 0–0.9)
MONOCYTES NFR BLD AUTO: 7.2 % — SIGNIFICANT CHANGE UP (ref 2–14)
NEUTROPHILS # BLD AUTO: 4.93 K/UL — SIGNIFICANT CHANGE UP (ref 1.8–7.4)
NEUTROPHILS NFR BLD AUTO: 70 % — SIGNIFICANT CHANGE UP (ref 43–77)
NRBC # BLD: 0 /100 WBCS — SIGNIFICANT CHANGE UP (ref 0–0)
PLATELET # BLD AUTO: 230 K/UL — SIGNIFICANT CHANGE UP (ref 150–400)
POTASSIUM SERPL-MCNC: 4.5 MMOL/L — SIGNIFICANT CHANGE UP (ref 3.5–5.3)
POTASSIUM SERPL-SCNC: 4.5 MMOL/L — SIGNIFICANT CHANGE UP (ref 3.5–5.3)
PROT SERPL-MCNC: 7.7 G/DL — SIGNIFICANT CHANGE UP (ref 6–8.3)
RBC # BLD: 4.18 M/UL — SIGNIFICANT CHANGE UP (ref 3.8–5.2)
RBC # FLD: 19 % — HIGH (ref 10.3–14.5)
SODIUM SERPL-SCNC: 140 MMOL/L — SIGNIFICANT CHANGE UP (ref 135–145)
WBC # BLD: 7.05 K/UL — SIGNIFICANT CHANGE UP (ref 3.8–10.5)
WBC # FLD AUTO: 7.05 K/UL — SIGNIFICANT CHANGE UP (ref 3.8–10.5)

## 2021-06-28 PROCEDURE — 73660 X-RAY EXAM OF TOE(S): CPT | Mod: 26,RT

## 2021-06-28 PROCEDURE — 96365 THER/PROPH/DIAG IV INF INIT: CPT

## 2021-06-28 PROCEDURE — 86140 C-REACTIVE PROTEIN: CPT

## 2021-06-28 PROCEDURE — 83605 ASSAY OF LACTIC ACID: CPT

## 2021-06-28 PROCEDURE — 36415 COLL VENOUS BLD VENIPUNCTURE: CPT

## 2021-06-28 PROCEDURE — 73660 X-RAY EXAM OF TOE(S): CPT

## 2021-06-28 PROCEDURE — 99284 EMERGENCY DEPT VISIT MOD MDM: CPT | Mod: 25

## 2021-06-28 PROCEDURE — 85652 RBC SED RATE AUTOMATED: CPT

## 2021-06-28 PROCEDURE — 80053 COMPREHEN METABOLIC PANEL: CPT

## 2021-06-28 PROCEDURE — 87040 BLOOD CULTURE FOR BACTERIA: CPT

## 2021-06-28 PROCEDURE — 96375 TX/PRO/DX INJ NEW DRUG ADDON: CPT

## 2021-06-28 PROCEDURE — 85025 COMPLETE CBC W/AUTO DIFF WBC: CPT

## 2021-06-28 RX ORDER — AMPICILLIN SODIUM AND SULBACTAM SODIUM 250; 125 MG/ML; MG/ML
3 INJECTION, POWDER, FOR SUSPENSION INTRAMUSCULAR; INTRAVENOUS ONCE
Refills: 0 | Status: COMPLETED | OUTPATIENT
Start: 2021-06-28 | End: 2021-06-28

## 2021-06-28 RX ORDER — DIPHENHYDRAMINE HCL 50 MG
25 CAPSULE ORAL ONCE
Refills: 0 | Status: COMPLETED | OUTPATIENT
Start: 2021-06-28 | End: 2021-06-28

## 2021-06-28 RX ORDER — VANCOMYCIN HCL 1 G
1000 VIAL (EA) INTRAVENOUS ONCE
Refills: 0 | Status: COMPLETED | OUTPATIENT
Start: 2021-06-28 | End: 2021-06-28

## 2021-06-28 RX ADMIN — Medication 1000 MILLIGRAM(S): at 22:49

## 2021-06-28 RX ADMIN — Medication 25 MILLIGRAM(S): at 22:49

## 2021-06-28 RX ADMIN — AMPICILLIN SODIUM AND SULBACTAM SODIUM 200 GRAM(S): 250; 125 INJECTION, POWDER, FOR SUSPENSION INTRAMUSCULAR; INTRAVENOUS at 20:25

## 2021-06-28 RX ADMIN — Medication 250 MILLIGRAM(S): at 21:40

## 2021-06-28 NOTE — ED PROVIDER NOTE - CLINICAL SUMMARY MEDICAL DECISION MAKING FREE TEXT BOX
pt c/o r big toe inf after getting ingrown nail excised by outside podiatrist, started on po cipro yest - here for eval, on exam self limiting inf to 1st digit, no ascending lymphangitis/foot inf, afebrile w/o leukocytosis, no osteo on xray, pt cultured, given dose of iv abx, seen by podiatry and also recommending dc w/abx - although also recommending abx change - podiatry sent rx for clindo. pt and daughter happy w/plan and wanting to go home, strict return precautions given

## 2021-06-28 NOTE — ED ADULT NURSE REASSESSMENT NOTE - NS ED NURSE REASSESS COMMENT FT1
Pt co itchiness s/p receiving vancomycin. MEET Hernandez made aware. Pt medicated as per MAR. NO rash present, no throat swelling present, no wheezing. Pt protecting airway and VSS. Per MEET Pineda OK for DC. Vancomycin added to allergy list.

## 2021-06-28 NOTE — ED PROVIDER NOTE - MUSCULOSKELETAL, MLM
Spine appears normal, range of motion is not limited, no muscle or joint tenderness; R foot: + erythema and swelling to 1st digit, + warmth, no pus, no bleeding, no ascending lymphangitis, pedal pulse 2+, FROM

## 2021-06-28 NOTE — ED ADULT NURSE NOTE - PMH
Aortic stenosis    COPD (chronic obstructive pulmonary disease)    GERD (gastroesophageal reflux disease)    HTN (hypertension)    Hyperlipidemia    Pulmonary HTN

## 2021-06-28 NOTE — ED ADULT NURSE NOTE - CHPI ED NUR SYMPTOMS NEG
no bleeding at site/no blood in mucus/no chills/no fever/no purulent drainage/no rectal pain/no vomiting

## 2021-06-28 NOTE — ED ADULT NURSE NOTE - OBJECTIVE STATEMENT
80 yo F presents to ED co R great toe wound s/p ingrown toenail procedure 1 week ago. Pt co redness and throbbing to R great toe x5 days and pain is now radiating to R knee, pt was given rx for cipro by PCP yesterday.  Erythema noted to toe. 82 yo F presents to ED co R great toe wound s/p ingrown toenail procedure 1 week ago. Pt co redness and throbbing to R great toe x5 days and pain is now radiating to R knee, pt was given rx for cipro by PCP yesterday.  Erythema noted to toe. Pt denies fevers/ chills.

## 2021-06-28 NOTE — CONSULT NOTE ADULT - ASSESSMENT
82 y/o Female with  HTN, HLD, AS (TAVR), COPD (O2 dependent), GERD, colon ca, who presents  for R big toe infection, s/p ingrown nail surgery  a week ago , with worsening redness, throbbing pain, and swelling to toe x 5 d, consistent with infection: cellulitis vs osteomyelitis

## 2021-06-28 NOTE — ED ADULT TRIAGE NOTE - OTHER COMPLAINTS
pt had ingrow nail surgery of right great toe on 6/22. Pt reports redness, drainage, and pain since one day after surgery.

## 2021-06-28 NOTE — ED PROVIDER NOTE - OBJECTIVE STATEMENT
The pt is a 82 y/o F, pmh HTN, HLD, AS (TAVR), COPD (O2 dependent), GERD, colon ca, who presents to ED w/family ( of choice), for R toe inf s/p ingrown nail surg a wk ago (non [facility] podiatrist). States that podiatrist did not start any abx, but pmd gave cipro yest. Pt c/o redness, throbbing pain, and swelling to toe x 5 d. Denies fevers, chills, pus, bleeding, injury, fall, cp, sob, any other c/o.

## 2021-06-28 NOTE — CONSULT NOTE ADULT - SUBJECTIVE AND OBJECTIVE BOX
Attending: Tali Gonzalez DPM    Patient is a 81y old  Female who presents with a chief complaint of Need for IV antibiotics (28 Jun 2021 21:18)      HPI: 82yo F w/ PMHx HTN, HLD, AS s/p TAVR, COPD, GERD, colon cancer presented to ED c/o redness, swelling and pain to R great toe that started on 6/23/21. Pt is accompanied by daughter (Jaelyn; pt prefers to use daughter as Latvian  if needed). Pt underwent a partial nail avulsion to the medial edge of her R great toe nail on 6/22 w/ outpatient podiatrist (Franchesca?). Pt started having pain, redness and swelling to the R great toe the following day; notified her podiatrist and was told that it was to be expected; was prescribed clotrimazole 1% ointment for fungal toe nails and mupirocin 2% ointment to apply to nail avulsion site. Pt saw her PMD 6/37 and was prescribed Ciprofloxacin 500mg BID. Pt denies N/F/V/C/CP/SOB.     Review of systems negative except per HPI    PAST MEDICAL & SURGICAL HISTORY:  HTN (hypertension)    Aortic stenosis    Pulmonary HTN    Hyperlipidemia    COPD (chronic obstructive pulmonary disease)    GERD (gastroesophageal reflux disease)    S/P TAVR (transcatheter aortic valve replacement)      Home Medications:  furosemide 20 mg oral tablet: 1 tab(s) orally once a day (07 May 2021 10:55)  magnesium oxide 400 mg (241.3 mg elemental magnesium) oral tablet: 1 tab(s) orally once a day (28 Apr 2021 13:24)  potassium chloride 10 mEq oral tablet, extended release: 1 tab(s) orally once a day (07 May 2021 10:55)  tiotropium 2.5 mcg/inh inhalation aerosol: 2 puff(s) inhaled once a day (28 Apr 2021 13:24)    Allergies    Digox (Rash; Urticaria; Hives)  Plavix (Other (Mod to Severe))    Intolerances      FAMILY HISTORY:  No pertinent family history in first degree relatives      LABS                        11.9   7.05  )-----------( 230      ( 28 Jun 2021 20:00 )             37.0     06-28    140  |  105  |  22  ----------------------------<  116<H>  4.5   |  26  |  1.27    Ca    9.2      28 Jun 2021 20:00    TPro  7.7  /  Alb  4.2  /  TBili  0.4  /  DBili  x   /  AST  16  /  ALT  12  /  AlkPhos  87  06-28      ESR: 76  CRP: --  06-28 @ 20:00    Vital Signs Last 24 Hrs  T(C): 37.5 (28 Jun 2021 19:15), Max: 37.5 (28 Jun 2021 19:15)  T(F): 99.5 (28 Jun 2021 19:15), Max: 99.5 (28 Jun 2021 19:15)  HR: 78 (28 Jun 2021 19:15) (78 - 78)  BP: 128/77 (28 Jun 2021 19:15) (128/77 - 128/77)  BP(mean): --  RR: 18 (28 Jun 2021 19:15) (18 - 18)  SpO2: 95% (28 Jun 2021 19:15) (95% - 95%)    PHYSICAL EXAM  General: NAD, AA0x3    Lower Extremity Focused:  Vasc: DP/PT 2/4 B/L. CFT <2s x 10. Increased warmth, erythema and edema to R hallux.   Derm: s/p removal of medial edge of R hallucal nail. Minimal dried blood visible to medial edge. No drainage/purulence. No malodor. Dystrophic, mycotic nails x 5 (R foot).   Neuro: Sensation intact b/l  MSK: 5/5 strength in all crural compartments b/l. Ambulates independently w/o assistive device.     RADIOLOGY  Resident Wet Read: No signs of OM, gas.                        Attending: Tali Gonzalez DPM    Patient is a 81y old  Female who presents with a chief complaint of Need for IV antibiotics (28 Jun 2021 21:18)    HPI: 82yo F w/ PMHx HTN, HLD, AS s/p TAVR, COPD, GERD, colon cancer presented to ED c/o redness, swelling and pain to R great toe that started on 6/23/21. Pt is accompanied by daughter (Jaelyn; pt prefers to use daughter as New Zealander  if needed). Pt underwent a partial nail avulsion to the lateral edge of her R great toe nail on 6/22 w/ outpatient podiatrist (Dr. Sorensen?). Pt started having pain, redness and swelling to the R great toe the following day; notified her podiatrist and was told that it was to be expected; was prescribed clotrimazole 1% ointment for fungal toe nails and mupirocin 2% ointment to apply to nail avulsion site. Pt saw her PMD 6/27 and was prescribed Ciprofloxacin 500mg BID. Pt denies N/F/V/C/CP/SOB. Denies drainage/pus from R great toe.     Review of systems negative except per HPI    PAST MEDICAL & SURGICAL HISTORY:  HTN (hypertension)    Aortic stenosis    Pulmonary HTN    Hyperlipidemia    COPD (chronic obstructive pulmonary disease)    GERD (gastroesophageal reflux disease)    S/P TAVR (transcatheter aortic valve replacement)      Home Medications:  furosemide 20 mg oral tablet: 1 tab(s) orally once a day (07 May 2021 10:55)  magnesium oxide 400 mg (241.3 mg elemental magnesium) oral tablet: 1 tab(s) orally once a day (28 Apr 2021 13:24)  potassium chloride 10 mEq oral tablet, extended release: 1 tab(s) orally once a day (07 May 2021 10:55)  tiotropium 2.5 mcg/inh inhalation aerosol: 2 puff(s) inhaled once a day (28 Apr 2021 13:24)    Allergies    Digox (Rash; Urticaria; Hives)  Plavix (Other (Mod to Severe))    Intolerances      FAMILY HISTORY:  No pertinent family history in first degree relatives      LABS                        11.9   7.05  )-----------( 230      ( 28 Jun 2021 20:00 )             37.0     06-28    140  |  105  |  22  ----------------------------<  116<H>  4.5   |  26  |  1.27    Ca    9.2      28 Jun 2021 20:00    TPro  7.7  /  Alb  4.2  /  TBili  0.4  /  DBili  x   /  AST  16  /  ALT  12  /  AlkPhos  87  06-28      ESR: 76  CRP: --  06-28 @ 20:00    Vital Signs Last 24 Hrs  T(C): 37.5 (28 Jun 2021 19:15), Max: 37.5 (28 Jun 2021 19:15)  T(F): 99.5 (28 Jun 2021 19:15), Max: 99.5 (28 Jun 2021 19:15)  HR: 78 (28 Jun 2021 19:15) (78 - 78)  BP: 128/77 (28 Jun 2021 19:15) (128/77 - 128/77)  BP(mean): --  RR: 18 (28 Jun 2021 19:15) (18 - 18)  SpO2: 95% (28 Jun 2021 19:15) (95% - 95%)    PHYSICAL EXAM  General: NAD, AA0x3    Lower Extremity Focused:  Vasc: DP/PT 2/4 B/L. CFT <2s x 10. Increased warmth, erythema and edema to R hallux.   Derm: s/p removal of lateral edge of R hallucal nail. Minimal dried blood visible to lateral edge. No drainage/purulence. No malodor. Dystrophic, mycotic nails x 5 (R foot).   Neuro: Sensation intact b/l  MSK: 5/5 strength in all crural compartments b/l. Ambulates independently w/o assistive device.     RADIOLOGY  Resident Wet Read: No signs of OM, gas.

## 2021-06-28 NOTE — CONSULT NOTE ADULT - SUBJECTIVE AND OBJECTIVE BOX
HPI: The pt is a 80 y/o F, pmh HTN, HLD, AS (TAVR), COPD (O2 dependent), GERD, colon ca, who presents to ED w/family ( of choice), for R toe inf s/p ingrown nail surg a wk ago (non [facility] podiatrist).  Pt c/o redness, throbbing pain, and swelling to toe x 5 d. Denies fevers, chills, pus, bleeding, injury, fall, cp, sob.; patient was taking Ciprofloxacin last 2 days , prescribed by PCP Dr Gato Wagner and is improving.      PAST MEDICAL & SURGICAL HISTORY:HTN (hypertension), Aortic stenosis, Pulmonary HTN    Hyperlipidemia,COPD (chronic obstructive pulmonary disease), GERD (gastroesophageal reflux disease)  S/P TAVR (transcatheter aortic valve replacement)  GERD, Colon Ca    Lasix 20 mg oral tablet: 1 tab(s) orally once a day to be started on 5/7/21  · 	potassium chloride 10 mEq oral tablet, extended release: 1 tab(s) orally once a day  · 	furosemide 20 mg oral tablet: 1 tab(s) orally once a day  · 	lisinopril 10 mg oral tablet: 1 tab(s) orally once a day  · 	potassium chloride 10 mEq oral capsule, extended release: 1 cap(s) orally once a day start on 5/7/21, take with lasix (furosemide)  metoprolol succinate 50 mg oral tablet, extended release: 1 tab(s) orally once a day  · 	pantoprazole 40 mg oral delayed release tablet: 1 tab(s) orally once a day  · 	Multiple Vitamins oral tablet: 1 tab(s) orally once a day  · 	folic acid 1 mg oral tablet: 1 tab(s) orally once a day  · 	atorvastatin 20 mg oral tablet: 1 tab(s) orally once a day (at bedtime)  · 	apixaban 5 mg oral tablet: 1 tab(s) orally 2 times a day  · 	magnesium oxide 400 mg (241.3 mg elemental magnesium) oral tablet: 1 tab(s) orally once a day  · 	tiotropium 2.5 mcg/inh inhalation aerosol: 2 puff(s) inhaled once a day      REVIEW OF SYSTEMS:    General:  no weakness; low grade fever, no chills  Skin/Breast: no rash  Respiratory and Thorax: no SOB, no cough  Cardiovascular:	No chest pain  Gastrointestinal:	 no nausea, vomiting , diarrhea  Genitourinary:	no dysuria, no difficulty urinating, no hematuria  Musculoskeletal:	no weakness, no joint swelling/pain  Neurological: no focal weakness/numbness  Endocrine: no polyuria, no polydipsia      ANTIBIOTICS:  MEDICATIONS  (STANDING):  vancomycin  IVPB. 1000 milliGRAM(s) IV Intermittent once        Allergies: Digox (Rash; Urticaria; Hives)  Plavix (Other (Mod to Severe))    SOCIAL HISTORY: no ETOh, no smoking    FAMILY HISTORY:  No pertinent family history in first degree relatives        Vital Signs Last 24 Hrs  T(C): 37.5 (28 Jun 2021 19:15), Max: 37.5 (28 Jun 2021 19:15)  T(F): 99.5 (28 Jun 2021 19:15), Max: 99.5 (28 Jun 2021 19:15)  HR: 78 (28 Jun 2021 19:15) (78 - 78)  BP: 128/77 (28 Jun 2021 19:15) (128/77 - 128/77)  BP(mean): --  RR: 18 (28 Jun 2021 19:15) (18 - 18)  SpO2: 95% (28 Jun 2021 19:15) (95% - 95%)      PHYSICAL EXAM:  Constitutional:Well-developed, well nourished  Eyes:TIMMY, EOMI  Ear/Nose/Throat: no oral lesion, no sinus tenderness on percussion	  Neck:no JVD, no lymphadenopathy, supple  Respiratory: CTA hannah  Cardiovascular: S1S2 RRR, no murmurs  Gastrointestinal: soft, (+) BS, no HSM. midline incision is healing well  Extremities:  R foot: + erythema and swelling to 1st digit, + warmth, no pus, no bleeding, no ascending lymphangitis, pedal pulse 2+, FROM  Vascular: DP Pulse: right normal; left normal            LABS:  C-Reactive Protein, Serum (06.28.21 @ 20:00)    C-Reactive Protein, Serum: 7.2 mg/L                            11.9   7.05  )-----------( 230      ( 28 Jun 2021 20:00 )             37.0     06-28    140  |  105  |  22  ----------------------------<  116<H>  4.5   |  26  |  1.27    Ca    9.2      28 Jun 2021 20:00    TPro  7.7  /  Alb  4.2  /  TBili  0.4  /  DBili  x   /  AST  16  /  ALT  12  /  AlkPhos  87  06-28      MICROBIOLOGY:  Blood culture -done  RADIOLOGY & ADDITIONAL STUDIES:

## 2021-06-28 NOTE — ED PROVIDER NOTE - NSFOLLOWUPINSTRUCTIONS_ED_ALL_ED_FT
[facility] ([facility])  [facility] Location   [address], [address]  [place] [address]  [phone]; [phone]    [facility] Location   [address] [place] [address]  [phone]    [email or web address]    FOLLOW UP WITH PODIATRY  TAKE ANTIBIOTICS AS PRESCRIBED, RETURN IMMEDIATELY FOR ANY WORSENING OR CONCERNING SYMPTOMS

## 2021-06-28 NOTE — ED PROVIDER NOTE - PATIENT PORTAL LINK FT
You can access the FollowMyHealth Patient Portal offered by [facility] by registering at the following website: [email or web address]. By joining [facility]’s FollowMyHealth portal, you will also be able to view your health information using other applications (apps) compatible with our system.

## 2021-06-28 NOTE — CONSULT NOTE ADULT - ASSESSMENT
82yo F w/ PMHx HTN, HLD, AS s/p TAVR, COPD, GERD, colon cancer presented to ED c/o redness, swelling and pain to R great toe that started on 6/23/21. Podiatry consulted for mgmt of R great toe cellulitis following R partial nail avulsion performed by outpatient podiatrist 6/22/21.     P:  Cleared to be discharged from podiatric standpoint; mild cellulitis to be treated w/ oral abx + outpatient f/u   Advised to return to [facility] ED if cellulitis continues to worsen and/or if patient begins to experience F/N/V/C/CP/SOB  Discontinue Ciprofloxacin  Start Clindamycin 300mg q6hr for 7 days (RX eSubmitted to [facility])  Continue to apply mupirocin 2% ointment daily to R great toe   Can shower regularly and allow R foot to get wet, cover w/ dry sterile dressing   WBAT to R foot in surgical shoe   Pt can f/u w/ any podiatrist at [facility], please call office to make appt w/in 1wk of discharge      Pt can follow-up on outpatient basis w/ any podiatrist at the following facility:   [facility] ([facility])  [facility] Location   [address], [address]  [place] [address] [phone]; [phone]    [facility] Location   [address] [place] [address]  [phone]    [email or web address] 80yo F w/ PMHx HTN, HLD, AS s/p TAVR, COPD, GERD, colon cancer presented to ED c/o redness, swelling and pain to R great toe that started on 6/23/21. Podiatry consulted for mgmt of R great toe cellulitis following R partial nail avulsion performed by outpatient podiatrist 6/22/21.     P:  Cleared to be discharged from podiatric standpoint; mild cellulitis to be treated w/ oral abx + outpatient f/u   Advised to return to [facility] ED if cellulitis continues to worsen and/or if patient begins to experience F/N/V/C/CP/SOB  Reviewed R foot radiographs, no indication of OM   Discontinue Ciprofloxacin  Start Clindamycin 300mg q6hr for 7 days (RX eSubmitted to [facility])  Continue to apply mupirocin 2% ointment daily to R great toe   Can shower regularly and allow R foot to get wet, cover w/ dry sterile dressing   WBAT to R foot in surgical shoe   Pt can f/u w/ any podiatrist at [facility], please call office to make appt w/in 1wk of discharge      Pt can follow-up on outpatient basis w/ any podiatrist at the following facility:   [facility] ([facility])  [facility] Location   [address] [place] [address], [address]  [place] [address] [phone]; [phone]    [facility] Location   [address] [place] [address]  [phone]    [email or web address]

## 2021-06-28 NOTE — CONSULT NOTE ADULT - PROBLEM SELECTOR RECOMMENDATION 9
1) Pending ESR, R foot x-ray, Blood culture and if possible send wound culture  2) Agree to continue [id number] Iv q12h and Unasyn 1.5gr IV q6h  3) Podiatry consult

## 2021-06-28 NOTE — ED ADULT NURSE NOTE - DISCHARGE DATE/TIME
WORKERS' COMPENSATION FOLLOW-UP NOTE    EMPLOYER: [employer]    DATE OF INJURY: 8/2/2017    CHIEF COMPLAINT:    Chief Complaint   Patient presents with   • Worker's Compensation     WRF 8/1/17 WRISTS [employer] [facility]       HISTORY OF INJURY:   Rene Franco is a 19 year old male, who returns for follow up of a work injury to his  wrists  that occurred at work on 8/2/2017.  It is basically an overuse wrist tendinitis with the right being worse than the left. He is making good progress and is 50% better already. When he first came in he couldn't even curl his fingers into a fist or pinches thumb and index finger together but he actually has a decent grasp now. The wrist pain no longer radiates up to the medial or lateral epicondyles but pretty much stays as a band around the wrist. No numbness or tingling of fingers. No swelling. He is on a blood thinner so is not able to take anti-inflammatories but his cardiologist said he could use some ibuprofen so he has been doing that. He is also using Voltaren gel and wearing a wrist brace. He is attending occupational therapy.  He had him lift 20 pounds today and he could do that but then had discomfort. He is working light duty doing office work. His regular duty is extremely strenuous-----see initial dictation for full details on that job description    Compared to the worst this pain has been since the time of initial injury, patient reports currently feeling 50% better.    Current medications were reviewed.  Medications relevant to this injury as actually taken at this time by patient, (including dose, frequency) are: Ibuprofen 200mg/3 tabs BID/.    Currently employee states he is working a limited job.    Is your employer following the restrictions you were given?  Yes    Therapy:  Patient is attending.    The specific location of pain or other symptoms        REVIEW OF SYSTEMS: Relevant findings are included in the History of  Injury.  I have reviewed the patient's medications and allergies, past medical, surgical, social and family history, updating these as appropriate.  See Histories section of the EMR for a display of this information.    PHYSICAL EXAM:   He is not in any acute distress with this. Exam of right wrist shows no swelling. There is generalized tenderness around the entire top and bottom of the wrist but more so on the ulnar side. No snuffbox tenderness and negative Finkelstein. He actually has a fairly strong hand grasp today but it is painful to do firm grasping and twisting yet.  He is neurovascularly intact in all fingers. He has full motion of his wrist. Full elbow motion and no tenderness over the medial or lateral epicondyles anymore. He has negative golfers and tennis elbow testing now.     ASSESSMENT:  Right wrist tendinitis    Work Relatedness:  Based on patient's history and my evaluation, this injury/illness is work related by causation or by precipitation or aggravation.      PLAN:  We will let him do a little bit more at work now but they will probably still need to have him in the office:  RESTRICTIONS:   Wear wrist brace right wrist.  Left wrist has mild similar symptoms too, so light use left wrist..no lifting more than 15 lbs left wrist and no repetitive use  May occasionally lift up to 20 pounds with both hands now.  Avoid repetitive use of right   hand. No firm grasping or twisting activities with this hand.  He will continue with his ibuprofen and his Voltaren gel and wrist brace and therapy  I really don't have anything else to add to this and he will continue current course of treatment as he is making good progress  He'll be seen again in about 2 weeks  Ultimately, he will probably need to look for a new job as this one is extremely strenuous and he is not a muscular fellow      During a 15 minute visit, more than half of the visit was spent counseling the patient.       28-Jun-2021 22:57

## 2021-06-28 NOTE — ED PROVIDER NOTE - PROGRESS NOTE DETAILS
informed by rn that pt is c/o itching - vanc in progress, no hives or systemic reaction, given benadryl, ? going too fast vs true allergic reaction

## 2021-06-28 NOTE — CONSULT NOTE ADULT - CONSULT REQUESTED DATE/TIME
Device Serial Number (Optional): [id number]
Device Serial Number (Optional): [id number]
28-Jun-2021 21:42
28-Jun-2021 18:18

## 2021-07-03 LAB
CULTURE RESULTS: SIGNIFICANT CHANGE UP
CULTURE RESULTS: SIGNIFICANT CHANGE UP
SPECIMEN SOURCE: SIGNIFICANT CHANGE UP
SPECIMEN SOURCE: SIGNIFICANT CHANGE UP

## 2021-07-23 NOTE — PRE-OP CHECKLIST - AS BP NONINV SITE
right upper arm
right upper arm
Cheek Interpolation Flap Text: A decision was made to reconstruct the defect utilizing an interpolation axial flap and a staged reconstruction.  A telfa template was made of the defect.  This telfa template was then used to outline the Cheek Interpolation flap.  The donor area for the pedicle flap was then injected with anesthesia.  The flap was excised through the skin and subcutaneous tissue down to the layer of the underlying musculature.  The interpolation flap was carefully excised within this deep plane to maintain its blood supply.  The edges of the donor site were undermined.   The donor site was closed in a primary fashion.  The pedicle was then rotated into position and sutured.  Once the tube was sutured into place, adequate blood supply was confirmed with blanching and refill.  The pedicle was then wrapped with xeroform gauze and dressed appropriately with a telfa and gauze bandage to ensure continued blood supply and protect the attached pedicle.

## 2021-08-05 ENCOUNTER — APPOINTMENT (OUTPATIENT)
Dept: PULMONOLOGY | Facility: CLINIC | Age: 81
End: 2021-08-05
Payer: MEDICARE

## 2021-08-05 VITALS
DIASTOLIC BLOOD PRESSURE: 79 MMHG | TEMPERATURE: 97.6 F | WEIGHT: 212 LBS | SYSTOLIC BLOOD PRESSURE: 122 MMHG | HEART RATE: 77 BPM | OXYGEN SATURATION: 96 % | HEIGHT: 62.2 IN | BODY MASS INDEX: 38.52 KG/M2

## 2021-08-05 PROCEDURE — 99213 OFFICE O/P EST LOW 20 MIN: CPT

## 2021-08-05 NOTE — HISTORY OF PRESENT ILLNESS
[TextBox_4] : Patient seen and evaluated in clinic after having been absent due to covid 19 in 2020. She had an interval hospitalization for decompensated heart failure thought to be related to her aortic stenosis and TAVR. She was diuresed to euvolemia and is feeling better. She feels significantly improved relative to before and denies any dyspnea on exertion. She is improving her exercise capacity and working with a . She is able to do stairs and squats and walks > 20 blocks without limitation. She has a baseline chronic cough related to her COPD but has not appreciated any changes. She is compliant with her spiriva and requires a refill. She has not had any interval scans to assess her pulmonary nodule that she is aware of.

## 2021-08-05 NOTE — PHYSICAL EXAM
[No Acute Distress] : no acute distress [Normal Oropharynx] : normal oropharynx [Normal Appearance] : normal appearance [No Neck Mass] : no neck mass [Normal Rate/Rhythm] : normal rate/rhythm [Normal S1, S2] : normal s1, s2 [No Murmurs] : no murmurs [No Resp Distress] : no resp distress [Clear to Auscultation Bilaterally] : clear to auscultation bilaterally [No Abnormalities] : no abnormalities [No Clubbing] : no clubbing [No Edema] : no edema

## 2021-08-05 NOTE — ASSESSMENT
[FreeTextEntry1] : COPD\par - well controlled on current regimen. c/w spiriva. No exacerbations or hospitalizations related to her COPD. She has good exercise capaity.\par - c/w nocturnal oxygen \par \par Pulmonary nodule \par - Repeat CT scan to evaluate right sided pulmonary nodule \par \par MARK\par - Patient with hx of moderate mark AHI 10. non compliant with CPAP due to claustrophia\par - supplemental oxygen QHS which she is taking \par - reenforced CPAP use and ordered humidified oxygen \par \par

## 2021-09-19 ENCOUNTER — OUTPATIENT (OUTPATIENT)
Dept: OUTPATIENT SERVICES | Facility: HOSPITAL | Age: 81
LOS: 1 days | End: 2021-09-19
Payer: MEDICARE

## 2021-09-19 ENCOUNTER — APPOINTMENT (OUTPATIENT)
Dept: CT IMAGING | Facility: HOSPITAL | Age: 81
End: 2021-09-19
Payer: MEDICARE

## 2021-09-19 DIAGNOSIS — Z95.2 PRESENCE OF PROSTHETIC HEART VALVE: Chronic | ICD-10-CM

## 2021-09-19 PROCEDURE — 71250 CT THORAX DX C-: CPT | Mod: 26,MH

## 2021-09-19 PROCEDURE — 71250 CT THORAX DX C-: CPT

## 2021-12-13 ENCOUNTER — EMERGENCY (EMERGENCY)
Facility: HOSPITAL | Age: 81
LOS: 1 days | Discharge: HOME | End: 2021-12-13
Attending: EMERGENCY MEDICINE | Admitting: EMERGENCY MEDICINE
Payer: MEDICARE

## 2021-12-13 VITALS
HEART RATE: 71 BPM | HEIGHT: 70 IN | DIASTOLIC BLOOD PRESSURE: 65 MMHG | RESPIRATION RATE: 18 BRPM | TEMPERATURE: 99 F | OXYGEN SATURATION: 95 % | SYSTOLIC BLOOD PRESSURE: 132 MMHG

## 2021-12-13 DIAGNOSIS — R21 RASH AND OTHER NONSPECIFIC SKIN ERUPTION: ICD-10-CM

## 2021-12-13 DIAGNOSIS — Z20.822 CONTACT WITH AND (SUSPECTED) EXPOSURE TO COVID-19: ICD-10-CM

## 2021-12-13 DIAGNOSIS — M25.571 PAIN IN RIGHT ANKLE AND JOINTS OF RIGHT FOOT: ICD-10-CM

## 2021-12-13 DIAGNOSIS — Z88.8 ALLERGY STATUS TO OTHER DRUGS, MEDICAMENTS AND BIOLOGICAL SUBSTANCES STATUS: ICD-10-CM

## 2021-12-13 DIAGNOSIS — J44.9 CHRONIC OBSTRUCTIVE PULMONARY DISEASE, UNSPECIFIED: ICD-10-CM

## 2021-12-13 DIAGNOSIS — I10 ESSENTIAL (PRIMARY) HYPERTENSION: ICD-10-CM

## 2021-12-13 DIAGNOSIS — Z88.1 ALLERGY STATUS TO OTHER ANTIBIOTIC AGENTS STATUS: ICD-10-CM

## 2021-12-13 DIAGNOSIS — Z95.2 PRESENCE OF PROSTHETIC HEART VALVE: Chronic | ICD-10-CM

## 2021-12-13 DIAGNOSIS — E78.5 HYPERLIPIDEMIA, UNSPECIFIED: ICD-10-CM

## 2021-12-13 LAB
BASOPHILS # BLD AUTO: 0.04 K/UL — SIGNIFICANT CHANGE UP (ref 0–0.2)
BASOPHILS NFR BLD AUTO: 0.4 % — SIGNIFICANT CHANGE UP (ref 0–2)
CHLORIDE SERPL-SCNC: 100 MMOL/L — SIGNIFICANT CHANGE UP (ref 96–108)
EOSINOPHIL # BLD AUTO: 0.04 K/UL — SIGNIFICANT CHANGE UP (ref 0–0.5)
EOSINOPHIL NFR BLD AUTO: 0.4 % — SIGNIFICANT CHANGE UP (ref 0–6)
HCT VFR BLD CALC: 32.9 % — LOW (ref 34.5–45)
HGB BLD-MCNC: 10.5 G/DL — LOW (ref 11.5–15.5)
IMM GRANULOCYTES NFR BLD AUTO: 0.3 % — SIGNIFICANT CHANGE UP (ref 0–1.5)
LACTATE SERPL-SCNC: 2.8 MMOL/L — HIGH (ref 0.5–2)
LYMPHOCYTES # BLD AUTO: 1.41 K/UL — SIGNIFICANT CHANGE UP (ref 1–3.3)
LYMPHOCYTES # BLD AUTO: 15 % — SIGNIFICANT CHANGE UP (ref 13–44)
MCHC RBC-ENTMCNC: 28.5 PG — SIGNIFICANT CHANGE UP (ref 27–34)
MCHC RBC-ENTMCNC: 31.9 GM/DL — LOW (ref 32–36)
MCV RBC AUTO: 89.4 FL — SIGNIFICANT CHANGE UP (ref 80–100)
MONOCYTES # BLD AUTO: 0.64 K/UL — SIGNIFICANT CHANGE UP (ref 0–0.9)
MONOCYTES NFR BLD AUTO: 6.8 % — SIGNIFICANT CHANGE UP (ref 2–14)
NEUTROPHILS # BLD AUTO: 7.26 K/UL — SIGNIFICANT CHANGE UP (ref 1.8–7.4)
NEUTROPHILS NFR BLD AUTO: 77.1 % — HIGH (ref 43–77)
NRBC # BLD: 0 /100 WBCS — SIGNIFICANT CHANGE UP (ref 0–0)
PLATELET # BLD AUTO: 258 K/UL — SIGNIFICANT CHANGE UP (ref 150–400)
POTASSIUM SERPL-MCNC: 4.4 MMOL/L — SIGNIFICANT CHANGE UP (ref 3.5–5.3)
POTASSIUM SERPL-SCNC: 4.4 MMOL/L — SIGNIFICANT CHANGE UP (ref 3.5–5.3)
RBC # BLD: 3.68 M/UL — LOW (ref 3.8–5.2)
RBC # FLD: 14.2 % — SIGNIFICANT CHANGE UP (ref 10.3–14.5)
SARS-COV-2 RNA SPEC QL NAA+PROBE: NEGATIVE — SIGNIFICANT CHANGE UP
SODIUM SERPL-SCNC: 138 MMOL/L — SIGNIFICANT CHANGE UP (ref 135–145)
WBC # BLD: 9.42 K/UL — SIGNIFICANT CHANGE UP (ref 3.8–10.5)
WBC # FLD AUTO: 9.42 K/UL — SIGNIFICANT CHANGE UP (ref 3.8–10.5)

## 2021-12-13 PROCEDURE — 99284 EMERGENCY DEPT VISIT MOD MDM: CPT

## 2021-12-13 NOTE — ED ADULT TRIAGE NOTE - ARRIVAL INFO ADDITIONAL COMMENTS
pt wore a pair of shoes on friday that caused pain in the ball of her right foot, she then developed chill today.  no temp was taken or treatment given.  ball of foot is mildly red.  no open areas.

## 2021-12-13 NOTE — ED ADULT NURSE NOTE - OBJECTIVE STATEMENT
82yo female with family member who states "I had an infection in my right foot about 3 months ago that I got antibiotics for and it got better... I don't know if she (patient) wore shoes that were too tight but since Friday she has had pain." Right foot is slightly swollen with redness extending from around right first toe up to left inner shin. No open wound or drainage noted at this time. Pt also reports chills and headache today. Reports difficulty bearing weight due to pain.

## 2021-12-14 VITALS
DIASTOLIC BLOOD PRESSURE: 67 MMHG | SYSTOLIC BLOOD PRESSURE: 117 MMHG | TEMPERATURE: 98 F | HEART RATE: 66 BPM | OXYGEN SATURATION: 95 % | RESPIRATION RATE: 18 BRPM

## 2021-12-14 LAB — LACTATE SERPL-SCNC: 1.1 MMOL/L — SIGNIFICANT CHANGE UP (ref 0.5–2)

## 2021-12-14 PROCEDURE — 73630 X-RAY EXAM OF FOOT: CPT | Mod: 26,RT

## 2021-12-14 PROCEDURE — 85025 COMPLETE CBC W/AUTO DIFF WBC: CPT

## 2021-12-14 PROCEDURE — 73630 X-RAY EXAM OF FOOT: CPT

## 2021-12-14 PROCEDURE — 99283 EMERGENCY DEPT VISIT LOW MDM: CPT | Mod: 25

## 2021-12-14 PROCEDURE — 83605 ASSAY OF LACTIC ACID: CPT

## 2021-12-14 PROCEDURE — 87040 BLOOD CULTURE FOR BACTERIA: CPT

## 2021-12-14 PROCEDURE — 80053 COMPREHEN METABOLIC PANEL: CPT

## 2021-12-14 PROCEDURE — 87635 SARS-COV-2 COVID-19 AMP PRB: CPT

## 2021-12-14 PROCEDURE — 36415 COLL VENOUS BLD VENIPUNCTURE: CPT

## 2021-12-14 RX ORDER — SODIUM CHLORIDE 9 MG/ML
1000 INJECTION INTRAMUSCULAR; INTRAVENOUS; SUBCUTANEOUS ONCE
Refills: 0 | Status: COMPLETED | OUTPATIENT
Start: 2021-12-14 | End: 2021-12-14

## 2021-12-14 RX ADMIN — SODIUM CHLORIDE 1000 MILLILITER(S): 9 INJECTION INTRAMUSCULAR; INTRAVENOUS; SUBCUTANEOUS at 01:12

## 2021-12-14 RX ADMIN — Medication 500 MILLIGRAM(S): at 01:15

## 2021-12-14 NOTE — ED PROVIDER NOTE - CLINICAL SUMMARY MEDICAL DECISION MAKING FREE TEXT BOX
avss. no systemic sx. no trauma. no e/o sepsis. found to have likely gout >> infection. s/p naproxen. pain controlled. ambulating at baseline. no indication for abx or emergent podiatry evaluation at this time. will dc w/ outpatient podiatry fu, naproxen, strict return precautions. pt/family agrees w/ plan. questions answered.

## 2021-12-14 NOTE — ED PROVIDER NOTE - PHYSICAL EXAMINATION
CONST: nontoxic NAD speaking in full sentences  HEAD: atraumatic  EYES: conjunctivae clear  ENT: mmm  NECK: supple/FROM  CARD: rrr no murmurs  CHEST: ctab no r/r/w  ABD: soft, nd, nttp, no rebound/guarding  EXT: +R 1st MTP erythema/warmth/mild swelling and pain w/ microvements, other joints FROM, symmetric distal pulses intact  SKIN: warm, dry, cap refill <2sec  NEURO: a+ox3, 5/5 strength x4, gross sensation intact x4, baseline gait

## 2021-12-14 NOTE — ED PROVIDER NOTE - INCLUDE COVID-19 DISCHARGE INSTRUCTIONS
Spoke with patient today in regard to smoking cessation progress for 3/6/12 month follow up, he states tobacco free. Commended patient on the accomplishment. Will resolve episode and complete smart form for Quit attempt #2.     <-------- Click here to INCLUDE CoVID-19 Discharge Instructions

## 2021-12-14 NOTE — ED PROVIDER NOTE - OBJECTIVE STATEMENT
81F AS s/p TAVR, copd (o2-dpt), htn, hld, colon ca, gerd, recurrent R toe rash previously tx for infection (6/2021), now c/o recurrent R 1st mtp pain/erythema in setting of Jehovah's witness fasting. no fever/chills, no uri/cough, no cp/sob, no abd pain/n/v, no trauma, no etoh-dpt/ivdu/dm/immunosuppression.

## 2021-12-14 NOTE — ED PROVIDER NOTE - PATIENT PORTAL LINK FT
You can access the FollowMyHealth Patient Portal offered by Health system by registering at the following website: [email or web address]. By joining [employer]’s FollowMyHealth portal, you will also be able to view your health information using other applications (apps) compatible with our system.

## 2021-12-14 NOTE — ED PROVIDER NOTE - NSFOLLOWUPINSTRUCTIONS_ED_ALL_ED_FT
PLEASE FOLLOW-UP WITH PODIATRY IN 1-2 DAYS.    [facility] ([facility])  [facility] Location  [address], [address]  [place] [address]  [phone]; [phone]    [facility] Location  [address] [place] [address]  [phone]  [email or web address]    NAPROXEN AS NEEDED FOR PAIN. PLEASE MONITOR STOOL FOR BLACK/BLOOD COLOR. TAKE MEDICATION ON FULL STOMACH.    ANY XRAY IMAGING RESULTS GIVEN IN THE EMERGENCY DEPARTMENT ARE PRELIMINARY UNTIL FORMALLY READ BY A RADIOLOGIST. YOU WILL BE CONTACTED IF THERE ARE ANY SIGNIFICANT CHANGES IN THE FINAL READ.    PLEASE RETURN TO THE EMERGENCY DEPARTMENT IF FEVER, CHEST PAIN, SHORTNESS OF BREATH, ABDOMINAL PAIN, VOMITING, OTHER CONCERNING SYMPTOMS.    PLEASE CONTACT IAN LONG ([facility] EMERGENCY DEPARTMENT CLINICAL REFERRAL COORDINATOR) TO ASSIST IN SCHEDULING YOUR FOLLOW-UP APPOINTMENT.    Monday - Friday 11am-7pm  [phone]  [email or web address]

## 2022-01-14 ENCOUNTER — INPATIENT (INPATIENT)
Facility: HOSPITAL | Age: 82
LOS: 3 days | Discharge: HOME | DRG: 305 | End: 2022-01-18
Attending: INTERNAL MEDICINE | Admitting: INTERNAL MEDICINE
Payer: MEDICARE

## 2022-01-14 VITALS
WEIGHT: 220.02 LBS | HEIGHT: 65 IN | OXYGEN SATURATION: 98 % | DIASTOLIC BLOOD PRESSURE: 91 MMHG | HEART RATE: 87 BPM | SYSTOLIC BLOOD PRESSURE: 192 MMHG | RESPIRATION RATE: 18 BRPM | TEMPERATURE: 99 F

## 2022-01-14 DIAGNOSIS — Z95.2 PRESENCE OF PROSTHETIC HEART VALVE: Chronic | ICD-10-CM

## 2022-01-14 DIAGNOSIS — I50.32 CHRONIC DIASTOLIC (CONGESTIVE) HEART FAILURE: ICD-10-CM

## 2022-01-14 DIAGNOSIS — D50.9 IRON DEFICIENCY ANEMIA, UNSPECIFIED: ICD-10-CM

## 2022-01-14 DIAGNOSIS — I16.0 HYPERTENSIVE URGENCY: ICD-10-CM

## 2022-01-14 DIAGNOSIS — R06.00 DYSPNEA, UNSPECIFIED: ICD-10-CM

## 2022-01-14 DIAGNOSIS — N17.9 ACUTE KIDNEY FAILURE, UNSPECIFIED: ICD-10-CM

## 2022-01-14 DIAGNOSIS — Z95.2 PRESENCE OF PROSTHETIC HEART VALVE: ICD-10-CM

## 2022-01-14 DIAGNOSIS — J44.9 CHRONIC OBSTRUCTIVE PULMONARY DISEASE, UNSPECIFIED: ICD-10-CM

## 2022-01-14 LAB
ALBUMIN SERPL ELPH-MCNC: 4.4 G/DL — SIGNIFICANT CHANGE UP (ref 3.3–5)
ALP SERPL-CCNC: 91 U/L — SIGNIFICANT CHANGE UP (ref 40–120)
ALT FLD-CCNC: 11 U/L — SIGNIFICANT CHANGE UP (ref 10–45)
ANION GAP SERPL CALC-SCNC: 15 MMOL/L — SIGNIFICANT CHANGE UP (ref 5–17)
APTT BLD: 35.2 SEC — SIGNIFICANT CHANGE UP (ref 27.5–35.5)
AST SERPL-CCNC: 15 U/L — SIGNIFICANT CHANGE UP (ref 10–40)
BASOPHILS # BLD AUTO: 0.03 K/UL — SIGNIFICANT CHANGE UP (ref 0–0.2)
BASOPHILS NFR BLD AUTO: 0.4 % — SIGNIFICANT CHANGE UP (ref 0–2)
BILIRUB SERPL-MCNC: 0.3 MG/DL — SIGNIFICANT CHANGE UP (ref 0.2–1.2)
BUN SERPL-MCNC: 25 MG/DL — HIGH (ref 7–23)
CALCIUM SERPL-MCNC: 9.2 MG/DL — SIGNIFICANT CHANGE UP (ref 8.4–10.5)
CHLORIDE SERPL-SCNC: 104 MMOL/L — SIGNIFICANT CHANGE UP (ref 96–108)
CK MB CFR SERPL CALC: 1.7 NG/ML — SIGNIFICANT CHANGE UP (ref 0–6.7)
CK SERPL-CCNC: 91 U/L — SIGNIFICANT CHANGE UP (ref 25–170)
CO2 SERPL-SCNC: 24 MMOL/L — SIGNIFICANT CHANGE UP (ref 22–31)
CREAT SERPL-MCNC: 1.41 MG/DL — HIGH (ref 0.5–1.3)
EOSINOPHIL # BLD AUTO: 0.08 K/UL — SIGNIFICANT CHANGE UP (ref 0–0.5)
EOSINOPHIL NFR BLD AUTO: 1 % — SIGNIFICANT CHANGE UP (ref 0–6)
GLUCOSE SERPL-MCNC: 114 MG/DL — HIGH (ref 70–99)
HCT VFR BLD CALC: 30.1 % — LOW (ref 34.5–45)
HGB BLD-MCNC: 9.5 G/DL — LOW (ref 11.5–15.5)
IMM GRANULOCYTES NFR BLD AUTO: 0.4 % — SIGNIFICANT CHANGE UP (ref 0–1.5)
INR BLD: 1.27 — HIGH (ref 0.88–1.16)
LYMPHOCYTES # BLD AUTO: 1.18 K/UL — SIGNIFICANT CHANGE UP (ref 1–3.3)
LYMPHOCYTES # BLD AUTO: 14.6 % — SIGNIFICANT CHANGE UP (ref 13–44)
MCHC RBC-ENTMCNC: 27.5 PG — SIGNIFICANT CHANGE UP (ref 27–34)
MCHC RBC-ENTMCNC: 31.6 GM/DL — LOW (ref 32–36)
MCV RBC AUTO: 87 FL — SIGNIFICANT CHANGE UP (ref 80–100)
MONOCYTES # BLD AUTO: 0.53 K/UL — SIGNIFICANT CHANGE UP (ref 0–0.9)
MONOCYTES NFR BLD AUTO: 6.6 % — SIGNIFICANT CHANGE UP (ref 2–14)
NEUTROPHILS # BLD AUTO: 6.21 K/UL — SIGNIFICANT CHANGE UP (ref 1.8–7.4)
NEUTROPHILS NFR BLD AUTO: 77 % — SIGNIFICANT CHANGE UP (ref 43–77)
NRBC # BLD: 0 /100 WBCS — SIGNIFICANT CHANGE UP (ref 0–0)
NT-PROBNP SERPL-SCNC: 304 PG/ML — HIGH (ref 0–300)
PLATELET # BLD AUTO: 247 K/UL — SIGNIFICANT CHANGE UP (ref 150–400)
POTASSIUM SERPL-MCNC: 4.2 MMOL/L — SIGNIFICANT CHANGE UP (ref 3.5–5.3)
POTASSIUM SERPL-SCNC: 4.2 MMOL/L — SIGNIFICANT CHANGE UP (ref 3.5–5.3)
PROT SERPL-MCNC: 7.4 G/DL — SIGNIFICANT CHANGE UP (ref 6–8.3)
PROTHROM AB SERPL-ACNC: 15.1 SEC — HIGH (ref 10.6–13.6)
RBC # BLD: 3.46 M/UL — LOW (ref 3.8–5.2)
RBC # FLD: 14.6 % — HIGH (ref 10.3–14.5)
SARS-COV-2 RNA SPEC QL NAA+PROBE: SIGNIFICANT CHANGE UP
SODIUM SERPL-SCNC: 143 MMOL/L — SIGNIFICANT CHANGE UP (ref 135–145)
TROPONIN T SERPL-MCNC: 0.01 NG/ML — SIGNIFICANT CHANGE UP (ref 0–0.01)
TROPONIN T SERPL-MCNC: 0.01 NG/ML — SIGNIFICANT CHANGE UP (ref 0–0.01)
WBC # BLD: 8.06 K/UL — SIGNIFICANT CHANGE UP (ref 3.8–10.5)
WBC # FLD AUTO: 8.06 K/UL — SIGNIFICANT CHANGE UP (ref 3.8–10.5)

## 2022-01-14 PROCEDURE — 71046 X-RAY EXAM CHEST 2 VIEWS: CPT | Mod: 26

## 2022-01-14 PROCEDURE — 99285 EMERGENCY DEPT VISIT HI MDM: CPT

## 2022-01-14 PROCEDURE — 93010 ELECTROCARDIOGRAM REPORT: CPT

## 2022-01-14 PROCEDURE — 71275 CT ANGIOGRAPHY CHEST: CPT | Mod: 26,MA

## 2022-01-14 PROCEDURE — 74174 CTA ABD&PLVS W/CONTRAST: CPT | Mod: 26,MA

## 2022-01-14 RX ORDER — LABETALOL HCL 100 MG
10 TABLET ORAL ONCE
Refills: 0 | Status: COMPLETED | OUTPATIENT
Start: 2022-01-14 | End: 2022-01-14

## 2022-01-14 RX ORDER — TIOTROPIUM BROMIDE 18 UG/1
1 CAPSULE ORAL; RESPIRATORY (INHALATION) DAILY
Refills: 0 | Status: DISCONTINUED | OUTPATIENT
Start: 2022-01-15 | End: 2022-01-18

## 2022-01-14 RX ORDER — ACETAMINOPHEN 500 MG
650 TABLET ORAL ONCE
Refills: 0 | Status: COMPLETED | OUTPATIENT
Start: 2022-01-14 | End: 2022-01-14

## 2022-01-14 RX ORDER — PANTOPRAZOLE SODIUM 20 MG/1
40 TABLET, DELAYED RELEASE ORAL
Refills: 0 | Status: DISCONTINUED | OUTPATIENT
Start: 2022-01-14 | End: 2022-01-18

## 2022-01-14 RX ORDER — FAMOTIDINE 10 MG/ML
20 INJECTION INTRAVENOUS ONCE
Refills: 0 | Status: COMPLETED | OUTPATIENT
Start: 2022-01-14 | End: 2022-01-14

## 2022-01-14 RX ORDER — FOLIC ACID 0.8 MG
1 TABLET ORAL AT BEDTIME
Refills: 0 | Status: DISCONTINUED | OUTPATIENT
Start: 2022-01-14 | End: 2022-01-18

## 2022-01-14 RX ORDER — INFLUENZA VIRUS VACCINE 15; 15; 15; 15 UG/.5ML; UG/.5ML; UG/.5ML; UG/.5ML
0.7 SUSPENSION INTRAMUSCULAR ONCE
Refills: 0 | Status: DISCONTINUED | OUTPATIENT
Start: 2022-01-14 | End: 2022-01-18

## 2022-01-14 RX ORDER — ATORVASTATIN CALCIUM 80 MG/1
20 TABLET, FILM COATED ORAL AT BEDTIME
Refills: 0 | Status: DISCONTINUED | OUTPATIENT
Start: 2022-01-14 | End: 2022-01-18

## 2022-01-14 RX ORDER — TIOTROPIUM BROMIDE 18 UG/1
2 CAPSULE ORAL; RESPIRATORY (INHALATION)
Qty: 0 | Refills: 0 | DISCHARGE

## 2022-01-14 RX ORDER — APIXABAN 2.5 MG/1
5 TABLET, FILM COATED ORAL EVERY 12 HOURS
Refills: 0 | Status: DISCONTINUED | OUTPATIENT
Start: 2022-01-14 | End: 2022-01-18

## 2022-01-14 RX ORDER — MONTELUKAST 4 MG/1
1 TABLET, CHEWABLE ORAL
Qty: 0 | Refills: 0 | DISCHARGE

## 2022-01-14 RX ORDER — METOPROLOL TARTRATE 50 MG
50 TABLET ORAL EVERY 24 HOURS
Refills: 0 | Status: DISCONTINUED | OUTPATIENT
Start: 2022-01-15 | End: 2022-01-18

## 2022-01-14 RX ORDER — NITROGLYCERIN 6.5 MG
0.4 CAPSULE, EXTENDED RELEASE ORAL ONCE
Refills: 0 | Status: COMPLETED | OUTPATIENT
Start: 2022-01-14 | End: 2022-01-14

## 2022-01-14 RX ADMIN — Medication 1 MILLIGRAM(S): at 21:48

## 2022-01-14 RX ADMIN — Medication 10 MILLIGRAM(S): at 14:33

## 2022-01-14 RX ADMIN — ATORVASTATIN CALCIUM 20 MILLIGRAM(S): 80 TABLET, FILM COATED ORAL at 21:48

## 2022-01-14 RX ADMIN — Medication 650 MILLIGRAM(S): at 19:49

## 2022-01-14 RX ADMIN — Medication 0.4 MILLIGRAM(S): at 12:47

## 2022-01-14 RX ADMIN — APIXABAN 5 MILLIGRAM(S): 2.5 TABLET, FILM COATED ORAL at 21:48

## 2022-01-14 RX ADMIN — FAMOTIDINE 20 MILLIGRAM(S): 10 INJECTION INTRAVENOUS at 19:49

## 2022-01-14 RX ADMIN — Medication 30 MILLILITER(S): at 19:49

## 2022-01-14 NOTE — PATIENT PROFILE ADULT - FALL HARM RISK - HARM RISK INTERVENTIONS

## 2022-01-14 NOTE — H&P ADULT - NSICDXPASTMEDICALHX_GEN_ALL_CORE_FT
PAST MEDICAL HISTORY:  Anemia     Aortic stenosis     COPD (chronic obstructive pulmonary disease)     Diastolic CHF, chronic     GERD (gastroesophageal reflux disease)     HTN (hypertension)     Hyperlipidemia     Obesity     Stage 3 chronic kidney disease

## 2022-01-14 NOTE — ED ADULT NURSE NOTE - OBJECTIVE STATEMENT
Pt.  brought by daughter for c/o HTN and palpitations x 3 days. Now w/ chest pain x yesterday. Also reporting SOB on exertion. Speaking in complete sentences. Saturating at 96% on room air.

## 2022-01-14 NOTE — H&P ADULT - PROBLEM SELECTOR PLAN 5
-no wheezing, satting well on RA. Pt on intermittent/nocturnal O2 2LPM @ home  -continue Spiriva  -follows with Dr. More -With Dr. Alejo 02/2019 in setting of severe AS and diastolic CHF  -Last echo 05/2021- peak velocity is 3.33 m/s, mean gradient is 29.60 mmHg, and the LVOT/AV velocity ratio is 0.42, peak gradient is 44.36 mmHg. No aortic regurgitation  -f/u Echo    #?valve thrombus  -continue home eliquis 5mg BID

## 2022-01-14 NOTE — H&P ADULT - HISTORY OF PRESENT ILLNESS
INCOMPLETE  81 year old Cypriot/English speaking Female with PMHx HTN, HLD, diastolic CHF with severe AS (s/p TAVR 02/2019), COPD (on 2L home O2 at baseline), iron deficiency anemia, CKD-III, GERD, and colon cancer (s/p colectomy 12/2020) who presented to [facility] ED 1/14 with concern for and palpitations with elevated blood pressure to 220s x3 days and midsternal chest pain for one day.    Pt and daughter confirm compliance with medications, no missed doses. Also with associated chest pain and palpitations. States worst pain today prior to presentation. Pt's daughter gave her sublingual NTG prior to coming to ED, with moderate improvement in symptoms. States while her legs are not visibly swollen, they feel heavier and she is concerned of fluid in lungs again. Pt had dental work done 3 days ago which was complicated by abscess. She has been taking Amoxicillin and pain is resolved and no longer with any pus. Pt initially thought that blood pressure was related to this however blood pressure did not improve as symptoms subsided. Pt also takes Lorazepam at night for anxiety, however notes that she is not anxious and blood pressure is still elevated. Denies fevers, chills, cough, Covid exposure, n/v/d, dizziness, syncope. No recent medication changes.  In the ER, patient HD stable, afebrile, /91 --> 134/76, HR 70-80s, RR 18, SpO2 96% on RA. Labs notable for trop negative x1, , Hgb 9.5, Cr 1.41/BUN 25. EKG NSR 80bpm, no acute ischemia. CTA chest/abd/pelvis revealed no aortic dissection or other acute findings.                 81 year old Swazi/English speaking Female with PMHx uncontrolled HTN, HLD, diastolic CHF, severe AS (s/p TAVR 02/2019), ?valve thrombus (on eliquis), COPD (on 2L home O2 at baseline), moderate MARK (non-compliant with CPAP 2/2 claustrophobia), iron deficiency anemia (baseline hgb 9s), CKD-III (baseline Cr 1.1-1.2), GERD, anxiety, colon cancer (s/p colectomy 12/2020) who presented to [facility] ED 1/14 with concern for SOB, chest pain and palpitations with elevated blood pressure to 220s over the last few days. Pt reports worsening MANRIQUE over past 1-2 weeks, now SOB when walking to bathroom, associated with palpitations. States while her legs are not visibly swollen, they feel heavier. She reports 4lb weight gain over past week. Over the past 1-2 days she also developed midsternal/L-sided chest pain not relieved with rest, resolves with spontaneously. Pain was more severe today, 8/10 in severity. She has never had pain like this before. Pt's daughter gave her sublingual NTG prior to coming to ED, with moderate improvement in symptoms. Pt had dental work done about 1 week ago and stated symptoms began after that, but denies any fevers, chills, or oral pain. Denies orthopnea/PND, leg swelling, LOC, bleeding, melena/hematochezia.  In the ER, patient HD stable, afebrile, /91 --> 134/76, HR 70-80s, RR 18, SpO2 95% on RA. Labs notable for trop negative x1, , Hgb 9.5, Cr 1.41/BUN 25. EKG NSR 80bpm, no acute ischemia. CTA chest/abd/pelvis revealed no aortic dissection or other acute findings. COVID PCR negative. She received labetalol 10mg IV x1 and SL nitro 0.4mg x1. Admitted to cardiac tele for further cardiac work-up including r/o ACS and management of hypertensive urgency.                 81 year old Sierra Leonean/English speaking Female with PMHx uncontrolled HTN, HLD, diastolic CHF, severe AS (s/p TAVR 02/2019), ?valve thrombus (on eliquis), COPD (intermittent 2L home O2), moderate MARK (non-compliant with CPAP 2/2 claustrophobia), iron deficiency anemia (baseline hgb 9s), CKD-III (baseline Cr 1.1-1.2), GERD, anxiety, colon cancer (s/p colectomy 12/2020) who presented to [facility] ED 1/14 with concern for SOB, chest pain and palpitations with elevated blood pressure to 220s over the last few days. Pt reports worsening MANRIQUE over past 1-2 weeks, now SOB when walking to bathroom, associated with palpitations. States while her legs are not visibly swollen, they feel heavier. She reports 4lb weight gain over past week. Over the past 1-2 days she also developed midsternal/L-sided chest pain not relieved with rest, resolves with spontaneously. Pain was more severe today, 8/10 in severity. She has never had pain like this before. Pt's daughter gave her sublingual NTG prior to coming to ED, with moderate improvement in symptoms. Pt had dental work done about 1 week ago and stated symptoms began after that, but denies any fevers, chills, or oral pain. Denies orthopnea/PND, leg swelling, LOC, bleeding, melena/hematochezia.  In the ER, patient HD stable, afebrile, /91 --> 134/76, HR 70-80s, RR 18, SpO2 95% on RA. Labs notable for trop negative x1, , Hgb 9.5, Cr 1.41/BUN 25. EKG NSR 80bpm, no acute ischemia. CTA chest/abd/pelvis revealed no aortic dissection or other acute findings. COVID PCR negative. She received labetalol 10mg IV x1 and SL nitro 0.4mg x1. Admitted to cardiac tele for further cardiac work-up including r/o ACS and management of hypertensive urgency.                 Hx obtained with help from patient's daughter, Jaelyn  81 year old Ukrainian/English speaking Female with PMHx uncontrolled HTN, HLD, diastolic CHF, severe AS (s/p TAVR 02/2019), ?valve thrombus (on eliquis), COPD (intermittent 2L home O2), moderate MARK (non-compliant with CPAP 2/2 claustrophobia), iron deficiency anemia (baseline hgb 9s), CKD-III (baseline Cr 1.1-1.2), GERD, anxiety, colon cancer (s/p colectomy 12/2020) who presented to [facility] ED 1/14 with concern for SOB, chest pain and palpitations with elevated blood pressure to 220s over the last few days. Pt reports worsening MANRIQUE over past 1-2 weeks, now SOB when walking to bathroom, associated with palpitations. States while her legs are not visibly swollen, they feel heavier. She reports 4lb weight gain over past week. Over the past 1-2 days she also developed midsternal/L-sided chest pain not relieved with rest, resolves with spontaneously. Pain was more severe today, 8/10 in severity. She has never had pain like this before. Pt's daughter gave her sublingual NTG prior to coming to ED, with moderate improvement in symptoms. Pt had dental work done about 1 week ago and stated symptoms began after that, but denies any fevers, chills, or oral pain. Denies orthopnea/PND, leg swelling, LOC, bleeding, melena/hematochezia.  In the ER, patient HD stable, afebrile, /91 --> 134/76, HR 70-80s, RR 18, SpO2 95% on RA. Labs notable for trop negative x1, , Hgb 9.5, Cr 1.41/BUN 25. EKG NSR 80bpm, no acute ischemia. CTA chest/abd/pelvis revealed no aortic dissection or other acute findings. COVID PCR negative. She received labetalol 10mg IV x1 and SL nitro 0.4mg x1. Admitted to cardiac tele for further cardiac work-up including r/o ACS and management of hypertensive urgency.

## 2022-01-14 NOTE — H&P ADULT - PROBLEM SELECTOR PLAN 7
-Hgb 8.5 on admission, baseline 9-10s  -denies melena or hematochezia  -f/u iron studies  -continue home folic acid    #DVT PPx- eliquis    #Dispo- pending work-up as above  -Pt from home, lives with daughter, no assistive devices but doesn't ambulate much  -PT eval    Meds confirmed with patient's list    Case d/w Dr. Ruiz -Hgb 8.5 on admission, baseline 9-10s. May be contributing to SOB  -denies melena or hematochezia  -f/u iron studies  -continue home folic acid  -unclear why pt not on iron    #DVT PPx- eliquis    #Dispo- pending work-up as above  -Pt from home, lives with daughter, no assistive devices but doesn't ambulate much  -PT eval    Meds confirmed with patient's list at bedside    Case d/w Dr. Ruiz -no wheezing, satting well on RA. Pt on intermittent/nocturnal O2 2LPM @ home  -continue Spiriva  -follows with Dr. More    #DVT PPx- eliquis    #Dispo- pending work-up as above  -Pt from home, lives with daughter, no assistive devices but doesn't ambulate much  -PT eval    Meds confirmed with patient's list at bedside    Case d/w Dr. Ruiz

## 2022-01-14 NOTE — ED PROVIDER NOTE - OBJECTIVE STATEMENT
82 y/o  AS s/p TAVR, copd (o2-dpt), htn, hld, colon ca, gerd, recurrent R toe rash previously tx for infection (6/2021), presents for hypertensive urgency/emergency with blood pressure up to 220's x 3 days. Pt and daughter confirm compliance with medications, no missed doses. Also with associated chest pain and palpitations. States worst pain today prior to presentation. Pt's daughter gave her sublingual NTG prior to coming to ED, with moderate improvement in symptoms. States while her legs are not visibly swollen, they feel heavier and she is concerned of fluid in lungs again. Pt had dental work done 3 days ago which was complicated by abscess. She has been taking Amoxicillin and pain is resolved and no longer with any pus. Pt initially thought that blood pressure was related to this however blood pressure did not improve as symptoms subsided. Pt also takes Lorazepam at night for anxiety, however notes that she is not anxious and blood pressure is still elevated. Denies fevers, chills, cough, Covid exposure, n/v/d, dizziness, syncope. No recent medication changes.

## 2022-01-14 NOTE — H&P ADULT - PROBLEM SELECTOR PLAN 2
-pt reports MANRIQUE, 4lb weight gain over past week, but lungs clear, no peripheral edema or ascites, satting 95% on RA  -CTA chest without pulmonary edema  -  -Last echo 5/5/21- EF 75%, mild LVH, G2DD, mildly dilated LA, Jamar 3 valve noted (see below), no evidence of pHTN  -core measures, I/Os, daily weights  -pt on Lasix 20mg daily, Lisinopril 10mg daily, and Toprol XL 50mg daily at home  -consider trial of lasix Pt reports SBP in 220s at home for past 3 days. Usually is in 140s. Likely exacerbated by anxiety  -/91 in ER, SBP improved to 120-130s after labetalol 10mg IV and SL nitro  -home meds: Lisinopril 10mg daily, Toprol XL 50mg daily, Lasix 20mg daily  -holding Lisinopril 2/2 possible BIBIANA  -continue Toprol  -continue to trend BP, aiming to improve slowly over next 12-24 hrs  -consider adding amlodipine in AM

## 2022-01-14 NOTE — H&P ADULT - NSHPSOCIALHISTORY_GEN_ALL_CORE
-never smoker, denies ETOH or illicits  -lives with her daughter  -does not use a walker  -retired teacher

## 2022-01-14 NOTE — H&P ADULT - PROBLEM SELECTOR PLAN 4
-Cr 1.41 on admission (baseline 1.1-1.2)  -f/u UA, urine lytes  -bladder scan to r/o urinary retention  -holding lisinopril for now  -continue to trend

## 2022-01-14 NOTE — H&P ADULT - PROBLEM SELECTOR PLAN 1
associated with midsternal CP and palpitations, some improvement with SL nitro  -unclear etiology, DDx include new angina, diastolic CHF, anemia, valvular disease, COPD. Unlikely PE as pt compliant with eliquis  -Cardiac cath 01/2019 showed normal coronaries  -Last echo 5/5/21- EF 75%, mild LVH, G2DD, mildly dilated LA, Jamar 3 valve noted (see below), no evidence of pHTN  -CTA chest/abd/pelvis negative for dissection, shows no acute pulmonary process  -trop negative x1, EKG NSR with no acute ischemia  -serial cardiac enzymes/EKG, next at 6pm  -echo ordered  -SL nitro PRN vs. nitro paste if CP persists  -consider trial of Lasix associated with midsternal CP and palpitations, some improvement with SL nitro  -unclear etiology, DDx include new angina, diastolic CHF, anemia, valvular disease, COPD. Unlikely PE as pt compliant with eliquis  -Cardiac cath 01/2019 showed normal coronaries  -Last echo 5/5/21- EF 75%, mild LVH, G2DD, mildly dilated LA, Jamar 3 valve noted (see below), no evidence of pHTN  -CTA chest/abd/pelvis negative for dissection, shows no acute pulmonary process  -trop negative x2, EKG NSR with no acute ischemia  -echo ordered  -SL nitro PRN vs. nitro paste if CP persists after tylenol/maalox/pepcid  -consider trial of Lasix if SOB associated with midsternal CP and palpitations, some improvement with SL nitro  -unclear etiology, DDx include new angina, diastolic CHF, anemia, valvular disease, COPD. Unlikely PE as pt compliant with eliquis  -Cardiac cath 01/2019 showed normal coronaries  -Last echo 5/5/21- EF 75%, mild LVH, G2DD, mildly dilated LA, Jamar 3 valve noted (see below), no evidence of pHTN  -CTA chest/abd/pelvis negative for dissection, shows no acute pulmonary process  -trop negative x2, EKG NSR with no acute ischemia  -echo ordered  -SL nitro PRN vs. nitro paste if CP persists after trial of tylenol/maalox/pepcid  -consider trial of Lasix if SOB

## 2022-01-14 NOTE — H&P ADULT - ASSESSMENT
81 year old Pan[place]ian/English speaking Female with PMHx uncontrolled HTN, HLD, diastolic CHF, severe AS (s/p TAVR 02/2019), ?valve thrombus (on eliquis), COPD (intermittent 2L home O2), moderate MARK (non-compliant with CPAP 2/2 claustrophobia), iron deficiency anemia (baseline hgb 9s), CKD-III (baseline Cr 1.1-1.2), GERD, anxiety, colon cancer (s/p colectomy 12/2020) who presented to [facility] ED 1/14 with concern for worsening MANRIQUE, chest pain and palpitations with elevated blood pressure to 220s over the last few days, admitted to cardiac tele for further cardiac work-up including r/o ACS and management of hypertensive urgency.

## 2022-01-14 NOTE — H&P ADULT - NSHPOUTPATIENTPROVIDERS_GEN_ALL_CORE
Cardiologist- Dr. Aguirre  PMD/Hem-onc- Kristy Hoskins  Structural- Dr. Alejo Cardiologist- Dr. Aguirre  PMD/Hem-onc- Kristy Hoskins  Structural- Dr. Alejo  [place]- Dr. More

## 2022-01-14 NOTE — H&P ADULT - PROBLEM SELECTOR PLAN 6
-With Dr. Alejo 02/2019 in setting of severe AS and diastolic CHF  -Last echo 05/2021- peak velocity is 3.33 m/s, mean gradient is 29.60 mmHg, and the LVOT/AV velocity ratio is 0.42, peak gradient is 44.36 mmHg. No aortic regurgitation  -f/u Echo    #?valve thrombus  -continue home eliquis 5mg BID -Hgb 8.5 on admission, baseline 9-10s. May be contributing to SOB  -denies melena or hematochezia  -f/u iron studies  -continue home folic acid  -unclear why pt not on iron

## 2022-01-14 NOTE — H&P ADULT - PROBLEM SELECTOR PLAN 3
Pt reports SBP in 220s at home for past 3 days. Usually is in 140s. Likely exacerbated by anxiety  -/91 in ER, SBP improved to 120-130s after labetalol 10mg IV and SL nitro  -home meds: Lisinopril 10mg daily, Toprol XL 50mg daily, Lasix 20mg daily  -holding Lisinopril 2/2 possible BIBIANA  -continue Toprol  -continue to trend BP, aiming to improve slowly over next 12-24 hrs  -consider adding amlodipine in AM -pt reports MANRIQUE, 4lb weight gain over past week, but lungs clear, no peripheral edema or ascites, satting 95% on RA  -CTA chest without pulmonary edema  -  -Last echo 5/5/21- EF 75%, mild LVH, G2DD, mildly dilated LA, Jamar 3 valve noted (see below), no evidence of pHTN  -core measures, I/Os, daily weights  -pt on Lasix 20mg daily, Lisinopril 10mg daily, and Toprol XL 50mg daily at home  -consider trial of lasix if persistent SOB

## 2022-01-14 NOTE — ED PROVIDER NOTE - PHYSICAL EXAMINATION
CONSTITUTIONAL: In no apparent distress.   HEAD: Normocephalic; atraumatic.   EYES:  conjunctiva and sclera clear  ENT: normal nose; no rhinorrhea; normal pharynx with no erythema or lesions.   NECK: Supple; non-tender;   CARDIOVASCULAR: Normal S1, S2;  Audible murmur. Regular rate and rhythm.  RESPIRATORY: Breathing easily; breath sounds clear and equal bilaterally; no wheezes, rhonchi, or rales.  GI: Soft; non-distended; non-tender; no palpable organomegaly.   EXT: No cyanosis or edema; N/V intact  SKIN: Normal for age and race; warm; dry; good turgor; no apparent lesions or rash.   NEURO: A & O x 3; face symmetric; grossly unremarkable.   PSYCHOLOGICAL: The patient’s mood and manner are appropriate. CONSTITUTIONAL: In no apparent distress.   HEAD: Normocephalic; atraumatic.   EYES:  conjunctiva and sclera clear  ENT: normal nose; no rhinorrhea; normal pharynx with no erythema or lesions. poor dentition, no tenderness over teeth, no fluctuance or purulence seen  NECK: Supple; non-tender;   CARDIOVASCULAR: Normal S1, S2;  Audible murmur. Regular rate and rhythm.  RESPIRATORY: Breathing easily; breath sounds clear and equal bilaterally; no wheezes, rhonchi, or rales.  GI: Soft; non-distended; non-tender; no palpable organomegaly.   EXT: No cyanosis or edema; N/V intact  SKIN: Normal for age and race; warm; dry; good turgor; no apparent lesions or rash.   NEURO: A & O x 3; face symmetric; grossly unremarkable.   PSYCHOLOGICAL: The patient’s mood and manner are appropriate.

## 2022-01-14 NOTE — H&P ADULT - NSHPPHYSICALEXAM_GEN_ALL_CORE
Vital Signs Last 24 Hrs  T(C): 36.9 (14 Jan 2022 18:33), Max: 37.1 (14 Jan 2022 11:30)  T(F): 98.5 (14 Jan 2022 18:33), Max: 98.8 (14 Jan 2022 11:30)  HR: 68 (14 Jan 2022 18:33) (68 - 87)  BP: 123/70 (14 Jan 2022 18:33) (123/70 - 192/91)  RR: 18 (14 Jan 2022 18:33) (18 - 18)  SpO2: 94% (14 Jan 2022 18:33) (94% - 98%)

## 2022-01-14 NOTE — H&P ADULT - NSHPLABSRESULTS_GEN_ALL_CORE
9.5    8.06  )-----------( 247      ( 14 Jan 2022 12:25 )             30.1       01-14    143  |  104  |  25<H>  ----------------------------<  114<H>  4.2   |  24  |  1.41<H>    Ca    9.2      14 Jan 2022 12:25    TPro  7.4  /  Alb  4.4  /  TBili  0.3  /  DBili  x   /  AST  15  /  ALT  11  /  AlkPhos  91  01-14      PT/INR - ( 14 Jan 2022 12:25 )   PT: 15.1 sec;   INR: 1.27          PTT - ( 14 Jan 2022 12:25 )  PTT:35.2 sec    CARDIAC MARKERS ( 14 Jan 2022 12:25 )  x     / 0.01 ng/mL / x     / x     / x          EKG: NSR 80bpm, no acute ischemia    CTA Chest/abd/pelvis 1/14/22  FINDINGS:  CHEST:  LUNGS AND LARGE AIRWAYS: Patent central airways. Scattered bibasilar   nodules the largest 9 mm right lower lobe are stable from 9/21.  PLEURA: No pleural effusion.  VESSELS: No aortic dissection or intramural hematoma. 4.2 cm ascending   aortic aneurysm stable. Status post TAVR.  HEART: Cardiomegaly. No pericardial effusion.  MEDIASTINUM AND PATRICIA: Mild mediastinal and hilar adenopathy stable.  CHEST WALL AND LOWER NECK: Within normal limits.    ABDOMEN AND PELVIS:  LIVER: Within normal limits.  BILE DUCTS: Normal caliber.  GALLBLADDER: Within normal limits.  SPLEEN: Within normal limits.  PANCREAS: Within normal limits.  ADRENALS: Small left adrenal nodule stable.  KIDNEYS/URETERS: Small bilateral cysts stable.    BLADDER: Within normal limits.  REPRODUCTIVE ORGANS: Uterus and adnexa within normal limits.    BOWEL: No bowel obstruction. Right hemicolectomy.  PERITONEUM: No ascites. Infiltration of the anterior peritoneal fat deep   to the probable surgical mesh, likely fat necrosis.  VESSELS: Atherosclerotic changes. No dissection. Mesenteric arteries   patent.  RETROPERITONEUM/LYMPH NODES: No lymphadenopathy.  ABDOMINAL WALL: Postsurgical changes. Question sinus tract to the   umbilicus, correlate clinically.  BONES: Within normal limits.    IMPRESSION:  No aortic dissection or other acute findings. Incidental comments as   above.

## 2022-01-14 NOTE — ED ADULT TRIAGE NOTE - CHIEF COMPLAINT QUOTE
pt brought in by daughter, c/o HTN (systolic 200's, diastolic 110's), palpitations x 3 days and midsternal chest pain since yesterday. pt endorsing SOB on exertion. hx COPD, valve replacement (on eliquis), HTN

## 2022-01-14 NOTE — ED PROVIDER NOTE - CLINICAL SUMMARY MEDICAL DECISION MAKING FREE TEXT BOX
1 y/o  AS s/p TAVR, copd (o2-dpt), htn, hld, colon ca, gerd, recurrent R toe rash previously tx for infection (6/2021), presents for hypertensive urgency/emergency. Plan labs, EKG, chest xray, and medications to bring blood pressure down. Dispo pending results and reassessment. 1 y/o  AS s/p TAVR, copd (o2-dpt), htn, hld, colon ca, gerd, recurrent R toe rash previously tx for infection (6/2021), presents for hypertensive urgency/emergency. Plan labs, EKG, chest xray, and medications to bring blood pressure down, r/o aortic dissection. Dispo pending results and reassessment.

## 2022-01-15 LAB
A1C WITH ESTIMATED AVERAGE GLUCOSE RESULT: 4.7 % — SIGNIFICANT CHANGE UP (ref 4–5.6)
ALBUMIN SERPL ELPH-MCNC: 3.8 G/DL — SIGNIFICANT CHANGE UP (ref 3.3–5)
ALP SERPL-CCNC: 80 U/L — SIGNIFICANT CHANGE UP (ref 40–120)
ALT FLD-CCNC: 10 U/L — SIGNIFICANT CHANGE UP (ref 10–45)
ANION GAP SERPL CALC-SCNC: 12 MMOL/L — SIGNIFICANT CHANGE UP (ref 5–17)
APPEARANCE UR: CLEAR — SIGNIFICANT CHANGE UP
APTT BLD: 31.5 SEC — SIGNIFICANT CHANGE UP (ref 27.5–35.5)
AST SERPL-CCNC: 17 U/L — SIGNIFICANT CHANGE UP (ref 10–40)
BASOPHILS # BLD AUTO: 0.04 K/UL — SIGNIFICANT CHANGE UP (ref 0–0.2)
BASOPHILS NFR BLD AUTO: 0.7 % — SIGNIFICANT CHANGE UP (ref 0–2)
BILIRUB SERPL-MCNC: 0.4 MG/DL — SIGNIFICANT CHANGE UP (ref 0.2–1.2)
BILIRUB UR-MCNC: NEGATIVE — SIGNIFICANT CHANGE UP
BLD GP AB SCN SERPL QL: POSITIVE — SIGNIFICANT CHANGE UP
BUN SERPL-MCNC: 24 MG/DL — HIGH (ref 7–23)
CALCIUM SERPL-MCNC: 9.2 MG/DL — SIGNIFICANT CHANGE UP (ref 8.4–10.5)
CHLORIDE SERPL-SCNC: 103 MMOL/L — SIGNIFICANT CHANGE UP (ref 96–108)
CHOLEST SERPL-MCNC: 101 MG/DL — SIGNIFICANT CHANGE UP
CK MB CFR SERPL CALC: 1.8 NG/ML — SIGNIFICANT CHANGE UP (ref 0–6.7)
CK SERPL-CCNC: 154 U/L — SIGNIFICANT CHANGE UP (ref 25–170)
CO2 SERPL-SCNC: 24 MMOL/L — SIGNIFICANT CHANGE UP (ref 22–31)
COLOR SPEC: YELLOW — SIGNIFICANT CHANGE UP
CREAT ?TM UR-MCNC: 139 MG/DL — SIGNIFICANT CHANGE UP
CREAT SERPL-MCNC: 1.18 MG/DL — SIGNIFICANT CHANGE UP (ref 0.5–1.3)
DIFF PNL FLD: NEGATIVE — SIGNIFICANT CHANGE UP
EOSINOPHIL # BLD AUTO: 0.13 K/UL — SIGNIFICANT CHANGE UP (ref 0–0.5)
EOSINOPHIL NFR BLD AUTO: 2.2 % — SIGNIFICANT CHANGE UP (ref 0–6)
ESTIMATED AVERAGE GLUCOSE: 88 MG/DL — SIGNIFICANT CHANGE UP (ref 68–114)
FERRITIN SERPL-MCNC: 18 NG/ML — SIGNIFICANT CHANGE UP (ref 15–150)
GLUCOSE SERPL-MCNC: 90 MG/DL — SIGNIFICANT CHANGE UP (ref 70–99)
GLUCOSE UR QL: NEGATIVE — SIGNIFICANT CHANGE UP
HCT VFR BLD CALC: 30.9 % — LOW (ref 34.5–45)
HDLC SERPL-MCNC: 46 MG/DL — LOW
HGB BLD-MCNC: 9.4 G/DL — LOW (ref 11.5–15.5)
IMM GRANULOCYTES NFR BLD AUTO: 0.2 % — SIGNIFICANT CHANGE UP (ref 0–1.5)
INR BLD: 1.37 — HIGH (ref 0.88–1.16)
IRON SATN MFR SERPL: 33 UG/DL — SIGNIFICANT CHANGE UP (ref 30–160)
IRON SATN MFR SERPL: 9 % — LOW (ref 14–50)
KETONES UR-MCNC: NEGATIVE — SIGNIFICANT CHANGE UP
LEUKOCYTE ESTERASE UR-ACNC: NEGATIVE — SIGNIFICANT CHANGE UP
LIPID PNL WITH DIRECT LDL SERPL: 38 MG/DL — SIGNIFICANT CHANGE UP
LYMPHOCYTES # BLD AUTO: 1.43 K/UL — SIGNIFICANT CHANGE UP (ref 1–3.3)
LYMPHOCYTES # BLD AUTO: 24.7 % — SIGNIFICANT CHANGE UP (ref 13–44)
MAGNESIUM SERPL-MCNC: 2.1 MG/DL — SIGNIFICANT CHANGE UP (ref 1.6–2.6)
MCHC RBC-ENTMCNC: 27.1 PG — SIGNIFICANT CHANGE UP (ref 27–34)
MCHC RBC-ENTMCNC: 30.4 GM/DL — LOW (ref 32–36)
MCV RBC AUTO: 89 FL — SIGNIFICANT CHANGE UP (ref 80–100)
MONOCYTES # BLD AUTO: 0.46 K/UL — SIGNIFICANT CHANGE UP (ref 0–0.9)
MONOCYTES NFR BLD AUTO: 7.9 % — SIGNIFICANT CHANGE UP (ref 2–14)
NEUTROPHILS # BLD AUTO: 3.73 K/UL — SIGNIFICANT CHANGE UP (ref 1.8–7.4)
NEUTROPHILS NFR BLD AUTO: 64.3 % — SIGNIFICANT CHANGE UP (ref 43–77)
NITRITE UR-MCNC: NEGATIVE — SIGNIFICANT CHANGE UP
NON HDL CHOLESTEROL: 55 MG/DL — SIGNIFICANT CHANGE UP
NRBC # BLD: 0 /100 WBCS — SIGNIFICANT CHANGE UP (ref 0–0)
PH UR: 5.5 — SIGNIFICANT CHANGE UP (ref 5–8)
PLATELET # BLD AUTO: 247 K/UL — SIGNIFICANT CHANGE UP (ref 150–400)
POTASSIUM SERPL-MCNC: 4 MMOL/L — SIGNIFICANT CHANGE UP (ref 3.5–5.3)
POTASSIUM SERPL-SCNC: 4 MMOL/L — SIGNIFICANT CHANGE UP (ref 3.5–5.3)
PROT SERPL-MCNC: 7 G/DL — SIGNIFICANT CHANGE UP (ref 6–8.3)
PROT UR-MCNC: NEGATIVE MG/DL — SIGNIFICANT CHANGE UP
PROTHROM AB SERPL-ACNC: 16.2 SEC — HIGH (ref 10.6–13.6)
RBC # BLD: 3.47 M/UL — LOW (ref 3.8–5.2)
RBC # FLD: 14.6 % — HIGH (ref 10.3–14.5)
RH IG SCN BLD-IMP: POSITIVE — SIGNIFICANT CHANGE UP
SODIUM SERPL-SCNC: 139 MMOL/L — SIGNIFICANT CHANGE UP (ref 135–145)
SODIUM UR-SCNC: 73 MMOL/L — SIGNIFICANT CHANGE UP
SP GR SPEC: 1.02 — SIGNIFICANT CHANGE UP (ref 1–1.03)
T4 FREE SERPL-MCNC: 1 NG/DL — SIGNIFICANT CHANGE UP (ref 0.93–1.7)
TIBC SERPL-MCNC: 362 UG/DL — SIGNIFICANT CHANGE UP (ref 220–430)
TRIGL SERPL-MCNC: 84 MG/DL — SIGNIFICANT CHANGE UP
TROPONIN T SERPL-MCNC: <0.01 NG/ML — SIGNIFICANT CHANGE UP (ref 0–0.01)
TSH SERPL-MCNC: 1.52 UIU/ML — SIGNIFICANT CHANGE UP (ref 0.27–4.2)
UIBC SERPL-MCNC: 329 UG/DL — SIGNIFICANT CHANGE UP (ref 110–370)
UROBILINOGEN FLD QL: 0.2 E.U./DL — SIGNIFICANT CHANGE UP
UUN UR-MCNC: 929 MG/DL — SIGNIFICANT CHANGE UP
WBC # BLD: 5.8 K/UL — SIGNIFICANT CHANGE UP (ref 3.8–10.5)
WBC # FLD AUTO: 5.8 K/UL — SIGNIFICANT CHANGE UP (ref 3.8–10.5)

## 2022-01-15 PROCEDURE — 93306 TTE W/DOPPLER COMPLETE: CPT | Mod: 26

## 2022-01-15 PROCEDURE — 86077 PHYS BLOOD BANK SERV XMATCH: CPT

## 2022-01-15 PROCEDURE — 99233 SBSQ HOSP IP/OBS HIGH 50: CPT

## 2022-01-15 RX ORDER — ACETAMINOPHEN 500 MG
650 TABLET ORAL EVERY 6 HOURS
Refills: 0 | Status: DISCONTINUED | OUTPATIENT
Start: 2022-01-15 | End: 2022-01-18

## 2022-01-15 RX ORDER — LISINOPRIL 2.5 MG/1
10 TABLET ORAL DAILY
Refills: 0 | Status: DISCONTINUED | OUTPATIENT
Start: 2022-01-15 | End: 2022-01-18

## 2022-01-15 RX ADMIN — Medication 650 MILLIGRAM(S): at 17:26

## 2022-01-15 RX ADMIN — Medication 1 MILLIGRAM(S): at 21:17

## 2022-01-15 RX ADMIN — ATORVASTATIN CALCIUM 20 MILLIGRAM(S): 80 TABLET, FILM COATED ORAL at 21:17

## 2022-01-15 RX ADMIN — APIXABAN 5 MILLIGRAM(S): 2.5 TABLET, FILM COATED ORAL at 09:43

## 2022-01-15 RX ADMIN — TIOTROPIUM BROMIDE 1 CAPSULE(S): 18 CAPSULE ORAL; RESPIRATORY (INHALATION) at 20:00

## 2022-01-15 RX ADMIN — LISINOPRIL 10 MILLIGRAM(S): 2.5 TABLET ORAL at 18:12

## 2022-01-15 RX ADMIN — Medication 650 MILLIGRAM(S): at 10:40

## 2022-01-15 RX ADMIN — Medication 1 TABLET(S): at 12:20

## 2022-01-15 RX ADMIN — Medication 50 MILLIGRAM(S): at 09:43

## 2022-01-15 RX ADMIN — PANTOPRAZOLE SODIUM 40 MILLIGRAM(S): 20 TABLET, DELAYED RELEASE ORAL at 06:19

## 2022-01-15 RX ADMIN — Medication 650 MILLIGRAM(S): at 09:43

## 2022-01-15 RX ADMIN — APIXABAN 5 MILLIGRAM(S): 2.5 TABLET, FILM COATED ORAL at 21:17

## 2022-01-15 NOTE — PROGRESS NOTE ADULT - PROBLEM SELECTOR PLAN 1
P/W increased MANRIQUE and CP, improved with SL Nitro. Euvolemic on exam.  Trop (-) x 3. EKG non-ischemic.   - Cardiac cath 01/2019 showed normal coronaries  - CTA chest/abd/pelvis negative for dissection, shows no acute pulmonary process  - ECHO 1/15/22: Mild symmetric LVH. Hyperdynamic LV SF. Normal RVSF. 23 mm Jamar 3 valve is noted in the aortic position without any AR. Proximal ascending aorta is dilated 4.00 cm. No pulm htn. Pericardial fat pad is seen anterior to RV, cannot  rule out a trivial pericardial effusion. Compared to TTE 5/2021, no sig. change  - Orthostatics negative   - plan for CCTA 1/18/22 for ischemic eval

## 2022-01-15 NOTE — PROGRESS NOTE ADULT - SUBJECTIVE AND OBJECTIVE BOX
CARDIOLOGY NP PROGRESS NOTE    Subjective: Received pt awake in bed in NAD. C/O CP w/ambulation. Denies SOB, dizziness/diaphoresis, n/v, palpitations.  Remainder ROS otherwise negative.    Overnight Events:  No acute events overnight     TELEMETRY: SB- SR (HR 50s-60s)    VITAL SIGNS:  T(C): 36.8 (01-15-22 @ 13:59), Max: 37 (01-15-22 @ 06:03)  HR: 65 (01-15-22 @ 12:27) (57 - 74)  BP: 121/57 (01-15-22 @ 12:27) (107/51 - 139/65)  RR: 19 (01-15-22 @ 12:27) (14 - 22)  SpO2: 97% (01-15-22 @ 12:27) (94% - 100%)  Wt(kg): --    I&O's Summary    14 Jan 2022 07:01  -  15 Milind 2022 07:00  --------------------------------------------------------  IN: 180 mL / OUT: 400 mL / NET: -220 mL    15 Milind 2022 07:01  -  15 Milind 2022 17:04  --------------------------------------------------------  IN: 180 mL / OUT: 250 mL / NET: -70 mL          PHYSICAL EXAM:    General: A/ox 3, No acute Distress  Neck: Supple, NO JVD  Cardiac: S1 S2, No M/R/G  Pulmonary: CTAB, Breathing unlabored, No Rhonchi/Rales/Wheezing  Abdomen: Soft, Non -tender, +BS x 4 quads  Extremities: No Rashes, No edema  Neuro: A/o x 3, No focal deficits          LABS:                          9.4    5.80  )-----------( 247      ( 15 Milind 2022 08:01 )             30.9                              01-15    139  |  103  |  24<H>  ----------------------------<  90  4.0   |  24  |  1.18    Ca    9.2      15 Milind 2022 08:01  Mg     2.1     01-15    TPro  7.0  /  Alb  3.8  /  TBili  0.4  /  DBili  x   /  AST  17  /  ALT  10  /  AlkPhos  80  01-15    LIVER FUNCTIONS - ( 15 Milind 2022 08:01 )  Alb: 3.8 g/dL / Pro: 7.0 g/dL / ALK PHOS: 80 U/L / ALT: 10 U/L / AST: 17 U/L / GGT: x                                 PT/INR - ( 15 Milind 2022 08:01 )   PT: 16.2 sec;   INR: 1.37          PTT - ( 15 Milind 2022 08:01 )  PTT:31.5 sec  CAPILLARY BLOOD GLUCOSE        CARDIAC MARKERS ( 15 Milind 2022 08:01 )  x     / <0.01 ng/mL / 154 U/L / x     / 1.8 ng/mL  CARDIAC MARKERS ( 14 Jan 2022 18:38 )  x     / 0.01 ng/mL / 91 U/L / x     / 1.7 ng/mL  CARDIAC MARKERS ( 14 Jan 2022 12:25 )  x     / 0.01 ng/mL / x     / x     / x              Allergies:  Digox (Rash; Urticaria; Hives)  Plavix (Other (Mod to Severe))  vancomycin (Other)    MEDICATIONS  (STANDING):  apixaban 5 milliGRAM(s) Oral every 12 hours  atorvastatin 20 milliGRAM(s) Oral at bedtime  folic acid 1 milliGRAM(s) Oral at bedtime  influenza  Vaccine (HIGH DOSE) 0.7 milliLiter(s) IntraMuscular once  metoprolol succinate ER 50 milliGRAM(s) Oral every 24 hours  multivitamin 1 Tablet(s) Oral daily  pantoprazole    Tablet 40 milliGRAM(s) Oral before breakfast  tiotropium 18 MICROgram(s) Capsule 1 Capsule(s) Inhalation daily    MEDICATIONS  (PRN):  acetaminophen     Tablet .. 650 milliGRAM(s) Oral every 6 hours PRN Moderate Pain (4 - 6)  LORazepam     Tablet 0.5 milliGRAM(s) Oral daily PRN Anxiety        DIAGNOSTIC TESTS:

## 2022-01-15 NOTE — PROGRESS NOTE ADULT - PROBLEM SELECTOR PLAN 3
- CTA and ECHO findings as above  - remains euvolemic on exam. Continue to HOLD home Lasix 20mg qd   - core measures. Strict I/Os, daily weights

## 2022-01-15 NOTE — PROGRESS NOTE ADULT - ATTENDING COMMENTS
Patient seen and examined. Case discussed with ACP    Interviewed with Afghan phone   81W HTN HL dCHF AS s/p TAVR on apixaban (?thrombus), cOPD MARK FEROZ CKD colon ca s/p colectomy who presents with worsening CP and dyspnea and found to be severley hypertensive. CTA chest neg for dissection    this AM BPs much improved off any home BP meds  however felt dizzy/LH  check orthostatics  at home denies any med or dietary noncompliance  says worsening left sided CP/dyspnea  check TTE   this PM with higher BPs after PT -- unclear why so labile, add back home meds  given CP consider CCTA  continue home apixaban    Andreina Donis MD

## 2022-01-15 NOTE — PROGRESS NOTE ADULT - PROBLEM SELECTOR PLAN 7
Not in acute exacerbation. On  intermittent 2L NC at home. Follows with Dr. Derik Domínguez c/w Spiriva    F: No IVF  N: DASH/TLC/DM diet  E: Replete lytes PRN K<4, Mg<2  P: DVT PPX: on Eliquis  C: FULL CODE  Dispo: Admit to tele 5 Uris    pending PT eval

## 2022-01-15 NOTE — PHYSICAL THERAPY INITIAL EVALUATION ADULT - ADDITIONAL COMMENTS
Pt. reports living in an elevator building; she denies using a assistive device for ambulation , but uses family for assist. with outdoors ambulation. No HHA, + home PT in the past.

## 2022-01-15 NOTE — PROGRESS NOTE ADULT - PROBLEM SELECTOR PLAN 2
On admission, SBPs 220s. (baseline SBP 140s). S/P IV labetalol 10mg x 1 and SL Nitro in ED.   - ECHO findings as above. Orthostatics negative.   - SBP 100s-190s   - c/w Toprol XL 50mg  - home Lisinopril 10mg added to regimen

## 2022-01-15 NOTE — PHYSICAL THERAPY INITIAL EVALUATION ADULT - PERTINENT HX OF CURRENT PROBLEM, REHAB EVAL
Pt. is an 81 y.o female presenting with chest pain, palpitations,  SOB in settings of elevated BP. She has been admitted for further cardiac w/u and management of  hypertensive urgency.

## 2022-01-16 LAB
ANION GAP SERPL CALC-SCNC: 12 MMOL/L — SIGNIFICANT CHANGE UP (ref 5–17)
BUN SERPL-MCNC: 22 MG/DL — SIGNIFICANT CHANGE UP (ref 7–23)
CALCIUM SERPL-MCNC: 9.4 MG/DL — SIGNIFICANT CHANGE UP (ref 8.4–10.5)
CHLORIDE SERPL-SCNC: 101 MMOL/L — SIGNIFICANT CHANGE UP (ref 96–108)
CO2 SERPL-SCNC: 25 MMOL/L — SIGNIFICANT CHANGE UP (ref 22–31)
CREAT SERPL-MCNC: 1.17 MG/DL — SIGNIFICANT CHANGE UP (ref 0.5–1.3)
GLUCOSE SERPL-MCNC: 109 MG/DL — HIGH (ref 70–99)
HCT VFR BLD CALC: 30.1 % — LOW (ref 34.5–45)
HGB BLD-MCNC: 9.2 G/DL — LOW (ref 11.5–15.5)
MAGNESIUM SERPL-MCNC: 2.2 MG/DL — SIGNIFICANT CHANGE UP (ref 1.6–2.6)
MCHC RBC-ENTMCNC: 26.9 PG — LOW (ref 27–34)
MCHC RBC-ENTMCNC: 30.6 GM/DL — LOW (ref 32–36)
MCV RBC AUTO: 88 FL — SIGNIFICANT CHANGE UP (ref 80–100)
NRBC # BLD: 0 /100 WBCS — SIGNIFICANT CHANGE UP (ref 0–0)
PLATELET # BLD AUTO: 251 K/UL — SIGNIFICANT CHANGE UP (ref 150–400)
POTASSIUM SERPL-MCNC: 4.9 MMOL/L — SIGNIFICANT CHANGE UP (ref 3.5–5.3)
POTASSIUM SERPL-SCNC: 4.9 MMOL/L — SIGNIFICANT CHANGE UP (ref 3.5–5.3)
RBC # BLD: 3.42 M/UL — LOW (ref 3.8–5.2)
RBC # FLD: 14.6 % — HIGH (ref 10.3–14.5)
SODIUM SERPL-SCNC: 138 MMOL/L — SIGNIFICANT CHANGE UP (ref 135–145)
WBC # BLD: 6.18 K/UL — SIGNIFICANT CHANGE UP (ref 3.8–10.5)
WBC # FLD AUTO: 6.18 K/UL — SIGNIFICANT CHANGE UP (ref 3.8–10.5)

## 2022-01-16 PROCEDURE — 99232 SBSQ HOSP IP/OBS MODERATE 35: CPT

## 2022-01-16 PROCEDURE — 99233 SBSQ HOSP IP/OBS HIGH 50: CPT

## 2022-01-16 RX ORDER — FUROSEMIDE 40 MG
20 TABLET ORAL DAILY
Refills: 0 | Status: DISCONTINUED | OUTPATIENT
Start: 2022-01-16 | End: 2022-01-18

## 2022-01-16 RX ADMIN — Medication 1 TABLET(S): at 08:46

## 2022-01-16 RX ADMIN — TIOTROPIUM BROMIDE 1 CAPSULE(S): 18 CAPSULE ORAL; RESPIRATORY (INHALATION) at 08:48

## 2022-01-16 RX ADMIN — LISINOPRIL 10 MILLIGRAM(S): 2.5 TABLET ORAL at 06:25

## 2022-01-16 RX ADMIN — Medication 1 CAPSULE(S): at 01:18

## 2022-01-16 RX ADMIN — APIXABAN 5 MILLIGRAM(S): 2.5 TABLET, FILM COATED ORAL at 08:46

## 2022-01-16 RX ADMIN — Medication 20 MILLIGRAM(S): at 14:29

## 2022-01-16 RX ADMIN — PANTOPRAZOLE SODIUM 40 MILLIGRAM(S): 20 TABLET, DELAYED RELEASE ORAL at 06:28

## 2022-01-16 RX ADMIN — APIXABAN 5 MILLIGRAM(S): 2.5 TABLET, FILM COATED ORAL at 21:13

## 2022-01-16 RX ADMIN — ATORVASTATIN CALCIUM 20 MILLIGRAM(S): 80 TABLET, FILM COATED ORAL at 21:13

## 2022-01-16 RX ADMIN — Medication 1 CAPSULE(S): at 02:00

## 2022-01-16 RX ADMIN — Medication 1 MILLIGRAM(S): at 21:12

## 2022-01-16 RX ADMIN — Medication 50 MILLIGRAM(S): at 08:46

## 2022-01-16 NOTE — PROGRESS NOTE ADULT - PROBLEM SELECTOR PLAN 2
On admission, SBPs 220s. (baseline SBP 140s). S/P IV labetalol 10mg x 1 and SL Nitro in ED.   - ECHO findings as above. Orthostatics negative.   - SBP 100s-190s   - c/w Toprol XL 50mg  - home Lisinopril 10mg added to regimen 1/15 On admission, SBPs 220s. (baseline SBP 140s). S/P IV labetalol 10mg x 1 and SL Nitro in ED.   - ECHO findings as above. Orthostatics negative.   - SBP 100s-190s   - c/w Toprol XL 50mg  - home Lisinopril 10mg added to regimen 1/15 and lasix 20 mg daily resumed 1/16

## 2022-01-16 NOTE — PROGRESS NOTE ADULT - PROBLEM SELECTOR PLAN 7
Not in acute exacerbation. On  intermittent 2L NC at home. Follows with Dr. Derik Domíngeuz c/w Spiriva    F: No IVF  N: DASH/TLC/DM diet  E: Replete lytes PRN K<4, Mg<2  P: DVT PPX: on Eliquis  C: FULL CODE  Dispo: Admit to tele 5 Uris    pending PT eval Not in acute exacerbation. On  intermittent 2L NC at home. Follows with Dr. Derik Domínguez c/w Spiriva    F: No IVF  N: DASH/TLC/DM diet  E: Replete lytes PRN K<4, Mg<2  P: DVT PPX: on Eliquis  C: FULL CODE  Dispo: Admit to tele 5 Uris    PT recs Home with home PT.   pending PT eval

## 2022-01-16 NOTE — PROGRESS NOTE ADULT - PROBLEM SELECTOR PLAN 3
- CTA and ECHO findings as above  - remains euvolemic on exam. Continue to HOLD home Lasix 20mg qd   - core measures. Strict I/Os, daily weights - CTA and ECHO findings as above  - remains euvolemic on exam. Initially home lasix held on admission for dizziness/elevated Cr;   - Home lasix restarted 1/16.   - core measures. Strict I/Os, daily weights

## 2022-01-16 NOTE — PROGRESS NOTE ADULT - SUBJECTIVE AND OBJECTIVE BOX
Interventional Cardiology PA Adult Progress Note    CC: HTN urgency  Subjective Assessment:    ROS neg except as per subjective HPI.   	  MEDICATIONS:  lisinopril 10 milliGRAM(s) Oral daily  metoprolol succinate ER 50 milliGRAM(s) Oral every 24 hours  tiotropium 18 MICROgram(s) Capsule 1 Capsule(s) Inhalation daily  acetaminophen     Tablet .. 650 milliGRAM(s) Oral every 6 hours PRN  LORazepam     Tablet 0.5 milliGRAM(s) Oral daily PRN  pantoprazole    Tablet 40 milliGRAM(s) Oral before breakfast  atorvastatin 20 milliGRAM(s) Oral at bedtime  apixaban 5 milliGRAM(s) Oral every 12 hours  folic acid 1 milliGRAM(s) Oral at bedtime  influenza  Vaccine (HIGH DOSE) 0.7 milliLiter(s) IntraMuscular once  multivitamin 1 Tablet(s) Oral daily      	    [PHYSICAL EXAM:  TELEMETRY:  T(C): 36.5 (01-16-22 @ 05:15), Max: 36.8 (01-15-22 @ 13:59)  HR: 75 (01-16-22 @ 06:38) (64 - 75)  BP: 192/87 (01-16-22 @ 06:38) (121/57 - 195/83)  RR: 20 (01-16-22 @ 06:38) (18 - 20)  SpO2: 98% (01-16-22 @ 06:38) (93% - 98%)  Wt(kg): --  I&O's Summary    15 Milind 2022 07:01  -  16 Jan 2022 07:00  --------------------------------------------------------  IN: 600 mL / OUT: 750 mL / NET: -150 mL        Gonzalez:  Central/PICC/Mid Line:                                             	    ECG:  	  RADIOLOGY:   DIAGNOSTIC TESTING:  [ ] Echocardiogram:  [ ]  Catheterization:  [ ] Stress Test:    [ ] IRMA  OTHER: 	    LABS:	 	  CARDIAC MARKERS:                                  9.2    6.18  )-----------( 251      ( 16 Jan 2022 06:07 )             30.1     01-16    138  |  101  |  22  ----------------------------<  109<H>  4.9   |  25  |  1.17    Ca    9.4      16 Jan 2022 06:07  Mg     2.2     01-16    TPro  7.0  /  Alb  3.8  /  TBili  0.4  /  DBili  x   /  AST  17  /  ALT  10  /  AlkPhos  80  01-15    proBNP:   Lipid Profile:   HgA1c:   TSH: Thyroid Stimulating Hormone, Serum: 1.520 uIU/mL (01-15 @ 08:01)    PT/INR - ( 15 Milind 2022 08:01 )   PT: 16.2 sec;   INR: 1.37          PTT - ( 15 Milind 2022 08:01 )  PTT:31.5 sec    ASSESSMENT/PLAN: 	        DVT ppx:  Dispo:     Interventional Cardiology PA Adult Progress Note    CC: HTN urgency  Subjective Assessment: Pt. seen, sitting on chair today am. Pt. comfortable; denies any chest pain, SOB, dizziness, palpitation, N/V, LE edema.     ROS neg except as per subjective HPI.   	  MEDICATIONS:  lisinopril 10 milliGRAM(s) Oral daily  metoprolol succinate ER 50 milliGRAM(s) Oral every 24 hours  tiotropium 18 MICROgram(s) Capsule 1 Capsule(s) Inhalation daily  acetaminophen     Tablet .. 650 milliGRAM(s) Oral every 6 hours PRN  LORazepam     Tablet 0.5 milliGRAM(s) Oral daily PRN  pantoprazole    Tablet 40 milliGRAM(s) Oral before breakfast  atorvastatin 20 milliGRAM(s) Oral at bedtime  apixaban 5 milliGRAM(s) Oral every 12 hours  folic acid 1 milliGRAM(s) Oral at bedtime  influenza  Vaccine (HIGH DOSE) 0.7 milliLiter(s) IntraMuscular once  multivitamin 1 Tablet(s) Oral daily      	    [PHYSICAL EXAM:  TELEMETRY:  T(C): 36.5 (01-16-22 @ 05:15), Max: 36.8 (01-15-22 @ 13:59)  HR: 75 (01-16-22 @ 06:38) (64 - 75)  BP: 192/87 (01-16-22 @ 06:38) (121/57 - 195/83)  RR: 20 (01-16-22 @ 06:38) (18 - 20)  SpO2: 98% (01-16-22 @ 06:38) (93% - 98%)  Wt(kg): --  I&O's Summary    15 Milind 2022 07:01  -  16 Jan 2022 07:00  --------------------------------------------------------  IN: 600 mL / OUT: 750 mL / NET: -150 mL        Gonzalez:  Central/PICC/Mid Line:                                           General: A/ox 3, No acute Distress  Neck: Supple, NO JVD  Cardiac: S1 S2, No M/R/G  Pulmonary: CTAB, Breathing unlabored, No Rhonchi/Rales/Wheezing  Abdomen: Soft, Non -tender, +BS x 4 quads  Extremities: No Rashes, No edema  Neuro: A/o x 3, No focal deficits    	    ECG:  	  RADIOLOGY:   DIAGNOSTIC TESTING:  [ ] Echocardiogram:  [ ]  Catheterization:  [ ] Stress Test:    [ ] IRMA  OTHER: 	    LABS:	 	  CARDIAC MARKERS:                                  9.2    6.18  )-----------( 251      ( 16 Jan 2022 06:07 )             30.1     01-16    138  |  101  |  22  ----------------------------<  109<H>  4.9   |  25  |  1.17    Ca    9.4      16 Jan 2022 06:07  Mg     2.2     01-16    TPro  7.0  /  Alb  3.8  /  TBili  0.4  /  DBili  x   /  AST  17  /  ALT  10  /  AlkPhos  80  01-15    proBNP:   Lipid Profile:   HgA1c:   TSH: Thyroid Stimulating Hormone, Serum: 1.520 uIU/mL (01-15 @ 08:01)    PT/INR - ( 15 Milind 2022 08:01 )   PT: 16.2 sec;   INR: 1.37          PTT - ( 15 Milind 2022 08:01 )  PTT:31.5 sec    ASSESSMENT/PLAN: 	        DVT ppx:  Dispo:

## 2022-01-16 NOTE — PROGRESS NOTE ADULT - SUBJECTIVE AND OBJECTIVE BOX
Interval Events: Reviewed  Patient seen and examined at bedside.    Patient is a 81y old  Female who presents with a chief complaint of hypertensive urgency (15 Milind 2022 17:04)  no acute event overnight, RA 97%, had headache overnight improve with Fioricet      PAST MEDICAL & SURGICAL HISTORY:  HTN (hypertension)    Aortic stenosis    Hyperlipidemia    COPD (chronic obstructive pulmonary disease)    GERD (gastroesophageal reflux disease)    Stage 3 chronic kidney disease    Anemia    Diastolic CHF, chronic    Obesity    S/P TAVR (transcatheter aortic valve replacement)        MEDICATIONS:  Pulmonary:  tiotropium 18 MICROgram(s) Capsule 1 Capsule(s) Inhalation daily    Antimicrobials:    Anticoagulants:  apixaban 5 milliGRAM(s) Oral every 12 hours    Cardiac:  lisinopril 10 milliGRAM(s) Oral daily  metoprolol succinate ER 50 milliGRAM(s) Oral every 24 hours      Allergies    Digox (Rash; Urticaria; Hives)  Plavix (Other (Mod to Severe))  vancomycin (Other)    Intolerances        Vital Signs Last 24 Hrs  T(C): 36.5 (2022 05:15), Max: 36.8 (15 Milind 2022 13:59)  T(F): 97.7 (2022 05:15), Max: 98.3 (15 Milind 2022 13:59)  HR: 70 (2022 01:52) (64 - 74)  BP: 150/73 (2022 01:52) (121/57 - 195/83)  BP(mean): --  RR: 20 (2022 01:52) (18 - 20)  SpO2: 97% (2022 01:52) (93% - 98%)     @ 07:01  -  01-15 @ 07:00  --------------------------------------------------------  IN: 180 mL / OUT: 400 mL / NET: -220 mL    01-15 @ 07:16 @ 06:20  --------------------------------------------------------  IN: 600 mL / OUT: 750 mL / NET: -150 mL          Review of Systems:   •	General: negative  •	Skin/Breast: negative  •	Ophthalmologic: negative  •	ENMT: negative  •	Respiratory and Thorax: negative  •	Cardiovascular: negative  •	Gastrointestinal: negative  •	Genitourinary: negative  •	Musculoskeletal: negative  •	Neurological: negative  •	Psychiatric: negative  •	Hematology/Lymphatics: negative  •	Endocrine: negative  •	Allergic/Immunologic: negative    Physical Exam:   • Constitutional:	Well-developed, well nourished  • Eyes:	EOMI; PERRL; no drainage or redness  • ENMT:	No oral lesions; no gross abnormalities  • Neck	no thyromegaly or nodules  • Breasts:	not examined  • Back:	No deformity or limitation of movement  • Respiratory:	Breath Sounds equal & clear to auscultation, no accessory muscle use  • Cardiovascular:	Regular rate & rhythm, normal S1, S2; no murmurs, gallops or rubs; no S3, S4  • Gastrointestinal:	Soft, non-tender, no hepatosplenomegaly, normal bowel sounds  • Genitourinary:	not examined  • Rectal: not examined  • Extremities:	No cyanosis, clubbing or edema  • Vascular:	Equal and normal pulses (dorsalis pedis)  • Neurologica:l	not examined  • Skin:	No lesions; no rash  • Lymph Nodes:	No lymphadedenopathy  • Musculoskeletal:	No joint pain, swelling or deformity; no limitation of movement        LABS:      CBC Full  -  ( 2022 06:07 )  WBC Count : 6.18 K/uL  RBC Count : 3.42 M/uL  Hemoglobin : 9.2 g/dL  Hematocrit : 30.1 %  Platelet Count - Automated : 251 K/uL  Mean Cell Volume : 88.0 fl  Mean Cell Hemoglobin : 26.9 pg  Mean Cell Hemoglobin Concentration : 30.6 gm/dL  Auto Neutrophil # : x  Auto Lymphocyte # : x  Auto Monocyte # : x  Auto Eosinophil # : x  Auto Basophil # : x  Auto Neutrophil % : x  Auto Lymphocyte % : x  Auto Monocyte % : x  Auto Eosinophil % : x  Auto Basophil % : x    -15    139  |  103  |  24<H>  ----------------------------<  90  4.0   |  24  |  1.18    Ca    9.2      15 Milind 2022 08:01  Mg     2.1     01-15    TPro  7.0  /  Alb  3.8  /  TBili  0.4  /  DBili  x   /  AST  17  /  ALT  10  /  AlkPhos  80  01-15    PT/INR - ( 15 Milind 2022 08:01 )   PT: 16.2 sec;   INR: 1.37          PTT - ( 15 Milind 2022 08:01 )  PTT:31.5 sec      Urinalysis Basic - ( 15 Milind 2022 06:57 )    Color: Yellow / Appearance: Clear / S.025 / pH: x  Gluc: x / Ketone: NEGATIVE  / Bili: Negative / Urobili: 0.2 E.U./dL   Blood: x / Protein: NEGATIVE mg/dL / Nitrite: NEGATIVE   Leuk Esterase: NEGATIVE / RBC: x / WBC x   Sq Epi: x / Non Sq Epi: x / Bacteria: x                  RADIOLOGY & ADDITIONAL STUDIES (The following images were personally reviewed):  Gonzalez:                                     No  Urine output:                       adequate  DVT prophylaxis:                 Yes  Flattus:                                  Yes  Bowel movement:              No

## 2022-01-16 NOTE — PROGRESS NOTE ADULT - ATTENDING COMMENTS
Patient seen and examined. Case discussed with ACP    Interviewed with Carmen phone  and d/w daughter over phone  81W HTN HL dCHF AS s/p TAVR on apixaban (?thrombus), cOPD MARK FEROZ CKD colon ca s/p colectomy who presents with worsening CP and dyspnea and found to be severely hypertensive. CTA chest neg for dissection. TTE with hyperdynamic LV and peak Aov 3.35m/s, similar to prior study, asc ao 4cm    this AM with BP up to 190s-- per pt felt rush of happy emotion at that time bc saw her bed being cleaned  has been prescribed lorazepam as oupt -- was taking daily until 5-6 days ago  she and daughter both think this is all related to recent anesthesia with dental procedure  pt nervous about episodes of CP/dyspnea  labile BPs may have sympathetic component  advised pt trial of lorazepam prn (is ordered)  restarted home furosemide  discussed with pt to inform us if feeling anxious during periods of high BP -- if treated aggressively as during initial ER presentation, has BPs decrease to 120s which causes dizziness so trying not to over-medicate  at home denies any med or dietary noncompliance - daughter confirms  CP may be related to episodes of HTN but would consider CCTA to r/o obs CAD given persistent symptoms  continue home apixaban  if above work-up negative, consider psych eval for anxiety as well    Andreina Donsi MD Patient seen and examined. Case discussed with ACP    Interviewed with Carmen phone  and d/w daughter over phone  81W HTN HL dCHF AS s/p TAVR on apixaban (?thrombus), cOPD MARK FEROZ CKD colon ca s/p colectomy who presents with worsening CP and dyspnea and found to be severely hypertensive. CTA chest neg for dissection. TTE with hyperdynamic LV and peak Aov 3.35m/s, similar to prior study, asc ao 4cm    this AM with BP up to 190s-- per pt felt rush of happy emotion at that time bc saw her bed being cleaned  has been prescribed lorazepam as oupt -- was taking daily until 5-6 days ago  she and daughter both think this is all related to recent anesthesia with dental procedure  pt nervous about episodes of CP/dyspnea  labile BPs may have sympathetic component  advised pt trial of lorazepam prn (is ordered)  restarted home furosemide  discussed with pt to inform us if feeling anxious during periods of high BP -- if treated aggressively as during initial ER presentation, has BPs decrease to 120s which causes dizziness so trying not to over-medicate  at home denies any med or dietary noncompliance - daughter confirms  CP may be related to episodes of HTN but would consider CCTA to r/o obs CAD given persistent symptoms  continue home apixaban  if above work-up negative, consider psych eval for anxiety as well inpt or outpt  if BP surges thought sympathetic surges, consider addition of alpha blocker (as alpha+beta blocker combo may be beneficial)    Andreina Donis MD

## 2022-01-17 LAB
ANION GAP SERPL CALC-SCNC: 12 MMOL/L — SIGNIFICANT CHANGE UP (ref 5–17)
BUN SERPL-MCNC: 23 MG/DL — SIGNIFICANT CHANGE UP (ref 7–23)
CALCIUM SERPL-MCNC: 9.2 MG/DL — SIGNIFICANT CHANGE UP (ref 8.4–10.5)
CHLORIDE SERPL-SCNC: 99 MMOL/L — SIGNIFICANT CHANGE UP (ref 96–108)
CO2 SERPL-SCNC: 23 MMOL/L — SIGNIFICANT CHANGE UP (ref 22–31)
CREAT SERPL-MCNC: 1.1 MG/DL — SIGNIFICANT CHANGE UP (ref 0.5–1.3)
GLUCOSE SERPL-MCNC: 104 MG/DL — HIGH (ref 70–99)
HCT VFR BLD CALC: 31.4 % — LOW (ref 34.5–45)
HGB BLD-MCNC: 9.3 G/DL — LOW (ref 11.5–15.5)
MAGNESIUM SERPL-MCNC: 2 MG/DL — SIGNIFICANT CHANGE UP (ref 1.6–2.6)
MCHC RBC-ENTMCNC: 26.3 PG — LOW (ref 27–34)
MCHC RBC-ENTMCNC: 29.6 GM/DL — LOW (ref 32–36)
MCV RBC AUTO: 89 FL — SIGNIFICANT CHANGE UP (ref 80–100)
NRBC # BLD: 0 /100 WBCS — SIGNIFICANT CHANGE UP (ref 0–0)
PLATELET # BLD AUTO: 254 K/UL — SIGNIFICANT CHANGE UP (ref 150–400)
POTASSIUM SERPL-MCNC: 4.5 MMOL/L — SIGNIFICANT CHANGE UP (ref 3.5–5.3)
POTASSIUM SERPL-SCNC: 4.5 MMOL/L — SIGNIFICANT CHANGE UP (ref 3.5–5.3)
RBC # BLD: 3.53 M/UL — LOW (ref 3.8–5.2)
RBC # FLD: 14.6 % — HIGH (ref 10.3–14.5)
SODIUM SERPL-SCNC: 134 MMOL/L — LOW (ref 135–145)
WBC # BLD: 5.14 K/UL — SIGNIFICANT CHANGE UP (ref 3.8–10.5)
WBC # FLD AUTO: 5.14 K/UL — SIGNIFICANT CHANGE UP (ref 3.8–10.5)

## 2022-01-17 PROCEDURE — 99233 SBSQ HOSP IP/OBS HIGH 50: CPT

## 2022-01-17 RX ORDER — IRON SUCROSE 20 MG/ML
200 INJECTION, SOLUTION INTRAVENOUS EVERY 24 HOURS
Refills: 0 | Status: DISCONTINUED | OUTPATIENT
Start: 2022-01-17 | End: 2022-01-17

## 2022-01-17 RX ORDER — IRON SUCROSE 20 MG/ML
200 INJECTION, SOLUTION INTRAVENOUS EVERY 24 HOURS
Refills: 0 | Status: DISCONTINUED | OUTPATIENT
Start: 2022-01-17 | End: 2022-01-18

## 2022-01-17 RX ADMIN — ATORVASTATIN CALCIUM 20 MILLIGRAM(S): 80 TABLET, FILM COATED ORAL at 21:20

## 2022-01-17 RX ADMIN — APIXABAN 5 MILLIGRAM(S): 2.5 TABLET, FILM COATED ORAL at 10:15

## 2022-01-17 RX ADMIN — Medication 1 TABLET(S): at 11:43

## 2022-01-17 RX ADMIN — PANTOPRAZOLE SODIUM 40 MILLIGRAM(S): 20 TABLET, DELAYED RELEASE ORAL at 05:03

## 2022-01-17 RX ADMIN — Medication 1 MILLIGRAM(S): at 21:20

## 2022-01-17 RX ADMIN — LISINOPRIL 10 MILLIGRAM(S): 2.5 TABLET ORAL at 05:04

## 2022-01-17 RX ADMIN — IRON SUCROSE 110 MILLIGRAM(S): 20 INJECTION, SOLUTION INTRAVENOUS at 12:06

## 2022-01-17 RX ADMIN — Medication 50 MILLIGRAM(S): at 10:15

## 2022-01-17 RX ADMIN — APIXABAN 5 MILLIGRAM(S): 2.5 TABLET, FILM COATED ORAL at 21:19

## 2022-01-17 RX ADMIN — TIOTROPIUM BROMIDE 1 CAPSULE(S): 18 CAPSULE ORAL; RESPIRATORY (INHALATION) at 11:28

## 2022-01-17 RX ADMIN — Medication 20 MILLIGRAM(S): at 05:04

## 2022-01-17 NOTE — PROGRESS NOTE ADULT - PROBLEM SELECTOR PLAN 1
P/W increased MANRIQUE and CP, improved with SL Nitro. Euvolemic on exam.  Trop (-) x 3. EKG non-ischemic.   - Cardiac cath 01/2019 showed normal coronaries  - CTA chest/abd/pelvis negative for dissection, shows no acute pulmonary process  - ECHO 1/15/22: Mild symmetric LVH. Hyperdynamic LV systolic Function, Normal RVSF, 23 mm Jamar 3 valve is noted in the aortic position without any AR, Proximal ascending aorta is dilated 4.00 cm, no pulmonary HTN, Pericardial fat pad is seen anterior to RV, cannot  rule out a trivial pericardial effusion. Compared to TTE 5/2021, no sig. change  - Orthostatics negative   - plan for CCTA 1/18/22 for ischemic eval P/W increased MANRIQUE and CP, improved with SL Nitro. Euvolemic on exam.  Trop (-) x 3. EKG non-ischemic.   - Cardiac cath 01/2019 showed normal coronaries  - CTA chest/abd/pelvis negative for dissection, shows no acute pulmonary process  - ECHO 1/15/22: Mild symmetric LVH. Hyperdynamic LV systolic Function, Normal RV systolic function, 23 mm Jamar 3 valve is noted in the aortic position without any AR, Proximal ascending aorta is dilated 4.00 cm, no pulmonary HTN, Pericardial fat pad is seen anterior to RV, cannot  rule out a trivial pericardial effusion. Compared to TTE 5/2021, no sig. change  - plan for CCTA 1/18/22 for ischemic eval

## 2022-01-17 NOTE — PROGRESS NOTE ADULT - ASSESSMENT
80 y/o Libyan/English speaking F, PMHx uncontrolled HTN, HLD, diastolic CHF, severe AS (s/p TAVR 02/2019), ?valve thrombus (on eliquis), COPD (intermittent 2L home O2), moderate MARK (non-compliant with CPAP 2/2 claustrophobia), iron deficiency anemia (baseline hgb 9s), CKD-III (baseline Cr 1.1-1.2), GERD, anxiety, colon cancer (s/p colectomy 12/2020) presented to ED 1/14 w/worsening MANRIQUE and CP, admitted for hypertensive urgency and r/o ACS. SBPs now stable w/plan for CCTA 1/18/22   
82 y/o Ecuadorean/English speaking F, PMHx uncontrolled HTN, HLD, diastolic CHF, severe AS (s/p TAVR 02/2019), ?valve thrombus (on eliquis), COPD (intermittent 2L home O2), moderate MARK (non-compliant with CPAP 2/2 claustrophobia), iron deficiency anemia (baseline hgb 9s), CKD-III (baseline Cr 1.1-1.2), GERD, anxiety, colon cancer (s/p colectomy 12/2020) presented to ED 1/14 w/worsening MANRIQUE and CP, admitted for hypertensive urgency and r/o ACS. SBPs now stable w/plan for CCTA 1/18/22   
80 y/o British/English speaking F, PMHx uncontrolled HTN, HLD, diastolic CHF, severe AS (s/p TAVR 02/2019), ?valve thrombus (on eliquis), COPD (intermittent 2L home O2), moderate MARK (non-compliant with CPAP 2/2 claustrophobia), iron deficiency anemia (baseline hgb 9s), CKD-III (baseline Cr 1.1-1.2), GERD, anxiety, colon cancer (s/p colectomy 12/2020) presented to ED 1/14 w/worsening MANRIQUE and CP, admitted for hypertensive urgency and r/o ACS. SBPs now stable w/plan for CCTA 1/18/22   
80 y/o [place] Islander/English speaking F, PMHx uncontrolled HTN, HLD, diastolic CHF, severe AS (s/p TAVR 02/2019), ?valve thrombus (on eliquis), COPD (intermittent 2L home O2), moderate MARK (non-compliant with CPAP 2/2 claustrophobia), iron deficiency anemia (baseline hgb 9s), CKD-III (baseline Cr 1.1-1.2), GERD, anxiety, colon cancer (s/p colectomy 12/2020) presented to ED 1/14 w/worsening MANRIQUE and CP, admitted for hypertensive urgency and r/o ACS. SBPs now stable w/plan for CCTA 1/18/22

## 2022-01-17 NOTE — PROGRESS NOTE ADULT - PROBLEM SELECTOR PROBLEM 1
MANRIQUE (dyspnea on exertion)

## 2022-01-17 NOTE — PROGRESS NOTE ADULT - PROBLEM SELECTOR PLAN 7
Not in acute exacerbation. On  intermittent 2L NC at home. Follows with Dr. Derik pickard/w Spiriva    F: No IVF  N: DASH/TLC/DM diet  E: Replete lytes PRN K<4, Mg<2  P: DVT PPX: on Eliquis  C: FULL CODE  Dispo: Admit to tele 5 Uris    PT recs Home with home PT.

## 2022-01-17 NOTE — PROGRESS NOTE ADULT - PROBLEM SELECTOR PROBLEM 3
Chronic diastolic congestive heart failure

## 2022-01-17 NOTE — PROGRESS NOTE ADULT - PROBLEM SELECTOR PLAN 4
Creat 1.41 on admission. Baseline Creat 1.1-1.2  - Creat 1.17 today   - No PVR. UA negative.   - renally dose medications and avoid nephrotoxic agents
Creat 1.41 on admission. Baseline Creat 1.1-1.2  - Creat 1.18 today   - No PVR. UA negative.   - renally dose medications and avoid nephrotoxic agents
Creat 1.41 on admission. Baseline Creat 1.1-1.2  - Creat 1.10 today   - No PVR. UA negative.   - renally dose medications and avoid nephrotoxic agents  -Monitor BP, urine output, volume status, and electrolytes.
Creat 1.41 on admission. Baseline Creat 1.1-1.2  - Creat 1.18 today   - No PVR. UA negative.   - renally dose medications and avoid nephrotoxic agents

## 2022-01-17 NOTE — PROGRESS NOTE ADULT - ATTENDING COMMENTS
Initial attending contact date 1/17/22     . See PA note written above for details. I reviewed the PA documentation. I have personally seen and examined this patient. I reviewed vitals, labs, medications, cardiac studies, and additional imaging. I agree with the above PA's findings and plans as written above with the following additions/statements.    80 y/o Omani/English speaking F, PMHx uncontrolled HTN, HLD, diastolic CHF, severe AS (s/p TAVR 02/2019), ?valve thrombus (on eliquis), COPD (intermittent 2L home O2), moderate MARK (non-compliant with CPAP 2/2 claustrophobia), iron deficiency anemia (baseline hgb 9s), CKD-III (baseline Cr 1.1-1.2), GERD, anxiety, colon cancer admitted with hypertensive urgency and r/o ACS  -BP better controlled. Cont metoprolol/lisinopril  -Currently without recurrent CP  -Plan for CCTA 1/18   -DC pending CCTA results

## 2022-01-17 NOTE — PROGRESS NOTE ADULT - PROBLEM SELECTOR PLAN 2
On admission, SBPs 220s. (baseline SBP 140s). S/P IV labetalol 10mg x 1 and SL Nitro in ED.   - ECHO findings as above. Orthostatics negative.   - SBP 100s-140s   - c/w Toprol XL 50mg  - home Lisinopril 10mg added to regimen 1/15 and lasix 20 mg daily resumed 1/16

## 2022-01-17 NOTE — PROGRESS NOTE ADULT - SUBJECTIVE AND OBJECTIVE BOX
Interventional Cardiology PA Adult Progress Note    CC: MANRIQUE, HTN Urgency    Subjective Assessment: Patient seen and examined at bedside. Using Slovak  Wendy ID#  [id number] patient interviewed and reports feeling well. She denies C/P, SOB, palpitations, dizziness, diaphoresis, N/V.    ROS otherwise negative unless otherwise stated except per HPI and Subjective  	  MEDICATIONS:  furosemide    Tablet 20 milliGRAM(s) Oral daily  lisinopril 10 milliGRAM(s) Oral daily  metoprolol succinate ER 50 milliGRAM(s) Oral every 24 hours  tiotropium 18 MICROgram(s) Capsule 1 Capsule(s) Inhalation daily  acetaminophen     Tablet .. 650 milliGRAM(s) Oral every 6 hours PRN  LORazepam     Tablet 0.5 milliGRAM(s) Oral daily PRN  pantoprazole    Tablet 40 milliGRAM(s) Oral before breakfast  atorvastatin 20 milliGRAM(s) Oral at bedtime  apixaban 5 milliGRAM(s) Oral every 12 hours  folic acid 1 milliGRAM(s) Oral at bedtime  influenza  Vaccine (HIGH DOSE) 0.7 milliLiter(s) IntraMuscular once  multivitamin 1 Tablet(s) Oral daily    [PHYSICAL EXAM:  TELEMETRY:  T(C): 36.4 (01-17-22 @ 06:24), Max: 36.6 (01-16-22 @ 12:59)  HR: 80 (01-17-22 @ 08:48) (65 - 80)  BP: 148/72 (01-17-22 @ 08:48) (105/54 - 160/79)  RR: 17 (01-17-22 @ 08:48) (16 - 18)  SpO2: 97% (01-17-22 @ 08:48) (94% - 99%)  Wt(kg): --  I&O's Summary    16 Jan 2022 07:01  -  17 Jan 2022 07:00  --------------------------------------------------------  IN: 420 mL / OUT: 1125 mL / NET: -705 mL    Gonzalez: No  Central/PICC/Mid Line: No                                        Appearance: Sitting in chair. NAD  HEENT:   Normal oral mucosa, PERRL, EOMI	  Neck: Supple. No JVD  Cardiovascular: + S1 S2. RRR. Harsh 3/6 YOVANA.   Respiratory: CTA Bilaterally. No rhonchi, wheezes, rales. 	  Gastrointestinal:  Soft, Non-tender, + BS	  Extremities: Normal range of motion, No clubbing, cyanosis or edema BLE. No calf tenderness BLE.   Neurologic: Non-focal  Psychiatry: A & O x 3, Mood & affect appropriate  	   LABS:	 	  CARDIAC MARKERS:                     9.3    5.14  )-----------( 254      ( 17 Jan 2022 06:11 )             31.4     01-17    134<L>  |  99  |  23  ----------------------------<  104<H>  4.5   |  23  |  1.10    Ca    9.2      17 Jan 2022 06:11  Mg     2.0     01-17          ASSESSMENT/PLAN:

## 2022-01-17 NOTE — PROGRESS NOTE ADULT - PROBLEM SELECTOR PLAN 3
- CTA and ECHO findings as above  - remains euvolemic on exam. Initially home lasix held on admission for dizziness/elevated Cr;   - Home lasix restarted 1/16.   - core measures. Strict I/Os, daily weights

## 2022-01-17 NOTE — PROGRESS NOTE ADULT - PROBLEM SELECTOR PLAN 6
Hgb 8.5 on admission. Baseline hgb 9-10s   - hgb 9.3 today; no s/sx bleeding   - Iron 33 TIBC 362 Iron sat 9% . Ferritin 18. Iron studies normal aside from low Iron sat.   -IV Iron ordered x 5 days  - transfuse hgb < 7  -Maintain Active Type and screen
Hgb 8.5 on admission. Baseline hgb 9-10s   - hgb 9.4 today; no s/sx bleeding   - Iron 33 TIBC 362 % Iron 9. Awaiting ferritin level. At this time studies pending ferritin, awaiting results.   - transfuse hgb < 7
Hgb 8.5 on admission. Baseline hgb 9-10s   - hgb 9.2 today; no s/sx bleeding   - Iron 33 TIBC 362 % Iron 9. Awaiting ferritin level. At this time studies pending ferritin, awaiting results.   - transfuse hgb < 7
Hgb 8.5 on admission. Baseline hgb 9-10s   - hgb 9.4 today; no s/sx bleeding   - Iron 33 TIBC 362 % Iron 9. Awaiting ferritin level. At this time studies pending ferritin, awaiting results.   - transfuse hgb < 7

## 2022-01-18 ENCOUNTER — TRANSCRIPTION ENCOUNTER (OUTPATIENT)
Age: 82
End: 2022-01-18

## 2022-01-18 VITALS
OXYGEN SATURATION: 99 % | SYSTOLIC BLOOD PRESSURE: 124 MMHG | HEART RATE: 68 BPM | DIASTOLIC BLOOD PRESSURE: 73 MMHG | RESPIRATION RATE: 18 BRPM

## 2022-01-18 LAB
ANION GAP SERPL CALC-SCNC: 14 MMOL/L — SIGNIFICANT CHANGE UP (ref 5–17)
BLD GP AB SCN SERPL QL: POSITIVE — SIGNIFICANT CHANGE UP
BUN SERPL-MCNC: 23 MG/DL — SIGNIFICANT CHANGE UP (ref 7–23)
CALCIUM SERPL-MCNC: 9.5 MG/DL — SIGNIFICANT CHANGE UP (ref 8.4–10.5)
CHLORIDE SERPL-SCNC: 101 MMOL/L — SIGNIFICANT CHANGE UP (ref 96–108)
CO2 SERPL-SCNC: 25 MMOL/L — SIGNIFICANT CHANGE UP (ref 22–31)
CREAT SERPL-MCNC: 1.17 MG/DL — SIGNIFICANT CHANGE UP (ref 0.5–1.3)
GLUCOSE SERPL-MCNC: 98 MG/DL — SIGNIFICANT CHANGE UP (ref 70–99)
HCT VFR BLD CALC: 32.5 % — LOW (ref 34.5–45)
HGB BLD-MCNC: 9.7 G/DL — LOW (ref 11.5–15.5)
MAGNESIUM SERPL-MCNC: 2.1 MG/DL — SIGNIFICANT CHANGE UP (ref 1.6–2.6)
MCHC RBC-ENTMCNC: 26.1 PG — LOW (ref 27–34)
MCHC RBC-ENTMCNC: 29.8 GM/DL — LOW (ref 32–36)
MCV RBC AUTO: 87.6 FL — SIGNIFICANT CHANGE UP (ref 80–100)
NRBC # BLD: 0 /100 WBCS — SIGNIFICANT CHANGE UP (ref 0–0)
PLATELET # BLD AUTO: 268 K/UL — SIGNIFICANT CHANGE UP (ref 150–400)
POTASSIUM SERPL-MCNC: 4.4 MMOL/L — SIGNIFICANT CHANGE UP (ref 3.5–5.3)
POTASSIUM SERPL-SCNC: 4.4 MMOL/L — SIGNIFICANT CHANGE UP (ref 3.5–5.3)
RBC # BLD: 3.71 M/UL — LOW (ref 3.8–5.2)
RBC # FLD: 14.6 % — HIGH (ref 10.3–14.5)
RH IG SCN BLD-IMP: POSITIVE — SIGNIFICANT CHANGE UP
SODIUM SERPL-SCNC: 140 MMOL/L — SIGNIFICANT CHANGE UP (ref 135–145)
WBC # BLD: 5.36 K/UL — SIGNIFICANT CHANGE UP (ref 3.8–10.5)
WBC # FLD AUTO: 5.36 K/UL — SIGNIFICANT CHANGE UP (ref 3.8–10.5)

## 2022-01-18 PROCEDURE — 99239 HOSP IP/OBS DSCHRG MGMT >30: CPT

## 2022-01-18 PROCEDURE — 83540 ASSAY OF IRON: CPT

## 2022-01-18 PROCEDURE — 85610 PROTHROMBIN TIME: CPT

## 2022-01-18 PROCEDURE — 82550 ASSAY OF CK (CPK): CPT

## 2022-01-18 PROCEDURE — 97116 GAIT TRAINING THERAPY: CPT

## 2022-01-18 PROCEDURE — 80061 LIPID PANEL: CPT

## 2022-01-18 PROCEDURE — 71046 X-RAY EXAM CHEST 2 VIEWS: CPT

## 2022-01-18 PROCEDURE — 84484 ASSAY OF TROPONIN QUANT: CPT

## 2022-01-18 PROCEDURE — 97161 PT EVAL LOW COMPLEX 20 MIN: CPT

## 2022-01-18 PROCEDURE — 82570 ASSAY OF URINE CREATININE: CPT

## 2022-01-18 PROCEDURE — U0005: CPT

## 2022-01-18 PROCEDURE — 93306 TTE W/DOPPLER COMPLETE: CPT

## 2022-01-18 PROCEDURE — 82728 ASSAY OF FERRITIN: CPT

## 2022-01-18 PROCEDURE — 86880 COOMBS TEST DIRECT: CPT

## 2022-01-18 PROCEDURE — 85027 COMPLETE CBC AUTOMATED: CPT

## 2022-01-18 PROCEDURE — 84439 ASSAY OF FREE THYROXINE: CPT

## 2022-01-18 PROCEDURE — 80053 COMPREHEN METABOLIC PANEL: CPT

## 2022-01-18 PROCEDURE — 83880 ASSAY OF NATRIURETIC PEPTIDE: CPT

## 2022-01-18 PROCEDURE — 86860 RBC ANTIBODY ELUTION: CPT

## 2022-01-18 PROCEDURE — 85025 COMPLETE CBC W/AUTO DIFF WBC: CPT

## 2022-01-18 PROCEDURE — 84443 ASSAY THYROID STIM HORMONE: CPT

## 2022-01-18 PROCEDURE — 83735 ASSAY OF MAGNESIUM: CPT

## 2022-01-18 PROCEDURE — 86900 BLOOD TYPING SEROLOGIC ABO: CPT

## 2022-01-18 PROCEDURE — 99285 EMERGENCY DEPT VISIT HI MDM: CPT | Mod: 25

## 2022-01-18 PROCEDURE — 75574 CT ANGIO HRT W/3D IMAGE: CPT | Mod: 26

## 2022-01-18 PROCEDURE — U0003: CPT

## 2022-01-18 PROCEDURE — 75574 CT ANGIO HRT W/3D IMAGE: CPT

## 2022-01-18 PROCEDURE — 74174 CTA ABD&PLVS W/CONTRAST: CPT | Mod: MA

## 2022-01-18 PROCEDURE — 81003 URINALYSIS AUTO W/O SCOPE: CPT

## 2022-01-18 PROCEDURE — 82553 CREATINE MB FRACTION: CPT

## 2022-01-18 PROCEDURE — 83036 HEMOGLOBIN GLYCOSYLATED A1C: CPT

## 2022-01-18 PROCEDURE — 96374 THER/PROPH/DIAG INJ IV PUSH: CPT

## 2022-01-18 PROCEDURE — 86870 RBC ANTIBODY IDENTIFICATION: CPT

## 2022-01-18 PROCEDURE — 84540 ASSAY OF URINE/UREA-N: CPT

## 2022-01-18 PROCEDURE — 71275 CT ANGIOGRAPHY CHEST: CPT | Mod: MA

## 2022-01-18 PROCEDURE — 86905 BLOOD TYPING RBC ANTIGENS: CPT

## 2022-01-18 PROCEDURE — 80048 BASIC METABOLIC PNL TOTAL CA: CPT

## 2022-01-18 PROCEDURE — 85730 THROMBOPLASTIN TIME PARTIAL: CPT

## 2022-01-18 PROCEDURE — 86901 BLOOD TYPING SEROLOGIC RH(D): CPT

## 2022-01-18 PROCEDURE — 36415 COLL VENOUS BLD VENIPUNCTURE: CPT

## 2022-01-18 PROCEDURE — 94640 AIRWAY INHALATION TREATMENT: CPT

## 2022-01-18 PROCEDURE — 84300 ASSAY OF URINE SODIUM: CPT

## 2022-01-18 PROCEDURE — 86850 RBC ANTIBODY SCREEN: CPT

## 2022-01-18 PROCEDURE — 83550 IRON BINDING TEST: CPT

## 2022-01-18 RX ORDER — METOPROLOL TARTRATE 50 MG
25 TABLET ORAL ONCE
Refills: 0 | Status: COMPLETED | OUTPATIENT
Start: 2022-01-18 | End: 2022-01-18

## 2022-01-18 RX ADMIN — Medication 50 MILLIGRAM(S): at 07:51

## 2022-01-18 RX ADMIN — TIOTROPIUM BROMIDE 1 CAPSULE(S): 18 CAPSULE ORAL; RESPIRATORY (INHALATION) at 08:30

## 2022-01-18 RX ADMIN — Medication 25 MILLIGRAM(S): at 11:00

## 2022-01-18 RX ADMIN — Medication 1 TABLET(S): at 16:06

## 2022-01-18 RX ADMIN — Medication 25 MILLIGRAM(S): at 09:57

## 2022-01-18 RX ADMIN — APIXABAN 5 MILLIGRAM(S): 2.5 TABLET, FILM COATED ORAL at 08:29

## 2022-01-18 RX ADMIN — Medication 20 MILLIGRAM(S): at 07:11

## 2022-01-18 RX ADMIN — IRON SUCROSE 110 MILLIGRAM(S): 20 INJECTION, SOLUTION INTRAVENOUS at 16:06

## 2022-01-18 RX ADMIN — LISINOPRIL 10 MILLIGRAM(S): 2.5 TABLET ORAL at 07:11

## 2022-01-18 RX ADMIN — PANTOPRAZOLE SODIUM 40 MILLIGRAM(S): 20 TABLET, DELAYED RELEASE ORAL at 07:11

## 2022-01-18 NOTE — DISCHARGE NOTE PROVIDER - PROVIDER TOKENS
PROVIDER:[TOKEN:[[id number]],FOLLOWUP:[1 week],ESTABLISHEDPATIENT:[T]],PROVIDER:[TOKEN:[[id number]],FOLLOWUP:[2 weeks],ESTABLISHEDPATIENT:[T]],PROVIDER:[TOKEN:[[id number]],FOLLOWUP:[2 weeks],ESTABLISHEDPATIENT:[T]]

## 2022-01-18 NOTE — DISCHARGE NOTE PROVIDER - HOSPITAL COURSE
* 80 y/o Australian/English speaking F, PMHx uncontrolled HTN, HLD, diastolic CHF, severe AS (s/p TAVR 02/2019), ?valve thrombus (on eliquis), COPD (intermittent 2L home O2), moderate MARK (non-compliant with CPAP 2/2 claustrophobia), iron deficiency anemia (baseline hgb 9s), CKD-III (baseline Cr 1.1-1.2), GERD, anxiety, colon cancer (s/p colectomy 12/2020) presented to ED 1/14 w/worsening MANRIQUE and CP, admitted for hypertensive urgency and r/o ACS. During hospital course CE negative x 3. ECHO 1/15/22: Mild symmetric LVH. Hyperdynamic LV Systolic Function. Normal RVSF. 23 mm Jamar 3 valve is noted in the aortic position without any AR. Proximal ascending aorta is dilated 4.00 cm. No pulm htn. Pericardial fat pad is seen anterior to RV, cannot  rule out a trivial pericardial effusion. Compared to TTE 5/2021, no sig. change. Orthostatics negative. CTA Coronaries 1/18/22 revealed non-obstructive CAD mild stenosis of pLAD mLAD and pRCA, <25% LM stenosis. Patient seen and examined and has no complaints. VSS. Patient voided and ambulated without issue. Labs and telemetry reviewed which are unremarkable aside from known Anemia ((patient received IV Iron x 2 doses). Patient C/P free and HD stable and cleared for discharge by Dr. Kessler.      Patient has been given appropriate discharge instructions including medication regimen and follow up.

## 2022-01-18 NOTE — DISCHARGE NOTE PROVIDER - CARE PROVIDERS DIRECT ADDRESSES
,DirectAddress_Unknown,DirectAddress_Unknown,[email or web address]

## 2022-01-18 NOTE — DISCHARGE NOTE PROVIDER - NSDCMRMEDTOKEN_GEN_ALL_CORE_FT
apixaban 5 mg oral tablet: 1 tab(s) orally 2 times a day  atorvastatin 20 mg oral tablet: 1 tab(s) orally once a day (at bedtime)  folic acid 1 mg oral tablet: 1 tab(s) orally once a day (at bedtime)  furosemide 20 mg oral tablet: 1 tab(s) orally once a day  lisinopril 10 mg oral tablet: 1 tab(s) orally once a day (at bedtime)  LORazepam 0.5 mg oral tablet: 1 tab(s) orally once a day  magnesium oxide 400 mg (241.3 mg elemental magnesium) oral tablet: 1 tab(s) orally once a day  metoprolol succinate 50 mg oral tablet, extended release: 1 tab(s) orally once a day  pantoprazole 40 mg oral delayed release tablet: 1 tab(s) orally once a day  potassium chloride 10 mEq oral capsule, extended release: 1 cap(s) orally once a day   Spiriva Respimat 1.25 mcg/inh inhalation aerosol: 2 puff(s) inhaled once a day  Tab-A-Stacie oral tablet: 1 tab(s) orally once a day   apixaban 5 mg oral tablet: 1 tab(s) orally 2 times a day  atorvastatin 20 mg oral tablet: 1 tab(s) orally once a day (at bedtime)  folic acid 1 mg oral tablet: 1 tab(s) orally once a day (at bedtime)  furosemide 20 mg oral tablet: 1 tab(s) orally once a day  lisinopril 10 mg oral tablet: 1 tab(s) orally once a day  LORazepam 0.5 mg oral tablet: 1 tab(s) orally once a day  magnesium oxide 400 mg (241.3 mg elemental magnesium) oral tablet: 1 tab(s) orally once a day  metoprolol succinate 50 mg oral tablet, extended release: 1 tab(s) orally once a day  pantoprazole 40 mg oral delayed release tablet: 1 tab(s) orally once a day  Spiriva Respimat 1.25 mcg/inh inhalation aerosol: 2 puff(s) inhaled once a day  Tab-A-Stacie oral tablet: 1 tab(s) orally once a day

## 2022-01-18 NOTE — DISCHARGE NOTE PROVIDER - NSDCCPCAREPLAN_GEN_ALL_CORE_FT
PRINCIPAL DISCHARGE DIAGNOSIS  Diagnosis: Hypertensive urgency  Assessment and Plan of Treatment:       SECONDARY DISCHARGE DIAGNOSES  Diagnosis: Chest pain  Assessment and Plan of Treatment:     Diagnosis: Chronic diastolic congestive heart failure  Assessment and Plan of Treatment:     Diagnosis: Iron deficiency anemia  Assessment and Plan of Treatment:     Diagnosis: COPD (chronic obstructive pulmonary disease)  Assessment and Plan of Treatment:     Diagnosis: Shortness of breath  Assessment and Plan of Treatment:      PRINCIPAL DISCHARGE DIAGNOSIS  Diagnosis: Hypertensive urgency  Assessment and Plan of Treatment: You were admitted to the hospital with very high blood pressure and treated with IV medications. Continue Lisinopril and Metoprolol. Maintain Low Salt Diet-Less than 2000 mg-2G Daily. Follow-up with cardiologist Dr. Aguirre in 1-2 weeks.      SECONDARY DISCHARGE DIAGNOSES  Diagnosis: Chest pain  Assessment and Plan of Treatment: You complained of chest pain which may have been secondary to your high blood pressure. Your heart enzymes were negative for heart attack and your CT Scan of the heart did not show any blockages.    Diagnosis: Chronic diastolic congestive heart failure  Assessment and Plan of Treatment: Continue Lasix 20 mg daily. Weigh yourself daily. If you notice weight gain of 2 or more lbs in 1 day or 5 lbs in 1 week and/or shortness of breath and or leg swelling contact your physician immediately and proceed to nearest Emergency Room.    RESTRICT YOUR DAILY FLUID INTAKE TO LESS THAN 2000 mL (cc) 2L Daily    Diagnosis: Iron deficiency anemia  Assessment and Plan of Treatment: You are known to have low blood count and low Iron and were given IV Iron in the hospital. Monitor for blood in the urine, blood in the stool or dark stool. If this occurs contact your Physician. Follow-up with Dr. Hoskins in 1-2 weeks.    Diagnosis: COPD (chronic obstructive pulmonary disease)  Assessment and Plan of Treatment: Continue Spiriva. Follow-up with Dr. More in 1-2 weeks.

## 2022-01-18 NOTE — DISCHARGE NOTE NURSING/CASE MANAGEMENT/SOCIAL WORK - NSDCPEFALRISK_GEN_ALL_CORE
For information on Fall & Injury Prevention, visit: [email or web address] OR  [email or web address] OR  https://www.cdc.gov/steadi/patient.html

## 2022-01-18 NOTE — DISCHARGE NOTE PROVIDER - CARE PROVIDER_API CALL
Keshav Aguirre)  Cardiology; Internal Medicine  [address] [place] [address]  Phone: [phone]  Fax: [phone]  Established Patient  Follow Up Time: 1 week    Kristy Hoskins)  Medicine  [address], [address] [place] [address]  Phone: [phone]  Fax: [phone]  Established Patient  Follow Up Time: 2 weeks    Bere More)  Critical Care Medicine; Pulmonary Disease  [address], [address] [place] [address]  Phone: [phone]  Fax: [phone]  Established Patient  Follow Up Time: 2 weeks

## 2022-01-18 NOTE — DISCHARGE NOTE NURSING/CASE MANAGEMENT/SOCIAL WORK - PATIENT PORTAL LINK FT
You can access the FollowMyHealth Patient Portal offered by [facility] by registering at the following website: [email or web address]. By joining [email or web address]’s FollowMyHealth portal, you will also be able to view your health information using other applications (apps) compatible with our system.

## 2022-01-23 DIAGNOSIS — G47.33 OBSTRUCTIVE SLEEP APNEA (ADULT) (PEDIATRIC): ICD-10-CM

## 2022-01-23 DIAGNOSIS — N18.30 CHRONIC KIDNEY DISEASE, STAGE 3 UNSPECIFIED: ICD-10-CM

## 2022-01-23 DIAGNOSIS — D50.9 IRON DEFICIENCY ANEMIA, UNSPECIFIED: ICD-10-CM

## 2022-01-23 DIAGNOSIS — Z79.899 OTHER LONG TERM (CURRENT) DRUG THERAPY: ICD-10-CM

## 2022-01-23 DIAGNOSIS — I13.0 HYPERTENSIVE HEART AND CHRONIC KIDNEY DISEASE WITH HEART FAILURE AND STAGE 1 THROUGH STAGE 4 CHRONIC KIDNEY DISEASE, OR UNSPECIFIED CHRONIC KIDNEY DISEASE: ICD-10-CM

## 2022-01-23 DIAGNOSIS — J44.9 CHRONIC OBSTRUCTIVE PULMONARY DISEASE, UNSPECIFIED: ICD-10-CM

## 2022-01-23 DIAGNOSIS — E78.5 HYPERLIPIDEMIA, UNSPECIFIED: ICD-10-CM

## 2022-01-23 DIAGNOSIS — N17.9 ACUTE KIDNEY FAILURE, UNSPECIFIED: ICD-10-CM

## 2022-01-23 DIAGNOSIS — R07.9 CHEST PAIN, UNSPECIFIED: ICD-10-CM

## 2022-01-23 DIAGNOSIS — Z95.2 PRESENCE OF PROSTHETIC HEART VALVE: ICD-10-CM

## 2022-01-23 DIAGNOSIS — I50.32 CHRONIC DIASTOLIC (CONGESTIVE) HEART FAILURE: ICD-10-CM

## 2022-01-23 DIAGNOSIS — E66.9 OBESITY, UNSPECIFIED: ICD-10-CM

## 2022-01-23 DIAGNOSIS — Z79.01 LONG TERM (CURRENT) USE OF ANTICOAGULANTS: ICD-10-CM

## 2022-01-23 DIAGNOSIS — I16.0 HYPERTENSIVE URGENCY: ICD-10-CM

## 2022-01-23 DIAGNOSIS — K21.9 GASTRO-ESOPHAGEAL REFLUX DISEASE WITHOUT ESOPHAGITIS: ICD-10-CM

## 2022-03-22 ENCOUNTER — INPATIENT (INPATIENT)
Facility: HOSPITAL | Age: 82
LOS: 1 days | Discharge: HOME | DRG: 309 | End: 2022-03-24
Attending: INTERNAL MEDICINE | Admitting: INTERNAL MEDICINE
Payer: MEDICARE

## 2022-03-22 VITALS
WEIGHT: 220.02 LBS | RESPIRATION RATE: 20 BRPM | OXYGEN SATURATION: 97 % | TEMPERATURE: 98 F | DIASTOLIC BLOOD PRESSURE: 79 MMHG | HEIGHT: 65 IN | HEART RATE: 152 BPM | SYSTOLIC BLOOD PRESSURE: 162 MMHG

## 2022-03-22 DIAGNOSIS — N18.30 CHRONIC KIDNEY DISEASE, STAGE 3 UNSPECIFIED: ICD-10-CM

## 2022-03-22 DIAGNOSIS — Z95.2 PRESENCE OF PROSTHETIC HEART VALVE: ICD-10-CM

## 2022-03-22 DIAGNOSIS — R07.89 OTHER CHEST PAIN: ICD-10-CM

## 2022-03-22 DIAGNOSIS — I48.0 PAROXYSMAL ATRIAL FIBRILLATION: ICD-10-CM

## 2022-03-22 DIAGNOSIS — D50.9 IRON DEFICIENCY ANEMIA, UNSPECIFIED: ICD-10-CM

## 2022-03-22 DIAGNOSIS — I50.32 CHRONIC DIASTOLIC (CONGESTIVE) HEART FAILURE: ICD-10-CM

## 2022-03-22 DIAGNOSIS — I10 ESSENTIAL (PRIMARY) HYPERTENSION: ICD-10-CM

## 2022-03-22 DIAGNOSIS — Z95.2 PRESENCE OF PROSTHETIC HEART VALVE: Chronic | ICD-10-CM

## 2022-03-22 PROBLEM — E66.9 OBESITY, UNSPECIFIED: Chronic | Status: ACTIVE | Noted: 2022-01-14

## 2022-03-22 PROBLEM — D64.9 ANEMIA, UNSPECIFIED: Chronic | Status: ACTIVE | Noted: 2022-01-14

## 2022-03-22 LAB
ALBUMIN SERPL ELPH-MCNC: 4.5 G/DL — SIGNIFICANT CHANGE UP (ref 3.3–5)
ALP SERPL-CCNC: 99 U/L — SIGNIFICANT CHANGE UP (ref 40–120)
ALT FLD-CCNC: 15 U/L — SIGNIFICANT CHANGE UP (ref 10–45)
ANION GAP SERPL CALC-SCNC: 12 MMOL/L — SIGNIFICANT CHANGE UP (ref 5–17)
APTT BLD: 31 SEC — SIGNIFICANT CHANGE UP (ref 27.5–35.5)
AST SERPL-CCNC: 20 U/L — SIGNIFICANT CHANGE UP (ref 10–40)
BASOPHILS # BLD AUTO: 0.04 K/UL — SIGNIFICANT CHANGE UP (ref 0–0.2)
BASOPHILS NFR BLD AUTO: 0.5 % — SIGNIFICANT CHANGE UP (ref 0–2)
BILIRUB SERPL-MCNC: 0.3 MG/DL — SIGNIFICANT CHANGE UP (ref 0.2–1.2)
BUN SERPL-MCNC: 22 MG/DL — SIGNIFICANT CHANGE UP (ref 7–23)
CALCIUM SERPL-MCNC: 9.9 MG/DL — SIGNIFICANT CHANGE UP (ref 8.4–10.5)
CHLORIDE SERPL-SCNC: 102 MMOL/L — SIGNIFICANT CHANGE UP (ref 96–108)
CO2 SERPL-SCNC: 26 MMOL/L — SIGNIFICANT CHANGE UP (ref 22–31)
CREAT SERPL-MCNC: 1.15 MG/DL — SIGNIFICANT CHANGE UP (ref 0.5–1.3)
EGFR: 47 ML/MIN/1.73M2 — LOW
EOSINOPHIL # BLD AUTO: 0.09 K/UL — SIGNIFICANT CHANGE UP (ref 0–0.5)
EOSINOPHIL NFR BLD AUTO: 1 % — SIGNIFICANT CHANGE UP (ref 0–6)
GLUCOSE SERPL-MCNC: 126 MG/DL — HIGH (ref 70–99)
HCT VFR BLD CALC: 38.6 % — SIGNIFICANT CHANGE UP (ref 34.5–45)
HGB BLD-MCNC: 12 G/DL — SIGNIFICANT CHANGE UP (ref 11.5–15.5)
IMM GRANULOCYTES NFR BLD AUTO: 0.3 % — SIGNIFICANT CHANGE UP (ref 0–1.5)
INR BLD: 1.17 — HIGH (ref 0.88–1.16)
LYMPHOCYTES # BLD AUTO: 1.33 K/UL — SIGNIFICANT CHANGE UP (ref 1–3.3)
LYMPHOCYTES # BLD AUTO: 15.2 % — SIGNIFICANT CHANGE UP (ref 13–44)
MAGNESIUM SERPL-MCNC: 2.1 MG/DL — SIGNIFICANT CHANGE UP (ref 1.6–2.6)
MCHC RBC-ENTMCNC: 28.4 PG — SIGNIFICANT CHANGE UP (ref 27–34)
MCHC RBC-ENTMCNC: 31.1 GM/DL — LOW (ref 32–36)
MCV RBC AUTO: 91.5 FL — SIGNIFICANT CHANGE UP (ref 80–100)
MONOCYTES # BLD AUTO: 0.71 K/UL — SIGNIFICANT CHANGE UP (ref 0–0.9)
MONOCYTES NFR BLD AUTO: 8.1 % — SIGNIFICANT CHANGE UP (ref 2–14)
NEUTROPHILS # BLD AUTO: 6.57 K/UL — SIGNIFICANT CHANGE UP (ref 1.8–7.4)
NEUTROPHILS NFR BLD AUTO: 74.9 % — SIGNIFICANT CHANGE UP (ref 43–77)
NRBC # BLD: 0 /100 WBCS — SIGNIFICANT CHANGE UP (ref 0–0)
PLATELET # BLD AUTO: 276 K/UL — SIGNIFICANT CHANGE UP (ref 150–400)
POTASSIUM SERPL-MCNC: 4.7 MMOL/L — SIGNIFICANT CHANGE UP (ref 3.5–5.3)
POTASSIUM SERPL-SCNC: 4.7 MMOL/L — SIGNIFICANT CHANGE UP (ref 3.5–5.3)
PROT SERPL-MCNC: 7.8 G/DL — SIGNIFICANT CHANGE UP (ref 6–8.3)
PROTHROM AB SERPL-ACNC: 14 SEC — HIGH (ref 10.5–13.4)
RBC # BLD: 4.22 M/UL — SIGNIFICANT CHANGE UP (ref 3.8–5.2)
RBC # FLD: 19.3 % — HIGH (ref 10.3–14.5)
SARS-COV-2 RNA SPEC QL NAA+PROBE: NEGATIVE — SIGNIFICANT CHANGE UP
SODIUM SERPL-SCNC: 140 MMOL/L — SIGNIFICANT CHANGE UP (ref 135–145)
TROPONIN T SERPL-MCNC: 0.01 NG/ML — SIGNIFICANT CHANGE UP (ref 0–0.01)
TROPONIN T SERPL-MCNC: <0.01 NG/ML — SIGNIFICANT CHANGE UP (ref 0–0.01)
WBC # BLD: 8.77 K/UL — SIGNIFICANT CHANGE UP (ref 3.8–10.5)
WBC # FLD AUTO: 8.77 K/UL — SIGNIFICANT CHANGE UP (ref 3.8–10.5)

## 2022-03-22 PROCEDURE — 93010 ELECTROCARDIOGRAM REPORT: CPT

## 2022-03-22 PROCEDURE — 71045 X-RAY EXAM CHEST 1 VIEW: CPT | Mod: 26

## 2022-03-22 PROCEDURE — 70450 CT HEAD/BRAIN W/O DYE: CPT | Mod: 26

## 2022-03-22 PROCEDURE — 99223 1ST HOSP IP/OBS HIGH 75: CPT

## 2022-03-22 PROCEDURE — 99291 CRITICAL CARE FIRST HOUR: CPT

## 2022-03-22 RX ORDER — METOPROLOL TARTRATE 50 MG
50 TABLET ORAL
Refills: 0 | Status: DISCONTINUED | OUTPATIENT
Start: 2022-03-22 | End: 2022-03-24

## 2022-03-22 RX ORDER — LISINOPRIL 2.5 MG/1
10 TABLET ORAL DAILY
Refills: 0 | Status: DISCONTINUED | OUTPATIENT
Start: 2022-03-23 | End: 2022-03-24

## 2022-03-22 RX ORDER — FOLIC ACID 0.8 MG
1 TABLET ORAL DAILY
Refills: 0 | Status: DISCONTINUED | OUTPATIENT
Start: 2022-03-22 | End: 2022-03-24

## 2022-03-22 RX ORDER — LISINOPRIL 2.5 MG/1
10 TABLET ORAL DAILY
Refills: 0 | Status: DISCONTINUED | OUTPATIENT
Start: 2022-03-22 | End: 2022-03-22

## 2022-03-22 RX ORDER — INFLUENZA VIRUS VACCINE 15; 15; 15; 15 UG/.5ML; UG/.5ML; UG/.5ML; UG/.5ML
0.7 SUSPENSION INTRAMUSCULAR ONCE
Refills: 0 | Status: DISCONTINUED | OUTPATIENT
Start: 2022-03-22 | End: 2022-03-24

## 2022-03-22 RX ORDER — APIXABAN 2.5 MG/1
5 TABLET, FILM COATED ORAL
Refills: 0 | Status: DISCONTINUED | OUTPATIENT
Start: 2022-03-22 | End: 2022-03-24

## 2022-03-22 RX ORDER — PANTOPRAZOLE SODIUM 20 MG/1
40 TABLET, DELAYED RELEASE ORAL
Refills: 0 | Status: DISCONTINUED | OUTPATIENT
Start: 2022-03-22 | End: 2022-03-24

## 2022-03-22 RX ORDER — METOPROLOL TARTRATE 50 MG
5 TABLET ORAL ONCE
Refills: 0 | Status: COMPLETED | OUTPATIENT
Start: 2022-03-22 | End: 2022-03-22

## 2022-03-22 RX ORDER — FUROSEMIDE 40 MG
20 TABLET ORAL DAILY
Refills: 0 | Status: DISCONTINUED | OUTPATIENT
Start: 2022-03-23 | End: 2022-03-24

## 2022-03-22 RX ORDER — ACETAMINOPHEN 500 MG
650 TABLET ORAL ONCE
Refills: 0 | Status: COMPLETED | OUTPATIENT
Start: 2022-03-22 | End: 2022-03-22

## 2022-03-22 RX ORDER — ATORVASTATIN CALCIUM 80 MG/1
20 TABLET, FILM COATED ORAL AT BEDTIME
Refills: 0 | Status: DISCONTINUED | OUTPATIENT
Start: 2022-03-22 | End: 2022-03-24

## 2022-03-22 RX ORDER — METOPROLOL TARTRATE 50 MG
50 TABLET ORAL THREE TIMES A DAY
Refills: 0 | Status: DISCONTINUED | OUTPATIENT
Start: 2022-03-22 | End: 2022-03-22

## 2022-03-22 RX ORDER — TIOTROPIUM BROMIDE 18 UG/1
1 CAPSULE ORAL; RESPIRATORY (INHALATION) DAILY
Refills: 0 | Status: DISCONTINUED | OUTPATIENT
Start: 2022-03-22 | End: 2022-03-24

## 2022-03-22 RX ORDER — METOPROLOL TARTRATE 50 MG
50 TABLET ORAL ONCE
Refills: 0 | Status: COMPLETED | OUTPATIENT
Start: 2022-03-22 | End: 2022-03-22

## 2022-03-22 RX ADMIN — Medication 50 MILLIGRAM(S): at 10:55

## 2022-03-22 RX ADMIN — Medication 50 MILLIGRAM(S): at 13:51

## 2022-03-22 RX ADMIN — Medication 650 MILLIGRAM(S): at 14:51

## 2022-03-22 RX ADMIN — Medication 50 MILLIGRAM(S): at 17:13

## 2022-03-22 RX ADMIN — Medication 5 MILLIGRAM(S): at 12:06

## 2022-03-22 RX ADMIN — Medication 30 MILLILITER(S): at 18:36

## 2022-03-22 RX ADMIN — APIXABAN 5 MILLIGRAM(S): 2.5 TABLET, FILM COATED ORAL at 13:53

## 2022-03-22 RX ADMIN — TIOTROPIUM BROMIDE 1 CAPSULE(S): 18 CAPSULE ORAL; RESPIRATORY (INHALATION) at 17:13

## 2022-03-22 RX ADMIN — Medication 1 MILLIGRAM(S): at 17:12

## 2022-03-22 RX ADMIN — Medication 5 MILLIGRAM(S): at 10:55

## 2022-03-22 RX ADMIN — ATORVASTATIN CALCIUM 20 MILLIGRAM(S): 80 TABLET, FILM COATED ORAL at 22:13

## 2022-03-22 RX ADMIN — Medication 650 MILLIGRAM(S): at 13:51

## 2022-03-22 RX ADMIN — Medication 1 TABLET(S): at 17:13

## 2022-03-22 NOTE — H&P ADULT - PROBLEM SELECTOR PLAN 3
euvolemic on exam  continue home lasix 20mg daily NC head CT as per attending given c/o HA and uncontrolled HTN and eliquis therapy   repeat SBP near 150, avoid hypotension given reported SBP >200 at home  continue metoprolol tartrate 50mg BID for rate control  continue lisinopril 10mg ( starting 3/23)

## 2022-03-22 NOTE — H&P ADULT - PROBLEM SELECTOR PROBLEM 5
Chronic iron deficiency anemia Stage 3 chronic kidney disease S/P TAVR (transcatheter aortic valve replacement)

## 2022-03-22 NOTE — ED PROVIDER NOTE - CLINICAL SUMMARY MEDICAL DECISION MAKING FREE TEXT BOX
here with chest pain, headache, rapid hr and high bp. afib with rvr on ekg/ monitor. history of paroxysmal afib per cardiologist (daughter texted him) but normally sinus. labs/cxr/ecg ordered. cxr neg for pneumonia, pneumothorax, widened mediastinum to suggest dissection.  initial troponin neg. given metoprolol iv, po for afib with rvr. hr improved. Tylenol for headache. ekg also with new twi/ q waves inferiorly. concern for acs. admit to cards for further treatment.

## 2022-03-22 NOTE — ED PROVIDER NOTE - NSICDXPASTMEDICALHX_GEN_ALL_CORE_FT
PAST MEDICAL HISTORY:  Anemia     Aortic stenosis     COPD (chronic obstructive pulmonary disease)     Diastolic CHF, chronic     GERD (gastroesophageal reflux disease)     HTN (hypertension)     Hyperlipidemia     Obesity     Stage 3 chronic kidney disease      PAST MEDICAL HISTORY:  Anemia     Aortic stenosis     COPD (chronic obstructive pulmonary disease)     Diastolic CHF, chronic     GERD (gastroesophageal reflux disease)     HTN (hypertension)     Hyperlipidemia     Obesity     Paroxysmal atrial fibrillation     Stage 3 chronic kidney disease

## 2022-03-22 NOTE — CONSULT NOTE ADULT - ASSESSMENT
82 y/o English/English speaking F, PMHx uncontrolled HTN, HLD, hx severe AS s/p TAVR 02/2019,  hx S3 THV leaflet  thrombus post TAVR in 2021(was on warfarin, not seen on last TTE 1/15/22) , pAfib (on eliquis, no hx of ablations/DCCV), COPD (intermittent 2L home O2), moderate MARK (non-compliant with CPAP 2/2 claustrophobia), iron deficiency anemia (baseline hgb 9s), CKD-III (baseline Cr 1.1-1.2), GERD, anxiety, colon cancer (s/p colectomy 12/2020), recent  admission to [facility]  1/14-1/18 2022 for for hypertensive urgency,  at that time in NSR, negative cardiac enzymes and CCTA  1/18/22 nonobstructive CAD,  TTE  1/15/22 with hyperdynamic LV EF, S3 valve in good position without AR, no mention of clot. Patient now presents to [facility] ED 3/22/22 with c/o burning  chest discomfort that woke her up this morning with associated palpitations and high BP  (reports BP at home 199/120, repeat after lisinopril  ). Denies syncope, LE edema, orthopnea, N/V. Patient contacted her cardiologist and was recommended for ER evaluation.    82 y/o Martiniquais/English speaking F, PMHx uncontrolled HTN, HLD, hx severe AS s/p TAVR 02/2019,  hx S3 THV leaflet  thrombus post TAVR in 2021(was on warfarin, not seen on last TTE 1/15/22) , pAfib (on eliquis, no hx of ablations/DCCV), COPD (intermittent 2L home O2), moderate MARK (non-compliant with CPAP 2/2 claustrophobia), iron deficiency anemia (baseline hgb 9s), CKD-III (baseline Cr 1.1-1.2), GERD, anxiety, colon cancer (s/p colectomy 12/2020), recent  admission to [facility]  1/14-1/18 2022 for for hypertensive urgency.    Patient now presents to [facility] ED 3/22/22 with c/o burning  chest discomfort that woke her up this morning with associated palpitations and high BP , patient was noted to be in  atrial fibrillation with RVR, her HR improved with Lopressor.   Will plan for DCCV in am, please keep NPO after MN, continue Eliquis, will not need IRMA, patient has been compliant with Eliquis.

## 2022-03-22 NOTE — PATIENT PROFILE ADULT - FALL HARM RISK - HARM RISK INTERVENTIONS

## 2022-03-22 NOTE — H&P ADULT - PROBLEM SELECTOR PROBLEM 3
S/P TAVR (transcatheter aortic valve replacement) Chronic diastolic congestive heart failure HTN (hypertension)

## 2022-03-22 NOTE — H&P ADULT - NSHPLABSRESULTS_GEN_ALL_CORE
covid neg PCR   troponin neg x 1    8.77>12/38.6<276       INR 1.17 PT 14 PTT 31      140/4.7/102/26/22/1.15<126    CXR clear lungs

## 2022-03-22 NOTE — ED PROVIDER NOTE - OBJECTIVE STATEMENT
Luxembourger/English speaking F, PMHx uncontrolled HTN, HLD, diastolic CHF, severe AS (s/p TAVR 02/2019), ?valve thrombus (on eliquis), COPD (intermittent 2L home O2), moderate MARK (non-compliant with CPAP 2/2 claustrophobia), iron deficiency anemia (baseline hgb 9s), CKD-III (baseline Cr 1.1-1.2), GERD, anxiety, colon cancer (s/p colectomy 12/2020) Dominican/English speaking F, PMHx uncontrolled HTN, HLD, diastolic CHF, severe AS (s/p TAVR 02/2019), ?valve thrombus (on eliquis), COPD (intermittent 2L home O2), moderate MARK (non-compliant with CPAP 2/2 claustrophobia), iron deficiency anemia (baseline hgb 9s), CKD-III (baseline Cr 1.1-1.2), GERD, anxiety, colon cancer (s/p colectomy 12/2020), here with elevated bp and hr associated with chest pain and headache this am. Reports has been having spikes of bp/hr around noon each day past few days but woke today with same. Was 237/162, hr 144 on home monitor. Took tylenol and lisinopril 10mg. Called her doctor, rechecked and was 188/103, hr 123, came to ED. Takes metoprolol at noon daily, hasn't taken yet today. No fever, chills, coughing. Algerian/English speaking F, PMHx uncontrolled HTN, HLD, pAfib, diastolic CHF, severe AS (s/p TAVR 02/2019), ?valve thrombus (on eliquis), COPD (intermittent 2L home O2), moderate MARK (non-compliant with CPAP 2/2 claustrophobia), iron deficiency anemia (baseline hgb 9s), CKD-III (baseline Cr 1.1-1.2), GERD, anxiety, colon cancer (s/p colectomy 12/2020), here with elevated bp and hr associated with chest pain and headache this am. Reports has been having spikes of bp/hr around noon each day past few days but woke today with same. Was 237/162, hr 144 on home monitor. Took tylenol and lisinopril 10mg. Called her doctor, rechecked and was 188/103, hr 123, came to ED. Takes metoprolol at noon daily, hasn't taken yet today. No fever, chills, coughing.

## 2022-03-22 NOTE — ED PROVIDER NOTE - CARE PLAN
1 Principal Discharge DX:	Atrial fibrillation with rapid ventricular response  Secondary Diagnosis:	Acute chest pain

## 2022-03-22 NOTE — H&P ADULT - NSICDXPASTMEDICALHX_GEN_ALL_CORE_FT
PAST MEDICAL HISTORY:  Anemia     Aortic stenosis     COPD (chronic obstructive pulmonary disease)     Diastolic CHF, chronic     GERD (gastroesophageal reflux disease)     HTN (hypertension)     Hyperlipidemia     Obesity     Paroxysmal atrial fibrillation     Stage 3 chronic kidney disease

## 2022-03-22 NOTE — ED PROVIDER NOTE - DISPOSITION TYPE
ADMIT Localized Dermabrasion Text: The patient was draped in routine manner.  Localized dermabrasion using 3 x 17 mm wire brush was performed in routine manner to papillary dermis. This spot dermabrasion is being performed to complete skin cancer reconstruction. It also will eliminate the other sun damaged precancerous cells that are known to be part of the regional effect of a lifetime's worth of sun exposure. This localized dermabrasion is therapeutic and should not be considered cosmetic in any regard. Localized Dermabrasion With Wire Brush Text: The patient was draped in routine manner.  Localized dermabrasion using 3 x 17 mm wire brush was performed in routine manner to papillary dermis. This spot dermabrasion is being performed to complete skin cancer reconstruction. It also will eliminate the other sun damaged precancerous cells that are known to be part of the regional effect of a lifetime's worth of sun exposure. This localized dermabrasion is therapeutic and should not be considered cosmetic in any regard.

## 2022-03-22 NOTE — H&P ADULT - NSHPPHYSICALEXAM_GEN_ALL_CORE
143/82, A fib on tele with -120, RR 20, O2 sat 97% RA  appears comfortable lying in bed 143/82, A fib on tele with -120, RR 20, O2 sat 97% RA  appears comfortable lying in bed    HEENT: AT, NC, MMM  Neck: no JVD  Chest: CTA b/l   Cor: S1 S2 irreg irreg, no murmur appreciated  Abd: obese, soft, NT/ND  Ext: trace pitting edema RLE only, well perfused  Vasc: DP/PT 1+ b/l   Neuro: A+Ox 3, no gross deficits  Psych: appropriate affect, cooperative  Skin: dry/warm

## 2022-03-22 NOTE — H&P ADULT - PROBLEM SELECTOR PROBLEM 4
Stage 3 chronic kidney disease S/P TAVR (transcatheter aortic valve replacement) Chronic diastolic congestive heart failure

## 2022-03-22 NOTE — H&P ADULT - PROBLEM SELECTOR PLAN 5
stable , Cr at baseline last TTE 1/15/22 with S3 valve in position without AR and no mention of leaflet thrombus    consider repeat TTE or IRMA if requested by EP

## 2022-03-22 NOTE — CONSULT NOTE ADULT - SUBJECTIVE AND OBJECTIVE BOX
Electrophysiology Consult Note:     CHIEF COMPLAINT:  Patient is a 81y old  Female who presents with a chief complaint of chest pressure and palpitations, uncontrolled HTN (22 Mar 2022 13:39)        HISTORY OF PRESENT ILLNESS:   HPI:    82 y/o Indonesian/English speaking F, PMHx uncontrolled HTN, HLD, hx severe AS s/p TAVR 02/2019,  hx S3 THV leaflet  thrombus post TAVR in 2021(was on warfarin, not seen on last TTE 1/15/22) , pAfib (on eliquis, no hx of ablations/DCCV), COPD (intermittent 2L home O2), moderate MARK (non-compliant with CPAP 2/2 claustrophobia), iron deficiency anemia (baseline hgb 9s), CKD-III (baseline Cr 1.1-1.2), GERD, anxiety, colon cancer (s/p colectomy 12/2020), recent  admission to [facility]  1/14-1/18 2022 for for hypertensive urgency,  at that time in NSR, negative cardiac enzymes and CCTA  1/18/22 nonobstructive CAD,  TTE  1/15/22 with hyperdynamic LV EF, S3 valve in good position without AR, no mention of clot. Patient now presents to [facility] ED 3/22/22 with c/o burning  chest discomfort that woke her up this morning with associated palpitations and high BP  (reports BP at home 199/120, repeat after lisinopril  ). Denies syncope, LE edema, orthopnea, N/V. Patient contacted her cardiologist and was recommended for ER evaluation.     OF NOTE, as per CTS notes from 5/2021 admission at [facility] patient had been on anticoagulation (warfarin) as an outpatient for concern for previous valve thrombus (S3 THV leaflet thrombus) given high gradients on  TTE/IRMA. Patient underwent cardiac cath at [facility] 5/5/2021 and found to have low  transvalvular gradient of only  7mmHg, so previously planned procedure of valve and valve TAVR was aborted and patient continued to be anticoagulated with eliquis.    CTA Coronaries 1/18/22 revealed non-obstructive CAD mild stenosis of pLAD mLAD and pRCA, <25% LM stenosis (22 Mar 2022 13:39)  EPS called to evaluate      PAST MEDICAL & SURGICAL HISTORY:  HTN (hypertension)    Aortic stenosis    Hyperlipidemia    COPD (chronic obstructive pulmonary disease)    GERD (gastroesophageal reflux disease)    Stage 3 chronic kidney disease    Anemia    Diastolic CHF, chronic    Obesity    Paroxysmal atrial fibrillation    S/P TAVR (transcatheter aortic valve replacement)  2/2019        FAMILY HISTORY:  No pertinent family history in first degree relatives        SOCIAL HISTORY:    [x ] Non-smoker    Allergies    Digox (Rash; Urticaria; Hives)  Plavix (Other (Mod to Severe))  vancomycin (Other)    Intolerances    	    MEDICATIONS:  MEDICATIONS  (STANDING):  apixaban 5 milliGRAM(s) Oral two times a day  atorvastatin 20 milliGRAM(s) Oral at bedtime  folic acid 1 milliGRAM(s) Oral daily  furosemide    Tablet 20 milliGRAM(s) Oral daily  influenza  Vaccine (HIGH DOSE) 0.7 milliLiter(s) IntraMuscular once  metoprolol tartrate 50 milliGRAM(s) Oral two times a day  multivitamin 1 Tablet(s) Oral daily  pantoprazole    Tablet 40 milliGRAM(s) Oral before breakfast  tiotropium 18 MICROgram(s) Capsule 1 Capsule(s) Inhalation daily    MEDICATIONS  (PRN):  LORazepam     Tablet 0.5 milliGRAM(s) Oral daily PRN Anxiety        REVIEW OF SYSTEMS:  CONSTITUTIONAL: No fever, weight loss, or fatigue  EYES: No eye pain, visual disturbances, or discharge  ENMT:  No difficulty hearing, tinnitus, vertigo; No sinus or throat pain  NECK: No pain or stiffness  BREASTS: No pain, masses, or nipple discharge  RESPIRATORY: No cough, wheezing, chills or hemoptysis; No Shortness of Breath  CARDIOVASCULAR: No chest pain, palpitations, dizziness, or leg swelling  GASTROINTESTINAL: No abdominal or epigastric pain. No nausea, vomiting, or hematemesis; No diarrhea or constipation. No melena or hematochezia.  GENITOURINARY: No dysuria, frequency, hematuria, or incontinence  NEUROLOGICAL: No headaches, memory loss, loss of strength, numbness, or tremors  SKIN: No itching, burning, rashes, or lesions   LYMPH Nodes: No enlarged glands  ENDOCRINE: No heat or cold intolerance; No hair loss  MUSCULOSKELETAL: No joint pain or swelling; No muscle, back, or extremity pain  PSYCHIATRIC: No depression, anxiety, mood swings, or difficulty sleeping  HEME/LYMPH: No easy bruising, or bleeding gums  ALLERY AND IMMUNOLOGIC: No hives or eczema	      PHYSICAL EXAM:  Vital Signs Last 24 Hrs  T(C): 36.7 (22 Mar 2022 14:45), Max: 36.7 (22 Mar 2022 14:45)  T(F): 98 (22 Mar 2022 14:45), Max: 98 (22 Mar 2022 14:45)  HR: 114 (22 Mar 2022 14:09) (114 - 152)  BP: 156/96 (22 Mar 2022 14:09) (136/76 - 162/79)  BP(mean): 117 (22 Mar 2022 14:09) (117 - 117)  RR: 18 (22 Mar 2022 14:09) (18 - 20)  SpO2: 96% (22 Mar 2022 14:09) (95% - 97%)  Daily Height in cm: 165.1 (22 Mar 2022 10:27)    Daily     Constitutional: NAD	  HEENT:   PERRL, EOMI	  CVS: S1 S2, irregular,  No JVD,  No edema  Pulm: Lungs clear to auscultation	  GI:  Soft, Non-tender, + BS	  Ext: No LE edema  Neurologic: A&O x 3  Skin: No rash or lesion       	  LABS:	                         12.0   8.77  )-----------( 276      ( 22 Mar 2022 11:12 )             38.6     03-22    140  |  102  |  22  ----------------------------<  126<H>  4.7   |  26  |  1.15    Ca    9.9      22 Mar 2022 11:12  Mg     2.1     03-22    TPro  7.8  /  Alb  4.5  /  TBili  0.3  /  DBili  x   /  AST  20  /  ALT  15  /  AlkPhos  99  03-22    proBNP:   Lipid Profile:   HgA1c:   TSH: 	      EKG: < from: 12 Lead ECG (01.14.22 @ 11:35) >  Ventricular Rate 80 BPM    Atrial Rate 80 BPM    P-R Interval 178 ms    QRS Duration 94 ms    Q-T Interval 396 ms    QTC Calculation(Bazett) 456 ms    P Axis 76 degrees    R Axis 45 degrees    T Axis 76 degrees    Diagnosis Line Normal sinus rhythm      Telemetry:  atrial fibrillation     Echo: < from: IRMA w/Doppler (11.17.20 @ 12:38) >  1. Normal left and right ventricular size and systolic function.   2. Mildly dilated left atrium.   3. No LA/RA/MAIK/RAA thrombus seen.   4. No evidence of an intracardiac shunt.   5. 23 mm Jamar valve is noted in the aortic position without any aortic regurgitation.   6. There is slight thickening at the base of the right cusp with probably mild restriction in excursion. The other prosthetic leaflets appear normal.   7. No evidence of pulmonary hypertension.   8. No pericardial effusion.   9. See TTE performed 11/16/2020 for TAVR gradients, which were elevated.    < from: TTE Echo Complete w/o Contrast w/ Doppler (01.15.22 @ 12:47) >  1. Mild symmetric left ventricular hypertrophy.   2. Hyperdynamic left ventricular systolic function.   3. Normal right ventricular systolic function.   4. 23 mm Jamar 3 valve is noted in the aortic position without any   aortic regurgitation. The peak transvalvular velocity is 3.35 m/s, the   mean transvalvular gradient is 28.70 mmHg, and the LVOT/AV velocity ratio   is 0.29. The peak transaortic gradient is 44.89 mmHg.   5. No evidence of pulmonary hypertension, pulmonary arterysystolic   pressure is 27 mmHg.   6. No pericardial effusion.   7. Proximal ascending aorta is dilated measuring 4.00 cm.   8. Pericardial fat pad is seen anterior to the right ventricle, cannot   rule out a trivial pericardial effusion.   9. Compared to the previous TTE performed on 5/5/2021, there have been   no significant interval changes.

## 2022-03-22 NOTE — ED ADULT TRIAGE NOTE - CHIEF COMPLAINT QUOTE
pt c/o chest pain, headache and increased BP at home. as per pt's daughter BP at home 235/145 . pt noted tachycardic in Triage , ekg in progress. pmh CHF, COPD, HTN, HLD aortic valve replacement. taking eliquis,

## 2022-03-22 NOTE — H&P ADULT - PROBLEM SELECTOR PLAN 4
last TTE 1/15/22 with S3 valve in position without AR and no mention of leaflet thrombus    consider repeat TTE or IRMA if requested by EP euvolemic on exam  continue home lasix 20mg daily

## 2022-03-22 NOTE — H&P ADULT - PROBLEM SELECTOR PLAN 2
NC head CT as per attending given c/o HA and uncontrolled HTN and eliquis therapy   continue metoprolol tartrate 50mg BID for rate control  continue lisinopril 10mg ( starting 3/23) NC head CT as per attending given c/o HA and uncontrolled HTN and eliquis therapy   repeat SBP near 150, avoid hypotension given reported SBP >200 at home  continue metoprolol tartrate 50mg BID for rate control  continue lisinopril 10mg ( starting 3/23) serial troponins ( troponin neg x 1 so far), symptoms likely related to A fib RVR  low suspicion for ACS as CCTA 1/18/2022 with minimal non obstructive CAD  EKG with new TWI in III and AVF in setting of A fib RVR,       will try maalox on addition to home protonix as well

## 2022-03-22 NOTE — H&P ADULT - PROBLEM SELECTOR PLAN 1
EP eval for DCCV 3/32  given symptomatic A fib RVR  patient states she has been on uninterrupted eliquis   continue eliquis 5mg BID  rate control with metoprolol 50mg BID  NPO after MN for possible DCCV if remains in A fib

## 2022-03-22 NOTE — H&P ADULT - HISTORY OF PRESENT ILLNESS
82 y/o Welsh/English speaking F, PMHx uncontrolled HTN, HLD, hx severe AS s/p TAVR 02/2019,  hx valve thrombus post TAVR since resolved , pAfib (on eliquis, no hx of ablations/DCCV), COPD (intermittent 2L home O2), moderate MARK (non-compliant with CPAP 2/2 claustrophobia), iron deficiency anemia (baseline hgb 9s), CKD-III (baseline Cr 1.1-1.2), GERD, anxiety, colon cancer (s/p colectomy 12/2020), recent  admission to [facility]  1/14-1/18, 2022 for for hypertensive urgency and r/o ACS, at that time in NSR, CCTA  1/18/22 nonobstructive CAD,  and TTE  1/15/22 with hyperdynamic LV systolic function, S3 valve in good position without AR, no mention of clot. Patient now presents to [facility] ED 3/22/22 with c/o chest pressure in setting of uncontrolled HTN and found to be in A fib RVR.   In ED initial Vitals:               TTE 1/15/22: Mild symmetric LVH. Hyperdynamic LV Systolic Function. Normal RVSF. 23 mm Jamar 3 valve is noted in the aortic position without any AR. Proximal ascending aorta is dilated 4.00 cm. No pulm htn. Pericardial fat pad is seen anterior to RV, cannot  rule out a trivial pericardial effusion. Compared to TTE 5/2021, no sig. change. Orthostatics negative.     CTA Coronaries 1/18/22 revealed non-obstructive CAD mild stenosis of pLAD mLAD and pRCA, <25% LM stenosis Incomplete    80 y/o Bermudian/English speaking F, PMHx uncontrolled HTN, HLD, hx severe AS s/p TAVR 02/2019,  hx valve thrombus post TAVR since resolved , pAfib (on eliquis, no hx of ablations/DCCV), COPD (intermittent 2L home O2), moderate MARK (non-compliant with CPAP 2/2 claustrophobia), iron deficiency anemia (baseline hgb 9s), CKD-III (baseline Cr 1.1-1.2), GERD, anxiety, colon cancer (s/p colectomy 12/2020), recent  admission to [facility]  1/14-1/18, 2022 for for hypertensive urgency and r/o ACS, at that time in NSR, CCTA  1/18/22 nonobstructive CAD,  and TTE  1/15/22 with hyperdynamic LV systolic function, S3 valve in good position without AR, no mention of clot. Patient now presents to [facility] ED 3/22/22 with c/o burning  chest discomfort that woke her up this morning and uncontrolled HTN (reports BP at home 199/120, repeat after lisinopril  ). Patient contacted her cardiologist and was recommended for ER evaluation. and found to be in A fib RVR.     In ED initial Vitals: /79                TTE 1/15/22: Mild symmetric LVH. Hyperdynamic LV Systolic Function. Normal RVSF. 23 mm Jamar 3 valve is noted in the aortic position without any AR. Proximal ascending aorta is dilated 4.00 cm. No pulm htn. Pericardial fat pad is seen anterior to RV, cannot  rule out a trivial pericardial effusion. Compared to TTE 5/2021, no sig. change. Orthostatics negative.     CTA Coronaries 1/18/22 revealed non-obstructive CAD mild stenosis of pLAD mLAD and pRCA, <25% LM stenosis Incomplete    82 y/o Colombian/English speaking F, PMHx uncontrolled HTN, HLD, hx severe AS s/p TAVR 02/2019,  hx  S3 THV leaflet  thrombus post TAVR (on eliquis) , pAfib (on eliquis, no hx of ablations/DCCV), COPD (intermittent 2L home O2), moderate MARK (non-compliant with CPAP 2/2 claustrophobia), iron deficiency anemia (baseline hgb 9s), CKD-III (baseline Cr 1.1-1.2), GERD, anxiety, colon cancer (s/p colectomy 12/2020), recent  admission to [facility]  1/14-1/18, 2022 for for hypertensive urgency and r/o ACS, at that time in NSR, CCTA  1/18/22 nonobstructive CAD,  and TTE  1/15/22 with hyperdynamic LV systolic function, S3 valve in good position without AR, no mention of clot. Patient now presents to [facility] ED 3/22/22 with c/o burning  chest discomfort that woke her up this morning and uncontrolled HTN (reports BP at home 199/120, repeat after lisinopril  ). Patient contacted her cardiologist and was recommended for ER evaluation. and found to be in A fib RVR.     In ED initial Vitals: /79                TTE 1/15/22: Mild symmetric LVH. Hyperdynamic LV Systolic Function. Normal RVSF. 23 mm Jamar 3 valve is noted in the aortic position without any AR. Proximal ascending aorta is dilated 4.00 cm. No pulm htn. Pericardial fat pad is seen anterior to RV, cannot  rule out a trivial pericardial effusion. Compared to TTE 5/2021, no sig. change. Orthostatics negative.     CTA Coronaries 1/18/22 revealed non-obstructive CAD mild stenosis of pLAD mLAD and pRCA, <25% LM stenosis Incomplete    80 y/o Comoran/English speaking F, PMHx uncontrolled HTN, HLD, hx severe AS s/p TAVR 02/2019,  hx  S3 THV leaflet  thrombus post TAVR in 2021 , pAfib (on eliquis, no hx of ablations/DCCV), COPD (intermittent 2L home O2), moderate MARK (non-compliant with CPAP 2/2 claustrophobia), iron deficiency anemia (baseline hgb 9s), CKD-III (baseline Cr 1.1-1.2), GERD, anxiety, colon cancer (s/p colectomy 12/2020), recent  admission to [facility]  1/14-1/18, 2022 for for hypertensive urgency and r/o ACS, at that time in NSR, CCTA  1/18/22 nonobstructive CAD,  and TTE  1/15/22 with hyperdynamic LV systolic function, S3 valve in good position without AR, no mention of clot. Patient now presents to [facility] ED 3/22/22 with c/o burning  chest discomfort that woke her up this morning and uncontrolled HTN (reports BP at home 199/120, repeat after lisinopril  ). Patient contacted her cardiologist and was recommended for ER evaluation.     In ED initial Vitals: /79 , 's, EKG A fib   with TWI in III and AVF, RR 22, O2 sat 95% RA, afebrile  patient was treated with lopressor 5mg IVP x 2 and metoprolol tartrate 50mg po x 1.  repeat BP  143/82 and tele with A fib -120.         OF NOTE, as per CTS notes from 5/2021 admission  patient had been on anticoagulation (warfarin) as an outpatient for concern for previous valve thrombus (S3 THV leaflet thrombus) given high gradients on  TTE/IRMA. Patient underwent cardiac cath at [facility] 5/5/2021 and found to have low  transvalvular gradient of only  7mmHg, so previously planned procedure of valve and valve TAVR was aborted and patient continued to be anticoagulated with eliquis.        TTE 1/15/22: Mild symmetric LVH. Hyperdynamic LV Systolic Function. Normal RVSF. 23 mm Jamar 3 valve is noted in the aortic position without any AR. Proximal ascending aorta is dilated 4.00 cm. No pulm htn. Pericardial fat pad is seen anterior to RV, cannot  rule out a trivial pericardial effusion. Compared to TTE 5/2021, no sig. change. Orthostatics negative.     CTA Coronaries 1/18/22 revealed non-obstructive CAD mild stenosis of pLAD mLAD and pRCA, <25% LM stenosis   80 y/o Hebrew/English speaking F, PMHx uncontrolled HTN, HLD, hx severe AS s/p TAVR 02/2019,  hx S3 THV leaflet  thrombus post TAVR in 2021(was on warfarin, not seen on last TTE 1/15/22) , pAfib (on eliquis, no hx of ablations/DCCV), COPD (intermittent 2L home O2), moderate MARK (non-compliant with CPAP 2/2 claustrophobia), iron deficiency anemia (baseline hgb 9s), CKD-III (baseline Cr 1.1-1.2), GERD, anxiety, colon cancer (s/p colectomy 12/2020), recent  admission to [facility]  1/14-1/18 2022 for for hypertensive urgency,  at that time in NSR, negative cardiac enzymes and CCTA  1/18/22 nonobstructive CAD,  TTE  1/15/22 with hyperdynamic LV EF, S3 valve in good position without AR, no mention of clot. Patient now presents to [facility] ED 3/22/22 with c/o burning  chest discomfort that woke her up this morning with associated palpitations and high BP  (reports BP at home 199/120, repeat after lisinopril  ). Denies syncope, LE edema, orthopnea, N/V. Patient contacted her cardiologist and was recommended for ER evaluation.     In ED initial Vitals: /79 , 's, EKG A fib   with TWI in III and AVF, RR 22, O2 sat 95% RA, afebrile  patient was treated with lopressor 5mg IVP x 2 and metoprolol tartrate 50mg po x 1.  Repeat BP  143/82 and tele with A fib -120. Troponin neg x1.    Patient is now admitted to [facility] for further management of A fib RVR, telemetry and EP eval for DCCV.      OF NOTE, as per CTS notes from 5/2021 admission at [facility] patient had been on anticoagulation (warfarin) as an outpatient for concern for previous valve thrombus (S3 THV leaflet thrombus) given high gradients on  TTE/IRMA. Patient underwent cardiac cath at [facility] 5/5/2021 and found to have low  transvalvular gradient of only  7mmHg, so previously planned procedure of valve and valve TAVR was aborted and patient continued to be anticoagulated with eliquis.        TTE 1/15/22: Mild symmetric LVH. Hyperdynamic LV Systolic Function. Normal RVSF. 23 mm Jamar 3 valve is noted in the aortic position without any AR. Proximal ascending aorta is dilated 4.00 cm. No pulm htn. Pericardial fat pad is seen anterior to RV, cannot  rule out a trivial pericardial effusion. Compared to TTE 5/2021, no sig. change. Orthostatics negative.     CTA Coronaries 1/18/22 revealed non-obstructive CAD mild stenosis of pLAD mLAD and pRCA, <25% LM stenosis

## 2022-03-22 NOTE — H&P ADULT - ASSESSMENT
[General Appearance - Well Developed] : well developed [Normal Appearance] : normal appearance [Well Groomed] : well groomed [General Appearance - Well Nourished] : well nourished [No Deformities] : no deformities [General Appearance - In No Acute Distress] : no acute distress [Normal Conjunctiva] : the conjunctiva exhibited no abnormalities [Eyelids - No Xanthelasma] : the eyelids demonstrated no xanthelasmas [Normal Oral Mucosa] : normal oral mucosa [No Oral Pallor] : no oral pallor [No Oral Cyanosis] : no oral cyanosis [Normal Jugular Venous A Waves Present] : normal jugular venous A waves present [Normal Jugular Venous V Waves Present] : normal jugular venous V waves present [No Jugular Venous Goins A Waves] : no jugular venous goins A waves [Respiration, Rhythm And Depth] : normal respiratory rhythm and effort [Exaggerated Use Of Accessory Muscles For Inspiration] : no accessory muscle use [Auscultation Breath Sounds / Voice Sounds] : lungs were clear to auscultation bilaterally [Heart Rate And Rhythm] : heart rate and rhythm were normal [Heart Sounds] : normal S1 and S2 [Murmurs] : no murmurs present [Arterial Pulses Normal] : the arterial pulses were normal [Abdomen Soft] : soft [Abdomen Tenderness] : non-tender [Abdomen Mass (___ Cm)] : no abdominal mass palpated [Abdomen Hernia] : no hernia was discovered [Abnormal Walk] : normal gait [Gait - Sufficient For Exercise Testing] : the gait was sufficient for exercise testing [Nail Clubbing] : no clubbing of the fingernails [Cyanosis, Localized] : no localized cyanosis [Petechial Hemorrhages (___cm)] : no petechial hemorrhages [Skin Color & Pigmentation] : normal skin color and pigmentation [] : no rash [No Venous Stasis] : no venous stasis 82 y/o [place] Islander/English speaking F, PMHx uncontrolled HTN, HLD, hx severe AS s/p TAVR 02/2019,  hx S3 THV leaflet  thrombus post TAVR in 2021(was on warfarin, not seen on last TTE 1/15/22) , pAfib (on eliquis, no hx of ablations/DCCV), hx diastolic CHF ( EF >70%  1/15/22),  COPD (intermittent 2L home O2), moderate MARK (non-compliant with CPAP 2/2 claustrophobia), iron deficiency anemia (baseline hgb 9s), CKD-III (baseline Cr 1.1-1.2), GERD, anxiety, colon cancer (s/p colectomy 12/2020), nonobstructive CAD as per CCTA 1/18/22,  who now presents to [facility] ED 3/22/22 with c/o burning  chest discomfort that woke her up this morning with associated palpitations and uncontrolled HTN.   Patient is now admitted to [facility] for further management of A fib RVR, telemetry and EP eval for DCCV.   [Skin Lesions] : no skin lesions [No Skin Ulcers] : no skin ulcer [No Xanthoma] : no  xanthoma was observed [Oriented To Time, Place, And Person] : oriented to person, place, and time [Affect] : the affect was normal [No Anxiety] : not feeling anxious [Mood] : the mood was normal 80 y/o Tongan/English speaking F, PMHx uncontrolled HTN, HLD, hx severe AS s/p TAVR 02/2019,  hx S3 THV leaflet  thrombus post TAVR in 2021(was on warfarin, not seen on last TTE 1/15/22) , pAfib (on eliquis, no hx of ablations/DCCV), hx diastolic CHF ( EF >70%  1/15/22),  COPD (intermittent 2L home O2), moderate MARK (non-compliant with CPAP 2/2 claustrophobia), iron deficiency anemia (baseline hgb 9s), CKD-III (baseline Cr 1.1-1.2), GERD, anxiety, colon cancer (s/p colectomy 12/2020), nonobstructive CAD as per CCTA 1/18/22,  who now presents to [facility] ED 3/22/22 with c/o burning  chest discomfort that woke her up this morning with associated palpitations and uncontrolled HTN. Found to be in A fib 's. Patient is now admitted to [facility] for further management of A fib RVR, telemetry and EP eval for DCCV.

## 2022-03-22 NOTE — ED PROVIDER NOTE - WR ORDER ID 1
Patient states that he misplaced the list of providers for therapy that he had received from Dr. Nevarez.  He would like another copy of that list or just the information for the providers that Dr. Nevarez would recommend.   [id number]

## 2022-03-22 NOTE — ED ADULT NURSE NOTE - OBJECTIVE STATEMENT
81y F, PMHx A-Fib, presents c/o chest pain worsening over the past few days. Pt hypertensive and tachycardic in triage. EKG showing A-Fib. Pt upgraded to Dr. Cardozo. PIV placed, continuous cardiac/pulse oximetry monitoring initiated. Pt medicated as per MAR. Pt A&Ox4, speaking in clear/full sentences, no acute distress. Family member at bedside assisting in translating.

## 2022-03-23 ENCOUNTER — TRANSCRIPTION ENCOUNTER (OUTPATIENT)
Age: 82
End: 2022-03-23

## 2022-03-23 DIAGNOSIS — Z29.9 ENCOUNTER FOR PROPHYLACTIC MEASURES, UNSPECIFIED: ICD-10-CM

## 2022-03-23 PROBLEM — I48.0 PAROXYSMAL ATRIAL FIBRILLATION: Chronic | Status: ACTIVE | Noted: 2022-03-22

## 2022-03-23 LAB
ANION GAP SERPL CALC-SCNC: 8 MMOL/L — SIGNIFICANT CHANGE UP (ref 5–17)
BUN SERPL-MCNC: 22 MG/DL — SIGNIFICANT CHANGE UP (ref 7–23)
CALCIUM SERPL-MCNC: 9.5 MG/DL — SIGNIFICANT CHANGE UP (ref 8.4–10.5)
CHLORIDE SERPL-SCNC: 105 MMOL/L — SIGNIFICANT CHANGE UP (ref 96–108)
CO2 SERPL-SCNC: 28 MMOL/L — SIGNIFICANT CHANGE UP (ref 22–31)
CREAT SERPL-MCNC: 1.1 MG/DL — SIGNIFICANT CHANGE UP (ref 0.5–1.3)
EGFR: 50 ML/MIN/1.73M2 — LOW
GLUCOSE SERPL-MCNC: 99 MG/DL — SIGNIFICANT CHANGE UP (ref 70–99)
HCT VFR BLD CALC: 35.2 % — SIGNIFICANT CHANGE UP (ref 34.5–45)
HGB BLD-MCNC: 10.8 G/DL — LOW (ref 11.5–15.5)
MAGNESIUM SERPL-MCNC: 2.2 MG/DL — SIGNIFICANT CHANGE UP (ref 1.6–2.6)
MCHC RBC-ENTMCNC: 28.1 PG — SIGNIFICANT CHANGE UP (ref 27–34)
MCHC RBC-ENTMCNC: 30.7 GM/DL — LOW (ref 32–36)
MCV RBC AUTO: 91.4 FL — SIGNIFICANT CHANGE UP (ref 80–100)
NRBC # BLD: 0 /100 WBCS — SIGNIFICANT CHANGE UP (ref 0–0)
NT-PROBNP SERPL-SCNC: 1196 PG/ML — HIGH (ref 0–300)
PLATELET # BLD AUTO: 255 K/UL — SIGNIFICANT CHANGE UP (ref 150–400)
POTASSIUM SERPL-MCNC: 4.6 MMOL/L — SIGNIFICANT CHANGE UP (ref 3.5–5.3)
POTASSIUM SERPL-SCNC: 4.6 MMOL/L — SIGNIFICANT CHANGE UP (ref 3.5–5.3)
RBC # BLD: 3.85 M/UL — SIGNIFICANT CHANGE UP (ref 3.8–5.2)
RBC # FLD: 19.8 % — HIGH (ref 10.3–14.5)
SODIUM SERPL-SCNC: 141 MMOL/L — SIGNIFICANT CHANGE UP (ref 135–145)
TROPONIN T SERPL-MCNC: <0.01 NG/ML — SIGNIFICANT CHANGE UP (ref 0–0.01)
WBC # BLD: 7.92 K/UL — SIGNIFICANT CHANGE UP (ref 3.8–10.5)
WBC # FLD AUTO: 7.92 K/UL — SIGNIFICANT CHANGE UP (ref 3.8–10.5)

## 2022-03-23 PROCEDURE — 99231 SBSQ HOSP IP/OBS SF/LOW 25: CPT

## 2022-03-23 PROCEDURE — 99233 SBSQ HOSP IP/OBS HIGH 50: CPT

## 2022-03-23 RX ORDER — AMIODARONE HYDROCHLORIDE 400 MG/1
400 TABLET ORAL EVERY 12 HOURS
Refills: 0 | Status: DISCONTINUED | OUTPATIENT
Start: 2022-03-23 | End: 2022-03-24

## 2022-03-23 RX ADMIN — APIXABAN 5 MILLIGRAM(S): 2.5 TABLET, FILM COATED ORAL at 05:16

## 2022-03-23 RX ADMIN — Medication 30 MILLILITER(S): at 07:03

## 2022-03-23 RX ADMIN — ATORVASTATIN CALCIUM 20 MILLIGRAM(S): 80 TABLET, FILM COATED ORAL at 21:21

## 2022-03-23 RX ADMIN — APIXABAN 5 MILLIGRAM(S): 2.5 TABLET, FILM COATED ORAL at 17:27

## 2022-03-23 RX ADMIN — Medication 50 MILLIGRAM(S): at 05:16

## 2022-03-23 RX ADMIN — TIOTROPIUM BROMIDE 1 CAPSULE(S): 18 CAPSULE ORAL; RESPIRATORY (INHALATION) at 12:36

## 2022-03-23 RX ADMIN — LISINOPRIL 10 MILLIGRAM(S): 2.5 TABLET ORAL at 05:15

## 2022-03-23 RX ADMIN — Medication 1 MILLIGRAM(S): at 12:35

## 2022-03-23 RX ADMIN — Medication 50 MILLIGRAM(S): at 17:26

## 2022-03-23 RX ADMIN — AMIODARONE HYDROCHLORIDE 400 MILLIGRAM(S): 400 TABLET ORAL at 12:34

## 2022-03-23 RX ADMIN — Medication 20 MILLIGRAM(S): at 06:56

## 2022-03-23 RX ADMIN — Medication 1 TABLET(S): at 12:35

## 2022-03-23 NOTE — DISCHARGE NOTE PROVIDER - NSDCFUADDAPPT_GEN_ALL_CORE_FT
(1) Please follow up with Primary Care Provider, Dr. Chelsea Aguirre on April 5th at 1 PM    (2) Please follow up with the electrophysiology, Dr. Keller on 5/23/22 at 2 PM

## 2022-03-23 NOTE — DISCHARGE NOTE PROVIDER - PROVIDER TOKENS
PROVIDER:[TOKEN:[[id number]],SCHEDULEDAPPT:[05/23/2022],SCHEDULEDAPPTTIME:[02:00 PM],ESTABLISHEDPATIENT:[T]]

## 2022-03-23 NOTE — DISCHARGE NOTE PROVIDER - NSDCMRMEDTOKEN_GEN_ALL_CORE_FT
apixaban 5 mg oral tablet: 1 tab(s) orally 2 times a day  atorvastatin 20 mg oral tablet: 1 tab(s) orally once a day (at bedtime)  folic acid 1 mg oral tablet: 1 tab(s) orally once a day (at bedtime)  furosemide 20 mg oral tablet: 1 tab(s) orally once a day  lisinopril 10 mg oral tablet: 1 tab(s) orally once a day  LORazepam 0.5 mg oral tablet: 1 tab(s) orally once a day  magnesium oxide 400 mg (241.3 mg elemental magnesium) oral tablet: 1 tab(s) orally once a day  metoprolol succinate 50 mg oral tablet, extended release: 1 tab(s) orally once a day  pantoprazole 40 mg oral delayed release tablet: 1 tab(s) orally once a day  Spiriva Respimat 1.25 mcg/inh inhalation aerosol: 2 puff(s) inhaled once a day  Tab-A-Stacie oral tablet: 1 tab(s) orally once a day   amiodarone 200 mg oral tablet: 2 tab(s) orally every 12 hours starting evening of 3/24 through 3/29. On 3/30 just take 1 tab a day   apixaban 5 mg oral tablet: 1 tab(s) orally 2 times a day  atorvastatin 20 mg oral tablet: 1 tab(s) orally once a day (at bedtime)  folic acid 1 mg oral tablet: 1 tab(s) orally once a day  furosemide 20 mg oral tablet: 1 tab(s) orally once a day  lisinopril 10 mg oral tablet: 1 tab(s) orally once a day  LORazepam 0.5 mg oral tablet: 1 tab(s) orally once a day  magnesium oxide 400 mg (241.3 mg elemental magnesium) oral tablet: 1 tab(s) orally once a day  Multiple Vitamins oral tablet: 1 tab(s) orally once a day  pantoprazole 40 mg oral delayed release tablet: 1 tab(s) orally once a day  Spiriva Respimat 1.25 mcg/inh inhalation aerosol: 2 puff(s) inhaled once a day

## 2022-03-23 NOTE — PROGRESS NOTE ADULT - PROBLEM SELECTOR PLAN 5
last TTE 1/15/22 with S3 valve in position without AR and no mention of leaflet thrombus    consider repeat TTE or IRMA if requested by EP

## 2022-03-23 NOTE — PROGRESS NOTE ADULT - SUBJECTIVE AND OBJECTIVE BOX
EPS Progress Note    S: in bed, NAD, feels better      MEDICATIONS  (STANDING):  aMIOdarone    Tablet 400 milliGRAM(s) Oral every 12 hours  apixaban 5 milliGRAM(s) Oral two times a day  atorvastatin 20 milliGRAM(s) Oral at bedtime  folic acid 1 milliGRAM(s) Oral daily  furosemide    Tablet 20 milliGRAM(s) Oral daily  influenza  Vaccine (HIGH DOSE) 0.7 milliLiter(s) IntraMuscular once  lisinopril 10 milliGRAM(s) Oral daily  metoprolol tartrate 50 milliGRAM(s) Oral two times a day  multivitamin 1 Tablet(s) Oral daily  pantoprazole    Tablet 40 milliGRAM(s) Oral before breakfast  tiotropium 18 MICROgram(s) Capsule 1 Capsule(s) Inhalation daily      Telemetry: sinus rhythm            General:  NAD        HEENT:   PERRL, EOMI	  Neck: Supple, - JVD   Cardiovascular:  S1 S2, No JVD  Respiratory: CTA B/L        Gastrointestinal:  Soft, Non-tender, + BS	  Skin: No rashes, No ecchymoses, No cyanosis  Extremities: No edema  Psychiatry: A & O x 3         Labs:                                                               10.8   7.92  )-----------( 255      ( 23 Mar 2022 08:08 )             35.2     03-23    141  |  105  |  22  ----------------------------<  99  4.6   |  28  |  1.10    Ca    9.5      23 Mar 2022 08:08  Mg     2.2     03-23    TPro  7.8  /  Alb  4.5  /  TBili  0.3  /  DBili  x   /  AST  20  /  ALT  15  /  AlkPhos  99  03-22    PT/INR - ( 22 Mar 2022 11:12 )   PT: 14.0 sec;   INR: 1.17          PTT - ( 22 Mar 2022 11:12 )  PTT:31.0 sec    Assessment/Plan:    80 y/o Korean/English speaking F, PMHx uncontrolled HTN, HLD, hx severe AS s/p TAVR 02/2019,  hx S3 THV leaflet  thrombus post TAVR in 2021(was on warfarin, not seen on last TTE 1/15/22) , pAfib (on eliquis, no hx of ablations/DCCV), COPD (intermittent 2L home O2), moderate MARK (non-compliant with CPAP 2/2 claustrophobia), iron deficiency anemia (baseline hgb 9s), CKD-III (baseline Cr 1.1-1.2), GERD, anxiety, colon cancer (s/p colectomy 12/2020), recent  admission to [facility]  1/14-1/18 2022 for for hypertensive urgency.   Patient self converted to sinus rhythm overnight, without pauses, will start Amiodarone load 400 mg BId x 7 days , then decrease to 200 mg daily. Patient should follow up with Dr. Keller EPS in  4-5 weeks. 
OVERNIGHT EVENTS: Converted to NSR overnight    SUBJECTIVE: NAC    VITAL SIGNS:  T(F): 97.9 (03-23-22 @ 13:11)  HR: 69 (03-23-22 @ 12:52)  BP: 114/60 (03-23-22 @ 12:52)  RR: 20 (03-23-22 @ 12:52)  SpO2: 98% (03-23-22 @ 12:52)      03-22-22 @ 07:01  -  03-23-22 @ 07:00  --------------------------------------------------------  IN: 60 mL / OUT: 200 mL / NET: -140 mL    03-23-22 @ 07:01  -  03-23-22 @ 14:41  --------------------------------------------------------  IN: 530 mL / OUT: 400 mL / NET: 130 mL    PHYSICAL EXAM:    HEENT: AT, NC, MMM  Neck: no JVD  Chest: CTA b/l   Cor: S1 S2 irreg irreg, no murmur appreciated  Abd: obese, soft, NT/ND  Ext: trace pitting edema RLE only, well perfused  Vasc: DP/PT 1+ b/l   Neuro: A+Ox 3, no gross deficits  Psych: appropriate affect, cooperative  Skin: dry/warm    MEDICATIONS  (STANDING):  aMIOdarone    Tablet 400 milliGRAM(s) Oral every 12 hours  apixaban 5 milliGRAM(s) Oral two times a day  atorvastatin 20 milliGRAM(s) Oral at bedtime  folic acid 1 milliGRAM(s) Oral daily  furosemide    Tablet 20 milliGRAM(s) Oral daily  influenza  Vaccine (HIGH DOSE) 0.7 milliLiter(s) IntraMuscular once  lisinopril 10 milliGRAM(s) Oral daily  metoprolol tartrate 50 milliGRAM(s) Oral two times a day  multivitamin 1 Tablet(s) Oral daily  pantoprazole    Tablet 40 milliGRAM(s) Oral before breakfast  tiotropium 18 MICROgram(s) Capsule 1 Capsule(s) Inhalation daily    MEDICATIONS  (PRN):  aluminum hydroxide/magnesium hydroxide/simethicone Suspension 30 milliLiter(s) Oral every 8 hours PRN Dyspepsia  LORazepam     Tablet 0.5 milliGRAM(s) Oral daily PRN Anxiety      Allergies    Digox (Rash; Urticaria; Hives)  Plavix (Other (Mod to Severe))  vancomycin (Other)    Intolerances    LABS:                        10.8   7.92  )-----------( 255      ( 23 Mar 2022 08:08 )             35.2     03-23    141  |  105  |  22  ----------------------------<  99  4.6   |  28  |  1.10    Ca    9.5      23 Mar 2022 08:08  Mg     2.2     03-23    TPro  7.8  /  Alb  4.5  /  TBili  0.3  /  DBili  x   /  AST  20  /  ALT  15  /  AlkPhos  99  03-22    PT/INR - ( 22 Mar 2022 11:12 )   PT: 14.0 sec;   INR: 1.17          PTT - ( 22 Mar 2022 11:12 )  PTT:31.0 sec      RADIOLOGY & ADDITIONAL TESTS:  Reviewed

## 2022-03-23 NOTE — PROGRESS NOTE ADULT - PROBLEM SELECTOR PLAN 3
NC head CT as per attending given c/o HA and uncontrolled HTN and eliquis therapy   repeat SBP near 150, avoid hypotension given reported SBP >200 at home  continue metoprolol tartrate 50mg BID for rate control  continue lisinopril 10mg ( starting 3/23)

## 2022-03-23 NOTE — PROGRESS NOTE ADULT - PROBLEM SELECTOR PLAN 1
EP eval for DCCV 3/32  given symptomatic A fib RVR  patient states she has been on uninterrupted eliquis   continue eliquis 5mg BID  rate control with metoprolol 50mg BID  - Patient converted to NSR overnight  - No cardioversion  - Started on amiodarone 400 BID for 7 days and then 200mg daily   - Pending PT eval prior to dc

## 2022-03-23 NOTE — PROGRESS NOTE ADULT - PROBLEM SELECTOR PLAN 2
serial troponins ( troponin neg x 1 so far), symptoms likely related to A fib RVR  low suspicion for ACS as CCTA 1/18/2022 with minimal non obstructive CAD  EKG with new TWI in III and AVF in setting of A fib RVR,       will try maalox on addition to home protonix as well

## 2022-03-23 NOTE — DISCHARGE NOTE PROVIDER - NSDCCPCAREPLAN_GEN_ALL_CORE_FT
PRINCIPAL DISCHARGE DIAGNOSIS  Diagnosis: Atrial fibrillation with rapid ventricular response  Assessment and Plan of Treatment: You were admitted to the hospital with chest pain. This occurred due to an irregular heart rhythm called atrial fibrilation. This condition can occur when the heart enters into an abnormal rhythm, which causes you to have chest pain. Many potential things may have caused this, however your rhythm returned to normal prior to any procedures to find the source. This case was discussed with the electrophysiology doctors, who are the specalists in heart rhythms, and you were started on a new medication called amiodarone. Please take all doses of this medication as prescribed and keep all follow up appointments.  If following your discharge, you experience chest pain, a sensation of pressing on your chest, or lose consiousness or feel as if you may do so, please call [phone] as this may represent a medical emergency which you should be evaluated for. Following this discharge, please keep your appointments with your cardiology team provided on this document. Please call the phone number at the bottom of this document to be connected with a healthcare provider if you have any questions.      SECONDARY DISCHARGE DIAGNOSES  Diagnosis: Acute chest pain  Assessment and Plan of Treatment:      PRINCIPAL DISCHARGE DIAGNOSIS  Diagnosis: Atrial fibrillation with rapid ventricular response  Assessment and Plan of Treatment: You were admitted to the hospital with chest pain. This occurred due to an irregular heart rhythm called atrial fibrilation. This condition can occur when the heart enters into an abnormal rhythm, which causes you to have chest pain. Many potential things may have caused this, however your rhythm returned to normal prior to any procedures to find the source. This case was discussed with the electrophysiology doctors, who are the specalists in heart rhythms, and you were started on a new medication called amiodarone. Please take all doses of this medication as prescribed and keep all follow up appointments.  If following your discharge, you experience chest pain, a sensation of pressing on your chest, or lose consiousness or feel as if you may do so, please call [phone] as this may represent a medical emergency which you should be evaluated for. Following this discharge, please keep your appointments with your cardiology team provided on this document. Please call the phone number at the bottom of this document to be connected with a healthcare provider if you have any questions.  Instructions:   - Please take amiodarone 400 mg twice a day starting when you go home tonight until (total      SECONDARY DISCHARGE DIAGNOSES  Diagnosis: Acute chest pain  Assessment and Plan of Treatment:      PRINCIPAL DISCHARGE DIAGNOSIS  Diagnosis: Atrial fibrillation with rapid ventricular response  Assessment and Plan of Treatment: You were admitted to the hospital with chest pain. This occurred due to an irregular heart rhythm called atrial fibrilation. This condition can occur when the heart enters into an abnormal rhythm, which causes you to have chest pain. Many potential things may have caused this, however your rhythm returned to normal prior to any procedures to find the source. This case was discussed with the electrophysiology doctors, who are the specalists in heart rhythms, and you were started on a new medication called amiodarone. Please take all doses of this medication as prescribed and keep all follow up appointments.  If following your discharge, you experience chest pain, a sensation of pressing on your chest, or lose consiousness or feel as if you may do so, please call [phone] as this may represent a medical emergency which you should be evaluated for. Following this discharge, please keep your appointments with your cardiology team provided on this document. Please call the phone number at the bottom of this document to be connected with a healthcare provider if you have any questions.  Instructions:   - Please take amiodarone 400 mg (2 pills) twice a day (4 pills a day total) starting when you go home tonight until 3/29. On 3/30 please just take one pill per day (200 mg total)  - Please follow up with Dr. Keller on 5/23/22   - Please discontinue your metoprolol  - Please continue taking lisinopril and lasix once a day   - Please follow up with your primary care provider, Dr. prince on April 5th at 1PM

## 2022-03-23 NOTE — DISCHARGE NOTE PROVIDER - HOSPITAL COURSE
#Discharge: do not delete    81F PMH HTN, HLD, severe AS s/p TAVR in '19 and '21, paroxysmal atrial fibrilation, COPD on home O2 at 2LPM PRN, FEROZ, CKD3 and colon cancer s/p resection in'19  Presented with palpitations and found to be in afib with RVR  Problem List/Main Diagnoses (system-based):     1. Afib with RVR - Patient admitted with atrial fibrillation for possible cardioversion with electrophysiology. Patient spontaneously converted from atrial fibrillation to normal sinus rhythm prior to intervention. Electrophysiology had already been consulted, and patient was started on amiodarone PO. Discharged without further incident on amiodarone and home xaralto and metoprolol tartrate 50 BID for rate control. Will follow up with EP as an outpatient.     2. HTN - Patient with longstanding hypertension which is poorly controlled as an outpatient. Continue with metoprolol tartrate 50 mg PO BID and started lisinopril 10 mg PO Qd. Follow up as an outpatient for continued BP monitoring.    3. COPD - Patient is with a longstanding history of COPD as an outpatient and requires O2 intermittently. Continue with supplimental O2 as required and patient to follow up with pulmonology.     New medications: Amiodarone 400 mg PO BID for 7 days total, then 200 mg PO Qd  Labs to be followed outpatient: None  Exam to be followed outpatient: None   #Discharge: do not delete    81F PMH HTN, HLD, severe AS s/p TAVR in '19 and '21, paroxysmal atrial fibrilation, COPD on home O2 at 2LPM PRN, FEROZ, CKD3 and colon cancer s/p resection in'19  Presented with palpitations and found to be in afib with RVR  Problem List/Main Diagnoses (system-based):     1. Afib with RVR - Patient admitted with atrial fibrillation for possible cardioversion with electrophysiology. Patient spontaneously converted from atrial fibrillation to normal sinus rhythm prior to intervention. Electrophysiology had already been consulted, and patient was started on amiodarone PO. Discharged without further incident on amiodarone and home xaralto and metoprolol tartrate 50 BID for rate control. Will follow up with EP as an outpatient.     2. HTN - Patient with longstanding hypertension which is poorly controlled as an outpatient. Continue with metoprolol tartrate 50 mg PO BID and started lisinopril 10 mg PO Qd. Follow up as an outpatient for continued BP monitoring.    3. COPD - Patient is with a longstanding history of COPD as an outpatient and requires O2 intermittently. Continue with supplimental O2 as required and patient to follow up with pulmonology.     New medications: Amiodarone 400 mg PO BID for 7 days total, then 200 mg PO Qd  Labs to be followed outpatient: None  Exam to be followed outpatient: None    PHYSICAL EXAM:  HEENT: AT, NC, MMM  Neck: no JVD  Chest: CTA b/l   Cor: S1 S2 irreg irreg, no murmur appreciated  Abd: obese, soft, NT/ND  Ext: trace pitting edema RLE only, well perfused  Vasc: DP/PT 1+ b/l   Neuro: A+Ox 3, no gross deficits  Psych: appropriate affect, cooperative  Skin: dry/warm   #Discharge: do not delete    81F PMH HTN, HLD, severe AS s/p TAVR in '19 and '21, paroxysmal atrial fibrilation, COPD on home O2 at 2LPM PRN, FEROZ, CKD3 and colon cancer s/p resection in'19  Presented with palpitations and found to be in afib with RVR  Problem List/Main Diagnoses (system-based):     1. Afib with RVR - Patient admitted with atrial fibrillation for possible cardioversion with electrophysiology. Patient spontaneously converted from atrial fibrillation to normal sinus rhythm prior to intervention. Electrophysiology had already been consulted, and patient was started on amiodarone PO. Discharged without further incident on amiodarone and home xaralto. On 3/24, patient with some bradycardia so was discharged off her metoprolol tartrate 50 BID for rate control. Will follow up with EP as an outpatient.     2. HTN - Patient with longstanding hypertension which is poorly controlled as an outpatient. We discontinued metoprolol tartrate 50 mg PO BID and started lisinopril 10 mg PO Qd. Continued lasix. Follow up as an outpatient for continued BP monitoring.    3. COPD - Patient is with a longstanding history of COPD as an outpatient and requires O2 intermittently. Continue with supplimental O2 as required and patient to follow up with pulmonology.     New medications:   Amiodarone 400 mg PO BID for 7 days total, then 200 mg PO Qd    Labs to be followed outpatient: None  Exam to be followed outpatient: None    PHYSICAL EXAM:  HEENT: AT, NC, MMM  Neck: no JVD  Chest: CTA b/l   Cor: S1 S2 irreg irreg, no murmur appreciated  Abd: obese, soft, NT/ND  Ext: trace pitting edema RLE only, well perfused  Vasc: DP/PT 1+ b/l   Neuro: A+Ox 3, no gross deficits  Psych: appropriate affect, cooperative  Skin: dry/warm

## 2022-03-23 NOTE — DISCHARGE NOTE PROVIDER - CARE PROVIDER_API CALL
Annabel Keller)  Cardiac Electrophysiology; Cardiovascular Disease; Internal Medicine  [address] [place] [address]  Phone: [phone]  Fax: [phone]  Established Patient  Scheduled Appointment: 05/23/2022 02:00 PM  
n/a

## 2022-03-23 NOTE — PROGRESS NOTE ADULT - ASSESSMENT
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 Found to be in A fib 's. Patient is now admitted to [facility] for further management of A fib RVR

## 2022-03-23 NOTE — PROGRESS NOTE ADULT - PROBLEM SELECTOR PLAN 8
F: None   E: Replete as necessary K>4 Mg>2  N: DASH diet   DVT Prophylaxis: eliquis   GI prophylaxis: None   CODE STATUS: FULL  D: Pending PT eval

## 2022-03-24 ENCOUNTER — TRANSCRIPTION ENCOUNTER (OUTPATIENT)
Age: 82
End: 2022-03-24

## 2022-03-24 VITALS — TEMPERATURE: 98 F

## 2022-03-24 LAB
ALBUMIN SERPL ELPH-MCNC: 4 G/DL — SIGNIFICANT CHANGE UP (ref 3.3–5)
ALP SERPL-CCNC: 81 U/L — SIGNIFICANT CHANGE UP (ref 40–120)
ALT FLD-CCNC: 13 U/L — SIGNIFICANT CHANGE UP (ref 10–45)
ANION GAP SERPL CALC-SCNC: 13 MMOL/L — SIGNIFICANT CHANGE UP (ref 5–17)
AST SERPL-CCNC: 17 U/L — SIGNIFICANT CHANGE UP (ref 10–40)
BASOPHILS # BLD AUTO: 0.05 K/UL — SIGNIFICANT CHANGE UP (ref 0–0.2)
BASOPHILS NFR BLD AUTO: 0.8 % — SIGNIFICANT CHANGE UP (ref 0–2)
BILIRUB SERPL-MCNC: 0.5 MG/DL — SIGNIFICANT CHANGE UP (ref 0.2–1.2)
BUN SERPL-MCNC: 26 MG/DL — HIGH (ref 7–23)
CALCIUM SERPL-MCNC: 9.3 MG/DL — SIGNIFICANT CHANGE UP (ref 8.4–10.5)
CHLORIDE SERPL-SCNC: 101 MMOL/L — SIGNIFICANT CHANGE UP (ref 96–108)
CO2 SERPL-SCNC: 23 MMOL/L — SIGNIFICANT CHANGE UP (ref 22–31)
CREAT SERPL-MCNC: 1.27 MG/DL — SIGNIFICANT CHANGE UP (ref 0.5–1.3)
EGFR: 42 ML/MIN/1.73M2 — LOW
EOSINOPHIL # BLD AUTO: 0.12 K/UL — SIGNIFICANT CHANGE UP (ref 0–0.5)
EOSINOPHIL NFR BLD AUTO: 1.9 % — SIGNIFICANT CHANGE UP (ref 0–6)
GLUCOSE SERPL-MCNC: 100 MG/DL — HIGH (ref 70–99)
HCT VFR BLD CALC: 35.2 % — SIGNIFICANT CHANGE UP (ref 34.5–45)
HGB BLD-MCNC: 10.7 G/DL — LOW (ref 11.5–15.5)
IMM GRANULOCYTES NFR BLD AUTO: 0.3 % — SIGNIFICANT CHANGE UP (ref 0–1.5)
LYMPHOCYTES # BLD AUTO: 1.59 K/UL — SIGNIFICANT CHANGE UP (ref 1–3.3)
LYMPHOCYTES # BLD AUTO: 25.3 % — SIGNIFICANT CHANGE UP (ref 13–44)
MAGNESIUM SERPL-MCNC: 2.1 MG/DL — SIGNIFICANT CHANGE UP (ref 1.6–2.6)
MCHC RBC-ENTMCNC: 27.9 PG — SIGNIFICANT CHANGE UP (ref 27–34)
MCHC RBC-ENTMCNC: 30.4 GM/DL — LOW (ref 32–36)
MCV RBC AUTO: 91.7 FL — SIGNIFICANT CHANGE UP (ref 80–100)
MONOCYTES # BLD AUTO: 0.52 K/UL — SIGNIFICANT CHANGE UP (ref 0–0.9)
MONOCYTES NFR BLD AUTO: 8.3 % — SIGNIFICANT CHANGE UP (ref 2–14)
NEUTROPHILS # BLD AUTO: 3.98 K/UL — SIGNIFICANT CHANGE UP (ref 1.8–7.4)
NEUTROPHILS NFR BLD AUTO: 63.4 % — SIGNIFICANT CHANGE UP (ref 43–77)
NRBC # BLD: 0 /100 WBCS — SIGNIFICANT CHANGE UP (ref 0–0)
PHOSPHATE SERPL-MCNC: 4.4 MG/DL — SIGNIFICANT CHANGE UP (ref 2.5–4.5)
PLATELET # BLD AUTO: 264 K/UL — SIGNIFICANT CHANGE UP (ref 150–400)
POTASSIUM SERPL-MCNC: 4.2 MMOL/L — SIGNIFICANT CHANGE UP (ref 3.5–5.3)
POTASSIUM SERPL-SCNC: 4.2 MMOL/L — SIGNIFICANT CHANGE UP (ref 3.5–5.3)
PROT SERPL-MCNC: 7 G/DL — SIGNIFICANT CHANGE UP (ref 6–8.3)
RBC # BLD: 3.84 M/UL — SIGNIFICANT CHANGE UP (ref 3.8–5.2)
RBC # FLD: 19.2 % — HIGH (ref 10.3–14.5)
SODIUM SERPL-SCNC: 137 MMOL/L — SIGNIFICANT CHANGE UP (ref 135–145)
WBC # BLD: 6.28 K/UL — SIGNIFICANT CHANGE UP (ref 3.8–10.5)
WBC # FLD AUTO: 6.28 K/UL — SIGNIFICANT CHANGE UP (ref 3.8–10.5)

## 2022-03-24 PROCEDURE — 94640 AIRWAY INHALATION TREATMENT: CPT

## 2022-03-24 PROCEDURE — 85610 PROTHROMBIN TIME: CPT

## 2022-03-24 PROCEDURE — 36415 COLL VENOUS BLD VENIPUNCTURE: CPT

## 2022-03-24 PROCEDURE — 83880 ASSAY OF NATRIURETIC PEPTIDE: CPT

## 2022-03-24 PROCEDURE — 83735 ASSAY OF MAGNESIUM: CPT

## 2022-03-24 PROCEDURE — 84484 ASSAY OF TROPONIN QUANT: CPT

## 2022-03-24 PROCEDURE — 85027 COMPLETE CBC AUTOMATED: CPT

## 2022-03-24 PROCEDURE — 80053 COMPREHEN METABOLIC PANEL: CPT

## 2022-03-24 PROCEDURE — 85025 COMPLETE CBC W/AUTO DIFF WBC: CPT

## 2022-03-24 PROCEDURE — 85730 THROMBOPLASTIN TIME PARTIAL: CPT

## 2022-03-24 PROCEDURE — 93005 ELECTROCARDIOGRAM TRACING: CPT

## 2022-03-24 PROCEDURE — 71045 X-RAY EXAM CHEST 1 VIEW: CPT

## 2022-03-24 PROCEDURE — 99239 HOSP IP/OBS DSCHRG MGMT >30: CPT

## 2022-03-24 PROCEDURE — 70450 CT HEAD/BRAIN W/O DYE: CPT

## 2022-03-24 PROCEDURE — 96374 THER/PROPH/DIAG INJ IV PUSH: CPT

## 2022-03-24 PROCEDURE — 84100 ASSAY OF PHOSPHORUS: CPT

## 2022-03-24 PROCEDURE — 80048 BASIC METABOLIC PNL TOTAL CA: CPT

## 2022-03-24 PROCEDURE — 99285 EMERGENCY DEPT VISIT HI MDM: CPT

## 2022-03-24 PROCEDURE — 96376 TX/PRO/DX INJ SAME DRUG ADON: CPT

## 2022-03-24 PROCEDURE — 87635 SARS-COV-2 COVID-19 AMP PRB: CPT

## 2022-03-24 RX ORDER — ACETAMINOPHEN 500 MG
650 TABLET ORAL EVERY 6 HOURS
Refills: 0 | Status: DISCONTINUED | OUTPATIENT
Start: 2022-03-24 | End: 2022-03-24

## 2022-03-24 RX ORDER — ATORVASTATIN CALCIUM 80 MG/1
1 TABLET, FILM COATED ORAL
Qty: 0 | Refills: 0 | DISCHARGE
Start: 2022-03-24

## 2022-03-24 RX ORDER — LISINOPRIL 2.5 MG/1
1 TABLET ORAL
Qty: 0 | Refills: 0 | DISCHARGE
Start: 2022-03-24

## 2022-03-24 RX ORDER — FUROSEMIDE 40 MG
1 TABLET ORAL
Qty: 0 | Refills: 0 | DISCHARGE
Start: 2022-03-24

## 2022-03-24 RX ORDER — AMIODARONE HYDROCHLORIDE 400 MG/1
1 TABLET ORAL
Qty: 0 | Refills: 1 | DISCHARGE
Start: 2022-03-24

## 2022-03-24 RX ORDER — AMIODARONE HYDROCHLORIDE 400 MG/1
2 TABLET ORAL
Qty: 45 | Refills: 1
Start: 2022-03-24

## 2022-03-24 RX ORDER — FOLIC ACID 0.8 MG
1 TABLET ORAL
Qty: 0 | Refills: 0 | DISCHARGE
Start: 2022-03-24

## 2022-03-24 RX ORDER — APIXABAN 2.5 MG/1
1 TABLET, FILM COATED ORAL
Qty: 0 | Refills: 0 | DISCHARGE
Start: 2022-03-24

## 2022-03-24 RX ADMIN — TIOTROPIUM BROMIDE 1 CAPSULE(S): 18 CAPSULE ORAL; RESPIRATORY (INHALATION) at 09:00

## 2022-03-24 RX ADMIN — APIXABAN 5 MILLIGRAM(S): 2.5 TABLET, FILM COATED ORAL at 06:08

## 2022-03-24 RX ADMIN — LISINOPRIL 10 MILLIGRAM(S): 2.5 TABLET ORAL at 06:09

## 2022-03-24 RX ADMIN — Medication 50 MILLIGRAM(S): at 06:09

## 2022-03-24 RX ADMIN — Medication 20 MILLIGRAM(S): at 06:08

## 2022-03-24 RX ADMIN — Medication 650 MILLIGRAM(S): at 08:03

## 2022-03-24 RX ADMIN — Medication 650 MILLIGRAM(S): at 07:13

## 2022-03-24 RX ADMIN — Medication 1 TABLET(S): at 11:49

## 2022-03-24 RX ADMIN — PANTOPRAZOLE SODIUM 40 MILLIGRAM(S): 20 TABLET, DELAYED RELEASE ORAL at 06:08

## 2022-03-24 RX ADMIN — AMIODARONE HYDROCHLORIDE 400 MILLIGRAM(S): 400 TABLET ORAL at 11:50

## 2022-03-24 RX ADMIN — AMIODARONE HYDROCHLORIDE 400 MILLIGRAM(S): 400 TABLET ORAL at 00:03

## 2022-03-24 RX ADMIN — Medication 1 MILLIGRAM(S): at 11:50

## 2022-03-24 NOTE — PHYSICAL THERAPY INITIAL EVALUATION ADULT - CRITERIA FOR SKILLED THERAPEUTIC INTERVENTIONS
impairments found/functional limitations in following categories Propranolol Pregnancy And Lactation Text: This medication is Pregnancy Category C and it isn't known if it is safe during pregnancy. It is excreted in breast milk.

## 2022-03-24 NOTE — PROVIDER CONTACT NOTE (OTHER) - ASSESSMENT
Pt. AOx3, not in distress. She complains of headache and stated she's hungry. Pls refer to flowsheet for V/S.

## 2022-03-24 NOTE — DISCHARGE NOTE NURSING/CASE MANAGEMENT/SOCIAL WORK - NSDCPEFALRISK_GEN_ALL_CORE
For information on Fall & Injury Prevention, visit: [email or web address] OR  [email or web address] OR  https://www.cdc.gov/steadi/patient.html

## 2022-03-24 NOTE — PHYSICAL THERAPY INITIAL EVALUATION ADULT - PERTINENT HX OF CURRENT PROBLEM, REHAB EVAL
81 year old female presented to [facility] ED 3/22/22 with c/o burning  chest discomfort that woke her up this morning with associated palpitations and uncontrolled HTN. Found to be in A fib 's. Patient is now admitted to [facility] for further management of A fib RVR, telemetry and EP eval for DCCV.

## 2022-03-24 NOTE — PROGRESS NOTE ADULT - REASON FOR ADMISSION
chest pressure and palpitations, uncontrolled HTN

## 2022-03-24 NOTE — PHYSICAL THERAPY INITIAL EVALUATION ADULT - ADDITIONAL COMMENTS
per psychosocial note: patient lives alone in the same apartment complex as  daughter. Patient has about 5-7 MADYSON. She was ambulating with cane  and needs some assistance with ADLs. Patient's daughter reports that she is patients CDPAP 42H a week.

## 2022-03-24 NOTE — DISCHARGE NOTE NURSING/CASE MANAGEMENT/SOCIAL WORK - PATIENT PORTAL LINK FT
You can access the FollowMyHealth Patient Portal offered by [facility] by registering at the following website: [email or web address]. By joining [employer]s FollowMyHealth portal, you will also be able to view your health information using other applications (apps) compatible with our system.

## 2022-03-28 DIAGNOSIS — Z91.19 PATIENT'S NONCOMPLIANCE WITH OTHER MEDICAL TREATMENT AND REGIMEN: ICD-10-CM

## 2022-03-28 DIAGNOSIS — E66.9 OBESITY, UNSPECIFIED: ICD-10-CM

## 2022-03-28 DIAGNOSIS — N18.30 CHRONIC KIDNEY DISEASE, STAGE 3 UNSPECIFIED: ICD-10-CM

## 2022-03-28 DIAGNOSIS — I50.32 CHRONIC DIASTOLIC (CONGESTIVE) HEART FAILURE: ICD-10-CM

## 2022-03-28 DIAGNOSIS — Z99.89 DEPENDENCE ON OTHER ENABLING MACHINES AND DEVICES: ICD-10-CM

## 2022-03-28 DIAGNOSIS — I48.0 PAROXYSMAL ATRIAL FIBRILLATION: ICD-10-CM

## 2022-03-28 DIAGNOSIS — K21.9 GASTRO-ESOPHAGEAL REFLUX DISEASE WITHOUT ESOPHAGITIS: ICD-10-CM

## 2022-03-28 DIAGNOSIS — G47.33 OBSTRUCTIVE SLEEP APNEA (ADULT) (PEDIATRIC): ICD-10-CM

## 2022-03-28 DIAGNOSIS — I13.0 HYPERTENSIVE HEART AND CHRONIC KIDNEY DISEASE WITH HEART FAILURE AND STAGE 1 THROUGH STAGE 4 CHRONIC KIDNEY DISEASE, OR UNSPECIFIED CHRONIC KIDNEY DISEASE: ICD-10-CM

## 2022-03-28 DIAGNOSIS — I25.10 ATHEROSCLEROTIC HEART DISEASE OF NATIVE CORONARY ARTERY WITHOUT ANGINA PECTORIS: ICD-10-CM

## 2022-03-28 DIAGNOSIS — Z99.81 DEPENDENCE ON SUPPLEMENTAL OXYGEN: ICD-10-CM

## 2022-03-28 DIAGNOSIS — D50.9 IRON DEFICIENCY ANEMIA, UNSPECIFIED: ICD-10-CM

## 2022-03-28 DIAGNOSIS — J44.9 CHRONIC OBSTRUCTIVE PULMONARY DISEASE, UNSPECIFIED: ICD-10-CM

## 2022-03-28 DIAGNOSIS — F41.9 ANXIETY DISORDER, UNSPECIFIED: ICD-10-CM

## 2022-04-06 NOTE — PROGRESS NOTE ADULT - PROBLEM SELECTOR PLAN 4
Problem: ALTERED NUTRIENT INTAKE - ADULT  Goal: Nutrient intake appropriate for improving, restoring or maintaining nutritional needs  Description: INTERVENTIONS:  - Assess nutritional status and recommend course of action  - Monitor oral intake, labs, and treatment plans  - Recommend appropriate diets, oral nutritional supplements, and vitamin/mineral supplements  - Provide specific nutrition education as appropriate  Outcome: Progressing Initially admitted w/ hypertensive emergency, now having episodes of hypotension  - hypotensive SBP 80s 11/18, resolved s/p IVF hydration NS 50cc/hr x 8hr  - again hypotensive SBP 80-100s w/ Rapid A-Fib on 11/21  - discontinued Coreg 6.25 mg PO BID, Lisinopril 10mg PO QD, and home HCTZ 12.5mg PO QD.   - current regimen: Lopressor 50 mg q6 hours, Cardizem 30 mg q8 hours

## 2022-04-08 ENCOUNTER — APPOINTMENT (OUTPATIENT)
Dept: HEART AND VASCULAR | Facility: CLINIC | Age: 82
End: 2022-04-08

## 2022-05-23 ENCOUNTER — APPOINTMENT (OUTPATIENT)
Dept: HEART AND VASCULAR | Facility: CLINIC | Age: 82
End: 2022-05-23

## 2022-07-06 ENCOUNTER — APPOINTMENT (OUTPATIENT)
Dept: HEART AND VASCULAR | Facility: CLINIC | Age: 82
End: 2022-07-06

## 2022-07-06 ENCOUNTER — NON-APPOINTMENT (OUTPATIENT)
Age: 82
End: 2022-07-06

## 2022-07-06 VITALS
BODY MASS INDEX: 39.38 KG/M2 | TEMPERATURE: 97 F | HEIGHT: 62 IN | WEIGHT: 214 LBS | SYSTOLIC BLOOD PRESSURE: 132 MMHG | HEART RATE: 60 BPM | DIASTOLIC BLOOD PRESSURE: 74 MMHG | OXYGEN SATURATION: 97 %

## 2022-07-06 DIAGNOSIS — R06.00 DYSPNEA, UNSPECIFIED: ICD-10-CM

## 2022-07-06 DIAGNOSIS — I34.0 NONRHEUMATIC MITRAL (VALVE) INSUFFICIENCY: ICD-10-CM

## 2022-07-06 DIAGNOSIS — R42 DIZZINESS AND GIDDINESS: ICD-10-CM

## 2022-07-06 DIAGNOSIS — I35.0 NONRHEUMATIC AORTIC (VALVE) STENOSIS: ICD-10-CM

## 2022-07-06 DIAGNOSIS — R20.2 PARESTHESIA OF SKIN: ICD-10-CM

## 2022-07-06 DIAGNOSIS — I50.30 UNSPECIFIED DIASTOLIC (CONGESTIVE) HEART FAILURE: ICD-10-CM

## 2022-07-06 DIAGNOSIS — I48.91 UNSPECIFIED ATRIAL FIBRILLATION: ICD-10-CM

## 2022-07-06 PROCEDURE — 93000 ELECTROCARDIOGRAM COMPLETE: CPT

## 2022-07-06 PROCEDURE — 99214 OFFICE O/P EST MOD 30 MIN: CPT

## 2022-07-06 PROCEDURE — 93306 TTE W/DOPPLER COMPLETE: CPT

## 2022-07-06 RX ORDER — FERROUS SULFATE TAB 325 MG (65 MG ELEMENTAL FE) 325 (65 FE) MG
325 (65 FE) TAB ORAL
Qty: 90 | Refills: 0 | Status: DISCONTINUED | COMMUNITY
Start: 2022-02-23 | End: 2022-07-06

## 2022-07-06 RX ORDER — NAPROXEN 500 MG/1
500 TABLET, DELAYED RELEASE ORAL
Qty: 28 | Refills: 0 | Status: DISCONTINUED | COMMUNITY
Start: 2022-02-08 | End: 2022-07-06

## 2022-07-06 RX ORDER — TIOTROPIUM BROMIDE INHALATION SPRAY 3.12 UG/1
2.5 SPRAY, METERED RESPIRATORY (INHALATION) DAILY
Qty: 3 | Refills: 3 | Status: DISCONTINUED | COMMUNITY
Start: 2019-05-15 | End: 2022-07-06

## 2022-07-06 RX ORDER — MULTIVITAMIN WITH FOLIC ACID 400 MCG
TABLET ORAL
Qty: 30 | Refills: 0 | Status: ACTIVE | COMMUNITY
Start: 2022-02-08

## 2022-07-06 RX ORDER — MECLIZINE HYDROCHLORIDE 12.5 MG/1
12.5 TABLET ORAL
Qty: 30 | Refills: 0 | Status: ACTIVE | COMMUNITY
Start: 2022-06-21

## 2022-07-06 RX ORDER — CHOLECALCIFEROL (VITAMIN D3) 25 MCG
1000 TABLET,CHEWABLE ORAL
Qty: 30 | Refills: 0 | Status: ACTIVE | COMMUNITY
Start: 2022-03-01

## 2022-07-06 RX ORDER — MONTELUKAST 10 MG/1
10 TABLET, FILM COATED ORAL
Qty: 30 | Refills: 0 | Status: ACTIVE | COMMUNITY
Start: 2021-12-13

## 2022-07-06 RX ORDER — ERGOCALCIFEROL 1.25 MG/1
1.25 MG CAPSULE, LIQUID FILLED ORAL
Qty: 5 | Refills: 0 | Status: ACTIVE | COMMUNITY
Start: 2022-05-10

## 2022-07-06 RX ORDER — PANTOPRAZOLE 40 MG/1
40 TABLET, DELAYED RELEASE ORAL DAILY
Qty: 30 | Refills: 1 | Status: DISCONTINUED | COMMUNITY
End: 2022-07-06

## 2022-07-06 RX ORDER — ASCORBIC ACID 500 MG
500 TABLET ORAL
Qty: 30 | Refills: 0 | Status: ACTIVE | COMMUNITY
Start: 2021-12-11

## 2022-07-06 RX ORDER — LORAZEPAM 0.5 MG/1
0.5 TABLET ORAL TWICE DAILY
Refills: 0 | Status: ACTIVE | COMMUNITY

## 2022-07-06 RX ORDER — CHLORHEXIDINE GLUCONATE 4 %
400 (240 MG) LIQUID (ML) TOPICAL
Qty: 30 | Refills: 0 | Status: ACTIVE | COMMUNITY
Start: 2021-12-21

## 2022-07-06 RX ORDER — AMIODARONE HYDROCHLORIDE 200 MG/1
200 TABLET ORAL
Qty: 45 | Refills: 0 | Status: ACTIVE | COMMUNITY
Start: 2022-03-24

## 2022-07-06 RX ORDER — ZOLPIDEM TARTRATE 10 MG/1
10 TABLET ORAL
Qty: 30 | Refills: 0 | Status: ACTIVE | COMMUNITY
Start: 2022-01-26

## 2022-07-06 NOTE — ASSESSMENT
[FreeTextEntry1] : HFpEF- Continue Lasix and K, pt not compliant with K\par \par Dizziness- ZIO monitor placed, need to r/o tachy and leobardo episodes, daughter to check BPs.  \par \par s/p TAVR\par \par PAF- On eliquis and Amio\par \par HTN- controlled\par \par ASHD- CTA Coronaries 1/18/22 revealed non-obstructive CAD mild stenosis of \par pLAD mLAD and pRCA, <25% LM stenosis.

## 2022-07-06 NOTE — PHYSICAL EXAM
[Well Developed] : well developed [Well Nourished] : well nourished [No Acute Distress] : no acute distress [Normal Conjunctiva] : normal conjunctiva [Normal Venous Pressure] : normal venous pressure [No Carotid Bruit] : no carotid bruit [Normal S1, S2] : normal S1, S2 [No Rub] : no rub [No Gallop] : no gallop [Clear Lung Fields] : clear lung fields [Good Air Entry] : good air entry [No Respiratory Distress] : no respiratory distress  [Soft] : abdomen soft [Non Tender] : non-tender [No Masses/organomegaly] : no masses/organomegaly [Normal Bowel Sounds] : normal bowel sounds [Normal Gait] : normal gait [No Edema] : no edema [No Cyanosis] : no cyanosis [No Clubbing] : no clubbing [No Varicosities] : no varicosities [No Rash] : no rash [No Skin Lesions] : no skin lesions [Moves all extremities] : moves all extremities [No Focal Deficits] : no focal deficits [Normal Speech] : normal speech [Alert and Oriented] : alert and oriented [Normal memory] : normal memory [de-identified] : teeth in poor repair, retained roots on uppers [de-identified] : YOVANA

## 2022-07-06 NOTE — REASON FOR VISIT
[FreeTextEntry1] : 81 y/o F s/p TAVR Feb 2019.  Also has PAF.  She is very anxious, takes  a benzodiazepine.  Getting dizzy with head turning. Worse at 5 pm and 11 PM.  Daughter checks BPs which are not low.   No syncope or CP. \par \par CTA Coronaries 1/18/22 revealed non-obstructive CAD mild stenosis of \par pLAD mLAD and pRCA, <25% LM stenosis. \par  \par \par EKG: NSR, normal axis and intervals, no ST-Tw abnormalities. 7/6/22

## 2022-07-06 NOTE — PROGRESS NOTE ADULT - PROBLEM SELECTOR PLAN 5
[FreeTextEntry1] : Mr. VALADEZ is a 73 Cramer assisted) \par  - Euvolemic on exam; satting well on RA  - s/p Lasix 20mg IV x1 in the ED. No need for further Lasix.   - Strict I/Os, daily weights, continue ACE.

## 2022-09-02 NOTE — H&P ADULT - NSTOBACCOSCREENHP_GEN_A_NCS
[facility] Call Center    Phone Message    May a detailed message be left on voicemail: yes     Reason for Call: Other: patient would like to know if he should cancel all his [facility] care since he is transfering over to [facility]. He would like Dr Paulson to take over all his care. Patient would also like to participate with the [employer] system.  Please send to his pharmacy in [id number] [place], [place] - [address] AT [facility] [address]. Please call both home and Cell thank you.    Action Taken: Other: Endo    Travel Screening: Not Applicable                                                                       No

## 2022-09-04 ENCOUNTER — INPATIENT (INPATIENT)
Facility: HOSPITAL | Age: 82
LOS: 8 days | Discharge: NURSING, REHAB OR LONG-TERM CARE | DRG: 158 | End: 2022-09-13
Attending: HOSPITALIST | Admitting: GENERAL ACUTE CARE HOSPITAL
Payer: MEDICARE

## 2022-09-04 VITALS
DIASTOLIC BLOOD PRESSURE: 74 MMHG | RESPIRATION RATE: 18 BRPM | HEIGHT: 65 IN | HEART RATE: 75 BPM | WEIGHT: 216.05 LBS | TEMPERATURE: 98 F | OXYGEN SATURATION: 97 % | SYSTOLIC BLOOD PRESSURE: 174 MMHG

## 2022-09-04 DIAGNOSIS — I20.0 UNSTABLE ANGINA: ICD-10-CM

## 2022-09-04 DIAGNOSIS — N18.9 CHRONIC KIDNEY DISEASE, UNSPECIFIED: ICD-10-CM

## 2022-09-04 DIAGNOSIS — I48.0 PAROXYSMAL ATRIAL FIBRILLATION: ICD-10-CM

## 2022-09-04 DIAGNOSIS — R53.1 WEAKNESS: ICD-10-CM

## 2022-09-04 DIAGNOSIS — J44.9 CHRONIC OBSTRUCTIVE PULMONARY DISEASE, UNSPECIFIED: ICD-10-CM

## 2022-09-04 DIAGNOSIS — I10 ESSENTIAL (PRIMARY) HYPERTENSION: ICD-10-CM

## 2022-09-04 DIAGNOSIS — Z95.2 PRESENCE OF PROSTHETIC HEART VALVE: Chronic | ICD-10-CM

## 2022-09-04 DIAGNOSIS — D64.9 ANEMIA, UNSPECIFIED: ICD-10-CM

## 2022-09-04 DIAGNOSIS — Z00.00 ENCOUNTER FOR GENERAL ADULT MEDICAL EXAMINATION WITHOUT ABNORMAL FINDINGS: ICD-10-CM

## 2022-09-04 DIAGNOSIS — R07.9 CHEST PAIN, UNSPECIFIED: ICD-10-CM

## 2022-09-04 DIAGNOSIS — E78.5 HYPERLIPIDEMIA, UNSPECIFIED: ICD-10-CM

## 2022-09-04 LAB
ALBUMIN SERPL ELPH-MCNC: 3.9 G/DL — SIGNIFICANT CHANGE UP (ref 3.3–5)
ALBUMIN SERPL ELPH-MCNC: 3.9 G/DL — SIGNIFICANT CHANGE UP (ref 3.3–5)
ALP SERPL-CCNC: 71 U/L — SIGNIFICANT CHANGE UP (ref 40–120)
ALP SERPL-CCNC: 75 U/L — SIGNIFICANT CHANGE UP (ref 40–120)
ALT FLD-CCNC: 19 U/L — SIGNIFICANT CHANGE UP (ref 10–45)
ALT FLD-CCNC: 20 U/L — SIGNIFICANT CHANGE UP (ref 10–45)
ANION GAP SERPL CALC-SCNC: 12 MMOL/L — SIGNIFICANT CHANGE UP (ref 5–17)
ANION GAP SERPL CALC-SCNC: 13 MMOL/L — SIGNIFICANT CHANGE UP (ref 5–17)
APTT BLD: 34.3 SEC — SIGNIFICANT CHANGE UP (ref 27.5–35.5)
AST SERPL-CCNC: 15 U/L — SIGNIFICANT CHANGE UP (ref 10–40)
AST SERPL-CCNC: 17 U/L — SIGNIFICANT CHANGE UP (ref 10–40)
BASOPHILS # BLD AUTO: 0.04 K/UL — SIGNIFICANT CHANGE UP (ref 0–0.2)
BASOPHILS NFR BLD AUTO: 0.4 % — SIGNIFICANT CHANGE UP (ref 0–2)
BILIRUB SERPL-MCNC: 0.6 MG/DL — SIGNIFICANT CHANGE UP (ref 0.2–1.2)
BILIRUB SERPL-MCNC: 0.6 MG/DL — SIGNIFICANT CHANGE UP (ref 0.2–1.2)
BLD GP AB SCN SERPL QL: POSITIVE — SIGNIFICANT CHANGE UP
BUN SERPL-MCNC: 16 MG/DL — SIGNIFICANT CHANGE UP (ref 7–23)
BUN SERPL-MCNC: 18 MG/DL — SIGNIFICANT CHANGE UP (ref 7–23)
CALCIUM SERPL-MCNC: 8.7 MG/DL — SIGNIFICANT CHANGE UP (ref 8.4–10.5)
CALCIUM SERPL-MCNC: 8.9 MG/DL — SIGNIFICANT CHANGE UP (ref 8.4–10.5)
CHLORIDE SERPL-SCNC: 101 MMOL/L — SIGNIFICANT CHANGE UP (ref 96–108)
CHLORIDE SERPL-SCNC: 97 MMOL/L — SIGNIFICANT CHANGE UP (ref 96–108)
CK MB CFR SERPL CALC: 1.1 NG/ML — SIGNIFICANT CHANGE UP (ref 0–6.7)
CK MB CFR SERPL CALC: <1 NG/ML — SIGNIFICANT CHANGE UP (ref 0–6.7)
CK SERPL-CCNC: 36 U/L — SIGNIFICANT CHANGE UP (ref 25–170)
CK SERPL-CCNC: 40 U/L — SIGNIFICANT CHANGE UP (ref 25–170)
CO2 SERPL-SCNC: 23 MMOL/L — SIGNIFICANT CHANGE UP (ref 22–31)
CO2 SERPL-SCNC: 24 MMOL/L — SIGNIFICANT CHANGE UP (ref 22–31)
CREAT SERPL-MCNC: 1.29 MG/DL — SIGNIFICANT CHANGE UP (ref 0.5–1.3)
CREAT SERPL-MCNC: 1.5 MG/DL — HIGH (ref 0.5–1.3)
EGFR: 34 ML/MIN/1.73M2 — LOW
EGFR: 41 ML/MIN/1.73M2 — LOW
EOSINOPHIL # BLD AUTO: 0.11 K/UL — SIGNIFICANT CHANGE UP (ref 0–0.5)
EOSINOPHIL NFR BLD AUTO: 1.2 % — SIGNIFICANT CHANGE UP (ref 0–6)
GLUCOSE SERPL-MCNC: 104 MG/DL — HIGH (ref 70–99)
GLUCOSE SERPL-MCNC: 123 MG/DL — HIGH (ref 70–99)
HCT VFR BLD CALC: 26.6 % — LOW (ref 34.5–45)
HCT VFR BLD CALC: 27.7 % — LOW (ref 34.5–45)
HGB BLD-MCNC: 8.3 G/DL — LOW (ref 11.5–15.5)
HGB BLD-MCNC: 8.6 G/DL — LOW (ref 11.5–15.5)
IMM GRANULOCYTES NFR BLD AUTO: 0.4 % — SIGNIFICANT CHANGE UP (ref 0–1.5)
INR BLD: 1.59 — HIGH (ref 0.88–1.16)
LDH SERPL L TO P-CCNC: 261 U/L — HIGH (ref 50–242)
LYMPHOCYTES # BLD AUTO: 1.05 K/UL — SIGNIFICANT CHANGE UP (ref 1–3.3)
LYMPHOCYTES # BLD AUTO: 11.8 % — LOW (ref 13–44)
MAGNESIUM SERPL-MCNC: 2.3 MG/DL — SIGNIFICANT CHANGE UP (ref 1.6–2.6)
MCHC RBC-ENTMCNC: 27.2 PG — SIGNIFICANT CHANGE UP (ref 27–34)
MCHC RBC-ENTMCNC: 27.6 PG — SIGNIFICANT CHANGE UP (ref 27–34)
MCHC RBC-ENTMCNC: 31 GM/DL — LOW (ref 32–36)
MCHC RBC-ENTMCNC: 31.2 GM/DL — LOW (ref 32–36)
MCV RBC AUTO: 87.7 FL — SIGNIFICANT CHANGE UP (ref 80–100)
MCV RBC AUTO: 88.4 FL — SIGNIFICANT CHANGE UP (ref 80–100)
MONOCYTES # BLD AUTO: 0.8 K/UL — SIGNIFICANT CHANGE UP (ref 0–0.9)
MONOCYTES NFR BLD AUTO: 9 % — SIGNIFICANT CHANGE UP (ref 2–14)
NEUTROPHILS # BLD AUTO: 6.86 K/UL — SIGNIFICANT CHANGE UP (ref 1.8–7.4)
NEUTROPHILS NFR BLD AUTO: 77.2 % — HIGH (ref 43–77)
NRBC # BLD: 0 /100 WBCS — SIGNIFICANT CHANGE UP (ref 0–0)
NRBC # BLD: 0 /100 WBCS — SIGNIFICANT CHANGE UP (ref 0–0)
NT-PROBNP SERPL-SCNC: 558 PG/ML — HIGH (ref 0–300)
PHOSPHATE SERPL-MCNC: 4.3 MG/DL — SIGNIFICANT CHANGE UP (ref 2.5–4.5)
PLATELET # BLD AUTO: 243 K/UL — SIGNIFICANT CHANGE UP (ref 150–400)
PLATELET # BLD AUTO: 256 K/UL — SIGNIFICANT CHANGE UP (ref 150–400)
POTASSIUM SERPL-MCNC: 4.2 MMOL/L — SIGNIFICANT CHANGE UP (ref 3.5–5.3)
POTASSIUM SERPL-MCNC: 4.8 MMOL/L — SIGNIFICANT CHANGE UP (ref 3.5–5.3)
POTASSIUM SERPL-SCNC: 4.2 MMOL/L — SIGNIFICANT CHANGE UP (ref 3.5–5.3)
POTASSIUM SERPL-SCNC: 4.8 MMOL/L — SIGNIFICANT CHANGE UP (ref 3.5–5.3)
PROT SERPL-MCNC: 6.9 G/DL — SIGNIFICANT CHANGE UP (ref 6–8.3)
PROT SERPL-MCNC: 7 G/DL — SIGNIFICANT CHANGE UP (ref 6–8.3)
PROTHROM AB SERPL-ACNC: 19 SEC — HIGH (ref 10.5–13.4)
RBC # BLD: 3.01 M/UL — LOW (ref 3.8–5.2)
RBC # BLD: 3.16 M/UL — LOW (ref 3.8–5.2)
RBC # FLD: 14.6 % — HIGH (ref 10.3–14.5)
RBC # FLD: 14.6 % — HIGH (ref 10.3–14.5)
RH IG SCN BLD-IMP: POSITIVE — SIGNIFICANT CHANGE UP
SARS-COV-2 RNA SPEC QL NAA+PROBE: NEGATIVE — SIGNIFICANT CHANGE UP
SODIUM SERPL-SCNC: 134 MMOL/L — LOW (ref 135–145)
SODIUM SERPL-SCNC: 136 MMOL/L — SIGNIFICANT CHANGE UP (ref 135–145)
TROPONIN T SERPL-MCNC: 0.01 NG/ML — SIGNIFICANT CHANGE UP (ref 0–0.01)
TROPONIN T SERPL-MCNC: <0.01 NG/ML — SIGNIFICANT CHANGE UP (ref 0–0.01)
WBC # BLD: 8.9 K/UL — SIGNIFICANT CHANGE UP (ref 3.8–10.5)
WBC # BLD: 9.29 K/UL — SIGNIFICANT CHANGE UP (ref 3.8–10.5)
WBC # FLD AUTO: 8.9 K/UL — SIGNIFICANT CHANGE UP (ref 3.8–10.5)
WBC # FLD AUTO: 9.29 K/UL — SIGNIFICANT CHANGE UP (ref 3.8–10.5)

## 2022-09-04 PROCEDURE — 99285 EMERGENCY DEPT VISIT HI MDM: CPT

## 2022-09-04 PROCEDURE — 99223 1ST HOSP IP/OBS HIGH 75: CPT | Mod: GC

## 2022-09-04 PROCEDURE — 71275 CT ANGIOGRAPHY CHEST: CPT | Mod: 26

## 2022-09-04 PROCEDURE — 70450 CT HEAD/BRAIN W/O DYE: CPT | Mod: 26,MA

## 2022-09-04 PROCEDURE — 93010 ELECTROCARDIOGRAM REPORT: CPT

## 2022-09-04 PROCEDURE — 71045 X-RAY EXAM CHEST 1 VIEW: CPT | Mod: 26

## 2022-09-04 PROCEDURE — 86077 PHYS BLOOD BANK SERV XMATCH: CPT

## 2022-09-04 RX ORDER — AMIODARONE HYDROCHLORIDE 400 MG/1
200 TABLET ORAL DAILY
Refills: 0 | Status: DISCONTINUED | OUTPATIENT
Start: 2022-09-04 | End: 2022-09-13

## 2022-09-04 RX ORDER — TIOTROPIUM BROMIDE 18 UG/1
1 CAPSULE ORAL; RESPIRATORY (INHALATION) DAILY
Refills: 0 | Status: DISCONTINUED | OUTPATIENT
Start: 2022-09-04 | End: 2022-09-13

## 2022-09-04 RX ORDER — SODIUM CHLORIDE 9 MG/ML
1000 INJECTION INTRAMUSCULAR; INTRAVENOUS; SUBCUTANEOUS ONCE
Refills: 0 | Status: COMPLETED | OUTPATIENT
Start: 2022-09-04 | End: 2022-09-04

## 2022-09-04 RX ORDER — LANOLIN ALCOHOL/MO/W.PET/CERES
5 CREAM (GRAM) TOPICAL AT BEDTIME
Refills: 0 | Status: DISCONTINUED | OUTPATIENT
Start: 2022-09-04 | End: 2022-09-13

## 2022-09-04 RX ORDER — APIXABAN 2.5 MG/1
5 TABLET, FILM COATED ORAL EVERY 12 HOURS
Refills: 0 | Status: DISCONTINUED | OUTPATIENT
Start: 2022-09-04 | End: 2022-09-09

## 2022-09-04 RX ORDER — INFLUENZA VIRUS VACCINE 15; 15; 15; 15 UG/.5ML; UG/.5ML; UG/.5ML; UG/.5ML
0.7 SUSPENSION INTRAMUSCULAR ONCE
Refills: 0 | Status: COMPLETED | OUTPATIENT
Start: 2022-09-04 | End: 2022-09-04

## 2022-09-04 RX ORDER — ATORVASTATIN CALCIUM 80 MG/1
20 TABLET, FILM COATED ORAL AT BEDTIME
Refills: 0 | Status: DISCONTINUED | OUTPATIENT
Start: 2022-09-04 | End: 2022-09-13

## 2022-09-04 RX ORDER — FOLIC ACID 0.8 MG
1 TABLET ORAL DAILY
Refills: 0 | Status: DISCONTINUED | OUTPATIENT
Start: 2022-09-04 | End: 2022-09-13

## 2022-09-04 RX ORDER — METOCLOPRAMIDE HCL 10 MG
10 TABLET ORAL ONCE
Refills: 0 | Status: COMPLETED | OUTPATIENT
Start: 2022-09-04 | End: 2022-09-04

## 2022-09-04 RX ORDER — PANTOPRAZOLE SODIUM 20 MG/1
40 TABLET, DELAYED RELEASE ORAL EVERY 12 HOURS
Refills: 0 | Status: DISCONTINUED | OUTPATIENT
Start: 2022-09-04 | End: 2022-09-05

## 2022-09-04 RX ORDER — METOPROLOL TARTRATE 50 MG
50 TABLET ORAL DAILY
Refills: 0 | Status: DISCONTINUED | OUTPATIENT
Start: 2022-09-04 | End: 2022-09-13

## 2022-09-04 RX ORDER — MAGNESIUM OXIDE 400 MG ORAL TABLET 241.3 MG
400 TABLET ORAL DAILY
Refills: 0 | Status: DISCONTINUED | OUTPATIENT
Start: 2022-09-04 | End: 2022-09-06

## 2022-09-04 RX ORDER — NITROGLYCERIN 6.5 MG
0.4 CAPSULE, EXTENDED RELEASE ORAL ONCE
Refills: 0 | Status: COMPLETED | OUTPATIENT
Start: 2022-09-04 | End: 2022-09-04

## 2022-09-04 RX ORDER — ACETAMINOPHEN 500 MG
650 TABLET ORAL EVERY 6 HOURS
Refills: 0 | Status: DISCONTINUED | OUTPATIENT
Start: 2022-09-04 | End: 2022-09-13

## 2022-09-04 RX ORDER — MONTELUKAST 4 MG/1
10 TABLET, CHEWABLE ORAL DAILY
Refills: 0 | Status: DISCONTINUED | OUTPATIENT
Start: 2022-09-04 | End: 2022-09-13

## 2022-09-04 RX ORDER — PANTOPRAZOLE SODIUM 20 MG/1
40 TABLET, DELAYED RELEASE ORAL
Refills: 0 | Status: DISCONTINUED | OUTPATIENT
Start: 2022-09-04 | End: 2022-09-04

## 2022-09-04 RX ORDER — ACETAMINOPHEN 500 MG
650 TABLET ORAL ONCE
Refills: 0 | Status: COMPLETED | OUTPATIENT
Start: 2022-09-04 | End: 2022-09-04

## 2022-09-04 RX ADMIN — Medication 650 MILLIGRAM(S): at 04:25

## 2022-09-04 RX ADMIN — Medication 650 MILLIGRAM(S): at 17:36

## 2022-09-04 RX ADMIN — APIXABAN 5 MILLIGRAM(S): 2.5 TABLET, FILM COATED ORAL at 17:36

## 2022-09-04 RX ADMIN — MAGNESIUM OXIDE 400 MG ORAL TABLET 400 MILLIGRAM(S): 241.3 TABLET ORAL at 12:50

## 2022-09-04 RX ADMIN — Medication 1 TABLET(S): at 12:50

## 2022-09-04 RX ADMIN — MONTELUKAST 10 MILLIGRAM(S): 4 TABLET, CHEWABLE ORAL at 12:49

## 2022-09-04 RX ADMIN — Medication 0.4 MILLIGRAM(S): at 21:42

## 2022-09-04 RX ADMIN — APIXABAN 5 MILLIGRAM(S): 2.5 TABLET, FILM COATED ORAL at 06:39

## 2022-09-04 RX ADMIN — TIOTROPIUM BROMIDE 1 CAPSULE(S): 18 CAPSULE ORAL; RESPIRATORY (INHALATION) at 12:49

## 2022-09-04 RX ADMIN — Medication 650 MILLIGRAM(S): at 06:52

## 2022-09-04 RX ADMIN — SODIUM CHLORIDE 1000 MILLILITER(S): 9 INJECTION INTRAMUSCULAR; INTRAVENOUS; SUBCUTANEOUS at 06:40

## 2022-09-04 RX ADMIN — Medication 650 MILLIGRAM(S): at 03:39

## 2022-09-04 RX ADMIN — ATORVASTATIN CALCIUM 20 MILLIGRAM(S): 80 TABLET, FILM COATED ORAL at 21:42

## 2022-09-04 RX ADMIN — Medication 10 MILLIGRAM(S): at 23:28

## 2022-09-04 RX ADMIN — Medication 50 MILLIGRAM(S): at 06:39

## 2022-09-04 RX ADMIN — PANTOPRAZOLE SODIUM 40 MILLIGRAM(S): 20 TABLET, DELAYED RELEASE ORAL at 17:36

## 2022-09-04 RX ADMIN — AMIODARONE HYDROCHLORIDE 200 MILLIGRAM(S): 400 TABLET ORAL at 06:39

## 2022-09-04 RX ADMIN — Medication 0.5 MILLIGRAM(S): at 12:50

## 2022-09-04 RX ADMIN — Medication 1 MILLIGRAM(S): at 12:50

## 2022-09-04 RX ADMIN — PANTOPRAZOLE SODIUM 40 MILLIGRAM(S): 20 TABLET, DELAYED RELEASE ORAL at 06:39

## 2022-09-04 NOTE — PROGRESS NOTE ADULT - SUBJECTIVE AND OBJECTIVE BOX
O/N Events: no acute events overnight.     Subjective/ROS: Patient seen and examined at bedside. Daughter at bedside reporting that patient feels CP that radiates to the back, also reports headache that prevents her from lying down.     Denies Fever/Chills, SOB, n/v, changes in bowel/urinary habits.  12pt ROS otherwise negative.    VITALS  Vital Signs Last 24 Hrs  T(C): 36.6 (04 Sep 2022 08:49), Max: 36.9 (04 Sep 2022 01:34)  T(F): 97.9 (04 Sep 2022 08:49), Max: 98.5 (04 Sep 2022 01:34)  HR: 61 (04 Sep 2022 08:49) (59 - 75)  BP: 116/75 (04 Sep 2022 08:49) (103/58 - 174/74)  BP(mean): --  RR: 18 (04 Sep 2022 08:49) (16 - 18)  SpO2: 96% (04 Sep 2022 08:49) (95% - 98%)    Parameters below as of 04 Sep 2022 08:49  Patient On (Oxygen Delivery Method): room air        CAPILLARY BLOOD GLUCOSE    PHYSICAL EXAM  General: NAD, sitting up in bed  Head: NC/AT; MMM  Neck: Supple; no LAD  Respiratory: CTAB; no wheezes/rales/rhonchi INCOMPLETE  Cardiovascular: Regular rhythm/rate; S1/S2+, no murmurs, rubs gallops   Gastrointestinal: Soft; NTND; bowel sounds normal and present  Extremities: WWP; no edema/cyanosis  Neurological: A&Ox3, CNII-XII grossly intact; no obvious focal deficits    MEDICATIONS  (STANDING):  aMIOdarone    Tablet 200 milliGRAM(s) Oral daily  apixaban 5 milliGRAM(s) Oral every 12 hours  atorvastatin 20 milliGRAM(s) Oral at bedtime  folic acid 1 milliGRAM(s) Oral daily  influenza  Vaccine (HIGH DOSE) 0.7 milliLiter(s) IntraMuscular once  LORazepam     Tablet 0.5 milliGRAM(s) Oral daily  magnesium oxide 400 milliGRAM(s) Oral daily  metoprolol succinate ER 50 milliGRAM(s) Oral daily  montelukast 10 milliGRAM(s) Oral daily  multivitamin 1 Tablet(s) Oral daily  pantoprazole  Injectable 40 milliGRAM(s) IV Push every 12 hours  tiotropium 18 MICROgram(s) Capsule 1 Capsule(s) Inhalation daily    MEDICATIONS  (PRN):  acetaminophen     Tablet .. 650 milliGRAM(s) Oral every 6 hours PRN Temp greater or equal to 38C (100.4F), Mild Pain (1 - 3)      Digox (Rash; Urticaria; Hives)  Plavix (Other (Mod to Severe))  vancomycin (Other)      LABS                        8.3    8.90  )-----------( 243      ( 04 Sep 2022 10:00 )             26.6     09-04    134<L>  |  97  |  18  ----------------------------<  104<H>  4.8   |  24  |  1.50<H>    Ca    8.9      04 Sep 2022 02:30    TPro  7.0  /  Alb  3.9  /  TBili  0.6  /  DBili  x   /  AST  15  /  ALT  19  /  AlkPhos  75  09-04    PT/INR - ( 04 Sep 2022 02:30 )   PT: 19.0 sec;   INR: 1.59          PTT - ( 04 Sep 2022 02:30 )  PTT:34.3 sec    CARDIAC MARKERS ( 04 Sep 2022 02:30 )  x     / 0.01 ng/mL / 36 U/L / x     / <1.0 ng/mL          IMAGING/EKG/ETC   O/N Events: no acute events overnight.     Subjective/ROS: Patient seen and examined at bedside. Daughter at bedside reporting that patient feels CP that radiates to the back, also reports headache that prevents her from lying down.     Denies Fever/Chills, SOB, n/v, changes in bowel/urinary habits.  12pt ROS otherwise negative.    VITALS  Vital Signs Last 24 Hrs  T(C): 36.6 (04 Sep 2022 08:49), Max: 36.9 (04 Sep 2022 01:34)  T(F): 97.9 (04 Sep 2022 08:49), Max: 98.5 (04 Sep 2022 01:34)  HR: 61 (04 Sep 2022 08:49) (59 - 75)  BP: 116/75 (04 Sep 2022 08:49) (103/58 - 174/74)  BP(mean): --  RR: 18 (04 Sep 2022 08:49) (16 - 18)  SpO2: 96% (04 Sep 2022 08:49) (95% - 98%)    Parameters below as of 04 Sep 2022 08:49  Patient On (Oxygen Delivery Method): room air        CAPILLARY BLOOD GLUCOSE    PHYSICAL EXAM  General: NAD, sitting up in bed  Head: NC/AT; MMM  Neck: Supple; no LAD  Respiratory: CTAB  Cardio: +S1, S2, RRR, 2/4 systolic murmur  Gastrointestinal: soft, NT, distended; no rebound or guarding  Extremities: WWP, no clubbing or cyanosis  Neurologic: AAOx3    MEDICATIONS  (STANDING):  aMIOdarone    Tablet 200 milliGRAM(s) Oral daily  apixaban 5 milliGRAM(s) Oral every 12 hours  atorvastatin 20 milliGRAM(s) Oral at bedtime  folic acid 1 milliGRAM(s) Oral daily  influenza  Vaccine (HIGH DOSE) 0.7 milliLiter(s) IntraMuscular once  LORazepam     Tablet 0.5 milliGRAM(s) Oral daily  magnesium oxide 400 milliGRAM(s) Oral daily  metoprolol succinate ER 50 milliGRAM(s) Oral daily  montelukast 10 milliGRAM(s) Oral daily  multivitamin 1 Tablet(s) Oral daily  pantoprazole  Injectable 40 milliGRAM(s) IV Push every 12 hours  tiotropium 18 MICROgram(s) Capsule 1 Capsule(s) Inhalation daily    MEDICATIONS  (PRN):  acetaminophen     Tablet .. 650 milliGRAM(s) Oral every 6 hours PRN Temp greater or equal to 38C (100.4F), Mild Pain (1 - 3)      Digox (Rash; Urticaria; Hives)  Plavix (Other (Mod to Severe))  vancomycin (Other)      LABS                        8.3    8.90  )-----------( 243      ( 04 Sep 2022 10:00 )             26.6     09-04    134<L>  |  97  |  18  ----------------------------<  104<H>  4.8   |  24  |  1.50<H>    Ca    8.9      04 Sep 2022 02:30    TPro  7.0  /  Alb  3.9  /  TBili  0.6  /  DBili  x   /  AST  15  /  ALT  19  /  AlkPhos  75  09-04    PT/INR - ( 04 Sep 2022 02:30 )   PT: 19.0 sec;   INR: 1.59          PTT - ( 04 Sep 2022 02:30 )  PTT:34.3 sec    CARDIAC MARKERS ( 04 Sep 2022 02:30 )  x     / 0.01 ng/mL / 36 U/L / x     / <1.0 ng/mL      IMAGING/EKG/ETC

## 2022-09-04 NOTE — H&P ADULT - PROBLEM SELECTOR PLAN 5
Hx Colon cancer s/p resection in 2019 (in remission) & FEROZ. P/w Normocytic anemia Hgb 8.6 (baseline 11-12)  - Started IV Protonix 40mg BID  - Active T+S, transfuse if hgb <7 - c/w home Atorvastatin 20mg Qd

## 2022-09-04 NOTE — H&P ADULT - NSHPSOCIALHISTORY_GEN_ALL_CORE
Lives with daughter on 1st floor with a few steps to get in. Denies smoking, alcohol or recreational drugs. Lives with daughter on 1st floor with a few steps to get in. Doesn't ambulate much at home but able to. Denies smoking, alcohol or recreational drugs.

## 2022-09-04 NOTE — H&P ADULT - PROBLEM SELECTOR PLAN 6
Ordered 1L 09%NS before IV contrast for CT chest  - Holding home Lasix 20mg Qd & Lisinopril 10mg Qd iso BIBIANA  - f/u Urine lytes Hx Colon cancer s/p resection in 2019 (in remission) & FEROZ. P/w Normocytic anemia Hgb 8.6 (baseline 11-12)  - Started IV Protonix 40mg BID  - Active T+S, transfuse if hgb <7

## 2022-09-04 NOTE — H&P ADULT - NSHPOUTPATIENTPROVIDERS_GEN_ALL_CORE
PCP: Dr. Kristy Hoskins #[phone]  Cards: Dr. Derek Marte #[phone]  [place]: Dr. More #[phone]

## 2022-09-04 NOTE — H&P ADULT - NSHPLABSRESULTS_GEN_ALL_CORE
LABS:                         8.6    9.29  )-----------( 256      ( 04 Sep 2022 02:30 )             27.7     09-04    134<L>  |  97  |  18  ----------------------------<  104<H>  4.8   |  24  |  1.50<H>    Ca    8.9      04 Sep 2022 02:30    TPro  7.0  /  Alb  3.9  /  TBili  0.6  /  DBili  x   /  AST  15  /  ALT  19  /  AlkPhos  75  09-04    PT/INR - ( 04 Sep 2022 02:30 )   PT: 19.0 sec;   INR: 1.59       PTT - ( 04 Sep 2022 02:30 )  PTT:34.3 sec    CARDIAC MARKERS ( 04 Sep 2022 02:30 )  x     / 0.01 ng/mL / x     / x     / x        Serum Pro-Brain Natriuretic Peptide: 558 pg/mL (09-04 @ 02:30)    RADIOLOGY, EKG & ADDITIONAL TESTS:

## 2022-09-04 NOTE — PROGRESS NOTE ADULT - ASSESSMENT
82F Japanese speaking PMH HTN, HLD, severe AS s/p TAVR (2019, 2021), Ascending Aortic Aneurysm 4cm in 01/2022, pAFib (on Amiodarone+Eliquis), COPD (s/p 2L home O2), Colon cancer s/p resection in 2019 (in remission), FEROZ & CKD3. She presents to the ED for Subacute weakness, HTN episodes associated with HA, and new CP c/f ascending aortic aneurysm rupture. Admitted for evaluation and PT.

## 2022-09-04 NOTE — H&P ADULT - PROBLEM SELECTOR PLAN 4
- c/w home Atorvastatin 20mg Qd Admission /74 with HA.  - c/w home Toprol 50mg Qd  - Holding home Lasix 20mg Qd & Lisinopril 10mg Qd iso BIBIANA

## 2022-09-04 NOTE — H&P ADULT - PROBLEM SELECTOR PLAN 7
Used to be on home 2L NC, now saturating well on RA.  - c/w home Montelukast 10mg Qd & Spiriva (TI w/ Tiotropium 18mcg Qd) Ordered 1L 09%NS before IV contrast for CT chest  - Holding home Lasix 20mg Qd & Lisinopril 10mg Qd iso BIBIANA  - f/u Urine lytes

## 2022-09-04 NOTE — ED PROVIDER NOTE - CLINICAL SUMMARY MEDICAL DECISION MAKING FREE TEXT BOX
ekg nonconcerning, seveerly hypertensive in triage, imrpoves on my assessment, bilateral arm BPs with systolic of 160, troponin unremarkable, unlikely ACS, d-dimer negative with equal bilateral bps unlikely aortic dissection or PE, mildly more aneic than typical, rectal exam with soft brown stool, no evidence of acute gib, ct head negative, unclear cause of aptients deconditioning causing her to be unable to ambulate more than 2-3 steps at a time, however unable to attend to adls in this state, will admit for further management, treatment and evaluation

## 2022-09-04 NOTE — PROGRESS NOTE ADULT - PROBLEM SELECTOR PLAN 10
F: s/p 1L 0.9%NS  E: Replete as necessary K>4 Mg>2  N: Regular diet     DVT Prophylaxis: SCDs  GI prophylaxis: None   CODE STATUS: FULL

## 2022-09-04 NOTE — H&P ADULT - NSHPPHYSICALEXAM_GEN_ALL_CORE
.  VITAL SIGNS:  T(C): 36.7 (09-04-22 @ 04:26), Max: 36.9 (09-04-22 @ 01:34)  T(F): 98 (09-04-22 @ 04:26), Max: 98.5 (09-04-22 @ 01:34)  HR: 71 (09-04-22 @ 04:26) (68 - 75)  BP: 164/78 (09-04-22 @ 04:26) (164/78 - 174/74)  BP(mean): --  RR: 16 (09-04-22 @ 04:26) (16 - 18)  SpO2: 97% (09-04-22 @ 04:26) (97% - 98%)  Wt(kg): --    PHYSICAL EXAM:    Constitutional: WDWN resting comfortably in bed; NAD  Head: NC/AT  Eyes: PERRL, EOMI, clear conjunctiva  ENT: no nasal discharge; uvula midline, no oropharyngeal erythema or exudates; MMM  Neck: supple; no JVD or thyromegaly  Respiratory: CTA B/L; no W/R/R, no retractions  Cardiac: +S1/S2; RRR; no M/R/G;  Gastrointestinal: soft, NT/ND; no rebound or guarding; +BSx4  Extremities: WWP, no clubbing or cyanosis; no peripheral edema  Musculoskeletal: NROM x4; no joint swelling, tenderness or erythema  Vascular: 2+ radial, femoral, DP/PT pulses B/L  Dermatologic: skin warm, dry and intact; no rashes, wounds, or scars  Neurologic: AAOx3; CNII-XII grossly intact; no focal deficits  Psychiatric: affect and characteristics of appearance, verbalizations, behaviors are appropriate VITAL SIGNS:  T(C): 36.7 (09-04-22 @ 04:26), Max: 36.9 (09-04-22 @ 01:34)  T(F): 98 (09-04-22 @ 04:26), Max: 98.5 (09-04-22 @ 01:34)  HR: 71 (09-04-22 @ 04:26) (68 - 75)  BP: 164/78 (09-04-22 @ 04:26) (164/78 - 174/74)  BP(mean): --  RR: 16 (09-04-22 @ 04:26) (16 - 18)  SpO2: 97% (09-04-22 @ 04:26) (97% - 98%)  Wt(kg): --    PHYSICAL EXAM:  Constitutional: obese, resting comfortably in bed; NAD  Head: NC/AT  Eyes: PERRL, EOMI, clear conjunctiva  ENT: no nasal discharge; uvula midline, no oropharyngeal erythema or exudates; MMM  Neck: supple; no JVD or thyromegaly  Respiratory: CTA B/L; no W/R/R, no retractions  Cardiac: +S1/S2; RRR; R 2nd ICS 2/4 systolic murmur  Gastrointestinal: soft, NT, distended; no rebound or guarding; +BSx4  Extremities: WWP, no clubbing or cyanosis; no peripheral edema  Musculoskeletal: NROM x4; no joint swelling, tenderness or erythema  Vascular: 2+ radial, femoral, DP/PT pulses B/L  Dermatologic: skin warm, dry and intact; no rashes, wounds, or scars  Neurologic: AAOx3; CNII-XII grossly intact; no focal deficits  Psychiatric: affect and characteristics of appearance, verbalizations, behaviors are appropriate

## 2022-09-04 NOTE — H&P ADULT - PROBLEM SELECTOR PLAN 10
Returned call. Spoke with patient. Informed of results and also MRI report forward to ortho dept.      F: s/p 1L 0.9%NS  E: Replete as necessary K>4 Mg>2  N: Regular diet     DVT Prophylaxis: SCDs  GI prophylaxis: None   CODE STATUS: FULL

## 2022-09-04 NOTE — H&P ADULT - ATTENDING COMMENTS
Patient was seen and examined at bedside on 9/4/2022 at 1030 am. Patient has no acute complaints - overall feels weak, reports worsening MANRIQUE. Chest pain is present even when at rest, has a positional headache that is worse when she is lying flat. Denies SOB, CP. ROS is otherwise negative. Vitals, labwork and pertinent imaging reviewed. Physical exam - NAD, AAO x 4, PERRLA, EOMI, MMM, supple neck, chest - CTA b/l, CV - rrr, s1s2, no m/r/g, abd - soft, NTND, + BS, ext - wwp, psych - normal affect, skin - no rash    Plan  -Cardiology consult  -Chest CTH Patient was seen and examined at bedside on 9/4/2022 at 1030 am. Patient has no acute complaints - overall feels weak, reports worsening MANRIQUE. Chest pain is present even when at rest, has a positional headache that is worse when she is lying flat. Denies SOB, CP. ROS is otherwise negative. Vitals, labwork and pertinent imaging reviewed. Physical exam - NAD, AAO x 4, PERRLA, EOMI, MMM, supple neck, chest - CTA b/l, CV - rrr, s1s2, no m/r/g, abd - soft, NTND, + BS, ext - wwp, psych - normal affect, skin - no rash    Plan  -Cardiology consult  -CTH - essentially normal Patient was seen and examined at bedside on 9/4/2022 at 1030 am. Patient has no acute complaints - overall feels weak, reports worsening MANRIQUE. Chest pain is present even when at rest, has a positional headache that is worse when she is lying flat. Denies SOB, CP. ROS is otherwise negative. Vitals, labwork and pertinent imaging reviewed. Physical exam - NAD, AAO x 4, PERRLA, EOMI, MMM, supple neck, chest - CTA b/l, CV - rrr, s1s2, no m/r/g, abd - soft, NTND, + BS, ext - wwp, psych - normal affect, skin - no rash    Plan  -Check Echo  -Cardiology consult  -CTH - essentially normal

## 2022-09-04 NOTE — H&P ADULT - PROBLEM SELECTOR PLAN 1
Hx exertional capacity of half a block, improved with PT to 5 blocks, since PT cessation deteriorated to 2 steps limited by lethargy. Denies CP or SOB, EKG & cardiac enzymes WNL. UE & LE strenght 5/5. Likely iso deconditioning.  - PT eval Hx exertional capacity of half a block, improved with PT to 5 blocks, since PT cessation deteriorated to 2 steps limited by lethargy. Denies CP or SOB, Hgb 8.6, EKG & cardiac enzymes WNL. UE & LE strenght 5/5. Likely iso deconditioning.  - PT eval

## 2022-09-04 NOTE — PATIENT PROFILE ADULT - FALL HARM RISK - HARM RISK INTERVENTIONS

## 2022-09-04 NOTE — ED ADULT NURSE NOTE - OBJECTIVE STATEMENT
pt c/o cp x2 week with HTN at home up to 's. Pt denies any current HA or vision changes, mild SOB. RR even and unlabored at rest, denies any coughing fevers, or bodyaches.  AAOx3

## 2022-09-04 NOTE — H&P ADULT - PROBLEM SELECTOR PLAN 9
F: s/p 1L 0.9%NS  E: Replete as necessary K>4 Mg>2  N: Regular diet     DVT Prophylaxis: SCDs  GI prophylaxis: None   CODE STATUS: FULL Admission EKG NSR  - c/w Amiodarone 200mg Qd & Eliquis 5mg BID

## 2022-09-04 NOTE — ED PROVIDER NOTE - OBJECTIVE STATEMENT
82F PMH HTN, HLD, severe AS s/p TAVR in '19 and '21, paroxysmal atrial fibrillation on amiodarone/eliquis, COPD on home O2 at 2LPM PRN, FEROZ, CKD3 and colon cancer s/p resection in'19 presenting with severeal days of chest pain and weakness. no particular provocation factors - sometime at rest, sometimes with exertion. feels very weak, at baseline can walk a block or sob, now unable to make it more than 2-3 steps. no black or bloody stool. taking he home medications.

## 2022-09-04 NOTE — PROGRESS NOTE ADULT - PROBLEM SELECTOR PLAN 3
x3wks new intermitted dull substernal CP radiating to the back that lasts 2-3days and resolve spontaneously. Home -200. PMHx Ascending Aortic Aneurysm 4cm in 01/2022 c/f impending rupture. Takes Protonix 40mg Qd at home.   - chest CTA w/ IV cont shows an ascending aortic aneurysm 4.2 cm, unchanged from previous study 01/2022   - TTE pending

## 2022-09-04 NOTE — PROGRESS NOTE ADULT - PROBLEM SELECTOR PLAN 1
Patient presents with CP associated with a headache, nausea, and a pressure-like sensation in her chest. EKG wnl, trop negative x2.  - cardio consulted, appreciate recs

## 2022-09-04 NOTE — PROGRESS NOTE ADULT - PROBLEM SELECTOR PLAN 7
Ordered 1L 09%NS before IV contrast for CT chest  - Holding home Lasix 20mg Qd & Lisinopril 10mg Qd iso BIBIANA  - f/u Urine lytes

## 2022-09-04 NOTE — PROGRESS NOTE ADULT - PROBLEM SELECTOR PLAN 4
Admission /74 with HA.  - c/w home Toprol 50mg Qd  - Holding home Lasix 20mg Qd & Lisinopril 10mg Qd iso BIBIANA

## 2022-09-04 NOTE — H&P ADULT - PROBLEM SELECTOR PLAN 3
Admission /74 with HA.  - c/w home Toprol 50mg Qd  - Holding home Lasix 20mg Qd & Lisinopril 10mg Qd iso BIBIANA x3wks new intermitted dull substernal CP radiating to the back that lasts 2-3days and resolve spontaneously. Home -200. PMHx Ascending Aortic Aneurysm 4cm in 01/2022 c/f impending rupture. Takes Protonix 40mg Qd at home.   - f/u 10am cardiac enzymes, chest CT w/ IV cont & TTE

## 2022-09-04 NOTE — H&P ADULT - HISTORY OF PRESENT ILLNESS
82F Kyrgyz speaking PMH HTN, HLD, severe AS s/p TAVR (2019, 2021), Ascending Aortic Aneurysm 4cm in 01/2022, pAFib (on Amiodarone+Eliquis), COPD (s/p 2L home O2), Colon cancer s/p resection in 2019 (in remission), FEROZ & CKD3. She had a history of weakness with exertional capacity of half a block, after working with PT that increased to 5 blocks, but since cessation of PT her exertional capacity has deteriorated to 2 steps limited by lethargy (not CP or SOB).    In the last 3 weeks she kept a log of her home SBP that fluctuate between 140-200, associated with HA and ear pain when hypertensive. She also endorses new intermitted dull substernal CP radiating to the back that lasts 2-3days and resolve spontaneously. She presents to the ED for Subacute weakness, HTN episodes associated with HA, and new CP.    In the ED was in HTN emergency /74 with HA which resolved w/ decreased /78. Labs significant for normocytic anemia Hgb 8.6 (baseline 11-12), BIBIANA on CKD3a Crt 1.5 (baseline 1.1-1.2). EKG & cardiac enzymes WNL. CTH WNL. She received PO Tylenol 650mg. 82F Chinese speaking PMHx HTN, HLD, severe AS s/p TAVR (2019, 2021), Ascending Aortic Aneurysm 4cm in 01/2022, pAFib (on Amiodarone+Eliquis), COPD (s/p 2L home O2), Colon cancer s/p resection in 2019 (in remission), FEROZ & CKD3. She had a history of weakness with exertional capacity of half a block, after working with PT that increased to 5 blocks, but since cessation of PT her exertional capacity has deteriorated to 2 steps limited by lethargy (not CP or SOB).    In the last 3 weeks she kept a log of her home SBP that fluctuate between 140-200, associated with HA and ear pain when hypertensive. She also endorses new intermitted dull substernal CP radiating to the back that lasts 2-3days and resolve spontaneously. She presents to the ED for Subacute weakness, HTN episodes associated with HA, and new CP.    In the ED was in HTN emergency /74 with HA which resolved w/ decreased /78. Labs significant for normocytic anemia Hgb 8.6 (baseline 11-12), BIBIANA on CKD3a Crt 1.5 (baseline 1.1-1.2). EKG & cardiac enzymes WNL. CTH WNL. She received PO Tylenol 650mg. 82F Portuguese speaking PMHx HTN, HLD, severe AS s/p TAVR (2019, 2021), Ascending Aortic Aneurysm 4cm in 01/2022, pAFib (on Amiodarone+Eliquis), COPD (s/p 2L home O2), Colon cancer s/p resection in 2019 (in remission), FEROZ & CKD3. She had a history of weakness with exertional capacity of half a block, after working with PT that increased to 5 blocks, but since cessation of PT her exertional capacity has deteriorated to 2 steps limited by lethargy (not CP or SOB).    In the last 3 weeks she kept a log of her home SBP that fluctuate between 140-200, associated with HA and ear pain when hypertensive. She also endorses new intermitted dull substernal CP radiating to the back that lasts 2-3days and resolve spontaneously. She presents to the ED for Subacute weakness, HTN episodes associated with HA, and new CP.    In the ED was in HTN /74 with HA which resolved w/ decreased /78. Labs significant for normocytic anemia Hgb 8.6 (baseline 11-12), BIBIANA on CKD3a Crt 1.5 (baseline 1.1-1.2). EKG & cardiac enzymes WNL. CTH WNL. She received PO Tylenol 650mg.

## 2022-09-04 NOTE — PROGRESS NOTE ADULT - PROBLEM SELECTOR PLAN 8
Used to be on home 2L NC, now saturating well on RA.  - c/w home Montelukast 10mg Qd & Spiriva (TI w/ Tiotropium 18mcg Qd)

## 2022-09-04 NOTE — H&P ADULT - ASSESSMENT
82F Polish speaking PMH HTN, HLD, severe AS s/p TAVR (2019, 2021), Ascending Aortic Aneurysm 4cm in 01/2022, pAFib (on Amiodarone+Eliquis), COPD (s/p 2L home O2), Colon cancer s/p resection in 2019 (in remission), FEROZ & CKD3. She had a history of weakness with exertional capacity of half a block, after working with PT that increased to 5 blocks, but since cessation of PT her exertional capacity has deteriorated to 2 steps limited by lethargy (not CP or SOB).    In the last 3 weeks she kept a log of her home SBP that fluctuate between 140-200, associated with HA and ear pain when hypertensive. She also endorses new intermitted dull substernal CP radiating to the back that lasts 2-3days and resolve spontaneously. She presents to the ED for Subacute weakness, HTN episodes associated with HA, and new CP.    In the ED was in HTN emergency /74 with HA which resolved w/ decreased /78. Labs significant for normocytic anemia Hgb 8.6 (baseline 11-12), BIBIANA on CKD3a Crt 1.5 (baseline 1.1-1.2). EKG & cardiac enzymes WNL. CTH WNL. She received PO Tylenol 650mg.   82F Slovenian speaking PMH HTN, HLD, severe AS s/p TAVR (2019, 2021), Ascending Aortic Aneurysm 4cm in 01/2022, pAFib (on Amiodarone+Eliquis), COPD (s/p 2L home O2), Colon cancer s/p resection in 2019 (in remission), FEROZ & CKD3. She had a history of weakness with exertional capacity of half a block, after working with PT that increased to 5 blocks, but since cessation of PT her exertional capacity has deteriorated to 2 steps limited by lethargy (not CP or SOB).    In the last 3 weeks she kept a log of her home SBP that fluctuate between 140-200, associated with HA and ear pain when hypertensive. She also endorses new intermitted dull substernal CP radiating to the back that lasts 2-3days and resolve spontaneously. She presents to the ED for Subacute weakness, HTN episodes associated with HA, and new CP.    In the ED was in HTN /74 with HA which resolved w/ decreased /78. Labs significant for normocytic anemia Hgb 8.6 (baseline 11-12), BIBIANA on CKD3a Crt 1.5 (baseline 1.1-1.2). EKG & cardiac enzymes WNL. CTH WNL. She received PO Tylenol 650mg.   82F Persian speaking PMH HTN, HLD, severe AS s/p TAVR (2019, 2021), Ascending Aortic Aneurysm 4cm in 01/2022, pAFib (on Amiodarone+Eliquis), COPD (s/p 2L home O2), Colon cancer s/p resection in 2019 (in remission), FEROZ & CKD3. She presents to the ED for Subacute weakness, HTN episodes associated with HA, and new CP c/f ascending aortic aneurysm rupture. Admitted for evaluation and PT.

## 2022-09-04 NOTE — PROGRESS NOTE ADULT - PROBLEM SELECTOR PLAN 2
Hx exertional capacity of half a block, improved with PT to 5 blocks, since PT cessation deteriorated to 2 steps limited by lethargy. Denies CP or SOB, Hgb 8.6, EKG & cardiac enzymes WNL. UE & LE strenght 5/5. Likely iso deconditioning.  - PT eval

## 2022-09-04 NOTE — PROGRESS NOTE ADULT - PROBLEM SELECTOR PLAN 6
Hx Colon cancer s/p resection in 2019 (in remission) & FEROZ. P/w Normocytic anemia Hgb 8.6 (baseline 11-12)  - Started IV Protonix 40mg BID  - Active T+S, transfuse if hgb <7

## 2022-09-04 NOTE — H&P ADULT - NSHPREVIEWOFSYSTEMS_GEN_ALL_CORE
Constitutional: [x] decreased appetite [x] fatigue [ ] fevers [ ] chills [ ] malaise [ ] myalgias [ ] arthralgias [ ] weight loss  Eyes: [ ] double vision [ ] eye pain  [ ] visual changes  ENT: [ ] sore throat [ ] odynophagia [ ] mouth pain [ ] rhinorrhea  CV: [x] chest pain [x] shortness of breath  [ ] edema  Respiratory:  [ ] cough [ ] sputum production [ ] wheezing [ ] shortness of breath  GI: [ ] abdominal pain [ ] nausea [ ] vomiting [ ]  constipation [ ] diarrhea  : [ ] suprapubic pain [ ] dysuria [ ] polyuria [ ] penile/vaginal discharge [ ] genital lesions  Heme/Lymph: [ ] easy bleeding [ ] swollen lymph nodes  Skin: [ ] rashes [ ] skin lesions  Neuro: [x] headache [ ] neck tenderness [ ] focal motor weakness [ ] sensory changes [ ] paresthesias

## 2022-09-04 NOTE — H&P ADULT - PROBLEM SELECTOR PLAN 8
Admission EKG NSR  - c/w Amiodarone 200mg Qd & Eliquis 5mg BID Used to be on home 2L NC, now saturating well on RA.  - c/w home Montelukast 10mg Qd & Spiriva (TI w/ Tiotropium 18mcg Qd)

## 2022-09-05 DIAGNOSIS — I50.32 CHRONIC DIASTOLIC (CONGESTIVE) HEART FAILURE: ICD-10-CM

## 2022-09-05 DIAGNOSIS — R06.09 OTHER FORMS OF DYSPNEA: ICD-10-CM

## 2022-09-05 DIAGNOSIS — R50.9 FEVER, UNSPECIFIED: ICD-10-CM

## 2022-09-05 DIAGNOSIS — R10.9 UNSPECIFIED ABDOMINAL PAIN: ICD-10-CM

## 2022-09-05 DIAGNOSIS — Z95.2 PRESENCE OF PROSTHETIC HEART VALVE: ICD-10-CM

## 2022-09-05 LAB
ALBUMIN SERPL ELPH-MCNC: 3.7 G/DL — SIGNIFICANT CHANGE UP (ref 3.3–5)
ALP SERPL-CCNC: 85 U/L — SIGNIFICANT CHANGE UP (ref 40–120)
ALT FLD-CCNC: 28 U/L — SIGNIFICANT CHANGE UP (ref 10–45)
ANION GAP SERPL CALC-SCNC: 13 MMOL/L — SIGNIFICANT CHANGE UP (ref 5–17)
APPEARANCE UR: CLEAR — SIGNIFICANT CHANGE UP
AST SERPL-CCNC: 23 U/L — SIGNIFICANT CHANGE UP (ref 10–40)
BACTERIA # UR AUTO: PRESENT /HPF
BASOPHILS # BLD AUTO: 0.02 K/UL — SIGNIFICANT CHANGE UP (ref 0–0.2)
BASOPHILS NFR BLD AUTO: 0.2 % — SIGNIFICANT CHANGE UP (ref 0–2)
BILIRUB SERPL-MCNC: 0.6 MG/DL — SIGNIFICANT CHANGE UP (ref 0.2–1.2)
BILIRUB UR-MCNC: NEGATIVE — SIGNIFICANT CHANGE UP
BLD GP AB SCN SERPL QL: POSITIVE — SIGNIFICANT CHANGE UP
BUN SERPL-MCNC: 14 MG/DL — SIGNIFICANT CHANGE UP (ref 7–23)
CALCIUM SERPL-MCNC: 9 MG/DL — SIGNIFICANT CHANGE UP (ref 8.4–10.5)
CHLORIDE SERPL-SCNC: 98 MMOL/L — SIGNIFICANT CHANGE UP (ref 96–108)
CO2 SERPL-SCNC: 24 MMOL/L — SIGNIFICANT CHANGE UP (ref 22–31)
COLOR SPEC: YELLOW — SIGNIFICANT CHANGE UP
CREAT SERPL-MCNC: 1.15 MG/DL — SIGNIFICANT CHANGE UP (ref 0.5–1.3)
CRP SERPL-MCNC: 37.6 MG/L — HIGH (ref 0–4)
DIFF PNL FLD: ABNORMAL
EGFR: 47 ML/MIN/1.73M2 — LOW
EOSINOPHIL # BLD AUTO: 0.04 K/UL — SIGNIFICANT CHANGE UP (ref 0–0.5)
EOSINOPHIL NFR BLD AUTO: 0.4 % — SIGNIFICANT CHANGE UP (ref 0–6)
EPI CELLS # UR: SIGNIFICANT CHANGE UP /HPF (ref 0–5)
GLUCOSE SERPL-MCNC: 112 MG/DL — HIGH (ref 70–99)
GLUCOSE UR QL: NEGATIVE — SIGNIFICANT CHANGE UP
HCT VFR BLD CALC: 30.8 % — LOW (ref 34.5–45)
HGB BLD-MCNC: 9 G/DL — LOW (ref 11.5–15.5)
IMM GRANULOCYTES NFR BLD AUTO: 0.6 % — SIGNIFICANT CHANGE UP (ref 0–1.5)
KETONES UR-MCNC: NEGATIVE — SIGNIFICANT CHANGE UP
LEUKOCYTE ESTERASE UR-ACNC: ABNORMAL
LIDOCAIN IGE QN: 24 U/L — SIGNIFICANT CHANGE UP (ref 7–60)
LYMPHOCYTES # BLD AUTO: 0.54 K/UL — LOW (ref 1–3.3)
LYMPHOCYTES # BLD AUTO: 5.5 % — LOW (ref 13–44)
MAGNESIUM SERPL-MCNC: 2.1 MG/DL — SIGNIFICANT CHANGE UP (ref 1.6–2.6)
MCHC RBC-ENTMCNC: 27.3 PG — SIGNIFICANT CHANGE UP (ref 27–34)
MCHC RBC-ENTMCNC: 29.2 GM/DL — LOW (ref 32–36)
MCV RBC AUTO: 93.3 FL — SIGNIFICANT CHANGE UP (ref 80–100)
MONOCYTES # BLD AUTO: 0.64 K/UL — SIGNIFICANT CHANGE UP (ref 0–0.9)
MONOCYTES NFR BLD AUTO: 6.5 % — SIGNIFICANT CHANGE UP (ref 2–14)
NEUTROPHILS # BLD AUTO: 8.6 K/UL — HIGH (ref 1.8–7.4)
NEUTROPHILS NFR BLD AUTO: 86.8 % — HIGH (ref 43–77)
NITRITE UR-MCNC: NEGATIVE — SIGNIFICANT CHANGE UP
NRBC # BLD: 0 /100 WBCS — SIGNIFICANT CHANGE UP (ref 0–0)
PH UR: 6 — SIGNIFICANT CHANGE UP (ref 5–8)
PHOSPHATE SERPL-MCNC: 3.8 MG/DL — SIGNIFICANT CHANGE UP (ref 2.5–4.5)
PLATELET # BLD AUTO: 221 K/UL — SIGNIFICANT CHANGE UP (ref 150–400)
POTASSIUM SERPL-MCNC: 4.8 MMOL/L — SIGNIFICANT CHANGE UP (ref 3.5–5.3)
POTASSIUM SERPL-SCNC: 4.8 MMOL/L — SIGNIFICANT CHANGE UP (ref 3.5–5.3)
PROCALCITONIN SERPL-MCNC: 0.1 NG/ML — SIGNIFICANT CHANGE UP (ref 0.02–0.1)
PROT SERPL-MCNC: 7.2 G/DL — SIGNIFICANT CHANGE UP (ref 6–8.3)
PROT UR-MCNC: NEGATIVE MG/DL — SIGNIFICANT CHANGE UP
RAPID RVP RESULT: SIGNIFICANT CHANGE UP
RBC # BLD: 3.3 M/UL — LOW (ref 3.8–5.2)
RBC # FLD: 14.5 % — SIGNIFICANT CHANGE UP (ref 10.3–14.5)
RBC CASTS # UR COMP ASSIST: < 5 /HPF — SIGNIFICANT CHANGE UP
RH IG SCN BLD-IMP: POSITIVE — SIGNIFICANT CHANGE UP
SARS-COV-2 RNA SPEC QL NAA+PROBE: SIGNIFICANT CHANGE UP
SODIUM SERPL-SCNC: 135 MMOL/L — SIGNIFICANT CHANGE UP (ref 135–145)
SP GR SPEC: 1.01 — SIGNIFICANT CHANGE UP (ref 1–1.03)
TROPONIN T SERPL-MCNC: 0.01 NG/ML — SIGNIFICANT CHANGE UP (ref 0–0.01)
UROBILINOGEN FLD QL: 0.2 E.U./DL — SIGNIFICANT CHANGE UP
WBC # BLD: 9.9 K/UL — SIGNIFICANT CHANGE UP (ref 3.8–10.5)
WBC # FLD AUTO: 9.9 K/UL — SIGNIFICANT CHANGE UP (ref 3.8–10.5)
WBC UR QL: < 5 /HPF — SIGNIFICANT CHANGE UP

## 2022-09-05 PROCEDURE — 99233 SBSQ HOSP IP/OBS HIGH 50: CPT | Mod: GC

## 2022-09-05 PROCEDURE — 93010 ELECTROCARDIOGRAM REPORT: CPT

## 2022-09-05 PROCEDURE — 99233 SBSQ HOSP IP/OBS HIGH 50: CPT

## 2022-09-05 PROCEDURE — 71045 X-RAY EXAM CHEST 1 VIEW: CPT | Mod: 26

## 2022-09-05 RX ORDER — FUROSEMIDE 40 MG
20 TABLET ORAL EVERY 24 HOURS
Refills: 0 | Status: DISCONTINUED | OUTPATIENT
Start: 2022-09-05 | End: 2022-09-08

## 2022-09-05 RX ORDER — LISINOPRIL 2.5 MG/1
10 TABLET ORAL EVERY 24 HOURS
Refills: 0 | Status: DISCONTINUED | OUTPATIENT
Start: 2022-09-05 | End: 2022-09-09

## 2022-09-05 RX ORDER — NITROGLYCERIN 6.5 MG
0.4 CAPSULE, EXTENDED RELEASE ORAL
Refills: 0 | Status: DISCONTINUED | OUTPATIENT
Start: 2022-09-05 | End: 2022-09-05

## 2022-09-05 RX ORDER — METOCLOPRAMIDE HCL 10 MG
10 TABLET ORAL ONCE
Refills: 0 | Status: COMPLETED | OUTPATIENT
Start: 2022-09-05 | End: 2022-09-05

## 2022-09-05 RX ORDER — SIMETHICONE 80 MG/1
80 TABLET, CHEWABLE ORAL
Refills: 0 | Status: DISCONTINUED | OUTPATIENT
Start: 2022-09-05 | End: 2022-09-13

## 2022-09-05 RX ORDER — NITROGLYCERIN 6.5 MG
0.4 CAPSULE, EXTENDED RELEASE ORAL ONCE
Refills: 0 | Status: COMPLETED | OUTPATIENT
Start: 2022-09-05 | End: 2022-09-05

## 2022-09-05 RX ORDER — LIDOCAINE 4 G/100G
1 CREAM TOPICAL EVERY 24 HOURS
Refills: 0 | Status: DISCONTINUED | OUTPATIENT
Start: 2022-09-05 | End: 2022-09-13

## 2022-09-05 RX ADMIN — Medication 650 MILLIGRAM(S): at 23:01

## 2022-09-05 RX ADMIN — Medication 650 MILLIGRAM(S): at 22:04

## 2022-09-05 RX ADMIN — TIOTROPIUM BROMIDE 1 CAPSULE(S): 18 CAPSULE ORAL; RESPIRATORY (INHALATION) at 18:10

## 2022-09-05 RX ADMIN — Medication 0.4 MILLIGRAM(S): at 10:27

## 2022-09-05 RX ADMIN — LIDOCAINE 1 PATCH: 4 CREAM TOPICAL at 18:10

## 2022-09-05 RX ADMIN — SIMETHICONE 80 MILLIGRAM(S): 80 TABLET, CHEWABLE ORAL at 17:30

## 2022-09-05 RX ADMIN — Medication 10 MILLIGRAM(S): at 14:19

## 2022-09-05 RX ADMIN — LISINOPRIL 10 MILLIGRAM(S): 2.5 TABLET ORAL at 10:27

## 2022-09-05 RX ADMIN — Medication 650 MILLIGRAM(S): at 13:40

## 2022-09-05 RX ADMIN — MAGNESIUM OXIDE 400 MG ORAL TABLET 400 MILLIGRAM(S): 241.3 TABLET ORAL at 11:22

## 2022-09-05 RX ADMIN — PANTOPRAZOLE SODIUM 40 MILLIGRAM(S): 20 TABLET, DELAYED RELEASE ORAL at 06:17

## 2022-09-05 RX ADMIN — Medication 20 MILLIGRAM(S): at 15:07

## 2022-09-05 RX ADMIN — Medication 650 MILLIGRAM(S): at 14:40

## 2022-09-05 RX ADMIN — Medication 1 MILLIGRAM(S): at 11:23

## 2022-09-05 RX ADMIN — Medication 650 MILLIGRAM(S): at 06:50

## 2022-09-05 RX ADMIN — Medication 50 MILLIGRAM(S): at 06:17

## 2022-09-05 RX ADMIN — Medication 650 MILLIGRAM(S): at 06:17

## 2022-09-05 RX ADMIN — AMIODARONE HYDROCHLORIDE 200 MILLIGRAM(S): 400 TABLET ORAL at 06:17

## 2022-09-05 RX ADMIN — Medication 5 MILLIGRAM(S): at 00:02

## 2022-09-05 RX ADMIN — APIXABAN 5 MILLIGRAM(S): 2.5 TABLET, FILM COATED ORAL at 17:30

## 2022-09-05 RX ADMIN — Medication 0.4 MILLIGRAM(S): at 06:30

## 2022-09-05 RX ADMIN — Medication 1 TABLET(S): at 11:23

## 2022-09-05 RX ADMIN — APIXABAN 5 MILLIGRAM(S): 2.5 TABLET, FILM COATED ORAL at 06:17

## 2022-09-05 RX ADMIN — ATORVASTATIN CALCIUM 20 MILLIGRAM(S): 80 TABLET, FILM COATED ORAL at 22:03

## 2022-09-05 RX ADMIN — MONTELUKAST 10 MILLIGRAM(S): 4 TABLET, CHEWABLE ORAL at 11:23

## 2022-09-05 RX ADMIN — Medication 5 MILLIGRAM(S): at 22:02

## 2022-09-05 RX ADMIN — Medication 0.5 MILLIGRAM(S): at 11:23

## 2022-09-05 RX ADMIN — LIDOCAINE 1 PATCH: 4 CREAM TOPICAL at 22:00

## 2022-09-05 NOTE — PROGRESS NOTE ADULT - PROBLEM SELECTOR PLAN 9
Not in acute exacerbation. On intermittent 2L NC at home. Follows with Dr. Derik Domínguez c/w Spiriva INH and Singulair qd

## 2022-09-05 NOTE — PROGRESS NOTE ADULT - PROBLEM SELECTOR PLAN 10
Normocytic anemia. H/h 8.6/27.7 on admission. Hx of Hx Colon cancer s/p resection in 2019 (in remission) and FEROZ. No s/s of bleeding.  -H/h stable 9/30  -Monitor CBC daily  -F/u iron panel in AM. consider IV iron if noted w/ FEROZ, received previously w/ improvements  -keep Active T+S, transfuse if hgb <7  -c/w Protonix 40mg PO qd Normocytic anemia. H/h 8.6/27.7 on admission. Hx of Hx Colon cancer s/p resection in 2019 (in remission) and FEROZ. No s/s of bleeding.  -H/h stable 9/30  -Monitor CBC daily  -F/u iron panel in AM. consider IV iron if noted w/ FEROZ, received previously w/ improvements  -keep Active T+S, transfuse if hgb <7  -c/w Protonix 40mg PO qd    F: No IVF  N: DASH/TLC diet  E: Replete lytes PRN K<4, Mg<2  P: DVT PPX: on Eliquis  C: FULL CODE  Dispo: Transferred [facility] to tele [facility]

## 2022-09-05 NOTE — PROGRESS NOTE ADULT - PROBLEM SELECTOR PLAN 3
Hx colon CA s/p resection 2019 (in remission). C/o RLQ pain w/ radiation to R flank, +bloating. Generalized mild tenderness on palpation to abd. No rebound tenderness or CVA tenderness. Negative Roe's sign.  -C/o diarrhea x 3 episodes since 9/4. Will send GI PCR  -LFTs WNL. F/u lipase  -Obtain Abd US  -Consider CT Abd/Pelvis if pain persists

## 2022-09-05 NOTE — PROGRESS NOTE ADULT - PROBLEM SELECTOR PLAN 2
Hx exertional capacity of half a block, improved with PT to 5 blocks, since PT cessation deteriorated to 2 steps limited by lethargy. Denies CP or SOB, Hgb 8.6, EKG & cardiac enzymes WNL. UE & LE strength 5/5. Likely iso deconditioning.  - PT eval

## 2022-09-05 NOTE — PROGRESS NOTE ADULT - PROBLEM SELECTOR PLAN 7
Hypertensive on admission, reports compliant w/ meds. /98 at home.  -BP meds initially held 2/2 BIBIANA on CKD  -Resume Lisinopril 10mg QD and Lasix 20mg QD    #HLD   -c/w home Atorvastatin 20mg QD Hypertensive on admission, reports compliant w/ meds. /98 at home.  -BP meds initially held 2/2 BIBIANA on CKD, now resolved  -c/w Toprol 50mg qd  -Resumed Lisinopril 10mg QD and Lasix 20mg QD     #HLD   -c/w home Atorvastatin 20mg QD

## 2022-09-05 NOTE — PROGRESS NOTE ADULT - PROBLEM SELECTOR PLAN 8
Crea 1.5 on admission. Baseline Creat 1.1-1.2.  - Resolved. Creat 1.10 today   - F/u UA  - Renally dose medications and avoid nephrotoxic agents

## 2022-09-05 NOTE — PROGRESS NOTE ADULT - PROBLEM SELECTOR PLAN 4
Admission /74 with HA. Pressure stable currently, 126/66  - c/w home Toprol 50mg Qd  - Will restart home lisinopril, hold lasix for now

## 2022-09-05 NOTE — PROGRESS NOTE ADULT - PROBLEM SELECTOR PLAN 1
C/o intermittent L sided chest pain 10/10, described as "getting punched" to the chest and squeezing sensation. Also c/o MANRIQUE w/ minimal exertion.  -Trop neg x 3. EKG NSR 60s w/ no ST changes  -S/p NTG SL x 3 w/ minimal improvement to 9/10  -Echo and IRMA pending  -CTA Cardiac 1/2022: mild calcium score 76 Agatston units. LM < 25% stenosis. Mild stenosis pLAD. Minimal stenoses mLAD and pRCA. The remaining segments are normal. C/o intermittent L sided chest pain 10/10, described as "getting punched" to the chest and squeezing sensation. Also c/o MANRIQUE w/ minimal exertion.  -Trop neg x 3. EKG NSR 60s w/ no ST changes  -S/p NTG SL x 3 w/ minimal improvement to 9/10  -Echo and IRMA pending  -CTA Cardiac 1/2022: mild calcium score 76 Agatston units. LM < 25% stenosis. Mild stenosis pLAD. Minimal stenoses mLAD and pRCA. The remaining segments are normal.  -Low suspicion for ACS given recent negative ischemic eval. Concern for endocarditis given fever, recent dental work in light of TAVR, generalized malaise

## 2022-09-05 NOTE — PROGRESS NOTE ADULT - SUBJECTIVE AND OBJECTIVE BOX
CARDIOLOGY NP PROGRESS NOTE    Subjective:   Remainder ROS otherwise negative.    Overnight Events:     TELEMETRY:    EKG:      VITAL SIGNS:  T(C): 36.6 (09-05-22 @ 13:48), Max: 38.3 (09-05-22 @ 05:47)  HR: 60 (09-05-22 @ 11:58) (52 - 62)  BP: 169/74 (09-05-22 @ 11:58) (126/66 - 169/74)  RR: 24 (09-05-22 @ 11:58) (18 - 24)  SpO2: 98% (09-05-22 @ 11:58) (94% - 98%)  Wt(kg): --    I&O's Summary        PHYSICAL EXAM:    General: A/ox 3, No acute Distress  Neck: Supple, NO JVD  Cardiac: S1 S2, No M/R/G  Pulmonary: CTAB, Breathing unlabored, No Rhonchi/Rales/Wheezing  Abdomen: Soft, Non -tender, +BS x 4 quads  Extremities: No Rashes, No edema  Neuro: A/o x 3, No focal deficits          LABS:                          9.0    9.90  )-----------( 221      ( 05 Sep 2022 06:27 )             30.8                              09-05    135  |  98  |  14  ----------------------------<  112<H>  4.8   |  24  |  1.15    Ca    9.0      05 Sep 2022 06:27  Phos  3.8     09-05  Mg     2.1     09-05    TPro  7.2  /  Alb  3.7  /  TBili  0.6  /  DBili  x   /  AST  23  /  ALT  28  /  AlkPhos  85  09-05    LIVER FUNCTIONS - ( 05 Sep 2022 06:27 )  Alb: 3.7 g/dL / Pro: 7.2 g/dL / ALK PHOS: 85 U/L / ALT: 28 U/L / AST: 23 U/L / GGT: x                                 PT/INR - ( 04 Sep 2022 02:30 )   PT: 19.0 sec;   INR: 1.59          PTT - ( 04 Sep 2022 02:30 )  PTT:34.3 sec  CAPILLARY BLOOD GLUCOSE        CARDIAC MARKERS ( 05 Sep 2022 06:27 )  x     / 0.01 ng/mL / x     / x     / x      CARDIAC MARKERS ( 04 Sep 2022 10:00 )  x     / <0.01 ng/mL / 40 U/L / x     / 1.1 ng/mL  CARDIAC MARKERS ( 04 Sep 2022 02:30 )  x     / 0.01 ng/mL / 36 U/L / x     / <1.0 ng/mL          Allergies:  Digox (Rash; Urticaria; Hives)  Plavix (Other (Mod to Severe))  vancomycin (Other)    MEDICATIONS  (STANDING):  aMIOdarone    Tablet 200 milliGRAM(s) Oral daily  apixaban 5 milliGRAM(s) Oral every 12 hours  atorvastatin 20 milliGRAM(s) Oral at bedtime  folic acid 1 milliGRAM(s) Oral daily  influenza  Vaccine (HIGH DOSE) 0.7 milliLiter(s) IntraMuscular once  lisinopril 10 milliGRAM(s) Oral every 24 hours  LORazepam     Tablet 0.5 milliGRAM(s) Oral daily  magnesium oxide 400 milliGRAM(s) Oral daily  melatonin 5 milliGRAM(s) Oral at bedtime  metoclopramide Injectable 10 milliGRAM(s) IV Push once  metoprolol succinate ER 50 milliGRAM(s) Oral daily  montelukast 10 milliGRAM(s) Oral daily  multivitamin 1 Tablet(s) Oral daily  pantoprazole  Injectable 40 milliGRAM(s) IV Push every 12 hours  tiotropium 18 MICROgram(s) Capsule 1 Capsule(s) Inhalation daily    MEDICATIONS  (PRN):  acetaminophen     Tablet .. 650 milliGRAM(s) Oral every 6 hours PRN Temp greater or equal to 38C (100.4F), Mild Pain (1 - 3)        DIAGNOSTIC TESTS:        CARDIOLOGY NP TRANSFER ACCEPTANCE RMF to Cardiac tele/ PROGRESS NOTE    HOSPITAL COURSE:  82F Turkmen speaking, w/ PMHx HTN, HLD, severe AS s/p TAVR (2019, 2021), Ascending Aortic Aneurysm 4cm in 01/2022, pAFib (on Amiodarone+Eliquis), COPD (s/p 2L home O2), Colon cancer s/p resection in 2019 (in remission), FEROZ & CKD3. She had a history of weakness with  poor exertional capacity, and recent onset headaches for the last three weeks which coincide with high blood pressure recordings at home. Presents with H/A, poor functional capacity, and dull substernal CP which has been radiating to her back for the last three days. Upon admission to ED pt was hypertensive to 174/74, and labs significant for anemia, BIBIANA on CK3. EKG wnl, and cardiac enzymes wnl. CTH normal. PO Tylenol x 1 given. Patient with persistent headache and C/P during hospitalization. Cardiology consulted. PRN nitrogylcerin added for c/p, minimal relief. CT chest ordered to assess if any change in previous AAA, stable, no changes. Melatonin added at night PRN for insomnia. Patient reporting headache symptoms persistent. Symptoms consistent w migraine, associated nausea, headache, and improvement in dark setting. Repeat chest pain and pressure 9/4 ON. EKG ordered, no ischemic changes. Trops negative 9/5 AM. Fever 9/5 AM, rectal 100.9. Denies any cough, SOB, abdominal pain, diarrhea, dysuria, hematuria. Blood cultures ordered, RVP ordered. Patient will require higher level monitoring in Tele floor. Pending TTE this admission, and potential pharmacologic/exercise stress test tomorrow as per cardiology.    Subjective:   Remainder ROS otherwise negative.    Overnight Events: fever 100.9 rectally at 6am.    TELEMETRY: SR 60s    EKG: sinus bradycardia 50s, no ST changes      VITAL SIGNS:  T(C): 36.6 (09-05-22 @ 13:48), Max: 38.3 (09-05-22 @ 05:47)  HR: 60 (09-05-22 @ 11:58) (52 - 62)  BP: 169/74 (09-05-22 @ 11:58) (126/66 - 169/74)  RR: 24 (09-05-22 @ 11:58) (18 - 24)  SpO2: 98% (09-05-22 @ 11:58) (94% - 98%)  Wt(kg): --    I&O's Summary        PHYSICAL EXAM:    General: A/ox 3, No acute Distress  Neck: Supple, NO JVD  Cardiac: S1 S2, No M/R/G  Pulmonary: CTAB, Breathing unlabored, No Rhonchi/Rales/Wheezing  Abdomen: Soft, Non -tender, +BS x 4 quads  Extremities: No Rashes, No edema  Neuro: A/o x 3, No focal deficits          LABS:                          9.0    9.90  )-----------( 221      ( 05 Sep 2022 06:27 )             30.8                              09-05    135  |  98  |  14  ----------------------------<  112<H>  4.8   |  24  |  1.15    Ca    9.0      05 Sep 2022 06:27  Phos  3.8     09-05  Mg     2.1     09-05    TPro  7.2  /  Alb  3.7  /  TBili  0.6  /  DBili  x   /  AST  23  /  ALT  28  /  AlkPhos  85  09-05    LIVER FUNCTIONS - ( 05 Sep 2022 06:27 )  Alb: 3.7 g/dL / Pro: 7.2 g/dL / ALK PHOS: 85 U/L / ALT: 28 U/L / AST: 23 U/L / GGT: x                                 PT/INR - ( 04 Sep 2022 02:30 )   PT: 19.0 sec;   INR: 1.59          PTT - ( 04 Sep 2022 02:30 )  PTT:34.3 sec  CAPILLARY BLOOD GLUCOSE        CARDIAC MARKERS ( 05 Sep 2022 06:27 )  x     / 0.01 ng/mL / x     / x     / x      CARDIAC MARKERS ( 04 Sep 2022 10:00 )  x     / <0.01 ng/mL / 40 U/L / x     / 1.1 ng/mL  CARDIAC MARKERS ( 04 Sep 2022 02:30 )  x     / 0.01 ng/mL / 36 U/L / x     / <1.0 ng/mL          Allergies:  Digox (Rash; Urticaria; Hives)  Plavix (Other (Mod to Severe))  vancomycin (Other)    MEDICATIONS  (STANDING):  aMIOdarone    Tablet 200 milliGRAM(s) Oral daily  apixaban 5 milliGRAM(s) Oral every 12 hours  atorvastatin 20 milliGRAM(s) Oral at bedtime  folic acid 1 milliGRAM(s) Oral daily  influenza  Vaccine (HIGH DOSE) 0.7 milliLiter(s) IntraMuscular once  lisinopril 10 milliGRAM(s) Oral every 24 hours  LORazepam     Tablet 0.5 milliGRAM(s) Oral daily  magnesium oxide 400 milliGRAM(s) Oral daily  melatonin 5 milliGRAM(s) Oral at bedtime  metoclopramide Injectable 10 milliGRAM(s) IV Push once  metoprolol succinate ER 50 milliGRAM(s) Oral daily  montelukast 10 milliGRAM(s) Oral daily  multivitamin 1 Tablet(s) Oral daily  pantoprazole  Injectable 40 milliGRAM(s) IV Push every 12 hours  tiotropium 18 MICROgram(s) Capsule 1 Capsule(s) Inhalation daily    MEDICATIONS  (PRN):  acetaminophen     Tablet .. 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 Presents with H/A, poor functional capacity, and dull substernal CP which has been radiating to her back for the last three days. Upon admission to ED pt was hypertensive to 174/74, and labs significant for anemia, BIBIANA on CK3. EKG wnl, and cardiac enzymes wnl. CTH normal. Patient with persistent headache and C/P during hospitalization. Cardiology consulted. PRN NTG added for c/p, minimal relief. CT chest ordered to assess if any change in previous AAA, stable, no changes. Patient reporting headache symptoms persistent. Symptoms consistent w migraine, associated nausea, headache, and improvement in dark setting. Repeat chest pain and pressure 9/4 ON. EKG ordered, no ischemic changes. Trops negative 9/5 AM. Fever 9/5 AM, rectal 100.9. Denies any cough, SOB, abdominal pain, diarrhea, dysuria, hematuria. Blood cultures ordered, RVP ordered. Patient will require higher level monitoring in Tele floor. Pending TTE this admission.    Subjective:   Remainder ROS otherwise negative.    Overnight Events: fever 100.9 rectally at 6am.    TELEMETRY: SR 60s    EKG: sinus bradycardia 50s, no ST changes      VITAL SIGNS:  T(C): 36.6 (09-05-22 @ 13:48), Max: 38.3 (09-05-22 @ 05:47)  HR: 60 (09-05-22 @ 11:58) (52 - 62)  BP: 169/74 (09-05-22 @ 11:58) (126/66 - 169/74)  RR: 24 (09-05-22 @ 11:58) (18 - 24)  SpO2: 98% (09-05-22 @ 11:58) (94% - 98%)  Wt(kg): --    I&O's Summary        PHYSICAL EXAM:    General: A/ox 3, No acute Distress  Neck: Supple, NO JVD  Cardiac: S1 S2, No M/R/G  Pulmonary: CTAB, Breathing unlabored, No Rhonchi/Rales/Wheezing  Abdomen: Soft, Non -tender, +BS x 4 quads  Extremities: No Rashes, No edema  Neuro: A/o x 3, No focal deficits          LABS:                          9.0    9.90  )-----------( 221      ( 05 Sep 2022 06:27 )             30.8                              09-05    135  |  98  |  14  ----------------------------<  112<H>  4.8   |  24  |  1.15    Ca    9.0      05 Sep 2022 06:27  Phos  3.8     09-05  Mg     2.1     09-05    TPro  7.2  /  Alb  3.7  /  TBili  0.6  /  DBili  x   /  AST  23  /  ALT  28  /  AlkPhos  85  09-05    LIVER FUNCTIONS - ( 05 Sep 2022 06:27 )  Alb: 3.7 g/dL / Pro: 7.2 g/dL / ALK PHOS: 85 U/L / ALT: 28 U/L / AST: 23 U/L / GGT: x                                 PT/INR - ( 04 Sep 2022 02:30 )   PT: 19.0 sec;   INR: 1.59          PTT - ( 04 Sep 2022 02:30 )  PTT:34.3 sec  CAPILLARY BLOOD GLUCOSE        CARDIAC MARKERS ( 05 Sep 2022 06:27 )  x     / 0.01 ng/mL / x     / x     / x      CARDIAC MARKERS ( 04 Sep 2022 10:00 )  x     / <0.01 ng/mL / 40 U/L / x     / 1.1 ng/mL  CARDIAC MARKERS ( 04 Sep 2022 02:30 )  x     / 0.01 ng/mL / 36 U/L / x     / <1.0 ng/mL          Allergies:  Digox (Rash; Urticaria; Hives)  Plavix (Other (Mod to Severe))  vancomycin (Other)    MEDICATIONS  (STANDING):  aMIOdarone    Tablet 200 milliGRAM(s) Oral daily  apixaban 5 milliGRAM(s) Oral every 12 hours  atorvastatin 20 milliGRAM(s) Oral at bedtime  folic acid 1 milliGRAM(s) Oral daily  influenza  Vaccine (HIGH DOSE) 0.7 milliLiter(s) IntraMuscular once  lisinopril 10 milliGRAM(s) Oral every 24 hours  LORazepam     Tablet 0.5 milliGRAM(s) Oral daily  magnesium oxide 400 milliGRAM(s) Oral daily  melatonin 5 milliGRAM(s) Oral at bedtime  metoclopramide Injectable 10 milliGRAM(s) IV Push once  metoprolol succinate ER 50 milliGRAM(s) Oral daily  montelukast 10 milliGRAM(s) Oral daily  multivitamin 1 Tablet(s) Oral daily  pantoprazole  Injectable 40 milliGRAM(s) IV Push every 12 hours  tiotropium 18 MICROgram(s) Capsule 1 Capsule(s) Inhalation daily    MEDICATIONS  (PRN):  acetaminophen     Tablet .. 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 Pt endorses recent dental work in [place], was not given Abx prior, and have been feeling unwell after. Upon admission to ED pt was hypertensive to 174/74, and labs significant for anemia H/h 8.6/27, BIBIANA on CKD3 w/ crea 1.5, DDimer neg, , Trop neg x 3. EKG non-ischemic. CTH unremarkable. Patient with persistent headache and chest pain during hospitalization. Cardiology consulted. Started NTG SL PRN for chest pain, w/ minimal relief. CT Chest stable thoracic aneurysm 4.2cm. Febrile 100.9 rectally 9/5 AM. Denies any cough, SOB, abdominal pain, diarrhea, dysuria, hematuria. Blood cultures sent, RVP ordered. Patient transferred to cardiac tele for further management for r/o endocarditis.     Subjective:   Remainder ROS otherwise negative.    Overnight Events: fever 100.9 rectally at 6am.    TELEMETRY: SR 60s    EKG: sinus bradycardia 50s, no ST changes      VITAL SIGNS:  T(C): 36.6 (09-05-22 @ 13:48), Max: 38.3 (09-05-22 @ 05:47)  HR: 60 (09-05-22 @ 11:58) (52 - 62)  BP: 169/74 (09-05-22 @ 11:58) (126/66 - 169/74)  RR: 24 (09-05-22 @ 11:58) (18 - 24)  SpO2: 98% (09-05-22 @ 11:58) (94% - 98%)  Wt(kg): --    I&O's Summary        PHYSICAL EXAM:    General: A/ox 3, No acute Distress  Neck: Supple, NO JVD  Cardiac: S1 S2, No M/R/G  Pulmonary: CTAB, Breathing unlabored, No Rhonchi/Rales/Wheezing  Abdomen: Soft, Non -tender, +BS x 4 quads  Extremities: No Rashes, No edema  Neuro: A/o x 3, No focal deficits          LABS:                          9.0    9.90  )-----------( 221      ( 05 Sep 2022 06:27 )             30.8                              09-05    135  |  98  |  14  ----------------------------<  112<H>  4.8   |  24  |  1.15    Ca    9.0      05 Sep 2022 06:27  Phos  3.8     09-05  Mg     2.1     09-05    TPro  7.2  /  Alb  3.7  /  TBili  0.6  /  DBili  x   /  AST  23  /  ALT  28  /  AlkPhos  85  09-05    LIVER FUNCTIONS - ( 05 Sep 2022 06:27 )  Alb: 3.7 g/dL / Pro: 7.2 g/dL / ALK PHOS: 85 U/L / ALT: 28 U/L / AST: 23 U/L / GGT: x                                 PT/INR - ( 04 Sep 2022 02:30 )   PT: 19.0 sec;   INR: 1.59          PTT - ( 04 Sep 2022 02:30 )  PTT:34.3 sec  CAPILLARY BLOOD GLUCOSE        CARDIAC MARKERS ( 05 Sep 2022 06:27 )  x     / 0.01 ng/mL / x     / x     / x      CARDIAC MARKERS ( 04 Sep 2022 10:00 )  x     / <0.01 ng/mL / 40 U/L / x     / 1.1 ng/mL  CARDIAC MARKERS ( 04 Sep 2022 02:30 )  x     / 0.01 ng/mL / 36 U/L / x     / <1.0 ng/mL          Allergies:  Digox (Rash; Urticaria; Hives)  Plavix (Other (Mod to Severe))  vancomycin (Other)    MEDICATIONS  (STANDING):  aMIOdarone    Tablet 200 milliGRAM(s) Oral daily  apixaban 5 milliGRAM(s) Oral every 12 hours  atorvastatin 20 milliGRAM(s) Oral at bedtime  folic acid 1 milliGRAM(s) Oral daily  influenza  Vaccine (HIGH DOSE) 0.7 milliLiter(s) IntraMuscular once  lisinopril 10 milliGRAM(s) Oral every 24 hours  LORazepam     Tablet 0.5 milliGRAM(s) Oral daily  magnesium oxide 400 milliGRAM(s) Oral daily  melatonin 5 milliGRAM(s) Oral at bedtime  metoclopramide Injectable 10 milliGRAM(s) IV Push once  metoprolol succinate ER 50 milliGRAM(s) Oral daily  montelukast 10 milliGRAM(s) Oral daily  multivitamin 1 Tablet(s) Oral daily  pantoprazole  Injectable 40 milliGRAM(s) IV Push every 12 hours  tiotropium 18 MICROgram(s) Capsule 1 Capsule(s) Inhalation daily    MEDICATIONS  (PRN):  acetaminophen     Tablet .. 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 Pt endorses recent dental work in [place], was not given Abx prior, and have been feeling unwell after. Upon admission to ED pt was hypertensive to 174/74, and labs significant for anemia H/h 8.6/27, BIBIANA on CKD3 w/ crea 1.5, DDimer neg, , Trop neg x 3. EKG non-ischemic. CTH unremarkable. Patient with persistent headache and chest pain during hospitalization. Cardiology consulted. Started NTG SL PRN for chest pain, w/ minimal relief. CT Chest stable thoracic aneurysm 4.2cm. Febrile 100.9 rectally 9/5 AM, also c/o fatigue, RLQ pain and diarrhea x2 episodes. Blood cultures, UA sent, RVP ordered. Patient transferred to cardiac tele for further management for r/o endocarditis.     Subjective: Pt seen and examined at bedside via Japanese  ID #[id number]. Reports feeling unwell with multiple c/o fatigue, L sided 9/10 chest pain, 10/10 HA a/w nausea, 10/10 RLQ pain radiating to R flank, MANRIQUE, and diarrhea x 2 since yesterday. Had fever this AM s/p Tylenol, chills resolved from prior. Denies lightheadedness, dizziness, palpitations. Remainder ROS otherwise negative.    Overnight Events: fever 100.9 rectally at 6am.    TELEMETRY: SR 60s    EKG: sinus bradycardia 50s, no ST changes      VITAL SIGNS:  T(C): 36.6 (09-05-22 @ 13:48), Max: 38.3 (09-05-22 @ 05:47)  HR: 60 (09-05-22 @ 11:58) (52 - 62)  BP: 169/74 (09-05-22 @ 11:58) (126/66 - 169/74)  RR: 24 (09-05-22 @ 11:58) (18 - 24)  SpO2: 98% (09-05-22 @ 11:58) (94% - 98%)  Wt(kg): --    I&O's Summary        PHYSICAL EXAM:    General: A/ox 3, No acute Distress, elderly appearing female  Neck: Supple, NO JVD  Cardiac: S1 S2, Grade III/VI systolic murmur at LSB   Pulmonary: CTAB, Breathing unlabored on 2L NC, No Rhonchi/Rales/Wheezing  Abdomen: Large, Soft, generalized tenderness to all quadrants on palpation, +BS x 4 quads  Extremities: No Rashes, No edema  Neuro: A/o x 3, No focal deficits          LABS:                          9.0    9.90  )-----------( 221      ( 05 Sep 2022 06:27 )             30.8                              09-05    135  |  98  |  14  ----------------------------<  112<H>  4.8   |  24  |  1.15    Ca    9.0      05 Sep 2022 06:27  Phos  3.8     09-05  Mg     2.1     09-05    TPro  7.2  /  Alb  3.7  /  TBili  0.6  /  DBili  x   /  AST  23  /  ALT  28  /  AlkPhos  85  09-05    LIVER FUNCTIONS - ( 05 Sep 2022 06:27 )  Alb: 3.7 g/dL / Pro: 7.2 g/dL / ALK PHOS: 85 U/L / ALT: 28 U/L / AST: 23 U/L / GGT: x         PT/INR - ( 04 Sep 2022 02:30 )   PT: 19.0 sec;   INR: 1.59          PTT - ( 04 Sep 2022 02:30 )  PTT:34.3 sec  CAPILLARY BLOOD GLUCOSE        CARDIAC MARKERS ( 05 Sep 2022 06:27 )  x     / 0.01 ng/mL / x     / x     / x      CARDIAC MARKERS ( 04 Sep 2022 10:00 )  x     / <0.01 ng/mL / 40 U/L / x     / 1.1 ng/mL  CARDIAC MARKERS ( 04 Sep 2022 02:30 )  x     / 0.01 ng/mL / 36 U/L / x     / <1.0 ng/mL          Allergies:  Digox (Rash; Urticaria; Hives)  Plavix (Other (Mod to Severe))  vancomycin (Other)    MEDICATIONS  (STANDING):  aMIOdarone    Tablet 200 milliGRAM(s) Oral daily  apixaban 5 milliGRAM(s) Oral every 12 hours  atorvastatin 20 milliGRAM(s) Oral at bedtime  folic acid 1 milliGRAM(s) Oral daily  influenza  Vaccine (HIGH DOSE) 0.7 milliLiter(s) IntraMuscular once  lisinopril 10 milliGRAM(s) Oral every 24 hours  LORazepam     Tablet 0.5 milliGRAM(s) Oral daily  magnesium oxide 400 milliGRAM(s) Oral daily  melatonin 5 milliGRAM(s) Oral at bedtime  metoclopramide Injectable 10 milliGRAM(s) IV Push once  metoprolol succinate ER 50 milliGRAM(s) Oral daily  montelukast 10 milliGRAM(s) Oral daily  multivitamin 1 Tablet(s) Oral daily  pantoprazole  Injectable 40 milliGRAM(s) IV Push every 12 hours  tiotropium 18 MICROgram(s) Capsule 1 Capsule(s) Inhalation daily    MEDICATIONS  (PRN):  acetaminophen     Tablet .. 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 Pt endorses recent dental work in [place], was not given Abx prior, and have been feeling unwell after. Upon admission to ED pt was hypertensive to 174/74, and labs significant for anemia H/h 8.6/27, BIBIANA on CKD3 w/ crea 1.5, DDimer neg, , Trop neg x 3. EKG non-ischemic. CTH unremarkable. Patient with persistent headache and chest pain during hospitalization. Cardiology consulted. Started NTG SL PRN for chest pain, w/ minimal relief. CT Chest stable thoracic aneurysm 4.2cm. Febrile 100.9 rectally 9/5 AM, also c/o fatigue, RLQ pain and diarrhea x2 episodes. Blood cultures, UA sent, RVP ordered. Patient transferred to cardiac tele for further management for r/o endocarditis.     Subjective: Pt seen and examined at bedside via Turkmen  ID #[id number]. Reports feeling unwell with multiple c/o fatigue, L sided 9/10 chest pain, 10/10 HA a/w nausea, 10/10 RLQ pain radiating to R flank, MANRIQUE, and diarrhea x 2 since yesterday. Had fever this AM s/p Tylenol, chills resolved from prior. Denies lightheadedness, dizziness, palpitations. Remainder ROS otherwise negative.    Overnight Events: fever 100.9 rectally at 6am. BC x 2 sent this AM, UA sent    TELEMETRY: SR 60s    EKG: sinus bradycardia 50s, no ST changes      VITAL SIGNS:  T(C): 36.6 (09-05-22 @ 13:48), Max: 38.3 (09-05-22 @ 05:47)  HR: 60 (09-05-22 @ 11:58) (52 - 62)  BP: 169/74 (09-05-22 @ 11:58) (126/66 - 169/74)  RR: 24 (09-05-22 @ 11:58) (18 - 24)  SpO2: 98% (09-05-22 @ 11:58) (94% - 98%)  Wt(kg): --    I&O's Summary        PHYSICAL EXAM:    General: A/ox 3, No acute Distress, elderly appearing female  Neck: Supple, NO JVD  Cardiac: S1 S2, Grade III/VI systolic murmur at LSB   Pulmonary: CTAB, Breathing unlabored on 2L NC, No Rhonchi/Rales/Wheezing  Abdomen: Large, Soft, generalized tenderness to all quadrants on palpation, +BS x 4 quads  Extremities: No Rashes, No edema  Neuro: A/o x 3, No focal deficits          LABS:                          9.0    9.90  )-----------( 221      ( 05 Sep 2022 06:27 )             30.8                              09-05    135  |  98  |  14  ----------------------------<  112<H>  4.8   |  24  |  1.15    Ca    9.0      05 Sep 2022 06:27  Phos  3.8     09-05  Mg     2.1     09-05    TPro  7.2  /  Alb  3.7  /  TBili  0.6  /  DBili  x   /  AST  23  /  ALT  28  /  AlkPhos  85  09-05    LIVER FUNCTIONS - ( 05 Sep 2022 06:27 )  Alb: 3.7 g/dL / Pro: 7.2 g/dL / ALK PHOS: 85 U/L / ALT: 28 U/L / AST: 23 U/L / GGT: x         PT/INR - ( 04 Sep 2022 02:30 )   PT: 19.0 sec;   INR: 1.59          PTT - ( 04 Sep 2022 02:30 )  PTT:34.3 sec  CAPILLARY BLOOD GLUCOSE        CARDIAC MARKERS ( 05 Sep 2022 06:27 )  x     / 0.01 ng/mL / x     / x     / x      CARDIAC MARKERS ( 04 Sep 2022 10:00 )  x     / <0.01 ng/mL / 40 U/L / x     / 1.1 ng/mL  CARDIAC MARKERS ( 04 Sep 2022 02:30 )  x     / 0.01 ng/mL / 36 U/L / x     / <1.0 ng/mL          Allergies:  Digox (Rash; Urticaria; Hives)  Plavix (Other (Mod to Severe))  vancomycin (Other)    MEDICATIONS  (STANDING):  aMIOdarone    Tablet 200 milliGRAM(s) Oral daily  apixaban 5 milliGRAM(s) Oral every 12 hours  atorvastatin 20 milliGRAM(s) Oral at bedtime  folic acid 1 milliGRAM(s) Oral daily  influenza  Vaccine (HIGH DOSE) 0.7 milliLiter(s) IntraMuscular once  lisinopril 10 milliGRAM(s) Oral every 24 hours  LORazepam     Tablet 0.5 milliGRAM(s) Oral daily  magnesium oxide 400 milliGRAM(s) Oral daily  melatonin 5 milliGRAM(s) Oral at bedtime  metoclopramide Injectable 10 milliGRAM(s) IV Push once  metoprolol succinate ER 50 milliGRAM(s) Oral daily  montelukast 10 milliGRAM(s) Oral daily  multivitamin 1 Tablet(s) Oral daily  pantoprazole  Injectable 40 milliGRAM(s) IV Push every 12 hours  tiotropium 18 MICROgram(s) Capsule 1 Capsule(s) Inhalation daily    MEDICATIONS  (PRN):  acetaminophen     Tablet .. 650 milliGRAM(s) Oral every 6 hours PRN Temp greater or equal to 38C (100.4F), Mild Pain (1 - 3)        DIAGNOSTIC TESTS:     < from: CT Angio Chest Aorta w/wo IV Cont (09.04.22 @ 13:48) >  FINDINGS:    LUNGS AND AIRWAYS: Patent central airways.  New bilateral lower lobe   predominant peribronchial wall thickening and right basilar interlobular   septal thickening. Unchanged 0.9 x 0.8 cm subpleural right lower lobe   anterior basal segment (7:214), stable dating back to 2019, likely benign   etiology. Redemonstrated subpleural reticular opacities at both lung   bases suggesting scarring and interstitial lung disease. Unchanged few   calcified micronodules.  PLEURA: No pleural effusion.  MEDIASTINUM AND PATRICIA: Stable or slightly increased long-standing multiple   enlarged mediastinal and hilar nodes, for example:  * Aortopulmonary window 2.2 x 1.5 cm (5:170)  *  Right hilar rosendo aggregate 3.3 x 1.6 cm (5:232)  *  Right lower paratracheal 2.0 x 1.6 cm (5:187)  VESSELS: Post-TAVR. Unchanged ascending aortic aneurysm caliber up to 4.2   cm. no aortic dissection. Mild aortic atherosclerotic plaque. No central   pulmonary embolism. Unchanged dilated right main pulmonary artery   suggesting pulmonary artery hypertension.  HEART: Unchanged enlarged heart. No pericardial effusion.  CHEST WALL AND LOWER NECK: Within normal limits.  VISUALIZED UPPER ABDOMEN: Atherosclerotic plaque at proximal SMA,   possibly hemodynamically significant narrowing. Small right renal cyst.   Unchanged left adrenal nodule, 2 x 1.5 cm (5:452), although slightly   increase since 2019 measuring 1.6 x 1.1 cm, most likely benign adenoma.  BONES: Spinal degenerative changes.      IMPRESSION:  1.  Unchanged fusiform dilatation mid ascending thoracic aorta 4.2 cm. No   dissection or acute pathology.  2.  Probable mild interstitial pulmonary edema and bronchitis.  3.  Stable or slightly increased multiple enlarged mediastinal and hilar   nodes, probably inflammatory/infectious etiology.  4.  Stable chronic findings detailed above.    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 Pt endorses recent dental work in [place] upper teeth, was not given Abx prior, and have been feeling unwell after. Upon admission to ED pt was hypertensive to 174/74, and labs significant for anemia H/h 8.6/27, BIBIANA on CKD3 w/ crea 1.5, DDimer neg, , Trop neg x 3. EKG non-ischemic. CTH unremarkable. Patient with persistent headache and chest pain during hospitalization. Cardiology consulted. Started NTG SL PRN for chest pain, w/ minimal relief. CT Chest stable thoracic aneurysm 4.2cm. Febrile 100.9 rectally 9/5 AM, also c/o fatigue, RLQ pain and diarrhea x2 episodes. Blood cultures, UA sent, RVP ordered. Patient transferred to cardiac tele for further management for r/o endocarditis.     Subjective: Pt seen and examined at bedside via Amharic  ID #[id number]. Reports feeling unwell with multiple c/o fatigue, L sided 9/10 chest pain, 10/10 HA a/w nausea, 10/10 RLQ pain radiating to R flank, MANRIQUE, and diarrhea x 2 since yesterday. Had fever this AM s/p Tylenol, chills resolved from prior. Denies lightheadedness, dizziness, palpitations. Remainder ROS otherwise negative.    Overnight Events: fever 100.9 rectally at 6am. BC x 2 sent this AM, UA sent    TELEMETRY: SR 60s    EKG: sinus bradycardia 50s, no ST changes      VITAL SIGNS:  T(C): 36.6 (09-05-22 @ 13:48), Max: 38.3 (09-05-22 @ 05:47)  HR: 60 (09-05-22 @ 11:58) (52 - 62)  BP: 169/74 (09-05-22 @ 11:58) (126/66 - 169/74)  RR: 24 (09-05-22 @ 11:58) (18 - 24)  SpO2: 98% (09-05-22 @ 11:58) (94% - 98%)  Wt(kg): --    I&O's Summary        PHYSICAL EXAM:    General: A/ox 3, obese, No acute Distress, elderly appearing female  HEENT: Supple, NO JVD. Poor dentition w/ mild redness/swelling L/R upper molar.   Cardiac: S1 S2, Grade III/VI systolic murmur at LSB   Pulmonary: CTAB, Breathing unlabored on 2L NC, No Rhonchi/Rales/Wheezing  Abdomen: Large, Soft, generalized tenderness to all quadrants on palpation, +BS x 4 quads  Extremities: No Rashes, No edema  Neuro: A/o x 3, No focal deficits          LABS:                          9.0    9.90  )-----------( 221      ( 05 Sep 2022 06:27 )             30.8                              09-05    135  |  98  |  14  ----------------------------<  112<H>  4.8   |  24  |  1.15    Ca    9.0      05 Sep 2022 06:27  Phos  3.8     09-05  Mg     2.1     09-05    TPro  7.2  /  Alb  3.7  /  TBili  0.6  /  DBili  x   /  AST  23  /  ALT  28  /  AlkPhos  85  09-05    LIVER FUNCTIONS - ( 05 Sep 2022 06:27 )  Alb: 3.7 g/dL / Pro: 7.2 g/dL / ALK PHOS: 85 U/L / ALT: 28 U/L / AST: 23 U/L / GGT: x         PT/INR - ( 04 Sep 2022 02:30 )   PT: 19.0 sec;   INR: 1.59          PTT - ( 04 Sep 2022 02:30 )  PTT:34.3 sec  CAPILLARY BLOOD GLUCOSE        CARDIAC MARKERS ( 05 Sep 2022 06:27 )  x     / 0.01 ng/mL / x     / x     / x      CARDIAC MARKERS ( 04 Sep 2022 10:00 )  x     / <0.01 ng/mL / 40 U/L / x     / 1.1 ng/mL  CARDIAC MARKERS ( 04 Sep 2022 02:30 )  x     / 0.01 ng/mL / 36 U/L / x     / <1.0 ng/mL          Allergies:  Digox (Rash; Urticaria; Hives)  Plavix (Other (Mod to Severe))  vancomycin (Other)    MEDICATIONS  (STANDING):  aMIOdarone    Tablet 200 milliGRAM(s) Oral daily  apixaban 5 milliGRAM(s) Oral every 12 hours  atorvastatin 20 milliGRAM(s) Oral at bedtime  folic acid 1 milliGRAM(s) Oral daily  influenza  Vaccine (HIGH DOSE) 0.7 milliLiter(s) IntraMuscular once  lisinopril 10 milliGRAM(s) Oral every 24 hours  LORazepam     Tablet 0.5 milliGRAM(s) Oral daily  magnesium oxide 400 milliGRAM(s) Oral daily  melatonin 5 milliGRAM(s) Oral at bedtime  metoclopramide Injectable 10 milliGRAM(s) IV Push once  metoprolol succinate ER 50 milliGRAM(s) Oral daily  montelukast 10 milliGRAM(s) Oral daily  multivitamin 1 Tablet(s) Oral daily  pantoprazole  Injectable 40 milliGRAM(s) IV Push every 12 hours  tiotropium 18 MICROgram(s) Capsule 1 Capsule(s) Inhalation daily    MEDICATIONS  (PRN):  acetaminophen     Tablet .. 650 milliGRAM(s) Oral every 6 hours PRN Temp greater or equal to 38C (100.4F), Mild Pain (1 - 3)        DIAGNOSTIC TESTS:     < from: CT Angio Chest Aorta w/wo IV Cont (09.04.22 @ 13:48) >  FINDINGS:    LUNGS AND AIRWAYS: Patent central airways.  New bilateral lower lobe   predominant peribronchial wall thickening and right basilar interlobular   septal thickening. Unchanged 0.9 x 0.8 cm subpleural right lower lobe   anterior basal segment (7:214), stable dating back to 2019, likely benign   etiology. Redemonstrated subpleural reticular opacities at both lung   bases suggesting scarring and interstitial lung disease. Unchanged few   calcified micronodules.  PLEURA: No pleural effusion.  MEDIASTINUM AND PATRICIA: Stable or slightly increased long-standing multiple   enlarged mediastinal and hilar nodes, for example:  * Aortopulmonary window 2.2 x 1.5 cm (5:170)  *  Right hilar rosendo aggregate 3.3 x 1.6 cm (5:232)  *  Right lower paratracheal 2.0 x 1.6 cm (5:187)  VESSELS: Post-TAVR. Unchanged ascending aortic aneurysm caliber up to 4.2   cm. no aortic dissection. Mild aortic atherosclerotic plaque. No central   pulmonary embolism. Unchanged dilated right main pulmonary artery   suggesting pulmonary artery hypertension.  HEART: Unchanged enlarged heart. No pericardial effusion.  CHEST WALL AND LOWER NECK: Within normal limits.  VISUALIZED UPPER ABDOMEN: Atherosclerotic plaque at proximal SMA,   possibly hemodynamically significant narrowing. Small right renal cyst.   Unchanged left adrenal nodule, 2 x 1.5 cm (5:452), although slightly   increase since 2019 measuring 1.6 x 1.1 cm, most likely benign adenoma.  BONES: Spinal degenerative changes.      IMPRESSION:  1.  Unchanged fusiform dilatation mid ascending thoracic aorta 4.2 cm. No   dissection or acute pathology.  2.  Probable mild interstitial pulmonary edema and bronchitis.  3.  Stable or slightly increased multiple enlarged mediastinal and hilar   nodes, probably inflammatory/infectious etiology.  4.  Stable chronic findings detailed above.    < end of copied text >    < from: CT Angio Cardiac w/ IV Cont (01.18.22 @ 13:40) >  IMPRESSION:  Cardiac:  1.  The calcium score is mild at 76 Agatston units, which is at the 95   percentile, adjusted for age, gender and race.  2.  Less than 25% stenosis of theLeft Main.  3.  Mild stenosis of the proximal left anterior descending artery.  4.  Minimal stenoses of the mid left anterior descending artery and   proximal right coronary artery.  5.  The remaining segments are normal.  6.  Visually estimated leftventricular ejection fraction is normal.  7.  Aortic prothesis is present.    Non-cardiac:  4 cm ascending aortic aneurysm. No acute findings.    < end of copied text >    < from: TTE Echo Complete w/o Contrast w/ Doppler (01.15.22 @ 12:47) >  CONCLUSIONS:   1. Mild symmetric left ventricular hypertrophy.   2. Hyperdynamic left ventricular systolic function.   3. Normal right ventricular systolic function.   4. 23 mm Jamar 3 valve is noted in the aortic position without any   aortic regurgitation. The peak transvalvular velocity is 3.35 m/s, the   mean transvalvular gradient is 28.70 mmHg, and the LVOT/AV velocity ratio   is 0.29. The peak transaortic gradient is 44.89 mmHg.   5. No evidence of pulmonary hypertension, pulmonary artery systolic   pressure is 27 mmHg.   6. No pericardial effusion.   7. Proximal ascending aorta is dilated measuring 4.00 cm.   8. Pericardial fat pad is seen anterior to the right ventricle, cannot   rule out a trivial pericardial effusion.   9. Compared to the previous TTE performed on 5/5/2021, there have been   no significant interval changes.    < end of copied text >

## 2022-09-05 NOTE — PROGRESS NOTE ADULT - PROBLEM SELECTOR PLAN 4
TAVR w/ Dr. Alejo 2/2019 2/2 severe AS and diastolic HF  -F/u IRMA and repeat Echo  - Initial thought to have high AV gradients per Echo 5/2021. Taken for Valve in Valve, but aborted 2/2 low AV gradient 7 across the valve TAVR w/ Dr. Alejo 2/2019 2/2 severe AS and diastolic HF. 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  -F/u IRMA and repeat Echo  - Initial thought to have high AV gradients per Echo 5/2021. Taken for Valve in Valve, but aborted 2/2 low transvalvular gradient 7 TAVR w/ Dr. Alejo 2/2019 2/2 severe AS and diastolic HF. 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  -F/u IRMA and repeat Echo as above  - Initial thought to have high AV gradients per Echo 5/2021. Taken for Valve in Valve, but aborted 2/2 low transvalvular gradient 7  -Recent ECHO 1/15/22: EF>74%, Hyperdynamic LV systolic function, Mild symmetric LVH. Jamar valve noted in the aortic position without any AR, MG 28, PG 44. Dilated proximal ascending aorta 4.00 cm, Pericardial fat pad is seen anterior to RV, cannot rule out a trivial pericardial effusion. Compared to TTE 5/2021, no sig. change

## 2022-09-05 NOTE — PROGRESS NOTE ADULT - PROBLEM SELECTOR PLAN 1
Patient presents with CP associated with a headache, nausea, and a pressure-like sensation in her chest. EKG wnl, trop negative x3. Persistent episodes of CP, despite PRN nitroglycerin, on PPI  - Continue nitroglycerin PRN  - TTE ordered, f/u results  - Cards consulted for transfer to tele,

## 2022-09-05 NOTE — PROGRESS NOTE ADULT - ASSESSMENT
82F Luxembourgish speaking PMH HTN, HLD, severe AS s/p TAVR (2019, 2021), Ascending Aortic Aneurysm 4cm in 01/2022, pAFib (on Amiodarone+Eliquis), COPD (s/p 2L home O2), Colon cancer s/p resection in 2019 (in remission), FEROZ & CKD3. She presents to the ED for Subacute weakness, HTN episodes associated with HA, and new CP, Patient w s/s consistent of unstable angina, contact cardiology for transfer to tele.

## 2022-09-05 NOTE — PROGRESS NOTE ADULT - PROBLEM SELECTOR PLAN 6
Recent [facility] admission 3/2022 for Afib RVR s/p spontaneous conversion  -Currently in SR 50-60s  -c/w Amiodarone 200mg QD and Eliquis 5mg BID Recent [facility] admission 3/2022 for Afib RVR s/p spontaneous conversion  -Currently in SR 50-60s  -c/w Amiodarone 200mg QD and Toprol 50mg qd  -c/w Eliquis 5mg BID. CHADsVASc 5

## 2022-09-05 NOTE — CONSULT NOTE ADULT - SUBJECTIVE AND OBJECTIVE BOX
Patient is a 82y old  Female who presents with a chief complaint of Admitted for Weakness & CP evaluation, and PT. (04 Sep 2022 10:00)    HPI:  82F Yakut speaking PMHx HTN, HLD, severe AS s/p TAVR (2019, 2021), Ascending Aortic Aneurysm 4cm in 01/2022, pAFib (on Amiodarone+Eliquis), COPD (s/p 2L home O2), Colon cancer s/p resection in 2019 (in remission), FEROZ & CKD3. She had a history of weakness with exertional capacity of half a block, after working with PT that increased to 5 blocks, but since cessation of PT her exertional capacity has deteriorated to 2 steps limited by lethargy (not CP or SOB).    In the last 3 weeks she kept a log of her home SBP that fluctuate between 140-200, associated with HA and ear pain when hypertensive. She also endorses new intermitted dull substernal CP radiating to the back that lasts 2-3days and resolve spontaneously. She presents to the ED for Subacute weakness, HTN episodes associated with HA, and new CP.    In the ED was in HTN /74 with HA which resolved w/ decreased /78. Labs significant for normocytic anemia Hgb 8.6 (baseline 11-12), BIBIANA on CKD3a Crt 1.5 (baseline 1.1-1.2). EKG & cardiac enzymes WNL. CTH WNL. She received PO Tylenol 650mg. (04 Sep 2022 05:02)    81 yo F presenting with 10 days of exertional chest pain and pressure that has been steadily progressive. Her symptoms progressed over the course of 10 days to where she became unable to care for herself due to chest pain and dyspnea on exertion. She was administered sublingual nitroglycerin today with improvement of the pain. She denies palpitations, shortness of breath, lower extremity edema. Denies prior ischemic evaluation or family history of cardiovascular disease.    REVIEW OF SYSTEMS:   12 pt ROS otherwise negative     PAST MEDICAL & SURGICAL HISTORY:  HTN (hypertension)  Aortic stenosis  Hyperlipidemia  COPD (chronic obstructive pulmonary disease)  GERD (gastroesophageal reflux disease)  Stage 3 chronic kidney disease  Anemia  Diastolic CHF, chronic  Obesity  Paroxysmal atrial fibrillation  S/P TAVR (transcatheter aortic valve replacement)  2/2019    SOCIAL HISTORY:  Denies tobacco and EtOH use     Allergies  Digox (Rash; Urticaria; Hives)  Plavix (Other (Mod to Severe))  vancomycin (Other)    HOME MEDICATIONS:  amiodarone 200 mg oral tablet: 1 tab(s) orally once a day (04 Sep 2022 05:40)  apixaban 5 mg oral tablet: 1 tab(s) orally 2 times a day (24 Mar 2022 14:50)  atorvastatin 20 mg oral tablet: 1 tab(s) orally once a day (at bedtime) (24 Mar 2022 14:50)  folic acid 1 mg oral tablet: 1 tab(s) orally once a day (24 Mar 2022 14:50)  furosemide 20 mg oral tablet: 1 tab(s) orally once a day (24 Mar 2022 14:50)  lisinopril 10 mg oral tablet: 1 tab(s) orally once a day (24 Mar 2022 14:50)  LORazepam 0.5 mg oral tablet: 1 tab(s) orally once a day (22 Mar 2022 13:30)  magnesium oxide 400 mg (241.3 mg elemental magnesium) oral tablet: 1 tab(s) orally once a day (22 Mar 2022 13:30)  montelukast 10 mg oral tablet: 1 tab(s) orally once a day (04 Sep 2022 05:43)  Multiple Vitamins oral tablet: 1 tab(s) orally once a day (24 Mar 2022 14:50)  Spiriva Respimat 1.25 mcg/inh inhalation aerosol: 2 puff(s) inhaled once a day (22 Mar 2022 13:30)  Toprol-XL 50 mg oral tablet, extended release: 1 tab(s) orally once a day (04 Sep 2022 05:42)    Vital Signs Last 24 Hrs  T(C): 36.7 (04 Sep 2022 20:49), Max: 36.9 (04 Sep 2022 06:01)  T(F): 98.1 (04 Sep 2022 20:49), Max: 98.5 (04 Sep 2022 06:01)  HR: 53 (04 Sep 2022 20:49) (52 - 71)  BP: 129/71 (04 Sep 2022 20:49) (103/58 - 171/77)  RR: 18 (04 Sep 2022 20:49) (16 - 18)  SpO2: 97% (04 Sep 2022 20:49) (95% - 98%)    Parameters below as of 04 Sep 2022 20:49  Patient On (Oxygen Delivery Method): room air    PHYSICAL EXAM:  GENERAL: NAD  HEAD:  Atraumatic, Normocephalic  EYES: EOMI, conjunctiva and sclera clear  NECK: Supple, No JVD  CHEST/LUNG: Clear to auscultation bilaterally; No wheeze  HEART: Regular rate and rhythm; No murmurs  ABDOMEN: Soft, Nontender, Nondistended; Bowel sounds present  EXTREMITIES:  2+ Peripheral Pulses, No clubbing, cyanosis, or edema    LABS                        8.3    8.90  )-----------( 243      ( 04 Sep 2022 10:00 )             26.6                         8.6    9.29  )-----------( 256      ( 04 Sep 2022 02:30 )             27.7     09-04    136  |  101  |  16  ----------------------------<  123<H>  4.2   |  23  |  1.29  09-04    134<L>  |  97  |  18  ----------------------------<  104<H>  4.8   |  24  |  1.50<H>    Ca    8.7      04 Sep 2022 10:00  Ca    8.9      04 Sep 2022 02:30  Phos  4.3     09-04  Mg     2.3     09-04    TPro  6.9  /  Alb  3.9  /  TBili  0.6  /  DBili  x   /  AST  17  /  ALT  20  /  AlkPhos  71  09-04  TPro  7.0  /  Alb  3.9  /  TBili  0.6  /  DBili  x   /  AST  15  /  ALT  19  /  AlkPhos  75  09-04    PT/INR - ( 04 Sep 2022 02:30 )   PT: 19.0 sec;   INR: 1.59     PTT - ( 04 Sep 2022 02:30 )  PTT:34.3 sec   04 Sep 2022 10:00 Troponin <0.01 ng/mL / CK 40 U/L / CKMB x     / CKMB Units 1.1 ng/mL   04 Sep 2022 02:30 Troponin 0.01 ng/mL / CK 36 U/L / CKMB x     / CKMB Units <1.0 ng/mL    MEDICATIONS  (STANDING):  aMIOdarone    Tablet 200 milliGRAM(s) Oral daily  apixaban 5 milliGRAM(s) Oral every 12 hours  atorvastatin 20 milliGRAM(s) Oral at bedtime  folic acid 1 milliGRAM(s) Oral daily  influenza  Vaccine (HIGH DOSE) 0.7 milliLiter(s) IntraMuscular once  LORazepam     Tablet 0.5 milliGRAM(s) Oral daily  magnesium oxide 400 milliGRAM(s) Oral daily  melatonin 5 milliGRAM(s) Oral at bedtime  metoprolol succinate ER 50 milliGRAM(s) Oral daily  montelukast 10 milliGRAM(s) Oral daily  multivitamin 1 Tablet(s) Oral daily  pantoprazole  Injectable 40 milliGRAM(s) IV Push every 12 hours  tiotropium 18 MICROgram(s) Capsule 1 Capsule(s) Inhalation daily    MEDICATIONS  (PRN):  acetaminophen     Tablet .. 650 milliGRAM(s) Oral every 6 hours PRN Temp greater or equal to 38C (100.4F), Mild Pain (1 - 3)    ECG: NSR   ECHO FINDINGS:

## 2022-09-05 NOTE — PROGRESS NOTE ADULT - PROBLEM SELECTOR PLAN 5
Not in acute exacerbation. Euvolemic on exam.   -CTA Chest 9/4: Probable mild intersitial pulm edema & bronchitis. Stable or slightly increased multiple enlarged mediastinal & hilar nodules likely 2/2 inflammatory or infectious etiology  -Recent ECHO 1/15/22: EF>74%, Hyperdynamic LV systolic Function, Mild symmetric LVH. Jamar valve noted in the aortic position without any AI, MG 28, PG 44. Dilated proximal ascending aorta 4.00 cm, Pericardial fat pad is seen anterior to RV, cannot rule out a trivial pericardial effusion. Compared to TTE 5/2021, no sig. change  -Pending repeat echo and IRMA in AM  -Resumed home Lasix 20mg QD and Lisinopril 10mg qd Not in acute exacerbation. Euvolemic on exam.   -CTA Chest 9/4: Probable mild intersitial pulm edema & bronchitis. Stable or slightly increased multiple enlarged mediastinal & hilar nodules likely 2/2 inflammatory or infectious etiology  -Recent Echo 1/2022 w/ EF >74%, rest as above  -Pending repeat echo and IRMA in AM  -Resumed home Lasix 20mg QD  -c/w Lisinopril 10mg qd and Toprol 50mg qd

## 2022-09-05 NOTE — PROGRESS NOTE ADULT - PROBLEM SELECTOR PLAN 3
x3wks new intermitted dull substernal CP radiating to the back that lasts 2-3days and resolve spontaneously. Home -200. PMHx Ascending Aortic Aneurysm 4cm in 01/2022 c/f impending rupture. Takes Protonix 40mg Qd at home.   - chest CTA w/ IV cont shows an ascending aortic aneurysm 4.2 cm, unchanged from previous study 01/2022   - TTE pending  - Cardiology aware, appreciate recs

## 2022-09-05 NOTE — CONSULT NOTE ADULT - ASSESSMENT
82F Luxembourgish speaking PMHx HTN, HLD, severe AS s/p TAVR, AAA, pAFib, COPD (s/p 2L home O2), Colon cancer s/p resection in 2019 (in remission) presenting with weakness and chest pain.     #Chest pain, atypical   Presenting with chest pain and exertional dyspnea; chest pain was initially exertional and now occurring at rest. Unclear etiology but unstable angina should given consideration given description of pain and improvement with SLN.   EKG NSR  Cardiac enzymes neg   - f/u TTE  - will consider further ischemic eval with pharm nuc stress test vs CCTA   - SLN prn   - c/w Troprol 50mg   - consider resuming home lisinopril

## 2022-09-05 NOTE — PROGRESS NOTE ADULT - ATTENDING COMMENTS
Patient was seen and examined at bedside on 9/5/2022 at 1030 am. Patient has no acute complaints - overall feels weak. Chest pain is still present after eating breakfast. Denies SOB at rest. ROS is otherwise negative. Vitals, labwork and pertinent imaging reviewed - febrile in AM. Physical exam - NAD, AAO x 4, PERRLA, EOMI, MMM, supple neck, chest - CTA b/l, CV - rrr, s1s2, no m/r/g, abd - soft, NTND, + BS, ext - wwp, psych - normal affect, skin - no rash    Plan  -Check Echo  -Cardiology consult - given symptoms c/w unstable angina vs possible endocarditis  -Fever workup done  -CTH - essentially normal.

## 2022-09-05 NOTE — PROGRESS NOTE ADULT - ASSESSMENT
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 Pt endorses recent dental work in [place] upper teeth, was not given Abx prior, and have been feeling unwell after. Upon admission to ED pt was hypertensive to 174/74, and labs significant for anemia H/h 8.6/27, BIBIANA on CKD3 w/ crea 1.5, DDimer neg, , Trop neg x 3. EKG non-ischemic. CTH unremarkable. Patient with persistent headache and chest pain during hospitalization. Cardiology consulted. Started NTG SL PRN for chest pain, w/ minimal relief. CT Chest stable thoracic aneurysm 4.2cm. Febrile 100.9 rectally 9/5 AM, also c/o fatigue, RLQ pain and diarrhea x2 episodes. Blood cultures, UA sent, RVP ordered. Patient transferred to cardiac tele for further management for r/o endocarditis.  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 Patient with persistent HA and chest pain during hospitalization. Started NTG SL PRN for chest pain, w/ minimal relief. Febrile 100.9 rectally 9/5 AM, also c/o fatigue, RLQ pain and diarrhea x2 episodes. Blood cultures, UA, RVP sent. Transferred to cardiac tele for further management for r/o endocarditis.

## 2022-09-05 NOTE — PROGRESS NOTE ADULT - SUBJECTIVE AND OBJECTIVE BOX
TRANSFER FROM [facility] TO TELE  82F Welsh speaking PMHx HTN, HLD, severe AS s/p TAVR (2019, 2021), Ascending Aortic Aneurysm 4cm in 01/2022, pAFib (on Amiodarone+Eliquis), COPD (s/p 2L home O2), Colon cancer s/p resection in 2019 (in remission), FEROZ & CKD3. She had a history of weakness with  poor exertional capacity, and recent onset headaches for the last three weeks which coincide with high blood pressure recordings at home. Presents with H/A, poor functional capacity, and dull substernal CP which has been radiating to her back for the last three days. Upon admission to ED pt was hypertensive to 174/74, and labs significant for anemia, BIBIANA on CK3. EKG wnl, and cardiac enzymes wnl. CTH normal. PO Tylenol x 1 given. Patient with persistent headache and C/P during hospitalization. Cardiology consulted. PRN nitrogylcerin added for c/p, minimal relief. CT chest ordered to assess if any change in previous AAA, stable, no changes. Melatonin added at night PRN for insomnia. Patient reporting headache symptoms persistent. Symptoms consistent w migraine, associated nausea, headache, and improvement in dark setting. Repeat chest pain and pressure 9/4 ON. EKG ordered, no ischemic changes. Trops negative 9/5 AM. Fever 9/5 AM, rectal 100.9. Denies any cough, SOB, abdominal pain, diarrhea, dysuria, hematuria. Blood cultures ordered, RVP ordered. Patient will require higher level monitoring in Tele floor. Pending TTE this admission, and potential pharmacologic/exercise stress test tomorrow as per cardiology.      OVERNIGHT EVENTS: Patient w c/p in AM. EKG wnl. PRN nitroglycerin ordered, mild relief. Trops negative.    SUBJECTIVE:  Patient seen and examined at bedside. Patient reports she has persistent headache, and chest pain in the morning has resolved but still feels sensation. Reports headache sensation consistent with migraines, associated nausea, visual disturbances, and relief in dark setting. Denies any SOB, increased WOB, cough, congestion, abdominal pain, nausea, vomiting, dysuria, hematuria, polyuria.     Vital Signs Last 12 Hrs  T(F): 98.3 (09-05-22 @ 10:01), Max: 100.9 (09-05-22 @ 05:47)  HR: 59 (09-05-22 @ 10:01) (59 - 62)  BP: 126/66 (09-05-22 @ 10:01) (126/66 - 164/77)  BP(mean): --  RR: 18 (09-05-22 @ 10:01) (18 - 18)  SpO2: 94% (09-05-22 @ 10:01) (94% - 96%)  I&O's Summary      PHYSICAL EXAM:  Constitutional: obese, resting comfortably in bed; NAD  Head: NC/AT  Eyes: PERRL, EOMI, clear conjunctiva  ENT: no nasal discharge; uvula midline, no oropharyngeal erythema or exudates; MMM  Neck: supple; no JVD or thyromegaly  Respiratory: CTA B/L; no W/R/R, no retractions  Cardiac: +S1/S2; RRR; RLSB 2/4 systolic murmur  Gastrointestinal: soft, NT, distended; no rebound or guarding; +BSx4. no bruit  Extremities: WWP, no clubbing or cyanosis; no peripheral edema  Musculoskeletal: NROM x4; no joint swelling, tenderness or erythema  Vascular: 2+ radial, femoral, DP/PT pulses B/L  Dermatologic: skin warm, dry and intact; no rashes, wounds, or scars  Neurologic: AAOx3; CNII-XII grossly intact; no focal deficits  Psychiatric: affect and characteristics of appearance, verbalizations, behaviors are appropriate        LABS:                        9.0    9.90  )-----------( 221      ( 05 Sep 2022 06:27 )             30.8     09-05    135  |  98  |  14  ----------------------------<  112<H>  4.8   |  24  |  1.15    Ca    9.0      05 Sep 2022 06:27  Phos  3.8     09-05  Mg     2.1     09-05    TPro  7.2  /  Alb  3.7  /  TBili  0.6  /  DBili  x   /  AST  23  /  ALT  28  /  AlkPhos  85  09-05    PT/INR - ( 04 Sep 2022 02:30 )   PT: 19.0 sec;   INR: 1.59          PTT - ( 04 Sep 2022 02:30 )  PTT:34.3 sec        RADIOLOGY & ADDITIONAL TESTS:    MEDICATIONS  (STANDING):  aMIOdarone    Tablet 200 milliGRAM(s) Oral daily  apixaban 5 milliGRAM(s) Oral every 12 hours  atorvastatin 20 milliGRAM(s) Oral at bedtime  folic acid 1 milliGRAM(s) Oral daily  influenza  Vaccine (HIGH DOSE) 0.7 milliLiter(s) IntraMuscular once  lisinopril 10 milliGRAM(s) Oral every 24 hours  LORazepam     Tablet 0.5 milliGRAM(s) Oral daily  magnesium oxide 400 milliGRAM(s) Oral daily  melatonin 5 milliGRAM(s) Oral at bedtime  metoprolol succinate ER 50 milliGRAM(s) Oral daily  montelukast 10 milliGRAM(s) Oral daily  multivitamin 1 Tablet(s) Oral daily  pantoprazole  Injectable 40 milliGRAM(s) IV Push every 12 hours  tiotropium 18 MICROgram(s) Capsule 1 Capsule(s) Inhalation daily    MEDICATIONS  (PRN):  acetaminophen     Tablet .. 650 milliGRAM(s) Oral every 6 hours PRN Temp greater or equal to 38C (100.4F), Mild Pain (1 - 3)

## 2022-09-06 ENCOUNTER — APPOINTMENT (OUTPATIENT)
Dept: HEART AND VASCULAR | Facility: CLINIC | Age: 82
End: 2022-09-06

## 2022-09-06 LAB
-  CANDIDA ALBICANS: SIGNIFICANT CHANGE UP
-  CANDIDA GLABRATA: SIGNIFICANT CHANGE UP
-  CANDIDA KRUSEI: SIGNIFICANT CHANGE UP
-  CANDIDA PARAPSILOSIS: SIGNIFICANT CHANGE UP
-  CANDIDA TROPICALIS: SIGNIFICANT CHANGE UP
-  COAGULASE NEGATIVE STAPHYLOCOCCUS: SIGNIFICANT CHANGE UP
-  K. PNEUMONIAE GROUP: SIGNIFICANT CHANGE UP
-  KPC RESISTANCE GENE: SIGNIFICANT CHANGE UP
-  STREPTOCOCCUS SP. (NOT GRP A, B OR S PNEUMONIAE): SIGNIFICANT CHANGE UP
A BAUMANNII DNA SPEC QL NAA+PROBE: SIGNIFICANT CHANGE UP
A1C WITH ESTIMATED AVERAGE GLUCOSE RESULT: 5.3 % — SIGNIFICANT CHANGE UP (ref 4–5.6)
ANION GAP SERPL CALC-SCNC: 10 MMOL/L — SIGNIFICANT CHANGE UP (ref 5–17)
BASOPHILS # BLD AUTO: 0.03 K/UL — SIGNIFICANT CHANGE UP (ref 0–0.2)
BASOPHILS NFR BLD AUTO: 0.3 % — SIGNIFICANT CHANGE UP (ref 0–2)
BUN SERPL-MCNC: 15 MG/DL — SIGNIFICANT CHANGE UP (ref 7–23)
CALCIUM SERPL-MCNC: 8.6 MG/DL — SIGNIFICANT CHANGE UP (ref 8.4–10.5)
CHLORIDE SERPL-SCNC: 100 MMOL/L — SIGNIFICANT CHANGE UP (ref 96–108)
CHOLEST SERPL-MCNC: 79 MG/DL — SIGNIFICANT CHANGE UP
CO2 SERPL-SCNC: 27 MMOL/L — SIGNIFICANT CHANGE UP (ref 22–31)
CREAT SERPL-MCNC: 1.23 MG/DL — SIGNIFICANT CHANGE UP (ref 0.5–1.3)
E CLOAC COMP DNA BLD POS QL NAA+PROBE: SIGNIFICANT CHANGE UP
E COLI DNA BLD POS QL NAA+NON-PROBE: SIGNIFICANT CHANGE UP
EGFR: 43 ML/MIN/1.73M2 — LOW
ENTEROCOC DNA BLD POS QL NAA+NON-PROBE: SIGNIFICANT CHANGE UP
ENTEROCOC DNA BLD POS QL NAA+NON-PROBE: SIGNIFICANT CHANGE UP
EOSINOPHIL # BLD AUTO: 0.09 K/UL — SIGNIFICANT CHANGE UP (ref 0–0.5)
EOSINOPHIL NFR BLD AUTO: 1 % — SIGNIFICANT CHANGE UP (ref 0–6)
ESTIMATED AVERAGE GLUCOSE: 105 MG/DL — SIGNIFICANT CHANGE UP (ref 68–114)
FERRITIN SERPL-MCNC: 26 NG/ML — SIGNIFICANT CHANGE UP (ref 15–150)
FOLATE SERPL-MCNC: >20 NG/ML — SIGNIFICANT CHANGE UP
FOLATE SERPL-MCNC: >20 NG/ML — SIGNIFICANT CHANGE UP
GLUCOSE SERPL-MCNC: 99 MG/DL — SIGNIFICANT CHANGE UP (ref 70–99)
GP B STREP DNA BLD POS QL NAA+NON-PROBE: SIGNIFICANT CHANGE UP
GRAM STN FLD: SIGNIFICANT CHANGE UP
HAEM INFLU DNA BLD POS QL NAA+NON-PROBE: SIGNIFICANT CHANGE UP
HCT VFR BLD CALC: 23.2 % — LOW (ref 34.5–45)
HCT VFR BLD CALC: 26.9 % — LOW (ref 34.5–45)
HDLC SERPL-MCNC: 35 MG/DL — LOW
HGB BLD-MCNC: 7.2 G/DL — LOW (ref 11.5–15.5)
HGB BLD-MCNC: 8.6 G/DL — LOW (ref 11.5–15.5)
IMM GRANULOCYTES NFR BLD AUTO: 0.6 % — SIGNIFICANT CHANGE UP (ref 0–1.5)
IRON SATN MFR SERPL: 24 UG/DL — LOW (ref 30–160)
IRON SATN MFR SERPL: 8 % — LOW (ref 14–50)
K OXYTOCA DNA BLD POS QL NAA+NON-PROBE: SIGNIFICANT CHANGE UP
L MONOCYTOG DNA BLD POS QL NAA+NON-PROBE: SIGNIFICANT CHANGE UP
LIPID PNL WITH DIRECT LDL SERPL: 29 MG/DL — SIGNIFICANT CHANGE UP
LYMPHOCYTES # BLD AUTO: 0.84 K/UL — LOW (ref 1–3.3)
LYMPHOCYTES # BLD AUTO: 9.6 % — LOW (ref 13–44)
MAGNESIUM SERPL-MCNC: 2.9 MG/DL — HIGH (ref 1.6–2.6)
MCHC RBC-ENTMCNC: 26.9 PG — LOW (ref 27–34)
MCHC RBC-ENTMCNC: 27.7 PG — SIGNIFICANT CHANGE UP (ref 27–34)
MCHC RBC-ENTMCNC: 31 GM/DL — LOW (ref 32–36)
MCHC RBC-ENTMCNC: 32 GM/DL — SIGNIFICANT CHANGE UP (ref 32–36)
MCV RBC AUTO: 86.6 FL — SIGNIFICANT CHANGE UP (ref 80–100)
MCV RBC AUTO: 86.8 FL — SIGNIFICANT CHANGE UP (ref 80–100)
METHOD TYPE: SIGNIFICANT CHANGE UP
MONOCYTES # BLD AUTO: 0.78 K/UL — SIGNIFICANT CHANGE UP (ref 0–0.9)
MONOCYTES NFR BLD AUTO: 8.9 % — SIGNIFICANT CHANGE UP (ref 2–14)
MRSA SPEC QL CULT: SIGNIFICANT CHANGE UP
MSSA DNA SPEC QL NAA+PROBE: SIGNIFICANT CHANGE UP
N MEN ISLT CULT: SIGNIFICANT CHANGE UP
NEUTROPHILS # BLD AUTO: 6.94 K/UL — SIGNIFICANT CHANGE UP (ref 1.8–7.4)
NEUTROPHILS NFR BLD AUTO: 79.6 % — HIGH (ref 43–77)
NON HDL CHOLESTEROL: 44 MG/DL — SIGNIFICANT CHANGE UP
NRBC # BLD: 0 /100 WBCS — SIGNIFICANT CHANGE UP (ref 0–0)
NRBC # BLD: 0 /100 WBCS — SIGNIFICANT CHANGE UP (ref 0–0)
P AERUGINOSA DNA BLD POS NAA+NON-PROBE: SIGNIFICANT CHANGE UP
PLATELET # BLD AUTO: 240 K/UL — SIGNIFICANT CHANGE UP (ref 150–400)
PLATELET # BLD AUTO: 252 K/UL — SIGNIFICANT CHANGE UP (ref 150–400)
POTASSIUM SERPL-MCNC: 4.3 MMOL/L — SIGNIFICANT CHANGE UP (ref 3.5–5.3)
POTASSIUM SERPL-SCNC: 4.3 MMOL/L — SIGNIFICANT CHANGE UP (ref 3.5–5.3)
PROTEUS SP DNA BLD POS QL NAA+NON-PROBE: SIGNIFICANT CHANGE UP
RBC # BLD: 2.68 M/UL — LOW (ref 3.8–5.2)
RBC # BLD: 3.1 M/UL — LOW (ref 3.8–5.2)
RBC # FLD: 14.6 % — HIGH (ref 10.3–14.5)
RBC # FLD: 14.8 % — HIGH (ref 10.3–14.5)
S MARCESCENS DNA BLD POS NAA+NON-PROBE: SIGNIFICANT CHANGE UP
S PNEUM DNA BLD POS QL NAA+NON-PROBE: SIGNIFICANT CHANGE UP
S PYO DNA BLD POS QL NAA+NON-PROBE: SIGNIFICANT CHANGE UP
SODIUM SERPL-SCNC: 137 MMOL/L — SIGNIFICANT CHANGE UP (ref 135–145)
TIBC SERPL-MCNC: 288 UG/DL — SIGNIFICANT CHANGE UP (ref 220–430)
TRIGL SERPL-MCNC: 73 MG/DL — SIGNIFICANT CHANGE UP
TSH SERPL-MCNC: 2.32 UIU/ML — SIGNIFICANT CHANGE UP (ref 0.27–4.2)
UIBC SERPL-MCNC: 264 UG/DL — SIGNIFICANT CHANGE UP (ref 110–370)
VIT B12 SERPL-MCNC: 557 PG/ML — SIGNIFICANT CHANGE UP (ref 232–1245)
VIT B12 SERPL-MCNC: 699 PG/ML — SIGNIFICANT CHANGE UP (ref 232–1245)
WBC # BLD: 8.73 K/UL — SIGNIFICANT CHANGE UP (ref 3.8–10.5)
WBC # BLD: 9.48 K/UL — SIGNIFICANT CHANGE UP (ref 3.8–10.5)
WBC # FLD AUTO: 8.73 K/UL — SIGNIFICANT CHANGE UP (ref 3.8–10.5)
WBC # FLD AUTO: 9.48 K/UL — SIGNIFICANT CHANGE UP (ref 3.8–10.5)

## 2022-09-06 PROCEDURE — 93306 TTE W/DOPPLER COMPLETE: CPT | Mod: 26

## 2022-09-06 PROCEDURE — 99222 1ST HOSP IP/OBS MODERATE 55: CPT

## 2022-09-06 PROCEDURE — 93325 DOPPLER ECHO COLOR FLOW MAPG: CPT | Mod: 26,59

## 2022-09-06 PROCEDURE — 76700 US EXAM ABDOM COMPLETE: CPT | Mod: 26

## 2022-09-06 PROCEDURE — 99233 SBSQ HOSP IP/OBS HIGH 50: CPT

## 2022-09-06 PROCEDURE — 93312 ECHO TRANSESOPHAGEAL: CPT | Mod: 26

## 2022-09-06 RX ORDER — VANCOMYCIN HCL 1 G
1250 VIAL (EA) INTRAVENOUS EVERY 24 HOURS
Refills: 0 | Status: DISCONTINUED | OUTPATIENT
Start: 2022-09-07 | End: 2022-09-08

## 2022-09-06 RX ORDER — IBUPROFEN 200 MG
200 TABLET ORAL ONCE
Refills: 0 | Status: COMPLETED | OUTPATIENT
Start: 2022-09-06 | End: 2022-09-06

## 2022-09-06 RX ORDER — PANTOPRAZOLE SODIUM 20 MG/1
40 TABLET, DELAYED RELEASE ORAL
Refills: 0 | Status: DISCONTINUED | OUTPATIENT
Start: 2022-09-06 | End: 2022-09-13

## 2022-09-06 RX ORDER — VANCOMYCIN HCL 1 G
VIAL (EA) INTRAVENOUS
Refills: 0 | Status: DISCONTINUED | OUTPATIENT
Start: 2022-09-06 | End: 2022-09-08

## 2022-09-06 RX ORDER — VANCOMYCIN HCL 1 G
1250 VIAL (EA) INTRAVENOUS ONCE
Refills: 0 | Status: COMPLETED | OUTPATIENT
Start: 2022-09-06 | End: 2022-09-06

## 2022-09-06 RX ADMIN — PANTOPRAZOLE SODIUM 40 MILLIGRAM(S): 20 TABLET, DELAYED RELEASE ORAL at 05:39

## 2022-09-06 RX ADMIN — ATORVASTATIN CALCIUM 20 MILLIGRAM(S): 80 TABLET, FILM COATED ORAL at 21:17

## 2022-09-06 RX ADMIN — Medication 50 MILLIGRAM(S): at 05:38

## 2022-09-06 RX ADMIN — Medication 20 MILLIGRAM(S): at 16:56

## 2022-09-06 RX ADMIN — Medication 5 MILLIGRAM(S): at 21:17

## 2022-09-06 RX ADMIN — SIMETHICONE 80 MILLIGRAM(S): 80 TABLET, CHEWABLE ORAL at 05:38

## 2022-09-06 RX ADMIN — LIDOCAINE 1 PATCH: 4 CREAM TOPICAL at 21:46

## 2022-09-06 RX ADMIN — APIXABAN 5 MILLIGRAM(S): 2.5 TABLET, FILM COATED ORAL at 05:36

## 2022-09-06 RX ADMIN — MONTELUKAST 10 MILLIGRAM(S): 4 TABLET, CHEWABLE ORAL at 11:58

## 2022-09-06 RX ADMIN — Medication 0.5 MILLIGRAM(S): at 10:36

## 2022-09-06 RX ADMIN — TIOTROPIUM BROMIDE 1 CAPSULE(S): 18 CAPSULE ORAL; RESPIRATORY (INHALATION) at 11:58

## 2022-09-06 RX ADMIN — Medication 1 TABLET(S): at 11:58

## 2022-09-06 RX ADMIN — Medication 650 MILLIGRAM(S): at 10:34

## 2022-09-06 RX ADMIN — LIDOCAINE 1 PATCH: 4 CREAM TOPICAL at 17:00

## 2022-09-06 RX ADMIN — Medication 1250 MILLIGRAM(S): at 11:58

## 2022-09-06 RX ADMIN — Medication 650 MILLIGRAM(S): at 11:34

## 2022-09-06 RX ADMIN — LIDOCAINE 1 PATCH: 4 CREAM TOPICAL at 07:34

## 2022-09-06 RX ADMIN — AMIODARONE HYDROCHLORIDE 200 MILLIGRAM(S): 400 TABLET ORAL at 05:38

## 2022-09-06 RX ADMIN — APIXABAN 5 MILLIGRAM(S): 2.5 TABLET, FILM COATED ORAL at 17:00

## 2022-09-06 RX ADMIN — Medication 1 MILLIGRAM(S): at 11:58

## 2022-09-06 RX ADMIN — LISINOPRIL 10 MILLIGRAM(S): 2.5 TABLET ORAL at 08:45

## 2022-09-06 NOTE — CONSULT NOTE ADULT - SUBJECTIVE AND OBJECTIVE BOX
HPI:  82F Greek speaking PMHx HTN, HLD, severe AS s/p TAVR (, ), Ascending Aortic Aneurysm 4cm in 2022, pAFib (on Amiodarone+Eliquis), COPD (s/p 2L home O2), Colon cancer s/p resection in 2019 (in remission), FEROZ & CKD3. She had a history of weakness with exertional capacity of half a block, after working with PT that increased to 5 blocks, but since cessation of PT her exertional capacity has deteriorated to 2 steps limited by lethargy (not CP or SOB).    In the last 3 weeks she kept a log of her home SBP that fluctuate between 140-200, associated with HA and ear pain when hypertensive. She also endorses new intermitted dull substernal CP radiating to the back that lasts 2-3days and resolve spontaneously. She presents to the ED for Subacute weakness, HTN episodes associated with HA, and new CP.    In the ED was in HTN /74 with HA which resolved w/ decreased /78. Labs significant for normocytic anemia Hgb 8.6 (baseline 11-12), BIBIANA on CKD3a Crt 1.5 (baseline 1.1-1.2). EKG & cardiac enzymes WNL. CTH WNL. She received PO Tylenol 650mg. (04 Sep 2022 05:02)      PAST MEDICAL & SURGICAL HISTORY:  HTN (hypertension)      Aortic stenosis      Hyperlipidemia      COPD (chronic obstructive pulmonary disease)      GERD (gastroesophageal reflux disease)      Stage 3 chronic kidney disease      Anemia      Diastolic CHF, chronic      Obesity      Paroxysmal atrial fibrillation      S/P TAVR (transcatheter aortic valve replacement)  2019            REVIEW OF SYSTEMS:    General:	 no weakness; no fevers, no chills  Skin/Breast: no rash  Respiratory and Thorax: no SOB, no cough  Cardiovascular:	No chest pain  Gastrointestinal:	 no nausea, vomiting , diarrhea  Genitourinary:	no dysuria, no difficulty urinating, no hematuria  Musculoskeletal:	no weakness, no joint swelling/pain  Neurological:	no focal weakness/numbness  Endocrine:	no polyuria, no polydipsia      ANTIBIOTICS:  MEDICATIONS  (STANDING):  aMIOdarone    Tablet 200 milliGRAM(s) Oral daily  apixaban 5 milliGRAM(s) Oral every 12 hours  atorvastatin 20 milliGRAM(s) Oral at bedtime  folic acid 1 milliGRAM(s) Oral daily  furosemide    Tablet 20 milliGRAM(s) Oral every 24 hours  influenza  Vaccine (HIGH DOSE) 0.7 milliLiter(s) IntraMuscular once  lidocaine   4% Patch 1 Patch Transdermal every 24 hours  lisinopril 10 milliGRAM(s) Oral every 24 hours  LORazepam     Tablet 0.5 milliGRAM(s) Oral daily  melatonin 5 milliGRAM(s) Oral at bedtime  metoprolol succinate ER 50 milliGRAM(s) Oral daily  montelukast 10 milliGRAM(s) Oral daily  multivitamin 1 Tablet(s) Oral daily  pantoprazole    Tablet 40 milliGRAM(s) Oral before breakfast  simethicone 80 milliGRAM(s) Chew two times a day  tiotropium 18 MICROgram(s) Capsule 1 Capsule(s) Inhalation daily  vancomycin  IVPB        MEDICATIONS  (PRN):  acetaminophen     Tablet .. 650 milliGRAM(s) Oral every 6 hours PRN Temp greater or equal to 38C (100.4F), Mild Pain (1 - 3)      Allergies    Digox (Rash; Urticaria; Hives)  Plavix (Other (Mod to Severe))  vancomycin (Other)    Intolerances        SOCIAL HISTORY:    FAMILY HISTORY:  No pertinent family history in first degree relatives        Vital Signs Last 24 Hrs  T(C): 36.3 (06 Sep 2022 06:12), Max: 37.6 (05 Sep 2022 22:05)  T(F): 97.3 (06 Sep 2022 06:12), Max: 99.6 (05 Sep 2022 22:05)  HR: 56 (06 Sep 2022 11:57) (50 - 68)  BP: 121/57 (06 Sep 2022 11:57) (121/57 - 187/77)  BP(mean): 82 (06 Sep 2022 11:57) (82 - 88)  RR: 20 (06 Sep 2022 11:57) (16 - 20)  SpO2: 94% (06 Sep 2022 11:57) (94% - 100%)    Parameters below as of 06 Sep 2022 11:57  Patient On (Oxygen Delivery Method): room air        PHYSICAL EXAM:  Constitutional: NAD  Eyes: TIMMY, EOMI  Ear/Nose/Throat: no oral lesion, no sinus tenderness on percussion	  Neck: no JVD, no lymphadenopathy, supple  Respiratory: CTA [place]  Cardiovascular: S1S2 RRR, no murmurs  Gastrointestinal: soft, (+) BS, no HSM  Extremities:no e/e/c  Vascular: DP Pulse: right normal; left normal            LABS:                        8.6    9.48  )-----------( 252      ( 06 Sep 2022 10:56 )             26.9         137  |  100  |  15  ----------------------------<  99  4.3   |  27  |  1.23    Ca    8.6      06 Sep 2022 05:30  Phos  3.8     -  Mg     2.9         TPro  7.2  /  Alb  3.7  /  TBili  0.6  /  DBili  x   /  AST  23  /  ALT  28  /  AlkPhos  85        Urinalysis Basic - ( 05 Sep 2022 15:16 )    Color: Yellow / Appearance: Clear / S.015 / pH: x  Gluc: x / Ketone: NEGATIVE  / Bili: Negative / Urobili: 0.2 E.U./dL   Blood: x / Protein: NEGATIVE mg/dL / Nitrite: NEGATIVE   Leuk Esterase: Small / RBC: < 5 /HPF / WBC < 5 /HPF   Sq Epi: x / Non Sq Epi: 0-5 /HPF / Bacteria: Present /HPF        MICROBIOLOGY: Culture - Blood (22 @ 10:45)    Gram Stain:   Aerobic Bottle: Gram Positive Cocci in Clusters  Result called to and read back byJuan Francisco Grady RN  2022 09:21:06  ***Blood Panel PCR results on this specimen are available  approximately 3 hours after the Gram stain result.***  Gram stain, PCR, and/or culture results may not always  correspond due to difference in methodologies.  ************************************************************  This PCR assay was performed using [employer].  The following targets are tested for: Enterococcus,  vancomycin resistant enterococci, Listeria monocytogenes,  coagulase negative staphylococci, S. aureus,  methicillin resistant S. aureus, Streptococcus agalactiae  (Group B), S. pneumoniae, S. pyogenes (Group A),  Acinetobacter baumannii, Enterobacter cloacae, E. coli,  Klebsiella oxytoca, K. pneumoniae, Proteus sp.,  Serratia marcescens, Haemophilus influenzae,  Neisseria meningitidis, Pseudomonas aeruginosa, Candida  albicans, C. glabrata, C krusei, C parapsilosis,  C. tropicalis and the KPC resistance gene.    -  Multidrug (KPC pos) resistant organism: Nondet    -  Methicillin SENSITIVE Staphylococcus aureus (MSSA): Nondet    -  Methicillin resistant Staphylococcus aureus (MRSA): Nondet    -  Coagulase negative Staphylococcus: Nondet    -  Enterococcus species: Nondet    -  Vancomycin resistant Enterococcus sp.: Nondet    -  Escherichia coli: Nondet    -  Klebsiella oxytoca: Nondet    -  Klebsiella pneumoniae: Nondet    -  Serratia marcescens: Nondet    -  Proteus species: Nondet    -  Haemophilus influenzae: Nondet    -  Listeria monocytogenes: Nondet    -  Neisseria meningitidis: Nondet    -  Pseudomonas aeruginosa: Nondet    -  Acinetobacter baumanii: Nondet    -  Enterobacter cloacae complex: Nondet    -  Streptococcus sp. (Not Grp A, B or S pneumoniae): Nondet    -  Streptococcus agalactiae (Group B): Nondet    -  Streptococcus pyogenes (Group A): Nondet    -  Streptococcus pneumoniae: Nondet    -  Candida albicans: Nondet    -  Candida glabrata: Nondet    -  Candida krusei: Nondet    -  Candida parapsilosis: Nondet    -  Candida tropicalis: Nondet    Specimen Source: .Blood Blood-Venous    Organism: Blood Culture PCR    Culture Results:   Culture in progress    Organism Identification: Blood Culture PCR    Method Type: PCR      RADIOLOGY & ADDITIONAL STUDIES: CT AP with hepatic steatosis, HSM

## 2022-09-06 NOTE — PROGRESS NOTE ADULT - PROBLEM SELECTOR PLAN 7
Hypertensive on admission, reports compliant w/ meds. /98 at home.  -BP meds initially held 2/2 BIBIANA on CKD, now resolved  -c/w Toprol 50mg qd  -Resumed Lisinopril 10mg QD and Lasix 20mg QD     #HLD   -c/w home Atorvastatin 20mg QD SBP 210s on admission   - SBPs 120s-130s   - c/w Lasix 20mg qd, Lisinopril 10mg  and Metoprolol Succinate 50mg       #HLD   - Chol 79 TG 73 HDL 35 LDL 29   - c/w Atorvastatin 20mg QD

## 2022-09-06 NOTE — PROGRESS NOTE ADULT - PROBLEM SELECTOR PLAN 6
Recent [facility] admission 3/2022 for Afib RVR s/p spontaneous conversion  -Currently in SR 50-60s  -c/w Amiodarone 200mg QD and Toprol 50mg qd  -c/w Eliquis 5mg BID. CHADsVASc 5 Recent [facility] admission 3/2022 for Afib RVR s/p spontaneous conversion  - tele- SB- SR HR 50s-60s   - c/w Amiodarone 200mg QD and Metoprolol Succinate 50mg   - c/w Eliquis 5mg BID. CHADsVASc 5

## 2022-09-06 NOTE — PROGRESS NOTE ADULT - SUBJECTIVE AND OBJECTIVE BOX
CARDIOLOGY NP PROGRESS NOTE    Subjective: Received pt awake sitting up in bed in NAD. Citizen of the [place] : Lexi ID #[id number]. Patient c/o ongoing HA, nausea, Right thigh and abdominal pain. Also admits to increased MANRIQUE. Discussed with patient results of blood cultures and plan for day with IRMA and TTE for which she verbalized understanding.  Remainder ROS otherwise negative.    Overnight Events:  Had persistent HA without alleviation from Tylenol and received Ibuprofen 200mg PO x1 to provide breakthrough relief     TELEMETRY: SB-SR HR 50s-60s       VITAL SIGNS:  T(C): 36.3 (09-06-22 @ 06:12), Max: 37.6 (09-05-22 @ 22:05)  HR: 60 (09-06-22 @ 08:29) (50 - 60)  BP: 125/58 (09-06-22 @ 08:29) (121/58 - 169/74)  RR: 16 (09-06-22 @ 08:29) (16 - 24)  SpO2: 98% (09-06-22 @ 08:29) (95% - 100%)  Wt(kg): --    I&O's Summary    05 Sep 2022 07:01  -  06 Sep 2022 07:00  --------------------------------------------------------  IN: 180 mL / OUT: 1 mL / NET: 179 mL      Citizen of [place] : Lexi ID #[id number]  PHYSICAL EXAM:  General: A/ox 3, Citizen of the [place] Speaking. No acute distress   Neck: Supple, NO JVD  Cardiac: S1 S2,  (+) murmur   Pulmonary: Diminished breath sounds in all lung fields. Breathing unlabored, No Rhonchi/Rales/Wheezing  Abdomen: Obese. Soft w/generalized tenderness.   Extremities: No Rashes, No edema  Neuro: A/o x 3, No focal deficits      LABS:                          8.6    9.48  )-----------( 252      ( 06 Sep 2022 10:56 )             26.9                              09-06    137  |  100  |  15  ----------------------------<  99  4.3   |  27  |  1.23    Ca    8.6      06 Sep 2022 05:30  Phos  3.8     09-05  Mg     2.9     09-06    TPro  7.2  /  Alb  3.7  /  TBili  0.6  /  DBili  x   /  AST  23  /  ALT  28  /  AlkPhos  85  09-05    LIVER FUNCTIONS - ( 05 Sep 2022 06:27 )  Alb: 3.7 g/dL / Pro: 7.2 g/dL / ALK PHOS: 85 U/L / ALT: 28 U/L / AST: 23 U/L / GGT: x                                   CAPILLARY BLOOD GLUCOSE        CARDIAC MARKERS ( 05 Sep 2022 06:27 )  x     / 0.01 ng/mL / x     / x     / x              Allergies:  Digox (Rash; Urticaria; Hives)  Plavix (Other (Mod to Severe))  vancomycin (Other)    MEDICATIONS  (STANDING):  aMIOdarone    Tablet 200 milliGRAM(s) Oral daily  apixaban 5 milliGRAM(s) Oral every 12 hours  atorvastatin 20 milliGRAM(s) Oral at bedtime  folic acid 1 milliGRAM(s) Oral daily  furosemide    Tablet 20 milliGRAM(s) Oral every 24 hours  influenza  Vaccine (HIGH DOSE) 0.7 milliLiter(s) IntraMuscular once  lidocaine   4% Patch 1 Patch Transdermal every 24 hours  lisinopril 10 milliGRAM(s) Oral every 24 hours  LORazepam     Tablet 0.5 milliGRAM(s) Oral daily  magnesium oxide 400 milliGRAM(s) Oral daily  melatonin 5 milliGRAM(s) Oral at bedtime  metoprolol succinate ER 50 milliGRAM(s) Oral daily  montelukast 10 milliGRAM(s) Oral daily  multivitamin 1 Tablet(s) Oral daily  pantoprazole    Tablet 40 milliGRAM(s) Oral before breakfast  simethicone 80 milliGRAM(s) Chew two times a day  tiotropium 18 MICROgram(s) Capsule 1 Capsule(s) Inhalation daily  vancomycin  IVPB      vancomycin  IVPB 1250 milliGRAM(s) IV Intermittent once    MEDICATIONS  (PRN):  acetaminophen     Tablet .. 650 milliGRAM(s) Oral every 6 hours PRN Temp greater or equal to 38C (100.4F), Mild Pain (1 - 3)        DIAGNOSTIC TESTS:

## 2022-09-06 NOTE — PROGRESS NOTE ADULT - ASSESSMENT
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 Transferred to tele 9/5 to r/o endocarditis for which she was started on IV ABX for positive blood cultures with plan for IRMA today (9/6). ID following

## 2022-09-06 NOTE — PROGRESS NOTE ADULT - PROBLEM SELECTOR PLAN 5
Not in acute exacerbation. Euvolemic on exam.   -CTA Chest 9/4: Probable mild intersitial pulm edema & bronchitis. Stable or slightly increased multiple enlarged mediastinal & hilar nodules likely 2/2 inflammatory or infectious etiology  -Recent Echo 1/2022 w/ EF >74%, rest as above  -Pending repeat echo and IRMA in AM  -Resumed home Lasix 20mg QD  -c/w Lisinopril 10mg qd and Toprol 50mg qd Not in acute exacerbation. Euvolemic on exam.   - CTA Chest 9/4: Probable mild intersitial pulm edema & bronchitis. Stable or slightly increased multiple enlarged mediastinal & hilar nodules likely 2/2 inflammatory or infectious etiology  - Recent Echo 1/2022 w/ EF >74%, rest as above  - Pending repeat TTE and IRMA   - c/w Lasix 20mg qd, Lisinopril 10mg  and Metoprolol Succinate 50mg   - core measures. Strict I/Os, daily weights

## 2022-09-06 NOTE — PHYSICAL THERAPY INITIAL EVALUATION ADULT - PERTINENT HX OF CURRENT PROBLEM, REHAB EVAL
82 year old female admitted to medicine service for c/o generalized weakness with MANRIQUE walking in apt, chest pain, and recent onset HA/nausea for the last 2 wks which coincide with high BP recordings at home ~211/98.  Upon admission to ED pt was hypertensive to 174/74, and labs significant for anemia H/h 8.6/27, BIBIANA on CKD3 w/ crea 1.5, DDimer neg, , Trop neg x 3. EKG non-ischemic. CTH unremarkable. Patient with persistent headache and chest pain during hospitalization. Patient transferred to cardiac tele for further management for r/o endocarditis.

## 2022-09-06 NOTE — PROGRESS NOTE ADULT - PROBLEM SELECTOR PLAN 10
Normocytic anemia. H/h 8.6/27.7 on admission. Hx of Hx Colon cancer s/p resection in 2019 (in remission) and FEROZ. No s/s of bleeding.  -H/h stable 9/30  -Monitor CBC daily  -F/u iron panel in AM. consider IV iron if noted w/ FEROZ, received previously w/ improvements  -keep Active T+S, transfuse if hgb <7  -c/w Protonix 40mg PO qd    F: No IVF  N: DASH/TLC diet  E: Replete lytes PRN K<4, Mg<2  P: DVT PPX: on Eliquis  C: FULL CODE  Dispo: Transferred [facility] to tele 5 Uris Normocytic anemia. H/h 8.6/27.7 on admission. Hx of Hx Colon cancer s/p resection in 2019 (in remission) and FEROZ. -  - hgb 7.2 this AM and repeat CBC 8.6. No S/SX bleeding  - pending Stool OB   - Iron Panel sent, follow up results and consider IV Iron   - maintain Active T+S, transfuse if hgb <7  - c/w Protonix 40mg PO qd    F: No IVF  N: NPO pending IRMA   E: Replete lytes PRN K<4, Mg<2  P: DVT PPX: on Eliquis  C: FULL CODE  Dispo: Pending IRMA   PT giselle- BELKIS

## 2022-09-06 NOTE — PROGRESS NOTE ADULT - PROBLEM SELECTOR PLAN 1
9/5 Fever 100.9 Rectal a/w chills and general malaise. Also c/o RLQ abdominal pain w/radiation R Flank.  - COVID and RVP negative. UC NGTD   - Remains afebrile since episode without leukocytosis   - Etiology 2/2 Endocarditis (did not receive Abx prior to dental work 2 weeks ago vs. Abdominal Source   - CT Chest 9/4: Probable mild intersitial pulm edema & bronchitis. Stable or slightly increased multiple enlarged mediastinal & hilar nodules likely 2/2 inflammatory or infectious etiology  - Abdominal US 9/6: Hepatosplenomegaly. Hepatic steatosis. Since 9/4/2022, stable left adrenal nodule.  - If has diarrhea obtain GI PCR and consider C.diff  - Blood Culture x 2 9/5: Positive Gram Cocci in Clusters   - pending IRMA  - ID Consulted, follow up recs.   - Started on Van 1250mg QD. 9/5 Fever 100.9 Rectal a/w chills and general malaise. Also c/o RLQ abdominal pain w/radiation R Flank.  - Etiology 2/2 Endocarditis (did not receive Abx prior to dental work 2 weeks ago vs. Abdominal Source   - COVID and RVP negative. UC NGTD   - Remains afebrile since episode without leukocytosis   - CT Chest 9/4: Probable mild intersitial pulm edema & bronchitis. Stable or slightly increased multiple enlarged mediastinal & hilar nodules likely 2/2 inflammatory or infectious etiology  - Abdominal US 9/6: Hepatosplenomegaly. Hepatic steatosis. Since 9/4/2022, stable left adrenal nodule.  - Blood Culture x 2 9/5: Positive Gram Cocci in Clusters   - pending IRMA  - If has diarrhea obtain GI PCR and consider C.diff  - ID Consulted, follow up recs.   - Started on Van 1250mg QD.

## 2022-09-06 NOTE — PROGRESS NOTE ADULT - PROBLEM SELECTOR PLAN 4
Hx colon CA s/p resection 2019 (in remission). C/o RLQ pain w/ radiation to R flank, +bloating. Generalized mild tenderness on palpation to abd. No rebound tenderness or CVA tenderness. Negative Roe's sign.  -C/o diarrhea x 3 episodes since 9/4. Will send GI PCR  -LFTs WNL. F/u lipase  -Obtain Abd US  -Consider CT Abd/Pelvis if pain persists Hx colon CA s/p resection 2019 (in remission). C/o RLQ pain w/ radiation to R flank, +bloating. Generalized mild tenderness on palpation to abd. No rebound tenderness or CVA tenderness. Negative Roe's sign.  - C/o diarrhea x 3 episodes since 9/4. Will send GI PCR if continues   - Abdominal US 9/6: Hepatosplenomegaly. Hepatic steatosis. Since 9/4/2022, stable left adrenal nodule.  - LFTs WNL

## 2022-09-06 NOTE — PROGRESS NOTE ADULT - PROBLEM SELECTOR PLAN 8
Crea 1.5 on admission. Baseline Creat 1.1-1.2.  - Resolved. Creat 1.10 today   - F/u UA  - Renally dose medications and avoid nephrotoxic agents Crea 1.5 on admission. Baseline Creat 1.1-1.2.  - Creat 1.23  - Renally dose medications and avoid nephrotoxic agents

## 2022-09-06 NOTE — PROGRESS NOTE ADULT - PROBLEM SELECTOR PLAN 3
C/o intermittent L sided chest pain 10/10, described as "getting punched" to the chest and squeezing sensation. Also c/o MANRIQUE w/ minimal exertion.  -Trop neg x 3. EKG NSR 60s w/ no ST changes  -S/p NTG SL x 3 w/ minimal improvement to 9/10  -Echo and IRMA pending  -CTA Cardiac 1/2022: mild calcium score 76 Agatston units. LM < 25% stenosis. Mild stenosis pLAD. Minimal stenoses mLAD and pRCA. The remaining segments are normal.  -Low suspicion for ACS given recent negative ischemic eval. Concern for endocarditis given fever, recent dental work in light of TAVR, generalized malaise P/W L sided CP 10/10 and MANRIQUE. Trop neg x 3. EKG non-ischemic. S/P SL NTG x 3 w/minimal improvement.   - CTA Cardiac 1/2022: mild calcium score 76 Agatston units. LM < 25% stenosis. Mild stenosis pLAD. Minimal stenoses mLAD and pRCA. The remaining segments are normal.  - Low suspicion for ACS given recent negative ischemic eval. Concern for endocarditis given fever, recent dental work in light of TAVR, generalized malaise  - same plan as above

## 2022-09-06 NOTE — PROGRESS NOTE ADULT - PROBLEM SELECTOR PLAN 2
TAVR w/ Dr. Alejo 2/2019 2/2 severe AS and diastolic HF. 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  - Initial thought to have high AV gradients per Echo 5/2021. Taken for Valve in Valve, but aborted 2/2 low transvalvular gradient 7  -Recent ECHO 1/15/22: EF>74%, Hyperdynamic LV systolic function, Mild symmetric LVH. Jamar valve noted in the aortic position without any AR, MG 28, PG 44. Dilated proximal ascending aorta 4.00 cm, Pericardial fat pad is seen anterior to RV, cannot rule out a trivial pericardial effusion. Compared to TTE 5/2021, no sig. change TAVR w/ Dr. Alejo 2/2019 2/2 severe AS and diastolic HF. 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  - Initial thought to have high AV gradients per Echo 5/2021. Taken for Valve in Valve, but aborted 2/2 low transvalvular gradient 7  - Recent ECHO 1/15/22: EF>74%, Hyperdynamic LV systolic function, Mild symmetric LVH. Jamar valve noted in the aortic position without any AR, MG 28, PG 44. Dilated proximal ascending aorta 4.00 cm, Pericardial fat pad is seen anterior to RV, cannot rule out a trivial pericardial effusion. Compared to TTE 5/2021, no sig. change  - pending IRMA and TTE

## 2022-09-06 NOTE — CONSULT NOTE ADULT - ASSESSMENT
81 yo female with obesity, severe AS s/p TAVR 2019, ?TAVR-associated thrombus 2021, recent dental work (reportedly did not receive SBE ppx prior to procedure), p/w decreased exercise tolerance; febrile here (Tm 100.9) found to have GPC in clusters in bcx with negative blood culture PCR.  - f/u GPC identification from bcx 9/5  - surveillance bcx AM labs 9/7  - f/u IRMA--if unremarkable/unrevealing for infection, then would proceed with gallium scan  - start vancomycin 1.25g IV q24h (infused over 2h); check trough prior to 3rd dose; does not appear to be true allergy--prior itching may have been infusion reaction; tolerated today's dose without adverse effect     d/w primary team    ID Team 2

## 2022-09-06 NOTE — PHYSICAL THERAPY INITIAL EVALUATION ADULT - GAIT DEVIATIONS NOTED, PT EVAL
fairly steady, no loss of balance, +MANRIQUE, 3 standing rest breaks, verbal cues to pacing, SpO2 >94%

## 2022-09-06 NOTE — PHYSICAL THERAPY INITIAL EVALUATION ADULT - ADDITIONAL COMMENTS
patient has been mostly homebound, unable to ambulate greater than a few feet due to MANRIQUE. Does not use any DME. She has a shower chair that does not fit in her shower space. Per eval from previous admission:  patient lives alone in the same apartment complex as  daughter. Patient has about 5-7 MADYSON. She was ambulating with cane and needs some assistance with ADLs. Patient's daughter reports that she is patients CDPAP 42H a week.

## 2022-09-07 ENCOUNTER — APPOINTMENT (OUTPATIENT)
Dept: HEART AND VASCULAR | Facility: CLINIC | Age: 82
End: 2022-09-07

## 2022-09-07 LAB
ANION GAP SERPL CALC-SCNC: 10 MMOL/L — SIGNIFICANT CHANGE UP (ref 5–17)
BUN SERPL-MCNC: 18 MG/DL — SIGNIFICANT CHANGE UP (ref 7–23)
CALCIUM SERPL-MCNC: 9 MG/DL — SIGNIFICANT CHANGE UP (ref 8.4–10.5)
CHLORIDE SERPL-SCNC: 97 MMOL/L — SIGNIFICANT CHANGE UP (ref 96–108)
CO2 SERPL-SCNC: 26 MMOL/L — SIGNIFICANT CHANGE UP (ref 22–31)
CREAT SERPL-MCNC: 1.23 MG/DL — SIGNIFICANT CHANGE UP (ref 0.5–1.3)
CULTURE RESULTS: SIGNIFICANT CHANGE UP
EGFR: 43 ML/MIN/1.73M2 — LOW
FOLATE SERPL-MCNC: >20 NG/ML — SIGNIFICANT CHANGE UP
GLUCOSE SERPL-MCNC: 107 MG/DL — HIGH (ref 70–99)
GRAM STN FLD: SIGNIFICANT CHANGE UP
HCT VFR BLD CALC: 25.5 % — LOW (ref 34.5–45)
HGB BLD-MCNC: 7.9 G/DL — LOW (ref 11.5–15.5)
MAGNESIUM SERPL-MCNC: 2.2 MG/DL — SIGNIFICANT CHANGE UP (ref 1.6–2.6)
MCHC RBC-ENTMCNC: 27.1 PG — SIGNIFICANT CHANGE UP (ref 27–34)
MCHC RBC-ENTMCNC: 31 GM/DL — LOW (ref 32–36)
MCV RBC AUTO: 87.6 FL — SIGNIFICANT CHANGE UP (ref 80–100)
NRBC # BLD: 0 /100 WBCS — SIGNIFICANT CHANGE UP (ref 0–0)
PLATELET # BLD AUTO: 276 K/UL — SIGNIFICANT CHANGE UP (ref 150–400)
POTASSIUM SERPL-MCNC: 4.3 MMOL/L — SIGNIFICANT CHANGE UP (ref 3.5–5.3)
POTASSIUM SERPL-SCNC: 4.3 MMOL/L — SIGNIFICANT CHANGE UP (ref 3.5–5.3)
RBC # BLD: 2.91 M/UL — LOW (ref 3.8–5.2)
RBC # FLD: 14.8 % — HIGH (ref 10.3–14.5)
SODIUM SERPL-SCNC: 133 MMOL/L — LOW (ref 135–145)
SPECIMEN SOURCE: SIGNIFICANT CHANGE UP
VIT B12 SERPL-MCNC: 697 PG/ML — SIGNIFICANT CHANGE UP (ref 232–1245)
WBC # BLD: 7.93 K/UL — SIGNIFICANT CHANGE UP (ref 3.8–10.5)
WBC # FLD AUTO: 7.93 K/UL — SIGNIFICANT CHANGE UP (ref 3.8–10.5)

## 2022-09-07 PROCEDURE — 99232 SBSQ HOSP IP/OBS MODERATE 35: CPT

## 2022-09-07 PROCEDURE — 99221 1ST HOSP IP/OBS SF/LOW 40: CPT

## 2022-09-07 PROCEDURE — 99233 SBSQ HOSP IP/OBS HIGH 50: CPT

## 2022-09-07 RX ADMIN — SIMETHICONE 80 MILLIGRAM(S): 80 TABLET, CHEWABLE ORAL at 06:52

## 2022-09-07 RX ADMIN — APIXABAN 5 MILLIGRAM(S): 2.5 TABLET, FILM COATED ORAL at 06:08

## 2022-09-07 RX ADMIN — SIMETHICONE 80 MILLIGRAM(S): 80 TABLET, CHEWABLE ORAL at 18:56

## 2022-09-07 RX ADMIN — Medication 650 MILLIGRAM(S): at 22:50

## 2022-09-07 RX ADMIN — TIOTROPIUM BROMIDE 1 CAPSULE(S): 18 CAPSULE ORAL; RESPIRATORY (INHALATION) at 14:28

## 2022-09-07 RX ADMIN — LIDOCAINE 1 PATCH: 4 CREAM TOPICAL at 18:56

## 2022-09-07 RX ADMIN — Medication 0.5 MILLIGRAM(S): at 12:51

## 2022-09-07 RX ADMIN — PANTOPRAZOLE SODIUM 40 MILLIGRAM(S): 20 TABLET, DELAYED RELEASE ORAL at 06:08

## 2022-09-07 RX ADMIN — Medication 125 MILLIGRAM(S): at 11:06

## 2022-09-07 RX ADMIN — Medication 5 MILLIGRAM(S): at 21:54

## 2022-09-07 RX ADMIN — APIXABAN 5 MILLIGRAM(S): 2.5 TABLET, FILM COATED ORAL at 18:56

## 2022-09-07 RX ADMIN — Medication 650 MILLIGRAM(S): at 06:55

## 2022-09-07 RX ADMIN — Medication 650 MILLIGRAM(S): at 21:55

## 2022-09-07 RX ADMIN — LISINOPRIL 10 MILLIGRAM(S): 2.5 TABLET ORAL at 11:09

## 2022-09-07 RX ADMIN — Medication 1 MILLIGRAM(S): at 12:50

## 2022-09-07 RX ADMIN — Medication 50 MILLIGRAM(S): at 06:09

## 2022-09-07 RX ADMIN — MONTELUKAST 10 MILLIGRAM(S): 4 TABLET, CHEWABLE ORAL at 12:50

## 2022-09-07 RX ADMIN — ATORVASTATIN CALCIUM 20 MILLIGRAM(S): 80 TABLET, FILM COATED ORAL at 21:54

## 2022-09-07 RX ADMIN — Medication 650 MILLIGRAM(S): at 07:52

## 2022-09-07 RX ADMIN — AMIODARONE HYDROCHLORIDE 200 MILLIGRAM(S): 400 TABLET ORAL at 06:08

## 2022-09-07 RX ADMIN — Medication 1 TABLET(S): at 12:50

## 2022-09-07 RX ADMIN — Medication 20 MILLIGRAM(S): at 14:28

## 2022-09-07 RX ADMIN — LIDOCAINE 1 PATCH: 4 CREAM TOPICAL at 19:00

## 2022-09-07 NOTE — PROGRESS NOTE ADULT - PROBLEM SELECTOR PLAN 2
TAVR w/ Dr. Alejo 2/2019 2/2 severe AS and diastolic HF. 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  - Initial thought to have high AV gradients per Echo 5/2021. Taken for Valve in Valve, but aborted 2/2 low transvalvular gradient 7  - Recent ECHO 1/15/22: EF>74%, Hyperdynamic LV systolic function, Mild symmetric LVH. Jamar valve noted in the aortic position without any AR, MG 28, PG 44. Dilated proximal ascending aorta 4.00 cm, Pericardial fat pad is seen anterior to RV, cannot rule out a trivial pericardial effusion. Compared to TTE 5/2021, no sig. change  - pending IRMA and TTE TAVR w/ Dr. Alejo 2/2019 2/2 severe AS and diastolic HF. 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  - Initial thought to have high AV gradients per Echo 5/2021. Taken for Valve in Valve, but aborted 2/2 low transvalvular gradient 7  - Recent ECHO 1/15/22: EF>74%, Hyperdynamic LV systolic function, Mild symmetric LVH. Jamar valve noted in the aortic position without any AR, MG 28, PG 44. Dilated proximal ascending aorta 4.00 cm, Pericardial fat pad is seen anterior to RV, cannot rule out a trivial pericardial effusion. Compared to TTE 5/2021, no sig. change  -ECHO 9/7: borderline dilated L  -IRMA 9/7 as above, visually there is leaflet restriction. No valvular vegetations noted.   -SHD consulted. F/u recs TAVR w/ Dr. Alejo 2/2019 2/2 severe AS and diastolic HF. 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  - Initial thought to have high AV gradients per Echo 5/2021. Taken for Valve in Valve, but aborted 2/2 low transvalvular gradient 7  -ECHO 9/7: EF 60-65%, borderline dilated LV, mod symm LVH, grade II diastolic dysfunction, TAVR in position, increased mean gradient 32 (previously 28), mild-mod MR  -IRMA 9/7 as above, visually there is leaflet restriction. No valvular vegetations noted.   -SHD consulted. F/u recs

## 2022-09-07 NOTE — CONSULT NOTE ADULT - SUBJECTIVE AND OBJECTIVE BOX
Surgeon/Attending MD: Sapna    Requesting Physician: Amarjit    HISTORY OF PRESENT ILLNESS:  82F Peruvian speaking PMHx HTN, HLD, severe AS s/p TAVR (2019), Ascending Aortic Aneurysm 4cm in 01/2022, pAFib (on Amiodarone+Eliquis), COPD (s/p 2L home O2), Colon cancer s/p resection in 2019 (in remission), FEROZ & CKD3. She was also treated with anticoagulation for possible AV thrombus in 2021. On 9/4/22 she presented to [facility] ED complaining of subacute weakness, HTN at home, and new chest pain. Workup after admission significant for positive blood cultures positive for gram positive cocci. IRMA negative for vegetation, noted restricted movement of aortic valve.     PAST MEDICAL & SURGICAL HISTORY:  HTN (hypertension)    Aortic stenosis    Hyperlipidemia      COPD (chronic obstructive pulmonary disease)      GERD (gastroesophageal reflux disease)      Stage 3 chronic kidney disease      Anemia      Diastolic CHF, chronic      Obesity      Paroxysmal atrial fibrillation      S/P TAVR (transcatheter aortic valve replacement)  2/2019          MEDICATIONS  (STANDING):  aMIOdarone    Tablet 200 milliGRAM(s) Oral daily  apixaban 5 milliGRAM(s) Oral every 12 hours  atorvastatin 20 milliGRAM(s) Oral at bedtime  folic acid 1 milliGRAM(s) Oral daily  furosemide    Tablet 20 milliGRAM(s) Oral every 24 hours  influenza  Vaccine (HIGH DOSE) 0.7 milliLiter(s) IntraMuscular once  lidocaine   4% Patch 1 Patch Transdermal every 24 hours  lisinopril 10 milliGRAM(s) Oral every 24 hours  LORazepam     Tablet 0.5 milliGRAM(s) Oral daily  melatonin 5 milliGRAM(s) Oral at bedtime  metoprolol succinate ER 50 milliGRAM(s) Oral daily  montelukast 10 milliGRAM(s) Oral daily  multivitamin 1 Tablet(s) Oral daily  pantoprazole    Tablet 40 milliGRAM(s) Oral before breakfast  simethicone 80 milliGRAM(s) Chew two times a day  tiotropium 18 MICROgram(s) Capsule 1 Capsule(s) Inhalation daily  vancomycin  IVPB      vancomycin  IVPB 1250 milliGRAM(s) IV Intermittent every 24 hours    MEDICATIONS  (PRN):  acetaminophen     Tablet .. 650 milliGRAM(s) Oral every 6 hours PRN Temp greater or equal to 38C (100.4F), Mild Pain (1 - 3)      Allergies    Digox (Rash; Urticaria; Hives)  Plavix (Other (Mod to Severe))    Intolerances        SOCIAL HISTORY:  Smoker:  NO         ETOH use:  NO    Ilicit Drug use:  NO    FAMILY HISTORY:  No pertinent family history in first degree relatives    Review of Systems:  CONSTITUTIONAL: Denies fevers / chills, sweats, fatigue, weight loss, weight gain                                       NEURO:  Denies parathesias, seizures, syncope, confusion                                                                                  EYES:  Denies blurry vision, discharge, pain, loss of vision                                                                                    ENMT:  Denies difficulty hearing, vertigo, dysphagia, epistaxis, recent dental work                                       CV:  Denies chest pain, palpitations, MANRIQUE, orthopnea                                                                                           RESPIRATORY:  Denies wheezing, SOB, cough / sputum, hemoptysis                                                               GI:  Denies nausea, vomiting, diarrhea, constipation, melena                                                                          : Denies hematuria, dysuria, urgency, incontinence                                                                                          MUSKULOSKELETAL:  Denies arthritis, joint swelling, muscle weakness                                                             SKIN/BREAST:  Denies rash, itching, hair loss, masses                                                                                              PSYCH:  Denies depression, anxiety, suicidal ideation                                                                                                HEME/LYMPH:  Denies bruises easily, enlarged lymph nodes, tender lymph nodes                                          ENDOCRINE:  Denies cold intolerance, heat intolerance, polydipsia                                                                      Vital Signs Last 24 Hrs  T(C): 35.8 (07 Sep 2022 13:37), Max: 36.5 (07 Sep 2022 10:10)  T(F): 96.4 (07 Sep 2022 13:37), Max: 97.7 (07 Sep 2022 10:10)  HR: 64 (07 Sep 2022 15:00) (48 - 64)  BP: 156/71 (07 Sep 2022 15:00) (127/60 - 160/71)  BP(mean): 98 (06 Sep 2022 16:55) (98 - 98)  RR: 18 (07 Sep 2022 12:49) (18 - 19)  SpO2: 97% (07 Sep 2022 15:00) (94% - 98%)    Parameters below as of 07 Sep 2022 15:00  Patient On (Oxygen Delivery Method): room air    Physical Exam  CONSTITUTIONAL:                                                              WNL  NEURO:                                                                       WNL                      EYES:                                                                                WNL  ENMT:                                                                               WNL  CV:                                                                                   WNL  RESPIRATORY:                                                                 WNL  GI:                                                                                     WNL  : VENTURA + / -                                                                  WNL  MUSKULOSKELETAL:                                                       WNL  SKIN / BREAST:                                                                  WNL                                                          LABS:                        7.9    7.93  )-----------( 276      ( 07 Sep 2022 06:33 )             25.5     09-07    133<L>  |  97  |  18  ----------------------------<  107<H>  4.3   |  26  |  1.23    Ca    9.0      07 Sep 2022 06:33  Mg     2.2     09-07      RADIOLOGY & ADDITIONAL STUDIES:    < from: IRMA w/Doppler (09.06.22 @ 13:52) >  CONCLUSIONS:     1. Normal left ventricular systolic function.   2. Dilated right ventricular size. Normal right ventricular systolic   function.   3. No LA/MAIK thrombus seen.   4. TAVR valve is seen in the aortic position. Flow acceleration through   the aortic prosthesis. Visually there is leaflet restriction. No valvular   vegetations noted. Trace aortic regurgitation.  5. Mitral valve appears mildly thickened and restricted. There may be   doming of the anterior mitral leaflet. Mean transmitral gradient is   3.2mmHg at 56bpm. Moderate mitral regurgitation.   6. Mild-to-moderate tricuspid regurgitation.   7. Dilatedascending aorta. There is mild to moderate non-mobile plaque   seen in the visualized portion of the descending aorta. There is mild to   moderate non-mobile plaque seen in the visualized portion of the aortic   arch.   8. Pericardial fat pad is seenanterior to the right ventricle, cannot   rule out a small pericardial effusion.   9. Compared to the previous TTE performed on 9/6/2022, IRMA is now   performed. Aortic gradients noted on transthoracic study.    < end of copied text >   Surgeon/Attending MD: Sapna    Requesting Physician: Amarjit    HISTORY OF PRESENT ILLNESS:  82F South Korean speaking PMHx HTN, HLD, severe AS s/p TAVR (2019), Ascending Aortic Aneurysm 4cm in 01/2022, pAFib (on Amiodarone+Eliquis), COPD (s/p 2L home O2), Colon cancer s/p resection in 2019 (in remission), FEROZ & CKD3. She was also treated with anticoagulation for possible AV thrombus in 2021. On 9/4/22 she presented to [facility] ED complaining of subacute weakness, HTN at home, and new chest pain. Workup after admission significant for positive blood cultures positive for gram positive cocci. IRMA negative for vegetation, noted restricted movement of aortic valve.     PAST MEDICAL & SURGICAL HISTORY:  HTN (hypertension)    Aortic stenosis    Hyperlipidemia      COPD (chronic obstructive pulmonary disease)      GERD (gastroesophageal reflux disease)      Stage 3 chronic kidney disease      Anemia      Diastolic CHF, chronic      Obesity      Paroxysmal atrial fibrillation      S/P TAVR (transcatheter aortic valve replacement)  2/2019          MEDICATIONS  (STANDING):  aMIOdarone    Tablet 200 milliGRAM(s) Oral daily  apixaban 5 milliGRAM(s) Oral every 12 hours  atorvastatin 20 milliGRAM(s) Oral at bedtime  folic acid 1 milliGRAM(s) Oral daily  furosemide    Tablet 20 milliGRAM(s) Oral every 24 hours  influenza  Vaccine (HIGH DOSE) 0.7 milliLiter(s) IntraMuscular once  lidocaine   4% Patch 1 Patch Transdermal every 24 hours  lisinopril 10 milliGRAM(s) Oral every 24 hours  LORazepam     Tablet 0.5 milliGRAM(s) Oral daily  melatonin 5 milliGRAM(s) Oral at bedtime  metoprolol succinate ER 50 milliGRAM(s) Oral daily  montelukast 10 milliGRAM(s) Oral daily  multivitamin 1 Tablet(s) Oral daily  pantoprazole    Tablet 40 milliGRAM(s) Oral before breakfast  simethicone 80 milliGRAM(s) Chew two times a day  tiotropium 18 MICROgram(s) Capsule 1 Capsule(s) Inhalation daily  vancomycin  IVPB      vancomycin  IVPB 1250 milliGRAM(s) IV Intermittent every 24 hours    MEDICATIONS  (PRN):  acetaminophen     Tablet .. 650 milliGRAM(s) Oral every 6 hours PRN Temp greater or equal to 38C (100.4F), Mild Pain (1 - 3)      Allergies    Digox (Rash; Urticaria; Hives)  Plavix (Other (Mod to Severe))    Intolerances        SOCIAL HISTORY:  Smoker:  NO         ETOH use:  NO    Ilicit Drug use:  NO    FAMILY HISTORY:  No pertinent family history in first degree relatives    Review of Systems:  CONSTITUTIONAL: Denies fevers / chills, sweats, fatigue, weight loss, weight gain                                       NEURO:  Denies parathesias, seizures, syncope, confusion                                                                                  EYES:  Denies blurry vision, discharge, pain, loss of vision                                                                                    ENMT:  Denies difficulty hearing, vertigo, dysphagia, epistaxis, recent dental work                                       CV:  Denies chest pain, palpitations, MANRIQUE, orthopnea                                                                                           RESPIRATORY:  Denies wheezing, SOB, cough / sputum, hemoptysis                                                               GI:  Denies nausea, vomiting, diarrhea, constipation, melena                                                                          : Denies hematuria, dysuria, urgency, incontinence                                                                                          MUSKULOSKELETAL:  Denies arthritis, joint swelling, muscle weakness                                                             SKIN/BREAST:  Denies rash, itching, hair loss, masses                                                                                              PSYCH:  Denies depression, anxiety, suicidal ideation                                                                                                HEME/LYMPH:  Denies bruises easily, enlarged lymph nodes, tender lymph nodes                                          ENDOCRINE:  Denies cold intolerance, heat intolerance, polydipsia                                                                      Vital Signs Last 24 Hrs  T(C): 35.8 (07 Sep 2022 13:37), Max: 36.5 (07 Sep 2022 10:10)  T(F): 96.4 (07 Sep 2022 13:37), Max: 97.7 (07 Sep 2022 10:10)  HR: 64 (07 Sep 2022 15:00) (48 - 64)  BP: 156/71 (07 Sep 2022 15:00) (127/60 - 160/71)  BP(mean): 98 (06 Sep 2022 16:55) (98 - 98)  RR: 18 (07 Sep 2022 12:49) (18 - 19)  SpO2: 97% (07 Sep 2022 15:00) (94% - 98%)    Parameters below as of 07 Sep 2022 15:00  Patient On (Oxygen Delivery Method): room air    Physical Exam  CONSTITUTIONAL: well appearing, NAD  NEURO: A&OX3, no focal deficits noted                   EYES: PERRLA  ENMT: Neck supple   CV: RRR, no murmurs, rubs, gallops   RESPIRATORY: CTA bilateral posterior lung fields   GI: +BS, NT/ND  : No cantu   MUSKULOSKELETAL: No peripheral edema or calf tenderness   SKIN / BREAST: No rashes noted                                                           LABS:                        7.9    7.93  )-----------( 276      ( 07 Sep 2022 06:33 )             25.5     09-07    133<L>  |  97  |  18  ----------------------------<  107<H>  4.3   |  26  |  1.23    Ca    9.0      07 Sep 2022 06:33  Mg     2.2     09-07      RADIOLOGY & ADDITIONAL STUDIES:    < from: IRMA w/Doppler (09.06.22 @ 13:52) >  CONCLUSIONS:     1. Normal left ventricular systolic function.   2. Dilated right ventricular size. Normal right ventricular systolic   function.   3. No LA/MAIK thrombus seen.   4. TAVR valve is seen in the aortic position. Flow acceleration through   the aortic prosthesis. Visually there is leaflet restriction. No valvular   vegetations noted. Trace aortic regurgitation.  5. Mitral valve appears mildly thickened and restricted. There may be   doming of the anterior mitral leaflet. Mean transmitral gradient is   3.2mmHg at 56bpm. Moderate mitral regurgitation.   6. Mild-to-moderate tricuspid regurgitation.   7. Dilatedascending aorta. There is mild to moderate non-mobile plaque   seen in the visualized portion of the descending aorta. There is mild to   moderate non-mobile plaque seen in the visualized portion of the aortic   arch.   8. Pericardial fat pad is seenanterior to the right ventricle, cannot   rule out a small pericardial effusion.   9. Compared to the previous TTE performed on 9/6/2022, IRMA is now   performed. Aortic gradients noted on transthoracic study.    < end of copied text >

## 2022-09-07 NOTE — PROGRESS NOTE ADULT - ASSESSMENT
81 y/o [place] speaking F, PMHx HTN, HLD,  severe AS s/p TAVR (2019), valve thrombosis 2021 s/p AC (not seen on echo 2022), Ascending Thoracic Aneurysm 4cm in 1/2022, pAFib (on Amiodarone/Eliquis), COPD (on 2L home O2), Colon cancer s/p resection in 2019 (in remission), moderate MARK (non-compliant with CPAP 2/2 claustrophobia), CKD3 (baseline Cr 1.1-1.2) and FEROZ presented to ED 9/4 w/generalized weakness a/w MANRIQUE, CP and HA s/p recent dental work up for which she was initially admitted to medicine. Transferred to tele 9/5 to r/o endocarditis for which she was started on IV ABX for positive blood cultures with plan for IRMA today (9/6). ID following    81 y/o Gabonese speaking F, PMHx HTN, HLD, severe AS s/p TAVR (2019), valve thrombosis 2021 s/p AC (not seen on echo 2022), Ascending Thoracic Aneurysm 4cm in 1/2022, pAFib (on Amiodarone/Eliquis), COPD (on 2L home O2), Colon cancer s/p resection in 2019 (in remission), moderate MARK (non-compliant with CPAP 2/2 claustrophobia), CKD3 (baseline Cr 1.1-1.2) and FEROZ presented to ED 9/4 w/ generalized weakness a/w MANRIQUE, CP and HA s/p recent dental work for which she was initially admitted to medicine. Transferred to cardiac tele 9/5 to r/o endocarditis, found to be febrile and bacteremic and started on IV ABx. IRMA negative for vegetation. ID following, pending gallium scan.

## 2022-09-07 NOTE — PROGRESS NOTE ADULT - PROBLEM SELECTOR PLAN 6
Recent [facility] admission 3/2022 for Afib RVR s/p spontaneous conversion  - tele- SB- SR HR 50s-60s   - c/w Amiodarone 200mg QD and Metoprolol Succinate 50mg   - c/w Eliquis 5mg BID. CHADsVASc 5

## 2022-09-07 NOTE — PROGRESS NOTE ADULT - PROBLEM SELECTOR PLAN 10
Normocytic anemia. H/h 8.6/27.7 on admission. Hx of Hx Colon cancer s/p resection in 2019 (in remission) and FEROZ. -  - hgb 7.2 this AM and repeat CBC 8.6. No S/SX bleeding  - pending Stool OB   - Iron Panel sent, follow up results and consider IV Iron   - maintain Active T+S, transfuse if hgb <7  - c/w Protonix 40mg PO qd    F: No IVF  N: NPO pending IRMA   E: Replete lytes PRN K<4, Mg<2  P: DVT PPX: on Eliquis  C: FULL CODE  Dispo: Pending IRMA   PT giselle- BELKIS Normocytic anemia. H/h 8.6/27.7 on admission. Hx of Hx Colon cancer s/p resection in 2019 (in remission) and FEROZ.  - hgb 7.2 this AM and repeat CBC 8.6. No S/SX bleeding  - pending Stool OB   - Iron Panel shows AoCD. Would not give IV iron in setting of bacteremia  - maintain Active T+S, transfuse if hgb <7  - c/w Protonix 40mg PO qd    F: No IVF  N: DASH/TLC  E: Replete lytes PRN K<4, Mg<2  P: DVT PPX: on Eliquis  C: FULL CODE  Dispo: Pending gallium scan   PT eval- BELKIS

## 2022-09-07 NOTE — PROGRESS NOTE ADULT - PROBLEM SELECTOR PLAN 1
9/5 Fever 100.9 Rectal a/w chills and general malaise. Also c/o RLQ abdominal pain w/radiation R Flank.  - Etiology 2/2 Endocarditis (did not receive Abx prior to dental work 2 weeks ago vs. Abdominal Source   - COVID and RVP negative. UC NGTD   - Remains afebrile since episode without leukocytosis   - CT Chest 9/4: Probable mild intersitial pulm edema & bronchitis. Stable or slightly increased multiple enlarged mediastinal & hilar nodules likely 2/2 inflammatory or infectious etiology  - Abdominal US 9/6: Hepatosplenomegaly. Hepatic steatosis. Since 9/4/2022, stable left adrenal nodule.  - Blood Culture x 2 9/5: Positive Gram Cocci in Clusters   - pending IRMA  - If has diarrhea obtain GI PCR and consider C.diff  - ID Consulted, follow up recs.   - Started on Van 1250mg QD. Fever 100.9 rectal 9/5 a/w chills and general malaise. Also c/o RLQ abdominal pain w/ radiation R Flank. Diarrhea resolved.  - Unclear etiology, concerning for possibly 2/2 Endocarditis (did not receive Abx prior to dental work 2 weeks ago) vs dental abscess or abdominal source   - COVID and RVP negative. UCx NGTD   - Blood Cultures 9/5: positive for Gram Cocci in Clusters   - Remains afebrile since episode without leukocytosis   - CT Chest 9/4: Probable mild intersitial pulm edema & bronchitis. Stable or slightly increased multiple enlarged mediastinal & hilar nodules likely 2/2 inflammatory or infectious etiology  - Abdominal US 9/6: Hepatosplenomegaly. Hepatic steatosis. Since 9/4/2022, stable left adrenal nodule.  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 Flow acceleration through the aortic prosthesis. Visually there is leaflet restriction. No valvular vegetations noted. Trace aortic regurgitation. Mod MR. Mild- Mod TR  - ID following, f/u recs   - c/w IV Vanco 1250mg QD. F/u vanc trough prior 9/7 @10am   - Pending NM Gallium scan 9/8, s/p injection 9/7  - Consult OMFS given TAVR and gram positive bacteremia probably from odontogenic source Fever 100.9 rectal 9/5 a/w chills and general malaise. Also c/o RLQ abdominal pain w/ radiation R Flank. Diarrhea resolved.  - Unclear etiology, concerning for possibly 2/2 Endocarditis (did not receive Abx prior to dental work 2 weeks ago) vs dental abscess or abdominal source   - COVID and RVP negative. UCx NGTD   - Blood Cultures 9/5: positive for Gram Cocci in Clusters, gemella species  - Remains afebrile since episode without leukocytosis   - CT Chest 9/4: Probable mild intersitial pulm edema & bronchitis. Stable or slightly increased multiple enlarged mediastinal & hilar nodules likely 2/2 inflammatory or infectious etiology  - Abdominal US 9/6: Hepatosplenomegaly. Hepatic steatosis. Since 9/4/2022, stable left adrenal nodule.  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 Flow acceleration through the aortic prosthesis. Visually there is leaflet restriction. No valvular vegetations noted. Trace aortic regurgitation. Mod MR. Mild- Mod TR  - ID following, f/u recs   - c/w IV Vanco 1250mg QD. F/u vanc trough prior to 3rd dose on 9/7 @10am  - Pt c/o mild redness/itchiness to hannah tips of ear/top of head after Vanco infusion over 2.5hr. Discussed w/ ID Dr Hammonds, mild symptoms self improved and would c/w Vanco - Reaction likely r/t infusion side effect. Can give Benadryl 25mg PO if symptoms worsen  - Pending NM Gallium scan 9/8, s/p injection 9/7  - Consult OMFS given TAVR and gram positive bacteremia probably from odontogenic source. Pt reports having 3 undrained tooth abscesses and reports she needs five teeth extracted.

## 2022-09-07 NOTE — PROGRESS NOTE ADULT - PROBLEM SELECTOR PLAN 7
SBP 210s on admission   - SBPs 120s-130s   - c/w Lasix 20mg qd, Lisinopril 10mg  and Metoprolol Succinate 50mg       #HLD   - Chol 79 TG 73 HDL 35 LDL 29   - c/w Atorvastatin 20mg QD SBP 210s on admission   - SBPs 120s-130s   - c/w Lasix 20mg qd, Lisinopril 10mg and Metoprolol Succinate 50mg qd    #HLD   - Chol 79 TG 73 HDL 35 LDL 29   - c/w Atorvastatin 20mg QD

## 2022-09-07 NOTE — PROGRESS NOTE ADULT - PROBLEM SELECTOR PLAN 3
P/W L sided CP 10/10 and MANRIQUE. Trop neg x 3. EKG non-ischemic. S/P SL NTG x 3 w/minimal improvement.   - CTA Cardiac 1/2022: mild calcium score 76 Agatston units. LM < 25% stenosis. Mild stenosis pLAD. Minimal stenoses mLAD and pRCA. The remaining segments are normal.  - Low suspicion for ACS given recent negative ischemic eval. Concern for endocarditis given fever, recent dental work in light of TAVR, generalized malaise  - same plan as above P/W L sided CP 10/10 and MANRIQUE. Trop neg x 3. EKG non-ischemic. S/P SL NTG x 3 w/ minimal improvement.   - CTA Cardiac 1/2022: mild calcium score 76 Agatston units. LM < 25% stenosis. Mild stenosis pLAD. Minimal stenoses mLAD and pRCA. The remaining segments are normal.  - Low suspicion for ACS given recent negative ischemic eval. Concern for endocarditis given fever, recent dental work in light of TAVR, generalized malaise  - same plan as above

## 2022-09-07 NOTE — PROGRESS NOTE ADULT - ASSESSMENT
83 yo female with obesity, severe AS s/p TAVR 2019, ?TAVR-associated thrombus 2021, p/w decreased exercise tolerance; febrile here (Tm 100.9) found to have GPC in clusters in bcx with negative blood culture PCR. Collateral information obtained from patient via [facility] : one week prior to presentation she developed frontal tooth swelling/abscess; did not have dental intervention; endorses three undrained tooth abscesses and reports she needs five teeth extracted.  - OMFS assessment given TAVR and gram positive bacteremia probably from odontogenic source  - f/u GPC identification from bcx 9/5  - f/u surveillance bcx AM labs 9/7  - gallium scan (with SPECT to assess for infected TAVR)  - continue vancomycin 1.25g IV q24h; check trough prior to dose on 9/8    d/w primary team    ID Team 2

## 2022-09-07 NOTE — PROGRESS NOTE ADULT - SUBJECTIVE AND OBJECTIVE BOX
CARDIOLOGY NP PROGRESS NOTE    Subjective:   Remainder ROS otherwise negative.    Overnight Events:     TELEMETRY:    EKG:      VITAL SIGNS:  T(C): 35.8 (09-07-22 @ 13:37), Max: 36.5 (09-07-22 @ 10:10)  HR: 54 (09-07-22 @ 12:49) (48 - 60)  BP: 133/60 (09-07-22 @ 12:49) (127/60 - 160/68)  RR: 18 (09-07-22 @ 12:49) (18 - 19)  SpO2: 98% (09-07-22 @ 12:49) (93% - 98%)  Wt(kg): --    I&O's Summary    06 Sep 2022 07:01  -  07 Sep 2022 07:00  --------------------------------------------------------  IN: 180 mL / OUT: 1 mL / NET: 179 mL    07 Sep 2022 07:01  -  07 Sep 2022 14:20  --------------------------------------------------------  IN: 150 mL / OUT: 0 mL / NET: 150 mL          PHYSICAL EXAM:    General: A/ox 3, No acute Distress  Neck: Supple, NO JVD  Cardiac: S1 S2, No M/R/G  Pulmonary: CTAB, Breathing unlabored, No Rhonchi/Rales/Wheezing  Abdomen: Soft, Non -tender, +BS x 4 quads  Extremities: No Rashes, No edema  Neuro: A/o x 3, No focal deficits          LABS:                          7.9    7.93  )-----------( 276      ( 07 Sep 2022 06:33 )             25.5                              09-07    133<L>  |  97  |  18  ----------------------------<  107<H>  4.3   |  26  |  1.23    Ca    9.0      07 Sep 2022 06:33  Mg     2.2     09-07                                CAPILLARY BLOOD GLUCOSE                Allergies:  Digox (Rash; Urticaria; Hives)  Plavix (Other (Mod to Severe))    MEDICATIONS  (STANDING):  aMIOdarone    Tablet 200 milliGRAM(s) Oral daily  apixaban 5 milliGRAM(s) Oral every 12 hours  atorvastatin 20 milliGRAM(s) Oral at bedtime  folic acid 1 milliGRAM(s) Oral daily  furosemide    Tablet 20 milliGRAM(s) Oral every 24 hours  influenza  Vaccine (HIGH DOSE) 0.7 milliLiter(s) IntraMuscular once  lidocaine   4% Patch 1 Patch Transdermal every 24 hours  lisinopril 10 milliGRAM(s) Oral every 24 hours  LORazepam     Tablet 0.5 milliGRAM(s) Oral daily  melatonin 5 milliGRAM(s) Oral at bedtime  metoprolol succinate ER 50 milliGRAM(s) Oral daily  montelukast 10 milliGRAM(s) Oral daily  multivitamin 1 Tablet(s) Oral daily  pantoprazole    Tablet 40 milliGRAM(s) Oral before breakfast  simethicone 80 milliGRAM(s) Chew two times a day  tiotropium 18 MICROgram(s) Capsule 1 Capsule(s) Inhalation daily  vancomycin  IVPB      vancomycin  IVPB 1250 milliGRAM(s) IV Intermittent every 24 hours    MEDICATIONS  (PRN):  acetaminophen     Tablet .. 650 milliGRAM(s) Oral every 6 hours PRN Temp greater or equal to 38C (100.4F), Mild Pain (1 - 3)        DIAGNOSTIC TESTS:        CARDIOLOGY NP PROGRESS NOTE    Subjective: Pt seen and examined at bedside. Reports feeling 8/10 abd pain a/w nausea. HA and chest pain 7/10, and described as "not too bad." Denies fever, chills, palpitations, dizziness, lightheadedness.   Remainder ROS otherwise negative.    Overnight Events: none    TELEMETRY: SB 50-60s         VITAL SIGNS:  T(C): 35.8 (09-07-22 @ 13:37), Max: 36.5 (09-07-22 @ 10:10)  HR: 54 (09-07-22 @ 12:49) (48 - 60)  BP: 133/60 (09-07-22 @ 12:49) (127/60 - 160/68)  RR: 18 (09-07-22 @ 12:49) (18 - 19)  SpO2: 98% (09-07-22 @ 12:49) (93% - 98%)  Wt(kg): --    I&O's Summary    06 Sep 2022 07:01  -  07 Sep 2022 07:00  --------------------------------------------------------  IN: 180 mL / OUT: 1 mL / NET: 179 mL    07 Sep 2022 07:01  -  07 Sep 2022 14:20  --------------------------------------------------------  IN: 150 mL / OUT: 0 mL / NET: 150 mL          PHYSICAL EXAM:    General: A/ox 3, obese, No acute Distress, elderly appearing female  HEENT: Supple, NO JVD. Poor dentition w/ mild redness/swelling L/R upper molar.   Cardiac: S1 S2, Grade III/VI systolic murmur at LSB   Pulmonary: CTAB, Breathing unlabored on 2L NC, No Rhonchi/Rales/Wheezing  Abdomen: Large, Soft, generalized tenderness to all quadrants on palpation, +BS x 4 quads  Extremities: No Rashes, No edema  Neuro: A/o x 3, No focal deficits          LABS:                          7.9    7.93  )-----------( 276      ( 07 Sep 2022 06:33 )             25.5                              09-07    133<L>  |  97  |  18  ----------------------------<  107<H>  4.3   |  26  |  1.23    Ca    9.0      07 Sep 2022 06:33  Mg     2.2     09-07                                CAPILLARY BLOOD GLUCOSE                Allergies:  Digox (Rash; Urticaria; Hives)  Plavix (Other (Mod to Severe))    MEDICATIONS  (STANDING):  aMIOdarone    Tablet 200 milliGRAM(s) Oral daily  apixaban 5 milliGRAM(s) Oral every 12 hours  atorvastatin 20 milliGRAM(s) Oral at bedtime  folic acid 1 milliGRAM(s) Oral daily  furosemide    Tablet 20 milliGRAM(s) Oral every 24 hours  influenza  Vaccine (HIGH DOSE) 0.7 milliLiter(s) IntraMuscular once  lidocaine   4% Patch 1 Patch Transdermal every 24 hours  lisinopril 10 milliGRAM(s) Oral every 24 hours  LORazepam     Tablet 0.5 milliGRAM(s) Oral daily  melatonin 5 milliGRAM(s) Oral at bedtime  metoprolol succinate ER 50 milliGRAM(s) Oral daily  montelukast 10 milliGRAM(s) Oral daily  multivitamin 1 Tablet(s) Oral daily  pantoprazole    Tablet 40 milliGRAM(s) Oral before breakfast  simethicone 80 milliGRAM(s) Chew two times a day  tiotropium 18 MICROgram(s) Capsule 1 Capsule(s) Inhalation daily  vancomycin  IVPB      vancomycin  IVPB 1250 milliGRAM(s) IV Intermittent every 24 hours    MEDICATIONS  (PRN):  acetaminophen     Tablet .. 650 milliGRAM(s) Oral every 6 hours PRN Temp greater or equal to 38C (100.4F), Mild Pain (1 - 3)        DIAGNOSTIC TESTS:        CARDIOLOGY NP PROGRESS NOTE    Subjective: Pt seen and examined at bedside. Reports feeling 8/10 abd pain a/w nausea. HA and chest pain 7/10, and described as "not too bad." Denies fever, chills, palpitations, dizziness, lightheadedness.   Remainder ROS otherwise negative.    Overnight Events: none    TELEMETRY: SB 50-60s         VITAL SIGNS:  T(C): 35.8 (09-07-22 @ 13:37), Max: 36.5 (09-07-22 @ 10:10)  HR: 54 (09-07-22 @ 12:49) (48 - 60)  BP: 133/60 (09-07-22 @ 12:49) (127/60 - 160/68)  RR: 18 (09-07-22 @ 12:49) (18 - 19)  SpO2: 98% (09-07-22 @ 12:49) (93% - 98%)  Wt(kg): --    I&O's Summary    06 Sep 2022 07:01  -  07 Sep 2022 07:00  --------------------------------------------------------  IN: 180 mL / OUT: 1 mL / NET: 179 mL    07 Sep 2022 07:01  -  07 Sep 2022 14:20  --------------------------------------------------------  IN: 150 mL / OUT: 0 mL / NET: 150 mL          PHYSICAL EXAM:    General: A/ox 3, obese, No acute Distress, elderly appearing female  HEENT: Supple, NO JVD. Poor dentition w/ mild redness/swelling L/R upper molar.   Cardiac: S1 S2, Grade III/VI systolic murmur at LSB   Pulmonary: CTAB, Breathing unlabored on 2L NC, No Rhonchi/Rales/Wheezing  Abdomen: Large, Soft, generalized tenderness to all quadrants on palpation, +BS x 4 quads  Extremities: No Rashes, No edema  Neuro: A/o x 3, No focal deficits          LABS:                          7.9    7.93  )-----------( 276      ( 07 Sep 2022 06:33 )             25.5                              09-07    133<L>  |  97  |  18  ----------------------------<  107<H>  4.3   |  26  |  1.23    Ca    9.0      07 Sep 2022 06:33  Mg     2.2     09-07                                CAPILLARY BLOOD GLUCOSE                Allergies:  Digox (Rash; Urticaria; Hives)  Plavix (Other (Mod to Severe))    MEDICATIONS  (STANDING):  aMIOdarone    Tablet 200 milliGRAM(s) Oral daily  apixaban 5 milliGRAM(s) Oral every 12 hours  atorvastatin 20 milliGRAM(s) Oral at bedtime  folic acid 1 milliGRAM(s) Oral daily  furosemide    Tablet 20 milliGRAM(s) Oral every 24 hours  influenza  Vaccine (HIGH DOSE) 0.7 milliLiter(s) IntraMuscular once  lidocaine   4% Patch 1 Patch Transdermal every 24 hours  lisinopril 10 milliGRAM(s) Oral every 24 hours  LORazepam     Tablet 0.5 milliGRAM(s) Oral daily  melatonin 5 milliGRAM(s) Oral at bedtime  metoprolol succinate ER 50 milliGRAM(s) Oral daily  montelukast 10 milliGRAM(s) Oral daily  multivitamin 1 Tablet(s) Oral daily  pantoprazole    Tablet 40 milliGRAM(s) Oral before breakfast  simethicone 80 milliGRAM(s) Chew two times a day  tiotropium 18 MICROgram(s) Capsule 1 Capsule(s) Inhalation daily  vancomycin  IVPB      vancomycin  IVPB 1250 milliGRAM(s) IV Intermittent every 24 hours    MEDICATIONS  (PRN):  acetaminophen     Tablet .. 650 milliGRAM(s) Oral every 6 hours PRN Temp greater or equal to 38C (100.4F), Mild Pain (1 - 3)        DIAGNOSTIC TESTS:     < from: TTE Echo Complete w/o Contrast w/ Doppler (09.06.22 @ 16:24) >  CONCLUSIONS:   1. Borderline dilated left ventriclular size. Normal left ventricular   systolic function. Moderate symmetric left ventricular hypertrophy.   2. Grade II left ventricular diastolic dysfunction.   3. TAVR valve is seen in the aortic position. Elevated   velocity/gradients suggestive of prosthetic stenosis. The peak   transvalvular velocity is 3.89 m/s, the mean transvalvular gradient is   32.49 mmHg, and the LVOT/AV velocity ratio is 0.39. The aortic valve area (estimated via the continuity method) is 1.12 cm². Trace aortic   regurgitation.   4. Mild-to-moderate mitral regurgitation.   5. Mild tricuspid regurgitation.   6. Pulmonary hypertension present, pulmonary artery systolic pressure is 49 mmHg.   7. Dilated ascending aorta.   8. Pericardial fat pad is seen anterior to the right ventricle, cannot   rule out a small pericardial effusion.   9. Compared to the previous TTE performed on 1/15/2022, prosthetic   aortic velocity/gradients are higher.    < end of copied text >    < from: IRMA w/Doppler (09.06.22 @ 13:52) >  CONCLUSIONS:   1. Normal left ventricular systolic function.   2. Dilated right ventricular size. Normal right ventricular systolic   function.   3. No LA/MAIK thrombus seen.   4. TAVR valve is seen in the aortic position. Flow acceleration through   the aortic prosthesis. Visually there is leaflet restriction. No valvular   vegetations noted. Trace aortic regurgitation.  5. Mitral valve appears mildly thickened and restricted. There may be   doming of the anterior mitral leaflet. Mean transmitral gradient is   3.2mmHg at 56bpm. Moderate mitral regurgitation.   6. Mild-to-moderate tricuspid regurgitation.   7. Dilatedascending aorta. There is mild to moderate non-mobile plaque seen in the visualized portion of the descending aorta. There is mild to moderate non-mobile plaque seen in the visualized portion of the aortic arch.   8. Pericardial fat pad is seenanterior to the right ventricle, cannot   rule out a small pericardial effusion.   9. Compared to the previous TTE performed on 9/6/2022, IRMA is now   performed. Aortic gradients noted on transthoracic study.    < end of copied text >

## 2022-09-07 NOTE — PROGRESS NOTE ADULT - PROBLEM SELECTOR PLAN 8
Crea 1.5 on admission. Baseline Creat 1.1-1.2.  - Creat 1.23  - Renally dose medications and avoid nephrotoxic agents

## 2022-09-07 NOTE — PROGRESS NOTE ADULT - PROBLEM SELECTOR PLAN 5
Not in acute exacerbation. Euvolemic on exam.   - CTA Chest 9/4: Probable mild intersitial pulm edema & bronchitis. Stable or slightly increased multiple enlarged mediastinal & hilar nodules likely 2/2 inflammatory or infectious etiology  - Recent Echo 1/2022 w/ EF >74%, rest as above  - Pending repeat TTE and IRMA   - c/w Lasix 20mg qd, Lisinopril 10mg  and Metoprolol Succinate 50mg   - core measures. Strict I/Os, daily weights Not in acute exacerbation. Euvolemic on exam.   - CTA Chest 9/4: Probable mild intersitial pulm edema & bronchitis. Stable or slightly increased multiple enlarged mediastinal & hilar nodules likely 2/2 inflammatory or infectious etiology  - Recent Echo 1/2022 w/ EF >74%, rest as above  - Repeat TTE and IRMA as above  - c/w Lasix 20mg qd, Lisinopril 10mg qd and Metoprolol Succinate 50mg qd  - HF core measures. Strict I/Os, daily weights

## 2022-09-07 NOTE — PROGRESS NOTE ADULT - SUBJECTIVE AND OBJECTIVE BOX
INTERVAL HPI/OVERNIGHT EVENTS: BERYL. Feels fatigued.    CONSTITUTIONAL:  Negative fever or chills, feels well, good appetite  EYES:  Negative  blurry vision or double vision  CARDIOVASCULAR:  Negative for chest pain or palpitations  RESPIRATORY:  Negative for cough, wheezing, or SOB   GASTROINTESTINAL:  Negative for nausea, vomiting, diarrhea, constipation, or abdominal pain  GENITOURINARY:  Negative frequency, urgency or dysuria  NEUROLOGIC:  No headache, confusion, dizziness, lightheadedness      ANTIBIOTICS/RELEVANT:    MEDICATIONS  (STANDING):  aMIOdarone    Tablet 200 milliGRAM(s) Oral daily  apixaban 5 milliGRAM(s) Oral every 12 hours  atorvastatin 20 milliGRAM(s) Oral at bedtime  folic acid 1 milliGRAM(s) Oral daily  furosemide    Tablet 20 milliGRAM(s) Oral every 24 hours  influenza  Vaccine (HIGH DOSE) 0.7 milliLiter(s) IntraMuscular once  lidocaine   4% Patch 1 Patch Transdermal every 24 hours  lisinopril 10 milliGRAM(s) Oral every 24 hours  LORazepam     Tablet 0.5 milliGRAM(s) Oral daily  melatonin 5 milliGRAM(s) Oral at bedtime  metoprolol succinate ER 50 milliGRAM(s) Oral daily  montelukast 10 milliGRAM(s) Oral daily  multivitamin 1 Tablet(s) Oral daily  pantoprazole    Tablet 40 milliGRAM(s) Oral before breakfast  simethicone 80 milliGRAM(s) Chew two times a day  tiotropium 18 MICROgram(s) Capsule 1 Capsule(s) Inhalation daily  vancomycin  IVPB      vancomycin  IVPB 1250 milliGRAM(s) IV Intermittent every 24 hours    MEDICATIONS  (PRN):  acetaminophen     Tablet .. 650 milliGRAM(s) Oral every 6 hours PRN Temp greater or equal to 38C (100.4F), Mild Pain (1 - 3)        Vital Signs Last 24 Hrs  T(C): 35.8 (07 Sep 2022 13:37), Max: 36.5 (07 Sep 2022 10:10)  T(F): 96.4 (07 Sep 2022 13:37), Max: 97.7 (07 Sep 2022 10:10)  HR: 54 (07 Sep 2022 12:49) (48 - 60)  BP: 133/60 (07 Sep 2022 12:49) (127/60 - 160/68)  BP(mean): 98 (06 Sep 2022 16:55) (98 - 98)  RR: 18 (07 Sep 2022 12:49) (18 - 19)  SpO2: 98% (07 Sep 2022 12:49) (93% - 98%)    Parameters below as of 07 Sep 2022 12:49  Patient On (Oxygen Delivery Method): room air        PHYSICAL EXAM:  Constitutional: NAD  Eyes: TIMMY, EOMI  Ear/Nose/Throat: no oral lesion, no sinus tenderness on percussion	  Neck: no JVD, no lymphadenopathy, supple  Respiratory: CTA hannah  Cardiovascular: S1S2 RRR, no murmurs  Gastrointestinal:soft, (+) BS, no HSM  Extremities:no e/e/c  Vascular: DP Pulse:	right normal; left normal      LABS:                        7.9    7.93  )-----------( 276      ( 07 Sep 2022 06:33 )             25.5     09-07    133<L>  |  97  |  18  ----------------------------<  107<H>  4.3   |  26  |  1.23    Ca    9.0      07 Sep 2022 06:33  Mg     2.2     09-07        Urinalysis Basic - ( 05 Sep 2022 15:16 )    Color: Yellow / Appearance: Clear / S.015 / pH: x  Gluc: x / Ketone: NEGATIVE  / Bili: Negative / Urobili: 0.2 E.U./dL   Blood: x / Protein: NEGATIVE mg/dL / Nitrite: NEGATIVE   Leuk Esterase: Small / RBC: < 5 /HPF / WBC < 5 /HPF   Sq Epi: x / Non Sq Epi: 0-5 /HPF / Bacteria: Present /HPF        MICROBIOLOGY: Culture - Blood (22 @ 10:45)    Gram Stain:   Aerobic Bottle: Gram Positive Cocci in Clusters  Result called to and read back byJuan Francisco Grady RN  2022 09:21:06  ***Blood Panel PCR results on this specimen are available  approximately 3 hours after the Gram stain result.***  Gram stain, PCR, and/or culture results may not always  correspond due to difference in methodologies.  ************************************************************  This PCR assay was performed using [employer].  The following targets are tested for: Enterococcus,  vancomycin resistant enterococci, Listeria monocytogenes,  coagulase negative staphylococci, S. aureus,  methicillin resistant S. aureus, Streptococcus agalactiae  (Group B), S. pneumoniae, S. pyogenes (Group A),  Acinetobacter baumannii, Enterobacter cloacae, E. coli,  Klebsiella oxytoca, K. pneumoniae, Proteus sp.,  Serratia marcescens, Haemophilus influenzae,  Neisseria meningitidis, Pseudomonas aeruginosa, Candida  albicans, C. glabrata, C krusei, C parapsilosis,  C. tropicalis and the KPC resistance gene.    -  Multidrug (KPC pos) resistant organism: Nondet    -  Methicillin SENSITIVE Staphylococcus aureus (MSSA): Nondet    -  Methicillin resistant Staphylococcus aureus (MRSA): Nondet    -  Coagulase negative Staphylococcus: Nondet    -  Enterococcus species: Nondet    -  Vancomycin resistant Enterococcus sp.: Nondet    -  Escherichia coli: Nondet    -  Klebsiella oxytoca: Nondet    -  Klebsiella pneumoniae: Nondet    -  Serratia marcescens: Nondet    -  Proteus species: Nondet    -  Haemophilus influenzae: Nondet    -  Listeria monocytogenes: Nondet    -  Neisseria meningitidis: Nondet    -  Pseudomonas aeruginosa: Nondet    -  Acinetobacter baumanii: Nondet    -  Enterobacter cloacae complex: Nondet    -  Streptococcus sp. (Not Grp A, B or S pneumoniae): Nondet    -  Streptococcus agalactiae (Group B): Nondet    -  Streptococcus pyogenes (Group A): Nondet    -  Streptococcus pneumoniae: Nondet    -  Candida albicans: Nondet    -  Candida glabrata: Nondet    -  Candida krusei: Nondet    -  Candida parapsilosis: Nondet    -  Candida tropicalis: Nondet    Specimen Source: .Blood Blood-Venous    Organism: Blood Culture PCR    Culture Results:   Growth in aerobic bottle: Gemella species (G. haemolysans)  Susceptibility to follow.    Organism Identification: Blood Culture PCR    Method Type: PCR        RADIOLOGY & ADDITIONAL STUDIES: < from: IRMA w/Doppler (22 @ 13:52) >  CONCLUSIONS:     1. Normal left ventricular systolic function.   2. Dilated right ventricular size. Normal right ventricular systolic   function.   3. No LA/MAIK thrombus seen.   4. TAVR valve is seen in the aortic position. Flow acceleration through   the aortic prosthesis. Visually there is leaflet restriction. No valvular   vegetations noted. Trace aortic regurgitation.  5. Mitral valve appears mildly thickened and restricted. There may be   doming of the anterior mitral leaflet. Mean transmitral gradient is   3.2mmHg at 56bpm. Moderate mitral regurgitation.   6. Mild-to-moderate tricuspid regurgitation.   7. Dilatedascending aorta. There is mild to moderate non-mobile plaque   seen in the visualized portion of the descending aorta. There is mild to   moderate non-mobile plaque seen in the visualized portion of the aortic   arch.   8. Pericardial fat pad is seenanterior to the right ventricle, cannot   rule out a small pericardial effusion.   9. Compared to the previous TTE performed on 2022, IRMA is now   performed. Aortic gradients noted on transthoracic study.    ---------------------------------------------------------------------------  -----  SPECTRAL DOPPLER ANALYSIS:    Mitral Valve:  MV Mean Grad: 3.2 mmHg MV Area, PHT:      ---------------------------------------------------------------------------  -----  FINDINGS:    Left Ventricle:  Leftventricular ejection fraction is 65-70%. Normal left ventricular   systolic function.    Right Ventricle:  The right ventricle is dilated. Right ventricular systolic function is   normal.    Left Atrium:  No thrombus seen in the left atrium or in theleft atrial appendage.    Interatrial Septum:  Color flow Doppler reveals no evidence of an interatrial shunt.    Aortic Valve:  A transcatheter aortic valve (TAVR) is noted in the aortic position. Flow   acceleration through the aortic prosthesis. Visually there is leaflet   restriction. No valvular vegetations noted. There is trace aortic   regurgitation.    Mitral Valve:  Mitral valve appears mildly thickened and restricted. There may be doming   of the anterior mitral leaflet. The mean transvalvular gradient is 3.20   mmHg at a heart rate of 56 bpm. There is moderate mitral regurgitation.    Tricuspid Valve:  The tricuspid valve is not well visualized but probably normal. There is   mild-to-moderate tricuspid regurgitation.    Pulmonic Valve:  The pulmonic valve is not well visualized.    Aorta:  The proximal ascending aorta is dilated measuring 4.10 cm. There is mild   to moderate non-mobile plaque seen in the visualized portion of the   descending aorta. There is mild to moderate non-mobile plaque seen in the   visualized portion of the aortic arch.    Pericardium:  Pericardial fat pad is seen anterior to the right ventricle, cannot rule   out a small pericardial effusion.      < end of copied text >

## 2022-09-07 NOTE — CONSULT NOTE ADULT - ASSESSMENT
Assesment:  82y Female        Plan:  Problem 1:      Problem 2:      Problem 3:      Problem 4:    I have reviewed clinical labs tests and reports, radiology tests and reports, as well as old patient medical records, and discussed with the refering physician.     Assesment:  82F French speaking PMHx HTN, HLD, severe AS s/p TAVR (2019), Ascending Aortic Aneurysm 4cm in 01/2022, pAFib (on Amiodarone+Eliquis), COPD (s/p 2L home O2), Colon cancer s/p resection in 2019 (in remission), FEROZ & CKD3. She was also treated with anticoagulation for possible AV thrombus in 2021. On 9/4/22 she presented to [facility] ED complaining of subacute weakness, HTN at home, and new chest pain. Workup after admission significant for positive blood cultures positive for gram positive cocci. IRMA negative for vegetation, noted restricted movement of aortic valve.     Plan:  Problem 1: Bacteremia s/p TAVR  - Case discussed with Dr. Alejo  - Consult called given patient has prosthetic TAVR valve and is bacteremic (GPC)  - IRMA without AV vegetation, dose show restriction in movement of an AV leaflet   - Agree with ID consult  - Pending gallium scan     Problem 2: HFpEF  - patient with hx HFpEF, not in acute exacerbation  - Continue diuresis per primary team  - Continue lisinopril, metoprolol     Problem 3: Atrial fibrillation  - Continue amio, metprolol  - Continue anticoagulation per primary team     Problem 4: BIBIANA on CKD  - Cr 1.5 on admission, now downtrending  - Continue to avoid nephrotoxic agents     I have reviewed clinical labs tests and reports, radiology tests and reports, as well as old patient medical records, and discussed with the refering physician.

## 2022-09-07 NOTE — PROGRESS NOTE ADULT - PROBLEM SELECTOR PLAN 4
Hx colon CA s/p resection 2019 (in remission). C/o RLQ pain w/ radiation to R flank, +bloating. Generalized mild tenderness on palpation to abd. No rebound tenderness or CVA tenderness. Negative Roe's sign.  - C/o diarrhea x 3 episodes since 9/4. Will send GI PCR if continues   - Abdominal US 9/6: Hepatosplenomegaly. Hepatic steatosis. Since 9/4/2022, stable left adrenal nodule.  - LFTs WNL Hx colon CA s/p resection 2019 (in remission). C/o RLQ pain w/ radiation to R flank, +bloating. Generalized mild tenderness on palpation to abd. No rebound tenderness or CVA tenderness. Negative Roe's sign.  - C/o diarrhea x 3 episodes since 9/4. Resolved w/ formed stool  - Abdominal US 9/6: Hepatosplenomegaly. Hepatic steatosis. Since 9/4/2022, stable left adrenal nodule.  - LFTs WNL

## 2022-09-08 LAB
-  KPC RESISTANCE GENE: SIGNIFICANT CHANGE UP
ANION GAP SERPL CALC-SCNC: 13 MMOL/L — SIGNIFICANT CHANGE UP (ref 5–17)
BLD GP AB SCN SERPL QL: POSITIVE — SIGNIFICANT CHANGE UP
BUN SERPL-MCNC: 22 MG/DL — SIGNIFICANT CHANGE UP (ref 7–23)
CALCIUM SERPL-MCNC: 9 MG/DL — SIGNIFICANT CHANGE UP (ref 8.4–10.5)
CHLORIDE SERPL-SCNC: 97 MMOL/L — SIGNIFICANT CHANGE UP (ref 96–108)
CO2 SERPL-SCNC: 22 MMOL/L — SIGNIFICANT CHANGE UP (ref 22–31)
CREAT SERPL-MCNC: 1.32 MG/DL — HIGH (ref 0.5–1.3)
E COLI DNA BLD POS QL NAA+NON-PROBE: SIGNIFICANT CHANGE UP
EGFR: 40 ML/MIN/1.73M2 — LOW
GLUCOSE SERPL-MCNC: 96 MG/DL — SIGNIFICANT CHANGE UP (ref 70–99)
GRAM STN FLD: SIGNIFICANT CHANGE UP
HCT VFR BLD CALC: 25.3 % — LOW (ref 34.5–45)
HGB BLD-MCNC: 7.9 G/DL — LOW (ref 11.5–15.5)
MAGNESIUM SERPL-MCNC: 2.1 MG/DL — SIGNIFICANT CHANGE UP (ref 1.6–2.6)
MCHC RBC-ENTMCNC: 27 PG — SIGNIFICANT CHANGE UP (ref 27–34)
MCHC RBC-ENTMCNC: 31.2 GM/DL — LOW (ref 32–36)
MCV RBC AUTO: 86.3 FL — SIGNIFICANT CHANGE UP (ref 80–100)
METHOD TYPE: SIGNIFICANT CHANGE UP
NRBC # BLD: 0 /100 WBCS — SIGNIFICANT CHANGE UP (ref 0–0)
PLATELET # BLD AUTO: 266 K/UL — SIGNIFICANT CHANGE UP (ref 150–400)
POTASSIUM SERPL-MCNC: 4.5 MMOL/L — SIGNIFICANT CHANGE UP (ref 3.5–5.3)
POTASSIUM SERPL-SCNC: 4.5 MMOL/L — SIGNIFICANT CHANGE UP (ref 3.5–5.3)
RBC # BLD: 2.93 M/UL — LOW (ref 3.8–5.2)
RBC # FLD: 14.6 % — HIGH (ref 10.3–14.5)
RH IG SCN BLD-IMP: POSITIVE — SIGNIFICANT CHANGE UP
SODIUM SERPL-SCNC: 132 MMOL/L — LOW (ref 135–145)
SPECIMEN SOURCE: SIGNIFICANT CHANGE UP
VANCOMYCIN TROUGH SERPL-MCNC: 9.3 UG/ML — LOW (ref 10–20)
WBC # BLD: 6.53 K/UL — SIGNIFICANT CHANGE UP (ref 3.8–10.5)
WBC # FLD AUTO: 6.53 K/UL — SIGNIFICANT CHANGE UP (ref 3.8–10.5)

## 2022-09-08 PROCEDURE — 99233 SBSQ HOSP IP/OBS HIGH 50: CPT

## 2022-09-08 PROCEDURE — 78802 RP LOCLZJ TUM WHBDY 1 D IMG: CPT | Mod: 26

## 2022-09-08 RX ORDER — PENICILLIN G POTASSIUM 5000000 [IU]/1
4 POWDER, FOR SOLUTION INTRAMUSCULAR; INTRAPLEURAL; INTRATHECAL; INTRAVENOUS EVERY 4 HOURS
Refills: 0 | Status: DISCONTINUED | OUTPATIENT
Start: 2022-09-08 | End: 2022-09-09

## 2022-09-08 RX ORDER — DIPHENHYDRAMINE HCL 50 MG
25 CAPSULE ORAL EVERY 6 HOURS
Refills: 0 | Status: DISCONTINUED | OUTPATIENT
Start: 2022-09-08 | End: 2022-09-09

## 2022-09-08 RX ADMIN — Medication 650 MILLIGRAM(S): at 11:12

## 2022-09-08 RX ADMIN — PANTOPRAZOLE SODIUM 40 MILLIGRAM(S): 20 TABLET, DELAYED RELEASE ORAL at 05:33

## 2022-09-08 RX ADMIN — LIDOCAINE 1 PATCH: 4 CREAM TOPICAL at 18:16

## 2022-09-08 RX ADMIN — APIXABAN 5 MILLIGRAM(S): 2.5 TABLET, FILM COATED ORAL at 17:37

## 2022-09-08 RX ADMIN — PENICILLIN G POTASSIUM 100 MILLION UNIT(S): 5000000 POWDER, FOR SOLUTION INTRAMUSCULAR; INTRAPLEURAL; INTRATHECAL; INTRAVENOUS at 18:33

## 2022-09-08 RX ADMIN — Medication 1 MILLIGRAM(S): at 11:52

## 2022-09-08 RX ADMIN — MONTELUKAST 10 MILLIGRAM(S): 4 TABLET, CHEWABLE ORAL at 11:52

## 2022-09-08 RX ADMIN — SIMETHICONE 80 MILLIGRAM(S): 80 TABLET, CHEWABLE ORAL at 05:34

## 2022-09-08 RX ADMIN — PENICILLIN G POTASSIUM 100 MILLION UNIT(S): 5000000 POWDER, FOR SOLUTION INTRAMUSCULAR; INTRAPLEURAL; INTRATHECAL; INTRAVENOUS at 21:32

## 2022-09-08 RX ADMIN — AMIODARONE HYDROCHLORIDE 200 MILLIGRAM(S): 400 TABLET ORAL at 05:34

## 2022-09-08 RX ADMIN — Medication 0.5 MILLIGRAM(S): at 11:53

## 2022-09-08 RX ADMIN — LIDOCAINE 1 PATCH: 4 CREAM TOPICAL at 17:37

## 2022-09-08 RX ADMIN — Medication 650 MILLIGRAM(S): at 18:37

## 2022-09-08 RX ADMIN — APIXABAN 5 MILLIGRAM(S): 2.5 TABLET, FILM COATED ORAL at 05:33

## 2022-09-08 RX ADMIN — ATORVASTATIN CALCIUM 20 MILLIGRAM(S): 80 TABLET, FILM COATED ORAL at 21:32

## 2022-09-08 RX ADMIN — Medication 50 MILLIGRAM(S): at 05:33

## 2022-09-08 RX ADMIN — Medication 650 MILLIGRAM(S): at 12:12

## 2022-09-08 RX ADMIN — Medication 5 MILLIGRAM(S): at 21:32

## 2022-09-08 RX ADMIN — SIMETHICONE 80 MILLIGRAM(S): 80 TABLET, CHEWABLE ORAL at 17:38

## 2022-09-08 RX ADMIN — Medication 650 MILLIGRAM(S): at 17:37

## 2022-09-08 RX ADMIN — Medication 125 MILLIGRAM(S): at 13:15

## 2022-09-08 RX ADMIN — Medication 650 MILLIGRAM(S): at 23:28

## 2022-09-08 RX ADMIN — LIDOCAINE 1 PATCH: 4 CREAM TOPICAL at 11:45

## 2022-09-08 RX ADMIN — TIOTROPIUM BROMIDE 1 CAPSULE(S): 18 CAPSULE ORAL; RESPIRATORY (INHALATION) at 11:53

## 2022-09-08 RX ADMIN — LISINOPRIL 10 MILLIGRAM(S): 2.5 TABLET ORAL at 11:52

## 2022-09-08 RX ADMIN — Medication 1 TABLET(S): at 11:52

## 2022-09-08 NOTE — OCCUPATIONAL THERAPY INITIAL EVALUATION ADULT - MD ORDER
In the ED was in HTN /74 with HA which resolved w/ decreased /78. Labs significant for normocytic anemia Hgb 8.6 (baseline 11-12), BIBIANA on CKD3a Crt 1.5 (baseline 1.1-1.2). EKG & cardiac enzymes WNL. CTH WNL. She received PO Tylenol 650mg.

## 2022-09-08 NOTE — PROGRESS NOTE ADULT - PROBLEM SELECTOR PLAN 4
Hx colon CA s/p resection 2019 (in remission). C/o RLQ pain w/ radiation to R flank, +bloating. Generalized mild tenderness on palpation to abd. No rebound tenderness or CVA tenderness. Negative Roe's sign.  - C/o diarrhea x 3 episodes since 9/4. Resolved w/ formed stool  - Abdominal US 9/6: Hepatosplenomegaly. Hepatic steatosis. Since 9/4/2022, stable left adrenal nodule.  - LFTs WNL

## 2022-09-08 NOTE — PROGRESS NOTE ADULT - ASSESSMENT
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 Collateral information obtained from patient via Khmer : one week prior to presentation she developed frontal tooth swelling/abscess; did not have dental intervention; endorses three undrained tooth abscesses and reports she needs five teeth extracted.  - OMFS assessment given TAVR and gram positive bacteremia probably from odontogenic source--this should be done promptly for source control and regardless of gallium scan findings   - f/u surveillance bcx AM labs 9/7--ngtd  - gallium scan (with SPECT to assess for infected TAVR)  - d/c vancomycin  - start PCN G 4M units IV q4h  - if gallium scan confirms TAVR infection, will add gentamicin to regimen for synergy     d/w primary team    ID Team 2

## 2022-09-08 NOTE — PROGRESS NOTE ADULT - PROBLEM SELECTOR PLAN 8
Recent [facility] admission 3/2022 for Afib RVR s/p spontaneous conversion  - tele- SB- SR HR 50s-60s   - c/w Amiodarone 200mg QD and Metoprolol Succinate 50mg   - c/w Eliquis 5mg BID. CHADsVASc 5

## 2022-09-08 NOTE — OCCUPATIONAL THERAPY INITIAL EVALUATION ADULT - ADDITIONAL COMMENTS
Patient reports living alone in an apartment building with 7 MADYSON. Patient's daughter lives on the second floor of the apartment building and is the primary caregiver. Patient states she was independent with most ADL's and functional mobility with no AD prior to admission however more recently has not been able to perform more than a couple of feet without feeling SOB. Patient owns a cane. Patient is R hand dominant and owns a walk in shower and shower chair that does not fit.

## 2022-09-08 NOTE — PROGRESS NOTE ADULT - PROBLEM SELECTOR PLAN 1
Fever 100.9 rectal 9/5 a/w chills and general malaise. Also c/o RLQ abdominal pain w/ radiation R Flank. Diarrhea resolved. Remains afebrile since episode without leukocytosis   - Unclear etiology, concerning for possibly 2/2 Endocarditis (did not receive Abx prior to dental work 2 weeks ago) vs dental abscess or abdominal source   - COVID and RVP negative. UCx NGTD   - Blood Cultures 9/5: positive for Gram Cocci in Clusters, gemella species  - Blood Cultures 9/7 NGTD x 1 day. Repeat Surveillance Cultures sent this AM (9/7)   - CT Chest 9/4: Probable mild intersitial pulm edema & bronchitis. Stable or slightly increased multiple enlarged mediastinal & hilar nodules likely 2/2 inflammatory or infectious etiology  - Abdominal US 9/6: Hepatosplenomegaly. Hepatic steatosis. Since 9/4/2022, stable left adrenal nodule.  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 Flow acceleration through the aortic prosthesis. Visually there is leaflet restriction. No valvular vegetations noted. Trace aortic regurgitation. Mod MR. Mild- Mod TR  - ID following, appreciate recs.   - c/w IV Vanco 1250mg QD. F/u vanc trough prior 9/8. Trough today 9.3  - Benadryl 25mg PO PRN ordered as pt c/o itchiness during Vanc infusion   - NM Gallium scan performed 9/8, follow up results   - OMFS consulted (Arvind Bishop) for dental abscess and possible source. Will call back when Gallium scan results Fever 100.9 rectal 9/5 a/w chills and general malaise. Also c/o RLQ abdominal pain w/ radiation R Flank. Diarrhea resolved. Remains afebrile since episode without leukocytosis   - Unclear etiology, concerning for possibly 2/2 Endocarditis (did not receive Abx prior to dental work 2 weeks ago) vs dental abscess or abdominal source   - COVID and RVP negative. UCx NGTD   - Blood Cultures 9/5: positive for Gram Cocci in Clusters, gemella species  - Blood Cultures 9/7 NGTD x 1 day. Repeat Surveillance Cultures sent this AM (9/7)   - CT Chest 9/4: Probable mild intersitial pulm edema & bronchitis. Stable or slightly increased multiple enlarged mediastinal & hilar nodules likely 2/2 inflammatory or infectious etiology  - Abdominal US 9/6: Hepatosplenomegaly. Hepatic steatosis. Since 9/4/2022, stable left adrenal nodule.  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 Flow acceleration through the aortic prosthesis. Visually there is leaflet restriction. No valvular vegetations noted. Trace aortic regurgitation. Mod MR. Mild- Mod TR  - ID following, appreciate recs.   - Benadryl 25mg PO PRN ordered as pt c/o itchiness during Vanc infusion   - IV Vanc dc'd and PCN G 4Mill U ordered as per ID recs for Q4H   - NM Gallium scan performed 9/8, follow up results   - OMFS consulted (Arvind Bishop) for dental abscess and possible source. Will call back when Gallium scan results Fever 100.9 rectal 9/5 a/w chills and general malaise. Also c/o RLQ abdominal pain w/ radiation R Flank. Diarrhea resolved. Remains afebrile since episode without leukocytosis   - Unclear etiology, concerning for possibly 2/2 Endocarditis (did not receive Abx prior to dental work 2 weeks ago) vs dental abscess or abdominal source   - COVID and RVP negative. UCx NGTD   - Blood Cultures 9/5: positive for Gram Cocci in Clusters, gemella species  - Blood Cultures 9/7 NGTD x 1 day. Repeat Surveillance Cultures sent this AM (9/8)   - CT Chest 9/4: Probable mild intersitial pulm edema & bronchitis. Stable or slightly increased multiple enlarged mediastinal & hilar nodules likely 2/2 inflammatory or infectious etiology  - Abdominal US 9/6: Hepatosplenomegaly. Hepatic steatosis. Since 9/4/2022, stable left adrenal nodule.  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 Flow acceleration through the aortic prosthesis. Visually there is leaflet restriction. No valvular vegetations noted. Trace aortic regurgitation. Mod MR. Mild- Mod TR  - ID following, appreciate recs.   - Benadryl 25mg PO PRN ordered as pt c/o itchiness during Vanc infusion   - IV Vanc dc'd and PCN G 4Mill U ordered as per ID recs for Q4H   - NM Gallium scan performed 9/8, follow up results   - OMFS consulted (Arvind Bishop) for dental abscess and possible source. Will call back when Gallium scan results

## 2022-09-08 NOTE — PROGRESS NOTE ADULT - PROBLEM SELECTOR PLAN 3
P/W L sided CP 10/10 and MANRIQUE. Trop neg x 3. EKG non-ischemic. S/P SL NTG x 3 w/ minimal improvement.   - CTA Cardiac 1/2022: mild calcium score 76 Agatston units. LM < 25% stenosis. Mild stenosis pLAD. Minimal stenoses mLAD and pRCA. The remaining segments are normal.  - Low suspicion for ACS given recent negative ischemic eval. Concern for endocarditis given fever, recent dental work in light of TAVR, generalized malaise  - same plan as above

## 2022-09-08 NOTE — PROGRESS NOTE ADULT - PROBLEM SELECTOR PLAN 9
SBP 210s on admission   - SBPs 150s-160s   - PO Lasix dc'd as she remains euvolemic with up- trending Creat and elevated gradient. Will resume when appropriate   - c/w Lisinopril 10mg qd and Metoprolol Succinate 50mg qd      #HLD   - Chol 79 TG 73 HDL 35 LDL 29   - c/w Atorvastatin 20mg QD

## 2022-09-08 NOTE — PROGRESS NOTE ADULT - PROBLEM SELECTOR PLAN 6
Crea 1.5 on admission. Baseline Creat 1.1-1.2.  - Creat 1.32 today   - PO Lasix dc'd as she remains euvolemic with up- trending Creat and elevated gradient. Will resume when appropriate   - Renally dose medications and avoid nephrotoxic agents

## 2022-09-08 NOTE — PROGRESS NOTE ADULT - PROBLEM SELECTOR PLAN 7
Normocytic anemia. H/h 8.6/27.7 on admission. Hx of Hx Colon cancer s/p resection in 2019 (in remission) and FEROZ.  - hgb 77.9 today; no s/sx bleeding   - pending Stool OB   - Iron Panel shows AoCD. Would not give IV iron in setting of bacteremia  - maintain Active T+S, transfuse if hgb <7  - c/w Protonix 40mg PO qd    F: No IVF  N: DASH/TLC  E: Replete lytes PRN K<4, Mg<2  P: DVT PPX: on Eliquis  C: FULL CODE  Dispo: Pending gallium scan   PT eval- BELKIS Normocytic anemia. H/h 8.6/27.7 on admission. Hx of Hx Colon cancer s/p resection in 2019 (in remission) and FEROZ.  - hgb 77.9 today; no s/sx bleeding   - pending Stool OB   - Iron Panel shows AoCD. Would not give IV iron in setting of bacteremia  - maintain Active T+S, transfuse if hgb <7  - c/w Protonix 40mg PO qq

## 2022-09-08 NOTE — PROGRESS NOTE ADULT - PROBLEM SELECTOR PLAN 10
Not in acute exacerbation. On intermittent 2L NC at home. Follows with Dr. Derik Domínguez c/w Spiriva INH and Singulair qd Not in acute exacerbation. On intermittent 2L NC at home. Follows with Dr. Derik Domínguez c/w Spiriva INH and Singulair qd    F: No IVF  N: DASH/TLC/DM diet  E: Replete lytes PRN K<4, Mg<2  P: DVT PPX: on Eliquis   C: FULL CODE  Dispo:  Cardiopulmonary Rehab

## 2022-09-08 NOTE — PROGRESS NOTE ADULT - ASSESSMENT
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 Transferred to cardiac tele 9/5 to r/o endocarditis, found to be febrile and bacteremic and started on IV ABx. IRMA negative for vegetation. ID following, pending gallium scan.

## 2022-09-08 NOTE — OCCUPATIONAL THERAPY INITIAL EVALUATION ADULT - MODIFIED CLINICAL TEST OF SENSORY INTEGRATION IN BALANCE TEST
Patient functionally ambulated from bed<>bathroom and to stretcher bed 10 x 3 feet with CG HHA. Patient with increased fatigue and dyspnea requiring x 3 30 second standing rest break to recover- SPO2% 99% throughout.

## 2022-09-08 NOTE — OCCUPATIONAL THERAPY INITIAL EVALUATION ADULT - LEVEL OF INDEPENDENCE: GROOMING, OT EVAL
Patient performed brushing teeth standing at sink side with CGA- patient noted with increased fatigue and dyspnea throughout 99% SPO2/contact guard

## 2022-09-08 NOTE — OCCUPATIONAL THERAPY INITIAL EVALUATION ADULT - PLANNED THERAPY INTERVENTIONS, OT EVAL
Deep breathing exercises, aerobic capacity/endurance/ADL retraining/balance training/bed mobility training/transfer training

## 2022-09-08 NOTE — PROGRESS NOTE ADULT - PROBLEM SELECTOR PLAN 2
TAVR w/ Dr. Alejo 2/2019 2/2 severe AS and diastolic HF. 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  - Initial thought to have high AV gradients per Echo 5/2021. Taken for Valve in Valve, but aborted 2/2 low transvalvular gradient 7  - ECHO 9/7: EF 60-65%, borderline dilated LV, mod symm LVH, grade II diastolic dysfunction, TAVR in position, increased mean gradient 32 (previously 28), mild-mod MR  - IRMA 9/7 as above, visually there is leaflet restriction. No valvular vegetations noted.   - SHD consulted. Appreciate Recs.

## 2022-09-08 NOTE — PROGRESS NOTE ADULT - PROBLEM SELECTOR PLAN 5
Not in acute exacerbation. Euvolemic on exam.   - CTA Chest 9/4: Probable mild intersitial pulm edema & bronchitis. Stable or slightly increased multiple enlarged mediastinal & hilar nodules likely 2/2 inflammatory or infectious etiology  - Recent Echo 1/2022 w/ EF >74%, rest as above  - PO Lasix dc'd as she remains euvolemic with up- trending Creat and elevated gradient. Will resume when appropriate   - c/w Lisinopril 10mg qd and Metoprolol Succinate 50mg qd  - HF core measures. Strict I/Os, daily weights

## 2022-09-08 NOTE — PROGRESS NOTE ADULT - SUBJECTIVE AND OBJECTIVE BOX
CARDIOLOGY NP PROGRESS NOTE    Cuban  Courtney ID #[id number]   Subjective:  Received pt awake in bed in NAD. States "feeling much better today than days prior". Denies CP/SOB, dizziness/diaphoresis, n/v, palpitations. Remainder ROS otherwise negative.    Overnight Events: No acute events overnight     TELEMETRY: SB- SR HR 50s-60s      VITAL SIGNS:  T(C): 36.2 (09-08-22 @ 13:54), Max: 36.4 (09-07-22 @ 21:59)  HR: 60 (09-08-22 @ 09:35) (50 - 64)  BP: 160/69 (09-08-22 @ 09:35) (153/76 - 168/74)  RR: 18 (09-08-22 @ 08:24) (18 - 18)  SpO2: 99% (09-08-22 @ 09:35) (95% - 99%)  Wt(kg): --    I&O's Summary    07 Sep 2022 07:01  -  08 Sep 2022 07:00  --------------------------------------------------------  IN: 150 mL / OUT: 1 mL / NET: 149 mL        Cuban  Courtney ID #[id number]   PHYSICAL EXAM:    General: A/ox 3, Cuban Speaking. No acute distress   Neck: Supple, NO JVD  Cardiac: S1 S2,  (+) murmur   Pulmonary: Diminished breath sounds in all lung fields. Breathing unlabored, No Rhonchi/Rales/Wheezing  Abdomen: Obese. Soft w/generalized tenderness.   Extremities: No Rashes, No edema  Neuro: A/o x 3, No focal deficits          LABS:                          7.9    6.53  )-----------( 266      ( 08 Sep 2022 07:18 )             25.3                              09-08    132<L>  |  97  |  22  ----------------------------<  96  4.5   |  22  |  1.32<H>    Ca    9.0      08 Sep 2022 07:18  Mg     2.1     09-08                                CAPILLARY BLOOD GLUCOSE                Allergies:  Digox (Rash; Urticaria; Hives)  Plavix (Other (Mod to Severe))    MEDICATIONS  (STANDING):  aMIOdarone    Tablet 200 milliGRAM(s) Oral daily  apixaban 5 milliGRAM(s) Oral every 12 hours  atorvastatin 20 milliGRAM(s) Oral at bedtime  folic acid 1 milliGRAM(s) Oral daily  influenza  Vaccine (HIGH DOSE) 0.7 milliLiter(s) IntraMuscular once  lidocaine   4% Patch 1 Patch Transdermal every 24 hours  lisinopril 10 milliGRAM(s) Oral every 24 hours  LORazepam     Tablet 0.5 milliGRAM(s) Oral daily  melatonin 5 milliGRAM(s) Oral at bedtime  metoprolol succinate ER 50 milliGRAM(s) Oral daily  montelukast 10 milliGRAM(s) Oral daily  multivitamin 1 Tablet(s) Oral daily  pantoprazole    Tablet 40 milliGRAM(s) Oral before breakfast  simethicone 80 milliGRAM(s) Chew two times a day  tiotropium 18 MICROgram(s) Capsule 1 Capsule(s) Inhalation daily  vancomycin  IVPB      vancomycin  IVPB 1250 milliGRAM(s) IV Intermittent every 24 hours    MEDICATIONS  (PRN):  acetaminophen     Tablet .. 650 milliGRAM(s) Oral every 6 hours PRN Temp greater or equal to 38C (100.4F), Mild Pain (1 - 3)  diphenhydrAMINE 25 milliGRAM(s) Oral every 6 hours PRN Rash and/or Itching        DIAGNOSTIC TESTS:

## 2022-09-08 NOTE — PROGRESS NOTE ADULT - SUBJECTIVE AND OBJECTIVE BOX
INTERVAL HPI/OVERNIGHT EVENTS: BERYL.    CONSTITUTIONAL:  Negative fever or chills, feels well, good appetite  EYES:  Negative  blurry vision or double vision  CARDIOVASCULAR:  Negative for chest pain or palpitations  RESPIRATORY:  Negative for cough, wheezing, or SOB   GASTROINTESTINAL:  Negative for nausea, vomiting, diarrhea, constipation, or abdominal pain  GENITOURINARY:  Negative frequency, urgency or dysuria  NEUROLOGIC:  No headache, confusion, dizziness, lightheadedness      ANTIBIOTICS/RELEVANT:    MEDICATIONS  (STANDING):  aMIOdarone    Tablet 200 milliGRAM(s) Oral daily  apixaban 5 milliGRAM(s) Oral every 12 hours  atorvastatin 20 milliGRAM(s) Oral at bedtime  folic acid 1 milliGRAM(s) Oral daily  influenza  Vaccine (HIGH DOSE) 0.7 milliLiter(s) IntraMuscular once  lidocaine   4% Patch 1 Patch Transdermal every 24 hours  lisinopril 10 milliGRAM(s) Oral every 24 hours  LORazepam     Tablet 0.5 milliGRAM(s) Oral daily  melatonin 5 milliGRAM(s) Oral at bedtime  metoprolol succinate ER 50 milliGRAM(s) Oral daily  montelukast 10 milliGRAM(s) Oral daily  multivitamin 1 Tablet(s) Oral daily  pantoprazole    Tablet 40 milliGRAM(s) Oral before breakfast  penicillin   G  potassium  IVPB 4 Million Unit(s) IV Intermittent every 4 hours  simethicone 80 milliGRAM(s) Chew two times a day  tiotropium 18 MICROgram(s) Capsule 1 Capsule(s) Inhalation daily    MEDICATIONS  (PRN):  acetaminophen     Tablet .. 650 milliGRAM(s) Oral every 6 hours PRN Temp greater or equal to 38C (100.4F), Mild Pain (1 - 3)  diphenhydrAMINE 25 milliGRAM(s) Oral every 6 hours PRN Rash and/or Itching        Vital Signs Last 24 Hrs  T(C): 36.2 (08 Sep 2022 13:54), Max: 36.4 (07 Sep 2022 21:59)  T(F): 97.2 (08 Sep 2022 13:54), Max: 97.5 (07 Sep 2022 21:59)  HR: 60 (08 Sep 2022 09:35) (50 - 60)  BP: 160/69 (08 Sep 2022 09:35) (153/76 - 168/74)  BP(mean): --  RR: 18 (08 Sep 2022 08:24) (18 - 18)  SpO2: 99% (08 Sep 2022 09:35) (95% - 99%)    Parameters below as of 08 Sep 2022 09:35  Patient On (Oxygen Delivery Method): room air        PHYSICAL EXAM:  Constitutional: NAD  Eyes: TIMMY, EOMI  Ear/Nose/Throat: no oral lesion, no sinus tenderness on percussion	  Neck: no JVD, no lymphadenopathy, supple  Respiratory: CTA hannah  Cardiovascular: S1S2 RRR, no murmurs  Gastrointestinal:soft, (+) BS, no HSM  Extremities:no e/e/c  Vascular: DP Pulse:	right normal; left normal      LABS:                        7.9    6.53  )-----------( 266      ( 08 Sep 2022 07:18 )             25.3     09-08    132<L>  |  97  |  22  ----------------------------<  96  4.5   |  22  |  1.32<H>    Ca    9.0      08 Sep 2022 07:18  Mg     2.1     09-08            MICROBIOLOGY: Culture - Blood (09.05.22 @ 10:45)    Gram Stain:   Aerobic Bottle: Gram Positive Cocci in Clusters  Result called to and read back byJuan Francisco Grady RN  09/06/2022 09:21:06  Anaerobic Bottle: Gram Positive Cocci in Clusters  Floor previously notified.  ***Blood Panel PCR results on this specimen are available  approximately 3 hours after the Gram stain result.***  Gram stain, PCR, and/or culture results may not always  correspond due to difference in methodologies.  ************************************************************  This PCR assay was performed using [employer].  The following targets are tested for: Enterococcus,  vancomycin resistant enterococci, Listeria monocytogenes,  coagulase negative staphylococci, S. aureus,  methicillin resistant S. aureus, Streptococcus agalactiae  (Group B), S. pneumoniae, S. pyogenes (Group A),  Acinetobacter baumannii, Enterobacter cloacae, E. coli,  Klebsiella oxytoca, K. pneumoniae, Proteus sp.,  Serratia marcescens, Haemophilus influenzae,  Neisseria meningitidis, Pseudomonas aeruginosa, Candida  albicans, C. glabrata, C krusei, C parapsilosis,  C. tropicalis and the KPC resistance gene.    -  Multidrug (KPC pos) resistant organism: Nondet    -  Methicillin SENSITIVE Staphylococcus aureus (MSSA): Nondet    -  Methicillin resistant Staphylococcus aureus (MRSA): Nondet    -  Coagulase negative Staphylococcus: Nondet    -  Enterococcus species: Nondet    -  Vancomycin resistant Enterococcus sp.: Nondet    -  Escherichia coli: Nondet    -  Klebsiella oxytoca: Nondet    -  Klebsiella pneumoniae: Nondet    -  Serratia marcescens: Nondet    -  Proteus species: Nondet    -  Haemophilus influenzae: Nondet    -  Listeria monocytogenes: Nondet    -  Neisseria meningitidis: Nondet    -  Pseudomonas aeruginosa: Nondet    -  Acinetobacter baumanii: Nondet    -  Enterobacter cloacae complex: Nondet    -  Streptococcus sp. (Not Grp A, B or S pneumoniae): Nondet    -  Streptococcus agalactiae (Group B): Nondet    -  Streptococcus pyogenes (Group A): Nondet    -  Streptococcus pneumoniae: Nondet    -  Candida albicans: Nondet    -  Candida glabrata: Nondet    -  Candida krusei: Nondet    -  Candida parapsilosis: Nondet    -  Candida tropicalis: Nondet    Specimen Source: .Blood Blood-Venous    Organism: Blood Culture PCR    Culture Results:   Growth in aerobic and anaerobic bottles: Gemella species (Gemella  haemolysans)  Susceptibility to follow.    Organism Identification: Blood Culture PCR    Method Type: PCR        RADIOLOGY & ADDITIONAL STUDIES: reviewed

## 2022-09-08 NOTE — OCCUPATIONAL THERAPY INITIAL EVALUATION ADULT - PERTINENT HX OF CURRENT PROBLEM, REHAB EVAL
82F Telugu speaking PMHx HTN, HLD, severe AS s/p TAVR (2019, 2021), Ascending Aortic Aneurysm 4cm in 01/2022, pAFib (on Amiodarone+Eliquis), COPD (s/p 2L home O2), Colon cancer s/p resection in 2019 (in remission), FEROZ & CKD3. She had a history of weakness with exertional capacity of half a block, after working with PT that increased to 5 blocks, but since cessation of PT her exertional capacity has deteriorated to 2 steps limited by lethargy (not CP or SOB).

## 2022-09-08 NOTE — OCCUPATIONAL THERAPY INITIAL EVALUATION ADULT - DIAGNOSIS, OT EVAL
Patient brought to [facility] with weakness and CP evaluation, presents with MANRIQUE upon during sit<>stand, functional mobility, functional tasks, SPO2 99% throughout however requiring frequent rest breaks impacting independence and safety with functional activities and mobility.

## 2022-09-08 NOTE — OCCUPATIONAL THERAPY INITIAL EVALUATION ADULT - GENERAL OBSERVATIONS, REHAB EVAL
Patient A&Ox4, agreeable an tolerated session fairly. Patient received semisupine in bed +tele, +heplock IV, room air, NAD.

## 2022-09-09 LAB
-  CEFTRIAXONE: SIGNIFICANT CHANGE UP
-  CLINDAMYCIN: SIGNIFICANT CHANGE UP
-  ERYTHROMYCIN: SIGNIFICANT CHANGE UP
-  ERYTHROMYCIN: SIGNIFICANT CHANGE UP
-  LEVOFLOXACIN: SIGNIFICANT CHANGE UP
-  LEVOFLOXACIN: SIGNIFICANT CHANGE UP
-  MEROPENEM: SIGNIFICANT CHANGE UP
-  PENICILLIN: SIGNIFICANT CHANGE UP
-  VANCOMYCIN: SIGNIFICANT CHANGE UP
ANION GAP SERPL CALC-SCNC: 14 MMOL/L — SIGNIFICANT CHANGE UP (ref 5–17)
APPEARANCE UR: CLEAR — SIGNIFICANT CHANGE UP
BACTERIA # UR AUTO: PRESENT /HPF
BILIRUB UR-MCNC: NEGATIVE — SIGNIFICANT CHANGE UP
BUN SERPL-MCNC: 18 MG/DL — SIGNIFICANT CHANGE UP (ref 7–23)
CALCIUM SERPL-MCNC: 9.1 MG/DL — SIGNIFICANT CHANGE UP (ref 8.4–10.5)
CHLORIDE SERPL-SCNC: 101 MMOL/L — SIGNIFICANT CHANGE UP (ref 96–108)
CO2 SERPL-SCNC: 23 MMOL/L — SIGNIFICANT CHANGE UP (ref 22–31)
COLOR SPEC: YELLOW — SIGNIFICANT CHANGE UP
CREAT SERPL-MCNC: 1.44 MG/DL — HIGH (ref 0.5–1.3)
CRP SERPL-MCNC: 25.6 MG/L — HIGH (ref 0–4)
CULTURE RESULTS: SIGNIFICANT CHANGE UP
CULTURE RESULTS: SIGNIFICANT CHANGE UP
DIFF PNL FLD: NEGATIVE — SIGNIFICANT CHANGE UP
EGFR: 36 ML/MIN/1.73M2 — LOW
EPI CELLS # UR: SIGNIFICANT CHANGE UP /HPF (ref 0–5)
GLUCOSE SERPL-MCNC: 107 MG/DL — HIGH (ref 70–99)
GLUCOSE UR QL: NEGATIVE — SIGNIFICANT CHANGE UP
GRAM STN FLD: SIGNIFICANT CHANGE UP
HCT VFR BLD CALC: 24.3 % — LOW (ref 34.5–45)
HGB BLD-MCNC: 7.5 G/DL — LOW (ref 11.5–15.5)
KETONES UR-MCNC: NEGATIVE — SIGNIFICANT CHANGE UP
LEUKOCYTE ESTERASE UR-ACNC: ABNORMAL
MAGNESIUM SERPL-MCNC: 2.1 MG/DL — SIGNIFICANT CHANGE UP (ref 1.6–2.6)
MCHC RBC-ENTMCNC: 27 PG — SIGNIFICANT CHANGE UP (ref 27–34)
MCHC RBC-ENTMCNC: 30.9 GM/DL — LOW (ref 32–36)
MCV RBC AUTO: 87.4 FL — SIGNIFICANT CHANGE UP (ref 80–100)
METHOD TYPE: SIGNIFICANT CHANGE UP
METHOD TYPE: SIGNIFICANT CHANGE UP
NITRITE UR-MCNC: NEGATIVE — SIGNIFICANT CHANGE UP
NRBC # BLD: 0 /100 WBCS — SIGNIFICANT CHANGE UP (ref 0–0)
ORGANISM # SPEC MICROSCOPIC CNT: SIGNIFICANT CHANGE UP
PH UR: 6.5 — SIGNIFICANT CHANGE UP (ref 5–8)
PLATELET # BLD AUTO: 283 K/UL — SIGNIFICANT CHANGE UP (ref 150–400)
POTASSIUM SERPL-MCNC: 4.6 MMOL/L — SIGNIFICANT CHANGE UP (ref 3.5–5.3)
POTASSIUM SERPL-SCNC: 4.6 MMOL/L — SIGNIFICANT CHANGE UP (ref 3.5–5.3)
PROT UR-MCNC: NEGATIVE MG/DL — SIGNIFICANT CHANGE UP
RBC # BLD: 2.78 M/UL — LOW (ref 3.8–5.2)
RBC # FLD: 14.4 % — SIGNIFICANT CHANGE UP (ref 10.3–14.5)
RBC CASTS # UR COMP ASSIST: < 5 /HPF — SIGNIFICANT CHANGE UP
SODIUM SERPL-SCNC: 138 MMOL/L — SIGNIFICANT CHANGE UP (ref 135–145)
SP GR SPEC: 1.01 — SIGNIFICANT CHANGE UP (ref 1–1.03)
SPECIMEN SOURCE: SIGNIFICANT CHANGE UP
SPECIMEN SOURCE: SIGNIFICANT CHANGE UP
UROBILINOGEN FLD QL: 0.2 E.U./DL — SIGNIFICANT CHANGE UP
WBC # BLD: 6.28 K/UL — SIGNIFICANT CHANGE UP (ref 3.8–10.5)
WBC # FLD AUTO: 6.28 K/UL — SIGNIFICANT CHANGE UP (ref 3.8–10.5)
WBC UR QL: < 5 /HPF — SIGNIFICANT CHANGE UP

## 2022-09-09 PROCEDURE — 70486 CT MAXILLOFACIAL W/O DYE: CPT | Mod: 26

## 2022-09-09 PROCEDURE — 99233 SBSQ HOSP IP/OBS HIGH 50: CPT

## 2022-09-09 PROCEDURE — 78830 RP LOCLZJ TUM SPECT W/CT 1: CPT | Mod: 26

## 2022-09-09 RX ORDER — ACETAMINOPHEN 500 MG
325 TABLET ORAL ONCE
Refills: 0 | Status: COMPLETED | OUTPATIENT
Start: 2022-09-09 | End: 2022-09-09

## 2022-09-09 RX ORDER — CEFTRIAXONE 500 MG/1
INJECTION, POWDER, FOR SOLUTION INTRAMUSCULAR; INTRAVENOUS
Refills: 0 | Status: DISCONTINUED | OUTPATIENT
Start: 2022-09-09 | End: 2022-09-13

## 2022-09-09 RX ORDER — ISOSORBIDE MONONITRATE 60 MG/1
30 TABLET, EXTENDED RELEASE ORAL DAILY
Refills: 0 | Status: DISCONTINUED | OUTPATIENT
Start: 2022-09-09 | End: 2022-09-13

## 2022-09-09 RX ORDER — CEFTRIAXONE 500 MG/1
2000 INJECTION, POWDER, FOR SOLUTION INTRAMUSCULAR; INTRAVENOUS ONCE
Refills: 0 | Status: COMPLETED | OUTPATIENT
Start: 2022-09-09 | End: 2022-09-09

## 2022-09-09 RX ORDER — CEFTRIAXONE 500 MG/1
2000 INJECTION, POWDER, FOR SOLUTION INTRAMUSCULAR; INTRAVENOUS EVERY 24 HOURS
Refills: 0 | Status: DISCONTINUED | OUTPATIENT
Start: 2022-09-10 | End: 2022-09-13

## 2022-09-09 RX ORDER — AMLODIPINE BESYLATE 2.5 MG/1
10 TABLET ORAL DAILY
Refills: 0 | Status: DISCONTINUED | OUTPATIENT
Start: 2022-09-09 | End: 2022-09-13

## 2022-09-09 RX ADMIN — AMIODARONE HYDROCHLORIDE 200 MILLIGRAM(S): 400 TABLET ORAL at 06:11

## 2022-09-09 RX ADMIN — LIDOCAINE 1 PATCH: 4 CREAM TOPICAL at 06:40

## 2022-09-09 RX ADMIN — ATORVASTATIN CALCIUM 20 MILLIGRAM(S): 80 TABLET, FILM COATED ORAL at 21:23

## 2022-09-09 RX ADMIN — PANTOPRAZOLE SODIUM 40 MILLIGRAM(S): 20 TABLET, DELAYED RELEASE ORAL at 06:11

## 2022-09-09 RX ADMIN — SIMETHICONE 80 MILLIGRAM(S): 80 TABLET, CHEWABLE ORAL at 18:15

## 2022-09-09 RX ADMIN — LISINOPRIL 10 MILLIGRAM(S): 2.5 TABLET ORAL at 10:19

## 2022-09-09 RX ADMIN — Medication 0.5 MILLIGRAM(S): at 10:20

## 2022-09-09 RX ADMIN — TIOTROPIUM BROMIDE 1 CAPSULE(S): 18 CAPSULE ORAL; RESPIRATORY (INHALATION) at 13:35

## 2022-09-09 RX ADMIN — Medication 5 MILLIGRAM(S): at 21:23

## 2022-09-09 RX ADMIN — Medication 1 MILLIGRAM(S): at 10:19

## 2022-09-09 RX ADMIN — Medication 50 MILLIGRAM(S): at 06:11

## 2022-09-09 RX ADMIN — ISOSORBIDE MONONITRATE 30 MILLIGRAM(S): 60 TABLET, EXTENDED RELEASE ORAL at 10:29

## 2022-09-09 RX ADMIN — PENICILLIN G POTASSIUM 100 MILLION UNIT(S): 5000000 POWDER, FOR SOLUTION INTRAMUSCULAR; INTRAPLEURAL; INTRATHECAL; INTRAVENOUS at 01:15

## 2022-09-09 RX ADMIN — SIMETHICONE 80 MILLIGRAM(S): 80 TABLET, CHEWABLE ORAL at 06:11

## 2022-09-09 RX ADMIN — CEFTRIAXONE 100 MILLIGRAM(S): 500 INJECTION, POWDER, FOR SOLUTION INTRAMUSCULAR; INTRAVENOUS at 13:35

## 2022-09-09 RX ADMIN — Medication 1 CAPSULE(S): at 19:15

## 2022-09-09 RX ADMIN — Medication 1 TABLET(S): at 10:29

## 2022-09-09 RX ADMIN — AMLODIPINE BESYLATE 10 MILLIGRAM(S): 2.5 TABLET ORAL at 13:34

## 2022-09-09 RX ADMIN — LIDOCAINE 1 PATCH: 4 CREAM TOPICAL at 18:15

## 2022-09-09 RX ADMIN — LIDOCAINE 1 PATCH: 4 CREAM TOPICAL at 19:30

## 2022-09-09 RX ADMIN — Medication 650 MILLIGRAM(S): at 00:27

## 2022-09-09 RX ADMIN — PENICILLIN G POTASSIUM 100 MILLION UNIT(S): 5000000 POWDER, FOR SOLUTION INTRAMUSCULAR; INTRAPLEURAL; INTRATHECAL; INTRAVENOUS at 06:10

## 2022-09-09 RX ADMIN — APIXABAN 5 MILLIGRAM(S): 2.5 TABLET, FILM COATED ORAL at 06:11

## 2022-09-09 RX ADMIN — MONTELUKAST 10 MILLIGRAM(S): 4 TABLET, CHEWABLE ORAL at 21:23

## 2022-09-09 RX ADMIN — Medication 1 CAPSULE(S): at 18:15

## 2022-09-09 NOTE — PROVIDER CONTACT NOTE (CRITICAL VALUE NOTIFICATION) - ASSESSMENT
pt is afebrile, no s/s of distress
VSS, c/o abdominal pain. Afebrile. blood cultures for gram + cocci in clusters in aerobic bottle

## 2022-09-09 NOTE — DIETITIAN INITIAL EVALUATION ADULT - PERTINENT MEDS FT
MEDICATIONS  (STANDING):  acetaminophen 300 mG/butalbital 50 mG/ caffeine 40 mG 1 Capsule(s) Oral once  aMIOdarone    Tablet 200 milliGRAM(s) Oral daily  amLODIPine   Tablet 10 milliGRAM(s) Oral daily  atorvastatin 20 milliGRAM(s) Oral at bedtime  cefTRIAXone   IVPB      folic acid 1 milliGRAM(s) Oral daily  influenza  Vaccine (HIGH DOSE) 0.7 milliLiter(s) IntraMuscular once  isosorbide   mononitrate ER Tablet (IMDUR) 30 milliGRAM(s) Oral daily  lidocaine   4% Patch 1 Patch Transdermal every 24 hours  LORazepam     Tablet 0.5 milliGRAM(s) Oral daily  melatonin 5 milliGRAM(s) Oral at bedtime  metoprolol succinate ER 50 milliGRAM(s) Oral daily  montelukast 10 milliGRAM(s) Oral daily  multivitamin 1 Tablet(s) Oral daily  pantoprazole    Tablet 40 milliGRAM(s) Oral before breakfast  simethicone 80 milliGRAM(s) Chew two times a day  tiotropium 18 MICROgram(s) Capsule 1 Capsule(s) Inhalation daily    MEDICATIONS  (PRN):  acetaminophen     Tablet .. 650 milliGRAM(s) Oral every 6 hours PRN Temp greater or equal to 38C (100.4F), Mild Pain (1 - 3)  diphenhydrAMINE 25 milliGRAM(s) Oral every 6 hours PRN Rash and/or Itching

## 2022-09-09 NOTE — PROGRESS NOTE ADULT - SUBJECTIVE AND OBJECTIVE BOX
INTERVAL HPI/OVERNIGHT EVENTS: BERYL. Awaiting OMFS evaluation.     CONSTITUTIONAL:  Negative fever or chills, feels well, good appetite  EYES:  Negative  blurry vision or double vision  CARDIOVASCULAR:  Negative for chest pain or palpitations  RESPIRATORY:  Negative for cough, wheezing, or SOB   GASTROINTESTINAL:  Negative for nausea, vomiting, diarrhea, constipation, or abdominal pain  GENITOURINARY:  Negative frequency, urgency or dysuria  NEUROLOGIC:  No headache, confusion, dizziness, lightheadedness      ANTIBIOTICS/RELEVANT:    MEDICATIONS  (STANDING):  acetaminophen 300 mG/butalbital 50 mG/ caffeine 40 mG 1 Capsule(s) Oral once  aMIOdarone    Tablet 200 milliGRAM(s) Oral daily  amLODIPine   Tablet 10 milliGRAM(s) Oral daily  apixaban 5 milliGRAM(s) Oral every 12 hours  atorvastatin 20 milliGRAM(s) Oral at bedtime  cefTRIAXone   IVPB      folic acid 1 milliGRAM(s) Oral daily  influenza  Vaccine (HIGH DOSE) 0.7 milliLiter(s) IntraMuscular once  isosorbide   mononitrate ER Tablet (IMDUR) 30 milliGRAM(s) Oral daily  lidocaine   4% Patch 1 Patch Transdermal every 24 hours  LORazepam     Tablet 0.5 milliGRAM(s) Oral daily  melatonin 5 milliGRAM(s) Oral at bedtime  metoprolol succinate ER 50 milliGRAM(s) Oral daily  montelukast 10 milliGRAM(s) Oral daily  multivitamin 1 Tablet(s) Oral daily  pantoprazole    Tablet 40 milliGRAM(s) Oral before breakfast  simethicone 80 milliGRAM(s) Chew two times a day  tiotropium 18 MICROgram(s) Capsule 1 Capsule(s) Inhalation daily    MEDICATIONS  (PRN):  acetaminophen     Tablet .. 650 milliGRAM(s) Oral every 6 hours PRN Temp greater or equal to 38C (100.4F), Mild Pain (1 - 3)  diphenhydrAMINE 25 milliGRAM(s) Oral every 6 hours PRN Rash and/or Itching        Vital Signs Last 24 Hrs  T(C): 36.2 (09 Sep 2022 13:28), Max: 37.1 (09 Sep 2022 09:18)  T(F): 97.2 (09 Sep 2022 13:28), Max: 98.8 (09 Sep 2022 09:18)  HR: 58 (09 Sep 2022 10:21) (52 - 68)  BP: 179/73 (09 Sep 2022 10:21) (151/68 - 204/86)  BP(mean): 105 (09 Sep 2022 10:21) (98 - 123)  RR: 14 (09 Sep 2022 10:21) (13 - 18)  SpO2: 97% (09 Sep 2022 10:21) (96% - 98%)    Parameters below as of 09 Sep 2022 10:21  Patient On (Oxygen Delivery Method): room air        PHYSICAL EXAM:  Constitutional: NAD  Eyes: TIMMY, EOMI  Ear/Nose/Throat: no oral lesion, no sinus tenderness on percussion	  Neck: no JVD, no lymphadenopathy, supple  Respiratory: CTA hannah  Cardiovascular: S1S2 RRR, no murmurs  Gastrointestinal:soft, (+) BS, no HSM  Extremities:no e/e/c  Vascular: DP Pulse:	right normal; left normal      LABS:                        7.5    6.28  )-----------( 283      ( 09 Sep 2022 06:37 )             24.3     09-09    138  |  101  |  18  ----------------------------<  107<H>  4.6   |  23  |  1.44<H>    Ca    9.1      09 Sep 2022 06:37  Mg     2.1     09-09            MICROBIOLOGY: Culture - Blood (09.08.22 @ 07:19)    Gram Stain:   Aerobic Bottle: Gram Negative Rods  Result called to and read back byJuan Francisco Lange RN (5UR)  09/08/2022 21:18:04  Anaerobic Bottle: Gram Negative Rods  Floor previously notified.  ***Blood Panel PCR results on this specimen are available  approximately 3 hours after the Gram stain result.***  Gram stain, PCR, and/or culture results may not always  correspond due to difference in methodologies.  ************************************************************  This PCR assay was performed using [employer].  The following targets are tested for: Enterococcus,  vancomycin resistant enterococci, Listeria monocytogenes,  coagulase negative staphylococci, S. aureus,  methicillin resistant S. aureus, Streptococcus agalactiae  (Group B), S. pneumoniae, S. pyogenes (Group A),  Acinetobacter baumannii, Enterobacter cloacae, E. coli,  Klebsiella oxytoca, K. pneumoniae, Proteus sp.,  Serratia marcescens, Haemophilus influenzae,  Neisseria meningitidis, Pseudomonas aeruginosa, Candida  albicans, C.glabrata, C krusei, C parapsilosis,  C. tropicalis and the KPC resistance gene.    -  Multidrug (KPC pos) resistant organism: Nondet    -  Escherichia coli: Detec    Specimen Source: .Blood Blood-Peripheral    Organism: Blood Culture PCR    Culture Results:   Growth in aerobic and anaerobic bottles: Escherichia coli  Susceptibility to follow.    Organism Identification: Blood Culture PCR    Method Type: PCR        RADIOLOGY & ADDITIONAL STUDIES: reviewed

## 2022-09-09 NOTE — PROGRESS NOTE ADULT - PROBLEM SELECTOR PLAN 10
Not in acute exacerbation. On intermittent 2L NC at home. Follows with Dr. Derik Domínguez c/w Spiriva INH and Singulair qd    F: No IVF  N: DASH/TLC/DM diet  E: Replete lytes PRN K<4, Mg<2  P: DVT PPX: on Eliquis   C: FULL CODE  Dispo:  Cardiopulmonary Rehab

## 2022-09-09 NOTE — DIETITIAN INITIAL EVALUATION ADULT - OTHER INFO
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 Transferred to cardiac tele 9/5 to r/o endocarditis, found to be febrile and bacteremic and started on IV ABx. IRMA negative for vegetation. ID following, pending gallium scan cardiac. OMFS consulted, plan for eval/bedside extractions 9/10.      Pt seen at bedside for initial assessment-attempted to use  services, however, pt deferred interview to daughter via hospital phone; information obtained via daughter/EMR. Last documented bowel movement 9/8. Confirms NKFA. Denies change in appetite PTA; endorses 3 meals/day at home (reports following minced and moist diet at home). Reports usual body weight 215 pounds (relatively consistent with dosing weight 216 pounds). B/L ankle, leg edema 2+. No pressure ulcers documented at this time. incision per chart. Quique score=20. Labs reviewed 9/9; electrolytes within normal limits at this time. Observed pt with no overt signs of muscle or fat wasting. Based on ASPEN guidelines, pt does not meet criteria for malnutrition at this time. Provided daughter with diet education regarding current diet order DASH/TLC; discussed sources of high versus low fat, types of fluids, and low sodium foods; daughter stating pt needs to have soup despite fluid restriction; RD discussed current fluid restriction and inability to provide soup at this time. Additionally, daughter reported plan to order pts meals for her when she arrives at the hospital as pt is refusing to order her own meals. No cultural, ethnic, Samaritan food preferences noted. Made aware RD remains available. RD to follow up per protocol. See nutrition recommendations below.

## 2022-09-09 NOTE — PROVIDER CONTACT NOTE (CRITICAL VALUE NOTIFICATION) - BACKGROUND
pt came in s/p dentist with fever, sob and Cp. IRMA= negative. R/o endocarditis
pt admitted for HTN urgency and BIBIANA, c/b CP and COB. of note, pt w/ TAVR and recent dental procedure w/o abx, here for r/o endocarditis

## 2022-09-09 NOTE — DIETITIAN INITIAL EVALUATION ADULT - PERTINENT LABORATORY DATA
09-09    138  |  101  |  18  ----------------------------<  107<H>  4.6   |  23  |  1.44<H>    Ca    9.1      09 Sep 2022 06:37  Mg     2.1     09-09    A1C with Estimated Average Glucose Result: 5.3 % (09-06-22 @ 05:30)  A1C with Estimated Average Glucose Result: 4.7 % (01-15-22 @ 08:01)

## 2022-09-09 NOTE — DIETITIAN INITIAL EVALUATION ADULT - ADD RECOMMEND
1. Change diet consistency to minced and moist diet and consider liberalizing diet to low sodium diet if pt continues w/ poor PO intake (monitor need for ONS) 2. Encourage pt to meet nutritional needs as able; provide assistance/encouragement as needed 3. Encourage adherence to diet education (reinforce as able) 4. Pain and bowel regimen per team 5. Will continue to assess/honor preferences as able

## 2022-09-09 NOTE — PROGRESS NOTE ADULT - PROBLEM SELECTOR PLAN 6
Crea 1.5 on admission. Baseline Creat 1.1-1.2.  - Creat 1.32 today   - PO Lasix dc'd as she remains euvolemic with up- trending Creat and elevated gradient. Will resume when appropriate   - Renally dose medications and avoid nephrotoxic agents Crea 1.5 on admission. Baseline Creat 1.1-1.2.  - Creat 1.32 -> 1.44 today   - - PO Lasix HELD as she remains with uptrending Creat and elevated gradient. Will resume likely 9/10  - D/C'ed Lisinopril given BIBIANA  - Renally dose medications and avoid nephrotoxic agents

## 2022-09-09 NOTE — PROGRESS NOTE ADULT - SUBJECTIVE AND OBJECTIVE BOX
CARDIOLOGY NP PROGRESS NOTE    Subjective:   Remainder ROS otherwise negative.    Overnight Events:     TELEMETRY:    EKG:      VITAL SIGNS:  T(C): 36.2 (09-09-22 @ 13:28), Max: 37.1 (09-09-22 @ 09:18)  HR: 58 (09-09-22 @ 10:21) (52 - 68)  BP: 179/73 (09-09-22 @ 10:21) (151/68 - 204/86)  RR: 14 (09-09-22 @ 10:21) (13 - 18)  SpO2: 97% (09-09-22 @ 10:21) (96% - 98%)  Wt(kg): --    I&O's Summary    08 Sep 2022 07:01  -  09 Sep 2022 07:00  --------------------------------------------------------  IN: 540 mL / OUT: 400 mL / NET: 140 mL    09 Sep 2022 07:01  -  09 Sep 2022 13:38  --------------------------------------------------------  IN: 250 mL / OUT: 0 mL / NET: 250 mL          PHYSICAL EXAM:    General: A/ox 3, No acute Distress  Neck: Supple, NO JVD  Cardiac: S1 S2, No M/R/G  Pulmonary: CTAB, Breathing unlabored, No Rhonchi/Rales/Wheezing  Abdomen: Soft, Non -tender, +BS x 4 quads  Extremities: No Rashes, No edema  Neuro: A/o x 3, No focal deficits          LABS:                          7.5    6.28  )-----------( 283      ( 09 Sep 2022 06:37 )             24.3                              09-09    138  |  101  |  18  ----------------------------<  107<H>  4.6   |  23  |  1.44<H>    Ca    9.1      09 Sep 2022 06:37  Mg     2.1     09-09                                CAPILLARY BLOOD GLUCOSE                Allergies:  Digox (Rash; Urticaria; Hives)  Plavix (Other (Mod to Severe))    MEDICATIONS  (STANDING):  aMIOdarone    Tablet 200 milliGRAM(s) Oral daily  amLODIPine   Tablet 10 milliGRAM(s) Oral daily  apixaban 5 milliGRAM(s) Oral every 12 hours  atorvastatin 20 milliGRAM(s) Oral at bedtime  cefTRIAXone   IVPB      folic acid 1 milliGRAM(s) Oral daily  influenza  Vaccine (HIGH DOSE) 0.7 milliLiter(s) IntraMuscular once  isosorbide   mononitrate ER Tablet (IMDUR) 30 milliGRAM(s) Oral daily  lidocaine   4% Patch 1 Patch Transdermal every 24 hours  LORazepam     Tablet 0.5 milliGRAM(s) Oral daily  melatonin 5 milliGRAM(s) Oral at bedtime  metoprolol succinate ER 50 milliGRAM(s) Oral daily  montelukast 10 milliGRAM(s) Oral daily  multivitamin 1 Tablet(s) Oral daily  pantoprazole    Tablet 40 milliGRAM(s) Oral before breakfast  simethicone 80 milliGRAM(s) Chew two times a day  tiotropium 18 MICROgram(s) Capsule 1 Capsule(s) Inhalation daily    MEDICATIONS  (PRN):  acetaminophen     Tablet .. 650 milliGRAM(s) Oral every 6 hours PRN Temp greater or equal to 38C (100.4F), Mild Pain (1 - 3)  diphenhydrAMINE 25 milliGRAM(s) Oral every 6 hours PRN Rash and/or Itching        DIAGNOSTIC TESTS:        CARDIOLOGY NP PROGRESS NOTE    Subjective: Pt seen and examined at bedside. Reports feeling high blood pressure last night, HA. MANRIQUE, chest pain and lower abd pain   Remainder ROS otherwise negative.    Overnight Events: s/p gallium scan yesterday, unrevealing    TELEMETRY: SB 50-60      VITAL SIGNS:  T(C): 36.2 (09-09-22 @ 13:28), Max: 37.1 (09-09-22 @ 09:18)  HR: 58 (09-09-22 @ 10:21) (52 - 68)  BP: 179/73 (09-09-22 @ 10:21) (151/68 - 204/86)  RR: 14 (09-09-22 @ 10:21) (13 - 18)  SpO2: 97% (09-09-22 @ 10:21) (96% - 98%)  Wt(kg): --    I&O's Summary    08 Sep 2022 07:01  -  09 Sep 2022 07:00  --------------------------------------------------------  IN: 540 mL / OUT: 400 mL / NET: 140 mL    09 Sep 2022 07:01  -  09 Sep 2022 13:38  --------------------------------------------------------  IN: 250 mL / OUT: 0 mL / NET: 250 mL          PHYSICAL EXAM:    General: A/ox 3, No acute Distress  Neck: Supple, NO JVD  Cardiac: S1 S2, No M/R/G  Pulmonary: CTAB, Breathing unlabored, No Rhonchi/Rales/Wheezing  Abdomen: Soft, Non -tender, +BS x 4 quads  Extremities: No Rashes, No edema  Neuro: A/o x 3, No focal deficits          LABS:                          7.5    6.28  )-----------( 283      ( 09 Sep 2022 06:37 )             24.3                              09-09    138  |  101  |  18  ----------------------------<  107<H>  4.6   |  23  |  1.44<H>    Ca    9.1      09 Sep 2022 06:37  Mg     2.1     09-09                                CAPILLARY BLOOD GLUCOSE                Allergies:  Digox (Rash; Urticaria; Hives)  Plavix (Other (Mod to Severe))    MEDICATIONS  (STANDING):  aMIOdarone    Tablet 200 milliGRAM(s) Oral daily  amLODIPine   Tablet 10 milliGRAM(s) Oral daily  apixaban 5 milliGRAM(s) Oral every 12 hours  atorvastatin 20 milliGRAM(s) Oral at bedtime  cefTRIAXone   IVPB      folic acid 1 milliGRAM(s) Oral daily  influenza  Vaccine (HIGH DOSE) 0.7 milliLiter(s) IntraMuscular once  isosorbide   mononitrate ER Tablet (IMDUR) 30 milliGRAM(s) Oral daily  lidocaine   4% Patch 1 Patch Transdermal every 24 hours  LORazepam     Tablet 0.5 milliGRAM(s) Oral daily  melatonin 5 milliGRAM(s) Oral at bedtime  metoprolol succinate ER 50 milliGRAM(s) Oral daily  montelukast 10 milliGRAM(s) Oral daily  multivitamin 1 Tablet(s) Oral daily  pantoprazole    Tablet 40 milliGRAM(s) Oral before breakfast  simethicone 80 milliGRAM(s) Chew two times a day  tiotropium 18 MICROgram(s) Capsule 1 Capsule(s) Inhalation daily    MEDICATIONS  (PRN):  acetaminophen     Tablet .. 650 milliGRAM(s) Oral every 6 hours PRN Temp greater or equal to 38C (100.4F), Mild Pain (1 - 3)  diphenhydrAMINE 25 milliGRAM(s) Oral every 6 hours PRN Rash and/or Itching        DIAGNOSTIC TESTS:        CARDIOLOGY NP PROGRESS NOTE    Subjective: Pt seen and examined at bedside w/ Carmen  ID # [id number]. Reports feeling high blood pressure last night, HA. MANRIQUE, chest pain and lower abd pain ongoing. Denies dizziness, lightheadedness, palpitations, fever, chills. Took Tylenol for HA overnight.  Remainder ROS otherwise negative.    Overnight Events: s/p gallium scan yesterday, unrevealing. Hypertensive 199/85 overnight.    TELEMETRY: SB 50-60      VITAL SIGNS:  T(C): 36.2 (09-09-22 @ 13:28), Max: 37.1 (09-09-22 @ 09:18)  HR: 58 (09-09-22 @ 10:21) (52 - 68)  BP: 179/73 (09-09-22 @ 10:21) (151/68 - 204/86)  RR: 14 (09-09-22 @ 10:21) (13 - 18)  SpO2: 97% (09-09-22 @ 10:21) (96% - 98%)  Wt(kg): --    I&O's Summary    08 Sep 2022 07:01  -  09 Sep 2022 07:00  --------------------------------------------------------  IN: 540 mL / OUT: 400 mL / NET: 140 mL    09 Sep 2022 07:01  -  09 Sep 2022 13:38  --------------------------------------------------------  IN: 250 mL / OUT: 0 mL / NET: 250 mL          PHYSICAL EXAM:    General: A/ox 3, obese, No acute Distress, elderly appearing female  HEENT: Supple, NO JVD. Poor dentition w/ mild redness/swelling L/R upper molar.   Cardiac: S1 S2, Grade III/VI systolic murmur at LSB   Pulmonary: CTAB, Breathing unlabored on 2L NC, No Rhonchi/Rales/Wheezing  Abdomen: Large, Soft, generalized tenderness to all quadrants on palpation, +BS x 4 quads  Extremities: No Rashes, 1-2+ hannah LE edema up to shins hannah  Neuro: A/o x 3, No focal deficits            LABS:                          7.5    6.28  )-----------( 283      ( 09 Sep 2022 06:37 )             24.3                              09-09    138  |  101  |  18  ----------------------------<  107<H>  4.6   |  23  |  1.44<H>    Ca    9.1      09 Sep 2022 06:37  Mg     2.1     09-09                                CAPILLARY BLOOD GLUCOSE           Allergies:  Digox (Rash; Urticaria; Hives)  Plavix (Other (Mod to Severe))    MEDICATIONS  (STANDING):  aMIOdarone    Tablet 200 milliGRAM(s) Oral daily  amLODIPine   Tablet 10 milliGRAM(s) Oral daily  apixaban 5 milliGRAM(s) Oral every 12 hours  atorvastatin 20 milliGRAM(s) Oral at bedtime  cefTRIAXone   IVPB      folic acid 1 milliGRAM(s) Oral daily  influenza  Vaccine (HIGH DOSE) 0.7 milliLiter(s) IntraMuscular once  isosorbide   mononitrate ER Tablet (IMDUR) 30 milliGRAM(s) Oral daily  lidocaine   4% Patch 1 Patch Transdermal every 24 hours  LORazepam     Tablet 0.5 milliGRAM(s) Oral daily  melatonin 5 milliGRAM(s) Oral at bedtime  metoprolol succinate ER 50 milliGRAM(s) Oral daily  montelukast 10 milliGRAM(s) Oral daily  multivitamin 1 Tablet(s) Oral daily  pantoprazole    Tablet 40 milliGRAM(s) Oral before breakfast  simethicone 80 milliGRAM(s) Chew two times a day  tiotropium 18 MICROgram(s) Capsule 1 Capsule(s) Inhalation daily    MEDICATIONS  (PRN):  acetaminophen     Tablet .. 650 milliGRAM(s) Oral every 6 hours PRN Temp greater or equal to 38C (100.4F), Mild Pain (1 - 3)  diphenhydrAMINE 25 milliGRAM(s) Oral every 6 hours PRN Rash and/or Itching        DIAGNOSTIC TESTS:     < from: NM Inflammatory Loc Wholebody Gallium, Single Day (09.08.22 @ 10:48) >  Procedure: The patient received an intravenous injection of 6 mCi of   gallium-67 citrate on 9/7/2022 and images of the whole body from the top   of the head to the mid thigh were obtained at about 24 hours in the   anterior and posterior projections.    Findings: There is a normal amount of activity in the liver, spleen,   kidneys, bone marrow and soft tissue.  No focal abnormal uptake is seen.    Impression: Normal whole body gallium scan.  No source of infection could   be identified on this study.    < end of copied text >

## 2022-09-09 NOTE — PROGRESS NOTE ADULT - ASSESSMENT
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 Transferred to cardiac tele 9/5 to r/o endocarditis, found to be febrile and bacteremic and started on IV ABx. IRMA negative for vegetation. ID following, pending gallium scan.  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 Transferred to cardiac tele 9/5 to r/o endocarditis, found to be febrile and bacteremic and started on IV ABx. IRMA negative for vegetation. ID following, pending gallium scan cardiac. OMFS consulted, plan for eval/bedside extractions 9/10. 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 Transferred to cardiac tele 9/5 to r/o endocarditis, found to be febrile and bacteremic and started on IV ABx. IRMA negative for vegetation. ID following, pending gallium scan cardiac results and CT maxillofacial. OMFS consulted, plan for eval/bedside extractions 9/10. 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 Transferred to cardiac tele 9/5 to r/o endocarditis, found to be febrile and bacteremic and started on IV ABx. IRMA negative for vegetation. ID following, pending gallium scan cardiac and CT maxillofacial results. OMFS consulted, will eval for possible dental extractions.

## 2022-09-09 NOTE — PROGRESS NOTE ADULT - PROBLEM SELECTOR PLAN 8
Recent [facility] admission 3/2022 for Afib RVR s/p spontaneous conversion  - tele- SB- SR HR 50s-60s   - c/w Amiodarone 200mg QD and Metoprolol Succinate 50mg   - c/w Eliquis 5mg BID. CHADsVASc 5 Recent [facility] admission 3/2022 for Afib RVR s/p spontaneous conversion. Elevated CHADsVASc 5.  - tele- SB- SR HR 50s-60s   - c/w Amiodarone 200mg QD and Metoprolol Succinate 50mg qd  - HOLD Eliquis 5mg BID pending dental extractions, last dose 9/9 AM Recent [facility] admission 3/2022 for Afib RVR s/p spontaneous conversion. Elevated CHADsVASc 5.  - tele- SB- SR HR 50s-60s   - c/w Amiodarone 200mg QD and Metoprolol Succinate 50mg qd  - HOLD Eliquis 5mg BID pending possible dental extractions, last dose 9/9 AM

## 2022-09-09 NOTE — PROGRESS NOTE ADULT - PROBLEM SELECTOR PLAN 5
Not in acute exacerbation. Euvolemic on exam.   - CTA Chest 9/4: Probable mild intersitial pulm edema & bronchitis. Stable or slightly increased multiple enlarged mediastinal & hilar nodules likely 2/2 inflammatory or infectious etiology  - Recent Echo 1/2022 w/ EF >74%, rest as above  - PO Lasix dc'd as she remains euvolemic with up- trending Creat and elevated gradient. Will resume when appropriate   - c/w Lisinopril 10mg qd and Metoprolol Succinate 50mg qd  - HF core measures. Strict I/Os, daily weights Not in acute exacerbation. Euvolemic on exam.   - CTA Chest 9/4: Probable mild intersitial pulm edema & bronchitis. Stable or slightly increased multiple enlarged mediastinal & hilar nodules likely 2/2 inflammatory or infectious etiology  - Recent Echo 1/2022 w/ EF >74%, rest as above  - PO Lasix HELD as she remains with uptrending Creat and elevated gradient. Will resume likely 9/10  - D/C Lisinopril 10mg qd given elevated crea  -C/w Metoprolol Succinate 50mg qd  - HF core measures. Strict I/Os, daily weights

## 2022-09-09 NOTE — DIETITIAN INITIAL EVALUATION ADULT - WEIGHT USED FOR CALCULATIONS
AGAPITO ANNABELLE MENDEZ    Patient Age: 45 year old    [unfilled]  MESSAGE:   Received call from patient in regards to his left foot/ankle pain. Patient states he had surgery on 8/11 of last year and he is still experiencing pain in that area as well as stiffness. Patient is scheduled for next available on 7/16 but wants to know if Dr. Reed can suggest anything prior to that. Patient requesting a call back to [phone]. Routing to team for assistance.      Next and Last Visit with Provider and Department  Last visit with Mauro Reed DPM: Visit date not found  Last visit with this department: Visit date not found    Next appt with HERI InterianoM: 7/16/2021  Next appt with this department: Visit date not found    WEIGHT AND HEIGHT:        ALLERGIES:   Allergen Reactions   • Penicillins HIVES     Current Outpatient Medications   Medication Sig   • chlorhexidine gluconate (PERIDEX) 0.12 % solution USE 15ML SWISH FOR 30 SECONDS AND SPIT BID FOR 2 WEEKS   • HYDROcodone-acetaminophen (NORCO) 7.5-325 MG per tablet TK 1 T PO  Q  6 TO 8 H PRN P   • sildenafil (VIAGRA) 100 MG tablet sildenafil 100 mg tablet   Start: 50 mg PO x1; Max: 100 mg/dose up to 1 dose/day; Info: give 0.5-4h before sexual      PHARMACY to use:           Pharmacy preference(s) on file: The requested medication(s) have been refilled.  The refill request(s) was within refill protocol.  Refill(s) were authorized by pharmacist.    CALL BACK INFO: Ok to leave response (including medical information) with family member or on ans. machine  ROUTING: Patient's physician / staff    PCP: No Pcp         INS: Payor: [employer] / Plan: OPEN ACCESS PLUS / Product Type: POS MISC   ADDRESS:  [address] [place] [id number]        IBW

## 2022-09-09 NOTE — PROGRESS NOTE ADULT - PROBLEM SELECTOR PLAN 7
Normocytic anemia. H/h 8.6/27.7 on admission. Hx of Hx Colon cancer s/p resection in 2019 (in remission) and FEROZ.  - hgb 77.9 today; no s/sx bleeding   - pending Stool OB   - Iron Panel shows AoCD. Would not give IV iron in setting of bacteremia  - maintain Active T+S, transfuse if hgb <7  - c/w Protonix 40mg PO qq Normocytic anemia. H/h 8.6/27.7 on admission. Hx of Hx Colon cancer s/p resection in 2019 (in remission) and FEROZ.  - hgb 7.5 today; no s/sx bleeding   - pending Stool OB   - Iron Panel shows AoCD. Would not give IV iron in setting of bacteremia  - maintain Active T+S, transfuse if hgb <7  - c/w Protonix 40mg PO qq

## 2022-09-09 NOTE — PROGRESS NOTE ADULT - ASSESSMENT
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 Collateral information obtained from patient via [place] : one week prior to presentation she developed frontal tooth swelling/abscess; did not have dental intervention; endorses three undrained tooth abscesses and reports she needs five teeth extracted. 9/8 bcx with E. coli of uncertain significance/source ?UTI.  - OMFS assessment given TAVR and gram positive bacteremia probably from odontogenic source; CT maxillofacial recommended by OMFS  - repeat UA  - f/u surveillance bcx 9/9; f/u E. coli susceptibility 9/8  - gallium scan (with SPECT to assess for infected TAVR)  - d/c PCN G and start ceftriaxone 2g IV q24h  - if gallium scan confirms TAVR infection, will add gentamicin to regimen for synergy     d/w Yadira Ocasio (cardiology), Jitendra (OMFS), Navdeep (nuclear medicine)    ID Team 2

## 2022-09-09 NOTE — DIETITIAN INITIAL EVALUATION ADULT - OTHER CALCULATIONS
Fluids per team. Based on Standards of Care pt >% IBW thus ideal body weight used for all calculations. Needs adjusted for advanced age.

## 2022-09-09 NOTE — PROGRESS NOTE ADULT - PROBLEM SELECTOR PLAN 9
SBP 210s on admission   - SBPs 150s-160s   - PO Lasix dc'd as she remains euvolemic with up- trending Creat and elevated gradient. Will resume when appropriate   - c/w Lisinopril 10mg qd and Metoprolol Succinate 50mg qd      #HLD   - Chol 79 TG 73 HDL 35 LDL 29   - c/w Atorvastatin 20mg QD SBP 210s on admission, hypertensive 199/85 overnight   - PO Lasix dc'd with up- trending Creat and elevated gradient. Will resume likely 9/10  - D/C'ed Lisinopril given BIBIANA on CKD  - c/w Metoprolol Succinate 50mg qd  - Start Amlodipine 10mg qd and Imdur 30mg qd    #HLD   - Chol 79 TG 73 HDL 35 LDL 29   - c/w Atorvastatin 20mg QD

## 2022-09-09 NOTE — PROGRESS NOTE ADULT - PROBLEM SELECTOR PLAN 1
Fever 100.9 rectal 9/5 a/w chills and general malaise. Also c/o RLQ abdominal pain w/ radiation R Flank. Diarrhea resolved. Remains afebrile since episode without leukocytosis   - Unclear etiology, concerning for possibly 2/2 Endocarditis (did not receive Abx prior to dental work 2 weeks ago) vs dental abscess or abdominal source   - COVID and RVP negative. UCx NGTD   - Blood Cultures 9/5: positive for Gram Cocci in Clusters, gemella species  - Blood Cultures 9/7 NGTD x 1 day. Repeat Surveillance Cultures sent this AM (9/8)   - CT Chest 9/4: Probable mild intersitial pulm edema & bronchitis. Stable or slightly increased multiple enlarged mediastinal & hilar nodules likely 2/2 inflammatory or infectious etiology  - Abdominal US 9/6: Hepatosplenomegaly. Hepatic steatosis. Since 9/4/2022, stable left adrenal nodule.  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 Flow acceleration through the aortic prosthesis. Visually there is leaflet restriction. No valvular vegetations noted. Trace aortic regurgitation. Mod MR. Mild- Mod TR  - ID following, appreciate recs.   - Benadryl 25mg PO PRN ordered as pt c/o itchiness during Vanc infusion   - IV Vanc dc'd and PCN G 4Mill U ordered as per ID recs for Q4H   - NM Gallium scan performed 9/8, follow up results   - OMFS consulted (Arvind Bishop) for dental abscess and possible source. Will call back when Gallium scan results Fever 100.9 rectal 9/5 a/w chills and general malaise. Also c/o RLQ abdominal pain w/ radiation R Flank. Diarrhea resolved. Remains afebrile since episode without leukocytosis   - Unclear etiology, concerning for possibly 2/2 Endocarditis (did not receive Abx prior to dental work 2 weeks ago) vs dental abscess or abdominal source   - COVID and RVP negative. UCx NGTD   - Blood Cultures 9/5: positive for gemella species (likely dental source)  - Blood Cultures 9/7: NGTD x2 day  - Blood Cultures 9/8: GNR E Coli in 1 set  - F/u surveillance BC 9/9  - CT Chest 9/4: Probable mild intersitial pulm edema & bronchitis. Stable or slightly increased multiple enlarged mediastinal & hilar nodules likely 2/2 inflammatory or infectious etiology  - Abdominal US 9/6: Hepatosplenomegaly. Hepatic steatosis. Since 9/4/2022, stable left adrenal nodule.  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 Flow acceleration through the aortic prosthesis. Visually there is leaflet restriction. No valvular vegetations noted. Trace aortic regurgitation. Mod MR. Mild- Mod TR  - ID following, appreciate recs.   - S/p Vancc and PCN G 4Mill U.   - Start Ceftriaxone 2g q24h per ID given E coli on BC  - NM Gallium scan whole body 9/8 w/o identifiable source of infection.   - S/p Gallium Scan cardiac 9/9, follow up results   - OMFS consulted (Arvind Bishop) for dental abscess and possible source. Will obtain CT maxillofacial 9/9, plan for bedside eval/dental extraction 9/10 Fever 100.9 rectal 9/5 a/w chills and general malaise. Also c/o RLQ abdominal pain w/ radiation R Flank. Diarrhea resolved. Remains afebrile since episode without leukocytosis   - Unclear etiology, concerning for possibly 2/2 Endocarditis (did not receive Abx prior to dental work 2 weeks ago) vs dental abscess or abdominal source   - COVID and RVP negative. UCx NGTD   - Repeat UA/Ucx 9/9  - Blood Cultures 9/5: positive for gemella species (likely dental source)  - Blood Cultures 9/7: NGTD x2 day  - Blood Cultures 9/8: GNR E Coli in 1 set  - F/u surveillance BC 9/9  - CT Chest 9/4: Probable mild intersitial pulm edema & bronchitis. Stable or slightly increased multiple enlarged mediastinal & hilar nodules likely 2/2 inflammatory or infectious etiology  - Abdominal US 9/6: Hepatosplenomegaly. Hepatic steatosis. Since 9/4/2022, stable left adrenal nodule.  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 Flow acceleration through the aortic prosthesis. Visually there is leaflet restriction. No valvular vegetations noted. Trace aortic regurgitation. Mod MR. Mild- Mod TR  - ID following, appreciate recs.   - S/p Vancc and PCN G 4Mill U.   - Start Ceftriaxone 2g q24h per ID given E coli on BC  - NM Gallium scan whole body 9/8 w/o identifiable source of infection.   - S/p Gallium Scan cardiac 9/9, follow up results   - OMFS consulted (Arvind Bishop) for dental abscess and possible source. Will obtain CT maxillofacial 9/9, plan for bedside eval/dental extraction 9/10 Fever 100.9 rectal 9/5 a/w chills and general malaise. Also c/o RLQ abdominal pain w/ radiation R Flank. Diarrhea resolved. Remains afebrile since episode without leukocytosis   - Unclear etiology, concerning for possibly 2/2 Endocarditis (did not receive Abx prior to dental work 2 weeks ago) vs dental abscess or abdominal source   - COVID and RVP negative. UCx NGTD   - Repeat UA/Ucx 9/9  - Blood Cultures 9/5: positive for gemella species (likely dental source)  - Blood Cultures 9/7: NGTD x2 day  - Blood Cultures 9/8: GNR E Coli in 1 set  - F/u surveillance BC 9/9  - CT Chest 9/4: Probable mild intersitial pulm edema & bronchitis. Stable or slightly increased multiple enlarged mediastinal & hilar nodules likely 2/2 inflammatory or infectious etiology  - Abdominal US 9/6: Hepatosplenomegaly. Hepatic steatosis. Since 9/4/2022, stable left adrenal nodule.  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 Flow acceleration through the aortic prosthesis. Visually there is leaflet restriction. No valvular vegetations noted. Trace aortic regurgitation. Mod MR. Mild- Mod TR  - ID following, appreciate recs.   - S/p Vancc and PCN G 4Mill U.   - Start Ceftriaxone 2g q24h per ID given E coli on BC  - NM Gallium scan whole body 9/8 w/o identifiable source of infection.   - S/p Gallium Scan cardiac 9/9, follow up results   - OMFS consulted (Dr Javi Ware) for dental abscess and possible source. Will obtain CT maxillofacial 9/9, and f/u timing of possible dental extraction

## 2022-09-10 LAB
-  AMPICILLIN/SULBACTAM: SIGNIFICANT CHANGE UP
-  AMPICILLIN: SIGNIFICANT CHANGE UP
-  CEFAZOLIN: SIGNIFICANT CHANGE UP
-  CEFTRIAXONE: SIGNIFICANT CHANGE UP
-  CIPROFLOXACIN: SIGNIFICANT CHANGE UP
-  ERTAPENEM: SIGNIFICANT CHANGE UP
-  GENTAMICIN: SIGNIFICANT CHANGE UP
-  PIPERACILLIN/TAZOBACTAM: SIGNIFICANT CHANGE UP
-  TOBRAMYCIN: SIGNIFICANT CHANGE UP
-  TRIMETHOPRIM/SULFAMETHOXAZOLE: SIGNIFICANT CHANGE UP
ANION GAP SERPL CALC-SCNC: 9 MMOL/L — SIGNIFICANT CHANGE UP (ref 5–17)
APTT BLD: 28.7 SEC — SIGNIFICANT CHANGE UP (ref 27.5–35.5)
BUN SERPL-MCNC: 16 MG/DL — SIGNIFICANT CHANGE UP (ref 7–23)
CALCIUM SERPL-MCNC: 9 MG/DL — SIGNIFICANT CHANGE UP (ref 8.4–10.5)
CHLORIDE SERPL-SCNC: 102 MMOL/L — SIGNIFICANT CHANGE UP (ref 96–108)
CO2 SERPL-SCNC: 25 MMOL/L — SIGNIFICANT CHANGE UP (ref 22–31)
CREAT SERPL-MCNC: 1.24 MG/DL — SIGNIFICANT CHANGE UP (ref 0.5–1.3)
CULTURE RESULTS: SIGNIFICANT CHANGE UP
EGFR: 43 ML/MIN/1.73M2 — LOW
GLUCOSE SERPL-MCNC: 95 MG/DL — SIGNIFICANT CHANGE UP (ref 70–99)
HCT VFR BLD CALC: 23.1 % — LOW (ref 34.5–45)
HCT VFR BLD CALC: 23.6 % — LOW (ref 34.5–45)
HGB BLD-MCNC: 7.1 G/DL — LOW (ref 11.5–15.5)
HGB BLD-MCNC: 7.1 G/DL — LOW (ref 11.5–15.5)
INR BLD: 1.21 — HIGH (ref 0.88–1.16)
MAGNESIUM SERPL-MCNC: 2.2 MG/DL — SIGNIFICANT CHANGE UP (ref 1.6–2.6)
MCHC RBC-ENTMCNC: 26.5 PG — LOW (ref 27–34)
MCHC RBC-ENTMCNC: 27 PG — SIGNIFICANT CHANGE UP (ref 27–34)
MCHC RBC-ENTMCNC: 30.1 GM/DL — LOW (ref 32–36)
MCHC RBC-ENTMCNC: 30.7 GM/DL — LOW (ref 32–36)
MCV RBC AUTO: 87.8 FL — SIGNIFICANT CHANGE UP (ref 80–100)
MCV RBC AUTO: 88.1 FL — SIGNIFICANT CHANGE UP (ref 80–100)
METHOD TYPE: SIGNIFICANT CHANGE UP
NRBC # BLD: 0 /100 WBCS — SIGNIFICANT CHANGE UP (ref 0–0)
NRBC # BLD: 0 /100 WBCS — SIGNIFICANT CHANGE UP (ref 0–0)
ORGANISM # SPEC MICROSCOPIC CNT: SIGNIFICANT CHANGE UP
PLATELET # BLD AUTO: 262 K/UL — SIGNIFICANT CHANGE UP (ref 150–400)
PLATELET # BLD AUTO: 275 K/UL — SIGNIFICANT CHANGE UP (ref 150–400)
POTASSIUM SERPL-MCNC: 5.1 MMOL/L — SIGNIFICANT CHANGE UP (ref 3.5–5.3)
POTASSIUM SERPL-SCNC: 5.1 MMOL/L — SIGNIFICANT CHANGE UP (ref 3.5–5.3)
PROTHROM AB SERPL-ACNC: 14.4 SEC — HIGH (ref 10.5–13.4)
RBC # BLD: 2.63 M/UL — LOW (ref 3.8–5.2)
RBC # BLD: 2.68 M/UL — LOW (ref 3.8–5.2)
RBC # FLD: 14.6 % — HIGH (ref 10.3–14.5)
RBC # FLD: 14.6 % — HIGH (ref 10.3–14.5)
SARS-COV-2 RNA SPEC QL NAA+PROBE: SIGNIFICANT CHANGE UP
SODIUM SERPL-SCNC: 136 MMOL/L — SIGNIFICANT CHANGE UP (ref 135–145)
SPECIMEN SOURCE: SIGNIFICANT CHANGE UP
WBC # BLD: 5.81 K/UL — SIGNIFICANT CHANGE UP (ref 3.8–10.5)
WBC # BLD: 7.51 K/UL — SIGNIFICANT CHANGE UP (ref 3.8–10.5)
WBC # FLD AUTO: 5.81 K/UL — SIGNIFICANT CHANGE UP (ref 3.8–10.5)
WBC # FLD AUTO: 7.51 K/UL — SIGNIFICANT CHANGE UP (ref 3.8–10.5)

## 2022-09-10 PROCEDURE — 99223 1ST HOSP IP/OBS HIGH 75: CPT

## 2022-09-10 PROCEDURE — 99233 SBSQ HOSP IP/OBS HIGH 50: CPT

## 2022-09-10 RX ORDER — FUROSEMIDE 40 MG
20 TABLET ORAL DAILY
Refills: 0 | Status: DISCONTINUED | OUTPATIENT
Start: 2022-09-10 | End: 2022-09-11

## 2022-09-10 RX ADMIN — PANTOPRAZOLE SODIUM 40 MILLIGRAM(S): 20 TABLET, DELAYED RELEASE ORAL at 06:11

## 2022-09-10 RX ADMIN — Medication 1 MILLIGRAM(S): at 13:19

## 2022-09-10 RX ADMIN — AMLODIPINE BESYLATE 10 MILLIGRAM(S): 2.5 TABLET ORAL at 06:10

## 2022-09-10 RX ADMIN — AMIODARONE HYDROCHLORIDE 200 MILLIGRAM(S): 400 TABLET ORAL at 06:11

## 2022-09-10 RX ADMIN — TIOTROPIUM BROMIDE 1 CAPSULE(S): 18 CAPSULE ORAL; RESPIRATORY (INHALATION) at 15:56

## 2022-09-10 RX ADMIN — Medication 20 MILLIGRAM(S): at 13:18

## 2022-09-10 RX ADMIN — Medication 5 MILLIGRAM(S): at 22:27

## 2022-09-10 RX ADMIN — LIDOCAINE 1 PATCH: 4 CREAM TOPICAL at 06:13

## 2022-09-10 RX ADMIN — SIMETHICONE 80 MILLIGRAM(S): 80 TABLET, CHEWABLE ORAL at 06:10

## 2022-09-10 RX ADMIN — ATORVASTATIN CALCIUM 20 MILLIGRAM(S): 80 TABLET, FILM COATED ORAL at 22:27

## 2022-09-10 RX ADMIN — Medication 1 TABLET(S): at 15:47

## 2022-09-10 RX ADMIN — MONTELUKAST 10 MILLIGRAM(S): 4 TABLET, CHEWABLE ORAL at 15:47

## 2022-09-10 RX ADMIN — Medication 0.5 MILLIGRAM(S): at 15:48

## 2022-09-10 RX ADMIN — LIDOCAINE 1 PATCH: 4 CREAM TOPICAL at 22:38

## 2022-09-10 RX ADMIN — CEFTRIAXONE 100 MILLIGRAM(S): 500 INJECTION, POWDER, FOR SOLUTION INTRAMUSCULAR; INTRAVENOUS at 13:21

## 2022-09-10 RX ADMIN — Medication 50 MILLIGRAM(S): at 06:10

## 2022-09-10 RX ADMIN — ISOSORBIDE MONONITRATE 30 MILLIGRAM(S): 60 TABLET, EXTENDED RELEASE ORAL at 15:56

## 2022-09-10 NOTE — PROGRESS NOTE ADULT - PROBLEM SELECTOR PLAN 7
Normocytic anemia. H/h 8.6/27.7 on admission. Hx of Hx Colon cancer s/p resection in 2019 (in remission) and FEROZ.  - hgb 7.1 today; no s/sx bleeding   - pending Stool OB   - Iron Panel shows AoCD. Would not give IV iron in setting of bacteremia  - maintain Active T+S, transfuse if hgb <7  - c/w Protonix 40mg PO qq

## 2022-09-10 NOTE — CONSULT NOTE ADULT - ASSESSMENT
82F Danish speaking PMHx HTN, HLD, severe AS s/p TAVR (2019, 2021), Ascending Aortic Aneurysm 4cm in 01/2022, pAFib (on Amiodarone+Eliquis), COPD (s/p 2L home O2), Colon cancer s/p resection in 2019 (in remission), FEROZ & CKD3 presenting with atypical chest pain, c/f endocarditis, now ruled out on IRMA/gallium, and E.Coli and gemella bacteremia being followed by ID and structural heart team. Medicine consulted for possible transfer to medicine.  82F Azeri speaking PMHx HTN, HLD, severe AS s/p TAVR (2019, 2021), Ascending Aortic Aneurysm 4cm in 01/2022, pAFib (on Amiodarone+Eliquis), COPD (s/p 2L home O2), Colon cancer s/p resection in 2019 (in remission), FEROZ & CKD3 presenting with atypical chest pain, c/f endocarditis, now ruled out on IRMA/gallium, and E.Coli and gemella bacteremia being followed by ID and structural heart team. Medicine consulted for possible transfer to medicine.     #Generalized weakness  Pt with decreased exercise capacity to 2 steps. Associated with chest pain and has been intermittent for the past couple f days however was worse on 9/4 prompting her to present to ED. Still complaining of chest pain and difficulty ambulating.   Troponins have been negative so far for ischemic work up. EKG appears nonischemic. Weakness could be 2/2 to dental infection ongoing.   - f/u OMFS and plan for surgery on Monday   - Will need Abx post procedure per ID recommendations  - No active further medical interventions, can stay on Cardiology service with Medicine Consult following for further guidance.     #Abdominal Pain.  Hx of Colon Ca s/p resection 2019, in remission Has abdominal pain at home per daughter. Is associated with some dairy products and vitamin/pill administration. Hepatic steatosis and hepatosplenomegaly. No further episodes of diarrhea.   - attempt to space out pills to aide with abd pain     #Anemia  Normocytic anemia with history of FEROZ and Colon Ca. Hgb 7.1.   - transfuse PRN   - can consider PO Iron supplementation in lieu of IV   - Protonix 40mg IV    #TAVR   Placed by Dr. Alejo. IRMA from 9/7 with leaflet restriction but without vegetations. Was taken for valve in valve in the past however was aborted for low gradients.     #Afib  Is on Toprol and Eliquis   - continue to hold Eliquis for OR Monday.     #HFpEF  EF 65% with grade 2 diastolic dysfunction.    - c/w Toprol 50 BID  - Lasix prn    #HTN  - C/w Toprol 50 BID, amlodipine 10mg, and Imdur 30mg   - Lasix prn    #COPD   On home 2L NC.   - try and humidify air as nares irritates  - c/w Spiriva and Singulair    Discussed with attending, Dr. Kaiser.

## 2022-09-10 NOTE — PROGRESS NOTE ADULT - PROBLEM SELECTOR PLAN 9
SBP 210s on admission, hypertensive 199/85 overnight   - PO Lasix dc'd with up- trending Creat and elevated gradient. Will resume likely 9/10  - D/C'ed Lisinopril given BIBIANA on CKD  - c/w Metoprolol Succinate 50mg qd  - Start Amlodipine 10mg qd and Imdur 30mg qd    #HLD   - Chol 79 TG 73 HDL 35 LDL 29   - c/w Atorvastatin 20mg QD SBP 210s on admission  - SBPs 110s-140s  - PO Lasix 20mg Resumed today (9/10) as Creat at baseline   - c/w Metoprolol Succinate 50mg, Amlodipine 10mg and Imdur 30mg     #HLD   - Chol 79 TG 73 HDL 35 LDL 29   - c/w Atorvastatin 20mg QD

## 2022-09-10 NOTE — PROGRESS NOTE ADULT - SUBJECTIVE AND OBJECTIVE BOX
INTERVAL HPI/OVERNIGHT EVENTS: BERYL.    CONSTITUTIONAL:  Negative fever or chills, feels well, good appetite  EYES:  Negative  blurry vision or double vision  CARDIOVASCULAR:  Negative for chest pain or palpitations  RESPIRATORY:  Negative for cough, wheezing, or SOB   GASTROINTESTINAL:  Negative for nausea, vomiting, diarrhea, constipation, or abdominal pain  GENITOURINARY:  Negative frequency, urgency or dysuria  NEUROLOGIC:  No headache, confusion, dizziness, lightheadedness      ANTIBIOTICS/RELEVANT:    MEDICATIONS  (STANDING):  aMIOdarone    Tablet 200 milliGRAM(s) Oral daily  amLODIPine   Tablet 10 milliGRAM(s) Oral daily  atorvastatin 20 milliGRAM(s) Oral at bedtime  cefTRIAXone   IVPB 2000 milliGRAM(s) IV Intermittent every 24 hours  cefTRIAXone   IVPB      folic acid 1 milliGRAM(s) Oral daily  furosemide    Tablet 20 milliGRAM(s) Oral daily  influenza  Vaccine (HIGH DOSE) 0.7 milliLiter(s) IntraMuscular once  isosorbide   mononitrate ER Tablet (IMDUR) 30 milliGRAM(s) Oral daily  lidocaine   4% Patch 1 Patch Transdermal every 24 hours  LORazepam     Tablet 0.5 milliGRAM(s) Oral daily  melatonin 5 milliGRAM(s) Oral at bedtime  metoprolol succinate ER 50 milliGRAM(s) Oral daily  montelukast 10 milliGRAM(s) Oral daily  multivitamin 1 Tablet(s) Oral daily  pantoprazole    Tablet 40 milliGRAM(s) Oral before breakfast  simethicone 80 milliGRAM(s) Chew two times a day  tiotropium 18 MICROgram(s) Capsule 1 Capsule(s) Inhalation daily    MEDICATIONS  (PRN):  acetaminophen     Tablet .. 650 milliGRAM(s) Oral every 6 hours PRN Temp greater or equal to 38C (100.4F), Mild Pain (1 - 3)        Vital Signs Last 24 Hrs  T(C): 36 (10 Sep 2022 13:33), Max: 36.4 (09 Sep 2022 22:03)  T(F): 96.8 (10 Sep 2022 13:33), Max: 97.6 (10 Sep 2022 10:30)  HR: 54 (10 Sep 2022 12:48) (49 - 60)  BP: 128/60 (10 Sep 2022 12:48) (118/57 - 150/69)  BP(mean): 86 (10 Sep 2022 12:48) (82 - 92)  RR: 16 (10 Sep 2022 12:48) (12 - 18)  SpO2: 100% (10 Sep 2022 12:48) (94% - 100%)    Parameters below as of 10 Sep 2022 12:48  Patient On (Oxygen Delivery Method): room air        PHYSICAL EXAM:  Constitutional: NAD  Eyes: TIMMY, EOMI  Ear/Nose/Throat: no oral lesion, no sinus tenderness on percussion	  Neck: no JVD, no lymphadenopathy, supple  Respiratory: CTA hannah  Cardiovascular: S1S2 RRR, no murmurs  Gastrointestinal:soft, (+) BS, no HSM  Extremities:no e/e/c  Vascular: DP Pulse:	right normal; left normal      LABS:                        7.1    5.81  )-----------( 262      ( 10 Sep 2022 06:06 )             23.1     09-10    136  |  102  |  16  ----------------------------<  95  5.1   |  25  |  1.24    Ca    9.0      10 Sep 2022 06:06  Mg     2.2     09-10      PT/INR - ( 10 Sep 2022 06:06 )   PT: 14.4 sec;   INR: 1.21          PTT - ( 10 Sep 2022 06:06 )  PTT:28.7 sec  Urinalysis Basic - ( 09 Sep 2022 15:21 )    Color: Yellow / Appearance: Clear / S.010 / pH: x  Gluc: x / Ketone: NEGATIVE  / Bili: Negative / Urobili: 0.2 E.U./dL   Blood: x / Protein: NEGATIVE mg/dL / Nitrite: NEGATIVE   Leuk Esterase: Trace / RBC: < 5 /HPF / WBC < 5 /HPF   Sq Epi: x / Non Sq Epi: 0-5 /HPF / Bacteria: Present /HPF        MICROBIOLOGY: reviewed    RADIOLOGY & ADDITIONAL STUDIES: reviewed

## 2022-09-10 NOTE — PROGRESS NOTE ADULT - PROBLEM SELECTOR PLAN 1
Fever 100.9 rectal 9/5 a/w chills and general malaise. Also c/o RLQ abdominal pain w/ radiation R Flank. Diarrhea resolved. Remains afebrile since episode without leukocytosis   - Unclear etiology, concerning for possibly 2/2 Endocarditis (did not receive Abx prior to dental work 2 weeks ago) vs dental abscess or abdominal source   - COVID and RVP negative. UCx NGTD. Repeat UA 9/9 negative   - Blood Cultures 9/5: positive for gemella species (likely dental source)  - Blood Cultures 9/7: NGTD x 3 days  - Blood Cultures 9/8: GNR E Coli in 1 set. Other set NGTD x 1 day   - Blood Cultures 9/9: NGTD x 12H   - CT Chest 9/4: Probable mild intersitial pulm edema & bronchitis. Stable or slightly increased multiple enlarged mediastinal & hilar nodules likely 2/2 inflammatory or infectious etiology  - Abdominal US 9/6: Hepatosplenomegaly. Hepatic steatosis. Since 9/4/2022, stable left adrenal nodule.  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 Flow acceleration through the aortic prosthesis. Visually there is leaflet restriction. No valvular vegetations noted. Trace aortic regurgitation. Mod MR. Mild- Mod TR  - ID following, appreciate recs.   - S/p Vancc and PCN G 4Mill U. c/w Ceftriaxone 2g q24h per ID given E coli on BC  - NM Gallium Scan whole body/cardiac 9/8:  Normal whole body gallium scan.  No source of infection could   be identified on this study. No evidence of prosthetic valve endocarditis is seen on this study.  - OMFS consulted (Dr Javi Ware) for dental abscess and possible source.   - CT Maxillofacial 9/9:Odontogenic disease, including periapical lucencies adjacent to the roots of all of the residual maxillary teeth except for the left molar, which could indicate infection-related periapical cysts or periapical abscess formation. Prior root canals of the maxillary teeth. Tooth number 4 is noted to be cracked. No gross findings of facial cellulitis.  - plan for OR 9/12/22 for teeth extraction

## 2022-09-10 NOTE — PROGRESS NOTE ADULT - PROBLEM SELECTOR PLAN 5
Not in acute exacerbation. Euvolemic on exam.   - CTA Chest 9/4: Probable mild intersitial pulm edema & bronchitis. Stable or slightly increased multiple enlarged mediastinal & hilar nodules likely 2/2 inflammatory or infectious etiology  - Recent Echo 1/2022 w/ EF >74%, rest as above  - PO Lasix 20mg Resumed today (9/10) as Creat at baseline   - c/w Metoprolol Succinate 50mg qd  - HF core measures. Strict I/Os, daily weights

## 2022-09-10 NOTE — PROGRESS NOTE ADULT - SUBJECTIVE AND OBJECTIVE BOX
CARDIOLOGY NP PROGRESS NOTE    Subjective:  Received pt awake in bed in NAD. States feeling much better. Denies CP/SOB, dizziness/diaphoresis, n/v, palpitations.  Remainder ROS otherwise negative.    Overnight Events: No acute events overnight     TELEMETRY: SB- SR HR 40s-60s        VITAL SIGNS:  T(C): 36.4 (09-10-22 @ 10:30), Max: 36.4 (09-09-22 @ 22:03)  HR: 54 (09-10-22 @ 12:48) (49 - 60)  BP: 128/60 (09-10-22 @ 12:48) (118/57 - 161/74)  RR: 16 (09-10-22 @ 12:48) (12 - 18)  SpO2: 100% (09-10-22 @ 12:48) (94% - 100%)  Wt(kg): --    I&O's Summary    09 Sep 2022 07:01  -  10 Sep 2022 07:00  --------------------------------------------------------  IN: 610 mL / OUT: 1250 mL / NET: -640 mL    10 Sep 2022 07:01  -  10 Sep 2022 13:03  --------------------------------------------------------  IN: 0 mL / OUT: 0 mL / NET: 0 mL          PHYSICAL EXAM:      General: A/ox 3, obese, No acute Distress, elderly appearing female  HEENT: Supple, NO JVD. Poor dentition w/ mild redness/swelling L/R upper molar.   Cardiac: S1 S2, (+) systolic murmur  Pulmonary: CTAB, Breathing unlabored on RA/ 2L NC, No Rhonchi/Rales/Wheezing  Abdomen: Large, Soft, generalized tenderness to all quadrants on palpation, +BS x 4 quads  Extremities: No Rashes, +1 BL LE edema   Neuro: A/o x 3, No focal deficits                  LABS:                          7.1    5.81  )-----------( 262      ( 10 Sep 2022 06:06 )             23.1                              09-10    136  |  102  |  16  ----------------------------<  95  5.1   |  25  |  1.24    Ca    9.0      10 Sep 2022 06:06  Mg     2.2     09-10                              PT/INR - ( 10 Sep 2022 06:06 )   PT: 14.4 sec;   INR: 1.21          PTT - ( 10 Sep 2022 06:06 )  PTT:28.7 sec  CAPILLARY BLOOD GLUCOSE                Allergies:  Digox (Rash; Urticaria; Hives)  Plavix (Other (Mod to Severe))    MEDICATIONS  (STANDING):  aMIOdarone    Tablet 200 milliGRAM(s) Oral daily  amLODIPine   Tablet 10 milliGRAM(s) Oral daily  atorvastatin 20 milliGRAM(s) Oral at bedtime  cefTRIAXone   IVPB 2000 milliGRAM(s) IV Intermittent every 24 hours  cefTRIAXone   IVPB      folic acid 1 milliGRAM(s) Oral daily  furosemide    Tablet 20 milliGRAM(s) Oral daily  influenza  Vaccine (HIGH DOSE) 0.7 milliLiter(s) IntraMuscular once  isosorbide   mononitrate ER Tablet (IMDUR) 30 milliGRAM(s) Oral daily  lidocaine   4% Patch 1 Patch Transdermal every 24 hours  LORazepam     Tablet 0.5 milliGRAM(s) Oral daily  melatonin 5 milliGRAM(s) Oral at bedtime  metoprolol succinate ER 50 milliGRAM(s) Oral daily  montelukast 10 milliGRAM(s) Oral daily  multivitamin 1 Tablet(s) Oral daily  pantoprazole    Tablet 40 milliGRAM(s) Oral before breakfast  simethicone 80 milliGRAM(s) Chew two times a day  tiotropium 18 MICROgram(s) Capsule 1 Capsule(s) Inhalation daily    MEDICATIONS  (PRN):  acetaminophen     Tablet .. 650 milliGRAM(s) Oral every 6 hours PRN Temp greater or equal to 38C (100.4F), Mild Pain (1 - 3)        DIAGNOSTIC TESTS:

## 2022-09-10 NOTE — PROGRESS NOTE ADULT - PROBLEM SELECTOR PLAN 8
Recent [facility] admission 3/2022 for Afib RVR s/p spontaneous conversion. Elevated CHADsVASc 5.  - tele- SB- SR HR 40s-60s   - c/w Amiodarone 200mg QD and Metoprolol Succinate 50mg qd  - HOLD Eliquis 5mg BID pending dental extractions 9/12. Last dose 9/9 AM Recent [facility] admission 3/2022 for Afib RVR s/p spontaneous conversion. Elevated CHADsVASc 5.  - tele- SB- SR HR 40s-60s   - c/w Amiodarone 200mg QD and Metoprolol Succinate 50mg qd  - continue to HOLD Eliquis 5mg BID pending dental extractions 9/12. Last dose 9/9 AM

## 2022-09-10 NOTE — PROGRESS NOTE ADULT - PROBLEM SELECTOR PLAN 6
Crea 1.5 on admission. Baseline Creat 1.1-1.2.  - Creat peaked 1.44 and is back at baseline 1.24 today (9/10)  - Renally dose medications and avoid nephrotoxic agents

## 2022-09-10 NOTE — PROGRESS NOTE ADULT - ASSESSMENT
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 Transferred to cardiac tele 9/5 to r/o endocarditis, found to be febrile and bacteremic and started on IV ABx. RIMA negative for vegetation and Gallium Scan without source. Plan OR 9/12/22 for Teeth extractions. OMFS and ID are following.

## 2022-09-10 NOTE — PROGRESS NOTE ADULT - NS ATTEND AMEND GEN_ALL_CORE FT
Initial attending contact date   9/10/22   . See PA note written above for details. I reviewed the PA documentation. I have personally seen and examined this patient. I reviewed vitals, labs, medications, cardiac studies, and additional imaging. I agree with the above PA's findings and plans as written above with the following additions/statements.    -Without active cardiac complaints  -Euvolemic on exam, satting 98% RA  -Maintaining NSR on tele  -IRMA and Gallium scan negative for endocarditis  -Plan for teeth extraction by OMFS monday  -Hold eliquis, no need for bridge at this time  -Medicine consulted for transfer  -No active cardiac issues

## 2022-09-10 NOTE — CONSULT NOTE ADULT - ATTENDING COMMENTS
Hospital course, labs, imaging reviewed.  hx of TAVR + Gemella and E.coli bacteremia -- source unknown and IRMA + gallium negative  Pending multiple teeth extractions monday by OMFS  Final reccs per ID, given gram positive bacteremia, PICC line should be placed once most recent blood cultures negative x 48h

## 2022-09-10 NOTE — PROGRESS NOTE ADULT - ASSESSMENT
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 Collateral information obtained from patient via Faroese : one week prior to presentation she developed frontal tooth swelling/abscess; did not have dental intervention; endorses three undrained tooth abscesses and reports she needs five teeth extracted. Maxillofacial CT 9/9 with odontogenic disease/periapical abscesses. 9/8 bcx with E. coli of uncertain significance/source.  - pending OR with OMFS for extractions 9/12  - IRMA and gallium SPECT without evidence of TAVR infection  - continue ceftriaxone 2g IV q24h    d/w medicine consult, cardiology, OMFS

## 2022-09-10 NOTE — CONSULT NOTE ADULT - SUBJECTIVE AND OBJECTIVE BOX
HPI:  82F Wol[place] speaking PMHx HTN, HLD, severe AS s/p TAVR (, ), Ascending Aortic Aneurysm 4cm in 2022, pAFib (on Amiodarone+Eliquis), COPD (s/p 2L home O2), Colon cancer s/p resection in 2019 (in remission), FEROZ & CKD3. She had a history of weakness with exertional capacity of half a block, after working with PT that increased to 5 blocks, but since cessation of PT her exertional capacity has deteriorated to 2 steps limited by lethargy (not CP or SOB).    In the last 3 weeks she kept a log of her home SBP that fluctuate between 140-200, associated with HA and ear pain when hypertensive. She also endorses new intermitted dull substernal CP radiating to the back that lasts 2-3days and resolve spontaneously. She presents to the ED for Subacute weakness, HTN episodes associated with HA, and new CP.    In the ED was in HTN /74 with HA which resolved w/ decreased /78. Labs significant for normocytic anemia Hgb 8.6 (baseline 11-12), BIBIANA on CKD3a Crt 1.5 (baseline 1.1-1.2). EKG & cardiac enzymes WNL. CTH WNL. She received PO Tylenol 650mg. (04 Sep 2022 05:02)    INTERVAL EVENTS:    SUBJECTIVE HPI: Patient seen and examined at bedside. Patient resting comfortably, no complaints at this time. Patient denies: fever, chills, weakness, dizziness, headaches, changes in vision, chest pain, palpitations, shortness of breath, cough, N/V, diarrhea or constipation, dysuria, LE edema. ROS otherwise negative.      PAST MEDICAL & SURGICAL HISTORY:  HTN (hypertension)  Aortic stenosis  Hyperlipidemia  COPD (chronic obstructive pulmonary disease)  GERD (gastroesophageal reflux disease)  Stage 3 chronic kidney disease  Anemia  Diastolic CHF, chronic  Obesity  Paroxysmal atrial fibrillation  S/P TAVR (transcatheter aortic valve replacement)  2019    FAMILY HISTORY:  No pertinent family history in first degree relatives    SOCIAL HISTORY:     -Cigarrettes:     -Alcohol:     -Ilicit Drug Use:    Home Medications:  amiodarone 200 mg oral tablet: 1 tab(s) orally once a day (04 Sep 2022 05:40)  apixaban 5 mg oral tablet: 1 tab(s) orally 2 times a day (24 Mar 2022 14:50)  atorvastatin 20 mg oral tablet: 1 tab(s) orally once a day (at bedtime) (24 Mar 2022 14:50)  folic acid 1 mg oral tablet: 1 tab(s) orally once a day (24 Mar 2022 14:50)  furosemide 20 mg oral tablet: 1 tab(s) orally once a day (24 Mar 2022 14:50)  lisinopril 10 mg oral tablet: 1 tab(s) orally once a day (24 Mar 2022 14:50)  LORazepam 0.5 mg oral tablet: 1 tab(s) orally once a day (22 Mar 2022 13:30)  magnesium oxide 400 mg (241.3 mg elemental magnesium) oral tablet: 1 tab(s) orally once a day (22 Mar 2022 13:30)  montelukast 10 mg oral tablet: 1 tab(s) orally once a day (04 Sep 2022 05:43)  Multiple Vitamins oral tablet: 1 tab(s) orally once a day (24 Mar 2022 14:50)  Spiriva Respimat 1.25 mcg/inh inhalation aerosol: 2 puff(s) inhaled once a day (22 Mar 2022 13:30)  Toprol-XL 50 mg oral tablet, extended release: 1 tab(s) orally once a day (04 Sep 2022 05:42)      MEDICATIONS  (STANDING):  aMIOdarone    Tablet 200 milliGRAM(s) Oral daily  amLODIPine   Tablet 10 milliGRAM(s) Oral daily  atorvastatin 20 milliGRAM(s) Oral at bedtime  cefTRIAXone   IVPB 2000 milliGRAM(s) IV Intermittent every 24 hours  cefTRIAXone   IVPB      folic acid 1 milliGRAM(s) Oral daily  furosemide    Tablet 20 milliGRAM(s) Oral daily  influenza  Vaccine (HIGH DOSE) 0.7 milliLiter(s) IntraMuscular once  isosorbide   mononitrate ER Tablet (IMDUR) 30 milliGRAM(s) Oral daily  lidocaine   4% Patch 1 Patch Transdermal every 24 hours  LORazepam     Tablet 0.5 milliGRAM(s) Oral daily  melatonin 5 milliGRAM(s) Oral at bedtime  metoprolol succinate ER 50 milliGRAM(s) Oral daily  montelukast 10 milliGRAM(s) Oral daily  multivitamin 1 Tablet(s) Oral daily  pantoprazole    Tablet 40 milliGRAM(s) Oral before breakfast  simethicone 80 milliGRAM(s) Chew two times a day  tiotropium 18 MICROgram(s) Capsule 1 Capsule(s) Inhalation daily    MEDICATIONS  (PRN):  acetaminophen     Tablet .. 650 milliGRAM(s) Oral every 6 hours PRN Temp greater or equal to 38C (100.4F), Mild Pain (1 - 3)      VITAL SIGNS:  Vital Signs Last 24 Hrs  T(C): 36.4 (10 Sep 2022 10:30), Max: 36.4 (09 Sep 2022 22:03)  T(F): 97.6 (10 Sep 2022 10:30), Max: 97.6 (10 Sep 2022 10:30)  HR: 54 (10 Sep 2022 12:48) (49 - 60)  BP: 128/60 (10 Sep 2022 12:48) (118/57 - 161/74)  BP(mean): 86 (10 Sep 2022 12:48) (82 - 106)  RR: 16 (10 Sep 2022 12:48) (12 - 18)  SpO2: 100% (10 Sep 2022 12:48) (94% - 100%)    Parameters below as of 10 Sep 2022 12:48  Patient On (Oxygen Delivery Method): room air        I&O's Detail    09 Sep 2022 07:01  -  10 Sep 2022 07:00  --------------------------------------------------------  IN:    Oral Fluid: 610 mL  Total IN: 610 mL    OUT:    Voided (mL): 1250 mL  Total OUT: 1250 mL    Total NET: -640 mL    PHYSICAL EXAM:  General: Comfortable, pleasant/anxious/agitated, Ill-appearing, well-nourished/frail/cachectic, comfortable / in distress  Neurological: AAOx3, no focal deficits  HEENT: NC/AT; EOMI, PERRL, clear conjunctiva, no nasal or oropharyngeal discharge or exudates, MMM  Neck: supple, no cervical or post-auricular lymphadenopathy  Cardiovascular: +S1/S2, no murmurs/rubs/gallops, RRR  Respiratory: CTA B/L, no diminished breath sounds, no wheezes/rales/rhonchi, no increased work of breathing or accessory muscle use  Gastrointestinal: soft, NT/ND; active BSx4 quadrants  Genitourinary: no suprapubic tenderness, no CVA tenderness  Extremities: WWP; no edema, clubbing or cyanosis  Vascular: 2+ radial, DP/PT pulses B/L  Skin: no rashes  Lines/Drains:     LABS:                        7.1    5.81  )-----------( 262      ( 10 Sep 2022 06:06 )             23.1     09-10    136  |  102  |  16  ----------------------------<  95  5.1   |  25  |  1.24    Ca    9.0      10 Sep 2022 06:06  Mg     2.2     09-10      PT/INR - ( 10 Sep 2022 06:06 )   PT: 14.4 sec;   INR: 1.21          PTT - ( 10 Sep 2022 06:06 )  PTT:28.7 sec        BNP    Urinalysis Basic - ( 09 Sep 2022 15:21 )    Color: Yellow / Appearance: Clear / S.010 / pH: x  Gluc: x / Ketone: NEGATIVE  / Bili: Negative / Urobili: 0.2 E.U./dL   Blood: x / Protein: NEGATIVE mg/dL / Nitrite: NEGATIVE   Leuk Esterase: Trace / RBC: < 5 /HPF / WBC < 5 /HPF   Sq Epi: x / Non Sq Epi: 0-5 /HPF / Bacteria: Present /HPF            Microbiology:    Culture - Blood (collected 22 @ 16:20)  Source: .Blood Blood-Peripheral  Preliminary Report (09-10-22 @ 06:01):    No growth at 12 hours    Culture - Blood (collected 22 @ 16:05)  Source: .Blood Blood-Peripheral  Preliminary Report (09-10-22 @ 06:01):    No growth at 12 hours    Urinalysis with Rflx Culture (collected 22 @ 15:21)    Culture - Blood (collected 22 @ 12:09)  Source: .Blood Blood-Peripheral  Preliminary Report (22 @ 14:00):    No growth at 1 day.    Culture - Blood (collected 22 @ 07:19)  Source: .Blood Blood-Peripheral  Gram Stain (22 @ 05:58):    Aerobic Bottle: Gram Negative Rods    Result called to and read back byJuan Francisco Lange RN (5UR)  2022 21:18:04    Anaerobic Bottle: Gram Negative Rods    Floor previously notified.    ***Blood Panel PCR results on this specimen are available    approximately 3 hours after the Gram stain result.***    Gram stain, PCR, and/or culture results may not always    correspond due to difference in methodologies.    ************************************************************    This PCR assay was performed using [employer].    The following targets are tested for: Enterococcus,    vancomycin resistant enterococci, Listeria monocytogenes,    coagulase negative staphylococci, S. aureus,    methicillin resistant S. aureus, Streptococcus agalactiae    (Group B), S. pneumoniae, S. pyogenes (Group A),    Acinetobacter baumannii, Enterobacter cloacae, E. coli,    Klebsiella oxytoca, K. pneumoniae, Proteus sp.,    Serratia marcescens, Haemophilus influenzae,    Neisseria meningitidis, Pseudomonas aeruginosa, Candida    albicans, C.glabrata, C krusei, C parapsilosis,    C. tropicalis and the KPC resistance gene.  Final Report (09-10-22 @ 08:22):    Growth in aerobic and anaerobic bottles: Escherichia coli  Organism: Escherichia coli  Blood Culture PCR (09-10-22 @ 08:22)  Organism: Blood Culture PCR (09-10-22 @ 08:22)      -  Escherichia coli: Detec      -  Multidrug (KPC pos) resistant organism: Nondet      Method Type: PCR  Organism: Escherichia coli (09-10-22 @ 08:22)      -  Ampicillin: R >16 These ampicillin results predict results for amoxicillin      -  Ampicillin/Sulbactam: I 16/8 Enterobacter, Klebsiella aerogenes, Citrobacter, and Serratia may develop resistance during prolonged therapy (3-4 days)      -  Cefazolin: I 4 Enterobacter, Klebsiella aerogenes, Citrobacter, and Serratia may develop resistance during prolonged therapy (3-4 days)      -  Ceftriaxone: S <=1 Enterobacter, Klebsiella aerogenes, Citrobacter, and Serratia may develop resistance during prolonged therapy      -  Ciprofloxacin: S <=0.25      -  Ertapenem: S <=0.5      -  Gentamicin: S <=2      -  Piperacillin/Tazobactam: S <=8      -  Tobramycin: S <=2      -  Trimethoprim/Sulfamethoxazole: R >38      Method Type: BRICE    Culture - Blood (collected 22 @ 08:07)  Source: .Blood Blood-Peripheral  Preliminary Report (09-10-22 @ 09:00):    No growth at 3 days.    Culture - Blood (collected 22 @ 08:07)  Source: .Blood Blood-Peripheral  Preliminary Report (09-10-22 @ 09:00):    No growth at 3 days.    Culture - Urine (collected 22 @ 15:16)  Source: Clean Catch None  Final Report (22 @ 08:29):    Insignificant amount of mixed growth.    Urinalysis with Rflx Culture (collected 22 @ 15:16)    Culture - Blood (collected 22 @ 10:45)  Source: .Blood Blood-Venous  Gram Stain (22 @ 17:13):    Aerobic Bottle: Gram Positive Cocci in Clusters    Result called to and read back byJuan Francisco Grady RN  2022 09:21:06    Anaerobic Bottle: Gram Positive Cocci in Clusters    Floor previously notified.    ***Blood Panel PCR results on this specimen are available    approximately 3 hours after the Gram stain result.***    Gram stain, PCR, and/or culture results may not always    correspond due to difference in methodologies.    ************************************************************    This PCR assay was performed using [employer].    The following targets are tested for: Enterococcus,    vancomycin resistant enterococci, Listeria monocytogenes,    coagulase negative staphylococci, S. aureus,    methicillin resistant S. aureus, Streptococcus agalactiae    (Group B), S. pneumoniae, S. pyogenes (Group A),    Acinetobacter baumannii, Enterobacter cloacae, E. coli,    Klebsiella oxytoca, K. pneumoniae, Proteus sp.,    Serratia marcescens, Haemophilus influenzae,    Neisseria meningitidis, Pseudomonas aeruginosa, Candida    albicans, C. glabrata, C krusei, C parapsilosis,    C. tropicalis and the KPC resistance gene.  Final Report (22 @ 15:09):    Growth in aerobic and anaerobic bottles: Gemella species (Gemella    haemolysans)  Organism: Gemella species  Blood Culture PCR (22 @ 15:09)  Organism: Blood Culture PCR (22 @ 15:09)      -  Acinetobacter baumanii: Nondet      -  Candida albicans: Nondet      -  Candida glabrata: Nondet      -  Candida krusei: Nondet      -  Candida parapsilosis: Nondet      -  Candida tropicalis: Nondet      -  Coagulase negative Staphylococcus: Nondet      -  Enterobacter cloacae complex: Nondet      -  Enterococcus species: Nondet      -  Escherichia coli: Nondet      -  Haemophilus influenzae: Nondet      -  Klebsiella oxytoca: Nondet      -  Klebsiella pneumoniae: Nondet      -  Listeria monocytogenes: Nondet      -  Methicillin resistant Staphylococcus aureus (MRSA): Nondet      -  Methicillin SENSITIVE Staphylococcus aureus (MSSA): Nondet      -  Multidrug (KPC pos) resistant organism: Nondet      -  Neisseria meningitidis: Nondet      -  Proteus species: Nondet      -  Pseudomonas aeruginosa: Nondet      -  Serratia marcescens: Nondet      -  Streptococcus agalactiae (Group B): Nondet      -  Streptococcus pneumoniae: Nondet      -  Streptococcus pyogenes (Group A): Nondet      -  Streptococcus sp. (Not Grp A, B or S pneumoniae): Nondet      -  Vancomycin resistant Enterococcus sp.: Nondet      Method Type: PCR  Organism: Gemella species (22 @ 15:09)      -  Ceftriaxone: S 0.094      -  Clindamycin: S 0.032      -  Erythromycin: S 0.25      -  Levofloxacin: S 0.125      -  Meropenem: S 0.064      -  Penicillin: I 0.5      -  Vancomycin: S 0.25      Method Type: ETEST    Culture - Blood (collected 22 @ 10:35)  Source: .Blood Blood-Venous  Gram Stain (22 @ 16:31):    Aerobic Bottle: Gram Positive Cocci in Clusters    Floor previously notified.    Anaerobic Bottle: Gram Positive Cocci in Clusters    Floor previously notified.  Final Report (22 @ 15:09):    Growth in aerobic and anaerobic bottles: Gemella species (G. haemolysans)  Organism: Gemella species (22 @ 15:09)  Organism: Gemella species (22 @ 15:09)      -  Ceftriaxone: S 0.064      -  Clindamycin: S 0.032      -  Erythromycin: S 0.25      -  Levofloxacin: S 0.125      -  Meropenem: S 0.094      -  Penicillin: I 0.5      -  Vancomycin: S 0.5      Method Type: ETEST          RADIOLOGY & ADDITIONAL STUDIES: Reviewed. HPI:  82F Chinese speaking PMHx HTN, HLD, severe AS s/p TAVR (, ), Ascending Aortic Aneurysm 4cm in 2022, pAFib (on Amiodarone+Eliquis), COPD (s/p 2L home O2), Colon cancer s/p resection in 2019 (in remission), FEROZ & CKD3. She had a history of weakness with exertional capacity of half a block, after working with PT that increased to 5 blocks, but since cessation of PT her exertional capacity has deteriorated to 2 steps limited by lethargy (not CP or SOB).    In the last 3 weeks she kept a log of her home SBP that fluctuate between 140-200, associated with HA and ear pain when hypertensive. She also endorses new intermitted dull substernal CP radiating to the back that lasts 2-3days and resolve spontaneously. She presents to the ED for Subacute weakness, HTN episodes associated with HA, and new CP.    In the ED was in HTN /74 with HA which resolved w/ decreased /78. Labs significant for normocytic anemia Hgb 8.6 (baseline 11-12), BIBIANA on CKD3a Crt 1.5 (baseline 1.1-1.2). EKG & cardiac enzymes WNL. CTH WNL. She received PO Tylenol 650mg. (04 Sep 2022 05:02)    INTERVAL EVENTS:    SUBJECTIVE HPI: Patient seen and examined at bedside. Patient resting comfortably, no complaints at this time. Patient denies: fever, chills, weakness, dizziness, headaches, changes in vision, chest pain, palpitations, shortness of breath, cough, N/V, diarrhea or constipation, dysuria, LE edema. ROS otherwise negative.      PAST MEDICAL & SURGICAL HISTORY:  HTN (hypertension)  Aortic stenosis  Hyperlipidemia  COPD (chronic obstructive pulmonary disease)  GERD (gastroesophageal reflux disease)  Stage 3 chronic kidney disease  Anemia  Diastolic CHF, chronic  Obesity  Paroxysmal atrial fibrillation  S/P TAVR (transcatheter aortic valve replacement)  2019    FAMILY HISTORY:  No pertinent family history in first degree relatives    SOCIAL HISTORY: Lives with daughter on 1st floor with a few steps to get in. Doesn't ambulate much at home but able to. Denies smoking, alcohol or recreational drugs.     -Cigarrettes: no     -Alcohol: no     -Ilicit Drug Use: no    Home Medications:  amiodarone 200 mg oral tablet: 1 tab(s) orally once a day (04 Sep 2022 05:40)  apixaban 5 mg oral tablet: 1 tab(s) orally 2 times a day (24 Mar 2022 14:50)  atorvastatin 20 mg oral tablet: 1 tab(s) orally once a day (at bedtime) (24 Mar 2022 14:50)  folic acid 1 mg oral tablet: 1 tab(s) orally once a day (24 Mar 2022 14:50)  furosemide 20 mg oral tablet: 1 tab(s) orally once a day (24 Mar 2022 14:50)  lisinopril 10 mg oral tablet: 1 tab(s) orally once a day (24 Mar 2022 14:50)  LORazepam 0.5 mg oral tablet: 1 tab(s) orally once a day (22 Mar 2022 13:30)  magnesium oxide 400 mg (241.3 mg elemental magnesium) oral tablet: 1 tab(s) orally once a day (22 Mar 2022 13:30)  montelukast 10 mg oral tablet: 1 tab(s) orally once a day (04 Sep 2022 05:43)  Multiple Vitamins oral tablet: 1 tab(s) orally once a day (24 Mar 2022 14:50)  Spiriva Respimat 1.25 mcg/inh inhalation aerosol: 2 puff(s) inhaled once a day (22 Mar 2022 13:30)  Toprol-XL 50 mg oral tablet, extended release: 1 tab(s) orally once a day (04 Sep 2022 05:42)      MEDICATIONS  (STANDING):  aMIOdarone    Tablet 200 milliGRAM(s) Oral daily  amLODIPine   Tablet 10 milliGRAM(s) Oral daily  atorvastatin 20 milliGRAM(s) Oral at bedtime  cefTRIAXone   IVPB 2000 milliGRAM(s) IV Intermittent every 24 hours  cefTRIAXone   IVPB      folic acid 1 milliGRAM(s) Oral daily  furosemide    Tablet 20 milliGRAM(s) Oral daily  influenza  Vaccine (HIGH DOSE) 0.7 milliLiter(s) IntraMuscular once  isosorbide   mononitrate ER Tablet (IMDUR) 30 milliGRAM(s) Oral daily  lidocaine   4% Patch 1 Patch Transdermal every 24 hours  LORazepam     Tablet 0.5 milliGRAM(s) Oral daily  melatonin 5 milliGRAM(s) Oral at bedtime  metoprolol succinate ER 50 milliGRAM(s) Oral daily  montelukast 10 milliGRAM(s) Oral daily  multivitamin 1 Tablet(s) Oral daily  pantoprazole    Tablet 40 milliGRAM(s) Oral before breakfast  simethicone 80 milliGRAM(s) Chew two times a day  tiotropium 18 MICROgram(s) Capsule 1 Capsule(s) Inhalation daily    MEDICATIONS  (PRN):  acetaminophen     Tablet .. 650 milliGRAM(s) Oral every 6 hours PRN Temp greater or equal to 38C (100.4F), Mild Pain (1 - 3)      VITAL SIGNS:  Vital Signs Last 24 Hrs  T(C): 36.4 (10 Sep 2022 10:30), Max: 36.4 (09 Sep 2022 22:03)  T(F): 97.6 (10 Sep 2022 10:30), Max: 97.6 (10 Sep 2022 10:30)  HR: 54 (10 Sep 2022 12:48) (49 - 60)  BP: 128/60 (10 Sep 2022 12:48) (118/57 - 161/74)  BP(mean): 86 (10 Sep 2022 12:48) (82 - 106)  RR: 16 (10 Sep 2022 12:48) (12 - 18)  SpO2: 100% (10 Sep 2022 12:48) (94% - 100%)    Parameters below as of 10 Sep 2022 12:48  Patient On (Oxygen Delivery Method): room air        I&O's Detail    09 Sep 2022 07:01  -  10 Sep 2022 07:00  --------------------------------------------------------  IN:    Oral Fluid: 610 mL  Total IN: 610 mL    OUT:    Voided (mL): 1250 mL  Total OUT: 1250 mL    Total NET: -640 mL    PHYSICAL EXAM:  General: Comfortable, NAD obese female  Neurological: AAOx3, no focal deficits  HEENT: NC/AT;  PERRL, clear conjunctiva, poor dentition noted.  Neck: supple, no cervical or post-auricular lymphadenopathy  Cardiovascular: +S1/S2, bracycardic on exam  Respiratory: CTA B/L,decreased breath sounds at bases  Gastrointestinal: soft, NT/ND; active BSx4 quadrants  Genitourinary: no suprapubic tenderness,   Extremities: WWP; no edema, clubbing or cyanosis  Vascular: 2+ radial, DP/PT pulses B/L      LABS:                        7.1    5.81  )-----------( 262      ( 10 Sep 2022 06:06 )             23.1     09-10    136  |  102  |  16  ----------------------------<  95  5.1   |  25  |  1.24    Ca    9.0      10 Sep 2022 06:06  Mg     2.2     09-10      PT/INR - ( 10 Sep 2022 06:06 )   PT: 14.4 sec;   INR: 1.21          PTT - ( 10 Sep 2022 06:06 )  PTT:28.7 sec        BNP    Urinalysis Basic - ( 09 Sep 2022 15:21 )    Color: Yellow / Appearance: Clear / S.010 / pH: x  Gluc: x / Ketone: NEGATIVE  / Bili: Negative / Urobili: 0.2 E.U./dL   Blood: x / Protein: NEGATIVE mg/dL / Nitrite: NEGATIVE   Leuk Esterase: Trace / RBC: < 5 /HPF / WBC < 5 /HPF   Sq Epi: x / Non Sq Epi: 0-5 /HPF / Bacteria: Present /HPF            Microbiology:    Culture - Blood (collected 22 @ 16:20)  Source: .Blood Blood-Peripheral  Preliminary Report (09-10-22 @ 06:01):    No growth at 12 hours    Culture - Blood (collected 22 @ 16:05)  Source: .Blood Blood-Peripheral  Preliminary Report (09-10-22 @ 06:01):    No growth at 12 hours    Urinalysis with Rflx Culture (collected 22 @ 15:21)    Culture - Blood (collected 22 @ 12:09)  Source: .Blood Blood-Peripheral  Preliminary Report (22 @ 14:00):    No growth at 1 day.    Culture - Blood (collected 22 @ 07:19)  Source: .Blood Blood-Peripheral  Gram Stain (22 @ 05:58):    Aerobic Bottle: Gram Negative Rods    Result called to and read back byJuan Francisco Lange RN (5UR)  2022 21:18:04    Anaerobic Bottle: Gram Negative Rods    Floor previously notified.    ***Blood Panel PCR results on this specimen are available    approximately 3 hours after the Gram stain result.***    Gram stain, PCR, and/or culture results may not always    correspond due to difference in methodologies.    ************************************************************    This PCR assay was performed using [employer].    The following targets are tested for: Enterococcus,    vancomycin resistant enterococci, Listeria monocytogenes,    coagulase negative staphylococci, S. aureus,    methicillin resistant S. aureus, Streptococcus agalactiae    (Group B), S. pneumoniae, S. pyogenes (Group A),    Acinetobacter baumannii, Enterobacter cloacae, E. coli,    Klebsiella oxytoca, K. pneumoniae, Proteus sp.,    Serratia marcescens, Haemophilus influenzae,    Neisseria meningitidis, Pseudomonas aeruginosa, Candida    albicans, C.glabrata, C krusei, C parapsilosis,    C. tropicalis and the KPC resistance gene.  Final Report (09-10-22 @ 08:22):    Growth in aerobic and anaerobic bottles: Escherichia coli  Organism: Escherichia coli  Blood Culture PCR (09-10-22 @ 08:22)  Organism: Blood Culture PCR (09-10-22 @ 08:22)      -  Escherichia coli: Detec      -  Multidrug (KPC pos) resistant organism: Nondet      Method Type: PCR  Organism: Escherichia coli (09-10-22 @ 08:22)      -  Ampicillin: R >16 These ampicillin results predict results for amoxicillin      -  Ampicillin/Sulbactam: I 16/8 Enterobacter, Klebsiella aerogenes, Citrobacter, and Serratia may develop resistance during prolonged therapy (3-4 days)      -  Cefazolin: I 4 Enterobacter, Klebsiella aerogenes, Citrobacter, and Serratia may develop resistance during prolonged therapy (3-4 days)      -  Ceftriaxone: S <=1 Enterobacter, Klebsiella aerogenes, Citrobacter, and Serratia may develop resistance during prolonged therapy      -  Ciprofloxacin: S <=0.25      -  Ertapenem: S <=0.5      -  Gentamicin: S <=2      -  Piperacillin/Tazobactam: S <=8      -  Tobramycin: S <=2      -  Trimethoprim/Sulfamethoxazole: R >2/38      Method Type: BRICE    Culture - Blood (collected 22 @ 08:07)  Source: .Blood Blood-Peripheral  Preliminary Report (09-10-22 @ 09:00):    No growth at 3 days.    Culture - Blood (collected 22 @ 08:07)  Source: .Blood Blood-Peripheral  Preliminary Report (09-10-22 @ 09:00):    No growth at 3 days.    Culture - Urine (collected 22 @ 15:16)  Source: Clean Catch None  Final Report (22 @ 08:29):    Insignificant amount of mixed growth.    Urinalysis with Rflx Culture (collected 22 @ 15:16)    Culture - Blood (collected 22 @ 10:45)  Source: .Blood Blood-Venous  Gram Stain (22 @ 17:13):    Aerobic Bottle: Gram Positive Cocci in Clusters    Result called to and read back byJuan Francisco Grady RN  2022 09:21:06    Anaerobic Bottle: Gram Positive Cocci in Clusters    Floor previously notified.    ***Blood Panel PCR results on this specimen are available    approximately 3 hours after the Gram stain result.***    Gram stain, PCR, and/or culture results may not always    correspond due to difference in methodologies.    ************************************************************    This PCR assay was performed using [employer].    The following targets are tested for: Enterococcus,    vancomycin resistant enterococci, Listeria monocytogenes,    coagulase negative staphylococci, S. aureus,    methicillin resistant S. aureus, Streptococcus agalactiae    (Group B), S. pneumoniae, S. pyogenes (Group A),    Acinetobacter baumannii, Enterobacter cloacae, E. coli,    Klebsiella oxytoca, K. pneumoniae, Proteus sp.,    Serratia marcescens, Haemophilus influenzae,    Neisseria meningitidis, Pseudomonas aeruginosa, Candida    albicans, C. glabrata, C krusei, C parapsilosis,    C. tropicalis and the KPC resistance gene.  Final Report (22 @ 15:09):    Growth in aerobic and anaerobic bottles: Gemella species (Gemella    haemolysans)  Organism: Gemella species  Blood Culture PCR (22 @ 15:09)  Organism: Blood Culture PCR (22 @ 15:09)      -  Acinetobacter baumanii: Nondet      -  Candida albicans: Nondet      -  Candida glabrata: Nondet      -  Candida krusei: Nondet      -  Candida parapsilosis: Nondet      -  Candida tropicalis: Nondet      -  Coagulase negative Staphylococcus: Nondet      -  Enterobacter cloacae complex: Nondet      -  Enterococcus species: Nondet      -  Escherichia coli: Nondet      -  Haemophilus influenzae: Nondet      -  Klebsiella oxytoca: Nondet      -  Klebsiella pneumoniae: Nondet      -  Listeria monocytogenes: Nondet      -  Methicillin resistant Staphylococcus aureus (MRSA): Nondet      -  Methicillin SENSITIVE Staphylococcus aureus (MSSA): Nondet      -  Multidrug (KPC pos) resistant organism: Nondet      -  Neisseria meningitidis: Nondet      -  Proteus species: Nondet      -  Pseudomonas aeruginosa: Nondet      -  Serratia marcescens: Nondet      -  Streptococcus agalactiae (Group B): Nondet      -  Streptococcus pneumoniae: Nondet      -  Streptococcus pyogenes (Group A): Nondet      -  Streptococcus sp. (Not Grp A, B or S pneumoniae): Nondet      -  Vancomycin resistant Enterococcus sp.: Nondet      Method Type: PCR  Organism: Gemella species (22 @ 15:09)      -  Ceftriaxone: S 0.094      -  Clindamycin: S 0.032      -  Erythromycin: S 0.25      -  Levofloxacin: S 0.125      -  Meropenem: S 0.064      -  Penicillin: I 0.5      -  Vancomycin: S 0.25      Method Type: ETEST    Culture - Blood (collected 22 @ 10:35)  Source: .Blood Blood-Venous  Gram Stain (22 @ 16:31):    Aerobic Bottle: Gram Positive Cocci in Clusters    Floor previously notified.    Anaerobic Bottle: Gram Positive Cocci in Clusters    Floor previously notified.  Final Report (22 @ 15:09):    Growth in aerobic and anaerobic bottles: Gemella species (G. haemolysans)  Organism: Gemella species (22 @ 15:09)  Organism: Gemella species (22 @ 15:09)      -  Ceftriaxone: S 0.064      -  Clindamycin: S 0.032      -  Erythromycin: S 0.25      -  Levofloxacin: S 0.125      -  Meropenem: S 0.094      -  Penicillin: I 0.5      -  Vancomycin: S 0.5      Method Type: ETEST          RADIOLOGY & ADDITIONAL STUDIES: Reviewed.    CONCLUSIONS:     1. Normal left ventricular systolic function.   2. Dilated right ventricular size. Normal right ventricular systolic   function.   3. No LA/MAIK thrombus seen.   4. TAVR valve is seen in the aortic position. Flow acceleration through   the aortic prosthesis. Visually there is leaflet restriction. No valvular   vegetations noted. Trace aortic regurgitation.  5. Mitral valve appears mildly thickened and restricted. There may be   doming of the anterior mitral leaflet. Mean transmitral gradient is   3.2mmHg at 56bpm. Moderate mitral regurgitation.   6. Mild-to-moderate tricuspid regurgitation.   7. Dilatedascending aorta. There is mild to moderate non-mobile plaque   seen in the visualized portion of the descending aorta. There is mild to   moderate non-mobile plaque seen in the visualized portion of the aortic   arch.   8. Pericardial fat pad is seenanterior to the right ventricle, cannot   rule out a small pericardial effusion.   9. Compared to the previous TTE performed on 2022, IRMA is now   performed. Aortic gradients noted on transthoracic study.

## 2022-09-11 LAB
ANION GAP SERPL CALC-SCNC: 12 MMOL/L — SIGNIFICANT CHANGE UP (ref 5–17)
BUN SERPL-MCNC: 20 MG/DL — SIGNIFICANT CHANGE UP (ref 7–23)
CALCIUM SERPL-MCNC: 9.1 MG/DL — SIGNIFICANT CHANGE UP (ref 8.4–10.5)
CHLORIDE SERPL-SCNC: 100 MMOL/L — SIGNIFICANT CHANGE UP (ref 96–108)
CO2 SERPL-SCNC: 22 MMOL/L — SIGNIFICANT CHANGE UP (ref 22–31)
CREAT SERPL-MCNC: 1.41 MG/DL — HIGH (ref 0.5–1.3)
EGFR: 37 ML/MIN/1.73M2 — LOW
GLUCOSE SERPL-MCNC: 92 MG/DL — SIGNIFICANT CHANGE UP (ref 70–99)
HCT VFR BLD CALC: 25.3 % — LOW (ref 34.5–45)
HGB BLD-MCNC: 7.7 G/DL — LOW (ref 11.5–15.5)
MAGNESIUM SERPL-MCNC: 2.1 MG/DL — SIGNIFICANT CHANGE UP (ref 1.6–2.6)
MCHC RBC-ENTMCNC: 26.3 PG — LOW (ref 27–34)
MCHC RBC-ENTMCNC: 30.4 GM/DL — LOW (ref 32–36)
MCV RBC AUTO: 86.3 FL — SIGNIFICANT CHANGE UP (ref 80–100)
NRBC # BLD: 0 /100 WBCS — SIGNIFICANT CHANGE UP (ref 0–0)
PLATELET # BLD AUTO: 316 K/UL — SIGNIFICANT CHANGE UP (ref 150–400)
POTASSIUM SERPL-MCNC: 4.8 MMOL/L — SIGNIFICANT CHANGE UP (ref 3.5–5.3)
POTASSIUM SERPL-SCNC: 4.8 MMOL/L — SIGNIFICANT CHANGE UP (ref 3.5–5.3)
RBC # BLD: 2.93 M/UL — LOW (ref 3.8–5.2)
RBC # FLD: 14.6 % — HIGH (ref 10.3–14.5)
SODIUM SERPL-SCNC: 134 MMOL/L — LOW (ref 135–145)
WBC # BLD: 7.75 K/UL — SIGNIFICANT CHANGE UP (ref 3.8–10.5)
WBC # FLD AUTO: 7.75 K/UL — SIGNIFICANT CHANGE UP (ref 3.8–10.5)

## 2022-09-11 PROCEDURE — 99233 SBSQ HOSP IP/OBS HIGH 50: CPT

## 2022-09-11 RX ADMIN — CEFTRIAXONE 100 MILLIGRAM(S): 500 INJECTION, POWDER, FOR SOLUTION INTRAMUSCULAR; INTRAVENOUS at 11:06

## 2022-09-11 RX ADMIN — Medication 20 MILLIGRAM(S): at 06:26

## 2022-09-11 RX ADMIN — Medication 1 CAPSULE(S): at 14:09

## 2022-09-11 RX ADMIN — SIMETHICONE 80 MILLIGRAM(S): 80 TABLET, CHEWABLE ORAL at 17:09

## 2022-09-11 RX ADMIN — ISOSORBIDE MONONITRATE 30 MILLIGRAM(S): 60 TABLET, EXTENDED RELEASE ORAL at 11:02

## 2022-09-11 RX ADMIN — AMIODARONE HYDROCHLORIDE 200 MILLIGRAM(S): 400 TABLET ORAL at 06:24

## 2022-09-11 RX ADMIN — Medication 1 CAPSULE(S): at 15:17

## 2022-09-11 RX ADMIN — Medication 0.5 MILLIGRAM(S): at 11:02

## 2022-09-11 RX ADMIN — PANTOPRAZOLE SODIUM 40 MILLIGRAM(S): 20 TABLET, DELAYED RELEASE ORAL at 06:28

## 2022-09-11 RX ADMIN — Medication 650 MILLIGRAM(S): at 11:03

## 2022-09-11 RX ADMIN — AMLODIPINE BESYLATE 10 MILLIGRAM(S): 2.5 TABLET ORAL at 06:26

## 2022-09-11 RX ADMIN — Medication 1 TABLET(S): at 11:02

## 2022-09-11 RX ADMIN — Medication 5 MILLIGRAM(S): at 21:45

## 2022-09-11 RX ADMIN — LIDOCAINE 1 PATCH: 4 CREAM TOPICAL at 17:08

## 2022-09-11 RX ADMIN — MONTELUKAST 10 MILLIGRAM(S): 4 TABLET, CHEWABLE ORAL at 11:02

## 2022-09-11 RX ADMIN — Medication 50 MILLIGRAM(S): at 06:25

## 2022-09-11 RX ADMIN — ATORVASTATIN CALCIUM 20 MILLIGRAM(S): 80 TABLET, FILM COATED ORAL at 21:45

## 2022-09-11 RX ADMIN — TIOTROPIUM BROMIDE 1 CAPSULE(S): 18 CAPSULE ORAL; RESPIRATORY (INHALATION) at 11:11

## 2022-09-11 RX ADMIN — Medication 650 MILLIGRAM(S): at 13:01

## 2022-09-11 RX ADMIN — Medication 1 MILLIGRAM(S): at 11:06

## 2022-09-11 NOTE — PROGRESS NOTE ADULT - PROBLEM SELECTOR PLAN 10
Not in acute exacerbation. On intermittent 2L NC at home. Follows with Dr. Derik Domínguez c/w Spiriva INH and Singulair qd    F: No IVF  N:  NPO after midnight for teeth extraction   E: Replete lytes PRN K<4, Mg<2  P: DVT PPX: on Eliquis   C: FULL CODE  Dispo:  Cardiopulmonary Rehab

## 2022-09-11 NOTE — PROGRESS NOTE ADULT - SUBJECTIVE AND OBJECTIVE BOX
CARDIOLOGY NP PROGRESS NOTE    Subjective:  Received pt awake in bed in NAD. C/O HA. Denies CP/SOB, dizziness/diaphoresis, n/v, palpitations.   Remainder ROS otherwise negative.    Overnight Events:  No acute events overnight     TELEMETRY: SB-SR HR 50s-60s          VITAL SIGNS:  T(C): 36.3 (09-11-22 @ 09:09), Max: 36.6 (09-11-22 @ 02:26)  HR: 55 (09-11-22 @ 09:00) (53 - 65)  BP: 116/59 (09-11-22 @ 09:00) (116/59 - 157/69)  RR: 18 (09-11-22 @ 09:00) (14 - 18)  SpO2: 95% (09-11-22 @ 09:00) (95% - 100%)  Wt(kg): --    I&O's Summary    10 Sep 2022 07:01  -  11 Sep 2022 07:00  --------------------------------------------------------  IN: 180 mL / OUT: 2700 mL / NET: -2520 mL    11 Sep 2022 07:01  -  11 Sep 2022 10:16  --------------------------------------------------------  IN: 240 mL / OUT: 0 mL / NET: 240 mL          PHYSICAL EXAM:    General: A/ox 3, obese, No acute Distress, elderly appearing female  HEENT: Supple, NO JVD. Poor dentition w/ mild redness/swelling L/R upper molar.   Cardiac: S1 S2, (+) systolic murmur  Pulmonary: CTAB, Breathing unlabored on RA/ 2L NC, No Rhonchi/Rales/Wheezing  Abdomen: Large, Soft, generalized tenderness to all quadrants on palpation, +BS x 4 quads  Extremities: No Rashes, +1 BL LE edema   Neuro: A/o x 3, No focal deficits          LABS:                          7.7    7.75  )-----------( 316      ( 11 Sep 2022 05:30 )             25.3                              09-11    134<L>  |  100  |  20  ----------------------------<  92  4.8   |  22  |  1.41<H>    Ca    9.1      11 Sep 2022 05:30  Mg     2.1     09-11                              PT/INR - ( 10 Sep 2022 06:06 )   PT: 14.4 sec;   INR: 1.21          PTT - ( 10 Sep 2022 06:06 )  PTT:28.7 sec  CAPILLARY BLOOD GLUCOSE                Allergies:  Digox (Rash; Urticaria; Hives)  Plavix (Other (Mod to Severe))    MEDICATIONS  (STANDING):  aMIOdarone    Tablet 200 milliGRAM(s) Oral daily  amLODIPine   Tablet 10 milliGRAM(s) Oral daily  atorvastatin 20 milliGRAM(s) Oral at bedtime  cefTRIAXone   IVPB 2000 milliGRAM(s) IV Intermittent every 24 hours  cefTRIAXone   IVPB      folic acid 1 milliGRAM(s) Oral daily  furosemide    Tablet 20 milliGRAM(s) Oral daily  isosorbide   mononitrate ER Tablet (IMDUR) 30 milliGRAM(s) Oral daily  lidocaine   4% Patch 1 Patch Transdermal every 24 hours  LORazepam     Tablet 0.5 milliGRAM(s) Oral daily  melatonin 5 milliGRAM(s) Oral at bedtime  metoprolol succinate ER 50 milliGRAM(s) Oral daily  montelukast 10 milliGRAM(s) Oral daily  multivitamin 1 Tablet(s) Oral daily  pantoprazole    Tablet 40 milliGRAM(s) Oral before breakfast  simethicone 80 milliGRAM(s) Chew two times a day  tiotropium 18 MICROgram(s) Capsule 1 Capsule(s) Inhalation daily    MEDICATIONS  (PRN):  acetaminophen     Tablet .. 650 milliGRAM(s) Oral every 6 hours PRN Temp greater or equal to 38C (100.4F), Mild Pain (1 - 3)        DIAGNOSTIC TESTS:

## 2022-09-11 NOTE — PROGRESS NOTE ADULT - ASSESSMENT
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 Transferred to cardiac tele 9/5 to r/o endocarditis, found to be febrile and bacteremic and started on IV ABx. IRMA negative for vegetation and Gallium Scan without source. Plan OR 9/12/22 for Teeth extractions. OMFS and ID are following.

## 2022-09-11 NOTE — PROGRESS NOTE ADULT - PROBLEM SELECTOR PLAN 1
Fever 100.9 rectal 9/5 a/w chills and general malaise. Also c/o RLQ abdominal pain w/ radiation R Flank. Diarrhea resolved. Remains afebrile since episode without leukocytosis   - Unclear etiology, concerning for possibly 2/2 Endocarditis (did not receive Abx prior to dental work 2 weeks ago) vs dental abscess or abdominal source   - COVID and RVP negative. UCx NGTD. Repeat UA 9/9 negative   - Blood Cultures 9/5: positive for gemella species (likely dental source)  - Blood Cultures 9/7: NGTD x 4 days  - Blood Cultures 9/8: GNR E Coli in 1 set. Other set NGTD x 2 days   - Blood Cultures 9/9: NGTD x 1 day   - CT Chest 9/4: Probable mild intersitial pulm edema & bronchitis. Stable or slightly increased multiple enlarged mediastinal & hilar nodules likely 2/2 inflammatory or infectious etiology  - Abdominal US 9/6: Hepatosplenomegaly. Hepatic steatosis. Since 9/4/2022, stable left adrenal nodule.  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 Flow acceleration through the aortic prosthesis. Visually there is leaflet restriction. No valvular vegetations noted. Trace aortic regurgitation. Mod MR. Mild- Mod TR  - ID following, appreciate recs.   - S/p Vancc and PCN G 4Mill U. c/w Ceftriaxone 2g q24h per ID given E coli on BC  - NM Gallium Scan whole body/cardiac 9/8:  Normal whole body gallium scan.  No source of infection could   be identified on this study. No evidence of prosthetic valve endocarditis is seen on this study.  - OMFS consulted (Dr Javi Ware) for dental abscess and possible source.   - CT Maxillofacial 9/9:Odontogenic disease, including periapical lucencies adjacent to the roots of all of the residual maxillary teeth except for the left molar, which could indicate infection-related periapical cysts or periapical abscess formation. Prior root canals of the maxillary teeth. Tooth number 4 is noted to be cracked. No gross findings of facial cellulitis.  - plan for OR 9/12/22 for teeth extraction

## 2022-09-11 NOTE — PROGRESS NOTE ADULT - PROBLEM SELECTOR PLAN 8
Recent [facility] admission 3/2022 for Afib RVR s/p spontaneous conversion. Elevated CHADsVASc 5.  - tele- SB- SR HR 50s-60s   - c/w Amiodarone 200mg QD and Metoprolol Succinate 50mg qd  - continue to HOLD Eliquis 5mg BID pending dental extractions 9/12. Last dose 9/9 AM

## 2022-09-11 NOTE — PROGRESS NOTE ADULT - PROBLEM SELECTOR PLAN 6
Crea 1.5 on admission. Baseline Creat 1.1-1.2.  - Creat up trend 1.41 today (1.24 on 9/11)  - Renally dose medications and avoid nephrotoxic agents Crea 1.5 on admission. Baseline Creat 1.1-1.2.  - Creat up trend 1.41 today (1.24 on 9/11)  - PO Lasix 20mg resumed 9/10 and dc'd today (9/11) 2/2 up-trending Creat   - Renally dose medications and avoid nephrotoxic agents

## 2022-09-11 NOTE — PROGRESS NOTE ADULT - PROBLEM SELECTOR PLAN 5
Not in acute exacerbation. Euvolemic on exam.   - CTA Chest 9/4: Probable mild intersitial pulm edema & bronchitis. Stable or slightly increased multiple enlarged mediastinal & hilar nodules likely 2/2 inflammatory or infectious etiology  - Recent Echo 1/2022 w/ EF >74%, rest as above  - c/w Lasix 20mg and  Metoprolol Succinate 50mg qd  - HF core measures. Strict I/Os, daily weights Not in acute exacerbation. Euvolemic on exam.   - CTA Chest 9/4: Probable mild intersitial pulm edema & bronchitis. Stable or slightly increased multiple enlarged mediastinal & hilar nodules likely 2/2 inflammatory or infectious etiology  - Recent Echo 1/2022 w/ EF >74%, rest as above  - PO Lasix 20mg resumed 9/10 and dc'd today (9/11) 2/2 up-trending Creat   - c/w Metoprolol Succinate 50mg qd  - HF core measures. Strict I/Os, daily weights

## 2022-09-11 NOTE — PROGRESS NOTE ADULT - NS ATTEND AMEND GEN_ALL_CORE FT
Initial attending contact date   9/10/22   . See PA note written above for details. I reviewed the PA documentation. I have personally seen and examined this patient. I reviewed vitals, labs, medications, cardiac studies, and additional imaging. I agree with the above PA's findings and plans as written above with the following additions/statements.    -Without active cardiac complaints  -Euvolemic on exam, satting 98% RA  -Maintaining NSR on tele  -IRMA and Gallium scan negative for endocarditis  -Plan for teeth extraction by OMFS monday  -Hold eliquis, no need for bridge at this time  -Will likely need PICC vs midline given need for IV abx for 2 weeks  -Crt uptrending, hold po lasix

## 2022-09-11 NOTE — PROGRESS NOTE ADULT - PROBLEM SELECTOR PLAN 9
SBP 210s on admission  - SBPs 110s-150s  - c/w Lasix 20mg, Metoprolol Succinate 50mg, Amlodipine 10mg and Imdur 30mg     #HLD   - Chol 79 TG 73 HDL 35 LDL 29   - c/w Atorvastatin 20mg QD SBP 210s on admission  - SBPs 110s-150s  - PO Lasix 20mg resumed 9/10 and dc'd today (9/11) 2/2 up-trending Creat   - c/w  Metoprolol Succinate 50mg, Amlodipine 10mg and Imdur 30mg     #HLD   - Chol 79 TG 73 HDL 35 LDL 29   - c/w Atorvastatin 20mg QD

## 2022-09-12 ENCOUNTER — TRANSCRIPTION ENCOUNTER (OUTPATIENT)
Age: 82
End: 2022-09-12

## 2022-09-12 LAB
ANION GAP SERPL CALC-SCNC: 14 MMOL/L — SIGNIFICANT CHANGE UP (ref 5–17)
APTT BLD: 25.7 SEC — LOW (ref 27.5–35.5)
BUN SERPL-MCNC: 21 MG/DL — SIGNIFICANT CHANGE UP (ref 7–23)
CALCIUM SERPL-MCNC: 9.3 MG/DL — SIGNIFICANT CHANGE UP (ref 8.4–10.5)
CHLORIDE SERPL-SCNC: 100 MMOL/L — SIGNIFICANT CHANGE UP (ref 96–108)
CO2 SERPL-SCNC: 22 MMOL/L — SIGNIFICANT CHANGE UP (ref 22–31)
CREAT SERPL-MCNC: 1.37 MG/DL — HIGH (ref 0.5–1.3)
CULTURE RESULTS: SIGNIFICANT CHANGE UP
CULTURE RESULTS: SIGNIFICANT CHANGE UP
EGFR: 38 ML/MIN/1.73M2 — LOW
GLUCOSE SERPL-MCNC: 107 MG/DL — HIGH (ref 70–99)
HCT VFR BLD CALC: 25.7 % — LOW (ref 34.5–45)
HGB BLD-MCNC: 7.9 G/DL — LOW (ref 11.5–15.5)
INR BLD: 1.03 — SIGNIFICANT CHANGE UP (ref 0.88–1.16)
MAGNESIUM SERPL-MCNC: 2.1 MG/DL — SIGNIFICANT CHANGE UP (ref 1.6–2.6)
MCHC RBC-ENTMCNC: 26.6 PG — LOW (ref 27–34)
MCHC RBC-ENTMCNC: 30.7 GM/DL — LOW (ref 32–36)
MCV RBC AUTO: 86.5 FL — SIGNIFICANT CHANGE UP (ref 80–100)
NRBC # BLD: 0 /100 WBCS — SIGNIFICANT CHANGE UP (ref 0–0)
PLATELET # BLD AUTO: 324 K/UL — SIGNIFICANT CHANGE UP (ref 150–400)
POTASSIUM SERPL-MCNC: 4.2 MMOL/L — SIGNIFICANT CHANGE UP (ref 3.5–5.3)
POTASSIUM SERPL-SCNC: 4.2 MMOL/L — SIGNIFICANT CHANGE UP (ref 3.5–5.3)
PROTHROM AB SERPL-ACNC: 12.3 SEC — SIGNIFICANT CHANGE UP (ref 10.5–13.4)
RBC # BLD: 2.97 M/UL — LOW (ref 3.8–5.2)
RBC # FLD: 14.4 % — SIGNIFICANT CHANGE UP (ref 10.3–14.5)
SODIUM SERPL-SCNC: 136 MMOL/L — SIGNIFICANT CHANGE UP (ref 135–145)
SPECIMEN SOURCE: SIGNIFICANT CHANGE UP
SPECIMEN SOURCE: SIGNIFICANT CHANGE UP
WBC # BLD: 9.31 K/UL — SIGNIFICANT CHANGE UP (ref 3.8–10.5)
WBC # FLD AUTO: 9.31 K/UL — SIGNIFICANT CHANGE UP (ref 3.8–10.5)

## 2022-09-12 PROCEDURE — 99233 SBSQ HOSP IP/OBS HIGH 50: CPT | Mod: GC

## 2022-09-12 PROCEDURE — 99232 SBSQ HOSP IP/OBS MODERATE 35: CPT

## 2022-09-12 RX ORDER — APIXABAN 2.5 MG/1
5 TABLET, FILM COATED ORAL
Refills: 0 | Status: DISCONTINUED | OUTPATIENT
Start: 2022-09-12 | End: 2022-09-13

## 2022-09-12 RX ORDER — ACETAMINOPHEN 500 MG
650 TABLET ORAL ONCE
Refills: 0 | Status: COMPLETED | OUTPATIENT
Start: 2022-09-12 | End: 2022-09-12

## 2022-09-12 RX ADMIN — LIDOCAINE 1 PATCH: 4 CREAM TOPICAL at 17:56

## 2022-09-12 RX ADMIN — PANTOPRAZOLE SODIUM 40 MILLIGRAM(S): 20 TABLET, DELAYED RELEASE ORAL at 07:33

## 2022-09-12 RX ADMIN — AMIODARONE HYDROCHLORIDE 200 MILLIGRAM(S): 400 TABLET ORAL at 07:32

## 2022-09-12 RX ADMIN — Medication 50 MILLIGRAM(S): at 07:31

## 2022-09-12 RX ADMIN — AMLODIPINE BESYLATE 10 MILLIGRAM(S): 2.5 TABLET ORAL at 07:32

## 2022-09-12 RX ADMIN — Medication 1 MILLIGRAM(S): at 12:55

## 2022-09-12 RX ADMIN — LIDOCAINE 1 PATCH: 4 CREAM TOPICAL at 21:21

## 2022-09-12 RX ADMIN — CEFTRIAXONE 100 MILLIGRAM(S): 500 INJECTION, POWDER, FOR SOLUTION INTRAMUSCULAR; INTRAVENOUS at 09:38

## 2022-09-12 RX ADMIN — ATORVASTATIN CALCIUM 20 MILLIGRAM(S): 80 TABLET, FILM COATED ORAL at 21:28

## 2022-09-12 RX ADMIN — Medication 650 MILLIGRAM(S): at 21:28

## 2022-09-12 RX ADMIN — Medication 650 MILLIGRAM(S): at 22:20

## 2022-09-12 RX ADMIN — SIMETHICONE 80 MILLIGRAM(S): 80 TABLET, CHEWABLE ORAL at 17:56

## 2022-09-12 RX ADMIN — Medication 1 CAPSULE(S): at 10:38

## 2022-09-12 RX ADMIN — Medication 1 TABLET(S): at 12:54

## 2022-09-12 RX ADMIN — APIXABAN 5 MILLIGRAM(S): 2.5 TABLET, FILM COATED ORAL at 17:56

## 2022-09-12 RX ADMIN — Medication 650 MILLIGRAM(S): at 15:28

## 2022-09-12 RX ADMIN — MONTELUKAST 10 MILLIGRAM(S): 4 TABLET, CHEWABLE ORAL at 12:55

## 2022-09-12 RX ADMIN — Medication 1 CAPSULE(S): at 09:38

## 2022-09-12 RX ADMIN — TIOTROPIUM BROMIDE 1 CAPSULE(S): 18 CAPSULE ORAL; RESPIRATORY (INHALATION) at 12:59

## 2022-09-12 RX ADMIN — SIMETHICONE 80 MILLIGRAM(S): 80 TABLET, CHEWABLE ORAL at 07:32

## 2022-09-12 RX ADMIN — ISOSORBIDE MONONITRATE 30 MILLIGRAM(S): 60 TABLET, EXTENDED RELEASE ORAL at 12:54

## 2022-09-12 RX ADMIN — Medication 650 MILLIGRAM(S): at 16:28

## 2022-09-12 NOTE — PROGRESS NOTE ADULT - ASSESSMENT
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 Transferred to cardiac tele 9/5 to r/o endocarditis, found to be febrile and bacteremic and started on IV ABx. IRMA negative for vegetation and Gallium Scan without source.   Initial plan OR today (9/12) for teeth extraction, however patient does not want procedure for which she is now pending PICC line placement for 2 week IV abx treatment. OMFS and ID following. 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 Transferred to cardiac tele 9/5 to r/o endocarditis, found to be febrile and bacteremic and started on IV ABx. IRMA negative for vegetation and Gallium Scan without source. Initial plan OR today (9/12) for teeth extraction, however patient does not want procedure for which she is now pending PICC line placement for 2 week IV abx treatment. OMFS and ID following. Plan for BELKIS

## 2022-09-12 NOTE — PROGRESS NOTE ADULT - PROBLEM SELECTOR PLAN 8
Recent [facility] admission 3/2022 for Afib RVR s/p spontaneous conversion. Elevated CHADsVASc 5.  - tele- SB- SR HR 50s-60s   - c/w Amiodarone 200mg QD and Metoprolol Succinate 50mg qd  - Eliquis 5mg BID resumed today as no longer going for teeth extraction and ok to resume as per PICC team In anticipation for PICC

## 2022-09-12 NOTE — PROGRESS NOTE ADULT - PROBLEM SELECTOR PROBLEM 8
Acute kidney injury superimposed on CKD
Paroxysmal atrial fibrillation
Paroxysmal atrial fibrillation
Acute kidney injury superimposed on CKD
Paroxysmal atrial fibrillation
COPD (chronic obstructive pulmonary disease)
Paroxysmal atrial fibrillation
Paroxysmal atrial fibrillation
COPD (chronic obstructive pulmonary disease)
Acute kidney injury superimposed on CKD

## 2022-09-12 NOTE — PROGRESS NOTE ADULT - PROBLEM SELECTOR PROBLEM 3
Chest pain
Abdominal pain
Chest pain

## 2022-09-12 NOTE — PROGRESS NOTE ADULT - PROBLEM SELECTOR PROBLEM 1
Unstable angina pectoris
Fever
Fever
Chest pain
Unstable angina pectoris
Fever

## 2022-09-12 NOTE — CONSULT NOTE ADULT - SUBJECTIVE AND OBJECTIVE BOX
Pt to be taken to OR today for extraction of non restorable teeth in the right maxilla, however, patient refuses and will have procedure completed as an outpatient. Discussed with cardiology PA, Teresa Choe.     Javi Ware DDS, MD

## 2022-09-12 NOTE — DISCHARGE NOTE PROVIDER - NSDCCPCAREPLAN_GEN_ALL_CORE_FT
PRINCIPAL DISCHARGE DIAGNOSIS  Diagnosis: Bacteremia  Assessment and Plan of Treatment:       SECONDARY DISCHARGE DIAGNOSES  Diagnosis: Weakness  Assessment and Plan of Treatment:     Diagnosis: BIBIANA (acute kidney injury)  Assessment and Plan of Treatment:      PRINCIPAL DISCHARGE DIAGNOSIS  Diagnosis: Bacteremia  Assessment and Plan of Treatment: - When you came to the hospital you were found to be bacteremic. In other words, THERE IS BACTERIA IN YOUR BLOOD. Multiple tests were performed to determine the cause. You underwent 2 tests which showed that there IS NO INFECTION IN THE VALVE OF YOUR HEART.   - The source of the infection is due to the abscess/teeth in your mouth that need to be removed for which it was recommended that you have a tooth extraction. As discussedm you did not want the extraction in the hospital and will follow with your dentist.   - In order to clear the infection from your body, it is important that you are compliant with intravenous (through your IV) antibiotics. For this reason you had a PICC line placed so that you can receive these antibioitics for 2 weeks (LAST DAY 9/26). During these 2 weeks it is recomended that you have your teeth extracted. Please follow up with your dentist within the next week and have those teeth removed.         SECONDARY DISCHARGE DIAGNOSES  Diagnosis: BIBIANA (acute kidney injury)  Assessment and Plan of Treatment: - You have a known history of chronic kidney disease. Chronic kidney disease (CKD) is when the kidneys stop working as well as they should. When they are working normally, the kidneys filter the blood and remove waste and excess salt and water . In people with CKD, the kidneys slowly lose the ability to filter the blood. In time, the kidneys can stop working completely. That is why it is so important to keep CKD from getting worse.  - While you were in the hospital your Creatinine or kidney function was higher than normal for which your Lasix was held. You are to RESUME Lasix on ____     PRINCIPAL DISCHARGE DIAGNOSIS  Diagnosis: Bacteremia  Assessment and Plan of Treatment: - When you came to the hospital you were found to be bacteremic. In other words, THERE IS BACTERIA IN YOUR BLOOD. Multiple tests were performed to determine the cause. You underwent 2 tests which showed that there IS NO INFECTION IN THE VALVE OF YOUR HEART.   - The source of the infection is due to the abscess/teeth in your mouth that need to be removed for which it was recommended that you have a tooth extraction. As discussed, you did not want the extraction in the hospital and will follow with your dentist.   - In order to clear the infection from your body, it is important that you are compliant with intravenous (through your IV) antibiotic called Ceftriaxone 2g once a day for 2 weeks. For this reason you had a PICC line placed so that you can receive these antibioitics for 2 weeks (LAST DAY 9/26). During these 2 weeks it is recomended that you have your teeth extracted. Please follow up with your dentist within the next week and have those teeth removed.      SECONDARY DISCHARGE DIAGNOSES  Diagnosis: BIBIANA (acute kidney injury)  Assessment and Plan of Treatment: - You have a known history of chronic kidney disease. Chronic kidney disease (CKD) is when the kidneys stop working as well as they should. When they are working normally, the kidneys filter the blood and remove waste and excess salt and water . In people with CKD, the kidneys slowly lose the ability to filter the blood. In time, the kidneys can stop working completely. That is why it is so important to keep CKD from getting worse.  - While you were in the hospital your Creatinine or kidney function was higher than normal for which your Lasix and Lisinopril was held.   -Please continue to hold your Lisinopril until you follow up with your cardiologist Dr. Marte. You have a scheduled appointment with Dr. Marte on 10/31/22 at 1:20pm, however his office will call you to schedule a sooner appointment.    Diagnosis: HTN (hypertension)  Assessment and Plan of Treatment: Please HOLD Lisinopril until you speak with your cardiologist.  Please RESTART Lasix 20mg daily and START Amlodipine 10mg daily and Imdur (Isosorbide) 30mg daily as listed to keep your blood pressure controlled. For blood pressure that is too high or too low please see your doctor or go to the emergency room as necessary.    Diagnosis: Chest pain  Assessment and Plan of Treatment: You were experiencing chest pain, while you were in the hospital. You had cardiac enzymes which were negative, revealing no damage to the heart muscle, or a heart attack. You had an Echocardiogram (ultrasound of the heart) which was normal. You also had a CT of your heart which revealed no blockages in the arteries of your heart.  -Please START Imdur (Isosorbide) 30mg daily to help with chest pain  -Please CONTINUE Aspirin 81mg daily and Atorvastatin 20mg daily to prevent blockages in the arteries of your heart.  Please follow up with Dr. Marte on 10/31/22 at 1:20pm (his office will call you to schedule a sooner appointment). If you experience any worsening chest pain, palpitations, dizziness, or shortness of breath, please go to the nearest emergency room.    Diagnosis: HLD (hyperlipidemia)  Assessment and Plan of Treatment: Please continue Atorvastatin 20mg at bedtime to keep your cholesterol low. High cholesterol contributes to heart disease.     PRINCIPAL DISCHARGE DIAGNOSIS  Diagnosis: Bacteremia  Assessment and Plan of Treatment: - When you came to the hospital you were found to be bacteremic. In other words, THERE IS BACTERIA IN YOUR BLOOD. Multiple tests were performed to determine the cause. You underwent 2 tests which showed that there IS NO INFECTION IN THE VALVE OF YOUR HEART.   - The source of the infection is due to the abscess/teeth in your mouth that need to be removed for which it was recommended that you have a tooth extraction. As discussed, you did not want the extraction in the hospital and will follow with your dentist.   - In order to clear the infection from your body, it is important that you are compliant with intravenous (through your IV) antibiotic called Ceftriaxone 2g once a day for 2 weeks. For this reason you had a PICC line placed so that you can receive these antibioitics for 2 weeks (LAST DAY 9/26). During these 2 weeks it is recomended that you have your teeth extracted. Please follow up with your dentist within the next week and have those teeth removed.      SECONDARY DISCHARGE DIAGNOSES  Diagnosis: BIBIANA (acute kidney injury)  Assessment and Plan of Treatment: - You have a known history of chronic kidney disease. Chronic kidney disease (CKD) is when the kidneys stop working as well as they should. When they are working normally, the kidneys filter the blood and remove waste and excess salt and water . In people with CKD, the kidneys slowly lose the ability to filter the blood. In time, the kidneys can stop working completely. That is why it is so important to keep CKD from getting worse.  - While you were in the hospital your Creatinine or kidney function was higher than normal for which your Lasix and Lisinopril was held.   -Please continue to hold your Lisinopril until you follow up with your cardiologist Dr. Marte. You have a scheduled appointment with Dr. Marte on 10/31/22 at 1:20pm, however his office will call you to schedule a sooner appointment.    Diagnosis: HTN (hypertension)  Assessment and Plan of Treatment: Please HOLD Lisinopril until you speak with your cardiologist.  Please RESTART Lasix 20mg daily and START Amlodipine 10mg daily and Imdur (Isosorbide) 30mg daily as listed to keep your blood pressure controlled. For blood pressure that is too high or too low please see your doctor or go to the emergency room as necessary.    Diagnosis: Chest pain  Assessment and Plan of Treatment: You were experiencing chest pain, while you were in the hospital. You had cardiac enzymes which were negative, revealing no damage to the heart muscle, or a heart attack. You had an Echocardiogram (ultrasound of the heart) which was normal. You also had a CT of your heart which revealed no blockages in the arteries of your heart.  -Please START Imdur (Isosorbide) 30mg daily to help with chest pain  -Please CONTINUE Aspirin 81mg daily and Atorvastatin 20mg daily to prevent blockages in the arteries of your heart.  Please follow up with Dr. Marte on 10/31/22 at 1:20pm (his office will call you to schedule a sooner appointment). If you experience any worsening chest pain, palpitations, dizziness, or shortness of breath, please go to the nearest emergency room.    Diagnosis: HLD (hyperlipidemia)  Assessment and Plan of Treatment: Please continue Atorvastatin 20mg at bedtime to keep your cholesterol low. High cholesterol contributes to heart disease.    Diagnosis: Atrial fibrillation  Assessment and Plan of Treatment: You have a history of an abnormal heart rhythm (arrhythmia) called atrial fibrillation. Atrial fibrillation is serious condition as it affects the heart’s ability to fill with blood as it should and blood clots may form, which increases the risk for stroke.  -Please CONTINUE ELIQUIS 5MG TWICE A DAY to prevent a stroke.  -Please CONTINUE Metoprolol XL 50mg daily to keep your heart rate regular  -Please CONTINUE Amiodaromne 200mg daily to keep your heart in normal rhythm.    Diagnosis: Chronic diastolic congestive heart failure  Assessment and Plan of Treatment: You have a weak heart, also known as Congestive Heart Failure (CHF). Heart failure is a condition in which the heart does not pump or fill with blood well. As a result, the heart lags behind in its job of moving blood throughout the body. This can lead to symptoms such as swelling, trouble breathing, and feeling tired.   -Please continue Lasix (furosemide) 20mg daily to prevent fluid build up in the body.  -Avoid drinking more than 1.5L of fluid daily and maintain a low salt diet (max 2grams daily).  -Please weigh yourself daily, for any significant increases in daily weight of 2lbs/day or 5lbs/week with associated swelling in the legs or abdomen and/or shortness of breath, please call your doctor or go to the emergency room.  -Please follow up with Dr. Marte.

## 2022-09-12 NOTE — PROGRESS NOTE ADULT - PROBLEM SELECTOR PROBLEM 6
Paroxysmal atrial fibrillation
Acute kidney injury superimposed on CKD
Anemia
Anemia
Acute kidney injury superimposed on CKD
Paroxysmal atrial fibrillation
Paroxysmal atrial fibrillation
Acute kidney injury superimposed on CKD

## 2022-09-12 NOTE — PROGRESS NOTE ADULT - PROBLEM SELECTOR PLAN 5
Not in acute exacerbation. Euvolemic on exam.   - CTA Chest 9/4: Probable mild intersitial pulm edema & bronchitis. Stable or slightly increased multiple enlarged mediastinal & hilar nodules likely 2/2 inflammatory or infectious etiology  - Recent Echo 1/2022 w/ EF >74%, rest as above  - PO Lasix 20mg dc'd 2/2 up-trending Creat and remains euvolemic on exam   - c/w Metoprolol Succinate 50mg qd  - HF core measures. Strict I/Os, daily weights

## 2022-09-12 NOTE — PROGRESS NOTE ADULT - SUBJECTIVE AND OBJECTIVE BOX
CARDIOLOGY NP PROGRESS NOTE    Subjective: Received pt awake sitting up in bed in NAD. C/O HA for which Fiorcet was ordered as it has helped her. Discussed initial plan for teeth extraction today, however, patient adamant about not getting extraction. Risks discussed in length with patient for which she verbalized understanding as she would like procedure done with her own dentist but is agreeable to PICC line placement for IV ABX treatment for 2 weeks. Denies CP/SOB, dizziness/diaphoresis, n/v, palpitations.  Remainder ROS otherwise negative.    Overnight Events: No acute events overnight     TELEMETRY: SB- SR HR 50s-60s          VITAL SIGNS:  T(C): 36.4 (09-12-22 @ 09:08), Max: 36.6 (09-11-22 @ 14:11)  HR: 82 (09-12-22 @ 08:59) (50 - 82)  BP: 153/67 (09-12-22 @ 08:59) (115/59 - 195/123)  RR: 16 (09-12-22 @ 08:59) (16 - 19)  SpO2: 97% (09-12-22 @ 08:59) (97% - 98%)  Wt(kg): --    I&O's Summary    11 Sep 2022 07:01  -  12 Sep 2022 07:00  --------------------------------------------------------  IN: 480 mL / OUT: 2700 mL / NET: -2220 mL    12 Sep 2022 07:01  -  12 Sep 2022 12:19  --------------------------------------------------------  IN: 0 mL / OUT: 300 mL / NET: -300 mL          PHYSICAL EXAM:    General: A/ox 3, obese, No acute Distress, elderly appearing female  HEENT: Supple, NO JVD. Poor dentition w/ mild redness/swelling L/R upper molar.   Cardiac: S1 S2, (+) systolic murmur  Pulmonary: CTAB, Breathing unlabored on RA/ 2L NC, No Rhonchi/Rales/Wheezing  Abdomen: Large, Soft, generalized tenderness to all quadrants on palpation, +BS x 4 quads  Extremities: No Rashes, +1 BL LE edema   Neuro: A/o x 3, No focal deficits            LABS:                          7.9    9.31  )-----------( 324      ( 12 Sep 2022 08:28 )             25.7                              09-12    136  |  100  |  21  ----------------------------<  107<H>  4.2   |  22  |  1.37<H>    Ca    9.3      12 Sep 2022 08:27  Mg     2.1     09-12                              PT/INR - ( 12 Sep 2022 08:28 )   PT: 12.3 sec;   INR: 1.03          PTT - ( 12 Sep 2022 08:28 )  PTT:25.7 sec  CAPILLARY BLOOD GLUCOSE                Allergies:  Digox (Rash; Urticaria; Hives)  Plavix (Other (Mod to Severe))    MEDICATIONS  (STANDING):  aMIOdarone    Tablet 200 milliGRAM(s) Oral daily  amLODIPine   Tablet 10 milliGRAM(s) Oral daily  apixaban 5 milliGRAM(s) Oral two times a day  atorvastatin 20 milliGRAM(s) Oral at bedtime  cefTRIAXone   IVPB 2000 milliGRAM(s) IV Intermittent every 24 hours  cefTRIAXone   IVPB      folic acid 1 milliGRAM(s) Oral daily  isosorbide   mononitrate ER Tablet (IMDUR) 30 milliGRAM(s) Oral daily  lidocaine   4% Patch 1 Patch Transdermal every 24 hours  melatonin 5 milliGRAM(s) Oral at bedtime  metoprolol succinate ER 50 milliGRAM(s) Oral daily  montelukast 10 milliGRAM(s) Oral daily  multivitamin 1 Tablet(s) Oral daily  pantoprazole    Tablet 40 milliGRAM(s) Oral before breakfast  simethicone 80 milliGRAM(s) Chew two times a day  tiotropium 18 MICROgram(s) Capsule 1 Capsule(s) Inhalation daily    MEDICATIONS  (PRN):  acetaminophen     Tablet .. 650 milliGRAM(s) Oral every 6 hours PRN Temp greater or equal to 38C (100.4F), Mild Pain (1 - 3)        DIAGNOSTIC TESTS:

## 2022-09-12 NOTE — PROGRESS NOTE ADULT - PROBLEM SELECTOR PROBLEM 7
Anemia
HTN (hypertension)
HTN (hypertension)
Acute kidney injury superimposed on CKD
Anemia
HTN (hypertension)
Acute kidney injury superimposed on CKD
Anemia

## 2022-09-12 NOTE — PROGRESS NOTE ADULT - PROBLEM SELECTOR PLAN 1
Fever 100.9 rectal 9/5 a/w chills and general malaise. Also c/o RLQ abdominal pain w/ radiation R Flank. Diarrhea resolved. Remains afebrile since episode without leukocytosis   - COVID and RVP negative. UCx NGTD. Repeat UA 9/9 negative   - Blood Cultures 9/5: positive for gemella species (likely dental source)  - Blood Cultures 9/7: NGTD x 5 days  - Blood Cultures 9/8: GNR E Coli in 1 set. Other set NGTD x 3 days   - Blood Cultures 9/9: NGTD x 2 days  - CT Chest 9/4: Probable mild intersitial pulm edema & bronchitis. Stable or slightly increased multiple enlarged mediastinal & hilar nodules likely 2/2 inflammatory or infectious etiology  - Abdominal US 9/6: Hepatosplenomegaly. Hepatic steatosis. Since 9/4/2022, stable left adrenal nodule.  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 Flow acceleration through the aortic prosthesis. Visually there is leaflet restriction. No valvular vegetations noted. Trace aortic regurgitation. Mod MR. Mild- Mod TR  - ID following, appreciate recs. - *Initial plan for OR today (9/12) for teeth extraction, however, patient does not want in patient procedure for which she is now pending PICC for LT IV ABX (total 2 weeks)   - S/p Vancc and PCN G 4Mill U. c/w Ceftriaxone 2g q24h per ID given E coli on BC  - NM Gallium Scan whole body/cardiac 9/8:  Normal whole body gallium scan.  No source of infection could   be identified on this study. No evidence of prosthetic valve endocarditis is seen on this study.  - OMFS consulted (Dr Javi Ware) for dental abscess and possible source.   - CT Maxillofacial 9/9:Odontogenic disease, including periapical lucencies adjacent to the roots of all of the residual maxillary teeth except for the left molar, which could indicate infection-related periapical cysts or periapical abscess formation. Prior root canals of the maxillary teeth. Tooth number 4 is noted to be cracked. No gross findings of facial cellulitis.

## 2022-09-12 NOTE — PROGRESS NOTE ADULT - PROBLEM SELECTOR PROBLEM 5
Chronic heart failure with preserved ejection fraction (HFpEF)
Chronic heart failure with preserved ejection fraction (HFpEF)
HLD (hyperlipidemia)
HLD (hyperlipidemia)
Chronic heart failure with preserved ejection fraction (HFpEF)

## 2022-09-12 NOTE — PROGRESS NOTE ADULT - SUBJECTIVE AND OBJECTIVE BOX
INTERVAL HPI/OVERNIGHT EVENTS: Per discussion with cardiology team, patient declining extractions/dental source control which was arranged for today.    CONSTITUTIONAL:  Negative fever or chills, feels well, good appetite  EYES:  Negative  blurry vision or double vision  CARDIOVASCULAR:  Negative for chest pain or palpitations  RESPIRATORY:  Negative for cough, wheezing, or SOB   GASTROINTESTINAL:  Negative for nausea, vomiting, diarrhea, constipation, or abdominal pain  GENITOURINARY:  Negative frequency, urgency or dysuria  NEUROLOGIC:  No headache, confusion, dizziness, lightheadedness      ANTIBIOTICS/RELEVANT:    MEDICATIONS  (STANDING):  aMIOdarone    Tablet 200 milliGRAM(s) Oral daily  amLODIPine   Tablet 10 milliGRAM(s) Oral daily  apixaban 5 milliGRAM(s) Oral two times a day  atorvastatin 20 milliGRAM(s) Oral at bedtime  cefTRIAXone   IVPB 2000 milliGRAM(s) IV Intermittent every 24 hours  cefTRIAXone   IVPB      folic acid 1 milliGRAM(s) Oral daily  isosorbide   mononitrate ER Tablet (IMDUR) 30 milliGRAM(s) Oral daily  lidocaine   4% Patch 1 Patch Transdermal every 24 hours  melatonin 5 milliGRAM(s) Oral at bedtime  metoprolol succinate ER 50 milliGRAM(s) Oral daily  montelukast 10 milliGRAM(s) Oral daily  multivitamin 1 Tablet(s) Oral daily  pantoprazole    Tablet 40 milliGRAM(s) Oral before breakfast  simethicone 80 milliGRAM(s) Chew two times a day  tiotropium 18 MICROgram(s) Capsule 1 Capsule(s) Inhalation daily    MEDICATIONS  (PRN):  acetaminophen     Tablet .. 650 milliGRAM(s) Oral every 6 hours PRN Temp greater or equal to 38C (100.4F), Mild Pain (1 - 3)        Vital Signs Last 24 Hrs  T(C): 36.3 (12 Sep 2022 13:08), Max: 36.6 (11 Sep 2022 22:01)  T(F): 97.4 (12 Sep 2022 13:08), Max: 97.9 (11 Sep 2022 22:01)  HR: 58 (12 Sep 2022 12:52) (50 - 82)  BP: 154/69 (12 Sep 2022 12:52) (115/59 - 195/123)  BP(mean): 99 (12 Sep 2022 12:52) (82 - 99)  RR: 16 (12 Sep 2022 12:52) (16 - 19)  SpO2: 96% (12 Sep 2022 12:52) (96% - 98%)    Parameters below as of 12 Sep 2022 12:52  Patient On (Oxygen Delivery Method): room air        PHYSICAL EXAM:  Constitutional: NAD  Eyes: TIMMY, EOMI  Ear/Nose/Throat: no oral lesion, no sinus tenderness on percussion	  Neck: no JVD, no lymphadenopathy, supple  Respiratory: CTA hannah  Cardiovascular: S1S2 RRR, no murmurs  Gastrointestinal:soft, (+) BS, no HSM  Extremities:no e/e/c  Vascular: DP Pulse:	right normal; left normal      LABS:                        7.9    9.31  )-----------( 324      ( 12 Sep 2022 08:28 )             25.7     09-12    136  |  100  |  21  ----------------------------<  107<H>  4.2   |  22  |  1.37<H>    Ca    9.3      12 Sep 2022 08:27  Mg     2.1     09-12      PT/INR - ( 12 Sep 2022 08:28 )   PT: 12.3 sec;   INR: 1.03          PTT - ( 12 Sep 2022 08:28 )  PTT:25.7 sec      MICROBIOLOGY: reviewed    RADIOLOGY & ADDITIONAL STUDIES: reviewed

## 2022-09-12 NOTE — CONSULT NOTE ADULT - REASON FOR ADMISSION
Admitted for Weakness & CP evaluation, and PT.

## 2022-09-12 NOTE — PROGRESS NOTE ADULT - PROBLEM SELECTOR PROBLEM 10
COPD (chronic obstructive pulmonary disease)
Anemia
Anemia
COPD (chronic obstructive pulmonary disease)
Healthcare maintenance
Healthcare maintenance
Anemia
COPD (chronic obstructive pulmonary disease)

## 2022-09-12 NOTE — PROGRESS NOTE ADULT - PROBLEM SELECTOR PLAN 6
Crea 1.5 on admission. Baseline Creat 1.1-1.2.  - Creat 1.37 today (1.41 on 9/11)   - PO Lasix 20mg dc'd 2/2 up-trending Creat and remains euvolemic on exam   - Renally dose medications and avoid nephrotoxic agents

## 2022-09-12 NOTE — DISCHARGE NOTE PROVIDER - ATTENDING ATTESTATION STATEMENT
I have personally seen and examined the patient. I have collaborated with and supervised the
Dr. Mullen

## 2022-09-12 NOTE — DISCHARGE NOTE PROVIDER - HOSPITAL COURSE
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 In ED, hypertensive SBPs 170s, D-dimer neg, trop neg x 3, EKG non-ischemic. CTH unremarkable. atient with persistent headache and chest pain during hospitalization. Cardiology consulted. Started NTG SL PRN for chest pain, w/ minimal relief. CT Chest stable thoracic aneurysm 4.2cm. Febrile 100.9 rectally 9/5 AM, also c/o fatigue, RLQ pain and diarrhea x2 episodes. Blood cultures, UA sent, RVP ordered. Patient transferred to cardiac tele for further management for r/o endocarditis.       While on 5U, ID consulted as patient noted to be bacteremic (BC 9/5 positive for gemella species). She underwent IRMA 9/6 s/f TAVR valve is seen in the aortic position. Flow acceleration through the aortic prosthesis. Visually there is leaflet restriction. No valvular vegetations noted. Trace aortic regurgitation. Mod MR. Mild- Mod TR           Fever 100.9 rectal 9/5 a/w chills and general malaise. Also c/o RLQ abdominal pain w/ radiation R Flank. Diarrhea resolved. Remains afebrile since episode without leukocytosis   - COVID and RVP negative. UCx NGTD. Repeat UA 9/9 negative   - Blood Cultures 9/5: positive for gemella species (likely dental source)  - Blood Cultures 9/7: NGTD x 5 days  - Blood Cultures 9/8: GNR E Coli in 1 set. Other set NGTD x 3 days   - Blood Cultures 9/9: NGTD x 2 days  - CT Chest 9/4: Probable mild intersitial pulm edema & bronchitis. Stable or slightly increased multiple enlarged mediastinal & hilar nodules likely 2/2 inflammatory or infectious etiology  - Abdominal US 9/6: Hepatosplenomegaly. Hepatic steatosis. Since 9/4/2022, stable left adrenal nodule.  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 Flow acceleration through the aortic prosthesis. Visually there is leaflet restriction. No valvular vegetations noted. Trace aortic regurgitation. Mod MR. Mild- Mod TR  - ID following, appreciate recs. - *Initial plan for OR today (9/12) for teeth extraction, however, patient does not want in patient procedure for which she is now pending PICC for LT IV ABX (total 2 weeks)   - S/p Vancc and PCN G 4Mill U. c/w Ceftriaxone 2g q24h per ID given E coli on BC  - NM Gallium Scan whole body/cardiac 9/8:  Normal whole body gallium scan.  No source of infection could   be identified on this study. No evidence of prosthetic valve endocarditis is seen on this study.  - OMFS consulted (Dr Javi Ware) for dental abscess and possible source.   - CT Maxillofacial 9/9:Odontogenic disease, including periapical lucencies adjacent to the roots of all of the residual maxillary teeth except for the left molar, which could indicate infection-related periapical cysts or periapical abscess formation. Prior root canals of the maxillary teeth. Tooth number 4 is noted to be cracked. No gross findings of facial cellulitis.     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 In ED, hypertensive SBPs 170s, D-dimer neg, trop neg x 3, EKG non-ischemic. CTH unremarkable. atient with persistent headache and chest pain during hospitalization. Cardiology consulted. Started NTG SL PRN for chest pain, w/ minimal relief. CT Chest stable thoracic aneurysm 4.2cm. Febrile 100.9 rectally 9/5 AM, also c/o fatigue, RLQ pain and diarrhea x2 episodes. Blood cultures, UA sent, RVP ordered. Patient transferred to cardiac tele for further management for r/o endocarditis.     While on 5U, ID consulted as patient noted to be bacteremic (BC 9/5 positive for gemella species) and she was started on IV abx. She underwent IRMA 9/6 s/f TAVR valve is seen in the aortic position. Flow acceleration through the aortic prosthesis. Visually there is leaflet restriction. No valvular vegetations noted. Trace aortic regurgitation. Mod MR. Mild- Mod TR. Subsequently NM Gallium Scan whole body/cardiac performed 9/8 which showed   Normal whole body gallium scan.  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 As d/w ID, etiology likely 2/2 dental source for which OMFS consulted and CT Maxillofacial performed s/f Odontogenic disease, including periapical lucencies adjacent to the roots of all of the residual maxillary teeth except for the left molar, which could indicate infection-related periapical cysts or periapical abscess formation. Prior root canals of the maxillary teeth. Tooth number 4 is noted to be cracked. No gross findings of facial cellulitis.    Of note, subsequent blood culture 9/8 s/f GNR E Coli for which the source of bacteremia infected molar for which ID recommended teeth extraction FOR SOURCE CONTROL (Repeat Surveillance BC 9/9 NGTD x 2 days). Initial plan for teeth extraction on 9/12, however patient  adamant about not getting extraction. Risks discussed in length with patient for which she verbalized understanding as she would like procedure done with her own dentist and agreed to PICC line placement for IV Abx treatment x 2 weeks (9/26) with understanding that would need teeth extraction within that time period. 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 In ED, hypertensive SBPs 170s, D-dimer neg, trop neg x 3, EKG non-ischemic. CTH unremarkable. atient with persistent headache and chest pain during hospitalization. Cardiology consulted. Started NTG SL PRN for chest pain, w/ minimal relief. CT Chest stable thoracic aneurysm 4.2cm. Febrile 100.9 rectally 9/5 AM, also c/o fatigue, RLQ pain and diarrhea x2 episodes. Blood cultures, UA sent, RVP ordered. Patient transferred to cardiac tele for further management for r/o endocarditis.     While on 5U, ID consulted as patient noted to be bacteremic (BC 9/5 positive for gemella species) and she was started on IV abx. She underwent IRMA 9/6 s/f TAVR valve is seen in the aortic position. Flow acceleration through the aortic prosthesis. Visually there is leaflet restriction. No valvular vegetations noted. Trace aortic regurgitation. Mod MR. Mild- Mod TR. Subsequently NM Gallium Scan whole body/cardiac performed 9/8 which showed   Normal whole body gallium scan.  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 As d/w ID, etiology likely 2/2 dental source for which OMFS consulted and CT Maxillofacial performed s/f Odontogenic disease, including periapical lucencies adjacent to the roots of all of the residual maxillary teeth except for the left molar, which could indicate infection-related periapical cysts or periapical abscess formation. Prior root canals of the maxillary teeth. Tooth number 4 is noted to be cracked. No gross findings of facial cellulitis.    Of note, subsequent blood culture 9/8 s/f GNR E Coli for which the source of bacteremia infected molar for which ID recommended teeth extraction FOR SOURCE CONTROL (Repeat Surveillance BC 9/9 NGTD x 2 days). Initial plan for teeth extraction on 9/12, however patient  adamant about not getting extraction. Risks discussed in length with patient for which she verbalized understanding as she would like procedure done with her own dentist and agreed to PICC line placement for IV Abx treatment x 2 weeks (9/26) with understanding that would need teeth extraction within that time period. 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 In ED, hypertensive SBPs 170s, D-dimer neg, trop neg x 3, EKG non-ischemic. CTH unremarkable. Patient with persistent headache and chest pain during hospitalization. Cardiology consulted. Started NTG SL PRN for chest pain, w/ minimal relief. CT Chest stable thoracic aneurysm 4.2cm. Febrile 100.9 rectally 9/5 AM, also c/o fatigue, RLQ pain and diarrhea x2 episodes. Blood cultures, UA sent, RVP ordered. Patient transferred to cardiac tele for further management for r/o endocarditis.     While on 5U, ID consulted as patient noted to be bacteremic (BC 9/5 positive for gemella species) and she was started on IV abx. She underwent IRMA 9/6 s/f TAVR valve is seen in the aortic position (flow acceleration through the aortic prosthesis, visually there is leaflet restriction), No valvular vegetations noted, Trace AR, Mod MR, Mild- Mod TR. Subsequently NM Gallium Scan whole body/cardiac performed 9/8 which showed Normal whole body gallium scan. 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 As d/w ID, etiology likely 2/2 dental source for which OMFS consulted and CT Maxillofacial performed s/f Odontogenic disease, including periapical lucencies adjacent to the roots of all of the residual maxillary teeth except for the left molar, which could indicate infection-related periapical cysts or periapical abscess formation. Prior root canals of the maxillary teeth. Tooth number 4 is noted to be cracked. No gross findings of facial cellulitis.    Of note, subsequent blood culture 9/8 s/f GNR E Coli for which the source of bacteremia infected molar for which ID recommended teeth extraction FOR SOURCE CONTROL (Repeat Surveillance BC 9/9 NGTD x 2 days). Initial plan for teeth extraction on 9/12, however patient  adamant about not getting extraction. Risks discussed in length with patient for which she verbalized understanding as she would like procedure done with her own dentist and agreed to PICC line placement for IV Abx treatment x 2 weeks (last dose 9/26) with understanding that would need teeth extraction within that time period. Pt now s/p PICC placement on 9/13.    Hospital course significant for BIBIANA on CKD, pts home Lasix and Lisinopril was held w/ improvement of Cr to 1.25 (baseline) prior to d/c. Pt is to restart Lasix 20mg QD on d/c and continue to hold Lisinorpil until evaluated by outpt cardiologist. Pt was started on Amlodipine 10mg QD and Imdur 30mg QD for BP management, as well as CP management.    Pt seen and examined at bedside this AM without any complaints or events overnight, VSS, labs and telemetry reviewed and pt medically stable for discharged to [facility] as discussed with Dr. Jerez. Pt has received appropriate discharge instructions, including medication regimen, access site management and follow up with Dr. Marte on 10/31/22 (soonest current appt, office will call pt to schedule a sooner appt).

## 2022-09-12 NOTE — DISCHARGE NOTE PROVIDER - NSDCFUADDAPPT_GEN_ALL_CORE_FT
You are scheduled to see Dr. Marte on 10/31 however we would like you to see him soon, his office will call you with a sooner appointment when available.

## 2022-09-12 NOTE — PROGRESS NOTE ADULT - TIME BILLING
Diagnosis and management of bacteremia
Management of bacteremia and dental infection; coordination of complex care
Management of bacteremia
Evaluation and management of bacteremia
Evaluation and management of bacteremia; coordination of complex care
as noted above
as noted above

## 2022-09-12 NOTE — CHART NOTE - NSCHARTNOTEFT_GEN_A_CORE
Spoke with patient at bedside with MEET Perea (PICC team) via Armenian  for approx 20 minutes. PICC Line placement procedure discussed in detail for which patient verbalized understanding in addition to reason for placement (IV abx till 9/26 for bacteremia) for which patient also verbalized understanding. However, patient hesitant to sign consent as she "does not want to be responsible for the procedure and if she were to die it would be her fault". Re-explained to patient importance of PICC placement for which her signature was required for the consent, however, adamant not to sign consent and verbalized risks of not getting IV ABX.  Daughter Luna, also does not want patient to be discharged to [facility] and wishes to take patient home. Event discussed with Dr. Jerez and plan for patient to be dc home on PO ABX as refusing PICC line placement.

## 2022-09-12 NOTE — DISCHARGE NOTE PROVIDER - NSDCMRMEDTOKEN_GEN_ALL_CORE_FT
amiodarone 200 mg oral tablet: 1 tab(s) orally once a day  apixaban 5 mg oral tablet: 1 tab(s) orally 2 times a day  atorvastatin 20 mg oral tablet: 1 tab(s) orally once a day (at bedtime)  folic acid 1 mg oral tablet: 1 tab(s) orally once a day  furosemide 20 mg oral tablet: 1 tab(s) orally once a day  lisinopril 10 mg oral tablet: 1 tab(s) orally once a day  LORazepam 0.5 mg oral tablet: 1 tab(s) orally once a day  magnesium oxide 400 mg (241.3 mg elemental magnesium) oral tablet: 1 tab(s) orally once a day  montelukast 10 mg oral tablet: 1 tab(s) orally once a day  Multiple Vitamins oral tablet: 1 tab(s) orally once a day  pantoprazole 40 mg oral delayed release tablet: 1 tab(s) orally once a day  Spiriva Respimat 1.25 mcg/inh inhalation aerosol: 2 puff(s) inhaled once a day  Toprol-XL 50 mg oral tablet, extended release: 1 tab(s) orally once a day   acetaminophen 325 mg oral tablet: 2 tab(s) orally every 6 hours, As needed, Temp greater or equal to 38C (100.4F), Mild Pain (1 - 3)  amiodarone 200 mg oral tablet: 1 tab(s) orally once a day  amLODIPine 10 mg oral tablet: 1 tab(s) orally once a day  apixaban 5 mg oral tablet: 1 tab(s) orally 2 times a day  atorvastatin 20 mg oral tablet: 1 tab(s) orally once a day (at bedtime)  cefTRIAXone: 2000 milligram(s) intravenous once a day last dose 9/26/22  folic acid 1 mg oral tablet: 1 tab(s) orally once a day  furosemide 20 mg oral tablet: 1 tab(s) orally once a day  isosorbide mononitrate 30 mg oral tablet, extended release: 1 tab(s) orally once a day  lidocaine 4% topical film: Apply topically to affected area once a day, As Needed for pain  LORazepam 0.5 mg oral tablet: 1 tab(s) orally once a day  magnesium oxide 400 mg (241.3 mg elemental magnesium) oral tablet: 1 tab(s) orally once a day  montelukast 10 mg oral tablet: 1 tab(s) orally once a day  Multiple Vitamins oral tablet: 1 tab(s) orally once a day  pantoprazole 40 mg oral delayed release tablet: 1 tab(s) orally once a day  simethicone 80 mg oral tablet, chewable: 1 tab(s) orally 2 times a day  Spiriva Respimat 1.25 mcg/inh inhalation aerosol: 2 puff(s) inhaled once a day  Toprol-XL 50 mg oral tablet, extended release: 1 tab(s) orally once a day

## 2022-09-12 NOTE — PROGRESS NOTE ADULT - PROBLEM SELECTOR PLAN 7
Normocytic anemia. H/h 8.6/27.7 on admission. Hx of Hx Colon cancer s/p resection in 2019 (in remission) and FEROZ.  - hgb 7.9 today; no s/sx bleeding   - pending Stool OB   - Iron Panel shows AoCD. Would not give IV iron in setting of bacteremia  - maintain Active T+S, transfuse if hgb <7  - c/w Protonix 40mg PO qd

## 2022-09-12 NOTE — DISCHARGE NOTE PROVIDER - NSDCFUSCHEDAPPT_GEN_ALL_CORE_FT
Derek Marte Physician [facility]  HEARTV[facility] [address]  Scheduled Appointment: 10/31/2022

## 2022-09-12 NOTE — PROGRESS NOTE ADULT - ASSESSMENT
82F Mongolian speaking PMHx HTN, HLD, severe AS s/p TAVR (2019, 2021), Ascending Aortic Aneurysm 4cm in 01/2022, pAFib (on Amiodarone+Eliquis), COPD (s/p 2L home O2), Colon cancer s/p resection in 2019 (in remission), FEROZ & CKD3 presenting with atypical chest pain, c/f endocarditis, now ruled out on IRMA/gallium, and E.Coli and gemella bacteremia being followed by ID and structural heart team. Medicine consulted for possible transfer to medicine, now following for co management.     #Anemia  Normocytic anemia with history of FEROZ and Colon Ca. Hgb 7.1.   - would consider PO or IV Iron transfusion.     #Odontogenic disease/periapical abscesses  -patient to go to OR for extraction today.   -holding eliquis in setting of OR    #Afib  Is on Toprol and Eliquis   - continue to hold Eliquis for OR Monday.     #HFpEF  EF 65% with grade 2 diastolic dysfunction.    - c/w Toprol 50 BID  - Lasix prn    #HTN  - C/w Toprol 50 BID, amlodipine 10mg, and Imdur 30mg   - Lasix prn    #COPD   On home 2L NC.   - try and humidify air as nares irritates  - c/w Spiriva and Singulair    All recommendations final upon attending attestation.    82F Croatian speaking PMHx HTN, HLD, severe AS s/p TAVR (2019, 2021), Ascending Aortic Aneurysm 4cm in 01/2022, pAFib (on Amiodarone+Eliquis), COPD (s/p 2L home O2), Colon cancer s/p resection in 2019 (in remission), FEROZ & CKD3 presenting with atypical chest pain, c/f endocarditis, now ruled out on IRMA/gallium, and E.Coli and gemella bacteremia being followed by ID and structural heart team. Medicine consulted for possible transfer to medicine, now following for co management.     #E.Coli and gemella bacteremia  -IRMA and gallium SPECT without evidence of TAVR infection  - continue ceftriaxone 2g IV q24h  - cultures from 9/9 show ngtd, should start the process for PICC/Midline for abx   - on rocephin 2g qd     #symptomatic Anemia  Normocytic anemia with history of FEROZ and Colon Ca. Hgb 7.1.   - would consider PO iron supplementation .     #Odontogenic disease/periapical abscesses  -patient scheduled to go to OR for extraction today. However refusing and stating that she has outpt surgery scheduled  -holding eliquis in setting of OR    #chronic Afib  Is on Toprol and Eliquis   - continue to hold Eliquis for OR.     #HFpEF  EF 65% with grade 2 diastolic dysfunction.    - c/w Toprol 50 BID  - Lasix prn holding in the setting of bibiana on ckd     #HTN  - C/w Toprol 50 BID, amlodipine 10mg, and Imdur 30mg   - noted to have intermittent increase in BP  - can increase imdur if patient continues to be persistently hypertensive  - Lasix prn    #COPD   On home 2L NC.   - try and humidify air as nares irritates  - c/w Spiriva and Singulair      # BIBIANA on CKD3a Crt 1.37 (baseline 1.1-1.2)  continue to hold lasix today  - cr trending down   - continue to follow qd     #DVT ppx on hold for OR   would recommend SCD

## 2022-09-12 NOTE — PROGRESS NOTE ADULT - PROBLEM SELECTOR PLAN 10
Not in acute exacerbation. On intermittent 2L NC at home. Follows with Dr. Derik Domínguez c/w Spiriva INH and Singulair qd    F: No IVF  N:  DASH/TLC   E: Replete lytes PRN K<4, Mg<2  P: DVT PPX: on Eliquis   C: FULL CODE  Dispo:  Cardiopulmonary Rehab w/plan for PICC for LT abx (total 2 weeks)

## 2022-09-12 NOTE — PROGRESS NOTE ADULT - PROBLEM SELECTOR PLAN 9
SBP 210s on admission  - SBPs 110s-140s  - PO Lasix 20mg dc'd 2/2 up-trending Creat and remains euvolemic on exam   - c/w  Metoprolol Succinate 50mg, Amlodipine 10mg and Imdur 30mg     #HLD   - Chol 79 TG 73 HDL 35 LDL 29   - c/w Atorvastatin 20mg QD

## 2022-09-12 NOTE — PROGRESS NOTE ADULT - PROBLEM SELECTOR PROBLEM 9
COPD (chronic obstructive pulmonary disease)
HTN (hypertension)
Paroxysmal atrial fibrillation
Paroxysmal atrial fibrillation
HTN (hypertension)
COPD (chronic obstructive pulmonary disease)
COPD (chronic obstructive pulmonary disease)
HTN (hypertension)

## 2022-09-12 NOTE — PROGRESS NOTE ADULT - ASSESSMENT
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 Collateral information obtained from patient via Argentine : one week prior to presentation she developed frontal tooth swelling/abscess; did not have dental intervention; endorses three undrained tooth abscesses and reports she needs five teeth extracted. 9/8 bcx with E. coli of uncertain significance/source.   OMFS assessment was recommended regularly by me to the cardiology service since 9/7; I directly reached out to OMFS on call (Dr. Ham) on 9/9--she recommended maxillofacial CT which confirmed odontogenic disease/periapical abscesses; patient was planned for extractions today--per primary team, patient refused and instead opts to follow up with outpatient dentist. OMFS first documentation 9/12 (Dr. Ware) noted. Unfortunately, patient has not achieved source control of infection and is at risk for future complications including relapsed bacteremia and infective endocarditis. Pending BELKIS and without a definitive plan/confirmed date for extractions for source control.  Antibiotic courses are generally defined with respect to source control (when applicable, as in this instance); given absence of source control, will recommend continue ceftriaxone 2g IV q24h for an additional two weeks thru 9/26; dental intervention should ideally be done while on antibiotic regimen. Also needs future SBE ppx prior to dental work. Midline catheter placement advised.    Discussed in detail with primary team.    Please reconsult with ?    ID Team 2

## 2022-09-12 NOTE — PROGRESS NOTE ADULT - PROBLEM SELECTOR PROBLEM 4
Abdominal pain
History of transcatheter aortic valve replacement (TAVR)
HTN (hypertension)
Abdominal pain
Abdominal pain
HTN (hypertension)
Abdominal pain
Abdominal pain

## 2022-09-12 NOTE — CONSULT NOTE ADULT - CONSULT REQUESTED DATE/TIME
06-Sep-2022
07-Sep-2022 15:50
10-Sep-2022 12:56
12-Sep-2022 13:04
Partial Purse String (Simple) Text: Given the location of the defect and the characteristics of the surrounding skin a simple purse string closure was deemed most appropriate.  Undermining was performed circumfirentially around the surgical defect.  A purse string suture was then placed and tightened. Wound tension only allowed a partial closure of the circular defect.
05-Sep-2022 02:02

## 2022-09-12 NOTE — PROGRESS NOTE ADULT - ATTENDING COMMENTS
I have personally seen and examined the patient.  I fully participated in the care of this patient.  I have made amendments to the documentation where necessary, and agree with the history, physical exam, and plan as documented by the Resident. 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 last set of cultures negative from 9/9. Pending OR with OMFS. will need to resume AC after surgery based on recommendation from surgical team. would recommend to add iron supp for iron def anemia as pt is mildly symptomatic on exertion    Hospital course, labs, imaging reviewed.  I have personally seen and examined the patient.  I fully participated in the care of this patient.  I have made amendments to the documentation where necessary, and agree with the history, physical exam, and plan as documented by the Resident.

## 2022-09-12 NOTE — DISCHARGE NOTE PROVIDER - CARE PROVIDER_API CALL
Derek Marte)  Cardiovascular Disease; Internal Medicine  Cardiology [facility], [address] [place] [address]  Phone: [phone]  Fax: [phone]  Scheduled Appointment: 10/31/2022 01:20 PM

## 2022-09-12 NOTE — PROGRESS NOTE ADULT - PROBLEM SELECTOR PROBLEM 2
History of transcatheter aortic valve replacement (TAVR)
History of transcatheter aortic valve replacement (TAVR)
General weakness
History of transcatheter aortic valve replacement (TAVR)
General weakness
History of transcatheter aortic valve replacement (TAVR)
History of transcatheter aortic valve replacement (TAVR)
Fever
History of transcatheter aortic valve replacement (TAVR)
History of transcatheter aortic valve replacement (TAVR)

## 2022-09-12 NOTE — PROGRESS NOTE ADULT - SUBJECTIVE AND OBJECTIVE BOX
OVERNIGHT EVENTS: BERYL    SUBJECTIVE / INTERVAL HPI: Patient seen and examined at bedside. She is frustrated as she does not want to go to for her teeth extraction. She is denying chest pain, shortness of breath, pain in her mouth. Endorsing headaches and shortness of breath on ambulation.     VITAL SIGNS:  Vital Signs Last 24 Hrs  T(C): 36.4 (12 Sep 2022 09:08), Max: 36.6 (11 Sep 2022 14:11)  T(F): 97.6 (12 Sep 2022 09:08), Max: 97.9 (11 Sep 2022 14:11)  HR: 82 (12 Sep 2022 08:59) (50 - 82)  BP: 153/67 (12 Sep 2022 08:59) (114/55 - 195/123)  BP(mean): 97 (12 Sep 2022 08:59) (82 - 97)  RR: 16 (12 Sep 2022 08:59) (16 - 19)  SpO2: 97% (12 Sep 2022 08:59) (95% - 98%)    Parameters below as of 12 Sep 2022 08:59  Patient On (Oxygen Delivery Method): room air        PHYSICAL EXAM:    General: elderly female, visibly frustrated  Neck: supple  Cardiovascular: +S1/S2; RRR  Respiratory: CTA B/L; no W/R/R  Gastrointestinal: soft, NT/ND; +BSx4  Extremities: 1+ pitting edema  Vascular: 2+ radial, DP/PT pulses B/L  Neurological: AAOx3; no focal deficits    MEDICATIONS:  MEDICATIONS  (STANDING):  aMIOdarone    Tablet 200 milliGRAM(s) Oral daily  amLODIPine   Tablet 10 milliGRAM(s) Oral daily  atorvastatin 20 milliGRAM(s) Oral at bedtime  cefTRIAXone   IVPB 2000 milliGRAM(s) IV Intermittent every 24 hours  cefTRIAXone   IVPB      folic acid 1 milliGRAM(s) Oral daily  isosorbide   mononitrate ER Tablet (IMDUR) 30 milliGRAM(s) Oral daily  lidocaine   4% Patch 1 Patch Transdermal every 24 hours  melatonin 5 milliGRAM(s) Oral at bedtime  metoprolol succinate ER 50 milliGRAM(s) Oral daily  montelukast 10 milliGRAM(s) Oral daily  multivitamin 1 Tablet(s) Oral daily  pantoprazole    Tablet 40 milliGRAM(s) Oral before breakfast  simethicone 80 milliGRAM(s) Chew two times a day  tiotropium 18 MICROgram(s) Capsule 1 Capsule(s) Inhalation daily    MEDICATIONS  (PRN):  acetaminophen     Tablet .. 650 milliGRAM(s) Oral every 6 hours PRN Temp greater or equal to 38C (100.4F), Mild Pain (1 - 3)      ALLERGIES:  Allergies    Digox (Rash; Urticaria; Hives)  Plavix (Other (Mod to Severe))    Intolerances        LABS:                        7.9    9.31  )-----------( 324      ( 12 Sep 2022 08:28 )             25.7     09-12    136  |  100  |  21  ----------------------------<  107<H>  4.2   |  22  |  1.37<H>    Ca    9.3      12 Sep 2022 08:27  Mg     2.1     09-12      PT/INR - ( 12 Sep 2022 08:28 )   PT: 12.3 sec;   INR: 1.03          PTT - ( 12 Sep 2022 08:28 )  PTT:25.7 sec    CAPILLARY BLOOD GLUCOSE          RADIOLOGY & ADDITIONAL TESTS: Reviewed.    ASSESSMENT:    PLAN:  OVERNIGHT EVENTS: BERYL    SUBJECTIVE / INTERVAL HPI: Patient seen and examined at bedside. She is frustrated as she does not want to go to for her teeth extraction. She is denying chest pain, shortness of breath, pain in her mouth. Endorsing headaches and shortness of breath on ambulation.     VITAL SIGNS:  Vital Signs Last 24 Hrs  T(C): 36.4 (12 Sep 2022 09:08), Max: 36.6 (11 Sep 2022 14:11)  T(F): 97.6 (12 Sep 2022 09:08), Max: 97.9 (11 Sep 2022 14:11)  HR: 82 (12 Sep 2022 08:59) (50 - 82)  BP: 153/67 (12 Sep 2022 08:59) (114/55 - 195/123)  BP(mean): 97 (12 Sep 2022 08:59) (82 - 97)  RR: 16 (12 Sep 2022 08:59) (16 - 19)  SpO2: 97% (12 Sep 2022 08:59) (95% - 98%)    Parameters below as of 12 Sep 2022 08:59  Patient On (Oxygen Delivery Method): room air        PHYSICAL EXAM:    General: elderly female, visibly frustrated  Neck: supple  Cardiovascular: +S1/S2; RRR  Respiratory: CTA B/L; no W/R/R  Gastrointestinal: soft, NT/ND; +BSx4  Extremities: 1+ pitting edema  Vascular: 2+ radial, DP/PT pulses B/L  Neurological: AAOx3; no focal deficits    MEDICATIONS:  MEDICATIONS  (STANDING):  aMIOdarone    Tablet 200 milliGRAM(s) Oral daily  amLODIPine   Tablet 10 milliGRAM(s) Oral daily  atorvastatin 20 milliGRAM(s) Oral at bedtime  cefTRIAXone   IVPB 2000 milliGRAM(s) IV Intermittent every 24 hours  cefTRIAXone   IVPB      folic acid 1 milliGRAM(s) Oral daily  isosorbide   mononitrate ER Tablet (IMDUR) 30 milliGRAM(s) Oral daily  lidocaine   4% Patch 1 Patch Transdermal every 24 hours  melatonin 5 milliGRAM(s) Oral at bedtime  metoprolol succinate ER 50 milliGRAM(s) Oral daily  montelukast 10 milliGRAM(s) Oral daily  multivitamin 1 Tablet(s) Oral daily  pantoprazole    Tablet 40 milliGRAM(s) Oral before breakfast  simethicone 80 milliGRAM(s) Chew two times a day  tiotropium 18 MICROgram(s) Capsule 1 Capsule(s) Inhalation daily    MEDICATIONS  (PRN):  acetaminophen     Tablet .. 650 milliGRAM(s) Oral every 6 hours PRN Temp greater or equal to 38C (100.4F), Mild Pain (1 - 3)      ALLERGIES:  Allergies    Digox (Rash; Urticaria; Hives)  Plavix (Other (Mod to Severe))    Intolerances        LABS:                        7.9    9.31  )-----------( 324      ( 12 Sep 2022 08:28 )             25.7     09-12    136  |  100  |  21  ----------------------------<  107<H>  4.2   |  22  |  1.37<H>    Ca    9.3      12 Sep 2022 08:27  Mg     2.1     09-12      PT/INR - ( 12 Sep 2022 08:28 )   PT: 12.3 sec;   INR: 1.03          PTT - ( 12 Sep 2022 08:28 )  PTT:25.7 sec    CAPILLARY BLOOD GLUCOSE          RADIOLOGY & ADDITIONAL TESTS: Reviewed.

## 2022-09-13 ENCOUNTER — TRANSCRIPTION ENCOUNTER (OUTPATIENT)
Age: 82
End: 2022-09-13

## 2022-09-13 VITALS — TEMPERATURE: 97 F

## 2022-09-13 LAB
ANION GAP SERPL CALC-SCNC: 12 MMOL/L — SIGNIFICANT CHANGE UP (ref 5–17)
BLD GP AB SCN SERPL QL: POSITIVE — SIGNIFICANT CHANGE UP
BUN SERPL-MCNC: 19 MG/DL — SIGNIFICANT CHANGE UP (ref 7–23)
CALCIUM SERPL-MCNC: 9.3 MG/DL — SIGNIFICANT CHANGE UP (ref 8.4–10.5)
CHLORIDE SERPL-SCNC: 99 MMOL/L — SIGNIFICANT CHANGE UP (ref 96–108)
CO2 SERPL-SCNC: 25 MMOL/L — SIGNIFICANT CHANGE UP (ref 22–31)
CREAT SERPL-MCNC: 1.25 MG/DL — SIGNIFICANT CHANGE UP (ref 0.5–1.3)
CULTURE RESULTS: SIGNIFICANT CHANGE UP
EGFR: 42 ML/MIN/1.73M2 — LOW
GLUCOSE SERPL-MCNC: 91 MG/DL — SIGNIFICANT CHANGE UP (ref 70–99)
HCT VFR BLD CALC: 23.6 % — LOW (ref 34.5–45)
HGB BLD-MCNC: 7.1 G/DL — LOW (ref 11.5–15.5)
MAGNESIUM SERPL-MCNC: 2.2 MG/DL — SIGNIFICANT CHANGE UP (ref 1.6–2.6)
MCHC RBC-ENTMCNC: 26.5 PG — LOW (ref 27–34)
MCHC RBC-ENTMCNC: 30.1 GM/DL — LOW (ref 32–36)
MCV RBC AUTO: 88.1 FL — SIGNIFICANT CHANGE UP (ref 80–100)
NRBC # BLD: 0 /100 WBCS — SIGNIFICANT CHANGE UP (ref 0–0)
PLATELET # BLD AUTO: 289 K/UL — SIGNIFICANT CHANGE UP (ref 150–400)
POTASSIUM SERPL-MCNC: 4.6 MMOL/L — SIGNIFICANT CHANGE UP (ref 3.5–5.3)
POTASSIUM SERPL-SCNC: 4.6 MMOL/L — SIGNIFICANT CHANGE UP (ref 3.5–5.3)
RBC # BLD: 2.68 M/UL — LOW (ref 3.8–5.2)
RBC # FLD: 14.6 % — HIGH (ref 10.3–14.5)
RH IG SCN BLD-IMP: POSITIVE — SIGNIFICANT CHANGE UP
SODIUM SERPL-SCNC: 136 MMOL/L — SIGNIFICANT CHANGE UP (ref 135–145)
SPECIMEN SOURCE: SIGNIFICANT CHANGE UP
WBC # BLD: 7.24 K/UL — SIGNIFICANT CHANGE UP (ref 3.8–10.5)
WBC # FLD AUTO: 7.24 K/UL — SIGNIFICANT CHANGE UP (ref 3.8–10.5)

## 2022-09-13 PROCEDURE — A9556: CPT

## 2022-09-13 PROCEDURE — 87040 BLOOD CULTURE FOR BACTERIA: CPT

## 2022-09-13 PROCEDURE — 80061 LIPID PANEL: CPT

## 2022-09-13 PROCEDURE — 83615 LACTATE (LD) (LDH) ENZYME: CPT

## 2022-09-13 PROCEDURE — 84100 ASSAY OF PHOSPHORUS: CPT

## 2022-09-13 PROCEDURE — 85730 THROMBOPLASTIN TIME PARTIAL: CPT

## 2022-09-13 PROCEDURE — 86077 PHYS BLOOD BANK SERV XMATCH: CPT

## 2022-09-13 PROCEDURE — 76700 US EXAM ABDOM COMPLETE: CPT

## 2022-09-13 PROCEDURE — 80053 COMPREHEN METABOLIC PANEL: CPT

## 2022-09-13 PROCEDURE — 97530 THERAPEUTIC ACTIVITIES: CPT

## 2022-09-13 PROCEDURE — 70486 CT MAXILLOFACIAL W/O DYE: CPT

## 2022-09-13 PROCEDURE — 85652 RBC SED RATE AUTOMATED: CPT

## 2022-09-13 PROCEDURE — 87635 SARS-COV-2 COVID-19 AMP PRB: CPT

## 2022-09-13 PROCEDURE — 97161 PT EVAL LOW COMPLEX 20 MIN: CPT

## 2022-09-13 PROCEDURE — 83735 ASSAY OF MAGNESIUM: CPT

## 2022-09-13 PROCEDURE — 87086 URINE CULTURE/COLONY COUNT: CPT

## 2022-09-13 PROCEDURE — 85027 COMPLETE CBC AUTOMATED: CPT

## 2022-09-13 PROCEDURE — 80048 BASIC METABOLIC PNL TOTAL CA: CPT

## 2022-09-13 PROCEDURE — 93312 ECHO TRANSESOPHAGEAL: CPT

## 2022-09-13 PROCEDURE — 87150 DNA/RNA AMPLIFIED PROBE: CPT

## 2022-09-13 PROCEDURE — 84443 ASSAY THYROID STIM HORMONE: CPT

## 2022-09-13 PROCEDURE — 71045 X-RAY EXAM CHEST 1 VIEW: CPT

## 2022-09-13 PROCEDURE — 82746 ASSAY OF FOLIC ACID SERUM: CPT

## 2022-09-13 PROCEDURE — 84145 PROCALCITONIN (PCT): CPT

## 2022-09-13 PROCEDURE — 83690 ASSAY OF LIPASE: CPT

## 2022-09-13 PROCEDURE — 81001 URINALYSIS AUTO W/SCOPE: CPT

## 2022-09-13 PROCEDURE — 82550 ASSAY OF CK (CPK): CPT

## 2022-09-13 PROCEDURE — U0003: CPT

## 2022-09-13 PROCEDURE — 87186 SC STD MICRODIL/AGAR DIL: CPT

## 2022-09-13 PROCEDURE — 86900 BLOOD TYPING SEROLOGIC ABO: CPT

## 2022-09-13 PROCEDURE — 93005 ELECTROCARDIOGRAM TRACING: CPT

## 2022-09-13 PROCEDURE — 36569 INSJ PICC 5 YR+ W/O IMAGING: CPT

## 2022-09-13 PROCEDURE — 82728 ASSAY OF FERRITIN: CPT

## 2022-09-13 PROCEDURE — 83036 HEMOGLOBIN GLYCOSYLATED A1C: CPT

## 2022-09-13 PROCEDURE — 83550 IRON BINDING TEST: CPT

## 2022-09-13 PROCEDURE — 82553 CREATINE MB FRACTION: CPT

## 2022-09-13 PROCEDURE — 78830 RP LOCLZJ TUM SPECT W/CT 1: CPT

## 2022-09-13 PROCEDURE — 84484 ASSAY OF TROPONIN QUANT: CPT

## 2022-09-13 PROCEDURE — 86850 RBC ANTIBODY SCREEN: CPT

## 2022-09-13 PROCEDURE — 99233 SBSQ HOSP IP/OBS HIGH 50: CPT | Mod: GC

## 2022-09-13 PROCEDURE — 71275 CT ANGIOGRAPHY CHEST: CPT

## 2022-09-13 PROCEDURE — 86880 COOMBS TEST DIRECT: CPT

## 2022-09-13 PROCEDURE — 99285 EMERGENCY DEPT VISIT HI MDM: CPT | Mod: 25

## 2022-09-13 PROCEDURE — 82607 VITAMIN B-12: CPT

## 2022-09-13 PROCEDURE — 85610 PROTHROMBIN TIME: CPT

## 2022-09-13 PROCEDURE — 83540 ASSAY OF IRON: CPT

## 2022-09-13 PROCEDURE — 36410 VNPNXR 3YR/> PHY/QHP DX/THER: CPT

## 2022-09-13 PROCEDURE — 86140 C-REACTIVE PROTEIN: CPT

## 2022-09-13 PROCEDURE — 87181 SC STD AGAR DILUTION PER AGT: CPT

## 2022-09-13 PROCEDURE — 86870 RBC ANTIBODY IDENTIFICATION: CPT

## 2022-09-13 PROCEDURE — 78802 RP LOCLZJ TUM WHBDY 1 D IMG: CPT

## 2022-09-13 PROCEDURE — 70450 CT HEAD/BRAIN W/O DYE: CPT | Mod: MA

## 2022-09-13 PROCEDURE — 0225U NFCT DS DNA&RNA 21 SARSCOV2: CPT

## 2022-09-13 PROCEDURE — 97116 GAIT TRAINING THERAPY: CPT

## 2022-09-13 PROCEDURE — 76937 US GUIDE VASCULAR ACCESS: CPT

## 2022-09-13 PROCEDURE — 83880 ASSAY OF NATRIURETIC PEPTIDE: CPT

## 2022-09-13 PROCEDURE — 86901 BLOOD TYPING SEROLOGIC RH(D): CPT

## 2022-09-13 PROCEDURE — 93010 ELECTROCARDIOGRAM REPORT: CPT

## 2022-09-13 PROCEDURE — 36415 COLL VENOUS BLD VENIPUNCTURE: CPT

## 2022-09-13 PROCEDURE — 85379 FIBRIN DEGRADATION QUANT: CPT

## 2022-09-13 PROCEDURE — 80202 ASSAY OF VANCOMYCIN: CPT

## 2022-09-13 PROCEDURE — 94640 AIRWAY INHALATION TREATMENT: CPT

## 2022-09-13 PROCEDURE — U0005: CPT

## 2022-09-13 PROCEDURE — 85025 COMPLETE CBC W/AUTO DIFF WBC: CPT

## 2022-09-13 PROCEDURE — 93306 TTE W/DOPPLER COMPLETE: CPT

## 2022-09-13 RX ORDER — AMLODIPINE BESYLATE 2.5 MG/1
1 TABLET ORAL
Qty: 0 | Refills: 0 | DISCHARGE
Start: 2022-09-13

## 2022-09-13 RX ORDER — SODIUM CHLORIDE 9 MG/ML
10 INJECTION INTRAMUSCULAR; INTRAVENOUS; SUBCUTANEOUS
Refills: 0 | Status: DISCONTINUED | OUTPATIENT
Start: 2022-09-13 | End: 2022-09-13

## 2022-09-13 RX ORDER — SIMETHICONE 80 MG/1
1 TABLET, CHEWABLE ORAL
Qty: 0 | Refills: 0 | DISCHARGE
Start: 2022-09-13

## 2022-09-13 RX ORDER — ISOSORBIDE MONONITRATE 60 MG/1
1 TABLET, EXTENDED RELEASE ORAL
Qty: 0 | Refills: 0 | DISCHARGE
Start: 2022-09-13

## 2022-09-13 RX ORDER — CHLORHEXIDINE GLUCONATE 213 G/1000ML
1 SOLUTION TOPICAL
Refills: 0 | Status: DISCONTINUED | OUTPATIENT
Start: 2022-09-13 | End: 2022-09-13

## 2022-09-13 RX ORDER — LIDOCAINE 4 G/100G
1 CREAM TOPICAL
Qty: 0 | Refills: 0 | DISCHARGE
Start: 2022-09-13

## 2022-09-13 RX ORDER — ACETAMINOPHEN 500 MG
2 TABLET ORAL
Qty: 0 | Refills: 0 | DISCHARGE
Start: 2022-09-13

## 2022-09-13 RX ORDER — FUROSEMIDE 40 MG
1 TABLET ORAL
Qty: 30 | Refills: 0
Start: 2022-09-13 | End: 2022-10-12

## 2022-09-13 RX ORDER — CEFTRIAXONE 500 MG/1
2000 INJECTION, POWDER, FOR SOLUTION INTRAMUSCULAR; INTRAVENOUS
Qty: 0 | Refills: 0 | DISCHARGE
Start: 2022-09-13

## 2022-09-13 RX ADMIN — Medication 1 MILLIGRAM(S): at 11:19

## 2022-09-13 RX ADMIN — Medication 650 MILLIGRAM(S): at 12:20

## 2022-09-13 RX ADMIN — Medication 650 MILLIGRAM(S): at 11:19

## 2022-09-13 RX ADMIN — Medication 0.5 MILLIGRAM(S): at 07:13

## 2022-09-13 RX ADMIN — ISOSORBIDE MONONITRATE 30 MILLIGRAM(S): 60 TABLET, EXTENDED RELEASE ORAL at 11:19

## 2022-09-13 RX ADMIN — CEFTRIAXONE 100 MILLIGRAM(S): 500 INJECTION, POWDER, FOR SOLUTION INTRAMUSCULAR; INTRAVENOUS at 10:26

## 2022-09-13 RX ADMIN — AMIODARONE HYDROCHLORIDE 200 MILLIGRAM(S): 400 TABLET ORAL at 06:05

## 2022-09-13 RX ADMIN — Medication 1 TABLET(S): at 11:19

## 2022-09-13 RX ADMIN — APIXABAN 5 MILLIGRAM(S): 2.5 TABLET, FILM COATED ORAL at 06:07

## 2022-09-13 RX ADMIN — Medication 50 MILLIGRAM(S): at 06:10

## 2022-09-13 RX ADMIN — LIDOCAINE 1 PATCH: 4 CREAM TOPICAL at 05:11

## 2022-09-13 RX ADMIN — SIMETHICONE 80 MILLIGRAM(S): 80 TABLET, CHEWABLE ORAL at 06:06

## 2022-09-13 RX ADMIN — AMLODIPINE BESYLATE 10 MILLIGRAM(S): 2.5 TABLET ORAL at 06:06

## 2022-09-13 RX ADMIN — TIOTROPIUM BROMIDE 1 CAPSULE(S): 18 CAPSULE ORAL; RESPIRATORY (INHALATION) at 11:20

## 2022-09-13 RX ADMIN — PANTOPRAZOLE SODIUM 40 MILLIGRAM(S): 20 TABLET, DELAYED RELEASE ORAL at 06:07

## 2022-09-13 RX ADMIN — MONTELUKAST 10 MILLIGRAM(S): 4 TABLET, CHEWABLE ORAL at 11:19

## 2022-09-13 NOTE — PROGRESS NOTE ADULT - REASON FOR ADMISSION
Admitted for Weakness & CP evaluation, and PT.

## 2022-09-13 NOTE — DISCHARGE NOTE NURSING/CASE MANAGEMENT/SOCIAL WORK - PATIENT PORTAL LINK FT
You can access the FollowMyHealth Patient Portal offered by [facility] by registering at the following website: [email or web address]. By joining [facility]’s FollowMyHealth portal, you will also be able to view your health information using other applications (apps) compatible with our system.

## 2022-09-13 NOTE — DISCHARGE NOTE NURSING/CASE MANAGEMENT/SOCIAL WORK - NSDCPEFALRISK_GEN_ALL_CORE
For information on Fall & Injury Prevention, visit: [email or web address]  [email or web address] OR  https://www.cdc.gov/steadi/patient.html

## 2022-09-13 NOTE — PROGRESS NOTE ADULT - SUBJECTIVE AND OBJECTIVE BOX
OVERNIGHT EVENTS: patient refused PICC, now amenable.     SUBJECTIVE / INTERVAL HPI: Patient refused to be examined or interviewed.     VITAL SIGNS:  Vital Signs Last 24 Hrs  T(C): 35.9 (13 Sep 2022 09:09), Max: 36.5 (12 Sep 2022 18:09)  T(F): 96.7 (13 Sep 2022 09:09), Max: 97.7 (12 Sep 2022 18:09)  HR: 58 (13 Sep 2022 08:32) (58 - 62)  BP: 156/70 (13 Sep 2022 08:32) (111/56 - 171/71)  BP(mean): 101 (13 Sep 2022 08:32) (92 - 102)  RR: 16 (13 Sep 2022 08:32) (16 - 18)  SpO2: 96% (13 Sep 2022 08:32) (96% - 97%)    Parameters below as of 13 Sep 2022 08:32  Patient On (Oxygen Delivery Method): room air        PHYSICAL EXAM: Unable to perform.      MEDICATIONS:  MEDICATIONS  (STANDING):  aMIOdarone    Tablet 200 milliGRAM(s) Oral daily  amLODIPine   Tablet 10 milliGRAM(s) Oral daily  apixaban 5 milliGRAM(s) Oral two times a day  atorvastatin 20 milliGRAM(s) Oral at bedtime  cefTRIAXone   IVPB 2000 milliGRAM(s) IV Intermittent every 24 hours  cefTRIAXone   IVPB      folic acid 1 milliGRAM(s) Oral daily  isosorbide   mononitrate ER Tablet (IMDUR) 30 milliGRAM(s) Oral daily  lidocaine   4% Patch 1 Patch Transdermal every 24 hours  melatonin 5 milliGRAM(s) Oral at bedtime  metoprolol succinate ER 50 milliGRAM(s) Oral daily  montelukast 10 milliGRAM(s) Oral daily  multivitamin 1 Tablet(s) Oral daily  pantoprazole    Tablet 40 milliGRAM(s) Oral before breakfast  simethicone 80 milliGRAM(s) Chew two times a day  tiotropium 18 MICROgram(s) Capsule 1 Capsule(s) Inhalation daily    MEDICATIONS  (PRN):  acetaminophen     Tablet .. 650 milliGRAM(s) Oral every 6 hours PRN Temp greater or equal to 38C (100.4F), Mild Pain (1 - 3)      ALLERGIES:  Allergies    Digox (Rash; Urticaria; Hives)  Plavix (Other (Mod to Severe))    Intolerances        LABS:                        7.1    7.24  )-----------( 289      ( 13 Sep 2022 06:54 )             23.6     09-13    136  |  99  |  19  ----------------------------<  91  4.6   |  25  |  1.25    Ca    9.3      13 Sep 2022 06:54  Mg     2.2     09-13      PT/INR - ( 12 Sep 2022 08:28 )   PT: 12.3 sec;   INR: 1.03          PTT - ( 12 Sep 2022 08:28 )  PTT:25.7 sec    CAPILLARY BLOOD GLUCOSE          RADIOLOGY & ADDITIONAL TESTS: Reviewed.    ASSESSMENT:    PLAN:

## 2022-09-13 NOTE — PROGRESS NOTE ADULT - PROVIDER SPECIALTY LIST ADULT
Infectious Disease
Infectious Disease
Internal Medicine
Cardiology
Infectious Disease
Internal Medicine
Cardiology

## 2022-09-13 NOTE — PROGRESS NOTE ADULT - ATTENDING COMMENTS
Pt. seen and examined by me earlier today; I have read Dr. Schmidt's consult note, I agree w/ her impression and recommendations as above

## 2022-09-13 NOTE — PROGRESS NOTE ADULT - ASSESSMENT
82F Hebrew speaking PMHx HTN, HLD, severe AS s/p TAVR (2019, 2021), Ascending Aortic Aneurysm 4cm in 01/2022, pAFib (on Amiodarone+Eliquis), COPD (s/p 2L home O2), Colon cancer s/p resection in 2019 (in remission), FEROZ & CKD3 presenting with atypical chest pain, c/f endocarditis, now ruled out on IRMA/gallium, and E.Coli and gemella bacteremia being followed by ID and structural heart team. Medicine consulted for possible transfer to medicine, now following for co management.     #E.Coli and gemella bacteremia  -IRMA and gallium SPECT without evidence of TAVR infection  - continue ceftriaxone 2g IV q24h  - cultures from 9/9 show ngtd, should start the process for PICC/Midline for abx   - on rocephin 2g qd, to receive PICC.    #symptomatic Anemia  Normocytic anemia with history of FEORZ and Colon Ca. Hgb 7.1.   - would consider PO iron supplementation .     #Odontogenic disease/periapical abscesses  -patient refused dental extraction.    #HTN  - C/w Toprol 50 BID, amlodipine 10mg, and Imdur 30mg   - noted to have intermittent increase in BP  - can increase imdur if patient continues to be persistently hypertensive  - Lasix prn    #COPD   On home 2L NC.   - try and humidify air as nares irritates  - c/w Spiriva and Singulair    Medicine to sign off, please reconsult with further questions. All recs final upon attending attestation.

## 2022-09-16 ENCOUNTER — EMERGENCY (EMERGENCY)
Facility: HOSPITAL | Age: 82
LOS: 1 days | Discharge: HOME | End: 2022-09-16
Attending: EMERGENCY MEDICINE | Admitting: EMERGENCY MEDICINE
Payer: MEDICARE

## 2022-09-16 VITALS
TEMPERATURE: 98 F | DIASTOLIC BLOOD PRESSURE: 77 MMHG | HEART RATE: 68 BPM | OXYGEN SATURATION: 100 % | SYSTOLIC BLOOD PRESSURE: 162 MMHG

## 2022-09-16 VITALS
RESPIRATION RATE: 18 BRPM | HEIGHT: 65 IN | OXYGEN SATURATION: 100 % | DIASTOLIC BLOOD PRESSURE: 68 MMHG | SYSTOLIC BLOOD PRESSURE: 169 MMHG | HEART RATE: 59 BPM | TEMPERATURE: 98 F

## 2022-09-16 DIAGNOSIS — Z95.2 PRESENCE OF PROSTHETIC HEART VALVE: Chronic | ICD-10-CM

## 2022-09-16 LAB
ALBUMIN SERPL ELPH-MCNC: 3.9 G/DL — SIGNIFICANT CHANGE UP (ref 3.3–5)
ALP SERPL-CCNC: 67 U/L — SIGNIFICANT CHANGE UP (ref 40–120)
ALT FLD-CCNC: 20 U/L — SIGNIFICANT CHANGE UP (ref 10–45)
ANION GAP SERPL CALC-SCNC: 12 MMOL/L — SIGNIFICANT CHANGE UP (ref 5–17)
APPEARANCE UR: CLEAR — SIGNIFICANT CHANGE UP
APTT BLD: 32 SEC — SIGNIFICANT CHANGE UP (ref 27.5–35.5)
AST SERPL-CCNC: 18 U/L — SIGNIFICANT CHANGE UP (ref 10–40)
BACTERIA # UR AUTO: SIGNIFICANT CHANGE UP /HPF
BASOPHILS # BLD AUTO: 0.05 K/UL — SIGNIFICANT CHANGE UP (ref 0–0.2)
BASOPHILS NFR BLD AUTO: 0.8 % — SIGNIFICANT CHANGE UP (ref 0–2)
BILIRUB SERPL-MCNC: 0.3 MG/DL — SIGNIFICANT CHANGE UP (ref 0.2–1.2)
BILIRUB UR-MCNC: NEGATIVE — SIGNIFICANT CHANGE UP
BLD GP AB SCN SERPL QL: POSITIVE — SIGNIFICANT CHANGE UP
BUN SERPL-MCNC: 14 MG/DL — SIGNIFICANT CHANGE UP (ref 7–23)
CALCIUM SERPL-MCNC: 9 MG/DL — SIGNIFICANT CHANGE UP (ref 8.4–10.5)
CHLORIDE SERPL-SCNC: 101 MMOL/L — SIGNIFICANT CHANGE UP (ref 96–108)
CO2 SERPL-SCNC: 24 MMOL/L — SIGNIFICANT CHANGE UP (ref 22–31)
COLOR SPEC: YELLOW — SIGNIFICANT CHANGE UP
CREAT SERPL-MCNC: 1.22 MG/DL — SIGNIFICANT CHANGE UP (ref 0.5–1.3)
DIFF PNL FLD: NEGATIVE — SIGNIFICANT CHANGE UP
EGFR: 44 ML/MIN/1.73M2 — LOW
EOSINOPHIL # BLD AUTO: 0.13 K/UL — SIGNIFICANT CHANGE UP (ref 0–0.5)
EOSINOPHIL NFR BLD AUTO: 2.1 % — SIGNIFICANT CHANGE UP (ref 0–6)
EPI CELLS # UR: SIGNIFICANT CHANGE UP /HPF (ref 0–5)
GLUCOSE SERPL-MCNC: 105 MG/DL — HIGH (ref 70–99)
GLUCOSE UR QL: NEGATIVE — SIGNIFICANT CHANGE UP
HCT VFR BLD CALC: 23.8 % — LOW (ref 34.5–45)
HCT VFR BLD CALC: 26.9 % — LOW (ref 34.5–45)
HGB BLD-MCNC: 7.2 G/DL — LOW (ref 11.5–15.5)
HGB BLD-MCNC: 8.1 G/DL — LOW (ref 11.5–15.5)
IMM GRANULOCYTES NFR BLD AUTO: 0.3 % — SIGNIFICANT CHANGE UP (ref 0–0.9)
INR BLD: 1.45 — HIGH (ref 0.88–1.16)
KETONES UR-MCNC: NEGATIVE — SIGNIFICANT CHANGE UP
LEUKOCYTE ESTERASE UR-ACNC: ABNORMAL
LIDOCAIN IGE QN: 25 U/L — SIGNIFICANT CHANGE UP (ref 7–60)
LYMPHOCYTES # BLD AUTO: 0.91 K/UL — LOW (ref 1–3.3)
LYMPHOCYTES # BLD AUTO: 15 % — SIGNIFICANT CHANGE UP (ref 13–44)
MCHC RBC-ENTMCNC: 26.1 PG — LOW (ref 27–34)
MCHC RBC-ENTMCNC: 26.2 PG — LOW (ref 27–34)
MCHC RBC-ENTMCNC: 30.1 GM/DL — LOW (ref 32–36)
MCHC RBC-ENTMCNC: 30.3 GM/DL — LOW (ref 32–36)
MCV RBC AUTO: 86.5 FL — SIGNIFICANT CHANGE UP (ref 80–100)
MCV RBC AUTO: 86.8 FL — SIGNIFICANT CHANGE UP (ref 80–100)
MONOCYTES # BLD AUTO: 0.57 K/UL — SIGNIFICANT CHANGE UP (ref 0–0.9)
MONOCYTES NFR BLD AUTO: 9.4 % — SIGNIFICANT CHANGE UP (ref 2–14)
NEUTROPHILS # BLD AUTO: 4.39 K/UL — SIGNIFICANT CHANGE UP (ref 1.8–7.4)
NEUTROPHILS NFR BLD AUTO: 72.4 % — SIGNIFICANT CHANGE UP (ref 43–77)
NITRITE UR-MCNC: NEGATIVE — SIGNIFICANT CHANGE UP
NRBC # BLD: 0 /100 WBCS — SIGNIFICANT CHANGE UP (ref 0–0)
NRBC # BLD: 0 /100 WBCS — SIGNIFICANT CHANGE UP (ref 0–0)
NT-PROBNP SERPL-SCNC: 513 PG/ML — HIGH (ref 0–300)
PH UR: 7 — SIGNIFICANT CHANGE UP (ref 5–8)
PLATELET # BLD AUTO: 307 K/UL — SIGNIFICANT CHANGE UP (ref 150–400)
PLATELET # BLD AUTO: 320 K/UL — SIGNIFICANT CHANGE UP (ref 150–400)
POTASSIUM SERPL-MCNC: 4.3 MMOL/L — SIGNIFICANT CHANGE UP (ref 3.5–5.3)
POTASSIUM SERPL-SCNC: 4.3 MMOL/L — SIGNIFICANT CHANGE UP (ref 3.5–5.3)
PROT SERPL-MCNC: 7.2 G/DL — SIGNIFICANT CHANGE UP (ref 6–8.3)
PROT UR-MCNC: NEGATIVE MG/DL — SIGNIFICANT CHANGE UP
PROTHROM AB SERPL-ACNC: 17.3 SEC — HIGH (ref 10.5–13.4)
RBC # BLD: 2.75 M/UL — LOW (ref 3.8–5.2)
RBC # BLD: 3.1 M/UL — LOW (ref 3.8–5.2)
RBC # FLD: 14.6 % — HIGH (ref 10.3–14.5)
RBC # FLD: 14.6 % — HIGH (ref 10.3–14.5)
RBC CASTS # UR COMP ASSIST: < 5 /HPF — SIGNIFICANT CHANGE UP
RH IG SCN BLD-IMP: POSITIVE — SIGNIFICANT CHANGE UP
SARS-COV-2 RNA SPEC QL NAA+PROBE: NEGATIVE — SIGNIFICANT CHANGE UP
SODIUM SERPL-SCNC: 137 MMOL/L — SIGNIFICANT CHANGE UP (ref 135–145)
SP GR SPEC: 1.01 — SIGNIFICANT CHANGE UP (ref 1–1.03)
TROPONIN T SERPL-MCNC: 0.01 NG/ML — SIGNIFICANT CHANGE UP (ref 0–0.01)
TROPONIN T SERPL-MCNC: <0.01 NG/ML — SIGNIFICANT CHANGE UP (ref 0–0.01)
UROBILINOGEN FLD QL: 0.2 E.U./DL — SIGNIFICANT CHANGE UP
WBC # BLD: 6.07 K/UL — SIGNIFICANT CHANGE UP (ref 3.8–10.5)
WBC # BLD: 7.9 K/UL — SIGNIFICANT CHANGE UP (ref 3.8–10.5)
WBC # FLD AUTO: 6.07 K/UL — SIGNIFICANT CHANGE UP (ref 3.8–10.5)
WBC # FLD AUTO: 7.9 K/UL — SIGNIFICANT CHANGE UP (ref 3.8–10.5)
WBC UR QL: < 5 /HPF — SIGNIFICANT CHANGE UP

## 2022-09-16 PROCEDURE — 87635 SARS-COV-2 COVID-19 AMP PRB: CPT

## 2022-09-16 PROCEDURE — 85610 PROTHROMBIN TIME: CPT

## 2022-09-16 PROCEDURE — 36430 TRANSFUSION BLD/BLD COMPNT: CPT

## 2022-09-16 PROCEDURE — 81001 URINALYSIS AUTO W/SCOPE: CPT

## 2022-09-16 PROCEDURE — 36415 COLL VENOUS BLD VENIPUNCTURE: CPT

## 2022-09-16 PROCEDURE — 85730 THROMBOPLASTIN TIME PARTIAL: CPT

## 2022-09-16 PROCEDURE — 83690 ASSAY OF LIPASE: CPT

## 2022-09-16 PROCEDURE — 93010 ELECTROCARDIOGRAM REPORT: CPT

## 2022-09-16 PROCEDURE — 99285 EMERGENCY DEPT VISIT HI MDM: CPT | Mod: CS,25

## 2022-09-16 PROCEDURE — 86901 BLOOD TYPING SEROLOGIC RH(D): CPT

## 2022-09-16 PROCEDURE — 80053 COMPREHEN METABOLIC PANEL: CPT

## 2022-09-16 PROCEDURE — 93306 TTE W/DOPPLER COMPLETE: CPT | Mod: 26

## 2022-09-16 PROCEDURE — 71275 CT ANGIOGRAPHY CHEST: CPT | Mod: 26,MA

## 2022-09-16 PROCEDURE — 96365 THER/PROPH/DIAG IV INF INIT: CPT | Mod: XU

## 2022-09-16 PROCEDURE — 86902 BLOOD TYPE ANTIGEN DONOR EA: CPT

## 2022-09-16 PROCEDURE — P9016: CPT

## 2022-09-16 PROCEDURE — 86922 COMPATIBILITY TEST ANTIGLOB: CPT

## 2022-09-16 PROCEDURE — 93005 ELECTROCARDIOGRAM TRACING: CPT

## 2022-09-16 PROCEDURE — 85027 COMPLETE CBC AUTOMATED: CPT

## 2022-09-16 PROCEDURE — 84484 ASSAY OF TROPONIN QUANT: CPT

## 2022-09-16 PROCEDURE — 96375 TX/PRO/DX INJ NEW DRUG ADDON: CPT | Mod: XU

## 2022-09-16 PROCEDURE — 74174 CTA ABD&PLVS W/CONTRAST: CPT | Mod: MA

## 2022-09-16 PROCEDURE — 71275 CT ANGIOGRAPHY CHEST: CPT | Mod: MA

## 2022-09-16 PROCEDURE — 74174 CTA ABD&PLVS W/CONTRAST: CPT | Mod: 26,MA

## 2022-09-16 PROCEDURE — 93306 TTE W/DOPPLER COMPLETE: CPT

## 2022-09-16 PROCEDURE — 99285 EMERGENCY DEPT VISIT HI MDM: CPT | Mod: 25

## 2022-09-16 PROCEDURE — 86850 RBC ANTIBODY SCREEN: CPT

## 2022-09-16 PROCEDURE — 85025 COMPLETE CBC W/AUTO DIFF WBC: CPT

## 2022-09-16 PROCEDURE — 86900 BLOOD TYPING SEROLOGIC ABO: CPT

## 2022-09-16 PROCEDURE — 83880 ASSAY OF NATRIURETIC PEPTIDE: CPT

## 2022-09-16 RX ORDER — ONDANSETRON 8 MG/1
4 TABLET, FILM COATED ORAL ONCE
Refills: 0 | Status: COMPLETED | OUTPATIENT
Start: 2022-09-16 | End: 2022-09-16

## 2022-09-16 RX ORDER — METOCLOPRAMIDE HCL 10 MG
10 TABLET ORAL ONCE
Refills: 0 | Status: COMPLETED | OUTPATIENT
Start: 2022-09-16 | End: 2022-09-16

## 2022-09-16 RX ORDER — FAMOTIDINE 10 MG/ML
20 INJECTION INTRAVENOUS ONCE
Refills: 0 | Status: COMPLETED | OUTPATIENT
Start: 2022-09-16 | End: 2022-09-16

## 2022-09-16 RX ORDER — METOCLOPRAMIDE HCL 10 MG
10 TABLET ORAL ONCE
Refills: 0 | Status: DISCONTINUED | OUTPATIENT
Start: 2022-09-16 | End: 2022-09-16

## 2022-09-16 RX ORDER — ACETAMINOPHEN 500 MG
975 TABLET ORAL ONCE
Refills: 0 | Status: COMPLETED | OUTPATIENT
Start: 2022-09-16 | End: 2022-09-16

## 2022-09-16 RX ORDER — LIDOCAINE 4 G/100G
10 CREAM TOPICAL ONCE
Refills: 0 | Status: COMPLETED | OUTPATIENT
Start: 2022-09-16 | End: 2022-09-16

## 2022-09-16 RX ORDER — CEFTRIAXONE 500 MG/1
2000 INJECTION, POWDER, FOR SOLUTION INTRAMUSCULAR; INTRAVENOUS ONCE
Refills: 0 | Status: COMPLETED | OUTPATIENT
Start: 2022-09-16 | End: 2022-09-16

## 2022-09-16 RX ORDER — DIPHENHYDRAMINE HCL 50 MG
50 CAPSULE ORAL ONCE
Refills: 0 | Status: DISCONTINUED | OUTPATIENT
Start: 2022-09-16 | End: 2022-09-16

## 2022-09-16 RX ADMIN — Medication 104 MILLIGRAM(S): at 12:49

## 2022-09-16 RX ADMIN — ONDANSETRON 4 MILLIGRAM(S): 8 TABLET, FILM COATED ORAL at 09:00

## 2022-09-16 RX ADMIN — CEFTRIAXONE 100 MILLIGRAM(S): 500 INJECTION, POWDER, FOR SOLUTION INTRAMUSCULAR; INTRAVENOUS at 11:48

## 2022-09-16 RX ADMIN — Medication 30 MILLILITER(S): at 08:23

## 2022-09-16 RX ADMIN — FAMOTIDINE 20 MILLIGRAM(S): 10 INJECTION INTRAVENOUS at 08:23

## 2022-09-16 RX ADMIN — Medication 975 MILLIGRAM(S): at 12:50

## 2022-09-16 RX ADMIN — CEFTRIAXONE 2000 MILLIGRAM(S): 500 INJECTION, POWDER, FOR SOLUTION INTRAMUSCULAR; INTRAVENOUS at 12:30

## 2022-09-16 RX ADMIN — LIDOCAINE 10 MILLILITER(S): 4 CREAM TOPICAL at 08:23

## 2022-09-16 NOTE — ED PROVIDER NOTE - PATIENT PORTAL LINK FT
You can access the FollowMyHealth Patient Portal offered by [facility] by registering at the following website: [email or web address]. By joining [employer]’s FollowMyHealth portal, you will also be able to view your health information using other applications (apps) compatible with our system.

## 2022-09-16 NOTE — ED PROVIDER NOTE - OBJECTIVE STATEMENT
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  D-dimer neg, trop neg x 3, EKG non-ischemic. CTH unremarkable. Patient with persistent headache and chest pain during hospitalization. Cardiology consulted. Started NTG SL PRN for chest pain, w/ minimal relief. CT Chest stable thoracic aneurysm 4.2cm.  Hospital course then notable for fever; pt bacteremic (BC 9/5 positive for gemella species) and she was started on IV abx. Pt had IRMA 9/6 s/f TAVR valve is seen in the aortic position (flow acceleration through the aortic prosthesis, visually there is leaflet restriction), No valvular vegetations noted, Trace AR, Mod MR, Mild- Mod TR. Subsequently NM Gallium Scan whole body/cardiac performed 9/8 which showed Normal whole body gallium scan. 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  OMFS consulted and CT Maxillofacial performed notable for periapical lucencies adjacent to the roots of all of the residual maxillary teeth except for the left molar, which could indicate infection-related periapical cysts or periapical abscess formation. Prior root canals of the maxillary teeth. Tooth number 4 is noted to be cracked. No gross findings of facial cellulitis.    Subsequent blood culture 9/8 s/f GNR E Coli for which the source of bacteremia infected molar.  Pt refused dental extraction; pt preferred procedure to be done with her own dentist.  Pt s/p PICC placement on 9/13.  Hospital course significant for BIBIANA on CKD, pts home Lasix and Lisinopril was held w/ improvement of Cr to 1.25 (baseline) prior to d/c. Pt is to restart Lasix 20mg QD on d/c and continue to hold Lisinorpil until evaluated by outpt cardiologist. Pt was started on Amlodipine 10mg QD and Imdur 30mg QD for BP management, as well as CP management. 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  D-dimer neg, trop neg x 3, EKG non-ischemic. CTH unremarkable. Patient with persistent headache and chest pain during hospitalization. Cardiology consulted. Started NTG SL PRN for chest pain, w/ minimal relief. CT Chest stable thoracic aneurysm 4.2cm.  Hospital course then notable for fever; pt bacteremic (BC 9/5 positive for gemella species) and she was started on IV abx. Pt had IRMA 9/6 s/f TAVR valve is seen in the aortic position (flow acceleration through the aortic prosthesis, visually there is leaflet restriction), No valvular vegetations noted, Trace AR, Mod MR, Mild- Mod TR. Subsequently NM Gallium Scan whole body/cardiac performed 9/8 which showed Normal whole body gallium scan. 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  OMFS consulted and CT Maxillofacial performed notable for periapical lucencies adjacent to the roots of all of the residual maxillary teeth except for the left molar, which could indicate infection-related periapical cysts or periapical abscess formation. Prior root canals of the maxillary teeth. Tooth number 4 is noted to be cracked. No gross findings of facial cellulitis.    Subsequent blood culture 9/8 s/f GNR E Coli for which the source of bacteremia infected molar.  Pt refused dental extraction; pt preferred procedure to be done with her own dentist.  Pt s/p PICC placement on 9/13.  Hospital course significant for BIBIANA on CKD, pts home Lasix and Lisinopril was held w/ improvement of Cr to 1.25 (baseline) prior to d/c. Pt is to restart Lasix 20mg QD on d/c and continue to hold Lisinorpil until evaluated by outpt cardiologist. Pt was started on Amlodipine 10mg QD and Imdur 30mg QD for BP management, as well as CP management.  Pt returns today c/o episode of acute sob w sscp this am at 130; relieved w ntg but returned ~ 1 hr later, again given ntg but w less relief.  Pt now notes 1/10 sscp and sob - feels she can't take a deep breath.  Pt states sx usually start after exertion but these sx started after she went to the bathroom, strained 2/2 constipation and then returned to the bed; sx started while at rest in the bed.  + radiation to L mid back. Pt noted epigastric area abd pain. + n w/o vomiting, no gerd sx.  No fever, cough, uri sx.  Pt has not had her dental work yet.  Pt noted some swelling in bilat feet but did not note other edema.  Sx similar to prior cp/sob sx.  Daughter notes it happened before w high bp - bp in NH notes indicate 180's, EMS bp 160's.  Pt also w ha on prior episodes, no ha today.  Pt w h/o anemia - hgb 7's during last admit; last tranfusion 2020.  History via daughter interpreting per pt's preference. 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  D-dimer neg, trop neg x 3, EKG non-ischemic. CTH unremarkable. Patient with persistent headache and chest pain during hospitalization. Cardiology consulted. Started NTG SL PRN for chest pain, w/ minimal relief. CT Chest stable thoracic aneurysm 4.2cm.  Hospital course then notable for fever; pt bacteremic (BC 9/5 positive for gemella species) and she was started on IV abx. Pt had IRMA 9/6 s/f TAVR valve is seen in the aortic position (flow acceleration through the aortic prosthesis, visually there is leaflet restriction), No valvular vegetations noted, Trace AR, Mod MR, Mild- Mod TR. Subsequently NM Gallium Scan whole body/cardiac performed 9/8 which showed Normal whole body gallium scan. 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  OMFS consulted and CT Maxillofacial performed notable for periapical lucencies adjacent to the roots of all of the residual maxillary teeth except for the left molar, which could indicate infection-related periapical cysts or periapical abscess formation. Prior root canals of the maxillary teeth. Tooth number 4 is noted to be cracked. No gross findings of facial cellulitis.    Subsequent blood culture 9/8 s/f GNR E Coli for which the source of bacteremia infected molar.  Pt refused dental extraction; pt preferred procedure to be done with her own dentist.  Pt s/p PICC placement on 9/13.  Hospital course significant for BIBIANA on CKD, pts home Lasix and Lisinopril was held w/ improvement of Cr to 1.25 (baseline) prior to d/c. Pt is to restart Lasix 20mg QD on d/c and continue to hold Lisinorpil until evaluated by outpt cardiologist. Pt was started on Amlodipine 10mg QD and Imdur 30mg QD for BP management, as well as CP management.  Pt returns today c/o episode of acute sob w sscp this am at 130; relieved w ntg but returned ~ 1 hr later, again given ntg but w less relief.  Pt now notes 1/10 sscp and sob - feels she can't take a deep breath.  Pt states sx usually start after exertion but these sx started after she went to the bathroom, strained 2/2 constipation and then returned to the bed; sx started while at rest in the bed.  + radiation to L mid back. Pt noted epigastric area abd pain. + n w/o vomiting, no gerd sx.  No fever, cough, uri sx.  Pt has not had her dental work yet.  Pt noted some swelling in bilat feet but did not note other edema.  Sx similar to prior cp/sob sx.  Daughter notes it happened before w high bp - bp in NH notes indicate 180's, EMS bp 160's.  Pt also w ha on prior episodes, no ha today.  Pt w h/o anemia - hgb 7's during last admit; last tranfusion 2020.  History via daughter interpreting per pt's preference.

## 2022-09-16 NOTE — ED ADULT NURSE REASSESSMENT NOTE - NS ED NURSE REASSESS COMMENT FT1
Pt A&Ox4 in NAD, denies any new or worsening s/s, resting comfortably in stretcher, transported to ECHO with BHUPENDRA Nath and ALTAGRACIA on blood transfusion off monitor as ordered and approved by MD Director.

## 2022-09-16 NOTE — ED ADULT TRIAGE NOTE - ARRIVAL INFO ADDITIONAL COMMENTS
pt sent from [facility] after c/o chest pain since 2am despite being given NGT.   was discharged 2 days ago from [facility] after admission and work up for chest pain

## 2022-09-16 NOTE — ED ADULT NURSE REASSESSMENT NOTE - NS ED NURSE REASSESS COMMENT FT1
pt laying awake in stretcher on cardiac monitor. pending transport back to [facility] rehab. pt in no acute distress.

## 2022-09-16 NOTE — ED ADULT TRIAGE NOTE - CCCP TRG CHIEF CMPLNT
Patient received awake and alert. Affect anxious, mood \"que sera sera.\"  Denies SI/HI. Denies auditory or visual hallucination. States she slept \"fine.\"  Cooperative and pleasant. Visible in milieu, social with peers.  Compliant with medications.      Spoke with Dr. Saucedo on the phone this afternoon regarding patient's upcoming discharge; Dr. Saucedo expressed concerns regarding the discharge plan; these were communicated to Dr. Grayson and to SW.  Information on follow-up appointment with hematologist obtained from prior After Visit Summary at Dr. Saucedo's request, and this was added to patient's discharge papers.  Dr. Saucedo states patient will need to see hematologist in order to be transitioned from eliquis to warfarin as outpatient.     chest pain

## 2022-09-16 NOTE — ED PROVIDER NOTE - CARE PROVIDERS DIRECT ADDRESSES
[email or web address]

## 2022-09-16 NOTE — ED ADULT NURSE NOTE - NSIMPLEMENTINTERV_GEN_ALL_ED
Implemented All Fall Risk Interventions:  [place] to call system. Call bell, personal items and telephone within reach. Instruct patient to call for assistance. Room bathroom lighting operational. Non-slip footwear when patient is off stretcher. Physically safe environment: no spills, clutter or unnecessary equipment. Stretcher in lowest position, wheels locked, appropriate side rails in place. Provide visual cue, wrist band, yellow gown, etc. Monitor gait and stability. Monitor for mental status changes and reorient to person, place, and time. Review medications for side effects contributing to fall risk. Reinforce activity limits and safety measures with patient and family.

## 2022-09-16 NOTE — ED PROVIDER NOTE - PHYSICAL EXAMINATION
VITAL SIGNS: I have reviewed nursing notes and confirm.  CONSTITUTIONAL: Well-developed; well-nourished; in no acute distress.   SKIN:  warm and dry, no acute rash.   HEAD:  normocephalic, atraumatic.  EYES: PERRL, EOM intact; conjunctiva and sclera clear.  ENT: No nasal discharge; airway clear.   NECK: Supple; non tender.  CARD: S1, S2 normal; + murmurs, no gallops, or rubs. Regular rate and rhythm.   RESP:  Clear to auscultation b/l, no wheezes, rales or rhonchi.  ABD: Normal bowel sounds; soft; non-distended; ttp epigastric, umbilical, llq, rlq, no pulsatile mass; no guarding/ rebound.  MSK: Normal ROM. No clubbing, cyanosis or edema. no ttp bilat le  NEURO: Alert, oriented, grossly unremarkable  PSYCH: Cooperative, mood and affect appropriate.

## 2022-09-16 NOTE — ED PROVIDER NOTE - PROGRESS NOTE DETAILS
Labs w nl wbc, low hgb c/w dc hgb level, nl chem, bnp 513, trop neg x 1 (2nd pending), cta neg for acute process but + chronic lung disease.  Dr More consulted and will eval.  Dr Marte consulted - states pt had neg cta cardiac 1/8/22 (minimal disease); no reason for card tele admit from his standpoint.  Pt discussed w her pmd, Dr Hoskins, who wants pt to receive 1-2 units prbc; rbc ordered.  She requests we admit to hospitalist c if pt is admitted to med.  CT surg consulted - familiar w pt and state that pt had no evidence of endocarditis on prior admit; they will come see pt.  Echo ordered and pending.  Plan for admit but assessing which service.  Pt reports cp, nausea improved after meds but sob cont.  Pt noted to have lewis when moving in/around the bed. Pt pending echo result.  Pt discussed w ct surg - no acute ct surg issues; recommend card which has already been addressed w Dr Marte.  Pt pending eval by Dr More as well.  RBC infused.  Rpt cbc ordered.   Pt signed out to Dr Aguilar. Pt c/o feeling unwell.  Discussed w pt via  [id number] - no sig change in sx from initial complaint, pt just cont to feel unwell and notes lewis.  Echo, prbc pending, ct surg and Dr Derik desai pending.  Dr More updated w recent results, consults and events; he is coming to see pt. Echo w severe AS w TAVR in place, increased pulm htn from prior.   Pt discussed w ct surg - no acute ct surg issues; recommend card which has already been addressed w Dr Marte.  Pt eval by Dr More who felt pt could be dc'd.  RBC infused.  Rpt cbc ordered.   Pt signed out to Dr Aguilar pending cbc; likely dc back to [facility]. Echo w severe AS w TAVR in place, increased pulm htn from prior.   Pt discussed w ct surg - no acute ct surg issues; recommend card which has already been addressed w Dr Marte.  Pt eval by Dr More who felt pt could be dc'd.  RBC infused.  Echo reviewed w CT surg - Dr Alejo reviewed images and compared to prior; he felt pt ok for dc and fu in clinica Rpt cbc ordered w improved hgb.  Message left for Dr Hoskins w updates about ed course/results.  Pt's daughter updated w results and plan for dc.  Will dc.  Results and plan discussed w pt w jed  [id number].

## 2022-09-16 NOTE — ED PROVIDER NOTE - CARE PROVIDER_API CALL
Bere More)  Critical Care Medicine; Pulmonary Disease  [address], [address] [place] [address]  Phone: [phone]  Fax: [phone]  Follow Up Time:     Solomon Alejo)  Cardiology; Interventional Cardiology  [address] [place] [address]  Phone: [phone]  Fax: [phone]  Follow Up Time:     Kristy Hoskins)  Medicine  [address] [place] [address], [address]  [place] [address]  Phone: [phone]  Fax: [phone]  Follow Up Time:     Derek Marte)  Cardiovascular Disease; Internal Medicine  Cardiology [facility], [address] [place] [address]  Phone: [phone]  Fax: [phone]  Follow Up Time:

## 2022-09-16 NOTE — ED PROVIDER NOTE - NSFOLLOWUPINSTRUCTIONS_ED_ALL_ED_FT
Chest pain  Shortness of breath    Please continue to follow with your doctor team - Dr Alejo, Dr More, Dr Marte, and Dr Hoskins.     Please restart and use your oxygen all the time.     Please call Dr Marte's office ([phone]) on Monday to arrange a telemedicine appointment.      Return for increased pain, difficulty breathing, palpitations, any other concerns.     Chest Pain    Chest pain can be caused by many different conditions which may or may not be dangerous. Causes include heartburn, lung infections, heart attack, blood clot in lungs, skin infections, strain or damage to muscle, cartilage, or bones, etc. In addition to a history and physical examination, an electrocardiogram (ECG) or other lab tests may have been performed to determine the cause of your chest pain. Follow up with your primary care provider or with a cardiologist as instructed.     SEEK IMMEDIATE MEDICAL CARE IF YOU HAVE ANY OF THE FOLLOWING SYMPTOMS: worsening chest pain, coughing up blood, unexplained back/neck/jaw pain, severe abdominal pain, dizziness or lightheadedness, fainting, shortness of breath, sweaty or clammy skin, vomiting, or racing heart beat. These symptoms may represent a serious problem that is an emergency. Do not wait to see if the symptoms will go away. Get medical help right away. Call 911 and do not drive yourself to the hospital.  --  Shortness of breath    Shortness of breath (dyspnea) means you have trouble breathing and could indicate a medical problem. Causes include lung disease, heart disease, low amount of red blood cells (anemia), poor physical fitness, being overweight, smoking, etc. Your health care provider today may not be able to find a cause for your shortness of breath after your exam. In this case, it is important to have a follow-up exam with your primary care physician as instructed. If medicines were prescribed, take them as directed for the full length of time directed. Refrain from tobacco products.    SEEK IMMEDIATE MEDICAL CARE IF YOU HAVE ANY OF THE FOLLOWING SYMPTOMS: worsening shortness of breath, chest pain, back pain, abdominal pain, fever, coughing up blood, lightheadedness/dizziness.

## 2022-09-16 NOTE — CHART NOTE - NSCHARTNOTEFT_GEN_A_CORE
82F Macedonian speaking PMHx HTN, HLD, severe AS s/p TAVR (2019), Ascending Aortic Aneurysm 4cm in 01/2022, pAFib (on Amiodarone+Eliquis), COPD (s/p 2L home O2), Colon cancer s/p resection in 2019 (in remission), FEROZ & CKD3. She was also treated with anticoagulation for possible AV thrombus in 2021 and recent admission with gamella bacteremia.    Problem 1: Bacteremia s/p TAVR  - Case discussed with Dr. Alejo  - Consult called given patient has prosthetic TAVR valve and previous admission had bacteremic (GPC)  - No intervention per Structural heart    Problem 2: Chest pain  - Patient with atypical chest pain  - Agree with TTE  - CTA PE negative for PE  - Consider cardiology consult if concern for ACS 82F Albanian speaking PMHx HTN, HLD, severe AS s/p TAVR (2019), Ascending Aortic Aneurysm 4cm in 01/2022, pAFib (on Amiodarone+Eliquis), COPD (s/p 2L home O2), Colon cancer s/p resection in 2019 (in remission), FEROZ & CKD3. She was also treated with anticoagulation for possible AV thrombus in 2021 and recent admission with gamella bacteremia.    Problem 1: Bacteremia s/p TAVR  - Case discussed with Dr. Alejo  - Consult called given patient has prosthetic TAVR valve and previous admission had bacteremic (GPC)  - No intervention per Structural heart  -Prior chat demonstrated no significant as elevated gradient due to pressure recovery    Problem 2: Chest pain  - Patient with atypical chest pain  - Agree with TTE  - CTA PE negative for PE  - Consider cardiology consult if concern for ACS

## 2022-09-16 NOTE — ED PROVIDER NOTE - CLINICAL SUMMARY MEDICAL DECISION MAKING FREE TEXT BOX
Pt c/o cp, sob, no c/o abd pain but + ttp on abd exam, recent bacteremia felt to be 2/2 dental source and c/o cp/sob during last admit.  BP slightly high, nl sat, nl lung exam.  ? pulm vs cardiac vs referred gi.  EKG unchanged from prior.  Recent ct w/o dissection, sig change in aortic valve, no vegetations on echo.  Labs sig for anemia stable from prior, neg trop (sx started at 130a), slightly elevated bnp.  Plan cta for dissection given c/o cp, sob, abd ttp; will also hopefully eval for pe, intra-abd issue.  Will try gi meds for poss gerd/gastritis.  Reassess.

## 2022-09-16 NOTE — ED ADULT NURSE REASSESSMENT NOTE - NS ED NURSE REASSESS COMMENT FT1
Director at bedside for patient evaluation. pt on cardiac monitor. pt c/o SOB and generalized abd pain. pending further testing.

## 2022-09-16 NOTE — ED PROVIDER NOTE - PROVIDER TOKENS
PROVIDER:[TOKEN:[[id number]]],PROVIDER:[TOKEN:[[id number]]],PROVIDER:[TOKEN:[[id number]]],PROVIDER:[TOKEN:[[id number]]]

## 2022-09-16 NOTE — ED ADULT NURSE REASSESSMENT NOTE - NS ED NURSE REASSESS COMMENT FT1
pt received from LAVERNE Martell RN. pt laying in stretcher on cardiac monitor c/o SOB. pt on 2L NC spo2 96%. pt boosted in bed, HOB placed in high fowlers. pt educated on deep breathing techniques. pt pending transport to [facility] rehab. pt updated on plan of care. understanding verbalized pt received from LAVERNE Martell RN. pt laying in stretcher on cardiac monitor c/o SOB. pt remains on 2L NC spo2 96%. pt boosted in bed, HOB placed in high fowlers. pt educated on deep breathing techniques. pt pending transport to [facility] [place] rehab. pt updated on plan of care. understanding verbalized

## 2022-09-19 DIAGNOSIS — R06.02 SHORTNESS OF BREATH: ICD-10-CM

## 2022-09-19 DIAGNOSIS — G47.33 OBSTRUCTIVE SLEEP APNEA (ADULT) (PEDIATRIC): ICD-10-CM

## 2022-09-19 DIAGNOSIS — Z79.01 LONG TERM (CURRENT) USE OF ANTICOAGULANTS: ICD-10-CM

## 2022-09-19 DIAGNOSIS — E78.5 HYPERLIPIDEMIA, UNSPECIFIED: ICD-10-CM

## 2022-09-19 DIAGNOSIS — R00.1 BRADYCARDIA, UNSPECIFIED: ICD-10-CM

## 2022-09-19 DIAGNOSIS — D63.1 ANEMIA IN CHRONIC KIDNEY DISEASE: ICD-10-CM

## 2022-09-19 DIAGNOSIS — Z85.038 PERSONAL HISTORY OF OTHER MALIGNANT NEOPLASM OF LARGE INTESTINE: ICD-10-CM

## 2022-09-19 DIAGNOSIS — R07.9 CHEST PAIN, UNSPECIFIED: ICD-10-CM

## 2022-09-19 DIAGNOSIS — I48.0 PAROXYSMAL ATRIAL FIBRILLATION: ICD-10-CM

## 2022-09-19 DIAGNOSIS — Z20.822 CONTACT WITH AND (SUSPECTED) EXPOSURE TO COVID-19: ICD-10-CM

## 2022-09-19 DIAGNOSIS — I12.9 HYPERTENSIVE CHRONIC KIDNEY DISEASE WITH STAGE 1 THROUGH STAGE 4 CHRONIC KIDNEY DISEASE, OR UNSPECIFIED CHRONIC KIDNEY DISEASE: ICD-10-CM

## 2022-09-19 DIAGNOSIS — N18.30 CHRONIC KIDNEY DISEASE, STAGE 3 UNSPECIFIED: ICD-10-CM

## 2022-09-19 DIAGNOSIS — Z88.8 ALLERGY STATUS TO OTHER DRUGS, MEDICAMENTS AND BIOLOGICAL SUBSTANCES: ICD-10-CM

## 2022-09-19 DIAGNOSIS — J44.9 CHRONIC OBSTRUCTIVE PULMONARY DISEASE, UNSPECIFIED: ICD-10-CM

## 2022-09-20 VITALS
HEIGHT: 65 IN | TEMPERATURE: 98 F | RESPIRATION RATE: 18 BRPM | WEIGHT: 250 LBS | OXYGEN SATURATION: 96 % | DIASTOLIC BLOOD PRESSURE: 69 MMHG | SYSTOLIC BLOOD PRESSURE: 149 MMHG | HEART RATE: 58 BPM

## 2022-09-20 LAB
ALBUMIN SERPL ELPH-MCNC: 4 G/DL — SIGNIFICANT CHANGE UP (ref 3.3–5)
ALP SERPL-CCNC: 69 U/L — SIGNIFICANT CHANGE UP (ref 40–120)
ALT FLD-CCNC: 19 U/L — SIGNIFICANT CHANGE UP (ref 10–45)
ANION GAP SERPL CALC-SCNC: 13 MMOL/L — SIGNIFICANT CHANGE UP (ref 5–17)
APTT BLD: 31.7 SEC — SIGNIFICANT CHANGE UP (ref 27.5–35.5)
AST SERPL-CCNC: 16 U/L — SIGNIFICANT CHANGE UP (ref 10–40)
BASOPHILS # BLD AUTO: 0.06 K/UL — SIGNIFICANT CHANGE UP (ref 0–0.2)
BASOPHILS NFR BLD AUTO: 0.9 % — SIGNIFICANT CHANGE UP (ref 0–2)
BILIRUB SERPL-MCNC: 0.3 MG/DL — SIGNIFICANT CHANGE UP (ref 0.2–1.2)
BLD GP AB SCN SERPL QL: POSITIVE — SIGNIFICANT CHANGE UP
BUN SERPL-MCNC: 11 MG/DL — SIGNIFICANT CHANGE UP (ref 7–23)
CALCIUM SERPL-MCNC: 8.8 MG/DL — SIGNIFICANT CHANGE UP (ref 8.4–10.5)
CHLORIDE SERPL-SCNC: 100 MMOL/L — SIGNIFICANT CHANGE UP (ref 96–108)
CO2 SERPL-SCNC: 23 MMOL/L — SIGNIFICANT CHANGE UP (ref 22–31)
CREAT SERPL-MCNC: 1.1 MG/DL — SIGNIFICANT CHANGE UP (ref 0.5–1.3)
EGFR: 50 ML/MIN/1.73M2 — LOW
EOSINOPHIL # BLD AUTO: 0.12 K/UL — SIGNIFICANT CHANGE UP (ref 0–0.5)
EOSINOPHIL NFR BLD AUTO: 1.8 % — SIGNIFICANT CHANGE UP (ref 0–6)
GLUCOSE SERPL-MCNC: 112 MG/DL — HIGH (ref 70–99)
HCT VFR BLD CALC: 25.9 % — LOW (ref 34.5–45)
HGB BLD-MCNC: 8.1 G/DL — LOW (ref 11.5–15.5)
IMM GRANULOCYTES NFR BLD AUTO: 0.5 % — SIGNIFICANT CHANGE UP (ref 0–0.9)
INR BLD: 1.34 — HIGH (ref 0.88–1.16)
LACTATE SERPL-SCNC: 1.5 MMOL/L — SIGNIFICANT CHANGE UP (ref 0.5–2)
LIDOCAIN IGE QN: 27 U/L — SIGNIFICANT CHANGE UP (ref 7–60)
LYMPHOCYTES # BLD AUTO: 0.8 K/UL — LOW (ref 1–3.3)
LYMPHOCYTES # BLD AUTO: 12 % — LOW (ref 13–44)
MCHC RBC-ENTMCNC: 26.4 PG — LOW (ref 27–34)
MCHC RBC-ENTMCNC: 31.3 GM/DL — LOW (ref 32–36)
MCV RBC AUTO: 84.4 FL — SIGNIFICANT CHANGE UP (ref 80–100)
MONOCYTES # BLD AUTO: 0.65 K/UL — SIGNIFICANT CHANGE UP (ref 0–0.9)
MONOCYTES NFR BLD AUTO: 9.8 % — SIGNIFICANT CHANGE UP (ref 2–14)
NEUTROPHILS # BLD AUTO: 4.99 K/UL — SIGNIFICANT CHANGE UP (ref 1.8–7.4)
NEUTROPHILS NFR BLD AUTO: 75 % — SIGNIFICANT CHANGE UP (ref 43–77)
NRBC # BLD: 0 /100 WBCS — SIGNIFICANT CHANGE UP (ref 0–0)
PLATELET # BLD AUTO: 280 K/UL — SIGNIFICANT CHANGE UP (ref 150–400)
POTASSIUM SERPL-MCNC: 4.3 MMOL/L — SIGNIFICANT CHANGE UP (ref 3.5–5.3)
POTASSIUM SERPL-SCNC: 4.3 MMOL/L — SIGNIFICANT CHANGE UP (ref 3.5–5.3)
PROT SERPL-MCNC: 7.1 G/DL — SIGNIFICANT CHANGE UP (ref 6–8.3)
PROTHROM AB SERPL-ACNC: 16 SEC — HIGH (ref 10.5–13.4)
RBC # BLD: 3.07 M/UL — LOW (ref 3.8–5.2)
RBC # FLD: 15.1 % — HIGH (ref 10.3–14.5)
RH IG SCN BLD-IMP: POSITIVE — SIGNIFICANT CHANGE UP
SARS-COV-2 RNA SPEC QL NAA+PROBE: NEGATIVE — SIGNIFICANT CHANGE UP
SODIUM SERPL-SCNC: 136 MMOL/L — SIGNIFICANT CHANGE UP (ref 135–145)
WBC # BLD: 6.65 K/UL — SIGNIFICANT CHANGE UP (ref 3.8–10.5)
WBC # FLD AUTO: 6.65 K/UL — SIGNIFICANT CHANGE UP (ref 3.8–10.5)

## 2022-09-20 PROCEDURE — 99285 EMERGENCY DEPT VISIT HI MDM: CPT

## 2022-09-20 PROCEDURE — 93010 ELECTROCARDIOGRAM REPORT: CPT

## 2022-09-20 NOTE — ED ADULT NURSE REASSESSMENT NOTE - NS ED NURSE REASSESS COMMENT FT1
pt. ambulated to restroom accompanied by daughter, with steady gait noted, specimen cup was given for ccms sample

## 2022-09-20 NOTE — ED ADULT NURSE REASSESSMENT NOTE - NS ED NURSE REASSESS COMMENT FT1
interventions as noted, rey. well, awaiting provider eval/further orders, pt. and daughter utd, with understanding verbalized

## 2022-09-20 NOTE — ED ADULT NURSE NOTE - OBJECTIVE STATEMENT
BLOODY STOOL/NAUSEA, WEAK, NO APPETITE    pt. BIBA from [facility], accompanied by daughter, with c/o above, denies pain or complaint otherwise

## 2022-09-20 NOTE — ED ADULT NURSE NOTE - PLAN OF CARE
Call bell/Explanation of exam/test/Fall precautions/Bedside visitors/I and O/NPO/Position of comfort

## 2022-09-21 ENCOUNTER — INPATIENT (INPATIENT)
Facility: HOSPITAL | Age: 82
LOS: 7 days | Discharge: HOME HEALTH | DRG: 374 | End: 2022-09-29
Attending: HOSPITALIST | Admitting: STUDENT IN AN ORGANIZED HEALTH CARE EDUCATION/TRAINING PROGRAM
Payer: MEDICARE

## 2022-09-21 DIAGNOSIS — G47.00 INSOMNIA, UNSPECIFIED: ICD-10-CM

## 2022-09-21 DIAGNOSIS — Z79.01 LONG TERM (CURRENT) USE OF ANTICOAGULANTS: ICD-10-CM

## 2022-09-21 DIAGNOSIS — K08.9 DISORDER OF TEETH AND SUPPORTING STRUCTURES, UNSPECIFIED: ICD-10-CM

## 2022-09-21 DIAGNOSIS — Z85.038 PERSONAL HISTORY OF OTHER MALIGNANT NEOPLASM OF LARGE INTESTINE: ICD-10-CM

## 2022-09-21 DIAGNOSIS — E78.5 HYPERLIPIDEMIA, UNSPECIFIED: ICD-10-CM

## 2022-09-21 DIAGNOSIS — Z95.2 PRESENCE OF PROSTHETIC HEART VALVE: Chronic | ICD-10-CM

## 2022-09-21 DIAGNOSIS — B96.89 OTHER SPECIFIED BACTERIAL AGENTS AS THE CAUSE OF DISEASES CLASSIFIED ELSEWHERE: ICD-10-CM

## 2022-09-21 DIAGNOSIS — G47.33 OBSTRUCTIVE SLEEP APNEA (ADULT) (PEDIATRIC): ICD-10-CM

## 2022-09-21 DIAGNOSIS — R78.81 BACTEREMIA: ICD-10-CM

## 2022-09-21 DIAGNOSIS — Z29.9 ENCOUNTER FOR PROPHYLACTIC MEASURES, UNSPECIFIED: ICD-10-CM

## 2022-09-21 DIAGNOSIS — J44.9 CHRONIC OBSTRUCTIVE PULMONARY DISEASE, UNSPECIFIED: ICD-10-CM

## 2022-09-21 DIAGNOSIS — F40.240 CLAUSTROPHOBIA: ICD-10-CM

## 2022-09-21 DIAGNOSIS — N18.31 CHRONIC KIDNEY DISEASE, STAGE 3A: ICD-10-CM

## 2022-09-21 DIAGNOSIS — I71.4 ABDOMINAL AORTIC ANEURYSM, WITHOUT RUPTURE: ICD-10-CM

## 2022-09-21 DIAGNOSIS — Z88.8 ALLERGY STATUS TO OTHER DRUGS, MEDICAMENTS AND BIOLOGICAL SUBSTANCES STATUS: ICD-10-CM

## 2022-09-21 DIAGNOSIS — D64.9 ANEMIA, UNSPECIFIED: ICD-10-CM

## 2022-09-21 DIAGNOSIS — K92.1 MELENA: ICD-10-CM

## 2022-09-21 DIAGNOSIS — K21.9 GASTRO-ESOPHAGEAL REFLUX DISEASE WITHOUT ESOPHAGITIS: ICD-10-CM

## 2022-09-21 DIAGNOSIS — D63.1 ANEMIA IN CHRONIC KIDNEY DISEASE: ICD-10-CM

## 2022-09-21 DIAGNOSIS — Z95.2 PRESENCE OF PROSTHETIC HEART VALVE: ICD-10-CM

## 2022-09-21 DIAGNOSIS — Z91.19 PATIENT'S NONCOMPLIANCE WITH OTHER MEDICAL TREATMENT AND REGIMEN: ICD-10-CM

## 2022-09-21 DIAGNOSIS — I48.0 PAROXYSMAL ATRIAL FIBRILLATION: ICD-10-CM

## 2022-09-21 DIAGNOSIS — Z95.4 PRESENCE OF OTHER HEART-VALVE REPLACEMENT: ICD-10-CM

## 2022-09-21 DIAGNOSIS — Z99.81 DEPENDENCE ON SUPPLEMENTAL OXYGEN: ICD-10-CM

## 2022-09-21 DIAGNOSIS — I08.3 COMBINED RHEUMATIC DISORDERS OF MITRAL, AORTIC AND TRICUSPID VALVES: ICD-10-CM

## 2022-09-21 DIAGNOSIS — N17.9 ACUTE KIDNEY FAILURE, UNSPECIFIED: ICD-10-CM

## 2022-09-21 DIAGNOSIS — R16.2 HEPATOMEGALY WITH SPLENOMEGALY, NOT ELSEWHERE CLASSIFIED: ICD-10-CM

## 2022-09-21 DIAGNOSIS — E66.9 OBESITY, UNSPECIFIED: ICD-10-CM

## 2022-09-21 DIAGNOSIS — B96.20 UNSPECIFIED ESCHERICHIA COLI [E. COLI] AS THE CAUSE OF DISEASES CLASSIFIED ELSEWHERE: ICD-10-CM

## 2022-09-21 DIAGNOSIS — R10.31 RIGHT LOWER QUADRANT PAIN: ICD-10-CM

## 2022-09-21 DIAGNOSIS — K04.7 PERIAPICAL ABSCESS WITHOUT SINUS: ICD-10-CM

## 2022-09-21 DIAGNOSIS — I50.32 CHRONIC DIASTOLIC (CONGESTIVE) HEART FAILURE: ICD-10-CM

## 2022-09-21 DIAGNOSIS — Z88.1 ALLERGY STATUS TO OTHER ANTIBIOTIC AGENTS STATUS: ICD-10-CM

## 2022-09-21 DIAGNOSIS — K76.0 FATTY (CHANGE OF) LIVER, NOT ELSEWHERE CLASSIFIED: ICD-10-CM

## 2022-09-21 DIAGNOSIS — I10 ESSENTIAL (PRIMARY) HYPERTENSION: ICD-10-CM

## 2022-09-21 DIAGNOSIS — I13.0 HYPERTENSIVE HEART AND CHRONIC KIDNEY DISEASE WITH HEART FAILURE AND STAGE 1 THROUGH STAGE 4 CHRONIC KIDNEY DISEASE, OR UNSPECIFIED CHRONIC KIDNEY DISEASE: ICD-10-CM

## 2022-09-21 DIAGNOSIS — I20.0 UNSTABLE ANGINA: ICD-10-CM

## 2022-09-21 DIAGNOSIS — Z41.8 ENCOUNTER FOR OTHER PROCEDURES FOR PURPOSES OTHER THAN REMEDYING HEALTH STATE: ICD-10-CM

## 2022-09-21 LAB
ALBUMIN SERPL ELPH-MCNC: 3.8 G/DL — SIGNIFICANT CHANGE UP (ref 3.3–5)
ALP SERPL-CCNC: 63 U/L — SIGNIFICANT CHANGE UP (ref 40–120)
ALT FLD-CCNC: 18 U/L — SIGNIFICANT CHANGE UP (ref 10–45)
ANION GAP SERPL CALC-SCNC: 10 MMOL/L — SIGNIFICANT CHANGE UP (ref 5–17)
APPEARANCE UR: CLEAR — SIGNIFICANT CHANGE UP
AST SERPL-CCNC: 16 U/L — SIGNIFICANT CHANGE UP (ref 10–40)
BASOPHILS # BLD AUTO: 0.04 K/UL — SIGNIFICANT CHANGE UP (ref 0–0.2)
BASOPHILS # BLD AUTO: 0.06 K/UL — SIGNIFICANT CHANGE UP (ref 0–0.2)
BASOPHILS NFR BLD AUTO: 0.7 % — SIGNIFICANT CHANGE UP (ref 0–2)
BASOPHILS NFR BLD AUTO: 0.9 % — SIGNIFICANT CHANGE UP (ref 0–2)
BILIRUB SERPL-MCNC: 0.4 MG/DL — SIGNIFICANT CHANGE UP (ref 0.2–1.2)
BILIRUB UR-MCNC: NEGATIVE — SIGNIFICANT CHANGE UP
BUN SERPL-MCNC: 13 MG/DL — SIGNIFICANT CHANGE UP (ref 7–23)
CALCIUM SERPL-MCNC: 8.8 MG/DL — SIGNIFICANT CHANGE UP (ref 8.4–10.5)
CHLORIDE SERPL-SCNC: 102 MMOL/L — SIGNIFICANT CHANGE UP (ref 96–108)
CO2 SERPL-SCNC: 26 MMOL/L — SIGNIFICANT CHANGE UP (ref 22–31)
COLOR SPEC: YELLOW — SIGNIFICANT CHANGE UP
CREAT SERPL-MCNC: 1.1 MG/DL — SIGNIFICANT CHANGE UP (ref 0.5–1.3)
DIFF PNL FLD: NEGATIVE — SIGNIFICANT CHANGE UP
EGFR: 50 ML/MIN/1.73M2 — LOW
EOSINOPHIL # BLD AUTO: 0.11 K/UL — SIGNIFICANT CHANGE UP (ref 0–0.5)
EOSINOPHIL # BLD AUTO: 0.13 K/UL — SIGNIFICANT CHANGE UP (ref 0–0.5)
EOSINOPHIL NFR BLD AUTO: 1.9 % — SIGNIFICANT CHANGE UP (ref 0–6)
EOSINOPHIL NFR BLD AUTO: 2 % — SIGNIFICANT CHANGE UP (ref 0–6)
GLUCOSE SERPL-MCNC: 93 MG/DL — SIGNIFICANT CHANGE UP (ref 70–99)
GLUCOSE UR QL: NEGATIVE — SIGNIFICANT CHANGE UP
HCT VFR BLD CALC: 24.9 % — LOW (ref 34.5–45)
HCT VFR BLD CALC: 26 % — LOW (ref 34.5–45)
HGB BLD-MCNC: 7.8 G/DL — LOW (ref 11.5–15.5)
HGB BLD-MCNC: 7.9 G/DL — LOW (ref 11.5–15.5)
IMM GRANULOCYTES NFR BLD AUTO: 0.4 % — SIGNIFICANT CHANGE UP (ref 0–0.9)
IMM GRANULOCYTES NFR BLD AUTO: 0.6 % — SIGNIFICANT CHANGE UP (ref 0–0.9)
KETONES UR-MCNC: NEGATIVE — SIGNIFICANT CHANGE UP
LEUKOCYTE ESTERASE UR-ACNC: NEGATIVE — SIGNIFICANT CHANGE UP
LYMPHOCYTES # BLD AUTO: 0.54 K/UL — LOW (ref 1–3.3)
LYMPHOCYTES # BLD AUTO: 0.63 K/UL — LOW (ref 1–3.3)
LYMPHOCYTES # BLD AUTO: 9.2 % — LOW (ref 13–44)
LYMPHOCYTES # BLD AUTO: 9.9 % — LOW (ref 13–44)
MCHC RBC-ENTMCNC: 26.1 PG — LOW (ref 27–34)
MCHC RBC-ENTMCNC: 27 PG — SIGNIFICANT CHANGE UP (ref 27–34)
MCHC RBC-ENTMCNC: 30 GM/DL — LOW (ref 32–36)
MCHC RBC-ENTMCNC: 31.7 GM/DL — LOW (ref 32–36)
MCV RBC AUTO: 85 FL — SIGNIFICANT CHANGE UP (ref 80–100)
MCV RBC AUTO: 87 FL — SIGNIFICANT CHANGE UP (ref 80–100)
MONOCYTES # BLD AUTO: 0.62 K/UL — SIGNIFICANT CHANGE UP (ref 0–0.9)
MONOCYTES # BLD AUTO: 0.64 K/UL — SIGNIFICANT CHANGE UP (ref 0–0.9)
MONOCYTES NFR BLD AUTO: 11.3 % — SIGNIFICANT CHANGE UP (ref 2–14)
MONOCYTES NFR BLD AUTO: 9.4 % — SIGNIFICANT CHANGE UP (ref 2–14)
NEUTROPHILS # BLD AUTO: 4.15 K/UL — SIGNIFICANT CHANGE UP (ref 1.8–7.4)
NEUTROPHILS # BLD AUTO: 5.33 K/UL — SIGNIFICANT CHANGE UP (ref 1.8–7.4)
NEUTROPHILS NFR BLD AUTO: 75.7 % — SIGNIFICANT CHANGE UP (ref 43–77)
NEUTROPHILS NFR BLD AUTO: 78 % — HIGH (ref 43–77)
NITRITE UR-MCNC: NEGATIVE — SIGNIFICANT CHANGE UP
NRBC # BLD: 0 /100 WBCS — SIGNIFICANT CHANGE UP (ref 0–0)
NRBC # BLD: 0 /100 WBCS — SIGNIFICANT CHANGE UP (ref 0–0)
OB PNL STL: POSITIVE
PH UR: 7 — SIGNIFICANT CHANGE UP (ref 5–8)
PLATELET # BLD AUTO: 241 K/UL — SIGNIFICANT CHANGE UP (ref 150–400)
PLATELET # BLD AUTO: 262 K/UL — SIGNIFICANT CHANGE UP (ref 150–400)
POTASSIUM SERPL-MCNC: 4.7 MMOL/L — SIGNIFICANT CHANGE UP (ref 3.5–5.3)
POTASSIUM SERPL-SCNC: 4.7 MMOL/L — SIGNIFICANT CHANGE UP (ref 3.5–5.3)
PROT SERPL-MCNC: 6.6 G/DL — SIGNIFICANT CHANGE UP (ref 6–8.3)
PROT UR-MCNC: NEGATIVE MG/DL — SIGNIFICANT CHANGE UP
RBC # BLD: 2.93 M/UL — LOW (ref 3.8–5.2)
RBC # BLD: 2.99 M/UL — LOW (ref 3.8–5.2)
RBC # FLD: 15.1 % — HIGH (ref 10.3–14.5)
RBC # FLD: 15.2 % — HIGH (ref 10.3–14.5)
SODIUM SERPL-SCNC: 138 MMOL/L — SIGNIFICANT CHANGE UP (ref 135–145)
SP GR SPEC: 1.01 — SIGNIFICANT CHANGE UP (ref 1–1.03)
UROBILINOGEN FLD QL: 0.2 E.U./DL — SIGNIFICANT CHANGE UP
WBC # BLD: 5.48 K/UL — SIGNIFICANT CHANGE UP (ref 3.8–10.5)
WBC # BLD: 6.83 K/UL — SIGNIFICANT CHANGE UP (ref 3.8–10.5)
WBC # FLD AUTO: 5.48 K/UL — SIGNIFICANT CHANGE UP (ref 3.8–10.5)
WBC # FLD AUTO: 6.83 K/UL — SIGNIFICANT CHANGE UP (ref 3.8–10.5)

## 2022-09-21 PROCEDURE — 99223 1ST HOSP IP/OBS HIGH 75: CPT

## 2022-09-21 PROCEDURE — G1004: CPT

## 2022-09-21 PROCEDURE — 86077 PHYS BLOOD BANK SERV XMATCH: CPT

## 2022-09-21 PROCEDURE — 99221 1ST HOSP IP/OBS SF/LOW 40: CPT

## 2022-09-21 PROCEDURE — 74174 CTA ABD&PLVS W/CONTRAST: CPT | Mod: 26,MG

## 2022-09-21 RX ORDER — ONDANSETRON 8 MG/1
4 TABLET, FILM COATED ORAL EVERY 8 HOURS
Refills: 0 | Status: DISCONTINUED | OUTPATIENT
Start: 2022-09-21 | End: 2022-09-29

## 2022-09-21 RX ORDER — PANTOPRAZOLE SODIUM 20 MG/1
80 TABLET, DELAYED RELEASE ORAL ONCE
Refills: 0 | Status: COMPLETED | OUTPATIENT
Start: 2022-09-21 | End: 2022-09-21

## 2022-09-21 RX ORDER — SIMETHICONE 80 MG/1
80 TABLET, CHEWABLE ORAL
Refills: 0 | Status: DISCONTINUED | OUTPATIENT
Start: 2022-09-21 | End: 2022-09-29

## 2022-09-21 RX ORDER — PANTOPRAZOLE SODIUM 20 MG/1
40 TABLET, DELAYED RELEASE ORAL EVERY 12 HOURS
Refills: 0 | Status: DISCONTINUED | OUTPATIENT
Start: 2022-09-22 | End: 2022-09-29

## 2022-09-21 RX ORDER — AMIODARONE HYDROCHLORIDE 400 MG/1
200 TABLET ORAL DAILY
Refills: 0 | Status: DISCONTINUED | OUTPATIENT
Start: 2022-09-21 | End: 2022-09-29

## 2022-09-21 RX ORDER — CEFTRIAXONE 500 MG/1
2000 INJECTION, POWDER, FOR SOLUTION INTRAMUSCULAR; INTRAVENOUS EVERY 24 HOURS
Refills: 0 | Status: COMPLETED | OUTPATIENT
Start: 2022-09-21 | End: 2022-09-26

## 2022-09-21 RX ORDER — MONTELUKAST 4 MG/1
10 TABLET, CHEWABLE ORAL DAILY
Refills: 0 | Status: DISCONTINUED | OUTPATIENT
Start: 2022-09-21 | End: 2022-09-29

## 2022-09-21 RX ORDER — MAGNESIUM OXIDE 400 MG ORAL TABLET 241.3 MG
400 TABLET ORAL DAILY
Refills: 0 | Status: DISCONTINUED | OUTPATIENT
Start: 2022-09-21 | End: 2022-09-29

## 2022-09-21 RX ORDER — ACETAMINOPHEN 500 MG
650 TABLET ORAL EVERY 6 HOURS
Refills: 0 | Status: DISCONTINUED | OUTPATIENT
Start: 2022-09-21 | End: 2022-09-29

## 2022-09-21 RX ORDER — ISOSORBIDE MONONITRATE 60 MG/1
30 TABLET, EXTENDED RELEASE ORAL DAILY
Refills: 0 | Status: DISCONTINUED | OUTPATIENT
Start: 2022-09-21 | End: 2022-09-29

## 2022-09-21 RX ORDER — AMLODIPINE BESYLATE 2.5 MG/1
10 TABLET ORAL DAILY
Refills: 0 | Status: DISCONTINUED | OUTPATIENT
Start: 2022-09-21 | End: 2022-09-29

## 2022-09-21 RX ORDER — ATORVASTATIN CALCIUM 80 MG/1
20 TABLET, FILM COATED ORAL AT BEDTIME
Refills: 0 | Status: DISCONTINUED | OUTPATIENT
Start: 2022-09-21 | End: 2022-09-29

## 2022-09-21 RX ORDER — ONDANSETRON 8 MG/1
4 TABLET, FILM COATED ORAL ONCE
Refills: 0 | Status: COMPLETED | OUTPATIENT
Start: 2022-09-21 | End: 2022-09-21

## 2022-09-21 RX ORDER — LIDOCAINE 4 G/100G
1 CREAM TOPICAL EVERY 24 HOURS
Refills: 0 | Status: DISCONTINUED | OUTPATIENT
Start: 2022-09-21 | End: 2022-09-29

## 2022-09-21 RX ORDER — FOLIC ACID 0.8 MG
1 TABLET ORAL DAILY
Refills: 0 | Status: DISCONTINUED | OUTPATIENT
Start: 2022-09-21 | End: 2022-09-29

## 2022-09-21 RX ORDER — INFLUENZA VIRUS VACCINE 15; 15; 15; 15 UG/.5ML; UG/.5ML; UG/.5ML; UG/.5ML
0.7 SUSPENSION INTRAMUSCULAR ONCE
Refills: 0 | Status: COMPLETED | OUTPATIENT
Start: 2022-09-21 | End: 2022-09-21

## 2022-09-21 RX ORDER — TIOTROPIUM BROMIDE 18 UG/1
1 CAPSULE ORAL; RESPIRATORY (INHALATION) DAILY
Refills: 0 | Status: DISCONTINUED | OUTPATIENT
Start: 2022-09-21 | End: 2022-09-29

## 2022-09-21 RX ORDER — SOD SULF/SODIUM/NAHCO3/KCL/PEG
4000 SOLUTION, RECONSTITUTED, ORAL ORAL ONCE
Refills: 0 | Status: COMPLETED | OUTPATIENT
Start: 2022-09-21 | End: 2022-09-21

## 2022-09-21 RX ADMIN — AMLODIPINE BESYLATE 10 MILLIGRAM(S): 2.5 TABLET ORAL at 17:40

## 2022-09-21 RX ADMIN — LIDOCAINE 1 PATCH: 4 CREAM TOPICAL at 17:41

## 2022-09-21 RX ADMIN — MONTELUKAST 10 MILLIGRAM(S): 4 TABLET, CHEWABLE ORAL at 18:35

## 2022-09-21 RX ADMIN — ATORVASTATIN CALCIUM 20 MILLIGRAM(S): 80 TABLET, FILM COATED ORAL at 22:24

## 2022-09-21 RX ADMIN — ISOSORBIDE MONONITRATE 30 MILLIGRAM(S): 60 TABLET, EXTENDED RELEASE ORAL at 18:35

## 2022-09-21 RX ADMIN — Medication 1 MILLIGRAM(S): at 17:40

## 2022-09-21 RX ADMIN — ONDANSETRON 4 MILLIGRAM(S): 8 TABLET, FILM COATED ORAL at 01:40

## 2022-09-21 RX ADMIN — CEFTRIAXONE 100 MILLIGRAM(S): 500 INJECTION, POWDER, FOR SOLUTION INTRAMUSCULAR; INTRAVENOUS at 16:15

## 2022-09-21 RX ADMIN — MAGNESIUM OXIDE 400 MG ORAL TABLET 400 MILLIGRAM(S): 241.3 TABLET ORAL at 18:35

## 2022-09-21 RX ADMIN — Medication 650 MILLIGRAM(S): at 07:55

## 2022-09-21 RX ADMIN — Medication 1 TABLET(S): at 17:40

## 2022-09-21 RX ADMIN — PANTOPRAZOLE SODIUM 80 MILLIGRAM(S): 20 TABLET, DELAYED RELEASE ORAL at 01:40

## 2022-09-21 RX ADMIN — Medication 650 MILLIGRAM(S): at 20:00

## 2022-09-21 RX ADMIN — SIMETHICONE 80 MILLIGRAM(S): 80 TABLET, CHEWABLE ORAL at 18:35

## 2022-09-21 RX ADMIN — Medication 4000 MILLILITER(S): at 18:36

## 2022-09-21 RX ADMIN — TIOTROPIUM BROMIDE 1 CAPSULE(S): 18 CAPSULE ORAL; RESPIRATORY (INHALATION) at 17:45

## 2022-09-21 RX ADMIN — AMIODARONE HYDROCHLORIDE 200 MILLIGRAM(S): 400 TABLET ORAL at 17:40

## 2022-09-21 RX ADMIN — Medication 650 MILLIGRAM(S): at 19:03

## 2022-09-21 NOTE — ED PROVIDER NOTE - OBJECTIVE STATEMENT
offered but declined translation services -- requesting daughter/mendy abreu to translate.    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 +nausea. +decreased appetite/po intake. last dose eliquis yesterday PM. no fever/chills, no uri/cough, no cp/sob, no n/v, no diarrhea, no dysuria, no rash, no trauma, no etoh-dpt/ivdu, no prior GIB.    hcp/daughter: mendy abreu offered but declined translation services -- requesting daughter/mendy abreu to translate.    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 +nausea. +decreased appetite/po intake. last dose eliquis yesterday PM. no fever/chills, no uri/cough, no cp/sob, no n/v, no diarrhea, no dysuria, no rash, no trauma, no etoh-dpt/ivdu, no prior GIB.    hcp/daughter: mendy abreu

## 2022-09-21 NOTE — CONSULT NOTE ADULT - SUBJECTIVE AND OBJECTIVE BOX
Surgery Consult Note    HPI:    82F, Yoruba speaking, w/ PMHx HTN, HLD, severe AS s/p TAVR (), valve thrombosis  s/p AC (not seen on echo ), Ascending Thoracic Aneurysm 4cm in 2022, pAFib (on Amiodarone/Eliquis), COPD (use to be on 2L home O2 but doesn't use it anymore), Colon CA s/p resection in  (in remission), moderate MARK (non-compliant with CPAP 2/2 claustrophobia), CKD3 (baseline Cr 1.1-1.2) and anemia now presenting with maroon blood in stool. Of note was recently admitted for bactermia (BC  positive for gemella species - possible dental source and  s/ GNR E Coli - was recommended to extraction but refused) and she was started on IV abx, PICC placed (last dose ) and discahrge to [facility]. Did not see blood clots but says the stool is all maroon, hard time describing more as they were flushed by nurse staff at [facility]. Patient reports that she has been fatigued for the past month, she has had ocassional diffuse abdominal pain that radiates to the right flank. After having a BM she feels lightheaded and dizzy. She denies any pain with BM. Denies dyuria, no hematuria. Denies fevers/chills.  Colonoscopy- planning to have one soon but none since surgery  Endoscopy - not sure    ED: afebrile, VSS, Hb 7.9 (8.1 at baseline), CT scan no active GI hemorrhage identified      PMH: HTN, HLD, severe AS s/p TAVR (), valve thrombosis  s/p AC (not seen on echo ), Ascending Thoracic Aneurysm 4cm in 2022, pAFib (on Amiodarone/Eliquis), COPD (on 2L home O2), Colon CA, CKD3 (baseline Cr 1.1-1.2) and anemia   PSH: Laparoscopic-assisted subtotal colectomy with ileal to descending colon anastomosis () - rescected and right and transverse colon, TAVR (transcatheter aortic valve replacement) 2019  FamHx: No cancer, no IBD  Soc: never smoker, occasional ETOH, no recreational drug use       Attending:  Dr. Chowdhury        PAST MEDICAL & SURGICAL HISTORY:  HTN (hypertension)      Aortic stenosis      Hyperlipidemia      COPD (chronic obstructive pulmonary disease)      GERD (gastroesophageal reflux disease)      Stage 3 chronic kidney disease      Anemia      Diastolic CHF, chronic      Obesity      Paroxysmal atrial fibrillation      S/P TAVR (transcatheter aortic valve replacement)  2019          MEDICATIONS  (STANDING):    MEDICATIONS  (PRN):      Allergies    Digox (Rash; Urticaria; Hives)  Plavix (Other (Mod to Severe))    Intolerances        SOCIAL HISTORY:    FAMILY HISTORY:  No pertinent family history in first degree relatives        Vital Signs Last 24 Hrs  T(C): 36.7 (21 Sep 2022 04:49), Max: 36.8 (20 Sep 2022 22:37)  T(F): 98 (21 Sep 2022 04:49), Max: 98.2 (20 Sep 2022 22:37)  HR: 59 (21 Sep 2022 04:49) (58 - 59)  BP: 114/55 (21 Sep 2022 04:49) (114/55 - 149/69)  BP(mean): --  RR: 18 (21 Sep 2022 04:49) (18 - 18)  SpO2: 94% (21 Sep 2022 04:49) (94% - 96%)    Parameters below as of 21 Sep 2022 04:49  Patient On (Oxygen Delivery Method): room air        I&O's Summary      Physical Exam:  General: NAD, resting comfortably  HEENT: NC/AT, EOMI, normal hearing, no oral lesions, no LAD, neck supple  Pulmonary: normal resp effort, CTA-B  Cardiovascular: NSR, no murmurs  Abdominal: soft, ND/NT, no organomegaly  Extremities: WWP, normal strength, no clubbing/cyanosis/edema  Neuro: A/O x 3, CNs II-XII grossly intact, normal sensation, no focal deficits  Pulses: palpable distal pulses    Lines/drains/tubes:    LABS:                        7.9    6.83  )-----------( 262      ( 21 Sep 2022 01:30 )             24.9     09-20    136  |  100  |  11  ----------------------------<  112<H>  4.3   |  23  |  1.10    Ca    8.8      20 Sep 2022 22:59    TPro  7.1  /  Alb  4.0  /  TBili  0.3  /  DBili  x   /  AST  16  /  ALT  19  /  AlkPhos  69  09-20    PT/INR - ( 20 Sep 2022 22:59 )   PT: 16.0 sec;   INR: 1.34          PTT - ( 20 Sep 2022 22:59 )  PTT:31.7 sec  Urinalysis Basic - ( 21 Sep 2022 00:09 )    Color: Yellow / Appearance: Clear / S.010 / pH: x  Gluc: x / Ketone: NEGATIVE  / Bili: Negative / Urobili: 0.2 E.U./dL   Blood: x / Protein: NEGATIVE mg/dL / Nitrite: NEGATIVE   Leuk Esterase: NEGATIVE / RBC: x / WBC x   Sq Epi: x / Non Sq Epi: x / Bacteria: x      CAPILLARY BLOOD GLUCOSE        LIVER FUNCTIONS - ( 20 Sep 2022 22:59 )  Alb: 4.0 g/dL / Pro: 7.1 g/dL / ALK PHOS: 69 U/L / ALT: 19 U/L / AST: 16 U/L / GGT: x             Cultures:      RADIOLOGY & ADDITIONAL STUDIES:         Surgery Consult Note    HPI:    82F, Hebrew speaking, w/ PMHx HTN, HLD, severe AS s/p TAVR (), valve thrombosis  s/p AC (not seen on echo ), Ascending Thoracic Aneurysm 4cm in 2022, pAFib (on Amiodarone/Eliquis), COPD (use to be on 2L home O2 but doesn't use it anymore), Colon CA s/p resection in  (in remission), moderate MARK (non-compliant with CPAP 2/2 claustrophobia), CKD3 (baseline Cr 1.1-1.2) and anemia now presenting with maroon blood in stool. Of note was recently admitted for bactermia (BC  positive for gemella species - possible dental source and  s/ GNR E Coli - was recommended to extraction but refused) and she was started on IV abx, PICC placed (last dose ) and discahrge to [facility]. Did not see blood clots but says the stool is all maroon, hard time describing more as they were flushed by nurse staff at [facility]. Patient reports that she has been fatigued for the past month, she has had ocassional diffuse abdominal pain that radiates to the right flank. After having a BM she feels lightheaded and dizzy. She denies any pain with BM. Denies dyuria, no hematuria. Denies fevers/chills.  Colonoscopy- planning to have one soon but none since surgery  Endoscopy - not sure    ED: afebrile, VSS, Hb 7.9 (8.1 at baseline), CT scan no active GI hemorrhage identified      PMH: HTN, HLD, severe AS s/p TAVR (), valve thrombosis  s/p AC (not seen on echo ), Ascending Thoracic Aneurysm 4cm in 2022, pAFib (on Amiodarone/Eliquis), COPD (on 2L home O2), Colon CA, CKD3 (baseline Cr 1.1-1.2) and anemia   PSH: Laparoscopic-assisted subtotal colectomy with ileal to descending colon anastomosis () - rescected and right and transverse colon, TAVR (transcatheter aortic valve replacement) 2019  FamHx: No cancer, no IBD  Soc: never smoker, occasional ETOH, no recreational drug use       Attending:  Dr. Chowdhury        PAST MEDICAL & SURGICAL HISTORY:  HTN (hypertension)      Aortic stenosis      Hyperlipidemia      COPD (chronic obstructive pulmonary disease)      GERD (gastroesophageal reflux disease)      Stage 3 chronic kidney disease      Anemia      Diastolic CHF, chronic      Obesity      Paroxysmal atrial fibrillation      S/P TAVR (transcatheter aortic valve replacement)  2019          MEDICATIONS  (STANDING):    MEDICATIONS  (PRN):      Allergies    Digox (Rash; Urticaria; Hives)  Plavix (Other (Mod to Severe))    Intolerances        SOCIAL HISTORY:    FAMILY HISTORY:  No pertinent family history in first degree relatives        Vital Signs Last 24 Hrs  T(C): 36.7 (21 Sep 2022 04:49), Max: 36.8 (20 Sep 2022 22:37)  T(F): 98 (21 Sep 2022 04:49), Max: 98.2 (20 Sep 2022 22:37)  HR: 59 (21 Sep 2022 04:49) (58 - 59)  BP: 114/55 (21 Sep 2022 04:49) (114/55 - 149/69)  BP(mean): --  RR: 18 (21 Sep 2022 04:49) (18 - 18)  SpO2: 94% (21 Sep 2022 04:49) (94% - 96%)    Parameters below as of 21 Sep 2022 04:49  Patient On (Oxygen Delivery Method): room air        I&O's Summary      Physical Exam:  General: NAD, resting comfortably  HEENT: NC/AT, EOMI, normal hearing, no oral lesions, no LAD, neck supple  Pulmonary: normal resp effort, CTA-B  Cardiovascular: NSR, no murmurs  Abdominal: soft, ND, diffusely tender, no guarding, well healing scar  ESTEFANY: minimal stool, no blood  Extremities: WWP, normal strength, no clubbing/cyanosis/edema  Neuro: A/O x 3, CNs II-XII grossly intact, normal sensation, no focal deficits  Pulses: palpable distal pulses    Lines/drains/tubes:    LABS:                        7.9    6.83  )-----------( 262      ( 21 Sep 2022 01:30 )             24.9     09-20    136  |  100  |  11  ----------------------------<  112<H>  4.3   |  23  |  1.10    Ca    8.8      20 Sep 2022 22:59    TPro  7.1  /  Alb  4.0  /  TBili  0.3  /  DBili  x   /  AST  16  /  ALT  19  /  AlkPhos  69  09-20    PT/INR - ( 20 Sep 2022 22:59 )   PT: 16.0 sec;   INR: 1.34          PTT - ( 20 Sep 2022 22:59 )  PTT:31.7 sec  Urinalysis Basic - ( 21 Sep 2022 00:09 )    Color: Yellow / Appearance: Clear / S.010 / pH: x  Gluc: x / Ketone: NEGATIVE  / Bili: Negative / Urobili: 0.2 E.U./dL   Blood: x / Protein: NEGATIVE mg/dL / Nitrite: NEGATIVE   Leuk Esterase: NEGATIVE / RBC: x / WBC x   Sq Epi: x / Non Sq Epi: x / Bacteria: x      CAPILLARY BLOOD GLUCOSE        LIVER FUNCTIONS - ( 20 Sep 2022 22:59 )  Alb: 4.0 g/dL / Pro: 7.1 g/dL / ALK PHOS: 69 U/L / ALT: 19 U/L / AST: 16 U/L / GGT: x             Cultures:      RADIOLOGY & ADDITIONAL STUDIES:         Surgery Consult Note    HPI:    82F, Nepali speaking, w/ PMHx HTN, HLD, severe AS s/p TAVR (), valve thrombosis  s/p AC (not seen on echo ), Ascending Thoracic Aneurysm 4cm in 2022, pAFib (on Amiodarone/Eliquis), COPD (use to be on 2L home O2 but doesn't use it anymore), Colon CA s/p resection in  (in remission), moderate MARK (non-compliant with CPAP 2/2 claustrophobia), CKD3 (baseline Cr 1.1-1.2) and anemia now presenting with maroon blood in stool. Of note was recently admitted for bactermia (BC  positive for gemella species - possible dental source and  s/ GNR E Coli - was recommended to extraction but refused) and she was started on IV abx, PICC placed (last dose ) and discahrge to [facility]. Did not see blood clots but says the stool is all maroon, hard time describing more as they were flushed by nurse staff at [facility]. Patient reports that she has been fatigued for the past month, she has had ocassional diffuse abdominal pain that radiates to the right flank. After having a BM she feels lightheaded and dizzy. She denies any pain with BM. Denies dyuria, no hematuria. Denies fevers/chills.  Colonoscopy- planning to have one soon but none since surgery  Endoscopy - not sure    ED: afebrile, VSS, Hb 7.9 (8.1 at baseline), CT scan no active GI hemorrhage identified      PMH: HTN, HLD, severe AS s/p TAVR (), valve thrombosis  s/p AC (not seen on echo ), Ascending Thoracic Aneurysm 4cm in 2022, pAFib (on Amiodarone/Eliquis), COPD (on 2L home O2), Colon CA, CKD3 (baseline Cr 1.1-1.2) and anemia   PSH: Laparoscopic-assisted subtotal colectomy with ileal to descending colon anastomosis () - rescected and right and transverse colon, TAVR (transcatheter aortic valve replacement) 2019  FamHx: No cancer, no IBD  Soc: never smoker, occasional ETOH, no recreational drug use       Attending:  Dr. Sanford        PAST MEDICAL & SURGICAL HISTORY:  HTN (hypertension)      Aortic stenosis      Hyperlipidemia      COPD (chronic obstructive pulmonary disease)      GERD (gastroesophageal reflux disease)      Stage 3 chronic kidney disease      Anemia      Diastolic CHF, chronic      Obesity      Paroxysmal atrial fibrillation      S/P TAVR (transcatheter aortic valve replacement)  2019          MEDICATIONS  (STANDING):    MEDICATIONS  (PRN):      Allergies    Digox (Rash; Urticaria; Hives)  Plavix (Other (Mod to Severe))    Intolerances        SOCIAL HISTORY:    FAMILY HISTORY:  No pertinent family history in first degree relatives        Vital Signs Last 24 Hrs  T(C): 36.7 (21 Sep 2022 04:49), Max: 36.8 (20 Sep 2022 22:37)  T(F): 98 (21 Sep 2022 04:49), Max: 98.2 (20 Sep 2022 22:37)  HR: 59 (21 Sep 2022 04:49) (58 - 59)  BP: 114/55 (21 Sep 2022 04:49) (114/55 - 149/69)  BP(mean): --  RR: 18 (21 Sep 2022 04:49) (18 - 18)  SpO2: 94% (21 Sep 2022 04:49) (94% - 96%)    Parameters below as of 21 Sep 2022 04:49  Patient On (Oxygen Delivery Method): room air        I&O's Summary      Physical Exam:  General: NAD, resting comfortably  HEENT: NC/AT, EOMI, normal hearing, no oral lesions, no LAD, neck supple  Pulmonary: normal resp effort, CTA-B  Cardiovascular: NSR, no murmurs  Abdominal: soft, ND, diffusely tender, no guarding, well healing scar  ESTEFANY: minimal stool, no blood  Extremities: WWP, normal strength, no clubbing/cyanosis/edema  Neuro: A/O x 3, CNs II-XII grossly intact, normal sensation, no focal deficits  Pulses: palpable distal pulses    Lines/drains/tubes:    LABS:                        7.9    6.83  )-----------( 262      ( 21 Sep 2022 01:30 )             24.9     09-20    136  |  100  |  11  ----------------------------<  112<H>  4.3   |  23  |  1.10    Ca    8.8      20 Sep 2022 22:59    TPro  7.1  /  Alb  4.0  /  TBili  0.3  /  DBili  x   /  AST  16  /  ALT  19  /  AlkPhos  69  09-20    PT/INR - ( 20 Sep 2022 22:59 )   PT: 16.0 sec;   INR: 1.34          PTT - ( 20 Sep 2022 22:59 )  PTT:31.7 sec  Urinalysis Basic - ( 21 Sep 2022 00:09 )    Color: Yellow / Appearance: Clear / S.010 / pH: x  Gluc: x / Ketone: NEGATIVE  / Bili: Negative / Urobili: 0.2 E.U./dL   Blood: x / Protein: NEGATIVE mg/dL / Nitrite: NEGATIVE   Leuk Esterase: NEGATIVE / RBC: x / WBC x   Sq Epi: x / Non Sq Epi: x / Bacteria: x      CAPILLARY BLOOD GLUCOSE        LIVER FUNCTIONS - ( 20 Sep 2022 22:59 )  Alb: 4.0 g/dL / Pro: 7.1 g/dL / ALK PHOS: 69 U/L / ALT: 19 U/L / AST: 16 U/L / GGT: x             Cultures:      RADIOLOGY & ADDITIONAL STUDIES:         Surgery Consult Note    HPI:    82F, Urdu speaking, w/ PMHx HTN, HLD, severe AS s/p TAVR (), valve thrombosis  s/p AC (not seen on echo ), Ascending Thoracic Aneurysm 4cm in 2022, pAFib (on Amiodarone/Eliquis), COPD (use to be on 2L home O2 but doesn't use it anymore), Colon CA s/p resection in  (in remission), moderate MARK (non-compliant with CPAP 2/2 claustrophobia), CKD3 (baseline Cr 1.1-1.2) and anemia now presenting with maroon blood in stool. Of note was recently admitted for bactermia (BC  positive for gemella species - possible dental source and  s/ GNR E Coli - was recommended to extraction but refused) and she was started on IV abx, PICC placed (last dose ) and discahrge to [facility]. Did not see blood clots but says the stool is all maroon, hard time describing more as they were flushed by nurse staff at [facility]. Patient reports that she has been fatigued for the past month, she has had ocassional diffuse abdominal pain that radiates to the right flank. After having a BM she feels lightheaded and dizzy. She denies any pain with BM. Denies dyuria, no hematuria. Denies fevers/chills.  Colonoscopy- planning to have one soon but none since surgery  Endoscopy - not sure    ED: afebrile, VSS, Hb 7.9 (8.1 at baseline), CT scan no active GI hemorrhage identified      PMH: HTN, HLD, severe AS s/p TAVR (), valve thrombosis  s/p AC (not seen on echo ), Ascending Thoracic Aneurysm 4cm in 2022, pAFib (on Amiodarone/Eliquis), COPD (on 2L home O2), Colon CA, CKD3 (baseline Cr 1.1-1.2) and anemia   PSH: Laparoscopic-assisted subtotal colectomy with ileal to descending colon anastomosis () - rescected and right and transverse colon, TAVR (transcatheter aortic valve replacement) 2019  FamHx: No cancer, no IBD  Soc: never smoker, occasional ETOH, no recreational drug use       Attending:  Dr. Sanford        PAST MEDICAL & SURGICAL HISTORY:  HTN (hypertension)      Aortic stenosis      Hyperlipidemia      COPD (chronic obstructive pulmonary disease)      GERD (gastroesophageal reflux disease)      Stage 3 chronic kidney disease      Anemia      Diastolic CHF, chronic      Obesity      Paroxysmal atrial fibrillation      S/P TAVR (transcatheter aortic valve replacement)  2019          MEDICATIONS  (STANDING):    MEDICATIONS  (PRN):      Allergies    Digox (Rash; Urticaria; Hives)  Plavix (Other (Mod to Severe))    Intolerances        SOCIAL HISTORY:    FAMILY HISTORY:  No pertinent family history in first degree relatives        Vital Signs Last 24 Hrs  T(C): 36.7 (21 Sep 2022 04:49), Max: 36.8 (20 Sep 2022 22:37)  T(F): 98 (21 Sep 2022 04:49), Max: 98.2 (20 Sep 2022 22:37)  HR: 59 (21 Sep 2022 04:49) (58 - 59)  BP: 114/55 (21 Sep 2022 04:49) (114/55 - 149/69)  BP(mean): --  RR: 18 (21 Sep 2022 04:49) (18 - 18)  SpO2: 94% (21 Sep 2022 04:49) (94% - 96%)    Parameters below as of 21 Sep 2022 04:49  Patient On (Oxygen Delivery Method): room air        I&O's Summary      Physical Exam:  General: NAD, resting comfortably  HEENT: NC/AT, EOMI, normal hearing, no oral lesions, no LAD, neck supple  Pulmonary: normal resp effort, CTA-B  Cardiovascular: NSR, no murmurs  Abdominal: soft, ND, diffusely tender, no guarding, well healing scar  ESTEFANY: minimal stool, no blood, external hemorrhoids  Extremities: WWP, normal strength, no clubbing/cyanosis/edema  Neuro: A/O x 3, CNs II-XII grossly intact, normal sensation, no focal deficits  Pulses: palpable distal pulses    Lines/drains/tubes:    LABS:                        7.9    6.83  )-----------( 262      ( 21 Sep 2022 01:30 )             24.9     09-20    136  |  100  |  11  ----------------------------<  112<H>  4.3   |  23  |  1.10    Ca    8.8      20 Sep 2022 22:59    TPro  7.1  /  Alb  4.0  /  TBili  0.3  /  DBili  x   /  AST  16  /  ALT  19  /  AlkPhos  69  09-20    PT/INR - ( 20 Sep 2022 22:59 )   PT: 16.0 sec;   INR: 1.34          PTT - ( 20 Sep 2022 22:59 )  PTT:31.7 sec  Urinalysis Basic - ( 21 Sep 2022 00:09 )    Color: Yellow / Appearance: Clear / S.010 / pH: x  Gluc: x / Ketone: NEGATIVE  / Bili: Negative / Urobili: 0.2 E.U./dL   Blood: x / Protein: NEGATIVE mg/dL / Nitrite: NEGATIVE   Leuk Esterase: NEGATIVE / RBC: x / WBC x   Sq Epi: x / Non Sq Epi: x / Bacteria: x      CAPILLARY BLOOD GLUCOSE        LIVER FUNCTIONS - ( 20 Sep 2022 22:59 )  Alb: 4.0 g/dL / Pro: 7.1 g/dL / ALK PHOS: 69 U/L / ALT: 19 U/L / AST: 16 U/L / GGT: x             Cultures:      RADIOLOGY & ADDITIONAL STUDIES:

## 2022-09-21 NOTE — ED PROVIDER NOTE - PHYSICAL EXAMINATION
CONST: nontoxic NAD speaking in full sentences  HEAD: atraumatic  EYES: conjunctivae clear, PERRL, EOMI  ENT: mmm  NECK: supple/FROM, nttp, no jvd  CARD: rrr no murmurs  CHEST: ctab no r/r/w, no stridor/retractions/tripoding  ABD: soft, nd, +ttp, no rebound/guarding    EXT: FROM, symmetric distal pulses intact  SKIN: warm, dry, 2+ pitting edema to bl shins, no rash, cap refill <2sec  NEURO: a+ox3, 5/5 strength x4, gross sensation intact x4 CONST: morbidly obese nontoxic NAD speaking in full sentences  HEAD: atraumatic  EYES: conjunctivae clear, PERRL, EOMI  ENT: mmm  NECK: supple/FROM  CARD: rrr no murmurs  CHEST: ctab no r/r/w  ABD: soft, nd, +ttp R mid abd, no rebound/guarding, no cvat  RECTAL: +empty rectal vault w/ bright red secretions  EXT: FROM, symmetric distal pulses intact  SKIN: warm, dry, 2+ pitting edema to bl shins, no rash, cap refill <2sec  NEURO: a+ox3, 5/5 strength x4, gross sensation intact x4

## 2022-09-21 NOTE — H&P ADULT - NSHPLABSRESULTS_GEN_ALL_CORE
LABS:                        7.8    5.48  )-----------( 241      ( 21 Sep 2022 11:16 )             26.0         138  |  102  |  13  ----------------------------<  93  4.7   |  26  |  1.10    Ca    8.8      21 Sep 2022 11:16    TPro  6.6  /  Alb  3.8  /  TBili  0.4  /  DBili  x   /  AST  16  /  ALT  18  /  AlkPhos  63      PT/INR - ( 20 Sep 2022 22:59 )   PT: 16.0 sec;   INR: 1.34          PTT - ( 20 Sep 2022 22:59 )  PTT:31.7 sec  Urinalysis Basic - ( 21 Sep 2022 00:09 )    Color: Yellow / Appearance: Clear / S.010 / pH: x  Gluc: x / Ketone: NEGATIVE  / Bili: Negative / Urobili: 0.2 E.U./dL   Blood: x / Protein: NEGATIVE mg/dL / Nitrite: NEGATIVE   Leuk Esterase: NEGATIVE / RBC: x / WBC x   Sq Epi: x / Non Sq Epi: x / Bacteria: x      CAPILLARY BLOOD GLUCOSE          Urinalysis with Rflx Culture (collected 22 @ 00:09)

## 2022-09-21 NOTE — H&P ADULT - PROBLEM SELECTOR PLAN 3
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   - Holding Eliquis in the setting of possible GIB

## 2022-09-21 NOTE — ED PROVIDER NOTE - PROGRESS NOTE DETAILS
GI consulted. will see pt upon admission. surgery consulted. will see pt. [stephanie / celi]  received on sign out. pt here w cardiac disease - on eliquis. here w abd pain and bright red blood per rectum.   thus far - H&H near baseline and stable x2. guaiac positive. vitals stable. GI aware of pt. s/p protonix.   ordered for CT, surgery aware  at time of sign out pending CT results and surgery eval. pt to be admitted. stephanie - ct w/o acute bleed. vitals stable. surgery signed off - will sign off.  admitted to medicine.

## 2022-09-21 NOTE — H&P ADULT - HISTORY OF PRESENT ILLNESS
82F, Upper sorbian speaking, w/ PMHx HTN, HLD, severe AS s/p TAVR (2019), valve thrombosis 2021 s/p AC (not seen on echo 2022), Ascending Thoracic Aneurysm 4cm in 1/2022, pAFib (on Amiodarone/Eliquis), COPD (on 2L home O2), Colon CA s/p resection in 2019 (in remission), moderate MARK (non-compliant with CPAP 2/2 claustrophobia), CKD3 (baseline Cr 1.1-1.2) and FEROZ who presents from [facility] for dark stools x 3 days. Patient was recently admitted under cardiology service from 9/4-9/13 for chest pain and headaches - found to incidentally have Ecoli bacteremia and Gemella spp. TTE, IRMA, whole body gallium scan were negative - source thought to be 2/2 infected tooth which patient refused to have extracted in house by OMFS. She was sent home with PICC line for IV abx (Ceftriaxone 2g daily until9/26). She has still not gotten her tooth extraction outpatient as she has been in the [facility], but has been getting the Ceftriaxone daily. For this admission, patient had a blood transfusion on 9/16 in the [facility], did not have any reaction to the transfusion. She started to develop bloody diarrhea after the transfusion and does not remember the details as to why exactly she got the transfusion. She also has chronic complaints including headache, jaw and neck pain, R hip pain, etc.     ED Course: afebrile, VSS, Hb 7.9 (8.1 at baseline), CT scan no active GI hemorrhage identified  Consults: surgery and GI          82F, Thai speaking, w/ PMHx HTN, HLD, severe AS s/p TAVR (2019), valve thrombosis 2021 s/p AC (not seen on echo 2022), Ascending Thoracic Aneurysm 4cm in 1/2022, pAFib (on Amiodarone/Eliquis), COPD (on 2L home O2), Colon CA s/p resection in 2019 (in remission), moderate MARK (non-compliant with CPAP 2/2 claustrophobia), CKD3 (baseline Cr 1.1-1.2) and FEROZ who presents from [facility] for dark stools x 3 days. Patient was recently admitted under cardiology service from 9/4-9/13 for chest pain and headaches - found to incidentally have Ecoli bacteremia and Gemella spp. TTE, IRMA, whole body gallium scan were negative - source thought to be 2/2 infected tooth which patient refused to have extracted in house by OMFS. She was sent home with PICC line for IV abx (Ceftriaxone 2g daily until9/26). She has still not gotten her tooth extraction outpatient as she has been in the [facility], but has been getting the Ceftriaxone daily. For this admission, patient had a blood transfusion on 9/16 in the [facility], did not have any reaction to the transfusion. She started to develop bloody diarrhea after the transfusion and does not remember the details as to why exactly she got the transfusion. She also has chronic complaints including headache, jaw and neck pain, R hip pain, etc.     ED Course: afebrile, VSS, Hb 7.9 (8.1 at baseline), CT scan no active GI hemorrhage identified  Consults: surgery and GI   Meds: zofran, protonix, tylenol   EKG: sinus bradycardia to 59, no ischemia

## 2022-09-21 NOTE — ED PROVIDER NOTE - CLINICAL SUMMARY MEDICAL DECISION MAKING FREE TEXT BOX
82F afib on eliquis, significant other cardiac disease, c/o 4d brbpr and R mid abd pain. avss. hb 8.1 (baseline) > 7.9. empty rectal vault w/ bright red secretions. +R mid abd pain on exam. s/p protonix. GI/surgery consulted. s/o'd overnight team stable pending ct read and surgery reccs -- anticipate admission.

## 2022-09-21 NOTE — ED ADULT NURSE REASSESSMENT NOTE - NS ED NURSE REASSESS COMMENT FT1
nad or change, spoke with Dr. Angel, who states will come evaluate pt., pt. and daughter aware, with understanding verbalized

## 2022-09-21 NOTE — H&P ADULT - PROBLEM SELECTOR PLAN 2
Pt with E coli and Gemella spp bacteremia from dental abscess. Refused extraction last visit, has not done it outpatient. On Ceftriaxone 2g daily until 9/26. Patient has jaw and neck pain without any crepitus on exam, no fluctuance or erythema/edema. Low concern for involvement of soft tissue involvement of head and neck, no airway compromise.   - Afebrile and without white count on admission   - Continue ceftriaxone 2g daily until 9/26

## 2022-09-21 NOTE — CONSULT NOTE ADULT - ASSESSMENT
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 Cardiology consulted fro preoperative clearance evaluation for colonoscopy with GI tomorrow.     #Preoperative Evaluation  -EKG: rate controlled, bradycardia  -Social: denies smoking hx, ilicit drugs, or alcohol use.   -Self-reported physical activity: unable to walk > 2 steps over the past month. Has not taken stairs in years.  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 RCRI/Branch not reflective of true risk.  - Pt is high risk for a low risk procedure and is optimized for surgery.  - Recommend cardiac anesthesia for procedure.    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 Cardiology consulted fro preoperative clearance evaluation for colonoscopy with GI tomorrow.     #Preoperative Evaluation  -EKG: rate controlled, bradycardia  -Social: denies smoking hx, ilicit drugs, or alcohol use.   -Self-reported physical activity: unable to walk > 2 steps over the past month. Has not taken stairs in years.  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 RCRI/Branch not reflective of true risk.  - Pt is high risk for a low risk procedure and is optimized for surgery.  - Recommend cardiac anesthesia for procedure.     #HFpEF   - Grade II diastolic dysfunction. On Lasix 20 PO daily outpatient.   - Can hold lasix in setting of bleeding.     #Atrial Fibrillation   - On amiodarone and eliquis at home.  - Continue holding Eliquis in setting of GI bleeding.

## 2022-09-21 NOTE — CONSULT NOTE ADULT - ATTENDING COMMENTS
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 GI consulted for hematochezia.     Plan for colonoscopy. She was to undergo close colonoscopic surveillance following colon cancer resection but was lost to follow-up.    Agree with plan as outlined above.    ORIANA Camarena MD  GI Attending
CRS Attending  Seen in ED, discussed with chief resident Dr Galicia at time of consultation.   Daughter also present at bedside  Agree with above.  CT and labs reviewed  Recommend GI consultation for possible scopes  Will follow as consult.
Initial attending contact date  9/21/22    . See resident note written above for details. I reviewed the resident documentation. I have personally seen and examined this patient. I reviewed vitals, labs, medications, cardiac studies, and additional imaging. I agree with the above residnet's findings and plans as written above with the following additions/statements.    -Pt well known to cardiac service with mx co-morbidities as noted   -Admitted now with GI bleed with plan for colonoscopy  -Cardiology consulted for pre-op risk assessment  -Recent ECHO with normal LVEF, grade II DD, known severe prosthetic AV stenosis   -With limited functional capacity, euvolemic on exam  -Given co-morbidities and known severe prosthetic AV stenosis ,pt considered high risk for low risk procedure  -Recommend cardiac anesthesia

## 2022-09-21 NOTE — PATIENT PROFILE ADULT - FUNCTIONAL ASSESSMENT - BASIC MOBILITY 6.
3-calculated by average/Not able to assess (calculate score using [facility] averaging method)

## 2022-09-21 NOTE — H&P ADULT - PROBLEM SELECTOR PLAN 1
Patient with dark red stool, occasional BRBPR. Hemoglobin 7.9 on admission which is around baseline. Non tachycardic or hypotensive. 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 Of note, patient did state that she had blood transfusion 5 days prior to admission at [facility] for "anemia" but couldn't give any more details.   - GI consulted, will be performing colonoscopy tomorrow   - Cardiology cleared patient for colonoscopy tomorrow - will need cardiac anesthesia given higher risk with TAVR   - Continue to trend hemoglobin, keep HG > 7  - Active T&S, another ordered for 9/22 AM   - NPO after midnight for colonoscopy in AM, will drink GoLytely today

## 2022-09-21 NOTE — ED ADULT NURSE REASSESSMENT NOTE - NS ED NURSE REASSESS COMMENT FT1
pt. provided with additional warm blanket for comfort, daughter remains at bedside, still awaiting CT results

## 2022-09-21 NOTE — CONSULT NOTE ADULT - SUBJECTIVE AND OBJECTIVE BOX
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 Of note was recently admitted for bactermia (BC 9/5 positive for gemella species - possible dental source and 9/8 s/f GNR E Coli - was recommended to extraction but refused) and she was started on IV abx, PICC placed (last dose 9/26) and discahrge to [facility]. Did not see blood clots but says the stool is all maroon, hard time describing more as they were flushed by nurse staff at [facility]. Patient reports that she has been fatigued for the past month, she has had ocassional diffuse abdominal pain that radiates to the right flank. After having a BM she feels lightheaded and dizzy. She denies any pain with BM. Denies dyuria, no hematuria. Denies fevers/chills.  Colonoscopy- planning to have one soon but none since surgery  Endoscopy - not sure    ED: afebrile, VSS, Hb 7.9 (8.1 at baseline), CT scan no active GI hemorrhage identified    GI consulted for hematochezia. Patient seen and examined at bedside.     Allergies    Digox (Rash; Urticaria; Hives)  Plavix (Other (Mod to Severe))    Intolerances      Home Medications:  amiodarone 200 mg oral tablet: 1 tab(s) orally once a day (21 Sep 2022 04:11)  amLODIPine 10 mg oral tablet: 1 tab(s) orally once a day (21 Sep 2022 04:11)  apixaban 5 mg oral tablet: 1 tab(s) orally 2 times a day (21 Sep 2022 04:11)  atorvastatin 20 mg oral tablet: 1 tab(s) orally once a day (at bedtime) (21 Sep 2022 04:11)  cefTRIAXone: 2000 milligram(s) intravenous once a day last dose 9/26/22 (21 Sep 2022 04:11)  folic acid 1 mg oral tablet: 1 tab(s) orally once a day (21 Sep 2022 04:11)  isosorbide mononitrate 30 mg oral tablet, extended release: 1 tab(s) orally once a day (21 Sep 2022 04:11)  lidocaine 4% topical film: Apply topically to affected area once a day, As Needed for pain (21 Sep 2022 04:11)  LORazepam 0.5 mg oral tablet: 1 tab(s) orally once a day (21 Sep 2022 04:11)  magnesium oxide 400 mg (241.3 mg elemental magnesium) oral tablet: 1 tab(s) orally once a day (21 Sep 2022 04:11)  montelukast 10 mg oral tablet: 1 tab(s) orally once a day (21 Sep 2022 04:11)  Multiple Vitamins oral tablet: 1 tab(s) orally once a day (21 Sep 2022 04:11)  simethicone 80 mg oral tablet, chewable: 1 tab(s) orally 2 times a day (21 Sep 2022 04:11)  Spiriva Respimat 1.25 mcg/inh inhalation aerosol: 2 puff(s) inhaled once a day (21 Sep 2022 04:11)  Toprol-XL 50 mg oral tablet, extended release: 1 tab(s) orally once a day (21 Sep 2022 04:11)    MEDICATIONS:  MEDICATIONS  (STANDING):  cefTRIAXone   IVPB 2000 milliGRAM(s) IV Intermittent every 24 hours    MEDICATIONS  (PRN):  acetaminophen     Tablet .. 650 milliGRAM(s) Oral every 6 hours PRN Temp greater or equal to 38C (100.4F), Mild Pain (1 - 3)  ondansetron Injectable 4 milliGRAM(s) IV Push every 8 hours PRN Nausea and/or Vomiting    PAST MEDICAL & SURGICAL HISTORY:  HTN (hypertension)      Aortic stenosis      Hyperlipidemia      COPD (chronic obstructive pulmonary disease)      GERD (gastroesophageal reflux disease)      Stage 3 chronic kidney disease      Anemia      Diastolic CHF, chronic      Obesity      Paroxysmal atrial fibrillation      S/P TAVR (transcatheter aortic valve replacement)  2/2019        FAMILY HISTORY:  No pertinent family history in first degree relatives      SOCIAL HISTORY:  Tobacco: [ ] Current, [ ] Former, [ ] Never; Pack Years:  Alcohol:  Illicit Drugs:    REVIEW OF SYSTEMS:  CONSTITUTIONAL: No weakness, fevers or chills  HEENT: No visual changes; No vertigo or throat pain   NECK: No pain or stiffness  RESPIRATORY: No cough, wheezing, hemoptysis; No shortness of breath  CARDIOVASCULAR: No chest pain or palpitations  GASTROINTESTINAL: As above.  GENITOURINARY: No dysuria, frequency or hematuria  NEUROLOGICAL: No numbness or weakness  SKIN: No itching, burning, rashes, or lesions   All other 10 review of systems is negative unless indicated above.    Vital Signs Last 24 Hrs  T(C): 36.8 (21 Sep 2022 10:21), Max: 36.9 (21 Sep 2022 07:49)  T(F): 98.2 (21 Sep 2022 10:21), Max: 98.5 (21 Sep 2022 08:31)  HR: 56 (21 Sep 2022 10:21) (56 - 60)  BP: 144/71 (21 Sep 2022 10:21) (105/66 - 149/69)  BP(mean): --  RR: 18 (21 Sep 2022 10:21) (17 - 18)  SpO2: 99% (21 Sep 2022 10:21) (92% - 99%)    Parameters below as of 21 Sep 2022 10:21  Patient On (Oxygen Delivery Method): nasal cannula  O2 Flow (L/min): 2        PHYSICAL EXAM:    General: in no acute distress  Eyes: Anicteric sclerae, moist conjunctivae  HENT: Moist mucous membranes  Neck: Trachea midline, supple  Lungs: Normal respiratory effort, no intercostal retractions  Cardiovascular: RRR  Abdomen: Soft, non-tender non-distended; No rebound or guarding  Extremities: Normal range of motion, No clubbing, cyanosis or edema  Neurological: Alert and oriented x3  Skin: Warm and dry. No obvious rash    LABS:                        7.8    5.48  )-----------( 241      ( 21 Sep 2022 11:16 )             26.0     09-21    138  |  102  |  13  ----------------------------<  93  4.7   |  26  |  1.10    Ca    8.8      21 Sep 2022 11:16    TPro  6.6  /  Alb  3.8  /  TBili  0.4  /  DBili  x   /  AST  16  /  ALT  18  /  AlkPhos  63  09-21        PT/INR - ( 20 Sep 2022 22:59 )   PT: 16.0 sec;   INR: 1.34          PTT - ( 20 Sep 2022 22:59 )  PTT:31.7 sec    Urinalysis with Rflx Culture (collected 21 Sep 2022 00:09)      RADIOLOGY & ADDITIONAL STUDIES:     Reviewed

## 2022-09-21 NOTE — H&P ADULT - NSHPPHYSICALEXAM_GEN_ALL_CORE
General: in no acute distress  Eyes: Anicteric sclerae, moist conjunctivae  HENT: Moist mucous membranes  Neck: Trachea midline, supple  Lungs: Normal respiratory effort, no intercostal retractions  Cardiovascular: RRR  Abdomen: Soft, non-tender non-distended; No rebound or guarding  Extremities: Normal range of motion, No clubbing, cyanosis or edema  Neurological: Alert and oriented x3  Skin: Warm and dry. No obvious rash

## 2022-09-21 NOTE — CONSULT NOTE ADULT - SUBJECTIVE AND OBJECTIVE BOX
Patient is a 82y old  Female who presents with a chief complaint of     HPI/HOSPITAL COURSE:  HPI:  82F Japanese speaking, w/ PMHx HTN, HLD, severe AS s/p TAVR (), valve thrombosis  s/p AC (not seen on echo ), Ascending Thoracic Aneurysm 4cm in 2022, pAFib (on Amiodarone/Eliquis), COPD (use to be on 2L home O2 but doesn't use it anymore), Colon CA s/p resection in  (in remission), moderate MARK (non-compliant with CPAP  claustrophobia), CKD3 (baseline Cr 1.1-1.2) and anemia now presenting with maroon blood in stool. Of note was recently admitted for bactermia (BC  positive for gemella species - possible dental source and  s/f GNR E Coli - was recommended to extraction but refused) and she was started on IV abx, PICC placed (last dose ) and discahrge to [facility]. Did not see blood clots but says the stool is all maroon, hard time describing more as they were flushed by nurse staff at [facility]. Patient reports that she has been fatigued for the past month, she has had ocassional diffuse abdominal pain that radiates to the right flank. After having a BM she feels lightheaded and dizzy. She denies any pain with BM. Denies dyuria, no hematuria. Denies fevers/chills.    SUBJECTIVE HPI: Japanese  #[id number]. Patient seen and examined at bedside. Patient resting comfortably no current chest pain, palpitations, SOB. Complains of SOB with ambulation and while eating any meals. She reports generalized weakness since the beginning of September. Pt states she is unable to walk more than 2 steps since her admission earlier this month. Prior to this was able to walk 4-5 blocks, but has been unable to use stairs in years. Denies any smoking history. Has been taking medications as prescribed to [facility] on discharge.        PAST MEDICAL & SURGICAL HISTORY:  HTN (hypertension)  Aortic stenosis  Hyperlipidemia  COPD (chronic obstructive pulmonary disease)  GERD (gastroesophageal reflux disease)  Stage 3 chronic kidney disease  Anemia  Diastolic CHF, chronic  Obesity  Paroxysmal atrial fibrillation  S/P TAVR (transcatheter aortic valve replacement)  2019    FAMILY HISTORY:  No pertinent family history in first degree relatives    SOCIAL HISTORY:     Cigarettes Denies     -Alcohol: Denies     -Ilicit Drug Use: Denies    Home Medications:  amiodarone 200 mg oral tablet: 1 tab(s) orally once a day (21 Sep 2022 14:42)  amLODIPine 10 mg oral tablet: 1 tab(s) orally once a day (21 Sep 2022 14:42)  apixaban 5 mg oral tablet: 1 tab(s) orally 2 times a day (21 Sep 2022 14:42)  atorvastatin 20 mg oral tablet: 1 tab(s) orally once a day (at bedtime) (21 Sep 2022 14:42)  cefTRIAXone: 2000 milligram(s) intravenous once a day last dose 22 (21 Sep 2022 14:42)  folic acid 1 mg oral tablet: 1 tab(s) orally once a day (21 Sep 2022 14:42)  isosorbide mononitrate 30 mg oral tablet, extended release: 1 tab(s) orally once a day (21 Sep 2022 14:42)  lidocaine 4% topical film: Apply topically to affected area once a day, As Needed for pain (21 Sep 2022 14:42)  LORazepam 0.5 mg oral tablet: 1 tab(s) orally once a day (21 Sep 2022 14:42)  magnesium oxide 400 mg (241.3 mg elemental magnesium) oral tablet: 1 tab(s) orally once a day (21 Sep 2022 14:42)  montelukast 10 mg oral tablet: 1 tab(s) orally once a day (21 Sep 2022 14:42)  Multiple Vitamins oral tablet: 1 tab(s) orally once a day (21 Sep 2022 14:42)  simethicone 80 mg oral tablet, chewable: 1 tab(s) orally 2 times a day (21 Sep 2022 14:42)  Spiriva Respimat 1.25 mcg/inh inhalation aerosol: 2 puff(s) inhaled once a day (21 Sep 2022 14:42)  Toprol-XL 50 mg oral tablet, extended release: 1 tab(s) orally once a day (21 Sep 2022 14:42)      MEDICATIONS  (STANDING):  aMIOdarone    Tablet 200 milliGRAM(s) Oral daily  amLODIPine   Tablet 10 milliGRAM(s) Oral daily  atorvastatin 20 milliGRAM(s) Oral at bedtime  cefTRIAXone   IVPB 2000 milliGRAM(s) IV Intermittent every 24 hours  folic acid 1 milliGRAM(s) Oral daily  isosorbide   mononitrate ER Tablet (IMDUR) 30 milliGRAM(s) Oral daily  lidocaine   4% Patch 1 Patch Transdermal every 24 hours  magnesium oxide 400 milliGRAM(s) Oral daily  montelukast 10 milliGRAM(s) Oral daily  multivitamin 1 Tablet(s) Oral daily  polyethylene glycol/electrolyte Solution. 4000 milliLiter(s) Oral once  simethicone 80 milliGRAM(s) Chew two times a day  tiotropium 18 MICROgram(s) Capsule 1 Capsule(s) Inhalation daily    MEDICATIONS  (PRN):  acetaminophen     Tablet .. 650 milliGRAM(s) Oral every 6 hours PRN Temp greater or equal to 38C (100.4F), Mild Pain (1 - 3)  LORazepam     Tablet 0.5 milliGRAM(s) Oral daily PRN Anxiety  ondansetron Injectable 4 milliGRAM(s) IV Push every 8 hours PRN Nausea and/or Vomiting      VITAL SIGNS:  Vital Signs Last 24 Hrs  T(C): 36.8 (21 Sep 2022 10:21), Max: 36.9 (21 Sep 2022 07:49)  T(F): 98.2 (21 Sep 2022 10:21), Max: 98.5 (21 Sep 2022 08:31)  HR: 56 (21 Sep 2022 10:21) (56 - 60)  BP: 144/71 (21 Sep 2022 10:21) (105/66 - 149/69)  BP(mean): --  RR: 18 (21 Sep 2022 10:21) (17 - 18)  SpO2: 99% (21 Sep 2022 10:21) (92% - 99%)    Parameters below as of 21 Sep 2022 10:21  Patient On (Oxygen Delivery Method): nasal cannula  O2 Flow (L/min): 2      I&O's Detail      PHYSICAL EXAM:  General: Comfortable, pleasant  Neurological: AAOx3, no focal deficits  Cardiovascular: bradycardic. +S1/S2, I/VI systolic murmur LUSB.   Respiratory: Decreased breath sounds bilaterally. Able to speak in full sentences.  Gastrointestinal: soft, TTP epigastrium and LUQ.   Extremities: WWP; 1+ pitting edema to top of knee.  Vascular: 2+ radial, DP/PT pulses B/L      LABS:                        7.8    5.48  )-----------( 241      ( 21 Sep 2022 11:16 )             26.0         138  |  102  |  13  ----------------------------<  93  4.7   |  26  |  1.10    Ca    8.8      21 Sep 2022 11:16    TPro  6.6  /  Alb  3.8  /  TBili  0.4  /  DBili  x   /  AST  16  /  ALT  18  /  AlkPhos  63      PT/INR - ( 20 Sep 2022 22:59 )   PT: 16.0 sec;   INR: 1.34          PTT - ( 20 Sep 2022 22:59 )  PTT:31.7 sec        BNP    Urinalysis Basic - ( 21 Sep 2022 00:09 )    Color: Yellow / Appearance: Clear / S.010 / pH: x  Gluc: x / Ketone: NEGATIVE  / Bili: Negative / Urobili: 0.2 E.U./dL   Blood: x / Protein: NEGATIVE mg/dL / Nitrite: NEGATIVE   Leuk Esterase: NEGATIVE / RBC: x / WBC x   Sq Epi: x / Non Sq Epi: x / Bacteria: x      RADIOLOGY & ADDITIONAL STUDIES: Reviewed.    EKG: rate controlled bradycardia.    ECHO < from: TTE Echo Complete w/o Contrast w/ Doppler (22 @ 15:47) >  1. Normal left ventricular size and systolic function.   2. Grade II left ventricular diastolic dysfunction.   3. Normal right ventricular size and systolic function.   4. Mildly dilated left atrium.   5. 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 The peak transvalvular velocity   is 3.80 m/s, the mean transvalvular gradient is 32.00 mmHg, and the   LVOT/AV velocity ratio is 0.29.   6. Mild mitral stenosis.   7. Pulmonary hypertension present, pulmonary artery systolic pressure is   61 mmHg.   8. No pericardial effusion.   9. Compared to the previous TTE performed on 2022, there is evidence   of severe pulmonary hypertension.    Aortic Valve:  A transcatheter aortic valve (TAVR) is noted in the aortic position. The   peaktransvalvular velocity is 3.80 m/s, the mean transvalvular gradient   is 32.00 mmHg, and the LVOT/AV velocity ratio is 0.29. The peak   transaortic gradient is 57.76 mmHg. Findings are consistent with severe   prosthetic aortic stenosis. There is no evidence of aortic regurgitation.    < end of copied text >

## 2022-09-21 NOTE — H&P ADULT - PROBLEM SELECTOR PROBLEM 8
Received call from daughter, Jenna Stokes requesting a recliner, as pt unable to sleep flat due to pain and difficulty breathing. Notified her that recliner could not be provided, but if pt must sleep elevated, hospital bed may be provided.   Daughter agreed that Anemia Prophylactic measure

## 2022-09-21 NOTE — H&P ADULT - ATTENDING COMMENTS
Briefly, this is an 83F Azerbaijani speaking, with PMH of hypertension, severe AS s/p TAVR, valve thrombosis, ascending thoracic aneurysm, paroxysmal atrial fibrillation, COPD (intermittently on home oxygen), CKD and colon cancer s/p resection presenting after recent admission for dyspnea, angina and with a dental infection, now admitted with three days of melena.     General: no acute distress, sitting up in bed  HEENT: normocephalic, atraumatic, no tenderness to palpation of jaw  CVS: RRR, systolic murmur LUSB  Pulm: CTAB, diminished breath sounds at bases, no crackles, wheeze, rales or rhonchi  Ab: soft, mild TTP of epigastrium, normoactive bowel sounds, nondistended  Ext: wwp, trace pedal edema, moves extremities  Neuro: grossly nonfocal exam  Psych: appropriate mood and affect    - cardiac clearance for GI procedure  - hemodynamically stable, but obtain consent for transfusion and maintain active type and screen  - holding anticoagulation at this time  - trend CBC  - continue ceftriaxone for E coli and gemella bacteremia until 9/26.  Patient had PICC line placed at last admission  - continue to hold lisinopril - was held on previous admission, with instructions to restart after cardiology follow up.  In setting of possible active GIB, will hold for now

## 2022-09-21 NOTE — PATIENT PROFILE ADULT - FALL HARM RISK - HARM RISK INTERVENTIONS

## 2022-09-21 NOTE — CONSULT NOTE ADULT - ASSESSMENT
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 GI consulted for hematochezia.     #Hematochezia  - patient known to GI service, was initially planned for outpatient f/u but was lost to f/u  - has not had colonoscopic evaluation following subtotal colectomy with ileocolic anastomosis (December 2020)  - Maintain active T&S, large bore IV access  - Transfusion threshold per primary team  - plan for colonoscopy tomorrow  - please give 4L golytely this afternoon/evening and keep NPO past MN    Indigo Gonzalez MD  PGY-6, Gastroenterology Fellow  pager: [phone]

## 2022-09-21 NOTE — CONSULT NOTE ADULT - ASSESSMENT
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 Of note was recently admitted for bacteremia (BC 9/5 positive for gemella species - possible dental source and 9/8 s/f GNR E Coli - was recommended to extraction but refused) and she was started on IV abx, PICC placed (last dose 9/26) and discharged to [facility]. In the ED afebrile, VSS, Hb 7.9 (8.1 at baseline), CT scan no active GI hemorrhage identified.     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 Of note was recently admitted for bacteremia (BC 9/5 positive for gemella species - possible dental source and 9/8 s/f GNR E Coli - was recommended to extraction but refused) and she was started on IV abx, PICC placed (last dose 9/26) and discharged to [facility]. In the ED afebrile, VSS, Hb 7.9 (8.1 at baseline), CT scan no active GI hemorrhage identified.     Plan:  - no surgical intervention at this time  - hold AC  - admit to medicine for further monitoring   - f/u GI recs  - team 1C will continue to follow

## 2022-09-21 NOTE — H&P ADULT - NSHPSOCIALHISTORY_GEN_ALL_CORE
Does not smoke, drink alcohol oir Does not smoke, drink alcohol or use drugs. Lives at home and walks with walker, daughter helps out a lot. Currently patient is living out of a rehab.

## 2022-09-21 NOTE — H&P ADULT - PROBLEM SELECTOR PLAN 4
- Continue amlodipine  - Holding home lisinopril due to BIBIANA from last admission, patient was instructed to restart this medication with Dr. Marte

## 2022-09-21 NOTE — H&P ADULT - PROBLEM SELECTOR PLAN 8
F: none   E: replete PRN   N: DASH/TLC, NPO After midnight   DVT: none   GI: protonix 40mg IV BID     Dispo: [facility]   Code status: full code

## 2022-09-21 NOTE — ED PROVIDER NOTE - SHIFT CHANGE DETAILS
82F afib on eliquis, significant other cardiac disease, c/o 4d brbpr and R mid abd pain. avss. hb 8.1 (baseline) > 7.9. empty rectal vault w/ bright red secretions. +R mid abd pain on exam. s/p protonix. GI/surgery consulted.    dispo: pending ct read and surgery reccs -- anticipate admission

## 2022-09-21 NOTE — H&P ADULT - ASSESSMENT
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

## 2022-09-21 NOTE — H&P ADULT - PROBLEM SELECTOR PLAN 5
History of COPD, on spiriva and monteleukast.   - Continue home meds   - Patient currently on 2L NC saturating 99% - used O2 at home before but has not used in 2 years

## 2022-09-22 ENCOUNTER — TRANSCRIPTION ENCOUNTER (OUTPATIENT)
Age: 82
End: 2022-09-22

## 2022-09-22 ENCOUNTER — RESULT REVIEW (OUTPATIENT)
Age: 82
End: 2022-09-22

## 2022-09-22 LAB
ALBUMIN SERPL ELPH-MCNC: 3.8 G/DL — SIGNIFICANT CHANGE UP (ref 3.3–5)
ALP SERPL-CCNC: 61 U/L — SIGNIFICANT CHANGE UP (ref 40–120)
ALT FLD-CCNC: 16 U/L — SIGNIFICANT CHANGE UP (ref 10–45)
ANION GAP SERPL CALC-SCNC: 10 MMOL/L — SIGNIFICANT CHANGE UP (ref 5–17)
APTT BLD: 26.9 SEC — LOW (ref 27.5–35.5)
AST SERPL-CCNC: 17 U/L — SIGNIFICANT CHANGE UP (ref 10–40)
BASOPHILS # BLD AUTO: 0.05 K/UL — SIGNIFICANT CHANGE UP (ref 0–0.2)
BASOPHILS NFR BLD AUTO: 1 % — SIGNIFICANT CHANGE UP (ref 0–2)
BILIRUB SERPL-MCNC: 0.4 MG/DL — SIGNIFICANT CHANGE UP (ref 0.2–1.2)
BLD GP AB SCN SERPL QL: POSITIVE — SIGNIFICANT CHANGE UP
BUN SERPL-MCNC: 15 MG/DL — SIGNIFICANT CHANGE UP (ref 7–23)
CALCIUM SERPL-MCNC: 8.6 MG/DL — SIGNIFICANT CHANGE UP (ref 8.4–10.5)
CHLORIDE SERPL-SCNC: 100 MMOL/L — SIGNIFICANT CHANGE UP (ref 96–108)
CO2 SERPL-SCNC: 27 MMOL/L — SIGNIFICANT CHANGE UP (ref 22–31)
CREAT SERPL-MCNC: 1.14 MG/DL — SIGNIFICANT CHANGE UP (ref 0.5–1.3)
EGFR: 47 ML/MIN/1.73M2 — LOW
EOSINOPHIL # BLD AUTO: 0.15 K/UL — SIGNIFICANT CHANGE UP (ref 0–0.5)
EOSINOPHIL NFR BLD AUTO: 2.9 % — SIGNIFICANT CHANGE UP (ref 0–6)
GLUCOSE SERPL-MCNC: 87 MG/DL — SIGNIFICANT CHANGE UP (ref 70–99)
HCT VFR BLD CALC: 23.4 % — LOW (ref 34.5–45)
HCT VFR BLD CALC: 25.3 % — LOW (ref 34.5–45)
HGB BLD-MCNC: 7.2 G/DL — LOW (ref 11.5–15.5)
HGB BLD-MCNC: 7.6 G/DL — LOW (ref 11.5–15.5)
IMM GRANULOCYTES NFR BLD AUTO: 0.2 % — SIGNIFICANT CHANGE UP (ref 0–0.9)
INR BLD: 1.12 — SIGNIFICANT CHANGE UP (ref 0.88–1.16)
LYMPHOCYTES # BLD AUTO: 0.63 K/UL — LOW (ref 1–3.3)
LYMPHOCYTES # BLD AUTO: 12.2 % — LOW (ref 13–44)
MCHC RBC-ENTMCNC: 26.2 PG — LOW (ref 27–34)
MCHC RBC-ENTMCNC: 26.5 PG — LOW (ref 27–34)
MCHC RBC-ENTMCNC: 30 GM/DL — LOW (ref 32–36)
MCHC RBC-ENTMCNC: 30.8 GM/DL — LOW (ref 32–36)
MCV RBC AUTO: 86 FL — SIGNIFICANT CHANGE UP (ref 80–100)
MCV RBC AUTO: 87.2 FL — SIGNIFICANT CHANGE UP (ref 80–100)
MONOCYTES # BLD AUTO: 0.62 K/UL — SIGNIFICANT CHANGE UP (ref 0–0.9)
MONOCYTES NFR BLD AUTO: 12 % — SIGNIFICANT CHANGE UP (ref 2–14)
NEUTROPHILS # BLD AUTO: 3.69 K/UL — SIGNIFICANT CHANGE UP (ref 1.8–7.4)
NEUTROPHILS NFR BLD AUTO: 71.7 % — SIGNIFICANT CHANGE UP (ref 43–77)
NRBC # BLD: 0 /100 WBCS — SIGNIFICANT CHANGE UP (ref 0–0)
NRBC # BLD: 0 /100 WBCS — SIGNIFICANT CHANGE UP (ref 0–0)
PLATELET # BLD AUTO: 241 K/UL — SIGNIFICANT CHANGE UP (ref 150–400)
PLATELET # BLD AUTO: 243 K/UL — SIGNIFICANT CHANGE UP (ref 150–400)
POTASSIUM SERPL-MCNC: 4.3 MMOL/L — SIGNIFICANT CHANGE UP (ref 3.5–5.3)
POTASSIUM SERPL-SCNC: 4.3 MMOL/L — SIGNIFICANT CHANGE UP (ref 3.5–5.3)
PROT SERPL-MCNC: 6.5 G/DL — SIGNIFICANT CHANGE UP (ref 6–8.3)
PROTHROM AB SERPL-ACNC: 13.4 SEC — SIGNIFICANT CHANGE UP (ref 10.5–13.4)
RBC # BLD: 2.72 M/UL — LOW (ref 3.8–5.2)
RBC # BLD: 2.9 M/UL — LOW (ref 3.8–5.2)
RBC # FLD: 15.3 % — HIGH (ref 10.3–14.5)
RBC # FLD: 15.4 % — HIGH (ref 10.3–14.5)
RH IG SCN BLD-IMP: POSITIVE — SIGNIFICANT CHANGE UP
SARS-COV-2 RNA SPEC QL NAA+PROBE: SIGNIFICANT CHANGE UP
SODIUM SERPL-SCNC: 137 MMOL/L — SIGNIFICANT CHANGE UP (ref 135–145)
WBC # BLD: 5.15 K/UL — SIGNIFICANT CHANGE UP (ref 3.8–10.5)
WBC # BLD: 6.44 K/UL — SIGNIFICANT CHANGE UP (ref 3.8–10.5)
WBC # FLD AUTO: 5.15 K/UL — SIGNIFICANT CHANGE UP (ref 3.8–10.5)
WBC # FLD AUTO: 6.44 K/UL — SIGNIFICANT CHANGE UP (ref 3.8–10.5)

## 2022-09-22 PROCEDURE — 74177 CT ABD & PELVIS W/CONTRAST: CPT | Mod: 26

## 2022-09-22 PROCEDURE — 99233 SBSQ HOSP IP/OBS HIGH 50: CPT | Mod: GC

## 2022-09-22 PROCEDURE — 71260 CT THORAX DX C+: CPT | Mod: 26

## 2022-09-22 PROCEDURE — 88305 TISSUE EXAM BY PATHOLOGIST: CPT | Mod: 26

## 2022-09-22 RX ORDER — IPRATROPIUM/ALBUTEROL SULFATE 18-103MCG
3 AEROSOL WITH ADAPTER (GRAM) INHALATION ONCE
Refills: 0 | Status: COMPLETED | OUTPATIENT
Start: 2022-09-22 | End: 2022-09-22

## 2022-09-22 RX ORDER — CHLORHEXIDINE GLUCONATE 213 G/1000ML
1 SOLUTION TOPICAL DAILY
Refills: 0 | Status: DISCONTINUED | OUTPATIENT
Start: 2022-09-22 | End: 2022-09-29

## 2022-09-22 RX ORDER — LIDOCAINE 4 G/100G
1 CREAM TOPICAL ONCE
Refills: 0 | Status: COMPLETED | OUTPATIENT
Start: 2022-09-22 | End: 2022-09-22

## 2022-09-22 RX ADMIN — MONTELUKAST 10 MILLIGRAM(S): 4 TABLET, CHEWABLE ORAL at 11:28

## 2022-09-22 RX ADMIN — Medication 1 TABLET(S): at 11:31

## 2022-09-22 RX ADMIN — Medication 650 MILLIGRAM(S): at 11:41

## 2022-09-22 RX ADMIN — CHLORHEXIDINE GLUCONATE 1 APPLICATION(S): 213 SOLUTION TOPICAL at 11:31

## 2022-09-22 RX ADMIN — SIMETHICONE 80 MILLIGRAM(S): 80 TABLET, CHEWABLE ORAL at 18:17

## 2022-09-22 RX ADMIN — MAGNESIUM OXIDE 400 MG ORAL TABLET 400 MILLIGRAM(S): 241.3 TABLET ORAL at 11:27

## 2022-09-22 RX ADMIN — Medication 1 MILLIGRAM(S): at 11:31

## 2022-09-22 RX ADMIN — LIDOCAINE 1 PATCH: 4 CREAM TOPICAL at 13:48

## 2022-09-22 RX ADMIN — Medication 650 MILLIGRAM(S): at 12:45

## 2022-09-22 RX ADMIN — Medication 3 MILLILITER(S): at 11:40

## 2022-09-22 RX ADMIN — ISOSORBIDE MONONITRATE 30 MILLIGRAM(S): 60 TABLET, EXTENDED RELEASE ORAL at 11:28

## 2022-09-22 RX ADMIN — LIDOCAINE 1 PATCH: 4 CREAM TOPICAL at 18:22

## 2022-09-22 RX ADMIN — ATORVASTATIN CALCIUM 20 MILLIGRAM(S): 80 TABLET, FILM COATED ORAL at 21:49

## 2022-09-22 RX ADMIN — CEFTRIAXONE 100 MILLIGRAM(S): 500 INJECTION, POWDER, FOR SOLUTION INTRAMUSCULAR; INTRAVENOUS at 11:27

## 2022-09-22 RX ADMIN — LIDOCAINE 1 PATCH: 4 CREAM TOPICAL at 05:45

## 2022-09-22 RX ADMIN — PANTOPRAZOLE SODIUM 40 MILLIGRAM(S): 20 TABLET, DELAYED RELEASE ORAL at 18:17

## 2022-09-22 RX ADMIN — TIOTROPIUM BROMIDE 1 CAPSULE(S): 18 CAPSULE ORAL; RESPIRATORY (INHALATION) at 11:28

## 2022-09-22 RX ADMIN — Medication 0.5 MILLIGRAM(S): at 14:11

## 2022-09-22 RX ADMIN — Medication 650 MILLIGRAM(S): at 18:59

## 2022-09-22 RX ADMIN — PANTOPRAZOLE SODIUM 40 MILLIGRAM(S): 20 TABLET, DELAYED RELEASE ORAL at 06:46

## 2022-09-22 RX ADMIN — Medication 650 MILLIGRAM(S): at 18:27

## 2022-09-22 NOTE — PROGRESS NOTE ADULT - ATTENDING COMMENTS
Patient was seen and examined at bedside on 9/22/2022 at 830 am. Patient has no acute complaints. Denies SOB, CP. ROS is otherwise negative. Vitals, labwork and pertinent imaging reviewed. Physical exam - NAD, AAO x 4, PERRLA, EOMI, MMM, supple neck, chest - CTA b/l, CV - rrr, s1s2, no m/r/g, abd - soft, NTND, + BS, ext - wwp, R knee wrapped, psych - normal affect, skin - no rash    Plan  -Pt s/p EGD showing mass  -CRS and Heme/Onc consulted  -C/w Lasix

## 2022-09-22 NOTE — DISCHARGE NOTE PROVIDER - NSDCFUSCHEDAPPT_GEN_ALL_CORE_FT
[facility]  [address]  Scheduled Appointment: 09/28/2022    [facility]  [facility] [address]  Scheduled Appointment: 10/31/2022     [facility]  [address] [address]  Scheduled Appointment: 10/31/2022     Bere More  [facility] Physician [facility]  [address]  Scheduled Appointment: 10/03/2022    Derek Marte  [facility] Physician [facility]  [address]ART[address] [address]  Scheduled Appointment: 10/31/2022     Derek Marte Physician [facility]  HEART[facility] [address]  Scheduled Appointment: 10/31/2022

## 2022-09-22 NOTE — PROGRESS NOTE ADULT - PROBLEM SELECTOR PROBLEM 5
COPD (chronic obstructive pulmonary disease) History of transcatheter aortic valve replacement (TAVR)

## 2022-09-22 NOTE — PROGRESS NOTE ADULT - ASSESSMENT
GI input appreciated - for colonoscopy today  Medical care appreciated as per primary team.  Cardiology consult noted.

## 2022-09-22 NOTE — PROGRESS NOTE ADULT - PROBLEM SELECTOR PLAN 2
Pt with E coli and Gemella spp bacteremia from dental abscess. Refused extraction last visit, has not done it outpatient. On Ceftriaxone 2g daily until 9/26. Patient has jaw and neck pain without any crepitus on exam, no fluctuance or erythema/edema. Low concern for involvement of soft tissue involvement of head and neck, no airway compromise.   - Afebrile and without white count on admission   - Continue ceftriaxone 2g daily until 9/26 Patient with dark red stool, occasional BRBPR. Hemoglobin 7.9 on admission which is around baseline. Non tachycardic or hypotensive. 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 Of note, patient did state that she had blood transfusion 5 days prior to admission at [facility] for "anemia" but couldn't give any more details. Colonoscopy performed on 9/22; "Approximately 5-7 cm distal to the ileocolonic anastomosis, there was a friable, centrally ulcerated, coarsened mass that occupied 30% of the luminal circumference, concerning for malignancy. Multiple cold forceps biopsies were obtained."   - CT Chest, Abd, Pelvis with contrast ordered, performed. F.u results  - Continue to trend hemoglobin, keep HG > 7  - Restarted on DASH/TLC diet

## 2022-09-22 NOTE — PROGRESS NOTE ADULT - ATTENDING COMMENTS
Discussed with GI attending Dr Paris. Mass found on colonoscopy, distal to prior anastomosis. Biopsies taken with concern for cancer.  Language Line  [id number] used for [place] interpretation. Colonoscopy findings and concern for underlying malignancy discussed.  Treatment options discussed with patient. Spent 30mins with  to discuss with patient. All questions answered, she expressed understanding.  Also discussed on phone with patient's daughter Jaelyn at request of patient.  Discussed with surgical resident Dr Baig.  Appreciate medical and cardiology care and input.  Staging CTs and CEA.  Recommend consulting patient's oncologist Dr Mckeon as well.  Will continue to follow as care and treatment plan is coordinated for patient.

## 2022-09-22 NOTE — DISCHARGE NOTE PROVIDER - HOSPITAL COURSE
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 Colonoscopy performed, demonstrated "Lumen Evidence of a previous ileo-colonic anastomosis was seen in the anastomosis. Suture material was visible. Additional findings Approximately 5-7 cm distal to the ileocolonic anastomosis, there was a friable, centrally ulcerated, coarsened mass that occupied 30% of the  luminal circumference, concerning for malignancy. Multiple cold forceps biopsies were obtained." CTA Chest/Abd/Pelvis obtained, demonstrated ___________________. As per surgery team; Colonoscopy findings discussed with pt by surgery team with Singaporean  and concern for underlying malignancy discussed. Surgery team also discussed treatment options with patient, she expressed understanding. Also discussed on phone with patient's daughter Jaelyn at request of patient.    Problem List/Main Diagnoses (system-based):  1) Hematochezia: Patient with dark red stool, occasional BRBPR. Hemoglobin 7.9 on admission which is around baseline. Non tachycardic or hypotensive. 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 Of note, patient did state that she had blood transfusion 5 days prior to admission at [facility] for "anemia" but couldn't give any more details. Colonoscopy performed on 9/22; "Approximately 5-7 cm distal to the ileocolonic anastomosis, there was a friable, centrally ulcerated, coarsened mass that occupied 30% of the luminal circumference, concerning for malignancy. Multiple cold forceps biopsies were obtained." CT Chest, Abd, Pelvis with contrast performed; ________________    2) E coli bacteremia: Pt with E coli and Gemella spp bacteremia from dental abscess. Refused extraction last visit, has not done it outpatient. On Ceftriaxone 2g daily until 9/26. Patient has jaw and neck pain without any crepitus on exam, no fluctuance or erythema/edema. Low concern for involvement of soft tissue involvement of head and neck, no airway compromise. Will continue ceftriaxone 2g daily until 9/26.     Problem/Plan - 3:  ·  Problem: History of transcatheter aortic valve replacement (TAVR).   ·  Plan: History of TAVR with thrombosis in the past, currently on Eliquis 5mg BID.   - Holding Eliquis in the setting of possible GIB.     Problem/Plan - 4:  ·  Problem: HTN (hypertension).   ·  Plan: - Continue amlodipine  - Holding home lisinopril due to BIBIANA from last admission, patient was instructed to restart this medication with Dr. Marte.     Problem/Plan - 5:  ·  Problem: COPD (chronic obstructive pulmonary disease).   ·  Plan: History of COPD, on spiriva and monteleukast.   - Continue home meds   - Patient currently on 2L NC saturating 99% - used O2 at home before but has not used in 2 years.     Problem/Plan - 6:  ·  Problem: GERD (gastroesophageal reflux disease).   ·  Plan: Continue protonix 40mg BID for GIB.     Problem/Plan - 7:  ·  Problem: Hyperlipidemia.   ·  Plan: Continue home atorvastatin 20mg.    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 Colonoscopy performed, demonstrated "Lumen Evidence of a previous ileo-colonic anastomosis was seen in the anastomosis. Suture material was visible. Additional findings Approximately 5-7 cm distal to the ileocolonic anastomosis, there was a friable, centrally ulcerated, coarsened mass that occupied 30% of the  luminal circumference, concerning for malignancy. Multiple cold forceps biopsies were obtained." CT Abd/Pelvis with IV contrast obtained, demonstrated "Unchanged mediastinal and bilateral hilar adenopathy. Differential diagnosis includes sarcoid, lymphoproliferative disorder.Small right and trace left pleural effusion. As compared to the CTA abdomen and pelvis 9/21/2022 the left pleural effusion is new, the right  pleural effusion is grossly unchanged. Thickened interlobular septa suggesting pulmonary interstitial edema. Stable bilateral lung nodules and micronodules. Status post TAVR. Cardiomegaly. No focal liver lesion. No liver metastasis. Mild splenomegaly, unchanged. Status post right hemicolectomy." CT Chest demonstrates "No aortic aneurysm or dissection. Status post post TAVR. Cardiomegaly. Pulmonary arterial hypertension. No central PE. Lungs show evidence of mosaic attenuation. The latter can be seen in the  pulmonary vascular disease, asthma, constrictive bronchiolitis,  hypersensitivity pneumonitis, sarcoid etc. Bilateral interlobular septal thickening which could represent pulmonary interstitial edema. Unchanged mediastinal and bilateral hilar adenopathy. Suspect an approximately 50% stenosis of the proximal SMA. Status post right hemicolectomy. Umbilical hernia containing a nonobstructing segment of small bowel." As per surgery team; Colonoscopy findings discussed with pt by surgery team with Wallisian  and concern for underlying malignancy discussed. Surgery team also discussed treatment options with patient, she expressed understanding. Also discussed on phone with patient's daughter Jaelyn at request of patient.    Problem List/Main Diagnoses (system-based):  1) Hematochezia: Patient with dark red stool, occasional BRBPR. Hemoglobin 7.9 on admission which is around baseline. Non tachycardic or hypotensive. 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 Of note, patient did state that she had blood transfusion 5 days prior to admission at [facility] for "anemia" but couldn't give any more details. Colonoscopy performed on 9/22; "Approximately 5-7 cm distal to the ileocolonic anastomosis, there was a friable, centrally ulcerated, coarsened mass that occupied 30% of the luminal circumference, concerning for malignancy. Multiple cold forceps biopsies were obtained." CT Chest, Abd, Pelvis with contrast performed; ________________    2) E coli bacteremia: Pt with E coli and Gemella spp bacteremia from dental abscess. Refused extraction last visit, has not done it outpatient. On Ceftriaxone 2g daily until 9/26. Patient has jaw and neck pain without any crepitus on exam, no fluctuance or erythema/edema. Low concern for involvement of soft tissue involvement of head and neck, no airway compromise. Will continue ceftriaxone 2g daily until 9/26.     Problem/Plan - 3:  ·  Problem: History of transcatheter aortic valve replacement (TAVR).   ·  Plan: History of TAVR with thrombosis in the past, currently on Eliquis 5mg BID.   - Holding Eliquis in the setting of possible GIB.     Problem/Plan - 4:  ·  Problem: HTN (hypertension).   ·  Plan: - Continue amlodipine  - Holding home lisinopril due to BIBIANA from last admission, patient was instructed to restart this medication with Dr. Marte.     Problem/Plan - 5:  ·  Problem: COPD (chronic obstructive pulmonary disease).   ·  Plan: History of COPD, on spiriva and monteleukast.   - Continue home meds   - Patient currently on 2L NC saturating 99% - used O2 at home before but has not used in 2 years.     Problem/Plan - 6:  ·  Problem: GERD (gastroesophageal reflux disease).   ·  Plan: Continue protonix 40mg BID for GIB.     Problem/Plan - 7:  ·  Problem: Hyperlipidemia.   ·  Plan: Continue home atorvastatin 20mg.    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 Colonoscopy performed, demonstrated "Lumen Evidence of a previous ileo-colonic anastomosis was seen in the anastomosis. Suture material was visible. Additional findings Approximately 5-7 cm distal to the ileocolonic anastomosis, there was a friable, centrally ulcerated, coarsened mass that occupied 30% of the  luminal circumference, concerning for malignancy. Multiple cold forceps biopsies were obtained." CT Abd/Pelvis with IV contrast obtained, demonstrated "Unchanged mediastinal and bilateral hilar adenopathy. Differential diagnosis includes sarcoid, lymphoproliferative disorder.Small right and trace left pleural effusion. As compared to the CTA abdomen and pelvis 9/21/2022 the left pleural effusion is new, the right  pleural effusion is grossly unchanged. Thickened interlobular septa suggesting pulmonary interstitial edema. Stable bilateral lung nodules and micronodules. Status post TAVR. Cardiomegaly. No focal liver lesion. No liver metastasis. Mild splenomegaly, unchanged. Status post right hemicolectomy." CT Chest demonstrates "No aortic aneurysm or dissection. Status post post TAVR. Cardiomegaly. Pulmonary arterial hypertension. No central PE. Lungs show evidence of mosaic attenuation. The latter can be seen in the  pulmonary vascular disease, asthma, constrictive bronchiolitis,  hypersensitivity pneumonitis, sarcoid etc. Bilateral interlobular septal thickening which could represent pulmonary interstitial edema. Unchanged mediastinal and bilateral hilar adenopathy. Suspect an approximately 50% stenosis of the proximal SMA. Status post right hemicolectomy. Umbilical hernia containing a nonobstructing segment of small bowel." As per surgery team; Colonoscopy findings discussed with pt by surgery team with Greenlandic  and concern for underlying malignancy discussed. Surgery team also discussed treatment options with patient, she expressed understanding. Also discussed on phone with patient's daughter Jaelyn at request of patient. PET-CT demonstrates "1. Hypermetabolic mediastinal and bilateral hilar lymph nodes, SUV max 12.4 at an enlarged aortopulmonary node, most likely representing metastatic disease.2. Focal activity at the anterior mid transverse colon, SUV max 11.9, most likely corresponding to the malignant lesion found on recent colonoscopy. Status post right hemicolectomy. 3. 2.3 cm area of increased uptake in an area of groundglass opacity at the posterior lingula, most likely representing inflammatory uptake, although malignant involvement is difficult to exclude. Mosaic attenuation of the lungs, most likely representing air trapping, edema, or small airway disease. 4. Small bilateral pleural effusions with mild associated uptake. 5. FDG avid subcentimeter soft tissue nodule in the upper inner quadrant of the right breast, possibly representing metastatic disease. 6. Wedge-shaped area of photopenia at the posterior aspect of L3-L4 without a CT correlate, possibly due to asymmetric degenerative changes."       Problem List/Main Diagnoses (system-based):  1) Hematochezia: Patient with dark red stool, occasional BRBPR. Hemoglobin 7.9 on admission which is around baseline. Non tachycardic or hypotensive. 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 Of note, patient did state that she had blood transfusion 5 days prior to admission at [facility] for "anemia" but couldn't give any more details. Colonoscopy performed on 9/22; "Approximately 5-7 cm distal to the ileocolonic anastomosis, there was a friable, centrally ulcerated, coarsened mass that occupied 30% of the luminal circumference, concerning for malignancy. Multiple cold forceps biopsies were obtained." CT Chest, Abd, Pelvis with contrast performed; results as above. PET-CT scan performed, results as above.    2) E coli bacteremia: Pt with E coli and Gemella spp bacteremia from dental abscess. Refused extraction last visit, has not done it outpatient. On Ceftriaxone 2g daily until 9/26. Patient has jaw and neck pain without any crepitus on exam, no fluctuance or erythema/edema. Low concern for involvement of soft tissue involvement of head and neck, no airway compromise. Will continue ceftriaxone 2g daily until 9/26.     Problem/Plan - 3:  ·  Problem: History of transcatheter aortic valve replacement (TAVR).   ·  Plan: History of TAVR with thrombosis in the past, currently on Eliquis 5mg BID.   - Holding Eliquis in the setting of possible GIB.     Problem/Plan - 4:  ·  Problem: HTN (hypertension).   ·  Plan: - Continue amlodipine  - Holding home lisinopril due to BIBIANA from last admission, patient was instructed to restart this medication with Dr. Marte.     Problem/Plan - 5:  ·  Problem: COPD (chronic obstructive pulmonary disease).   ·  Plan: History of COPD, on spiriva and monteleukast.   - Continue home meds   - Patient currently on 2L NC saturating 99% - used O2 at home before but has not used in 2 years.     Problem/Plan - 6:  ·  Problem: GERD (gastroesophageal reflux disease).   ·  Plan: Continue protonix 40mg BID for GIB.     Problem/Plan - 7:  ·  Problem: Hyperlipidemia.   ·  Plan: Continue home atorvastatin 20mg.    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 Colonoscopy performed, demonstrated "Lumen Evidence of a previous ileo-colonic anastomosis was seen in the anastomosis. Suture material was visible. Additional findings Approximately 5-7 cm distal to the ileocolonic anastomosis, there was a friable, centrally ulcerated, coarsened mass that occupied 30% of the  luminal circumference, concerning for malignancy. Multiple cold forceps biopsies were obtained." CT Abd/Pelvis with IV contrast obtained, demonstrated "Unchanged mediastinal and bilateral hilar adenopathy. Differential diagnosis includes sarcoid, lymphoproliferative disorder.Small right and trace left pleural effusion. As compared to the CTA abdomen and pelvis 9/21/2022 the left pleural effusion is new, the right  pleural effusion is grossly unchanged. Thickened interlobular septa suggesting pulmonary interstitial edema. Stable bilateral lung nodules and micronodules. Status post TAVR. Cardiomegaly. No focal liver lesion. No liver metastasis. Mild splenomegaly, unchanged. Status post right hemicolectomy."     CT Chest demonstrates "No aortic aneurysm or dissection. Status post post TAVR. Cardiomegaly. Pulmonary arterial hypertension. No central PE. Lungs show evidence of mosaic attenuation. The latter can be seen in the  pulmonary vascular disease, asthma, constrictive bronchiolitis,  hypersensitivity pneumonitis, sarcoid etc. Bilateral interlobular septal thickening which could represent pulmonary interstitial edema. Unchanged mediastinal and bilateral hilar adenopathy. Suspect an approximately 50% stenosis of the proximal SMA. Status post right hemicolectomy. Umbilical hernia containing a nonobstructing segment of small bowel."     As per surgery team; Colonoscopy findings discussed with pt by surgery team with Bahamian  and concern for underlying malignancy discussed. Surgery team also discussed treatment options with patient, she expressed understanding. Also discussed on phone with patient's daughter Jaelyn at request of patient.     PET-CT demonstrates "1. Hypermetabolic mediastinal and bilateral hilar lymph nodes, SUV max 12.4 at an enlarged aortopulmonary node, most likely representing metastatic disease.2. Focal activity at the anterior mid transverse colon, SUV max 11.9, most likely corresponding to the malignant lesion found on recent colonoscopy. Status post right hemicolectomy. 3. 2.3 cm area of increased uptake in an area of groundglass opacity at the posterior lingula, most likely representing inflammatory uptake, although malignant involvement is difficult to exclude. Mosaic attenuation of the lungs, most likely representing air trapping, edema, or small airway disease. 4. Small bilateral pleural effusions with mild associated uptake. 5. FDG avid subcentimeter soft tissue nodule in the upper inner quadrant of the right breast, possibly representing metastatic disease. 6. Wedge-shaped area of photopenia at the posterior aspect of L3-L4 without a CT correlate, possibly due to asymmetric degenerative changes."       Problem List/Main Diagnoses (system-based):  1) Hematochezia: Patient with dark red stool, occasional BRBPR. Hemoglobin 7.9 on admission which is around baseline. Non tachycardic or hypotensive. 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 Of note, patient did state that she had blood transfusion 5 days prior to admission at [facility] for "anemia" but couldn't give any more details. Colonoscopy performed on 9/22; "Approximately 5-7 cm distal to the ileocolonic anastomosis, there was a friable, centrally ulcerated, coarsened mass that occupied 30% of the luminal circumference, concerning for malignancy. Multiple cold forceps biopsies were obtained." CT Chest, Abd, Pelvis with contrast performed; results as above. PET-CT scan performed, results as above.    2) E coli bacteremia: Pt with E coli and Gemella spp bacteremia from dental abscess. Refused extraction last visit, has not done it outpatient. On Ceftriaxone 2g daily until 9/26. Patient has jaw and neck pain without any crepitus on exam, no fluctuance or erythema/edema. Low concern for involvement of soft tissue involvement of head and neck, no airway compromise. Completed course ceftriaxone 2g daily on 9/26. Pt afebrile and asymptomatic since.     3) History of transcatheter aortic valve replacement (TAVR): History of TAVR with thrombosis in the past, currently on Eliquis 5mg BID. Held I/s/o suspicion for GI bleed and in anticipation of possible surgical intervention until restarted on 9/27 as per cardiology recs.     4) HTN: C/w Amlodipine 10mg QD during inpt stay, HTN overall well controlled.     5) COPD (chronic obstructive pulmonary disease): History of COPD, on spiriva and monteleukast. Home meds continued while inpatient.     6) GERD (gastroesophageal reflux disease): C/w protonix 40mg BID for GIB while inpatient.     7) Hyperlipidemia: C/w home atorvastatin 20mg while inpt.     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 Colonoscopy concerning for recurrence of colon cancer with biopsy showing adenocarcinoma. PET scan concerning for metastatic disease to the lung. Pt was scheduled for EBUS and lung lymph node biopsy to confirm metastatic disease and help target treatment. However, after long talk with patient and daughter, they decided to opt for alternative treatments and refused EBUS. Pt restarted on cardiac medications per cards recs and is now stable for discharge.       Problem List/Main Diagnoses (system-based):  1) Hematochezia: Patient with dark red stool, occasional BRBPR. Hemoglobin 7.9 on admission which is around baseline. Non tachycardic or hypotensive. 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 Of note, patient did state that she had blood transfusion 5 days prior to admission at [facility] for "anemia" but couldn't give any more details. Colonoscopy performed on 9/22; "Approximately 5-7 cm distal to the ileocolonic anastomosis, there was a friable, centrally ulcerated, coarsened mass that occupied 30% of the luminal circumference, concerning for malignancy. Multiple cold forceps biopsies were obtained." Biopsy results showing adenocarcinoma. CT Chest, Abd, Pelvis, and PET-CT performed c/f metastatic disease. Pt refused EBUS with biopsy of lung nodules. Okayed for reinstitution of anticoagulation per surgery.  Plan:  - f/u oncology o/p    #HFpEF  - hx of HFpEF with grade 2 diastolic dysfunction on lasix 40mg and metoprolol succinate 50mg OD  Plan:  - switch lasix to torsemide 40mg qd and reduse toprol to 12.5mg qd      2) E coli bacteremia: Pt with E coli and Gemella spp bacteremia from dental abscess. Refused extraction last visit, has not done it outpatient. On Ceftriaxone 2g daily until 9/26. Patient has jaw and neck pain without any crepitus on exam, no fluctuance or erythema/edema. Low concern for involvement of soft tissue involvement of head and neck, no airway compromise. Completed course ceftriaxone 2g daily on 9/26. Pt afebrile and asymptomatic since.   Plan:  - removed PICC on d/c    3) History of transcatheter aortic valve replacement (TAVR): History of TAVR with thrombosis in the past, currently on Eliquis 5mg BID. Held I/s/o suspicion for GI bleed and in anticipation of possible surgical intervention until restarted on 9/27 as per cardiology recs.     4) HTN: C/w Amlodipine 10mg QD during inpt stay, HTN overall well controlled.     5) COPD (chronic obstructive pulmonary disease): History of COPD, on spiriva and monteleukast. Home meds continued while inpatient.     6) GERD (gastroesophageal reflux disease): C/w protonix 40mg BID for GIB while inpatient.     7) Hyperlipidemia: C/w home atorvastatin 20mg while inpt.     New medications: none    Exam to be followed outpatient: If pt is amenable, lung biopsy outpatient

## 2022-09-22 NOTE — DISCHARGE NOTE PROVIDER - NSDCCPTREATMENT_GEN_ALL_CORE_FT
PRINCIPAL PROCEDURE  Procedure: Colonoscopy  Findings and Treatment: Lumen Evidence of a previous ileo-colonic anastomosis was seen in the anastomosis. Suture material was visible. Additional findings Approximately 5-7 cm distal to the ileocolonic anastomosis, there was a friable, centrally ulcerated, coarsened mass that occupied 30% of the  luminal circumference, concerning for malignancy. Multiple cold forceps biopsies were obtained.      SECONDARY PROCEDURE  Procedure: CT abdomen  Findings and Treatment: Unchanged mediastinal and bilateral hilar adenopathy. Differential diagnosis includes sarcoid, lymphoproliferative disorder.Small right and trace left pleural effusion. As compared to the CTA abdomen and pelvis 9/21/2022 the left pleural effusion is new, the right  pleural effusion is grossly unchanged. Thickened interlobular septa suggesting pulmonary interstitial edema. Stable bilateral lung nodules and micronodules. Status post TAVR. Cardiomegaly. No focal liver lesion. No liver metastasis. Mild splenomegaly, unchanged. Status post right hemicolectomy.    Procedure: CT chest w con  Findings and Treatment: No aortic aneurysm or dissection. Status post post TAVR. Cardiomegaly. Pulmonary arterial hypertension. No central PE. Lungs show evidence of mosaic attenuation. The latter can be seen in the  pulmonary vascular disease, asthma, constrictive bronchiolitis,  hypersensitivity pneumonitis, sarcoid etc. Bilateral interlobular septal thickening which could represent pulmonary interstitial edema. Unchanged mediastinal and bilateral hilar adenopathy. Suspect an approximately 50% stenosis of the proximal SMA. Status post right hemicolectomy. Umbilical hernia containing a nonobstructing segment of small bowel.    Procedure: PET imaging initial dx  Findings and Treatment: 1. Hypermetabolic mediastinal and bilateral hilar lymph nodes, SUV max 12.4 at an enlarged aortopulmonary node, most likely representing metastatic disease.2. Focal activity at the anterior mid transverse colon, SUV max 11.9, most likely corresponding to the malignant lesion found on recent colonoscopy. Status post right hemicolectomy. 3. 2.3 cm area of increased uptake in an area of groundglass opacity at the posterior lingula, most likely representing inflammatory uptake, although malignant involvement is difficult to exclude. Mosaic attenuation of the lungs, most likely representing air trapping, edema, or small airway disease. 4. Small bilateral pleural effusions with mild associated uptake. 5. FDG avid subcentimeter soft tissue nodule in the upper inner quadrant of the right breast, possibly representing metastatic disease. 6. Wedge-shaped area of photopenia at the posterior aspect of L3-L4 without a CT correlate, possibly due to asymmetric degenerative changes.    Procedure: Colon biopsy  Findings and Treatment: Adenocarcinoma, at least intramucosal, with suggestion of deeper invasion

## 2022-09-22 NOTE — DISCHARGE NOTE PROVIDER - NSDCMRMEDTOKEN_GEN_ALL_CORE_FT
amiodarone 200 mg oral tablet: 1 tab(s) orally once a day  amLODIPine 10 mg oral tablet: 1 tab(s) orally once a day  apixaban 5 mg oral tablet: 1 tab(s) orally 2 times a day  atorvastatin 20 mg oral tablet: 1 tab(s) orally once a day (at bedtime)  cefTRIAXone: 2000 milligram(s) intravenous once a day last dose 9/26/22  folic acid 1 mg oral tablet: 1 tab(s) orally once a day  furosemide 20 mg oral tablet: 1 tab(s) orally once a day  isosorbide mononitrate 30 mg oral tablet, extended release: 1 tab(s) orally once a day  lidocaine 4% topical film: Apply topically to affected area once a day, As Needed for pain  LORazepam 0.5 mg oral tablet: 1 tab(s) orally once a day  magnesium oxide 400 mg (241.3 mg elemental magnesium) oral tablet: 1 tab(s) orally once a day  montelukast 10 mg oral tablet: 1 tab(s) orally once a day  Multiple Vitamins oral tablet: 1 tab(s) orally once a day  pantoprazole 40 mg oral delayed release tablet: 1 tab(s) orally once a day  simethicone 80 mg oral tablet, chewable: 1 tab(s) orally 2 times a day  Spiriva Respimat 1.25 mcg/inh inhalation aerosol: 2 puff(s) inhaled once a day  Toprol-XL 50 mg oral tablet, extended release: 1 tab(s) orally once a day   amiodarone 200 mg oral tablet: 1 tab(s) orally once a day  amLODIPine 10 mg oral tablet: 1 tab(s) orally once a day  apixaban 5 mg oral tablet: 1 tab(s) orally 2 times a day  atorvastatin 20 mg oral tablet: 1 tab(s) orally once a day (at bedtime)  folic acid 1 mg oral tablet: 1 tab(s) orally once a day  furosemide 20 mg oral tablet: 1 tab(s) orally once a day  isosorbide mononitrate 30 mg oral tablet, extended release: 1 tab(s) orally once a day  lidocaine 4% topical film: Apply topically to affected area once a day, As Needed for pain  LORazepam 0.5 mg oral tablet: 1 tab(s) orally once a day  magnesium oxide 400 mg (241.3 mg elemental magnesium) oral tablet: 1 tab(s) orally once a day  montelukast 10 mg oral tablet: 1 tab(s) orally once a day  Multiple Vitamins oral tablet: 1 tab(s) orally once a day  pantoprazole 40 mg oral delayed release tablet: 1 tab(s) orally once a day  simethicone 80 mg oral tablet, chewable: 1 tab(s) orally 2 times a day  Spiriva Respimat 1.25 mcg/inh inhalation aerosol: 2 puff(s) inhaled once a day  Toprol-XL 50 mg oral tablet, extended release: 1 tab(s) orally once a day   amiodarone 200 mg oral tablet: 1 tab(s) orally once a day  amLODIPine 10 mg oral tablet: 1 tab(s) orally once a day  apixaban 5 mg oral tablet: 1 tab(s) orally 2 times a day  atorvastatin 20 mg oral tablet: 1 tab(s) orally once a day (at bedtime)  folic acid 1 mg oral tablet: 1 tab(s) orally once a day  isosorbide mononitrate 30 mg oral tablet, extended release: 1 tab(s) orally once a day  lidocaine 4% topical film: Apply topically to affected area once a day, As Needed for pain  LORazepam 0.5 mg oral tablet: 1 tab(s) orally once a day  magnesium oxide 400 mg (241.3 mg elemental magnesium) oral tablet: 1 tab(s) orally once a day  metoprolol succinate 25 mg oral tablet, extended release: 0.5 tab(s) orally once a day   montelukast 10 mg oral tablet: 1 tab(s) orally once a day  Multiple Vitamins oral tablet: 1 tab(s) orally once a day  pantoprazole 40 mg oral delayed release tablet: 1 tab(s) orally once a day  simethicone 80 mg oral tablet, chewable: 1 tab(s) orally 2 times a day  Spiriva Respimat 1.25 mcg/inh inhalation aerosol: 2 puff(s) inhaled once a day  torsemide 40 mg oral tablet: 1 tab(s) orally once a day   amiodarone 200 mg oral tablet: 1 tab(s) orally once a day  amLODIPine 10 mg oral tablet: 1 tab(s) orally once a day  apixaban 5 mg oral tablet: 1 tab(s) orally 2 times a day  atorvastatin 20 mg oral tablet: 1 tab(s) orally once a day (at bedtime)  folic acid 1 mg oral tablet: 1 tab(s) orally once a day  isosorbide mononitrate 30 mg oral tablet, extended release: 1 tab(s) orally once a day  lidocaine 4% topical film: Apply topically to affected area once a day, As Needed for pain  LORazepam 0.5 mg oral tablet: 1 tab(s) orally once a day  magnesium oxide 400 mg (241.3 mg elemental magnesium) oral tablet: 1 tab(s) orally once a day  metoprolol succinate 25 mg oral tablet, extended release: 0.5 tab(s) orally once a day   montelukast 10 mg oral tablet: 1 tab(s) orally once a day  Multiple Vitamins oral tablet: 1 tab(s) orally once a day  pantoprazole 40 mg oral delayed release tablet: 1 tab(s) orally once a day  simethicone 80 mg oral tablet, chewable: 1 tab(s) orally 2 times a day  Spiriva Respimat 1.25 mcg/inh inhalation aerosol: 2 puff(s) inhaled once a day  torsemide 20 mg oral tablet: 2 tab(s) orally once a day

## 2022-09-22 NOTE — DISCHARGE NOTE PROVIDER - NSDCCPCAREPLAN_GEN_ALL_CORE_FT
PRINCIPAL DISCHARGE DIAGNOSIS  Diagnosis: GI bleeding  Assessment and Plan of Treatment: Gastrointestinal (GI) bleeding is a symptom of a disorder in your digestive tract. The blood often appears in stool or vomit but isn't always visible, though it may cause the stool to look black or tarry. The level of bleeding can range from mild to severe and can be life-threatening. This was the reason that you came to the hospital, and we performed a colonoscopy to get a look at the inside of your intestinal tract. Unfortunately, we found that there was a mass in your colon that is likely a return of your previous colon cancer. Originally the plan was to perform surgery to remove this mass, however a PET-CT scan was performed to see if the cancer had spread to any other part of your body. Unfortunately, it seems that there is evidence that there is cancer in other parts of your body, including the lungs. We have contacted our cancer specific doctors (hematology oncology) and lung doctors (pulmonology) to discuss further steps for this problem outside of the hospital.       PRINCIPAL DISCHARGE DIAGNOSIS  Diagnosis: Colonic mass  Assessment and Plan of Treatment: Gastrointestinal (GI) bleeding is a symptom of a disorder in your digestive tract. The blood often appears in stool or vomit but isn't always visible, though it may cause the stool to look black or tarry. The level of bleeding can range from mild to severe and can be life-threatening. This was the reason that you came to the hospital, and we performed a colonoscopy to get a look at the inside of your intestinal tract. Unfortunately, we found that there was a mass in your colon that is likely a return of your previous colon cancer. Originally the plan was to perform surgery to remove this mass, however a PET-CT scan was performed to see if the cancer had spread to any other part of your body. Unfortunately, it seems that there is evidence that there is cancer in other parts of your body, including the lungs. Our next plan was to perform a lung biospy, where we take a piece of the tissue we suspect may be cancerous and test to see if it is cancer. You have declined this test inside of the hospital, which is and will always be your decision to make. We have contacted our cancer specific doctors (hematology oncology) and lung doctors (pulmonology) to discuss further steps for this problem outside of the hospital. Please go to your follow up appointments as listed below.       PRINCIPAL DISCHARGE DIAGNOSIS  Diagnosis: Colonic mass  Assessment and Plan of Treatment: Gastrointestinal (GI) bleeding is a symptom of a disorder in your digestive tract. The blood often appears in stool or vomit but isn't always visible, though it may cause the stool to look black or tarry. The level of bleeding can range from mild to severe and can be life-threatening. This was the reason that you came to the hospital, and we performed a colonoscopy to get a look at the inside of your intestinal tract. Unfortunately, we found that there was a mass in your colon that is likely a return of your previous colon cancer. Originally the plan was to perform surgery to remove this mass, however a PET-CT scan was performed to see if the cancer had spread to any other part of your body. Unfortunately, it seems that there is evidence that there is cancer in other parts of your body, including the lungs. Our next plan was to perform a lung biospy, where we take a piece of the tissue we suspect may be cancerous and test to see if it is cancer. You have declined this test inside of the hospital, which is and will always be your decision to make. We have contacted our cancer specific doctors (hematology oncology) and lung doctors (pulmonology) to discuss further steps for this problem outside of the hospital. Please go to your follow up appointments as listed below.      SECONDARY DISCHARGE DIAGNOSES  Diagnosis: Chronic heart failure with preserved ejection fraction (HFpEF)  Assessment and Plan of Treatment: You have a history of heart failure. While you were in the hospital we changed some of your medications. You will STOP taking lasix and instead you will START taking torsemide 40mg once a day. Also, instead of taking 50mg of metoprolol, you will take 12.5mg of metoprolol. Please pick these medications up at your pharmacy as you leave the hospital. You can refer to your discharge medication list to see which medications you should be taking.

## 2022-09-22 NOTE — PROGRESS NOTE ADULT - PROBLEM SELECTOR PLAN 1
Patient with dark red stool, occasional BRBPR. Hemoglobin 7.9 on admission which is around baseline. Non tachycardic or hypotensive. 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 Of note, patient did state that she had blood transfusion 5 days prior to admission at [facility] for "anemia" but couldn't give any more details. Colonoscopy performed on 9/22; "Approximately 5-7 cm distal to the ileocolonic anastomosis, there was a friable, centrally ulcerated, coarsened mass that occupied 30% of the luminal circumference, concerning for malignancy. Multiple cold forceps biopsies were obtained."   - CT Chest, Abd, Pelvis with contrast ordered, performed. F.u results  - Continue to trend hemoglobin, keep HG > 7  - Restarted on DASH/TLC diet

## 2022-09-22 NOTE — PROGRESS NOTE ADULT - SUBJECTIVE AND OBJECTIVE BOX
CRS Attending  Seen on morning rounds. In holding area of endoscopy suite - she is for colonoscopy this morning.  C/o shortness of breath.  Had multiple BMs overnight    Vital Signs Last 24 Hrs  T(C): 37.2 (22 Sep 2022 06:10), Max: 37.2 (22 Sep 2022 06:10)  T(F): 98.9 (22 Sep 2022 06:10), Max: 98.9 (22 Sep 2022 06:10)  HR: 62 (22 Sep 2022 06:10) (56 - 64)  BP: 121/68 (22 Sep 2022 06:10) (121/68 - 161/75)  BP(mean): --  RR: 19 (22 Sep 2022 06:10) (17 - 19)  SpO2: 96% (22 Sep 2022 06:10) (94% - 100%)    Parameters below as of 22 Sep 2022 06:10  Patient On (Oxygen Delivery Method): nasal cannula    Gen NAD  On nasal canula  Abdomen obese    No labs resulted for 9/22/22 at time of rounding

## 2022-09-22 NOTE — PROGRESS NOTE ADULT - PROBLEM SELECTOR PLAN 4
- Continue amlodipine  - Holding home lisinopril due to BIBIANA from last admission, patient was instructed to restart this medication with Dr. Marte Pt with E coli and Gemella spp bacteremia from dental abscess. Refused extraction last visit, has not done it outpatient. On Ceftriaxone 2g daily until 9/26. Patient has jaw and neck pain without any crepitus on exam, no fluctuance or erythema/edema. Low concern for involvement of soft tissue involvement of head and neck, no airway compromise.   - Afebrile and without white count on admission   - Continue ceftriaxone 2g daily until 9/26

## 2022-09-22 NOTE — PROGRESS NOTE ADULT - ASSESSMENT
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

## 2022-09-22 NOTE — PROGRESS NOTE ADULT - SUBJECTIVE AND OBJECTIVE BOX
OVERNIGHT EVENTS: Tolerated 4L golyteley prep.     SUBJECTIVE:  Patient seen and examined at bedside.  ROS: Patient denies h/n/v/d, fever, chills, cp, palpitations, sob, abd pain, leg swelling, rashes, dysuria, and changes in BM.     Vital Signs Last 12 Hrs  T(F): 97.8 (22 @ 11:15), Max: 98.9 (22 @ 06:10)  HR: 69 (22 @ 13:50) (62 - 82)  BP: 170/82 (22 @ 11:15) (121/68 - 170/82)  BP(mean): --  RR: 20 (22 @ 13:50) (19 - 22)  SpO2: 97% (22 @ 13:50) (87% - 97%)  I&O's Summary    21 Sep 2022 07:  -  22 Sep 2022 07:00  --------------------------------------------------------  IN: 50 mL / OUT: 0 mL / NET: 50 mL    22 Sep 2022 07:01  -  22 Sep 2022 15:18  --------------------------------------------------------  IN: 50 mL / OUT: 0 mL / NET: 50 mL        PHYSICAL EXAM:  Constitutional: NAD, laying supine in bed, seemingly anxious.   HEENT: NC/AT  Neck: Supple, no JVD  Respiratory: CTA B/L. No w/r/r.   Cardiovascular: RRR, normal S1 and S2, no m/r/g.   Gastrointestinal: +BS, soft NTND, no guarding or rebound tenderness, no palpable masses   Extremities: wwp; no cyanosis, clubbing or edema.   Vascular: Pulses equal and strong throughout.   Neurological: AAOx3, no CN deficits, strength and sensation intact throughout.   Skin: No gross skin abnormalities or rashes        LABS:                        7.6    6.44  )-----------( 241      ( 22 Sep 2022 12:34 )             25.3         137  |  100  |  15  ----------------------------<  87  4.3   |  27  |  1.14    Ca    8.6      22 Sep 2022 09:43    TPro  6.5  /  Alb  3.8  /  TBili  0.4  /  DBili  x   /  AST  17  /  ALT  16  /  AlkPhos  61  22    PT/INR - ( 22 Sep 2022 09:43 )   PT: 13.4 sec;   INR: 1.12          PTT - ( 22 Sep 2022 09:43 )  PTT:26.9 sec  Urinalysis Basic - ( 21 Sep 2022 00:09 )    Color: Yellow / Appearance: Clear / S.010 / pH: x  Gluc: x / Ketone: NEGATIVE  / Bili: Negative / Urobili: 0.2 E.U./dL   Blood: x / Protein: NEGATIVE mg/dL / Nitrite: NEGATIVE   Leuk Esterase: NEGATIVE / RBC: x / WBC x   Sq Epi: x / Non Sq Epi: x / Bacteria: x          RADIOLOGY & ADDITIONAL TESTS:    MEDICATIONS  (STANDING):  aMIOdarone    Tablet 200 milliGRAM(s) Oral daily  amLODIPine   Tablet 10 milliGRAM(s) Oral daily  atorvastatin 20 milliGRAM(s) Oral at bedtime  cefTRIAXone   IVPB 2000 milliGRAM(s) IV Intermittent every 24 hours  chlorhexidine 2% Cloths 1 Application(s) Topical daily  folic acid 1 milliGRAM(s) Oral daily  influenza  Vaccine (HIGH DOSE) 0.7 milliLiter(s) IntraMuscular once  isosorbide   mononitrate ER Tablet (IMDUR) 30 milliGRAM(s) Oral daily  lidocaine   4% Patch 1 Patch Transdermal every 24 hours  magnesium oxide 400 milliGRAM(s) Oral daily  montelukast 10 milliGRAM(s) Oral daily  multivitamin 1 Tablet(s) Oral daily  pantoprazole  Injectable 40 milliGRAM(s) IV Push every 12 hours  simethicone 80 milliGRAM(s) Chew two times a day  tiotropium 18 MICROgram(s) Capsule 1 Capsule(s) Inhalation daily    MEDICATIONS  (PRN):  acetaminophen     Tablet .. 650 milliGRAM(s) Oral every 6 hours PRN Temp greater or equal to 38C (100.4F), Mild Pain (1 - 3)  LORazepam     Tablet 0.5 milliGRAM(s) Oral daily PRN Anxiety  ondansetron Injectable 4 milliGRAM(s) IV Push every 8 hours PRN Nausea and/or Vomiting   OVERNIGHT EVENTS: Tolerated 4L golyteley prep.     SUBJECTIVE:  Patient seen and examined at bedside.  ROS: Patient denies n/v/d, fever, chills, cp, abd pain, leg swelling, rashes, dysuria, and changes in BM.     Vital Signs Last 12 Hrs  T(F): 97.8 (22 @ 11:15), Max: 98.9 (22 @ 06:10)  HR: 69 (22 @ 13:50) (62 - 82)  BP: 170/82 (22 @ 11:15) (121/68 - 170/82)  BP(mean): --  RR: 20 (22 @ 13:50) (19 - 22)  SpO2: 97% (22 @ 13:50) (87% - 97%)  I&O's Summary    21 Sep 2022 07:  -  22 Sep 2022 07:00  --------------------------------------------------------  IN: 50 mL / OUT: 0 mL / NET: 50 mL    22 Sep 2022 07:01  -  22 Sep 2022 15:18  --------------------------------------------------------  IN: 50 mL / OUT: 0 mL / NET: 50 mL        PHYSICAL EXAM:  Constitutional: NAD, laying supine in bed, seemingly anxious.   HEENT: NC/AT  Neck: Supple, no JVD  Respiratory: CTA B/L. No w/r/r.   Cardiovascular: RRR, normal S1 and S2, no m/r/g.   Gastrointestinal: +BS, soft NT, distended no guarding or rebound tenderness, no palpable masses   Extremities: wwp; no cyanosis, clubbing or edema.   Vascular: Pulses equal and strong throughout.   Neurological: AAOx3, no CN deficits, strength and sensation intact throughout.   Skin: No gross skin abnormalities or rashes        LABS:                        7.6    6.44  )-----------( 241      ( 22 Sep 2022 12:34 )             25.3         137  |  100  |  15  ----------------------------<  87  4.3   |  27  |  1.14    Ca    8.6      22 Sep 2022 09:43    TPro  6.5  /  Alb  3.8  /  TBili  0.4  /  DBili  x   /  AST  17  /  ALT  16  /  AlkPhos  61  22    PT/INR - ( 22 Sep 2022 09:43 )   PT: 13.4 sec;   INR: 1.12          PTT - ( 22 Sep 2022 09:43 )  PTT:26.9 sec  Urinalysis Basic - ( 21 Sep 2022 00:09 )    Color: Yellow / Appearance: Clear / S.010 / pH: x  Gluc: x / Ketone: NEGATIVE  / Bili: Negative / Urobili: 0.2 E.U./dL   Blood: x / Protein: NEGATIVE mg/dL / Nitrite: NEGATIVE   Leuk Esterase: NEGATIVE / RBC: x / WBC x   Sq Epi: x / Non Sq Epi: x / Bacteria: x          RADIOLOGY & ADDITIONAL TESTS:    MEDICATIONS  (STANDING):  aMIOdarone    Tablet 200 milliGRAM(s) Oral daily  amLODIPine   Tablet 10 milliGRAM(s) Oral daily  atorvastatin 20 milliGRAM(s) Oral at bedtime  cefTRIAXone   IVPB 2000 milliGRAM(s) IV Intermittent every 24 hours  chlorhexidine 2% Cloths 1 Application(s) Topical daily  folic acid 1 milliGRAM(s) Oral daily  influenza  Vaccine (HIGH DOSE) 0.7 milliLiter(s) IntraMuscular once  isosorbide   mononitrate ER Tablet (IMDUR) 30 milliGRAM(s) Oral daily  lidocaine   4% Patch 1 Patch Transdermal every 24 hours  magnesium oxide 400 milliGRAM(s) Oral daily  montelukast 10 milliGRAM(s) Oral daily  multivitamin 1 Tablet(s) Oral daily  pantoprazole  Injectable 40 milliGRAM(s) IV Push every 12 hours  simethicone 80 milliGRAM(s) Chew two times a day  tiotropium 18 MICROgram(s) Capsule 1 Capsule(s) Inhalation daily    MEDICATIONS  (PRN):  acetaminophen     Tablet .. 650 milliGRAM(s) Oral every 6 hours PRN Temp greater or equal to 38C (100.4F), Mild Pain (1 - 3)  LORazepam     Tablet 0.5 milliGRAM(s) Oral daily PRN Anxiety  ondansetron Injectable 4 milliGRAM(s) IV Push every 8 hours PRN Nausea and/or Vomiting   OVERNIGHT EVENTS: Tolerated 4L golyteley prep.     SUBJECTIVE:  Patient seen and examined at bedside.  ROS: Patient denies n/v/d, fever, chills, cp, abd pain, leg swelling, rashes, dysuria, and changes in BM.     Vital Signs Last 12 Hrs  T(F): 97.8 (22 @ 11:15), Max: 98.9 (22 @ 06:10)  HR: 69 (22 @ 13:50) (62 - 82)  BP: 170/82 (22 @ 11:15) (121/68 - 170/82)  BP(mean): --  RR: 20 (22 @ 13:50) (19 - 22)  SpO2: 97% (22 @ 13:50) (87% - 97%)  I&O's Summary    21 Sep 2022 07:  -  22 Sep 2022 07:00  --------------------------------------------------------  IN: 50 mL / OUT: 0 mL / NET: 50 mL    22 Sep 2022 07:01  -  22 Sep 2022 15:18  --------------------------------------------------------  IN: 50 mL / OUT: 0 mL / NET: 50 mL        PHYSICAL EXAM:  Constitutional: NAD, laying supine in bed, seemingly anxious.   HEENT: NC/AT  Neck: Supple, no JVD  Respiratory: CTA B/L. No w/r/r.   Cardiovascular: RRR, normal S1 and S2, no m/r/g.   Gastrointestinal: +BS, soft NT, moderately distended, no guarding or rebound tenderness, no palpable masses   Extremities: wwp; no cyanosis, clubbing or edema.   Vascular: Pulses equal and strong throughout.   Neurological: AAOx3, no CN deficits, strength and sensation intact throughout.   Skin: No gross skin abnormalities or rashes        LABS:                        7.6    6.44  )-----------( 241      ( 22 Sep 2022 12:34 )             25.3         137  |  100  |  15  ----------------------------<  87  4.3   |  27  |  1.14    Ca    8.6      22 Sep 2022 09:43    TPro  6.5  /  Alb  3.8  /  TBili  0.4  /  DBili  x   /  AST  17  /  ALT  16  /  AlkPhos  61  22    PT/INR - ( 22 Sep 2022 09:43 )   PT: 13.4 sec;   INR: 1.12          PTT - ( 22 Sep 2022 09:43 )  PTT:26.9 sec  Urinalysis Basic - ( 21 Sep 2022 00:09 )    Color: Yellow / Appearance: Clear / S.010 / pH: x  Gluc: x / Ketone: NEGATIVE  / Bili: Negative / Urobili: 0.2 E.U./dL   Blood: x / Protein: NEGATIVE mg/dL / Nitrite: NEGATIVE   Leuk Esterase: NEGATIVE / RBC: x / WBC x   Sq Epi: x / Non Sq Epi: x / Bacteria: x          RADIOLOGY & ADDITIONAL TESTS:    MEDICATIONS  (STANDING):  aMIOdarone    Tablet 200 milliGRAM(s) Oral daily  amLODIPine   Tablet 10 milliGRAM(s) Oral daily  atorvastatin 20 milliGRAM(s) Oral at bedtime  cefTRIAXone   IVPB 2000 milliGRAM(s) IV Intermittent every 24 hours  chlorhexidine 2% Cloths 1 Application(s) Topical daily  folic acid 1 milliGRAM(s) Oral daily  influenza  Vaccine (HIGH DOSE) 0.7 milliLiter(s) IntraMuscular once  isosorbide   mononitrate ER Tablet (IMDUR) 30 milliGRAM(s) Oral daily  lidocaine   4% Patch 1 Patch Transdermal every 24 hours  magnesium oxide 400 milliGRAM(s) Oral daily  montelukast 10 milliGRAM(s) Oral daily  multivitamin 1 Tablet(s) Oral daily  pantoprazole  Injectable 40 milliGRAM(s) IV Push every 12 hours  simethicone 80 milliGRAM(s) Chew two times a day  tiotropium 18 MICROgram(s) Capsule 1 Capsule(s) Inhalation daily    MEDICATIONS  (PRN):  acetaminophen     Tablet .. 650 milliGRAM(s) Oral every 6 hours PRN Temp greater or equal to 38C (100.4F), Mild Pain (1 - 3)  LORazepam     Tablet 0.5 milliGRAM(s) Oral daily PRN Anxiety  ondansetron Injectable 4 milliGRAM(s) IV Push every 8 hours PRN Nausea and/or Vomiting

## 2022-09-22 NOTE — PROGRESS NOTE ADULT - SUBJECTIVE AND OBJECTIVE BOX
SUBJECTIVE: Patient seen and examined bedside, now s/p colonoscopy with GI. Reports multiple bowel movements overnight with Golytely prep as well as shortness of breath, improved with NC. Abdominal pain well controlled. Denies nausea or vomiting.    aMIOdarone    Tablet 200 milliGRAM(s) Oral daily  amLODIPine   Tablet 10 milliGRAM(s) Oral daily  cefTRIAXone   IVPB 2000 milliGRAM(s) IV Intermittent every 24 hours  isosorbide   mononitrate ER Tablet (IMDUR) 30 milliGRAM(s) Oral daily      Vital Signs Last 24 Hrs  T(C): 37.2 (22 Sep 2022 06:10), Max: 37.2 (22 Sep 2022 06:10)  T(F): 98.9 (22 Sep 2022 06:10), Max: 98.9 (22 Sep 2022 06:10)  HR: 62 (22 Sep 2022 06:10) (56 - 64)  BP: 121/68 (22 Sep 2022 06:10) (121/68 - 161/75)  BP(mean): --  RR: 19 (22 Sep 2022 06:10) (17 - 19)  SpO2: 96% (22 Sep 2022 06:10) (94% - 100%)    Parameters below as of 22 Sep 2022 06:10  Patient On (Oxygen Delivery Method): nasal cannula      I&O's Detail    21 Sep 2022 07:01  -  22 Sep 2022 07:00  --------------------------------------------------------  IN:    IV PiggyBack: 50 mL  Total IN: 50 mL    OUT:  Total OUT: 0 mL    Total NET: 50 mL          General: NAD, resting comfortably in bed  C/V: NSR  Pulm: Nonlabored breathing, no respiratory distress  Abd: soft, obese, nontender, nondistended, no rebound, no guarding  Extrem: WWP, no edema, SCDs in place        LABS:                        7.2    5.15  )-----------( 243      ( 22 Sep 2022 09:43 )             23.4     09-22    137  |  100  |  15  ----------------------------<  87  4.3   |  27  |  1.14    Ca    8.6      22 Sep 2022 09:43    TPro  6.5  /  Alb  3.8  /  TBili  0.4  /  DBili  x   /  AST  17  /  ALT  16  /  AlkPhos  61  09-22    PT/INR - ( 22 Sep 2022 09:43 )   PT: 13.4 sec;   INR: 1.12          PTT - ( 22 Sep 2022 09:43 )  PTT:26.9 sec  Urinalysis Basic - ( 21 Sep 2022 00:09 )    Color: Yellow / Appearance: Clear / S.010 / pH: x  Gluc: x / Ketone: NEGATIVE  / Bili: Negative / Urobili: 0.2 E.U./dL   Blood: x / Protein: NEGATIVE mg/dL / Nitrite: NEGATIVE   Leuk Esterase: NEGATIVE / RBC: x / WBC x   Sq Epi: x / Non Sq Epi: x / Bacteria: x        RADIOLOGY & ADDITIONAL STUDIES:

## 2022-09-22 NOTE — PROGRESS NOTE ADULT - PROBLEM SELECTOR PLAN 3
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   - Holding Eliquis in the setting of possible GIB Start Lasix

## 2022-09-22 NOTE — PROGRESS NOTE ADULT - PROBLEM SELECTOR PLAN 7
Continue home atorvastatin 20mg History of COPD, on spiriva and monteleukast.   - Continue home meds   - Patient currently on 2L NC saturating 99% - used O2 at home before but has not used in 2 years

## 2022-09-22 NOTE — PROGRESS NOTE ADULT - PROBLEM SELECTOR PLAN 5
History of COPD, on spiriva and monteleukast.   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   - Holding Eliquis in the setting of possible GIB

## 2022-09-22 NOTE — DISCHARGE NOTE PROVIDER - ATTENDING DISCHARGE PHYSICAL EXAMINATION:
Patient was seen and examined at bedside on 9/29/2022 at 830 am. Patient has no acute complaints. Denies SOB, CP. ROS is otherwise negative. Vitals, labwork and pertinent imaging reviewed. Physical exam - NAD, AAO x 4, PERRLA, EOMI, MMM, supple neck, chest - CTA b/l, CV - rrr, s1s2, no m/r/g, abd - soft, NTND, + BS, ext - wwp, psych - normal affect, skin - no rash    Plan  -Pt s/p EGD showing mass  -CRS and Heme/Onc consulted   -C/w Lasix as per cardiology recs  -PET scan showed uptake in the lung, plan was for EBUS on 9/29 but patient and family refused  -D/c home with close outpatient f/u

## 2022-09-22 NOTE — DISCHARGE NOTE PROVIDER - NSDCFUADDAPPT_GEN_ALL_CORE_FT
Dr. Mckeon's office will reach out to you or your daughter directly to make a follow up appointment. They have already been notified.  Dr. Mckeon's (hematology/oncology/cancer doctor) office will reach out to you or your daughter directly to make a follow up appointment. They have already been notified.     Dr. More's office (pulmonology/lung doctor) has been notified. You have been scheduled for a follow up appointment at 10/25 at 2PM, however they will try their best to find you an earlier appointment and will call you directly if they have an earlier appointment open up for you.

## 2022-09-22 NOTE — PROGRESS NOTE ADULT - PROBLEM SELECTOR PLAN 6
Continue protonix 40mg BID for GIB - Continue amlodipine  - Holding home lisinopril due to BIBIANA from last admission, patient was instructed to restart this medication with Dr. Marte

## 2022-09-22 NOTE — PROGRESS NOTE ADULT - PROBLEM SELECTOR PLAN 8
F: None  E: replete PRN   N: DASH/TLC  DVT: none i/s/o GI bleed  GI: protonix 40mg IV BID     Dispo: [facility]   Code status: full code Continue protonix 40mg BID for GIB

## 2022-09-22 NOTE — DISCHARGE NOTE PROVIDER - CARE PROVIDER_API CALL
Bere More)  Critical Care Medicine; Pulmonary Disease  [address] [place] [address]  Phone: [phone]  Fax: [phone]  Scheduled Appointment: 10/25/2022 02:00 PM

## 2022-09-23 DIAGNOSIS — I50.23 ACUTE ON CHRONIC SYSTOLIC (CONGESTIVE) HEART FAILURE: ICD-10-CM

## 2022-09-23 DIAGNOSIS — K63.89 OTHER SPECIFIED DISEASES OF INTESTINE: ICD-10-CM

## 2022-09-23 DIAGNOSIS — I50.33 ACUTE ON CHRONIC DIASTOLIC (CONGESTIVE) HEART FAILURE: ICD-10-CM

## 2022-09-23 DIAGNOSIS — D62 ACUTE POSTHEMORRHAGIC ANEMIA: ICD-10-CM

## 2022-09-23 LAB
ANION GAP SERPL CALC-SCNC: 9 MMOL/L — SIGNIFICANT CHANGE UP (ref 5–17)
BUN SERPL-MCNC: 11 MG/DL — SIGNIFICANT CHANGE UP (ref 7–23)
CALCIUM SERPL-MCNC: 8.9 MG/DL — SIGNIFICANT CHANGE UP (ref 8.4–10.5)
CEA SERPL-MCNC: 1.8 NG/ML — SIGNIFICANT CHANGE UP (ref 0–3.8)
CHLORIDE SERPL-SCNC: 97 MMOL/L — SIGNIFICANT CHANGE UP (ref 96–108)
CO2 SERPL-SCNC: 26 MMOL/L — SIGNIFICANT CHANGE UP (ref 22–31)
CREAT SERPL-MCNC: 1.03 MG/DL — SIGNIFICANT CHANGE UP (ref 0.5–1.3)
EGFR: 54 ML/MIN/1.73M2 — LOW
GLUCOSE SERPL-MCNC: 117 MG/DL — HIGH (ref 70–99)
HCT VFR BLD CALC: 24.6 % — LOW (ref 34.5–45)
HGB BLD-MCNC: 7.6 G/DL — LOW (ref 11.5–15.5)
MAGNESIUM SERPL-MCNC: 2.1 MG/DL — SIGNIFICANT CHANGE UP (ref 1.6–2.6)
MCHC RBC-ENTMCNC: 26.4 PG — LOW (ref 27–34)
MCHC RBC-ENTMCNC: 30.9 GM/DL — LOW (ref 32–36)
MCV RBC AUTO: 85.4 FL — SIGNIFICANT CHANGE UP (ref 80–100)
NRBC # BLD: 0 /100 WBCS — SIGNIFICANT CHANGE UP (ref 0–0)
PHOSPHATE SERPL-MCNC: 3.8 MG/DL — SIGNIFICANT CHANGE UP (ref 2.5–4.5)
PLATELET # BLD AUTO: 257 K/UL — SIGNIFICANT CHANGE UP (ref 150–400)
POTASSIUM SERPL-MCNC: 4.5 MMOL/L — SIGNIFICANT CHANGE UP (ref 3.5–5.3)
POTASSIUM SERPL-SCNC: 4.5 MMOL/L — SIGNIFICANT CHANGE UP (ref 3.5–5.3)
RBC # BLD: 2.88 M/UL — LOW (ref 3.8–5.2)
RBC # FLD: 15.3 % — HIGH (ref 10.3–14.5)
SODIUM SERPL-SCNC: 132 MMOL/L — LOW (ref 135–145)
SURGICAL PATHOLOGY STUDY: SIGNIFICANT CHANGE UP
WBC # BLD: 6.15 K/UL — SIGNIFICANT CHANGE UP (ref 3.8–10.5)
WBC # FLD AUTO: 6.15 K/UL — SIGNIFICANT CHANGE UP (ref 3.8–10.5)

## 2022-09-23 PROCEDURE — 99233 SBSQ HOSP IP/OBS HIGH 50: CPT

## 2022-09-23 PROCEDURE — 99233 SBSQ HOSP IP/OBS HIGH 50: CPT | Mod: GC

## 2022-09-23 PROCEDURE — 99232 SBSQ HOSP IP/OBS MODERATE 35: CPT | Mod: GC

## 2022-09-23 RX ORDER — ENOXAPARIN SODIUM 100 MG/ML
120 INJECTION SUBCUTANEOUS EVERY 24 HOURS
Refills: 0 | Status: COMPLETED | OUTPATIENT
Start: 2022-09-24 | End: 2022-09-25

## 2022-09-23 RX ORDER — FUROSEMIDE 40 MG
40 TABLET ORAL ONCE
Refills: 0 | Status: COMPLETED | OUTPATIENT
Start: 2022-09-23 | End: 2022-09-23

## 2022-09-23 RX ORDER — APIXABAN 2.5 MG/1
5 TABLET, FILM COATED ORAL EVERY 12 HOURS
Refills: 0 | Status: DISCONTINUED | OUTPATIENT
Start: 2022-09-23 | End: 2022-09-23

## 2022-09-23 RX ORDER — POLYETHYLENE GLYCOL 3350 17 G/17G
17 POWDER, FOR SOLUTION ORAL DAILY
Refills: 0 | Status: DISCONTINUED | OUTPATIENT
Start: 2022-09-23 | End: 2022-09-29

## 2022-09-23 RX ORDER — ENOXAPARIN SODIUM 100 MG/ML
40 INJECTION SUBCUTANEOUS EVERY 24 HOURS
Refills: 0 | Status: DISCONTINUED | OUTPATIENT
Start: 2022-09-23 | End: 2022-09-23

## 2022-09-23 RX ORDER — ACETAMINOPHEN 500 MG
650 TABLET ORAL ONCE
Refills: 0 | Status: COMPLETED | OUTPATIENT
Start: 2022-09-23 | End: 2022-09-23

## 2022-09-23 RX ORDER — OXYCODONE HYDROCHLORIDE 5 MG/1
2.5 TABLET ORAL EVERY 4 HOURS
Refills: 0 | Status: DISCONTINUED | OUTPATIENT
Start: 2022-09-23 | End: 2022-09-29

## 2022-09-23 RX ORDER — ENOXAPARIN SODIUM 100 MG/ML
80 INJECTION SUBCUTANEOUS ONCE
Refills: 0 | Status: COMPLETED | OUTPATIENT
Start: 2022-09-23 | End: 2022-09-23

## 2022-09-23 RX ADMIN — LIDOCAINE 1 PATCH: 4 CREAM TOPICAL at 01:35

## 2022-09-23 RX ADMIN — ISOSORBIDE MONONITRATE 30 MILLIGRAM(S): 60 TABLET, EXTENDED RELEASE ORAL at 11:46

## 2022-09-23 RX ADMIN — LIDOCAINE 1 PATCH: 4 CREAM TOPICAL at 14:19

## 2022-09-23 RX ADMIN — LIDOCAINE 1 PATCH: 4 CREAM TOPICAL at 18:25

## 2022-09-23 RX ADMIN — ATORVASTATIN CALCIUM 20 MILLIGRAM(S): 80 TABLET, FILM COATED ORAL at 21:24

## 2022-09-23 RX ADMIN — SIMETHICONE 80 MILLIGRAM(S): 80 TABLET, CHEWABLE ORAL at 05:41

## 2022-09-23 RX ADMIN — AMLODIPINE BESYLATE 10 MILLIGRAM(S): 2.5 TABLET ORAL at 05:41

## 2022-09-23 RX ADMIN — OXYCODONE HYDROCHLORIDE 2.5 MILLIGRAM(S): 5 TABLET ORAL at 18:25

## 2022-09-23 RX ADMIN — Medication 650 MILLIGRAM(S): at 06:44

## 2022-09-23 RX ADMIN — Medication 650 MILLIGRAM(S): at 05:53

## 2022-09-23 RX ADMIN — ENOXAPARIN SODIUM 80 MILLIGRAM(S): 100 INJECTION SUBCUTANEOUS at 21:24

## 2022-09-23 RX ADMIN — Medication 650 MILLIGRAM(S): at 22:24

## 2022-09-23 RX ADMIN — Medication 1 MILLIGRAM(S): at 11:46

## 2022-09-23 RX ADMIN — Medication 650 MILLIGRAM(S): at 16:38

## 2022-09-23 RX ADMIN — CEFTRIAXONE 100 MILLIGRAM(S): 500 INJECTION, POWDER, FOR SOLUTION INTRAMUSCULAR; INTRAVENOUS at 10:17

## 2022-09-23 RX ADMIN — LIDOCAINE 1 PATCH: 4 CREAM TOPICAL at 01:40

## 2022-09-23 RX ADMIN — ENOXAPARIN SODIUM 40 MILLIGRAM(S): 100 INJECTION SUBCUTANEOUS at 17:19

## 2022-09-23 RX ADMIN — MAGNESIUM OXIDE 400 MG ORAL TABLET 400 MILLIGRAM(S): 241.3 TABLET ORAL at 11:46

## 2022-09-23 RX ADMIN — OXYCODONE HYDROCHLORIDE 2.5 MILLIGRAM(S): 5 TABLET ORAL at 17:20

## 2022-09-23 RX ADMIN — LIDOCAINE 1 PATCH: 4 CREAM TOPICAL at 06:39

## 2022-09-23 RX ADMIN — Medication 1 TABLET(S): at 11:46

## 2022-09-23 RX ADMIN — Medication 40 MILLIGRAM(S): at 11:58

## 2022-09-23 RX ADMIN — Medication 650 MILLIGRAM(S): at 21:24

## 2022-09-23 RX ADMIN — SIMETHICONE 80 MILLIGRAM(S): 80 TABLET, CHEWABLE ORAL at 17:19

## 2022-09-23 RX ADMIN — CHLORHEXIDINE GLUCONATE 1 APPLICATION(S): 213 SOLUTION TOPICAL at 11:45

## 2022-09-23 RX ADMIN — AMIODARONE HYDROCHLORIDE 200 MILLIGRAM(S): 400 TABLET ORAL at 05:41

## 2022-09-23 RX ADMIN — Medication 650 MILLIGRAM(S): at 07:44

## 2022-09-23 RX ADMIN — PANTOPRAZOLE SODIUM 40 MILLIGRAM(S): 20 TABLET, DELAYED RELEASE ORAL at 17:19

## 2022-09-23 RX ADMIN — MONTELUKAST 10 MILLIGRAM(S): 4 TABLET, CHEWABLE ORAL at 11:45

## 2022-09-23 RX ADMIN — TIOTROPIUM BROMIDE 1 CAPSULE(S): 18 CAPSULE ORAL; RESPIRATORY (INHALATION) at 11:58

## 2022-09-23 RX ADMIN — Medication 650 MILLIGRAM(S): at 15:38

## 2022-09-23 RX ADMIN — PANTOPRAZOLE SODIUM 40 MILLIGRAM(S): 20 TABLET, DELAYED RELEASE ORAL at 05:40

## 2022-09-23 NOTE — PROGRESS NOTE ADULT - PROBLEM SELECTOR PLAN 1
Patient with dark red stool, occasional BRBPR. Hemoglobin 7.9 on admission which is around baseline. Non tachycardic or hypotensive. 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 Of note, patient did state that she had blood transfusion 5 days prior to admission at [facility] for "anemia" but couldn't give any more details. Colonoscopy performed on 9/22; "Approximately 5-7 cm distal to the ileocolonic anastomosis, there was a friable, centrally ulcerated, coarsened mass that occupied 30% of the luminal circumference, concerning for malignancy. Multiple cold forceps biopsies were obtained." Dr. Sanford ([facility] surgery attending) assessed and updated pt, tentative for surgical intervention 9/28.   - CT Chest, Abd, Pelvis with contrast ordered, performed. F.u results  - Continue to trend hemoglobin, keep HG > 7  - Restarted on DASH/TLC diet Patient with dark red stool, occasional BRBPR. Hemoglobin 7.9 on admission which is around baseline. Non tachycardic or hypotensive. 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 Of note, patient did state that she had blood transfusion 5 days prior to admission at [facility] for "anemia" but couldn't give any more details. Colonoscopy performed on 9/22; "Approximately 5-7 cm distal to the ileocolonic anastomosis, there was a friable, centrally ulcerated, coarsened mass that occupied 30% of the luminal circumference, concerning for malignancy. Multiple cold forceps biopsies were obtained." Dr. Sanford ([facility] surgery attending) assessed and updated pt, tentative for surgical intervention 9/28.   - Order pre-op labs and ensure active Covid swab for 9/28  - Continue to trend hemoglobin, keep HG > 7  - Restarted on DASH/TLC diet

## 2022-09-23 NOTE — PROGRESS NOTE ADULT - PROBLEM SELECTOR PLAN 6
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   - Holding Eliquis in the setting of possible GIB

## 2022-09-23 NOTE — PROGRESS NOTE ADULT - PROBLEM SELECTOR PLAN 11
F: None  E: replete PRN   N: DASH/TLC  DVT: none i/s/o GI bleed  GI: protonix 40mg IV BID     Dispo: [facility]   Code status: full code

## 2022-09-23 NOTE — PHYSICAL THERAPY INITIAL EVALUATION ADULT - GENERAL OBSERVATIONS, REHAB EVAL
PT IE Completed. Pt received semi-supine in bed, Macedonian speaking, A&Ox4, +2LNC,+heplock, in 10/10 back and flank pain, and agreeable to work with PT, BHUPENDRA Smith notified.Pt presents to [facility] from [facility] with dark stools x3days; found to have GIB. PT exam shows poor endurance and activity tolerance while maintaining 97% SpO2 on 2LNC during ADL and functional mobility. Pt completed bed mob, transfers and gait. MANRIQUE throughout; pt educated on diaphragmatic breathing technique to assist with pacing. Pt left in as found, +bed alarm, +call bell within reach, reporting 10/10 headache, BHUPENDRA Smith notified. Pt can benefit from PT in order to improve quality of life by increasing strength/endurance/balance with functional mobility and ADLS.

## 2022-09-23 NOTE — CONSULT NOTE ADULT - ASSESSMENT
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 Found on colonoscopy (9/22/22) to have friable, centrally ulcerated mass 5-7cm distal to ileocolic anastomosis suspicious for malignancy. Hem-Onc consulted for further management.    1.) colonic mass suspicious for recurrent colon cancer  Pt with history of colon cancer in remission s/p subtotal colectomy with ileocolic anastomosis. Pt did not get treated with RT or chemotherapy. Now presenting with LGIB, and most recent colonoscopy showing mass suspicious for malignancy. CT Chest/Abdomen/Pelvis showing mediastinal, bilateral hilar LAD (read as stable), no focal liver lesions.    -follow-up CEA  -follow-up biopsy pathology  -Appreciate Colorectal Surgery input as to whether mass can be surgically removed  -Would consider PET/CT  -although CT showing "stable" mediastinal and bilateral hilar LAD, consider pulm for further investigation of LAD    Pt discussed with Dr. Mckeon

## 2022-09-23 NOTE — PROGRESS NOTE ADULT - PROBLEM SELECTOR PLAN 7
Continue home atorvastatin 20mg - Continue amlodipine  - Holding home lisinopril due to BIBIANA from last admission, patient was instructed to restart this medication with Dr. Marte      #[id number]   - Grade II diastolic dysfunction. On Lasix 20 PO daily outpatient, however held inpatient. Pt appears mildly overloaded on CT Chest performed to r/o mets on 9/22.   - Given 40mg IVP Lasix on 9/23

## 2022-09-23 NOTE — CONSULT NOTE ADULT - SUBJECTIVE AND OBJECTIVE BOX
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 Patient was recently admitted under cardiology service from 9/4-9/13 for chest pain and headaches - found to incidentally have Ecoli bacteremia and Gemella spp. TTE, IRMA, whole body gallium scan were negative - source thought to be 2/2 infected tooth which patient refused to have extracted in house by OMFS. She was sent home with PICC line for IV abx (Ceftriaxone 2g daily until9/26). She has still not gotten her tooth extraction outpatient as she has been in the [facility], but has been getting the Ceftriaxone daily. For this admission, patient had a blood transfusion on 9/16 in the [facility], did not have any reaction to the transfusion. She started to develop bloody diarrhea after the transfusion and does not remember the details as to why exactly she got the transfusion. She also has chronic complaints including headache, jaw and neck pain, R hip pain, etc.     ED Course: afebrile, VSS, Hb 7.9 (8.1 at baseline), CT scan no active GI hemorrhage identified  Consults: surgery and GI   Meds: zofran, protonix, tylenol   EKG: sinus bradycardia to 59, no ischemia  (21 Sep 2022 14:06)      PAST MEDICAL & SURGICAL HISTORY:  HTN (hypertension)      Aortic stenosis      Hyperlipidemia      COPD (chronic obstructive pulmonary disease)      GERD (gastroesophageal reflux disease)      Stage 3 chronic kidney disease      Anemia      Diastolic CHF, chronic      Obesity      Paroxysmal atrial fibrillation      S/P TAVR (transcatheter aortic valve replacement)  2/2019          MEDICATIONS  (STANDING):  aMIOdarone    Tablet 200 milliGRAM(s) Oral daily  amLODIPine   Tablet 10 milliGRAM(s) Oral daily  atorvastatin 20 milliGRAM(s) Oral at bedtime  cefTRIAXone   IVPB 2000 milliGRAM(s) IV Intermittent every 24 hours  chlorhexidine 2% Cloths 1 Application(s) Topical daily  folic acid 1 milliGRAM(s) Oral daily  isosorbide   mononitrate ER Tablet (IMDUR) 30 milliGRAM(s) Oral daily  lidocaine   4% Patch 1 Patch Transdermal every 24 hours  magnesium oxide 400 milliGRAM(s) Oral daily  montelukast 10 milliGRAM(s) Oral daily  multivitamin 1 Tablet(s) Oral daily  pantoprazole  Injectable 40 milliGRAM(s) IV Push every 12 hours  simethicone 80 milliGRAM(s) Chew two times a day  tiotropium 18 MICROgram(s) Capsule 1 Capsule(s) Inhalation daily    MEDICATIONS  (PRN):  acetaminophen     Tablet .. 650 milliGRAM(s) Oral every 6 hours PRN Temp greater or equal to 38C (100.4F), Mild Pain (1 - 3)  LORazepam     Tablet 0.5 milliGRAM(s) Oral daily PRN Anxiety  ondansetron Injectable 4 milliGRAM(s) IV Push every 8 hours PRN Nausea and/or Vomiting             FAMILY HISTORY:  No pertinent family history in first degree relatives      CBC Full  -  ( 23 Sep 2022 10:12 )  WBC Count : 6.15 K/uL  RBC Count : 2.88 M/uL  Hemoglobin : 7.6 g/dL  Hematocrit : 24.6 %  Platelet Count - Automated : 257 K/uL  Mean Cell Volume : 85.4 fl  Mean Cell Hemoglobin : 26.4 pg  Mean Cell Hemoglobin Concentration : 30.9 gm/dL  Auto Neutrophil # : x  Auto Lymphocyte # : x  Auto Monocyte # : x  Auto Eosinophil # : x  Auto Basophil # : x  Auto Neutrophil % : x  Auto Lymphocyte % : x  Auto Monocyte % : x  Auto Eosinophil % : x  Auto Basophil % : x      09-23    132<L>  |  97  |  11  ----------------------------<  117<H>  4.5   |  26  |  1.03    Ca    8.9      23 Sep 2022 10:12  Phos  3.8     09-23  Mg     2.1     09-23    TPro  6.5  /  Alb  3.8  /  TBili  0.4  /  DBili  x   /  AST  17  /  ALT  16  /  AlkPhos  61  09-22            Radiology :  < from: CT Chest w/ IV Cont (09.22.22 @ 14:56) >  ACC: [id number] EXAM:  CT CHEST IC                        ACC: [id number] EXAM:  CT ABDOMEN AND PELVIS IC                          PROCEDURE DATE:  09/22/2022          INTERPRETATION:  CT of the CHEST, ABDOMEN and PELVIS with intravenous   contrast dated 9/22/2022 2:55 PM    INDICATION: r/o metastasis    TECHNIQUE: CT of the chest, abdomen and pelvis was performed. Axial and   coronal  and sagittal  images were produced and reviewed.    CONTRAST USAGE:  IV contrast: Isovue 370: 100 ml administered;ml discarded.  Oral contrast: Not administered.    PRIOR STUDIES: CTA abdomen and pelvis 9/21/2022. CTA chest, abdomen,   pelvis 9/16/2022.    FINDINGS: There is mild ectasia of the ascending aorta measuring up to   4.1 cm. The aortic root diameter measures 2.8 cm.. The heart is enlarged.   Status post TAVR. Dense mitral valve annular calcification..  Trace   inferior pericardial fluid..  There is mediastinal, bilateral hilar   lymphadenopathy is seen. 1.1 cm right lobe of thyroid nodule. Prominent   pulmonary trunk measuring 3.5 cm. Right pulmonary artery is prominent   measuring 2.9 cm left pulmonary arteries prominent measuring 2.9 cm.   Findings are suspicious for pulmonary arterial hypertension.    Evaluation of the pulmonary parenchyma demonstrates compressive   atelectasis right lower lobe. Bilateral smooth thickening of interlobular   septa likely due to pulmonary edema. Bilateral lung mosaic attenuation.    No gross change in multiple bilateral solid lung nodules and micronodules  which appear to be in a random distribution.. Small right pleural   effusion. Trace left pleural effusion..    Images of the upper abdomen demonstrate no focal hepatic abnormalities.     No radiopaque stones are seen in the gallbladder.      The pancreas shows fatty replacement..     Mild splenomegaly.    Normal right adrenal gland. 1.8 cm left adrenal nodule-unchanged..    The kidneys are normal in size. No hydronephrosis. 1.9 cm and 1.3 cm    cortical cyst right kidney. A few other bilateral subcentimeter renal   cortical hypodensities are too small to characterize.     No aortic aneurysm is seen. Unchanged moderate stenosis of the proximal   SMA.     No retroperitoneal or mesenteric lymphadenopathy is seen.    Evaluation of the bowel is limited due to lack of oral contrast material.   Patient appears to be status post right hemicolectomy. No bowel   obstruction or ileus. Subtle radiodensities  seen in relation to the   distal esophagus at the GE junction.    . No ascites is seen.  Prior bowel containing umbilical hernia has been reduced in the interval.    Images of the pelvis demonstrate under distended bladder containing   contrast material. Unremarkable ovaries. Normal-sized anteverted uterus.   Some subtle fullness and hypodensity of the lower uterine segment of   uterus and cervix.    Evaluation of the osseous structures demonstrates degenerative changes.   Multilevel lumbar degenerative disc disease. Lumbar dextroscoliosis.      IMPRESSION:  Patient had CTA abdomen ,pelvis on 9/21/2022 and CTA chest, abdomen,   pelvis on 9/16/2022.  Unchanged mediastinal and bilateral hilar adenopathy. Differential   diagnosis includes sarcoid, lymphoproliferative disorder.  Small right and trace left pleural effusion. As compared to the CTA   abdomen and pelvis 9/21/2022 the left pleural effusion is new, the right   pleural effusion is grossly unchanged.  Thickened interlobular septa suggesting pulmonary interstitial edema.  Stable bilateral lung nodules and micronodules.  Status post TAVR. Cardiomegaly.  No focal liver lesion. No liver metastasis.  Mild splenomegaly, unchanged.  Status post right hemicolectomy.            Vital Signs Last 24 Hrs  T(C): 36.7 (23 Sep 2022 08:41), Max: 36.9 (22 Sep 2022 15:37)  T(F): 98.1 (23 Sep 2022 08:41), Max: 98.5 (22 Sep 2022 15:37)  HR: 63 (23 Sep 2022 08:41) (62 - 77)  BP: 147/71 (23 Sep 2022 08:41) (135/68 - 156/84)  BP(mean): --  RR: 18 (23 Sep 2022 08:41) (17 - 20)  SpO2: 96% (23 Sep 2022 08:41) (87% - 99%)    Parameters below as of 23 Sep 2022 08:41  Patient On (Oxygen Delivery Method): nasal cannula  O2 Flow (L/min): 2          REVIEW OF SYSTEMS:  per HPI         Physical Exam:  obese 82 y o woman lying in semi Angel's position , awake , alert , no current complaints     Head : normocephalic , atraumatic    Eyes : PERRLA , EOMI , no nystagmus , sclera anicteric    ENT : nasal discharge , uvula midline , no oropharyngeal erythema / exudate    Neck : supple , negative JVD , negative carotid bruits , no thyromegaly    Chest : CTA bilaterally , neg wheeze / rhonchi / rales / crackles / egophany    Cardiovascular: regular rate and rhythm , neg murmurs / rubs / gallops    Abdomen : soft , obese , mild epigastric , negative rebound / guarding , normal bowel sounds    Extremities : WWP , neg cyanosis /clubbing / edema     Neurologic Exam:    Alert and oriented x 3     Motor Exam:          Right UE:               no focal weakness ,  > 3+/5 throughout  , no drift                                Left UE:                 no focal weakness,  > 3+/5 throughout  , no drift         Right LE:                no focal weakness,  > 3+/5 throughout      Left LE:                  no focal weakness,  > 3+/5 throughout           Sensation:         intact to light touch x 4 extremities                        DTR :                     biceps/brachioradialis : equal                                              patella/ankle : equal      Gait :  not tested              PM&R Impression :     1) deconditioned    2) no focal weakness      Recommendations / Plan :     1) Physical / Occupational therapy focusing on therapeutic exercises , equipment evaluation , bed mobility/transfer out of bed evaluation , progressive ambulation with assistive devices prn .    2) Anticipated Disposition Plan / Recs  :     [facility]

## 2022-09-23 NOTE — PROGRESS NOTE ADULT - ASSESSMENT
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 GI consulted for hematochezia.     #Hematochezia  - patient known to GI service, was initially planned for outpatient f/u but was lost to f/u  - has not had colonoscopic evaluation following subtotal colectomy with ileocolic anastomosis (December 2020)  - Maintain active T&S, large bore IV access  - Transfusion threshold per primary team  - s/p colonoscopy 9/22/22: malignant appearing mass 5-7 cm distal to anastomosis s/p biopsies   - f/u [facility]  - appreciate Colorectal Surgery input    Thank you for allowing us to participate in the care of this patient.  GI will sign off. Please call back with any questions or concerns.     Indigo Gonzalez MD  PGY-6, Gastroenterology Fellow  pager: [phone]

## 2022-09-23 NOTE — PROGRESS NOTE ADULT - PROBLEM SELECTOR PLAN 8
F: None  E: replete PRN   N: DASH/TLC  DVT: none i/s/o GI bleed  GI: protonix 40mg IV BID     Dispo: [facility]   Code status: full code History of COPD, on spiriva and monteleukast.   - Continue home meds   - Patient currently on 2L NC saturating 99% - used O2 at home before but has not used in 2 years

## 2022-09-23 NOTE — PROGRESS NOTE ADULT - ATTENDING COMMENTS
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 GI consulted for hematochezia.     Colonoscopy showing colonic mass, path confirming adenocarcinoma, plan for surgery.    Agree with plan as outlined above.    ORIANA Camarena MD  GI Attending        ACCESSION No:  [id number]  Patient:   BRITTANY DE LA CRUZ      Accession:                             [id number]    Collected Date/Time:                   9/22/2022 16:00 EDT  Received Date/Time:                    9/22/2022 17:06 EDT    Surgical Pathology Report - Auth (Verified)    Specimen(s) Submitted  1  Hepatic flexure mass    Final Diagnosis    Hepatic flexure mass, biopsy:  Adenocarcinoma, at least intramucosal, with suggestion of deeper invasion    Verified by: Melba Torres M.D

## 2022-09-23 NOTE — CONSULT NOTE ADULT - ASSESSMENT
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      Problem/Plan - 1:  ·  Problem: Hematochezia.   ·  Plan: Patient with dark red stool, occasional BRBPR. Hemoglobin 7.9 on admission which is around baseline. Non tachycardic or hypotensive. 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 Of note, patient did state that she had blood transfusion 5 days prior to admission at [facility] for "anemia" but couldn't give any more details. Colonoscopy performed on 9/22; "Approximately 5-7 cm distal to the ileocolonic anastomosis, there was a friable, centrally ulcerated, coarsened mass that occupied 30% of the luminal circumference, concerning for malignancy. Multiple cold forceps biopsies were obtained."   - CT Chest, Abd, Pelvis with contrast ordered, performed. F.u results  - Continue to trend hemoglobin, keep HG > 7  - Restarted on DASH/TLC diet.    Problem/Plan - 2:  ·  Problem: E coli bacteremia.   ·  Plan: Pt with E coli and Gemella spp bacteremia from dental abscess. Refused extraction last visit, has not done it outpatient. On Ceftriaxone 2g daily until 9/26. Patient has jaw and neck pain without any crepitus on exam, no fluctuance or erythema/edema. Low concern for involvement of soft tissue involvement of head and neck, no airway compromise.   - Afebrile and without white count on admission   - Continue ceftriaxone 2g daily until 9/26.    Problem/Plan - 3:  ·  Problem: History of transcatheter aortic valve replacement (TAVR).   ·  Plan: History of TAVR with thrombosis in the past, currently on Eliquis 5mg BID.   - Holding Eliquis in the setting of possible GIB.    Problem/Plan - 4:  ·  Problem: HTN (hypertension).   ·  Plan: - Continue amlodipine  - Holding home lisinopril due to BIBIANA from last admission, patient was instructed to restart this medication with Dr. Marte.    Problem/Plan - 5:  ·  Problem: COPD (chronic obstructive pulmonary disease).   ·  Plan: History of COPD, on spiriva and monteleukast.   - Continue home meds   - Patient currently on 2L NC saturating 99% - used O2 at home before but has not used in 2 years.    Problem/Plan - 6:  ·  Problem: GERD (gastroesophageal reflux disease).   ·  Plan: Continue protonix 40mg BID for GIB.    Problem/Plan - 7:  ·  Problem: Hyperlipidemia.   ·  Plan: Continue home atorvastatin 20mg.    Problem/Plan - 8:  ·  Problem: Prophylactic measure.   ·  Plan: F: None  E: replete PRN   N: DASH/TLC  DVT: none i/s/o GI bleed  GI: protonix 40mg IV BID     Dispo: [facility]   Code status: full code.

## 2022-09-23 NOTE — PROGRESS NOTE ADULT - SUBJECTIVE AND OBJECTIVE BOX
GASTROENTEROLOGY PROGRESS NOTE  Patient seen and examined at bedside. Mild abd tenderness, but otherwise no acute complaints. Discussed colonoscopy results at length at bedside, # [id number].     PERTINENT REVIEW OF SYSTEMS:  CONSTITUTIONAL: No weakness, fevers or chills  HEENT: No visual changes; No vertigo or throat pain   GASTROINTESTINAL: As above.  NEUROLOGICAL: No numbness or weakness  SKIN: No itching, burning, rashes, or lesions     Allergies    Digox (Rash; Urticaria; Hives)  Plavix (Other (Mod to Severe))    Intolerances      MEDICATIONS:  MEDICATIONS  (STANDING):  aMIOdarone    Tablet 200 milliGRAM(s) Oral daily  amLODIPine   Tablet 10 milliGRAM(s) Oral daily  atorvastatin 20 milliGRAM(s) Oral at bedtime  cefTRIAXone   IVPB 2000 milliGRAM(s) IV Intermittent every 24 hours  chlorhexidine 2% Cloths 1 Application(s) Topical daily  folic acid 1 milliGRAM(s) Oral daily  isosorbide   mononitrate ER Tablet (IMDUR) 30 milliGRAM(s) Oral daily  lidocaine   4% Patch 1 Patch Transdermal every 24 hours  magnesium oxide 400 milliGRAM(s) Oral daily  montelukast 10 milliGRAM(s) Oral daily  multivitamin 1 Tablet(s) Oral daily  pantoprazole  Injectable 40 milliGRAM(s) IV Push every 12 hours  simethicone 80 milliGRAM(s) Chew two times a day  tiotropium 18 MICROgram(s) Capsule 1 Capsule(s) Inhalation daily    MEDICATIONS  (PRN):  acetaminophen     Tablet .. 650 milliGRAM(s) Oral every 6 hours PRN Temp greater or equal to 38C (100.4F), Mild Pain (1 - 3)  LORazepam     Tablet 0.5 milliGRAM(s) Oral daily PRN Anxiety  ondansetron Injectable 4 milliGRAM(s) IV Push every 8 hours PRN Nausea and/or Vomiting    Vital Signs Last 24 Hrs  T(C): 36.7 (23 Sep 2022 08:41), Max: 36.9 (22 Sep 2022 15:37)  T(F): 98.1 (23 Sep 2022 08:41), Max: 98.5 (22 Sep 2022 15:37)  HR: 63 (23 Sep 2022 08:41) (62 - 77)  BP: 147/71 (23 Sep 2022 08:41) (135/68 - 156/84)  BP(mean): --  RR: 18 (23 Sep 2022 08:41) (17 - 20)  SpO2: 96% (23 Sep 2022 08:41) (87% - 99%)    Parameters below as of 23 Sep 2022 08:41  Patient On (Oxygen Delivery Method): nasal cannula  O2 Flow (L/min): 2      09-22 @ 07:01  -  09-23 @ 07:00  --------------------------------------------------------  IN: 50 mL / OUT: 1250 mL / NET: -1200 mL    09-23 @ 07:01  -  09-23 @ 12:11  --------------------------------------------------------  IN: 50 mL / OUT: 0 mL / NET: 50 mL      PHYSICAL EXAM:    General: in no acute distress  HEENT: MMM, conjunctiva and sclera clear  Gastrointestinal: Soft non-tender non-distended; No rebound or guarding  Skin: Warm and dry. No obvious rash    LABS:                        7.6    6.15  )-----------( 257      ( 23 Sep 2022 10:12 )             24.6     09-23    132<L>  |  97  |  11  ----------------------------<  117<H>  4.5   |  26  |  1.03    Ca    8.9      23 Sep 2022 10:12  Phos  3.8     09-23  Mg     2.1     09-23    TPro  6.5  /  Alb  3.8  /  TBili  0.4  /  DBili  x   /  AST  17  /  ALT  16  /  AlkPhos  61  09-22    PT/INR - ( 22 Sep 2022 09:43 )   PT: 13.4 sec;   INR: 1.12          PTT - ( 22 Sep 2022 09:43 )  PTT:26.9 sec                  Urinalysis with Rflx Culture (collected 21 Sep 2022 00:09)      RADIOLOGY & ADDITIONAL STUDIES:  Reviewed

## 2022-09-23 NOTE — PROGRESS NOTE ADULT - PROBLEM SELECTOR PLAN 5
History of COPD, on spiriva and monteleukast.   - Continue home meds   - Patient currently on 2L NC saturating 99% - used O2 at home before but has not used in 2 years Pt with E coli and Gemella spp bacteremia from dental abscess. Refused extraction last visit, has not done it outpatient. On Ceftriaxone 2g daily until 9/26. Patient has jaw and neck pain without any crepitus on exam, no fluctuance or erythema/edema. Low concern for involvement of soft tissue involvement of head and neck, no airway compromise.   - Afebrile and without white count on admission   - Continue ceftriaxone 2g daily until 9/26

## 2022-09-23 NOTE — PROGRESS NOTE ADULT - ASSESSMENT
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

## 2022-09-23 NOTE — PROGRESS NOTE ADULT - SUBJECTIVE AND OBJECTIVE BOX
SUBJECTIVE: Patient seen and examined bedside and spoken to with interpretor. Pt reports feeling well this morning with mild epigastric abdominal pain and complaint of shortness of breath. Denies nausea or vomiting. Reports flatus but no additional bowel movements since colonoscopy.     aMIOdarone    Tablet 200 milliGRAM(s) Oral daily  amLODIPine   Tablet 10 milliGRAM(s) Oral daily  cefTRIAXone   IVPB 2000 milliGRAM(s) IV Intermittent every 24 hours  isosorbide   mononitrate ER Tablet (IMDUR) 30 milliGRAM(s) Oral daily      Vital Signs Last 24 Hrs  T(C): 36.7 (23 Sep 2022 08:41), Max: 36.9 (22 Sep 2022 15:37)  T(F): 98.1 (23 Sep 2022 08:41), Max: 98.5 (22 Sep 2022 15:37)  HR: 63 (23 Sep 2022 08:41) (62 - 77)  BP: 147/71 (23 Sep 2022 08:41) (135/68 - 156/84)  BP(mean): --  RR: 18 (23 Sep 2022 08:41) (17 - 20)  SpO2: 96% (23 Sep 2022 08:41) (87% - 99%)    Parameters below as of 23 Sep 2022 08:41  Patient On (Oxygen Delivery Method): nasal cannula  O2 Flow (L/min): 2    I&O's Detail    22 Sep 2022 07:01  -  23 Sep 2022 07:00  --------------------------------------------------------  IN:    IV PiggyBack: 50 mL  Total IN: 50 mL    OUT:    Voided (mL): 1250 mL  Total OUT: 1250 mL    Total NET: -1200 mL      23 Sep 2022 07:01  -  23 Sep 2022 12:43  --------------------------------------------------------  IN:    IV PiggyBack: 50 mL  Total IN: 50 mL    OUT:  Total OUT: 0 mL    Total NET: 50 mL          General: NAD, resting comfortably in bed  C/V: NSR  Pulm: Nonlabored breathing, no respiratory distress  Abd: soft, obese, NTND, no rebound, no guarding  Extrem: WWP, no edema, SCDs in place        LABS:                        7.6    6.15  )-----------( 257      ( 23 Sep 2022 10:12 )             24.6     09-23    132<L>  |  97  |  11  ----------------------------<  117<H>  4.5   |  26  |  1.03    Ca    8.9      23 Sep 2022 10:12  Phos  3.8     09-23  Mg     2.1     09-23    TPro  6.5  /  Alb  3.8  /  TBili  0.4  /  DBili  x   /  AST  17  /  ALT  16  /  AlkPhos  61  09-22    PT/INR - ( 22 Sep 2022 09:43 )   PT: 13.4 sec;   INR: 1.12          PTT - ( 22 Sep 2022 09:43 )  PTT:26.9 sec      RADIOLOGY & ADDITIONAL STUDIES:

## 2022-09-23 NOTE — PROGRESS NOTE ADULT - SUBJECTIVE AND OBJECTIVE BOX
OVERNIGHT EVENTS: Required 1 lidocaine patch o/n for back pain.     SUBJECTIVE:  Patient seen and examined at bedside.  ROS: Patient denies h/n/v/d, fever, chills, cp, palpitations, sob, abd pain, leg swelling, rashes, dysuria, and changes in BM.     Vital Signs Last 12 Hrs  T(F): 98.1 (09-23-22 @ 08:41), Max: 98.4 (09-23-22 @ 06:25)  HR: 63 (09-23-22 @ 08:41) (63 - 70)  BP: 147/71 (09-23-22 @ 08:41) (147/64 - 147/71)  BP(mean): --  RR: 18 (09-23-22 @ 08:41) (18 - 19)  SpO2: 96% (09-23-22 @ 08:41) (94% - 96%)  I&O's Summary    22 Sep 2022 07:01  -  23 Sep 2022 07:00  --------------------------------------------------------  IN: 50 mL / OUT: 1250 mL / NET: -1200 mL    23 Sep 2022 07:01  -  23 Sep 2022 14:38  --------------------------------------------------------  IN: 50 mL / OUT: 0 mL / NET: 50 mL        PHYSICAL EXAM:  Constitutional: NAD, comfortable in bed.   HEENT: NC/AT  Neck: Supple, no JVD  Respiratory: CTA B/L. No w/r/r.   Cardiovascular: RRR, holosystolic murmur best auscultated in RUSB.   Gastrointestinal: +BS, soft NT, moderately distended, no guarding or rebound tenderness, no palpable masses   Extremities: wwp; no cyanosis, clubbing or edema.   Vascular: Pulses equal and strong throughout.   Neurological: AAOx3, no CN deficits, strength and sensation intact throughout.   Skin: No gross skin abnormalities or rashes        LABS:                        7.6    6.15  )-----------( 257      ( 23 Sep 2022 10:12 )             24.6     09-23    132<L>  |  97  |  11  ----------------------------<  117<H>  4.5   |  26  |  1.03    Ca    8.9      23 Sep 2022 10:12  Phos  3.8     09-23  Mg     2.1     09-23    TPro  6.5  /  Alb  3.8  /  TBili  0.4  /  DBili  x   /  AST  17  /  ALT  16  /  AlkPhos  61  09-22    PT/INR - ( 22 Sep 2022 09:43 )   PT: 13.4 sec;   INR: 1.12          PTT - ( 22 Sep 2022 09:43 )  PTT:26.9 sec        RADIOLOGY & ADDITIONAL TESTS:    MEDICATIONS  (STANDING):  aMIOdarone    Tablet 200 milliGRAM(s) Oral daily  amLODIPine   Tablet 10 milliGRAM(s) Oral daily  atorvastatin 20 milliGRAM(s) Oral at bedtime  cefTRIAXone   IVPB 2000 milliGRAM(s) IV Intermittent every 24 hours  chlorhexidine 2% Cloths 1 Application(s) Topical daily  folic acid 1 milliGRAM(s) Oral daily  isosorbide   mononitrate ER Tablet (IMDUR) 30 milliGRAM(s) Oral daily  lidocaine   4% Patch 1 Patch Transdermal every 24 hours  magnesium oxide 400 milliGRAM(s) Oral daily  montelukast 10 milliGRAM(s) Oral daily  multivitamin 1 Tablet(s) Oral daily  pantoprazole  Injectable 40 milliGRAM(s) IV Push every 12 hours  simethicone 80 milliGRAM(s) Chew two times a day  tiotropium 18 MICROgram(s) Capsule 1 Capsule(s) Inhalation daily    MEDICATIONS  (PRN):  acetaminophen     Tablet .. 650 milliGRAM(s) Oral every 6 hours PRN Temp greater or equal to 38C (100.4F), Mild Pain (1 - 3)  LORazepam     Tablet 0.5 milliGRAM(s) Oral daily PRN Anxiety  ondansetron Injectable 4 milliGRAM(s) IV Push every 8 hours PRN Nausea and/or Vomiting

## 2022-09-23 NOTE — PROGRESS NOTE ADULT - ASSESSMENT
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 Colorectal surgery consulted in setting of c/f GI bleed with known colorectal cancer history. Pt now s/p colonoscopy with GI on 9/22 with finding of a friable, centrally ulcerated, coarsened mass 5-7cm distal of previous ileo-colonic anastomosis that occupied 30% of the luminal circumference. Multiple cold forceps biopsies were obtained. Findings concerning for new primary colon cancer.     Recommendations:  Medical and cardiac risk stratification for potential future surgery  Oncology consult in the setting of new primary colon ca with h/o colon resection in 2019 (Please consult Dr. Mckeon; patient's oncologist)  Will require staging work up (CT chest, abdomen, and pelvis with PO/IV contrast)  F/u pathology results  Appreciate GI recs  Team 1C will continue to follow    Plan discussed with chief resident and Dr. Sanford

## 2022-09-23 NOTE — PHYSICAL THERAPY INITIAL EVALUATION ADULT - PERTINENT HX OF CURRENT PROBLEM, REHAB EVAL
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

## 2022-09-23 NOTE — PROGRESS NOTE ADULT - ATTENDING COMMENTS
Patient was seen and examined at bedside on 9/23/2022 at 830 am. Patient has no acute complaints. Denies SOB, CP. ROS is otherwise negative. Vitals, labwork and pertinent imaging reviewed. Physical exam - NAD, AAO x 4, PERRLA, EOMI, MMM, supple neck, chest - CTA b/l, CV - rrr, s1s2, no m/r/g, abd - soft, NTND, + BS, ext - wwp, psych - normal affect, skin - no rash    Plan  -Pt s/p EGD showing mass  -CRS and Heme/Onc consulted - plan for surgery   -C/w Lasix - will start Lasix 20 mg PO qd

## 2022-09-23 NOTE — PROGRESS NOTE ADULT - PROBLEM SELECTOR PLAN 4
- Continue amlodipine  - Holding home lisinopril due to BIBIANA from last admission, patient was instructed to restart this medication with Dr. Marte - Continue amlodipine  - Holding home lisinopril due to BIBIANA from last admission, patient was instructed to restart this medication with Dr. Marte      #[id number]   - Grade II diastolic dysfunction. On Lasix 20 PO daily outpatient, however held inpatient. Pt appears mildly overloaded on CT Chest performed to r/o mets on 9/22.   - Given 40mg IVP Lasix on 9/23 - Grade II diastolic dysfunction. On Lasix 20 PO daily outpatient, however held inpatient. Pt appears mildly overloaded on CT Chest performed to r/o mets.   - 40mg IVP Lasix now  - Restart home lasix 20mg PO tomorrow

## 2022-09-23 NOTE — PROGRESS NOTE ADULT - PROBLEM SELECTOR PLAN 3
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   - Holding Eliquis in the setting of possible GIB Patient with dark red stool, occasional BRBPR. Hemoglobin 7.9 on admission which is around baseline. Non tachycardic or hypotensive. 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 Of note, patient did state that she had blood transfusion 5 days prior to admission at [facility] for "anemia" but couldn't give any more details. Colonoscopy performed on 9/22; "Approximately 5-7 cm distal to the ileocolonic anastomosis, there was a friable, centrally ulcerated, coarsened mass that occupied 30% of the luminal circumference, concerning for malignancy. Multiple cold forceps biopsies were obtained." Dr. Sanford ([facility] surgery attending) assessed and updated pt, tentative for surgical intervention 9/28.   - Order pre-op labs and ensure active Covid swab for 9/28  - Continue to trend hemoglobin, keep HG > 7  - Restarted on DASH/TLC diet

## 2022-09-23 NOTE — PROGRESS NOTE ADULT - SUBJECTIVE AND OBJECTIVE BOX
*** INCOMPLETE ***    REASON FOR CONSULT:     INTERVAL/OVERNIGHT EVENTS: As per overnight staff, there were no acute events overnight    SUBJECTIVE HPI: Patient seen and examined at bedside. Patient resting comfortably, no complaints at this time. Patient denies: fever, chills, weakness, dizziness, headaches, changes in vision, chest pain, palpitations, shortness of breath, cough, N/V, diarrhea or constipation, dysuria, LE edema. ROS otherwise negative.      MEDICATIONS  (STANDING):  aMIOdarone    Tablet 200 milliGRAM(s) Oral daily  amLODIPine   Tablet 10 milliGRAM(s) Oral daily  atorvastatin 20 milliGRAM(s) Oral at bedtime  cefTRIAXone   IVPB 2000 milliGRAM(s) IV Intermittent every 24 hours  chlorhexidine 2% Cloths 1 Application(s) Topical daily  folic acid 1 milliGRAM(s) Oral daily  furosemide   Injectable 40 milliGRAM(s) IV Push once  isosorbide   mononitrate ER Tablet (IMDUR) 30 milliGRAM(s) Oral daily  lidocaine   4% Patch 1 Patch Transdermal every 24 hours  magnesium oxide 400 milliGRAM(s) Oral daily  montelukast 10 milliGRAM(s) Oral daily  multivitamin 1 Tablet(s) Oral daily  pantoprazole  Injectable 40 milliGRAM(s) IV Push every 12 hours  simethicone 80 milliGRAM(s) Chew two times a day  tiotropium 18 MICROgram(s) Capsule 1 Capsule(s) Inhalation daily    MEDICATIONS  (PRN):  acetaminophen     Tablet .. 650 milliGRAM(s) Oral every 6 hours PRN Temp greater or equal to 38C (100.4F), Mild Pain (1 - 3)  LORazepam     Tablet 0.5 milliGRAM(s) Oral daily PRN Anxiety  ondansetron Injectable 4 milliGRAM(s) IV Push every 8 hours PRN Nausea and/or Vomiting        VITAL SIGNS:  Vital Signs Last 24 Hrs  T(C): 36.7 (23 Sep 2022 08:41), Max: 36.9 (22 Sep 2022 15:37)  T(F): 98.1 (23 Sep 2022 08:41), Max: 98.5 (22 Sep 2022 15:37)  HR: 63 (23 Sep 2022 08:41) (62 - 77)  BP: 147/71 (23 Sep 2022 08:41) (135/68 - 156/84)  BP(mean): --  RR: 18 (23 Sep 2022 08:41) (17 - 20)  SpO2: 96% (23 Sep 2022 08:41) (87% - 99%)    Parameters below as of 23 Sep 2022 08:41  Patient On (Oxygen Delivery Method): nasal cannula  O2 Flow (L/min): 2      I&O's Detail    22 Sep 2022 07:01  -  23 Sep 2022 07:00  --------------------------------------------------------  IN:    IV PiggyBack: 50 mL  Total IN: 50 mL    OUT:    Voided (mL): 1250 mL  Total OUT: 1250 mL    Total NET: -1200 mL      23 Sep 2022 07:01  -  23 Sep 2022 11:30  --------------------------------------------------------  IN:    IV PiggyBack: 50 mL  Total IN: 50 mL    OUT:  Total OUT: 0 mL    Total NET: 50 mL          PHYSICAL EXAM:  General: Comfortable, pleasant/anxious/agitated, Ill-appearing, well-nourished/frail/cachectic, comfortable / in distress  Neurological: AAOx3, no focal deficits  HEENT: NC/AT; EOMI, PERRL, clear conjunctiva, no nasal or oropharyngeal discharge or exudates, MMM  Neck: supple, no cervical or post-auricular lymphadenopathy  Cardiovascular: +S1/S2, no murmurs/rubs/gallops, RRR  Respiratory: CTA B/L, no diminished breath sounds, no wheezes/rales/rhonchi, no increased work of breathing or accessory muscle use  Gastrointestinal: soft, NT/ND; active BSx4 quadrants  Genitourinary: no suprapubic tenderness, no CVA tenderness  Extremities: WWP; no edema, clubbing or cyanosis  Vascular: 2+ radial, DP/PT pulses B/L  Skin: no rashes  Lines/Drains:     LABS:                        7.6    6.15  )-----------( 257      ( 23 Sep 2022 10:12 )             24.6     09-23    132<L>  |  97  |  11  ----------------------------<  117<H>  4.5   |  26  |  1.03    Ca    8.9      23 Sep 2022 10:12  Phos  3.8     09-23  Mg     2.1     09-23    TPro  6.5  /  Alb  3.8  /  TBili  0.4  /  DBili  x   /  AST  17  /  ALT  16  /  AlkPhos  61  09-22    PT/INR - ( 22 Sep 2022 09:43 )   PT: 13.4 sec;   INR: 1.12          PTT - ( 22 Sep 2022 09:43 )  PTT:26.9 sec        BNP            Microbiology:    Urinalysis with Rflx Culture (collected 09-21-22 @ 00:09)    Urinalysis with Rflx Culture (collected 09-16-22 @ 08:47)    Culture - Blood (collected 09-09-22 @ 16:20)  Source: .Blood Blood-Peripheral  Final Report (09-14-22 @ 18:00):    No growth at 5 days.    Culture - Blood (collected 09-09-22 @ 16:05)  Source: .Blood Blood-Peripheral  Final Report (09-14-22 @ 18:00):    No growth at 5 days.    Urinalysis with Rflx Culture (collected 09-09-22 @ 15:21)    Culture - Blood (collected 09-08-22 @ 12:09)  Source: .Blood Blood-Peripheral  Final Report (09-13-22 @ 14:00):    No growth at 5 days.    Culture - Blood (collected 09-08-22 @ 07:19)  Source: .Blood Blood-Peripheral  Gram Stain (09-09-22 @ 05:58):    Aerobic Bottle: Gram Negative Rods    Result called to and read back byJuan Francisco Lange RN (5UR)  09/08/2022 21:18:04    Anaerobic Bottle: Gram Negative Rods    Floor previously notified.    ***Blood Panel PCR results on this specimen are available    approximately 3 hours after the Gram stain result.***    Gram stain, PCR, and/or culture results may not always    correspond due to difference in methodologies.    ************************************************************    This PCR assay was performed using [employer].    The following targets are tested for: Enterococcus,    vancomycin resistant enterococci, Listeria monocytogenes,    coagulase negative staphylococci, S. aureus,    methicillin resistant S. aureus, Streptococcus agalactiae    (Group B), S. pneumoniae, S. pyogenes (Group A),    Acinetobacter baumannii, Enterobacter cloacae, E. coli,    Klebsiella oxytoca, K. pneumoniae, Proteus sp.,    Serratia marcescens, Haemophilus influenzae,    Neisseria meningitidis, Pseudomonas aeruginosa, Candida    albicans, C.glabrata, C krusei, C parapsilosis,    C. tropicalis and the KPC resistance gene.  Final Report (09-10-22 @ 08:22):    Growth in aerobic and anaerobic bottles: Escherichia coli  Organism: Escherichia coli  Blood Culture PCR (09-10-22 @ 08:22)  Organism: Blood Culture PCR (09-10-22 @ 08:22)      -  Escherichia coli: Detec      -  Multidrug (KPC pos) resistant organism: Nondet      Method Type: PCR  Organism: Escherichia coli (09-10-22 @ 08:22)      -  Ampicillin: R >16 These ampicillin results predict results for amoxicillin      -  Ampicillin/Sulbactam: I 16/8 Enterobacter, Klebsiella aerogenes, Citrobacter, and Serratia may develop resistance during prolonged therapy (3-4 days)      -  Cefazolin: I 4 Enterobacter, Klebsiella aerogenes, Citrobacter, and Serratia may develop resistance during prolonged therapy (3-4 days)      -  Ceftriaxone: S <=1 Enterobacter, Klebsiella aerogenes, Citrobacter, and Serratia may develop resistance during prolonged therapy      -  Ciprofloxacin: S <=0.25      -  Ertapenem: S <=0.5      -  Gentamicin: S <=2      -  Piperacillin/Tazobactam: S <=8      -  Tobramycin: S <=2      -  Trimethoprim/Sulfamethoxazole: R >2/38      Method Type: BRICE    Culture - Blood (collected 09-07-22 @ 08:07)  Source: .Blood Blood-Peripheral  Final Report (09-12-22 @ 09:00):    No growth at 5 days.    Culture - Blood (collected 09-07-22 @ 08:07)  Source: .Blood Blood-Peripheral  Final Report (09-12-22 @ 09:00):    No growth at 5 days.    Culture - Urine (collected 09-05-22 @ 15:16)  Source: Clean Catch None  Final Report (09-07-22 @ 08:29):    Insignificant amount of mixed growth.            RADIOLOGY & ADDITIONAL STUDIES: Reviewed.    ECG: rate controlled    ECHO:  < from: TTE Echo Complete w/o Contrast w/ Doppler (09.16.22 @ 15:47) >   1. Normal left ventricular size and systolic function.   2. Grade II left ventricular diastolic dysfunction.   3. Normal right ventricular size and systolic function.   4. Mildly dilated left atrium.   5. 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 The peak transvalvular velocity   is 3.80 m/s, the mean transvalvular gradient is 32.00 mmHg, and the   LVOT/AV velocity ratio is 0.29.   6. Mild mitral stenosis.   7. Pulmonary hypertension present, pulmonary artery systolic pressure is   61 mmHg.   8. No pericardial effusion.   9. Compared to the previous TTE performed on 9/6/2022, there is evidence   of severe pulmonary hypertension.    < end of copied text >      REASON FOR CONSULT:     INTERVAL/OVERNIGHT EVENTS: As per overnight staff, there were no acute events overnight    SUBJECTIVE HPI: Patient seen and examined at bedside. Patient resting comfortably, no complaints at this time. Patient denies: fever, chills, weakness, dizziness, headaches, changes in vision, chest pain, palpitations, shortness of breath, cough, N/V, diarrhea or constipation, dysuria, LE edema. ROS otherwise negative.      MEDICATIONS  (STANDING):  aMIOdarone    Tablet 200 milliGRAM(s) Oral daily  amLODIPine   Tablet 10 milliGRAM(s) Oral daily  atorvastatin 20 milliGRAM(s) Oral at bedtime  cefTRIAXone   IVPB 2000 milliGRAM(s) IV Intermittent every 24 hours  chlorhexidine 2% Cloths 1 Application(s) Topical daily  folic acid 1 milliGRAM(s) Oral daily  furosemide   Injectable 40 milliGRAM(s) IV Push once  isosorbide   mononitrate ER Tablet (IMDUR) 30 milliGRAM(s) Oral daily  lidocaine   4% Patch 1 Patch Transdermal every 24 hours  magnesium oxide 400 milliGRAM(s) Oral daily  montelukast 10 milliGRAM(s) Oral daily  multivitamin 1 Tablet(s) Oral daily  pantoprazole  Injectable 40 milliGRAM(s) IV Push every 12 hours  simethicone 80 milliGRAM(s) Chew two times a day  tiotropium 18 MICROgram(s) Capsule 1 Capsule(s) Inhalation daily    MEDICATIONS  (PRN):  acetaminophen     Tablet .. 650 milliGRAM(s) Oral every 6 hours PRN Temp greater or equal to 38C (100.4F), Mild Pain (1 - 3)  LORazepam     Tablet 0.5 milliGRAM(s) Oral daily PRN Anxiety  ondansetron Injectable 4 milliGRAM(s) IV Push every 8 hours PRN Nausea and/or Vomiting        VITAL SIGNS:  Vital Signs Last 24 Hrs  T(C): 36.7 (23 Sep 2022 08:41), Max: 36.9 (22 Sep 2022 15:37)  T(F): 98.1 (23 Sep 2022 08:41), Max: 98.5 (22 Sep 2022 15:37)  HR: 63 (23 Sep 2022 08:41) (62 - 77)  BP: 147/71 (23 Sep 2022 08:41) (135/68 - 156/84)  BP(mean): --  RR: 18 (23 Sep 2022 08:41) (17 - 20)  SpO2: 96% (23 Sep 2022 08:41) (87% - 99%)    Parameters below as of 23 Sep 2022 08:41  Patient On (Oxygen Delivery Method): nasal cannula  O2 Flow (L/min): 2      I&O's Detail    22 Sep 2022 07:01  -  23 Sep 2022 07:00  --------------------------------------------------------  IN:    IV PiggyBack: 50 mL  Total IN: 50 mL    OUT:    Voided (mL): 1250 mL  Total OUT: 1250 mL    Total NET: -1200 mL      23 Sep 2022 07:01  -  23 Sep 2022 11:30  --------------------------------------------------------  IN:    IV PiggyBack: 50 mL  Total IN: 50 mL    OUT:  Total OUT: 0 mL    Total NET: 50 mL          PHYSICAL EXAM:  General: Comfortable, pleasant/anxious/agitated, Ill-appearing, well-nourished/frail/cachectic, comfortable / in distress  Neurological: AAOx3, no focal deficits  HEENT: NC/AT; EOMI, PERRL, clear conjunctiva, no nasal or oropharyngeal discharge or exudates, MMM  Neck: supple, no cervical or post-auricular lymphadenopathy  Cardiovascular: +S1/S2, no murmurs/rubs/gallops, RRR  Respiratory: CTA B/L, no diminished breath sounds, no wheezes/rales/rhonchi, no increased work of breathing or accessory muscle use  Gastrointestinal: soft, NT/ND; active BSx4 quadrants  Genitourinary: no suprapubic tenderness, no CVA tenderness  Extremities: WWP; no edema, clubbing or cyanosis  Vascular: 2+ radial, DP/PT pulses B/L  Skin: no rashes  Lines/Drains:     LABS:                        7.6    6.15  )-----------( 257      ( 23 Sep 2022 10:12 )             24.6     09-23    132<L>  |  97  |  11  ----------------------------<  117<H>  4.5   |  26  |  1.03    Ca    8.9      23 Sep 2022 10:12  Phos  3.8     09-23  Mg     2.1     09-23    TPro  6.5  /  Alb  3.8  /  TBili  0.4  /  DBili  x   /  AST  17  /  ALT  16  /  AlkPhos  61  09-22    PT/INR - ( 22 Sep 2022 09:43 )   PT: 13.4 sec;   INR: 1.12          PTT - ( 22 Sep 2022 09:43 )  PTT:26.9 sec        BNP            Microbiology:    Urinalysis with Rflx Culture (collected 09-21-22 @ 00:09)    Urinalysis with Rflx Culture (collected 09-16-22 @ 08:47)    Culture - Blood (collected 09-09-22 @ 16:20)  Source: .Blood Blood-Peripheral  Final Report (09-14-22 @ 18:00):    No growth at 5 days.    Culture - Blood (collected 09-09-22 @ 16:05)  Source: .Blood Blood-Peripheral  Final Report (09-14-22 @ 18:00):    No growth at 5 days.    Urinalysis with Rflx Culture (collected 09-09-22 @ 15:21)    Culture - Blood (collected 09-08-22 @ 12:09)  Source: .Blood Blood-Peripheral  Final Report (09-13-22 @ 14:00):    No growth at 5 days.    Culture - Blood (collected 09-08-22 @ 07:19)  Source: .Blood Blood-Peripheral  Gram Stain (09-09-22 @ 05:58):    Aerobic Bottle: Gram Negative Rods    Result called to and read back byJuan Francisco Lange RN (5UR)  09/08/2022 21:18:04    Anaerobic Bottle: Gram Negative Rods    Floor previously notified.    ***Blood Panel PCR results on this specimen are available    approximately 3 hours after the Gram stain result.***    Gram stain, PCR, and/or culture results may not always    correspond due to difference in methodologies.    ************************************************************    This PCR assay was performed using [employer].    The following targets are tested for: Enterococcus,    vancomycin resistant enterococci, Listeria monocytogenes,    coagulase negative staphylococci, S. aureus,    methicillin resistant S. aureus, Streptococcus agalactiae    (Group B), S. pneumoniae, S. pyogenes (Group A),    Acinetobacter baumannii, Enterobacter cloacae, E. coli,    Klebsiella oxytoca, K. pneumoniae, Proteus sp.,    Serratia marcescens, Haemophilus influenzae,    Neisseria meningitidis, Pseudomonas aeruginosa, Candida    albicans, C.glabrata, C krusei, C parapsilosis,    C. tropicalis and the KPC resistance gene.  Final Report (09-10-22 @ 08:22):    Growth in aerobic and anaerobic bottles: Escherichia coli  Organism: Escherichia coli  Blood Culture PCR (09-10-22 @ 08:22)  Organism: Blood Culture PCR (09-10-22 @ 08:22)      -  Escherichia coli: Detec      -  Multidrug (KPC pos) resistant organism: Nondet      Method Type: PCR  Organism: Escherichia coli (09-10-22 @ 08:22)      -  Ampicillin: R >16 These ampicillin results predict results for amoxicillin      -  Ampicillin/Sulbactam: I 16/8 Enterobacter, Klebsiella aerogenes, Citrobacter, and Serratia may develop resistance during prolonged therapy (3-4 days)      -  Cefazolin: I 4 Enterobacter, Klebsiella aerogenes, Citrobacter, and Serratia may develop resistance during prolonged therapy (3-4 days)      -  Ceftriaxone: S <=1 Enterobacter, Klebsiella aerogenes, Citrobacter, and Serratia may develop resistance during prolonged therapy      -  Ciprofloxacin: S <=0.25      -  Ertapenem: S <=0.5      -  Gentamicin: S <=2      -  Piperacillin/Tazobactam: S <=8      -  Tobramycin: S <=2      -  Trimethoprim/Sulfamethoxazole: R >2/38      Method Type: BRICE    Culture - Blood (collected 09-07-22 @ 08:07)  Source: .Blood Blood-Peripheral  Final Report (09-12-22 @ 09:00):    No growth at 5 days.    Culture - Blood (collected 09-07-22 @ 08:07)  Source: .Blood Blood-Peripheral  Final Report (09-12-22 @ 09:00):    No growth at 5 days.    Culture - Urine (collected 09-05-22 @ 15:16)  Source: Clean Catch None  Final Report (09-07-22 @ 08:29):    Insignificant amount of mixed growth.            RADIOLOGY & ADDITIONAL STUDIES: Reviewed.    ECG: rate controlled    ECHO:  < from: TTE Echo Complete w/o Contrast w/ Doppler (09.16.22 @ 15:47) >   1. Normal left ventricular size and systolic function.   2. Grade II left ventricular diastolic dysfunction.   3. Normal right ventricular size and systolic function.   4. Mildly dilated left atrium.   5. 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 The peak transvalvular velocity   is 3.80 m/s, the mean transvalvular gradient is 32.00 mmHg, and the   LVOT/AV velocity ratio is 0.29.   6. Mild mitral stenosis.   7. Pulmonary hypertension present, pulmonary artery systolic pressure is   61 mmHg.   8. No pericardial effusion.   9. Compared to the previous TTE performed on 9/6/2022, there is evidence   of severe pulmonary hypertension.    < end of copied text >          SUBJECTIVE HPI: Patient seen and examined at bedside. Sitting up at bedside, NAD. Pt was informed of biopsy results by primary team and surgery. Unsure of what to do next. Denies current chest pain, palpitations, SOB.    MEDICATIONS  (STANDING):  aMIOdarone    Tablet 200 milliGRAM(s) Oral daily  amLODIPine   Tablet 10 milliGRAM(s) Oral daily  atorvastatin 20 milliGRAM(s) Oral at bedtime  cefTRIAXone   IVPB 2000 milliGRAM(s) IV Intermittent every 24 hours  chlorhexidine 2% Cloths 1 Application(s) Topical daily  folic acid 1 milliGRAM(s) Oral daily  furosemide   Injectable 40 milliGRAM(s) IV Push once  isosorbide   mononitrate ER Tablet (IMDUR) 30 milliGRAM(s) Oral daily  lidocaine   4% Patch 1 Patch Transdermal every 24 hours  magnesium oxide 400 milliGRAM(s) Oral daily  montelukast 10 milliGRAM(s) Oral daily  multivitamin 1 Tablet(s) Oral daily  pantoprazole  Injectable 40 milliGRAM(s) IV Push every 12 hours  simethicone 80 milliGRAM(s) Chew two times a day  tiotropium 18 MICROgram(s) Capsule 1 Capsule(s) Inhalation daily    MEDICATIONS  (PRN):  acetaminophen     Tablet .. 650 milliGRAM(s) Oral every 6 hours PRN Temp greater or equal to 38C (100.4F), Mild Pain (1 - 3)  LORazepam     Tablet 0.5 milliGRAM(s) Oral daily PRN Anxiety  ondansetron Injectable 4 milliGRAM(s) IV Push every 8 hours PRN Nausea and/or Vomiting        VITAL SIGNS:  Vital Signs Last 24 Hrs  T(C): 36.7 (23 Sep 2022 08:41), Max: 36.9 (22 Sep 2022 15:37)  T(F): 98.1 (23 Sep 2022 08:41), Max: 98.5 (22 Sep 2022 15:37)  HR: 63 (23 Sep 2022 08:41) (62 - 77)  BP: 147/71 (23 Sep 2022 08:41) (135/68 - 156/84)  BP(mean): --  RR: 18 (23 Sep 2022 08:41) (17 - 20)  SpO2: 96% (23 Sep 2022 08:41) (87% - 99%)    Parameters below as of 23 Sep 2022 08:41  Patient On (Oxygen Delivery Method): nasal cannula  O2 Flow (L/min): 2      I&O's Detail    22 Sep 2022 07:01  -  23 Sep 2022 07:00  --------------------------------------------------------  IN:    IV PiggyBack: 50 mL  Total IN: 50 mL    OUT:    Voided (mL): 1250 mL  Total OUT: 1250 mL    Total NET: -1200 mL      23 Sep 2022 07:01  -  23 Sep 2022 11:30  --------------------------------------------------------  IN:    IV PiggyBack: 50 mL  Total IN: 50 mL    OUT:  Total OUT: 0 mL    Total NET: 50 mL          PHYSICAL EXAM:  Constitutional: NAD, sitting up, anxious.  HEENT: NC/AT  Neck: Supple, no JVD  Respiratory: CTA B/L. No w/r/r.   Cardiovascular: RRR, normal S1 and S2, I/VI systolic murmur LUSB.   Gastrointestinal: +BS, soft NT, moderately distended, no guarding  Extremities: wwp; no cyanosis, clubbing or edema.   Vascular2+ DP, PT, radial.  Neurological: AAOx3, no CN deficits, strength and sensation intact throughout.       LABS:                        7.6    6.15  )-----------( 257      ( 23 Sep 2022 10:12 )             24.6     09-23    132<L>  |  97  |  11  ----------------------------<  117<H>  4.5   |  26  |  1.03    Ca    8.9      23 Sep 2022 10:12  Phos  3.8     09-23  Mg     2.1     09-23    TPro  6.5  /  Alb  3.8  /  TBili  0.4  /  DBili  x   /  AST  17  /  ALT  16  /  AlkPhos  61  09-22    PT/INR - ( 22 Sep 2022 09:43 )   PT: 13.4 sec;   INR: 1.12          PTT - ( 22 Sep 2022 09:43 )  PTT:26.9 sec        BNP            RADIOLOGY & ADDITIONAL STUDIES: Reviewed.    ECG: rate controlled    ECHO:  < from: TTE Echo Complete w/o Contrast w/ Doppler (09.16.22 @ 15:47) >   1. Normal left ventricular size and systolic function.   2. Grade II left ventricular diastolic dysfunction.   3. Normal right ventricular size and systolic function.   4. Mildly dilated left atrium.   5. 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 The peak transvalvular velocity   is 3.80 m/s, the mean transvalvular gradient is 32.00 mmHg, and the   LVOT/AV velocity ratio is 0.29.   6. Mild mitral stenosis.   7. Pulmonary hypertension present, pulmonary artery systolic pressure is   61 mmHg.   8. No pericardial effusion.   9. Compared to the previous TTE performed on 9/6/2022, there is evidence   of severe pulmonary hypertension.    < end of copied text >

## 2022-09-23 NOTE — PROGRESS NOTE ADULT - ATTENDING COMMENTS
CRS Attending  Seen on rounds  Language Line Croatian  utilized.  c/o shortness of breath after colonoscopy yesterday, worse with walking. Improved today compared to yesterday, but still symptomatic.  We discussed at length treatment plans and options. She understands given her comorbidities she is high risk for surgery, and perioperative surgical care will require ICU care, possible prolonged intubation, possible cardiac complications, including death.  All questions answered, she expressed understanding.  CTs reviewed  Dr Mckeon/oncology consult today.  Discussed with Medical housestaff team caring for patient.  Discussed with resident Dr Baig.

## 2022-09-23 NOTE — CONSULT NOTE ADULT - SUBJECTIVE AND OBJECTIVE BOX
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 Patient was recently admitted under cardiology service from 9/4-9/13 for chest pain and headaches - found to incidentally have Ecoli bacteremia and Gemella spp. TTE, IRMA, whole body gallium scan were negative - source thought to be 2/2 infected tooth which patient refused to have extracted in house by OMFS. She was sent home with PICC line for IV abx (Ceftriaxone 2g daily until9/26). She has still not gotten her tooth extraction outpatient as she has been in the [facility], but has been getting the Ceftriaxone daily. For this admission, patient had a blood transfusion on 9/16 in the [facility], did not have any reaction to the transfusion. She started to develop bloody diarrhea after the transfusion and does not remember the details as to why exactly she got the transfusion. She also has chronic complaints including headache, jaw and neck pain, R hip pain, etc.     ED Course: afebrile, VSS, Hb 7.9 (8.1 at baseline), CT scan no active GI hemorrhage identified  Consults: surgery and GI   Meds: zofran, protonix, tylenol   EKG: sinus bradycardia to 59, no ischemia  (21 Sep 2022 14:06)      Allergies    Digox (Rash; Urticaria; Hives)  Plavix (Other (Mod to Severe))    Intolerances        MEDICATIONS  (STANDING):  aMIOdarone    Tablet 200 milliGRAM(s) Oral daily  amLODIPine   Tablet 10 milliGRAM(s) Oral daily  atorvastatin 20 milliGRAM(s) Oral at bedtime  cefTRIAXone   IVPB 2000 milliGRAM(s) IV Intermittent every 24 hours  chlorhexidine 2% Cloths 1 Application(s) Topical daily  folic acid 1 milliGRAM(s) Oral daily  furosemide   Injectable 40 milliGRAM(s) IV Push once  isosorbide   mononitrate ER Tablet (IMDUR) 30 milliGRAM(s) Oral daily  lidocaine   4% Patch 1 Patch Transdermal every 24 hours  magnesium oxide 400 milliGRAM(s) Oral daily  montelukast 10 milliGRAM(s) Oral daily  multivitamin 1 Tablet(s) Oral daily  pantoprazole  Injectable 40 milliGRAM(s) IV Push every 12 hours  simethicone 80 milliGRAM(s) Chew two times a day  tiotropium 18 MICROgram(s) Capsule 1 Capsule(s) Inhalation daily    MEDICATIONS  (PRN):  acetaminophen     Tablet .. 650 milliGRAM(s) Oral every 6 hours PRN Temp greater or equal to 38C (100.4F), Mild Pain (1 - 3)  LORazepam     Tablet 0.5 milliGRAM(s) Oral daily PRN Anxiety  ondansetron Injectable 4 milliGRAM(s) IV Push every 8 hours PRN Nausea and/or Vomiting      PAST MEDICAL & SURGICAL HISTORY:  HTN (hypertension)      Aortic stenosis      Hyperlipidemia      COPD (chronic obstructive pulmonary disease)      GERD (gastroesophageal reflux disease)      Stage 3 chronic kidney disease      Anemia      Diastolic CHF, chronic      Obesity      Paroxysmal atrial fibrillation      S/P TAVR (transcatheter aortic valve replacement)  2/2019          FAMILY HISTORY:  No pertinent family history in first degree relatives        SOCIAL HISTORY: No EtOH, no tobacco    REVIEW OF SYSTEMS:    CONSTITUTIONAL: No weakness, fevers or chills  EYES/ENT: No visual changes;  No vertigo or throat pain   NECK: No pain or stiffness  RESPIRATORY: No cough, wheezing, hemoptysis; No shortness of breath  CARDIOVASCULAR: No chest pain or palpitations  GASTROINTESTINAL: No abdominal or epigastric pain. No nausea, vomiting, or hematemesis; No diarrhea or constipation. No melena or hematochezia.  GENITOURINARY: No dysuria, frequency or hematuria  NEUROLOGICAL: No numbness or weakness  SKIN: No itching, burning, rashes, or lesions   All other review of systems is negative unless indicated above.        T(F): 98.1 (09-23-22 @ 08:41), Max: 98.5 (09-22-22 @ 15:37)  HR: 63 (09-23-22 @ 08:41)  BP: 147/71 (09-23-22 @ 08:41)  RR: 18 (09-23-22 @ 08:41)  SpO2: 96% (09-23-22 @ 08:41)  Wt(kg): --    GENERAL: NAD, well-developed  HEAD:  Atraumatic, Normocephalic  EYES: EOMI, PERRLA, conjunctiva and sclera clear  NECK: Supple, No JVD  CHEST/LUNG: Clear to auscultation bilaterally; No wheeze  HEART: Regular rate and rhythm; No murmurs, rubs, or gallops  ABDOMEN: Soft, Nontender, Nondistended; Bowel sounds present  EXTREMITIES:  2+ Peripheral Pulses, No clubbing, cyanosis, or edema  NEUROLOGY: non-focal  SKIN: No rashes or lesions                          7.6    6.15  )-----------( 257      ( 23 Sep 2022 10:12 )             24.6       09-23    132<L>  |  97  |  11  ----------------------------<  117<H>  4.5   |  26  |  1.03    Ca    8.9      23 Sep 2022 10:12  Phos  3.8     09-23  Mg     2.1     09-23    TPro  6.5  /  Alb  3.8  /  TBili  0.4  /  DBili  x   /  AST  17  /  ALT  16  /  AlkPhos  61  09-22      Magnesium, Serum: 2.1 mg/dL (09-23 @ 10:12)  Phosphorus Level, Serum: 3.8 mg/dL (09-23 @ 10:12)       Hematology-Oncology Consult Note    HPI:  82F, Cambodian speaking, w/ PMHx HTN, HLD, severe AS s/p TAVR (2019), 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 Patient was recently admitted under cardiology service from 9/4-9/13 for chest pain and headaches - found to incidentally have Ecoli bacteremia and Gemella spp. TTE, IRMA, whole body gallium scan were negative - source thought to be 2/2 infected tooth which patient refused to have extracted in house by OMFS. She was sent home with PICC line for IV abx (Ceftriaxone 2g daily until9/26). She has still not gotten her tooth extraction outpatient as she has been in the [facility], but has been getting the Ceftriaxone daily. For this admission, patient had a blood transfusion on 9/16 in the [facility], did not have any reaction to the transfusion. She started to develop bloody diarrhea after the transfusion and does not remember the details as to why exactly she got the transfusion. She also has chronic complaints including headache, jaw and neck pain, R hip pain, etc.     ED Course: afebrile, VSS, Hb 7.9 (8.1 at baseline), CT scan no active GI hemorrhage identified  Consults: surgery and GI   Meds: zofran, protonix, tylenol   EKG: sinus bradycardia to 59, no ischemia  (21 Sep 2022 14:06)    Pt seen and examined with the help of [employer] [place] , Panda [id number]      Allergies    Digox (Rash; Urticaria; Hives)  Plavix (Other (Mod to Severe))    Intolerances        MEDICATIONS  (STANDING):  aMIOdarone    Tablet 200 milliGRAM(s) Oral daily  amLODIPine   Tablet 10 milliGRAM(s) Oral daily  atorvastatin 20 milliGRAM(s) Oral at bedtime  cefTRIAXone   IVPB 2000 milliGRAM(s) IV Intermittent every 24 hours  chlorhexidine 2% Cloths 1 Application(s) Topical daily  folic acid 1 milliGRAM(s) Oral daily  furosemide   Injectable 40 milliGRAM(s) IV Push once  isosorbide   mononitrate ER Tablet (IMDUR) 30 milliGRAM(s) Oral daily  lidocaine   4% Patch 1 Patch Transdermal every 24 hours  magnesium oxide 400 milliGRAM(s) Oral daily  montelukast 10 milliGRAM(s) Oral daily  multivitamin 1 Tablet(s) Oral daily  pantoprazole  Injectable 40 milliGRAM(s) IV Push every 12 hours  simethicone 80 milliGRAM(s) Chew two times a day  tiotropium 18 MICROgram(s) Capsule 1 Capsule(s) Inhalation daily    MEDICATIONS  (PRN):  acetaminophen     Tablet .. 650 milliGRAM(s) Oral every 6 hours PRN Temp greater or equal to 38C (100.4F), Mild Pain (1 - 3)  LORazepam     Tablet 0.5 milliGRAM(s) Oral daily PRN Anxiety  ondansetron Injectable 4 milliGRAM(s) IV Push every 8 hours PRN Nausea and/or Vomiting      PAST MEDICAL & SURGICAL HISTORY:  HTN (hypertension)  Aortic stenosis  Hyperlipidemia  COPD (chronic obstructive pulmonary disease)  GERD (gastroesophageal reflux disease)  Stage 3 chronic kidney disease  Anemia  Diastolic CHF, chronic  Obesity  Paroxysmal atrial fibrillation  S/P TAVR (transcatheter aortic valve replacement)  2/2019    FAMILY HISTORY:  No pertinent family history in first degree relatives      SOCIAL HISTORY: No EtOH, no tobacco    REVIEW OF SYSTEMS:    CONSTITUTIONAL: No weakness, fevers or chills  EYES/ENT: No visual changes;  No vertigo or throat pain   NECK: No pain or stiffness  RESPIRATORY: No cough, wheezing, hemoptysis; No shortness of breath  CARDIOVASCULAR: No chest pain or palpitations  GASTROINTESTINAL: +abdominal or epigastric pain. No nausea, vomiting, or hematemesis; No diarrhea or constipation. +melena  GENITOURINARY: No dysuria, frequency or hematuria  NEUROLOGICAL: No numbness or weakness  SKIN: No itching, burning, rashes, or lesions   All other review of systems is negative unless indicated above.        T(F): 98.1 (09-23-22 @ 08:41), Max: 98.5 (09-22-22 @ 15:37)  HR: 63 (09-23-22 @ 08:41)  BP: 147/71 (09-23-22 @ 08:41)  RR: 18 (09-23-22 @ 08:41)  SpO2: 96% (09-23-22 @ 08:41)  Wt(kg): --    GENERAL: NAD  HEAD:  Atraumatic, Normocephalic  EYES: EOMI, PERRLA, conjunctiva and sclera clear  NECK: Supple, No JVD  CHEST/LUNG: Clear to auscultation bilaterally; No wheeze, comfortable on 2L NC  HEART: Regular rate and rhythm; systolic murmur loudest on LUSB  ABDOMEN: Obese abdomen, soft, tender to palpation in lower quadrants. periumbilical hernia  EXTREMITIES:  2+ Peripheral Pulses, 2+pitting edema  NEUROLOGY: non-focal  SKIN: No rashes or lesions                          7.6    6.15  )-----------( 257      ( 23 Sep 2022 10:12 )             24.6       09-23    132<L>  |  97  |  11  ----------------------------<  117<H>  4.5   |  26  |  1.03    Ca    8.9      23 Sep 2022 10:12  Phos  3.8     09-23  Mg     2.1     09-23    TPro  6.5  /  Alb  3.8  /  TBili  0.4  /  DBili  x   /  AST  17  /  ALT  16  /  AlkPhos  61  09-22      Magnesium, Serum: 2.1 mg/dL (09-23 @ 10:12)  Phosphorus Level, Serum: 3.8 mg/dL (09-23 @ 10:12)    < from: CT Chest w/ IV Cont (09.22.22 @ 14:56) >  IMPRESSION:  Patient had CTA abdomen ,pelvis on 9/21/2022 and CTA chest, abdomen,   pelvis on 9/16/2022.  Unchanged mediastinal and bilateral hilar adenopathy. Differential   diagnosis includes sarcoid, lymphoproliferative disorder.  Small right and trace left pleural effusion. As compared to the CTA   abdomen and pelvis 9/21/2022 the left pleural effusion is new, the right   pleural effusion is grossly unchanged.  Thickened interlobular septa suggesting pulmonary interstitial edema.  Stable bilateral lung nodules and micronodules.  Status post TAVR. Cardiomegaly.  No focal liver lesion. No liver metastasis.  Mild splenomegaly, unchanged.  Status post right hemicolectomy.    < end of copied text >

## 2022-09-23 NOTE — PROGRESS NOTE ADULT - ATTENDING COMMENTS
Initial attending contact date  9/21/22    . See resident note written above for details. I reviewed the resident documentation. I have personally seen and examined this patient. I reviewed vitals, labs, medications, cardiac studies, and additional imaging. I agree with the above residnet's findings and plans as written above with the following additions/statements.    -Pt well known to cardiac service with mx co-morbidities as noted   -Admitted now with GI bleed with plan for colonoscopy  -Cardiology consulted for pre-op risk assessment  -Recent ECHO with normal LVEF, grade II DD, known severe prosthetic AV stenosis   -now s/p colonoscopy with possible recurrence, further plan per primary team  -Noted be fluid overloaded post procedure, lasix 40 mg IV x 1 today  -Resume home lasix 20 mg po qd starting tomorrow  -Cont amio, resume home eliquis   -Cont amlodipine 10 mg qd, imdur 30 mg qd for BP control  -To fu with Dr Deleon for outpatient cardiology fu

## 2022-09-23 NOTE — PROGRESS NOTE ADULT - ASSESSMENT
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 Cardiology consulted for preoperative clearance evaluation for colonoscopy. Pt now s/p colonoscopy with c/f possible recurrence of malignancy,     #HFpEF   - Grade II diastolic dysfunction. On Lasix 20 PO daily outpatient, however held inpatient. Pt appears mildly overloaded on CT Chest performed to r/o mets.   - 40mg IVP Lasix now    #Atrial Fibrillation   - On amiodarone and eliquis at home.   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 Cardiology consulted for preoperative clearance evaluation for colonoscopy. Pt now s/p colonoscopy with c/f possible recurrence of malignancy,     #HFpEF   - Grade II diastolic dysfunction. On Lasix 20 PO daily outpatient, however held inpatient. Pt appears mildly overloaded on CT Chest performed to r/o mets.   - 40mg IVP Lasix now  - Restart home lasix 20mg PO tomorrow   - I/Os     #Atrial Fibrillation   - On amiodarone and eliquis at home.  - Restart home Eliquis 5 BID, as pt states no further maroon stools and has completed colonoscopy   - Cardiology will sign off today

## 2022-09-24 LAB
ALBUMIN SERPL ELPH-MCNC: 3.4 G/DL — SIGNIFICANT CHANGE UP (ref 3.3–5)
ALP SERPL-CCNC: 63 U/L — SIGNIFICANT CHANGE UP (ref 40–120)
ALT FLD-CCNC: 17 U/L — SIGNIFICANT CHANGE UP (ref 10–45)
ANION GAP SERPL CALC-SCNC: 14 MMOL/L — SIGNIFICANT CHANGE UP (ref 5–17)
AST SERPL-CCNC: 17 U/L — SIGNIFICANT CHANGE UP (ref 10–40)
BILIRUB SERPL-MCNC: 0.4 MG/DL — SIGNIFICANT CHANGE UP (ref 0.2–1.2)
BUN SERPL-MCNC: 10 MG/DL — SIGNIFICANT CHANGE UP (ref 7–23)
CALCIUM SERPL-MCNC: 8.6 MG/DL — SIGNIFICANT CHANGE UP (ref 8.4–10.5)
CHLORIDE SERPL-SCNC: 95 MMOL/L — LOW (ref 96–108)
CK MB CFR SERPL CALC: 1.2 NG/ML — SIGNIFICANT CHANGE UP (ref 0–6.7)
CK SERPL-CCNC: 41 U/L — SIGNIFICANT CHANGE UP (ref 25–170)
CO2 SERPL-SCNC: 24 MMOL/L — SIGNIFICANT CHANGE UP (ref 22–31)
CREAT SERPL-MCNC: 1.08 MG/DL — SIGNIFICANT CHANGE UP (ref 0.5–1.3)
EGFR: 51 ML/MIN/1.73M2 — LOW
GLUCOSE SERPL-MCNC: 109 MG/DL — HIGH (ref 70–99)
MAGNESIUM SERPL-MCNC: 2 MG/DL — SIGNIFICANT CHANGE UP (ref 1.6–2.6)
PHOSPHATE SERPL-MCNC: 3.2 MG/DL — SIGNIFICANT CHANGE UP (ref 2.5–4.5)
POTASSIUM SERPL-MCNC: 4 MMOL/L — SIGNIFICANT CHANGE UP (ref 3.5–5.3)
POTASSIUM SERPL-SCNC: 4 MMOL/L — SIGNIFICANT CHANGE UP (ref 3.5–5.3)
PROT SERPL-MCNC: 6.5 G/DL — SIGNIFICANT CHANGE UP (ref 6–8.3)
SODIUM SERPL-SCNC: 133 MMOL/L — LOW (ref 135–145)
TROPONIN T SERPL-MCNC: 0.01 NG/ML — SIGNIFICANT CHANGE UP (ref 0–0.01)

## 2022-09-24 PROCEDURE — 99232 SBSQ HOSP IP/OBS MODERATE 35: CPT | Mod: GC

## 2022-09-24 PROCEDURE — 71045 X-RAY EXAM CHEST 1 VIEW: CPT | Mod: 26

## 2022-09-24 PROCEDURE — 99232 SBSQ HOSP IP/OBS MODERATE 35: CPT

## 2022-09-24 RX ORDER — FUROSEMIDE 40 MG
20 TABLET ORAL EVERY 24 HOURS
Refills: 0 | Status: DISCONTINUED | OUTPATIENT
Start: 2022-09-24 | End: 2022-09-26

## 2022-09-24 RX ORDER — IPRATROPIUM/ALBUTEROL SULFATE 18-103MCG
3 AEROSOL WITH ADAPTER (GRAM) INHALATION ONCE
Refills: 0 | Status: COMPLETED | OUTPATIENT
Start: 2022-09-24 | End: 2022-09-24

## 2022-09-24 RX ADMIN — TIOTROPIUM BROMIDE 1 CAPSULE(S): 18 CAPSULE ORAL; RESPIRATORY (INHALATION) at 11:30

## 2022-09-24 RX ADMIN — Medication 650 MILLIGRAM(S): at 16:42

## 2022-09-24 RX ADMIN — AMIODARONE HYDROCHLORIDE 200 MILLIGRAM(S): 400 TABLET ORAL at 06:12

## 2022-09-24 RX ADMIN — Medication 20 MILLIGRAM(S): at 09:10

## 2022-09-24 RX ADMIN — ENOXAPARIN SODIUM 120 MILLIGRAM(S): 100 INJECTION SUBCUTANEOUS at 22:14

## 2022-09-24 RX ADMIN — Medication 1 MILLIGRAM(S): at 11:28

## 2022-09-24 RX ADMIN — ISOSORBIDE MONONITRATE 30 MILLIGRAM(S): 60 TABLET, EXTENDED RELEASE ORAL at 11:29

## 2022-09-24 RX ADMIN — MONTELUKAST 10 MILLIGRAM(S): 4 TABLET, CHEWABLE ORAL at 11:28

## 2022-09-24 RX ADMIN — ATORVASTATIN CALCIUM 20 MILLIGRAM(S): 80 TABLET, FILM COATED ORAL at 22:14

## 2022-09-24 RX ADMIN — OXYCODONE HYDROCHLORIDE 2.5 MILLIGRAM(S): 5 TABLET ORAL at 19:44

## 2022-09-24 RX ADMIN — CHLORHEXIDINE GLUCONATE 1 APPLICATION(S): 213 SOLUTION TOPICAL at 11:28

## 2022-09-24 RX ADMIN — Medication 650 MILLIGRAM(S): at 15:33

## 2022-09-24 RX ADMIN — Medication 1 TABLET(S): at 11:28

## 2022-09-24 RX ADMIN — CEFTRIAXONE 100 MILLIGRAM(S): 500 INJECTION, POWDER, FOR SOLUTION INTRAMUSCULAR; INTRAVENOUS at 10:01

## 2022-09-24 RX ADMIN — SIMETHICONE 80 MILLIGRAM(S): 80 TABLET, CHEWABLE ORAL at 18:10

## 2022-09-24 RX ADMIN — Medication 100 MILLIGRAM(S): at 07:04

## 2022-09-24 RX ADMIN — Medication 650 MILLIGRAM(S): at 22:23

## 2022-09-24 RX ADMIN — LIDOCAINE 1 PATCH: 4 CREAM TOPICAL at 14:42

## 2022-09-24 RX ADMIN — Medication 3 MILLILITER(S): at 12:17

## 2022-09-24 RX ADMIN — Medication 100 MILLIGRAM(S): at 22:41

## 2022-09-24 RX ADMIN — Medication 650 MILLIGRAM(S): at 06:12

## 2022-09-24 RX ADMIN — AMLODIPINE BESYLATE 10 MILLIGRAM(S): 2.5 TABLET ORAL at 06:13

## 2022-09-24 RX ADMIN — Medication 650 MILLIGRAM(S): at 07:12

## 2022-09-24 RX ADMIN — LIDOCAINE 1 PATCH: 4 CREAM TOPICAL at 18:41

## 2022-09-24 RX ADMIN — PANTOPRAZOLE SODIUM 40 MILLIGRAM(S): 20 TABLET, DELAYED RELEASE ORAL at 06:12

## 2022-09-24 RX ADMIN — MAGNESIUM OXIDE 400 MG ORAL TABLET 400 MILLIGRAM(S): 241.3 TABLET ORAL at 11:29

## 2022-09-24 RX ADMIN — SIMETHICONE 80 MILLIGRAM(S): 80 TABLET, CHEWABLE ORAL at 06:13

## 2022-09-24 RX ADMIN — LIDOCAINE 1 PATCH: 4 CREAM TOPICAL at 02:02

## 2022-09-24 RX ADMIN — Medication 0.5 MILLIGRAM(S): at 16:10

## 2022-09-24 RX ADMIN — PANTOPRAZOLE SODIUM 40 MILLIGRAM(S): 20 TABLET, DELAYED RELEASE ORAL at 18:10

## 2022-09-24 NOTE — PROGRESS NOTE ADULT - PROBLEM SELECTOR PLAN 6
- Continue amlodipine  - Holding home lisinopril due to BIBIANA from last admission, patient was instructed to restart this medication with Dr. Marte      #HFpEF   - Grade II diastolic dysfunction. On Lasix 20 PO daily outpatient, however held inpatient. Pt appears mildly overloaded on CT Chest performed to r/o mets on 9/22.   - Given 40mg IVP Lasix on 9/23

## 2022-09-24 NOTE — PROGRESS NOTE ADULT - ASSESSMENT
A/P  Hx as above  Getting prepared for surgery  Medicine service dealing with COPD and cardiac isseues.  For A fib has been on anticoagulation that will have to be stopped.  On lovenox in interim  No surgical issues presently.  Will follow

## 2022-09-24 NOTE — PROGRESS NOTE ADULT - SUBJECTIVE AND OBJECTIVE BOX
OVERNIGHT EVENTS:  None    SUBJECTIVE:  Patient seen and examined at bedside. She reports worsening SOB, CP  ROS: Patient denies h/n/v/d, fever, chills, palpitations, abd pain, leg swelling, rashes, dysuria, and changes in BM. ROS is otherwise negative    Vital Signs Last 24 Hrs  T(C): 36.8 (24 Sep 2022 09:06), Max: 37.6 (24 Sep 2022 06:15)  T(F): 98.2 (24 Sep 2022 09:06), Max: 99.7 (24 Sep 2022 06:15)  HR: 66 (24 Sep 2022 09:06) (66 - 78)  BP: 158/68 (24 Sep 2022 09:06) (134/67 - 170/72)  BP(mean): --  RR: 19 (24 Sep 2022 09:06) (17 - 19)  SpO2: 98% (24 Sep 2022 09:06) (95% - 99%)    Parameters below as of 24 Sep 2022 09:06  Patient On (Oxygen Delivery Method): nasal cannula  O2 Flow (L/min): 2          PHYSICAL EXAM:  Constitutional: NAD, comfortable in bed.   HEENT: NC/AT  Neck: Supple, no JVD  Respiratory: CTA B/L. No w/r/r.   Cardiovascular: RRR, holosystolic murmur best auscultated in RUSB.   Gastrointestinal: +BS, soft NT, moderately distended, no guarding or rebound tenderness, no palpable masses   Extremities: wwp; no cyanosis, clubbing or edema.   Vascular: Pulses equal and strong throughout.   Neurological: AAOx3, no CN deficits, strength and sensation intact throughout.   Skin: No gross skin abnormalities or rashes  Psych: anxious affect        LABS:                                   7.6    6.15  )-----------( 257      ( 23 Sep 2022 10:12 )             24.6   09-24    133<L>  |  95<L>  |  10  ----------------------------<  109<H>  4.0   |  24  |  1.08    Ca    8.6      24 Sep 2022 08:41  Phos  3.2     09-24  Mg     2.0     09-24    TPro  6.5  /  Alb  3.4  /  TBili  0.4  /  DBili  x   /  AST  17  /  ALT  17  /  AlkPhos  63  09-24          RADIOLOGY & ADDITIONAL TESTS:    MEDICATIONS  (STANDING):  MEDICATIONS  (STANDING):  aMIOdarone    Tablet 200 milliGRAM(s) Oral daily  amLODIPine   Tablet 10 milliGRAM(s) Oral daily  atorvastatin 20 milliGRAM(s) Oral at bedtime  cefTRIAXone   IVPB 2000 milliGRAM(s) IV Intermittent every 24 hours  chlorhexidine 2% Cloths 1 Application(s) Topical daily  enoxaparin Injectable 120 milliGRAM(s) SubCutaneous every 24 hours  folic acid 1 milliGRAM(s) Oral daily  furosemide    Tablet 20 milliGRAM(s) Oral every 24 hours  isosorbide   mononitrate ER Tablet (IMDUR) 30 milliGRAM(s) Oral daily  lidocaine   4% Patch 1 Patch Transdermal every 24 hours  magnesium oxide 400 milliGRAM(s) Oral daily  montelukast 10 milliGRAM(s) Oral daily  multivitamin 1 Tablet(s) Oral daily  pantoprazole  Injectable 40 milliGRAM(s) IV Push every 12 hours  simethicone 80 milliGRAM(s) Chew two times a day  tiotropium 18 MICROgram(s) Capsule 1 Capsule(s) Inhalation daily    MEDICATIONS  (PRN):  acetaminophen     Tablet .. 650 milliGRAM(s) Oral every 6 hours PRN Temp greater or equal to 38C (100.4F), Mild Pain (1 - 3)  LORazepam     Tablet 0.5 milliGRAM(s) Oral daily PRN Anxiety  ondansetron Injectable 4 milliGRAM(s) IV Push every 8 hours PRN Nausea and/or Vomiting  oxyCODONE    Solution 2.5 milliGRAM(s) Oral every 4 hours PRN Severe Pain (7 - 10)  polyethylene glycol 3350 17 Gram(s) Oral daily PRN Constipation

## 2022-09-24 NOTE — PROGRESS NOTE ADULT - PROBLEM SELECTOR PLAN 3
- Grade II diastolic dysfunction. On Lasix 20 PO daily outpatient, however held inpatient. Pt appears mildly overloaded on CT Chest performed to r/o mets.   - 40mg IVP Lasix now  - Restart home lasix 20mg PO tomorrow

## 2022-09-24 NOTE — PROGRESS NOTE ADULT - SUBJECTIVE AND OBJECTIVE BOX
General Surgery Progress Note    SUBJECTIVE:   Patient seen and examined at bedside during AM rounds with covering attending. Patient reports she still has mild abdominal pain and shortness of breath. Reports she is able to tolerate diet, but does not enjoy it. Denies nausea or vomiting. States she is passing flatus and having bowel movements, last BM was this AM.    Vital Signs Last 24 Hrs  T(C): 36.8 (24 Sep 2022 09:06), Max: 37.6 (24 Sep 2022 06:15)  T(F): 98.2 (24 Sep 2022 09:06), Max: 99.7 (24 Sep 2022 06:15)  HR: 66 (24 Sep 2022 09:06) (66 - 78)  BP: 158/68 (24 Sep 2022 09:06) (134/67 - 170/72)  BP(mean): --  RR: 19 (24 Sep 2022 09:06) (17 - 19)  SpO2: 98% (24 Sep 2022 09:06) (95% - 99%)    Parameters below as of 24 Sep 2022 09:06  Patient On (Oxygen Delivery Method): nasal cannula  O2 Flow (L/min): 2      I&O's Summary    23 Sep 2022 07:01  -  24 Sep 2022 07:00  --------------------------------------------------------  IN: 50 mL / OUT: 0 mL / NET: 50 mL    24 Sep 2022 07:01  -  24 Sep 2022 14:11  --------------------------------------------------------  IN: 50 mL / OUT: 0 mL / NET: 50 mL        Physical Exam:  General: Resting comfortably in bed, NAD. Nasal cannula in place  HEENT: ATNC  Pulmonary: Nonlabored breathing, no respiratory distress, no accessory muscle use noted.  Cardiovascular: NSR  Abdomen: Soft, obese, nondistended. Mild tenderness to palpation in R abdomen without guarding or rebound. No masses appreciated  Extremities: WWP, SCDs in place, Mild edema appreciated    LABS:                        7.6    6.15  )-----------( 257      ( 23 Sep 2022 10:12 )             24.6     09-24    133<L>  |  95<L>  |  10  ----------------------------<  109<H>  4.0   |  24  |  1.08    Ca    8.6      24 Sep 2022 08:41  Phos  3.2     09-24  Mg     2.0     09-24    TPro  6.5  /  Alb  3.4  /  TBili  0.4  /  DBili  x   /  AST  17  /  ALT  17  /  AlkPhos  63  09-24

## 2022-09-24 NOTE — PROGRESS NOTE ADULT - ASSESSMENT
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 Colorectal surgery consulted in setting of c/f GI bleed with known colorectal cancer history. Pt now s/p colonoscopy with GI on 9/22 with finding of a friable, centrally ulcerated, coarsened mass 5-7cm distal of previous ileo-colonic anastomosis that occupied 30% of the luminal circumference. Multiple cold forceps biopsies were obtained. Findings concerning for new primary colon cancer. Patient denies symptoms of obstruction today. Is passing flatus and having bowel movements.     Recommendations:  If patient considering surgical intervention, will need medical and cardiac risk stratification prior to procedure  Appreciate recommendations from Dr. Mckeon. CEA appears within normal limits  F/u final pathology results  Appreciate GI recs  Team 1C will continue to follow    Plan discussed with chief resident and Dr. Harper

## 2022-09-24 NOTE — PROGRESS NOTE ADULT - PROBLEM SELECTOR PLAN 2
Patient with dark red stool, occasional BRBPR. Hemoglobin 7.9 on admission which is around baseline. Non tachycardic or hypotensive. 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 Of note, patient did state that she had blood transfusion 5 days prior to admission at [facility] for "anemia" but couldn't give any more details. Colonoscopy performed on 9/22; "Approximately 5-7 cm distal to the ileocolonic anastomosis, there was a friable, centrally ulcerated, coarsened mass that occupied 30% of the luminal circumference, concerning for malignancy. Multiple cold forceps biopsies were obtained." Dr. Sanford ([facility] surgery attending) assessed and updated pt, tentative for surgical intervention 9/28.   - Order pre-op labs and ensure active Covid swab for 9/28  - Continue to trend hemoglobin, keep HG > 7  - Restarted on DASH/TLC diet

## 2022-09-24 NOTE — PROGRESS NOTE ADULT - ASSESSMENT
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

## 2022-09-24 NOTE — PROGRESS NOTE ADULT - PROBLEM SELECTOR PLAN 1
New mass seen C-scope  -Tentative plan for OR on Wednesday New mass seen C-scope  -Tentative plan for OR on Wednesday w/ [facility] - Dr. Sanford  -Will obtain cardiac clearance

## 2022-09-24 NOTE — PROGRESS NOTE ADULT - SUBJECTIVE AND OBJECTIVE BOX
(covering for Dr. Sanford)    81 yo woman with anemia and recently diagnosed colon cancer who is being prepared for surgery and evaluated by medical oncology.  Hx prior colon cancer in 2019.  She has a hx of cardiac (s/pTAVR, with Afib) and pulmonary problems (COPD) and is being optimized  Dr. Clark is the oncologist.    This AM she is c/o some mild SOB.    No abdominal pain or N/V.   Been having BM's daily.  Tolerating po.      Sitting on side of bed and comfortable.  Conversation had via .  Vital Signs Last 24 Hrs  T(C): 36.8 (24 Sep 2022 09:06), Max: 37.6 (24 Sep 2022 06:15)  T(F): 98.2 (24 Sep 2022 09:06), Max: 99.7 (24 Sep 2022 06:15)  HR: 66 (24 Sep 2022 09:06) (66 - 78)  BP: 158/68 (24 Sep 2022 09:06) (134/67 - 170/72)  BP(mean): --  RR: 19 (24 Sep 2022 09:06) (17 - 19)  SpO2: 98% (24 Sep 2022 09:06) (95% - 99%)    Parameters below as of 24 Sep 2022 09:06  Patient On (Oxygen Delivery Method): nasal cannula  O2 Flow (L/min): 2  abd: non tender, obese, no mass palpable  ext: without calf tenderness                          7.6    6.15  )-----------( 257      ( 23 Sep 2022 10:12 )             24.6   09-24    133<L>  |  95<L>  |  10  ----------------------------<  109<H>  4.0   |  24  |  1.08    Ca    8.6      24 Sep 2022 08:41  Phos  3.2     09-24  Mg     2.0     09-24    TPro  6.5  /  Alb  3.4  /  TBili  0.4  /  DBili  x   /  AST  17  /  ALT  17  /  AlkPhos  63  09-24

## 2022-09-25 LAB
ALBUMIN SERPL ELPH-MCNC: 3.9 G/DL — SIGNIFICANT CHANGE UP (ref 3.3–5)
ALP SERPL-CCNC: 59 U/L — SIGNIFICANT CHANGE UP (ref 40–120)
ALT FLD-CCNC: 17 U/L — SIGNIFICANT CHANGE UP (ref 10–45)
ANION GAP SERPL CALC-SCNC: 9 MMOL/L — SIGNIFICANT CHANGE UP (ref 5–17)
ANISOCYTOSIS BLD QL: SLIGHT — SIGNIFICANT CHANGE UP
AST SERPL-CCNC: 20 U/L — SIGNIFICANT CHANGE UP (ref 10–40)
BASOPHILS # BLD AUTO: 0.04 K/UL — SIGNIFICANT CHANGE UP (ref 0–0.2)
BASOPHILS NFR BLD AUTO: 0.9 % — SIGNIFICANT CHANGE UP (ref 0–2)
BILIRUB SERPL-MCNC: 0.5 MG/DL — SIGNIFICANT CHANGE UP (ref 0.2–1.2)
BLD GP AB SCN SERPL QL: POSITIVE — SIGNIFICANT CHANGE UP
BUN SERPL-MCNC: 9 MG/DL — SIGNIFICANT CHANGE UP (ref 7–23)
CALCIUM SERPL-MCNC: 8.6 MG/DL — SIGNIFICANT CHANGE UP (ref 8.4–10.5)
CHLORIDE SERPL-SCNC: 94 MMOL/L — LOW (ref 96–108)
CO2 SERPL-SCNC: 27 MMOL/L — SIGNIFICANT CHANGE UP (ref 22–31)
CREAT SERPL-MCNC: 1.06 MG/DL — SIGNIFICANT CHANGE UP (ref 0.5–1.3)
EGFR: 52 ML/MIN/1.73M2 — LOW
EOSINOPHIL # BLD AUTO: 0.04 K/UL — SIGNIFICANT CHANGE UP (ref 0–0.5)
EOSINOPHIL NFR BLD AUTO: 0.9 % — SIGNIFICANT CHANGE UP (ref 0–6)
GIANT PLATELETS BLD QL SMEAR: PRESENT — SIGNIFICANT CHANGE UP
GLUCOSE SERPL-MCNC: 99 MG/DL — SIGNIFICANT CHANGE UP (ref 70–99)
HCT VFR BLD CALC: 22.3 % — LOW (ref 34.5–45)
HCT VFR BLD CALC: 22.9 % — LOW (ref 34.5–45)
HGB BLD-MCNC: 6.8 G/DL — CRITICAL LOW (ref 11.5–15.5)
HGB BLD-MCNC: 7.1 G/DL — LOW (ref 11.5–15.5)
HYPOCHROMIA BLD QL: SLIGHT — SIGNIFICANT CHANGE UP
LYMPHOCYTES # BLD AUTO: 0.31 K/UL — LOW (ref 1–3.3)
LYMPHOCYTES # BLD AUTO: 7.1 % — LOW (ref 13–44)
MACROCYTES BLD QL: SLIGHT — SIGNIFICANT CHANGE UP
MAGNESIUM SERPL-MCNC: 2 MG/DL — SIGNIFICANT CHANGE UP (ref 1.6–2.6)
MANUAL SMEAR VERIFICATION: SIGNIFICANT CHANGE UP
MCHC RBC-ENTMCNC: 25.7 PG — LOW (ref 27–34)
MCHC RBC-ENTMCNC: 25.9 PG — LOW (ref 27–34)
MCHC RBC-ENTMCNC: 30.5 GM/DL — LOW (ref 32–36)
MCHC RBC-ENTMCNC: 31 GM/DL — LOW (ref 32–36)
MCV RBC AUTO: 83.6 FL — SIGNIFICANT CHANGE UP (ref 80–100)
MCV RBC AUTO: 84.2 FL — SIGNIFICANT CHANGE UP (ref 80–100)
MICROCYTES BLD QL: SLIGHT — SIGNIFICANT CHANGE UP
MONOCYTES # BLD AUTO: 0.28 K/UL — SIGNIFICANT CHANGE UP (ref 0–0.9)
MONOCYTES NFR BLD AUTO: 6.3 % — SIGNIFICANT CHANGE UP (ref 2–14)
MYELOCYTES NFR BLD: 0.9 % — HIGH (ref 0–0)
NEUTROPHILS # BLD AUTO: 3.68 K/UL — SIGNIFICANT CHANGE UP (ref 1.8–7.4)
NEUTROPHILS NFR BLD AUTO: 83.9 % — HIGH (ref 43–77)
NRBC # BLD: 0 /100 WBCS — SIGNIFICANT CHANGE UP (ref 0–0)
OVALOCYTES BLD QL SMEAR: SLIGHT — SIGNIFICANT CHANGE UP
PHOSPHATE SERPL-MCNC: 3.3 MG/DL — SIGNIFICANT CHANGE UP (ref 2.5–4.5)
PLAT MORPH BLD: ABNORMAL
PLATELET # BLD AUTO: 204 K/UL — SIGNIFICANT CHANGE UP (ref 150–400)
PLATELET # BLD AUTO: 211 K/UL — SIGNIFICANT CHANGE UP (ref 150–400)
POLYCHROMASIA BLD QL SMEAR: SLIGHT — SIGNIFICANT CHANGE UP
POTASSIUM SERPL-MCNC: 3.6 MMOL/L — SIGNIFICANT CHANGE UP (ref 3.5–5.3)
POTASSIUM SERPL-SCNC: 3.6 MMOL/L — SIGNIFICANT CHANGE UP (ref 3.5–5.3)
PROT SERPL-MCNC: 6.8 G/DL — SIGNIFICANT CHANGE UP (ref 6–8.3)
RBC # BLD: 2.65 M/UL — LOW (ref 3.8–5.2)
RBC # BLD: 2.74 M/UL — LOW (ref 3.8–5.2)
RBC # FLD: 15.6 % — HIGH (ref 10.3–14.5)
RBC # FLD: 15.7 % — HIGH (ref 10.3–14.5)
RBC BLD AUTO: ABNORMAL
RH IG SCN BLD-IMP: POSITIVE — SIGNIFICANT CHANGE UP
SODIUM SERPL-SCNC: 130 MMOL/L — LOW (ref 135–145)
WBC # BLD: 4.11 K/UL — SIGNIFICANT CHANGE UP (ref 3.8–10.5)
WBC # BLD: 4.39 K/UL — SIGNIFICANT CHANGE UP (ref 3.8–10.5)
WBC # FLD AUTO: 4.11 K/UL — SIGNIFICANT CHANGE UP (ref 3.8–10.5)
WBC # FLD AUTO: 4.39 K/UL — SIGNIFICANT CHANGE UP (ref 3.8–10.5)

## 2022-09-25 PROCEDURE — 99231 SBSQ HOSP IP/OBS SF/LOW 25: CPT

## 2022-09-25 PROCEDURE — 71045 X-RAY EXAM CHEST 1 VIEW: CPT | Mod: 26

## 2022-09-25 PROCEDURE — 99232 SBSQ HOSP IP/OBS MODERATE 35: CPT | Mod: GC

## 2022-09-25 RX ORDER — POTASSIUM CHLORIDE 20 MEQ
40 PACKET (EA) ORAL DAILY
Refills: 0 | Status: DISCONTINUED | OUTPATIENT
Start: 2022-09-25 | End: 2022-09-29

## 2022-09-25 RX ORDER — FUROSEMIDE 40 MG
20 TABLET ORAL ONCE
Refills: 0 | Status: COMPLETED | OUTPATIENT
Start: 2022-09-25 | End: 2022-09-25

## 2022-09-25 RX ADMIN — AMIODARONE HYDROCHLORIDE 200 MILLIGRAM(S): 400 TABLET ORAL at 06:16

## 2022-09-25 RX ADMIN — OXYCODONE HYDROCHLORIDE 2.5 MILLIGRAM(S): 5 TABLET ORAL at 15:20

## 2022-09-25 RX ADMIN — Medication 650 MILLIGRAM(S): at 23:00

## 2022-09-25 RX ADMIN — CHLORHEXIDINE GLUCONATE 1 APPLICATION(S): 213 SOLUTION TOPICAL at 11:33

## 2022-09-25 RX ADMIN — Medication 1 MILLIGRAM(S): at 11:33

## 2022-09-25 RX ADMIN — MAGNESIUM OXIDE 400 MG ORAL TABLET 400 MILLIGRAM(S): 241.3 TABLET ORAL at 11:33

## 2022-09-25 RX ADMIN — AMLODIPINE BESYLATE 10 MILLIGRAM(S): 2.5 TABLET ORAL at 06:16

## 2022-09-25 RX ADMIN — LIDOCAINE 1 PATCH: 4 CREAM TOPICAL at 03:44

## 2022-09-25 RX ADMIN — LIDOCAINE 1 PATCH: 4 CREAM TOPICAL at 18:49

## 2022-09-25 RX ADMIN — Medication 40 MILLIEQUIVALENT(S): at 11:34

## 2022-09-25 RX ADMIN — Medication 650 MILLIGRAM(S): at 22:33

## 2022-09-25 RX ADMIN — Medication 100 MILLIGRAM(S): at 12:38

## 2022-09-25 RX ADMIN — PANTOPRAZOLE SODIUM 40 MILLIGRAM(S): 20 TABLET, DELAYED RELEASE ORAL at 18:29

## 2022-09-25 RX ADMIN — CEFTRIAXONE 100 MILLIGRAM(S): 500 INJECTION, POWDER, FOR SOLUTION INTRAMUSCULAR; INTRAVENOUS at 10:13

## 2022-09-25 RX ADMIN — TIOTROPIUM BROMIDE 1 CAPSULE(S): 18 CAPSULE ORAL; RESPIRATORY (INHALATION) at 11:34

## 2022-09-25 RX ADMIN — Medication 1 TABLET(S): at 11:33

## 2022-09-25 RX ADMIN — SIMETHICONE 80 MILLIGRAM(S): 80 TABLET, CHEWABLE ORAL at 06:16

## 2022-09-25 RX ADMIN — SIMETHICONE 80 MILLIGRAM(S): 80 TABLET, CHEWABLE ORAL at 18:29

## 2022-09-25 RX ADMIN — ATORVASTATIN CALCIUM 20 MILLIGRAM(S): 80 TABLET, FILM COATED ORAL at 22:28

## 2022-09-25 RX ADMIN — PANTOPRAZOLE SODIUM 40 MILLIGRAM(S): 20 TABLET, DELAYED RELEASE ORAL at 06:16

## 2022-09-25 RX ADMIN — OXYCODONE HYDROCHLORIDE 2.5 MILLIGRAM(S): 5 TABLET ORAL at 14:20

## 2022-09-25 RX ADMIN — Medication 20 MILLIGRAM(S): at 21:46

## 2022-09-25 RX ADMIN — ISOSORBIDE MONONITRATE 30 MILLIGRAM(S): 60 TABLET, EXTENDED RELEASE ORAL at 11:33

## 2022-09-25 RX ADMIN — MONTELUKAST 10 MILLIGRAM(S): 4 TABLET, CHEWABLE ORAL at 11:33

## 2022-09-25 RX ADMIN — Medication 20 MILLIGRAM(S): at 08:04

## 2022-09-25 RX ADMIN — LIDOCAINE 1 PATCH: 4 CREAM TOPICAL at 14:17

## 2022-09-25 RX ADMIN — Medication 650 MILLIGRAM(S): at 12:38

## 2022-09-25 RX ADMIN — ENOXAPARIN SODIUM 120 MILLIGRAM(S): 100 INJECTION SUBCUTANEOUS at 22:28

## 2022-09-25 RX ADMIN — Medication 650 MILLIGRAM(S): at 13:38

## 2022-09-25 NOTE — PROGRESS NOTE ADULT - ATTENDING COMMENTS
Patient was seen and examined at bedside on 9/25/2022 at 830 am. Patient has no acute complaints. Denies SOB, CP. ROS is otherwise negative. Vitals, labwork and pertinent imaging reviewed - downtrending HgB. Physical exam - NAD, AAO x 4, PERRLA, EOMI, MMM, supple neck, chest - CTA b/l, CV - rrr, s1s2, no m/r/g, abd - soft, NTND, + BS, ext - wwp, psych - normal affect, skin - no rash    Plan  -Pt s/p EGD showing mass  -CRS and Heme/Onc consulted - plan for surgery?  -C/w Lasix - will start Lasix 20 mg PO qd  -Cardiology clearance for possible OR

## 2022-09-25 NOTE — PROGRESS NOTE ADULT - SUBJECTIVE AND OBJECTIVE BOX
Colorectal Surgery for Claribel  No change.  Patient is passing gas and little stool.  minor intermittent abdominal pain in upper abdomen (? related to respiratory issues  She c/o difficulty with breathing although she looks comfortable.     Vital Signs Last 24 Hrs  T(C): 37.5 (25 Sep 2022 08:08), Max: 37.5 (25 Sep 2022 08:08)  T(F): 99.5 (25 Sep 2022 08:08), Max: 99.5 (25 Sep 2022 08:08)  HR: 69 (25 Sep 2022 08:08) (67 - 77)  BP: 113/64 (25 Sep 2022 08:08) (108/63 - 129/67)  BP(mean): --  RR: 18 (25 Sep 2022 08:08) (18 - 18)  SpO2: 98% (25 Sep 2022 08:08) (97% - 98%)    Parameters below as of 25 Sep 2022 08:08  Patient On (Oxygen Delivery Method): nasal cannula  O2 Flow (L/min): 2    abd: benign, soft, non tender, no masses  ext: without calf tenderness                          6.8    4.11  )-----------( 204      ( 25 Sep 2022 12:00 )             22.3   09-25    130<L>  |  94<L>  |  9   ----------------------------<  99  3.6   |  27  |  1.06    Ca    8.6      25 Sep 2022 05:30  Phos  3.3     09-25  Mg     2.0     09-25    TPro  6.8  /  Alb  3.9  /  TBili  0.5  /  DBili  x   /  AST  20  /  ALT  17  /  AlkPhos  59  09-25

## 2022-09-25 NOTE — PROGRESS NOTE ADULT - SUBJECTIVE AND OBJECTIVE BOX
SUBJECTIVE: Patient seen and examined at bedside. She primarily c/o shortness of breath on 2LNC without chest pain, primary team made aware. Continues to c/o non-specific diffuse moderate abdominal pain, otherwise denies n/v. Passing flatus and having bowel movements.      aMIOdarone    Tablet 200 milliGRAM(s) Oral daily  amLODIPine   Tablet 10 milliGRAM(s) Oral daily  cefTRIAXone   IVPB 2000 milliGRAM(s) IV Intermittent every 24 hours  enoxaparin Injectable 120 milliGRAM(s) SubCutaneous every 24 hours  furosemide    Tablet 20 milliGRAM(s) Oral every 24 hours  isosorbide   mononitrate ER Tablet (IMDUR) 30 milliGRAM(s) Oral daily      Vital Signs Last 24 Hrs  T(C): 37.5 (25 Sep 2022 08:08), Max: 37.5 (25 Sep 2022 08:08)  T(F): 99.5 (25 Sep 2022 08:08), Max: 99.5 (25 Sep 2022 08:08)  HR: 69 (25 Sep 2022 08:08) (67 - 77)  BP: 113/64 (25 Sep 2022 08:08) (108/63 - 129/67)  BP(mean): --  RR: 18 (25 Sep 2022 08:08) (18 - 18)  SpO2: 98% (25 Sep 2022 08:08) (97% - 98%)    Parameters below as of 25 Sep 2022 08:08  Patient On (Oxygen Delivery Method): nasal cannula  O2 Flow (L/min): 2    I&O's Detail    24 Sep 2022 07:01  -  25 Sep 2022 07:00  --------------------------------------------------------  IN:    IV PiggyBack: 50 mL  Total IN: 50 mL    OUT:  Total OUT: 0 mL    Total NET: 50 mL      25 Sep 2022 07:01  -  25 Sep 2022 14:04  --------------------------------------------------------  IN:    IV PiggyBack: 50 mL  Total IN: 50 mL    OUT:  Total OUT: 0 mL    Total NET: 50 mL          General: NAD, resting comfortably in bed  C/V: NSR  Pulm: Nonlabored breathing, no respiratory distress  Abd: soft, moderately distended, diffusely tender R>L without rebound or guarding.  Extrem: WWP, no edema, SCDs in place        LABS:                        6.8    4.11  )-----------( 204      ( 25 Sep 2022 12:00 )             22.3     09-25    130<L>  |  94<L>  |  9   ----------------------------<  99  3.6   |  27  |  1.06    Ca    8.6      25 Sep 2022 05:30  Phos  3.3     09-25  Mg     2.0     09-25    TPro  6.8  /  Alb  3.9  /  TBili  0.5  /  DBili  x   /  AST  20  /  ALT  17  /  AlkPhos  59  09-25          RADIOLOGY & ADDITIONAL STUDIES:

## 2022-09-25 NOTE — PROGRESS NOTE ADULT - ASSESSMENT
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

## 2022-09-25 NOTE — PROGRESS NOTE ADULT - ASSESSMENT
82F s/p colonoscopy with finding of a friable, centrally ulcerated, coarsened mass 5-7cm distal of previous ileo-colonic anastomosis that occupied 30% of the luminal circumference (9/22), surgery consulted for possible operative intervention of new colon mass, no obstructive symptoms    If patient considering surgical intervention, will need medical and cardiac risk stratification prior to procedure  Appreciate recommendations from Dr. Mckeon.   F/u final pathology results  Appreciate GI recs  Team 1C will continue to follow

## 2022-09-25 NOTE — PROGRESS NOTE ADULT - PROBLEM SELECTOR PLAN 2
Patient with dark red stool, occasional BRBPR. Hemoglobin 7.9 on admission which is around baseline. Non tachycardic or hypotensive. 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 Of note, patient did state that she had blood transfusion 5 days prior to admission at [facility] for "anemia" but couldn't give any more details. Colonoscopy performed on 9/22; "Approximately 5-7 cm distal to the ileocolonic anastomosis, there was a friable, centrally ulcerated, coarsened mass that occupied 30% of the luminal circumference, concerning for malignancy. Multiple cold forceps biopsies were obtained." Dr. Sanford ([facility] surgery attending) assessed and updated pt, tentative for surgical intervention 9/28.   - Order pre-op labs and ensure active Covid swab for 9/28  - Continue to trend hemoglobin, keep HG > 7  - Restarted on DASH/TLC diet

## 2022-09-25 NOTE — PROGRESS NOTE ADULT - PROBLEM SELECTOR PLAN 1
New mass seen C-scope  -Tentative plan for OR on Wednesday 9/28 w/ CRS - Dr. Sanford  -Will obtain cardiac clearance

## 2022-09-25 NOTE — PROGRESS NOTE ADULT - ASSESSMENT
A/P  Stable from colonic point of view.  Anemic and will require transfusions before surgery.  Hgb < 7.  Medicine service managing the COPD

## 2022-09-25 NOTE — PROGRESS NOTE ADULT - SUBJECTIVE AND OBJECTIVE BOX
OVERNIGHT EVENTS: Coughing, given tessalon pearls, resolved.     SUBJECTIVE:  Patient seen and examined at bedside.  ROS: Patient denies h/n/v/d, fever, chills, cp, palpitations, abd pain, leg swelling, rashes, dysuria, and changes in BM. Pt endorses SOB, improved pain.     Vital Signs Last 12 Hrs  T(F): 99.5 (09-25-22 @ 08:08), Max: 99.5 (09-25-22 @ 08:08)  HR: 69 (09-25-22 @ 08:08) (67 - 69)  BP: 113/64 (09-25-22 @ 08:08) (113/64 - 122/72)  BP(mean): --  RR: 18 (09-25-22 @ 08:08) (18 - 18)  SpO2: 98% (09-25-22 @ 08:08) (97% - 98%)  I&O's Summary    24 Sep 2022 07:01  -  25 Sep 2022 07:00  --------------------------------------------------------  IN: 50 mL / OUT: 0 mL / NET: 50 mL        PHYSICAL EXAM:  Constitutional: NAD, comfortable in bed.  HEENT: NC/AT  Neck: Supple, no JVD  Respiratory: CTA B/L. No w/r/r.   Cardiovascular: RRR, normal S1 and S2, no m/r/g.   Gastrointestinal: +BS, soft NT mildly distended, no guarding or rebound tenderness, no palpable masses   Extremities: wwp; no cyanosis, clubbing or edema.   Vascular: Pulses equal and strong throughout.   Neurological: AAOx3, no CN deficits, strength and sensation intact throughout.   Skin: No gross skin abnormalities or rashes        LABS:                        7.1    4.39  )-----------( 211      ( 25 Sep 2022 05:30 )             22.9     09-25    130<L>  |  94<L>  |  9   ----------------------------<  99  3.6   |  27  |  1.06    Ca    8.6      25 Sep 2022 05:30  Phos  3.3     09-25  Mg     2.0     09-25    TPro  6.8  /  Alb  3.9  /  TBili  0.5  /  DBili  x   /  AST  20  /  ALT  17  /  AlkPhos  59  09-25            RADIOLOGY & ADDITIONAL TESTS:    MEDICATIONS  (STANDING):  aMIOdarone    Tablet 200 milliGRAM(s) Oral daily  amLODIPine   Tablet 10 milliGRAM(s) Oral daily  atorvastatin 20 milliGRAM(s) Oral at bedtime  cefTRIAXone   IVPB 2000 milliGRAM(s) IV Intermittent every 24 hours  chlorhexidine 2% Cloths 1 Application(s) Topical daily  enoxaparin Injectable 120 milliGRAM(s) SubCutaneous every 24 hours  folic acid 1 milliGRAM(s) Oral daily  furosemide    Tablet 20 milliGRAM(s) Oral every 24 hours  isosorbide   mononitrate ER Tablet (IMDUR) 30 milliGRAM(s) Oral daily  lidocaine   4% Patch 1 Patch Transdermal every 24 hours  magnesium oxide 400 milliGRAM(s) Oral daily  montelukast 10 milliGRAM(s) Oral daily  multivitamin 1 Tablet(s) Oral daily  pantoprazole  Injectable 40 milliGRAM(s) IV Push every 12 hours  potassium chloride    Tablet ER 40 milliEquivalent(s) Oral daily  simethicone 80 milliGRAM(s) Chew two times a day  tiotropium 18 MICROgram(s) Capsule 1 Capsule(s) Inhalation daily    MEDICATIONS  (PRN):  acetaminophen     Tablet .. 650 milliGRAM(s) Oral every 6 hours PRN Temp greater or equal to 38C (100.4F), Mild Pain (1 - 3)  LORazepam     Tablet 0.5 milliGRAM(s) Oral daily PRN Anxiety  ondansetron Injectable 4 milliGRAM(s) IV Push every 8 hours PRN Nausea and/or Vomiting  oxyCODONE    Solution 2.5 milliGRAM(s) Oral every 4 hours PRN Severe Pain (7 - 10)  polyethylene glycol 3350 17 Gram(s) Oral daily PRN Constipation

## 2022-09-25 NOTE — PROGRESS NOTE ADULT - PROBLEM SELECTOR PLAN 7
History of COPD, on spiriva and monteleukast.   - Continue home meds   - Patient currently on 2L NC saturating 99% - used O2 at home before but has not used in 2 years      #MARK:  - Attempt to restart CPAP at night

## 2022-09-26 LAB
ALBUMIN SERPL ELPH-MCNC: 4 G/DL — SIGNIFICANT CHANGE UP (ref 3.3–5)
ALP SERPL-CCNC: 61 U/L — SIGNIFICANT CHANGE UP (ref 40–120)
ALT FLD-CCNC: 18 U/L — SIGNIFICANT CHANGE UP (ref 10–45)
ANION GAP SERPL CALC-SCNC: 10 MMOL/L — SIGNIFICANT CHANGE UP (ref 5–17)
AST SERPL-CCNC: 24 U/L — SIGNIFICANT CHANGE UP (ref 10–40)
BILIRUB SERPL-MCNC: 0.6 MG/DL — SIGNIFICANT CHANGE UP (ref 0.2–1.2)
BUN SERPL-MCNC: 10 MG/DL — SIGNIFICANT CHANGE UP (ref 7–23)
CALCIUM SERPL-MCNC: 8.8 MG/DL — SIGNIFICANT CHANGE UP (ref 8.4–10.5)
CHLORIDE SERPL-SCNC: 96 MMOL/L — SIGNIFICANT CHANGE UP (ref 96–108)
CO2 SERPL-SCNC: 28 MMOL/L — SIGNIFICANT CHANGE UP (ref 22–31)
CREAT SERPL-MCNC: 1.08 MG/DL — SIGNIFICANT CHANGE UP (ref 0.5–1.3)
EGFR: 51 ML/MIN/1.73M2 — LOW
GLUCOSE SERPL-MCNC: 101 MG/DL — HIGH (ref 70–99)
HCT VFR BLD CALC: 25.7 % — LOW (ref 34.5–45)
HGB BLD-MCNC: 8 G/DL — LOW (ref 11.5–15.5)
MAGNESIUM SERPL-MCNC: 2 MG/DL — SIGNIFICANT CHANGE UP (ref 1.6–2.6)
MCHC RBC-ENTMCNC: 26.1 PG — LOW (ref 27–34)
MCHC RBC-ENTMCNC: 31.1 GM/DL — LOW (ref 32–36)
MCV RBC AUTO: 84 FL — SIGNIFICANT CHANGE UP (ref 80–100)
NRBC # BLD: 0 /100 WBCS — SIGNIFICANT CHANGE UP (ref 0–0)
PHOSPHATE SERPL-MCNC: 3.7 MG/DL — SIGNIFICANT CHANGE UP (ref 2.5–4.5)
PLATELET # BLD AUTO: 210 K/UL — SIGNIFICANT CHANGE UP (ref 150–400)
POTASSIUM SERPL-MCNC: 3.6 MMOL/L — SIGNIFICANT CHANGE UP (ref 3.5–5.3)
POTASSIUM SERPL-SCNC: 3.6 MMOL/L — SIGNIFICANT CHANGE UP (ref 3.5–5.3)
PROT SERPL-MCNC: 6.9 G/DL — SIGNIFICANT CHANGE UP (ref 6–8.3)
RBC # BLD: 3.06 M/UL — LOW (ref 3.8–5.2)
RBC # FLD: 15.5 % — HIGH (ref 10.3–14.5)
SODIUM SERPL-SCNC: 134 MMOL/L — LOW (ref 135–145)
WBC # BLD: 5.43 K/UL — SIGNIFICANT CHANGE UP (ref 3.8–10.5)
WBC # FLD AUTO: 5.43 K/UL — SIGNIFICANT CHANGE UP (ref 3.8–10.5)

## 2022-09-26 PROCEDURE — 99233 SBSQ HOSP IP/OBS HIGH 50: CPT | Mod: GC

## 2022-09-26 PROCEDURE — 99232 SBSQ HOSP IP/OBS MODERATE 35: CPT | Mod: GC

## 2022-09-26 PROCEDURE — 99233 SBSQ HOSP IP/OBS HIGH 50: CPT

## 2022-09-26 RX ORDER — FUROSEMIDE 40 MG
40 TABLET ORAL EVERY 12 HOURS
Refills: 0 | Status: DISCONTINUED | OUTPATIENT
Start: 2022-09-26 | End: 2022-09-28

## 2022-09-26 RX ORDER — FUROSEMIDE 40 MG
40 TABLET ORAL EVERY 12 HOURS
Refills: 0 | Status: DISCONTINUED | OUTPATIENT
Start: 2022-09-26 | End: 2022-09-26

## 2022-09-26 RX ORDER — POTASSIUM CHLORIDE 20 MEQ
40 PACKET (EA) ORAL ONCE
Refills: 0 | Status: COMPLETED | OUTPATIENT
Start: 2022-09-26 | End: 2022-09-26

## 2022-09-26 RX ADMIN — Medication 650 MILLIGRAM(S): at 23:09

## 2022-09-26 RX ADMIN — Medication 40 MILLIEQUIVALENT(S): at 11:42

## 2022-09-26 RX ADMIN — CHLORHEXIDINE GLUCONATE 1 APPLICATION(S): 213 SOLUTION TOPICAL at 11:43

## 2022-09-26 RX ADMIN — PANTOPRAZOLE SODIUM 40 MILLIGRAM(S): 20 TABLET, DELAYED RELEASE ORAL at 18:50

## 2022-09-26 RX ADMIN — SIMETHICONE 80 MILLIGRAM(S): 80 TABLET, CHEWABLE ORAL at 18:50

## 2022-09-26 RX ADMIN — Medication 40 MILLIEQUIVALENT(S): at 14:17

## 2022-09-26 RX ADMIN — Medication 1 MILLIGRAM(S): at 11:42

## 2022-09-26 RX ADMIN — CEFTRIAXONE 100 MILLIGRAM(S): 500 INJECTION, POWDER, FOR SOLUTION INTRAMUSCULAR; INTRAVENOUS at 09:53

## 2022-09-26 RX ADMIN — AMIODARONE HYDROCHLORIDE 200 MILLIGRAM(S): 400 TABLET ORAL at 06:17

## 2022-09-26 RX ADMIN — Medication 40 MILLIGRAM(S): at 14:17

## 2022-09-26 RX ADMIN — ISOSORBIDE MONONITRATE 30 MILLIGRAM(S): 60 TABLET, EXTENDED RELEASE ORAL at 11:43

## 2022-09-26 RX ADMIN — LIDOCAINE 1 PATCH: 4 CREAM TOPICAL at 14:17

## 2022-09-26 RX ADMIN — LIDOCAINE 1 PATCH: 4 CREAM TOPICAL at 02:20

## 2022-09-26 RX ADMIN — Medication 650 MILLIGRAM(S): at 22:05

## 2022-09-26 RX ADMIN — LIDOCAINE 1 PATCH: 4 CREAM TOPICAL at 19:08

## 2022-09-26 RX ADMIN — TIOTROPIUM BROMIDE 1 CAPSULE(S): 18 CAPSULE ORAL; RESPIRATORY (INHALATION) at 11:43

## 2022-09-26 RX ADMIN — SIMETHICONE 80 MILLIGRAM(S): 80 TABLET, CHEWABLE ORAL at 06:17

## 2022-09-26 RX ADMIN — Medication 20 MILLIGRAM(S): at 08:35

## 2022-09-26 RX ADMIN — Medication 1 TABLET(S): at 11:42

## 2022-09-26 RX ADMIN — ATORVASTATIN CALCIUM 20 MILLIGRAM(S): 80 TABLET, FILM COATED ORAL at 22:03

## 2022-09-26 RX ADMIN — PANTOPRAZOLE SODIUM 40 MILLIGRAM(S): 20 TABLET, DELAYED RELEASE ORAL at 06:17

## 2022-09-26 RX ADMIN — Medication 650 MILLIGRAM(S): at 06:44

## 2022-09-26 RX ADMIN — AMLODIPINE BESYLATE 10 MILLIGRAM(S): 2.5 TABLET ORAL at 06:17

## 2022-09-26 RX ADMIN — MAGNESIUM OXIDE 400 MG ORAL TABLET 400 MILLIGRAM(S): 241.3 TABLET ORAL at 11:42

## 2022-09-26 RX ADMIN — MONTELUKAST 10 MILLIGRAM(S): 4 TABLET, CHEWABLE ORAL at 11:42

## 2022-09-26 NOTE — PROGRESS NOTE ADULT - ATTENDING COMMENTS
Initial attending contact date  9/21/22    . See resident note written above for details. I reviewed the resident documentation. I have personally seen and examined this patient. I reviewed vitals, labs, medications, cardiac studies, and additional imaging. I agree with the above residnet's findings and plans as written above with the following additions/statements.    -Pt well known to cardiac service with mx co-morbidities as noted   -Cardiology consulted for pre-op risk assessment  -Recent ECHO with normal LVEF, grade II DD, known severe prosthetic AV stenosis   -now s/p colonoscopy with possible recurrence, further plan including OR per primary team  -Noted be fluid overloaded , lasix 40 mg IV bid, first dose today '  -Strict Is and Os  -Cont amio, resume home eliquis if no plan for OR  -Cont amlodipine 10 mg qd, imdur 30 mg qd for BP control

## 2022-09-26 NOTE — PROGRESS NOTE ADULT - PROBLEM SELECTOR PLAN 3
Grade II diastolic dysfunction. On Lasix 20 PO daily outpatient, however held inpatient. Pt appears mildly overloaded on CT Chest performed to r/o mets.   - c/w Lasix 40 BID

## 2022-09-26 NOTE — PROGRESS NOTE ADULT - SUBJECTIVE AND OBJECTIVE BOX
OVERNIGHT EVENTS: Pt was found to be SOB 30 minutes post transfusion. CXR ordered, demonstrated signs of fluid overload. Lasix 20 IVP x1 given. Pt symptoms resolved.     SUBJECTIVE:  Patient seen and examined at bedside.  ROS: Patient denies h/n/v/d, fever, chills, cp, palpitations, sob, abd pain, leg swelling, rashes, dysuria, and changes in BM.     Vital Signs Last 12 Hrs  T(F): 98.9 (09-26-22 @ 16:15), Max: 98.9 (09-26-22 @ 16:15)  HR: 70 (09-26-22 @ 16:15) (65 - 70)  BP: 134/69 (09-26-22 @ 16:15) (121/69 - 143/67)  BP(mean): --  RR: 18 (09-26-22 @ 16:15) (18 - 19)  SpO2: 98% (09-26-22 @ 16:15) (98% - 98%)  I&O's Summary    25 Sep 2022 07:01  -  26 Sep 2022 07:00  --------------------------------------------------------  IN: 50 mL / OUT: 0 mL / NET: 50 mL        PHYSICAL EXAM:  Constitutional: NAD, comfortable in bed.  HEENT: NC/AT  Neck: Supple, no JVD  Respiratory: Distant breathing sounds auscultated. CTA B/L. No w/r/r.   Cardiovascular: RRR, normal S1 and S2, no m/r/g.   Gastrointestinal: +BS, soft NTND, no guarding or rebound tenderness, no palpable masses   Extremities: wwp; no cyanosis, clubbing or edema.   Vascular: Pulses equal and strong throughout.   Neurological: AAOx3, no CN deficits, strength and sensation intact throughout.   Skin: No gross skin abnormalities or rashes        LABS:                        8.0    5.43  )-----------( 210      ( 26 Sep 2022 09:34 )             25.7     09-26    134<L>  |  96  |  10  ----------------------------<  101<H>  3.6   |  28  |  1.08    Ca    8.8      26 Sep 2022 09:34  Phos  3.7     09-26  Mg     2.0     09-26    TPro  6.9  /  Alb  4.0  /  TBili  0.6  /  DBili  x   /  AST  24  /  ALT  18  /  AlkPhos  61  09-26            RADIOLOGY & ADDITIONAL TESTS:    MEDICATIONS  (STANDING):  aMIOdarone    Tablet 200 milliGRAM(s) Oral daily  amLODIPine   Tablet 10 milliGRAM(s) Oral daily  atorvastatin 20 milliGRAM(s) Oral at bedtime  chlorhexidine 2% Cloths 1 Application(s) Topical daily  folic acid 1 milliGRAM(s) Oral daily  furosemide   Injectable 40 milliGRAM(s) IV Push every 12 hours  isosorbide   mononitrate ER Tablet (IMDUR) 30 milliGRAM(s) Oral daily  lidocaine   4% Patch 1 Patch Transdermal every 24 hours  magnesium oxide 400 milliGRAM(s) Oral daily  montelukast 10 milliGRAM(s) Oral daily  multivitamin 1 Tablet(s) Oral daily  pantoprazole  Injectable 40 milliGRAM(s) IV Push every 12 hours  potassium chloride    Tablet ER 40 milliEquivalent(s) Oral daily  simethicone 80 milliGRAM(s) Chew two times a day  tiotropium 18 MICROgram(s) Capsule 1 Capsule(s) Inhalation daily    MEDICATIONS  (PRN):  acetaminophen     Tablet .. 650 milliGRAM(s) Oral every 6 hours PRN Temp greater or equal to 38C (100.4F), Mild Pain (1 - 3)  benzonatate 100 milliGRAM(s) Oral three times a day PRN Cough  LORazepam     Tablet 0.5 milliGRAM(s) Oral daily PRN Anxiety  ondansetron Injectable 4 milliGRAM(s) IV Push every 8 hours PRN Nausea and/or Vomiting  oxyCODONE    Solution 2.5 milliGRAM(s) Oral every 4 hours PRN Severe Pain (7 - 10)  polyethylene glycol 3350 17 Gram(s) Oral daily PRN Constipation

## 2022-09-26 NOTE — PROGRESS NOTE ADULT - ASSESSMENT
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

## 2022-09-26 NOTE — PROGRESS NOTE ADULT - PROBLEM SELECTOR PLAN 4
Pt with E coli and Gemella spp bacteremia from dental abscess. Refused extraction last visit, has not done it outpatient. On Ceftriaxone 2g daily until 9/26. Patient has jaw and neck pain without any crepitus on exam, no fluctuance or erythema/edema. Low concern for involvement of soft tissue involvement of head and neck, no airway compromise.   - Afebrile and without white count on admission   - Continued ceftriaxone 2g daily until 9/26, completed course

## 2022-09-26 NOTE — PROGRESS NOTE ADULT - ASSESSMENT
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 Colorectal surgery consulted in setting of c/f GI bleed with known colorectal cancer history. Pt now s/p colonoscopy with GI on 9/22 with finding of a friable, centrally ulcerated, coarsened mass 5-7cm distal of previous ileo-colonic anastomosis that occupied 30% of the luminal circumference. Multiple cold forceps biopsies were obtained. Findings concerning for new primary colon cancer. Pt transfused with 1U PRBC on 9/25 for hb of 6.8. Pt doing well subjectively, continues to c/o SOB daily. Has bowel function. Per cardiology, pt high risk for intermediate risk procedure, will likely require cardiac anesthesia. CEA 1.8. Colonoscopy biopsy with findings of adenocarcinoma, at least intramucosal, with suggestion of deeper invasion. Tentative plan for surgery this week pending further pre-operative work-up and goals of care discussion.     Recommendations:  Medical risk stratification for potential future surgery  Appreciate cardiology recs  Oncology following (Dr. Mckeon) - will need goals of care discussion regarding family wishes of surgery/possible PET/CT  Pulmonology consult for further investigation of LAD noted on CT scan  Appreciate GI recs  Team 1C will continue to follow    Plan discussed with chief resident and Dr. Sanford

## 2022-09-26 NOTE — CONSULT NOTE ADULT - CONSULT REQUESTED DATE/TIME
21-Sep-2022
21-Sep-2022 14:49
21-Sep-2022 13:23
23-Sep-2022 11:41
26-Sep-2022 14:56
23-Sep-2022 12:15

## 2022-09-26 NOTE — PROGRESS NOTE ADULT - ASSESSMENT
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 Found on colonoscopy (9/22/22) to have friable, centrally ulcerated mass 5-7cm distal to ileocolic anastomosis suspicious for malignancy. Hem-Onc consulted for further management.    1.) colonic mass suspicious for recurrent colon cancer  Pt with history of colon cancer in remission s/p subtotal colectomy with ileocolic anastomosis. Pt did not get treated with RT or chemotherapy. Now presenting with LGIB, and most recent colonoscopy showing mass suspicious for malignancy. CT Chest/Abdomen/Pelvis showing mediastinal, bilateral hilar LAD (read as stable), no focal liver lesions. Hepatic flexure mass with biopsy pathology showing adenocarcinoma, at least intramucosal, with suggestion of deeper invasion. CEA 1.8.     Recommend:  -Although CT showing "stable" mediastinal and bilateral hilar LAD, would consider pulm for further investigation of LAD and would recommend PET/CT prior to surgery  -Colorectal Surgery following, possible candidate for resection  -Systemic options are dependent on PET/CT staging and/or LN biopsy, and final surgical pathology    Pt discussed with Dr. Mckeon

## 2022-09-26 NOTE — PROGRESS NOTE ADULT - PROBLEM SELECTOR PLAN 1
New mass seen C-scope  -Tentative plan for OR on Wednesday 9/28 w/ CRS - Dr. Sanford  - Cardiac anesthesia recommended as per cardiology  - PET-CT ordered for staging  - Pulm consulted, appreciate recs    - 6% risk of MI or cardiac arrest intraoperative or post op 30 days as per Branch score.   - Class III risk  (10.1%) on RCRI scale  - Pt is high risk for intermediate risk surgery

## 2022-09-26 NOTE — PROGRESS NOTE ADULT - SUBJECTIVE AND OBJECTIVE BOX
SUBJECTIVE: Patient seen and examined bedside. Pt reports feeling well this morning with continued complaint of SOB and mild intermittent lower abdominal pain. Tolerating diet. Denies nausea overnight. Passing flatus and reports having a BM yesterday but is unsure if there was blood in the stool.     aMIOdarone    Tablet 200 milliGRAM(s) Oral daily  amLODIPine   Tablet 10 milliGRAM(s) Oral daily  cefTRIAXone   IVPB 2000 milliGRAM(s) IV Intermittent every 24 hours  furosemide    Tablet 20 milliGRAM(s) Oral every 24 hours  isosorbide   mononitrate ER Tablet (IMDUR) 30 milliGRAM(s) Oral daily      Vital Signs Last 24 Hrs  T(C): 36.9 (26 Sep 2022 05:18), Max: 37.3 (25 Sep 2022 21:25)  T(F): 98.5 (26 Sep 2022 05:18), Max: 99.1 (25 Sep 2022 21:25)  HR: 63 (26 Sep 2022 05:18) (63 - 80)  BP: 113/63 (26 Sep 2022 05:18) (113/63 - 160/65)  BP(mean): --  RR: 19 (26 Sep 2022 05:18) (17 - 22)  SpO2: 98% (26 Sep 2022 05:18) (97% - 99%)    Parameters below as of 26 Sep 2022 05:18  Patient On (Oxygen Delivery Method): nasal cannula      I&O's Detail    25 Sep 2022 07:01  -  26 Sep 2022 07:00  --------------------------------------------------------  IN:    IV PiggyBack: 50 mL  Total IN: 50 mL    OUT:  Total OUT: 0 mL    Total NET: 50 mL          General: NAD, resting comfortably in bed  C/V: NSR  Pulm: Nonlabored breathing, no respiratory distress  Abd: soft, protuberant, nondistended, nontender, no rebound, no guarding  Extrem: WWP, no edema, SCDs in place        LABS:                        6.8    4.11  )-----------( 204      ( 25 Sep 2022 12:00 )             22.3     09-25    130<L>  |  94<L>  |  9   ----------------------------<  99  3.6   |  27  |  1.06    Ca    8.6      25 Sep 2022 05:30  Phos  3.3     09-25  Mg     2.0     09-25    TPro  6.8  /  Alb  3.9  /  TBili  0.5  /  DBili  x   /  AST  20  /  ALT  17  /  AlkPhos  59  09-25          RADIOLOGY & ADDITIONAL STUDIES:

## 2022-09-26 NOTE — PROGRESS NOTE ADULT - ATTENDING COMMENTS
CRS Attending  Seen on afternoon rounds.  Discussed with Dr Mckeon and Dr Hartley.  Patient is pending PET scan. Pulm also consulted.  Long discussion held with patient (70 minutes) regarding her care plan using Language Line  #[id number] Denisse.  She understands PET scan is to be performed and that if concern for metastatic disease, surgical management would not be recommended.  If no signs of metastatic disease, we discussed surgical management in form of partial colectomy can be considered.   The associated risks, benefits, alternatives of the procedure have been outlined discussed and reviewed with the patient. These risks including but not limited to bleeding, infection, injury to adjacent organs, anastomotic leak, need for secondary surgery, need for ostomy creation, incisional dehiscence, incisional hernia, change in bowel habits, as well as the risk of heart and lung complications, stroke, DVT/PE and death were detailed.   We discussed given her medical comorbidities she is concerned high risk for surgery, and the likely need for ICU care, and that prolonged intubation and cardiovascular supportive measures may be indicated as part of her perioperative care.   We discussed alternatives to surgery, including medical management and palliative care and support.  All questions answered, she expressed understanding of her management options.  She would like me to discuss the above with her daughter Jaelyn as well, and gave permission for me to call daughter - [phone] (home), [phone] (cell). CRS Attending  Seen on afternoon rounds.  Discussed with Dr Mckeon and Dr Hartley.  Patient is pending PET scan. Pulm also consulted.  Long discussion held with patient (70 minutes) regarding her care plan using Language Line  #[id number] Denisse.  She understands PET scan is to be performed and that if concern for metastatic disease, surgical management would not be recommended.  If no signs of metastatic disease, we discussed surgical management in form of partial colectomy can be considered.   The associated risks, benefits, alternatives of the procedure have been outlined discussed and reviewed with the patient. These risks including but not limited to bleeding, infection, injury to adjacent organs, anastomotic leak, need for secondary surgery, need for ostomy creation, incisional dehiscence, incisional hernia, change in bowel habits, as well as the risk of heart and lung complications, stroke, DVT/PE and death were detailed.   We discussed given her medical comorbidities she is considered high risk for surgery, and the likely need for ICU care, and that prolonged intubation and cardiovascular supportive measures may be indicated as part of her perioperative care.   We discussed alternatives to surgery, including medical management and palliative care and support.  All questions answered, she expressed understanding of her management options.  She would like me to discuss the above with her daughter Jaelyn as well, and gave permission for me to call daughter - [phone] (home), [phone] (cell).

## 2022-09-26 NOTE — PROGRESS NOTE ADULT - ATTENDING COMMENTS
Patient was seen and examined at bedside on 9/26/2022 at 830 am. Patient has no acute complaints. Denies SOB, CP. ROS is otherwise negative. Vitals, labwork and pertinent imaging reviewed - stable HgB. Physical exam - NAD, AAO x 4, PERRLA, EOMI, MMM, supple neck, chest - CTA b/l, CV - rrr, s1s2, no m/r/g, abd - soft, NTND, + BS, ext - wwp, psych - normal affect, skin - no rash    Plan  -Pt s/p EGD showing mass  -CRS and Heme/Onc consulted - plan for surgery will depend on PET scan  -C/w Lasix as per cardiology recs  -Cardiology clearance for possible OR

## 2022-09-26 NOTE — PROGRESS NOTE ADULT - PROBLEM SELECTOR PLAN 2
Patient with dark red stool, occasional BRBPR. Hemoglobin 7.9 on admission which is around baseline. Non tachycardic or hypotensive. 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 Of note, patient did state that she had blood transfusion 5 days prior to admission at [facility] for "anemia" but couldn't give any more details. Colonoscopy performed on 9/22; "Approximately 5-7 cm distal to the ileocolonic anastomosis, there was a friable, centrally ulcerated, coarsened mass that occupied 30% of the luminal circumference, concerning for malignancy. Multiple cold forceps biopsies were obtained." Dr. Sanford ([facility] surgery attending) assessed and updated pt, tentative for surgical intervention 9/28.   - Order pre-op labs and ensure active Covid swab for 9/28  - Continue to trend hemoglobin, keep HG > 7  - Restarted on DASH/TLC diet

## 2022-09-26 NOTE — CONSULT NOTE ADULT - ASSESSMENT
82F Andorran speaking with history of colon CA s/p resection in 2019, ?COPD (non-compliant on Spiriva, Montelukast, previously used 2L home O2), MARK (non-compliant with CPAP), and extensive cardiac hx, admitted for suspected GI bleed and found to have friable, centrally ulcerated, coarsened mass 5-7cm distal of previous ileo-colonic anastomosis on recent colonoscopy, currently c/o mild SOB, MANRIQUE, and episode of blood speckled cough, was found to have multiple randomly distributed bilateral solid lung nodules and micronodules with unchanged bilateral mediastinal hilar adenopathy on CT, c/f sarcoid vs lymphoproliferative disorder vs metastatic lung CA 2/2 primary colon CA.   - Surgical path from colonic mass positive for adenocarcinoma.   - Serum Ca wnl, Hgb 7-8's, elevated CRP 25.6, elevated , CEA 1.6, ALP wnl     Plan:  - f/u whole body PET   - order serum SPEP, UPEP  - will evaluate for possible EBUS with bx

## 2022-09-26 NOTE — CONSULT NOTE ADULT - SUBJECTIVE AND OBJECTIVE BOX
Pulmonary Consult    Patient is a 82y old  Female who presents with a chief complaint of GI bleeding (26 Sep 2022 08:43)      HPI:  82F, Nepalese speaking, w/ PMHx HTN, HLD, severe AS s/p TAVR (), valve thrombosis  s/p AC (not seen on echo ), Ascending Thoracic Aneurysm 4cm in 2022, pAFib (on Amiodarone/Eliquis), COPD previously on 2L home O2), Colon CA s/p resection in  (in remission), moderate MARK (non-compliant with CPAP 2/2 claustrophobia), CKD3 (baseline Cr 1.1-1.2) and FEROZ who presents from [facility] for dark stools x 3 days. Patient was recently admitted under cardiology service from - for chest pain and headaches - found to incidentally have Ecoli bacteremia and Gemella spp. TTE, IRMA, whole body gallium scan were negative - source thought to be 2/2 infected tooth which patient refused to have extracted in house by OMFS. She was sent home with PICC line for IV abx (Ceftriaxone 2g daily until). She has still not gotten her tooth extraction outpatient as she has been in the [facility], but has been getting the Ceftriaxone daily. For this admission, patient had a blood transfusion on  in the [facility], did not have any reaction to the transfusion. She started to develop bloody diarrhea after the transfusion and does not remember the details as to why exactly she got the transfusion. She also has chronic complaints including headache, jaw and neck pain, R hip pain, etc.     ED Course: afebrile, VSS, Hb 7.9 (8.1 at baseline), CT scan no active GI hemorrhage identified  Consults: surgery and GI   Meds: zofran, protonix, tylenol   EKG: sinus bradycardia to 59, no ischemia  (21 Sep 2022 14:06)      PAST MEDICAL & SURGICAL HISTORY:  HTN (hypertension)      Aortic stenosis      Hyperlipidemia      COPD (chronic obstructive pulmonary disease)      GERD (gastroesophageal reflux disease)      Stage 3 chronic kidney disease      Anemia      Diastolic CHF, chronic      Obesity      Paroxysmal atrial fibrillation      S/P TAVR (transcatheter aortic valve replacement)  2019        FAMILY HISTORY:  History of breast CA in daughter. No family history of Lung CA, colon CA, leukemia/lymphoma in first degree relatives      Social History:  Does not smoke, drink alcohol or use drugs. Denies second hand smoke exposure. Lives at home and walks with walker, daughter helps out a lot. Currently patient is living out of a rehab. (21 Sep 2022 14:06)    Allergies    Digox (Rash; Urticaria; Hives)  Plavix (Other (Mod to Severe))    Intolerances    Pulmonary HPI:    81 yo, Nepalese speaking female with history of colon CA s/p resection in 2019, ?COPD (on Spiriva 2 puffs daily, Montelukast but non-compliant, previously used 2L home O2), MARK (non-compliant with CPAP), and extensive cardiac hx, currently c/o mild SOB, MANRIQUE, and 1 episode of blood speckled cough today. States she has chest tightness and feels slightly SOB 2/2 chest tightness at rest and has worse MANRIQUE when walking to bathroom with O2 NC on. Had small blood speckled episode of cough today; had similar 1 time episode years ago that self resolved. Denies pleuritic CP, fever, chills, night sweats, rhinorrhea, sore throat, massive hemoptysis. At baseline, pt has <1/2 block ET.  Patient has poor pulmonary follow up; last saw a pulmonologist 2 years ago and saw Dr More 3 years ago. Additionally, she notes she saw pulmonologist in [facility] 5 yrs ago for SOB and did a spirometry test, and she was prescribed Spiriva; however patient denies any known dx of COPD, asthma, or lung disease. States she was compliant with Spiriva until 1 month ago. Notes she moved from [facility] to  30 yrs ago; worked as . Denies any history of cigarette use, second hand smoke exposure, indoor biofuel use, industrial chemical exposure, living in a basement for extended periods, h/o recurrent PNA. Pt also denies any family history of lung CA, colon CA, or lymphoma/leukemia, but notes her daughter  of metastatic breast CA. Benign physical exam; patient on 4L O2 NC.       REVIEW OF SYSTEMS:  Constitutional: No fevers or chills or weight loss.   Eyes: No itching or discharge from the eyes  ENT:  1 episode of blood speckled cough. No post nasal drip. No epistaxis. No throat pain. No difficulty swallowing.   CV: No chest pain. No palpitations or dizziness.   Resp: Mild SOB with chest tightness. Non productive cough. MANRIQUE. No wheezing or pleuritic chest pain   GI: No nausea. No vomiting. No diarrhea or abdominal pain   MSK: Lower back pain.  Integumentary: No skin lesions.   Neurological: No gross motor weakness. No sensory changes.    PHYSICAL EXAM:  Vital Signs Last 24 Hrs  T(C): 37.1 (26 Sep 2022 10:07), Max: 37.3 (25 Sep 2022 21:25)  T(F): 98.7 (26 Sep 2022 10:07), Max: 99.1 (25 Sep 2022 21:25)  HR: 66 (26 Sep 2022 14:15) (63 - 80)  BP: 143/67 (26 Sep 2022 14:15) (113/63 - 160/65)  BP(mean): --  RR: 19 (26 Sep 2022 10:07) (19 - 22)  SpO2: 98% (26 Sep 2022 10:07) (97% - 99%)    Parameters below as of 26 Sep 2022 10:07  Patient On (Oxygen Delivery Method): nasal cannula  O2 Flow (L/min): 4    General: Awake, alert, oriented X 3.   HEENT: Atraumatic, normocephalic.   Neck: No JVD no lymphadenopathy   Respiratory: normal vesicular breathing with no wheeze or rales on the exam .  Cardiovascular: S1 S2 normal. No murmurs.   Abdomen: Soft, non-tender, non-distended. No organomegaly.  Extremities: No edema of calf tenderness   Skin: No rashes or skin lesions  Neurological: moving all the extremities with out weakness       HOSPITAL MEDICATIONS:  MEDICATIONS  (STANDING):  aMIOdarone    Tablet 200 milliGRAM(s) Oral daily  amLODIPine   Tablet 10 milliGRAM(s) Oral daily  atorvastatin 20 milliGRAM(s) Oral at bedtime  chlorhexidine 2% Cloths 1 Application(s) Topical daily  folic acid 1 milliGRAM(s) Oral daily  furosemide   Injectable 40 milliGRAM(s) IV Push every 12 hours  isosorbide   mononitrate ER Tablet (IMDUR) 30 milliGRAM(s) Oral daily  lidocaine   4% Patch 1 Patch Transdermal every 24 hours  magnesium oxide 400 milliGRAM(s) Oral daily  montelukast 10 milliGRAM(s) Oral daily  multivitamin 1 Tablet(s) Oral daily  pantoprazole  Injectable 40 milliGRAM(s) IV Push every 12 hours  potassium chloride    Tablet ER 40 milliEquivalent(s) Oral daily  simethicone 80 milliGRAM(s) Chew two times a day  tiotropium 18 MICROgram(s) Capsule 1 Capsule(s) Inhalation daily    MEDICATIONS  (PRN):  acetaminophen     Tablet .. 650 milliGRAM(s) Oral every 6 hours PRN Temp greater or equal to 38C (100.4F), Mild Pain (1 - 3)  benzonatate 100 milliGRAM(s) Oral three times a day PRN Cough  LORazepam     Tablet 0.5 milliGRAM(s) Oral daily PRN Anxiety  ondansetron Injectable 4 milliGRAM(s) IV Push every 8 hours PRN Nausea and/or Vomiting  oxyCODONE    Solution 2.5 milliGRAM(s) Oral every 4 hours PRN Severe Pain (7 - 10)  polyethylene glycol 3350 17 Gram(s) Oral daily PRN Constipation      LABS:                        8.0    5.43  )-----------( 210      ( 26 Sep 2022 09:34 )             25.7         134<L>  |  96  |  10  ----------------------------<  101<H>  3.6   |  28  |  1.08    Ca    8.8      26 Sep 2022 09:34  Phos  3.7       Mg     2.0         TPro  6.9  /  Alb  4.0  /  TBili  0.6  /  DBili  x   /  AST  24  /  ALT  18  /  AlkPhos  61

## 2022-09-26 NOTE — PROGRESS NOTE ADULT - SUBJECTIVE AND OBJECTIVE BOX
Cardiology Consult      Interval History: patient underwent EGD that discovered colonic mass. Patient is now pending surgical intervention       OBJECTIVE  Vitals:  T(C): 37.3 (09-25-22 @ 21:25), Max: 37.5 (09-25-22 @ 08:08)  HR: 80 (09-25-22 @ 21:25) (66 - 80)  BP: 160/65 (09-25-22 @ 21:25) (113/64 - 160/65)  RR: 19 (09-25-22 @ 21:25) (17 - 19)  SpO2: 97% (09-25-22 @ 21:25) (97% - 98%)  Wt(kg): --    I/O:  I&O's Summary    24 Sep 2022 07:01  -  25 Sep 2022 07:00  --------------------------------------------------------  IN: 50 mL / OUT: 0 mL / NET: 50 mL    25 Sep 2022 07:01  -  26 Sep 2022 00:46  --------------------------------------------------------  IN: 50 mL / OUT: 0 mL / NET: 50 mL        PHYSICAL EXAM:  General: Comfortable, pleasant  Neurological: AAOx3  Cardiovascular: normal rate. +S1/S2  Respiratory: Decreased breath sounds bibasilar   Gastrointestinal: soft, non tender   Extremities: WWP; 1+ pitting edema    	  LABS:                        6.8    4.11  )-----------( 204      ( 25 Sep 2022 12:00 )             22.3     09-25    130<L>  |  94<L>  |  9   ----------------------------<  99  3.6   |  27  |  1.06    Ca    8.6      25 Sep 2022 05:30  Phos  3.3     09-25  Mg     2.0     09-25    TPro  6.8  /  Alb  3.9  /  TBili  0.5  /  DBili  x   /  AST  20  /  ALT  17  /  AlkPhos  59  09-25          RADIOLOGY & ADDITIONAL TESTS:  Reviewed .    MEDICATIONS  (STANDING):  aMIOdarone    Tablet 200 milliGRAM(s) Oral daily  amLODIPine   Tablet 10 milliGRAM(s) Oral daily  atorvastatin 20 milliGRAM(s) Oral at bedtime  cefTRIAXone   IVPB 2000 milliGRAM(s) IV Intermittent every 24 hours  chlorhexidine 2% Cloths 1 Application(s) Topical daily  folic acid 1 milliGRAM(s) Oral daily  furosemide    Tablet 20 milliGRAM(s) Oral every 24 hours  isosorbide   mononitrate ER Tablet (IMDUR) 30 milliGRAM(s) Oral daily  lidocaine   4% Patch 1 Patch Transdermal every 24 hours  magnesium oxide 400 milliGRAM(s) Oral daily  montelukast 10 milliGRAM(s) Oral daily  multivitamin 1 Tablet(s) Oral daily  pantoprazole  Injectable 40 milliGRAM(s) IV Push every 12 hours  potassium chloride    Tablet ER 40 milliEquivalent(s) Oral daily  simethicone 80 milliGRAM(s) Chew two times a day  tiotropium 18 MICROgram(s) Capsule 1 Capsule(s) Inhalation daily    MEDICATIONS  (PRN):  acetaminophen     Tablet .. 650 milliGRAM(s) Oral every 6 hours PRN Temp greater or equal to 38C (100.4F), Mild Pain (1 - 3)  benzonatate 100 milliGRAM(s) Oral three times a day PRN Cough  LORazepam     Tablet 0.5 milliGRAM(s) Oral daily PRN Anxiety  ondansetron Injectable 4 milliGRAM(s) IV Push every 8 hours PRN Nausea and/or Vomiting  oxyCODONE    Solution 2.5 milliGRAM(s) Oral every 4 hours PRN Severe Pain (7 - 10)  polyethylene glycol 3350 17 Gram(s) Oral daily PRN Constipation      ECHO < from: TTE Echo Complete w/o Contrast w/ Doppler (09.16.22 @ 15:47) >  1. Normal left ventricular size and systolic function.   2. Grade II left ventricular diastolic dysfunction.   3. Normal right ventricular size and systolic function.   4. Mildly dilated left atrium.   5. 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 The peak transvalvular velocity   is 3.80 m/s, the mean transvalvular gradient is 32.00 mmHg, and the   LVOT/AV velocity ratio is 0.29.   6. Mild mitral stenosis.   7. Pulmonary hypertension present, pulmonary artery systolic pressure is   61 mmHg.   8. No pericardial effusion.   9. Compared to the previous TTE performed on 9/6/2022, there is evidence   of severe pulmonary hypertension.    Aortic Valve:  A transcatheter aortic valve (TAVR) is noted in the aortic position. The   peaktransvalvular velocity is 3.80 m/s, the mean transvalvular gradient   is 32.00 mmHg, and the LVOT/AV velocity ratio is 0.29. The peak   transaortic gradient is 57.76 mmHg. Findings are consistent with severe   prosthetic aortic stenosis. There is no evidence of aortic regurgitation.    < end of copied text >

## 2022-09-26 NOTE — PROGRESS NOTE ADULT - SUBJECTIVE AND OBJECTIVE BOX
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 Patient was recently admitted under cardiology service from 9/4-9/13 for chest pain and headaches - found to incidentally have Ecoli bacteremia and Gemella spp. TTE, IRMA, whole body gallium scan were negative - source thought to be 2/2 infected tooth which patient refused to have extracted in house by OMFS. She was sent home with PICC line for IV abx (Ceftriaxone 2g daily until9/26). She has still not gotten her tooth extraction outpatient as she has been in the [facility], but has been getting the Ceftriaxone daily. For this admission, patient had a blood transfusion on 9/16 in the [facility], did not have any reaction to the transfusion. She started to develop bloody diarrhea after the transfusion and does not remember the details as to why exactly she got the transfusion. She also has chronic complaints including headache, jaw and neck pain, R hip pain, etc.     ED Course: afebrile, VSS, Hb 7.9 (8.1 at baseline), CT scan no active GI hemorrhage identified  Consults: surgery and GI   Meds: zofran, protonix, tylenol   EKG: sinus bradycardia to 59, no ischemia  (21 Sep 2022 14:06)      SUBJECTIVE: Patient seen and examined at bedside. Samoan  Lexus ID #[id number]. Used the CPAP overnight. A little anxious. No fever or chest pain. Discussed possiblility of surgery on Wednesday. Discussed role of PET/CT in identifying if disease elsewhere.    OBJECTIVE:    VITAL SIGNS:  ICU Vital Signs Last 24 Hrs  T(C): 37.1 (26 Sep 2022 10:07), Max: 37.3 (25 Sep 2022 21:25)  T(F): 98.7 (26 Sep 2022 10:07), Max: 99.1 (25 Sep 2022 21:25)  HR: 66 (26 Sep 2022 14:15) (63 - 80)  BP: 143/67 (26 Sep 2022 14:15) (113/63 - 160/65)  BP(mean): --  ABP: --  ABP(mean): --  RR: 19 (26 Sep 2022 10:07) (19 - 22)  SpO2: 98% (26 Sep 2022 10:07) (97% - 99%)    O2 Parameters below as of 26 Sep 2022 10:07  Patient On (Oxygen Delivery Method): nasal cannula  O2 Flow (L/min): 4            09-25 @ 07:01  -  09-26 @ 07:00  --------------------------------------------------------  IN: 50 mL / OUT: 0 mL / NET: 50 mL      CAPILLARY BLOOD GLUCOSE          PHYSICAL EXAM:  General: obese, chronically ill appearing, non toxic, answering questions  HEENT: NC/AT; PERRL, clear conjunctiva  Neck: supple  Respiratory: CTA b/l  Cardiovascular: +S1/S2; RRR  Abdomen: soft, NT/ND; +BS x4  Extremities: WWP, 2+ peripheral pulses b/l; no LE edema  Skin: normal color and turgor; no rash  Neurological:     MEDICATIONS:  MEDICATIONS  (STANDING):  aMIOdarone    Tablet 200 milliGRAM(s) Oral daily  amLODIPine   Tablet 10 milliGRAM(s) Oral daily  atorvastatin 20 milliGRAM(s) Oral at bedtime  chlorhexidine 2% Cloths 1 Application(s) Topical daily  folic acid 1 milliGRAM(s) Oral daily  furosemide   Injectable 40 milliGRAM(s) IV Push every 12 hours  isosorbide   mononitrate ER Tablet (IMDUR) 30 milliGRAM(s) Oral daily  lidocaine   4% Patch 1 Patch Transdermal every 24 hours  magnesium oxide 400 milliGRAM(s) Oral daily  montelukast 10 milliGRAM(s) Oral daily  multivitamin 1 Tablet(s) Oral daily  pantoprazole  Injectable 40 milliGRAM(s) IV Push every 12 hours  potassium chloride    Tablet ER 40 milliEquivalent(s) Oral daily  simethicone 80 milliGRAM(s) Chew two times a day  tiotropium 18 MICROgram(s) Capsule 1 Capsule(s) Inhalation daily    MEDICATIONS  (PRN):  acetaminophen     Tablet .. 650 milliGRAM(s) Oral every 6 hours PRN Temp greater or equal to 38C (100.4F), Mild Pain (1 - 3)  benzonatate 100 milliGRAM(s) Oral three times a day PRN Cough  LORazepam     Tablet 0.5 milliGRAM(s) Oral daily PRN Anxiety  ondansetron Injectable 4 milliGRAM(s) IV Push every 8 hours PRN Nausea and/or Vomiting  oxyCODONE    Solution 2.5 milliGRAM(s) Oral every 4 hours PRN Severe Pain (7 - 10)  polyethylene glycol 3350 17 Gram(s) Oral daily PRN Constipation      ALLERGIES:  Allergies    Digox (Rash; Urticaria; Hives)  Plavix (Other (Mod to Severe))    Intolerances        LABS:                        8.0    5.43  )-----------( 210      ( 26 Sep 2022 09:34 )             25.7     09-26    134<L>  |  96  |  10  ----------------------------<  101<H>  3.6   |  28  |  1.08    Ca    8.8      26 Sep 2022 09:34  Phos  3.7     09-26  Mg     2.0     09-26    TPro  6.9  /  Alb  4.0  /  TBili  0.6  /  DBili  x   /  AST  24  /  ALT  18  /  AlkPhos  61  09-26                    RADIOLOGY & ADDITIONAL TESTS: Reviewed.

## 2022-09-26 NOTE — PROGRESS NOTE ADULT - ASSESSMENT
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 Cardiology consulted for preoperative clearance evaluation for colonoscopy. Pt now s/p colonoscopy with showed a mass now cardiology being reconsulted for pre op for colon resection.     #HFpEF   Patient is volume up on physical exam and on with worsening pulmonary congestion on CXR  ECHO: normal LVEF, grade II DD, known severe prosthetic AV stenosis   - on Lasix 20 PO daily, transition to Lasix 40mg IVP bid for now. Will reevaluate tomorrow and will likely deescalate to PO if improving   - I/Os     #Atrial Fibrillation   - c/w amiodarone  - ChaDVASC 6   - Eliquis 5 BID held due to ongoing Hgb drops, when ok per primary team and H/H stable can use heparin drip perioperatively    #Severe prosthetic AS  Avoid excessive fluid use   recommend Lasix as above     #Severe pulmonary HTN  Avoid hypoxia   C/w CPAP   C/w O2 for COPD     #HTN  c/w Imdur and Amlodipine     #HLD  c/w statin     #Pre-op risk assessment   Patient is high risk for an intermediate risk procedure   Recommend cardiac anesthesia for the case

## 2022-09-27 LAB
ANION GAP SERPL CALC-SCNC: 11 MMOL/L — SIGNIFICANT CHANGE UP (ref 5–17)
BLD GP AB SCN SERPL QL: POSITIVE — SIGNIFICANT CHANGE UP
BUN SERPL-MCNC: 13 MG/DL — SIGNIFICANT CHANGE UP (ref 7–23)
CALCIUM SERPL-MCNC: 8.9 MG/DL — SIGNIFICANT CHANGE UP (ref 8.4–10.5)
CHLORIDE SERPL-SCNC: 94 MMOL/L — LOW (ref 96–108)
CO2 SERPL-SCNC: 29 MMOL/L — SIGNIFICANT CHANGE UP (ref 22–31)
CREAT SERPL-MCNC: 1.05 MG/DL — SIGNIFICANT CHANGE UP (ref 0.5–1.3)
EGFR: 52 ML/MIN/1.73M2 — LOW
GLUCOSE BLDC GLUCOMTR-MCNC: 116 MG/DL — HIGH (ref 70–99)
GLUCOSE SERPL-MCNC: 104 MG/DL — HIGH (ref 70–99)
HCT VFR BLD CALC: 27.1 % — LOW (ref 34.5–45)
HGB BLD-MCNC: 8.4 G/DL — LOW (ref 11.5–15.5)
MAGNESIUM SERPL-MCNC: 2.1 MG/DL — SIGNIFICANT CHANGE UP (ref 1.6–2.6)
MCHC RBC-ENTMCNC: 26.1 PG — LOW (ref 27–34)
MCHC RBC-ENTMCNC: 31 GM/DL — LOW (ref 32–36)
MCV RBC AUTO: 84.2 FL — SIGNIFICANT CHANGE UP (ref 80–100)
NRBC # BLD: 0 /100 WBCS — SIGNIFICANT CHANGE UP (ref 0–0)
PHOSPHATE SERPL-MCNC: 4.3 MG/DL — SIGNIFICANT CHANGE UP (ref 2.5–4.5)
PLATELET # BLD AUTO: 222 K/UL — SIGNIFICANT CHANGE UP (ref 150–400)
POTASSIUM SERPL-MCNC: 3.5 MMOL/L — SIGNIFICANT CHANGE UP (ref 3.5–5.3)
POTASSIUM SERPL-SCNC: 3.5 MMOL/L — SIGNIFICANT CHANGE UP (ref 3.5–5.3)
PROT SERPL-MCNC: 6.5 G/DL — SIGNIFICANT CHANGE UP (ref 6–8.3)
PROT SERPL-MCNC: 6.5 G/DL — SIGNIFICANT CHANGE UP (ref 6–8.3)
RBC # BLD: 3.22 M/UL — LOW (ref 3.8–5.2)
RBC # FLD: 15.6 % — HIGH (ref 10.3–14.5)
RH IG SCN BLD-IMP: POSITIVE — SIGNIFICANT CHANGE UP
SARS-COV-2 RNA SPEC QL NAA+PROBE: SIGNIFICANT CHANGE UP
SODIUM SERPL-SCNC: 134 MMOL/L — LOW (ref 135–145)
WBC # BLD: 5.08 K/UL — SIGNIFICANT CHANGE UP (ref 3.8–10.5)
WBC # FLD AUTO: 5.08 K/UL — SIGNIFICANT CHANGE UP (ref 3.8–10.5)

## 2022-09-27 PROCEDURE — 78815 PET IMAGE W/CT SKULL-THIGH: CPT | Mod: 26,PS

## 2022-09-27 PROCEDURE — 99232 SBSQ HOSP IP/OBS MODERATE 35: CPT | Mod: GC

## 2022-09-27 PROCEDURE — 99233 SBSQ HOSP IP/OBS HIGH 50: CPT | Mod: GC

## 2022-09-27 PROCEDURE — 99233 SBSQ HOSP IP/OBS HIGH 50: CPT

## 2022-09-27 PROCEDURE — 86077 PHYS BLOOD BANK SERV XMATCH: CPT

## 2022-09-27 RX ORDER — APIXABAN 2.5 MG/1
5 TABLET, FILM COATED ORAL EVERY 12 HOURS
Refills: 0 | Status: DISCONTINUED | OUTPATIENT
Start: 2022-09-27 | End: 2022-09-27

## 2022-09-27 RX ADMIN — AMIODARONE HYDROCHLORIDE 200 MILLIGRAM(S): 400 TABLET ORAL at 06:26

## 2022-09-27 RX ADMIN — LIDOCAINE 1 PATCH: 4 CREAM TOPICAL at 13:42

## 2022-09-27 RX ADMIN — Medication 40 MILLIGRAM(S): at 18:35

## 2022-09-27 RX ADMIN — LIDOCAINE 1 PATCH: 4 CREAM TOPICAL at 02:11

## 2022-09-27 RX ADMIN — AMLODIPINE BESYLATE 10 MILLIGRAM(S): 2.5 TABLET ORAL at 06:26

## 2022-09-27 RX ADMIN — Medication 1 MILLIGRAM(S): at 13:01

## 2022-09-27 RX ADMIN — PANTOPRAZOLE SODIUM 40 MILLIGRAM(S): 20 TABLET, DELAYED RELEASE ORAL at 18:35

## 2022-09-27 RX ADMIN — PANTOPRAZOLE SODIUM 40 MILLIGRAM(S): 20 TABLET, DELAYED RELEASE ORAL at 06:25

## 2022-09-27 RX ADMIN — CHLORHEXIDINE GLUCONATE 1 APPLICATION(S): 213 SOLUTION TOPICAL at 13:02

## 2022-09-27 RX ADMIN — ATORVASTATIN CALCIUM 20 MILLIGRAM(S): 80 TABLET, FILM COATED ORAL at 22:27

## 2022-09-27 RX ADMIN — TIOTROPIUM BROMIDE 1 CAPSULE(S): 18 CAPSULE ORAL; RESPIRATORY (INHALATION) at 13:02

## 2022-09-27 RX ADMIN — SIMETHICONE 80 MILLIGRAM(S): 80 TABLET, CHEWABLE ORAL at 06:25

## 2022-09-27 RX ADMIN — LIDOCAINE 1 PATCH: 4 CREAM TOPICAL at 19:34

## 2022-09-27 RX ADMIN — ISOSORBIDE MONONITRATE 30 MILLIGRAM(S): 60 TABLET, EXTENDED RELEASE ORAL at 13:01

## 2022-09-27 RX ADMIN — Medication 40 MILLIEQUIVALENT(S): at 13:01

## 2022-09-27 RX ADMIN — Medication 1 TABLET(S): at 13:01

## 2022-09-27 RX ADMIN — SIMETHICONE 80 MILLIGRAM(S): 80 TABLET, CHEWABLE ORAL at 18:36

## 2022-09-27 RX ADMIN — Medication 40 MILLIGRAM(S): at 06:25

## 2022-09-27 RX ADMIN — MONTELUKAST 10 MILLIGRAM(S): 4 TABLET, CHEWABLE ORAL at 13:01

## 2022-09-27 RX ADMIN — MAGNESIUM OXIDE 400 MG ORAL TABLET 400 MILLIGRAM(S): 241.3 TABLET ORAL at 13:01

## 2022-09-27 NOTE — PROGRESS NOTE ADULT - SUBJECTIVE AND OBJECTIVE BOX
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        MEDICATIONS  (STANDING):  aMIOdarone    Tablet 200 milliGRAM(s) Oral daily  amLODIPine   Tablet 10 milliGRAM(s) Oral daily  atorvastatin 20 milliGRAM(s) Oral at bedtime  chlorhexidine 2% Cloths 1 Application(s) Topical daily  folic acid 1 milliGRAM(s) Oral daily  furosemide   Injectable 40 milliGRAM(s) IV Push every 12 hours  isosorbide   mononitrate ER Tablet (IMDUR) 30 milliGRAM(s) Oral daily  lidocaine   4% Patch 1 Patch Transdermal every 24 hours  magnesium oxide 400 milliGRAM(s) Oral daily  montelukast 10 milliGRAM(s) Oral daily  multivitamin 1 Tablet(s) Oral daily  pantoprazole  Injectable 40 milliGRAM(s) IV Push every 12 hours  potassium chloride    Tablet ER 40 milliEquivalent(s) Oral daily  simethicone 80 milliGRAM(s) Chew two times a day  tiotropium 18 MICROgram(s) Capsule 1 Capsule(s) Inhalation daily    MEDICATIONS  (PRN):  acetaminophen     Tablet .. 650 milliGRAM(s) Oral every 6 hours PRN Temp greater or equal to 38C (100.4F), Mild Pain (1 - 3)  benzonatate 100 milliGRAM(s) Oral three times a day PRN Cough  LORazepam     Tablet 0.5 milliGRAM(s) Oral daily PRN Anxiety  ondansetron Injectable 4 milliGRAM(s) IV Push every 8 hours PRN Nausea and/or Vomiting  oxyCODONE    Solution 2.5 milliGRAM(s) Oral every 4 hours PRN Severe Pain (7 - 10)  polyethylene glycol 3350 17 Gram(s) Oral daily PRN Constipation    T(F): 98.4 (09-27-22 @ 08:55), Max: 98.4 (09-27-22 @ 08:55)  HR: 61 (09-27-22 @ 12:49) (60 - 81)  BP: 125/71 (09-27-22 @ 12:49) (103/50 - 135/62)  BP(mean): --  ABP: --  ABP(mean): --  RR: 17 (09-27-22 @ 08:55) (17 - 19)  SpO2: 97% (09-27-22 @ 08:55) (93% - 99%)    I/O Detail 24H    26 Sep 2022 07:01  -  27 Sep 2022 07:00  --------------------------------------------------------  IN:  Total IN: 0 mL    OUT:    Voided (mL): 50 mL  Total OUT: 50 mL    Total NET: -50 mL          PHYSICAL EXAM:  GEN: NAD  HEENT: EOMI   RESP: CTA b/l  CV: RRR. Normal S1/S2. No m/r/g.  ABD: soft, non-distended  EXT: Pitting edema  NEURO: alert and attentive    LABS:  CBC 09-27-22 @ 09:44                        8.4    5.08  )-----------( 222                   27.1       Hgb trend: 8.4 <-- , 8.0 <-- , 6.8 <-- , 7.1 <--   WBC trend: 5.08 <-- , 5.43 <-- , 4.11 <-- , 4.39 <--       CMP 09-27-22 @ 09:44    134<L>  |  94<L>  |  13  ----------------------------<  104<H>  3.5   |  29  |  1.05    Ca    8.9      09-27-22 @ 09:44  Phos  4.3     09-27  Mg     2.1     09-27    TPro  6.5  /  Alb  x   /  TBili  x   /  DBili  x   /  AST  x   /  ALT  x   /  AlkPhos  x   09-27      Serum Cr trend: 1.05 <-- , 1.08 <-- , 1.06 <--         Cardiac Markers           STUDIES:

## 2022-09-27 NOTE — DIETITIAN INITIAL EVALUATION ADULT - OTHER INFO
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 for dark stools x 3 days. Pt was recently admitted under cardiology service from 9/4-9/13 for chest pain and headaches - found to incidentally have Ecoli bacteremia and Gemella spp. TTE, IRMA, whole body gallium scan were negative - source thought to be 2/2 infected tooth which pt refused to have extracted in house by OMFS. She was sent home with PICC line for IV abx (Ceftriaxone 2g daily until9/26). She has still not gotten her tooth extraction outpatient as she has been in the [facility], but has been getting the Ceftriaxone daily. For this admission, pt had a blood transfusion on 9/16 in the [facility], did not have any reaction to the transfusion. She started to develop bloody diarrhea after the transfusion. Pt s/p colonoscopy 9/22 with finding of a friable, centrally ulcerated, coarsened mass 5-7cm distal of previous ileo-colonic anastomosis that occupied 30% of the luminal circumference. Multiple cold forceps biopsies were obtained. Findings concerning for new primary colon cancer. Pt transfused with 1U PRBC on 9/25 for hb of 6.8. Colonoscopy biopsy with findings of adenocarcinoma, at least intramucosal, with suggestion of deeper invasion. Tentative plan for surgery this week pending further pre-operative work-up and goals of care discussion; pending PET Scan today.     Pt seen this afternoon soundly sleeping on 4UR. No family present. Spoke with RN. Pt had been noted with NPO diet order prior to RD visit. Now with DASH TLC diet order. RN reports pt did get lunch today, had 50% of avocado toast. No reports of diet related issues from RN, does report c/o SOB. RN denies BM thus far on shift. Last BM per flow sheets 9/25 9/24. Ordered for Myclion, MiraLax, Protonix, Zofran. No pain. Quique 18. 1+Edema (right leg; left leg). No pressure ulcers.   Labs: low HH, Mg K Phos WDL, Na 134, BUN/Cr .  Please see below for nutritions recommendations.

## 2022-09-27 NOTE — PROGRESS NOTE ADULT - ASSESSMENT
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 Found on colonoscopy (9/22/22) to have friable, centrally ulcerated mass 5-7cm distal to ileocolic anastomosis suspicious for malignancy. Hem-Onc consulted for further management.    1.) colonic mass suspicious for recurrent colon cancer  Pt with history of colon cancer in remission s/p subtotal colectomy with ileocolic anastomosis. Pt did not get treated with RT or chemotherapy. Now presenting with LGIB, and most recent colonoscopy showing mass suspicious for malignancy. CT Chest/Abdomen/Pelvis showing mediastinal, bilateral hilar LAD (read as stable), no focal liver lesions. Hepatic flexure mass with biopsy pathology showing adenocarcinoma, at least intramucosal, with suggestion of deeper invasion. CEA 1.8.     Recommendations:    PET/CT today to assess possible distant disease  If negative, she will be evaluated for resection of local recurrence  Discussed with Dr. Hartley and Dr. Sanford    Thank you    Stefano Mckeon MD  [phone]

## 2022-09-27 NOTE — DIETITIAN INITIAL EVALUATION ADULT - CONTINUE CURRENT NUTRITION CARE PLAN
Monitor need for NPO Pending GI. However While PO feasible, consider low sodium > DASH d/t reported fair PO. Monitor need for adding low fiber to order. Suggest use of oral nutrition supplements: Ensure Enlive x1/day 350kcal/20gm prot per 1 can.

## 2022-09-27 NOTE — PROGRESS NOTE ADULT - ASSESSMENT
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 Cardiology consulted for preoperative clearance evaluation for colonoscopy. Pt now s/p colonoscopy with showed a mass now cardiology being reconsulted for pre op for colon resection.     #HFpEF   Patient is volume up on physical exam and on with worsening pulmonary congestion on CXR  ECHO: normal LVEF, grade II DD, known severe prosthetic AV stenosis   - C/w lasix 40mg BID; additionally give metolazone 5mg x 1 prior to PM lasix dose today  - Strict I/Os, daily standing weights. IO not well recorded - consider primafit    #Atrial Fibrillation   - c/w amiodarone  - ChaDVASC 6   - Eliquis 5 BID held due to ongoing Hgb drops, when ok per primary team and H/H stable can use heparin drip perioperatively    #Severe prosthetic AS  Avoid excessive fluid use   recommend Lasix as above     #Severe pulmonary HTN  Avoid hypoxia   C/w CPAP   C/w O2 for COPD     #HTN  c/w Imdur and Amlodipine     #HLD  c/w statin     #Pre-op risk assessment   Patient is high risk for an intermediate risk procedure   Recommend cardiac anesthesia for the case

## 2022-09-27 NOTE — PROGRESS NOTE ADULT - SUBJECTIVE AND OBJECTIVE BOX
OVERNIGHT EVENTS: Wore CPAP intermittently    SUBJECTIVE:  Patient seen and examined at bedside.  ROS: Patient denies h/n/v/d, fever, chills, cp, palpitations, sob, abd pain, leg swelling, rashes, dysuria, and changes in BM. Pt states sheis breathing better today compared to yesterday    Vital Signs Last 12 Hrs  T(F): 97.8 (09-27-22 @ 15:23), Max: 98.4 (09-27-22 @ 08:55)  HR: 62 (09-27-22 @ 17:34) (60 - 65)  BP: 113/62 (09-27-22 @ 15:23) (103/50 - 125/71)  BP(mean): --  RR: 18 (09-27-22 @ 17:34) (17 - 18)  SpO2: 96% (09-27-22 @ 17:34) (96% - 98%)  I&O's Summary    26 Sep 2022 07:01  -  27 Sep 2022 07:00  --------------------------------------------------------  IN: 0 mL / OUT: 50 mL / NET: -50 mL        PHYSICAL EXAM:  Constitutional: NAD, comfortable in bed.  HEENT: NC/AT, PERRLA, EOMI, no conjunctival pallor or scleral icterus, MMM  Neck: Supple, no JVD  Respiratory: CTA B/L. No w/r/r.   Cardiovascular: RRR, normal S1 and S2, no m/r/g.   Gastrointestinal: +BS, soft NT moderate distention, no guarding or rebound tenderness, no palpable masses   Extremities: wwp; no cyanosis, clubbing or edema.   Vascular: Pulses equal and strong throughout.   Neurological: AAOx3, no CN deficits, strength and sensation intact throughout.   Skin: No gross skin abnormalities or rashes        LABS:                        8.4    5.08  )-----------( 222      ( 27 Sep 2022 09:44 )             27.1     09-27    134<L>  |  94<L>  |  13  ----------------------------<  104<H>  3.5   |  29  |  1.05    Ca    8.9      27 Sep 2022 09:44  Phos  4.3     09-27  Mg     2.1     09-27    TPro  6.5  /  Alb  x   /  TBili  x   /  DBili  x   /  AST  x   /  ALT  x   /  AlkPhos  x   09-27            RADIOLOGY & ADDITIONAL TESTS:    MEDICATIONS  (STANDING):  aMIOdarone    Tablet 200 milliGRAM(s) Oral daily  amLODIPine   Tablet 10 milliGRAM(s) Oral daily  apixaban 5 milliGRAM(s) Oral every 12 hours  atorvastatin 20 milliGRAM(s) Oral at bedtime  chlorhexidine 2% Cloths 1 Application(s) Topical daily  folic acid 1 milliGRAM(s) Oral daily  furosemide   Injectable 40 milliGRAM(s) IV Push every 12 hours  isosorbide   mononitrate ER Tablet (IMDUR) 30 milliGRAM(s) Oral daily  lidocaine   4% Patch 1 Patch Transdermal every 24 hours  magnesium oxide 400 milliGRAM(s) Oral daily  metolazone 5 milliGRAM(s) Oral once  montelukast 10 milliGRAM(s) Oral daily  multivitamin 1 Tablet(s) Oral daily  pantoprazole  Injectable 40 milliGRAM(s) IV Push every 12 hours  potassium chloride    Tablet ER 40 milliEquivalent(s) Oral daily  simethicone 80 milliGRAM(s) Chew two times a day  tiotropium 18 MICROgram(s) Capsule 1 Capsule(s) Inhalation daily    MEDICATIONS  (PRN):  acetaminophen     Tablet .. 650 milliGRAM(s) Oral every 6 hours PRN Temp greater or equal to 38C (100.4F), Mild Pain (1 - 3)  benzonatate 100 milliGRAM(s) Oral three times a day PRN Cough  LORazepam     Tablet 0.5 milliGRAM(s) Oral daily PRN Anxiety  ondansetron Injectable 4 milliGRAM(s) IV Push every 8 hours PRN Nausea and/or Vomiting  oxyCODONE    Solution 2.5 milliGRAM(s) Oral every 4 hours PRN Severe Pain (7 - 10)  polyethylene glycol 3350 17 Gram(s) Oral daily PRN Constipation

## 2022-09-27 NOTE — DIETITIAN INITIAL EVALUATION ADULT - PERTINENT LABORATORY DATA
09-27    134<L>  |  94<L>  |  13  ----------------------------<  104<H>  3.5   |  29  |  1.05    Ca    8.9      27 Sep 2022 09:44  Phos  4.3     09-27  Mg     2.1     09-27    TPro  6.5  /  Alb  x   /  TBili  x   /  DBili  x   /  AST  x   /  ALT  x   /  AlkPhos  x   09-27  POCT Blood Glucose.: 116 mg/dL (09-27-22 @ 10:04)  A1C with Estimated Average Glucose Result: 5.3 % (09-06-22 @ 05:30)  A1C with Estimated Average Glucose Result: 4.7 % (01-15-22 @ 08:01)

## 2022-09-27 NOTE — PROGRESS NOTE ADULT - ASSESSMENT
82F German speaking with history of colon CA s/p resection in 2019, ?COPD (non-compliant on Spiriva, Montelukast, previously used 2L home O2), MARK (non-compliant with CPAP), and extensive cardiac hx, admitted for suspected GI bleed and found to have friable, centrally ulcerated, coarsened mass 5-7cm distal of previous ileo-colonic anastomosis on recent colonoscopy, currently c/o mild SOB, MANRIQUE, and episode of blood speckled cough, was found to have multiple randomly distributed bilateral solid lung nodules and micronodules with unchanged bilateral mediastinal hilar adenopathy on CT, c/f sarcoid vs lymphoproliferative disorder vs metastatic lung CA 2/2 primary colon CA.     Mediastinal adenopathy   - Patient with chronic mediastinal adenopathy. Her CT is significant for ground glass opacities, interlobular septal thickening and bilateral pleural effusions. All of these findings are likely attributable to her history of heart failure. Differential for mediastinal adenopathy includes granulomatous disease vs malignancy vs reactive. Given GI findings, we have been consulted to evaluate for possibility of metastatic disease. It would be atypical for colonic malignancy to metastasize to the mediastinum and bypass liver, abd lymph nodes, etc. We will f/u on the PET/CT to evaluate for FDG avidity.     Plan:   - EBUS scheduled for Thursday at 1 pm   - Please obtain COVID 19 swab 9/28. PTT, PT/INR for day of procedure.   - NPO at midnight 9/28  - Cardiac clearance for anesthesia.          82F Kinyarwanda speaking with history of colon CA s/p resection in 2019, ?COPD (non-compliant on Spiriva, Montelukast, previously used 2L home O2), MARK (non-compliant with CPAP), and extensive cardiac hx, admitted for suspected GI bleed and found to have friable, centrally ulcerated, coarsened mass 5-7cm distal of previous ileo-colonic anastomosis on recent colonoscopy, currently c/o mild SOB, MANRIQUE, and episode of blood speckled cough, was found to have multiple randomly distributed bilateral solid lung nodules and micronodules with unchanged bilateral mediastinal hilar adenopathy on CT, c/f sarcoid vs lymphoproliferative disorder vs metastatic lung CA 2/2 primary colon CA.     Mediastinal adenopathy   - Patient with chronic mediastinal adenopathy. Her CT is significant for ground glass opacities, interlobular septal thickening and bilateral pleural effusions. All of these findings are likely attributable to her history of heart failure. Differential for mediastinal adenopathy includes granulomatous disease vs malignancy vs reactive. Given GI findings, we have been consulted to evaluate for possibility of metastatic disease. It would be atypical for colonic malignancy to metastasize to the mediastinum and bypass liver, abd lymph nodes, etc. We will f/u on the PET/CT to evaluate for FDG avidity.     Plan:   - Initially planned for EBUS to evaluate mediastinal adenopathy but per primary team will not be pursuing further inpatient staging outside of her PET/CT   - Please arrange for follow up at discharge with Dr. Bere More for further evaluation of mediastinal lymph nodes     Pulmonary service will sign off, please reconsult with questions

## 2022-09-27 NOTE — PROGRESS NOTE ADULT - ASSESSMENT
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 Colorectal surgery consulted in setting of c/f GI bleed with known colorectal cancer history. Pt now s/p colonoscopy with GI on 9/22 with finding of a friable, centrally ulcerated, coarsened mass 5-7cm distal of previous ileo-colonic anastomosis that occupied 30% of the luminal circumference. Multiple cold forceps biopsies were obtained. Findings concerning for new primary colon cancer. Pt transfused with 1U PRBC on 9/25 for hb of 6.8. Pt doing well subjectively, continues to c/o SOB daily. Has bowel function. Per cardiology, pt high risk for intermediate risk procedure, will likely require cardiac anesthesia. CEA 1.8. Colonoscopy biopsy with findings of adenocarcinoma, at least intramucosal, with suggestion of deeper invasion. Tentative plan for surgery tomorrow pending further pre-operative work-up and goals of care discussion.     Recommendations:  F/u PET/CT this morning - additional recs pending findings  Team 1C will closely follow    Plan discussed with chief resident and Dr. 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 Colorectal surgery consulted in setting of c/f GI bleed with known colorectal cancer history. Pt now s/p colonoscopy with GI on 9/22 with finding of a friable, centrally ulcerated, coarsened mass 5-7cm distal of previous ileo-colonic anastomosis that occupied 30% of the luminal circumference. Multiple cold forceps biopsies were obtained. Findings concerning for new primary colon cancer. Pt transfused with 1U PRBC on 9/25 for hb of 6.8. Pt doing well subjectively, continues to c/o SOB daily. Has bowel function. Per cardiology, pt high risk for intermediate risk procedure, will likely require cardiac anesthesia. CEA 1.8. Colonoscopy biopsy with findings of adenocarcinoma, at least intramucosal, with suggestion of deeper invasion. Tentative plan for surgery tomorrow pending further pre-operative work-up and goals of care discussion.     Recommendations:  F/u PET/CT this morning - additional recs pending findings  Team 1C will closely follow    Plan discussed with chief resident and Dr. Sanford

## 2022-09-27 NOTE — PROGRESS NOTE ADULT - ASSESSMENT
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      Problem/Plan - 1:  ·  Problem: Colonic mass.   ·  Plan: New mass seen C-scope  -Tentative plan for OR on Wednesday 9/28 w/ CRS - Dr. Sanford  - Cardiac anesthesia recommended as per cardiology  - PET-CT ordered for staging  - Pulm consulted, appreciate recs    - 6% risk of MI or cardiac arrest intraoperative or post op 30 days as per Branch score.   - Class III risk  (10.1%) on RCRI scale  - Pt is high risk for intermediate risk surgery.    Problem/Plan - 2:  ·  Problem: Acute blood loss anemia.   ·  Plan: Patient with dark red stool, occasional BRBPR. Hemoglobin 7.9 on admission which is around baseline. Non tachycardic or hypotensive. 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 Of note, patient did state that she had blood transfusion 5 days prior to admission at [facility] for "anemia" but couldn't give any more details. Colonoscopy performed on 9/22; "Approximately 5-7 cm distal to the ileocolonic anastomosis, there was a friable, centrally ulcerated, coarsened mass that occupied 30% of the luminal circumference, concerning for malignancy. Multiple cold forceps biopsies were obtained." Dr. Sanford ([facility] surgery attending) assessed and updated pt, tentative for surgical intervention 9/28.   - Order pre-op labs and ensure active Covid swab for 9/28  - Continue to trend hemoglobin, keep HG > 7  - Restarted on DASH/TLC diet.    Problem/Plan - 3:  ·  Problem: Acute on chronic diastolic heart failure.   ·  Plan: Grade II diastolic dysfunction. On Lasix 20 PO daily outpatient, however held inpatient. Pt appears mildly overloaded on CT Chest performed to r/o mets.   - c/w Lasix 40 BID.    Problem/Plan - 4:  ·  Problem: E coli bacteremia.   ·  Plan: Pt with E coli and Gemella spp bacteremia from dental abscess. Refused extraction last visit, has not done it outpatient. On Ceftriaxone 2g daily until 9/26. Patient has jaw and neck pain without any crepitus on exam, no fluctuance or erythema/edema. Low concern for involvement of soft tissue involvement of head and neck, no airway compromise.   - Afebrile and without white count on admission   - Continued ceftriaxone 2g daily until 9/26, completed course.    Problem/Plan - 5:  ·  Problem: History of transcatheter aortic valve replacement (TAVR).   ·  Plan: History of TAVR with thrombosis in the past, currently on Eliquis 5mg BID.   - Holding Eliquis in the setting of possible GIB.    Problem/Plan - 6:  ·  Problem: HTN (hypertension).   ·  Plan: - Continue amlodipine  - Holding home lisinopril due to BIBIANA from last admission, patient was instructed to restart this medication with Dr. Marte      #HFpEF   - Grade II diastolic dysfunction. On Lasix 20 PO daily outpatient, however held inpatient. Pt appears mildly overloaded on CT Chest performed to r/o mets on 9/22.   - Given 40mg IVP Lasix on 9/23.    Problem/Plan - 7:  ·  Problem: COPD (chronic obstructive pulmonary disease).   ·  Plan: History of COPD, on spiriva and monteleukast.   - Continue home meds   - Patient currently on 2L NC saturating 99% - used O2 at home before but has not used in 2 years      #MARK:  - Attempt to restart CPAP at night.    Problem/Plan - 8:  ·  Problem: GERD (gastroesophageal reflux disease).   ·  Plan: Continue protonix 40mg BID for GIB.    Problem/Plan - 9:  ·  Problem: Hyperlipidemia.   ·  Plan: Continue home atorvastatin 20mg.    Problem/Plan - 10:  ·  Problem: Prophylactic measure.   ·  Plan; F: None  E: replete PRN   N: DASH/TLC  DVT: none i/s/o GI bleed  GI: protonix 40mg IV BID     Dispo: [facility]   Code status: full code.

## 2022-09-27 NOTE — PROGRESS NOTE ADULT - SUBJECTIVE AND OBJECTIVE BOX
PULMONARY CONSULT SERVICE FOLLOW-UP NOTE    INTERVAL HPI:  Reviewed chart and overnight events; patient seen and examined at bedside. No events overnight. Planned for PET/CT today. ROS unchanged.     MEDICATIONS:  Pulmonary:  benzonatate 100 milliGRAM(s) Oral three times a day PRN  montelukast 10 milliGRAM(s) Oral daily  tiotropium 18 MICROgram(s) Capsule 1 Capsule(s) Inhalation daily    Antimicrobials:    Anticoagulants:    Cardiac:  aMIOdarone    Tablet 200 milliGRAM(s) Oral daily  amLODIPine   Tablet 10 milliGRAM(s) Oral daily  furosemide   Injectable 40 milliGRAM(s) IV Push every 12 hours  isosorbide   mononitrate ER Tablet (IMDUR) 30 milliGRAM(s) Oral daily      Allergies    Digox (Rash; Urticaria; Hives)  Plavix (Other (Mod to Severe))    Intolerances        Vital Signs Last 24 Hrs  T(C): 36.9 (27 Sep 2022 08:55), Max: 37.2 (26 Sep 2022 16:15)  T(F): 98.4 (27 Sep 2022 08:55), Max: 98.9 (26 Sep 2022 16:15)  HR: 60 (27 Sep 2022 08:55) (60 - 81)  BP: 103/50 (27 Sep 2022 08:55) (103/50 - 143/67)  BP(mean): --  RR: 17 (27 Sep 2022 08:55) (17 - 19)  SpO2: 97% (27 Sep 2022 08:55) (93% - 99%)    Parameters below as of 27 Sep 2022 08:55  Patient On (Oxygen Delivery Method): nasal cannula  O2 Flow (L/min): 6 09-26 @ 07:01  -  09-27 @ 07:00  --------------------------------------------------------  IN: 0 mL / OUT: 50 mL / NET: -50 mL          PHYSICAL EXAM:  Constitutional: WD  HEENT: NC/AT; PERRL, anicteric sclera; MMM  Neck: supple, + jvd   Cardiovascular: +S1/S2, RRR  Respiratory: decreased breath sounds bibasilar, rales   Gastrointestinal: soft, NT/ND  Extremities: WWP; 1+ symmetric b/l edema, clubbing or cyanosis  Vascular: 2+ radial pulses B/L  Neurological: awake and alert; QUIROS    LABS:      CBC Full  -  ( 27 Sep 2022 09:44 )  WBC Count : 5.08 K/uL  RBC Count : 3.22 M/uL  Hemoglobin : 8.4 g/dL  Hematocrit : 27.1 %  Platelet Count - Automated : 222 K/uL  Mean Cell Volume : 84.2 fl  Mean Cell Hemoglobin : 26.1 pg  Mean Cell Hemoglobin Concentration : 31.0 gm/dL  Auto Neutrophil # : x  Auto Lymphocyte # : x  Auto Monocyte # : x  Auto Eosinophil # : x  Auto Basophil # : x  Auto Neutrophil % : x  Auto Lymphocyte % : x  Auto Monocyte % : x  Auto Eosinophil % : x  Auto Basophil % : x    09-27    134<L>  |  94<L>  |  13  ----------------------------<  104<H>  3.5   |  29  |  1.05    Ca    8.9      27 Sep 2022 09:44  Phos  4.3     09-27  Mg     2.1     09-27    TPro  6.9  /  Alb  4.0  /  TBili  0.6  /  DBili  x   /  AST  24  /  ALT  18  /  AlkPhos  61  09-26                      RADIOLOGY & ADDITIONAL STUDIES:

## 2022-09-27 NOTE — PROGRESS NOTE ADULT - SUBJECTIVE AND OBJECTIVE BOX
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 Patient was recently admitted under cardiology service from 9/4-9/13 for chest pain and headaches - found to incidentally have Ecoli bacteremia and Gemella spp. TTE, IRMA, whole body gallium scan were negative - source thought to be 2/2 infected tooth which patient refused to have extracted in house by OMFS. She was sent home with PICC line for IV abx (Ceftriaxone 2g daily until9/26). She has still not gotten her tooth extraction outpatient as she has been in the [facility], but has been getting the Ceftriaxone daily. For this admission, patient had a blood transfusion on 9/16 in the [facility], did not have any reaction to the transfusion. She started to develop bloody diarrhea after the transfusion and does not remember the details as to why exactly she got the transfusion. She also has chronic complaints including headache, jaw and neck pain, R hip pain, etc.     ED Course: afebrile, VSS, Hb 7.9 (8.1 at baseline), CT scan no active GI hemorrhage identified  Consults: surgery and GI   Meds: zofran, protonix, tylenol   EKG: sinus bradycardia to 59, no ischemia  (21 Sep 2022 14:06)                            8.4    5.08  )-----------( 222      ( 27 Sep 2022 09:44 )             27.1       09-27    134<L>  |  94<L>  |  13  ----------------------------<  104<H>  3.5   |  29  |  1.05    Ca    8.9      27 Sep 2022 09:44  Phos  4.3     09-27  Mg     2.1     09-27    TPro  6.9  /  Alb  4.0  /  TBili  0.6  /  DBili  x   /  AST  24  /  ALT  18  /  AlkPhos  61  09-26    Vital Signs Last 24 Hrs  T(C): 36.9 (27 Sep 2022 08:55), Max: 37.2 (26 Sep 2022 16:15)  T(F): 98.4 (27 Sep 2022 08:55), Max: 98.9 (26 Sep 2022 16:15)  HR: 61 (27 Sep 2022 12:49) (60 - 81)  BP: 125/71 (27 Sep 2022 12:49) (103/50 - 143/67)  BP(mean): --  RR: 17 (27 Sep 2022 08:55) (17 - 19)  SpO2: 97% (27 Sep 2022 08:55) (93% - 99%)    Parameters below as of 27 Sep 2022 08:55  Patient On (Oxygen Delivery Method): nasal cannula  O2 Flow (L/min): 6      MEDICATIONS  (STANDING):  aMIOdarone    Tablet 200 milliGRAM(s) Oral daily  amLODIPine   Tablet 10 milliGRAM(s) Oral daily  atorvastatin 20 milliGRAM(s) Oral at bedtime  chlorhexidine 2% Cloths 1 Application(s) Topical daily  folic acid 1 milliGRAM(s) Oral daily  furosemide   Injectable 40 milliGRAM(s) IV Push every 12 hours  isosorbide   mononitrate ER Tablet (IMDUR) 30 milliGRAM(s) Oral daily  lidocaine   4% Patch 1 Patch Transdermal every 24 hours  magnesium oxide 400 milliGRAM(s) Oral daily  montelukast 10 milliGRAM(s) Oral daily  multivitamin 1 Tablet(s) Oral daily  pantoprazole  Injectable 40 milliGRAM(s) IV Push every 12 hours  potassium chloride    Tablet ER 40 milliEquivalent(s) Oral daily  simethicone 80 milliGRAM(s) Chew two times a day  tiotropium 18 MICROgram(s) Capsule 1 Capsule(s) Inhalation daily    MEDICATIONS  (PRN):  acetaminophen     Tablet .. 650 milliGRAM(s) Oral every 6 hours PRN Temp greater or equal to 38C (100.4F), Mild Pain (1 - 3)  benzonatate 100 milliGRAM(s) Oral three times a day PRN Cough  LORazepam     Tablet 0.5 milliGRAM(s) Oral daily PRN Anxiety  ondansetron Injectable 4 milliGRAM(s) IV Push every 8 hours PRN Nausea and/or Vomiting  oxyCODONE    Solution 2.5 milliGRAM(s) Oral every 4 hours PRN Severe Pain (7 - 10)  polyethylene glycol 3350 17 Gram(s) Oral daily PRN Constipation       Physical Therapy Functional Status Assessment :       Cognitive/Neuro/Behavioral  Cognitive/Neuro/Behavioral [WDL Definition: Alert; opens eyes spontaneously; arouses to voice or touch; oriented x 4; follows commands; speech spontaneous, logical; purposeful motor response; behavior appropriate to situation]: WDL    Language Assistance  Preferred Language to Address Healthcare Preferred Language to Address Healthcare: [place]ill[place]  's name: Andrés   Patient/Caregiver offered   services: yes  Patient/Caregiver accepted  services: yes  Date/Time of acceptance(dd-mmm-yyyy hh:mm): 26-Sep-2022 17:15    ID: [id number]     Musculoskeletal      Musculoskeletal  Musculoskeletal [WDL Definition: No observed or reported muscle weakness, joint swelling or tenderness; all extremities with symmetrical movement bilaterally]: WDL except  General Mobility: generalized weakness;  mobility appropriate for age  Joint Tenderness: no tenderness  Joint Swelling: no swelling  Extremity Movement: full movement of all extremities    Therapeutic Interventions      Sit-Stand Transfer Training  Sit-to-Stand Transfer Training Rehab Potential: good, to achieve stated therapy goals  Sit-to-Stand Transfer Training Symptoms Noted During/After Treatment: none  Transfer Training Sit-to-Stand Transfer: contact guard;  1 person assist;  full weight-bearing   with and without RW  Transfer Training Stand-to-Sit Transfer: contact guard;  1 person assist;  full weight-bearing   with and without RW  Sit-to-Stand Transfer Training Transfer Safety Analysis: decreased strength;  reduced cardiopulm endurance    Gait Training  Gait Training Rehab Potential: good, to achieve stated therapy goals  Gait Training Symptoms Noted During/After Treatment: shortness of breath  Gait Training: contact guard;  1 person assist;  full weight-bearing   20 feet  Gait Analysis: decreased bhavya;  wide BHAKTI;  decreased step length;  decreased strength;  reduced cardiopulm endurance;  20 ft    Functional Endurance  Functional Endurance Rehab Effort: good  Functional Endurance Symptoms Noted During/After Treatment: shortness of breath  Functional Endurance Detail: Patient performed the following activities to improve functional endurance with DBE, while O2-sat being continuously monitored throughout session:- 4x5 STS training (first 2 sets performed with RW; last 2 sets performed without RW and no use of UEs for support) = O2-sating 90-93% throughout on RA- gait training 20ft total without AD and CGA (O2 satting at 91%, and desat to 87% towards last ~2-3ft of ambulation on RA, some mild SOB reported; O2 recovered to 94% while sitting at EOB for ~1 min with DBE)             PM&R Impression : as above    Current Disposition Plan Recommendations :    subacute rehab placement

## 2022-09-27 NOTE — DIETITIAN INITIAL EVALUATION ADULT - OTHER CALCULATIONS
5'5''  pounds +-10%  Wt 195 pounds, BMI 32.4, %PQS998  IBW used to calculate energy needs due to pt's current body weight exceeding 120% of IBW   Adjusted for age possible CA, CHF, Possible OR; fluids per team

## 2022-09-27 NOTE — DIETITIAN INITIAL EVALUATION ADULT - PERTINENT MEDS FT
MEDICATIONS  (STANDING):  aMIOdarone    Tablet 200 milliGRAM(s) Oral daily  amLODIPine   Tablet 10 milliGRAM(s) Oral daily  atorvastatin 20 milliGRAM(s) Oral at bedtime  chlorhexidine 2% Cloths 1 Application(s) Topical daily  folic acid 1 milliGRAM(s) Oral daily  furosemide   Injectable 40 milliGRAM(s) IV Push every 12 hours  isosorbide   mononitrate ER Tablet (IMDUR) 30 milliGRAM(s) Oral daily  lidocaine   4% Patch 1 Patch Transdermal every 24 hours  magnesium oxide 400 milliGRAM(s) Oral daily  montelukast 10 milliGRAM(s) Oral daily  multivitamin 1 Tablet(s) Oral daily  pantoprazole  Injectable 40 milliGRAM(s) IV Push every 12 hours  potassium chloride    Tablet ER 40 milliEquivalent(s) Oral daily  simethicone 80 milliGRAM(s) Chew two times a day  tiotropium 18 MICROgram(s) Capsule 1 Capsule(s) Inhalation daily    MEDICATIONS  (PRN):  acetaminophen     Tablet .. 650 milliGRAM(s) Oral every 6 hours PRN Temp greater or equal to 38C (100.4F), Mild Pain (1 - 3)  benzonatate 100 milliGRAM(s) Oral three times a day PRN Cough  LORazepam     Tablet 0.5 milliGRAM(s) Oral daily PRN Anxiety  ondansetron Injectable 4 milliGRAM(s) IV Push every 8 hours PRN Nausea and/or Vomiting  oxyCODONE    Solution 2.5 milliGRAM(s) Oral every 4 hours PRN Severe Pain (7 - 10)  polyethylene glycol 3350 17 Gram(s) Oral daily PRN Constipation

## 2022-09-27 NOTE — PROGRESS NOTE ADULT - SUBJECTIVE AND OBJECTIVE BOX
Hematology Oncology Progress Note        INTERVAL Hx: Doing OK. No significant abdominal pain. 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 Patient was recently admitted under cardiology service from 9/4-9/13 for chest pain and headaches - found to incidentally have Ecoli bacteremia and Gemella spp. TTE, IRMA, whole body gallium scan were negative - source thought to be 2/2 infected tooth which patient refused to have extracted in house by OMFS. She was sent home with PICC line for IV abx (Ceftriaxone 2g daily until9/26). She has still not gotten her tooth extraction outpatient as she has been in the [facility], but has been getting the Ceftriaxone daily. For this admission, patient had a blood transfusion on 9/16 in the [facility], did not have any reaction to the transfusion. She started to develop bloody diarrhea after the transfusion and does not remember the details as to why exactly she got the transfusion. She also has chronic complaints including headache, jaw and neck pain, R hip pain, etc.     ED Course: afebrile, VSS, Hb 7.9 (8.1 at baseline), CT scan no active GI hemorrhage identified  Consults: surgery and GI   Meds: zofran, protonix, tylenol   EKG: sinus bradycardia to 59, no ischemia  (21 Sep 2022 14:06)      OBJECTIVE:  Vital Signs Last 24 Hrs  T(C): 36.8 (27 Sep 2022 05:20), Max: 37.2 (26 Sep 2022 16:15)  T(F): 98.3 (27 Sep 2022 05:20), Max: 98.9 (26 Sep 2022 16:15)  HR: 68 (27 Sep 2022 05:20) (65 - 81)  BP: 135/62 (27 Sep 2022 05:20) (121/69 - 143/67)  BP(mean): --  RR: 19 (27 Sep 2022 05:20) (18 - 19)  SpO2: 96% (27 Sep 2022 05:20) (93% - 99%)    Parameters below as of 27 Sep 2022 05:20  Patient On (Oxygen Delivery Method): room air      PHYSICAL EXAM:  General: obese, chronically ill appearing, non toxic, answering questions  HEENT: NC/AT; PERRL, clear conjunctiva  Neck: supple  Respiratory: CTA b/l  Cardiovascular: +S1/S2; RRR  Abdomen: soft, NT/ND; +BS x4  Extremities: WWP, 2+ peripheral pulses b/l; no LE edema  Skin: normal color and turgor; no rash  Neurological:     MEDICATIONS:  MEDICATIONS  (STANDING):  aMIOdarone    Tablet 200 milliGRAM(s) Oral daily  amLODIPine   Tablet 10 milliGRAM(s) Oral daily  atorvastatin 20 milliGRAM(s) Oral at bedtime  chlorhexidine 2% Cloths 1 Application(s) Topical daily  folic acid 1 milliGRAM(s) Oral daily  furosemide   Injectable 40 milliGRAM(s) IV Push every 12 hours  isosorbide   mononitrate ER Tablet (IMDUR) 30 milliGRAM(s) Oral daily  lidocaine   4% Patch 1 Patch Transdermal every 24 hours  magnesium oxide 400 milliGRAM(s) Oral daily  montelukast 10 milliGRAM(s) Oral daily  multivitamin 1 Tablet(s) Oral daily  pantoprazole  Injectable 40 milliGRAM(s) IV Push every 12 hours  potassium chloride    Tablet ER 40 milliEquivalent(s) Oral daily  simethicone 80 milliGRAM(s) Chew two times a day  tiotropium 18 MICROgram(s) Capsule 1 Capsule(s) Inhalation daily    MEDICATIONS  (PRN):  acetaminophen     Tablet .. 650 milliGRAM(s) Oral every 6 hours PRN Temp greater or equal to 38C (100.4F), Mild Pain (1 - 3)  benzonatate 100 milliGRAM(s) Oral three times a day PRN Cough  LORazepam     Tablet 0.5 milliGRAM(s) Oral daily PRN Anxiety  ondansetron Injectable 4 milliGRAM(s) IV Push every 8 hours PRN Nausea and/or Vomiting  oxyCODONE    Solution 2.5 milliGRAM(s) Oral every 4 hours PRN Severe Pain (7 - 10)  polyethylene glycol 3350 17 Gram(s) Oral daily PRN Constipation      ALLERGIES:  Allergies    Digox (Rash; Urticaria; Hives)  Plavix (Other (Mod to Severe))    Intolerances        LABS:                           8.0    5.43  )-----------( 210      ( 26 Sep 2022 09:34 )             25.7         CBC Full  -  ( 26 Sep 2022 09:34 )  WBC Count : 5.43 K/uL  RBC Count : 3.06 M/uL  Hemoglobin : 8.0 g/dL  Hematocrit : 25.7 %  Platelet Count - Automated : 210 K/uL  Mean Cell Volume : 84.0 fl  Mean Cell Hemoglobin : 26.1 pg  Mean Cell Hemoglobin Concentration : 31.1 gm/dL  Auto Neutrophil # : x  Auto Lymphocyte # : x  Auto Monocyte # : x  Auto Eosinophil # : x  Auto Basophil # : x  Auto Neutrophil % : x  Auto Lymphocyte % : x  Auto Monocyte % : x  Auto Eosinophil % : x  Auto Basophil % : x    09-26    134<L>  |  96  |  10  ----------------------------<  101<H>  3.6   |  28  |  1.08    Ca    8.8      26 Sep 2022 09:34  Phos  3.7     09-26  Mg     2.0     09-26    TPro  6.9  /  Alb  4.0  /  TBili  0.6  /  DBili  x   /  AST  24  /  ALT  18  /  AlkPhos  61  09-26                        RADIOLOGY & ADDITIONAL TESTS: Reviewed.

## 2022-09-27 NOTE — PROGRESS NOTE ADULT - ATTENDING COMMENTS
Patient was seen and examined at bedside on 9/27/2022 at 830 am. Patient has no acute complaints. Denies SOB, CP. ROS is otherwise negative. Vitals, labwork and pertinent imaging reviewed. Physical exam - NAD, AAO x 4, PERRLA, EOMI, MMM, supple neck, chest - CTA b/l, CV - rrr, s1s2, no m/r/g, abd - soft, NTND, + BS, ext - wwp, psych - normal affect, skin - no rash    Plan  -Pt s/p EGD showing mass  -CRS and Heme/Onc consulted   -C/w Lasix as per cardiology recs  -PET scan showed uptake in the lung, plan for EBUS on 9/29 - may need ICU/tele after  -Cardiology clearance/optimization  -Plan for d/c to [facility] after

## 2022-09-27 NOTE — PROGRESS NOTE ADULT - PROBLEM SELECTOR PLAN 3
Grade II diastolic dysfunction. On Lasix 20 PO daily outpatient, however held inpatient. Pt appears mildly overloaded on CT Chest performed to r/o mets.   - c/w Lasix 40 BID  - 1 dose Metolazone 5mg given 9/27 30 minutes before PM dose of Lasix as per card recs  - Strict Is and Os as per card recs      #Hx pafib on Amiodarone and Eliquis  - C/w Amiodarone 200mg Qd   - Restarted Home Eliquis 5mg BID as per card recs as plan is no longer for colorectal resection

## 2022-09-27 NOTE — PROGRESS NOTE ADULT - ATTENDING COMMENTS
Initial attending contact date  9/21/22    . See resident note written above for details. I reviewed the resident documentation. I have personally seen and examined this patient. I reviewed vitals, labs, medications, cardiac studies, and additional imaging. I agree with the above residnet's findings and plans as written above with the following additions/statements.    -Pt well known to cardiac service with mx co-morbidities as noted   -Cardiology consulted for pre-op risk assessment  -Recent ECHO with normal LVEF, grade II DD, known severe prosthetic AV stenosis   -now s/p colonoscopy with possible recurrence, further plan including OR per primary team  -Noted be fluid overloaded  with inaccurate Is and Os  -Pls plave primafit  -metolazone 5 mg po x1 30 mins prior to pm IV lasix dose  -Cont amio, resume home eliquis if no plan for OR  -Cont amlodipine 10 mg qd, imdur 30 mg qd for BP control .

## 2022-09-27 NOTE — PROGRESS NOTE ADULT - ATTENDING COMMENTS
CRS Attending  PET scan reviewed and discussed with patient in detail.  Given concern for metastatic disease, do not recommend surgical resection of colon cancer.  Continue supportive care.  Discussed also with Dr Mckeon and Dr Hartley.  Bertram following for possible biopsy.  Discussed with patient's daughter Jaelyn as well over the phone.  All questions answered, she expressed understanding.   Language Line Andrés #[id number] used for interpretation (50 minutes) ACC: [id number] EXAM:  PETCT [facility] ONC FDG SUBS                          PROCEDURE DATE:  09/27/2022          INTERPRETATION:  18F-FDG PET/CT    INDICATION: Colon cancer status post resection 2019 with recurrence noted   after colonoscopy this admission. Subsequent restaging study.    COMPARISON: CT chest 9/19/2021; CT angiogram chest and pelvis 1/14/2022;   CT chest abdomen pelvis 9/16/2022    TECHNIQUE: 11.9 mCi of F-18 FDG administered intravenously. After an   approximately 1 hour incubation time, PET images were obtained from skull   base to mid thigh with arms up. Axial CT images were obtained for   localization and attenuation correction only. Sagittal and coronal   reformatted images were obtained.    FINDINGS:  Head and neck: Physiologic uptake is noted in the salivary glands,   oropharynx, larynx, and thyroid. No hypermetabolic cervical or   supraclavicular lymphadenopathy.    Chest: 2.3 cm area of increased uptake in an area of groundglass opacity   at the posterior lingula. Mosaic attenuation of the lungs with associated   mild, diffuse uptake. Small bilateral pleural effusions with mild   associated uptake. Hypermetabolic mediastinal lymph nodes involving   paratracheal, periaortic, aortopulmonary, subcarinal and paraesophageal   regions, representative of which is a 1.3 cm aortopulmonary node, SUV max   12.4. Hypermetabolic bilateral hilar nodes. FDG avid subcentimeter soft   tissue nodule in the upper inner quadrant of the right breast. Status   post endovascular aortic valve replacement. Inflammatory uptake is noted   in the left axillary vein, most likely related to a midline catheter. No   hypermetabolic axillary lymphadenopathy.    Abdomen and pelvis: Status post right hemicolectomy. Focal activity   measuring 3.2 cm in length at the anterior mid transverse colon (axial   image 119), SUV max 11.9, without a definite CT correlate, most likely   corresponding to the lesion found on recent colonoscopy. Small umbilical   hernia containing small bowel. No evidence of bowel obstruction. Small   cysts in the right kidney. Physiologic uptake is noted in the liver,   spleen, pancreas, bowel, adrenal glands, and urinary collecting system.   No hypermetabolic lymphadenopathy.    Musculoskeletal: No abnormal foci of uptake in the osseous structures.   Wedge-shaped photopenia at the posterior aspect of L3-L4 without a CT   correlate. Moderate degenerative changes of the spine.      IMPRESSION:  1. Hypermetabolic mediastinal and bilateral hilar lymph nodes, SUV max   12.4 at an enlarged aortopulmonary node, most likely representing   metastatic disease.  2. Focal activity at the anterior mid transverse colon, SUV max 11.9,   most likely corresponding to the malignant lesion found on recent   colonoscopy. Status post right hemicolectomy.  3. 2.3 cm area of increased uptake in an area of groundglass opacity at   the posterior lingula, most likely representing inflammatory uptake,   although malignant involvement is difficult to exclude. Mosaic   attenuation of the lungs, most likely representing air trapping, edema,   or small airway disease.  4. Small bilateral pleural effusions with mild associated uptake.  5. FDG avid subcentimeter soft tissue nodule in the upper inner quadrant   of the right breast, possibly representing metastatic disease.  6. Wedge-shaped area of photopenia at the posterior aspect of L3-L4   without a CT correlate, possibly due to asymmetric degenerative changes.   Correlate clinically.    --- End of Report ---    YEVGENIY STEVEN MD; Attending Radiologist  This document has been electronically signed. Sep 27 2022  2:00PM      ****      CRS Attending  PET scan reviewed and discussed with patient in detail.  Given concern for metastatic disease, do not recommend surgical resection of colon cancer.  Continue supportive care.  Discussed also with Dr Mckeon and Dr Hartley.  Pulm following for possible biopsy.  Discussed with patient's daughter Jaelyn as well over the phone.  All questions answered, she expressed understanding.   Language Line Andrés #[id number] used for interpretation (50 minutes)

## 2022-09-27 NOTE — PROGRESS NOTE ADULT - PROBLEM SELECTOR PLAN 1
New mass seen C-scope  -Tentative plan for OR on Wednesday 9/28 w/ CRS - Dr. Sanford  - Cardiac anesthesia recommended as per cardiology  - PET-CT performed for staging, results indicate likely metastatic spread to mediastinal and b/l hilar lymph nodes.   - Current plan is for outpt f/u with heme onc and pulmonology for further prognostication and potential systemic therapy

## 2022-09-27 NOTE — PROGRESS NOTE ADULT - ASSESSMENT
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

## 2022-09-27 NOTE — PROGRESS NOTE ADULT - SUBJECTIVE AND OBJECTIVE BOX
SUBJECTIVE: Patient seen and examined bedside. Pt reports feeling well this morning with no acute complaints. Denies abdominal pain, nausea, or vomiting. Passing flatus and having BMs, but continues to be unsure if they are bloody.    aMIOdarone    Tablet 200 milliGRAM(s) Oral daily  amLODIPine   Tablet 10 milliGRAM(s) Oral daily  furosemide   Injectable 40 milliGRAM(s) IV Push every 12 hours  isosorbide   mononitrate ER Tablet (IMDUR) 30 milliGRAM(s) Oral daily      Vital Signs Last 24 Hrs  T(C): 36.9 (27 Sep 2022 08:55), Max: 37.2 (26 Sep 2022 16:15)  T(F): 98.4 (27 Sep 2022 08:55), Max: 98.9 (26 Sep 2022 16:15)  HR: 60 (27 Sep 2022 08:55) (60 - 81)  BP: 103/50 (27 Sep 2022 08:55) (103/50 - 143/67)  BP(mean): --  RR: 17 (27 Sep 2022 08:55) (17 - 19)  SpO2: 97% (27 Sep 2022 08:55) (93% - 99%)    Parameters below as of 27 Sep 2022 08:55  Patient On (Oxygen Delivery Method): nasal cannula  O2 Flow (L/min): 6    I&O's Detail    26 Sep 2022 07:01  -  27 Sep 2022 07:00  --------------------------------------------------------  IN:  Total IN: 0 mL    OUT:    Voided (mL): 50 mL  Total OUT: 50 mL    Total NET: -50 mL          General: NAD, resting comfortably in bed  C/V: NSR  Pulm: Nonlabored breathing, no respiratory distress  Abd: soft, protuberant, nondistended, nontender, no rebound, no guarding  Extrem: WWP, no edema, SCDs in place        LABS:                        8.0    5.43  )-----------( 210      ( 26 Sep 2022 09:34 )             25.7     09-26    134<L>  |  96  |  10  ----------------------------<  101<H>  3.6   |  28  |  1.08    Ca    8.8      26 Sep 2022 09:34  Phos  3.7     09-26  Mg     2.0     09-26    TPro  6.9  /  Alb  4.0  /  TBili  0.6  /  DBili  x   /  AST  24  /  ALT  18  /  AlkPhos  61  09-26          RADIOLOGY & ADDITIONAL STUDIES:

## 2022-09-27 NOTE — PROGRESS NOTE ADULT - PROBLEM SELECTOR PLAN 2
Patient with dark red stool, occasional BRBPR. Hemoglobin 7.9 on admission which is around baseline. Non tachycardic or hypotensive. 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 Of note, patient did state that she had blood transfusion 5 days prior to admission at [facility] for "anemia" but couldn't give any more details. Colonoscopy performed on 9/22; "Approximately 5-7 cm distal to the ileocolonic anastomosis, there was a friable, centrally ulcerated, coarsened mass that occupied 30% of the luminal circumference, concerning for malignancy. Multiple cold forceps biopsies were obtained." Dr. Sanford ([facility] surgery attending) assessed and updated pt, tentative for surgical intervention 9/28.   - Order pre-op labs and ensure active Covid swab for 9/28  - Continue to trend hemoglobin, keep HG > 7  - Restarted on low sodium diet with low fiber, with 1 ensure per day

## 2022-09-27 NOTE — DIETITIAN INITIAL EVALUATION ADULT - ADD RECOMMEND
1. Monitor PO intake/appetite, diet tolerance.   2. Pain/GI per team. Monitor Skin, Wts, GOC.  3 RD to remain available for additional nutrition interventions as needed.

## 2022-09-28 ENCOUNTER — APPOINTMENT (OUTPATIENT)
Dept: COLORECTAL SURGERY | Facility: HOSPITAL | Age: 82
End: 2022-09-28

## 2022-09-28 DIAGNOSIS — J96.01 ACUTE RESPIRATORY FAILURE WITH HYPOXIA: ICD-10-CM

## 2022-09-28 DIAGNOSIS — R91.1 SOLITARY PULMONARY NODULE: ICD-10-CM

## 2022-09-28 DIAGNOSIS — R59.0 LOCALIZED ENLARGED LYMPH NODES: ICD-10-CM

## 2022-09-28 DIAGNOSIS — J44.9 CHRONIC OBSTRUCTIVE PULMONARY DISEASE, UNSPECIFIED: ICD-10-CM

## 2022-09-28 DIAGNOSIS — G47.33 OBSTRUCTIVE SLEEP APNEA (ADULT) (PEDIATRIC): ICD-10-CM

## 2022-09-28 LAB
ANION GAP SERPL CALC-SCNC: 16 MMOL/L — SIGNIFICANT CHANGE UP (ref 5–17)
BUN SERPL-MCNC: 16 MG/DL — SIGNIFICANT CHANGE UP (ref 7–23)
CALCIUM SERPL-MCNC: 9 MG/DL — SIGNIFICANT CHANGE UP (ref 8.4–10.5)
CHLORIDE SERPL-SCNC: 98 MMOL/L — SIGNIFICANT CHANGE UP (ref 96–108)
CO2 SERPL-SCNC: 25 MMOL/L — SIGNIFICANT CHANGE UP (ref 22–31)
CREAT SERPL-MCNC: 1.09 MG/DL — SIGNIFICANT CHANGE UP (ref 0.5–1.3)
CREATININE, URINE RESULT: 21 MG/DL — SIGNIFICANT CHANGE UP
EGFR: 50 ML/MIN/1.73M2 — LOW
GLUCOSE SERPL-MCNC: 100 MG/DL — HIGH (ref 70–99)
HCT VFR BLD CALC: 27.3 % — LOW (ref 34.5–45)
HGB BLD-MCNC: 8.2 G/DL — LOW (ref 11.5–15.5)
MAGNESIUM SERPL-MCNC: 2.2 MG/DL — SIGNIFICANT CHANGE UP (ref 1.6–2.6)
MCHC RBC-ENTMCNC: 25.6 PG — LOW (ref 27–34)
MCHC RBC-ENTMCNC: 30 GM/DL — LOW (ref 32–36)
MCV RBC AUTO: 85.3 FL — SIGNIFICANT CHANGE UP (ref 80–100)
NRBC # BLD: 0 /100 WBCS — SIGNIFICANT CHANGE UP (ref 0–0)
PHOSPHATE SERPL-MCNC: 4 MG/DL — SIGNIFICANT CHANGE UP (ref 2.5–4.5)
PLATELET # BLD AUTO: 236 K/UL — SIGNIFICANT CHANGE UP (ref 150–400)
POTASSIUM SERPL-MCNC: 4.1 MMOL/L — SIGNIFICANT CHANGE UP (ref 3.5–5.3)
POTASSIUM SERPL-SCNC: 4.1 MMOL/L — SIGNIFICANT CHANGE UP (ref 3.5–5.3)
PROT ?TM UR-MCNC: <4 MG/DL — SIGNIFICANT CHANGE UP (ref 0–12)
RBC # BLD: 3.2 M/UL — LOW (ref 3.8–5.2)
RBC # FLD: 15.5 % — HIGH (ref 10.3–14.5)
SODIUM SERPL-SCNC: 139 MMOL/L — SIGNIFICANT CHANGE UP (ref 135–145)
WBC # BLD: 4.9 K/UL — SIGNIFICANT CHANGE UP (ref 3.8–10.5)
WBC # FLD AUTO: 4.9 K/UL — SIGNIFICANT CHANGE UP (ref 3.8–10.5)

## 2022-09-28 PROCEDURE — 99232 SBSQ HOSP IP/OBS MODERATE 35: CPT | Mod: GC

## 2022-09-28 PROCEDURE — 99233 SBSQ HOSP IP/OBS HIGH 50: CPT

## 2022-09-28 PROCEDURE — 99233 SBSQ HOSP IP/OBS HIGH 50: CPT | Mod: GC

## 2022-09-28 RX ORDER — ENOXAPARIN SODIUM 100 MG/ML
80 INJECTION SUBCUTANEOUS ONCE
Refills: 0 | Status: COMPLETED | OUTPATIENT
Start: 2022-09-28 | End: 2022-09-28

## 2022-09-28 RX ORDER — METOPROLOL TARTRATE 50 MG
12.5 TABLET ORAL DAILY
Refills: 0 | Status: DISCONTINUED | OUTPATIENT
Start: 2022-09-28 | End: 2022-09-29

## 2022-09-28 RX ADMIN — TIOTROPIUM BROMIDE 1 CAPSULE(S): 18 CAPSULE ORAL; RESPIRATORY (INHALATION) at 12:20

## 2022-09-28 RX ADMIN — PANTOPRAZOLE SODIUM 40 MILLIGRAM(S): 20 TABLET, DELAYED RELEASE ORAL at 06:10

## 2022-09-28 RX ADMIN — Medication 1 TABLET(S): at 12:21

## 2022-09-28 RX ADMIN — MAGNESIUM OXIDE 400 MG ORAL TABLET 400 MILLIGRAM(S): 241.3 TABLET ORAL at 12:20

## 2022-09-28 RX ADMIN — Medication 40 MILLIEQUIVALENT(S): at 12:19

## 2022-09-28 RX ADMIN — SIMETHICONE 80 MILLIGRAM(S): 80 TABLET, CHEWABLE ORAL at 18:55

## 2022-09-28 RX ADMIN — ISOSORBIDE MONONITRATE 30 MILLIGRAM(S): 60 TABLET, EXTENDED RELEASE ORAL at 12:21

## 2022-09-28 RX ADMIN — MONTELUKAST 10 MILLIGRAM(S): 4 TABLET, CHEWABLE ORAL at 12:21

## 2022-09-28 RX ADMIN — LIDOCAINE 1 PATCH: 4 CREAM TOPICAL at 19:23

## 2022-09-28 RX ADMIN — LIDOCAINE 1 PATCH: 4 CREAM TOPICAL at 01:44

## 2022-09-28 RX ADMIN — LIDOCAINE 1 PATCH: 4 CREAM TOPICAL at 15:42

## 2022-09-28 RX ADMIN — CHLORHEXIDINE GLUCONATE 1 APPLICATION(S): 213 SOLUTION TOPICAL at 12:19

## 2022-09-28 RX ADMIN — Medication 1 MILLIGRAM(S): at 12:20

## 2022-09-28 RX ADMIN — ATORVASTATIN CALCIUM 20 MILLIGRAM(S): 80 TABLET, FILM COATED ORAL at 22:31

## 2022-09-28 RX ADMIN — ENOXAPARIN SODIUM 80 MILLIGRAM(S): 100 INJECTION SUBCUTANEOUS at 17:26

## 2022-09-28 RX ADMIN — PANTOPRAZOLE SODIUM 40 MILLIGRAM(S): 20 TABLET, DELAYED RELEASE ORAL at 18:54

## 2022-09-28 RX ADMIN — SIMETHICONE 80 MILLIGRAM(S): 80 TABLET, CHEWABLE ORAL at 06:10

## 2022-09-28 NOTE — PROGRESS NOTE ADULT - PROBLEM SELECTOR PROBLEM 9
Hyperlipidemia
GERD (gastroesophageal reflux disease)
Hyperlipidemia

## 2022-09-28 NOTE — PROGRESS NOTE ADULT - SUBJECTIVE AND OBJECTIVE BOX
Hematology Oncology Progress Note        INTERVAL Hx: Doing OK. No abdominal pain. PET/CT completed, significant SUV uptake in mediastinal/hilar lymph nodes. The patient is hesitant about any additional work up and wants to go home.  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 Patient was recently admitted under cardiology service from 9/4-9/13 for chest pain and headaches - found to incidentally have Ecoli bacteremia and Gemella spp. TTE, IRMA, whole body gallium scan were negative - source thought to be 2/2 infected tooth which patient refused to have extracted in house by OMFS. She was sent home with PICC line for IV abx (Ceftriaxone 2g daily until9/26). She has still not gotten her tooth extraction outpatient as she has been in the [facility], but has been getting the Ceftriaxone daily. For this admission, patient had a blood transfusion on 9/16 in the [facility], did not have any reaction to the transfusion. She started to develop bloody diarrhea after the transfusion and does not remember the details as to why exactly she got the transfusion. She also has chronic complaints including headache, jaw and neck pain, R hip pain, etc.     ED Course: afebrile, VSS, Hb 7.9 (8.1 at baseline), CT scan no active GI hemorrhage identified  Consults: surgery and GI   Meds: zofran, protonix, tylenol   EKG: sinus bradycardia to 59, no ischemia  (21 Sep 2022 14:06)      OBJECTIVE:  Vital Signs Last 24 Hrs  T(C): 37.1 (28 Sep 2022 05:05), Max: 37.1 (27 Sep 2022 21:12)  T(F): 98.8 (28 Sep 2022 05:05), Max: 98.8 (28 Sep 2022 05:05)  HR: 60 (28 Sep 2022 06:50) (58 - 71)  BP: 134/61 (28 Sep 2022 05:05) (103/50 - 134/61)  BP(mean): --  RR: 17 (28 Sep 2022 06:50) (17 - 18)  SpO2: 100% (28 Sep 2022 06:50) (96% - 100%)    Parameters below as of 28 Sep 2022 06:50  Patient On (Oxygen Delivery Method): nasal cannula  O2 Flow (L/min): 5    PHYSICAL EXAM:  General: obese, chronically ill appearing, non toxic, answering questions  HEENT: NC/AT; PERRL, clear conjunctiva  Neck: supple  Respiratory: CTA b/l  Cardiovascular: +S1/S2; RRR  Abdomen: soft, NT/ND; +BS x4  Extremities: WWP, 2+ peripheral pulses b/l; no LE edema  Skin: normal color and turgor; no rash  Neurological:     MEDICATIONS:  MEDICATIONS  (STANDING):  aMIOdarone    Tablet 200 milliGRAM(s) Oral daily  amLODIPine   Tablet 10 milliGRAM(s) Oral daily  atorvastatin 20 milliGRAM(s) Oral at bedtime  chlorhexidine 2% Cloths 1 Application(s) Topical daily  folic acid 1 milliGRAM(s) Oral daily  furosemide   Injectable 40 milliGRAM(s) IV Push every 12 hours  isosorbide   mononitrate ER Tablet (IMDUR) 30 milliGRAM(s) Oral daily  lidocaine   4% Patch 1 Patch Transdermal every 24 hours  magnesium oxide 400 milliGRAM(s) Oral daily  montelukast 10 milliGRAM(s) Oral daily  multivitamin 1 Tablet(s) Oral daily  pantoprazole  Injectable 40 milliGRAM(s) IV Push every 12 hours  potassium chloride    Tablet ER 40 milliEquivalent(s) Oral daily  simethicone 80 milliGRAM(s) Chew two times a day  tiotropium 18 MICROgram(s) Capsule 1 Capsule(s) Inhalation daily    MEDICATIONS  (PRN):  acetaminophen     Tablet .. 650 milliGRAM(s) Oral every 6 hours PRN Temp greater or equal to 38C (100.4F), Mild Pain (1 - 3)  benzonatate 100 milliGRAM(s) Oral three times a day PRN Cough  LORazepam     Tablet 0.5 milliGRAM(s) Oral daily PRN Anxiety  ondansetron Injectable 4 milliGRAM(s) IV Push every 8 hours PRN Nausea and/or Vomiting  oxyCODONE    Solution 2.5 milliGRAM(s) Oral every 4 hours PRN Severe Pain (7 - 10)  polyethylene glycol 3350 17 Gram(s) Oral daily PRN Constipation      ALLERGIES:  Allergies    Digox (Rash; Urticaria; Hives)  Plavix (Other (Mod to Severe))    Intolerances        LABS:                           8.4    5.08  )-----------( 222      ( 27 Sep 2022 09:44 )             27.1         CBC Full  -  ( 27 Sep 2022 09:44 )  WBC Count : 5.08 K/uL  RBC Count : 3.22 M/uL  Hemoglobin : 8.4 g/dL  Hematocrit : 27.1 %  Platelet Count - Automated : 222 K/uL  Mean Cell Volume : 84.2 fl  Mean Cell Hemoglobin : 26.1 pg  Mean Cell Hemoglobin Concentration : 31.0 gm/dL  Auto Neutrophil # : x  Auto Lymphocyte # : x  Auto Monocyte # : x  Auto Eosinophil # : x  Auto Basophil # : x  Auto Neutrophil % : x  Auto Lymphocyte % : x  Auto Monocyte % : x  Auto Eosinophil % : x  Auto Basophil % : x        09-27    134<L>  |  94<L>  |  13  ----------------------------<  104<H>  3.5   |  29  |  1.05    Ca    8.9      27 Sep 2022 09:44  Phos  4.3     09-27  Mg     2.1     09-27    TPro  6.5  /  Alb  x   /  TBili  x   /  DBili  x   /  AST  x   /  ALT  x   /  AlkPhos  x   09-27          RADIOLOGY & ADDITIONAL TESTS: Reviewed.

## 2022-09-28 NOTE — PROGRESS NOTE ADULT - PROBLEM SELECTOR PLAN 2
Patient with a history of mutiple solid, subsolid pulmonary nodules which have been followed over time with multiple CTs. Some of these nodules have demonstrated FDG avidity in the past. She again has an area of ground glass opacity with FDG avidity noted on this recent PET. Differential for these nodules include infectious inflammatory versus malignant (metastasis vs primary).     Plan:   - BAL tomorrow

## 2022-09-28 NOTE — PROGRESS NOTE ADULT - PROBLEM SELECTOR PLAN 3
- suspect due primarily to decompensated heart failure given interlobular septal thickening, ground glass opacities and bilateral effusions. There is no evidence of exacerbation in her obstructive lung disease on exam. There is no emphysema noted on her CTs.

## 2022-09-28 NOTE — PROGRESS NOTE ADULT - PROBLEM SELECTOR PLAN 1
New mass seen C-scope  -Tentative plan for OR on Wednesday 9/28 w/ CRS - Dr. Sanford  - Cardiac anesthesia recommended as per cardiology  - PET-CT performed for staging, results indicate likely metastatic spread to mediastinal and b/l hilar lymph nodes.   - Current plan is for lung LN biopsy inpatient then outpt f/u with heme onc and pulmonology for further prognostication and potential systemic therapy

## 2022-09-28 NOTE — PROGRESS NOTE ADULT - SUBJECTIVE AND OBJECTIVE BOX
PULMONARY CONSULT SERVICE FOLLOW-UP NOTE    INTERVAL HPI:  Reviewed chart and overnight events; patient seen and examined at bedside. Pulmonary service reconsulted for EBUS now that she is planned for inpatient workup and staging of her GI malignancy. No significant interval events. No further episodes of bleeding.     MEDICATIONS:  Pulmonary:  benzonatate 100 milliGRAM(s) Oral three times a day PRN  montelukast 10 milliGRAM(s) Oral daily  tiotropium 18 MICROgram(s) Capsule 1 Capsule(s) Inhalation daily    Antimicrobials:    Anticoagulants:    Cardiac:  aMIOdarone    Tablet 200 milliGRAM(s) Oral daily  amLODIPine   Tablet 10 milliGRAM(s) Oral daily  furosemide   Injectable 40 milliGRAM(s) IV Push every 12 hours  isosorbide   mononitrate ER Tablet (IMDUR) 30 milliGRAM(s) Oral daily      Allergies    Digox (Rash; Urticaria; Hives)  Plavix (Other (Mod to Severe))    Intolerances        Vital Signs Last 24 Hrs  T(C): 36.9 (28 Sep 2022 09:27), Max: 37.1 (27 Sep 2022 21:12)  T(F): 98.5 (28 Sep 2022 09:27), Max: 98.8 (28 Sep 2022 05:05)  HR: 60 (28 Sep 2022 09:27) (58 - 71)  BP: 114/59 (28 Sep 2022 09:27) (113/62 - 134/61)  BP(mean): --  RR: 18 (28 Sep 2022 09:27) (17 - 18)  SpO2: 99% (28 Sep 2022 09:27) (96% - 100%)    Parameters below as of 28 Sep 2022 09:27  Patient On (Oxygen Delivery Method): nasal cannula  O2 Flow (L/min): 5      PHYSICAL EXAM:  Constitutional: WD  HEENT: NC/AT; PERRL, anicteric sclera; MMM  Neck: supple, + jvd   Cardiovascular: +S1/S2, RRR  Respiratory: decreased breath sounds bibasilar  Gastrointestinal: soft, NT/ND  Extremities: WWP; 1+ symmetric b/l edema, clubbing or cyanosis  Vascular: 2+ radial pulses B/L  Neurological: awake and alert; QUIROS  LABS:      CBC Full  -  ( 28 Sep 2022 08:25 )  WBC Count : 4.90 K/uL  RBC Count : 3.20 M/uL  Hemoglobin : 8.2 g/dL  Hematocrit : 27.3 %  Platelet Count - Automated : 236 K/uL  Mean Cell Volume : 85.3 fl  Mean Cell Hemoglobin : 25.6 pg  Mean Cell Hemoglobin Concentration : 30.0 gm/dL  Auto Neutrophil # : x  Auto Lymphocyte # : x  Auto Monocyte # : x  Auto Eosinophil # : x  Auto Basophil # : x  Auto Neutrophil % : x  Auto Lymphocyte % : x  Auto Monocyte % : x  Auto Eosinophil % : x  Auto Basophil % : x    09-28    139  |  98  |  16  ----------------------------<  100<H>  4.1   |  25  |  1.09    Ca    9.0      28 Sep 2022 08:25  Phos  4.0     09-28  Mg     2.2     09-28    TPro  6.5  /  Alb  x   /  TBili  x   /  DBili  x   /  AST  x   /  ALT  x   /  AlkPhos  x   09-27                      RADIOLOGY & ADDITIONAL STUDIES:

## 2022-09-28 NOTE — PROGRESS NOTE ADULT - ATTENDING COMMENTS
[facility]S Attending  Seen on rounds  No complaints  Patient denies questions since our conversation yesterday evening.  She understandings recommendation for EBUS and biopsy, but is hesitant to pursue further workup of the findings seen on PET/CT.  Continue supportive care.

## 2022-09-28 NOTE — PROGRESS NOTE ADULT - ATTENDING COMMENTS
Initial attending contact date  9/21/22    . See resident note written above for details. I reviewed the resident documentation. I have personally seen and examined this patient. I reviewed vitals, labs, medications, cardiac studies, and additional imaging. I agree with the above residnet's findings and plans as written above with the following additions/statements.    -Pt well known to cardiac service with mx co-morbidities as noted   -Cardiology consulted for pre-op risk assessment  -Recent ECHO with normal LVEF, grade II DD, known severe prosthetic AV stenosis   -now s/p colonoscopy with signs of metastatic colon ca  -With plan for possible EBUS  -Appears euvolemic today, satting 99% on 2 L which is her baseline  -HFpEF: Transition to torsemide 40 mg po qd, restart low dose toprol 12.5 mg qd   -Remains in NSR. Cont po amio  -Has been off of AC ~ 1 week given potential upcoming procedures. At this point would bridge with IV heparin or lovenox given CHADSVASC2 = 5 if unable to restart home eliquis   -Cont amlodipine 10 mg qd, imdur 30 mg qd for BP control   -For EBUS, given underlying cardiac disease including HFpEF with recurrent exacerbations and known severe prosthetic AV stenosis, pt considered high risk for low risk procedure. Recommend cardiac anesthesia

## 2022-09-28 NOTE — PROGRESS NOTE ADULT - SUBJECTIVE AND OBJECTIVE BOX
SUBJECTIVE: Patient seen and examined bedside. Pt reports feeling well this morning with no acute complaints. States she wore her CPAP 4hrs overnight with improved SOB. Denies abdominal pain, nausea, or vomiting. Tolerating diet. Passing flatus and having BMs.     aMIOdarone    Tablet 200 milliGRAM(s) Oral daily  amLODIPine   Tablet 10 milliGRAM(s) Oral daily  furosemide   Injectable 40 milliGRAM(s) IV Push every 12 hours  isosorbide   mononitrate ER Tablet (IMDUR) 30 milliGRAM(s) Oral daily      Vital Signs Last 24 Hrs  T(C): 37.1 (28 Sep 2022 05:05), Max: 37.1 (27 Sep 2022 21:12)  T(F): 98.8 (28 Sep 2022 05:05), Max: 98.8 (28 Sep 2022 05:05)  HR: 59 (28 Sep 2022 05:05) (58 - 71)  BP: 134/61 (28 Sep 2022 05:05) (103/50 - 134/61)  BP(mean): --  RR: 17 (28 Sep 2022 05:05) (17 - 18)  SpO2: 98% (28 Sep 2022 05:05) (96% - 100%)    Parameters below as of 28 Sep 2022 05:05  Patient On (Oxygen Delivery Method): nasal cannula  O2 Flow (L/min): 5    I&O's Detail    26 Sep 2022 07:01  -  27 Sep 2022 07:00  --------------------------------------------------------  IN:  Total IN: 0 mL    OUT:    Voided (mL): 50 mL  Total OUT: 50 mL    Total NET: -50 mL          General: NAD, resting comfortably in bed  C/V: NSR  Pulm: Nonlabored breathing, no respiratory distress  Abd: soft, protuberant, NT/ND, no rebound, no guarding  Extrem: WWP, no edema, SCDs in place        LABS:                        8.4    5.08  )-----------( 222      ( 27 Sep 2022 09:44 )             27.1     09-27    134<L>  |  94<L>  |  13  ----------------------------<  104<H>  3.5   |  29  |  1.05    Ca    8.9      27 Sep 2022 09:44  Phos  4.3     09-27  Mg     2.1     09-27    TPro  6.5  /  Alb  x   /  TBili  x   /  DBili  x   /  AST  x   /  ALT  x   /  AlkPhos  x   09-27          RADIOLOGY & ADDITIONAL STUDIES:

## 2022-09-28 NOTE — PROGRESS NOTE ADULT - ASSESSMENT
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 Found on colonoscopy (9/22/22) to have friable, centrally ulcerated mass 5-7cm distal to ileocolic anastomosis suspicious for malignancy. Hem-Onc consulted for further management.    1.) colonic mass suspicious for recurrent colon cancer  Pt with history of colon cancer, s/p al colectomy with ileocolic anastomosis. Pt did not get treated with RT or chemotherapy. Now presenting with LGIB, and most recent colonoscopy showing mass suspicious for malignancy. CT Chest/Abdomen/Pelvis showing mediastinal, bilateral hilar LAD (read as stable), no focal liver lesions. Hepatic flexure mass with biopsy pathology showing adenocarcinoma, at least intramucosal, with suggestion of deeper invasion. CEA 1.8.     Recommendations:    PET/CT with highly suspicious mediastinal/hilar lymph nodes  Tissue evaluation is indicated (EBUS), but the patient seems to be hesitant about any work up  At this moment, she wants to go home and will think about the work up  Will arrange outpatient follow up  Discussed with Dr. Hartley and Dr. Sanford    Thank you    Stefano Mckeon MD  [phone]

## 2022-09-28 NOTE — PROGRESS NOTE ADULT - ASSESSMENT
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

## 2022-09-28 NOTE — PROGRESS NOTE ADULT - PROBLEM SELECTOR PROBLEM 10
Prophylactic measure
Hyperlipidemia

## 2022-09-28 NOTE — PROGRESS NOTE ADULT - ASSESSMENT
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 Cardiology consulted for preoperative clearance evaluation for colonoscopy. Pt now s/p colonoscopy  with a mass.     #HFpEF   Patient is volume up on physical exam and on with worsening pulmonary congestion on CXR  ECHO: normal LVEF, grade II DD, known severe prosthetic AV stenosis   - Change Lasix to Torsemide 40mg PO daily.  - Add Toprol 12.5mg with hold parameters  - Primafit for accurate I/Os documentation  - Strict I/Os, daily standing weights. IO not well recorded     #Atrial Fibrillation   - c/w amiodarone  - ChaDVASC 5  - Given elevated CHADS2- VASc, pt should be on AC. Since Eliquis is stopped for possible EBUS, bridge with full dose lovenox BID or heparin gtt.    #Severe prosthetic AS  Avoid excessive fluid use   recommend Torsemide as above     #Severe pulmonary HTN  Avoid hypoxia   C/w CPAP   C/w O2 for COPD, home 2 L NC.     #HTN  c/w Imdur and Amlodipine     #HLD  c/w statin     #Pre-op risk assessment for EBUS  - Patient is high risk for a low  risk procedure given her severe prosthetic AS.   - Recommend cardiac anesthesia for the case

## 2022-09-28 NOTE — PROGRESS NOTE ADULT - SUBJECTIVE AND OBJECTIVE BOX
INTERVAL EVENTS:  -Patient did not receive recommended metolazone or lasix yesterday PM due to ?low BP  -Per primary team, patient no longer wishes to pursue inpatient workup. Per surgery, no longer recommended for colon mass resection in light of likely metastatic disease in lung.     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        MEDICATIONS  (STANDING):  aMIOdarone    Tablet 200 milliGRAM(s) Oral daily  amLODIPine   Tablet 10 milliGRAM(s) Oral daily  atorvastatin 20 milliGRAM(s) Oral at bedtime  chlorhexidine 2% Cloths 1 Application(s) Topical daily  folic acid 1 milliGRAM(s) Oral daily  furosemide   Injectable 40 milliGRAM(s) IV Push every 12 hours  isosorbide   mononitrate ER Tablet (IMDUR) 30 milliGRAM(s) Oral daily  lidocaine   4% Patch 1 Patch Transdermal every 24 hours  magnesium oxide 400 milliGRAM(s) Oral daily  montelukast 10 milliGRAM(s) Oral daily  multivitamin 1 Tablet(s) Oral daily  pantoprazole  Injectable 40 milliGRAM(s) IV Push every 12 hours  potassium chloride    Tablet ER 40 milliEquivalent(s) Oral daily  simethicone 80 milliGRAM(s) Chew two times a day  tiotropium 18 MICROgram(s) Capsule 1 Capsule(s) Inhalation daily    MEDICATIONS  (PRN):  acetaminophen     Tablet .. 650 milliGRAM(s) Oral every 6 hours PRN Temp greater or equal to 38C (100.4F), Mild Pain (1 - 3)  benzonatate 100 milliGRAM(s) Oral three times a day PRN Cough  LORazepam     Tablet 0.5 milliGRAM(s) Oral daily PRN Anxiety  ondansetron Injectable 4 milliGRAM(s) IV Push every 8 hours PRN Nausea and/or Vomiting  oxyCODONE    Solution 2.5 milliGRAM(s) Oral every 4 hours PRN Severe Pain (7 - 10)  polyethylene glycol 3350 17 Gram(s) Oral daily PRN Constipation    T(F): 98.8 (09-28-22 @ 05:05), Max: 98.8 (09-28-22 @ 05:05)  HR: 60 (09-28-22 @ 06:50) (58 - 71)  BP: 134/61 (09-28-22 @ 05:05) (103/50 - 134/61)  BP(mean): --  ABP: --  ABP(mean): --  RR: 17 (09-28-22 @ 06:50) (17 - 18)  SpO2: 100% (09-28-22 @ 06:50) (96% - 100%)    I/O Detail 24H      PHYSICAL EXAM:  GEN: NAD  HEENT: EOMI   RESP: CTA b/l  CV: RRR. Normal S1/S2. No m/r/g.  ABD: soft, non-distended  EXT: No edema   NEURO: alert and attentive    LABS:  CBC 09-27-22 @ 09:44                        8.4    5.08  )-----------( 222                   27.1       Hgb trend: 8.4 <-- , 8.0 <-- , 6.8 <--   WBC trend: 5.08 <-- , 5.43 <-- , 4.11 <--       CMP 09-27-22 @ 09:44    134<L>  |  94<L>  |  13  ----------------------------<  104<H>  3.5   |  29  |  1.05    Ca    8.9      09-27-22 @ 09:44  Phos  4.3     09-27  Mg     2.1     09-27    TPro  6.5  /  Alb  x   /  TBili  x   /  DBili  x   /  AST  x   /  ALT  x   /  AlkPhos  x   09-27      Serum Cr trend: 1.05 <-- , 1.08 <--         Cardiac Markers           STUDIES:         INTERVAL EVENTS:  -Patient did not receive recommended metolazone or lasix yesterday PM due to ?low BP  -Per primary team, patient no longer wishes to pursue inpatient workup. Per surgery, no longer recommended for colon mass resection in light of likely metastatic disease in lung. Pt may require EBUS with pulmonology if amenable.     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        MEDICATIONS  (STANDING):  aMIOdarone    Tablet 200 milliGRAM(s) Oral daily  amLODIPine   Tablet 10 milliGRAM(s) Oral daily  atorvastatin 20 milliGRAM(s) Oral at bedtime  chlorhexidine 2% Cloths 1 Application(s) Topical daily  folic acid 1 milliGRAM(s) Oral daily  furosemide   Injectable 40 milliGRAM(s) IV Push every 12 hours  isosorbide   mononitrate ER Tablet (IMDUR) 30 milliGRAM(s) Oral daily  lidocaine   4% Patch 1 Patch Transdermal every 24 hours  magnesium oxide 400 milliGRAM(s) Oral daily  montelukast 10 milliGRAM(s) Oral daily  multivitamin 1 Tablet(s) Oral daily  pantoprazole  Injectable 40 milliGRAM(s) IV Push every 12 hours  potassium chloride    Tablet ER 40 milliEquivalent(s) Oral daily  simethicone 80 milliGRAM(s) Chew two times a day  tiotropium 18 MICROgram(s) Capsule 1 Capsule(s) Inhalation daily    MEDICATIONS  (PRN):  acetaminophen     Tablet .. 650 milliGRAM(s) Oral every 6 hours PRN Temp greater or equal to 38C (100.4F), Mild Pain (1 - 3)  benzonatate 100 milliGRAM(s) Oral three times a day PRN Cough  LORazepam     Tablet 0.5 milliGRAM(s) Oral daily PRN Anxiety  ondansetron Injectable 4 milliGRAM(s) IV Push every 8 hours PRN Nausea and/or Vomiting  oxyCODONE    Solution 2.5 milliGRAM(s) Oral every 4 hours PRN Severe Pain (7 - 10)  polyethylene glycol 3350 17 Gram(s) Oral daily PRN Constipation    T(F): 98.8 (09-28-22 @ 05:05), Max: 98.8 (09-28-22 @ 05:05)  HR: 60 (09-28-22 @ 06:50) (58 - 71)  BP: 134/61 (09-28-22 @ 05:05) (103/50 - 134/61)  BP(mean): --  ABP: --  ABP(mean): --  RR: 17 (09-28-22 @ 06:50) (17 - 18)  SpO2: 100% (09-28-22 @ 06:50) (96% - 100%)    I/O Detail 24H      PHYSICAL EXAM:  GEN: NAD  HEENT: EOMI   RESP: CTA b/l  CV: RRR. Normal S1/S2. No m/r/g.  ABD: soft, non-distended  EXT: mild 1+ pitting edema bilaterally   NEURO: alert and attentive    LABS:  CBC 09-27-22 @ 09:44                        8.4    5.08  )-----------( 222                   27.1       Hgb trend: 8.4 <-- , 8.0 <-- , 6.8 <--   WBC trend: 5.08 <-- , 5.43 <-- , 4.11 <--       CMP 09-27-22 @ 09:44    134<L>  |  94<L>  |  13  ----------------------------<  104<H>  3.5   |  29  |  1.05    Ca    8.9      09-27-22 @ 09:44  Phos  4.3     09-27  Mg     2.1     09-27    TPro  6.5  /  Alb  x   /  TBili  x   /  DBili  x   /  AST  x   /  ALT  x   /  AlkPhos  x   09-27      Serum Cr trend: 1.05 <-- , 1.08 <--         Cardiac Markers           STUDIES:

## 2022-09-28 NOTE — PROGRESS NOTE ADULT - PROBLEM SELECTOR PLAN 2
Patient with dark red stool, occasional BRBPR. Hemoglobin 7.9 on admission which is around baseline. Non tachycardic or hypotensive. 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 Of note, patient did state that she had blood transfusion 5 days prior to admission at [facility] for "anemia" but couldn't give any more details. Colonoscopy performed on 9/22; "Approximately 5-7 cm distal to the ileocolonic anastomosis, there was a friable, centrally ulcerated, coarsened mass that occupied 30% of the luminal circumference, concerning for malignancy. Multiple cold forceps biopsies were obtained." Dr. Sanford ([facility] surgery attending) assessed and updated pt, tentative for surgical intervention 9/28.   - Continue to trend hemoglobin, keep HG > 7  - Restarted on low sodium diet with low fiber, with 1 ensure per day

## 2022-09-28 NOTE — PROGRESS NOTE ADULT - PROBLEM SELECTOR PLAN 10
F: None  E: replete PRN   N: DASH/TLC  DVT: none i/s/o GI bleed  GI: protonix 40mg IV BID     Dispo: [facility]   Code status: full code
F: None  E: replete PRN   N: Low sodium diet with low fiber, 1 ensure per day  DVT: Eliquis 5mg BID  GI: Protonix 40mg IV BID     Dispo: [facility]   Code status: full code
F: None  E: replete PRN   N: DASH/TLC  DVT: none i/s/o GI bleed  GI: protonix 40mg IV BID     Dispo: [facility]   Code status: full code
F: None  E: replete PRN   N: DASH/TLC  DVT: none i/s/o GI bleed  GI: protonix 40mg IV BID     Dispo: [facility]   Code status: full code
Continue home atorvastatin 20mg
F: None  E: replete PRN   N: DASH/TLC  DVT: none i/s/o GI bleed  GI: protonix 40mg IV BID     Dispo: [facility]   Code status: full code
F: None  E: replete PRN   N: Low sodium diet with low fiber, 1 ensure per day  DVT: Eliquis 5mg BID  GI: Protonix 40mg IV BID     Dispo: [facility]   Code status: full code

## 2022-09-28 NOTE — PROGRESS NOTE ADULT - PROBLEM SELECTOR PLAN 1
Patient with mediastinal adenopathy which has been noted on serial CTs performed of the chest. She has both hilar and mediastinal adenopathy which have persisted for multiple years. These have also been identified as being FDG avid on previous PET/CT of the thorax while she was undergoing w/u for her GI cancer. Differential for these mediastinal nodes includes malignancy (GI, Lung, Lymphoma) vs infectious vs inflammatory (sarcoid) vs congestive due to heart failure. Her PET/CT again demonstrates FDG avidity in these nodes.     Plan:  - Patient planned for EBUS w/ [facility] 9/29 at 130 PM  - Please ensure she is NPO at midnight and there is an AM PT/INR & PTT, CBC  - Anesthesia evaluation today prior to procedure

## 2022-09-28 NOTE — PROGRESS NOTE ADULT - SUBJECTIVE AND OBJECTIVE BOX
OVERNIGHT EVENTS: None    SUBJECTIVE:  Patient seen and examined at bedside.  ROS: Patient denies h/n/v/d, fever, chills, cp, palpitations, sob, abd pain, leg swelling, rashes, dysuria, and changes in BM.     Vital Signs Last 12 Hrs  T(F): 98.7 (09-28-22 @ 15:30), Max: 98.7 (09-28-22 @ 15:30)  HR: 65 (09-28-22 @ 17:29) (65 - 69)  BP: 136/69 (09-28-22 @ 15:30) (136/69 - 136/69)  BP(mean): --  RR: 18 (09-28-22 @ 17:29) (18 - 18)  SpO2: 100% (09-28-22 @ 17:29) (100% - 100%)  I&O's Summary      PHYSICAL EXAM:  Constitutional: NAD, comfortable in bed.  HEENT: NC/AT, PERRLA, EOMI, no conjunctival pallor or scleral icterus, MMM  Neck: Supple, no JVD  Respiratory: CTA B/L. No w/r/r. Breath sounds distant  Cardiovascular: RRR, normal S1 and S2, no m/r/g.   Gastrointestinal: +BS, soft NT mildly distended, no guarding or rebound tenderness, no palpable masses   Extremities: wwp; no cyanosis, clubbing or edema.   Vascular: Pulses equal and strong throughout.   Neurological: AAOx3, no CN deficits, strength and sensation intact throughout.   Skin: No gross skin abnormalities or rashes        LABS:                        8.2    4.90  )-----------( 236      ( 28 Sep 2022 08:25 )             27.3     09-28    139  |  98  |  16  ----------------------------<  100<H>  4.1   |  25  |  1.09    Ca    9.0      28 Sep 2022 08:25  Phos  4.0     09-28  Mg     2.2     09-28    TPro  6.5  /  Alb  x   /  TBili  x   /  DBili  x   /  AST  x   /  ALT  x   /  AlkPhos  x   09-27            RADIOLOGY & ADDITIONAL TESTS:    MEDICATIONS  (STANDING):  aMIOdarone    Tablet 200 milliGRAM(s) Oral daily  amLODIPine   Tablet 10 milliGRAM(s) Oral daily  atorvastatin 20 milliGRAM(s) Oral at bedtime  chlorhexidine 2% Cloths 1 Application(s) Topical daily  folic acid 1 milliGRAM(s) Oral daily  isosorbide   mononitrate ER Tablet (IMDUR) 30 milliGRAM(s) Oral daily  lidocaine   4% Patch 1 Patch Transdermal every 24 hours  magnesium oxide 400 milliGRAM(s) Oral daily  metoprolol succinate ER 12.5 milliGRAM(s) Oral daily  montelukast 10 milliGRAM(s) Oral daily  multivitamin 1 Tablet(s) Oral daily  pantoprazole  Injectable 40 milliGRAM(s) IV Push every 12 hours  potassium chloride    Tablet ER 40 milliEquivalent(s) Oral daily  simethicone 80 milliGRAM(s) Chew two times a day  tiotropium 18 MICROgram(s) Capsule 1 Capsule(s) Inhalation daily    MEDICATIONS  (PRN):  acetaminophen     Tablet .. 650 milliGRAM(s) Oral every 6 hours PRN Temp greater or equal to 38C (100.4F), Mild Pain (1 - 3)  benzonatate 100 milliGRAM(s) Oral three times a day PRN Cough  LORazepam     Tablet 0.5 milliGRAM(s) Oral daily PRN Anxiety  ondansetron Injectable 4 milliGRAM(s) IV Push every 8 hours PRN Nausea and/or Vomiting  oxyCODONE    Solution 2.5 milliGRAM(s) Oral every 4 hours PRN Severe Pain (7 - 10)  polyethylene glycol 3350 17 Gram(s) Oral daily PRN Constipation

## 2022-09-28 NOTE — PROGRESS NOTE ADULT - ATTENDING COMMENTS
Patient was seen and examined at bedside on 9/28/2022 at 830 am. Patient has no acute complaints. Denies SOB, CP. ROS is otherwise negative. Vitals, labwork and pertinent imaging reviewed. Physical exam - NAD, AAO x 4, PERRLA, EOMI, MMM, supple neck, chest - CTA b/l, CV - rrr, s1s2, no m/r/g, abd - soft, NTND, + BS, ext - wwp, psych - normal affect, skin - no rash    Plan  -Pt s/p EGD showing mass  -CRS and Heme/Onc consulted   -C/w Lasix as per cardiology recs  -PET scan showed uptake in the lung, plan for EBUS on 9/29 - may need ICU/tele after  -Cardiology clearance/optimization  -Plan for d/c to [facility] after

## 2022-09-28 NOTE — PROGRESS NOTE ADULT - ASSESSMENT
82F Scottish speaking with history of colon CA s/p resection in 2019, ?COPD (non-compliant on Spiriva, Montelukast, previously used 2L home O2), MARK (non-compliant with CPAP), and extensive cardiac hx, admitted for suspected GI bleed and found to have friable, centrally ulcerated, coarsened mass 5-7cm distal of previous ileo-colonic anastomosis on recent colonoscopy, currently c/o mild SOB, MANRIQUE, and episode of blood speckled cough, was found to have multiple randomly distributed bilateral solid lung nodules and micronodules with unchanged bilateral mediastinal hilar adenopathy on CT, c/f sarcoid vs lymphoproliferative disorder vs metastatic lung CA 2/2 primary colon CA.

## 2022-09-28 NOTE — PROGRESS NOTE ADULT - PROBLEM SELECTOR PLAN 9
Continue home atorvastatin 20mg
Continue protonix 40mg BID for GIB
Continue home atorvastatin 20mg

## 2022-09-28 NOTE — PROGRESS NOTE ADULT - ASSESSMENT
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 Colorectal surgery consulted in setting of c/f GI bleed with known colorectal cancer history. Pt now s/p colonoscopy with GI on 9/22 with finding of a friable, centrally ulcerated, coarsened mass 5-7cm distal of previous ileo-colonic anastomosis that occupied 30% of the luminal circumference. Multiple cold forceps biopsies were obtained. Findings concerning for new primary colon cancer. Pt transfused with 1U PRBC on 9/25 for hb of 6.8. Pt doing well subjectively, continues to c/o SOB daily. Has bowel function. Per cardiology, pt high risk for intermediate risk procedure, will likely require cardiac anesthesia. CEA 1.8. Colonoscopy biopsy with findings of adenocarcinoma, at least intramucosal, with suggestion of deeper invasion. PET/CT on 9/27 with evidence of metastatic disease. Findings discussed with patient.     Recommendations:  No acute surgical intervention

## 2022-09-29 ENCOUNTER — TRANSCRIPTION ENCOUNTER (OUTPATIENT)
Age: 82
End: 2022-09-29

## 2022-09-29 VITALS
HEART RATE: 60 BPM | SYSTOLIC BLOOD PRESSURE: 148 MMHG | DIASTOLIC BLOOD PRESSURE: 71 MMHG | RESPIRATION RATE: 18 BRPM | OXYGEN SATURATION: 100 % | TEMPERATURE: 99 F

## 2022-09-29 LAB
ANION GAP SERPL CALC-SCNC: 12 MMOL/L — SIGNIFICANT CHANGE UP (ref 5–17)
APTT BLD: 29.8 SEC — SIGNIFICANT CHANGE UP (ref 27.5–35.5)
BLD GP AB SCN SERPL QL: POSITIVE — SIGNIFICANT CHANGE UP
BUN SERPL-MCNC: 13 MG/DL — SIGNIFICANT CHANGE UP (ref 7–23)
CALCIUM SERPL-MCNC: 9.3 MG/DL — SIGNIFICANT CHANGE UP (ref 8.4–10.5)
CHLORIDE SERPL-SCNC: 96 MMOL/L — SIGNIFICANT CHANGE UP (ref 96–108)
CO2 SERPL-SCNC: 27 MMOL/L — SIGNIFICANT CHANGE UP (ref 22–31)
CREAT SERPL-MCNC: 1.11 MG/DL — SIGNIFICANT CHANGE UP (ref 0.5–1.3)
EGFR: 49 ML/MIN/1.73M2 — LOW
GLUCOSE SERPL-MCNC: 100 MG/DL — HIGH (ref 70–99)
HCT VFR BLD CALC: 28 % — LOW (ref 34.5–45)
HGB BLD-MCNC: 8.6 G/DL — LOW (ref 11.5–15.5)
INR BLD: 1.11 — SIGNIFICANT CHANGE UP (ref 0.88–1.16)
MAGNESIUM SERPL-MCNC: 2.2 MG/DL — SIGNIFICANT CHANGE UP (ref 1.6–2.6)
MCHC RBC-ENTMCNC: 25.9 PG — LOW (ref 27–34)
MCHC RBC-ENTMCNC: 30.7 GM/DL — LOW (ref 32–36)
MCV RBC AUTO: 84.3 FL — SIGNIFICANT CHANGE UP (ref 80–100)
NRBC # BLD: 0 /100 WBCS — SIGNIFICANT CHANGE UP (ref 0–0)
PHOSPHATE SERPL-MCNC: 3.9 MG/DL — SIGNIFICANT CHANGE UP (ref 2.5–4.5)
PLATELET # BLD AUTO: 274 K/UL — SIGNIFICANT CHANGE UP (ref 150–400)
POTASSIUM SERPL-MCNC: 4.2 MMOL/L — SIGNIFICANT CHANGE UP (ref 3.5–5.3)
POTASSIUM SERPL-SCNC: 4.2 MMOL/L — SIGNIFICANT CHANGE UP (ref 3.5–5.3)
PROTHROM AB SERPL-ACNC: 13.2 SEC — SIGNIFICANT CHANGE UP (ref 10.5–13.4)
RBC # BLD: 3.32 M/UL — LOW (ref 3.8–5.2)
RBC # FLD: 15.5 % — HIGH (ref 10.3–14.5)
RH IG SCN BLD-IMP: POSITIVE — SIGNIFICANT CHANGE UP
SODIUM SERPL-SCNC: 135 MMOL/L — SIGNIFICANT CHANGE UP (ref 135–145)
WBC # BLD: 4.89 K/UL — SIGNIFICANT CHANGE UP (ref 3.8–10.5)
WBC # FLD AUTO: 4.89 K/UL — SIGNIFICANT CHANGE UP (ref 3.8–10.5)

## 2022-09-29 PROCEDURE — 88305 TISSUE EXAM BY PATHOLOGIST: CPT

## 2022-09-29 PROCEDURE — U0005: CPT

## 2022-09-29 PROCEDURE — 96374 THER/PROPH/DIAG INJ IV PUSH: CPT

## 2022-09-29 PROCEDURE — 86850 RBC ANTIBODY SCREEN: CPT

## 2022-09-29 PROCEDURE — 97530 THERAPEUTIC ACTIVITIES: CPT

## 2022-09-29 PROCEDURE — 86922 COMPATIBILITY TEST ANTIGLOB: CPT

## 2022-09-29 PROCEDURE — 74177 CT ABD & PELVIS W/CONTRAST: CPT

## 2022-09-29 PROCEDURE — 80053 COMPREHEN METABOLIC PANEL: CPT

## 2022-09-29 PROCEDURE — 85730 THROMBOPLASTIN TIME PARTIAL: CPT

## 2022-09-29 PROCEDURE — 84166 PROTEIN E-PHORESIS/URINE/CSF: CPT

## 2022-09-29 PROCEDURE — 82553 CREATINE MB FRACTION: CPT

## 2022-09-29 PROCEDURE — 86900 BLOOD TYPING SEROLOGIC ABO: CPT

## 2022-09-29 PROCEDURE — 94640 AIRWAY INHALATION TREATMENT: CPT

## 2022-09-29 PROCEDURE — 80048 BASIC METABOLIC PNL TOTAL CA: CPT

## 2022-09-29 PROCEDURE — 82378 CARCINOEMBRYONIC ANTIGEN: CPT

## 2022-09-29 PROCEDURE — 99239 HOSP IP/OBS DSCHRG MGMT >30: CPT

## 2022-09-29 PROCEDURE — 94660 CPAP INITIATION&MGMT: CPT

## 2022-09-29 PROCEDURE — 96375 TX/PRO/DX INJ NEW DRUG ADDON: CPT

## 2022-09-29 PROCEDURE — 71045 X-RAY EXAM CHEST 1 VIEW: CPT

## 2022-09-29 PROCEDURE — 82272 OCCULT BLD FECES 1-3 TESTS: CPT

## 2022-09-29 PROCEDURE — 36430 TRANSFUSION BLD/BLD COMPNT: CPT

## 2022-09-29 PROCEDURE — G1004: CPT

## 2022-09-29 PROCEDURE — 36415 COLL VENOUS BLD VENIPUNCTURE: CPT

## 2022-09-29 PROCEDURE — 97116 GAIT TRAINING THERAPY: CPT

## 2022-09-29 PROCEDURE — 82550 ASSAY OF CK (CPK): CPT

## 2022-09-29 PROCEDURE — P9016: CPT

## 2022-09-29 PROCEDURE — 81003 URINALYSIS AUTO W/O SCOPE: CPT

## 2022-09-29 PROCEDURE — 97163 PT EVAL HIGH COMPLEX 45 MIN: CPT

## 2022-09-29 PROCEDURE — 97112 NEUROMUSCULAR REEDUCATION: CPT

## 2022-09-29 PROCEDURE — 84100 ASSAY OF PHOSPHORUS: CPT

## 2022-09-29 PROCEDURE — 86334 IMMUNOFIX E-PHORESIS SERUM: CPT

## 2022-09-29 PROCEDURE — 85025 COMPLETE CBC W/AUTO DIFF WBC: CPT

## 2022-09-29 PROCEDURE — 84165 PROTEIN E-PHORESIS SERUM: CPT

## 2022-09-29 PROCEDURE — 74174 CTA ABD&PLVS W/CONTRAST: CPT | Mod: MG

## 2022-09-29 PROCEDURE — 86870 RBC ANTIBODY IDENTIFICATION: CPT

## 2022-09-29 PROCEDURE — 83605 ASSAY OF LACTIC ACID: CPT

## 2022-09-29 PROCEDURE — 71260 CT THORAX DX C+: CPT

## 2022-09-29 PROCEDURE — 86902 BLOOD TYPE ANTIGEN DONOR EA: CPT

## 2022-09-29 PROCEDURE — 84155 ASSAY OF PROTEIN SERUM: CPT

## 2022-09-29 PROCEDURE — 86880 COOMBS TEST DIRECT: CPT

## 2022-09-29 PROCEDURE — A9552: CPT

## 2022-09-29 PROCEDURE — 82962 GLUCOSE BLOOD TEST: CPT

## 2022-09-29 PROCEDURE — 99285 EMERGENCY DEPT VISIT HI MDM: CPT

## 2022-09-29 PROCEDURE — 87635 SARS-COV-2 COVID-19 AMP PRB: CPT

## 2022-09-29 PROCEDURE — 83690 ASSAY OF LIPASE: CPT

## 2022-09-29 PROCEDURE — 99233 SBSQ HOSP IP/OBS HIGH 50: CPT

## 2022-09-29 PROCEDURE — 84484 ASSAY OF TROPONIN QUANT: CPT

## 2022-09-29 PROCEDURE — 85027 COMPLETE CBC AUTOMATED: CPT

## 2022-09-29 PROCEDURE — 83735 ASSAY OF MAGNESIUM: CPT

## 2022-09-29 PROCEDURE — 85610 PROTHROMBIN TIME: CPT

## 2022-09-29 PROCEDURE — U0003: CPT

## 2022-09-29 PROCEDURE — 78815 PET IMAGE W/CT SKULL-THIGH: CPT

## 2022-09-29 PROCEDURE — 86901 BLOOD TYPING SEROLOGIC RH(D): CPT

## 2022-09-29 RX ORDER — METOPROLOL TARTRATE 50 MG
0.5 TABLET ORAL
Qty: 15 | Refills: 0
Start: 2022-09-29 | End: 2022-10-28

## 2022-09-29 RX ORDER — METOPROLOL TARTRATE 50 MG
1 TABLET ORAL
Qty: 0 | Refills: 0 | DISCHARGE

## 2022-09-29 RX ADMIN — Medication 40 MILLIEQUIVALENT(S): at 11:45

## 2022-09-29 RX ADMIN — AMIODARONE HYDROCHLORIDE 200 MILLIGRAM(S): 400 TABLET ORAL at 06:31

## 2022-09-29 RX ADMIN — CHLORHEXIDINE GLUCONATE 1 APPLICATION(S): 213 SOLUTION TOPICAL at 11:45

## 2022-09-29 RX ADMIN — LIDOCAINE 1 PATCH: 4 CREAM TOPICAL at 03:51

## 2022-09-29 RX ADMIN — MAGNESIUM OXIDE 400 MG ORAL TABLET 400 MILLIGRAM(S): 241.3 TABLET ORAL at 11:45

## 2022-09-29 RX ADMIN — TIOTROPIUM BROMIDE 1 CAPSULE(S): 18 CAPSULE ORAL; RESPIRATORY (INHALATION) at 11:46

## 2022-09-29 RX ADMIN — Medication 1 TABLET(S): at 11:46

## 2022-09-29 RX ADMIN — PANTOPRAZOLE SODIUM 40 MILLIGRAM(S): 20 TABLET, DELAYED RELEASE ORAL at 06:29

## 2022-09-29 RX ADMIN — AMLODIPINE BESYLATE 10 MILLIGRAM(S): 2.5 TABLET ORAL at 06:31

## 2022-09-29 RX ADMIN — Medication 1 MILLIGRAM(S): at 11:45

## 2022-09-29 RX ADMIN — Medication 12.5 MILLIGRAM(S): at 06:30

## 2022-09-29 RX ADMIN — MONTELUKAST 10 MILLIGRAM(S): 4 TABLET, CHEWABLE ORAL at 11:46

## 2022-09-29 RX ADMIN — SIMETHICONE 80 MILLIGRAM(S): 80 TABLET, CHEWABLE ORAL at 06:31

## 2022-09-29 RX ADMIN — ISOSORBIDE MONONITRATE 30 MILLIGRAM(S): 60 TABLET, EXTENDED RELEASE ORAL at 11:46

## 2022-09-29 RX ADMIN — Medication 40 MILLIGRAM(S): at 06:30

## 2022-09-29 NOTE — PROGRESS NOTE ADULT - ASSESSMENT
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 Colorectal surgery consulted in setting of c/f GI bleed with known colorectal cancer history. Pt now s/p colonoscopy with GI on 9/22 with finding of a friable, centrally ulcerated, coarsened mass 5-7cm distal of previous ileo-colonic anastomosis that occupied 30% of the luminal circumference. Multiple cold forceps biopsies were obtained. Findings concerning for new primary colon cancer. Pt transfused with 1U PRBC on 9/25 for hb of 6.8. Pt doing well subjectively, continues to c/o SOB daily. Has bowel function. Per cardiology, pt high risk for intermediate risk procedure, will likely require cardiac anesthesia. CEA 1.8. Colonoscopy biopsy with findings of adenocarcinoma, at least intramucosal, with suggestion of deeper invasion. PET/CT on 9/27 with evidence of metastatic disease. Findings discussed with patient. Per primary team, patient no longer wishes to pursue additional inpatient workup of cancer and will follow with Dr. Mckeon as an outpatient with plan for discharge today.  Recommendations:  No acute surgical intervention  Team 1C will sign off at this time    Plan discussed with chief resident and Dr. Sanford

## 2022-09-29 NOTE — PROGRESS NOTE ADULT - SUBJECTIVE AND OBJECTIVE BOX
Hematology Oncology Progress Note        INTERVAL Hx: Doing OK.  The patient and her daughter remain adamant about not wanting further work up. She wants to pursue alternative treatments.     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 Patient was recently admitted under cardiology service from 9/4-9/13 for chest pain and headaches - found to incidentally have Ecoli bacteremia and Gemella spp. TTE, IRMA, whole body gallium scan were negative - source thought to be 2/2 infected tooth which patient refused to have extracted in house by OMFS. She was sent home with PICC line for IV abx (Ceftriaxone 2g daily until9/26). She has still not gotten her tooth extraction outpatient as she has been in the [facility], but has been getting the Ceftriaxone daily. For this admission, patient had a blood transfusion on 9/16 in the [facility], did not have any reaction to the transfusion. She started to develop bloody diarrhea after the transfusion and does not remember the details as to why exactly she got the transfusion. She also has chronic complaints including headache, jaw and neck pain, R hip pain, etc.     ED Course: afebrile, VSS, Hb 7.9 (8.1 at baseline), CT scan no active GI hemorrhage identified  Consults: surgery and GI   Meds: zofran, protonix, tylenol   EKG: sinus bradycardia to 59, no ischemia  (21 Sep 2022 14:06)      OBJECTIVE:  Vital Signs Last 24 Hrs  T(C): 37.3 (29 Sep 2022 09:07), Max: 37.4 (28 Sep 2022 21:43)  T(F): 99.2 (29 Sep 2022 09:07), Max: 99.3 (28 Sep 2022 21:43)  HR: 60 (29 Sep 2022 09:07) (60 - 79)  BP: 148/71 (29 Sep 2022 09:07) (114/59 - 148/71)  BP(mean): --  RR: 18 (29 Sep 2022 09:07) (18 - 20)  SpO2: 100% (29 Sep 2022 09:07) (99% - 100%)    Parameters below as of 29 Sep 2022 09:07  Patient On (Oxygen Delivery Method): nasal cannula  O2 Flow (L/min): 5    PHYSICAL EXAM:  General: obese, chronically ill appearing, non toxic, answering questions  HEENT: NC/AT; PERRL, clear conjunctiva  Neck: supple  Respiratory: CTA b/l  Cardiovascular: +S1/S2; RRR  Abdomen: soft, NT/ND; +BS x4  Extremities: WWP, 2+ peripheral pulses b/l; no LE edema  Skin: normal color and turgor; no rash  Neurological:     MEDICATIONS:  MEDICATIONS  (STANDING):  aMIOdarone    Tablet 200 milliGRAM(s) Oral daily  amLODIPine   Tablet 10 milliGRAM(s) Oral daily  atorvastatin 20 milliGRAM(s) Oral at bedtime  chlorhexidine 2% Cloths 1 Application(s) Topical daily  folic acid 1 milliGRAM(s) Oral daily  furosemide   Injectable 40 milliGRAM(s) IV Push every 12 hours  isosorbide   mononitrate ER Tablet (IMDUR) 30 milliGRAM(s) Oral daily  lidocaine   4% Patch 1 Patch Transdermal every 24 hours  magnesium oxide 400 milliGRAM(s) Oral daily  montelukast 10 milliGRAM(s) Oral daily  multivitamin 1 Tablet(s) Oral daily  pantoprazole  Injectable 40 milliGRAM(s) IV Push every 12 hours  potassium chloride    Tablet ER 40 milliEquivalent(s) Oral daily  simethicone 80 milliGRAM(s) Chew two times a day  tiotropium 18 MICROgram(s) Capsule 1 Capsule(s) Inhalation daily    MEDICATIONS  (PRN):  acetaminophen     Tablet .. 650 milliGRAM(s) Oral every 6 hours PRN Temp greater or equal to 38C (100.4F), Mild Pain (1 - 3)  benzonatate 100 milliGRAM(s) Oral three times a day PRN Cough  LORazepam     Tablet 0.5 milliGRAM(s) Oral daily PRN Anxiety  ondansetron Injectable 4 milliGRAM(s) IV Push every 8 hours PRN Nausea and/or Vomiting  oxyCODONE    Solution 2.5 milliGRAM(s) Oral every 4 hours PRN Severe Pain (7 - 10)  polyethylene glycol 3350 17 Gram(s) Oral daily PRN Constipation      ALLERGIES:  Allergies    Digox (Rash; Urticaria; Hives)  Plavix (Other (Mod to Severe))    Intolerances        LABS:                          8.2    4.90  )-----------( 236      ( 28 Sep 2022 08:25 )             27.3         CBC Full  -  ( 28 Sep 2022 08:25 )  WBC Count : 4.90 K/uL  RBC Count : 3.20 M/uL  Hemoglobin : 8.2 g/dL  Hematocrit : 27.3 %  Platelet Count - Automated : 236 K/uL  Mean Cell Volume : 85.3 fl  Mean Cell Hemoglobin : 25.6 pg  Mean Cell Hemoglobin Concentration : 30.0 gm/dL  Auto Neutrophil # : x  Auto Lymphocyte # : x  Auto Monocyte # : x  Auto Eosinophil # : x  Auto Basophil # : x  Auto Neutrophil % : x  Auto Lymphocyte % : x  Auto Monocyte % : x  Auto Eosinophil % : x  Auto Basophil % : x        09-28    139  |  98  |  16  ----------------------------<  100<H>  4.1   |  25  |  1.09    Ca    9.0      28 Sep 2022 08:25  Phos  4.0     09-28  Mg     2.2     09-28    TPro  6.5  /  Alb  x   /  TBili  x   /  DBili  x   /  AST  x   /  ALT  x   /  AlkPhos  x   09-27        PT/INR - ( 29 Sep 2022 05:30 )   PT: 13.2 sec;   INR: 1.11          PTT - ( 29 Sep 2022 05:30 )  PTT:29.8 sec      RADIOLOGY & ADDITIONAL TESTS: Reviewed.

## 2022-09-29 NOTE — PROGRESS NOTE ADULT - SUBJECTIVE AND OBJECTIVE BOX
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 for dark stools x 3 days. Patient was recently admitted under cardiology service from 9/4-9/13 for chest pain and headaches - found to incidentally have Ecoli bacteremia and Gemella spp. TTE, IRMA, whole body gallium scan were negative - source thought to be 2/2 infected tooth which patient refused to have extracted in house by OMFS. She was sent home with PICC line for IV abx (Ceftriaxone 2g daily until9/26). She has still not gotten her tooth extraction outpatient as she has been in the [facility], but has been getting the Ceftriaxone daily. For this admission, patient had a blood transfusion on 9/16 in the [facility], did not have any reaction to the transfusion. She started to develop bloody diarrhea after the transfusion and does not remember the details as to why exactly she got the transfusion. She also has chronic complaints including headache, jaw and neck pain, R hip pain, etc.     ED Course: afebrile, VSS, Hb 7.9 (8.1 at baseline), CT scan no active GI hemorrhage identified  Consults: surgery and GI   Meds: zofran, protonix, tylenol   EKG: sinus bradycardia to 59, no ischemia  (21 Sep 2022 14:06)                            8.6    4.89  )-----------( 274      ( 29 Sep 2022 05:30 )             28.0       09-29    135  |  96  |  13  ----------------------------<  100<H>  4.2   |  27  |  1.11    Ca    9.3      29 Sep 2022 05:30  Phos  3.9     09-29  Mg     2.2     09-29      Vital Signs Last 24 Hrs  T(C): 37.3 (29 Sep 2022 09:07), Max: 37.4 (28 Sep 2022 21:43)  T(F): 99.2 (29 Sep 2022 09:07), Max: 99.3 (28 Sep 2022 21:43)  HR: 60 (29 Sep 2022 09:07) (60 - 79)  BP: 148/71 (29 Sep 2022 09:07) (119/71 - 148/71)  BP(mean): --  RR: 18 (29 Sep 2022 09:07) (18 - 20)  SpO2: 100% (29 Sep 2022 09:07) (100% - 100%)    Parameters below as of 29 Sep 2022 09:07  Patient On (Oxygen Delivery Method): nasal cannula  O2 Flow (L/min): 5      MEDICATIONS  (STANDING):  aMIOdarone    Tablet 200 milliGRAM(s) Oral daily  amLODIPine   Tablet 10 milliGRAM(s) Oral daily  atorvastatin 20 milliGRAM(s) Oral at bedtime  chlorhexidine 2% Cloths 1 Application(s) Topical daily  folic acid 1 milliGRAM(s) Oral daily  isosorbide   mononitrate ER Tablet (IMDUR) 30 milliGRAM(s) Oral daily  lidocaine   4% Patch 1 Patch Transdermal every 24 hours  magnesium oxide 400 milliGRAM(s) Oral daily  metoprolol succinate ER 12.5 milliGRAM(s) Oral daily  montelukast 10 milliGRAM(s) Oral daily  multivitamin 1 Tablet(s) Oral daily  pantoprazole  Injectable 40 milliGRAM(s) IV Push every 12 hours  potassium chloride    Tablet ER 40 milliEquivalent(s) Oral daily  simethicone 80 milliGRAM(s) Chew two times a day  tiotropium 18 MICROgram(s) Capsule 1 Capsule(s) Inhalation daily  torsemide 40 milliGRAM(s) Oral daily    MEDICATIONS  (PRN):  acetaminophen     Tablet .. 650 milliGRAM(s) Oral every 6 hours PRN Temp greater or equal to 38C (100.4F), Mild Pain (1 - 3)  benzonatate 100 milliGRAM(s) Oral three times a day PRN Cough  ondansetron Injectable 4 milliGRAM(s) IV Push every 8 hours PRN Nausea and/or Vomiting  oxyCODONE    Solution 2.5 milliGRAM(s) Oral every 4 hours PRN Severe Pain (7 - 10)  polyethylene glycol 3350 17 Gram(s) Oral daily PRN Constipation       Physical Therapy Functional Status Assessment :   9/28/2022     Cognitive/Neuro/Behavioral  Cognitive/Neuro/Behavioral [WDL Definition: Alert; opens eyes spontaneously; arouses to voice or touch; oriented x 4; follows commands; speech spontaneous, logical; purposeful motor response; behavior appropriate to situation]: WDL    Language Assistance  Preferred Language to Address Healthcare Preferred Language to Address Healthcare: Canadian  's name: Dov   Patient/Caregiver offered   services: yes  Patient/Caregiver accepted  services: yes  Date/Time of acceptance(dd-mmm-yyyy hh:mm): 28-Sep-2022 14:55    ID: [id number]     Therapeutic Interventions      Sit-Stand Transfer Training  Transfer Training Sit-to-Stand Transfer: supervsion;  supervision;  full weight-bearing   rolling walker  Transfer Training Stand-to-Sit Transfer: supervsion;  supervision;  weight-bearing as tolerated   rolling walker  Sit-to-Stand Transfer Training Transfer Safety Analysis: impaired balance;  rolling walker    Gait Training  Gait Training: supervsion;  supervision;  weight-bearing as tolerated   rolling walker;  75 feet;  x 2  Gait Analysis: swing-to gait   decreased bhavya;  decreased hip/knee flexion;  decreased velocity of limb motion;  impaired balance;  75 feet;  x 2;  rolling walker;  off O2    Therapeutic Exercise  Therapeutic Exercise Detail: seated - marching, long arc quad x 30 reps; BUE shoulder press x 20 (off O2)       PM&R Impression : as above    Current Disposition Plan Recommendations :    d/c home , home P.T.

## 2022-09-29 NOTE — PROGRESS NOTE ADULT - ASSESSMENT
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 Cardiology consulted for preoperative clearance evaluation for colonoscopy. Pt now s/p colonoscopy  with a mass.     #HFpEF   Patient is volume up on physical exam and on with worsening pulmonary congestion on CXR  ECHO: normal LVEF, grade II DD, known severe prosthetic AV stenosis   - C/w Torsemide 40mg PO daily, Toprol 12.5mg daily  - Primafit for accurate I/Os documentation  - Strict I/Os, daily standing weights. IO not well recorded     #Atrial Fibrillation   - c/w amiodarone  - Resume home eliquis

## 2022-09-29 NOTE — PROGRESS NOTE ADULT - ASSESSMENT
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 Found on colonoscopy (9/22/22) to have friable, centrally ulcerated mass 5-7cm distal to ileocolic anastomosis suspicious for malignancy. Hem-Onc consulted for further management.    1.) colonic mass suspicious for recurrent colon cancer  Pt with history of colon cancer, s/p al colectomy with ileocolic anastomosis. Pt did not get treated with RT or chemotherapy. Now presenting with LGIB, and most recent colonoscopy showing mass suspicious for malignancy. CT Chest/Abdomen/Pelvis showing mediastinal, bilateral hilar LAD (read as stable), no focal liver lesions. Hepatic flexure mass with biopsy pathology showing adenocarcinoma, at least intramucosal, with suggestion of deeper invasion. CEA 1.8.     Recommendations:    PET/CT with highly suspicious mediastinal/hilar lymph nodes  The patient and her daughter declined further work up (EBUS)  She wants to consider alternative treatments  Will arrange outpatient follow up prn    Thank you    Stefano Mckeon MD  [phone]

## 2022-09-29 NOTE — PROGRESS NOTE ADULT - PROVIDER SPECIALTY LIST ADULT
Cardiology
Gastroenterology
Heme/Onc
Internal Medicine
Pulmonology
Rehab Medicine
Surgery
Surgery
Cardiology
Cardiology
Internal Medicine
Surgery
Cardiology
Heme/Onc
Pulmonology
Rehab Medicine
Surgery
Cardiology
Colorectal Surgery
Heme/Onc
Heme/Onc
Hospitalist
Surgery
Surgery

## 2022-09-29 NOTE — PROGRESS NOTE ADULT - ASSESSMENT
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      Problem/Plan - 1:  ·  Problem: Colonic mass.   ·  Plan: New mass seen C-scope  -Tentative plan for OR on Wednesday 9/28 w/ CRS - Dr. Sanford  - Cardiac anesthesia recommended as per cardiology  - PET-CT performed for staging, results indicate likely metastatic spread to mediastinal and b/l hilar lymph nodes.   - Current plan is for lung LN biopsy inpatient then outpt f/u with heme onc and pulmonology for further prognostication and potential systemic therapy.    Problem/Plan - 2:  ·  Problem: Acute blood loss anemia.   ·  Plan: Patient with dark red stool, occasional BRBPR. Hemoglobin 7.9 on admission which is around baseline. Non tachycardic or hypotensive. 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 Of note, patient did state that she had blood transfusion 5 days prior to admission at [facility] for "anemia" but couldn't give any more details. Colonoscopy performed on 9/22; "Approximately 5-7 cm distal to the ileocolonic anastomosis, there was a friable, centrally ulcerated, coarsened mass that occupied 30% of the luminal circumference, concerning for malignancy. Multiple cold forceps biopsies were obtained." Dr. Sanford ([facility] surgery attending) assessed and updated pt, tentative for surgical intervention 9/28.   - Continue to trend hemoglobin, keep HG > 7  - Restarted on low sodium diet with low fiber, with 1 ensure per day.    Problem/Plan - 3:  ·  Problem: Acute on chronic diastolic heart failure.   ·  Plan: Grade II diastolic dysfunction. On Lasix 20 PO daily outpatient, however held inpatient. Pt appears mildly overloaded on CT Chest performed to r/o mets.   - c/w Lasix 40 BID  - 1 dose Metolazone 5mg given 9/27 30 minutes before PM dose of Lasix as per card recs  - Strict Is and Os as per card recs      #Hx pafib on Amiodarone and Eliquis  - C/w Amiodarone 200mg Qd   - Restarted Home Eliquis 5mg BID as per card recs as plan is no longer for colorectal resection.    Problem/Plan - 4:  ·  Problem: E coli bacteremia.   ·  Plan: Pt with E coli and Gemella spp bacteremia from dental abscess. Refused extraction last visit, has not done it outpatient. On Ceftriaxone 2g daily until 9/26. Patient has jaw and neck pain without any crepitus on exam, no fluctuance or erythema/edema. Low concern for involvement of soft tissue involvement of head and neck, no airway compromise.   - Afebrile and without white count on admission   - Continued ceftriaxone 2g daily until 9/26, completed course.    Problem/Plan - 5:  ·  Problem: History of transcatheter aortic valve replacement (TAVR).   ·  Plan: History of TAVR with thrombosis in the past, currently on Eliquis 5mg BID.   - Holding Eliquis in the setting of possible GIB.    Problem/Plan - 6:  ·  Problem: HTN (hypertension).   ·  Plan: - Continue amlodipine  - Holding home lisinopril due to BIBIANA from last admission, patient was instructed to restart this medication with Dr. Marte.    Problem/Plan - 7:  ·  Problem: COPD (chronic obstructive pulmonary disease).   ·  Plan: History of COPD, on spiriva and monteleukast.   - Continue home meds   - Patient currently on 2L NC saturating 99% - used O2 at home before but has not used in 2 years      #MARK:  - Attempt to restart CPAP at night.    Problem/Plan - 8:  ·  Problem: GERD (gastroesophageal reflux disease).   ·  Plan: Continue protonix 40mg BID for GIB.    Problem/Plan - 9:  ·  Problem: Hyperlipidemia.   ·  Plan: Continue home atorvastatin 20mg.    Problem/Plan - 10:  ·  Problem: Prophylactic measure.   ·  Plan; F: None  E: replete PRN   N: Low sodium diet with low fiber, 1 ensure per day  DVT: Eliquis 5mg BID  GI: Protonix 40mg IV BID     Dispo: [facility]   Code status: full code.

## 2022-09-29 NOTE — PROGRESS NOTE ADULT - ATTENDING SUPERVISION STATEMENT
Fellow
Resident
Resident
Fellow
Fellow
Resident
Fellow
Resident

## 2022-09-29 NOTE — PROGRESS NOTE ADULT - SUBJECTIVE AND OBJECTIVE BOX
INTERVAL EVENTS:  -Per chart, patient and family do not wish to continue further inpatient workup of possible malignancy    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        MEDICATIONS  (STANDING):  aMIOdarone    Tablet 200 milliGRAM(s) Oral daily  amLODIPine   Tablet 10 milliGRAM(s) Oral daily  atorvastatin 20 milliGRAM(s) Oral at bedtime  chlorhexidine 2% Cloths 1 Application(s) Topical daily  folic acid 1 milliGRAM(s) Oral daily  isosorbide   mononitrate ER Tablet (IMDUR) 30 milliGRAM(s) Oral daily  lidocaine   4% Patch 1 Patch Transdermal every 24 hours  magnesium oxide 400 milliGRAM(s) Oral daily  metoprolol succinate ER 12.5 milliGRAM(s) Oral daily  montelukast 10 milliGRAM(s) Oral daily  multivitamin 1 Tablet(s) Oral daily  pantoprazole  Injectable 40 milliGRAM(s) IV Push every 12 hours  potassium chloride    Tablet ER 40 milliEquivalent(s) Oral daily  simethicone 80 milliGRAM(s) Chew two times a day  tiotropium 18 MICROgram(s) Capsule 1 Capsule(s) Inhalation daily  torsemide 40 milliGRAM(s) Oral daily    MEDICATIONS  (PRN):  acetaminophen     Tablet .. 650 milliGRAM(s) Oral every 6 hours PRN Temp greater or equal to 38C (100.4F), Mild Pain (1 - 3)  benzonatate 100 milliGRAM(s) Oral three times a day PRN Cough  ondansetron Injectable 4 milliGRAM(s) IV Push every 8 hours PRN Nausea and/or Vomiting  oxyCODONE    Solution 2.5 milliGRAM(s) Oral every 4 hours PRN Severe Pain (7 - 10)  polyethylene glycol 3350 17 Gram(s) Oral daily PRN Constipation    T(F): 99.2 (09-29-22 @ 09:07), Max: 99.3 (09-28-22 @ 21:43)  HR: 60 (09-29-22 @ 09:07) (60 - 79)  BP: 148/71 (09-29-22 @ 09:07) (119/71 - 148/71)  BP(mean): --  ABP: --  ABP(mean): --  RR: 18 (09-29-22 @ 09:07) (18 - 20)  SpO2: 100% (09-29-22 @ 09:07) (100% - 100%)    I/O Detail 24H      PHYSICAL EXAM:  GEN: NAD  HEENT: EOMI   RESP: CTA b/l  CV: RRR. Normal S1/S2. No m/r/g.  ABD: soft, non-distended  EXT: No edema   NEURO: alert and attentive    LABS:  CBC 09-29-22 @ 05:30                        8.6    4.89  )-----------( 274                   28.0       Hgb trend: 8.6 <-- , 8.2 <-- , 8.4 <--   WBC trend: 4.89 <-- , 4.90 <-- , 5.08 <--       CMP 09-29-22 @ 05:30    135  |  96  |  13  ----------------------------<  100<H>  4.2   |  27  |  1.11    Ca    9.3      09-29-22 @ 05:30  Phos  3.9     09-29  Mg     2.2     09-29        Serum Cr trend: 1.11 <-- , 1.09 <-- , 1.05 <--     PT/INR - ( 29 Sep 2022 05:30 )   PT: 13.2 sec;   INR: 1.11          PTT - ( 29 Sep 2022 05:30 ):29.8 sec    Cardiac Markers           STUDIES:

## 2022-09-29 NOTE — PROGRESS NOTE ADULT - REASON FOR ADMISSION
GI bleeding

## 2022-09-29 NOTE — PROGRESS NOTE ADULT - ATTENDING COMMENTS
Initial attending contact date  9/21/22    . See resident note written above for details. I reviewed the resident documentation. I have personally seen and examined this patient. I reviewed vitals, labs, medications, cardiac studies, and additional imaging. I agree with the above residnet's findings and plans as written above with the following additions/statements.    -Pt well known to cardiac service with mx co-morbidities as noted   -Cardiology consulted for pre-op risk assessment  -Recent ECHO with normal LVEF, grade II DD, known severe prosthetic AV stenosis   -now s/p colonoscopy with signs of metastatic colon ca  -All further work up deferred for now   -Appears euvolemic today, satting 99% on 2 L which is her baseline  -HFpEF: Cont torsemide 40 mg po qd,  toprol 12.5 mg qd   -Remains in NSR. Cont po amio. Resume home eliquis   -Cont amlodipine 10 mg qd, imdur 30 mg qd for BP control   -To fu with Dr Deleon upon dc for outpatient cardiology fu

## 2022-09-29 NOTE — PROGRESS NOTE ADULT - TIME BILLING
as noted above
Patient seen and examined with house-staff during bedside rounds.  Resident note read, including vitals, physical findings, laboratory data, and radiological reports.   Revisions included below.  Direct personal management at bed side and extensive interpretation of the data.  Plan was outlined and discussed in details with the housestaff.  Decision making of high complexity  Action taken for acute disease activity to reflect the level of care provided:  - medication reconciliation  - review laboratory data  The patient is clinically stable.  The patient is scheduled for EBUS.  Await the PET scan and cardiac evaluation for the procedure.  The patient might require general anesthesia.  The echocardiogram revealed pulmonary hypertension as she is high risk for bleeding and complications related intubation for the procedure
as noted above
Patient seen and examined with house-staff during bedside rounds.  Resident note read, including vitals, physical findings, laboratory data, and radiological reports.   Revisions included below.  Direct personal management at bed side and extensive interpretation of the data.  Plan was outlined and discussed in details with the housestaff.  Decision making of high complexity  Action taken for acute disease activity to reflect the level of care provided:  - medication reconciliation  - review laboratory data  she is scheduled for the procedure tomorrow

## 2022-09-29 NOTE — DISCHARGE NOTE NURSING/CASE MANAGEMENT/SOCIAL WORK - NSDCFUADDAPPT_GEN_ALL_CORE_FT
not applicable Dr. Mckeon's (hematology/oncology/cancer doctor) office will reach out to you or your daughter directly to make a follow up appointment. They have already been notified.     Dr. More's office (pulmonology/lung doctor) has been notified. You have been scheduled for a follow up appointment at 10/25 at 2PM, however they will try their best to find you an earlier appointment and will call you directly if they have an earlier appointment open up for you.

## 2022-09-29 NOTE — DISCHARGE NOTE NURSING/CASE MANAGEMENT/SOCIAL WORK - NSDCPEFALRISK_GEN_ALL_CORE
For information on Fall & Injury Prevention, visit: [email or web address] OR  [email or web address] OR  https://www.cdc.gov/steadi/patient.html

## 2022-09-29 NOTE — PROGRESS NOTE ADULT - SUBJECTIVE AND OBJECTIVE BOX
SUBJECTIVE: Patient seen and examined bedside. Pt reports feeling well with no acute complaints. Denies abdominal pain, nausea, or vomiting. Tolerating diet and reports continued bowel function.     aMIOdarone    Tablet 200 milliGRAM(s) Oral daily  amLODIPine   Tablet 10 milliGRAM(s) Oral daily  isosorbide   mononitrate ER Tablet (IMDUR) 30 milliGRAM(s) Oral daily  metoprolol succinate ER 12.5 milliGRAM(s) Oral daily  torsemide 40 milliGRAM(s) Oral daily      Vital Signs Last 24 Hrs  T(C): 37.3 (29 Sep 2022 09:07), Max: 37.4 (28 Sep 2022 21:43)  T(F): 99.2 (29 Sep 2022 09:07), Max: 99.3 (28 Sep 2022 21:43)  HR: 60 (29 Sep 2022 09:07) (60 - 79)  BP: 148/71 (29 Sep 2022 09:07) (119/71 - 148/71)  BP(mean): --  RR: 18 (29 Sep 2022 09:07) (18 - 20)  SpO2: 100% (29 Sep 2022 09:07) (100% - 100%)    Parameters below as of 29 Sep 2022 09:07  Patient On (Oxygen Delivery Method): nasal cannula  O2 Flow (L/min): 5    I&O's Detail      General: NAD, resting comfortably in bed  C/V: NSR  Pulm: Nonlabored breathing, no respiratory distress  Abd: soft, NT/ND, no rebound, no guarding  Extrem: WWP, no edema, SCDs in place        LABS:                        8.6    4.89  )-----------( 274      ( 29 Sep 2022 05:30 )             28.0     09-29    135  |  96  |  13  ----------------------------<  100<H>  4.2   |  27  |  1.11    Ca    9.3      29 Sep 2022 05:30  Phos  3.9     09-29  Mg     2.2     09-29      PT/INR - ( 29 Sep 2022 05:30 )   PT: 13.2 sec;   INR: 1.11          PTT - ( 29 Sep 2022 05:30 )  PTT:29.8 sec      RADIOLOGY & ADDITIONAL STUDIES:

## 2022-09-29 NOTE — CHART NOTE - NSCHARTNOTEFT_GEN_A_CORE
Patient is s/p colonoscopy with Dr. Camarena in the Endoscopy Suite with the following findings and recommendations.     Impressions:  - Evidence of a previous ileo-colonic anastomosis was seen in the anastomosis. Suture material was visible.	  - Approximately 5-7 cm distal to the ileocolonic anastomosis, there was a friable, centrally ulcerated, coarsened mass that occupied 30% of the luminal circumference, concerning for malignancy. Multiple cold forceps biopsies were obtained.	  - Perianal skin tags.    Recommendations:  - Resume Previous Diet  - Await pathology results  - Refer to Colorectal Surgeon, d/w Dr. Sanford  - CEA  - CT scan Chest, Abdomen and Pelvis with IV contrast    Indigo Gonzalez MD  PGY-6, Gastroenterology Fellow  pager: [phone]
Discussed EBUS at length with patient using [employer]  ID# [id number]. Discussed primary colon cancer with suspicion for metastatic disease to the lung per PET scan. Discussed that EBUS with biopsy would be required in order to determine spread of cancer and thus treatment. Patient expressed multiple concerns such as frustration with multiple tests during hospital stay and concern that biopsy may help spread cancer. Explained to patient that cancer is a complicated diagnosis with complicated treatments and that tests may be frustrating, but they are required for diagnosis and treatment. Also explained to patient that the biopsy causing worsening spread of cancer would be unlikely and expressed the need for biopsy in order to determine treatment. Pt acknowledged that without undergoing the procedure she would not be able to get treatment for cancer at this time, but wished to defer decision for biopsy to her daughter.     Discussed care with daughter who also expressed concern that biopsy would spread cancer. Explained that cancer spread via biopsy would be unlikely and that biopsy would be necessary in order to determine treatment of cancer. Daughter also believes that this cancer may respond to a low protein diet. Pt's daughter acknowledged that she we may believe her thought process is unrealistic, but that she is adamant that she wants to try low protein diet. Both patient and daughter accepted that without biopsy and further treatment that cancer may spread and make future treatments impossible.     Accepted patient and daughter's wishes to cancel EBUS and informed them of next steps, which are plans for discharge with outpatient oncology follow up.
Notified by primary team that patient and daughter are apprehensive about proceeding with EBUS and that they do not wish to undergo this procedure inpatient. They wish to discuss further with heme/onc regarding treatment for her cancer and pursuing alternative therapies. Patient should follow up with Dr. Bere More at discharge to discuss next steps and schedule outpatient EBUS if in line with her goals of care.     Pulmonary service will sign off, please reconsult with questions
Patient seen and examined.  Full consult to follow.  Patient with likely recurrence of colon cancer. Awaiting Oncology and colorectal surgery.   Symptom addressed. Patient reports intermittent pain. Not amenable to opiates at this point.  Tylenol recommended. If necessary, can escalated to Oxycodone 2.5 mg PO Q4 hours prn.   Patient is not amenable to discussing overall goals of care at this time. Is awaiting discussion with Dr. Mckeon and CT Surgery.

## 2022-09-29 NOTE — DISCHARGE NOTE NURSING/CASE MANAGEMENT/SOCIAL WORK - PATIENT PORTAL LINK FT
You can access the FollowMyHealth Patient Portal offered by  by registering at the following website: [email or web address]. By joining [facility]’s FollowMyHealth portal, you will also be able to view your health information using other applications (apps) compatible with our system.

## 2022-09-30 LAB
% GAMMA, URINE: 19.9 % — SIGNIFICANT CHANGE UP
ALBUMIN 24H MFR UR ELPH: 15.3 % — SIGNIFICANT CHANGE UP
ALPHA1 GLOB 24H MFR UR ELPH: 36.4 % — SIGNIFICANT CHANGE UP
ALPHA2 GLOB 24H MFR UR ELPH: 15.9 % — SIGNIFICANT CHANGE UP
B-GLOBULIN 24H MFR UR ELPH: 12.5 % — SIGNIFICANT CHANGE UP
INTERPRETATION 24H UR IFE-IMP: SIGNIFICANT CHANGE UP
INTERPRETATION 24H UR IFE-IMP: SIGNIFICANT CHANGE UP
M PROTEIN 24H UR ELPH-MRATE: SIGNIFICANT CHANGE UP
PROT ?TM UR-MCNC: <4 MG/DL — SIGNIFICANT CHANGE UP (ref 0–12)
PROT PATTERN 24H UR ELPH-IMP: SIGNIFICANT CHANGE UP
TOTAL VOLUME - 24 HOUR: SIGNIFICANT CHANGE UP ML
URINE CREATININE CALCULATION: SIGNIFICANT CHANGE UP G/24 H (ref 0.8–1.8)

## 2022-10-03 ENCOUNTER — APPOINTMENT (OUTPATIENT)
Dept: PULMONOLOGY | Facility: CLINIC | Age: 82
End: 2022-10-03

## 2022-10-03 VITALS
DIASTOLIC BLOOD PRESSURE: 79 MMHG | WEIGHT: 202 LBS | HEIGHT: 62 IN | RESPIRATION RATE: 12 BRPM | HEART RATE: 80 BPM | OXYGEN SATURATION: 96 % | SYSTOLIC BLOOD PRESSURE: 183 MMHG | BODY MASS INDEX: 37.17 KG/M2 | TEMPERATURE: 97.2 F

## 2022-10-03 DIAGNOSIS — G47.30 SLEEP APNEA, UNSPECIFIED: ICD-10-CM

## 2022-10-03 PROCEDURE — 99214 OFFICE O/P EST MOD 30 MIN: CPT

## 2022-10-03 NOTE — PHYSICAL EXAM
[No Acute Distress] : no acute distress [Normal Oropharynx] : normal oropharynx [Normal Appearance] : normal appearance [No Neck Mass] : no neck mass [Normal Rate/Rhythm] : normal rate/rhythm [Normal S1, S2] : normal s1, s2 [No Resp Distress] : no resp distress [Clear to Auscultation Bilaterally] : clear to auscultation bilaterally [No Abnormalities] : no abnormalities [Benign] : benign [Normal Gait] : normal gait [No Clubbing] : no clubbing [No Cyanosis] : no cyanosis [No Edema] : no edema [FROM] : FROM [Normal Color/ Pigmentation] : normal color/ pigmentation [No Focal Deficits] : no focal deficits [Oriented x3] : oriented x3 [Normal Affect] : normal affect [TextBox_54] : systolic murmur

## 2022-10-03 NOTE — ASSESSMENT
[FreeTextEntry1] : COPD\par she is stable and compliant with the inhaler.  The baseline oxygen saturation is mateo;l.  She did not require systemic steroids nor urgent care visit.  \par \par Sleep apnea\par she is noncompliant with cpap and instructed her to be more complaint with it \par \par Pulmonary nodule and mediastinal adenopathy \par \par I discussed the case in detail with the daughter.  The patient was admitted to the hospital and had a colonoscopy.  There is a lesion which turned to be positive for lung cancer.  Patient had a PET scan which revealed uptake in the mediastinal and the rascal and opacity and the right nodule.  The uptake is minimal could be consistent with inflammatory process.  The size of the right lower lobe nodule mediastinal lymph node has not changed.  I discussed with the patient that patient needs to have bronchoscopy to rule out either primary lung versus metastatic disease at that we will change the therapeutic regimen and the diagnosis.  I will discuss with Dr. Delgado

## 2022-10-05 DIAGNOSIS — N18.30 CHRONIC KIDNEY DISEASE, STAGE 3 UNSPECIFIED: ICD-10-CM

## 2022-10-05 DIAGNOSIS — B96.20 UNSPECIFIED ESCHERICHIA COLI [E. COLI] AS THE CAUSE OF DISEASES CLASSIFIED ELSEWHERE: ICD-10-CM

## 2022-10-05 DIAGNOSIS — R00.1 BRADYCARDIA, UNSPECIFIED: ICD-10-CM

## 2022-10-05 DIAGNOSIS — R59.0 LOCALIZED ENLARGED LYMPH NODES: ICD-10-CM

## 2022-10-05 DIAGNOSIS — I48.91 UNSPECIFIED ATRIAL FIBRILLATION: ICD-10-CM

## 2022-10-05 DIAGNOSIS — C18.3 MALIGNANT NEOPLASM OF HEPATIC FLEXURE: ICD-10-CM

## 2022-10-05 DIAGNOSIS — I50.33 ACUTE ON CHRONIC DIASTOLIC (CONGESTIVE) HEART FAILURE: ICD-10-CM

## 2022-10-05 DIAGNOSIS — I13.0 HYPERTENSIVE HEART AND CHRONIC KIDNEY DISEASE WITH HEART FAILURE AND STAGE 1 THROUGH STAGE 4 CHRONIC KIDNEY DISEASE, OR UNSPECIFIED CHRONIC KIDNEY DISEASE: ICD-10-CM

## 2022-10-05 DIAGNOSIS — Z20.822 CONTACT WITH AND (SUSPECTED) EXPOSURE TO COVID-19: ICD-10-CM

## 2022-10-05 DIAGNOSIS — Z88.8 ALLERGY STATUS TO OTHER DRUGS, MEDICAMENTS AND BIOLOGICAL SUBSTANCES STATUS: ICD-10-CM

## 2022-10-05 DIAGNOSIS — Z23 ENCOUNTER FOR IMMUNIZATION: ICD-10-CM

## 2022-10-05 DIAGNOSIS — E66.01 MORBID (SEVERE) OBESITY DUE TO EXCESS CALORIES: ICD-10-CM

## 2022-10-05 DIAGNOSIS — Z95.2 PRESENCE OF PROSTHETIC HEART VALVE: ICD-10-CM

## 2022-10-05 DIAGNOSIS — Z99.81 DEPENDENCE ON SUPPLEMENTAL OXYGEN: ICD-10-CM

## 2022-10-05 DIAGNOSIS — D50.9 IRON DEFICIENCY ANEMIA, UNSPECIFIED: ICD-10-CM

## 2022-10-05 DIAGNOSIS — E78.5 HYPERLIPIDEMIA, UNSPECIFIED: ICD-10-CM

## 2022-10-05 DIAGNOSIS — I27.20 PULMONARY HYPERTENSION, UNSPECIFIED: ICD-10-CM

## 2022-10-05 DIAGNOSIS — J44.9 CHRONIC OBSTRUCTIVE PULMONARY DISEASE, UNSPECIFIED: ICD-10-CM

## 2022-10-05 DIAGNOSIS — Z85.038 PERSONAL HISTORY OF OTHER MALIGNANT NEOPLASM OF LARGE INTESTINE: ICD-10-CM

## 2022-10-05 DIAGNOSIS — G89.29 OTHER CHRONIC PAIN: ICD-10-CM

## 2022-10-05 DIAGNOSIS — Z90.49 ACQUIRED ABSENCE OF OTHER SPECIFIED PARTS OF DIGESTIVE TRACT: ICD-10-CM

## 2022-10-05 DIAGNOSIS — G47.33 OBSTRUCTIVE SLEEP APNEA (ADULT) (PEDIATRIC): ICD-10-CM

## 2022-10-05 DIAGNOSIS — Z53.8 PROCEDURE AND TREATMENT NOT CARRIED OUT FOR OTHER REASONS: ICD-10-CM

## 2022-10-05 DIAGNOSIS — K04.7 PERIAPICAL ABSCESS WITHOUT SINUS: ICD-10-CM

## 2022-10-05 DIAGNOSIS — Z91.19 PATIENT'S NONCOMPLIANCE WITH OTHER MEDICAL TREATMENT AND REGIMEN: ICD-10-CM

## 2022-10-05 DIAGNOSIS — D62 ACUTE POSTHEMORRHAGIC ANEMIA: ICD-10-CM

## 2022-10-08 LAB
% ALBUMIN: 49.3 % — SIGNIFICANT CHANGE UP
% ALPHA 1: 7.9 % — SIGNIFICANT CHANGE UP
% ALPHA 2: 12.7 % — SIGNIFICANT CHANGE UP
% BETA: 14.4 % — SIGNIFICANT CHANGE UP
% GAMMA: 15.7 % — SIGNIFICANT CHANGE UP
ALBUMIN SERPL ELPH-MCNC: 3.2 G/DL — LOW (ref 3.6–5.5)
ALBUMIN/GLOB SERPL ELPH: 1 RATIO — SIGNIFICANT CHANGE UP
ALPHA1 GLOB SERPL ELPH-MCNC: 0.5 G/DL — HIGH (ref 0.1–0.4)
ALPHA2 GLOB SERPL ELPH-MCNC: 0.8 G/DL — SIGNIFICANT CHANGE UP (ref 0.5–1)
B-GLOBULIN SERPL ELPH-MCNC: 0.9 G/DL — SIGNIFICANT CHANGE UP (ref 0.5–1)
GAMMA GLOBULIN: 1 G/DL — SIGNIFICANT CHANGE UP (ref 0.6–1.6)
INTERPRETATION SERPL IFE-IMP: SIGNIFICANT CHANGE UP
PROT PATTERN SERPL ELPH-IMP: SIGNIFICANT CHANGE UP

## 2022-11-22 ENCOUNTER — APPOINTMENT (OUTPATIENT)
Dept: PULMONOLOGY | Facility: CLINIC | Age: 82
End: 2022-11-22

## 2022-11-22 VITALS
TEMPERATURE: 97.5 F | BODY MASS INDEX: 38.28 KG/M2 | OXYGEN SATURATION: 95 % | RESPIRATION RATE: 12 BRPM | HEART RATE: 72 BPM | DIASTOLIC BLOOD PRESSURE: 78 MMHG | WEIGHT: 208 LBS | HEIGHT: 62 IN | SYSTOLIC BLOOD PRESSURE: 189 MMHG

## 2022-11-22 DIAGNOSIS — J44.9 CHRONIC OBSTRUCTIVE PULMONARY DISEASE, UNSPECIFIED: ICD-10-CM

## 2022-11-22 DIAGNOSIS — R59.9 ENLARGED LYMPH NODES, UNSPECIFIED: ICD-10-CM

## 2022-11-22 DIAGNOSIS — R91.1 SOLITARY PULMONARY NODULE: ICD-10-CM

## 2022-11-22 PROCEDURE — 36415 COLL VENOUS BLD VENIPUNCTURE: CPT

## 2022-11-22 PROCEDURE — 99214 OFFICE O/P EST MOD 30 MIN: CPT | Mod: 25

## 2022-11-23 NOTE — HISTORY OF PRESENT ILLNESS
[Former] : former [Never] : never [TextBox_4] : Pt with no pulmonary complaints.  Denies wheezing, coughing, fever or hemoptysis.  Daughter states he had an oral infection and is concerned the enlarged nodules are due to that.  She is concerned with moving forward with bronchoscopy.  [ESS] : 0

## 2022-11-23 NOTE — ASSESSMENT
[FreeTextEntry1] : COPD\par she is stable and compliant with the inhaler.  The baseline oxygen saturation is normal.  She did not require systemic steroids nor urgent care visit.  \par \par Sleep apnea\par she is noncompliant with cpap and instructed her to be more complaint with it \par \par Pulmonary nodule and mediastinal adenopathy \par \par I discussed the case in detail with the daughter.  The patient was admitted to the hospital and had a colonoscopy.  There is a lesion which turned to be positive for lung cancer.  Patient had a PET scan which revealed uptake in the mediastinal and the rascal and opacity and the right nodule.  The uptake is minimal could be consistent with inflammatory process.  The size of the right lower lobe nodule mediastinal lymph node has not changed.  I had a detailed discussion with pt and daughter.  They are reluctant to move forward with bronchoscopy at this time.  Discussed a PET scan will give us no additional information.   Option to repeat CT scan with IV contrast at this time since been 2 months since last scan.  Pt and daughter would like to move forward with CT scan.  Will follow on CMP to check her kidney function prior to contrast.  Discussed do not want to delay any further to get the CT scan down as soon as possible after the holiday.  Will discussed next steps post CT scan. She is follow by Dr. Delgado.

## 2022-11-23 NOTE — END OF VISIT
[Time Spent: ___ minutes] : I have spent [unfilled] minutes of time on the encounter. [>50% of the face to face encounter time was spent on counseling and/or coordination of care for ___] : Greater than 50% of the face to face encounter time was spent on counseling and/or coordination of care for [unfilled] [FreeTextEntry3] : Pt seen with MARIA LUISA Willams and agree on the above plan of care.

## 2022-11-25 LAB
ALBUMIN SERPL ELPH-MCNC: 4 G/DL
ALP BLD-CCNC: 82 U/L
ALT SERPL-CCNC: 30 U/L
ANION GAP SERPL CALC-SCNC: 13 MMOL/L
AST SERPL-CCNC: 39 U/L
BILIRUB SERPL-MCNC: 0.6 MG/DL
BUN SERPL-MCNC: 18 MG/DL
CALCIUM SERPL-MCNC: 9.1 MG/DL
CHLORIDE SERPL-SCNC: 101 MMOL/L
CO2 SERPL-SCNC: 28 MMOL/L
CREAT SERPL-MCNC: 1.54 MG/DL
EGFR: 33 ML/MIN/1.73M2
GLUCOSE SERPL-MCNC: 108 MG/DL
POTASSIUM SERPL-SCNC: 4.7 MMOL/L
PROT SERPL-MCNC: 6.8 G/DL
SODIUM SERPL-SCNC: 142 MMOL/L

## 2022-12-01 ENCOUNTER — INPATIENT (INPATIENT)
Facility: HOSPITAL | Age: 82
LOS: 1 days | Discharge: HOME HEALTH | DRG: 291 | End: 2022-12-03
Attending: INTERNAL MEDICINE | Admitting: INTERNAL MEDICINE
Payer: MEDICARE

## 2022-12-01 VITALS
HEART RATE: 66 BPM | SYSTOLIC BLOOD PRESSURE: 186 MMHG | WEIGHT: 207.01 LBS | RESPIRATION RATE: 22 BRPM | TEMPERATURE: 98 F | DIASTOLIC BLOOD PRESSURE: 75 MMHG | OXYGEN SATURATION: 93 %

## 2022-12-01 DIAGNOSIS — K21.9 GASTRO-ESOPHAGEAL REFLUX DISEASE WITHOUT ESOPHAGITIS: ICD-10-CM

## 2022-12-01 DIAGNOSIS — Z29.9 ENCOUNTER FOR PROPHYLACTIC MEASURES, UNSPECIFIED: ICD-10-CM

## 2022-12-01 DIAGNOSIS — J43.9 EMPHYSEMA, UNSPECIFIED: ICD-10-CM

## 2022-12-01 DIAGNOSIS — I27.20 PULMONARY HYPERTENSION, UNSPECIFIED: ICD-10-CM

## 2022-12-01 DIAGNOSIS — I50.9 HEART FAILURE, UNSPECIFIED: ICD-10-CM

## 2022-12-01 DIAGNOSIS — I48.0 PAROXYSMAL ATRIAL FIBRILLATION: ICD-10-CM

## 2022-12-01 DIAGNOSIS — J44.9 CHRONIC OBSTRUCTIVE PULMONARY DISEASE, UNSPECIFIED: ICD-10-CM

## 2022-12-01 DIAGNOSIS — R59.0 LOCALIZED ENLARGED LYMPH NODES: ICD-10-CM

## 2022-12-01 DIAGNOSIS — I10 ESSENTIAL (PRIMARY) HYPERTENSION: ICD-10-CM

## 2022-12-01 DIAGNOSIS — E78.5 HYPERLIPIDEMIA, UNSPECIFIED: ICD-10-CM

## 2022-12-01 DIAGNOSIS — R91.8 OTHER NONSPECIFIC ABNORMAL FINDING OF LUNG FIELD: ICD-10-CM

## 2022-12-01 DIAGNOSIS — D64.9 ANEMIA, UNSPECIFIED: ICD-10-CM

## 2022-12-01 DIAGNOSIS — N17.9 ACUTE KIDNEY FAILURE, UNSPECIFIED: ICD-10-CM

## 2022-12-01 DIAGNOSIS — Z95.2 PRESENCE OF PROSTHETIC HEART VALVE: Chronic | ICD-10-CM

## 2022-12-01 DIAGNOSIS — G47.33 OBSTRUCTIVE SLEEP APNEA (ADULT) (PEDIATRIC): ICD-10-CM

## 2022-12-01 LAB
ALBUMIN SERPL ELPH-MCNC: 3.8 G/DL — SIGNIFICANT CHANGE UP (ref 3.3–5)
ALBUMIN SERPL ELPH-MCNC: 4.1 G/DL — SIGNIFICANT CHANGE UP (ref 3.3–5)
ALP SERPL-CCNC: 80 U/L — SIGNIFICANT CHANGE UP (ref 40–120)
ALP SERPL-CCNC: 85 U/L — SIGNIFICANT CHANGE UP (ref 40–120)
ALT FLD-CCNC: 18 U/L — SIGNIFICANT CHANGE UP (ref 10–45)
ALT FLD-CCNC: 21 U/L — SIGNIFICANT CHANGE UP (ref 10–45)
ANION GAP SERPL CALC-SCNC: 9 MMOL/L — SIGNIFICANT CHANGE UP (ref 5–17)
ANION GAP SERPL CALC-SCNC: 9 MMOL/L — SIGNIFICANT CHANGE UP (ref 5–17)
ANISOCYTOSIS BLD QL: SIGNIFICANT CHANGE UP
AST SERPL-CCNC: 17 U/L — SIGNIFICANT CHANGE UP (ref 10–40)
AST SERPL-CCNC: 21 U/L — SIGNIFICANT CHANGE UP (ref 10–40)
BASOPHILS # BLD AUTO: 0 K/UL — SIGNIFICANT CHANGE UP (ref 0–0.2)
BASOPHILS NFR BLD AUTO: 0 % — SIGNIFICANT CHANGE UP (ref 0–2)
BILIRUB SERPL-MCNC: 0.6 MG/DL — SIGNIFICANT CHANGE UP (ref 0.2–1.2)
BILIRUB SERPL-MCNC: 0.6 MG/DL — SIGNIFICANT CHANGE UP (ref 0.2–1.2)
BUN SERPL-MCNC: 21 MG/DL — SIGNIFICANT CHANGE UP (ref 7–23)
BUN SERPL-MCNC: 21 MG/DL — SIGNIFICANT CHANGE UP (ref 7–23)
CALCIUM SERPL-MCNC: 8.8 MG/DL — SIGNIFICANT CHANGE UP (ref 8.4–10.5)
CALCIUM SERPL-MCNC: 9 MG/DL — SIGNIFICANT CHANGE UP (ref 8.4–10.5)
CHLORIDE SERPL-SCNC: 97 MMOL/L — SIGNIFICANT CHANGE UP (ref 96–108)
CHLORIDE SERPL-SCNC: 98 MMOL/L — SIGNIFICANT CHANGE UP (ref 96–108)
CO2 SERPL-SCNC: 29 MMOL/L — SIGNIFICANT CHANGE UP (ref 22–31)
CO2 SERPL-SCNC: 31 MMOL/L — SIGNIFICANT CHANGE UP (ref 22–31)
CREAT SERPL-MCNC: 1.59 MG/DL — HIGH (ref 0.5–1.3)
CREAT SERPL-MCNC: 1.62 MG/DL — HIGH (ref 0.5–1.3)
DACRYOCYTES BLD QL SMEAR: SLIGHT — SIGNIFICANT CHANGE UP
EGFR: 31 ML/MIN/1.73M2 — LOW
EGFR: 32 ML/MIN/1.73M2 — LOW
EOSINOPHIL # BLD AUTO: 0.07 K/UL — SIGNIFICANT CHANGE UP (ref 0–0.5)
EOSINOPHIL NFR BLD AUTO: 0.9 % — SIGNIFICANT CHANGE UP (ref 0–6)
GLUCOSE SERPL-MCNC: 113 MG/DL — HIGH (ref 70–99)
GLUCOSE SERPL-MCNC: 93 MG/DL — SIGNIFICANT CHANGE UP (ref 70–99)
HCT VFR BLD CALC: 26.1 % — LOW (ref 34.5–45)
HCT VFR BLD CALC: 27.8 % — LOW (ref 34.5–45)
HGB BLD-MCNC: 7.6 G/DL — LOW (ref 11.5–15.5)
HGB BLD-MCNC: 8.1 G/DL — LOW (ref 11.5–15.5)
HYPOCHROMIA BLD QL: SLIGHT — SIGNIFICANT CHANGE UP
LYMPHOCYTES # BLD AUTO: 0.4 K/UL — LOW (ref 1–3.3)
LYMPHOCYTES # BLD AUTO: 5.2 % — LOW (ref 13–44)
MAGNESIUM SERPL-MCNC: 2.1 MG/DL — SIGNIFICANT CHANGE UP (ref 1.6–2.6)
MANUAL SMEAR VERIFICATION: SIGNIFICANT CHANGE UP
MCHC RBC-ENTMCNC: 25.2 PG — LOW (ref 27–34)
MCHC RBC-ENTMCNC: 25.2 PG — LOW (ref 27–34)
MCHC RBC-ENTMCNC: 29.1 GM/DL — LOW (ref 32–36)
MCHC RBC-ENTMCNC: 29.1 GM/DL — LOW (ref 32–36)
MCV RBC AUTO: 86.6 FL — SIGNIFICANT CHANGE UP (ref 80–100)
MCV RBC AUTO: 86.7 FL — SIGNIFICANT CHANGE UP (ref 80–100)
MICROCYTES BLD QL: SLIGHT — SIGNIFICANT CHANGE UP
MONOCYTES # BLD AUTO: 0.4 K/UL — SIGNIFICANT CHANGE UP (ref 0–0.9)
MONOCYTES NFR BLD AUTO: 5.2 % — SIGNIFICANT CHANGE UP (ref 2–14)
NEUTROPHILS # BLD AUTO: 6.74 K/UL — SIGNIFICANT CHANGE UP (ref 1.8–7.4)
NEUTROPHILS NFR BLD AUTO: 88.7 % — HIGH (ref 43–77)
NRBC # BLD: 0 /100 WBCS — SIGNIFICANT CHANGE UP (ref 0–0)
NT-PROBNP SERPL-SCNC: 842 PG/ML — HIGH (ref 0–300)
OVALOCYTES BLD QL SMEAR: SLIGHT — SIGNIFICANT CHANGE UP
PHOSPHATE SERPL-MCNC: 3.5 MG/DL — SIGNIFICANT CHANGE UP (ref 2.5–4.5)
PLAT MORPH BLD: NORMAL — SIGNIFICANT CHANGE UP
PLATELET # BLD AUTO: 232 K/UL — SIGNIFICANT CHANGE UP (ref 150–400)
PLATELET # BLD AUTO: 236 K/UL — SIGNIFICANT CHANGE UP (ref 150–400)
POIKILOCYTOSIS BLD QL AUTO: SLIGHT — SIGNIFICANT CHANGE UP
POLYCHROMASIA BLD QL SMEAR: SLIGHT — SIGNIFICANT CHANGE UP
POTASSIUM SERPL-MCNC: 3.8 MMOL/L — SIGNIFICANT CHANGE UP (ref 3.5–5.3)
POTASSIUM SERPL-MCNC: 4.4 MMOL/L — SIGNIFICANT CHANGE UP (ref 3.5–5.3)
POTASSIUM SERPL-SCNC: 3.8 MMOL/L — SIGNIFICANT CHANGE UP (ref 3.5–5.3)
POTASSIUM SERPL-SCNC: 4.4 MMOL/L — SIGNIFICANT CHANGE UP (ref 3.5–5.3)
PROT SERPL-MCNC: 7 G/DL — SIGNIFICANT CHANGE UP (ref 6–8.3)
PROT SERPL-MCNC: 7.2 G/DL — SIGNIFICANT CHANGE UP (ref 6–8.3)
RBC # BLD: 3.01 M/UL — LOW (ref 3.8–5.2)
RBC # BLD: 3.21 M/UL — LOW (ref 3.8–5.2)
RBC # FLD: 21.2 % — HIGH (ref 10.3–14.5)
RBC # FLD: 21.3 % — HIGH (ref 10.3–14.5)
RBC BLD AUTO: ABNORMAL
SARS-COV-2 RNA SPEC QL NAA+PROBE: SIGNIFICANT CHANGE UP
SCHISTOCYTES BLD QL AUTO: SLIGHT — SIGNIFICANT CHANGE UP
SODIUM SERPL-SCNC: 135 MMOL/L — SIGNIFICANT CHANGE UP (ref 135–145)
SODIUM SERPL-SCNC: 138 MMOL/L — SIGNIFICANT CHANGE UP (ref 135–145)
TROPONIN T SERPL-MCNC: 0.01 NG/ML — SIGNIFICANT CHANGE UP (ref 0–0.01)
WBC # BLD: 6.03 K/UL — SIGNIFICANT CHANGE UP (ref 3.8–10.5)
WBC # BLD: 7.6 K/UL — SIGNIFICANT CHANGE UP (ref 3.8–10.5)
WBC # FLD AUTO: 6.03 K/UL — SIGNIFICANT CHANGE UP (ref 3.8–10.5)
WBC # FLD AUTO: 7.6 K/UL — SIGNIFICANT CHANGE UP (ref 3.8–10.5)

## 2022-12-01 PROCEDURE — 99222 1ST HOSP IP/OBS MODERATE 55: CPT

## 2022-12-01 PROCEDURE — 93010 ELECTROCARDIOGRAM REPORT: CPT

## 2022-12-01 PROCEDURE — 99285 EMERGENCY DEPT VISIT HI MDM: CPT | Mod: 25

## 2022-12-01 PROCEDURE — 99223 1ST HOSP IP/OBS HIGH 75: CPT | Mod: GC

## 2022-12-01 PROCEDURE — 71045 X-RAY EXAM CHEST 1 VIEW: CPT | Mod: 26

## 2022-12-01 PROCEDURE — 71045 X-RAY EXAM CHEST 1 VIEW: CPT | Mod: 26,77

## 2022-12-01 PROCEDURE — 93306 TTE W/DOPPLER COMPLETE: CPT | Mod: 26

## 2022-12-01 PROCEDURE — 99358 PROLONG SERVICE W/O CONTACT: CPT | Mod: NC

## 2022-12-01 RX ORDER — APIXABAN 2.5 MG/1
2.5 TABLET, FILM COATED ORAL EVERY 12 HOURS
Refills: 0 | Status: DISCONTINUED | OUTPATIENT
Start: 2022-12-01 | End: 2022-12-02

## 2022-12-01 RX ORDER — FERROUS SULFATE 325(65) MG
325 TABLET ORAL DAILY
Refills: 0 | Status: DISCONTINUED | OUTPATIENT
Start: 2022-12-01 | End: 2022-12-02

## 2022-12-01 RX ORDER — METOPROLOL TARTRATE 50 MG
12.5 TABLET ORAL DAILY
Refills: 0 | Status: DISCONTINUED | OUTPATIENT
Start: 2022-12-01 | End: 2022-12-03

## 2022-12-01 RX ORDER — FUROSEMIDE 40 MG
40 TABLET ORAL ONCE
Refills: 0 | Status: COMPLETED | OUTPATIENT
Start: 2022-12-01 | End: 2022-12-01

## 2022-12-01 RX ORDER — ATORVASTATIN CALCIUM 80 MG/1
20 TABLET, FILM COATED ORAL AT BEDTIME
Refills: 0 | Status: DISCONTINUED | OUTPATIENT
Start: 2022-12-01 | End: 2022-12-03

## 2022-12-01 RX ORDER — TIOTROPIUM BROMIDE 18 UG/1
2 CAPSULE ORAL; RESPIRATORY (INHALATION) DAILY
Refills: 0 | Status: DISCONTINUED | OUTPATIENT
Start: 2022-12-01 | End: 2022-12-01

## 2022-12-01 RX ORDER — LANOLIN ALCOHOL/MO/W.PET/CERES
5 CREAM (GRAM) TOPICAL AT BEDTIME
Refills: 0 | Status: DISCONTINUED | OUTPATIENT
Start: 2022-12-01 | End: 2022-12-03

## 2022-12-01 RX ORDER — IRON SUCROSE 20 MG/ML
200 INJECTION, SOLUTION INTRAVENOUS ONCE
Refills: 0 | Status: DISCONTINUED | OUTPATIENT
Start: 2022-12-01 | End: 2022-12-02

## 2022-12-01 RX ORDER — AMIODARONE HYDROCHLORIDE 400 MG/1
200 TABLET ORAL EVERY 24 HOURS
Refills: 0 | Status: DISCONTINUED | OUTPATIENT
Start: 2022-12-01 | End: 2022-12-03

## 2022-12-01 RX ORDER — ISOSORBIDE MONONITRATE 60 MG/1
30 TABLET, EXTENDED RELEASE ORAL DAILY
Refills: 0 | Status: DISCONTINUED | OUTPATIENT
Start: 2022-12-01 | End: 2022-12-03

## 2022-12-01 RX ORDER — MONTELUKAST 4 MG/1
10 TABLET, CHEWABLE ORAL EVERY 24 HOURS
Refills: 0 | Status: DISCONTINUED | OUTPATIENT
Start: 2022-12-01 | End: 2022-12-03

## 2022-12-01 RX ORDER — PANTOPRAZOLE SODIUM 20 MG/1
40 TABLET, DELAYED RELEASE ORAL
Refills: 0 | Status: DISCONTINUED | OUTPATIENT
Start: 2022-12-01 | End: 2022-12-03

## 2022-12-01 RX ORDER — INFLUENZA VIRUS VACCINE 15; 15; 15; 15 UG/.5ML; UG/.5ML; UG/.5ML; UG/.5ML
0.7 SUSPENSION INTRAMUSCULAR ONCE
Refills: 0 | Status: DISCONTINUED | OUTPATIENT
Start: 2022-12-01 | End: 2022-12-03

## 2022-12-01 RX ORDER — ACETAMINOPHEN 500 MG
650 TABLET ORAL EVERY 6 HOURS
Refills: 0 | Status: DISCONTINUED | OUTPATIENT
Start: 2022-12-01 | End: 2022-12-03

## 2022-12-01 RX ORDER — TIOTROPIUM BROMIDE AND OLODATEROL 3.124; 2.736 UG/1; UG/1
2 SPRAY, METERED RESPIRATORY (INHALATION) DAILY
Refills: 0 | Status: DISCONTINUED | OUTPATIENT
Start: 2022-12-01 | End: 2022-12-03

## 2022-12-01 RX ORDER — FUROSEMIDE 40 MG
40 TABLET ORAL EVERY 12 HOURS
Refills: 0 | Status: DISCONTINUED | OUTPATIENT
Start: 2022-12-01 | End: 2022-12-01

## 2022-12-01 RX ADMIN — ISOSORBIDE MONONITRATE 30 MILLIGRAM(S): 60 TABLET, EXTENDED RELEASE ORAL at 18:56

## 2022-12-01 RX ADMIN — Medication 40 MILLIGRAM(S): at 12:38

## 2022-12-01 RX ADMIN — AMIODARONE HYDROCHLORIDE 200 MILLIGRAM(S): 400 TABLET ORAL at 12:37

## 2022-12-01 RX ADMIN — APIXABAN 2.5 MILLIGRAM(S): 2.5 TABLET, FILM COATED ORAL at 18:57

## 2022-12-01 RX ADMIN — Medication 12.5 MILLIGRAM(S): at 12:38

## 2022-12-01 RX ADMIN — PANTOPRAZOLE SODIUM 40 MILLIGRAM(S): 20 TABLET, DELAYED RELEASE ORAL at 07:35

## 2022-12-01 RX ADMIN — Medication 325 MILLIGRAM(S): at 12:38

## 2022-12-01 RX ADMIN — Medication 5 MILLIGRAM(S): at 21:27

## 2022-12-01 RX ADMIN — APIXABAN 2.5 MILLIGRAM(S): 2.5 TABLET, FILM COATED ORAL at 07:34

## 2022-12-01 RX ADMIN — Medication 650 MILLIGRAM(S): at 19:47

## 2022-12-01 RX ADMIN — Medication 40 MILLIGRAM(S): at 02:24

## 2022-12-01 RX ADMIN — ATORVASTATIN CALCIUM 20 MILLIGRAM(S): 80 TABLET, FILM COATED ORAL at 21:25

## 2022-12-01 RX ADMIN — MONTELUKAST 10 MILLIGRAM(S): 4 TABLET, CHEWABLE ORAL at 12:38

## 2022-12-01 NOTE — PATIENT PROFILE ADULT - MEDICATIONS/VISITS
Spoke with patient in regards of results, overall controlled cholesterol,  adviced patient about the mediterranean diet in regards of the elevated triglycerides, patient had no further questions or concerns. Patient states has an upcoming appointment  with alexandra and stated will make sure to bring all current medication. Patient had no further questions.    no

## 2022-12-01 NOTE — ED ADULT NURSE NOTE - NSIMPLEMENTINTERV_GEN_ALL_ED
Implemented All Fall with Harm Risk Interventions:  Grace to call system. Call bell, personal items and telephone within reach. Instruct patient to call for assistance. Room bathroom lighting operational. Non-slip footwear when patient is off stretcher. Physically safe environment: no spills, clutter or unnecessary equipment. Stretcher in lowest position, wheels locked, appropriate side rails in place. Provide visual cue, wrist band, yellow gown, etc. Monitor gait and stability. Monitor for mental status changes and reorient to person, place, and time. Review medications for side effects contributing to fall risk. Reinforce activity limits and safety measures with patient and family. Provide visual clues: red socks.

## 2022-12-01 NOTE — ED ADULT TRIAGE NOTE - CHIEF COMPLAINT QUOTE
Pt c/o worsening shortness of breath. Recently dx with possible metastatic cancer, undergoing testing to find out primary location. Denies fevers, vomiting. Reports associated chest pressure.

## 2022-12-01 NOTE — ED PROVIDER NOTE - CLINICAL SUMMARY MEDICAL DECISION MAKING FREE TEXT BOX
Most likely acute CHF exacerbation, pt has decreased dose of torsemide recently. Low suspicion for pneumonia, ACS, pneumothorax, PE, tamponade, or aortic dissection based on clinical history and physical exam.    Last estimated EF: 70-75%    Plan: Labs including BNP, Trop, VBG; CXR & EKG, diuresis PRN, Bipap PRN    Patient is stable at this time but still symptomatic. Would benefit from admission to Cardiology for further monitoring and improvement of medication regimen to control symptoms and prevent future exacerbations.

## 2022-12-01 NOTE — ED PROVIDER NOTE - PHYSICAL EXAMINATION
CONSTITUTIONAL: Non-toxic; in no apparent distress  HEAD: Normocephalic; atraumatic  EYES: PERRL; EOM intact   ENMT: External appears normal  NECK: Supple; non-tender  CARD: Normal S1, S2; no murmurs, rubs, or gallops  RESP: + tachypneic, + crackles at lung bases bl  ABD: Soft, non-distended; non-tender  EXT: Normal ROM in all four extremities; non-tender to palpation, + 2+ peripheral edema to shins bl  SKIN: Warm, dry, no rash  NEURO:  No focal neurological deficiencies.

## 2022-12-01 NOTE — ED PROVIDER NOTE - NS ED ROS FT
CONSTITUTIONAL: No fever, no chills, no fatigue  EYES: No eye redness, no visual changes  ENT: No ear pain, no sore throat  CARDIOVASCULAR: No chest pain, no palpitations  RESPIRATORY: No cough, +SOB  GI: No abdominal pain, no nausea, no vomiting, no constipation, no diarrhea  GENITOURINARY: No dysuria, no frequency, no hematuria  MUSCULOSKELETAL: No back pain, no joint pain, no myalgias  SKIN: No rash, +peripheral edema  NEURO: No headache, no confusion    ALL OTHER SYSTEMS NEGATIVE.

## 2022-12-01 NOTE — H&P ADULT - NSHPPHYSICALEXAM_GEN_ALL_CORE
.  VITAL SIGNS:  T(C): 36.8 (12-01-22 @ 03:25), Max: 36.9 (12-01-22 @ 00:44)  T(F): 98.2 (12-01-22 @ 03:25), Max: 98.4 (12-01-22 @ 00:44)  HR: 67 (12-01-22 @ 03:25) (66 - 67)  BP: 176/75 (12-01-22 @ 03:25) (176/75 - 186/75)  BP(mean): --  RR: 18 (12-01-22 @ 03:25) (18 - 22)  SpO2: 95% (12-01-22 @ 03:25) (93% - 95%)  Wt(kg): --    PHYSICAL EXAM:    Constitutional: resting comfortably in bed; NAD, tachypneic but speaking full sentences   Head: NC/AT  Eyes: PERRL, EOMI, anicteric sclera  ENT: MMM  Neck: supple; no JVD or thyromegaly  Respiratory: + crackles at b/l lower lung fields, no retractions or accessory muscle use   Cardiac: +S1/S2; RRR; no M/R/G;   Gastrointestinal: abdomen soft, NT/ND; no rebound or guarding; +BSx4  Back: spine midline, no bony tenderness or step-offs; no CVAT B/L  Extremities: WWP, no clubbing or cyanosis; 2+ pitting edema   Musculoskeletal: NROM x4; no joint swelling, tenderness or erythema  Vascular: 2+ radial, DP pulses B/L  Dermatologic: skin warm, dry and intact; no rashes, wounds, or scars  Lymphatic: no submandibular or cervical LAD  Neurologic: AAOx3; CNII-XII grossly intact; no focal deficits  Psychiatric: affect and characteristics of appearance, verbalizations, behaviors are appropriate

## 2022-12-01 NOTE — PHYSICAL THERAPY INITIAL EVALUATION ADULT - LIVES WITH, PROFILE
Lives with many family members including , children, and son/daughter in laws, 4 steps to enter/children/spouse

## 2022-12-01 NOTE — H&P ADULT - PROBLEM SELECTOR PLAN 7
Patient with a history of Anemia and FEROZ with also history of GIB. No active signs of bleeding on history or physical. Hgb at baseline.     Plan:   - continue to monitor H/H  - transfuse <7  - maintain active T&S  - consider IV iron

## 2022-12-01 NOTE — H&P ADULT - NSHPSOCIALHISTORY_GEN_ALL_CORE
Does not smoke, drink alcohol or use drugs. Lives at home and walks with walker, daughter helps out a lot.

## 2022-12-01 NOTE — PATIENT PROFILE ADULT - FALL HARM RISK - HARM RISK INTERVENTIONS

## 2022-12-01 NOTE — H&P ADULT - PROBLEM SELECTOR PLAN 4
Patient in the past was on imdur 30mg, toprol 12.5mg QD and amlodipine 10mg. Currently per daughter and patient she is not taking any medications for BP I/s/o of hypotension.    Plan:    - consider restarting agents if indicated  - Collateral from PCP/cardiologist about current medications seeing as there have been many recent changes.

## 2022-12-01 NOTE — H&P ADULT - NSHPLABSRESULTS_GEN_ALL_CORE
.  LABS:                         8.1    7.60  )-----------( 236      ( 01 Dec 2022 01:57 )             27.8     12-01    135  |  97  |  21  ----------------------------<  113<H>  4.4   |  29  |  1.62<H>    Ca    8.8      01 Dec 2022 01:57    TPro  7.2  /  Alb  4.1  /  TBili  0.6  /  DBili  x   /  AST  21  /  ALT  21  /  AlkPhos  85  12-01        CARDIAC MARKERS ( 01 Dec 2022 01:57 )  x     / 0.01 ng/mL / x     / x     / x          Serum Pro-Brain Natriuretic Peptide: 842 pg/mL (12-01 @ 01:57)        RADIOLOGY, EKG & ADDITIONAL TESTS: Reviewed.

## 2022-12-01 NOTE — H&P ADULT - TIME BILLING
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

## 2022-12-01 NOTE — CHART NOTE - NSCHARTNOTEFT_GEN_A_CORE
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 Patient previously admitted from 09/04/2022 to 09/13/2022 for chest pain and headaches, found to have E. Coli bacteremia and Gemella Spp, TTE/IRMA and Gallium scan negative and source presumed to be an infected tooth, and subsequently discharged on IV antibiotics. Patient represented to [facility] ED endorsing the progressive dyspnea and edema ever since her Torsemide was discontinued by her PCP one week ago due to worsening kidney function as an outpatient. In ED, labs showed Hgb 8.1, Cr 1.62, trop negative x 1, and , Chest x-ray showed pulmonary congestion, and EKG showed rate controlled A-fib. Patient was given Lasix 40 mg IV once in ED and admitted to cardiology/telemetry. Patient was ordered for an additional Lasix 40 mg IV once on 12/01/2022 afternoon and repeat Echocardiogram was unchanged from prior admission. Patient was also given one time dose of Iron Sucrose 200 mg IV and initiatied on Iron 325 mg PO daily. PT evaluated patient and recommended home PT. 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 Patient previously admitted from 09/04/2022 to 09/13/2022 for chest pain and headaches, found to have E. Coli bacteremia and Gemella Spp, TTE/IRMA and Gallium scan negative and source presumed to be an infected tooth, and subsequently discharged on IV antibiotics. Patient represented to [facility] ED endorsing the progressive dyspnea and edema ever since her Torsemide was discontinued by her PCP one week ago due to worsening kidney function as an outpatient. In ED, labs showed Hgb 8.1, Cr 1.62, trop negative x 1, and , Chest x-ray showed pulmonary congestion, and EKG showed rate controlled A-fib. Patient was given Lasix 40 mg IV once in ED and admitted to cardiology/telemetry. Patient was ordered for an additional Lasix 40 mg IV once on 12/01/2022 afternoon and repeat Echocardiogram was unchanged from prior admission. Patient was also given one time dose of Iron Sucrose 200 mg IV and initiated on Iron 325 mg PO daily. PT evaluated patient and recommended home PT. Patient will possible be ready for discharge home w/ home PT tomorrow, pending response to additional IV Lasix and volume status in AM.

## 2022-12-01 NOTE — H&P ADULT - HISTORY OF PRESENT ILLNESS
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 Patient with recent admission in September sent in from [facility] for melena with colonoscopy c/f cancer with biopsy showing adenocarcinoma and PET scan with concerning MET disease to lung. She was also admitted in early september under cardiology service from 09/4-09/13 for chest pain and hedaches found to have e.coli bacteremia and gemella spp. w/ TTE//IRMA and gallium scan negative with source presumed to be infected tooth d/c'ed with IV Abx. The patient was doing well over the last few weeks until one week ago when her torsemide was held by her PMD for rising creatinine. Since discontinuing her torsemide the patient has had progressive SOB and lower extremity swelling as well as worsening of baseline orthopnea. She was most recently on RA at home however over the past week had to return to using home O2 and she presented to the ED i/s/o worsening SOB yesterday feeling like she could not catch her breath. Other than the torsemide patient denies any other recent changes to medications. She denies extremity pain, chest pain, nausea, vomiting, hemoptysis, recent travel of surgeries. Most recent EF in 09/2022 was 70-75%.     ED: 98.1, 67, 176/75, 95% 3L NC   Labs s/f: WBC 7.60, Hgb 8.1, Plt 236, electrolytes wnl, BUN/Cr 21/1.62 (Cr 1.11 on d/c 09/29/2022), LFTs wnl, troponin 0.01,   CXR: b/l pulmonary congestion, b/l mild pleural effusion   EKG: afib, No ischemic changes   Interventions: Lasix 40mg IVP

## 2022-12-01 NOTE — H&P ADULT - PROBLEM SELECTOR PLAN 3
Patient with history of afib on amiodarone 200mg and Eliquis 5mg BID.     Plan:   - c/w home medications Patient with history of afib on amiodarone 200mg and Eliquis 5mg BID.     Plan:   - c/w amiodarone 200mg   - i/s/o rising cr (1.6), age almost 83, and history of UGIB with transfusions consider starting lower dose of Eliquis

## 2022-12-01 NOTE — H&P ADULT - PROBLEM SELECTOR PLAN 2
Patient with baseline CKD 3 with cr ranging from (baseline Cr 1.1-1.2), elevated this admission to 1.62. Unclear etiology as patients daughter states patient has been eating and drinking w/o difficulty. Possibly from venous congestion.     Plan:   - As above   - Avoid nephrotoxic agents   - Strict I/Os

## 2022-12-01 NOTE — CONSULT NOTE ADULT - PROBLEM SELECTOR RECOMMENDATION 2
The patient had a CT scan of the chest which revealed mediastinal adenopathy and groundglass opacity in the left upper lobe.  At that time the PET scan was positive in the  Adenopathy but could be consistent with inflammatory process.  At that time the patient had a hepatic flexure mass with standing biopsy was consistent with Colon adenocarcinoma.  Patient was seen recently in the office and was scheduled to have a repeat CT scan of the chest to evaluate regarding whether she has a second primary or metastatic disease to the lung.  Repeat CT scan of the chest with no contrast as the creatinine is elevated.

## 2022-12-01 NOTE — PHYSICAL THERAPY INITIAL EVALUATION ADULT - GAIT DISTANCE, PT EVAL
50 ft x 2 with RW, 20 ft x 1 with no AD. Steady with and without an AD. Pt not in agreement of ordering a RW for home.

## 2022-12-01 NOTE — ED ADULT NURSE NOTE - ED STAT RN HANDOFF DETAILS
Covering for RN Deangelo: Patient stable for transfer. Transfer and plan of care discussed with patient and family member present at bedside and verbalized understanding. No acute distress noted. Safety precautions maintained. Belongings secured with patient.

## 2022-12-01 NOTE — ED PROVIDER NOTE - OBJECTIVE STATEMENT
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 Pt cut torsemide dose in half 1 wk ago due to PMD concern for renal failure. Pt has since that time had increasing leg swelling, orthopnea, and SOB. She has recent echo w EF 70-75%. No hemoptysis, exogenous estrogen, recent travel, surgery, malignancy, personal or FHx of VTE.

## 2022-12-01 NOTE — CONSULT NOTE ADULT - PROBLEM SELECTOR RECOMMENDATION 9
Pulmonary status is stable.  The patient was admitted with dyspnea on exertion due to CHF.  No evidence of acute exacerbation of COPD.  Changed the Spiriva to Stiolto.  Continue oxygen supplementation wean off it.  No indication for systemic steroid

## 2022-12-01 NOTE — H&P ADULT - ASSESSMENT
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

## 2022-12-01 NOTE — H&P ADULT - PROBLEM SELECTOR PLAN 1
History of diastolic HF w/ preserved EF in sept 2022. Torsemide recently held i/s/o renal failure. No ischemic changes noted on EKG this admission. Acute exacerbation likely i/s/o medication changes.     Plan:   - Lasix 40mg PO BID   - Daily weight   - Primafit for I/O   - TTE in AM

## 2022-12-01 NOTE — PHYSICAL THERAPY INITIAL EVALUATION ADULT - PERTINENT HX OF CURRENT PROBLEM, REHAB EVAL
82F presents for dyspnea and lower extremity swelling x1 week. Patient with recent admission in September sent in from [facility] for melena with colonoscopy c/f cancer with biopsy showing adenocarcinoma. Also admitted in early september under cardiology service from 09/4-09/13 for chest pain & hedaches found to have e.coli bacteremia and gemella. Pt was doing well over the last few weeks until 1week ago when her torsemide was held by her PMD for rising creatinine. Since discontinuing pt has had progressive SOB and lower extremity swelling as well as worsening of baseline orthopnea. She was most recently on RA at home however over the past week had to return to using home O2 and she presented to the ED with worsening SOB yesterday feeling like she could not catch her breath.

## 2022-12-01 NOTE — H&P ADULT - NS ATTEND AMEND GEN_ALL_CORE FT
Initial attending contact date  12/1/22    . See PA note written above for details. I reviewed the PA documentation. I have personally seen and examined this patient. I reviewed vitals, labs, medications, cardiac studies, and additional imaging. I agree with the above PA's findings and plans as written above with the following additions/statements.    -Pt admitted with mild acute on chronic HFpEF exacerbation. Home torsemide held as outpatient due to [place]  -On exam trace edema, lung clear, no JVD. With baseline home O2 requirements. BNP lower than prior admits  -Repeat ECHO remains unchanged  -Received additional lasix 40 mg IV x 1 today. Crt downtrending 1.62- 1.59  -PT rec home with home PT  -Plan to dc home 12/2 on home torsemide

## 2022-12-01 NOTE — H&P ADULT - PROBLEM SELECTOR PLAN 6
History of GERD and GIB. Abd unremarkable this admission and Hgb seems to be at baseline.     Plan:   - Protonix 40mg QD

## 2022-12-01 NOTE — PHYSICAL THERAPY INITIAL EVALUATION ADULT - ADDITIONAL COMMENTS
As per pt, PTA she was mostly independent with all functional mobility, ADLs, and IADLs with no use of an AD. If pt required assistance, she has many family members living with her available to help. Pt intermittently uses 2L of home O2. Pt has a shower tub.

## 2022-12-01 NOTE — PHYSICAL THERAPY INITIAL EVALUATION ADULT - LEVEL OF INDEPENDENCE: STAIR NEGOTIATION, REHAB EVAL
Not assessed secondary to pt feeling fatigue after 100 ft of ambulation and requesting to return to room

## 2022-12-02 LAB
A1C WITH ESTIMATED AVERAGE GLUCOSE RESULT: 4.9 % — SIGNIFICANT CHANGE UP (ref 4–5.6)
ANION GAP SERPL CALC-SCNC: 11 MMOL/L — SIGNIFICANT CHANGE UP (ref 5–17)
BUN SERPL-MCNC: 19 MG/DL — SIGNIFICANT CHANGE UP (ref 7–23)
CALCIUM SERPL-MCNC: 8.8 MG/DL — SIGNIFICANT CHANGE UP (ref 8.4–10.5)
CHLORIDE SERPL-SCNC: 95 MMOL/L — LOW (ref 96–108)
CHOLEST SERPL-MCNC: 89 MG/DL — SIGNIFICANT CHANGE UP
CO2 SERPL-SCNC: 28 MMOL/L — SIGNIFICANT CHANGE UP (ref 22–31)
CREAT SERPL-MCNC: 1.43 MG/DL — HIGH (ref 0.5–1.3)
EGFR: 36 ML/MIN/1.73M2 — LOW
ESTIMATED AVERAGE GLUCOSE: 94 MG/DL — SIGNIFICANT CHANGE UP (ref 68–114)
GLUCOSE SERPL-MCNC: 104 MG/DL — HIGH (ref 70–99)
HCT VFR BLD CALC: 25.9 % — LOW (ref 34.5–45)
HDLC SERPL-MCNC: 39 MG/DL — LOW
HGB BLD-MCNC: 7.5 G/DL — LOW (ref 11.5–15.5)
LIPID PNL WITH DIRECT LDL SERPL: 36 MG/DL — SIGNIFICANT CHANGE UP
MAGNESIUM SERPL-MCNC: 2.2 MG/DL — SIGNIFICANT CHANGE UP (ref 1.6–2.6)
MCHC RBC-ENTMCNC: 24.9 PG — LOW (ref 27–34)
MCHC RBC-ENTMCNC: 29 GM/DL — LOW (ref 32–36)
MCV RBC AUTO: 86 FL — SIGNIFICANT CHANGE UP (ref 80–100)
NON HDL CHOLESTEROL: 50 MG/DL — SIGNIFICANT CHANGE UP
NRBC # BLD: 0 /100 WBCS — SIGNIFICANT CHANGE UP (ref 0–0)
PLATELET # BLD AUTO: 206 K/UL — SIGNIFICANT CHANGE UP (ref 150–400)
POTASSIUM SERPL-MCNC: 3.9 MMOL/L — SIGNIFICANT CHANGE UP (ref 3.5–5.3)
POTASSIUM SERPL-SCNC: 3.9 MMOL/L — SIGNIFICANT CHANGE UP (ref 3.5–5.3)
RBC # BLD: 3.01 M/UL — LOW (ref 3.8–5.2)
RBC # FLD: 21.2 % — HIGH (ref 10.3–14.5)
SODIUM SERPL-SCNC: 134 MMOL/L — LOW (ref 135–145)
TRIGL SERPL-MCNC: 72 MG/DL — SIGNIFICANT CHANGE UP
WBC # BLD: 5.08 K/UL — SIGNIFICANT CHANGE UP (ref 3.8–10.5)
WBC # FLD AUTO: 5.08 K/UL — SIGNIFICANT CHANGE UP (ref 3.8–10.5)

## 2022-12-02 PROCEDURE — 99497 ADVNCD CARE PLAN 30 MIN: CPT | Mod: 25

## 2022-12-02 PROCEDURE — 71250 CT THORAX DX C-: CPT | Mod: 26

## 2022-12-02 PROCEDURE — 99232 SBSQ HOSP IP/OBS MODERATE 35: CPT

## 2022-12-02 PROCEDURE — 99233 SBSQ HOSP IP/OBS HIGH 50: CPT

## 2022-12-02 PROCEDURE — 99223 1ST HOSP IP/OBS HIGH 75: CPT

## 2022-12-02 RX ORDER — APIXABAN 2.5 MG/1
2.5 TABLET, FILM COATED ORAL ONCE
Refills: 0 | Status: COMPLETED | OUTPATIENT
Start: 2022-12-02 | End: 2022-12-02

## 2022-12-02 RX ORDER — APIXABAN 2.5 MG/1
5 TABLET, FILM COATED ORAL
Refills: 0 | Status: DISCONTINUED | OUTPATIENT
Start: 2022-12-03 | End: 2022-12-03

## 2022-12-02 RX ORDER — ONDANSETRON 8 MG/1
4 TABLET, FILM COATED ORAL ONCE
Refills: 0 | Status: COMPLETED | OUTPATIENT
Start: 2022-12-02 | End: 2022-12-02

## 2022-12-02 RX ORDER — IRON SUCROSE 20 MG/ML
300 INJECTION, SOLUTION INTRAVENOUS EVERY 24 HOURS
Refills: 0 | Status: DISCONTINUED | OUTPATIENT
Start: 2022-12-02 | End: 2022-12-03

## 2022-12-02 RX ORDER — FUROSEMIDE 40 MG
40 TABLET ORAL ONCE
Refills: 0 | Status: COMPLETED | OUTPATIENT
Start: 2022-12-02 | End: 2022-12-02

## 2022-12-02 RX ADMIN — ISOSORBIDE MONONITRATE 30 MILLIGRAM(S): 60 TABLET, EXTENDED RELEASE ORAL at 12:08

## 2022-12-02 RX ADMIN — Medication 12.5 MILLIGRAM(S): at 05:50

## 2022-12-02 RX ADMIN — MONTELUKAST 10 MILLIGRAM(S): 4 TABLET, CHEWABLE ORAL at 10:56

## 2022-12-02 RX ADMIN — PANTOPRAZOLE SODIUM 40 MILLIGRAM(S): 20 TABLET, DELAYED RELEASE ORAL at 05:49

## 2022-12-02 RX ADMIN — TIOTROPIUM BROMIDE AND OLODATEROL 2 PUFF(S): 3.124; 2.736 SPRAY, METERED RESPIRATORY (INHALATION) at 12:07

## 2022-12-02 RX ADMIN — APIXABAN 2.5 MILLIGRAM(S): 2.5 TABLET, FILM COATED ORAL at 18:32

## 2022-12-02 RX ADMIN — Medication 1 MILLIGRAM(S): at 17:10

## 2022-12-02 RX ADMIN — ONDANSETRON 4 MILLIGRAM(S): 8 TABLET, FILM COATED ORAL at 15:45

## 2022-12-02 RX ADMIN — IRON SUCROSE 176.67 MILLIGRAM(S): 20 INJECTION, SOLUTION INTRAVENOUS at 10:45

## 2022-12-02 RX ADMIN — Medication 40 MILLIGRAM(S): at 17:13

## 2022-12-02 RX ADMIN — Medication 650 MILLIGRAM(S): at 20:10

## 2022-12-02 RX ADMIN — APIXABAN 2.5 MILLIGRAM(S): 2.5 TABLET, FILM COATED ORAL at 05:49

## 2022-12-02 RX ADMIN — APIXABAN 2.5 MILLIGRAM(S): 2.5 TABLET, FILM COATED ORAL at 21:00

## 2022-12-02 RX ADMIN — Medication 650 MILLIGRAM(S): at 19:18

## 2022-12-02 RX ADMIN — ATORVASTATIN CALCIUM 20 MILLIGRAM(S): 80 TABLET, FILM COATED ORAL at 21:33

## 2022-12-02 RX ADMIN — AMIODARONE HYDROCHLORIDE 200 MILLIGRAM(S): 400 TABLET ORAL at 13:01

## 2022-12-02 NOTE — PROGRESS NOTE ADULT - PROBLEM SELECTOR PLAN 9
F: None   E: K>4, Mag >2  Diet: DASH  DVT: eliquis   Dispo: 5Uris F: None   E: K>4, Mag >2  Diet: DASH  DVT: eliquis   Dispo: Discharge home tomorrow pending CTSx rec's  d/w Dr Jasso

## 2022-12-02 NOTE — CONSULT NOTE ADULT - SUBJECTIVE AND OBJECTIVE BOX
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 Pt now nearing euvolemia. Dr More following to r/o lung metastases. Palliative saw pt today w/ no changed in GOC discussion. Pt s/p echo revealing at least moderate AS, CT surgery consults, pt being considered for DC tomorrow.       PAST MEDICAL & SURGICAL HISTORY:  HTN (hypertension)      Aortic stenosis      Hyperlipidemia      COPD (chronic obstructive pulmonary disease)      GERD (gastroesophageal reflux disease)      Stage 3 chronic kidney disease      Anemia      Diastolic CHF, chronic      Obesity      Paroxysmal atrial fibrillation      S/P TAVR (transcatheter aortic valve replacement)  2/2019          MEDICATIONS  (STANDING):  aMIOdarone    Tablet 200 milliGRAM(s) Oral every 24 hours  apixaban 2.5 milliGRAM(s) Oral once  atorvastatin 20 milliGRAM(s) Oral at bedtime  influenza  Vaccine (HIGH DOSE) 0.7 milliLiter(s) IntraMuscular once  iron sucrose IVPB 300 milliGRAM(s) IV Intermittent every 24 hours  isosorbide   mononitrate ER Tablet (IMDUR) 30 milliGRAM(s) Oral daily  metoprolol succinate ER 12.5 milliGRAM(s) Oral daily  montelukast 10 milliGRAM(s) Oral every 24 hours  pantoprazole    Tablet 40 milliGRAM(s) Oral before breakfast  tiotropium 2.5 MICROgram(s)/olodaterol 2.5 MICROgram(s) (STIOLTO) Inhaler 2 Puff(s) Inhalation daily    MEDICATIONS  (PRN):  acetaminophen     Tablet .. 650 milliGRAM(s) Oral every 6 hours PRN Mild Pain (1 - 3), Moderate Pain (4 - 6)  LORazepam     Tablet 1 milliGRAM(s) Oral daily PRN Anxiety  melatonin 5 milliGRAM(s) Oral at bedtime PRN Insomnia      Allergies    Digox (Rash; Urticaria; Hives)  Plavix (Other (Mod to Severe))    Intolerances        SOCIAL HISTORY:  Smoker:   NO        ETOH use:   NO            Ilicit Drug use:  NO      FAMILY HISTORY:  No pertinent family history in first degree relatives        Review of Systems (Need 10):  CONSTITUTIONAL: Denies fevers / chills, sweats, fatigue, weight loss, weight gain                                       NEURO:  Denies parathesias, seizures, syncope, confusion                                                                                  EYES:  Denies blurry vision, discharge, pain, loss of vision                                                                                    ENMT:  Denies difficulty hearing, vertigo, dysphagia, epistaxis, recent dental work                                       CV:  Denies chest pain, palpitations, MANRIQUE, orthopnea                                                                                           RESPIRATORY:  Denies wWheezing, SOB, cough / sputum, hemoptysis                                                               GI:  Denies nausea, vomiting, diarrhea, constipation, melena                                                                          : Denies hematuria, dysuria, urgency, incontinence                                                                                          MUSKULOSKELETAL:  Denies arthritis, joint swelling, muscle weakness                                                             SKIN/BREAST:  Denies rash, itching, hair loss, masses                                                                                              PSYCH:  Denies depression, anxiety, suicidal ideation                                                                                                HEME/LYMPH:  Denies bruises easily, enlarged lymph nodes, tender lymph nodes                                          ENDOCRINE:  Denies cold intolerance, heat intolerance, polydipsia                                                                      Vital Signs Last 24 Hrs  T(C): 36.8 (02 Dec 2022 17:57), Max: 36.9 (01 Dec 2022 22:31)  T(F): 98.3 (02 Dec 2022 17:57), Max: 98.4 (01 Dec 2022 22:31)  HR: 55 (02 Dec 2022 17:21) (52 - 80)  BP: 139/64 (02 Dec 2022 17:21) (119/78 - 207/91)  BP(mean): 92 (02 Dec 2022 17:21) (86 - 130)  RR: 20 (02 Dec 2022 16:27) (18 - 23)  SpO2: 95% (02 Dec 2022 16:27) (89% - 100%)    Parameters below as of 02 Dec 2022 16:27  Patient On (Oxygen Delivery Method): room air        Physical Exam (Need 8)      PHYSICAL EXAM:  General: resting comfortably in bed in NAD  Neurological: AOx3. Motor skills grossly intact  Cardiovascular: Normal S1/S2. Regular rate/rhythm. No murmurs  Respiratory: Lungs CTA bilaterally. No wheezing or rales  Gastrointestinal: +BS in all 4 quadrants. Non-distended. Soft. Non-tender  Extremities: Strength 5/5 b/l upper/lower extremities. Sensation grossly intact upper/lower extremities. No edema. No calf tenderness.  Vascular: Radial 2+bilaterally, DP 2+ b/l  Incision Sites: none                                                            LABS:                        7.5    5.08  )-----------( 206      ( 02 Dec 2022 08:33 )             25.9     12-02    134<L>  |  95<L>  |  19  ----------------------------<  104<H>  3.9   |  28  |  1.43<H>    Ca    8.8      02 Dec 2022 08:33  Phos  3.5     12-01  Mg     2.2     12-02    TPro  7.0  /  Alb  3.8  /  TBili  0.6  /  DBili  x   /  AST  17  /  ALT  18  /  AlkPhos  80  12-01        CARDIAC MARKERS ( 01 Dec 2022 01:57 )  x     / 0.01 ng/mL / x     / x     / x              RADIOLOGY & ADDITIONAL STUDIES:    < from: TTE Echo Complete w/o Contrast w/ Doppler (12.01.22 @ 09:14) >  CONCLUSIONS:     1. Mild symmetric left ventricular hypertrophy.   2. Normal left ventricular systolic function.   3. Normal right ventricular size and systolic function.   4. Normal atria.   5. A transcatheter aortic valve (TAVR) is noted in the aortic position.   The peak transvalvular velocity is 3.80 m/s, the mean transvalvular   gradient is 31 mmHg, and the LVOT/AV velocity ratio is 0.26-these values   are consistent with prossible prosthetic valve stenosis. There is no   evidence of aortic regurgitation.   6. Pulmonary artery systolic pressure is 66 mmHg.   7. Trivial pericardial effusion.   8. Compared to the previous TTE performed on 9/16/2022, there have been   no significant interval changes.    < end of copied text >    
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 Patient with recent admission in September sent in from [facility] for melena with colonoscopy c/f cancer with biopsy showing adenocarcinoma and PET scan with concerning MET disease to lung. She was also admitted in early september under cardiology service from 09/4-09/13 for chest pain and hedaches found to have e.coli bacteremia and gemella spp. w/ TTE//IRMA and gallium scan negative with source presumed to be infected tooth d/c'ed with IV Abx. The patient was doing well over the last few weeks until one week ago when her torsemide was held by her PMD for rising creatinine. Since discontinuing her torsemide the patient has had progressive SOB and lower extremity swelling as well as worsening of baseline orthopnea. She was most recently on RA at home however over the past week had to return to using home O2 and she presented to the ED i/s/o worsening SOB yesterday feeling like she could not catch her breath. Other than the torsemide patient denies any other recent changes to medications. She denies extremity pain, chest pain, nausea, vomiting, hemoptysis, recent travel of surgeries. Most recent EF in 09/2022 was 70-75%.     ED: 98.1, 67, 176/75, 95% 3L NC   Labs s/f: WBC 7.60, Hgb 8.1, Plt 236, electrolytes wnl, BUN/Cr 21/1.62 (Cr 1.11 on d/c 09/29/2022), LFTs wnl, troponin 0.01,   CXR: b/l pulmonary congestion, b/l mild pleural effusion   EKG: afib, No ischemic changes   Interventions: Lasix 40mg IVP   (01 Dec 2022 04:09)    PERTINENT PM/SXH:   HTN (hypertension)    Aortic stenosis    Pulmonary HTN    CHF (congestive heart failure)    Hyperlipidemia    COPD (chronic obstructive pulmonary disease)    GERD (gastroesophageal reflux disease)    Stage 3 chronic kidney disease    Anemia    Diastolic CHF, chronic    Obesity    Paroxysmal atrial fibrillation      No significant past surgical history    S/P TAVR (transcatheter aortic valve replacement)      FAMILY HISTORY:  No pertinent family history in first degree relatives      ITEMS NOT CHECKED ARE NOT PRESENT    SOCIAL HISTORY:   Significant other/partner:  [x]  Children:  [x]   Substance hx:  []   Tobacco hx:  []   Alcohol hx: []   Home Opioid hx:  [] I-Stop Reference No:  - no active Rx's / see chart note  Living Situation: [x]Home  []Long term care  []Rehab []Other  Mu-ism/Spiritual practice: ; Role of organized Yazidi [ x] important [ ] some [ ] unable to assess  Coping: [x ] well [ ] with difficulty [ ] poor coping [ ] unable to assess  Support system: [ x] strong [ ] adequate [ ] inadequate    ADVANCE DIRECTIVES:    []MOLST  []Living Will  DECISION MAKER(s):  [] Health Care Proxy(s)  [x] Surrogate(s)  [] Guardian           Name(s)/Phone Number(s): Jaelyn Vera (daughter)    BASELINE (I)ADLs (prior to admission):  [place]: []Total  [x] Moderate []Dependent    ALLERGIES:  Digox (Rash; Urticaria; Hives)  Plavix (Other (Mod to Severe))    MEDICATIONS  (STANDING):  aMIOdarone    Tablet 200 milliGRAM(s) Oral every 24 hours  apixaban 2.5 milliGRAM(s) Oral every 12 hours  atorvastatin 20 milliGRAM(s) Oral at bedtime  influenza  Vaccine (HIGH DOSE) 0.7 milliLiter(s) IntraMuscular once  iron sucrose IVPB 300 milliGRAM(s) IV Intermittent every 24 hours  isosorbide   mononitrate ER Tablet (IMDUR) 30 milliGRAM(s) Oral daily  metoprolol succinate ER 12.5 milliGRAM(s) Oral daily  montelukast 10 milliGRAM(s) Oral every 24 hours  pantoprazole    Tablet 40 milliGRAM(s) Oral before breakfast  tiotropium 2.5 MICROgram(s)/olodaterol 2.5 MICROgram(s) (STIOLTO) Inhaler 2 Puff(s) Inhalation daily    MEDICATIONS  (PRN):  acetaminophen     Tablet .. 650 milliGRAM(s) Oral every 6 hours PRN Mild Pain (1 - 3), Moderate Pain (4 - 6)  melatonin 5 milliGRAM(s) Oral at bedtime PRN Insomnia    PRESENT SYMPTOMS: []Unable to obtain due to poor mentation/encephalopathy  Source if other than patient:  []Family   []Team     Pain: [ ] yes [x ] no  QOL impact -   Location -                    Aggravating Factors -  Quality -  Radiation -  Timing -  Severity (0-10 scale) -   Minimal Acceptable Level (0-10 scale) -    PAIN AD Score:  [email or web address] (press ctrl +  left click to view)    Dyspnea:                           [ x]Mild  [ ]Moderate [ ]Severe  Anxiety:                             [x ]Mild [ ]Moderate [ ]Severe  Fatigue:                             [ ]Mild [ x]Moderate [ ]Severe  Nausea:                             [ ]Mild [ ]Moderate [ ]Severe  Loss of Appetite:             [ x]Mild [ ]Moderate [ ]Severe  Constipation:                   [ ]Mild [ ]Moderate [ ]Severe    Other Symptoms:  [x ]All other review of systems negative     Palliative Performance Status Version 2: 50 %    http://npcrc.org/files/news/palliative_performance_scale_[email or web address].pdf    PHYSICAL EXAM:  GENERAL:  [ x]Alert  [x ]Oriented x 3  [ ]Lethargic  [ ]Cachexia  [ ]Unarousable  [x ]Verbal  [ ]Non-Verbal  Behavioral:   [ ]Anxiety  [ ]Delirium [ ]Agitation [x ]Cooperative  HEENT:  [ ]Normal   [x ]Dry mouth   [ ]ET Tube/Trach  [ ]Oral lesions  PULMONARY:   [ x]Clear []Tachypnea  []Audible excessive secretions   [ ]Rhonchi        [ ]Right [ ]Left [ ]Bilateral  [ ]Crackles        [ ]Right [ ]Left [ ]Bilateral  [ ]Wheezing     [ ]Right [ ]Left [ ]Bilateral  CARDIOVASCULAR:    [ x]Regular [ ]Irregular [ ]Tachy  [ ]Charlie [ ]Murmur [ ]Other  GASTROINTESTINAL:  [x ]Soft  [ ]Distended   [x ]+BS  [x ]Non tender [ ]Tender  [ ]PEG [ ]OGT/ NGT  Last BM:     GENITOURINARY:  [x ]Normal [ ] Incontinent   [ ]Oliguria/Anuria   [ ]Gonzalez  MUSCULOSKELETAL:   [ ]Normal   [x ]Weakness  [ ]Bed/Wheelchair bound [ ]Edema  NEUROLOGIC:   [x ]No focal deficits  [ ]Cognitive impairment  [ ]Dysphagia [ ]Dysarthria [ ]Paresis [ ]Encephalopathic   SKIN:   [ x]Normal   [ ]Pressure ulcer(s)  [ ]Rash    CRITICAL CARE:  [ ]Shock Present  [ ]Septic [ ]Cardiogenic [ ]Neurologic [ ]Hypovolemic  [ ]Vasopressors [ ]Inotropes   [ ]Respiratory failure present [ ]Mechanical Ventilation [ ]Non-invasive ventilatory support [ ]High-Flow  [ ]Acute  [ ]Chronic [ ]Hypoxic  [ ]Hypercarbic  [ ]Other organ failure    Vital Signs Last 24 Hrs  T(C): 36.9 (02 Dec 2022 12:47), Max: 38 (01 Dec 2022 14:36)  T(F): 98.4 (02 Dec 2022 12:47), Max: 100.4 (01 Dec 2022 14:36)  HR: 52 (02 Dec 2022 06:20) (52 - 58)  BP: 145/66 (02 Dec 2022 06:20) (116/59 - 185/77)  BP(mean): 75 (01 Dec 2022 19:00) (75 - 75)  RR: 18 (02 Dec 2022 06:20) (18 - 18)  SpO2: 99% (02 Dec 2022 06:20) (98% - 100%)    Parameters below as of 02 Dec 2022 06:20  Patient On (Oxygen Delivery Method): room air  O2 Flow (L/min): 2   I&O's Summary    01 Dec 2022 07:01  -  02 Dec 2022 07:00  --------------------------------------------------------  IN: 420 mL / OUT: 2100 mL / NET: -1680 mL    02 Dec 2022 07:01  -  02 Dec 2022 14:04  --------------------------------------------------------  IN: 300 mL / OUT: 200 mL / NET: 100 mL        LABS:                        7.5    5.08  )-----------( 206      ( 02 Dec 2022 08:33 )             25.9   12-02    134<L>  |  95<L>  |  19  ----------------------------<  104<H>  3.9   |  28  |  1.43<H>    Ca    8.8      02 Dec 2022 08:33  Phos  3.5     12-01  Mg     2.2     12-02    TPro  7.0  /  Alb  3.8  /  TBili  0.6  /  DBili  x   /  AST  17  /  ALT  18  /  AlkPhos  80  12-01      RADIOLOGY & ADDITIONAL STUDIES:      PROTEIN CALORIE MALNUTRITION PRESENT: [ ]mild [ ]moderate [ ]severe [ ]underweight [ ]morbid obesity  [ ]PPSV2 < or = to 30% [ ]significant weight loss  [ ]poor nutritional intake [ ]catabolic state [ ]anasarca     Artificial Nutrition [ ]     REFERRALS:  [x]Social Work  [ ]Case management [ ]PT/OT [ ]Chaplaincy  [ ]Hospice  [ ]Patient/Family Support    DISCUSSION OF CASE: Family - to obtain additional history and to provide emotional support; ( ) -     Care Coordination/Goals of Care Document:     
82-year-old female with a PMHx of HTN, HLD, HFpEF, severe AS (s/p TAVR in 2019), valve thrombosis, ascending thoracic aortic aneurysm, pAFib (on Eliquis), COPD (on 2L home 02), colon cancer (s/p colectomy in 2019, now in remission), MARK (non-compliant with CPAP), CKD-III (baseline Cr 1.1 - 1.2), FEROZ and recent admission for melena (found to have adenocarcinoma) for who presented with progressive SOB, orthopnea and LE edema as outpatient PCP decreased Torsemide dosing in setting of worsening kidney function.  Patient is admitted to cardiology for acute on chronic heart failure exacerbation.  Medicine to follow as co-management.      Of note, patient was admitted from 9/21 - 9/29 for melanotic stool.  Patient was eventually found to have colonic adenocarcinoma with PET scan concerning for metastatic disease to the lung.  Planned for EBUS to confirm metastatic disease but patient and family declined. Patient was discharged with plans for outpatient follow up.      Today, patient was sitting at side of bed and doing well.  States she currently feels much better.  Denies HA, CP, SOB, abdominal pain, nausea, vomiting, fever, chills or diarrhea.     PMHx: as per HPI     PSHx: TAVR (2019)     FHx: non-contributory      SHx: denies alcohol, tobacco or illicit drug use      Allergies: digoxin and Plavix      Home Medications:    -Torsemide 40mg daily   -Metoprolol Succinate 12.5mg daily   -Simethicone 80mg BID   -Amlodipine 10mg daily   -Isosorbide Mononitrate 30mg daily   -Folic Acid 1mg daily   -Atorvastatin 20mg daily   -Apixiabn 5mg BID   -Amiodarone 200mg daily   -Pantoprazole 40mg daily   -Magnesium Oxide 400mg daily   -Spiriva 2 puffs daily   -Lorazepam 0.5mg daily   -Montelukast 10mg daily      Objective:  Vital Signs:  Vital Signs Last 24 Hrs  T(C): 36.8 (01 Dec 2022 14:50), Max: 38 (01 Dec 2022 14:36)  T(F): 98.2 (01 Dec 2022 14:50), Max: 100.4 (01 Dec 2022 14:36)  HR: 58 (01 Dec 2022 14:20) (54 - 67)  BP: 154/68 (01 Dec 2022 14:20) (131/58 - 186/75)  BP(mean): 105 (01 Dec 2022 12:51) (105 - 105)  RR: 18 (01 Dec 2022 12:51) (17 - 22)  SpO2: 100% (01 Dec 2022 12:51) (93% - 100%)    Parameters below as of 01 Dec 2022 14:20  Patient On (Oxygen Delivery Method): nasal cannula  O2 Flow (L/min): 4    Physical Exam:  -Gen: NAD, resting at side of bed, pleasant  -HEENT: EOMI, PERRL, no scleral icterus, no JVD, no bruits  -CV: normal S1 and S2, no murmurs appreciated   -Lungs: faint bibasilar crackles, normal respiratory effort on NC  -Ab: obese, soft, NT, ND, normal BS  -Ext: +1-2 LE edema, no rashes  -Neuro: no focal deficits     Labs:                        7.6    6.03  )-----------( 232      ( 01 Dec 2022 05:30 )             26.1   12-01    138  |  98  |  21  ----------------------------<  93  3.8   |  31  |  1.59<H>    Ca    9.0      01 Dec 2022 05:30  Phos  3.5     12-01  Mg     2.1     12-01    TPro  7.0  /  Alb  3.8  /  TBili  0.6  /  DBili  x   /  AST  17  /  ALT  18  /  AlkPhos  80  12-01    Medications:  MEDICATIONS  (STANDING):  aMIOdarone    Tablet 200 milliGRAM(s) Oral every 24 hours  apixaban 2.5 milliGRAM(s) Oral every 12 hours  atorvastatin 20 milliGRAM(s) Oral at bedtime  ferrous    sulfate 325 milliGRAM(s) Oral daily  influenza  Vaccine (HIGH DOSE) 0.7 milliLiter(s) IntraMuscular once  iron sucrose Injectable 200 milliGRAM(s) IV Push once  isosorbide   mononitrate ER Tablet (IMDUR) 30 milliGRAM(s) Oral daily  metoprolol succinate ER 12.5 milliGRAM(s) Oral daily  montelukast 10 milliGRAM(s) Oral every 24 hours  pantoprazole    Tablet 40 milliGRAM(s) Oral before breakfast  tiotropium 2.5 MICROgram(s) Inhaler 2 Puff(s) Inhalation daily    MEDICATIONS  (PRN):    
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 Patient with recent admission in September sent in from [facility] for melena with colonoscopy c/f cancer with biopsy showing adenocarcinoma and PET scan with concerning MET disease to lung. She was also admitted in early september under cardiology service from 09/4-09/13 for chest pain and hedaches found to have e.coli bacteremia and gemella spp. w/ TTE//IRMA and gallium scan negative with source presumed to be infected tooth d/c'ed with IV Abx. The patient was doing well over the last few weeks until one week ago when her torsemide was held by her PMD for rising creatinine. Since discontinuing her torsemide the patient has had progressive SOB and lower extremity swelling as well as worsening of baseline orthopnea. She was most recently on RA at home however over the past week had to return to using home O2 and she presented to the ED i/s/o worsening SOB yesterday feeling like she could not catch her breath. Other than the torsemide patient denies any other recent changes to medications. She denies extremity pain, chest pain, nausea, vomiting, hemoptysis, recent travel of surgeries. Most recent EF in 09/2022 was 70-75%.     ED: 98.1, 67, 176/75, 95% 3L NC   Labs s/f: WBC 7.60, Hgb 8.1, Plt 236, electrolytes wnl, BUN/Cr 21/1.62 (Cr 1.11 on d/c 09/29/2022), LFTs wnl, troponin 0.01,   CXR: b/l pulmonary congestion, b/l mild pleural effusion   EKG: afib, No ischemic changes   Interventions: Lasix 40mg IVP   (01 Dec 2022 04:09)      PAST MEDICAL & SURGICAL HISTORY:  HTN (hypertension)      Aortic stenosis      Hyperlipidemia      COPD (chronic obstructive pulmonary disease)      GERD (gastroesophageal reflux disease)      Stage 3 chronic kidney disease      Anemia      Diastolic CHF, chronic      Obesity      Paroxysmal atrial fibrillation      S/P TAVR (transcatheter aortic valve replacement)  2/2019          FAMILY HISTORY:  No pertinent family history in first degree relatives        SOCIAL HISTORY:  Smoking Status: [ ] Current, [ ] Former, [ ] Never  Pack Years:    MEDICATIONS:  Pulmonary:  montelukast 10 milliGRAM(s) Oral every 24 hours  tiotropium 2.5 MICROgram(s) Inhaler 2 Puff(s) Inhalation daily    Antimicrobials:    Anticoagulants:  apixaban 2.5 milliGRAM(s) Oral every 12 hours    Onc:    GI/:  pantoprazole    Tablet 40 milliGRAM(s) Oral before breakfast    Endocrine:  atorvastatin 20 milliGRAM(s) Oral at bedtime    Cardiac:  aMIOdarone    Tablet 200 milliGRAM(s) Oral every 24 hours  isosorbide   mononitrate ER Tablet (IMDUR) 30 milliGRAM(s) Oral daily  metoprolol succinate ER 12.5 milliGRAM(s) Oral daily    Other Medications:  acetaminophen     Tablet .. 650 milliGRAM(s) Oral every 6 hours PRN  ferrous    sulfate 325 milliGRAM(s) Oral daily  influenza  Vaccine (HIGH DOSE) 0.7 milliLiter(s) IntraMuscular once  iron sucrose Injectable 200 milliGRAM(s) IV Push once  melatonin 5 milliGRAM(s) Oral at bedtime PRN      Allergies    Digox (Rash; Urticaria; Hives)  Plavix (Other (Mod to Severe))    Intolerances        Review of Systems:   •	General: negative  •	Skin/Breast: negative  •	Ophthalmologic: negative  •	ENMT: negative  •	Respiratory and Thorax: sob  •	Cardiovascular: negative  •	Gastrointestinal: negative  •	Genitourinary: negative  •	Musculoskeletal: negative  •	Neurological: negative  •	Psychiatric: negative  •	Hematology/Lymphatics: negative  •	Endocrine: negative  •	Allergic/Immunologic: negative    Physical Exam:   • Constitutional:	refer to dietitian/nutritionist note  • Eyes:	EOMI; PERRL; no drainage or redness  • ENMT:	No oral lesions; no gross abnormalities  • Neck	No bruits; no thyromegaly or nodules  • Breasts:	not examined  • Back:	No deformity or limitation of movement  • Respiratory:	Breath Sounds equal & clear to percussion & bl rhonciauscultation, no accessory muscle use  • Cardiovascular:	Regular rate & rhythm, normal S1, S2; no murmurs, gallops or rubs; no S3, S4  • Gastrointestinal:	Soft, non-tender, no hepatosplenomegaly, normal bowel sounds  • Genitourinary:	not examined  • Rectal: not examined  • Extremities:	No cyanosis, clubbing or edema  • Vascular:	Equal and normal pulses (carotid, femoral, dorsalis pedis)  • Neurologica:l	not examined  • Skin:	No lesions; no rash  • Lymph Nodes:	No lymphadedenopathy  • Musculoskeletal:	No joint pain, swelling or deformity; no limitation of movement      Vital Signs Last 24 Hrs  T(C): 36.8 (01 Dec 2022 14:50), Max: 38 (01 Dec 2022 14:36)  T(F): 98.2 (01 Dec 2022 14:50), Max: 100.4 (01 Dec 2022 14:36)  HR: 54 (01 Dec 2022 19:00) (54 - 67)  BP: 116/59 (01 Dec 2022 19:00) (116/59 - 186/75)  BP(mean): 75 (01 Dec 2022 19:00) (75 - 105)  RR: 18 (01 Dec 2022 12:51) (17 - 22)  SpO2: 100% (01 Dec 2022 12:51) (93% - 100%)    Parameters below as of 01 Dec 2022 14:20  Patient On (Oxygen Delivery Method): nasal cannula  O2 Flow (L/min): 4      11-30 @ 07:01 - 12-01 @ 07:00  --------------------------------------------------------  IN: 0 mL / OUT: 1600 mL / NET: -1600 mL    12-01 @ 07:01 - 12-01 @ 21:12  --------------------------------------------------------  IN: 420 mL / OUT: 1600 mL / NET: -1180 mL          LABS:      CBC Full  -  ( 01 Dec 2022 05:30 )  WBC Count : 6.03 K/uL  RBC Count : 3.01 M/uL  Hemoglobin : 7.6 g/dL  Hematocrit : 26.1 %  Platelet Count - Automated : 232 K/uL  Mean Cell Volume : 86.7 fl  Mean Cell Hemoglobin : 25.2 pg  Mean Cell Hemoglobin Concentration : 29.1 gm/dL  Auto Neutrophil # : x  Auto Lymphocyte # : x  Auto Monocyte # : x  Auto Eosinophil # : x  Auto Basophil # : x  Auto Neutrophil % : x  Auto Lymphocyte % : x  Auto Monocyte % : x  Auto Eosinophil % : x  Auto Basophil % : x    12-01    138  |  98  |  21  ----------------------------<  93  3.8   |  31  |  1.59<H>    Ca    9.0      01 Dec 2022 05:30  Phos  3.5     12-01  Mg     2.1     12-01    TPro  7.0  /  Alb  3.8  /  TBili  0.6  /  DBili  x   /  AST  17  /  ALT  18  /  AlkPhos  80  12-01  < from: CT Chest w/ IV Cont (09.22.22 @ 14:56) >    ACC: [id number] EXAM:  CT CHEST IC                        ACC: [id number] EXAM:  CT ABDOMEN AND PELVIS IC                          PROCEDURE DATE:  09/22/2022          INTERPRETATION:  CT of the CHEST, ABDOMEN and PELVIS with intravenous   contrast dated 9/22/2022 2:55 PM    INDICATION: r/o metastasis    TECHNIQUE: CT of the chest, abdomen and pelvis was performed. Axial and   coronal  and sagittal  images were produced and reviewed.    CONTRAST USAGE:  IV contrast: Isovue 370: 100 ml administered;ml discarded.  Oral contrast: Not administered.    PRIOR STUDIES: CTA abdomen and pelvis 9/21/2022. CTA chest, abdomen,   pelvis 9/16/2022.    FINDINGS: There is mild ectasia of the ascending aorta measuring up to   4.1 cm. The aortic root diameter measures 2.8 cm.. The heart is enlarged.   Status post TAVR. Dense mitral valve annular calcification..  Trace   inferior pericardial fluid..  There is mediastinal, bilateral hilar   lymphadenopathy is seen. 1.1 cm right lobe of thyroid nodule. Prominent   pulmonary trunk measuring 3.5 cm. Right pulmonary artery is prominent   measuring 2.9 cm left pulmonary arteries prominent measuring 2.9 cm.   Findings are suspicious for pulmonary arterial hypertension.    Evaluation of the pulmonary parenchyma demonstrates compressive   atelectasis right lower lobe. Bilateral smooth thickening of interlobular   septa likely due to pulmonary edema. Bilateral lung mosaic attenuation.    No gross change in multiple bilateral solid lung nodules and micronodules  which appear to be in a random distribution.. Small right pleural   effusion. Trace left pleural effusion..    Images of the upper abdomen demonstrate no focal hepatic abnormalities.     No radiopaque stones are seen in the gallbladder.      The pancreas shows fatty replacement..     Mild splenomegaly.    Normal right adrenal gland. 1.8 cm left adrenal nodule-unchanged..    The kidneys are normal in size. No hydronephrosis. 1.9 cm and 1.3 cm    cortical cyst right kidney. A few other bilateral subcentimeter renal   cortical hypodensities are too small to characterize.     No aortic aneurysm is seen. Unchanged moderate stenosis of the proximal   SMA.     No retroperitoneal or mesenteric lymphadenopathy is seen.    Evaluation of the bowel is limited due to lack of oral contrast material.   Patient appears to be status post right hemicolectomy. No bowel   obstruction or ileus. Subtle radiodensities  seen in relation to the   distal esophagus at the GE junction.    . No ascites is seen.  Prior bowel containing umbilical hernia has been reduced in the interval.    Images of the pelvis demonstrate under distended bladder containing   contrast material. Unremarkable ovaries. Normal-sized anteverted uterus.   Some subtle fullness and hypodensity of the lower uterine segment of   uterus and cervix.    Evaluation of the osseous structures demonstrates degenerative changes.   Multilevel lumbar degenerative disc disease. Lumbar dextroscoliosis.      IMPRESSION:  Patient had CTA abdomen ,pelvis on 9/21/2022 and CTA chest, abdomen,   pelvis on 9/16/2022.  Unchanged mediastinal and bilateral hilar adenopathy. Differential   diagnosis includes sarcoid, lymphoproliferative disorder.  Small right and trace left pleural effusion. As compared to the CTA   abdomen and pelvis 9/21/2022 the left pleural effusion is new, the right   pleural effusion is grossly unchanged.  Thickened interlobular septa suggesting pulmonary interstitial edema.  Stable bilateral lung nodules and micronodules.  Status post TAVR. Cardiomegaly.  No focal liver lesion. No liver metastasis.  Mild splenomegaly, unchanged.  Status post right hemicolectomy.    < end of copied text >  < from: NM PET/CT Onc FDG Skull to Thigh, Subsq (09.27.22 @ 12:25) >  IMPRESSION:  1. Hypermetabolic mediastinal and bilateral hilar lymph nodes,SUV max   12.4 at an enlarged aortopulmonary node, most likely representing   metastatic disease.  2. Focal activity at the anterior mid transverse colon, SUV max 11.9,   most likely corresponding to the malignant lesion found on recent   colonoscopy. Status post right hemicolectomy.  3. 2.3 cm area of increased uptake in an area of groundglass opacity at   the posterior lingula, most likely representing inflammatory uptake,   although malignant involvement is difficult to exclude. Mosaic   attenuation of the lungs, most likely representing air trapping, edema,   or small airway disease.  4. Small bilateral pleural effusions with mild associated uptake.  5. FDG avid subcentimeter soft tissue nodule in the upper inner quadrant   of the right breast, possibly representing metastatic disease.  6. Wedge-shaped area of photopenia at the posterior aspect of L3-L4   without a CT correlate, possibly due to asymmetric degenerative changes.   Correlate clinically.    < end of copied text >  < from: TTE Echo Complete w/o Contrast w/ Doppler (12.01.22 @ 09:14) >  ---------------------------------------------------------------------------  -----  CONCLUSIONS:     1. Mild symmetric left ventricular hypertrophy.   2. Normal left ventricular systolic function.   3. Normal right ventricular size and systolic function.   4. Normal atria.   5. A transcatheter aortic valve (TAVR) is noted in the aortic position.   The peak transvalvular velocity is 3.80 m/s, the mean transvalvular   gradient is 31 mmHg, and the LVOT/AV velocity ratio is 0.26-these values   are consistent with prossible prosthetic valve stenosis. There is no   evidence of aortic regurgitation.   6. Pulmonary artery systolic pressure is 66 mmHg.   7. Trivial pericardial effusion.   8. Compared to the previous TTE performed on 9/16/2022, there have been   no significant interval changes.    < end of copied text >                      RADIOLOGY & ADDITIONAL STUDIES (The following images were personally reviewed):

## 2022-12-02 NOTE — PROGRESS NOTE ADULT - NS ATTEND AMEND GEN_ALL_CORE FT
See PA note written above, for details. I reviewed the PA documentation.  I have personally seen and examined this patient today. I reviewed vitals, labs, medications, cardiac studies and additional imaging.  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 Patient admitted with mild acute on chronic HFpEF exacerbation. Initial plan for discharge 12/2 but patient with episode of nausea and SOB, treated with zofran and home lorazepam reinstated.  Physical exam: Patient now with trace edema, clear lungs, flat neck veins on baseline home O2 of 2L NC  Repeat ECHO remains unchanged  Plan for:  Resume home Torsemide 40mg po daily today  Reinstate home Lorazepam for anxiety mgmt  IV iron for chronic FEROZ  Cont Imdur 30 daily, Toprol 12.5 XL daily, PPI for GI protection  Eliquis 2.5mg BID for Afib CVA risk reduction (dose reduced for age and Cr)  PT rec home with home PT  Geo Chatterjee M.D.  Cardiology Attending See PA note written above, for details. I reviewed the PA documentation.  I have personally seen and examined this patient today. I reviewed vitals, labs, medications, cardiac studies and additional imaging.  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 Patient admitted with mild acute on chronic HFpEF exacerbation. Initial plan for discharge 12/2 but patient with episode of nausea and SOB, treated with zofran and home lorazepam reinstated.  Physical exam: Patient now with trace edema, clear lungs, flat neck veins on baseline home O2 of 2L NC  Repeat ECHO remains unchanged  Plan for:  CTSx structural consulted for prosthetic valve stenosis eval, may not be candidate for any further therapy  Resume home Torsemide 40mg po daily today  Reinstate home Lorazepam for anxiety mgmt  IV iron for chronic FEROZ  Cont Imdur 30 daily, Toprol 12.5 XL daily, PPI for GI protection  Eliquis 2.5mg BID for Afib CVA risk reduction (dose reduced for age and Cr)  PT rec home with home PT  Geo Chatterjee M.D.  Cardiology Attending

## 2022-12-02 NOTE — PROGRESS NOTE ADULT - PROBLEM SELECTOR PLAN 1
History of diastolic HF w/ preserved EF in sept 2022. Torsemide recently held i/s/o renal failure. No ischemic changes noted on EKG this admission. Acute exacerbation likely i/s/o medication changes.     Plan:   - Lasix 40mg PO BID   - Daily weight   - Primafit for I/O   - TTE in AM History of diastolic HF w/ preserved EF in sept 2022. Torsemide recently held i/s/o acute renal failure. No ischemic changes noted on EKG this admission. Acute exacerbation likely i/s/o medication changes.     -  -CXR w/ unchanged b/l effusions (small) and congestion  -s/p IV lasix, start torsemide 40mg daily tomorrow  - History of diastolic HF w/ preserved EF in sept 2022. Torsemide recently held i/s/o acute renal failure. No ischemic changes noted on EKG this admission. Acute exacerbation likely i/s/o medication changes.     -  -CXR w/ unchanged b/l effusions (small) and congestion  -s/p IV lasix, start torsemide 40mg daily tomorrow  -Echocardiogram 12/1/22: No significant changes from 9/2022 echo: Mild LVH. Normal biV systolic fx. Normal atria. TAVR w/ mean 31mmHg. PASP 66mmHg trivial effusion  -CTSx consulted, Dr Alejo to see pt tomorrow for AS  -start Torsemided 40mg daily (home dose) tomorrow  -c/w toprol 12.5mg daily, no ACE/ARB/ARNI given BIBIANA  -f/u appointment made w/ Dr Marte 12/5 @ 2pm

## 2022-12-02 NOTE — PROGRESS NOTE ADULT - ASSESSMENT
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 Pt now nearing euvolemia. Dr More following to r/o lung metastases. Palliative following for continued GOC discussion. 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 Pt now nearing euvolemia. Dr More following to r/o lung metastases. Palliative saw pt today w/ no changed in GOC discussion. Pt s/p echo revealing at least moderate AS, now plan is to discharge pt tomorrow pending CTSx recs.

## 2022-12-02 NOTE — CONSULT NOTE ADULT - ASSESSMENT
82 F with advanced diastolic heart failure, colon cancer, debility, encounter for palliative care.     1) Advanced Diastolic Heart Failure:  - Patient with 4 admissions during calendar year.  - Currently not requiring oxygen, s/p diuresis.  - States that she is feeling better overall.  - Ongoing care by Heart Failure team.  2) Colon Cancer:  - Recently diagnosed.  - Concern for metastatic disease to lung.   - Awaiting CT chest to evaluate for progression.  3) Debility:  - PPSV2 is 50%.  - Assist with ADLs.  - Patient states that she has adequate support at home, as she lives with  and daughter.   4) Encounter for Palliative Care:  - Patient with advanced heart failure, colon cancer, frequent readmissions and numerous comorbidities.   - Palliative care consulted for goals of care discussion.  5) Advanced Care Planning:  - 25 minutes spent with patient at bedside.   - HCP, MOLST and advanced directives discussed.   - States that her daughter is her HCP. States that she has signed paperwork during previous admissions and is hesitant to sign again.  - Code status discussed. Patient anxious to discuss end of life care, but states she still has a good quality of life and does not want to alter anything. Wishes to stay full code. Also mentations that when her time comes, "my family knows my wishes."  - Palliative care to remain available if goals need to be readdressed  - Patient CT chest to evaluate status of malignancy. 
82-year-old female with a PMHx of HTN, HLD, HFpEF, severe AS (s/p TAVR in 2019), valve thrombosis, ascending thoracic aortic aneurysm, pAFib (on Eliquis), COPD (on 2L home 02), colon cancer (s/p colectomy in 2019, now in remission), CARI (non-compliant with CPAP), CKD-III (baseline Cr 1.1 - 1.2), FEROZ and recently diagnosed colonic adenocarcinoma who presented with acute on chronic heart failure exacerbation.       #Acute on Chronic Heart Failure Exacerbation   -further management as per cardiology    -TTE: normal EF, mild LVH and PASP of 66mHg (no major changes compared to 9/16/22)   -plan for additional dose of 40mg IV Lasix today    -continue with metoprolol succinate 12.5mg daily   -strict I/Os, daily weights and fluid restriction      #Recently Diagnosed Colonic Adenocarcinoma   -diagnosed on previous admission, declined EBUS for complete workup and planned for outpatient follow up    -obtain collateral information regarding treatment course over past 2 months (patient was unable to provide great detail)  -Dr. More to see patient this admission, f/u recommendations   -outpatient hematology follow up    #BIBIANA on CKD-III (baseline Cr 1.1 - 1.2)   -likely in setting of heart failure exacerbation    -continue to treat HF as mentioned above   -obtain PVR to ensure patient is not retaining    -obtain urine electrolytes (with urea) if persistent despite diuresis    -renally dose medications and avoid nephrotoxic agents      #Paroxysmal Atrial Fibrillation    -continue with amiodarone 200mg daily    -continue with Eliquis 2.5mg BID   -continue with metoprolol tartrate 12.5mg daily      #Normocytic Anemia    -unclear baseline hemoglobin, has fluctuated from 7.5 - 12 since 4/2022 but stable when compared to 9/2022 admission    -unclear etiology, likely multifactorial from ACD, FEROZ and possible occult bleed from known colonic malignancy    -can give 200mg IV iron sucrose while admitted, consider outpatient PO iron supplementation   -maintain active type and screen       #HTN   -patient reportedly not taking imdur, metoprolol or amlodipine at home   -would resume imdur 30mg daily and metoprolol 12.5mg daily   -restart [id number] daily as indicated    #COPD   -continue with home spiriva and montelukast      #GERD   -continue with pantoprazole 40mg daily      #HLD   -continue with atorvastatin 20mg daily      DVT PPx: Eliquis    Dispo: pending, possibly tomorrow      
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 Pt now nearing euvolemia. Dr More following to r/o lung metastases. Palliative saw pt today w/ no changed in GOC discussion. Pt s/p echo revealing at least moderate AS, CT surgery consults, pt being considered for DC tomorrow. Pt reports mild SOB, denies chest pain, reporting a headache at this time.           Plan:  Problem 1: Aortic stenosis  -transvalvular gradient unchanged from prior echos  -Team to discuss plan, likely not a surgical candidate      Problem 2: Paroxysmal afib  -c/w amiodarone and eliquis   -management per primary team      Problem 3: BIBIANA  -Cr downtrending to 1.43 today  -avoid nephrotoxic agents  -I/Os        I have reviewed clinical labs tests and reports, radiology tests and reports, as well as old patient medical records, and discussed with the refering physician.

## 2022-12-02 NOTE — PROGRESS NOTE ADULT - ASSESSMENT
82-year-old female with a PMHx of HTN, HLD, HFpEF, severe AS (s/p TAVR in 2019), valve thrombosis, ascending thoracic aortic aneurysm, pAFib (on Eliquis), COPD (on 2L home 02), colon cancer (s/p colectomy in 2019, now in remission), CARI (non-compliant with CPAP), CKD-III (baseline Cr 1.1 - 1.2), FEROZ and recently diagnosed colonic adenocarcinoma who presented with acute on chronic heart failure exacerbation.        #Acute on Chronic Heart Failure Exacerbation    -further management as per cardiology     -TTE: normal EF, mild LVH and PASP of 66mHg (no major changes compared to 9/16/22)    -further diuresis as per cardiology    -continue with metoprolol succinate 12.5mg daily    -strict I/Os, daily weights and fluid restriction       #Recently Diagnosed Colonic Adenocarcinoma    -diagnosed on previous admission, declined EBUS for complete workup and planned for outpatient follow up    -outpatient hematology and pulmonology follow up   -palliative care consulted, follow up recommendations     #Pulmonary Nodules   -pulmonology consulted, obtain non-contrast CT-Chest to better evaluate given concern for metastatic disease to the lung     #BIBIANA on CKD-III (baseline Cr 1.1 - 1.2)    -likely in setting of heart failure exacerbation, improving with diuresis      -continue to treat HF as mentioned above    -obtain PVR to ensure patient is not retaining     -obtain urine electrolytes (with urea) if persistent despite diuresis     -renally dose medications and avoid nephrotoxic agents       #Paroxysmal Atrial Fibrillation     -continue with amiodarone 200mg daily     -continue with Eliquis 2.5mg BID    -continue with metoprolol tartrate 12.5mg daily       #Normocytic Anemia     -unclear baseline hemoglobin, has fluctuated from 7.5 - 12 since 4/2022 but stable when compared to 9/2022 admission     -unclear etiology, likely multifactorial from ACD, FEROZ and possible occult bleed from known colonic malignancy     -plan for 3 days of IV iron while inpatient and then transition to PO ferrous sulfate 325mg daily   -maintain active type and screen        #HTN    -patient reportedly not taking imdur, metoprolol or amlodipine at home    -continue with imdur 30mg daily and metoprolol 12.5mg daily    -restart [id number] daily as indicated based on BP trend     #COPD    -pulmonology consulted, transition from spiriva to stiolto   -continue with home montelukast       #GERD    -continue with pantoprazole 40mg daily       #HLD    -continue with atorvastatin 20mg daily       DVT PPx: Eliquis     Dispo: home with home PT

## 2022-12-02 NOTE — PROGRESS NOTE ADULT - PROBLEM SELECTOR PLAN 2
With repeat CT scan of the chest and we will follow-up on the groundglass opacity and the mediastinal windows.

## 2022-12-02 NOTE — PROGRESS NOTE ADULT - PROBLEM SELECTOR PLAN 7
Patient with a history of Anemia and FEROZ with also history of GIB. No active signs of bleeding on history or physical. Hgb at baseline.     Plan:   - continue to monitor H/H  - transfuse <7  - maintain active T&S  - consider IV iron Patient with a history of Anemia and FEROZ with also history of GIB. No active signs of bleeding on history or physical. Hgb at baseline.     Plan:   - continue to monitor H/H  - transfuse <7  - maintain active T&S  - c/w IV iron    #r/o metastases  - CT chest non-con ordered as Dr More had this ordered outpatient.   - performed 12/2 night  - f/u w/ Dr More

## 2022-12-02 NOTE — PROGRESS NOTE ADULT - PROBLEM SELECTOR PLAN 4
Patient in the past was on imdur 30mg, toprol 12.5mg QD and amlodipine 10mg. Currently per daughter and patient she is not taking any medications for BP I/s/o of hypotension.    Plan:    - consider restarting agents if indicated  - Collateral from PCP/cardiologist about current medications seeing as there have been many recent changes. Patient in the past was on imdur 30mg, toprol 12.5mg QD and amlodipine 10mg. Currently per daughter and patient she is not taking any medications for BP I/s/o of hypotension.    Plan:    - continue with home meds, BPs stable

## 2022-12-02 NOTE — PROGRESS NOTE ADULT - PROBLEM SELECTOR PLAN 2
Patient with baseline CKD 3 with cr ranging from (baseline Cr 1.1-1.2), elevated this admission to 1.62. Unclear etiology as patients daughter states patient has been eating and drinking w/o difficulty. Possibly from venous congestion.     Plan:   - As above   - Avoid nephrotoxic agents   - Strict I/Os Patient with baseline CKD 3 with cr ranging from (baseline Cr 1.1-1.2), elevated this admission to 1.62. Unclear etiology as patients daughter states patient has been eating and drinking w/o difficulty. Possibly from venous congestion.     Plan:   - Cr improve to 1.43 today  - As above   - Avoid nephrotoxic agents   - Strict I/Os

## 2022-12-02 NOTE — PROGRESS NOTE ADULT - PROBLEM SELECTOR PLAN 3
Patient with history of afib on amiodarone 200mg and Eliquis 5mg BID.     Plan:   - c/w amiodarone 200mg   - i/s/o rising cr (1.6), age almost 83, and history of UGIB with transfusions consider starting lower dose of Eliquis Patient with history of afib on amiodarone 200mg and Eliquis 5mg BID.     Plan:   - c/w amiodarone 200mg, eliquis 5mg bid, and toprol 12.5mg daily  - eliquis originally changed to 2.5 given CKD and age, but Cr now <1.5

## 2022-12-02 NOTE — PROGRESS NOTE ADULT - PROBLEM SELECTOR PLAN 1
Stable.  No evidence of acute exacerbation of COPD.  Continue Stiolto.  No need for escalation of therapy.

## 2022-12-03 ENCOUNTER — TRANSCRIPTION ENCOUNTER (OUTPATIENT)
Age: 82
End: 2022-12-03

## 2022-12-03 VITALS
SYSTOLIC BLOOD PRESSURE: 122 MMHG | DIASTOLIC BLOOD PRESSURE: 58 MMHG | OXYGEN SATURATION: 95 % | RESPIRATION RATE: 18 BRPM | HEART RATE: 59 BPM

## 2022-12-03 LAB
ANION GAP SERPL CALC-SCNC: 9 MMOL/L — SIGNIFICANT CHANGE UP (ref 5–17)
BUN SERPL-MCNC: 16 MG/DL — SIGNIFICANT CHANGE UP (ref 7–23)
CALCIUM SERPL-MCNC: 9.4 MG/DL — SIGNIFICANT CHANGE UP (ref 8.4–10.5)
CHLORIDE SERPL-SCNC: 98 MMOL/L — SIGNIFICANT CHANGE UP (ref 96–108)
CO2 SERPL-SCNC: 30 MMOL/L — SIGNIFICANT CHANGE UP (ref 22–31)
CREAT SERPL-MCNC: 1.29 MG/DL — SIGNIFICANT CHANGE UP (ref 0.5–1.3)
EGFR: 41 ML/MIN/1.73M2 — LOW
GLUCOSE SERPL-MCNC: 91 MG/DL — SIGNIFICANT CHANGE UP (ref 70–99)
HCT VFR BLD CALC: 28.1 % — LOW (ref 34.5–45)
HGB BLD-MCNC: 8 G/DL — LOW (ref 11.5–15.5)
MAGNESIUM SERPL-MCNC: 2.5 MG/DL — SIGNIFICANT CHANGE UP (ref 1.6–2.6)
MCHC RBC-ENTMCNC: 25.4 PG — LOW (ref 27–34)
MCHC RBC-ENTMCNC: 28.5 GM/DL — LOW (ref 32–36)
MCV RBC AUTO: 89.2 FL — SIGNIFICANT CHANGE UP (ref 80–100)
NRBC # BLD: 0 /100 WBCS — SIGNIFICANT CHANGE UP (ref 0–0)
PLATELET # BLD AUTO: 232 K/UL — SIGNIFICANT CHANGE UP (ref 150–400)
POTASSIUM SERPL-MCNC: 4.8 MMOL/L — SIGNIFICANT CHANGE UP (ref 3.5–5.3)
POTASSIUM SERPL-SCNC: 4.8 MMOL/L — SIGNIFICANT CHANGE UP (ref 3.5–5.3)
RBC # BLD: 3.15 M/UL — LOW (ref 3.8–5.2)
RBC # FLD: 21.2 % — HIGH (ref 10.3–14.5)
SODIUM SERPL-SCNC: 137 MMOL/L — SIGNIFICANT CHANGE UP (ref 135–145)
WBC # BLD: 5.73 K/UL — SIGNIFICANT CHANGE UP (ref 3.8–10.5)
WBC # FLD AUTO: 5.73 K/UL — SIGNIFICANT CHANGE UP (ref 3.8–10.5)

## 2022-12-03 PROCEDURE — 36415 COLL VENOUS BLD VENIPUNCTURE: CPT

## 2022-12-03 PROCEDURE — 71045 X-RAY EXAM CHEST 1 VIEW: CPT | Mod: 26

## 2022-12-03 PROCEDURE — 80061 LIPID PANEL: CPT

## 2022-12-03 PROCEDURE — 84100 ASSAY OF PHOSPHORUS: CPT

## 2022-12-03 PROCEDURE — 85027 COMPLETE CBC AUTOMATED: CPT

## 2022-12-03 PROCEDURE — 96374 THER/PROPH/DIAG INJ IV PUSH: CPT

## 2022-12-03 PROCEDURE — 71250 CT THORAX DX C-: CPT

## 2022-12-03 PROCEDURE — 83880 ASSAY OF NATRIURETIC PEPTIDE: CPT

## 2022-12-03 PROCEDURE — 80053 COMPREHEN METABOLIC PANEL: CPT

## 2022-12-03 PROCEDURE — 93306 TTE W/DOPPLER COMPLETE: CPT

## 2022-12-03 PROCEDURE — 97161 PT EVAL LOW COMPLEX 20 MIN: CPT

## 2022-12-03 PROCEDURE — 87635 SARS-COV-2 COVID-19 AMP PRB: CPT

## 2022-12-03 PROCEDURE — 80048 BASIC METABOLIC PNL TOTAL CA: CPT

## 2022-12-03 PROCEDURE — 99232 SBSQ HOSP IP/OBS MODERATE 35: CPT

## 2022-12-03 PROCEDURE — 94640 AIRWAY INHALATION TREATMENT: CPT

## 2022-12-03 PROCEDURE — 85025 COMPLETE CBC W/AUTO DIFF WBC: CPT

## 2022-12-03 PROCEDURE — 99239 HOSP IP/OBS DSCHRG MGMT >30: CPT

## 2022-12-03 PROCEDURE — 83036 HEMOGLOBIN GLYCOSYLATED A1C: CPT

## 2022-12-03 PROCEDURE — 99285 EMERGENCY DEPT VISIT HI MDM: CPT | Mod: 25

## 2022-12-03 PROCEDURE — 84484 ASSAY OF TROPONIN QUANT: CPT

## 2022-12-03 PROCEDURE — 83735 ASSAY OF MAGNESIUM: CPT

## 2022-12-03 PROCEDURE — 71045 X-RAY EXAM CHEST 1 VIEW: CPT

## 2022-12-03 RX ORDER — AMLODIPINE BESYLATE 2.5 MG/1
2.5 TABLET ORAL DAILY
Refills: 0 | Status: DISCONTINUED | OUTPATIENT
Start: 2022-12-03 | End: 2022-12-03

## 2022-12-03 RX ORDER — ACETAMINOPHEN 500 MG
2 TABLET ORAL
Qty: 112 | Refills: 0
Start: 2022-12-03 | End: 2022-12-16

## 2022-12-03 RX ADMIN — Medication 40 MILLIGRAM(S): at 05:34

## 2022-12-03 RX ADMIN — PANTOPRAZOLE SODIUM 40 MILLIGRAM(S): 20 TABLET, DELAYED RELEASE ORAL at 05:33

## 2022-12-03 RX ADMIN — IRON SUCROSE 176.67 MILLIGRAM(S): 20 INJECTION, SOLUTION INTRAVENOUS at 08:40

## 2022-12-03 RX ADMIN — AMIODARONE HYDROCHLORIDE 200 MILLIGRAM(S): 400 TABLET ORAL at 13:57

## 2022-12-03 RX ADMIN — APIXABAN 5 MILLIGRAM(S): 2.5 TABLET, FILM COATED ORAL at 05:34

## 2022-12-03 RX ADMIN — MONTELUKAST 10 MILLIGRAM(S): 4 TABLET, CHEWABLE ORAL at 10:23

## 2022-12-03 RX ADMIN — Medication 12.5 MILLIGRAM(S): at 05:34

## 2022-12-03 NOTE — DISCHARGE NOTE PROVIDER - NSDCFUSCHEDAPPT_GEN_ALL_CORE_FT
Derek Marte Physician [facility]  [facility] [address]  Scheduled Appointment: 12/05/2022     Bere More  [facility] Physician [address]  Scheduled Appointment: 12/22/2022

## 2022-12-03 NOTE — DISCHARGE NOTE PROVIDER - NSDCMRMEDTOKEN_GEN_ALL_CORE_FT
amiodarone 200 mg oral tablet: 1 tab(s) orally once a day  amLODIPine 10 mg oral tablet: 1 tab(s) orally once a day  apixaban 5 mg oral tablet: 1 tab(s) orally 2 times a day  atorvastatin 20 mg oral tablet: 1 tab(s) orally once a day (at bedtime)  folic acid 1 mg oral tablet: 1 tab(s) orally once a day  isosorbide mononitrate 30 mg oral tablet, extended release: 1 tab(s) orally once a day  lidocaine 4% topical film: Apply topically to affected area once a day, As Needed for pain  LORazepam 0.5 mg oral tablet: 1 tab(s) orally once a day  magnesium oxide 400 mg (241.3 mg elemental magnesium) oral tablet: 1 tab(s) orally once a day  metoprolol succinate 25 mg oral tablet, extended release: 0.5 tab(s) orally once a day   montelukast 10 mg oral tablet: 1 tab(s) orally once a day  Multiple Vitamins oral tablet: 1 tab(s) orally once a day  pantoprazole 40 mg oral delayed release tablet: 1 tab(s) orally once a day  simethicone 80 mg oral tablet, chewable: 1 tab(s) orally 2 times a day  Spiriva Respimat 1.25 mcg/inh inhalation aerosol: 2 puff(s) inhaled once a day  torsemide 20 mg oral tablet: 2 tab(s) orally once a day    acetaminophen 325 mg oral tablet: 2 tab(s) orally every 6 hours, As needed, Mild Pain (1 - 3), Moderate Pain (4 - 6)  amiodarone 200 mg oral tablet: 1 tab(s) orally once a day  apixaban 5 mg oral tablet: 1 tab(s) orally 2 times a day  atorvastatin 20 mg oral tablet: 1 tab(s) orally once a day (at bedtime)  folic acid 1 mg oral tablet: 1 tab(s) orally once a day  Home physical therapy for evaluation and treatment. Dx generalized weakness ICD-10 code M62. 81: Home physical therapy for evaluation and treatment. Dx generalized weakness ICD-10 code M62. 81  isosorbide mononitrate 30 mg oral tablet, extended release: 1 tab(s) orally once a day  lidocaine 4% topical film: Apply topically to affected area once a day, As Needed for pain  LORazepam 0.5 mg oral tablet: 1 tab(s) orally once a day  magnesium oxide 400 mg (241.3 mg elemental magnesium) oral tablet: 1 tab(s) orally once a day  metoprolol succinate 25 mg oral tablet, extended release: 0.5 tab(s) orally once a day   montelukast 10 mg oral tablet: 1 tab(s) orally once a day  Multiple Vitamins oral tablet: 1 tab(s) orally once a day  pantoprazole 40 mg oral delayed release tablet: 1 tab(s) orally once a day  simethicone 80 mg oral tablet, chewable: 1 tab(s) orally 2 times a day  Spiriva Respimat 1.25 mcg/inh inhalation aerosol: 2 puff(s) inhaled once a day  torsemide 20 mg oral tablet: 2 tab(s) orally once a day

## 2022-12-03 NOTE — PROGRESS NOTE ADULT - PROBLEM SELECTOR PROBLEM 5
MARK (obstructive sleep apnea)
MARK (obstructive sleep apnea)
COPD (chronic obstructive pulmonary disease)

## 2022-12-03 NOTE — DISCHARGE NOTE NURSING/CASE MANAGEMENT/SOCIAL WORK - PATIENT PORTAL LINK FT
You can access the FollowMyHealth Patient Portal offered by [facility] by registering at the following website: [email or web address]. By joining [facility]’s FollowMyHealth portal, you will also be able to view your health information using other applications (apps) compatible with our system.

## 2022-12-03 NOTE — DISCHARGE NOTE PROVIDER - PROVIDER TOKENS
PROVIDER:[TOKEN:[[id number]]],PROVIDER:[TOKEN:[[id number]]],PROVIDER:[TOKEN:[[id number]]],PROVIDER:[TOKEN:[[id number]]]

## 2022-12-03 NOTE — DISCHARGE NOTE PROVIDER - NSDCFUADDINST_GEN_ALL_CORE_FT
F/u appointment made w/ Dr Marte 12/5 @ 2pm.    A Mediterranean Diet is recommended! Some suggestions include continue incorporating 2 or more servings per day of vegetables, fruits, and whole grains. Increase intake of fish and legumes/beans to 2 or more servings per week. Aim to increase intake of healthy fats, such as olive oil and avocados, and have a handful of nuts/seeds most days. Reduce red/processed meat consumption to 2 or fewer times per week.

## 2022-12-03 NOTE — DISCHARGE NOTE NURSING/CASE MANAGEMENT/SOCIAL WORK - NSDCPEFALRISK_GEN_ALL_CORE
For information on Fall & Injury Prevention, visit: [email or web address] OR  [email or web address] OR  https://www.cdc.gov/steadi/patient.html

## 2022-12-03 NOTE — PROGRESS NOTE ADULT - SUBJECTIVE AND OBJECTIVE BOX
Subjective:  No acute events overnight.  Patient is doing well today without new complaints.  Denies HA, CP, SOB, abdominal pain, nausea, vomiting, fever, chills or diarrhea.     Objective:   Vital Signs:  T(C): 36.7 (02 Dec 2022 09:06), Max: 38 (01 Dec 2022 14:36)  T(F): 98.1 (02 Dec 2022 09:06), Max: 100.4 (01 Dec 2022 14:36)  HR: 52 (02 Dec 2022 06:20) (52 - 58)  BP: 145/66 (02 Dec 2022 06:20) (116/59 - 185/77)  BP(mean): 75 (01 Dec 2022 19:00) (75 - 105)  RR: 18 (02 Dec 2022 06:20) (18 - 18)  SpO2: 99% (02 Dec 2022 06:20) (98% - 100%)    Parameters below as of 02 Dec 2022 06:20  Patient On (Oxygen Delivery Method): room air  O2 Flow (L/min): 2    Physical Exam:  -Gen: NAD, resting at side of bed, pleasant  -HEENT: EOMI, PERRL, no scleral icterus, no JVD, no bruits  -CV: normal S1 and S2, no murmurs appreciated   -Lungs: CTABL, normal respiratory effort on RA  -Ab: obese, soft, NT, ND, normal BS  -Ext: +1 LE edema, no rashes  -Neuro: no focal deficits     Labs:                        7.5    5.08  )-----------( 206      ( 02 Dec 2022 08:33 )             25.9       12-02    134<L>  |  95<L>  |  19  ----------------------------<  104<H>  3.9   |  28  |  1.43<H>    Ca    8.8      02 Dec 2022 08:33  Phos  3.5     12-01  Mg     2.2     12-02    TPro  7.0  /  Alb  3.8  /  TBili  0.6  /  DBili  x   /  AST  17  /  ALT  18  /  AlkPhos  80  12-01     Medications:  MEDICATIONS  (STANDING):  aMIOdarone    Tablet 200 milliGRAM(s) Oral every 24 hours  apixaban 2.5 milliGRAM(s) Oral every 12 hours  atorvastatin 20 milliGRAM(s) Oral at bedtime  influenza  Vaccine (HIGH DOSE) 0.7 milliLiter(s) IntraMuscular once  iron sucrose IVPB 300 milliGRAM(s) IV Intermittent every 24 hours  isosorbide   mononitrate ER Tablet (IMDUR) 30 milliGRAM(s) Oral daily  metoprolol succinate ER 12.5 milliGRAM(s) Oral daily  montelukast 10 milliGRAM(s) Oral every 24 hours  pantoprazole    Tablet 40 milliGRAM(s) Oral before breakfast  tiotropium 2.5 MICROgram(s)/olodaterol 2.5 MICROgram(s) (STIOLTO) Inhaler 2 Puff(s) Inhalation daily    MEDICATIONS  (PRN):  acetaminophen     Tablet .. 650 milliGRAM(s) Oral every 6 hours PRN Mild Pain (1 - 3), Moderate Pain (4 - 6)  melatonin 5 milliGRAM(s) Oral at bedtime PRN Insomnia  
Interventional Cardiology PA Adult Progress Note    Subjective Assessment:  Pt seen and examined at bedside this am and reports feeling some SOB. Denies chills, CP, palpitations, orthopnea, n/v.      ROS Negative except as per Subjective and HPI  	  MEDICATIONS:  aMIOdarone    Tablet 200 milliGRAM(s) Oral every 24 hours  isosorbide   mononitrate ER Tablet (IMDUR) 30 milliGRAM(s) Oral daily  metoprolol succinate ER 12.5 milliGRAM(s) Oral daily  montelukast 10 milliGRAM(s) Oral every 24 hours  tiotropium 2.5 MICROgram(s)/olodaterol 2.5 MICROgram(s) (STIOLTO) Inhaler 2 Puff(s) Inhalation daily  acetaminophen     Tablet .. 650 milliGRAM(s) Oral every 6 hours PRN  melatonin 5 milliGRAM(s) Oral at bedtime PRN  pantoprazole    Tablet 40 milliGRAM(s) Oral before breakfast  atorvastatin 20 milliGRAM(s) Oral at bedtime  apixaban 2.5 milliGRAM(s) Oral every 12 hours  influenza  Vaccine (HIGH DOSE) 0.7 milliLiter(s) IntraMuscular once  iron sucrose IVPB 300 milliGRAM(s) IV Intermittent every 24 hours      	    [PHYSICAL EXAM:  TELEMETRY:  T(C): 36.9 (12-02-22 @ 12:47), Max: 38 (12-01-22 @ 14:36)  HR: 52 (12-02-22 @ 06:20) (52 - 58)  BP: 145/66 (12-02-22 @ 06:20) (116/59 - 185/77)  RR: 18 (12-02-22 @ 06:20) (18 - 18)  SpO2: 99% (12-02-22 @ 06:20) (98% - 100%)  Wt(kg): --  I&O's Summary    01 Dec 2022 07:01  -  02 Dec 2022 07:00  --------------------------------------------------------  IN: 420 mL / OUT: 2100 mL / NET: -1680 mL    02 Dec 2022 07:01  -  02 Dec 2022 14:03  --------------------------------------------------------  IN: 300 mL / OUT: 200 mL / NET: 100 mL          Appearance: Normal	  HEENT:   Normal oral mucosa, PERRL, EOMI	  Neck: Supple, - JVD  Cardiovascular: Irregularly irregular rhythm. IV/VI, harsh systolic murmur in RUSB  Respiratory: Lungs clear to auscultation. No Rales, Rhonchi, Wheezing	  Gastrointestinal:  Soft, Non-tender, + BS	  Skin: No rashes, No ecchymoses, No cyanosis  Extremities: trace to 1+ pitting edema b/l  Psychiatry: Mood & affect appropriate      	    ECG:  	  RADIOLOGY:   DIAGNOSTIC TESTING:  [ ] Echocardiogram:  [ ]  Catheterization:  [ ] Stress Test:    [ ] IRMA  OTHER: 	    LABS:	 	  CARDIAC MARKERS:                                  7.5    5.08  )-----------( 206      ( 02 Dec 2022 08:33 )             25.9     12-02    134<L>  |  95<L>  |  19  ----------------------------<  104<H>  3.9   |  28  |  1.43<H>    Ca    8.8      02 Dec 2022 08:33  Phos  3.5     12-01  Mg     2.2     12-02    TPro  7.0  /  Alb  3.8  /  TBili  0.6  /  DBili  x   /  AST  17  /  ALT  18  /  AlkPhos  80  12-01    proBNP:   Lipid Profile:   HgA1c:   TSH:       ASSESSMENT/PLAN: 	        DVT ppx:  Dispo:    
Interval Events: Reviewed  Patient seen and examined at bedside.    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          MEDICATIONS:  Pulmonary:  montelukast 10 milliGRAM(s) Oral every 24 hours  tiotropium 2.5 MICROgram(s)/olodaterol 2.5 MICROgram(s) (STIOLTO) Inhaler 2 Puff(s) Inhalation daily    Antimicrobials:    Anticoagulants:  apixaban 2.5 milliGRAM(s) Oral every 12 hours    Cardiac:  aMIOdarone    Tablet 200 milliGRAM(s) Oral every 24 hours  isosorbide   mononitrate ER Tablet (IMDUR) 30 milliGRAM(s) Oral daily  metoprolol succinate ER 12.5 milliGRAM(s) Oral daily      Allergies    Digox (Rash; Urticaria; Hives)  Plavix (Other (Mod to Severe))    Intolerances        Vital Signs Last 24 Hrs  T(C): 36.9 (02 Dec 2022 12:47), Max: 38 (01 Dec 2022 14:36)  T(F): 98.4 (02 Dec 2022 12:47), Max: 100.4 (01 Dec 2022 14:36)  HR: 52 (02 Dec 2022 06:20) (52 - 58)  BP: 145/66 (02 Dec 2022 06:20) (116/59 - 185/77)  BP(mean): 75 (01 Dec 2022 19:00) (75 - 75)  RR: 18 (02 Dec 2022 06:20) (18 - 18)  SpO2: 99% (02 Dec 2022 06:20) (98% - 100%)    Parameters below as of 02 Dec 2022 06:20  Patient On (Oxygen Delivery Method): room air  O2 Flow (L/min): 2      12-01 @ 07:01  -  12-02 @ 07:00  --------------------------------------------------------  IN: 420 mL / OUT: 2100 mL / NET: -1680 mL    12-02 @ 07:01  -  12-02 @ 13:33  --------------------------------------------------------  IN: 150 mL / OUT: 200 mL / NET: -50 mL          Review of Systems:   •	General: negative  •	Skin/Breast: negative  •	Ophthalmologic: negative  •	ENMT: negative  •	Respiratory and Thorax: negative  •	Cardiovascular: negative  •	Gastrointestinal: negative  •	Genitourinary: negative  •	Musculoskeletal: negative  •	Neurological: negative  •	Psychiatric: negative  •	Hematology/Lymphatics: negative  •	Endocrine: negative  •	Allergic/Immunologic: negative    Physical Exam:   • Constitutional:	refer to the dietion /Nutritionist note  • Eyes:	EOMI; PERRL; no drainage or redness  • ENMT:	No oral lesions; no gross abnormalities  • Neck	No bruits; no thyromegaly or nodules  • Breasts:	not examined  • Back:	No deformity or limitation of movement  • Respiratory:	Breath Sounds equal & clear to percussion & auscultation, no accessory muscle use  • Cardiovascular:	Regular rate & rhythm, normal S1, S2; no murmurs, gallops or rubs; no S3, S4  • Gastrointestinal:	Soft, non-tender, no hepatosplenomegaly, normal bowel sounds  • Genitourinary:	not examined  • Rectal: not examined  • Extremities:	No cyanosis, clubbing or edema  • Vascular:	Equal and normal pulses (carotid, femoral, dorsalis pedis)  • Neurologica:l	not examined  • Skin:	No lesions; no rash  • Lymph Nodes:	No lymphadedenopathy  • Musculoskeletal:	No joint pain, swelling or deformity; no limitation of movement        LABS:      CBC Full  -  ( 02 Dec 2022 08:33 )  WBC Count : 5.08 K/uL  RBC Count : 3.01 M/uL  Hemoglobin : 7.5 g/dL  Hematocrit : 25.9 %  Platelet Count - Automated : 206 K/uL  Mean Cell Volume : 86.0 fl  Mean Cell Hemoglobin : 24.9 pg  Mean Cell Hemoglobin Concentration : 29.0 gm/dL  Auto Neutrophil # : x  Auto Lymphocyte # : x  Auto Monocyte # : x  Auto Eosinophil # : x  Auto Basophil # : x  Auto Neutrophil % : x  Auto Lymphocyte % : x  Auto Monocyte % : x  Auto Eosinophil % : x  Auto Basophil % : x    12-02    134<L>  |  95<L>  |  19  ----------------------------<  104<H>  3.9   |  28  |  1.43<H>    Ca    8.8      02 Dec 2022 08:33  Phos  3.5     12-01  Mg     2.2     12-02    TPro  7.0  /  Alb  3.8  /  TBili  0.6  /  DBili  x   /  AST  17  /  ALT  18  /  AlkPhos  80  12-01                        RADIOLOGY & ADDITIONAL STUDIES (The following images were personally reviewed):  
Interval Events: Reviewed  Patient seen and examined at bedside.    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          MEDICATIONS:  Pulmonary:  montelukast 10 milliGRAM(s) Oral every 24 hours  tiotropium 2.5 MICROgram(s)/olodaterol 2.5 MICROgram(s) (STIOLTO) Inhaler 2 Puff(s) Inhalation daily    Antimicrobials:    Anticoagulants:  apixaban 5 milliGRAM(s) Oral two times a day    Cardiac:  aMIOdarone    Tablet 200 milliGRAM(s) Oral every 24 hours  amLODIPine   Tablet 2.5 milliGRAM(s) Oral daily  isosorbide   mononitrate ER Tablet (IMDUR) 30 milliGRAM(s) Oral daily  metoprolol succinate ER 12.5 milliGRAM(s) Oral daily  torsemide 40 milliGRAM(s) Oral daily      Allergies    Digox (Rash; Urticaria; Hives)  Plavix (Other (Mod to Severe))    Intolerances        Vital Signs Last 24 Hrs  T(C): 37.2 (03 Dec 2022 09:09), Max: 37.3 (02 Dec 2022 15:36)  T(F): 99 (03 Dec 2022 09:09), Max: 99.2 (02 Dec 2022 15:36)  HR: 58 (03 Dec 2022 08:45) (46 - 80)  BP: 163/70 (03 Dec 2022 08:45) (111/54 - 207/91)  BP(mean): 100 (03 Dec 2022 08:45) (86 - 130)  RR: 18 (03 Dec 2022 08:45) (18 - 23)  SpO2: 99% (03 Dec 2022 08:45) (89% - 99%)    Parameters below as of 03 Dec 2022 08:45  Patient On (Oxygen Delivery Method): room air  O2 Flow (L/min): 2      12-02 @ 07:01  -  12-03 @ 07:00  --------------------------------------------------------  IN: 1650 mL / OUT: 2525 mL / NET: -875 mL    12-03 @ 07:01  - 12-03 @ 11:16  --------------------------------------------------------  IN: 150 mL / OUT: 600 mL / NET: -450 mL          Review of Systems:   •	General: negative  •	Skin/Breast: negative  •	Ophthalmologic: negative  •	ENMT: negative  •	Respiratory and Thorax: negative  •	Cardiovascular: negative  •	Gastrointestinal: negative  •	Genitourinary: negative  •	Musculoskeletal: negative  •	Neurological: negative  •	Psychiatric: negative  •	Hematology/Lymphatics: negative  •	Endocrine: negative  •	Allergic/Immunologic: negative    Physical Exam:   • Constitutional:	elderly female, NAD  • Eyes:	EOMI; PERRL; no drainage or redness  • ENMT:	No oral lesions; no gross abnormalities  • Neck	no thyromegaly or nodules  • Breasts:	not examined  • Back:	No deformity or limitation of movement  • Respiratory:	Breath Sounds equal & clear to auscultation, no accessory muscle use  • Cardiovascular:	Regular rate & rhythm, normal S1, S2; no murmurs, gallops or rubs; no S3, S4  • Gastrointestinal:	Soft, non-tender, no hepatosplenomegaly, normal bowel sounds  • Genitourinary:	not examined  • Rectal: not examined  • Extremities:	No cyanosis, clubbing, positive b/l LE edema  • Vascular:	Equal and normal pulses (dorsalis pedis)  • Neurologica:l	not examined  • Skin:	No lesions; no rash  • Lymph Nodes:	No lymphadedenopathy  • Musculoskeletal:	No joint pain, swelling or deformity; no limitation of movement        LABS:      CBC Full  -  ( 03 Dec 2022 07:15 )  WBC Count : 5.73 K/uL  RBC Count : 3.15 M/uL  Hemoglobin : 8.0 g/dL  Hematocrit : 28.1 %  Platelet Count - Automated : 232 K/uL  Mean Cell Volume : 89.2 fl  Mean Cell Hemoglobin : 25.4 pg  Mean Cell Hemoglobin Concentration : 28.5 gm/dL  Auto Neutrophil # : x  Auto Lymphocyte # : x  Auto Monocyte # : x  Auto Eosinophil # : x  Auto Basophil # : x  Auto Neutrophil % : x  Auto Lymphocyte % : x  Auto Monocyte % : x  Auto Eosinophil % : x  Auto Basophil % : x    12-03    137  |  98  |  16  ----------------------------<  91  4.8   |  30  |  1.29    Ca    9.4      03 Dec 2022 07:15  Mg     2.5     12-03                          RADIOLOGY & ADDITIONAL STUDIES (The following images were personally reviewed):  Gonzalez:                                     No  Urine output:                       adequate  DVT prophylaxis:                 Yes  Flattus:                                  Yes  Bowel movement:              No

## 2022-12-03 NOTE — DISCHARGE NOTE PROVIDER - CARE PROVIDER_API CALL
Solomon Alejo)  Cardiology; Interventional Cardiology  [address] [place] [address]  Phone: [phone]  Fax: [phone]  Follow Up Time:     Derek Marte)  Cardiovascular Disease; Internal Medicine  Cardiology [facility], [address] [place] [address]  Phone: [phone]  Fax: [phone]  Follow Up Time:     Bere More)  Critical Care Medicine; Pulmonary Disease  [address], [address] [place] [address]  Phone: [phone]  Fax: [phone]  Follow Up Time:     Stefano Mckeon)  Hematology; Medical Oncology  [address], [address]  [place] [address]  Phone: [phone]  Fax: [phone]  Follow Up Time:

## 2022-12-03 NOTE — PROGRESS NOTE ADULT - ASSESSMENT
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

## 2022-12-03 NOTE — PROGRESS NOTE ADULT - PROBLEM SELECTOR PLAN 5
Of 8 at night
Is noncompliant with the CPAP mask.  It starts CPAP of 8 and also can help her cardiac condition.
Follows with Dr. More. On montelukast and Spiriva at home.     Plan:   - c/w home medications.

## 2022-12-03 NOTE — DISCHARGE NOTE PROVIDER - HOSPITAL COURSE
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 Patient with recent admission in September sent in from [facility] for melena with colonoscopy c/f cancer with biopsy showing adenocarcinoma and PET scan with concerning MET disease to lung. She was also admitted in early september under cardiology service from 09/4-09/13 for chest pain and hedaches found to have e.coli bacteremia and gemella spp. w/ TTE//IRMA and gallium scan negative with source presumed to be infected tooth d/c'ed with IV Abx. The patient was doing well over the last few weeks until one week ago when her torsemide was held by her PMD for rising creatinine. Since discontinuing her torsemide the patient has had progressive SOB and lower extremity swelling as well as worsening of baseline orthopnea. She was most recently on RA at home however over the past week had to return to using home O2 and she presented to the ED i/s/o worsening SOB yesterday feeling like she could not catch her breath. Other than the torsemide patient denies any other recent changes to medications. She denies extremity pain, chest pain, nausea, vomiting, hemoptysis, recent travel of surgeries. Most recent EF in 09/2022 was 70-75%.     Admitted for acute on chronic HFpEF exacerbation in setting of holding home Torsemide.  . s/p IV lasix       ·  Plan: History of diastolic HF w/ preserved EF in sept 2022. Torsemide recently held i/s/o acute renal failure. No ischemic changes noted on EKG this admission. Acute exacerbation likely i/s/o medication changes.     -  -CXR w/ unchanged b/l effusions (small) and congestion  -s/p IV lasix, start torsemide 40mg daily tomorrow  -Echocardiogram 12/1/22: No significant changes from 9/2022 echo: Mild LVH. Normal biV systolic fx. Normal atria. TAVR w/ mean 31mmHg. PASP 66mmHg trivial effusion  -CTSx consulted, Dr Alejo to see pt tomorrow for AS  -start Torsemided 40mg daily (home dose) tomorrow  -c/w toprol 12.5mg daily, no ACE/ARB/ARNI given BIBIANA  -f/u appointment made w/ Dr Marte 12/5 @ 2pm.       Problem/Plan - 2:  ·  Problem: Acute kidney injury superimposed on CKD.   ·  Plan: Patient with baseline CKD 3 with cr ranging from (baseline Cr 1.1-1.2), elevated this admission to 1.62. Unclear etiology as patients daughter states patient has been eating and drinking w/o difficulty. Possibly from venous congestion.     Plan:   - Cr improve to 1.43 today  - As above   - Avoid nephrotoxic agents   - Strict I/Os.       Problem/Plan - 3:  ·  Problem: Paroxysmal atrial fibrillation.   ·  Plan: Patient with history of afib on amiodarone 200mg and Eliquis 5mg BID.     Plan:   - c/w amiodarone 200mg, eliquis 5mg bid, and toprol 12.5mg daily  - eliquis originally changed to 2.5 given CKD and age, but Cr now <1.5.       Problem/Plan - 4:  ·  Problem: HTN (hypertension).   ·  Plan: Patient in the past was on imdur 30mg, toprol 12.5mg QD and amlodipine 10mg. Currently per daughter and patient she is not taking any medications for BP I/s/o of hypotension.    Plan:    - continue with home meds, BPs stable.       Problem/Plan - 5:  ·  Problem: COPD (chronic obstructive pulmonary disease).   ·  Plan: Follows with Dr. More. On montelukast and Spiriva at home.     Plan:   - c/w home medications.     Problem/Plan - 6:  ·  Problem: GERD (gastroesophageal reflux disease).   ·  Plan: History of GERD and GIB. Abd unremarkable this admission and Hgb seems to be at baseline.     Plan:   - Protonix 40mg QD.     Problem/Plan - 7:  ·  Problem: Anemia.   ·  Plan: Patient with a history of Anemia and FEROZ with also history of GIB. No active signs of bleeding on history or physical. Hgb at baseline.     Plan:   - continue to monitor H/H  - transfuse <7  - maintain active T&S  - c/w IV iron    #r/o metastases  - CT chest non-con ordered as Dr More had this ordered outpatient.   - performed 12/2 night  - f/u w/ Dr More.      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 Patient admitted with mild acute on chronic HFpEF exacerbation. Initial plan for discharge 12/2 but patient with episode of nausea and SOB, treated with zofran and home lorazepam reinstated.  Physical exam: Patient now with trace edema, clear lungs, flat neck veins on baseline home O2 of 2L NC  Repeat ECHO remains unchanged  Plan for:  CTSx structural consulted for prosthetic valve stenosis eval, may not be candidate for any further therapy  Resume home Torsemide 40mg po daily today  Reinstate home Lorazepam for anxiety mgmt  IV iron for chronic FEROZ  Cont Imdur 30 daily, Toprol 12.5 XL daily, PPI for GI protection  Eliquis 2.5mg BID for Afib CVA risk reduction (dose reduced for age and Cr)  PT rec home with home PT  Geo Chatterjee M.D.  Cardiology Attending. 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 Patient with recent admission in September sent in from [facility] for melena with colonoscopy c/f cancer with biopsy showing adenocarcinoma and PET scan with concerning MET disease to lung. She was also admitted in early september under cardiology service from 09/4-09/13 for chest pain and hedaches found to have e.coli bacteremia and gemella spp. w/ TTE//IRMA and gallium scan negative with source presumed to be infected tooth d/c'ed with IV Abx. The patient was doing well over the last few weeks until one week ago when her torsemide was held by her PMD for rising creatinine. Since discontinuing her torsemide the patient has had progressive SOB and lower extremity swelling as well as worsening of baseline orthopnea. She was most recently on RA at home however over the past week had to return to using home O2 and she presented to the ED i/s/o worsening SOB yesterday feeling like she could not catch her breath. Other than the torsemide patient denies any other recent changes to medications. She denies extremity pain, chest pain, nausea, vomiting, hemoptysis, recent travel of surgeries. Most recent EF in 09/2022 was 70-75%.     Admitted for acute on chronic HFpEF exacerbation i/s/o outpatient medication adjustments and patient confusion re: medication changes  .  Echocardiogram 12/1/22: No significant changes from 9/2022 echo: Mild LVH, normal biV systolic fx, normal atria, TAVR noted in aortic position with peak transvalvular velocity 3.8m/s, means transvalvular gradient 31 mmHg, LVOT/AV velocity ratio 0.26 (values c/w possible prothetic valve stenosis), no e/o AI, PASP 66mmHg. s/p IV lasix which was transitioned to home Torsemide 40 mg PO QD prior to discharge. Saturating well on baseline 2L NC (on home O2 for COPD).   Toprol XL 12.5 mg PO QD restarted. CTSX consulted who reported that transvalvular gradient unchanged from prior echos and likely not surgical candidate.  Spoke with Herberth GLASS on 12/3/22, no plan for surgical intervention this admit but recommend outpatient follow up with Dr. Alejo.  Outpatient appointment made with Dr. Marte on 12/4/22 @ 2PM.     Admitted with BIBIANA on CKD III, peaked to 1.62 which resolved prior to discharge. Possibly hepatorenal.     PMHx pAtrial fibrillation. SB 40s. Patient to continue on Amiodarone 200 mg PO QD, Eliquis 12.5 mg PO QD, Toprol XL 12.5 mg PO QD.     h/o labile BP. Restarted home Toprol XL 12.5 mg PO QD, Imdur 30 mg PO QD. Home Amlodipine 10 mg PO QD discontinued.     h/p COPD and follows with Dr. More. Patient in in COPD exacerbation on admit. Continues on Montelukast and Spiriva at home.     PMHx Anemia and FEROZ with also history of GIB. No active signs of bleeding on history or physical. Hgb at baseline.     Noted h/o colon CA s/p rxn (was in remission but now with concern for relapse for possible mets). PET scan 9/2022 revealed hypermetabolic mediastinal and B/L hilar LN most likely metastatic disease with activity at anterior mid transverse colon, posterior lingula, R breast. Underwent CT chest while inpatient via Dr. More recommendations. CT chest not resulted but reviewed with pulm. Cleared for discharge from pulm standpoint with outpatient follow up.     No significant events on telemetry overnight. Patient has been medically cleared for discharge as per Dr. Donis. Patient has been given appropriate discharge instructions including medication regimen, access site management and follow up. Medications that patient needs refills on have been e-prescribed to preferred pharmacy.  Home PT script provided. Ambulance arranged.     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 Patient with recent admission in September sent in from [facility] for melena with colonoscopy c/f cancer with biopsy showing adenocarcinoma and PET scan with concerning MET disease to lung. She was also admitted in early september under cardiology service from 09/4-09/13 for chest pain and hedaches found to have e.coli bacteremia and gemella spp. w/ TTE//IRMA and gallium scan negative with source presumed to be infected tooth d/c'ed with IV Abx. The patient was doing well over the last few weeks until one week ago when her torsemide was held by her PMD for rising creatinine. Since discontinuing her torsemide the patient has had progressive SOB and lower extremity swelling as well as worsening of baseline orthopnea. She was most recently on RA at home however over the past week had to return to using home O2 and she presented to the ED i/s/o worsening SOB yesterday feeling like she could not catch her breath. Other than the torsemide patient denies any other recent changes to medications. She denies extremity pain, chest pain, nausea, vomiting, hemoptysis, recent travel of surgeries. Most recent EF in 09/2022 was 70-75%.     Admitted for acute on chronic HFpEF exacerbation i/s/o outpatient medication adjustments and patient confusion re: medication changes  .  Echocardiogram 12/1/22: No significant changes from 9/2022 echo: Mild LVH, normal biV systolic fx, normal atria, TAVR noted in aortic position with peak transvalvular velocity 3.8m/s, means transvalvular gradient 31 mmHg, LVOT/AV velocity ratio 0.26 (values c/w possible prothetic valve stenosis), no e/o AI, PASP 66mmHg. s/p IV lasix which was transitioned to home Torsemide 40 mg PO QD prior to discharge. Saturating well on baseline 2L NC (on home O2 for COPD).   Toprol XL 12.5 mg PO QD restarted. CTSX consulted who reported that transvalvular gradient unchanged from prior echos and likely not surgical candidate.  Spoke with Herberth GLASS on 12/3/22, no plan for surgical intervention this admit but recommend outpatient follow up with Dr. Alejo.  Outpatient appointment made with Dr. Marte on 12/4/22 @ 2PM.     Admitted with BIBIANA on CKD III, peaked to 1.62 which resolved prior to discharge. Possibly cardiorenal.     PMHx pAtrial fibrillation. SB 40s. Patient to continue on Amiodarone 200 mg PO QD, Eliquis 12.5 mg PO QD, Toprol XL 12.5 mg PO QD.     h/o labile BP. Restarted home Toprol XL 12.5 mg PO QD, Imdur 30 mg PO QD. Home Amlodipine 10 mg PO QD discontinued.     h/p COPD and follows with Dr. More. Patient in in COPD exacerbation on admit. Continues on Montelukast and Spiriva at home.     PMHx Anemia and FEROZ with also history of GIB. No active signs of bleeding on history or physical. Hgb at baseline.     Noted h/o colon CA s/p rxn (was in remission but now with concern for relapse for possible mets). PET scan 9/2022 revealed hypermetabolic mediastinal and B/L hilar LN most likely metastatic disease with activity at anterior mid transverse colon, posterior lingula, R breast. Underwent CT chest while inpatient via Dr. More recommendations. CT chest not resulted but reviewed with pulm. Cleared for discharge from pulm standpoint with outpatient follow up.     No significant events on telemetry overnight. Patient has been medically cleared for discharge as per Dr. Donis. Patient has been given appropriate discharge instructions including medication regimen, access site management and follow up. Medications that patient needs refills on have been e-prescribed to preferred pharmacy.  Home PT script provided. Ambulance arranged.     Temp 97.7F, HR 55 BPM,  /61, RR 18, SpO2 99% on 2L  Gen: NAD, A&O x3  Cards: RRR, murmur auscultated best at R 2nd intercostal space  Pulm: decreased BS at bases  Abd: soft, NT  Ext: no LE edema or ulcerations B/L

## 2022-12-03 NOTE — DISCHARGE NOTE PROVIDER - CARE PROVIDERS DIRECT ADDRESSES
[email or web address]

## 2022-12-05 ENCOUNTER — APPOINTMENT (OUTPATIENT)
Dept: HEART AND VASCULAR | Facility: CLINIC | Age: 82
End: 2022-12-05

## 2022-12-05 DIAGNOSIS — I48.0 PAROXYSMAL ATRIAL FIBRILLATION: ICD-10-CM

## 2022-12-05 DIAGNOSIS — R91.8 OTHER NONSPECIFIC ABNORMAL FINDING OF LUNG FIELD: ICD-10-CM

## 2022-12-05 DIAGNOSIS — I13.0 HYPERTENSIVE HEART AND CHRONIC KIDNEY DISEASE WITH HEART FAILURE AND STAGE 1 THROUGH STAGE 4 CHRONIC KIDNEY DISEASE, OR UNSPECIFIED CHRONIC KIDNEY DISEASE: ICD-10-CM

## 2022-12-05 DIAGNOSIS — I27.20 PULMONARY HYPERTENSION, UNSPECIFIED: ICD-10-CM

## 2022-12-05 DIAGNOSIS — D50.9 IRON DEFICIENCY ANEMIA, UNSPECIFIED: ICD-10-CM

## 2022-12-05 DIAGNOSIS — Z95.2 PRESENCE OF PROSTHETIC HEART VALVE: ICD-10-CM

## 2022-12-05 DIAGNOSIS — E66.9 OBESITY, UNSPECIFIED: ICD-10-CM

## 2022-12-05 DIAGNOSIS — G47.33 OBSTRUCTIVE SLEEP APNEA (ADULT) (PEDIATRIC): ICD-10-CM

## 2022-12-05 DIAGNOSIS — F40.240 CLAUSTROPHOBIA: ICD-10-CM

## 2022-12-05 DIAGNOSIS — N17.9 ACUTE KIDNEY FAILURE, UNSPECIFIED: ICD-10-CM

## 2022-12-05 DIAGNOSIS — J43.9 EMPHYSEMA, UNSPECIFIED: ICD-10-CM

## 2022-12-05 DIAGNOSIS — N18.30 CHRONIC KIDNEY DISEASE, STAGE 3 UNSPECIFIED: ICD-10-CM

## 2022-12-05 DIAGNOSIS — Z90.49 ACQUIRED ABSENCE OF OTHER SPECIFIED PARTS OF DIGESTIVE TRACT: ICD-10-CM

## 2022-12-05 DIAGNOSIS — I50.33 ACUTE ON CHRONIC DIASTOLIC (CONGESTIVE) HEART FAILURE: ICD-10-CM

## 2022-12-05 DIAGNOSIS — I35.0 NONRHEUMATIC AORTIC (VALVE) STENOSIS: ICD-10-CM

## 2022-12-05 DIAGNOSIS — Z79.01 LONG TERM (CURRENT) USE OF ANTICOAGULANTS: ICD-10-CM

## 2022-12-05 DIAGNOSIS — R59.0 LOCALIZED ENLARGED LYMPH NODES: ICD-10-CM

## 2022-12-05 DIAGNOSIS — Z99.81 DEPENDENCE ON SUPPLEMENTAL OXYGEN: ICD-10-CM

## 2022-12-05 DIAGNOSIS — Z85.038 PERSONAL HISTORY OF OTHER MALIGNANT NEOPLASM OF LARGE INTESTINE: ICD-10-CM

## 2022-12-05 DIAGNOSIS — E78.5 HYPERLIPIDEMIA, UNSPECIFIED: ICD-10-CM

## 2022-12-05 DIAGNOSIS — I71.21 ANEURYSM OF THE ASCENDING AORTA, WITHOUT RUPTURE: ICD-10-CM

## 2022-12-05 DIAGNOSIS — Z91.199 PATIENT'S NONCOMPLIANCE WITH OTHER MEDICAL TREATMENT AND REGIMEN DUE TO UNSPECIFIED REASON: ICD-10-CM

## 2022-12-05 DIAGNOSIS — K21.9 GASTRO-ESOPHAGEAL REFLUX DISEASE WITHOUT ESOPHAGITIS: ICD-10-CM

## 2022-12-15 ENCOUNTER — TRANSCRIPTION ENCOUNTER (OUTPATIENT)
Age: 82
End: 2022-12-15

## 2022-12-15 VITALS
WEIGHT: 190.04 LBS | HEART RATE: 69 BPM | DIASTOLIC BLOOD PRESSURE: 72 MMHG | SYSTOLIC BLOOD PRESSURE: 190 MMHG | TEMPERATURE: 100 F | OXYGEN SATURATION: 95 % | RESPIRATION RATE: 18 BRPM | HEIGHT: 65 IN

## 2022-12-15 LAB
ALBUMIN SERPL ELPH-MCNC: 3.8 G/DL — SIGNIFICANT CHANGE UP (ref 3.3–5)
ALP SERPL-CCNC: 71 U/L — SIGNIFICANT CHANGE UP (ref 40–120)
ALT FLD-CCNC: 17 U/L — SIGNIFICANT CHANGE UP (ref 10–45)
ANION GAP SERPL CALC-SCNC: 14 MMOL/L — SIGNIFICANT CHANGE UP (ref 5–17)
APPEARANCE UR: CLEAR — SIGNIFICANT CHANGE UP
APTT BLD: 22.5 SEC — LOW (ref 27.5–35.5)
AST SERPL-CCNC: 28 U/L — SIGNIFICANT CHANGE UP (ref 10–40)
BACTERIA # UR AUTO: PRESENT /HPF
BASE EXCESS BLDV CALC-SCNC: 4.7 MMOL/L — HIGH (ref -2–3)
BASOPHILS # BLD AUTO: 0.02 K/UL — SIGNIFICANT CHANGE UP (ref 0–0.2)
BASOPHILS NFR BLD AUTO: 0.3 % — SIGNIFICANT CHANGE UP (ref 0–2)
BILIRUB SERPL-MCNC: 0.7 MG/DL — SIGNIFICANT CHANGE UP (ref 0.2–1.2)
BILIRUB UR-MCNC: NEGATIVE — SIGNIFICANT CHANGE UP
BUN SERPL-MCNC: 23 MG/DL — SIGNIFICANT CHANGE UP (ref 7–23)
CA-I SERPL-SCNC: 1.14 MMOL/L — LOW (ref 1.15–1.33)
CALCIUM SERPL-MCNC: 9.1 MG/DL — SIGNIFICANT CHANGE UP (ref 8.4–10.5)
CHLORIDE SERPL-SCNC: 98 MMOL/L — SIGNIFICANT CHANGE UP (ref 96–108)
CK MB CFR SERPL CALC: <1 NG/ML — SIGNIFICANT CHANGE UP (ref 0–6.7)
CK SERPL-CCNC: 28 U/L — SIGNIFICANT CHANGE UP (ref 25–170)
CO2 BLDV-SCNC: 30.5 MMOL/L — HIGH (ref 22–26)
CO2 SERPL-SCNC: 25 MMOL/L — SIGNIFICANT CHANGE UP (ref 22–31)
COLOR SPEC: YELLOW — SIGNIFICANT CHANGE UP
COMMENT - URINE: SIGNIFICANT CHANGE UP
CREAT SERPL-MCNC: 1.8 MG/DL — HIGH (ref 0.5–1.3)
DIFF PNL FLD: ABNORMAL
EGFR: 27 ML/MIN/1.73M2 — LOW
EOSINOPHIL # BLD AUTO: 0.01 K/UL — SIGNIFICANT CHANGE UP (ref 0–0.5)
EOSINOPHIL NFR BLD AUTO: 0.1 % — SIGNIFICANT CHANGE UP (ref 0–6)
EPI CELLS # UR: SIGNIFICANT CHANGE UP /HPF (ref 0–5)
FLUAV AG NPH QL: SIGNIFICANT CHANGE UP
FLUBV AG NPH QL: SIGNIFICANT CHANGE UP
GAS PNL BLDV: 134 MMOL/L — LOW (ref 136–145)
GAS PNL BLDV: SIGNIFICANT CHANGE UP
GAS PNL BLDV: SIGNIFICANT CHANGE UP
GLUCOSE SERPL-MCNC: 125 MG/DL — HIGH (ref 70–99)
GLUCOSE UR QL: NEGATIVE — SIGNIFICANT CHANGE UP
HCO3 BLDV-SCNC: 29 MMOL/L — SIGNIFICANT CHANGE UP (ref 22–29)
HCT VFR BLD CALC: 34.1 % — LOW (ref 34.5–45)
HGB BLD-MCNC: 10.4 G/DL — LOW (ref 11.5–15.5)
HYALINE CASTS # UR AUTO: ABNORMAL /LPF (ref 0–2)
IMM GRANULOCYTES NFR BLD AUTO: 0.5 % — SIGNIFICANT CHANGE UP (ref 0–0.9)
INR BLD: 1.12 — SIGNIFICANT CHANGE UP (ref 0.88–1.16)
KETONES UR-MCNC: NEGATIVE — SIGNIFICANT CHANGE UP
LACTATE SERPL-SCNC: 1.5 MMOL/L — SIGNIFICANT CHANGE UP (ref 0.5–2)
LEUKOCYTE ESTERASE UR-ACNC: ABNORMAL
LYMPHOCYTES # BLD AUTO: 0.77 K/UL — LOW (ref 1–3.3)
LYMPHOCYTES # BLD AUTO: 10 % — LOW (ref 13–44)
MAGNESIUM SERPL-MCNC: 2.1 MG/DL — SIGNIFICANT CHANGE UP (ref 1.6–2.6)
MCHC RBC-ENTMCNC: 26.2 PG — LOW (ref 27–34)
MCHC RBC-ENTMCNC: 30.5 GM/DL — LOW (ref 32–36)
MCV RBC AUTO: 85.9 FL — SIGNIFICANT CHANGE UP (ref 80–100)
MONOCYTES # BLD AUTO: 0.57 K/UL — SIGNIFICANT CHANGE UP (ref 0–0.9)
MONOCYTES NFR BLD AUTO: 7.4 % — SIGNIFICANT CHANGE UP (ref 2–14)
NEUTROPHILS # BLD AUTO: 6.26 K/UL — SIGNIFICANT CHANGE UP (ref 1.8–7.4)
NEUTROPHILS NFR BLD AUTO: 81.7 % — HIGH (ref 43–77)
NITRITE UR-MCNC: NEGATIVE — SIGNIFICANT CHANGE UP
NRBC # BLD: 0 /100 WBCS — SIGNIFICANT CHANGE UP (ref 0–0)
NT-PROBNP SERPL-SCNC: 1105 PG/ML — HIGH (ref 0–300)
PCO2 BLDV: 42 MMHG — SIGNIFICANT CHANGE UP (ref 39–42)
PH BLDV: 7.45 — HIGH (ref 7.32–7.43)
PH UR: 5.5 — SIGNIFICANT CHANGE UP (ref 5–8)
PHOSPHATE SERPL-MCNC: 3.8 MG/DL — SIGNIFICANT CHANGE UP (ref 2.5–4.5)
PLATELET # BLD AUTO: 179 K/UL — SIGNIFICANT CHANGE UP (ref 150–400)
PO2 BLDV: <33 MMHG — SIGNIFICANT CHANGE UP (ref 25–45)
POTASSIUM BLDV-SCNC: 3.5 MMOL/L — SIGNIFICANT CHANGE UP (ref 3.5–5.1)
POTASSIUM SERPL-MCNC: 3.9 MMOL/L — SIGNIFICANT CHANGE UP (ref 3.5–5.3)
POTASSIUM SERPL-SCNC: 3.9 MMOL/L — SIGNIFICANT CHANGE UP (ref 3.5–5.3)
PROT SERPL-MCNC: 7.7 G/DL — SIGNIFICANT CHANGE UP (ref 6–8.3)
PROT UR-MCNC: NEGATIVE MG/DL — SIGNIFICANT CHANGE UP
PROTHROM AB SERPL-ACNC: 13.3 SEC — SIGNIFICANT CHANGE UP (ref 10.5–13.4)
RAPID RVP RESULT: SIGNIFICANT CHANGE UP
RBC # BLD: 3.97 M/UL — SIGNIFICANT CHANGE UP (ref 3.8–5.2)
RBC # FLD: 22 % — HIGH (ref 10.3–14.5)
RBC CASTS # UR COMP ASSIST: < 5 /HPF — SIGNIFICANT CHANGE UP
RSV RNA NPH QL NAA+NON-PROBE: SIGNIFICANT CHANGE UP
SAO2 % BLDV: 39 % — LOW (ref 67–88)
SARS-COV-2 RNA SPEC QL NAA+PROBE: SIGNIFICANT CHANGE UP
SARS-COV-2 RNA SPEC QL NAA+PROBE: SIGNIFICANT CHANGE UP
SODIUM SERPL-SCNC: 137 MMOL/L — SIGNIFICANT CHANGE UP (ref 135–145)
SP GR SPEC: 1.01 — SIGNIFICANT CHANGE UP (ref 1–1.03)
TROPONIN T SERPL-MCNC: 0.01 NG/ML — SIGNIFICANT CHANGE UP (ref 0–0.01)
UROBILINOGEN FLD QL: 0.2 E.U./DL — SIGNIFICANT CHANGE UP
WBC # BLD: 7.67 K/UL — SIGNIFICANT CHANGE UP (ref 3.8–10.5)
WBC # FLD AUTO: 7.67 K/UL — SIGNIFICANT CHANGE UP (ref 3.8–10.5)
WBC UR QL: > 10 /HPF

## 2022-12-15 PROCEDURE — 99284 EMERGENCY DEPT VISIT MOD MDM: CPT

## 2022-12-15 PROCEDURE — 71045 X-RAY EXAM CHEST 1 VIEW: CPT | Mod: 26

## 2022-12-15 RX ORDER — ACETAMINOPHEN 500 MG
650 TABLET ORAL ONCE
Refills: 0 | Status: COMPLETED | OUTPATIENT
Start: 2022-12-15 | End: 2022-12-15

## 2022-12-15 RX ADMIN — Medication 650 MILLIGRAM(S): at 23:30

## 2022-12-15 NOTE — ED ADULT NURSE NOTE - OBJECTIVE STATEMENT
Pt. a&ox3-4 poor historian to own pmhx, dtr @ bedside (contact info in emergency contact), primarily Sammarinese speaking. pt. came to ED s/p tremors and worsening nausea since this am. Pt. has extensive pmhx ; colon ca., CHF, COPD,  recent admission s/p pleural effusion, detection of growths found on lungs with last admission, dtr refused biopsy, pt. with persistent chronic nausea, frontal HA with intermittent blurring of vision, loss of appetite, dry nonproductive cough. Found to have low grade fevers @ home 100.8F highest recorded. Pt. reported to have nausea since d/c 1.5 weeks ago. Pt. denies cp, SOB @ this time, denies v/d, reports + burning with urination, denies hx of UTI.

## 2022-12-15 NOTE — ED ADULT NURSE NOTE - BREATHING, MLM
Patient checked blood glucose with own machine.  276 at 0741.  Patient asymptomatic at this time.   Spontaneous, unlabored and symmetrical

## 2022-12-16 ENCOUNTER — INPATIENT (INPATIENT)
Facility: HOSPITAL | Age: 82
LOS: 10 days | Discharge: NURSING, REHAB OR LONG-TERM CARE | DRG: 872 | End: 2022-12-27
Attending: STUDENT IN AN ORGANIZED HEALTH CARE EDUCATION/TRAINING PROGRAM | Admitting: STUDENT IN AN ORGANIZED HEALTH CARE EDUCATION/TRAINING PROGRAM
Payer: MEDICARE

## 2022-12-16 ENCOUNTER — TRANSCRIPTION ENCOUNTER (OUTPATIENT)
Age: 82
End: 2022-12-16

## 2022-12-16 DIAGNOSIS — Z95.2 PRESENCE OF PROSTHETIC HEART VALVE: Chronic | ICD-10-CM

## 2022-12-16 DIAGNOSIS — N39.0 URINARY TRACT INFECTION, SITE NOT SPECIFIED: ICD-10-CM

## 2022-12-16 DIAGNOSIS — C18.9 MALIGNANT NEOPLASM OF COLON, UNSPECIFIED: ICD-10-CM

## 2022-12-16 DIAGNOSIS — I50.32 CHRONIC DIASTOLIC (CONGESTIVE) HEART FAILURE: ICD-10-CM

## 2022-12-16 DIAGNOSIS — I10 ESSENTIAL (PRIMARY) HYPERTENSION: ICD-10-CM

## 2022-12-16 DIAGNOSIS — N17.9 ACUTE KIDNEY FAILURE, UNSPECIFIED: ICD-10-CM

## 2022-12-16 DIAGNOSIS — Z29.9 ENCOUNTER FOR PROPHYLACTIC MEASURES, UNSPECIFIED: ICD-10-CM

## 2022-12-16 DIAGNOSIS — K21.9 GASTRO-ESOPHAGEAL REFLUX DISEASE WITHOUT ESOPHAGITIS: ICD-10-CM

## 2022-12-16 DIAGNOSIS — K42.9 UMBILICAL HERNIA WITHOUT OBSTRUCTION OR GANGRENE: ICD-10-CM

## 2022-12-16 DIAGNOSIS — J44.9 CHRONIC OBSTRUCTIVE PULMONARY DISEASE, UNSPECIFIED: ICD-10-CM

## 2022-12-16 DIAGNOSIS — I48.0 PAROXYSMAL ATRIAL FIBRILLATION: ICD-10-CM

## 2022-12-16 DIAGNOSIS — M25.551 PAIN IN RIGHT HIP: ICD-10-CM

## 2022-12-16 DIAGNOSIS — I35.0 NONRHEUMATIC AORTIC (VALVE) STENOSIS: ICD-10-CM

## 2022-12-16 LAB
-  STREPTOCOCCUS SP. (NOT GRP A, B OR S PNEUMONIAE): SIGNIFICANT CHANGE UP
ALBUMIN SERPL ELPH-MCNC: 3.3 G/DL — SIGNIFICANT CHANGE UP (ref 3.3–5)
ALP SERPL-CCNC: 64 U/L — SIGNIFICANT CHANGE UP (ref 40–120)
ALT FLD-CCNC: 14 U/L — SIGNIFICANT CHANGE UP (ref 10–45)
ANION GAP SERPL CALC-SCNC: 12 MMOL/L — SIGNIFICANT CHANGE UP (ref 5–17)
AST SERPL-CCNC: 20 U/L — SIGNIFICANT CHANGE UP (ref 10–40)
BASOPHILS # BLD AUTO: 0.02 K/UL — SIGNIFICANT CHANGE UP (ref 0–0.2)
BASOPHILS NFR BLD AUTO: 0.3 % — SIGNIFICANT CHANGE UP (ref 0–2)
BILIRUB SERPL-MCNC: 0.6 MG/DL — SIGNIFICANT CHANGE UP (ref 0.2–1.2)
BUN SERPL-MCNC: 22 MG/DL — SIGNIFICANT CHANGE UP (ref 7–23)
CALCIUM SERPL-MCNC: 9 MG/DL — SIGNIFICANT CHANGE UP (ref 8.4–10.5)
CHLORIDE SERPL-SCNC: 100 MMOL/L — SIGNIFICANT CHANGE UP (ref 96–108)
CO2 SERPL-SCNC: 27 MMOL/L — SIGNIFICANT CHANGE UP (ref 22–31)
CREAT SERPL-MCNC: 1.57 MG/DL — HIGH (ref 0.5–1.3)
EGFR: 32 ML/MIN/1.73M2 — LOW
EOSINOPHIL # BLD AUTO: 0.01 K/UL — SIGNIFICANT CHANGE UP (ref 0–0.5)
EOSINOPHIL NFR BLD AUTO: 0.2 % — SIGNIFICANT CHANGE UP (ref 0–6)
GLUCOSE SERPL-MCNC: 108 MG/DL — HIGH (ref 70–99)
GRAM STN FLD: SIGNIFICANT CHANGE UP
HCT VFR BLD CALC: 31.5 % — LOW (ref 34.5–45)
HGB BLD-MCNC: 9.3 G/DL — LOW (ref 11.5–15.5)
IMM GRANULOCYTES NFR BLD AUTO: 0.5 % — SIGNIFICANT CHANGE UP (ref 0–0.9)
LYMPHOCYTES # BLD AUTO: 0.8 K/UL — LOW (ref 1–3.3)
LYMPHOCYTES # BLD AUTO: 12.6 % — LOW (ref 13–44)
MAGNESIUM SERPL-MCNC: 2.1 MG/DL — SIGNIFICANT CHANGE UP (ref 1.6–2.6)
MCHC RBC-ENTMCNC: 25.7 PG — LOW (ref 27–34)
MCHC RBC-ENTMCNC: 29.5 GM/DL — LOW (ref 32–36)
MCV RBC AUTO: 87 FL — SIGNIFICANT CHANGE UP (ref 80–100)
METHOD TYPE: SIGNIFICANT CHANGE UP
MONOCYTES # BLD AUTO: 0.65 K/UL — SIGNIFICANT CHANGE UP (ref 0–0.9)
MONOCYTES NFR BLD AUTO: 10.3 % — SIGNIFICANT CHANGE UP (ref 2–14)
NEUTROPHILS # BLD AUTO: 4.82 K/UL — SIGNIFICANT CHANGE UP (ref 1.8–7.4)
NEUTROPHILS NFR BLD AUTO: 76.1 % — SIGNIFICANT CHANGE UP (ref 43–77)
NRBC # BLD: 0 /100 WBCS — SIGNIFICANT CHANGE UP (ref 0–0)
PHOSPHATE SERPL-MCNC: 4.1 MG/DL — SIGNIFICANT CHANGE UP (ref 2.5–4.5)
PLATELET # BLD AUTO: 163 K/UL — SIGNIFICANT CHANGE UP (ref 150–400)
POTASSIUM SERPL-MCNC: 3.4 MMOL/L — LOW (ref 3.5–5.3)
POTASSIUM SERPL-SCNC: 3.4 MMOL/L — LOW (ref 3.5–5.3)
PROT SERPL-MCNC: 7.2 G/DL — SIGNIFICANT CHANGE UP (ref 6–8.3)
RBC # BLD: 3.62 M/UL — LOW (ref 3.8–5.2)
RBC # FLD: 22.4 % — HIGH (ref 10.3–14.5)
SODIUM SERPL-SCNC: 139 MMOL/L — SIGNIFICANT CHANGE UP (ref 135–145)
SPECIMEN SOURCE: SIGNIFICANT CHANGE UP
WBC # BLD: 6.33 K/UL — SIGNIFICANT CHANGE UP (ref 3.8–10.5)
WBC # FLD AUTO: 6.33 K/UL — SIGNIFICANT CHANGE UP (ref 3.8–10.5)

## 2022-12-16 PROCEDURE — 99221 1ST HOSP IP/OBS SF/LOW 40: CPT

## 2022-12-16 PROCEDURE — 72128 CT CHEST SPINE W/O DYE: CPT | Mod: 26

## 2022-12-16 PROCEDURE — 72131 CT LUMBAR SPINE W/O DYE: CPT | Mod: 26

## 2022-12-16 PROCEDURE — 72192 CT PELVIS W/O DYE: CPT | Mod: 26,59

## 2022-12-16 PROCEDURE — 73521 X-RAY EXAM HIPS BI 2 VIEWS: CPT | Mod: 26

## 2022-12-16 PROCEDURE — 99233 SBSQ HOSP IP/OBS HIGH 50: CPT | Mod: GC

## 2022-12-16 PROCEDURE — 74176 CT ABD & PELVIS W/O CONTRAST: CPT | Mod: 26

## 2022-12-16 RX ORDER — HYDROMORPHONE HYDROCHLORIDE 2 MG/ML
0.5 INJECTION INTRAMUSCULAR; INTRAVENOUS; SUBCUTANEOUS ONCE
Refills: 0 | Status: DISCONTINUED | OUTPATIENT
Start: 2022-12-16 | End: 2022-12-16

## 2022-12-16 RX ORDER — TIOTROPIUM BROMIDE 18 UG/1
2 CAPSULE ORAL; RESPIRATORY (INHALATION) DAILY
Refills: 0 | Status: DISCONTINUED | OUTPATIENT
Start: 2022-12-16 | End: 2022-12-27

## 2022-12-16 RX ORDER — APIXABAN 2.5 MG/1
5 TABLET, FILM COATED ORAL EVERY 12 HOURS
Refills: 0 | Status: DISCONTINUED | OUTPATIENT
Start: 2022-12-16 | End: 2022-12-16

## 2022-12-16 RX ORDER — PANTOPRAZOLE SODIUM 20 MG/1
40 TABLET, DELAYED RELEASE ORAL
Refills: 0 | Status: DISCONTINUED | OUTPATIENT
Start: 2022-12-16 | End: 2022-12-27

## 2022-12-16 RX ORDER — INFLUENZA VIRUS VACCINE 15; 15; 15; 15 UG/.5ML; UG/.5ML; UG/.5ML; UG/.5ML
0.7 SUSPENSION INTRAMUSCULAR ONCE
Refills: 0 | Status: COMPLETED | OUTPATIENT
Start: 2022-12-16 | End: 2022-12-16

## 2022-12-16 RX ORDER — AMIODARONE HYDROCHLORIDE 400 MG/1
200 TABLET ORAL DAILY
Refills: 0 | Status: DISCONTINUED | OUTPATIENT
Start: 2022-12-16 | End: 2022-12-27

## 2022-12-16 RX ORDER — SIMETHICONE 80 MG/1
80 TABLET, CHEWABLE ORAL
Refills: 0 | Status: DISCONTINUED | OUTPATIENT
Start: 2022-12-16 | End: 2022-12-22

## 2022-12-16 RX ORDER — MONTELUKAST 4 MG/1
1 TABLET, CHEWABLE ORAL
Qty: 0 | Refills: 0 | DISCHARGE

## 2022-12-16 RX ORDER — CEFTRIAXONE 500 MG/1
2 INJECTION, POWDER, FOR SOLUTION INTRAMUSCULAR; INTRAVENOUS
Qty: 0 | Refills: 0 | DISCHARGE
Start: 2022-12-16 | End: 2023-01-27

## 2022-12-16 RX ORDER — OXYCODONE HYDROCHLORIDE 5 MG/1
5 TABLET ORAL EVERY 6 HOURS
Refills: 0 | Status: DISCONTINUED | OUTPATIENT
Start: 2022-12-16 | End: 2022-12-22

## 2022-12-16 RX ORDER — LIDOCAINE 4 G/100G
1 CREAM TOPICAL EVERY 24 HOURS
Refills: 0 | Status: DISCONTINUED | OUTPATIENT
Start: 2022-12-16 | End: 2022-12-27

## 2022-12-16 RX ORDER — OXYCODONE HYDROCHLORIDE 5 MG/1
5 TABLET ORAL ONCE
Refills: 0 | Status: DISCONTINUED | OUTPATIENT
Start: 2022-12-16 | End: 2022-12-16

## 2022-12-16 RX ORDER — CEFTRIAXONE 500 MG/1
2000 INJECTION, POWDER, FOR SOLUTION INTRAMUSCULAR; INTRAVENOUS EVERY 24 HOURS
Refills: 0 | Status: DISCONTINUED | OUTPATIENT
Start: 2022-12-16 | End: 2022-12-27

## 2022-12-16 RX ORDER — ISOSORBIDE MONONITRATE 60 MG/1
30 TABLET, EXTENDED RELEASE ORAL EVERY 24 HOURS
Refills: 0 | Status: DISCONTINUED | OUTPATIENT
Start: 2022-12-16 | End: 2022-12-27

## 2022-12-16 RX ORDER — SENNA PLUS 8.6 MG/1
2 TABLET ORAL AT BEDTIME
Refills: 0 | Status: DISCONTINUED | OUTPATIENT
Start: 2022-12-16 | End: 2022-12-27

## 2022-12-16 RX ORDER — MAGNESIUM OXIDE 400 MG ORAL TABLET 241.3 MG
1 TABLET ORAL
Qty: 0 | Refills: 0 | DISCHARGE

## 2022-12-16 RX ORDER — TRAMADOL HYDROCHLORIDE 50 MG/1
50 TABLET ORAL EVERY 6 HOURS
Refills: 0 | Status: DISCONTINUED | OUTPATIENT
Start: 2022-12-16 | End: 2022-12-22

## 2022-12-16 RX ORDER — TIOTROPIUM BROMIDE 18 UG/1
2 CAPSULE ORAL; RESPIRATORY (INHALATION)
Qty: 0 | Refills: 0 | DISCHARGE

## 2022-12-16 RX ORDER — ACETAMINOPHEN 500 MG
650 TABLET ORAL EVERY 6 HOURS
Refills: 0 | Status: DISCONTINUED | OUTPATIENT
Start: 2022-12-16 | End: 2022-12-16

## 2022-12-16 RX ORDER — METOPROLOL TARTRATE 50 MG
25 TABLET ORAL DAILY
Refills: 0 | Status: DISCONTINUED | OUTPATIENT
Start: 2022-12-16 | End: 2022-12-27

## 2022-12-16 RX ORDER — MONTELUKAST 4 MG/1
10 TABLET, CHEWABLE ORAL DAILY
Refills: 0 | Status: DISCONTINUED | OUTPATIENT
Start: 2022-12-16 | End: 2022-12-27

## 2022-12-16 RX ORDER — FOLIC ACID 0.8 MG
1 TABLET ORAL DAILY
Refills: 0 | Status: DISCONTINUED | OUTPATIENT
Start: 2022-12-16 | End: 2022-12-27

## 2022-12-16 RX ORDER — CEFTRIAXONE 500 MG/1
1000 INJECTION, POWDER, FOR SOLUTION INTRAMUSCULAR; INTRAVENOUS ONCE
Refills: 0 | Status: COMPLETED | OUTPATIENT
Start: 2022-12-16 | End: 2022-12-16

## 2022-12-16 RX ORDER — APIXABAN 2.5 MG/1
2.5 TABLET, FILM COATED ORAL EVERY 12 HOURS
Refills: 0 | Status: DISCONTINUED | OUTPATIENT
Start: 2022-12-16 | End: 2022-12-27

## 2022-12-16 RX ORDER — ATORVASTATIN CALCIUM 80 MG/1
20 TABLET, FILM COATED ORAL AT BEDTIME
Refills: 0 | Status: DISCONTINUED | OUTPATIENT
Start: 2022-12-16 | End: 2022-12-27

## 2022-12-16 RX ORDER — POLYETHYLENE GLYCOL 3350 17 G/17G
17 POWDER, FOR SOLUTION ORAL DAILY
Refills: 0 | Status: DISCONTINUED | OUTPATIENT
Start: 2022-12-16 | End: 2022-12-25

## 2022-12-16 RX ADMIN — Medication 40 MILLIGRAM(S): at 14:23

## 2022-12-16 RX ADMIN — Medication 1 TABLET(S): at 09:17

## 2022-12-16 RX ADMIN — ATORVASTATIN CALCIUM 20 MILLIGRAM(S): 80 TABLET, FILM COATED ORAL at 21:56

## 2022-12-16 RX ADMIN — OXYCODONE HYDROCHLORIDE 5 MILLIGRAM(S): 5 TABLET ORAL at 07:28

## 2022-12-16 RX ADMIN — CEFTRIAXONE 100 MILLIGRAM(S): 500 INJECTION, POWDER, FOR SOLUTION INTRAMUSCULAR; INTRAVENOUS at 01:05

## 2022-12-16 RX ADMIN — ISOSORBIDE MONONITRATE 30 MILLIGRAM(S): 60 TABLET, EXTENDED RELEASE ORAL at 14:22

## 2022-12-16 RX ADMIN — OXYCODONE HYDROCHLORIDE 5 MILLIGRAM(S): 5 TABLET ORAL at 21:56

## 2022-12-16 RX ADMIN — TRAMADOL HYDROCHLORIDE 50 MILLIGRAM(S): 50 TABLET ORAL at 17:57

## 2022-12-16 RX ADMIN — APIXABAN 2.5 MILLIGRAM(S): 2.5 TABLET, FILM COATED ORAL at 17:31

## 2022-12-16 RX ADMIN — APIXABAN 5 MILLIGRAM(S): 2.5 TABLET, FILM COATED ORAL at 06:09

## 2022-12-16 RX ADMIN — POLYETHYLENE GLYCOL 3350 17 GRAM(S): 17 POWDER, FOR SOLUTION ORAL at 09:17

## 2022-12-16 RX ADMIN — Medication 650 MILLIGRAM(S): at 02:53

## 2022-12-16 RX ADMIN — Medication 1 MILLIGRAM(S): at 09:17

## 2022-12-16 RX ADMIN — TRAMADOL HYDROCHLORIDE 50 MILLIGRAM(S): 50 TABLET ORAL at 18:50

## 2022-12-16 RX ADMIN — CEFTRIAXONE 100 MILLIGRAM(S): 500 INJECTION, POWDER, FOR SOLUTION INTRAMUSCULAR; INTRAVENOUS at 22:05

## 2022-12-16 RX ADMIN — Medication 650 MILLIGRAM(S): at 04:43

## 2022-12-16 RX ADMIN — LIDOCAINE 1 PATCH: 4 CREAM TOPICAL at 07:28

## 2022-12-16 RX ADMIN — Medication 25 MILLIGRAM(S): at 09:18

## 2022-12-16 RX ADMIN — LIDOCAINE 1 PATCH: 4 CREAM TOPICAL at 06:08

## 2022-12-16 RX ADMIN — Medication 0.5 MILLIGRAM(S): at 09:20

## 2022-12-16 RX ADMIN — PANTOPRAZOLE SODIUM 40 MILLIGRAM(S): 20 TABLET, DELAYED RELEASE ORAL at 06:10

## 2022-12-16 RX ADMIN — HYDROMORPHONE HYDROCHLORIDE 0.5 MILLIGRAM(S): 2 INJECTION INTRAMUSCULAR; INTRAVENOUS; SUBCUTANEOUS at 10:55

## 2022-12-16 RX ADMIN — MONTELUKAST 10 MILLIGRAM(S): 4 TABLET, CHEWABLE ORAL at 11:58

## 2022-12-16 RX ADMIN — OXYCODONE HYDROCHLORIDE 5 MILLIGRAM(S): 5 TABLET ORAL at 22:40

## 2022-12-16 RX ADMIN — Medication 200 MILLIGRAM(S): at 14:24

## 2022-12-16 RX ADMIN — AMIODARONE HYDROCHLORIDE 200 MILLIGRAM(S): 400 TABLET ORAL at 06:10

## 2022-12-16 RX ADMIN — HYDROMORPHONE HYDROCHLORIDE 0.5 MILLIGRAM(S): 2 INJECTION INTRAMUSCULAR; INTRAVENOUS; SUBCUTANEOUS at 11:10

## 2022-12-16 RX ADMIN — TRAMADOL HYDROCHLORIDE 50 MILLIGRAM(S): 50 TABLET ORAL at 14:25

## 2022-12-16 RX ADMIN — TIOTROPIUM BROMIDE 2 PUFF(S): 18 CAPSULE ORAL; RESPIRATORY (INHALATION) at 11:57

## 2022-12-16 RX ADMIN — SENNA PLUS 2 TABLET(S): 8.6 TABLET ORAL at 21:55

## 2022-12-16 RX ADMIN — OXYCODONE HYDROCHLORIDE 5 MILLIGRAM(S): 5 TABLET ORAL at 06:25

## 2022-12-16 RX ADMIN — TRAMADOL HYDROCHLORIDE 50 MILLIGRAM(S): 50 TABLET ORAL at 12:21

## 2022-12-16 NOTE — CONSULT NOTE ADULT - ASSESSMENT
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    Problem/Plan - 1:  ·  Problem: Hip pain, right.   ·  Plan: Likely 2/2 multilevel degenerative changes as seen on CT Lumbar/Thoracic Spine. No acute fracture or traumatic malalignment.  Pt presenting with malaise, chills and acute on chronic R hip pain. Pt states she has had chronic R hip pain that shoots down her leg, but has not seen providers for it in the past. Day prior to admission she said the pain was severe and had pain with ambulation. Pain starts at posterior hip and radiates down R leg    - If continued pain, further evaluation with MRI as it is a superior modality to evaluate the spinal canal and neural foramen  - Pain control w lidocaine patch, tylenol, oxycodone 5mg q6 hours prn   - PT Consult  - Fall Precautions.    Problem/Plan - 2:  ·  Problem: Acute UTI.   ·  Plan: U/A consistent with LE, bacteria and WBC. Received 1 CTX in the ED. Not febrile, but is having chills. Does report intermittent burning with urination.     - C/w CTX x 3 days (start date 12/16)   - f/u UCx   - f/u BCx.    Problem/Plan - 3:  ·  Problem: Acute kidney injury superimposed on CKD.   ·  Plan: Baseline Cr. 1.2-1.3. Cr on admission 1.8 likely 2/2 decreased PO intake.     - Urine lytes, calculate FeUrea   - Bladder scan to rule out obstruction   - Trend sCr   - Avoid nephrotoxic agents.   - Renally dose medications.    Problem/Plan - 4:  ·  Problem: Umbilical hernia.   ·  Plan: Umbilical hernia measuring 3.38 cm in size, containing a small bowel loop which demonstrates mild bowel wall thickening. Within the abdomen, the proximal portion of the small bowel loops slightly distended to 2.4 cm, suggesting a degree of entrapment within the hernia itself, where as the distal portion the small bowel loop is empty with a diameter of 10 mm. Additionally, a volume of edematous mesenteric fat is noted adjacent to the umbilical hernia measuring up to 5.34 cm. On exam, unable to appreciate reduction of umbilical hernia 2/2 to patient's abdominal girth.     - Pt without current abdominal pain.   - Regular BM, no N/V/D  - Surgery consult in AM.    Problem/Plan - 5:  ·  Problem: Diastolic CHF, chronic.   ·  Plan: Pt with history of diastolic HF, recently admitted here with CHF exacerbation and had medication adjustments. Home medications include: torsemide 40mg, toprol 25mg , and isosorbide mononitrate 30mg.   - c/w home torsemide 40mg, toprol 25mg and isosorbide mononitrate 30mg.    Problem/Plan - 6:  ·  Problem: GERD (gastroesophageal reflux disease).   ·  Plan: Pt on protonix 40mg outpatient   - c/w home protonix.    Problem/Plan - 7:  ·  Problem: HTN (hypertension).   ·  Plan: Pt is on torsemide 40mg, isosorbide mononitrate, and toprol 25mg   - c/w home medications with hold parameters.    Problem/Plan - 8:  ·  Problem: Colon cancer.   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 Pt refused EBUS at that time.   - outpatient heme/onc follow up.    Problem/Plan - 9:  ·  Problem: COPD (chronic obstructive pulmonary disease).   ·  Plan: Pt with history of COPD. On montelukast and spiriva. Follows with Dr. More  - c/w home medications   - goal sat 88-92%.    Problem/Plan - 10:  ·  Problem: Aortic stenosis.   ·  Plan; Pt s/p TAVR, however now with moderate tavr valve stenosis. Structural team has evaluated her on previous admission and pt not a candidate for rpt intervention.    Problem/Plan - 11:  ·  Problem: Paroxysmal atrial fibrillation.   ·  Plan: Pt is on amiodarone and eliquis 5 BID.   - C/w Eliquis 5mg BID   - C/w amiodarone 200mg daily   - C/w toprol 25mg.    Problem/Plan - 12:  ·  Problem: Prophylactic measure.   ·  Plan: F: none  E: caution with chano on ckd   N: dash diet  DVT: eliquis 5 BID.   Dispo: [facility]  code: FULL CODE, palliative previously consulted 12/2.

## 2022-12-16 NOTE — PROGRESS NOTE ADULT - ASSESSMENT
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

## 2022-12-16 NOTE — H&P ADULT - PROBLEM SELECTOR PLAN 4
Umbilical hernia measuring 3.38 cm in size, containing a small bowel loop which demonstrates mild bowel wall thickening. Within the abdomen, the proximal portion of the small bowel loops slightly distended to 2.4 cm, suggesting   a degree of entrapment within the hernia itself, where as the distal portion the small bowel loop is empty with a diameter of 10 mm. Additionally, a volume of edematous mesenteric fat is noted adjacent to the umbilical hernia measuring up to 5.34 cm.  - Pt without current abdominal pain. Having regular BMs, no nausea, vomiting, constipation  - Unable to appreciate reduction of umbilical hernia 2/2 to patient's abdominal girth.   - Surgery consult in AM

## 2022-12-16 NOTE — DISCHARGE NOTE PROVIDER - NSDCFUSCHEDAPPT_GEN_ALL_CORE_FT
Bere More  [facility] Physician [address]  Scheduled Appointment: 12/22/2022     Derek Marte  [facility]  HEARTVASC [address]  Scheduled Appointment: 12/20/2022    Bere More  [facility] Physician [facility]  PULMMED [address]  Scheduled Appointment: 12/22/2022

## 2022-12-16 NOTE — PROGRESS NOTE ADULT - PROBLEM SELECTOR PLAN 4
Umbilical hernia measuring 3.38 cm in size, containing a small bowel loop which demonstrates mild bowel wall thickening. Within the abdomen, the proximal portion of the small bowel loops slightly distended to 2.4 cm, suggesting a degree of entrapment within the hernia itself, where as the distal portion the small bowel loop is empty with a diameter of 10 mm. Additionally, a volume of edematous mesenteric fat is noted adjacent to the umbilical hernia measuring up to 5.34 cm. On exam, unable to appreciate reduction of umbilical hernia 2/2 to patient's abdominal girth.     - Pt without current abdominal pain.   - Regular BM, no N/V/D  - Surgery consult in AM Umbilical hernia measuring 3.38 cm in size, containing a small bowel loop which demonstrates mild bowel wall thickening. Within the abdomen, the proximal portion of the small bowel loops slightly distended to 2.4 cm, suggesting a degree of entrapment within the hernia itself, where as the distal portion the small bowel loop is empty with a diameter of 10 mm. Additionally, a volume of edematous mesenteric fat is noted adjacent to the umbilical hernia measuring up to 5.34 cm. On exam, unable to appreciate reduction of umbilical hernia 2/2 to patient's abdominal girth.     - Pt without current abdominal pain.   - Regular BM, no N/V/D  - Outpatient surgery appointment Umbilical hernia measuring 3.38 cm in size, containing a small bowel loop which demonstrates mild bowel wall thickening. Within the abdomen, the proximal portion of the small bowel loops slightly distended to 2.4 cm, suggesting a degree of entrapment within the hernia itself, where as the distal portion the small bowel loop is empty with a diameter of 10 mm. Additionally, a volume of edematous mesenteric fat is noted adjacent to the umbilical hernia measuring up to 5.34 cm. On exam, unable to appreciate reduction of umbilical hernia 2/2 to patient's abdominal girth.     - Pt without current abdominal pain.   - Regular BM, no N/V/D  - f/u Surgery consult w concurrent pain sx Baseline Cr. 1.2-1.3. Cr on admission 1.8 likely 2/2 decreased PO intake.     - Urine lytes, calculate FeUrea   - Bladder scan to rule out obstruction   - Trend sCr   - Avoid nephrotoxic agents.   - Renally dose medications

## 2022-12-16 NOTE — H&P ADULT - NSHPPHYSICALEXAM_GEN_ALL_CORE
PHYSICAL EXAM:    Vital Signs Last 24 Hrs  T(C): 36.7 (16 Dec 2022 02:25), Max: 37.8 (15 Dec 2022 18:47)  T(F): 98 (16 Dec 2022 02:25), Max: 100.1 (15 Dec 2022 18:47)  HR: 67 (16 Dec 2022 02:25) (64 - 69)  BP: 111/56 (16 Dec 2022 02:25) (111/56 - 190/72)  BP(mean): --  RR: 20 (16 Dec 2022 02:25) (18 - 20)  SpO2: 94% (16 Dec 2022 02:25) (94% - 95%)    Parameters below as of 16 Dec 2022 02:25  Patient On (Oxygen Delivery Method): room air      I&O's Summary      General: mild distress, sitting up in bed, crying.  HEENT: NC/AT; anicteric sclera; moist mucosal membranes.  Neck: supple, trachea midline  Cardiovascular: RRR, systolic murmur appreciated +S1/S2; NO M/R/G  Respiratory: CTA B/L; no W/R/R  Gastrointestinal: soft, NT/ND; +BSx4  : negative CVA tenderness  Extremities: WWP; 1+ pitting edema to lower shin.  Vascular: 2+ radial, DP/PT pulses B/L  Neurological: AAOx3; no focal deficits. Pain on palpation of posterior R posterior iliac crest and R paraspinal muscle.

## 2022-12-16 NOTE — PROGRESS NOTE ADULT - SUBJECTIVE AND OBJECTIVE BOX
**incomplete** Overnight: NAEON  Subjective: No SOB, N/V/D, patient has reduction of R hip pain per patient report, reports 3/10 dull pain on the R hip.     T(C): 37.1 (22 @ 05:32), Max: 37.8 (12-15-22 @ 18:47)  HR: 60 (22 @ 05:32) (60 - 69)  BP: 146/70 (22 @ 05:32) (111/56 - 190/72)  RR: 18 (22 @ 05:32) (18 - 20)  SpO2: 99% (22 @ 05:32) (94% - 99%)  Wt(kg): --Vital Signs Last 24 Hrs  T(C): 37.1 (16 Dec 2022 05:32), Max: 37.8 (15 Dec 2022 18:47)  T(F): 98.7 (16 Dec 2022 05:32), Max: 100.1 (15 Dec 2022 18:47)  HR: 60 (16 Dec 2022 05:32) (60 - 69)  BP: 146/70 (16 Dec 2022 05:32) (111/56 - 190/72)  BP(mean): --  RR: 18 (16 Dec 2022 05:32) (18 - 20)  SpO2: 99% (16 Dec 2022 05:32) (94% - 99%)    Parameters below as of 16 Dec 2022 02:25  Patient On (Oxygen Delivery Method): room air      PHYSICAL EXAM:  General: mild distress, sitting up in bed, crying.  HEENT: NC/AT; anicteric sclera; moist mucosal membranes.  Neck: supple, trachea midline  Cardiovascular: RRR, systolic murmur appreciated +S1/S2; NO M/R/G  Respiratory: CTA B/L; no W/R/R  Gastrointestinal: soft, NT/ND; +BSx4  : negative CVA tenderness  Extremities: WWP; 1+ pitting edema to lower shin.  Vascular: 2+ radial, DP/PT pulses B/L  Neurological: AAOx3; no focal deficits. Pain on palpation of posterior R posterior iliac crest and R paraspinal muscle      LABS:                        9.3    6.33  )-----------( 163      ( 16 Dec 2022 07:29 )             31.5     12-16    139  |  100  |  22  ----------------------------<  108<H>  3.4<L>   |  27  |  1.57<H>    Ca    9.0      16 Dec 2022 07:29  Phos  4.1     12-  Mg     2.1     -    TPro  7.2  /  Alb  3.3  /  TBili  0.6  /  DBili  x   /  AST  20  /  ALT  14  /  AlkPhos  64  12-16      PT/INR - ( 15 Dec 2022 20:39 )   PT: 13.3 sec;   INR: 1.12          PTT - ( 15 Dec 2022 20:39 )  PTT:22.5 sec  Urinalysis Basic - ( 15 Dec 2022 21:54 )    Color: Yellow / Appearance: Clear / S.010 / pH: x  Gluc: x / Ketone: NEGATIVE  / Bili: Negative / Urobili: 0.2 E.U./dL   Blood: x / Protein: NEGATIVE mg/dL / Nitrite: NEGATIVE   Leuk Esterase: Small / RBC: < 5 /HPF / WBC > 10 /HPF   Sq Epi: x / Non Sq Epi: 0-5 /HPF / Bacteria: Present /HPF      CAPILLARY BLOOD GLUCOSE      POCT Blood Glucose.: 129 mg/dL (15 Dec 2022 18:46)        Urinalysis Basic - ( 15 Dec 2022 21:54 )    Color: Yellow / Appearance: Clear / S.010 / pH: x  Gluc: x / Ketone: NEGATIVE  / Bili: Negative / Urobili: 0.2 E.U./dL   Blood: x / Protein: NEGATIVE mg/dL / Nitrite: NEGATIVE   Leuk Esterase: Small / RBC: < 5 /HPF / WBC > 10 /HPF   Sq Epi: x / Non Sq Epi: 0-5 /HPF / Bacteria: Present /HPF         Overnight: NAEON  Subjective: + Presenting w severe hip/lumbar back pain, however non-tender on palpation, the patient endorsed some SOB on RA (placed on 2L NC which improved SOB subjective sx)    T(C): 37.1 (22 @ 05:32), Max: 37.8 (12-15-22 @ 18:47)  HR: 60 (22 @ 05:32) (60 - 69)  BP: 146/70 (22 @ 05:32) (111/56 - 190/72)  RR: 18 (22 @ 05:32) (18 - 20)  SpO2: 99% (22 @ 05:32) (94% - 99%)  Wt(kg): --Vital Signs Last 24 Hrs  T(C): 37.1 (16 Dec 2022 05:32), Max: 37.8 (15 Dec 2022 18:47)  T(F): 98.7 (16 Dec 2022 05:32), Max: 100.1 (15 Dec 2022 18:47)  HR: 60 (16 Dec 2022 05:32) (60 - 69)  BP: 146/70 (16 Dec 2022 05:32) (111/56 - 190/72)  BP(mean): --  RR: 18 (16 Dec 2022 05:32) (18 - 20)  SpO2: 99% (16 Dec 2022 05:32) (94% - 99%)    Parameters below as of 16 Dec 2022 02:25  Patient On (Oxygen Delivery Method): room air      PHYSICAL EXAM:  General: mild distress, sitting up in bed, crying.  HEENT: NC/AT; anicteric sclera; moist mucosal membranes.  Neck: supple, trachea midline  Cardiovascular: RRR, systolic murmur appreciated +S1/S2; NO M/R/G  Respiratory: CTA B/L; no W/R/R  Gastrointestinal: soft, NT/ND; +BSx4  : negative CVA tenderness  Extremities: WWP; 1+ pitting edema to lower shin.  Vascular: 2+ radial, DP/PT pulses B/L  Neurological: AAOx3; no focal deficits. Pain on palpation of posterior R posterior iliac crest and R paraspinal muscle      LABS:                        9.3    6.33  )-----------( 163      ( 16 Dec 2022 07:29 )             31.5     12-16    139  |  100  |  22  ----------------------------<  108<H>  3.4<L>   |  27  |  1.57<H>    Ca    9.0      16 Dec 2022 07:29  Phos  4.1     12-  Mg     2.1     -    TPro  7.2  /  Alb  3.3  /  TBili  0.6  /  DBili  x   /  AST  20  /  ALT  14  /  AlkPhos  64  12-16      PT/INR - ( 15 Dec 2022 20:39 )   PT: 13.3 sec;   INR: 1.12          PTT - ( 15 Dec 2022 20:39 )  PTT:22.5 sec  Urinalysis Basic - ( 15 Dec 2022 21:54 )    Color: Yellow / Appearance: Clear / S.010 / pH: x  Gluc: x / Ketone: NEGATIVE  / Bili: Negative / Urobili: 0.2 E.U./dL   Blood: x / Protein: NEGATIVE mg/dL / Nitrite: NEGATIVE   Leuk Esterase: Small / RBC: < 5 /HPF / WBC > 10 /HPF   Sq Epi: x / Non Sq Epi: 0-5 /HPF / Bacteria: Present /HPF      CAPILLARY BLOOD GLUCOSE      POCT Blood Glucose.: 129 mg/dL (15 Dec 2022 18:46)        Urinalysis Basic - ( 15 Dec 2022 21:54 )    Color: Yellow / Appearance: Clear / S.010 / pH: x  Gluc: x / Ketone: NEGATIVE  / Bili: Negative / Urobili: 0.2 E.U./dL   Blood: x / Protein: NEGATIVE mg/dL / Nitrite: NEGATIVE   Leuk Esterase: Small / RBC: < 5 /HPF / WBC > 10 /HPF   Sq Epi: x / Non Sq Epi: 0-5 /HPF / Bacteria: Present /HPF         Overnight: NAEON  Subjective: + Presenting w severe hip/lumbar back pain, however non-tender on palpation, the patient endorsed some SOB on RA (placed on 2L NC which improved SOB subjective sx)    T(C): 37.1 (22 @ 05:32), Max: 37.8 (12-15-22 @ 18:47)  HR: 60 (22 @ 05:32) (60 - 69)  BP: 146/70 (22 @ 05:32) (111/56 - 190/72)  RR: 18 (22 @ 05:32) (18 - 20)  SpO2: 99% (22 @ 05:32) (94% - 99%)  Wt(kg): --Vital Signs Last 24 Hrs  T(C): 37.1 (16 Dec 2022 05:32), Max: 37.8 (15 Dec 2022 18:47)  T(F): 98.7 (16 Dec 2022 05:32), Max: 100.1 (15 Dec 2022 18:47)  HR: 60 (16 Dec 2022 05:32) (60 - 69)  BP: 146/70 (16 Dec 2022 05:32) (111/56 - 190/72)  BP(mean): --  RR: 18 (16 Dec 2022 05:32) (18 - 20)  SpO2: 99% (16 Dec 2022 05:32) (94% - 99%)    Parameters below as of 16 Dec 2022 02:25  Patient On (Oxygen Delivery Method): room air      PHYSICAL EXAM:  General: mild distress, sitting up in bed, crying.  HEENT: NC/AT; anicteric sclera; moist mucosal membranes.  Neck: supple, trachea midline  Cardiovascular: RRR, systolic murmur appreciated +S1/S2; NO M/R/G  Respiratory: CTA B/L; no W/R/R  Gastrointestinal: soft, NT/ND; +Reproducible hernia noted   : negative CVA tenderness  Extremities: WWP; 1+ pitting edema to lower shin.  Vascular: 2+ radial, DP/PT pulses B/L  Neurological: AAOx3; no focal deficits. Pain on palpation of posterior R posterior iliac crest and R paraspinal muscle      LABS:                        9.3    6.33  )-----------( 163      ( 16 Dec 2022 07:29 )             31.5     12-16    139  |  100  |  22  ----------------------------<  108<H>  3.4<L>   |  27  |  1.57<H>    Ca    9.0      16 Dec 2022 07:29  Phos  4.1     12-  Mg     2.1     -    TPro  7.2  /  Alb  3.3  /  TBili  0.6  /  DBili  x   /  AST  20  /  ALT  14  /  AlkPhos  64  12-16      PT/INR - ( 15 Dec 2022 20:39 )   PT: 13.3 sec;   INR: 1.12          PTT - ( 15 Dec 2022 20:39 )  PTT:22.5 sec  Urinalysis Basic - ( 15 Dec 2022 21:54 )    Color: Yellow / Appearance: Clear / S.010 / pH: x  Gluc: x / Ketone: NEGATIVE  / Bili: Negative / Urobili: 0.2 E.U./dL   Blood: x / Protein: NEGATIVE mg/dL / Nitrite: NEGATIVE   Leuk Esterase: Small / RBC: < 5 /HPF / WBC > 10 /HPF   Sq Epi: x / Non Sq Epi: 0-5 /HPF / Bacteria: Present /HPF      CAPILLARY BLOOD GLUCOSE      POCT Blood Glucose.: 129 mg/dL (15 Dec 2022 18:46)        Urinalysis Basic - ( 15 Dec 2022 21:54 )    Color: Yellow / Appearance: Clear / S.010 / pH: x  Gluc: x / Ketone: NEGATIVE  / Bili: Negative / Urobili: 0.2 E.U./dL   Blood: x / Protein: NEGATIVE mg/dL / Nitrite: NEGATIVE   Leuk Esterase: Small / RBC: < 5 /HPF / WBC > 10 /HPF   Sq Epi: x / Non Sq Epi: 0-5 /HPF / Bacteria: Present /HPF

## 2022-12-16 NOTE — PROGRESS NOTE ADULT - PROBLEM SELECTOR PLAN 11
Pt is on amiodarone and eliquis 5 BID.   - C/w Eliquis 5mg BID   - C/w amiodarone 200mg daily   - C/w toprol 25mg Pt is on amiodarone and eliquis 5 BID. NSR  - C/w Eliquis 2.5 mg BID (reduction from 5 BID, per cards)  - C/w amiodarone 200mg daily   - C/w toprol 25mg

## 2022-12-16 NOTE — DISCHARGE NOTE PROVIDER - CARE PROVIDER_API CALL
Juhi Kessler)  Cardiovascular Disease; Internal Medicine  [address], [address]  [place], [place] [address]  Phone: [phone]  Fax: [phone]  Follow Up Time:    Derek Marte)  Cardiovascular Disease; Internal Medicine  Cardiology [facility], [address] [place] [address]  Phone: [phone]  Fax: [phone]  Scheduled Appointment: 12/20/2022 03:00 PM   Derek Marte (MD)  Cardiovascular Disease; Internal Medicine  Cardiology [facility], [address] [place] [address]  Phone: [phone]  Fax: [phone]  Scheduled Appointment: 12/20/2022 03:00 PM    Teresa Sanford)  ColonRectal Surgery; Surgery  [facility], [address] [place] [address]  Phone: [phone]  Fax: [phone]  Follow Up Time:

## 2022-12-16 NOTE — PROGRESS NOTE ADULT - PROBLEM SELECTOR PLAN 9
Pt with history of COPD. On montelukast and spiriva. Follows with Dr. Derik pickard/froylan home medications   - goal sat 88-92% Pt with history of COPD. On montelukast and spiriva. Follows with Dr. More. On RA    - c/w home medications   - goal sat 88-92%

## 2022-12-16 NOTE — PATIENT PROFILE ADULT - FUNCTIONAL ASSESSMENT - BASIC MOBILITY 6.
3-calculated by average/Not able to assess (calculate score using [facility] averaging method)

## 2022-12-16 NOTE — H&P ADULT - NSHPLABSRESULTS_GEN_ALL_CORE
LABS:                         10.4   7.67  )-----------( 179      ( 15 Dec 2022 20:39 )             34.1     -15    137  |  98  |  23  ----------------------------<  125<H>  3.9   |  25  |  1.80<H>    Ca    9.1      15 Dec 2022 20:39  Phos  3.8     12-15  Mg     2.1     -15    TPro  7.7  /  Alb  3.8  /  TBili  0.7  /  DBili  x   /  AST  28  /  ALT  17  /  AlkPhos  71  12-15    PT/INR - ( 15 Dec 2022 20:39 )   PT: 13.3 sec;   INR: 1.12          PTT - ( 15 Dec 2022 20:39 )  PTT:22.5 sec  Urinalysis Basic - ( 15 Dec 2022 21:54 )    Color: Yellow / Appearance: Clear / S.010 / pH: x  Gluc: x / Ketone: NEGATIVE  / Bili: Negative / Urobili: 0.2 E.U./dL   Blood: x / Protein: NEGATIVE mg/dL / Nitrite: NEGATIVE   Leuk Esterase: Small / RBC: < 5 /HPF / WBC > 10 /HPF   Sq Epi: x / Non Sq Epi: 0-5 /HPF / Bacteria: Present /HPF      CARDIAC MARKERS ( 15 Dec 2022 21:50 )  x     / 0.01 ng/mL / 28 U/L / x     / <1.0 ng/mL  CARDIAC MARKERS ( 15 Dec 2022 20:39 )  x     / 0.01 ng/mL / 32 U/L / x     / <1.0 ng/mL      CAPILLARY BLOOD GLUCOSE      POCT Blood Glucose.: 129 mg/dL (15 Dec 2022 18:46)    Serum Pro-Brain Natriuretic Peptide: 1105 pg/mL (12-15 @ 21:50)  Serum Pro-Brain Natriuretic Peptide: 1107 pg/mL (12-15 @ 20:39)    Lactate, Blood: 1.5 mmol/L (12-15 @ 21:50)      RADIOLOGY, EKG & ADDITIONAL TESTS:

## 2022-12-16 NOTE — H&P ADULT - NSICDXPASTMEDICALHX_GEN_ALL_CORE_FT
PAST MEDICAL HISTORY:  Anemia     Aortic stenosis     Colon cancer     COPD (chronic obstructive pulmonary disease)     Diastolic CHF, chronic     GERD (gastroesophageal reflux disease)     HTN (hypertension)     Hyperlipidemia     Obesity     Paroxysmal atrial fibrillation     Stage 3 chronic kidney disease

## 2022-12-16 NOTE — H&P ADULT - PROBLEM SELECTOR PLAN 8
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 Pt refused EBUS at that time.   - outpatient heme/onc follow up

## 2022-12-16 NOTE — H&P ADULT - PROBLEM SELECTOR PLAN 1
2nd dose May 2021/[employer] dose 1 and 2
Pt presenting with malaise, chills and acute on chronic R hip pain. Pt states she has had chronic R hip pain that shoots down her leg, but has not seen providers for it in the past. Day prior to admission she said the pain was severe and had pain with ambulation. Pain starts at posterior hip and radiates down R leg  - F/u CT thoracic, lumbar, and CT abdomen pelvis read  - Pain control with lidocaine patch, tylenol, oxycodone 5mg q6 hours prn   - Consider orthopedics consult if not improving.

## 2022-12-16 NOTE — PROGRESS NOTE ADULT - PROBLEM SELECTOR PLAN 5
Pt with history of diastolic HF, recently admitted here with CHF exacerbation and had medication adjustments. Home medications include: torsemide 40mg, toprol 25mg , and isosorbide mononitrate 30mg.   - c/w home torsemide 40mg, toprol 25mg and isosorbide mononitrate 30mg Pt with history of diastolic HF, recently admitted here with CHF exacerbation and had medication adjustments. Home medications include: torsemide 40mg, toprol 25mg , and isosorbide mononitrate 30mg.     - c/w home torsemide 40mg, toprol 25mg and isosorbide mononitrate 30mg Pt with history of diastolic HF, recently admitted here with CHF exacerbation and had medication adjustments. Home medications include: torsemide 40mg, toprol 25mg , and isosorbide mononitrate 30mg. Not in active HF.     - cards following given frequent HF admissions  - c/w home torsemide 40 mg po qd, imdur 30 mg po qd, toprol 25 mg po qd   - c/w home torsemide 40mg, toprol 25mg and isosorbide mononitrate 30mg

## 2022-12-16 NOTE — PROGRESS NOTE ADULT - PROBLEM SELECTOR PLAN 12
F: none  E: caution with chano on ckd   N: dash diet  DVT: eliquis 5 BID.   Dispo: [facility]  code: FULL CODE, palliative previously consulted 12/2.

## 2022-12-16 NOTE — DISCHARGE NOTE PROVIDER - NSDCMRMEDTOKEN_GEN_ALL_CORE_FT
acetaminophen 325 mg oral tablet: 2 tab(s) orally every 6 hours, As needed, Mild Pain (1 - 3), Moderate Pain (4 - 6)  amiodarone 200 mg oral tablet: 1 tab(s) orally once a day  apixaban 5 mg oral tablet: 1 tab(s) orally 2 times a day  atorvastatin 20 mg oral tablet: 1 tab(s) orally once a day (at bedtime)  folic acid 1 mg oral tablet: 1 tab(s) orally once a day  isosorbide mononitrate 30 mg oral tablet, extended release: 1 tab(s) orally once a day  lidocaine 4% topical film: Apply topically to affected area once a day, As Needed for pain  LORazepam 0.5 mg oral tablet: 1 tab(s) orally once a day  magnesium oxide 400 mg (241.3 mg elemental magnesium) oral tablet: 1 tab(s) orally once a day  metoprolol succinate 25 mg oral tablet, extended release: 0.5 tab(s) orally once a day   montelukast 10 mg oral tablet: 1 tab(s) orally once a day  Multiple Vitamins oral tablet: 1 tab(s) orally once a day  pantoprazole 40 mg oral delayed release tablet: 1 tab(s) orally once a day  simethicone 80 mg oral tablet, chewable: 1 tab(s) orally 2 times a day  Spiriva Respimat 1.25 mcg/inh inhalation aerosol: 2 puff(s) inhaled once a day  torsemide 20 mg oral tablet: 2 tab(s) orally once a day    acetaminophen 325 mg oral tablet: 2 tab(s) orally every 6 hours, As needed, Mild Pain (1 - 3), Moderate Pain (4 - 6)  amiodarone 200 mg oral tablet: 1 tab(s) orally once a day  apixaban 5 mg oral tablet: 1 tab(s) orally 2 times a day  atorvastatin 20 mg oral tablet: 1 tab(s) orally once a day (at bedtime)  folic acid 1 mg oral tablet: 1 tab(s) orally once a day  gabapentin 300 mg oral capsule: 1 cap(s) orally once a day (at bedtime)  isosorbide mononitrate 30 mg oral tablet, extended release: 1 tab(s) orally once a day  lidocaine 4% topical film: Apply topically to affected area once a day, As Needed for pain  LORazepam 0.5 mg oral tablet: 1 tab(s) orally once a day  magnesium oxide 400 mg (241.3 mg elemental magnesium) oral tablet: 1 tab(s) orally once a day  metoprolol succinate 25 mg oral tablet, extended release: 0.5 tab(s) orally once a day   montelukast 10 mg oral tablet: 1 tab(s) orally once a day  Multiple Vitamins oral tablet: 1 tab(s) orally once a day  pantoprazole 40 mg oral delayed release tablet: 1 tab(s) orally once a day  simethicone 80 mg oral tablet, chewable: 1 tab(s) orally 2 times a day  Spiriva Respimat 1.25 mcg/inh inhalation aerosol: 2 puff(s) inhaled once a day  torsemide 20 mg oral tablet: 2 tab(s) orally once a day    acetaminophen 325 mg oral tablet: 2 tab(s) orally every 6 hours, As needed, Mild Pain (1 - 3), Moderate Pain (4 - 6)  amiodarone 200 mg oral tablet: 1 tab(s) orally once a day  apixaban 5 mg oral tablet: 1 tab(s) orally 2 times a day  atorvastatin 20 mg oral tablet: 1 tab(s) orally once a day (at bedtime)  caspofungin: 50 milligram(s) intravenous once a day  cefTRIAXone: 2 gram(s) intravenous once a day  Fleet Enema 19 g-7 g rectal enema: 133 milliliter(s) rectally once a day   folic acid 1 mg oral tablet: 1 tab(s) orally once a day  gabapentin 300 mg oral capsule: 1 cap(s) orally once a day (at bedtime)  guaiFENesin 100 mg/5 mL oral liquid: 5 milliliter(s) orally every 6 hours, As needed, Cough  isosorbide mononitrate 30 mg oral tablet, extended release: 1 tab(s) orally once a day  lidocaine 4% topical film: Apply topically to affected area once a day, As Needed  LORazepam 0.5 mg oral tablet: 1 tab(s) orally once a day  magnesium oxide 400 mg (241.3 mg elemental magnesium) oral tablet: 1 tab(s) orally once a day  metoprolol succinate 25 mg oral tablet, extended release: 0.5 tab(s) orally once a day   montelukast 10 mg oral tablet: 1 tab(s) orally once a day  Multiple Vitamins oral tablet: 1 tab(s) orally once a day  pantoprazole 40 mg oral delayed release tablet: 1 tab(s) orally once a day  polyethylene glycol 3350 oral powder for reconstitution: 17 gram(s) orally 2 times a day  senna leaf extract oral tablet: 2 tab(s) orally once a day (at bedtime)  simethicone 80 mg oral tablet, chewable: 1 tab(s) orally 2 times a day  Spiriva Respimat 1.25 mcg/inh inhalation aerosol: 2 puff(s) inhaled once a day  torsemide 40 mg oral tablet: 1 tab(s) orally every 24 hours

## 2022-12-16 NOTE — PROGRESS NOTE ADULT - PROBLEM SELECTOR PLAN 8
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 Pt refused EBUS at that time.   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 Pt refused EBUS at that time.     - DARREN  - f/u w outpatient management

## 2022-12-16 NOTE — H&P ADULT - PROBLEM SELECTOR PLAN 11
Pt is on amiodarone and eliquis 5 BID.   - C/w Eliquis 5mg BID   - C.w amiodarone 200mg daily   - C/w toprol 25mg

## 2022-12-16 NOTE — CONSULT NOTE ADULT - ASSESSMENT
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     #Chronic CHF  Euvolemic on exam and no signs of acute HF exacerbation  -Continue home torsemide, toprol, imdur  -F/u Cardiology outpatient

## 2022-12-16 NOTE — PROGRESS NOTE ADULT - PROBLEM SELECTOR PLAN 3
Baseline Cr. 1.2-1.3. Cr on admission 1.8 likely 2/2 decreased PO intake.     - Urine lytes, calculate FeUrea   - Bladder scan to rule out obstruction   - Trend sCr   - Avoid nephrotoxic agents.   - Renally dose medications Umbilical hernia measuring 3.38 cm in size, containing a small bowel loop which demonstrates mild bowel wall thickening. Within the abdomen, the proximal portion of the small bowel loops slightly distended to 2.4 cm, suggesting a degree of entrapment within the hernia itself, where as the distal portion the small bowel loop is empty with a diameter of 10 mm. Additionally, a volume of edematous mesenteric fat is noted adjacent to the umbilical hernia measuring up to 5.34 cm. On exam, unable to appreciate reduction of umbilical hernia 2/2 to patient's abdominal girth. + Reproducible Hernia    - Patient with worsening abdominal pain w concurrent nausea   - Surgery consulted for continued pain with umbilical hernia w hx of Colon CA

## 2022-12-16 NOTE — DISCHARGE NOTE PROVIDER - NSDCFUADDAPPT_GEN_ALL_CORE_FT
Please bring your Insurance card, Photo ID and Discharge paperwork to the following appointment:    (1) Please follow up with your Cardiology Provider, Dr. Derek Marte at [address] [place] [address] on 12/20/2022 at 3:00pm.    Appointment was scheduled by Ms. SAMANTHA Reynolds, Referral Coordinator.

## 2022-12-16 NOTE — PROGRESS NOTE ADULT - PROBLEM SELECTOR PLAN 2
U/A consistent with LE, bacteria and WBC. Received 1 CTX in the ED. Not febrile, but is having chills. Does report intermittent burning with urination.     - C/w CTX x 3 days (start date 12/16)   - f/u UCx   - f/u BCx U/A consistent with LE, bacteria and WBC. Received 1 CTX in the ED. Not febrile, but is having chills. Does report intermittent burning with urination.     - C/w CTX x 3 days (start date 12/16)  - f/u UCx   - f/u BCx U/A consistent with LE, bacteria and WBC. Received 1 CTX in the ED. Not febrile, but is having chills. Does report intermittent burning with urination.     - C/w CTX x 3 days (start date 12/16)  - f/u UCx (NGTD, 12/15)  - Repeat Bclx, Initial Bclx (gram positive cocci in pairs suspicious of Strep Bovis)

## 2022-12-16 NOTE — H&P ADULT - PROBLEM SELECTOR PLAN 2
U/A consistent with LE, bacteria and WBC. Received 1 CTX in the ED. Not febrile, but is having chills. Does report intermittent burning with urination.   - C/w CTX for total 7 day course for UTI.   - F/u UCx and BCx

## 2022-12-16 NOTE — DISCHARGE NOTE PROVIDER - NSDCDCMDCOMP_GEN_ALL_CORE
Problem: Cardiac Catheterization with/without PCI (Adult)  Goal: Signs and Symptoms of Listed Potential Problems Will be Absent or Manageable (Cardiac Catheterization with/without PCI)  Outcome: Ongoing (interventions implemented as appropriate)    09/07/17 1103   Cardiac Catheterization with/without PCI   Problems Assessed (Cardiac Catheterization) all   Problems Present (Cardiac Catheterization) none            This document is complete and the patient is ready for discharge.

## 2022-12-16 NOTE — H&P ADULT - PROBLEM SELECTOR PLAN 12
.  F: none  E: caution with chano on ckd   N: dash diet  DVT: eliquis 5 BID.     dispo: [facility]  code: FULL CODE, palliative previously consulted 12/2.

## 2022-12-16 NOTE — ED PROVIDER NOTE - NS ED MD DISPO ADMIT [facility] PALLIATIVE CARE
Problem: Adult Inpatient Plan of Care  Goal: Optimal Comfort and Wellbeing  Outcome: Ongoing, Progressing     Problem: Adjustment to Illness (Sepsis/Septic Shock)  Goal: Optimal Coping  Outcome: Ongoing, Progressing     Problem: Bleeding (Sepsis/Septic Shock)  Goal: Absence of Bleeding  Outcome: Ongoing, Progressing     Problem: Nutrition Impaired (Sepsis/Septic Shock)  Goal: Optimal Nutrition Intake  Outcome: Ongoing, Progressing     Problem: Oral Intake Inadequate (Acute Kidney Injury/Impairment)  Goal: Optimal Nutrition Intake  Outcome: Ongoing, Progressing     Problem: Renal Function Impairment (Acute Kidney Injury/Impairment)  Goal: Effective Renal Function  Outcome: Ongoing, Progressing     Problem: Fall Injury Risk  Goal: Absence of Fall and Fall-Related Injury  Outcome: Ongoing, Progressing     Problem: Device-Related Complication Risk (Mechanical Ventilation, Invasive)  Goal: Optimal Device Function  Outcome: Ongoing, Progressing     Problem: Nutrition Impairment (Mechanical Ventilation, Invasive)  Goal: Optimal Nutrition Delivery  Outcome: Ongoing, Progressing     Problem: Skin and Tissue Injury (Mechanical Ventilation, Invasive)  Goal: Absence of Device-Related Skin and Tissue Injury  Outcome: Ongoing, Progressing     Problem: Adult Inpatient Plan of Care  Goal: Plan of Care Review  Outcome: Ongoing, Not Progressing  Goal: Patient-Specific Goal (Individualized)  Outcome: Ongoing, Not Progressing  Goal: Absence of Hospital-Acquired Illness or Injury  Outcome: Ongoing, Not Progressing  Goal: Readiness for Transition of Care  Outcome: Ongoing, Not Progressing     Problem: Glycemic Control Impaired (Sepsis/Septic Shock)  Goal: Blood Glucose Level Within Desired Range  Outcome: Ongoing, Not Progressing     Problem: Infection Progression (Sepsis/Septic Shock)  Goal: Absence of Infection Signs and Symptoms  Outcome: Ongoing, Not Progressing     Problem: Fluid and Electrolyte Imbalance (Acute Kidney  Injury/Impairment)  Goal: Fluid and Electrolyte Balance  Outcome: Ongoing, Not Progressing     Problem: Infection  Goal: Absence of Infection Signs and Symptoms  Outcome: Ongoing, Not Progressing     Problem: Communication Impairment (Mechanical Ventilation, Invasive)  Goal: Effective Communication  Outcome: Ongoing, Not Progressing     Problem: Inability to Wean (Mechanical Ventilation, Invasive)  Goal: Mechanical Ventilation Liberation  Outcome: Ongoing, Not Progressing     Problem: Ventilator-Induced Lung Injury (Mechanical Ventilation, Invasive)  Goal: Absence of Ventilator-Induced Lung Injury  Outcome: Ongoing, Not Progressing     Problem: Communication Impairment (Artificial Airway)  Goal: Effective Communication  Outcome: Ongoing, Not Progressing     Problem: Device-Related Complication Risk (Artificial Airway)  Goal: Optimal Device Function  Outcome: Ongoing, Not Progressing      NONE

## 2022-12-16 NOTE — DISCHARGE NOTE PROVIDER - NSDCCPTREATMENT_GEN_ALL_CORE_FT
PRINCIPAL PROCEDURE  Procedure: NM gallium scan whole body  Findings and Treatment: 1. No abnormal foci of uptake at the bilateral hips.  2. Mild focal uptake at the lower lumbar spine at approximately L5. An underlying infectious/inflammatory process cannot be excluded.  3. Prominent, symmetric activity at the oral cavity. Correlate for pharyngitis, tonsillitis, or other diffuse infectious/inflammatory process.  4. Persistent, diffuse uptake in distended transverse colon. Correlate for colitis.  5. Mild uptake in the bilateral kidneys, most likely due to renal failure. An infectious process is considered less likely.  ------------------------  XR abdomen   INTERPRETATION: Indication:Evaluation of colonic dilatation  Technique: Single supine view of the abdomen was obtained..  Comparison: Abdominal radiograph dated 12/20/2022 at 2:43 PM and CT chest abdomen and pelvis dated 12/20/2022  Findings:  Again demonstrated are dilated loops of transverse colon, measuring up to 10.6 cm (similar to radiograph dated 12/20/2022 at 2:43 PM). Mild bibasilar atelectasis. No acute osseous abnormality. Surgical clips noted in the right upper quadrant.  Impression:  No significant interval change in the radiographic appearance of dilated large bowel.  -------  MR Lumbar Spine   IMPRESSION:  Low T1 signal with corresponding high T2 signal in the L4-L5 endplates with abnormal signal within the disc; findings likely represents Modic type I endplate changes however superimposed discitis osteomyelitis cannot be excluded. Additionally there is an approximately 2 cm cystic lesion spanning from the superior endplate of L5 vertebral body to the superior endplate of S1; findings may represent a synovial cyst versus epidural fluid collection or disc extrusion. Further evaluation with the use of intravenous contrast is recommended to exclude infectious etiology.  Multilevel degenerative disc disease as described above.

## 2022-12-16 NOTE — PATIENT PROFILE ADULT - FALL HARM RISK - HARM RISK INTERVENTIONS

## 2022-12-16 NOTE — DISCHARGE NOTE PROVIDER - HOSPITAL COURSE
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 The patient was treated for her UTI (as below), For the patient's hip pain, she was seen by our orthopedic team who determined that there was no septic arthritis however the patient would benefit from supportive care for pain and physical therapy. The patient was also seen by our surgery team regarding her umbilical hernia who determined that she does not need any surgical intervention at this time as her hernia is reproducible. The patient was given laxatives and enemas for her constipation which showed improvement. The patient will be discharged to [facility] with close outpatient follow up.      #Abdominal distention.   12/20 Patient with distended bowel, however soft but tender to palpation likely 2/2 constipation as pt had not had a BM for 5 days. Had 1 large BM s/p Bowel reg, ducolex enema, fleet enema. Abdominal Xray Pre and post BM demonstrated appx 11cm dilation. Repeat Abd XR (12/22) with again dilated loops of transverse colon, 10.6 cm (comparable to 12/20/2022) - no significant interval change s/p EGD (12/21) with Candida esophagitis  CT A/P (12/20) with 1. resolution of previously noted herniated loop of small bowel within the periumbilical hernia. The periumbilical hernia now contains fat. 2.  Copious stool in mid transverse colon. Post right hemicolectomy, with patent anastomosis. s/p miralax, senna, dulcolax suppository, and tapwater w a formed BM   - Improving BMs with regimen   - f/u with outpatient appointments as scheduled       #Bacteremia.   Found to have strep bacteremia likely 2/2 to hx of colon cancer. Patient currently afebrile, last febrile 12/20 AM (T 100.6). 12/15 BCx growing Strep mitis/oralis subsequent surveillance BCx neg (most recently 12/20), TTE neg for vegetation, f/u IRMA (will get gallium if IRMA neg to r/o prosthetic infection).  - c/w CTX 2g q24h (12/16- ) w PICC line in place for a 2 week period   - f/u with outpatient appointments as scheduled     #Hip pain, right.   Pt presenting with malaise, chills and acute on chronic R hip pain. Pt states she has had chronic R hip pain that shoots down her leg, but has not seen providers for it in the past. Day prior to admission she said the pain was severe and had pain with ambulation. Pain starts at posterior hip and radiates down R leg. CT scans and xrays without findings. MRI lumbar spine (12/20) with no acute neurocompressive lesions, synovial cyst vs epidural fluid collection at L5, Modic type 1 changes vs disciits OM. Pain control inpatient with lidocaine patch, tylenol, oxycodone 5mg q6 hours prn. CT scans and xrays without findings. Ortho saw patient with low concern for septic joint w supportive management focused on PT and pain control  - cw Gabapentin 600 mg PO daily.   - Will be D/c'd to [facility]   - f/u outpatient appointments as scheduled     #Acute UTI.   U/A consistent with LE, bacteria and WBC. Received 1 CTX in the ED. Not febrile, but is having chills. Does report intermittent burning with urination.   - C/w CTX 2g x  weeks w PICC line in place    #Acute kidney injury superimposed on CKD.   Baseline Cr. 1.2-1.3. Cr on admission 1.8.   - Avoid nephrotoxic agents.   - Renally dose medications.  - f/u outpatient appointments as scheduled     #Umbilical hernia.   Reproducible, umbilical hernia measuring 3.38 cm in size, containing a small bowel loop which demonstrates mild bowel wall thickening. Within the abdomen, the proximal portion of the small bowel loops slightly distended to 2.4 cm, suggesting a degree of entrapment within the hernia itself, where as the distal portion the small bowel loop is empty with a diameter of 10 mm. Additionally, a volume of edematous mesenteric fat is noted adjacent to the umbilical hernia measuring up to 5.34 cm.  - Inpatient surgery consult with no acute intervention.  - f/u outpatient appointments as scheduled     #Diastolic CHF, chronic.   Pt with history of diastolic HF, recently admitted here with CHF exacerbation and had medication adjustments. Home medications include: torsemide 40mg, toprol 25mg , and isosorbide mononitrate 30mg.   - c/w home torsemide 40mg, toprol 25mg and isosorbide mononitrate 30mg.    #GERD (gastroesophageal reflux disease).   Pt on protonix 40mg outpatient   - c/w home protonix.    #HTN (hypertension).   Pt is on torsemide 40mg, isosorbide mononitrate, and toprol 25mg   - c/w home medications with hold parameters.    #Colon cancer.   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 Pt refused EBUS at that time.   - outpatient heme/onc follow up.    #COPD (chronic obstructive pulmonary disease).   Pt with history of COPD. On montelukast and spiriva. Follows with Dr. More  - c/w home medications     #Aortic stenosis.   Pt s/p TAVR, however now with moderate tavr valve stenosis. Structural team has evaluated her on previous admission and pt not a candidate for rpt intervention.        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 The patient was treated for her UTI (as below), For the patient's hip pain, she was seen by our orthopedic team who determined that there was no septic arthritis however the patient would benefit from supportive care for pain and physical therapy. The patient was also seen by our surgery team regarding her umbilical hernia who determined that she does not need any surgical intervention at this time as her hernia is reproducible. The patient was given laxatives and enemas for her constipation which showed improvement. The patient will be discharged to [facility] with close outpatient follow up.      #Abdominal distention.   12/20 Patient with distended bowel, however soft but tender to palpation likely 2/2 constipation as pt had not had a BM for 5 days. Had 1 large BM s/p Bowel reg, ducolex enema, fleet enema. Abdominal Xray Pre and post BM demonstrated appx 11cm dilation. Repeat Abd XR (12/22) with again dilated loops of transverse colon, 10.6 cm (comparable to 12/20/2022) - no significant interval change s/p EGD (12/21) with Candida esophagitis  CT A/P (12/20) with 1. resolution of previously noted herniated loop of small bowel within the periumbilical hernia. The periumbilical hernia now contains fat. 2.  Copious stool in mid transverse colon. Post right hemicolectomy, with patent anastomosis. s/p miralax, senna, dulcolax suppository, and tapwater w a formed BM   - Improving BMs with regimen   - Abd XR with progressive improvement with bowel regimen  - d/c'ed opioids  - f/u with outpatient appointments as scheduled       #Bacteremia.   Found to have strep bacteremia likely 2/2 to hx of colon cancer. Patient currently afebrile, last febrile 12/20 AM (T 100.6), IRMA neg for vegetation w f/u Gallium scan neg for cardiac etiology (endocarditis). 12/15 BCx growing Strep mitis/oralis, however now NGTD from (12/20)  - Bacterial Clx (NGTD) - 12/20   - c/w CTX 2g q24h (start 12/16)  - ID following, plan to continue CTX until 1/27    #Hip pain, right.   Pt presenting with malaise, chills and acute on chronic R hip pain. Pt states she has had chronic R hip pain that shoots down her leg, but has not seen providers for it in the past. Day prior to admission she said the pain was severe and had pain with ambulation. Pain starts at posterior hip and radiates down R leg. CT scans and xrays without findings. MRI lumbar spine (12/20) with no acute neurocompressive lesions, synovial cyst vs epidural fluid collection at L5, Modic type 1 changes vs disciits OM. Pain control inpatient with lidocaine patch, tylenol, oxycodone 5mg q6 hours prn. CT scans and xrays without findings. Ortho saw patient with low concern for septic joint w supportive management focused on PT and pain control  - cw Gabapentin 600 mg PO and lidocaine patches daily.   - Will be D/c'd to [facility]   - f/u outpatient appointments as scheduled     #Esophageal candidiasis   - 14 day course of caspofungin 50mg daily IV   - s/p PICC line    #Umbilical hernia.   Reproducible, umbilical hernia measuring 3.38 cm in size, containing a small bowel loop which demonstrates mild bowel wall thickening. Within the abdomen, the proximal portion of the small bowel loops slightly distended to 2.4 cm, suggesting a degree of entrapment within the hernia itself, where as the distal portion the small bowel loop is empty with a diameter of 10 mm. Additionally, a volume of edematous mesenteric fat is noted adjacent to the umbilical hernia measuring up to 5.34 cm.  - Inpatient surgery consult with no acute intervention.  - f/u outpatient appointments as scheduled     #Diastolic CHF, chronic.   Pt with history of diastolic HF, recently admitted here with CHF exacerbation and had medication adjustments. Home medications include: torsemide 40mg, toprol 25mg , and isosorbide mononitrate 30mg.   - c/w home torsemide 40mg, toprol 25mg and isosorbide mononitrate 30mg.    #GERD (gastroesophageal reflux disease).   Pt on protonix 40mg outpatient   - c/w home protonix.    #HTN (hypertension).   Pt is on torsemide 40mg, isosorbide mononitrate, and toprol 25mg   - c/w home medications with hold parameters.    #Colon cancer.   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 Pt refused EBUS at that time.   - outpatient heme/onc follow up.    #COPD (chronic obstructive pulmonary disease).   Pt with history of COPD. On montelukast and spiriva. Follows with Dr. More  - c/w home medications     #Aortic stenosis.   Pt s/p TAVR, however now with moderate tavr valve stenosis. Structural team has evaluated her on previous admission and pt not a candidate for rpt intervention.        PHYSICAL EXAM:  General: NAD; speaking in full sentences  HEENT: NC/AT; PERRL; EOMI; MMM  Neck: supple; no JVD  Cardiac: RRR; +S1/S2  Pulm: CTA B/L; no W/R/R  GI: soft, mildly distended, tender to palpation to mid-abdomen, no rigidity or guarding  MSK: tenderness to lower spine and paraspinal regions  Extremities:  no edema, clubbing or cyanosis  Vasc: 2+ radial, DP pulses B/L  Neuro: AAOx3; no focal deficits; sensation intact to b/l LEs

## 2022-12-16 NOTE — H&P ADULT - PROBLEM SELECTOR PLAN 3
Baseline Cr. 1.2-1.3. Cr on admission 1.8.   - Urine lytes, calculate FeUrea   - Bladder scan to rule out obstruction   - Trend sCr   - Avoid nephrotoxic agents.   -Renally dose medications

## 2022-12-16 NOTE — ED PROVIDER NOTE - CLINICAL SUMMARY MEDICAL DECISION MAKING FREE TEXT BOX
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 On exam, VS grossly wnl, pt ill appearing, otherwise no remarkable exam findings.    ddx: viral illness vs chf vs acs vs pna vs uti    Plan:  - ekg  - labs: cr 1.8 c/w baseline  - trop negative  - bnp > 1000 slightly above baseline  - ua: LE, WBC, and bacteria, concerning for possible uti  - rvp: negative  - cxr: clear  - pt given tylenol   - on reassessment pt still ill-appearing and does not appear improved states she does not feel well  - d/w medicine and cardiology who believe that pt best to be admitted to medicine for possible uti will also order ct to assess for back and pelvic pain  - d/w dhara will admit

## 2022-12-16 NOTE — PROGRESS NOTE ADULT - PROBLEM SELECTOR PLAN 6
Pt on protonix 40mg outpatient   - c/w home protonix Pt on protonix 40mg outpatient     - c/w home protonix

## 2022-12-16 NOTE — PROGRESS NOTE ADULT - PROBLEM SELECTOR PLAN 10
Pt s/p TAVR, however now with moderate tavr valve stenosis. Structural team has evaluated her on previous admission and pt not a candidate for rpt intervention. Pt s/p TAVR, however now with moderate tavr valve stenosis. Structural team has evaluated her on previous admission and pt not a candidate for rpt intervention.  - DARREN

## 2022-12-16 NOTE — DISCHARGE NOTE PROVIDER - CARE PROVIDERS DIRECT ADDRESSES
,[email or web address]

## 2022-12-16 NOTE — DISCHARGE NOTE PROVIDER - NSDCCPCAREPLAN_GEN_ALL_CORE_FT
PRINCIPAL DISCHARGE DIAGNOSIS  Diagnosis: Acute UTI  Assessment and Plan of Treatment:       SECONDARY DISCHARGE DIAGNOSES  Diagnosis: Right hip pain  Assessment and Plan of Treatment:      PRINCIPAL DISCHARGE DIAGNOSIS  Diagnosis: Acute UTI  Assessment and Plan of Treatment: A urinary tract infection (UTI) is an infection of any part of the urinary tract. The urinary tract includes the kidneys, ureters, bladder, and urethra. These organs make, store, and get rid of urine in the body.  An upper UTI affects the ureters and kidneys. A lower UTI affects the bladder and urethra. You were given antibiotics to help treat this UTI and will be leaving with a PICC line.   Please make sure to attend all your outpatient follow up appointments as listed        SECONDARY DISCHARGE DIAGNOSES  Diagnosis: Right hip pain  Assessment and Plan of Treatment: The patient was diagnosed with long term chronic right hip pain with our orthopedics team assessment with low concern of an infection of the joint, however the team suggestested a need for further care via physical therapy. This includes care that focuses on improvement of movement, stability, and pain management. The hip is the joint between the upper legs and the lower pelvis. The bones, cartilage, tendons, and muscles of your hip joint support your body and allow you to move around. Hip pain can range from a minor ache to severe pain in one or both of your hips. The pain may be felt on the inside of the hip joint near the groin, or on the outside near the buttocks and upper thigh. You may also have swelling or stiffness in your hip area.  It is important that you follow up with all outpatient appointments as scheduled.        PRINCIPAL DISCHARGE DIAGNOSIS  Diagnosis: Infection due to Streptococcus mitis group  Assessment and Plan of Treatment:       SECONDARY DISCHARGE DIAGNOSES  Diagnosis: Right hip pain  Assessment and Plan of Treatment: The patient was diagnosed with long term chronic right hip pain with our orthopedics team assessment with low concern of an infection of the joint, however the team suggestested a need for further care via physical therapy. This includes care that focuses on improvement of movement, stability, and pain management. The hip is the joint between the upper legs and the lower pelvis. The bones, cartilage, tendons, and muscles of your hip joint support your body and allow you to move around. Hip pain can range from a minor ache to severe pain in one or both of your hips. The pain may be felt on the inside of the hip joint near the groin, or on the outside near the buttocks and upper thigh. You may also have swelling or stiffness in your hip area.  It is important that you follow up with all outpatient appointments as scheduled.       Diagnosis: Abdominal pain  Assessment and Plan of Treatment: You    Diagnosis: Esophageal candidiasis  Assessment and Plan of Treatment:      PRINCIPAL DISCHARGE DIAGNOSIS  Diagnosis: Infection due to Streptococcus mitis group  Assessment and Plan of Treatment:       SECONDARY DISCHARGE DIAGNOSES  Diagnosis: Right hip pain  Assessment and Plan of Treatment: The patient was diagnosed with long term chronic right hip pain with our orthopedics team assessment with low concern of an infection of the joint, however the team suggestested a need for further care via physical therapy. This includes care that focuses on improvement of movement, stability, and pain management. The hip is the joint between the upper legs and the lower pelvis. The bones, cartilage, tendons, and muscles of your hip joint support your body and allow you to move around. Hip pain can range from a minor ache to severe pain in one or both of your hips. The pain may be felt on the inside of the hip joint near the groin, or on the outside near the buttocks and upper thigh. You may also have swelling or stiffness in your hip area.  It is important that you follow up with all outpatient appointments as scheduled.       Diagnosis: Abdominal pain  Assessment and Plan of Treatment: You have a distended bowel that was secondary to severe constipation while receiving opioid medications for hip pain. We started you on an aggresive bowel regimen which consists of miralax, senna, and fleet enemas. On the day of discharge your symptoms improved and you not longer had significant gas in your stool. Please continue taking these medications at sub acute rehab.    Diagnosis: Esophageal candidiasis  Assessment and Plan of Treatment:      PRINCIPAL DISCHARGE DIAGNOSIS  Diagnosis: Infection due to Streptococcus mitis group  Assessment and Plan of Treatment: You wre found to have to have streptococcus mitis blood stream infection. Your repeat blood cultures were negative but are on IV antibiotics consisting of ceftriaxone 2grams daily until January 27th. You have a PICC line to complete this regimen.      SECONDARY DISCHARGE DIAGNOSES  Diagnosis: Right hip pain  Assessment and Plan of Treatment: The patient was diagnosed with long term chronic right hip pain with our orthopedics team assessment with low concern of an infection of the joint, however the team suggestested a need for further care via physical therapy. This includes care that focuses on improvement of movement, stability, and pain management. The hip is the joint between the upper legs and the lower pelvis. The bones, cartilage, tendons, and muscles of your hip joint support your body and allow you to move around. Hip pain can range from a minor ache to severe pain in one or both of your hips. The pain may be felt on the inside of the hip joint near the groin, or on the outside near the buttocks and upper thigh. You may also have swelling or stiffness in your hip area.  It is important that you follow up with all outpatient appointments as scheduled.       Diagnosis: Abdominal pain  Assessment and Plan of Treatment: You have a distended bowel that was secondary to severe constipation while receiving opioid medications for hip pain. We started you on an aggresive bowel regimen which consists of miralax, senna, and fleet enemas. On the day of discharge your symptoms improved and you not longer had significant gas in your stool. Please continue taking these medications at sub acute rehab.    Diagnosis: Esophageal candidiasis  Assessment and Plan of Treatment: Please take caspofungin 50mg daily until January 4th for a fungal infection of your throat.     PRINCIPAL DISCHARGE DIAGNOSIS  Diagnosis: Infection due to Streptococcus mitis group  Assessment and Plan of Treatment: You wre found to have to have streptococcus mitis blood stream infection. Your repeat blood cultures were negative but are on IV antibiotics consisting of ceftriaxone 2grams daily until January 27th. You have a PICC line to complete this regimen.      SECONDARY DISCHARGE DIAGNOSES  Diagnosis: Right hip pain  Assessment and Plan of Treatment: The patient was diagnosed with long term chronic right hip pain with our orthopedics team assessment with low concern of an infection of the joint, however the team suggestested a need for further care via physical therapy. This includes care that focuses on improvement of movement, stability, and pain management. The hip is the joint between the upper legs and the lower pelvis. The bones, cartilage, tendons, and muscles of your hip joint support your body and allow you to move around. Hip pain can range from a minor ache to severe pain in one or both of your hips. The pain may be felt on the inside of the hip joint near the groin, or on the outside near the buttocks and upper thigh. You may also have swelling or stiffness in your hip area.  It is important that you follow up with all outpatient appointments as scheduled.       Diagnosis: Abdominal pain  Assessment and Plan of Treatment: You have a distended bowel that was secondary to severe constipation while receiving opioid medications for hip pain. We started you on an aggresive bowel regimen which consists of miralax, senna, and fleet enemas. On the day of discharge your symptoms improved and you not longer had significant gas in your stool. Please continue taking these medications at sub acute rehab.    Diagnosis: Esophageal candidiasis  Assessment and Plan of Treatment: Please take caspofungin 50mg daily until January 4th for a fungal infection of your throat.    Diagnosis: Paroxysmal atrial fibrillation  Assessment and Plan of Treatment: You have a history for which you take eliquis 2.5mg every 12 hours and amiodarone. Given your history of GI bleed and advanced age your eliquis dose was decreased from 5mg to 2.5mg every 12 hours about one month ago. Please discuss with your cardiologist about further adjustment.

## 2022-12-16 NOTE — PROGRESS NOTE ADULT - ATTENDING COMMENTS
I personally examined Luca Srinivasan and discussed management with medical team. I agree with assessment except as below.    Persian  [id number].  83 y/o F with above P,H presents with acute onset right hip pain. Patient reports feeling her usual when she had worsening of her chronic hip pain. She reported malaise and chills to night team. Denies chest pain, no SOB, no cough, no abdominal pain, no N/V, denies trauma or fall. Patient with severe pain, regimen adjusted with partial response.    Exam  General: AOX3, in mild discomfort, no labored breathing on RA, speaking in full sentences  HEENT: AT/NC, no facial asymmetry   Lungs: limited, poor inspiration, no crackles, no wheezes  Heart: RRR  Abdomen: soft, non-tender, no guarding, no rigidity, + BS  Extremities: warm, ROM limited on the right secondary to pain, no tenderness on limb palpation, no focal deficit    Results  WBC 6.33  H/H 9.3/31.5 MCV 87    k 3.4 Bicarb 27  Creat 1.57  CT lumbar spine/Abdomen/pelvis, CXR reviewed  Blood culture + Gram positive clusters and chains    Gram positive bacteremia  Acute on chronic right hip pain  CKD III  HFpEF  COPD  pAfib   Colon CA  Abdominal hernia     Patient with positive blood cultures, specification pending. Has history of colon CA, denies respiratory symptoms. Get CXR official read. Started on ATB, follow-up blood cultures  Get CT hip to better evaluate joint. Patient started on multimodal pain regimen, titrate as needed. Avoid oversedation.  Get surgery evaluation for CT findings, no signs of strangulation on exam.  Appreciate HF  Monitor respiratory status

## 2022-12-16 NOTE — CONSULT NOTE ADULT - ASSESSMENT
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 General surgery consulted for incidental CTAP finding of small bowel loop in a periumbilical ventral hernia which is now thick walled, compared to previous imaging on 9/27/22, possibly indicative of strangulation. On examination, pt continues to complain of right hip pain and mild generalized abdominal pain. Denies nausea or vomiting and has been tolerating PO intake albeit less than normal. Not clinically obstructed. Abdomen soft, obese, nondistended, nontender, supraumbilical hernia defect appreciated (approximately 2cm in diameter) without any evidence of incarcerated contents.    Recommendations:  No acute surgical intervention  Will reassess abdomen in the morning and if no changes in physical exam, highly unlikely to have strangulated hernia  Team 4C will continue to follow    Plan discussed with chief resident and Dr. Xavier

## 2022-12-16 NOTE — DISCHARGE NOTE PROVIDER - PROVIDER TOKENS
PROVIDER:[TOKEN:[[id number]]] PROVIDER:[TOKEN:[[id number]],SCHEDULEDAPPT:[12/20/2022],SCHEDULEDAPPTTIME:[03:00 PM]] PROVIDER:[TOKEN:[[id number]],SCHEDULEDAPPT:[12/20/2022],SCHEDULEDAPPTTIME:[03:00 PM]],PROVIDER:[TOKEN:[[id number]]]

## 2022-12-16 NOTE — ED PROVIDER NOTE - RESPIRATORY, MLM
Breath sounds clear and equal bilaterally. no wheezes/ronchi/stridor/crackles/increased wob/accessory mm use

## 2022-12-16 NOTE — DISCHARGE NOTE PROVIDER - ATTENDING DISCHARGE PHYSICAL EXAMINATION:
Patient seen and examined at bedside on 12/27/2022 at 11 am. Pt continues to have nausea/dyspepsia although had a large BM this AM. Denies rash, SOB, CP, abdominal pain. ROS is otherwise negative. Vitals, labwork and pertinent imaging reviewed. Exam - NAD, AAO x 4, PERRLA, EOMI, MMM, supple neck, chest - CTA b/l, CV - rrr, s1s2, no m/r/g, abd - soft, NTND, + BS, ext - wwp, psych - normal affect    Plan:  -C/w Caspofungin (2 weeks) and CTX (6 weeks)  -BCX - NGTD  -PICC line placement today- pt medically ready for d/c to [facility]  -Will need close outpt f/u

## 2022-12-16 NOTE — ED PROVIDER NOTE - OBJECTIVE STATEMENT
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 Denies cp/sob/bloody stool/f/c/rash.

## 2022-12-16 NOTE — ED ADULT NURSE REASSESSMENT NOTE - NS ED NURSE REASSESS COMMENT FT1
Pt received in no acute distress, a&ox3, breathing with ease on room air. Pt with 20g heplock to L forearm, intact, no edema or redness noted. Pt admitted, pending bed. Pt otherwise comfortable and assisted with all needs. Denies any c/o. Will monitor.

## 2022-12-16 NOTE — CONSULT NOTE ADULT - ATTENDING COMMENTS
Initial attending contact date  12/16/22    . See fellow note written above for details. I reviewed the fellow documentation. I have personally seen and examined this patient. I reviewed vitals, labs, medications, cardiac studies, and additional imaging. I agree with the above fellow's findings and plans as written above with the following additions/statements.    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 Denies SOB, appears overall euvolemic on exam  -Imaging with only small bl pleural effusions  -At present not in active HF exacerbation  -Cont home torsemide 40 mg po qd, imdur 30 mg po qd, toprol 25 mg po qd   -P A fib, currently in NSR. Cont amio 200 mg po qd, reduce eliquis dose 2.5 mg bid   -To fu with Dr Marte upon dc for follow up

## 2022-12-16 NOTE — H&P ADULT - ASSESSMENT
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

## 2022-12-16 NOTE — H&P ADULT - PROBLEM SELECTOR PLAN 7
Pt is on torsemide 40mg, isosorbide mononitrate, and toprol 25mg   - c/w home medications with hold parameters

## 2022-12-16 NOTE — H&P ADULT - PROBLEM SELECTOR PLAN 10
Pt s/p TAVR, however now with moderate tavr valve stenosis. Structural team has evaluated her on previous admission and pt not a candidate for rpt intervention.

## 2022-12-16 NOTE — H&P ADULT - PROBLEM SELECTOR PLAN 9
Pt with history of COPD. On montelukast and spiriva. Follows with Dr. Derik pickard/froylan home medications   - goal sat 88-92%

## 2022-12-16 NOTE — H&P ADULT - PROBLEM SELECTOR PLAN 5
Pt with history of diastolic HF, recently admitted here with CHF exacerbation and had medication adjustments. Home medications include: torsemide 40mg, toprol 25mg , and isosorbide mononitrate 30mg.   - c/w home torsemide 40mg, toprol 25mg and isosorbide mononitrate 30mg

## 2022-12-16 NOTE — PROGRESS NOTE ADULT - PROBLEM SELECTOR PLAN 1
Likely 2/2 multilevel degenerative changes as seen on CT Lumbar/Thoracic Spine. No acute fracture or traumatic malalignment.  Pt presenting with malaise, chills and acute on chronic R hip pain. Pt states she has had chronic R hip pain that shoots down her leg, but has not seen providers for it in the past. Day prior to admission she said the pain was severe and had pain with ambulation. Pain starts at posterior hip and radiates down R leg    - If continued pain, further evaluation with MRI as it is a superior modality to evaluate the spinal canal and neural foramen  - Pain control w lidocaine patch, tylenol, oxycodone 5mg q6 hours prn   - PT Consult  - Fall Precautions Likely 2/2 multilevel degenerative changes as seen on CT Lumbar/Thoracic Spine. No acute fracture or traumatic malalignment.  Pt presenting with malaise, chills and acute on chronic R hip pain. Pt states she has had chronic R hip pain that shoots down her leg, but has not seen providers for it in the past. Day prior to admission she said the pain was severe and had pain with ambulation. Pain starts at posterior hip and radiates down R leg. Xray hip no acute fracture    - If continued pain, further evaluation with MRI as it is a superior modality to evaluate the spinal canal and neural foramen  - Pain control w lidocaine patch, tylenol, oxycodone 5mg q6 hours prn   - f/u CT hip  - PT Consult w Fall Precautions Likely 2/2 multilevel degenerative changes as seen on CT Lumbar/Thoracic Spine. No acute fracture or traumatic malalignment.  Pt presenting with malaise, chills and acute on chronic R hip pain. Pt states she has had chronic R hip pain that shoots down her leg, but has not seen providers for it in the past. Day prior to admission she said the pain was severe and had pain with ambulation. Pain starts at posterior hip and radiates down R leg. Xray hip no acute fracture    - If continued pain, further evaluation with MRI as it is a superior modality to evaluate the spinal canal and neural foramen  - Pain control: s/p 0.5mg IV Dilaudid w lidocaine patch, tylenol, oxycodone 5mg q6 hours prn w Tigan for nausea (Qtc 472)  - f/u CT Hip, ordered for continued pain  - PT Consult w Fall Precautions

## 2022-12-17 LAB
ALBUMIN SERPL ELPH-MCNC: 3.5 G/DL — SIGNIFICANT CHANGE UP (ref 3.3–5)
ALP SERPL-CCNC: 68 U/L — SIGNIFICANT CHANGE UP (ref 40–120)
ALT FLD-CCNC: 21 U/L — SIGNIFICANT CHANGE UP (ref 10–45)
ANION GAP SERPL CALC-SCNC: 15 MMOL/L — SIGNIFICANT CHANGE UP (ref 5–17)
AST SERPL-CCNC: 29 U/L — SIGNIFICANT CHANGE UP (ref 10–40)
BASOPHILS # BLD AUTO: 0.02 K/UL — SIGNIFICANT CHANGE UP (ref 0–0.2)
BASOPHILS NFR BLD AUTO: 0.2 % — SIGNIFICANT CHANGE UP (ref 0–2)
BILIRUB SERPL-MCNC: 0.6 MG/DL — SIGNIFICANT CHANGE UP (ref 0.2–1.2)
BUN SERPL-MCNC: 23 MG/DL — SIGNIFICANT CHANGE UP (ref 7–23)
CALCIUM SERPL-MCNC: 9.3 MG/DL — SIGNIFICANT CHANGE UP (ref 8.4–10.5)
CHLORIDE SERPL-SCNC: 97 MMOL/L — SIGNIFICANT CHANGE UP (ref 96–108)
CO2 SERPL-SCNC: 26 MMOL/L — SIGNIFICANT CHANGE UP (ref 22–31)
CREAT SERPL-MCNC: 1.49 MG/DL — HIGH (ref 0.5–1.3)
CULTURE RESULTS: SIGNIFICANT CHANGE UP
EGFR: 34 ML/MIN/1.73M2 — LOW
EOSINOPHIL # BLD AUTO: 0.03 K/UL — SIGNIFICANT CHANGE UP (ref 0–0.5)
EOSINOPHIL NFR BLD AUTO: 0.3 % — SIGNIFICANT CHANGE UP (ref 0–6)
GLUCOSE SERPL-MCNC: 130 MG/DL — HIGH (ref 70–99)
HCT VFR BLD CALC: 31.6 % — LOW (ref 34.5–45)
HGB BLD-MCNC: 9.4 G/DL — LOW (ref 11.5–15.5)
IMM GRANULOCYTES NFR BLD AUTO: 0.7 % — SIGNIFICANT CHANGE UP (ref 0–0.9)
LYMPHOCYTES # BLD AUTO: 0.67 K/UL — LOW (ref 1–3.3)
LYMPHOCYTES # BLD AUTO: 6.5 % — LOW (ref 13–44)
MAGNESIUM SERPL-MCNC: 2.2 MG/DL — SIGNIFICANT CHANGE UP (ref 1.6–2.6)
MCHC RBC-ENTMCNC: 25.9 PG — LOW (ref 27–34)
MCHC RBC-ENTMCNC: 29.7 GM/DL — LOW (ref 32–36)
MCV RBC AUTO: 87.1 FL — SIGNIFICANT CHANGE UP (ref 80–100)
MONOCYTES # BLD AUTO: 0.8 K/UL — SIGNIFICANT CHANGE UP (ref 0–0.9)
MONOCYTES NFR BLD AUTO: 7.8 % — SIGNIFICANT CHANGE UP (ref 2–14)
NEUTROPHILS # BLD AUTO: 8.66 K/UL — HIGH (ref 1.8–7.4)
NEUTROPHILS NFR BLD AUTO: 84.5 % — HIGH (ref 43–77)
NRBC # BLD: 0 /100 WBCS — SIGNIFICANT CHANGE UP (ref 0–0)
PHOSPHATE SERPL-MCNC: 4.3 MG/DL — SIGNIFICANT CHANGE UP (ref 2.5–4.5)
PLATELET # BLD AUTO: 192 K/UL — SIGNIFICANT CHANGE UP (ref 150–400)
POTASSIUM SERPL-MCNC: 3.4 MMOL/L — LOW (ref 3.5–5.3)
POTASSIUM SERPL-SCNC: 3.4 MMOL/L — LOW (ref 3.5–5.3)
PROT SERPL-MCNC: 7.4 G/DL — SIGNIFICANT CHANGE UP (ref 6–8.3)
RBC # BLD: 3.63 M/UL — LOW (ref 3.8–5.2)
RBC # FLD: 21.8 % — HIGH (ref 10.3–14.5)
SODIUM SERPL-SCNC: 138 MMOL/L — SIGNIFICANT CHANGE UP (ref 135–145)
SPECIMEN SOURCE: SIGNIFICANT CHANGE UP
WBC # BLD: 10.25 K/UL — SIGNIFICANT CHANGE UP (ref 3.8–10.5)
WBC # FLD AUTO: 10.25 K/UL — SIGNIFICANT CHANGE UP (ref 3.8–10.5)

## 2022-12-17 PROCEDURE — 99233 SBSQ HOSP IP/OBS HIGH 50: CPT | Mod: GC

## 2022-12-17 PROCEDURE — 93010 ELECTROCARDIOGRAM REPORT: CPT

## 2022-12-17 RX ORDER — POTASSIUM CHLORIDE 20 MEQ
20 PACKET (EA) ORAL ONCE
Refills: 0 | Status: COMPLETED | OUTPATIENT
Start: 2022-12-17 | End: 2022-12-18

## 2022-12-17 RX ORDER — CYCLOBENZAPRINE HYDROCHLORIDE 10 MG/1
5 TABLET, FILM COATED ORAL ONCE
Refills: 0 | Status: COMPLETED | OUTPATIENT
Start: 2022-12-17 | End: 2022-12-17

## 2022-12-17 RX ORDER — POTASSIUM CHLORIDE 20 MEQ
10 PACKET (EA) ORAL ONCE
Refills: 0 | Status: COMPLETED | OUTPATIENT
Start: 2022-12-17 | End: 2022-12-17

## 2022-12-17 RX ORDER — GABAPENTIN 400 MG/1
600 CAPSULE ORAL EVERY 24 HOURS
Refills: 0 | Status: DISCONTINUED | OUTPATIENT
Start: 2022-12-17 | End: 2022-12-18

## 2022-12-17 RX ADMIN — TRAMADOL HYDROCHLORIDE 50 MILLIGRAM(S): 50 TABLET ORAL at 10:41

## 2022-12-17 RX ADMIN — Medication 1 TABLET(S): at 11:52

## 2022-12-17 RX ADMIN — Medication 1 MILLIGRAM(S): at 11:52

## 2022-12-17 RX ADMIN — TRAMADOL HYDROCHLORIDE 50 MILLIGRAM(S): 50 TABLET ORAL at 17:31

## 2022-12-17 RX ADMIN — TRAMADOL HYDROCHLORIDE 50 MILLIGRAM(S): 50 TABLET ORAL at 22:24

## 2022-12-17 RX ADMIN — APIXABAN 2.5 MILLIGRAM(S): 2.5 TABLET, FILM COATED ORAL at 06:27

## 2022-12-17 RX ADMIN — AMIODARONE HYDROCHLORIDE 200 MILLIGRAM(S): 400 TABLET ORAL at 06:27

## 2022-12-17 RX ADMIN — SENNA PLUS 2 TABLET(S): 8.6 TABLET ORAL at 22:24

## 2022-12-17 RX ADMIN — OXYCODONE HYDROCHLORIDE 5 MILLIGRAM(S): 5 TABLET ORAL at 19:48

## 2022-12-17 RX ADMIN — POLYETHYLENE GLYCOL 3350 17 GRAM(S): 17 POWDER, FOR SOLUTION ORAL at 11:52

## 2022-12-17 RX ADMIN — MONTELUKAST 10 MILLIGRAM(S): 4 TABLET, CHEWABLE ORAL at 11:52

## 2022-12-17 RX ADMIN — LIDOCAINE 1 PATCH: 4 CREAM TOPICAL at 07:36

## 2022-12-17 RX ADMIN — LIDOCAINE 1 PATCH: 4 CREAM TOPICAL at 06:27

## 2022-12-17 RX ADMIN — CEFTRIAXONE 100 MILLIGRAM(S): 500 INJECTION, POWDER, FOR SOLUTION INTRAMUSCULAR; INTRAVENOUS at 22:24

## 2022-12-17 RX ADMIN — OXYCODONE HYDROCHLORIDE 5 MILLIGRAM(S): 5 TABLET ORAL at 14:45

## 2022-12-17 RX ADMIN — APIXABAN 2.5 MILLIGRAM(S): 2.5 TABLET, FILM COATED ORAL at 19:49

## 2022-12-17 RX ADMIN — TRAMADOL HYDROCHLORIDE 50 MILLIGRAM(S): 50 TABLET ORAL at 03:37

## 2022-12-17 RX ADMIN — ATORVASTATIN CALCIUM 20 MILLIGRAM(S): 80 TABLET, FILM COATED ORAL at 22:24

## 2022-12-17 RX ADMIN — TIOTROPIUM BROMIDE 2 PUFF(S): 18 CAPSULE ORAL; RESPIRATORY (INHALATION) at 09:42

## 2022-12-17 RX ADMIN — PANTOPRAZOLE SODIUM 40 MILLIGRAM(S): 20 TABLET, DELAYED RELEASE ORAL at 06:27

## 2022-12-17 RX ADMIN — OXYCODONE HYDROCHLORIDE 5 MILLIGRAM(S): 5 TABLET ORAL at 06:31

## 2022-12-17 RX ADMIN — TRAMADOL HYDROCHLORIDE 50 MILLIGRAM(S): 50 TABLET ORAL at 16:44

## 2022-12-17 RX ADMIN — TRAMADOL HYDROCHLORIDE 50 MILLIGRAM(S): 50 TABLET ORAL at 03:50

## 2022-12-17 RX ADMIN — Medication 40 MILLIGRAM(S): at 13:45

## 2022-12-17 RX ADMIN — OXYCODONE HYDROCHLORIDE 5 MILLIGRAM(S): 5 TABLET ORAL at 13:45

## 2022-12-17 RX ADMIN — Medication 50 MILLIEQUIVALENT(S): at 19:49

## 2022-12-17 RX ADMIN — CYCLOBENZAPRINE HYDROCHLORIDE 5 MILLIGRAM(S): 10 TABLET, FILM COATED ORAL at 11:52

## 2022-12-17 RX ADMIN — LIDOCAINE 1 PATCH: 4 CREAM TOPICAL at 19:11

## 2022-12-17 RX ADMIN — TRAMADOL HYDROCHLORIDE 50 MILLIGRAM(S): 50 TABLET ORAL at 22:55

## 2022-12-17 RX ADMIN — OXYCODONE HYDROCHLORIDE 5 MILLIGRAM(S): 5 TABLET ORAL at 07:00

## 2022-12-17 RX ADMIN — ISOSORBIDE MONONITRATE 30 MILLIGRAM(S): 60 TABLET, EXTENDED RELEASE ORAL at 13:45

## 2022-12-17 RX ADMIN — Medication 0.5 MILLIGRAM(S): at 09:40

## 2022-12-17 RX ADMIN — TRAMADOL HYDROCHLORIDE 50 MILLIGRAM(S): 50 TABLET ORAL at 09:41

## 2022-12-17 RX ADMIN — GABAPENTIN 600 MILLIGRAM(S): 400 CAPSULE ORAL at 23:32

## 2022-12-17 NOTE — PROGRESS NOTE ADULT - ATTENDING COMMENTS
I personally examined Ms. Segovia and discussed management with medical team. I agree with assessment except as below.     Patient reports persistent radicular pain in the right hip. Pain management helping but still not fully controlled. Denies fevers, no chills, denies SOB,  no chest pain. Denies abdominal pain, no N/V, no obstipation.    General: AOX3, NAD, lying flat in bed, no labored breathing on NC 2L, speaking in full sentences  HEENT: AT/NC, no facial asymmetry   Lungs: limited, poor inspiration, no crackles, no wheezes  Heart: RRR  Abdomen: soft, non-tender, no guarding, no rigidity, + BS  Extremities: warm, ROM limited on the right secondary to pain, no tenderness on limb palpation, no focal deficit    Results  WBC 10.2 Neut 84%  H/H 9.4/31.6 MCV 87  K 3.4 Creat 1.49    Patient with persistent hip pain, CT hip without acute findings. Optimize pain management, will add Flexeril. Continue on Oxycodone, Lidocaine patch  Strep bacteremia, suspect Strep Bovis in view of history of colon CA. Follow-up specification, Repeat Bcx pending, has worsening leucocytosis. Follow-up TTE. Will get ID input  Trend creatinine, avoid nephrotoxics

## 2022-12-17 NOTE — PROGRESS NOTE ADULT - PROBLEM SELECTOR PLAN 4
Umbilical hernia measuring 3.38 cm in size, containing a small bowel loop which demonstrates mild bowel wall thickening. Within the abdomen, the proximal portion of the small bowel loops slightly distended to 2.4 cm, suggesting   a degree of entrapment within the hernia itself, where as the distal portion the small bowel loop is empty with a diameter of 10 mm. Additionally, a volume of edematous mesenteric fat is noted adjacent to the umbilical hernia measuring up to 5.34 cm.  - Pt without current abdominal pain. Having regular BMs, no nausea, vomiting, constipation  - Unable to appreciate reduction of umbilical hernia 2/2 to patient's abdominal girth.   - Surgery consult with no recs.

## 2022-12-17 NOTE — PROGRESS NOTE ADULT - SUBJECTIVE AND OBJECTIVE BOX
INTERVAL HPI/OVERNIGHT EVENTS:  Patient was seen and examined at bedside. Case discussed with attending physician during morning rounds.     As per nurse and patient, no o/n events, patient resting comfortably. No complaints at this time. Patient denies: fever, chills, dizziness, weakness, HA, Changes in vision, CP, palpitations, SOB, cough, N/V/D/C, dysuria, changes in bowel movements, LE edema. ROS otherwise negative.    VITAL SIGNS:  T(F): 98.3 (22 @ 10:13)  HR: 63 (22 @ 10:13)  BP: 129/72 (22 @ 10:13)  RR: 20 (22 @ 10:13)  SpO2: 98% (22 @ 10:13)  Wt(kg): --    PHYSICAL EXAM:    Constitutional: No acute distress, resting comfortably in bed.    HEENT: Pupils equal round and reactive to light, EOMI, sclera non-icteric, neck supple, trachea midline, no masses, no JVD, MMM, good dentition  Respiratory: Clear to ausculatation bilaterally. good air entry, no wheezing, no rhonchi, no rales, without accessory muscle use and no intercostal retractions  Cardiovascular: Regular rate and rhythm, normal S1S2, no murmurs, rubs, gallops.  Gastrointestinal: soft, non-tender and non-distended, no masses palpable, Normoactive bowel sounds.  Extremities: Warm, well perfused, pulses equal bilateral upper and lower extremities, no edema, no clubbing  Neurological: AAOx3, CN Grossly intact  Skin: Normal temperature, warm, dry.    MEDICATIONS  (STANDING):  aMIOdarone    Tablet 200 milliGRAM(s) Oral daily  apixaban 2.5 milliGRAM(s) Oral every 12 hours  atorvastatin 20 milliGRAM(s) Oral at bedtime  cefTRIAXone   IVPB 2000 milliGRAM(s) IV Intermittent every 24 hours  cyclobenzaprine 5 milliGRAM(s) Oral once  folic acid 1 milliGRAM(s) Oral daily  influenza  Vaccine (HIGH DOSE) 0.7 milliLiter(s) IntraMuscular once  isosorbide   mononitrate ER Tablet (IMDUR) 30 milliGRAM(s) Oral every 24 hours  lidocaine   4% Patch 1 Patch Transdermal every 24 hours  metoprolol succinate ER 25 milliGRAM(s) Oral daily  montelukast 10 milliGRAM(s) Oral daily  multivitamin 1 Tablet(s) Oral daily  pantoprazole    Tablet 40 milliGRAM(s) Oral before breakfast  polyethylene glycol 3350 17 Gram(s) Oral daily  senna 2 Tablet(s) Oral at bedtime  tiotropium 2.5 MICROgram(s) Inhaler 2 Puff(s) Inhalation daily  torsemide 40 milliGRAM(s) Oral every 24 hours    MEDICATIONS  (PRN):  LORazepam     Tablet 0.5 milliGRAM(s) Oral daily PRN Anxiety  oxyCODONE    IR 5 milliGRAM(s) Oral every 6 hours PRN Severe Pain (7 - 10)  simethicone 80 milliGRAM(s) Chew two times a day PRN Gas  traMADol 50 milliGRAM(s) Oral every 6 hours PRN Moderate Pain (4 - 6)  trimethobenzamide Injectable 200 milliGRAM(s) IntraMuscular every 6 hours PRN Nausea and/or Vomiting      Allergies    Digox (Rash; Urticaria; Hives)  Plavix (Other (Mod to Severe))    Intolerances        LABS:                        9.4    10.25 )-----------( 192      ( 17 Dec 2022 07:43 )             31.6     12-    138  |  97  |  23  ----------------------------<  130<H>  3.4<L>   |  26  |  1.49<H>    Ca    9.3      17 Dec 2022 07:43  Phos  4.3       Mg     2.2         TPro  7.4  /  Alb  3.5  /  TBili  0.6  /  DBili  x   /  AST  29  /  ALT  21  /  AlkPhos  68  12-    PT/INR - ( 15 Dec 2022 20:39 )   PT: 13.3 sec;   INR: 1.12          PTT - ( 15 Dec 2022 20:39 )  PTT:22.5 sec  Urinalysis Basic - ( 15 Dec 2022 21:54 )    Color: Yellow / Appearance: Clear / S.010 / pH: x  Gluc: x / Ketone: NEGATIVE  / Bili: Negative / Urobili: 0.2 E.U./dL   Blood: x / Protein: NEGATIVE mg/dL / Nitrite: NEGATIVE   Leuk Esterase: Small / RBC: < 5 /HPF / WBC > 10 /HPF   Sq Epi: x / Non Sq Epi: 0-5 /HPF / Bacteria: Present /HPF          RADIOLOGY & ADDITIONAL TESTS:  Reviewed INTERVAL HPI/OVERNIGHT EVENTS:  Patient was seen and examined at bedside. Case discussed with attending physician during morning rounds.     As per patient, still complaining of significant right lower extremity pain. Patient denies fever, chills, shortness of breath, chest pain.     VITAL SIGNS:  T(F): 98.3 (22 @ 10:13)  HR: 63 (22 @ 10:13)  BP: 129/72 (22 @ 10:13)  RR: 20 (22 @ 10:13)  SpO2: 98% (22 @ 10:13)  Wt(kg): --    PHYSICAL EXAM:    Constitutional: Female in moderate distress from pain, groaning in pain intermittently.   Respiratory: Clear to auscultation bilaterally. Adequate air entry, no wheezing, no rhonchi, no rales, without accessory muscle use and no intercostal retractions  Cardiovascular: Regular rate and rhythm, normal S1S2, no murmurs, rubs, gallops.  Gastrointestinal: soft, non-tender, mildly distended, Normoactive bowel sounds.  Extremities: Warm, well perfused, pulses equal bilateral upper and lower extremities,  Neurological: AAOx3,  Skin: Normal temperature, warm, dry.    MEDICATIONS  (STANDING):  aMIOdarone    Tablet 200 milliGRAM(s) Oral daily  apixaban 2.5 milliGRAM(s) Oral every 12 hours  atorvastatin 20 milliGRAM(s) Oral at bedtime  cefTRIAXone   IVPB 2000 milliGRAM(s) IV Intermittent every 24 hours  cyclobenzaprine 5 milliGRAM(s) Oral once  folic acid 1 milliGRAM(s) Oral daily  influenza  Vaccine (HIGH DOSE) 0.7 milliLiter(s) IntraMuscular once  isosorbide   mononitrate ER Tablet (IMDUR) 30 milliGRAM(s) Oral every 24 hours  lidocaine   4% Patch 1 Patch Transdermal every 24 hours  metoprolol succinate ER 25 milliGRAM(s) Oral daily  montelukast 10 milliGRAM(s) Oral daily  multivitamin 1 Tablet(s) Oral daily  pantoprazole    Tablet 40 milliGRAM(s) Oral before breakfast  polyethylene glycol 3350 17 Gram(s) Oral daily  senna 2 Tablet(s) Oral at bedtime  tiotropium 2.5 MICROgram(s) Inhaler 2 Puff(s) Inhalation daily  torsemide 40 milliGRAM(s) Oral every 24 hours    MEDICATIONS  (PRN):  LORazepam     Tablet 0.5 milliGRAM(s) Oral daily PRN Anxiety  oxyCODONE    IR 5 milliGRAM(s) Oral every 6 hours PRN Severe Pain (7 - 10)  simethicone 80 milliGRAM(s) Chew two times a day PRN Gas  traMADol 50 milliGRAM(s) Oral every 6 hours PRN Moderate Pain (4 - 6)  trimethobenzamide Injectable 200 milliGRAM(s) IntraMuscular every 6 hours PRN Nausea and/or Vomiting      Allergies    Digox (Rash; Urticaria; Hives)  Plavix (Other (Mod to Severe))    Intolerances        LABS:                        9.4    10.25 )-----------( 192      ( 17 Dec 2022 07:43 )             31.6     -    138  |  97  |  23  ----------------------------<  130<H>  3.4<L>   |  26  |  1.49<H>    Ca    9.3      17 Dec 2022 07:43  Phos  4.3       Mg     2.2         TPro  7.4  /  Alb  3.5  /  TBili  0.6  /  DBili  x   /  AST  29  /  ALT  21  /  AlkPhos  68  12-    PT/INR - ( 15 Dec 2022 20:39 )   PT: 13.3 sec;   INR: 1.12          PTT - ( 15 Dec 2022 20:39 )  PTT:22.5 sec  Urinalysis Basic - ( 15 Dec 2022 21:54 )    Color: Yellow / Appearance: Clear / S.010 / pH: x  Gluc: x / Ketone: NEGATIVE  / Bili: Negative / Urobili: 0.2 E.U./dL   Blood: x / Protein: NEGATIVE mg/dL / Nitrite: NEGATIVE   Leuk Esterase: Small / RBC: < 5 /HPF / WBC > 10 /HPF   Sq Epi: x / Non Sq Epi: 0-5 /HPF / Bacteria: Present /HPF          RADIOLOGY & ADDITIONAL TESTS:  Reviewed

## 2022-12-17 NOTE — PROGRESS NOTE ADULT - SUBJECTIVE AND OBJECTIVE BOX
SUBJECTIVE: Pt seen and examined at bedside this am by surgery team. Patient is lying comfortably in bed with no complaints.    MEDICATIONS  (STANDING):  aMIOdarone    Tablet 200 milliGRAM(s) Oral daily  apixaban 2.5 milliGRAM(s) Oral every 12 hours  atorvastatin 20 milliGRAM(s) Oral at bedtime  cefTRIAXone   IVPB 2000 milliGRAM(s) IV Intermittent every 24 hours  folic acid 1 milliGRAM(s) Oral daily  influenza  Vaccine (HIGH DOSE) 0.7 milliLiter(s) IntraMuscular once  isosorbide   mononitrate ER Tablet (IMDUR) 30 milliGRAM(s) Oral every 24 hours  lidocaine   4% Patch 1 Patch Transdermal every 24 hours  metoprolol succinate ER 25 milliGRAM(s) Oral daily  montelukast 10 milliGRAM(s) Oral daily  multivitamin 1 Tablet(s) Oral daily  pantoprazole    Tablet 40 milliGRAM(s) Oral before breakfast  polyethylene glycol 3350 17 Gram(s) Oral daily  potassium chloride  10 mEq/50 mL IVPB 10 milliEquivalent(s) IV Intermittent once  senna 2 Tablet(s) Oral at bedtime  tiotropium 2.5 MICROgram(s) Inhaler 2 Puff(s) Inhalation daily  torsemide 40 milliGRAM(s) Oral every 24 hours    MEDICATIONS  (PRN):  LORazepam     Tablet 0.5 milliGRAM(s) Oral daily PRN Anxiety  oxyCODONE    IR 5 milliGRAM(s) Oral every 6 hours PRN Severe Pain (7 - 10)  simethicone 80 milliGRAM(s) Chew two times a day PRN Gas  traMADol 50 milliGRAM(s) Oral every 6 hours PRN Moderate Pain (4 - 6)  trimethobenzamide Injectable 200 milliGRAM(s) IntraMuscular every 6 hours PRN Nausea and/or Vomiting      Vital Signs Last 24 Hrs  T(C): 36.9 (17 Dec 2022 16:26), Max: 37 (16 Dec 2022 21:00)  T(F): 98.5 (17 Dec 2022 16:26), Max: 98.6 (16 Dec 2022 21:00)  HR: 63 (17 Dec 2022 16:26) (55 - 67)  BP: 153/76 (17 Dec 2022 16:26) (107/70 - 171/76)  BP(mean): --  RR: 18 (17 Dec 2022 16:26) (18 - 20)  SpO2: 98% (17 Dec 2022 16:26) (97% - 98%)    Parameters below as of 17 Dec 2022 16:26  Patient On (Oxygen Delivery Method): nasal cannula        Physical Exam  General: Patient is doing well and lying in bed comfortably  Constitutional: alert and oriented   Pulm: Nonlabored breathing, no respiratory distress  CV: Regular rate and rhythm, normal sinus rhythm  Abd:  soft, nontender, nondistended. No rebound, no guarding. Supraumbilical hernia defect palpable, no evidence of incarcerated hernia.  Extremities: warm, well perfused, no edema    I&O's Detail    16 Dec 2022 07:01  -  17 Dec 2022 07:00  --------------------------------------------------------  IN:  Total IN: 0 mL    OUT:    Voided (mL): 1300 mL  Total OUT: 1300 mL    Total NET: -1300 mL        LABS:                        9.4    10.25 )-----------( 192      ( 17 Dec 2022 07:43 )             31.6     12-17    138  |  97  |  23  ----------------------------<  130<H>  3.4<L>   |  26  |  1.49<H>    Ca    9.3      17 Dec 2022 07:43  Phos  4.3     12-  Mg     2.2     -    TPro  7.4  /  Alb  3.5  /  TBili  0.6  /  DBili  x   /  AST  29  /  ALT  21  /  AlkPhos  68  12-17    PT/INR - ( 15 Dec 2022 20:39 )   PT: 13.3 sec;   INR: 1.12          PTT - ( 15 Dec 2022 20:39 )  PTT:22.5 sec  Urinalysis Basic - ( 15 Dec 2022 21:54 )    Color: Yellow / Appearance: Clear / S.010 / pH: x  Gluc: x / Ketone: NEGATIVE  / Bili: Negative / Urobili: 0.2 E.U./dL   Blood: x / Protein: NEGATIVE mg/dL / Nitrite: NEGATIVE   Leuk Esterase: Small / RBC: < 5 /HPF / WBC > 10 /HPF   Sq Epi: x / Non Sq Epi: 0-5 /HPF / Bacteria: Present /HPF

## 2022-12-17 NOTE — PROGRESS NOTE ADULT - ASSESSMENT
. 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 none

## 2022-12-17 NOTE — PROGRESS NOTE ADULT - PROBLEM SELECTOR PLAN 2
U/A consistent with LE, bacteria and WBC. Received 1 CTX in the ED. Not febrile, but is having chills. Does report intermittent burning with urination.   - C/w CTX for total 7 day course for UTI.   - F/u UCx and BCx  - Repeat Bcx today.

## 2022-12-17 NOTE — PROGRESS NOTE ADULT - PROBLEM SELECTOR PLAN 1
Pt presenting with malaise, chills and acute on chronic R hip pain. Pt states she has had chronic R hip pain that shoots down her leg, but has not seen providers for it in the past. Day prior to admission she said the pain was severe and had pain with ambulation. Pain starts at posterior hip and radiates down R leg. Appears sciatic in nature.   - Pain control with lidocaine patch, tylenol, oxycodone 5mg q6 hours prn   - Started Flexeril 5 mg Once without improvement, plan to start gabapentin 600 mg PO daily.   - CT scans and xrays without findings.

## 2022-12-18 LAB
-  CEFTRIAXONE: SIGNIFICANT CHANGE UP
-  CEFTRIAXONE: SIGNIFICANT CHANGE UP
-  CLINDAMYCIN: SIGNIFICANT CHANGE UP
-  CLINDAMYCIN: SIGNIFICANT CHANGE UP
-  ERYTHROMYCIN: SIGNIFICANT CHANGE UP
-  ERYTHROMYCIN: SIGNIFICANT CHANGE UP
-  LEVOFLOXACIN: 1 — SIGNIFICANT CHANGE UP
-  LEVOFLOXACIN: SIGNIFICANT CHANGE UP
-  LEVOFLOXACIN: SIGNIFICANT CHANGE UP
-  PENICILLIN: SIGNIFICANT CHANGE UP
-  PENICILLIN: SIGNIFICANT CHANGE UP
-  VANCOMYCIN: SIGNIFICANT CHANGE UP
-  VANCOMYCIN: SIGNIFICANT CHANGE UP
ANION GAP SERPL CALC-SCNC: 10 MMOL/L — SIGNIFICANT CHANGE UP (ref 5–17)
BUN SERPL-MCNC: 24 MG/DL — HIGH (ref 7–23)
CALCIUM SERPL-MCNC: 9.4 MG/DL — SIGNIFICANT CHANGE UP (ref 8.4–10.5)
CHLORIDE SERPL-SCNC: 96 MMOL/L — SIGNIFICANT CHANGE UP (ref 96–108)
CO2 SERPL-SCNC: 31 MMOL/L — SIGNIFICANT CHANGE UP (ref 22–31)
CREAT ?TM UR-MCNC: 106 MG/DL — SIGNIFICANT CHANGE UP
CREAT SERPL-MCNC: 1.49 MG/DL — HIGH (ref 0.5–1.3)
CULTURE RESULTS: SIGNIFICANT CHANGE UP
CULTURE RESULTS: SIGNIFICANT CHANGE UP
EGFR: 34 ML/MIN/1.73M2 — LOW
GLUCOSE SERPL-MCNC: 97 MG/DL — SIGNIFICANT CHANGE UP (ref 70–99)
HCT VFR BLD CALC: 29.6 % — LOW (ref 34.5–45)
HGB BLD-MCNC: 8.9 G/DL — LOW (ref 11.5–15.5)
MAGNESIUM SERPL-MCNC: 2.2 MG/DL — SIGNIFICANT CHANGE UP (ref 1.6–2.6)
MCHC RBC-ENTMCNC: 26.3 PG — LOW (ref 27–34)
MCHC RBC-ENTMCNC: 30.1 GM/DL — LOW (ref 32–36)
MCV RBC AUTO: 87.6 FL — SIGNIFICANT CHANGE UP (ref 80–100)
METHOD TYPE: SIGNIFICANT CHANGE UP
NRBC # BLD: 0 /100 WBCS — SIGNIFICANT CHANGE UP (ref 0–0)
ORGANISM # SPEC MICROSCOPIC CNT: SIGNIFICANT CHANGE UP
PHOSPHATE SERPL-MCNC: 4.3 MG/DL — SIGNIFICANT CHANGE UP (ref 2.5–4.5)
PLATELET # BLD AUTO: 207 K/UL — SIGNIFICANT CHANGE UP (ref 150–400)
POTASSIUM SERPL-MCNC: 3.8 MMOL/L — SIGNIFICANT CHANGE UP (ref 3.5–5.3)
POTASSIUM SERPL-SCNC: 3.8 MMOL/L — SIGNIFICANT CHANGE UP (ref 3.5–5.3)
POTASSIUM UR-SCNC: 45 MMOL/L — SIGNIFICANT CHANGE UP
PROT ?TM UR-MCNC: 20 MG/DL — HIGH (ref 0–12)
PROT/CREAT UR-RTO: 0.2 RATIO — SIGNIFICANT CHANGE UP (ref 0–0.2)
RBC # BLD: 3.38 M/UL — LOW (ref 3.8–5.2)
RBC # FLD: 21.3 % — HIGH (ref 10.3–14.5)
SODIUM SERPL-SCNC: 137 MMOL/L — SIGNIFICANT CHANGE UP (ref 135–145)
SODIUM UR-SCNC: 43 MMOL/L — SIGNIFICANT CHANGE UP
SPECIMEN SOURCE: SIGNIFICANT CHANGE UP
SPECIMEN SOURCE: SIGNIFICANT CHANGE UP
UUN UR-MCNC: 499 MG/DL — SIGNIFICANT CHANGE UP
WBC # BLD: 10.36 K/UL — SIGNIFICANT CHANGE UP (ref 3.8–10.5)
WBC # FLD AUTO: 10.36 K/UL — SIGNIFICANT CHANGE UP (ref 3.8–10.5)

## 2022-12-18 PROCEDURE — 99222 1ST HOSP IP/OBS MODERATE 55: CPT

## 2022-12-18 PROCEDURE — 99233 SBSQ HOSP IP/OBS HIGH 50: CPT | Mod: GC

## 2022-12-18 RX ORDER — ACETAMINOPHEN 500 MG
650 TABLET ORAL EVERY 6 HOURS
Refills: 0 | Status: DISCONTINUED | OUTPATIENT
Start: 2022-12-18 | End: 2022-12-22

## 2022-12-18 RX ORDER — GABAPENTIN 400 MG/1
300 CAPSULE ORAL AT BEDTIME
Refills: 0 | Status: COMPLETED | OUTPATIENT
Start: 2022-12-18 | End: 2022-12-24

## 2022-12-18 RX ADMIN — Medication 1 MILLIGRAM(S): at 12:06

## 2022-12-18 RX ADMIN — Medication 1 TABLET(S): at 12:07

## 2022-12-18 RX ADMIN — AMIODARONE HYDROCHLORIDE 200 MILLIGRAM(S): 400 TABLET ORAL at 05:53

## 2022-12-18 RX ADMIN — OXYCODONE HYDROCHLORIDE 5 MILLIGRAM(S): 5 TABLET ORAL at 12:06

## 2022-12-18 RX ADMIN — OXYCODONE HYDROCHLORIDE 5 MILLIGRAM(S): 5 TABLET ORAL at 05:53

## 2022-12-18 RX ADMIN — OXYCODONE HYDROCHLORIDE 5 MILLIGRAM(S): 5 TABLET ORAL at 06:30

## 2022-12-18 RX ADMIN — Medication 200 MILLIGRAM(S): at 14:37

## 2022-12-18 RX ADMIN — LIDOCAINE 1 PATCH: 4 CREAM TOPICAL at 07:06

## 2022-12-18 RX ADMIN — OXYCODONE HYDROCHLORIDE 5 MILLIGRAM(S): 5 TABLET ORAL at 13:06

## 2022-12-18 RX ADMIN — ISOSORBIDE MONONITRATE 30 MILLIGRAM(S): 60 TABLET, EXTENDED RELEASE ORAL at 14:24

## 2022-12-18 RX ADMIN — Medication 0.5 MILLIGRAM(S): at 14:28

## 2022-12-18 RX ADMIN — PANTOPRAZOLE SODIUM 40 MILLIGRAM(S): 20 TABLET, DELAYED RELEASE ORAL at 06:40

## 2022-12-18 RX ADMIN — TRAMADOL HYDROCHLORIDE 50 MILLIGRAM(S): 50 TABLET ORAL at 14:24

## 2022-12-18 RX ADMIN — SENNA PLUS 2 TABLET(S): 8.6 TABLET ORAL at 22:39

## 2022-12-18 RX ADMIN — POLYETHYLENE GLYCOL 3350 17 GRAM(S): 17 POWDER, FOR SOLUTION ORAL at 12:07

## 2022-12-18 RX ADMIN — TIOTROPIUM BROMIDE 2 PUFF(S): 18 CAPSULE ORAL; RESPIRATORY (INHALATION) at 10:05

## 2022-12-18 RX ADMIN — TRAMADOL HYDROCHLORIDE 50 MILLIGRAM(S): 50 TABLET ORAL at 15:24

## 2022-12-18 RX ADMIN — LIDOCAINE 1 PATCH: 4 CREAM TOPICAL at 05:52

## 2022-12-18 RX ADMIN — APIXABAN 2.5 MILLIGRAM(S): 2.5 TABLET, FILM COATED ORAL at 05:53

## 2022-12-18 RX ADMIN — Medication 50 MILLIEQUIVALENT(S): at 01:25

## 2022-12-18 RX ADMIN — GABAPENTIN 300 MILLIGRAM(S): 400 CAPSULE ORAL at 22:39

## 2022-12-18 RX ADMIN — CEFTRIAXONE 100 MILLIGRAM(S): 500 INJECTION, POWDER, FOR SOLUTION INTRAMUSCULAR; INTRAVENOUS at 22:40

## 2022-12-18 RX ADMIN — ATORVASTATIN CALCIUM 20 MILLIGRAM(S): 80 TABLET, FILM COATED ORAL at 22:39

## 2022-12-18 RX ADMIN — Medication 650 MILLIGRAM(S): at 19:23

## 2022-12-18 RX ADMIN — Medication 40 MILLIGRAM(S): at 14:24

## 2022-12-18 RX ADMIN — MONTELUKAST 10 MILLIGRAM(S): 4 TABLET, CHEWABLE ORAL at 12:07

## 2022-12-18 NOTE — PROGRESS NOTE ADULT - ATTENDING COMMENTS
I personally examined Ms. Segovia and discussed management with medical team. I agree with assessment except as below.    Patient reports improvement in pain with addition of Gabapentin, able to move better. Denies SOB, no chest pain, no abdominal pain. Denies melena, no hematochezia. Denies other complains.    General: AOX3, NAD, lying in bed, speaking in full sentences, no labored breathing on RA  HEENT: AT/NC, no facial asymmetry  Lungs: no crackles, no wheezes  Heart: RRR  Abdomen: Soft, non-tender, no guarding, + BS  Extremities: warm, no edema, no tenderness to palpation, able to move right hip and leg better, no focal deficit    Labs  WBC 10.36  H/H 8.9/29.6 MCV 87      Strep Bacteremia  Acute on chronic Radicular pain  History of colon CA  HFpEF  Afib       Patient with improvement in radicular pain with addition of Gabapentin.  Has persistent leucocytosis, afebrile. Repeat Bcx pending, TTE pending. Reports improvement in right radicular pain, denies worsening of chronic hip pain. Initial chest and abdominal imaging without collection. CT hip without fluid. Follow-up with ID  Continue on ATB  Monitor Hb, fluctuating

## 2022-12-18 NOTE — CONSULT NOTE ADULT - ASSESSMENT
83yo Mongolian speaking F h/o severe AS s/p TAVR 2019 with mod valve stenosis and valve thrombosis in 2021 s/p AC (not seen on echo 2022), ascending thoracic aneurysm, colon ca s/p resection in 2019 with recurrence mets to lung (not on therapy), COPD on home 2L O2, MARK, CKD3, pAfib p/w fatigue, weakness, acute on chronic R hip pain, inability to ambulate, found to have high grade Strep mitis oralis bacteremia.  Patient with poor dentition and didn't get tooth extraction after having gamella bacteremia in Sep, source of bacteremia likely from oral mela.  Given acute on chronic R hip pain causing inability walk, this is c/f septic arthritis of R hip.  Epidural abscess also should be ruled out for worsening LBP.    - cont CTX 2g IV q24h  - repeat BCx today  - TTE and IRMA  - check ESR, CRP  - MRI lumbar wwo contrast  - will get gallium scan if IRMA neg to r/o prosthetic infection       Team 2 will follow you.  Case d/w primary team.    Evette Donahue MD, MS  Infectious Disease attending  work cell [phone]   For any questions during evening/weekend/holiday, please page ID on call  83yo Tajik speaking F h/o severe AS s/p TAVR 2019 with mod valve stenosis and valve thrombosis in 2021 s/p AC (not seen on echo 2022), ascending thoracic aneurysm, colon ca s/p resection in 2019 with recurrence mets to lung (not on therapy), COPD on home 2L O2, MARK, CKD3, pAfib p/w fatigue, weakness, acute on chronic R hip pain, inability to ambulate, found to have high grade Strep mitis oralis bacteremia.  Patient with poor dentition and didn't get tooth extraction after having gamella bacteremia in Sep, source of bacteremia likely from oral mela.  Given acute on chronic R hip pain causing inability walk, this is c/f septic arthritis of R hip.  Epidural abscess also should be ruled out for worsening LBP.    - cont CTX 2g IV q24h  - repeat BCx today  - TTE and IRMA to r/o endocarditis  - check ESR, CRP  - MRI lumbar wwo contrast  - will get gallium scan if IRMA neg to r/o prosthetic infection       Team 2 will follow you.  Case d/w primary team.    Evette Donahue MD, MS  Infectious Disease attending  work cell [phone]   For any questions during evening/weekend/holiday, please page ID on call

## 2022-12-19 LAB
ALBUMIN SERPL ELPH-MCNC: 3.2 G/DL — LOW (ref 3.3–5)
ALP SERPL-CCNC: 81 U/L — SIGNIFICANT CHANGE UP (ref 40–120)
ALT FLD-CCNC: 20 U/L — SIGNIFICANT CHANGE UP (ref 10–45)
ANION GAP SERPL CALC-SCNC: 11 MMOL/L — SIGNIFICANT CHANGE UP (ref 5–17)
AST SERPL-CCNC: 22 U/L — SIGNIFICANT CHANGE UP (ref 10–40)
BASOPHILS # BLD AUTO: 0.03 K/UL — SIGNIFICANT CHANGE UP (ref 0–0.2)
BASOPHILS NFR BLD AUTO: 0.3 % — SIGNIFICANT CHANGE UP (ref 0–2)
BILIRUB SERPL-MCNC: 0.5 MG/DL — SIGNIFICANT CHANGE UP (ref 0.2–1.2)
BUN SERPL-MCNC: 26 MG/DL — HIGH (ref 7–23)
CALCIUM SERPL-MCNC: 9 MG/DL — SIGNIFICANT CHANGE UP (ref 8.4–10.5)
CHLORIDE SERPL-SCNC: 93 MMOL/L — LOW (ref 96–108)
CO2 SERPL-SCNC: 30 MMOL/L — SIGNIFICANT CHANGE UP (ref 22–31)
CREAT SERPL-MCNC: 1.63 MG/DL — HIGH (ref 0.5–1.3)
CRP SERPL-MCNC: 278.3 MG/L — HIGH (ref 0–4)
EGFR: 31 ML/MIN/1.73M2 — LOW
EOSINOPHIL # BLD AUTO: 0.2 K/UL — SIGNIFICANT CHANGE UP (ref 0–0.5)
EOSINOPHIL NFR BLD AUTO: 2.2 % — SIGNIFICANT CHANGE UP (ref 0–6)
ERYTHROCYTE [SEDIMENTATION RATE] IN BLOOD: 111 MM/HR — HIGH
GLUCOSE SERPL-MCNC: 102 MG/DL — HIGH (ref 70–99)
HCT VFR BLD CALC: 30.9 % — LOW (ref 34.5–45)
HGB BLD-MCNC: 9.2 G/DL — LOW (ref 11.5–15.5)
IMM GRANULOCYTES NFR BLD AUTO: 0.3 % — SIGNIFICANT CHANGE UP (ref 0–0.9)
LYMPHOCYTES # BLD AUTO: 0.57 K/UL — LOW (ref 1–3.3)
LYMPHOCYTES # BLD AUTO: 6.1 % — LOW (ref 13–44)
MAGNESIUM SERPL-MCNC: 2.2 MG/DL — SIGNIFICANT CHANGE UP (ref 1.6–2.6)
MCHC RBC-ENTMCNC: 26.1 PG — LOW (ref 27–34)
MCHC RBC-ENTMCNC: 29.8 GM/DL — LOW (ref 32–36)
MCV RBC AUTO: 87.5 FL — SIGNIFICANT CHANGE UP (ref 80–100)
MONOCYTES # BLD AUTO: 0.77 K/UL — SIGNIFICANT CHANGE UP (ref 0–0.9)
MONOCYTES NFR BLD AUTO: 8.3 % — SIGNIFICANT CHANGE UP (ref 2–14)
NEUTROPHILS # BLD AUTO: 7.7 K/UL — HIGH (ref 1.8–7.4)
NEUTROPHILS NFR BLD AUTO: 82.8 % — HIGH (ref 43–77)
NRBC # BLD: 0 /100 WBCS — SIGNIFICANT CHANGE UP (ref 0–0)
PHOSPHATE SERPL-MCNC: 3.7 MG/DL — SIGNIFICANT CHANGE UP (ref 2.5–4.5)
PLATELET # BLD AUTO: 241 K/UL — SIGNIFICANT CHANGE UP (ref 150–400)
POTASSIUM SERPL-MCNC: 3.9 MMOL/L — SIGNIFICANT CHANGE UP (ref 3.5–5.3)
POTASSIUM SERPL-SCNC: 3.9 MMOL/L — SIGNIFICANT CHANGE UP (ref 3.5–5.3)
PROT SERPL-MCNC: 7.4 G/DL — SIGNIFICANT CHANGE UP (ref 6–8.3)
RBC # BLD: 3.53 M/UL — LOW (ref 3.8–5.2)
RBC # FLD: 20.8 % — HIGH (ref 10.3–14.5)
SODIUM SERPL-SCNC: 134 MMOL/L — LOW (ref 135–145)
WBC # BLD: 9.3 K/UL — SIGNIFICANT CHANGE UP (ref 3.8–10.5)
WBC # FLD AUTO: 9.3 K/UL — SIGNIFICANT CHANGE UP (ref 3.8–10.5)

## 2022-12-19 PROCEDURE — 93306 TTE W/DOPPLER COMPLETE: CPT | Mod: 26

## 2022-12-19 PROCEDURE — 99233 SBSQ HOSP IP/OBS HIGH 50: CPT

## 2022-12-19 PROCEDURE — 99233 SBSQ HOSP IP/OBS HIGH 50: CPT | Mod: GC

## 2022-12-19 RX ADMIN — CEFTRIAXONE 100 MILLIGRAM(S): 500 INJECTION, POWDER, FOR SOLUTION INTRAMUSCULAR; INTRAVENOUS at 22:25

## 2022-12-19 RX ADMIN — SENNA PLUS 2 TABLET(S): 8.6 TABLET ORAL at 22:26

## 2022-12-19 RX ADMIN — Medication 40 MILLIGRAM(S): at 14:21

## 2022-12-19 RX ADMIN — Medication 650 MILLIGRAM(S): at 12:07

## 2022-12-19 RX ADMIN — LIDOCAINE 1 PATCH: 4 CREAM TOPICAL at 19:15

## 2022-12-19 RX ADMIN — GABAPENTIN 300 MILLIGRAM(S): 400 CAPSULE ORAL at 22:26

## 2022-12-19 RX ADMIN — MONTELUKAST 10 MILLIGRAM(S): 4 TABLET, CHEWABLE ORAL at 12:07

## 2022-12-19 RX ADMIN — ISOSORBIDE MONONITRATE 30 MILLIGRAM(S): 60 TABLET, EXTENDED RELEASE ORAL at 14:21

## 2022-12-19 RX ADMIN — PANTOPRAZOLE SODIUM 40 MILLIGRAM(S): 20 TABLET, DELAYED RELEASE ORAL at 06:42

## 2022-12-19 RX ADMIN — LIDOCAINE 1 PATCH: 4 CREAM TOPICAL at 06:42

## 2022-12-19 RX ADMIN — AMIODARONE HYDROCHLORIDE 200 MILLIGRAM(S): 400 TABLET ORAL at 06:42

## 2022-12-19 RX ADMIN — Medication 25 MILLIGRAM(S): at 06:42

## 2022-12-19 RX ADMIN — Medication 1 TABLET(S): at 12:09

## 2022-12-19 RX ADMIN — Medication 1 MILLIGRAM(S): at 12:09

## 2022-12-19 RX ADMIN — LIDOCAINE 1 PATCH: 4 CREAM TOPICAL at 09:24

## 2022-12-19 RX ADMIN — ATORVASTATIN CALCIUM 20 MILLIGRAM(S): 80 TABLET, FILM COATED ORAL at 22:26

## 2022-12-19 RX ADMIN — Medication 650 MILLIGRAM(S): at 13:00

## 2022-12-19 NOTE — PROGRESS NOTE ADULT - ATTENDING COMMENTS
I personally examined Ms Segovia and discussed management with medical team. I agree with assessment except as below.    [facility] [place]  [id number]. Patient evaluated while PT/OT in the room. She reports pain " a little" better, denies fevers, no chills. She has good appetite, no abdominal pain, no obstipation.    Labs  WBC 9.3  H/H 9.2/30.9 MCV 87.5  ESR 11  TTE no vegetations    Continue on ATB, follow-up blood cultures, remain negative  Appreciate ID, Ortho. Low suspicion for septic arthritis  Follow-up MRI spine, hip  Will need TTE  Pain control   DVT ppx

## 2022-12-19 NOTE — PROGRESS NOTE ADULT - ASSESSMENT
per Internal Medicine    82 y o F, Mongolian speaking, spoke with  Lulu #[id number], w/ PMHx HTN, HLD, severe AS s/p TAVR (2019) w/ mod valve stenosis and valve thrombosis 2021 s/p AC (not seen on echo 2022), Ascending Thoracic Aneurysm 4cm in 1/2022, pAFib (on Amiodarone/Eliquis), COPD (on 2L home O2), Colon CA s/p resection in 2019 (previously in remission now with recurrence and mets to lung), moderate MARK (non-compliant with CPAP 2/2 claustrophobia), CKD3 (baseline Cr 1.1-1.2) and FEROZ presents for malaise, chills, and hip pain for 1 day.    Problem/Plan - 1:  ·  Problem: Hip pain, right.   ·  Plan: Pt presenting with malaise, chills and acute on chronic R hip pain. Pt states she has had chronic R hip pain that shoots down her leg, but has not seen providers for it in the past. Day prior to admission she said the pain was severe and had pain with ambulation. Pain starts at posterior hip and radiates down R leg. Appears sciatic in nature.   - Pain control with lidocaine patch, tylenol, oxycodone 5mg q6 hours prn   - Started Flexeril 5 mg Once without improvement, plan to start gabapentin 600 mg PO daily.   - CT scans and xrays without findings.    Problem/Plan - 2:  ·  Problem: Acute UTI.   ·  Plan: U/A consistent with LE, bacteria and WBC. Received 1 CTX in the ED. Not febrile, but is having chills. Does report intermittent burning with urination.   - C/w CTX for total 7 day course for UTI.   - F/u UCx and BCx  - Repeat Bcx today.    Problem/Plan - 3:  ·  Problem: Acute kidney injury superimposed on CKD.   ·  Plan: Baseline Cr. 1.2-1.3. Cr on admission 1.8.   - Urine lytes, calculate FeUrea   - Bladder scan to rule out obstruction   - Trend sCr   - Avoid nephrotoxic agents.   -Renally dose medications.    Problem/Plan - 4:  ·  Problem: Umbilical hernia.   ·  Plan: Umbilical hernia measuring 3.38 cm in size, containing a small bowel loop which demonstrates mild bowel wall thickening. Within the abdomen, the proximal portion of the small bowel loops slightly distended to 2.4 cm, suggesting   a degree of entrapment within the hernia itself, where as the distal portion the small bowel loop is empty with a diameter of 10 mm. Additionally, a volume of edematous mesenteric fat is noted adjacent to the umbilical hernia measuring up to 5.34 cm.  - Pt without current abdominal pain. Having regular BMs, no nausea, vomiting, constipation  - Unable to appreciate reduction of umbilical hernia 2/2 to patient's abdominal girth.   - Surgery consult with no recs.    Problem/Plan - 5:  ·  Problem: Diastolic CHF, chronic.   ·  Plan: Pt with history of diastolic HF, recently admitted here with CHF exacerbation and had medication adjustments. Home medications include: torsemide 40mg, toprol 25mg , and isosorbide mononitrate 30mg.   - c/w home torsemide 40mg, toprol 25mg and isosorbide mononitrate 30mg.    Problem/Plan - 6:  ·  Problem: GERD (gastroesophageal reflux disease).   ·  Plan: Pt on protonix 40mg outpatient   - c/w home protonix.    Problem/Plan - 7:  ·  Problem: HTN (hypertension).   ·  Plan: Pt is on torsemide 40mg, isosorbide mononitrate, and toprol 25mg   - c/w home medications with hold parameters.    Problem/Plan - 8:  ·  Problem: Colon cancer.   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 Pt refused EBUS at that time.   - outpatient heme/onc follow up.    Problem/Plan - 9:  ·  Problem: COPD (chronic obstructive pulmonary disease).   ·  Plan: Pt with history of COPD. On montelukast and spiriva. Follows with Dr. More  - c/w home medications   - goal sat 88-92%.    Problem/Plan - 10:  ·  Problem: Aortic stenosis.   ·  Plan; Pt s/p TAVR, however now with moderate tavr valve stenosis. Structural team has evaluated her on previous admission and pt not a candidate for rpt intervention.    Problem/Plan - 11:  ·  Problem: Paroxysmal atrial fibrillation.   ·  Plan: Pt is on amiodarone and eliquis 5 BID.   - C/w Eliquis 5mg BID   - C.w amiodarone 200mg daily   - C/w toprol 25mg.    Problem/Plan - 12:  ·  Problem: Prophylactic measure.   ·  Plan: .  F: none  E: caution with chano on ckd   N: dash diet  DVT: eliquis 5 BID.     dispo: [facility]  code: FULL CODE, palliative previously consulted 12/2.

## 2022-12-19 NOTE — PROGRESS NOTE ADULT - SUBJECTIVE AND OBJECTIVE BOX
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 She states that earlier this week she saw her PCP was doing well, however yesterday had chills, rigors, malaise, and woke up with acute on chronic R hip pain. She states she has had hip pain in the past, never seen a doctor for it, and has just managed it with lidocaine patches. She states this is the worst it has been, it starts at her posterior hip and radiates down her R leg. She states it hurts with ambulation and describes it as a sharp shooting pain. Pt is tearful on exam, and concerned as she stats every time she comes here we find something new wrong with her. She denies chest pain, SOB, headaches, LOC, abdominal pain, nausea, vomiting, diarrhea. She does endorse some burning with urination.     Recent admissions 12/1-12/3 for CHF exacerbation under cardiology, had medications adjusted ands sent home. Other admission under medicine 9/21-9/29 sent in from [facility] for melena with colonoscopy c/f cancer with biopsy showing adenocarcinoma and PET scan with concerning MET disease to lung. Also admitted under cardiology service from 09/4-09/13 for chest pain and hedaches found to have e.coli bacteremia and gemella spp. w/ TTE/IRMA and gallium scan negative with source presumed to be infected tooth d/c'ed with IV Abx.    In the ED:   Vitals: T 100.1, 69. 190/72>157/73, 18 95% RA  Labs: Hgb 10.4, sCr 1.80, pBNP 1105. U/A:  small LE, WBC >10, +bacteria  CXR: L sided effusion/infiltrate  Interventions: tylenol 650, CTX, CTAP, CTL/CTT   (16 Dec 2022 02:50)                            9.2    9.30  )-----------( 241      ( 19 Dec 2022 05:30 )             30.9       12-19    134<L>  |  93<L>  |  26<H>  ----------------------------<  102<H>  3.9   |  30  |  1.63<H>    Ca    9.0      19 Dec 2022 05:30  Phos  3.7     12-19  Mg     2.2     12-19    TPro  7.4  /  Alb  3.2<L>  /  TBili  0.5  /  DBili  x   /  AST  22  /  ALT  20  /  AlkPhos  81  12-19    Vital Signs Last 24 Hrs  T(C): 37.6 (19 Dec 2022 09:05), Max: 38 (18 Dec 2022 18:30)  T(F): 99.7 (19 Dec 2022 09:05), Max: 100.4 (18 Dec 2022 18:30)  HR: 69 (19 Dec 2022 09:05) (64 - 72)  BP: 133/72 (19 Dec 2022 09:05) (113/67 - 133/72)  BP(mean): --  RR: 20 (19 Dec 2022 09:05) (19 - 20)  SpO2: 99% (19 Dec 2022 09:05) (97% - 99%)    Parameters below as of 19 Dec 2022 09:05  Patient On (Oxygen Delivery Method): nasal cannula  O2 Flow (L/min): 3      MEDICATIONS  (STANDING):  aMIOdarone    Tablet 200 milliGRAM(s) Oral daily  apixaban 2.5 milliGRAM(s) Oral every 12 hours  atorvastatin 20 milliGRAM(s) Oral at bedtime  cefTRIAXone   IVPB 2000 milliGRAM(s) IV Intermittent every 24 hours  folic acid 1 milliGRAM(s) Oral daily  gabapentin 300 milliGRAM(s) Oral at bedtime  influenza  Vaccine (HIGH DOSE) 0.7 milliLiter(s) IntraMuscular once  isosorbide   mononitrate ER Tablet (IMDUR) 30 milliGRAM(s) Oral every 24 hours  lidocaine   4% Patch 1 Patch Transdermal every 24 hours  metoprolol succinate ER 25 milliGRAM(s) Oral daily  montelukast 10 milliGRAM(s) Oral daily  multivitamin 1 Tablet(s) Oral daily  pantoprazole    Tablet 40 milliGRAM(s) Oral before breakfast  polyethylene glycol 3350 17 Gram(s) Oral daily  senna 2 Tablet(s) Oral at bedtime  tiotropium 2.5 MICROgram(s) Inhaler 2 Puff(s) Inhalation daily  torsemide 40 milliGRAM(s) Oral every 24 hours    MEDICATIONS  (PRN):  acetaminophen     Tablet .. 650 milliGRAM(s) Oral every 6 hours PRN Temp greater or equal to 38C (100.4F), Mild Pain (1 - 3)  LORazepam     Tablet 0.5 milliGRAM(s) Oral daily PRN Anxiety  oxyCODONE    IR 5 milliGRAM(s) Oral every 6 hours PRN Severe Pain (7 - 10)  simethicone 80 milliGRAM(s) Chew two times a day PRN Gas  traMADol 50 milliGRAM(s) Oral every 6 hours PRN Moderate Pain (4 - 6)  trimethobenzamide Injectable 200 milliGRAM(s) IntraMuscular every 6 hours PRN Nausea and/or Vomiting         Physical Therapy Functional Status Assessment :       Previous Level of Function:     · Ambulation Skills	independent; needs device; straight cane  · Transfer Skills	independent; needs device; straight cane  · ADL Skills	needed assist; needs device; shower chair, straight cane  · Additional Comments	has home oxygen, apartment, first floor, 5 steps to enter    Cognitive Status Examination:   · Orientation	oriented to person, place, time and situation  · Level of Consciousness	alert  · Follows Commands and Answers Questions	100% of the time  · Personal Safety and Judgment	intact  · Short Term Memory	intact  · Long Term Memory	intact    Range of Motion Exam:   · Range of Motion Examination	no ROM deficits were identified  · Active Range of Motion Examination	no Active ROM deficits were identified    Manual Muscle Testing:   · Manual Muscle Testing Results	grossly assessed due to  functional observation against gravity > 3/5 MMT    Bed Mobility: Rolling/Turning:     · Level of [place]	moderate assist (50% patients effort)  · Physical Assist/Nonphysical Assist	1 person assist    Bed Mobility: Sit to Supine:     · Level of [place]	moderate assist (50% patients effort)  · Physical Assist/Nonphysical Assist	2 person assist    Bed Mobility: Supine to Sit:     · Level of [place]	moderate assist (50% patients effort)  · Physical Assist/Nonphysical Assist	2 person assist    Transfer: Sit to Stand:     · Level of [place]	moderate assist (50% patients effort)  · Physical Assist/Nonphysical Assist	2 person assist  · Assistive Device	rolling walker    Transfer: Stand to Sit:     · Level of [place]	moderate assist (50% patients effort)  · Physical Assist/Nonphysical Assist	2 person assist  · Assistive Device	rolling walker    Gait Skills:     · Level of [place]	moderate assist (50% patients effort)  · Physical Assist/Nonphysical Assist	2 person assist  · Assistive Device	rolling walker  · Gait Distance	4 side steps    Gait Analysis:     · Gait Deviations Noted	decreased bhavya; decreased step length; decreased weight-shifting ability; retropulsion  · Impairments Contributing to Gait Deviations	impaired balance; decreased strength    Balance Skills Assessment:     · Sitting Balance: Static	fair balance  · Sitting Balance: Dynamic	fair minus  · Sit-to-Stand Balance	poor balance  · Standing Balance: Static	poor balance  · Standing Balance: Dynamic	poor minus  · Systems Impairment Contributing to Balance Disturbance	musculoskeletal  · Identified Impairments Contributing to Balance Disturbance	decreased strength; pain    Clinical Impressions:   · Criteria for Skilled Therapeutic Interventions	impairments found; functional limitations in following categories; risk reduction/prevention; rehab potential; therapy frequency; anticipated discharge recommendation  · Impairments Found (describe specific impairments)	aerobic capacity/endurance; gait, locomotion, and balance; muscle strength  · Functional Limitations in Following Categories (describe specific limitations)	self-care; home management; community/leisure  · Risk Reduction/Prevention (Describe Specific Areas of risk reduction/prevention)	risk factors  · Risk Areas	fall  · Rehab Potential	fair, will monitor progress closely        PM&R Impression : as above    Current Disposition Plan Recommendations :    subacute rehab placement

## 2022-12-19 NOTE — PHYSICAL THERAPY INITIAL EVALUATION ADULT - PERTINENT HX OF CURRENT PROBLEM, REHAB EVAL
82F presents for malaise, chills, and hip pain for 1 day. She states that earlier this week she saw her PCP was doing well, however yesterday had chills, rigors, malaise, and woke up with acute on chronic R hip pain. She states she has had hip pain in the past, never seen a doctor for it, and has just managed it with lidocaine patches. She states this is the worst it has been, it starts at her posterior hip and radiates down her R leg.

## 2022-12-19 NOTE — OCCUPATIONAL THERAPY INITIAL EVALUATION ADULT - PERTINENT HX OF CURRENT PROBLEM, REHAB EVAL
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

## 2022-12-19 NOTE — PROGRESS NOTE ADULT - ASSESSMENT
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

## 2022-12-19 NOTE — OCCUPATIONAL THERAPY INITIAL EVALUATION ADULT - RANGE OF MOTION EXAMINATION, UPPER EXTREMITY
No bilateral UE Active ROM was WFL  (within functional limits)/bilateral UE Passive ROM was WFL  (within functional limits)

## 2022-12-19 NOTE — PROGRESS NOTE ADULT - SUBJECTIVE AND OBJECTIVE BOX
INFECTIOUS DISEASES CONSULT FOLLOW-UP NOTE    INTERVAL HPI/OVERNIGHT EVENTS:  No event overnight  patient reports R side LBP and inability to walk  denied CP, SOB, n/v/d     ROS:   Constitutional, eyes, ENT, cardiovascular, respiratory, gastrointestinal, genitourinary, integumentary, neurological, psychiatric and heme/lymph are otherwise negative other than noted above       ANTIBIOTICS/RELEVANT:    MEDICATIONS  (STANDING):  aMIOdarone    Tablet 200 milliGRAM(s) Oral daily  apixaban 2.5 milliGRAM(s) Oral every 12 hours  atorvastatin 20 milliGRAM(s) Oral at bedtime  cefTRIAXone   IVPB 2000 milliGRAM(s) IV Intermittent every 24 hours  folic acid 1 milliGRAM(s) Oral daily  gabapentin 300 milliGRAM(s) Oral at bedtime  influenza  Vaccine (HIGH DOSE) 0.7 milliLiter(s) IntraMuscular once  isosorbide   mononitrate ER Tablet (IMDUR) 30 milliGRAM(s) Oral every 24 hours  lidocaine   4% Patch 1 Patch Transdermal every 24 hours  metoprolol succinate ER 25 milliGRAM(s) Oral daily  montelukast 10 milliGRAM(s) Oral daily  multivitamin 1 Tablet(s) Oral daily  pantoprazole    Tablet 40 milliGRAM(s) Oral before breakfast  polyethylene glycol 3350 17 Gram(s) Oral daily  senna 2 Tablet(s) Oral at bedtime  tiotropium 2.5 MICROgram(s) Inhaler 2 Puff(s) Inhalation daily  torsemide 40 milliGRAM(s) Oral every 24 hours    MEDICATIONS  (PRN):  acetaminophen     Tablet .. 650 milliGRAM(s) Oral every 6 hours PRN Temp greater or equal to 38C (100.4F), Mild Pain (1 - 3)  LORazepam     Tablet 0.5 milliGRAM(s) Oral daily PRN Anxiety  oxyCODONE    IR 5 milliGRAM(s) Oral every 6 hours PRN Severe Pain (7 - 10)  simethicone 80 milliGRAM(s) Chew two times a day PRN Gas  traMADol 50 milliGRAM(s) Oral every 6 hours PRN Moderate Pain (4 - 6)  trimethobenzamide Injectable 200 milliGRAM(s) IntraMuscular every 6 hours PRN Nausea and/or Vomiting        Vital Signs Last 24 Hrs  T(C): 37.6 (19 Dec 2022 09:05), Max: 38 (18 Dec 2022 18:30)  T(F): 99.7 (19 Dec 2022 09:05), Max: 100.4 (18 Dec 2022 18:30)  HR: 69 (19 Dec 2022 09:05) (64 - 72)  BP: 133/72 (19 Dec 2022 09:05) (113/67 - 175/52)  BP(mean): --  RR: 20 (19 Dec 2022 09:05) (19 - 20)  SpO2: 99% (19 Dec 2022 09:05) (95% - 99%)    Parameters below as of 19 Dec 2022 09:05  Patient On (Oxygen Delivery Method): nasal cannula  O2 Flow (L/min): 3      PHYSICAL EXAM:  Constitutional: alert, NAD  Eyes: the sclera and conjunctiva were normal.   ENT: the ears and nose were normal in appearance.  poor dentition   Neck: the appearance of the neck was normal and the neck was supple.   Pulmonary: no respiratory distress and lungs were clear to auscultation bilaterally.   Heart: heart rate was normal and rhythm regular, normal S1 and S2  Abdomen: normal bowel sounds, soft, non-tender          LABS:                        9.2    9.30  )-----------( 241      ( 19 Dec 2022 05:30 )             30.9     12-19    134<L>  |  93<L>  |  26<H>  ----------------------------<  102<H>  3.9   |  30  |  1.63<H>    Ca    9.0      19 Dec 2022 05:30  Phos  3.7     12-19  Mg     2.2     12-19    TPro  7.4  /  Alb  3.2<L>  /  TBili  0.5  /  DBili  x   /  AST  22  /  ALT  20  /  AlkPhos  81  12-19          MICROBIOLOGY:  12/17 BCx ngtd  12/15 BCx Strep mitis oralis (S to Pen 0.094, CTX 0.064, vanco)      RADIOLOGY & ADDITIONAL STUDIES:  12/16 CT pelvix  IMPRESSION:    No acute displaced fracture or dislocation.    Please note that MRI would be a more sensitive test to evaluate for   metastatic disease within the bones.    Other findings as above.    CT a/p 12/16  IMPRESSION:  1. Since 9/27/2022, the small bowel loop in a periumbilical ventral   hernia is now thick-walled. This could indicate strangulation. Recommend   surgical evaluation.    2. A left adrenal nodule has increased in size since 2019. Though it has   density measurements of an adenoma, a collision tumor containing   metastasis cannot be excluded.    3. Splenomegaly.      CT thoracic/lumbar 12/16    IMPRESSION:  No acute fracture or traumatic malalignment.  Multilevel degenerative changes.  (If the patient's symptoms persist consider further evaluation with MRI   as it is a superior modality to evaluate the spinal canal and neural   foramen).   INFECTIOUS DISEASES CONSULT FOLLOW-UP NOTE    INTERVAL HPI/OVERNIGHT EVENTS:  No event overnight  patient reports R side LBP and inability to walk  denied CP, SOB, n/v/d     ROS:   Constitutional, eyes, ENT, cardiovascular, respiratory, gastrointestinal, genitourinary, integumentary, neurological, psychiatric and heme/lymph are otherwise negative other than noted above       ANTIBIOTICS/RELEVANT:    MEDICATIONS  (STANDING):  aMIOdarone    Tablet 200 milliGRAM(s) Oral daily  apixaban 2.5 milliGRAM(s) Oral every 12 hours  atorvastatin 20 milliGRAM(s) Oral at bedtime  cefTRIAXone   IVPB 2000 milliGRAM(s) IV Intermittent every 24 hours  folic acid 1 milliGRAM(s) Oral daily  gabapentin 300 milliGRAM(s) Oral at bedtime  influenza  Vaccine (HIGH DOSE) 0.7 milliLiter(s) IntraMuscular once  isosorbide   mononitrate ER Tablet (IMDUR) 30 milliGRAM(s) Oral every 24 hours  lidocaine   4% Patch 1 Patch Transdermal every 24 hours  metoprolol succinate ER 25 milliGRAM(s) Oral daily  montelukast 10 milliGRAM(s) Oral daily  multivitamin 1 Tablet(s) Oral daily  pantoprazole    Tablet 40 milliGRAM(s) Oral before breakfast  polyethylene glycol 3350 17 Gram(s) Oral daily  senna 2 Tablet(s) Oral at bedtime  tiotropium 2.5 MICROgram(s) Inhaler 2 Puff(s) Inhalation daily  torsemide 40 milliGRAM(s) Oral every 24 hours    MEDICATIONS  (PRN):  acetaminophen     Tablet .. 650 milliGRAM(s) Oral every 6 hours PRN Temp greater or equal to 38C (100.4F), Mild Pain (1 - 3)  LORazepam     Tablet 0.5 milliGRAM(s) Oral daily PRN Anxiety  oxyCODONE    IR 5 milliGRAM(s) Oral every 6 hours PRN Severe Pain (7 - 10)  simethicone 80 milliGRAM(s) Chew two times a day PRN Gas  traMADol 50 milliGRAM(s) Oral every 6 hours PRN Moderate Pain (4 - 6)  trimethobenzamide Injectable 200 milliGRAM(s) IntraMuscular every 6 hours PRN Nausea and/or Vomiting        Vital Signs Last 24 Hrs  T(C): 37.6 (19 Dec 2022 09:05), Max: 38 (18 Dec 2022 18:30)  T(F): 99.7 (19 Dec 2022 09:05), Max: 100.4 (18 Dec 2022 18:30)  HR: 69 (19 Dec 2022 09:05) (64 - 72)  BP: 133/72 (19 Dec 2022 09:05) (113/67 - 175/52)  BP(mean): --  RR: 20 (19 Dec 2022 09:05) (19 - 20)  SpO2: 99% (19 Dec 2022 09:05) (95% - 99%)    Parameters below as of 19 Dec 2022 09:05  Patient On (Oxygen Delivery Method): nasal cannula  O2 Flow (L/min): 3      PHYSICAL EXAM:  Constitutional: alert, NAD  Eyes: the sclera and conjunctiva were normal.   ENT: the ears and nose were normal in appearance.  poor dentition   Neck: the appearance of the neck was normal and the neck was supple.   Pulmonary: no respiratory distress and lungs were clear to auscultation bilaterally.   Heart: heart rate was normal and rhythm regular, normal S1 and S2  Abdomen: normal bowel sounds, soft, non-tender          LABS:                        9.2    9.30  )-----------( 241      ( 19 Dec 2022 05:30 )             30.9     12-19    134<L>  |  93<L>  |  26<H>  ----------------------------<  102<H>  3.9   |  30  |  1.63<H>    Ca    9.0      19 Dec 2022 05:30  Phos  3.7     12-19  Mg     2.2     12-19    TPro  7.4  /  Alb  3.2<L>  /  TBili  0.5  /  DBili  x   /  AST  22  /  ALT  20  /  AlkPhos  81  12-19          MICROBIOLOGY:  12/17 BCx ngtd  12/15 BCx Strep mitis oralis (S to Pen 0.094, CTX 0.064, vanco)      RADIOLOGY & ADDITIONAL STUDIES:  TTE/   1. Normal left ventricular size and systolic function.   2. Mild symmetric left ventricular hypertrophy.   3. Normal right ventricular size and systolic function.   4. Mildly dilated left atrium.   5. A transcatheter aortic valve (TAVR) is noted in the aortic position.   The peak transvalvular velocity is 3.70 m/s, the mean transvalvular   gradient is 33.00 mmHg, and the LVOT/AV velocity ratio is 0.28. There is   no evidence of aortic regurgitation. The values slightly higher than   expected for a TAVR valve.   6. No evidence of pulmonary hypertension.   7. Trivial pericardial effusion without echocardiographic evidence of   cardiac tamponade physiology.   8. Compared to the previous TTE performed on 12/1/2022, there have been   no significant interval changes.   9. No obvious vegetations seen. However, consider IRMA to better   visualize the valves and evaluate for endocarditis, if clinically   indicated.      12/16 CT pelvix  IMPRESSION:    No acute displaced fracture or dislocation.    Please note that MRI would be a more sensitive test to evaluate for   metastatic disease within the bones.    Other findings as above.    CT a/p 12/16  IMPRESSION:  1. Since 9/27/2022, the small bowel loop in a periumbilical ventral   hernia is now thick-walled. This could indicate strangulation. Recommend   surgical evaluation.    2. A left adrenal nodule has increased in size since 2019. Though it has   density measurements of an adenoma, a collision tumor containing   metastasis cannot be excluded.    3. Splenomegaly.      CT thoracic/lumbar 12/16    IMPRESSION:  No acute fracture or traumatic malalignment.  Multilevel degenerative changes.  (If the patient's symptoms persist consider further evaluation with MRI   as it is a superior modality to evaluate the spinal canal and neural   foramen).

## 2022-12-19 NOTE — PROGRESS NOTE ADULT - SUBJECTIVE AND OBJECTIVE BOX
**incomplete** BRITTANY DE LA CRUZ  82y  Female    Patient is a 82y old  Female who presents with a chief complaint of Right hip pain (16 Dec 2022 17:27)      INTERVAL HPI/OVERNIGHT EVENTS:    ROS: fever/chills, fatigue, nausea, vomiting, headache, stuffiness, sore throat, chest pain, palpitations, edema, SOB, cough, wheezing, changes in appetite, changes in bowel movements, contipation, diarrhea, abdominal pain, dizziness, fainting/LOC      T(C): 37.6 (12-19-22 @ 09:05), Max: 38 (12-18-22 @ 18:30)  HR: 66 (12-19-22 @ 14:13) (64 - 69)  BP: 118/68 (12-19-22 @ 14:13) (118/68 - 133/72)  RR: 20 (12-19-22 @ 09:05) (19 - 20)  SpO2: 99% (12-19-22 @ 09:05) (97% - 99%)  Wt(kg): --Vital Signs Last 24 Hrs  T(C): 37.6 (19 Dec 2022 09:05), Max: 38 (18 Dec 2022 18:30)  T(F): 99.7 (19 Dec 2022 09:05), Max: 100.4 (18 Dec 2022 18:30)  HR: 66 (19 Dec 2022 14:13) (64 - 69)  BP: 118/68 (19 Dec 2022 14:13) (118/68 - 133/72)  BP(mean): --  RR: 20 (19 Dec 2022 09:05) (19 - 20)  SpO2: 99% (19 Dec 2022 09:05) (97% - 99%)    Parameters below as of 19 Dec 2022 09:05  Patient On (Oxygen Delivery Method): nasal cannula  O2 Flow (L/min): 3      PHYSICAL EXAM:  GENERAL: NAD; well-groomed; well-developed; AAO x 3; good concentration   Neuro: CN2-12 grossly intact; speech clear; +2/4 DTR in UE and LE b/l; light touch sensation intact of UE and le b/l;  negative Romberg test; no pronator drift; intact tandem gait; intact heel and toe walking; intact finger to nose testing; intact rapid alternating movements  HEENT: NC/AT; MMM; neck supple; good dentition; no nystagmus; no scleral icterus; nasal passages clear; no throid or LN enlargement  Heart: RRR; +s1 and s2; no MRG (or grade 2 _ murmur appreciated at _ ICS); non displaced PMI; no JVD  Lungs: CTA b/l; normal effort; no accesory muscle use; symmetric inhalation and exhalation; vesicular breath sounds; no WWR; normal tactile fremitus; persussion resonant  GI: nondistended; nontender; normal bowel sounds [id number]; percussion typmanic; no organomegaly   Extremities: +2 pulses in UE and LE b/l; no clubbing, cyanosis, or edema b/l, capillary refill <2 sec b/l  Skin: skin dry and warm; no skin tenting; no rashes or lesions  MSK: normal tone; +5/5 strength in upper and lower extremities b/l    Consultant(s) Notes Reviewed:  [x ] YES  [ ] NO  Care Discussed with Consultants/Other Providers [ x] YES  [ ] NO    LABS:                        9.2    9.30  )-----------( 241      ( 19 Dec 2022 05:30 )             30.9     12-19    134<L>  |  93<L>  |  26<H>  ----------------------------<  102<H>  3.9   |  30  |  1.63<H>    Ca    9.0      19 Dec 2022 05:30  Phos  3.7     12-19  Mg     2.2     12-19    TPro  7.4  /  Alb  3.2<L>  /  TBili  0.5  /  DBili  x   /  AST  22  /  ALT  20  /  AlkPhos  81  12-19          CAPILLARY BLOOD GLUCOSE                RADIOLOGY & ADDITIONAL TESTS:    Imaging Personally Reviewed:  [ ] YES  [ ] NO

## 2022-12-19 NOTE — PROGRESS NOTE ADULT - PROBLEM SELECTOR PLAN 3
Baseline Cr. 1.2-1.3. Cr on admission 1.8.   - Urine lytes, calculate FeUrea   - Bladder scan to rule out obstruction   - Trend sCr   - Avoid nephrotoxic agents.   - Renally dose medications

## 2022-12-19 NOTE — PROGRESS NOTE ADULT - PROBLEM SELECTOR PLAN 1
Pt presenting with malaise, chills and acute on chronic R hip pain. Pt states she has had chronic R hip pain that shoots down her leg, but has not seen providers for it in the past. Day prior to admission she said the pain was severe and had pain with ambulation. Pain starts at posterior hip and radiates down R leg. CT scans and xrays without findings   - Ortho consulted, low concern for septic joint   - ID consulted, f/u recs   -  MRI lumber and R hip wwo contrast   - Pain control with lidocaine patch, tylenol, oxycodone 5mg q6 hours prn  - Gabapentin 600 mg PO daily.   - CT scans and xrays without findings.  - f/u ESR and CRP   - f/u PT     #Bacteremia   Found to have strep bactermia likely 2/2 to hx of colon cancer. Patient currently afebrile, however had T 100.4 (12/18, ON).   - cw CTX 2g   - f/u repeat blood cultures from 12/19   - ID following, f/u recs

## 2022-12-19 NOTE — PROGRESS NOTE ADULT - ASSESSMENT
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  Patient with poor dentition and didn't get tooth extraction after having gamella bacteremia in Sep, source of bacteremia likely from oral mela.  Acute on chronic R hip / R LBP can be due to degenerative disease vs infection vs mets.      - cont CTX 2g IV q24h  - f/u surveillance BCx (so far ngtd since 12/17)  - IRMA to r/o endocarditis  - check ESR, CRP  - MRI lumbar and R hip wwo contrast  - will get gallium scan if IRMA neg to r/o prosthetic infection       Team 2 will follow you.  Case d/w primary team.    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  Patient with poor dentition and didn't get tooth extraction after having gamella bacteremia in Sep, source of bacteremia likely from oral mela.  Acute on chronic R hip / R LBP can be due to degenerative disease vs infection vs mets.  TTE neg for vegetation, will need IRMA.    - cont CTX 2g IV q24h  - f/u surveillance BCx (so far ngtd since 12/17)  - obtain IRMA to r/o endocarditis  - check ESR, CRP  - MRI lumbar and R hip wwo contrast  - will get gallium scan if IRMA neg to r/o prosthetic infection       Team 2 will follow you.  Case d/w primary team.    Evette Donahue MD, MS  Infectious Disease attending  work cell [phone]   For any questions during evening/weekend/holiday, please page ID on call

## 2022-12-19 NOTE — PHYSICAL THERAPY INITIAL EVALUATION ADULT - NSPTDISCHREC_GEN_A_CORE
recommended rehab however patient declining rehab; if patient were to go home can benefit from home PT with 24 hr assist and wheelchair for long distance and rolling walker/Sub-acute Rehab

## 2022-12-19 NOTE — PROGRESS NOTE ADULT - SUBJECTIVE AND OBJECTIVE BOX
Ortho Note    Pt comfortable. Endorsing R lower back pain. Denies any hip pain.  Denies CP, SOB, N/V, numbness/tingling     Vital Signs Last 24 Hrs  T(C): 37.1 (12-19-22 @ 06:36), Max: 37.1 (12-19-22 @ 06:36)  T(F): 98.7 (12-19-22 @ 06:36), Max: 98.7 (12-19-22 @ 06:36)  HR: 64 (12-19-22 @ 06:36) (64 - 64)  BP: 130/74 (12-19-22 @ 06:36) (130/74 - 130/74)  BP(mean): --  RR: 19 (12-19-22 @ 06:36) (19 - 19)  SpO2: 97% (12-19-22 @ 06:36) (97% - 97%)  I&O's Summary      Gen: Lying in bed, non-toxic appearing, NAD, resting comfortably  Resp: No increased WOB  RLE:  TTP over R lumbar spine  Skin intact, no effusion or erythema, normothermic  NT over R hip,  compartments soft  R Hip full pain free ROM   Motor: TA/EHL/GS/FHL intact  Sensory: DP/SP/Tib/Ellen/Saph SILT  +DP pulse, WWP  Negative straight leg raise                          8.9    10.36 )-----------( 207      ( 18 Dec 2022 08:58 )             29.6     12-18    137  |  96  |  24<H>  ----------------------------<  97  3.8   |  31  |  1.49<H>    Ca    9.4      18 Dec 2022 08:58  Phos  4.3     12-18  Mg     2.2     12-18      82y Female w/ R low back pain. Very Low suspicion for septic arthritis.   -WBAT RLE  -f/u ESR and CRP  - Rec PT  -no acute orthopedic intervention indicated at this time  -pain control  -ice/cold compress  -MRI for check for effusion and metastatic disease  - Remainder of care per primary team      Ortho Pager [phone]

## 2022-12-19 NOTE — PHYSICAL THERAPY INITIAL EVALUATION ADULT - GENERAL OBSERVATIONS, REHAB EVAL
Received supine complaints of back pain and headache 8/10 +IV hep, primafit not attached to suction, O2 NC 3L. Left as found +RN Monalisa aware, call garcia

## 2022-12-19 NOTE — PROGRESS NOTE ADULT - PROBLEM SELECTOR PLAN 2
U/A consistent with LE, bacteria and WBC. Received 1 CTX in the ED. Not febrile, but is having chills. Does report intermittent burning with urination.   - C/w CTX 2g

## 2022-12-19 NOTE — OCCUPATIONAL THERAPY INITIAL EVALUATION ADULT - GENERAL OBSERVATIONS, REHAB EVAL
Pt's RN Monalisa aware of intent to eval/tx; cleared Pt. Pt received in supine - +heplock, bed alarm. Pt agreeable to OT.

## 2022-12-20 ENCOUNTER — APPOINTMENT (OUTPATIENT)
Dept: HEART AND VASCULAR | Facility: CLINIC | Age: 82
End: 2022-12-20

## 2022-12-20 LAB
ALBUMIN SERPL ELPH-MCNC: 2.6 G/DL — LOW (ref 3.3–5)
ALP SERPL-CCNC: 152 U/L — HIGH (ref 40–120)
ALT FLD-CCNC: 32 U/L — SIGNIFICANT CHANGE UP (ref 10–45)
ANION GAP SERPL CALC-SCNC: 12 MMOL/L — SIGNIFICANT CHANGE UP (ref 5–17)
AST SERPL-CCNC: 39 U/L — SIGNIFICANT CHANGE UP (ref 10–40)
BASOPHILS # BLD AUTO: 0.02 K/UL — SIGNIFICANT CHANGE UP (ref 0–0.2)
BASOPHILS NFR BLD AUTO: 0.2 % — SIGNIFICANT CHANGE UP (ref 0–2)
BILIRUB SERPL-MCNC: 0.5 MG/DL — SIGNIFICANT CHANGE UP (ref 0.2–1.2)
BUN SERPL-MCNC: 29 MG/DL — HIGH (ref 7–23)
CALCIUM SERPL-MCNC: 9 MG/DL — SIGNIFICANT CHANGE UP (ref 8.4–10.5)
CHLORIDE SERPL-SCNC: 91 MMOL/L — LOW (ref 96–108)
CO2 SERPL-SCNC: 30 MMOL/L — SIGNIFICANT CHANGE UP (ref 22–31)
CREAT SERPL-MCNC: 1.43 MG/DL — HIGH (ref 0.5–1.3)
CRP SERPL-MCNC: 253.5 MG/L — HIGH (ref 0–4)
EGFR: 36 ML/MIN/1.73M2 — LOW
EOSINOPHIL # BLD AUTO: 0.14 K/UL — SIGNIFICANT CHANGE UP (ref 0–0.5)
EOSINOPHIL NFR BLD AUTO: 1.5 % — SIGNIFICANT CHANGE UP (ref 0–6)
GLUCOSE SERPL-MCNC: 122 MG/DL — HIGH (ref 70–99)
HCT VFR BLD CALC: 32.6 % — LOW (ref 34.5–45)
HGB BLD-MCNC: 9.5 G/DL — LOW (ref 11.5–15.5)
IMM GRANULOCYTES NFR BLD AUTO: 0.4 % — SIGNIFICANT CHANGE UP (ref 0–0.9)
LYMPHOCYTES # BLD AUTO: 0.59 K/UL — LOW (ref 1–3.3)
LYMPHOCYTES # BLD AUTO: 6.3 % — LOW (ref 13–44)
MAGNESIUM SERPL-MCNC: 2 MG/DL — SIGNIFICANT CHANGE UP (ref 1.6–2.6)
MCHC RBC-ENTMCNC: 26.2 PG — LOW (ref 27–34)
MCHC RBC-ENTMCNC: 29.1 GM/DL — LOW (ref 32–36)
MCV RBC AUTO: 90.1 FL — SIGNIFICANT CHANGE UP (ref 80–100)
MONOCYTES # BLD AUTO: 0.65 K/UL — SIGNIFICANT CHANGE UP (ref 0–0.9)
MONOCYTES NFR BLD AUTO: 7 % — SIGNIFICANT CHANGE UP (ref 2–14)
NEUTROPHILS # BLD AUTO: 7.88 K/UL — HIGH (ref 1.8–7.4)
NEUTROPHILS NFR BLD AUTO: 84.6 % — HIGH (ref 43–77)
NRBC # BLD: 0 /100 WBCS — SIGNIFICANT CHANGE UP (ref 0–0)
PHOSPHATE SERPL-MCNC: 3.6 MG/DL — SIGNIFICANT CHANGE UP (ref 2.5–4.5)
PLATELET # BLD AUTO: 240 K/UL — SIGNIFICANT CHANGE UP (ref 150–400)
POTASSIUM SERPL-MCNC: 4 MMOL/L — SIGNIFICANT CHANGE UP (ref 3.5–5.3)
POTASSIUM SERPL-SCNC: 4 MMOL/L — SIGNIFICANT CHANGE UP (ref 3.5–5.3)
PROT SERPL-MCNC: 7.4 G/DL — SIGNIFICANT CHANGE UP (ref 6–8.3)
RBC # BLD: 3.62 M/UL — LOW (ref 3.8–5.2)
RBC # FLD: 20.5 % — HIGH (ref 10.3–14.5)
SARS-COV-2 RNA SPEC QL NAA+PROBE: SIGNIFICANT CHANGE UP
SODIUM SERPL-SCNC: 133 MMOL/L — LOW (ref 135–145)
WBC # BLD: 9.32 K/UL — SIGNIFICANT CHANGE UP (ref 3.8–10.5)
WBC # FLD AUTO: 9.32 K/UL — SIGNIFICANT CHANGE UP (ref 3.8–10.5)

## 2022-12-20 PROCEDURE — 74018 RADEX ABDOMEN 1 VIEW: CPT | Mod: 26

## 2022-12-20 PROCEDURE — 72148 MRI LUMBAR SPINE W/O DYE: CPT | Mod: 26

## 2022-12-20 PROCEDURE — 71250 CT THORAX DX C-: CPT | Mod: 26

## 2022-12-20 PROCEDURE — 74176 CT ABD & PELVIS W/O CONTRAST: CPT | Mod: 26

## 2022-12-20 PROCEDURE — 74018 RADEX ABDOMEN 1 VIEW: CPT | Mod: 26,77

## 2022-12-20 PROCEDURE — 99233 SBSQ HOSP IP/OBS HIGH 50: CPT

## 2022-12-20 PROCEDURE — 99233 SBSQ HOSP IP/OBS HIGH 50: CPT | Mod: GC

## 2022-12-20 RX ORDER — DIATRIZOATE MEGLUMINE 180 MG/ML
30 INJECTION, SOLUTION INTRAVESICAL ONCE
Refills: 0 | Status: COMPLETED | OUTPATIENT
Start: 2022-12-20 | End: 2022-12-20

## 2022-12-20 RX ADMIN — MONTELUKAST 10 MILLIGRAM(S): 4 TABLET, CHEWABLE ORAL at 14:51

## 2022-12-20 RX ADMIN — DIATRIZOATE MEGLUMINE 30 MILLILITER(S): 180 INJECTION, SOLUTION INTRAVESICAL at 19:17

## 2022-12-20 RX ADMIN — Medication 40 MILLIGRAM(S): at 14:51

## 2022-12-20 RX ADMIN — Medication 650 MILLIGRAM(S): at 15:35

## 2022-12-20 RX ADMIN — ISOSORBIDE MONONITRATE 30 MILLIGRAM(S): 60 TABLET, EXTENDED RELEASE ORAL at 14:51

## 2022-12-20 RX ADMIN — AMIODARONE HYDROCHLORIDE 200 MILLIGRAM(S): 400 TABLET ORAL at 06:40

## 2022-12-20 RX ADMIN — CEFTRIAXONE 100 MILLIGRAM(S): 500 INJECTION, POWDER, FOR SOLUTION INTRAMUSCULAR; INTRAVENOUS at 22:36

## 2022-12-20 RX ADMIN — TIOTROPIUM BROMIDE 2 PUFF(S): 18 CAPSULE ORAL; RESPIRATORY (INHALATION) at 10:17

## 2022-12-20 RX ADMIN — LIDOCAINE 1 PATCH: 4 CREAM TOPICAL at 06:41

## 2022-12-20 RX ADMIN — PANTOPRAZOLE SODIUM 40 MILLIGRAM(S): 20 TABLET, DELAYED RELEASE ORAL at 06:40

## 2022-12-20 RX ADMIN — POLYETHYLENE GLYCOL 3350 17 GRAM(S): 17 POWDER, FOR SOLUTION ORAL at 14:51

## 2022-12-20 RX ADMIN — LIDOCAINE 1 PATCH: 4 CREAM TOPICAL at 19:13

## 2022-12-20 RX ADMIN — Medication 1 ENEMA: at 18:51

## 2022-12-20 RX ADMIN — GABAPENTIN 300 MILLIGRAM(S): 400 CAPSULE ORAL at 22:36

## 2022-12-20 RX ADMIN — SENNA PLUS 2 TABLET(S): 8.6 TABLET ORAL at 22:36

## 2022-12-20 RX ADMIN — Medication 650 MILLIGRAM(S): at 14:51

## 2022-12-20 RX ADMIN — Medication 1 MILLIGRAM(S): at 14:51

## 2022-12-20 RX ADMIN — Medication 10 MILLIGRAM(S): at 10:17

## 2022-12-20 RX ADMIN — APIXABAN 2.5 MILLIGRAM(S): 2.5 TABLET, FILM COATED ORAL at 06:41

## 2022-12-20 RX ADMIN — Medication 1 TABLET(S): at 14:51

## 2022-12-20 RX ADMIN — Medication 25 MILLIGRAM(S): at 06:40

## 2022-12-20 RX ADMIN — ATORVASTATIN CALCIUM 20 MILLIGRAM(S): 80 TABLET, FILM COATED ORAL at 22:36

## 2022-12-20 NOTE — PROGRESS NOTE ADULT - SUBJECTIVE AND OBJECTIVE BOX
INFECTIOUS DISEASES CONSULT FOLLOW-UP NOTE    INTERVAL HPI/OVERNIGHT EVENTS:  No event overnight  patient reports abdominal pain, just had BM, saying she didnt have BM for 5 days and now finaly had one  thinks back and R hip pain improving    ROS:   Constitutional, eyes, ENT, cardiovascular, respiratory, gastrointestinal, genitourinary, integumentary, neurological, psychiatric and heme/lymph are otherwise negative other than noted above       ANTIBIOTICS/RELEVANT:    MEDICATIONS  (STANDING):  aMIOdarone    Tablet 200 milliGRAM(s) Oral daily  apixaban 2.5 milliGRAM(s) Oral every 12 hours  atorvastatin 20 milliGRAM(s) Oral at bedtime  cefTRIAXone   IVPB 2000 milliGRAM(s) IV Intermittent every 24 hours  folic acid 1 milliGRAM(s) Oral daily  gabapentin 300 milliGRAM(s) Oral at bedtime  influenza  Vaccine (HIGH DOSE) 0.7 milliLiter(s) IntraMuscular once  isosorbide   mononitrate ER Tablet (IMDUR) 30 milliGRAM(s) Oral every 24 hours  lidocaine   4% Patch 1 Patch Transdermal every 24 hours  metoprolol succinate ER 25 milliGRAM(s) Oral daily  montelukast 10 milliGRAM(s) Oral daily  multivitamin 1 Tablet(s) Oral daily  pantoprazole    Tablet 40 milliGRAM(s) Oral before breakfast  polyethylene glycol 3350 17 Gram(s) Oral daily  senna 2 Tablet(s) Oral at bedtime  tiotropium 2.5 MICROgram(s) Inhaler 2 Puff(s) Inhalation daily  torsemide 40 milliGRAM(s) Oral every 24 hours    MEDICATIONS  (PRN):  acetaminophen     Tablet .. 650 milliGRAM(s) Oral every 6 hours PRN Temp greater or equal to 38C (100.4F), Mild Pain (1 - 3)  LORazepam     Tablet 0.5 milliGRAM(s) Oral daily PRN Anxiety  oxyCODONE    IR 5 milliGRAM(s) Oral every 6 hours PRN Severe Pain (7 - 10)  simethicone 80 milliGRAM(s) Chew two times a day PRN Gas  traMADol 50 milliGRAM(s) Oral every 6 hours PRN Moderate Pain (4 - 6)  trimethobenzamide Injectable 200 milliGRAM(s) IntraMuscular every 6 hours PRN Nausea and/or Vomiting        Vital Signs Last 24 Hrs  T(C): 38.1 (20 Dec 2022 09:06), Max: 38.1 (20 Dec 2022 09:06)  T(F): 100.6 (20 Dec 2022 09:06), Max: 100.6 (20 Dec 2022 09:06)  HR: 66 (20 Dec 2022 09:06) (63 - 66)  BP: 135/68 (20 Dec 2022 09:06) (117/67 - 135/68)  BP(mean): --  RR: 15 (20 Dec 2022 09:06) (15 - 18)  SpO2: 96% (20 Dec 2022 09:06) (94% - 99%)    Parameters below as of 20 Dec 2022 09:06  Patient On (Oxygen Delivery Method): nasal cannula  O2 Flow (L/min): 3      PHYSICAL EXAM:  Constitutional: alert, NAD  Eyes: the sclera and conjunctiva were normal.   ENT: the ears and nose were normal in appearance.  poor dentition   Neck: the appearance of the neck was normal and the neck was supple.   Pulmonary: no respiratory distress and lungs were clear to auscultation bilaterally.   Heart: heart rate was normal and rhythm regular, normal S1 and S2  Abdomen: normal bowel sounds, soft, ttp           LABS:                        9.5    9.32  )-----------( 240      ( 20 Dec 2022 05:30 )             32.6     12-20    133<L>  |  91<L>  |  29<H>  ----------------------------<  122<H>  4.0   |  30  |  1.43<H>    Ca    9.0      20 Dec 2022 05:30  Phos  3.6     12-20  Mg     2.0     12-20    TPro  7.4  /  Alb  2.6<L>  /  TBili  0.5  /  DBili  x   /  AST  39  /  ALT  32  /  AlkPhos  152<H>  12-20          MICROBIOLOGY:  12/17 BCx ngtd  12/15 BCx Strep mitis oralis (S to Pen 0.094, CTX 0.064, vanco)      RADIOLOGY & ADDITIONAL STUDIES:  TTE/   1. Normal left ventricular size and systolic function.   2. Mild symmetric left ventricular hypertrophy.   3. Normal right ventricular size and systolic function.   4. Mildly dilated left atrium.   5. A transcatheter aortic valve (TAVR) is noted in the aortic position.   The peak transvalvular velocity is 3.70 m/s, the mean transvalvular   gradient is 33.00 mmHg, and the LVOT/AV velocity ratio is 0.28. There is   no evidence of aortic regurgitation. The values slightly higher than   expected for a TAVR valve.   6. No evidence of pulmonary hypertension.   7. Trivial pericardial effusion without echocardiographic evidence of   cardiac tamponade physiology.   8. Compared to the previous TTE performed on 12/1/2022, there have been   no significant interval changes.   9. No obvious vegetations seen. However, consider IRMA to better   visualize the valves and evaluate for endocarditis, if clinically   indicated.      12/16 CT pelvix  IMPRESSION:    No acute displaced fracture or dislocation.    Please note that MRI would be a more sensitive test to evaluate for   metastatic disease within the bones.    Other findings as above.    CT a/p 12/16  IMPRESSION:  1. Since 9/27/2022, the small bowel loop in a periumbilical ventral   hernia is now thick-walled. This could indicate strangulation. Recommend   surgical evaluation.    2. A left adrenal nodule has increased in size since 2019. Though it has   density measurements of an adenoma, a collision tumor containing   metastasis cannot be excluded.    3. Splenomegaly.      CT thoracic/lumbar 12/16    IMPRESSION:  No acute fracture or traumatic malalignment.  Multilevel degenerative changes.  (If the patient's symptoms persist consider further evaluation with MRI   as it is a superior modality to evaluate the spinal canal and neural   foramen).

## 2022-12-20 NOTE — PROGRESS NOTE ADULT - SUBJECTIVE AND OBJECTIVE BOX
Ortho Note    Pt seen and examined on morning rounds. Pt comfortable without complaints, pain controlled.  Denies CP, SOB, N/V, numbness/tingling     Vital Signs Last 24 Hrs  T(C): 37.3 (12-19-22 @ 22:11), Max: 37.3 (12-19-22 @ 22:11)  T(F): 99.1 (12-19-22 @ 22:11), Max: 99.1 (12-19-22 @ 22:11)  HR: 63 (12-19-22 @ 22:11) (63 - 63)  BP: 117/67 (12-19-22 @ 22:11) (117/67 - 117/67)  BP(mean): --  RR: 18 (12-19-22 @ 22:11) (18 - 18)  SpO2: 98% (12-19-22 @ 22:11) (98% - 98%)  I&O's Summary    Physical Exam:  Gen: Lying in bed, non-toxic appearing, NAD, resting comfortably  Resp: No increased WOB  RLE:  TTP over R lumbar spine  Skin intact, no effusion or erythema, normothermic  NT over R hip,  compartments soft  R Hip full pain free ROM   Motor: TA/EHL/GS/FHL intact  Sensory: DP/SP/Tib/Ellen/Saph SILT  +DP pulse, WWP  Negative straight leg raise                          9.2    9.30  )-----------( 241      ( 19 Dec 2022 05:30 )             30.9     12-19    134<L>  |  93<L>  |  26<H>  ----------------------------<  102<H>  3.9   |  30  |  1.63<H>    Ca    9.0      19 Dec 2022 05:30  Phos  3.7     12-19  Mg     2.2     12-19    TPro  7.4  /  Alb  3.2<L>  /  TBili  0.5  /  DBili  x   /  AST  22  /  ALT  20  /  AlkPhos  81  12-19      82y Female w/ R low back pain. Very Low suspicion for septic arthritis.   -WBAT RLE  -OAI111 and .3  - Rec PT  -no acute orthopedic intervention indicated at this time  -pain control  -ice/cold compress  -MRI for check for effusion and metastatic disease  - Remainder of care per primary team    Srinivas Sanchez, PGY-2  Ortho Pager [phone]

## 2022-12-20 NOTE — PROGRESS NOTE ADULT - ATTENDING COMMENTS
I personally examined Ms Segovia and discussed management with medical team. I agree with assessment except as below.    Patient reports abdominal distension, she reports no BM over last 2-3 days, unsure if she passed flatus since yesterday. Denies N/V.  Reports good UOP. Reports pain better controlled. Denies other complains.    General: AOX3, NAD, lying in bed, speaking in full sentences, no labored breathing on RA  HEENT: AT/NC, no facial asymmetry   Lungs: poor inspiration, no crackles, no wheezes  Heart: RRR  Abdomen: distended, tympanic, mildly tender, no guarding, hyperactive BS  Extremities: warm, no edema, no tenderness, able to move better in bed, no focal deficit, sensation intact    Labs  WBC 9.32 Neut 7.8  H/H 9.5/32.6 MCV 91    Creat 1.43 Bicarb 30 AG 12    Patient with new abdominal pain, get AXR. She denies urinary incontinence/retention. Has history of Colon surgery and umbilical hernia. Will get surgical evaluation based on findings  Continue on ATB for Strep Bacteremia, repeat Bx remain with no growth. TTE without vegetations, will need IRMA, MRI spine pending. If all negative will need Gallium scan   Appreciate Ortho, ID

## 2022-12-20 NOTE — PROGRESS NOTE ADULT - PROBLEM SELECTOR PLAN 4
Reproducible, umbilical hernia measuring 3.38 cm in size, containing a small bowel loop which demonstrates mild bowel wall thickening. Within the abdomen, the proximal portion of the small bowel loops slightly distended to 2.4 cm, suggesting a degree of entrapment within the hernia itself, where as the distal portion the small bowel loop is empty with a diameter of 10 mm. Additionally, a volume of edematous mesenteric fat is noted adjacent to the umbilical hernia measuring up to 5.34 cm.  - Surgery consult with no acute intervention.

## 2022-12-20 NOTE — PROGRESS NOTE ADULT - ASSESSMENT
82F, Russian speaking, spoke with  Lulu #[id number], w/ PMHx HTN, HLD, severe AS s/p TAVR (2019) w/ mod valve stenosis and valve thrombosis 2021 s/p AC (not seen on echo 2022), Ascending Thoracic Aneurysm 4cm in 1/2022, pAFib (on Amiodarone/Eliquis), COPD (on 2L home O2), Colon CA s/p resection in 2019 (previously in remission now with recurrence and mets to lung), moderate MARK (non-compliant with CPAP 2/2 claustrophobia), CKD3 (baseline Cr 1.1-1.2) and FEROZ presents for malaise, chills, and hip pain for 1 day.

## 2022-12-20 NOTE — CONSULT NOTE ADULT - ASSESSMENT
82F, Marshallese speaking, w/ PMHx HTN, HLD, severe AS s/p TAVR (2019), valve thrombosis 2021 s/p AC (not seen on echo 2022), Ascending Thoracic Aneurysm 4cm in 1/2022, pAFib (on Amiodarone/Eliquis), COPD (use to be on 2L home O2 but doesn't use it anymore), Colon CA s/p resection in 2019 (in remission), moderate MARK (non-compliant with CPAP 2/2 claustrophobia), CKD3 (baseline Cr 1.1-1.2) with recent admission in September, 2022 for anemia, found to have new primary colon cancer with lung mets, readmitted to medicine on 12/16 on with right hip pain and a UTI, now being workup up for strep mitis bacteremia. General surgery consulted for new onset abdominal pain and evidence of colonic dilation on abdominal xray. Pt subjectively with mild generalized abdominal pain, denies nausea or vomiting, and had a large nonbloody BM earlier today. Abdomen soft, mildly distended, with mild-moderate lower abdominal tenderness to palpation. Labs from today without electrolyte abnormalities. Plain film of the abdomen demonstrates colonic dilation to 11cm.     Recommendations:  No acute surgical intervention at this time  Obtain CTAP with PO contrast for better evaluation of the large intestine  In the setting of known metastatic disease, would consider palliative care discussion to better appreciate goals of care  Team 1C will continue to follow    Plan discussed with chief resident and Dr. Sanford   82F, Macedonian speaking, w/ PMHx HTN, HLD, severe AS s/p TAVR (2019), valve thrombosis 2021 s/p AC (not seen on echo 2022), Ascending Thoracic Aneurysm 4cm in 1/2022, pAFib (on Amiodarone/Eliquis), COPD (use to be on 2L home O2 but doesn't use it anymore), Colon CA s/p resection in 2019 (in remission), moderate MARK (non-compliant with CPAP 2/2 claustrophobia), CKD3 (baseline Cr 1.1-1.2) with recent admission in September, 2022 for anemia, found to have new primary colon cancer with lung mets, readmitted to medicine on 12/16 on with right hip pain and a UTI, now being workup up for strep mitis bacteremia. General surgery consulted for new onset abdominal pain and evidence of colonic dilation on abdominal xray. Pt subjectively with mild generalized abdominal pain, denies nausea or vomiting, and had a large nonbloody BM earlier today. Abdomen soft, mildly distended, with mild-moderate lower abdominal tenderness to palpation. Labs from today without electrolyte abnormalities. Plain film of the abdomen demonstrates colonic dilation to 11cm.     Recommendations:  No acute surgical intervention at this time  Obtain CTAP with PO contrast for better evaluation of the large intestine  In the setting of known metastatic disease, would consider palliative care discussion to better appreciate goals of care  Team 1C will continue to follow    Plan discussed with chief resident and Dr. Xavier

## 2022-12-20 NOTE — PROGRESS NOTE ADULT - SUBJECTIVE AND OBJECTIVE BOX
**incomplete** Overnight: NAEON   Subjective: Patient continues to have abdominal pain. However, the patient was able to have a 1 large BM after ducolax enema, fleet enema, and continued bowel regimen. Prior to the BM, the patient had an abd xray showing dilated bowel, post-BM the patient continued to have similar imaging findings with moderately improved pain. The patient denies any CP or lightheadedness, however the patient does endorse some hip pain (similar to prior)    T(C): 37.8 (12-20-22 @ 17:23), Max: 38.1 (12-20-22 @ 09:06)  HR: 62 (12-20-22 @ 17:23) (61 - 66)  BP: 128/62 (12-20-22 @ 17:23) (117/67 - 147/70)  RR: 18 (12-20-22 @ 17:23) (15 - 18)  SpO2: 98% (12-20-22 @ 17:23) (94% - 98%)  Wt(kg): --Vital Signs Last 24 Hrs  T(C): 37.8 (20 Dec 2022 17:23), Max: 38.1 (20 Dec 2022 09:06)  T(F): 100 (20 Dec 2022 17:23), Max: 100.6 (20 Dec 2022 09:06)  HR: 62 (20 Dec 2022 17:23) (61 - 66)  BP: 128/62 (20 Dec 2022 17:23) (117/67 - 147/70)  BP(mean): --  RR: 18 (20 Dec 2022 17:23) (15 - 18)  SpO2: 98% (20 Dec 2022 17:23) (94% - 98%)    Parameters below as of 20 Dec 2022 09:06  Patient On (Oxygen Delivery Method): nasal cannula  O2 Flow (L/min): 3      PHYSICAL EXAM:  GENERAL: Appears in distress, particularly when palpating abdomen   Neuro: CN2-12 grossly intact  HEENT: NC/AT; MMM  Heart: RRR; +s1 and s2; no MRG   Lungs: CTA b/l; normal effort; no accesory muscle use;  GI: nondistended; nontender; normal bowel sounds [id number];  Extremities: +2 pulses in UE and LE b/l;   Skin: skin dry and warm; no skin tenting; no rashes or lesions  MSK: normal tone; +5/5 strength in upper and lower extremities b/l    LABS:                        9.5    9.32  )-----------( 240      ( 20 Dec 2022 05:30 )             32.6     12-20    133<L>  |  91<L>  |  29<H>  ----------------------------<  122<H>  4.0   |  30  |  1.43<H>    Ca    9.0      20 Dec 2022 05:30  Phos  3.6     12-20  Mg     2.0     12-20    TPro  7.4  /  Alb  2.6<L>  /  TBili  0.5  /  DBili  x   /  AST  39  /  ALT  32  /  AlkPhos  152<H>  12-20          CAPILLARY BLOOD GLUCOSE                RADIOLOGY & ADDITIONAL TESTS:    Imaging Personally Reviewed:  [ ] YES  [ ] NO

## 2022-12-20 NOTE — PROGRESS NOTE ADULT - PROBLEM SELECTOR PLAN 3
Baseline Cr. 1.2-1.3. Cr on admission 1.8.   - Trend sCr   - Avoid nephrotoxic agents.   - Renally dose medications

## 2022-12-20 NOTE — PROGRESS NOTE ADULT - PROBLEM SELECTOR PLAN 1
Pt presenting with malaise, chills and acute on chronic R hip pain. Pt states she has had chronic R hip pain that shoots down her leg, but has not seen providers for it in the past. Day prior to admission she said the pain was severe and had pain with ambulation. Pain starts at posterior hip and radiates down R leg. CT scans and xrays without findings     - Ortho consulted, low concern for septic joint w supportive management focused of PT and pain control  - MRI lumber and R hip wwo contrast   - Pain control with lidocaine patch, tylenol, oxycodone 5mg q6 hours prn  - Gabapentin 600 mg PO daily.   - CT scans and xrays without findings.  - f/u PT recs    #Bacteremia   Found to have strep bacteremia likely 2/2 to hx of colon cancer. Patient currently afebrile, however had T 100.4 (12/18, ON).   - cw CTX 2g   - f/u repeat blood cultures from 12/20  - ID following, f/u recs  - TTE neg for vegetation, f/u IRMA (will get gallium if IRMA neg to r/o prosthetic infection)    #Abdominal Pain   Patient with distended bowel, however soft but tender to palpation likely 2/2 constipation as pt had not had a BM for 5 days. Had 1 large BM today s/p Bowel reg, ducolex enema, fleet enema. Abdominal Xray Pre and post BM demonstrated appx 11cm dilation   - Surgery following, no acute intervention, however rec CTAP w PO contrast for improved evaluation

## 2022-12-20 NOTE — PROGRESS NOTE ADULT - ASSESSMENT
81yo Korean speaking F h/o severe AS s/p TAVR 2019 with mod valve stenosis and valve thrombosis in 2021 s/p AC (not seen on echo 2022), ascending thoracic aneurysm, colon ca s/p resection in 2019 with recurrence mets to lung (not on therapy), COPD on home 2L O2, MARK, CKD3, pAfib p/w fatigue, weakness, acute on chronic R hip pain, inability to ambulate, found to have high grade Strep mitis oralis bacteremia.  Patient with poor dentition and didn't get tooth extraction after having gamella bacteremia in Sep, source of bacteremia likely from oral mela.  Acute on chronic R hip / R LBP can be due to degenerative disease vs infection vs mets.  TTE neg for vegetation, will need IRMA.    - cont CTX 2g IV q24h  - f/u surveillance BCx (so far ngtd since 12/17)  - obtain IRMA to r/o endocarditis  - MRI lumbar and R hip wwo contrast  - will get gallium scan if IRMA neg to r/o prosthetic infection       Team 2 will follow you.  Case d/w primary team.    Evette Donahue MD, MS  Infectious Disease attending  work cell [phone]   For any questions during evening/weekend/holiday, please page ID on call

## 2022-12-21 DIAGNOSIS — R53.81 OTHER MALAISE: ICD-10-CM

## 2022-12-21 DIAGNOSIS — R14.0 ABDOMINAL DISTENSION (GASEOUS): ICD-10-CM

## 2022-12-21 DIAGNOSIS — R78.81 BACTEREMIA: ICD-10-CM

## 2022-12-21 DIAGNOSIS — R10.9 UNSPECIFIED ABDOMINAL PAIN: ICD-10-CM

## 2022-12-21 DIAGNOSIS — Z71.89 OTHER SPECIFIED COUNSELING: ICD-10-CM

## 2022-12-21 DIAGNOSIS — Z51.5 ENCOUNTER FOR PALLIATIVE CARE: ICD-10-CM

## 2022-12-21 LAB
ALBUMIN SERPL ELPH-MCNC: 3.1 G/DL — LOW (ref 3.3–5)
ALP SERPL-CCNC: 128 U/L — HIGH (ref 40–120)
ALT FLD-CCNC: 34 U/L — SIGNIFICANT CHANGE UP (ref 10–45)
ANION GAP SERPL CALC-SCNC: 12 MMOL/L — SIGNIFICANT CHANGE UP (ref 5–17)
APTT BLD: 32.6 SEC — SIGNIFICANT CHANGE UP (ref 27.5–35.5)
AST SERPL-CCNC: 32 U/L — SIGNIFICANT CHANGE UP (ref 10–40)
BASOPHILS # BLD AUTO: 0.04 K/UL — SIGNIFICANT CHANGE UP (ref 0–0.2)
BASOPHILS NFR BLD AUTO: 0.4 % — SIGNIFICANT CHANGE UP (ref 0–2)
BILIRUB SERPL-MCNC: 0.5 MG/DL — SIGNIFICANT CHANGE UP (ref 0.2–1.2)
BUN SERPL-MCNC: 28 MG/DL — HIGH (ref 7–23)
CALCIUM SERPL-MCNC: 9.2 MG/DL — SIGNIFICANT CHANGE UP (ref 8.4–10.5)
CHLORIDE SERPL-SCNC: 93 MMOL/L — LOW (ref 96–108)
CO2 SERPL-SCNC: 32 MMOL/L — HIGH (ref 22–31)
CREAT SERPL-MCNC: 1.34 MG/DL — HIGH (ref 0.5–1.3)
EGFR: 39 ML/MIN/1.73M2 — LOW
EOSINOPHIL # BLD AUTO: 0.14 K/UL — SIGNIFICANT CHANGE UP (ref 0–0.5)
EOSINOPHIL NFR BLD AUTO: 1.4 % — SIGNIFICANT CHANGE UP (ref 0–6)
GLUCOSE SERPL-MCNC: 98 MG/DL — SIGNIFICANT CHANGE UP (ref 70–99)
HCT VFR BLD CALC: 31.7 % — LOW (ref 34.5–45)
HGB BLD-MCNC: 9.6 G/DL — LOW (ref 11.5–15.5)
IMM GRANULOCYTES NFR BLD AUTO: 0.5 % — SIGNIFICANT CHANGE UP (ref 0–0.9)
INR BLD: 1.36 — HIGH (ref 0.88–1.16)
LYMPHOCYTES # BLD AUTO: 0.67 K/UL — LOW (ref 1–3.3)
LYMPHOCYTES # BLD AUTO: 6.9 % — LOW (ref 13–44)
MAGNESIUM SERPL-MCNC: 2 MG/DL — SIGNIFICANT CHANGE UP (ref 1.6–2.6)
MCHC RBC-ENTMCNC: 25.9 PG — LOW (ref 27–34)
MCHC RBC-ENTMCNC: 30.3 GM/DL — LOW (ref 32–36)
MCV RBC AUTO: 85.7 FL — SIGNIFICANT CHANGE UP (ref 80–100)
MONOCYTES # BLD AUTO: 0.68 K/UL — SIGNIFICANT CHANGE UP (ref 0–0.9)
MONOCYTES NFR BLD AUTO: 7 % — SIGNIFICANT CHANGE UP (ref 2–14)
NEUTROPHILS # BLD AUTO: 8.08 K/UL — HIGH (ref 1.8–7.4)
NEUTROPHILS NFR BLD AUTO: 83.8 % — HIGH (ref 43–77)
NRBC # BLD: 0 /100 WBCS — SIGNIFICANT CHANGE UP (ref 0–0)
PHOSPHATE SERPL-MCNC: 3.9 MG/DL — SIGNIFICANT CHANGE UP (ref 2.5–4.5)
PLATELET # BLD AUTO: 297 K/UL — SIGNIFICANT CHANGE UP (ref 150–400)
POTASSIUM SERPL-MCNC: 3.5 MMOL/L — SIGNIFICANT CHANGE UP (ref 3.5–5.3)
POTASSIUM SERPL-SCNC: 3.5 MMOL/L — SIGNIFICANT CHANGE UP (ref 3.5–5.3)
PROT SERPL-MCNC: 7.8 G/DL — SIGNIFICANT CHANGE UP (ref 6–8.3)
PROTHROM AB SERPL-ACNC: 16.2 SEC — HIGH (ref 10.5–13.4)
RBC # BLD: 3.7 M/UL — LOW (ref 3.8–5.2)
RBC # FLD: 20.2 % — HIGH (ref 10.3–14.5)
SODIUM SERPL-SCNC: 137 MMOL/L — SIGNIFICANT CHANGE UP (ref 135–145)
WBC # BLD: 9.66 K/UL — SIGNIFICANT CHANGE UP (ref 3.8–10.5)
WBC # FLD AUTO: 9.66 K/UL — SIGNIFICANT CHANGE UP (ref 3.8–10.5)

## 2022-12-21 PROCEDURE — 99233 SBSQ HOSP IP/OBS HIGH 50: CPT | Mod: GC

## 2022-12-21 PROCEDURE — 99223 1ST HOSP IP/OBS HIGH 75: CPT

## 2022-12-21 PROCEDURE — 99222 1ST HOSP IP/OBS MODERATE 55: CPT | Mod: 25

## 2022-12-21 PROCEDURE — 43235 EGD DIAGNOSTIC BRUSH WASH: CPT

## 2022-12-21 PROCEDURE — 99497 ADVNCD CARE PLAN 30 MIN: CPT | Mod: 25

## 2022-12-21 RX ORDER — CASPOFUNGIN ACETATE 7 MG/ML
50 INJECTION, POWDER, LYOPHILIZED, FOR SOLUTION INTRAVENOUS
Qty: 0 | Refills: 0 | DISCHARGE
Start: 2022-12-21 | End: 2023-01-04

## 2022-12-21 RX ADMIN — SENNA PLUS 2 TABLET(S): 8.6 TABLET ORAL at 23:30

## 2022-12-21 RX ADMIN — Medication 1 TABLET(S): at 12:02

## 2022-12-21 RX ADMIN — Medication 25 MILLIGRAM(S): at 06:46

## 2022-12-21 RX ADMIN — CEFTRIAXONE 100 MILLIGRAM(S): 500 INJECTION, POWDER, FOR SOLUTION INTRAMUSCULAR; INTRAVENOUS at 23:32

## 2022-12-21 RX ADMIN — ATORVASTATIN CALCIUM 20 MILLIGRAM(S): 80 TABLET, FILM COATED ORAL at 23:31

## 2022-12-21 RX ADMIN — OXYCODONE HYDROCHLORIDE 5 MILLIGRAM(S): 5 TABLET ORAL at 12:02

## 2022-12-21 RX ADMIN — OXYCODONE HYDROCHLORIDE 5 MILLIGRAM(S): 5 TABLET ORAL at 13:00

## 2022-12-21 RX ADMIN — TRAMADOL HYDROCHLORIDE 50 MILLIGRAM(S): 50 TABLET ORAL at 06:52

## 2022-12-21 RX ADMIN — TRAMADOL HYDROCHLORIDE 50 MILLIGRAM(S): 50 TABLET ORAL at 07:25

## 2022-12-21 RX ADMIN — LIDOCAINE 1 PATCH: 4 CREAM TOPICAL at 06:51

## 2022-12-21 RX ADMIN — APIXABAN 2.5 MILLIGRAM(S): 2.5 TABLET, FILM COATED ORAL at 06:47

## 2022-12-21 RX ADMIN — TIOTROPIUM BROMIDE 2 PUFF(S): 18 CAPSULE ORAL; RESPIRATORY (INHALATION) at 17:14

## 2022-12-21 RX ADMIN — Medication 1 MILLIGRAM(S): at 12:02

## 2022-12-21 RX ADMIN — AMIODARONE HYDROCHLORIDE 200 MILLIGRAM(S): 400 TABLET ORAL at 06:46

## 2022-12-21 RX ADMIN — APIXABAN 2.5 MILLIGRAM(S): 2.5 TABLET, FILM COATED ORAL at 17:46

## 2022-12-21 RX ADMIN — MONTELUKAST 10 MILLIGRAM(S): 4 TABLET, CHEWABLE ORAL at 12:02

## 2022-12-21 RX ADMIN — GABAPENTIN 300 MILLIGRAM(S): 400 CAPSULE ORAL at 23:31

## 2022-12-21 RX ADMIN — PANTOPRAZOLE SODIUM 40 MILLIGRAM(S): 20 TABLET, DELAYED RELEASE ORAL at 06:47

## 2022-12-21 NOTE — DIETITIAN INITIAL EVALUATION ADULT - PERTINENT LABORATORY DATA
12-21    137  |  93<L>  |  28<H>  ----------------------------<  98  3.5   |  32<H>  |  1.34<H>    Ca    9.2      21 Dec 2022 07:12  Phos  3.9     12-21  Mg     2.0     12-21    TPro  7.8  /  Alb  3.1<L>  /  TBili  0.5  /  DBili  x   /  AST  32  /  ALT  34  /  AlkPhos  128<H>  12-21  A1C with Estimated Average Glucose Result: 4.9 % (12-02-22 @ 08:33)  A1C with Estimated Average Glucose Result: 5.3 % (09-06-22 @ 05:30)  A1C with Estimated Average Glucose Result: 4.7 % (01-15-22 @ 08:01)

## 2022-12-21 NOTE — PROGRESS NOTE ADULT - PROBLEM SELECTOR PLAN 1
Pt presenting with malaise, chills and acute on chronic R hip pain. Pt states she has had chronic R hip pain that shoots down her leg, but has not seen providers for it in the past. Day prior to admission she said the pain was severe and had pain with ambulation. Pain starts at posterior hip and radiates down R leg. CT scans and xrays without findings     - Ortho consulted, low concern for septic joint w supportive management focused of PT and pain control  - MRI lumber and R hip wwo contrast   - Pain control with lidocaine patch, tylenol, oxycodone 5mg q6 hours prn  - Gabapentin 600 mg PO daily.   - CT scans and xrays without findings.  - f/u PT recs    #Bacteremia   Found to have strep bacteremia likely 2/2 to hx of colon cancer. Patient currently afebrile, however had T 100.4 (12/18, ON).   - cw CTX 2g   - f/u repeat blood cultures from 12/20  - ID following, f/u recs  - TTE neg for vegetation, f/u IRMA (will get gallium if IRMA neg to r/o prosthetic infection)    #Abdominal Pain   Patient with distended bowel, however soft but tender to palpation likely 2/2 constipation as pt had not had a BM for 5 days. Had 1 large BM today s/p Bowel reg, ducolex enema, fleet enema. Abdominal Xray Pre and post BM demonstrated appx 11cm dilation   - Surgery following, no acute intervention, however rec CTAP w PO contrast for improved evaluation 12/20 Patient with distended bowel, however soft but tender to palpation likely 2/2 constipation as pt had not had a BM for 5 days. Had 1 large BM s/p Bowel reg, ducolex enema, fleet enema. Abdominal Xray Pre and post BM demonstrated appx 11cm dilation     - Surg following, no acute intervention, appreciate recs  - Planned for EGD with GI today (12/21)   - CT A/P with 1. resolution of previously noted herniated loop of small bowel within the periumbilical hernia. The periumbilical hernia now contains fat. 2.  Copious stool in mid transverse colon. Post right hemicolectomy, with patent anastomosis.  - C/w miralax, senna, and dulcolax suppository

## 2022-12-21 NOTE — CONSULT NOTE ADULT - PROBLEM SELECTOR RECOMMENDATION 4
.  - no indication for HD  - cw fluids, mgmt per primary team   - renally dose medications -- specifically ana as above

## 2022-12-21 NOTE — PROGRESS NOTE ADULT - PROBLEM SELECTOR PLAN 8
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 Pt refused EBUS at that time.   - outpatient heme/onc follow up Pt on protonix 40mg outpatient   - c/w home protonix

## 2022-12-21 NOTE — CONSULT NOTE ADULT - ASSESSMENT
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 Palliative care consulted for pain management and GOC.

## 2022-12-21 NOTE — PROGRESS NOTE ADULT - ASSESSMENT
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

## 2022-12-21 NOTE — PROGRESS NOTE ADULT - ASSESSMENT
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 General surgery consulted for new onset abdominal pain and evidence of colonic dilation to 11cm on abdominal x-ray. Pt presented with mild generalized abdominal pain without nausea and vomiting.  There were no overnight events and she reports passing nonbloody stool of normal caliber and consistency.  On physical examination the patient remains soft, mildly distended, with moderate tenderness to palpation of the abdomen and R flank. Serum laboratory results are unremarkable.  CT imaging demonstrates herniated loop of small bowel resolution in comparison with prior scan and periumbilical hernia currently filled with fat, patent anastomosis and copious stool burden in transverse colon.      PLAN  - No acute surgical intervention at this time  - In the setting of known metastatic disease, would consider palliative care discussion to better appreciate goals of care  - Surgery Team 1C will continue to follow.  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 General surgery consulted for new onset abdominal pain and evidence of colonic dilation to 11cm on abdominal x-ray. Pt presented with mild generalized abdominal pain without nausea and vomiting.  There were no overnight events and she reports passing nonbloody stool of normal caliber and consistency.  On physical examination the patient remains soft, mildly distended, with moderate tenderness to palpation of the abdomen and R flank. Serum laboratory results are unremarkable.  CT imaging demonstrates herniated loop of small bowel resolution in comparison with prior scan and periumbilical hernia currently filled with fat, patent anastomosis and copious stool burden in transverse colon.      PLAN  - Recommend daily x-ray imaging to monitor for changes in distension and contrast transition  - Recommend serial abdominal examinations  - Recommend limit narcotics use  - In the setting of known metastatic disease, would consider palliative care discussion to better appreciate goals of care  - Surgery Team 1C will continue to follow.  - Please page Team 1 at [phone] with any questions and/or clinical changes

## 2022-12-21 NOTE — CONSULT NOTE ADULT - CONSULT REQUESTED DATE/TIME
18-Dec-2022 15:26
16-Dec-2022 17:28
20-Dec-2022 17:10
21-Dec-2022 09:37
16-Dec-2022 09:45
18-Dec-2022 14:35
21-Dec-2022 17:53
16-Dec-2022 18:02
21-Dec-2022 16:53

## 2022-12-21 NOTE — DIETITIAN INITIAL EVALUATION ADULT - ADD RECOMMEND
1. Once procedure completed and NPO status removed, recommend to continue previous DASH/TLC diet  2. Encourage pt to meet nutritional needs as able   3. Monitor PO intakes, trend weights (weekly), monitor skin integrity, monitor labs (electrolytes, CMP), monitor GI fxn   4. Encourage adherence to diet education (reinforce as able)  5. Continue folic acid and MVI supplementation  6. Pain and bowel regimen per team   7. Will continue to assess/honor preferences as able   8. Align nutrition interventions with GOC at all times

## 2022-12-21 NOTE — DIETITIAN INITIAL EVALUATION ADULT - NS FNS DIET ORDER
Diet, NPO after Midnight:      NPO Start Date: 21-Dec-2022,   NPO Start Time: 23:59  Except Medications (12-21-22 @ 06:11)

## 2022-12-21 NOTE — CHART NOTE - NSCHARTNOTEFT_GEN_A_CORE
EGD performed in endoscopy suite with Dr Corral and myself, findings as noted below:    Impression:  -Scattered white plaques distributed through esophagus, appears to be consistent with candida esophagitis.  -Diffuse erythema of the mucosa was noted in the antrum. These findings are compatible with non-erosive gastritis.  -Normal duodenum.    Plan:  -Check QTC again (prolonged on admission, 522), if not prolonged, can start Fluconazole 400 mg x 1 day, followed by Fluconazole 200 mg PO daily x 20 days  -Return to floor for further management

## 2022-12-21 NOTE — DIETITIAN INITIAL EVALUATION ADULT - PERTINENT MEDS FT
MEDICATIONS  (STANDING):  aMIOdarone    Tablet 200 milliGRAM(s) Oral daily  apixaban 2.5 milliGRAM(s) Oral every 12 hours  atorvastatin 20 milliGRAM(s) Oral at bedtime  cefTRIAXone   IVPB 2000 milliGRAM(s) IV Intermittent every 24 hours  folic acid 1 milliGRAM(s) Oral daily  gabapentin 300 milliGRAM(s) Oral at bedtime  influenza  Vaccine (HIGH DOSE) 0.7 milliLiter(s) IntraMuscular once  isosorbide   mononitrate ER Tablet (IMDUR) 30 milliGRAM(s) Oral every 24 hours  lidocaine   4% Patch 1 Patch Transdermal every 24 hours  metoprolol succinate ER 25 milliGRAM(s) Oral daily  montelukast 10 milliGRAM(s) Oral daily  multivitamin 1 Tablet(s) Oral daily  pantoprazole    Tablet 40 milliGRAM(s) Oral before breakfast  polyethylene glycol 3350 17 Gram(s) Oral daily  senna 2 Tablet(s) Oral at bedtime  tiotropium 2.5 MICROgram(s) Inhaler 2 Puff(s) Inhalation daily  torsemide 40 milliGRAM(s) Oral every 24 hours    MEDICATIONS  (PRN):  acetaminophen     Tablet .. 650 milliGRAM(s) Oral every 6 hours PRN Temp greater or equal to 38C (100.4F), Mild Pain (1 - 3)  LORazepam     Tablet 0.5 milliGRAM(s) Oral daily PRN Anxiety  oxyCODONE    IR 5 milliGRAM(s) Oral every 6 hours PRN Severe Pain (7 - 10)  simethicone 80 milliGRAM(s) Chew two times a day PRN Gas  traMADol 50 milliGRAM(s) Oral every 6 hours PRN Moderate Pain (4 - 6)  trimethobenzamide Injectable 200 milliGRAM(s) IntraMuscular every 6 hours PRN Nausea and/or Vomiting

## 2022-12-21 NOTE — CONSULT NOTE ADULT - PROBLEM SELECTOR RECOMMENDATION 2
.  appears to be gas related, but may be element of disease progression. further workup/imaging pending   - recommend simethicone 40 mg PO QID PRN   - standing BR, mLax, senna 2 tabs PO qHS

## 2022-12-21 NOTE — CONSULT NOTE ADULT - CONVERSATION DETAILS
Chinese  used id [id number]. Reviewed onc hx.  Pt with appropriate understanding that she "has cancer," but with limited insight into the metastatic nature of her disease. Shares that she was unable to follow up outpatient because she "didn't know when" or with whom she was supposed to follow up.  Reviewed advance directives, has not completed HCP. Discussed role and circumstances when form would come into effect. Names her daughter Jaelyn Vera #[phone]. Form completed and placed in chart. Code status deferred and further GOC discussion limited dt pt pain/discomfort. RN made aware.  Support provided.

## 2022-12-21 NOTE — CONSULT NOTE ADULT - SUBJECTIVE AND OBJECTIVE BOX
HPI:  82F, Japanese speaking, spoke with  Lulu #[id number], w/ PMHx HTN, HLD, severe AS s/p TAVR () w/ mod valve stenosis and valve thrombosis  s/p AC (not seen on echo ), Ascending Thoracic Aneurysm 4cm in 2022, pAFib (on Amiodarone/Eliquis), COPD (on 2L home O2), Colon CA s/p resection in 2019 (previously in remission now with recurrence and mets to lung), moderate MARK (non-compliant with CPAP 2/2 claustrophobia), CKD3 (baseline Cr 1.1-1.2) and FEROZ presents for malaise, chills, and hip pain for 1 day. She states that earlier this week she saw her PCP was doing well, however yesterday had chills, rigors, malaise, and woke up with acute on chronic R hip pain. She states she has had hip pain in the past, never seen a doctor for it, and has just managed it with lidocaine patches. She states this is the worst it has been, it starts at her posterior hip and radiates down her R leg. She states it hurts with ambulation and describes it as a sharp shooting pain. Pt is tearful on exam, and concerned as she stats every time she comes here we find something new wrong with her. She denies chest pain, SOB, headaches, LOC, abdominal pain, nausea, vomiting, diarrhea. She does endorse some burning with urination.     Recent admissions -12/3 for CHF exacerbation under cardiology, had medications adjusted ands sent home. Other admission under medicine - sent in from [facility] for melena with colonoscopy c/f cancer with biopsy showing adenocarcinoma and PET scan with concerning MET disease to lung. Also admitted under cardiology service from - for chest pain and hedaches found to have e.coli bacteremia and gemella spp. w/ TTE/IRMA and gallium scan negative with source presumed to be infected tooth d/c'ed with IV Abx.    In the ED:   Vitals: T 100.1, 69. 190/72>157/73, 18 95% RA  Labs: Hgb 10.4, sCr 1.80, pBNP 1105. U/A:  small LE, WBC >10, +bacteria  CXR: L sided effusion/infiltrate  Interventions: tylenol 650, CTX, CTAP, CTL/CTT   (16 Dec 2022 02:50)      GENERAL SURGERY ADDENDUM: HPI as above. 82F, Japanese speaking, w/ PMHx HTN, HLD, severe AS s/p TAVR (), valve thrombosis  s/p AC (not seen on echo ), Ascending Thoracic Aneurysm 4cm in 2022, pAFib (on Amiodarone/Eliquis), COPD (use to be on 2L home O2 but doesn't use it anymore), Colon CA s/p resection in  (in remission), moderate MARK (non-compliant with CPAP 2/2 claustrophobia), CKD3 (baseline Cr 1.1-1.2) with recent admission in  for anemia, found to have new primary colon cancer with lung mets, now readmitted to medicine on  on with right hip pain and a UTI. General surgery consulted for incidental CTAP finding of small bowel loop in a periumbilical ventral hernia which is now thick walled, compared to previous imaging on 22, possibly indicative of strangulation. On examination, pt continues to complain of right hip pain and mild generalized abdominal pain. Denies nausea or vomiting and has been tolerating PO intake albeit less than normal. States she continues to pass gas and have normal bowel movements (last BM yesterday, normal and nonbloody).       aMIOdarone    Tablet 200 milliGRAM(s) Oral daily  apixaban 2.5 milliGRAM(s) Oral every 12 hours  atorvastatin 20 milliGRAM(s) Oral at bedtime  cefTRIAXone   IVPB 2000 milliGRAM(s) IV Intermittent every 24 hours  folic acid 1 milliGRAM(s) Oral daily  influenza  Vaccine (HIGH DOSE) 0.7 milliLiter(s) IntraMuscular once  isosorbide   mononitrate ER Tablet (IMDUR) 30 milliGRAM(s) Oral every 24 hours  lidocaine   4% Patch 1 Patch Transdermal every 24 hours  LORazepam     Tablet 0.5 milliGRAM(s) Oral daily PRN  metoprolol succinate ER 25 milliGRAM(s) Oral daily  montelukast 10 milliGRAM(s) Oral daily  multivitamin 1 Tablet(s) Oral daily  oxyCODONE    IR 5 milliGRAM(s) Oral every 6 hours PRN  pantoprazole    Tablet 40 milliGRAM(s) Oral before breakfast  polyethylene glycol 3350 17 Gram(s) Oral daily  senna 2 Tablet(s) Oral at bedtime  simethicone 80 milliGRAM(s) Chew two times a day PRN  tiotropium 2.5 MICROgram(s) Inhaler 2 Puff(s) Inhalation daily  torsemide 40 milliGRAM(s) Oral every 24 hours  traMADol 50 milliGRAM(s) Oral every 6 hours PRN  trimethobenzamide Injectable 200 milliGRAM(s) IntraMuscular every 6 hours PRN      Allergies    Digox (Rash; Urticaria; Hives)  Plavix (Other (Mod to Severe))    Intolerances        ICU Vital Signs Last 24 Hrs  T(F): 97.3 (22 @ 15:30), Max: 100.1 (12-15-22 @ 18:47)  HR: 74 (22 @ 15:30) (60 - 92)  BP: 117/64 (22 @ 15:30) (111/56 - 190/72)  BP(mean): --  ABP: --  RR: 18 (22 @ 15:30) (18 - 20)  SpO2: 95% (22 @ 15:30) (93% - 99%)    General: AAOx3, NAD, WDWN, laying comfortably in bed  Cardio: S1,S2, No MRG, RRR  Pulm: Lungs bilaterally clear to auscultation  Abdomen: soft, obese, nondistended, nontender, supraumbilical hernia defect appreciated (approximately 2cm in diameter) without any evidence of incarcerated contents  Extremities: WWP, peripheral pulses appreciated    LABS:        139  |  100  |  22  ----------------------------<  108<H>  3.4<L>   |  27  |  1.57<H>    Ca    9.0      16 Dec 2022 07:29  Phos  4.1       Mg     2.1         TPro  7.2  /  Alb  3.3  /  TBili  0.6  /  DBili  x   /  AST  20  /  ALT  14  /  AlkPhos  64    LIVER FUNCTIONS - ( 16 Dec 2022 07:29 )  Alb: 3.3 g/dL / Pro: 7.2 g/dL / ALK PHOS: 64 U/L / ALT: 14 U/L / AST: 20 U/L / GGT: x                               9.3    6.33  )-----------( 163      ( 16 Dec 2022 07:29 )             31.5   PT/INR - ( 15 Dec 2022 20:39 )   PT: 13.3 sec;   INR: 1.12          PTT - ( 15 Dec 2022 20:39 )  PTT:22.5 secCARDIAC MARKERS ( 15 Dec 2022 21:50 )  x     / 0.01 ng/mL / 28 U/L / x     / <1.0 ng/mL  CARDIAC MARKERS ( 15 Dec 2022 20:39 )  x     / 0.01 ng/mL / 32 U/L / x     / <1.0 ng/mL    Urinalysis Basic - ( 15 Dec 2022 21:54 )    Color: Yellow / Appearance: Clear / S.010 / pH: x  Gluc: x / Ketone: NEGATIVE  / Bili: Negative / Urobili: 0.2 E.U./dL   Blood: x / Protein: NEGATIVE mg/dL / Nitrite: NEGATIVE   Leuk Esterase: Small / RBC: < 5 /HPF / WBC > 10 /HPF   Sq Epi: x / Non Sq Epi: 0-5 /HPF / Bacteria: Present /HPF    CAPILLARY BLOOD GLUCOSE      POCT Blood Glucose.: 129 mg/dL (15 Dec 2022 18:46)        ACC: [id number] EXAM: CT ABDOMEN AND PELVIS    PROCEDURE DATE: 2022        INTERPRETATION: Initial report created on 2022 2:31:02 AM EST  PROCEDURE INFORMATION:  Exam: CT Abdomen And Pelvis Without Contrast  Exam date and time: 2022 12:55 AM  Age: 82 years old  Clinical indication: Lower abdominal pain. Shivers.    TECHNIQUE:  Imaging protocol: Computed tomography of the abdomen and pelvis without intravenous or oral contrast.    COMPARISON: Most recent chest CT from 2022 multiple additional prior imaging studies dating back to 2019.    FINDINGS:  Lower chest: Again post transcatheter aortic valve replacement. Heavily calcified mitral valve annulus. Cardiomegaly. Low-density blood within the heart is consistent with anemia. No change 7 mm tubular shaped nodule lateral basal segment right lower lobe. Noncalcified micronodules right middle lobe. Small amount of linear atelectasis both lower lobes.    Liver: Punctate calcification is consistent with prior granulomatous disease.  Gallbladder and bile ducts: Normal.  Pancreas: Fatty infiltration of the pancreas.  Spleen: Splenomegaly, with the spleen measuring 15.5 cm in greatest dimension. No focal lesion.  Adrenal glands: There is a 1.9 x 1.6 cm left adrenal nodule. On 2017, there had been a 1.1 x 0.8 cm left adrenal nodule. Although this nodule has a density consistent with an adenoma (0 Hounsfield units), its growth raises the suspicion of a superimposed metastasis. Right adrenal gland within normal limits.  Kidneys and ureters: A few low-density lesions in the right kidney have previously been shown to be cysts. There is also a 0.6 cm lesion along the lateral aspect of the lower pole the right kidney, more dense than a simple cyst. Left kidney within normal limits.    Stomach and bowel: Again post extended right hemicolectomy with ileal to mid transverse colon anastomosis.The suspected recurrent colon cancer near the anastomosis is not visualized on this limited noncontrast exam. No change 1.0 cm fat density nodule to the right of the anastomosis (series 3, image 64). Focal fat stranding along the anterior abdominal wall to the right of midline is unchanged. These findings may represent omental infarct. A periumbilical hernia is again present, again containing a loop of small bowel. That small bowel loop is now thick-walled. No bowel obstruction. No free air.    Ascites: None.  Vasculature: Large amount of calcified plaque. Aorta and pelvic arteries tortuous.  Lymph nodes: No enlarged lymph nodes.  Urinary bladder: Within normal limits.  Reproductive: Uterus and adnexa within normal limits.  Bones/joints: Mild dextroscoliosis lumbar spine. Degenerative changes.      IMPRESSION:  1. Since 2022, the small bowel loop in a periumbilical ventral hernia is now thick-walled. This could indicate strangulation. Recommend surgical evaluation.    2. A left adrenal nodule has increased in size since 2019. Though it has density measurements of an adenoma, a collision tumor containing metastasis cannot be excluded.    3. Splenomegaly.    
HPI:  82F, Korean speaking, spoke with  Lulu #[id number], w/ PMHx HTN, HLD, severe AS s/p TAVR (2019) w/ mod valve stenosis and valve thrombosis 2021 s/p AC (not seen on echo 2022), Ascending Thoracic Aneurysm 4cm in 1/2022, pAFib (on Amiodarone/Eliquis), COPD (on 2L home O2), Colon CA s/p resection in 2019 (previously in remission now with recurrence and mets to lung), moderate MARK (non-compliant with CPAP 2/2 claustrophobia), CKD3 (baseline Cr 1.1-1.2) and FEROZ presents for malaise, chills, and hip pain for 1 day. She states that earlier this week she saw her PCP was doing well, however yesterday had chills, rigors, malaise, and woke up with acute on chronic R hip pain. She states she has had hip pain in the past, never seen a doctor for it, and has just managed it with lidocaine patches. She states this is the worst it has been, it starts at her posterior hip and radiates down her R leg. She states it hurts with ambulation and describes it as a sharp shooting pain. Pt is tearful on exam, and concerned as she stats every time she comes here we find something new wrong with her. She denies chest pain, SOB, headaches, LOC, abdominal pain, nausea, vomiting, diarrhea. She does endorse some burning with urination.     Recent admissions 12/1-12/3 for CHF exacerbation under cardiology, had medications adjusted ands sent home. Other admission under medicine 9/21-9/29 sent in from [facility] for melena with colonoscopy c/f cancer with biopsy showing adenocarcinoma and PET scan with concerning MET disease to lung. Also admitted under cardiology service from 09/4-09/13 for chest pain and hedaches found to have e.coli bacteremia and gemella spp. w/ TTE/IRMA and gallium scan negative with source presumed to be infected tooth d/c'ed with IV Abx.    In the ED:   Vitals: T 100.1, 69. 190/72>157/73, 18 95% RA  Labs: Hgb 10.4, sCr 1.80, pBNP 1105. U/A:  small LE, WBC >10, +bacteria  CXR: L sided effusion/infiltrate  Interventions: tylenol 650, CTX, CTAP, CTL/CTT    Bcx on admission, 12/15/22, positive for strep mitis/oralis (negative surveillance Bcx), currently undergoing evaluation with cardiology to rule out IE, pending IRMA. Additionally, patient reporting complaints of dysphagia to both solids and liquids since admission. GI consulted for EGD evaluation.      (16 Dec 2022 02:50)    Allergies    Digox (Rash; Urticaria; Hives)  Plavix (Other (Mod to Severe))    Intolerances      MEDICATIONS:  MEDICATIONS  (STANDING):  aMIOdarone    Tablet 200 milliGRAM(s) Oral daily  apixaban 2.5 milliGRAM(s) Oral every 12 hours  atorvastatin 20 milliGRAM(s) Oral at bedtime  cefTRIAXone   IVPB 2000 milliGRAM(s) IV Intermittent every 24 hours  folic acid 1 milliGRAM(s) Oral daily  gabapentin 300 milliGRAM(s) Oral at bedtime  influenza  Vaccine (HIGH DOSE) 0.7 milliLiter(s) IntraMuscular once  isosorbide   mononitrate ER Tablet (IMDUR) 30 milliGRAM(s) Oral every 24 hours  lidocaine   4% Patch 1 Patch Transdermal every 24 hours  metoprolol succinate ER 25 milliGRAM(s) Oral daily  montelukast 10 milliGRAM(s) Oral daily  multivitamin 1 Tablet(s) Oral daily  pantoprazole    Tablet 40 milliGRAM(s) Oral before breakfast  polyethylene glycol 3350 17 Gram(s) Oral daily  senna 2 Tablet(s) Oral at bedtime  tiotropium 2.5 MICROgram(s) Inhaler 2 Puff(s) Inhalation daily  torsemide 40 milliGRAM(s) Oral every 24 hours    MEDICATIONS  (PRN):  acetaminophen     Tablet .. 650 milliGRAM(s) Oral every 6 hours PRN Temp greater or equal to 38C (100.4F), Mild Pain (1 - 3)  LORazepam     Tablet 0.5 milliGRAM(s) Oral daily PRN Anxiety  oxyCODONE    IR 5 milliGRAM(s) Oral every 6 hours PRN Severe Pain (7 - 10)  simethicone 80 milliGRAM(s) Chew two times a day PRN Gas  traMADol 50 milliGRAM(s) Oral every 6 hours PRN Moderate Pain (4 - 6)  trimethobenzamide Injectable 200 milliGRAM(s) IntraMuscular every 6 hours PRN Nausea and/or Vomiting    PAST MEDICAL & SURGICAL HISTORY:  HTN (hypertension)      Aortic stenosis      Hyperlipidemia      COPD (chronic obstructive pulmonary disease)      GERD (gastroesophageal reflux disease)      Stage 3 chronic kidney disease      Anemia      Diastolic CHF, chronic      Obesity      Paroxysmal atrial fibrillation      Colon cancer      S/P TAVR (transcatheter aortic valve replacement)  2/2019        FAMILY HISTORY:    SOCIAL HISTORY:  [place]: [ ] Current, [ ] Former, [ ] Never; Pack Years:  Alcohol:  Illicit Drugs:    REVIEW OF SYSTEMS:  Constitutional: No fever, chills, weight loss, or fatigue  ENMT:  No visual changes; No difficulty hearing, tinnitus, vertigo; No sinus or throat pain  Neck: No pain or stiffness  Respiratory: No cough, wheezing, or hemoptysis; No shortness of breath  Cardiovascular: No chest pain, palpitations, dizziness, orthopnea, PND, or leg swelling  Gastrointestinal: No abdominal or epigastric pain. No  nausea, vomiting, or hematemesis. No diarrhea, constipation, or steatorrhea. No melena or hematochezia  Genitourinary: No dysuria, urinary frequency/hesitancy, or hematuria  Skin: No itching, burning, rashes or lesions   Musculoskeletal: No joint pain or swelling; No muscle, back or extremity pain    Vital Signs Last 24 Hrs  T(C): 36.7 (21 Dec 2022 14:23), Max: 37.4 (20 Dec 2022 22:29)  T(F): 98.1 (21 Dec 2022 05:59), Max: 99.3 (20 Dec 2022 22:29)  HR: 62 (21 Dec 2022 14:23) (60 - 62)  BP: 116/65 (21 Dec 2022 14:23) (116/65 - 127/58)  BP(mean): --  RR: 18 (21 Dec 2022 14:23) (18 - 18)  SpO2: 98% (21 Dec 2022 14:23) (95% - 98%)    Parameters below as of 21 Dec 2022 14:23    O2 Flow (L/min): 3      12-20 @ 07:01  -  12-21 @ 07:00  --------------------------------------------------------  IN: 0 mL / OUT: 1000 mL / NET: -1000 mL        PHYSICAL EXAM:    General: Well developed; well nourished; in no acute distress  HEENT: MMM, conjunctiva and sclera clear  Gastrointestinal: Soft, non-tender non-distended; Normal bowel sounds; No rebound or guarding  Extremities: Normal range of motion, No clubbing, cyanosis or edema  Neurological: Alert and oriented x3  Skin: Warm and dry. No obvious rash    LABS:                        9.6    9.66  )-----------( 297      ( 21 Dec 2022 07:12 )             31.7     12-21    137  |  93<L>  |  28<H>  ----------------------------<  98  3.5   |  32<H>  |  1.34<H>    Ca    9.2      21 Dec 2022 07:12  Phos  3.9     12-21  Mg     2.0     12-21    TPro  7.8  /  Alb  3.1<L>  /  TBili  0.5  /  DBili  x   /  AST  32  /  ALT  34  /  AlkPhos  128<H>  12-21        RADIOLOGY & ADDITIONAL STUDIES:   
HPI:  82F, Korean speaking, spoke with  Lulu #[id number], w/ PMHx HTN, HLD, severe AS s/p TAVR (2019) w/ mod valve stenosis and valve thrombosis 2021 s/p AC (not seen on echo 2022), Ascending Thoracic Aneurysm 4cm in 1/2022, pAFib (on Amiodarone/Eliquis), COPD (on 2L home O2), Colon CA s/p resection in 2019 (previously in remission now with recurrence and mets to lung), moderate MARK (non-compliant with CPAP 2/2 claustrophobia), CKD3 (baseline Cr 1.1-1.2) and FEROZ presents for malaise, chills, and hip pain for 1 day. She states that earlier this week she saw her PCP was doing well, however yesterday had chills, rigors, malaise, and woke up with acute on chronic R hip pain. She states she has had hip pain in the past, never seen a doctor for it, and has just managed it with lidocaine patches. She states this is the worst it has been, it starts at her posterior hip and radiates down her R leg. She states it hurts with ambulation and describes it as a sharp shooting pain. Pt is tearful on exam, and concerned as she stats every time she comes here we find something new wrong with her. She denies chest pain, SOB, headaches, LOC, abdominal pain, nausea, vomiting, diarrhea. She does endorse some burning with urination.     Recent admissions 12/1-12/3 for CHF exacerbation under cardiology, had medications adjusted ands sent home. Other admission under medicine 9/21-9/29 sent in from [facility] for melena with colonoscopy c/f cancer with biopsy showing adenocarcinoma and PET scan with concerning MET disease to lung. Also admitted under cardiology service from 09/4-09/13 for chest pain and hedaches found to have e.coli bacteremia and gemella spp. w/ TTE/IRMA and gallium scan negative with source presumed to be infected tooth d/c'ed with IV Abx.    In the ED:   Vitals: T 100.1, 69. 190/72>157/73, 18 95% RA  Labs: Hgb 10.4, sCr 1.80, pBNP 1105. U/A:  small LE, WBC >10, +bacteria  CXR: L sided effusion/infiltrate  Interventions: tylenol 650, CTX, CTAP, CTL/CTT   (16 Dec 2022 02:50)    Allergies    Digox (Rash; Urticaria; Hives)  Plavix (Other (Mod to Severe))    Intolerances      MEDICATIONS:  MEDICATIONS  (STANDING):  aMIOdarone    Tablet 200 milliGRAM(s) Oral daily  apixaban 2.5 milliGRAM(s) Oral every 12 hours  atorvastatin 20 milliGRAM(s) Oral at bedtime  cefTRIAXone   IVPB 2000 milliGRAM(s) IV Intermittent every 24 hours  folic acid 1 milliGRAM(s) Oral daily  gabapentin 300 milliGRAM(s) Oral at bedtime  influenza  Vaccine (HIGH DOSE) 0.7 milliLiter(s) IntraMuscular once  isosorbide   mononitrate ER Tablet (IMDUR) 30 milliGRAM(s) Oral every 24 hours  lidocaine   4% Patch 1 Patch Transdermal every 24 hours  metoprolol succinate ER 25 milliGRAM(s) Oral daily  montelukast 10 milliGRAM(s) Oral daily  multivitamin 1 Tablet(s) Oral daily  pantoprazole    Tablet 40 milliGRAM(s) Oral before breakfast  polyethylene glycol 3350 17 Gram(s) Oral daily  senna 2 Tablet(s) Oral at bedtime  tiotropium 2.5 MICROgram(s) Inhaler 2 Puff(s) Inhalation daily  torsemide 40 milliGRAM(s) Oral every 24 hours    MEDICATIONS  (PRN):  acetaminophen     Tablet .. 650 milliGRAM(s) Oral every 6 hours PRN Temp greater or equal to 38C (100.4F), Mild Pain (1 - 3)  LORazepam     Tablet 0.5 milliGRAM(s) Oral daily PRN Anxiety  oxyCODONE    IR 5 milliGRAM(s) Oral every 6 hours PRN Severe Pain (7 - 10)  simethicone 80 milliGRAM(s) Chew two times a day PRN Gas  traMADol 50 milliGRAM(s) Oral every 6 hours PRN Moderate Pain (4 - 6)  trimethobenzamide Injectable 200 milliGRAM(s) IntraMuscular every 6 hours PRN Nausea and/or Vomiting    PAST MEDICAL & SURGICAL HISTORY:  HTN (hypertension)      Aortic stenosis      Hyperlipidemia      COPD (chronic obstructive pulmonary disease)      GERD (gastroesophageal reflux disease)      Stage 3 chronic kidney disease      Anemia      Diastolic CHF, chronic      Obesity      Paroxysmal atrial fibrillation      Colon cancer      S/P TAVR (transcatheter aortic valve replacement)  2/2019        FAMILY HISTORY:    SOCIAL HISTORY:  [place]: [ ] Current, [ ] Former, [ ] Never; Pack Years:  Alcohol:  Illicit Drugs:    REVIEW OF SYSTEMS:  Constitutional: No fever, chills, weight loss, or fatigue  ENMT:  No visual changes; No difficulty hearing, tinnitus, vertigo; No sinus or throat pain  Neck: No pain or stiffness  Respiratory: No cough, wheezing, or hemoptysis; No shortness of breath  Cardiovascular: No chest pain, palpitations, dizziness, orthopnea, PND, or leg swelling  Gastrointestinal: No abdominal or epigastric pain. No  nausea, vomiting, or hematemesis. No diarrhea, constipation, or steatorrhea. No melena or hematochezia  Genitourinary: No dysuria, urinary frequency/hesitancy, or hematuria  Skin: No itching, burning, rashes or lesions   Musculoskeletal: No joint pain or swelling; No muscle, back or extremity pain    Vital Signs Last 24 Hrs  T(C): 36.7 (21 Dec 2022 05:59), Max: 37.8 (20 Dec 2022 17:23)  T(F): 98.1 (21 Dec 2022 05:59), Max: 100 (20 Dec 2022 17:23)  HR: 62 (21 Dec 2022 05:59) (60 - 62)  BP: 116/65 (21 Dec 2022 05:59) (116/65 - 147/70)  BP(mean): --  RR: 18 (21 Dec 2022 05:59) (18 - 18)  SpO2: 98% (21 Dec 2022 05:59) (95% - 98%)    Parameters below as of 21 Dec 2022 05:59  Patient On (Oxygen Delivery Method): nasal cannula        12-20 @ 07:01  -  12-21 @ 07:00  --------------------------------------------------------  IN: 0 mL / OUT: 1000 mL / NET: -1000 mL        PHYSICAL EXAM:    General: Well developed; well nourished; in no acute distress  HEENT: MMM, conjunctiva and sclera clear  Gastrointestinal: Soft, non-tender non-distended; Normal bowel sounds; No rebound or guarding  Extremities: Normal range of motion, No clubbing, cyanosis or edema  Neurological: Alert and oriented x3  Skin: Warm and dry. No obvious rash    LABS:                        9.6    9.66  )-----------( 297      ( 21 Dec 2022 07:12 )             31.7     12-21    137  |  93<L>  |  28<H>  ----------------------------<  98  3.5   |  32<H>  |  1.34<H>    Ca    9.2      21 Dec 2022 07:12  Phos  3.9     12-21  Mg     2.0     12-21    TPro  7.8  /  Alb  3.1<L>  /  TBili  0.5  /  DBili  x   /  AST  32  /  ALT  34  /  AlkPhos  128<H>  12-21      Culture Results:   No growth at 12 hours (12-20 @ 14:50)  Culture Results:   No growth at 12 hours (12-20 @ 14:50)    RADIOLOGY & ADDITIONAL STUDIES:   
HPI  82y Female w/ PMHX colon ca presents with R lower back pain. Patient states she has been having R lower back pain and been unable to ambulate or get out of bed since being admitted to the hospital. Denies any hip pain. States having fever but denies chills. Patient is currently being treated for a UTI. Denies numbness/tingling in the RLE. Denies any recent trauma. Denies hx of crystal arthopathy or other inflammatory joint disorders, IV drug use, new sexual encounters/STIs, or other infections. At baseline, community ambulator w/o assistive devices. Denies radicular pain.    ROS  Negative unless otherwise specified in HPI.    PAST MEDICAL & SURGICAL Hx  PAST MEDICAL & SURGICAL HISTORY:  HTN (hypertension)      Aortic stenosis      Hyperlipidemia      COPD (chronic obstructive pulmonary disease)      GERD (gastroesophageal reflux disease)      Stage 3 chronic kidney disease      Anemia      Diastolic CHF, chronic      Obesity      Paroxysmal atrial fibrillation      Colon cancer      S/P TAVR (transcatheter aortic valve replacement)  2/2019          MEDICATIONS  Home Medications:  amiodarone 200 mg oral tablet: 1 tab(s) orally once a day (16 Dec 2022 01:15)  apixaban 5 mg oral tablet: 1 tab(s) orally 2 times a day (16 Dec 2022 01:15)  atorvastatin 20 mg oral tablet: 1 tab(s) orally once a day (at bedtime) (16 Dec 2022 01:15)  folic acid 1 mg oral tablet: 1 tab(s) orally once a day (16 Dec 2022 01:15)  isosorbide mononitrate 30 mg oral tablet, extended release: 1 tab(s) orally once a day (16 Dec 2022 01:15)  lidocaine 4% topical film: Apply topically to affected area once a day, As Needed for pain (16 Dec 2022 01:15)  LORazepam 0.5 mg oral tablet: 1 tab(s) orally once a day (16 Dec 2022 01:15)  magnesium oxide 400 mg (241.3 mg elemental magnesium) oral tablet: 1 tab(s) orally once a day (16 Dec 2022 01:15)  montelukast 10 mg oral tablet: 1 tab(s) orally once a day (16 Dec 2022 01:15)  Multiple Vitamins oral tablet: 1 tab(s) orally once a day (16 Dec 2022 01:15)  simethicone 80 mg oral tablet, chewable: 1 tab(s) orally 2 times a day (16 Dec 2022 01:15)  Spiriva Respimat 1.25 mcg/inh inhalation aerosol: 2 puff(s) inhaled once a day (16 Dec 2022 01:15)      ALLERGIES  Digox (Rash; Urticaria; Hives)  Plavix (Other (Mod to Severe))      FAMILY Hx  FAMILY HISTORY:      SOCIAL Hx  Social History:  Lives at home with daughter in 1st floor apartment. Denies toxic habits (16 Dec 2022 02:50)      VITALS  Vital Signs Last 24 Hrs  T(C): 36.9 (18 Dec 2022 21:26), Max: 38 (18 Dec 2022 18:30)  T(F): 98.5 (18 Dec 2022 21:26), Max: 100.4 (18 Dec 2022 18:30)  HR: 68 (18 Dec 2022 21:26) (58 - 72)  BP: 123/61 (18 Dec 2022 21:26) (113/67 - 175/52)  BP(mean): --  RR: 19 (18 Dec 2022 21:26) (18 - 20)  SpO2: 97% (18 Dec 2022 21:26) (95% - 97%)    Parameters below as of 18 Dec 2022 21:26  Patient On (Oxygen Delivery Method): nasal cannula  O2 Flow (L/min): 3      PHYSICAL EXAM  Gen: Lying in bed, non-toxic appearing, NAD, resting comfortably  Resp: No increased WOB  RLE:  TTP over R lumbar spine  Skin intact, no effusion or erythema, normothermic  NT over R hip,  compartments soft  R Hip full pain free ROM   Motor: TA/EHL/GS/FHL intact  Sensory: DP/SP/Tib/Ellen/Saph SILT  +DP pulse, WWP  Negative straight leg raise      LABS                        8.9    10.36 )-----------( 207      ( 18 Dec 2022 08:58 )             29.6     12-18    137  |  96  |  24<H>  ----------------------------<  97  3.8   |  31  |  1.49<H>    Ca    9.4      18 Dec 2022 08:58  Phos  4.3     12-18  Mg     2.2     12-18    TPro  7.4  /  Alb  3.5  /  TBili  0.6  /  DBili  x   /  AST  29  /  ALT  21  /  AlkPhos  68  12-17          Culture - Blood (collected 17 Dec 2022 14:37)  Source: .Blood Blood-Venous  Preliminary Report (18 Dec 2022 15:00):    No growth at 1 day.        IMAGING  XRs: No effusion, fracture , or acute injury present on bilateral hip xr. Arthritis present in bilateral hips of the R hip (personal read)  CT/pelvis shows no acute fracture or effusion. Umbilical hernia present     ASSESSMENT & PLAN  82y Female w/ R low back pain. Very Low suspicion for septic arthritis.   -WBAT RLE  -f/u ESR and CRP  - Rec PT  -no acute orthopedic intervention indicated at this time  -pain control  -ice/cold compress  -MRI for check for effusion and metastatic disease  - Remainder of care per primary team    * Plan not final until signed by attending *
    Patient is a 82y old  Female who presents with a chief complaint of       HPI:  82F, German speaking, spoke with  Lulu #[id number], w/ PMHx HTN, HLD, severe AS s/p TAVR () w/ mod valve stenosis and valve thrombosis  s/p AC (not seen on echo ), Ascending Thoracic Aneurysm 4cm in 2022, pAFib (on Amiodarone/Eliquis), COPD (on 2L home O2), Colon CA s/p resection in 2019 (previously in remission now with recurrence and mets to lung), moderate MARK (non-compliant with CPAP 2/2 claustrophobia), CKD3 (baseline Cr 1.1-1.2) and FEROZ presents for malaise, chills, and hip pain for 1 day. She states that earlier this week she saw her PCP was doing well, however yesterday had chills, rigors, malaise, and woke up with acute on chronic R hip pain. She states she has had hip pain in the past, never seen a doctor for it, and has just managed it with lidocaine patches. She states this is the worst it has been, it starts at her posterior hip and radiates down her R leg. She states it hurts with ambulation and describes it as a sharp shooting pain. Pt is tearful on exam, and concerned as she stats every time she comes here we find something new wrong with her. She denies chest pain, SOB, headaches, LOC, abdominal pain, nausea, vomiting, diarrhea. She does endorse some burning with urination.     Recent admissions -12/3 for CHF exacerbation under cardiology, had medications adjusted ands sent home. Other admission under medicine - sent in from [facility] for melena with colonoscopy c/f cancer with biopsy showing adenocarcinoma and PET scan with concerning MET disease to lung. Also admitted under cardiology service from - for chest pain and hedaches found to have e.coli bacteremia and gemella spp. w/ TTE/IRMA and gallium scan negative with source presumed to be infected tooth d/c'ed with IV Abx.    In the ED:   Vitals: T 100.1, 69. 190/72>157/73, 18 95% RA  Labs: Hgb 10.4, sCr 1.80, pBNP 1105. U/A:  small LE, WBC >10, +bacteria  CXR: L sided effusion/infiltrate  Interventions: tylenol 650, CTX, CTAP, CTL/CTT   (16 Dec 2022 02:50)      PAST MEDICAL & SURGICAL HISTORY:  HTN (hypertension)      Aortic stenosis      Hyperlipidemia      COPD (chronic obstructive pulmonary disease)      GERD (gastroesophageal reflux disease)      Stage 3 chronic kidney disease      Anemia      Diastolic CHF, chronic      Obesity      Paroxysmal atrial fibrillation      Colon cancer      S/P TAVR (transcatheter aortic valve replacement)  2019          MEDICATIONS  (STANDING):  aMIOdarone    Tablet 200 milliGRAM(s) Oral daily  apixaban 5 milliGRAM(s) Oral every 12 hours  atorvastatin 20 milliGRAM(s) Oral at bedtime  folic acid 1 milliGRAM(s) Oral daily  influenza  Vaccine (HIGH DOSE) 0.7 milliLiter(s) IntraMuscular once  isosorbide   mononitrate ER Tablet (IMDUR) 30 milliGRAM(s) Oral every 24 hours  lidocaine   4% Patch 1 Patch Transdermal every 24 hours  metoprolol succinate ER 25 milliGRAM(s) Oral daily  montelukast 10 milliGRAM(s) Oral daily  multivitamin 1 Tablet(s) Oral daily  pantoprazole    Tablet 40 milliGRAM(s) Oral before breakfast  polyethylene glycol 3350 17 Gram(s) Oral daily  senna 2 Tablet(s) Oral at bedtime  tiotropium 2.5 MICROgram(s) Inhaler 2 Puff(s) Inhalation daily  torsemide 40 milliGRAM(s) Oral every 24 hours    MEDICATIONS  (PRN):  acetaminophen     Tablet .. 650 milliGRAM(s) Oral every 6 hours PRN Temp greater or equal to 38C (100.4F), Mild Pain (1 - 3)  LORazepam     Tablet 0.5 milliGRAM(s) Oral daily PRN Anxiety  simethicone 80 milliGRAM(s) Chew two times a day PRN Gas           FAMILY HISTORY:    CBC Full  -  ( 16 Dec 2022 07:29 )  WBC Count : 6.33 K/uL  RBC Count : 3.62 M/uL  Hemoglobin : 9.3 g/dL  Hematocrit : 31.5 %  Platelet Count - Automated : 163 K/uL  Mean Cell Volume : 87.0 fl  Mean Cell Hemoglobin : 25.7 pg  Mean Cell Hemoglobin Concentration : 29.5 gm/dL  Auto Neutrophil # : 4.82 K/uL  Auto Lymphocyte # : 0.80 K/uL  Auto Monocyte # : 0.65 K/uL  Auto Eosinophil # : 0.01 K/uL  Auto Basophil # : 0.02 K/uL  Auto Neutrophil % : 76.1 %  Auto Lymphocyte % : 12.6 %  Auto Monocyte % : 10.3 %  Auto Eosinophil % : 0.2 %  Auto Basophil % : 0.3 %          139  |  100  |  22  ----------------------------<  108<H>  3.4<L>   |  27  |  1.57<H>    Ca    9.0      16 Dec 2022 07:29  Phos  4.1       Mg     2.1         TPro  7.2  /  Alb  3.3  /  TBili  0.6  /  DBili  x   /  AST  20  /  ALT  14  /  AlkPhos  64        Urinalysis Basic - ( 15 Dec 2022 21:54 )    Color: Yellow / Appearance: Clear / S.010 / pH: x  Gluc: x / Ketone: NEGATIVE  / Bili: Negative / Urobili: 0.2 E.U./dL   Blood: x / Protein: NEGATIVE mg/dL / Nitrite: NEGATIVE   Leuk Esterase: Small / RBC: < 5 /HPF / WBC > 10 /HPF   Sq Epi: x / Non Sq Epi: 0-5 /HPF / Bacteria: Present /HPF          Radiology :     < from: CT Abdomen and Pelvis No Cont (22 @ 01:30) >  ACC: [id number] EXAM:  CT ABDOMEN AND PELVIS                          PROCEDURE DATE:  2022          INTERPRETATION:  Initial report created on 2022 2:31:02 AM EST  PROCEDURE INFORMATION:  Exam: CT Abdomen And Pelvis Without Contrast  Exam date and time: 2022 12:55 AM  Age: 82 years old  Clinical indication: Lower abdominal pain. Shivers.    TECHNIQUE:  Imaging protocol: Computed tomography of the abdomen and pelvis without   intravenous or oral contrast.    COMPARISON: Most recent chest CT from 2022 multiple additional prior   imaging studies dating back to 2019.    FINDINGS:  Lower chest: Again post transcatheter aortic valve replacement. Heavily   calcified mitral valve annulus. Cardiomegaly. Low-density blood within   the heart is consistent with anemia. No change 7 mm tubular shaped nodule   lateral basal segment right lower lobe. Noncalcified micronodules right   middle lobe. Small amount of linear atelectasis both lower lobes.    Liver: Punctate calcification is consistent with prior granulomatous   disease.  Gallbladder and bile ducts: Normal.  Pancreas: Fatty infiltration of the pancreas.  Spleen: Splenomegaly, with the spleen measuring 15.5 cm in greatest   dimension. No focal lesion.  Adrenal glands: There is a 1.9 x 1.6 cm left adrenal nodule. On 2017,   there had been a 1.1 x 0.8 cm left adrenal nodule. Although this nodule   has a density consistent with an adenoma (0 Hounsfield units), its growth   raises the suspicion of a superimposed metastasis. Right adrenal gland   within normal limits.  Kidneys and ureters: A few low-density lesions in the right kidney have   previously been shown to be cysts. There is also a 0.6 cm lesion along   the lateral aspect of the lower pole the right kidney, more dense than a   simple cyst. Left kidney within normal limits.    Stomach and bowel: Again post extended right hemicolectomy with ileal to   mid transverse colon anastomosis.The suspected recurrent colon cancer   near the anastomosis is not visualized on this limited noncontrast exam.   No change 1.0 cm fat density nodule to the right of the anastomosis   (series 3, image 64). Focal fat stranding along the anterior abdominal   wall to the right of midline is unchanged. These findingsmay represent   omental infarct. A periumbilical hernia is again present, again   containing a loop of small bowel. That small bowel loop is now   thick-walled. No bowel obstruction. No free air.    Ascites: None.  Vasculature: Large amount of calcified plaque. Aorta and pelvic arteries   tortuous.  Lymph nodes: No enlarged lymph nodes.  Urinary bladder: Within normal limits.  Reproductive: Uterus and adnexa within normal limits.  Bones/joints: Mild dextroscoliosis lumbar spine. Degenerative changes.      IMPRESSION:  1. Since 2022, the small bowel loop in a periumbilical ventral   hernia is now thick-walled. This could indicate strangulation. Recommend   surgical evaluation.    2. A left adrenal nodule has increased in size since 2019. Though it has   density measurements of an adenoma, a collision tumor containing   metastasis cannot be excluded.    3. Splenomegaly.        < from: CT Thoracic Spine No Cont (22 @ 01:30) >  ACC: [id number] EXAM:  CT THORACIC SPINE                        ACC: [id number] EXAM:  CT LUMBAR SPINE                          PROCEDURE DATE:  2022          INTERPRETATION:  CLINICAL INDICATION: Lower back pain.    TECHNIQUE: CT of the thoracicand lumbar spine was performed without the   administration of intravenous contrast, according to standard protocol.    COMPARISON: None.    FINDINGS:    ALIGNMENT: Mild S-shaped scoliosis of thoracic spine. Mild   dextroscoliosis of the lumbar spine. There is trace anterolisthesis of T3   on T4 and T4 and T5. There is trace retrolisthesis of L2 on L3, L3 on L4,   and L4 on L5.    VERTEBRAE: The vertebral bodies are normal in height. There is no   fracture or aggressive osseous lesion.    DISCS: Areas of mild to moderate loss of disc height throughout the   thoracic spine. There is multilevel loss of disc height throughout the   lumbar spine with near complete loss of height at the L3-4 level,   moderate loss of height also noted at the L2-3 and L5-S1 levels. There is   associated degenerative endplate sclerosis.    PARAVERTEBRAL SOFT TISSUES AND VISUALIZED RETROPERITONEUM: There are few   scattered pulmonary micronodules. There is dependent atelectasis.   Atherosclerotic changes of the aorta. Status post aortic valve   replacement. Mitral valve calcification. 2.3 cm left adrenal nodule   possibly an adenoma    There is multilevel degenerative disc disease of the thoracic and lumbar   spine. Areas of posterior disc bulge resulting in mild canal stenosis at   L2-3 and L3-4 with mild to moderate stenosis at L4-5.    LIMITED EVALUATION OF UPPER SACRUM AND SACROILIAC JOINTS: Mild   degenerative changes of the sacroiliac joints.    IMPRESSION:  No acute fracture or traumatic malalignment.  Multilevel degenerative changes.  (If the patient's symptoms persist consider further evaluation with MRI   as it is a superior modality to evaluate the spinal canal and neural   foramen).        Vital Signs Last 24 Hrs  T(C): 37.1 (16 Dec 2022 05:32), Max: 37.8 (15 Dec 2022 18:47)  T(F): 98.7 (16 Dec 2022 05:32), Max: 100.1 (15 Dec 2022 18:47)  HR: 60 (16 Dec 2022 05:32) (60 - 69)  BP: 146/70 (16 Dec 2022 05:32) (111/56 - 190/72)  BP(mean): --  RR: 18 (16 Dec 2022 05:32) (18 - 20)  SpO2: 99% (16 Dec 2022 05:32) (94% - 99%)    Parameters below as of 16 Dec 2022 02:25  Patient On (Oxygen Delivery Method): room air            REVIEW OF SYSTEMS:   per hpi         Physical Exam:  obese 82 y o German speaking woman  lying comfortably in semi Angel's position , awake , alert , no current complaints     Neurologic Exam:    Alert and oriented  x 3     Motor Exam:          Right UE:               no focal weakness ,  > 3+/5 throughout                                 Left UE:                 no focal weakness,  > 3+/5 throughout         Right LE:                no focal weakness,  > 3+/5 throughout        Left LE:                  no focal weakness,  > 3+/5 throughout             Sensation:         intact to light touch x 4 extremities                        DTR :                     biceps/brachioradialis : equal                                              patella/ankle : equal      Gait :  not tested        PM&R Impression :     1) admitted for uti/chano , c/o r hip pain - neg fx on CT imaging       Recommendations / Plan :     1) Physical / Occupational therapy focusing on therapeutic exercises , equipment evaluation , bed mobility/transfer out of bed evaluation , progressive ambulation with assistive devices prn .    2) Current disposition plan recommendation  : pending functional progress                     
INFECTIOUS DISEASES INITIAL CONSULT NOTE    HPI: 81yo Bel[place]ian speaking F h/o severe AS s/p TAVR 2019 with mod valve stenosis and valve thrombosis in 2021 s/p AC (not seen on echo 2022), ascending thoracic aneurysm, colon ca s/p resection in 2019 with recurrence mets to lung (not on therapy), COPD on home 2L O2, MARK, CKD3, pAfib p/w fatigue, weakness, acute on chronic R hip pain, inability to ambulate for a few days.  History obtained through [place]  ([id number]) with some difficulty.  Patient was admitted from 9/4-9/13 for generalized weakness, found to have Gamella bacteremia. IRMA neg for vegetation.  he had tooth infection and tooth extraction was recommended; however patient refused and said she would like to follow up with her own dentist. After discharge, she never saw a dentist. She went home with IV CTX to finish on 9/26. She was then readmitted from 9/21-9/29 for melena. Colonoscopy showed recurrent adenocarcinoma and PET scan c/f metastatic disease to the lung. EBUS and lung LN biopsy was planned but patient refused. She followed up with Dr. More as outpatient and still refused bronch and was supposed to get CT chest, which didn't happen. She as readmitted from 12/1-12/3 for CHF exacerbation.  Then about a few days ago, she started to feel very tired and weak, chills, lack of appetite, not feeling well.  She has had chronic R hip pain and LBP but now acutely worsened, having shooting R hip pain, unable to ambulate.  Also nauseated and vomiting. Denied teeth pain, abdominal pain, diarrhea, CP, cough, dysuria.  Upon admission, she was found to be febrile to 100.1, hypertensive, lab notable for WBC 7.67, Cr 1.8, positive UA. CXR L side effusion/infiltrate.  CT thoracic/lumbar w/o con showed multilevel degenerative disease, no fracture. hip X-ray also unremarkable.  BCx 4/4 grew Strep mitis oralis.  She is currently on IV CTX.  ID was consulted for management of bacteremia.        PAST MEDICAL & SURGICAL HISTORY:  HTN (hypertension)      Aortic stenosis      Hyperlipidemia      COPD (chronic obstructive pulmonary disease)      GERD (gastroesophageal reflux disease)      Stage 3 chronic kidney disease      Anemia      Diastolic CHF, chronic      Obesity      Paroxysmal atrial fibrillation      Colon cancer      S/P TAVR (transcatheter aortic valve replacement)  2/2019            Review of Systems:   Constitutional, eyes, ENT, cardiovascular, respiratory, gastrointestinal, genitourinary, integumentary, neurological, psychiatric and heme/lymph are otherwise negative other than noted above       ANTIBIOTICS:  MEDICATIONS  (STANDING):  aMIOdarone    Tablet 200 milliGRAM(s) Oral daily  apixaban 2.5 milliGRAM(s) Oral every 12 hours  atorvastatin 20 milliGRAM(s) Oral at bedtime  cefTRIAXone   IVPB 2000 milliGRAM(s) IV Intermittent every 24 hours  folic acid 1 milliGRAM(s) Oral daily  gabapentin 300 milliGRAM(s) Oral at bedtime  influenza  Vaccine (HIGH DOSE) 0.7 milliLiter(s) IntraMuscular once  isosorbide   mononitrate ER Tablet (IMDUR) 30 milliGRAM(s) Oral every 24 hours  lidocaine   4% Patch 1 Patch Transdermal every 24 hours  metoprolol succinate ER 25 milliGRAM(s) Oral daily  montelukast 10 milliGRAM(s) Oral daily  multivitamin 1 Tablet(s) Oral daily  pantoprazole    Tablet 40 milliGRAM(s) Oral before breakfast  polyethylene glycol 3350 17 Gram(s) Oral daily  senna 2 Tablet(s) Oral at bedtime  tiotropium 2.5 MICROgram(s) Inhaler 2 Puff(s) Inhalation daily  torsemide 40 milliGRAM(s) Oral every 24 hours    MEDICATIONS  (PRN):  LORazepam     Tablet 0.5 milliGRAM(s) Oral daily PRN Anxiety  oxyCODONE    IR 5 milliGRAM(s) Oral every 6 hours PRN Severe Pain (7 - 10)  simethicone 80 milliGRAM(s) Chew two times a day PRN Gas  traMADol 50 milliGRAM(s) Oral every 6 hours PRN Moderate Pain (4 - 6)  trimethobenzamide Injectable 200 milliGRAM(s) IntraMuscular every 6 hours PRN Nausea and/or Vomiting      Allergies    Digox (Rash; Urticaria; Hives)  Plavix (Other (Mod to Severe))    Intolerances        SOCIAL HISTORY: Denied toxic habits.  Lives with her daughter.  Can ambulate independently until a few days ago.  Born in [facility], came to the  [place] yrs ago.  Former teacher       FAMILY HISTORY:   no FH leading to current infection    Vital Signs Last 24 Hrs  T(C): 36.7 (18 Dec 2022 12:39), Max: 37.1 (17 Dec 2022 21:19)  T(F): 98.1 (18 Dec 2022 12:39), Max: 98.8 (17 Dec 2022 21:19)  HR: 71 (18 Dec 2022 12:39) (58 - 71)  BP: 175/52 (18 Dec 2022 12:39) (118/64 - 175/52)  BP(mean): --  RR: 20 (18 Dec 2022 12:39) (18 - 20)  SpO2: 95% (18 Dec 2022 12:39) (95% - 98%)    Parameters below as of 18 Dec 2022 12:39  Patient On (Oxygen Delivery Method): nasal cannula  O2 Flow (L/min): 3        PHYSICAL EXAM:  Constitutional: alert, NAD  Eyes: the sclera and conjunctiva were normal.   ENT: the ears and nose were normal in appearance.  poor dentition   Neck: the appearance of the neck was normal and the neck was supple.   Pulmonary: no respiratory distress and lungs were clear to auscultation bilaterally.   Heart: heart rate was normal and rhythm regular, normal S1 and S2  Abdomen: normal bowel sounds, soft, non-tender  Neurological: difficult to assess due to patient's c/o pain and unwillingness     LABS:                        8.9    10.36 )-----------( 207      ( 18 Dec 2022 08:58 )             29.6     12-18    137  |  96  |  24<H>  ----------------------------<  97  3.8   |  31  |  1.49<H>    Ca    9.4      18 Dec 2022 08:58  Phos  4.3     12-18  Mg     2.2     12-18    TPro  7.4  /  Alb  3.5  /  TBili  0.6  /  DBili  x   /  AST  29  /  ALT  21  /  AlkPhos  68  12-17          MICROBIOLOGY:  12/17 BCx ngtd  12/15 BCx Strep mitis oralis (S to Pen 0.094, CTX 0.064, vanco)      RADIOLOGY & ADDITIONAL STUDIES:  12/16 CT pelvix  IMPRESSION:    No acute displaced fracture or dislocation.    Please note that MRI would be a more sensitive test to evaluate for   metastatic disease within the bones.    Other findings as above.    CT a/p 12/16  IMPRESSION:  1. Since 9/27/2022, the small bowel loop in a periumbilical ventral   hernia is now thick-walled. This could indicate strangulation. Recommend   surgical evaluation.    2. A left adrenal nodule has increased in size since 2019. Though it has   density measurements of an adenoma, a collision tumor containing   metastasis cannot be excluded.    3. Splenomegaly.      CT thoracic/lumbar 12/16    IMPRESSION:  No acute fracture or traumatic malalignment.  Multilevel degenerative changes.  (If the patient's symptoms persist consider further evaluation with MRI   as it is a superior modality to evaluate the spinal canal and neural   foramen).    
HPI:  82F, Somali speaking, spoke with  Lulu #[id number], w/ PMHx HTN, HLD, severe AS s/p TAVR (2019) w/ mod valve stenosis and valve thrombosis 2021 s/p AC (not seen on echo 2022), Ascending Thoracic Aneurysm 4cm in 1/2022, pAFib (on Amiodarone/Eliquis), COPD (on 2L home O2), Colon CA s/p resection in 2019 (previously in remission now with recurrence and mets to lung), moderate MARK (non-compliant with CPAP 2/2 claustrophobia), CKD3 (baseline Cr 1.1-1.2) and FEROZ presents for malaise, chills, and hip pain for 1 day. She states that earlier this week she saw her PCP was doing well, however yesterday had chills, rigors, malaise, and woke up with acute on chronic R hip pain. She states she has had hip pain in the past, never seen a doctor for it, and has just managed it with lidocaine patches. She states this is the worst it has been, it starts at her posterior hip and radiates down her R leg. She states it hurts with ambulation and describes it as a sharp shooting pain. Pt is tearful on exam, and concerned as she stats every time she comes here we find something new wrong with her. She denies chest pain, SOB, headaches, LOC, abdominal pain, nausea, vomiting, diarrhea. She does endorse some burning with urination.     Recent admissions 12/1-12/3 for CHF exacerbation under cardiology, had medications adjusted ands sent home. Other admission under medicine 9/21-9/29 sent in from [facility] for melena with colonoscopy c/f cancer with biopsy showing adenocarcinoma and PET scan with concerning MET disease to lung. Also admitted under cardiology service from 09/4-09/13 for chest pain and hedaches found to have e.coli bacteremia and gemella spp. w/ TTE/IRMA and gallium scan negative with source presumed to be infected tooth d/c'ed with IV Abx.    In the ED:   Vitals: T 100.1, 69. 190/72>157/73, 18 95% RA  Labs: Hgb 10.4, sCr 1.80, pBNP 1105. U/A:  small LE, WBC >10, +bacteria  CXR: L sided effusion/infiltrate  Interventions: tylenol 650, CTX, CTAP, CTL/CTT   (16 Dec 2022 02:50)      GENERAL SURGERY ADDENDUM: HPI as above. 82F, Somali speaking, w/ PMHx HTN, HLD, severe AS s/p TAVR (2019), valve thrombosis 2021 s/p AC (not seen on echo 2022), Ascending Thoracic Aneurysm 4cm in 1/2022, pAFib (on Amiodarone/Eliquis), COPD (use to be on 2L home O2 but doesn't use it anymore), Colon CA s/p resection in 2019 (in remission), moderate MARK (non-compliant with CPAP 2/2 claustrophobia), CKD3 (baseline Cr 1.1-1.2) with recent admission in September, 2022 for anemia, found to have new primary colon cancer with lung mets, now readmitted to medicine on 12/16 on with right hip pain and a UTI now being workup up for strep mitis bacteremia. General surgery consulted for new onset abdominal pain and evidence of colonic dilation on abdominal xray. On examination, pt states she has mild generalized abdominal pain which she attibutes to not eating. Denies nausea or vomiting. States she is unaware if she is passing flatus but had a large nonbloody bowel movement earlier today.      acetaminophen     Tablet .. 650 milliGRAM(s) Oral every 6 hours PRN  aMIOdarone    Tablet 200 milliGRAM(s) Oral daily  apixaban 2.5 milliGRAM(s) Oral every 12 hours  atorvastatin 20 milliGRAM(s) Oral at bedtime  cefTRIAXone   IVPB 2000 milliGRAM(s) IV Intermittent every 24 hours  folic acid 1 milliGRAM(s) Oral daily  gabapentin 300 milliGRAM(s) Oral at bedtime  influenza  Vaccine (HIGH DOSE) 0.7 milliLiter(s) IntraMuscular once  isosorbide   mononitrate ER Tablet (IMDUR) 30 milliGRAM(s) Oral every 24 hours  lidocaine   4% Patch 1 Patch Transdermal every 24 hours  LORazepam     Tablet 0.5 milliGRAM(s) Oral daily PRN  metoprolol succinate ER 25 milliGRAM(s) Oral daily  montelukast 10 milliGRAM(s) Oral daily  multivitamin 1 Tablet(s) Oral daily  oxyCODONE    IR 5 milliGRAM(s) Oral every 6 hours PRN  pantoprazole    Tablet 40 milliGRAM(s) Oral before breakfast  polyethylene glycol 3350 17 Gram(s) Oral daily  saline laxative (FLEET) Rectal Enema 1 Enema Rectal once  senna 2 Tablet(s) Oral at bedtime  simethicone 80 milliGRAM(s) Chew two times a day PRN  tiotropium 2.5 MICROgram(s) Inhaler 2 Puff(s) Inhalation daily  torsemide 40 milliGRAM(s) Oral every 24 hours  traMADol 50 milliGRAM(s) Oral every 6 hours PRN  trimethobenzamide Injectable 200 milliGRAM(s) IntraMuscular every 6 hours PRN      Allergies    Digox (Rash; Urticaria; Hives)  Plavix (Other (Mod to Severe))    Intolerances        ICU Vital Signs Last 24 Hrs  T(F): 100.6 (12-20-22 @ 09:06), Max: 100.6 (12-20-22 @ 09:06)  HR: 61 (12-20-22 @ 14:07) (61 - 66)  BP: 147/70 (12-20-22 @ 14:07) (117/67 - 147/70)  BP(mean): --  ABP: --  RR: 15 (12-20-22 @ 09:06) (15 - 18)  SpO2: 96% (12-20-22 @ 09:06) (94% - 99%)    General: AAOx3, NAD, WDWN, laying comfortably in bed  Cardio: S1,S2, No MRG, RRR  Pulm: Lungs bilaterally clear to auscultation  Abdomen: soft, mildly distended, mild-moderate TTP in RLQ/LLQ, no rebound, no guarding  ESTEFANY: skin tags appreciated externally, normal sphincter tone, nontender, no palpable masses, small amount of soft brown stool on glove  Extremities: WWP, peripheral pulses appreciated  LABS:    12-20    133<L>  |  91<L>  |  29<H>  ----------------------------<  122<H>  4.0   |  30  |  1.43<H>    Ca    9.0      20 Dec 2022 05:30  Phos  3.6     12-20  Mg     2.0     12-20    TPro  7.4  /  Alb  2.6<L>  /  TBili  0.5  /  DBili  x   /  AST  39  /  ALT  32  /  AlkPhos  152<H>  12-20  LIVER FUNCTIONS - ( 20 Dec 2022 05:30 )  Alb: 2.6 g/dL / Pro: 7.4 g/dL / ALK PHOS: 152 U/L / ALT: 32 U/L / AST: 39 U/L / GGT: x                               9.5    9.32  )-----------( 240      ( 20 Dec 2022 05:30 )             32.6     CAPILLARY BLOOD GLUCOSE    RADIOLOGY  ACC: [id number] EXAM: XR ABDOMEN PORTABLE URGENT 1V    PROCEDURE DATE: 12/20/2022        INTERPRETATION: Abdomen    INDICATION: Abdominal pain, constipation    IMPRESSION: 2 supine views demonstrate markedly dilated colon. No gross pneumoperitoneum although limited without upright views.            
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 She states that earlier this week she saw her PCP was doing well, however yesterday had chills, rigors, malaise, and woke up with acute on chronic R hip pain. She states she has had hip pain in the past, never seen a doctor for it, and has just managed it with lidocaine patches. She states this is the worst it has been, it starts at her posterior hip and radiates down her R leg. She states it hurts with ambulation and describes it as a sharp shooting pain. Pt is tearful on exam, and concerned as she stats every time she comes here we find something new wrong with her. She denies chest pain, SOB, headaches, LOC, abdominal pain, nausea, vomiting, diarrhea. She does endorse some burning with urination.     Recent admissions 12/1-12/3 for CHF exacerbation under cardiology, had medications adjusted ands sent home. Other admission under medicine 9/21-9/29 sent in from [facility] for melena with colonoscopy c/f cancer with biopsy showing adenocarcinoma and PET scan with concerning MET disease to lung. Also admitted under cardiology service from 09/4-09/13 for chest pain and hedaches found to have e.coli bacteremia and gemella spp. w/ TTE/IRMA and gallium scan negative with source presumed to be infected tooth d/c'ed with IV Abx.    In the ED:   Vitals: T 100.1, 69. 190/72>157/73, 18 95% RA  Labs: Hgb 10.4, sCr 1.80, pBNP 1105. U/A:  small LE, WBC >10, +bacteria  CXR: L sided effusion/infiltrate  Interventions: tylenol 650, CTX, CTAP, CTL/CTT   (16 Dec 2022 02:50)      ROS: A 10-point review of systems was otherwise negative.    PAST MEDICAL & SURGICAL HISTORY:  HTN (hypertension)      Aortic stenosis      Hyperlipidemia      COPD (chronic obstructive pulmonary disease)      GERD (gastroesophageal reflux disease)      Stage 3 chronic kidney disease      Anemia      Diastolic CHF, chronic      Obesity      Paroxysmal atrial fibrillation      Colon cancer      S/P TAVR (transcatheter aortic valve replacement)  2/2019          SOCIAL HISTORY:  FAMILY HISTORY:      ALLERGIES: 	  Digox (Rash; Urticaria; Hives)  Plavix (Other (Mod to Severe))            MEDICATIONS:  aMIOdarone    Tablet 200 milliGRAM(s) Oral daily  apixaban 2.5 milliGRAM(s) Oral every 12 hours  atorvastatin 20 milliGRAM(s) Oral at bedtime  cefTRIAXone   IVPB 2000 milliGRAM(s) IV Intermittent every 24 hours  folic acid 1 milliGRAM(s) Oral daily  influenza  Vaccine (HIGH DOSE) 0.7 milliLiter(s) IntraMuscular once  isosorbide   mononitrate ER Tablet (IMDUR) 30 milliGRAM(s) Oral every 24 hours  lidocaine   4% Patch 1 Patch Transdermal every 24 hours  LORazepam     Tablet 0.5 milliGRAM(s) Oral daily PRN  metoprolol succinate ER 25 milliGRAM(s) Oral daily  montelukast 10 milliGRAM(s) Oral daily  multivitamin 1 Tablet(s) Oral daily  oxyCODONE    IR 5 milliGRAM(s) Oral every 6 hours PRN  pantoprazole    Tablet 40 milliGRAM(s) Oral before breakfast  polyethylene glycol 3350 17 Gram(s) Oral daily  senna 2 Tablet(s) Oral at bedtime  simethicone 80 milliGRAM(s) Chew two times a day PRN  tiotropium 2.5 MICROgram(s) Inhaler 2 Puff(s) Inhalation daily  torsemide 40 milliGRAM(s) Oral every 24 hours  traMADol 50 milliGRAM(s) Oral every 6 hours PRN  trimethobenzamide Injectable 200 milliGRAM(s) IntraMuscular every 6 hours PRN      HOME MEDICATIONS:  amiodarone 200 mg oral tablet: 1 tab(s) orally once a day  apixaban 5 mg oral tablet: 1 tab(s) orally 2 times a day  atorvastatin 20 mg oral tablet: 1 tab(s) orally once a day (at bedtime)  folic acid 1 mg oral tablet: 1 tab(s) orally once a day  isosorbide mononitrate 30 mg oral tablet, extended release: 1 tab(s) orally once a day  lidocaine 4% topical film: Apply topically to affected area once a day, As Needed for pain  LORazepam 0.5 mg oral tablet: 1 tab(s) orally once a day  magnesium oxide 400 mg (241.3 mg elemental magnesium) oral tablet: 1 tab(s) orally once a day  montelukast 10 mg oral tablet: 1 tab(s) orally once a day  Multiple Vitamins oral tablet: 1 tab(s) orally once a day  simethicone 80 mg oral tablet, chewable: 1 tab(s) orally 2 times a day  Spiriva Respimat 1.25 mcg/inh inhalation aerosol: 2 puff(s) inhaled once a day      PHYSICAL EXAM:  Height (cm): 165.1 (12-15 @ 18:47)  Weight (kg): 86.2 (12-15 @ 18:47)  BMI (kg/m2): 31.6 (12-15 @ 18:47)  BSA (m2): 1.94 (12-15 @ 18:47)    I/O Summary 24H        T(F): 97.3 (12-16-22 @ 15:30), Max: 100.1 (12-15-22 @ 18:47)  HR: 74 (12-16-22 @ 15:30) (60 - 92)  BP: 117/64 (12-16-22 @ 15:30) (111/56 - 190/72)  BP(mean): --  ABP: --  ABP(mean): --  RR: 18 (12-16-22 @ 15:30) (18 - 20)  SpO2: 95% (12-16-22 @ 15:30) (93% - 99%)    GEN: Uncomfortable  HEENT: NCAT, PERRL, EOMI. Mucosa moist. No JVD.   RESP: CTA b/l  CV: RRR, normal s1/s2. No m/r/g.  ABD: Soft, NTND. BS+  EXT: Warm. No edema, clubbing, or cyanosis.   NEURO: AAOx3. No focal deficits.      	  LABS:	 	    Cardiac Markers   12-15-22 @ 21:50: Trop T 0.01  / CK 28    / CKMB <1.0   12-15-22 @ 20:39: Trop T 0.01  / CK 32    / CKMB <1.0     Serum Pro-Brain Natriuretic Peptide: 1105 pg/mL (12-15-22 @ 21:50)  Serum Pro-Brain Natriuretic Peptide: 1107 pg/mL (12-15-22 @ 20:39)          CBC 12-16-22 @ 07:29                        9.3    6.33  )-----------( 163                   31.5       Hgb trend: 9.3 <-- , 10.4 <--   WBC trend: 6.33 <-- , 7.67 <--     CMP 12-16-22 @ 07:29    139  |  100  |  22  ----------------------------<  108<H>  3.4<L>   |  27  |  1.57<H>    Ca    9.0      12-16-22 @ 07:29  Phos  4.1     12-16  Mg     2.1     12-16    TPro  7.2  /  Alb  3.3  /  TBili  0.6  /  DBili  x   /  AST  20  /  ALT  14  /  AlkPhos  64  12-16      Serum Cr trend: 1.57 <-- , 1.80 <--   proBNP: Serum Pro-Brain Natriuretic Peptide: 1105 pg/mL (12-15 @ 21:50)  Serum Pro-Brain Natriuretic Peptide: 1107 pg/mL (12-15 @ 20:39)    Lipid Profile:   HgA1c:   TSH:     TELEMETRY: 	    ECG:  	  RADIOLOGY:   ECHO:  STRESS:  CATH:  
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 She states that earlier this week she saw her PCP was doing well, however yesterday had chills, rigors, malaise, and woke up with acute on chronic R hip pain. She states she has had hip pain in the past, never seen a doctor for it, and has just managed it with lidocaine patches. She states this is the worst it has been, it starts at her posterior hip and radiates down her R leg. She states it hurts with ambulation and describes it as a sharp shooting pain. Pt is tearful on exam, and concerned as she stats every time she comes here we find something new wrong with her. She denies chest pain, SOB, headaches, LOC, abdominal pain, nausea, vomiting, diarrhea. She does endorse some burning with urination.     Recent admissions 12/1-12/3 for CHF exacerbation under cardiology, had medications adjusted ands sent home. Other admission under medicine 9/21-9/29 sent in from [facility] for melena with colonoscopy c/f cancer with biopsy showing adenocarcinoma and PET scan with concerning MET disease to lung. Also admitted under cardiology service from 09/4-09/13 for chest pain and hedaches found to have e.coli bacteremia and gemella spp. w/ TTE/IRMA and gallium scan negative with source presumed to be infected tooth d/c'ed with IV Abx.   (16 Dec 2022 02:50)    Hospital course noted. Pt seen and examined using  Syriac  id #[id number]. C/o acute R hip pain and LUQ pain w comprehensive description noted below. Reports that "many months" ago she had a similar pain to her R hip and was managed with lidocaine patches. However in the morning prior to admission, she was unable to move/walk dt the pain, called daughter and presented to ED.  Tylenol x1 in the last 24hs, tramadol x1 in the last 24s. Denies oversedation, acute nausea, myoclonus with tramdol but concurrently does not remember taking in the last 24hs. Comprehensive ROS as noted below, collateral obtained from team, chart.     Pt has not f/u with oncologist because she "didn't know when" she was supposed to.     ACP initiated documented below.     PAST MEDICAL & SURGICAL HISTORY:  HTN (hypertension)  Aortic stenosis  Hyperlipidemia   (chronic obstructive pulmonary disease)  GERD (gastroesophageal reflux disease)  Stage 3 chronic kidney disease  Anemia  Diastolic CHF, chronic  Obesity  Paroxysmal atrial fibrillation  Colon cancer  S/P TAVR (transcatheter aortic valve replacement)  2/2019    FAMILY HISTORY:   Reviewed; no history of  cancer   in mother or father    Opiate Naive (Y/N):  yes  iStop reviewed (Y/N): Yes.   ativan and ambien Rx found on iStop review (Ref#:  [id number]         Items that are not checked are not present  PSYCHOSOCIAL ASSESSMENT:  - Significant other/partner: [  ]  Children: [ x ]  - Living Situation: Home [x  ] Long term care [  ]  Rehab[  ]  Other[  ]  - Support system: strong[  ] adequate[ x ] inadequate[  ]  - Mandaen/Spiritual practice: deferred   - Coping: well[  ]  with difficulty[x  ]  poor coping[  ]  unable to assess dt pt mentation [  ]    ADVANCE DIRECTIVES:    - MOLST[  ]    Living Will [  ]   DECISION MAKER(s):  - Health Care Proxy(s) [  ]  Surrogate(s)[  ] Guardian[  ]           Name(s)/Phone Number(s):     BASELINE (I)ADLs (prior to admission):  - Isom:  Total[  ] Moderate[  ] Dependent[  ]    Allergies    Digox (Rash; Urticaria; Hives)  Plavix (Other (Mod to Severe))    Intolerances        Medications:      MEDICATIONS  (STANDING):  aMIOdarone    Tablet 200 milliGRAM(s) Oral daily  apixaban 2.5 milliGRAM(s) Oral every 12 hours  atorvastatin 20 milliGRAM(s) Oral at bedtime  cefTRIAXone   IVPB 2000 milliGRAM(s) IV Intermittent every 24 hours  folic acid 1 milliGRAM(s) Oral daily  gabapentin 300 milliGRAM(s) Oral at bedtime  influenza  Vaccine (HIGH DOSE) 0.7 milliLiter(s) IntraMuscular once  isosorbide   mononitrate ER Tablet (IMDUR) 30 milliGRAM(s) Oral every 24 hours  lidocaine   4% Patch 1 Patch Transdermal every 24 hours  metoprolol succinate ER 25 milliGRAM(s) Oral daily  montelukast 10 milliGRAM(s) Oral daily  multivitamin 1 Tablet(s) Oral daily  pantoprazole    Tablet 40 milliGRAM(s) Oral before breakfast  polyethylene glycol 3350 17 Gram(s) Oral daily  senna 2 Tablet(s) Oral at bedtime  tiotropium 2.5 MICROgram(s) Inhaler 2 Puff(s) Inhalation daily  torsemide 40 milliGRAM(s) Oral every 24 hours    MEDICATIONS  (PRN):  acetaminophen     Tablet .. 650 milliGRAM(s) Oral every 6 hours PRN Temp greater or equal to 38C (100.4F), Mild Pain (1 - 3)  LORazepam     Tablet 0.5 milliGRAM(s) Oral daily PRN Anxiety  oxyCODONE    IR 5 milliGRAM(s) Oral every 6 hours PRN Severe Pain (7 - 10)  simethicone 80 milliGRAM(s) Chew two times a day PRN Gas  traMADol 50 milliGRAM(s) Oral every 6 hours PRN Moderate Pain (4 - 6)  trimethobenzamide Injectable 200 milliGRAM(s) IntraMuscular every 6 hours PRN Nausea and/or Vomiting      PRESENT SYMPTOMS:   [ ]Unable to obtain due to poor mentation   Source if other than patient:  [ ]Family   [ ]Team     Pain [ x ]  Location :     posterior R hip   Quality: knife twisting in the back, with generalized pressure to R hip area  Radiation: pain and pressure down R leg   Timing: constant w BTP  Aggravating factors: pt is completely unable to move or reposition herself   Severity in last 24h (0-10 scale) : "over ten"  Current score (0-10 scale): "10+/10"  Improves with:  No relief at present     Location :     LUQ  Quality: sharp   Radiation: through left side of abdomen    Timing: constant w BTP  Aggravating factors: light or deep palpation   Severity in last 24h (0-10 scale) : "over ten"  Current score (0-10 scale): "10+/10"  Improves with:  No relief at present       PAIN AD Score:  n/a  [email or web address] (press ctrl +  left click to view)    Analgesic Use (PRNs) for past 24 hours:  - ana 300 mg PO qHs for seven days  - tramadol 50 mg x1  - oxy IR 5 mg PO x0   - Tylenol x1     If [  ], pt denies symptom.   Dyspnea:         [  ]  Anxiety:           [  ]  Difficulty sleeping: [ x ] dt pain   Fatigue:           [  ]  Nausea:           [  ]  Loss of appetite:     [x  ] dt pain   Dysphagia: [  ]  Constipation:   [ x ]      BM with enema yesterday   Grief Present   [x  ] Yes   [  ] No   Other Symptoms:  denies changes in BB     All other review of systems negative [x  ]    ECOG Performance:     4  Current Palliative Performance Scale/Karnofsky Score:  40  %  Preadmit Karnofsky: 70  %          PEx:  General: obese elderly woman lying flat in bed alert  oriented x    3 objectively appears comfortable at rest, uncomfortable w repositioning   Behavioral: calm, cooperative   HEENT: atraumatic, No abnormal lesions, No dry mouth, No  temporal wasting  RESP: Reg rhythm, No tachypnea/labored breathing, unable to auscultate BS dt body habitus   CV: RRR, S1S2,  + tachycardia  GI: soft non distended non tender functionally incontinent                constipation  last BM:  12/20  MUSK: weakness x4,  fully  BB, pain with light palpation of posterior R hip, pain w palpation throughout R LE,   SKIN: No abnormal skin lesions, Poor skin turgor, generalized pallor   NEURO:  No deficits, cognitive impairment, encephalopathic dsyphagia dysarthria paresis. Sensation to BLE intact  Oral intake ability:  full capability      T(C): 36.7 (12-21-22 @ 14:23), Max: 37.8 (12-20-22 @ 17:23)  HR: 62 (12-21-22 @ 14:23) (60 - 62)  BP: 116/65 (12-21-22 @ 14:23) (116/65 - 128/62)  RR: 18 (12-21-22 @ 14:23) (18 - 18)  SpO2: 98% (12-21-22 @ 14:23) (95% - 98%)  Wt(kg): --    Labs:    CBC:                        9.6    9.66  )-----------( 297      ( 21 Dec 2022 07:12 )             31.7     CMP:    12-21    137  |  93<L>  |  28<H>  ----------------------------<  98  3.5   |  32<H>  |  1.34<H>    Ca    9.2      21 Dec 2022 07:12  Phos  3.9     12-21  Mg     2.0     12-21    TPro  7.8  /  Alb  3.1<L>  /  TBili  0.5  /  DBili  x   /  AST  32  /  ALT  34  /  AlkPhos  128<H>  12-21    PT/INR - ( 21 Dec 2022 07:12 )   PT: 16.2 sec;   INR: 1.36          PTT - ( 21 Dec 2022 07:12 )  PTT:32.6 sec       RADIOLOGY & ADDITIONAL STUDIES:  Imaging:  Reviewed    < from: Xray Abdomen 1 View PORTABLE -Urgent (Xray Abdomen 1 View PORTABLE -Urgent .) (12.20.22 @ 09:58) >  IMPRESSION: 2 supine views demonstrate markedly dilated colon. No gross   pneumoperitoneum although limited without upright views.    < from: CT Chest No Cont (12.20.22 @ 21:49) >    IMPRESSION:  1.  Resolution of previously noted herniated loop of small bowel within   the periumbilical hernia. The periumbilical hernia now contains fat.  2.  Copious stool noted in the mid transverse colon. No colonic wall   thickening. Post right hemicolectomy, with patent anastomosis.  3.   tract unremarkable.  4.  Indeterminate left adrenal gland nodule, unchanged compared to study   from 4 daysago.      < from: NM PET/CT Onc FDG Skull to Thigh, Subsq (09.27.22 @ 12:25) >    IMPRESSION:  1. Hypermetabolic mediastinal and bilateral hilar lymph nodes,SUV max   12.4 at an enlarged aortopulmonary node, most likely representing   metastatic disease.  2. Focal activity at the anterior mid transverse colon, SUV max 11.9,   most likely corresponding to the malignant lesion found on recent   colonoscopy. Status post right hemicolectomy.  3. 2.3 cm area of increased uptake in an area of groundglass opacity at   the posterior lingula, most likely representing inflammatory uptake,   although malignant involvement is difficult to exclude. Mosaic   attenuation of the lungs, most likely representing air trapping, edema,   or small airway disease.  4. Small bilateral pleural effusions with mild associated uptake.  5. FDG avid subcentimeter soft tissue nodule in the upper inner quadrant   of the right breast, possibly representing metastatic disease.  6. Wedge-shaped area of photopenia at the posterior aspect of L3-L4   without a CT correlate, possibly due to asymmetric degenerative changes.   Correlate clinically.      < from: CT Pelvis Bony Only No Cont (12.16.22 @ 19:56) >  FINDINGS:  No acute displaced fracture or dislocation. Stable sclerotic lesion   within the right posterior acetabulum, measuring up to 7 mm, similar in   appearance to prior study dated 1/9/2019. No concerning lytic or blastic   lesion seen. Mild bilateral hip arthrosis. Degenerative changes within   the lower lumbar spine, sacroiliac joints, pubic symphysis.

## 2022-12-21 NOTE — PROGRESS NOTE ADULT - PROBLEM SELECTOR PLAN 12
F: none  E: caution with chano on ckd   N: dash diet  DVT: eliquis 5 BID.     dispo: [facility]  code: FULL CODE, palliative previously consulted 12/2. Pt s/p TAVR, however now with moderate tavr valve stenosis. Structural team has evaluated her on previous admission and pt not a candidate for rpt intervention.

## 2022-12-21 NOTE — CONSULT NOTE ADULT - PROBLEM SELECTOR RECOMMENDATION 5
.  dx 2019 sp resection. now w recurrence and metastatic dz, pain/symptom burden   - poor performance status and thus poor candidate for systemic treatment  - heme onc recs appreciated  - goc to continue, unable to initiate today dt pt discomfort   - If no further disease modifying treatments offered or patient/family declined further disease modifying treatments,  patient would qualify for hospice

## 2022-12-21 NOTE — PROGRESS NOTE ADULT - PROBLEM SELECTOR PLAN 3
Baseline Cr. 1.2-1.3. Cr on admission 1.8.   - Trend sCr   - Avoid nephrotoxic agents.   - Renally dose medications Pt presenting with malaise, chills and acute on chronic R hip pain. Pt states she has had chronic R hip pain that shoots down her leg, but has not seen providers for it in the past. Day prior to admission she said the pain was severe and had pain with ambulation. Pain starts at posterior hip and radiates down R leg. CT scans and xrays without findings     - Ortho consulted, low concern for septic joint w supportive management focused of PT and pain control  - MRI lumbar spine performed, f/u read   - Pain control with lidocaine patch, tylenol, oxycodone 5mg q6 hours prn  - Gabapentin 600 mg PO daily.   - CT scans and xrays without findings.  - f/u PT recs

## 2022-12-21 NOTE — CONSULT NOTE ADULT - PROBLEM SELECTOR RECOMMENDATION 7
.  - see GOC note    In addition to the E/M visit, an advance care planning meeting was performed. Start time: 1030; End time:1115 ; Total time: 45 min. A face to face meeting to discuss advance care planning was held today regarding: BRITTANY DE LA CRUZ. Primary decision maker:  Patient is able to participate in decision making;  Alternate/surrogate:    Jaelyn Vera . Discussed advance directives including, but not limited to, healthcare proxy, patient's values/goals Decision regarding code status: FULL CODE; Documentation completed today: HCP GOC note .  symptom management as above  GOC to continue     Active Psychosocial Referrals:   SW/CM[ x ] PT/OT[ x ] Hospice[  ]  Ethics[  ]  Debby Kelly Patient/Family Support[  ] Chaplaincy[   ]  Massage Therapy[  ] Music Therapy [  ] Holistic RN[  ]    Coping:  well[  ] with difficulty[ x ] poor coping[  ] unable to assess[  ]   Support system: strong[  ] adequate[ x ] inadequate[  ]  - For new or uncontrolled symptoms, please call Palliative Care at [phone]-[facility]. The service is available 24/7 (including nights & weekends) to provide symptom management recommendations over the phone as appropriate  - Will continue to follow with you

## 2022-12-21 NOTE — PROGRESS NOTE ADULT - SUBJECTIVE AND OBJECTIVE BOX
** INCOMPLETE **    OVERNIGHT EVENTS:     SUBJECTIVE:    VITAL SIGNS:  Vital Signs Last 24 Hrs  T(C): 36.7 (21 Dec 2022 05:59), Max: 37.8 (20 Dec 2022 17:23)  T(F): 98.1 (21 Dec 2022 05:59), Max: 100 (20 Dec 2022 17:23)  HR: 62 (21 Dec 2022 05:59) (60 - 62)  BP: 116/65 (21 Dec 2022 05:59) (116/65 - 147/70)  BP(mean): --  RR: 18 (21 Dec 2022 05:59) (18 - 18)  SpO2: 98% (21 Dec 2022 05:59) (95% - 98%)    Parameters below as of 21 Dec 2022 05:59  Patient On (Oxygen Delivery Method): nasal cannula        PHYSICAL EXAM:  General: NAD; speaking in full sentences  HEENT: NC/AT; PERRL; EOMI; MMM  Neck: supple; no JVD  Cardiac: RRR; +S1/S2  Pulm: CTA B/L; no W/R/R  GI: soft, NT/ND, +BS  Extremities:  no edema, clubbing or cyanosis  Vasc: 2+ radial, DP pulses B/L  Neuro: AAOx3; no focal deficits    MEDICATIONS:  MEDICATIONS  (STANDING):  aMIOdarone    Tablet 200 milliGRAM(s) Oral daily  apixaban 2.5 milliGRAM(s) Oral every 12 hours  atorvastatin 20 milliGRAM(s) Oral at bedtime  cefTRIAXone   IVPB 2000 milliGRAM(s) IV Intermittent every 24 hours  folic acid 1 milliGRAM(s) Oral daily  gabapentin 300 milliGRAM(s) Oral at bedtime  influenza  Vaccine (HIGH DOSE) 0.7 milliLiter(s) IntraMuscular once  isosorbide   mononitrate ER Tablet (IMDUR) 30 milliGRAM(s) Oral every 24 hours  lidocaine   4% Patch 1 Patch Transdermal every 24 hours  metoprolol succinate ER 25 milliGRAM(s) Oral daily  montelukast 10 milliGRAM(s) Oral daily  multivitamin 1 Tablet(s) Oral daily  pantoprazole    Tablet 40 milliGRAM(s) Oral before breakfast  polyethylene glycol 3350 17 Gram(s) Oral daily  senna 2 Tablet(s) Oral at bedtime  tiotropium 2.5 MICROgram(s) Inhaler 2 Puff(s) Inhalation daily  torsemide 40 milliGRAM(s) Oral every 24 hours    MEDICATIONS  (PRN):  acetaminophen     Tablet .. 650 milliGRAM(s) Oral every 6 hours PRN Temp greater or equal to 38C (100.4F), Mild Pain (1 - 3)  LORazepam     Tablet 0.5 milliGRAM(s) Oral daily PRN Anxiety  oxyCODONE    IR 5 milliGRAM(s) Oral every 6 hours PRN Severe Pain (7 - 10)  simethicone 80 milliGRAM(s) Chew two times a day PRN Gas  traMADol 50 milliGRAM(s) Oral every 6 hours PRN Moderate Pain (4 - 6)  trimethobenzamide Injectable 200 milliGRAM(s) IntraMuscular every 6 hours PRN Nausea and/or Vomiting      ALLERGIES:  Allergies    Digox (Rash; Urticaria; Hives)  Plavix (Other (Mod to Severe))    Intolerances        LABS:                        9.6    9.66  )-----------( 297      ( 21 Dec 2022 07:12 )             31.7     12-21    137  |  93<L>  |  28<H>  ----------------------------<  98  3.5   |  32<H>  |  1.34<H>    Ca    9.2      21 Dec 2022 07:12  Phos  3.9     12-21  Mg     2.0     12-21    TPro  7.8  /  Alb  3.1<L>  /  TBili  0.5  /  DBili  x   /  AST  32  /  ALT  34  /  AlkPhos  128<H>  12-21    PT/INR - ( 21 Dec 2022 07:12 )   PT: 16.2 sec;   INR: 1.36          PTT - ( 21 Dec 2022 07:12 )  PTT:32.6 sec    RADIOLOGY & ADDITIONAL TESTS: Reviewed. OVERNIGHT EVENTS:   No acute events overnight.    SUBJECTIVE:  Pt seen and examined at bedside. Reports nausea but no vomiting. Also continues to have abdominal pain, worse in upper abd. Has been having bowel movements. Also continues to endorse hip pain.    VITAL SIGNS:  Vital Signs Last 24 Hrs  T(C): 36.7 (21 Dec 2022 05:59), Max: 37.8 (20 Dec 2022 17:23)  T(F): 98.1 (21 Dec 2022 05:59), Max: 100 (20 Dec 2022 17:23)  HR: 62 (21 Dec 2022 05:59) (60 - 62)  BP: 116/65 (21 Dec 2022 05:59) (116/65 - 147/70)  BP(mean): --  RR: 18 (21 Dec 2022 05:59) (18 - 18)  SpO2: 98% (21 Dec 2022 05:59) (95% - 98%)    Parameters below as of 21 Dec 2022 05:59  Patient On (Oxygen Delivery Method): nasal cannula        PHYSICAL EXAM:  General: NAD; speaking in full sentences  HEENT: NC/AT; PERRL; EOMI; MMM  Neck: supple; no JVD  Cardiac: RRR; +S1/S2  Pulm: CTA B/L; no W/R/R  GI: soft, mildly distended, tender to palpation to LUQ, RUQ, and epigastric area, +BS  Extremities:  no edema, clubbing or cyanosis  Vasc: 2+ radial, DP pulses B/L  Neuro: AAOx3; no focal deficits    MEDICATIONS:  MEDICATIONS  (STANDING):  aMIOdarone    Tablet 200 milliGRAM(s) Oral daily  apixaban 2.5 milliGRAM(s) Oral every 12 hours  atorvastatin 20 milliGRAM(s) Oral at bedtime  cefTRIAXone   IVPB 2000 milliGRAM(s) IV Intermittent every 24 hours  folic acid 1 milliGRAM(s) Oral daily  gabapentin 300 milliGRAM(s) Oral at bedtime  influenza  Vaccine (HIGH DOSE) 0.7 milliLiter(s) IntraMuscular once  isosorbide   mononitrate ER Tablet (IMDUR) 30 milliGRAM(s) Oral every 24 hours  lidocaine   4% Patch 1 Patch Transdermal every 24 hours  metoprolol succinate ER 25 milliGRAM(s) Oral daily  montelukast 10 milliGRAM(s) Oral daily  multivitamin 1 Tablet(s) Oral daily  pantoprazole    Tablet 40 milliGRAM(s) Oral before breakfast  polyethylene glycol 3350 17 Gram(s) Oral daily  senna 2 Tablet(s) Oral at bedtime  tiotropium 2.5 MICROgram(s) Inhaler 2 Puff(s) Inhalation daily  torsemide 40 milliGRAM(s) Oral every 24 hours    MEDICATIONS  (PRN):  acetaminophen     Tablet .. 650 milliGRAM(s) Oral every 6 hours PRN Temp greater or equal to 38C (100.4F), Mild Pain (1 - 3)  LORazepam     Tablet 0.5 milliGRAM(s) Oral daily PRN Anxiety  oxyCODONE    IR 5 milliGRAM(s) Oral every 6 hours PRN Severe Pain (7 - 10)  simethicone 80 milliGRAM(s) Chew two times a day PRN Gas  traMADol 50 milliGRAM(s) Oral every 6 hours PRN Moderate Pain (4 - 6)  trimethobenzamide Injectable 200 milliGRAM(s) IntraMuscular every 6 hours PRN Nausea and/or Vomiting      ALLERGIES:  Allergies    Digox (Rash; Urticaria; Hives)  Plavix (Other (Mod to Severe))    Intolerances        LABS:                        9.6    9.66  )-----------( 297      ( 21 Dec 2022 07:12 )             31.7     12-21    137  |  93<L>  |  28<H>  ----------------------------<  98  3.5   |  32<H>  |  1.34<H>    Ca    9.2      21 Dec 2022 07:12  Phos  3.9     12-21  Mg     2.0     12-21    TPro  7.8  /  Alb  3.1<L>  /  TBili  0.5  /  DBili  x   /  AST  32  /  ALT  34  /  AlkPhos  128<H>  12-21    PT/INR - ( 21 Dec 2022 07:12 )   PT: 16.2 sec;   INR: 1.36          PTT - ( 21 Dec 2022 07:12 )  PTT:32.6 sec    RADIOLOGY & ADDITIONAL TESTS: Reviewed. OVERNIGHT EVENTS:   No acute events overnight.    SUBJECTIVE:  Pt seen and examined at bedside. Reports nausea but no vomiting. Also continues to have abdominal pain, worse in upper abd. Has been having bowel movements. Also continues to endorse hip pain.    VITAL SIGNS:  Vital Signs Last 24 Hrs  T(C): 36.7 (21 Dec 2022 05:59), Max: 37.8 (20 Dec 2022 17:23)  T(F): 98.1 (21 Dec 2022 05:59), Max: 100 (20 Dec 2022 17:23)  HR: 62 (21 Dec 2022 05:59) (60 - 62)  BP: 116/65 (21 Dec 2022 05:59) (116/65 - 147/70)  BP(mean): --  RR: 18 (21 Dec 2022 05:59) (18 - 18)  SpO2: 98% (21 Dec 2022 05:59) (95% - 98%)    Parameters below as of 21 Dec 2022 05:59  Patient On (Oxygen Delivery Method): nasal cannula        PHYSICAL EXAM:  General: NAD; speaking in full sentences  HEENT: NC/AT; PERRL; EOMI; MMM  Neck: supple; no JVD  Cardiac: RRR; +S1/S2  Pulm: CTA B/L; no W/R/R  GI: soft, mildly distended, tender to palpation to LUQ, RUQ, and epigastric area, no rigidity or guarding, decreased BS  Extremities:  no edema, clubbing or cyanosis  Vasc: 2+ radial, DP pulses B/L  Neuro: AAOx3; no focal deficits    MEDICATIONS:  MEDICATIONS  (STANDING):  aMIOdarone    Tablet 200 milliGRAM(s) Oral daily  apixaban 2.5 milliGRAM(s) Oral every 12 hours  atorvastatin 20 milliGRAM(s) Oral at bedtime  cefTRIAXone   IVPB 2000 milliGRAM(s) IV Intermittent every 24 hours  folic acid 1 milliGRAM(s) Oral daily  gabapentin 300 milliGRAM(s) Oral at bedtime  influenza  Vaccine (HIGH DOSE) 0.7 milliLiter(s) IntraMuscular once  isosorbide   mononitrate ER Tablet (IMDUR) 30 milliGRAM(s) Oral every 24 hours  lidocaine   4% Patch 1 Patch Transdermal every 24 hours  metoprolol succinate ER 25 milliGRAM(s) Oral daily  montelukast 10 milliGRAM(s) Oral daily  multivitamin 1 Tablet(s) Oral daily  pantoprazole    Tablet 40 milliGRAM(s) Oral before breakfast  polyethylene glycol 3350 17 Gram(s) Oral daily  senna 2 Tablet(s) Oral at bedtime  tiotropium 2.5 MICROgram(s) Inhaler 2 Puff(s) Inhalation daily  torsemide 40 milliGRAM(s) Oral every 24 hours    MEDICATIONS  (PRN):  acetaminophen     Tablet .. 650 milliGRAM(s) Oral every 6 hours PRN Temp greater or equal to 38C (100.4F), Mild Pain (1 - 3)  LORazepam     Tablet 0.5 milliGRAM(s) Oral daily PRN Anxiety  oxyCODONE    IR 5 milliGRAM(s) Oral every 6 hours PRN Severe Pain (7 - 10)  simethicone 80 milliGRAM(s) Chew two times a day PRN Gas  traMADol 50 milliGRAM(s) Oral every 6 hours PRN Moderate Pain (4 - 6)  trimethobenzamide Injectable 200 milliGRAM(s) IntraMuscular every 6 hours PRN Nausea and/or Vomiting      ALLERGIES:  Allergies    Digox (Rash; Urticaria; Hives)  Plavix (Other (Mod to Severe))    Intolerances        LABS:                        9.6    9.66  )-----------( 297      ( 21 Dec 2022 07:12 )             31.7     12-21    137  |  93<L>  |  28<H>  ----------------------------<  98  3.5   |  32<H>  |  1.34<H>    Ca    9.2      21 Dec 2022 07:12  Phos  3.9     12-21  Mg     2.0     12-21    TPro  7.8  /  Alb  3.1<L>  /  TBili  0.5  /  DBili  x   /  AST  32  /  ALT  34  /  AlkPhos  128<H>  12-21    PT/INR - ( 21 Dec 2022 07:12 )   PT: 16.2 sec;   INR: 1.36          PTT - ( 21 Dec 2022 07:12 )  PTT:32.6 sec    RADIOLOGY & ADDITIONAL TESTS: Reviewed. OVERNIGHT EVENTS:   No acute events overnight.    SUBJECTIVE:  Pt seen and examined at bedside. Reports nausea but no vomiting. Also continues to have abdominal pain, worse in upper abd. Has been having bowel movements. Also continues to endorse hip pain.     ID: [id number]    VITAL SIGNS:  Vital Signs Last 24 Hrs  T(C): 36.7 (21 Dec 2022 05:59), Max: 37.8 (20 Dec 2022 17:23)  T(F): 98.1 (21 Dec 2022 05:59), Max: 100 (20 Dec 2022 17:23)  HR: 62 (21 Dec 2022 05:59) (60 - 62)  BP: 116/65 (21 Dec 2022 05:59) (116/65 - 147/70)  BP(mean): --  RR: 18 (21 Dec 2022 05:59) (18 - 18)  SpO2: 98% (21 Dec 2022 05:59) (95% - 98%)    Parameters below as of 21 Dec 2022 05:59  Patient On (Oxygen Delivery Method): nasal cannula        PHYSICAL EXAM:  General: NAD; speaking in full sentences  HEENT: NC/AT; PERRL; EOMI; MMM  Neck: supple; no JVD  Cardiac: RRR; +S1/S2  Pulm: CTA B/L; no W/R/R  GI: soft, mildly distended, tender to palpation to LUQ, RUQ, and epigastric area, no rigidity or guarding, decreased BS  Extremities:  no edema, clubbing or cyanosis  Vasc: 2+ radial, DP pulses B/L  Neuro: AAOx3; no focal deficits    MEDICATIONS:  MEDICATIONS  (STANDING):  aMIOdarone    Tablet 200 milliGRAM(s) Oral daily  apixaban 2.5 milliGRAM(s) Oral every 12 hours  atorvastatin 20 milliGRAM(s) Oral at bedtime  cefTRIAXone   IVPB 2000 milliGRAM(s) IV Intermittent every 24 hours  folic acid 1 milliGRAM(s) Oral daily  gabapentin 300 milliGRAM(s) Oral at bedtime  influenza  Vaccine (HIGH DOSE) 0.7 milliLiter(s) IntraMuscular once  isosorbide   mononitrate ER Tablet (IMDUR) 30 milliGRAM(s) Oral every 24 hours  lidocaine   4% Patch 1 Patch Transdermal every 24 hours  metoprolol succinate ER 25 milliGRAM(s) Oral daily  montelukast 10 milliGRAM(s) Oral daily  multivitamin 1 Tablet(s) Oral daily  pantoprazole    Tablet 40 milliGRAM(s) Oral before breakfast  polyethylene glycol 3350 17 Gram(s) Oral daily  senna 2 Tablet(s) Oral at bedtime  tiotropium 2.5 MICROgram(s) Inhaler 2 Puff(s) Inhalation daily  torsemide 40 milliGRAM(s) Oral every 24 hours    MEDICATIONS  (PRN):  acetaminophen     Tablet .. 650 milliGRAM(s) Oral every 6 hours PRN Temp greater or equal to 38C (100.4F), Mild Pain (1 - 3)  LORazepam     Tablet 0.5 milliGRAM(s) Oral daily PRN Anxiety  oxyCODONE    IR 5 milliGRAM(s) Oral every 6 hours PRN Severe Pain (7 - 10)  simethicone 80 milliGRAM(s) Chew two times a day PRN Gas  traMADol 50 milliGRAM(s) Oral every 6 hours PRN Moderate Pain (4 - 6)  trimethobenzamide Injectable 200 milliGRAM(s) IntraMuscular every 6 hours PRN Nausea and/or Vomiting      ALLERGIES:  Allergies    Digox (Rash; Urticaria; Hives)  Plavix (Other (Mod to Severe))    Intolerances        LABS:                        9.6    9.66  )-----------( 297      ( 21 Dec 2022 07:12 )             31.7     12-21    137  |  93<L>  |  28<H>  ----------------------------<  98  3.5   |  32<H>  |  1.34<H>    Ca    9.2      21 Dec 2022 07:12  Phos  3.9     12-21  Mg     2.0     12-21    TPro  7.8  /  Alb  3.1<L>  /  TBili  0.5  /  DBili  x   /  AST  32  /  ALT  34  /  AlkPhos  128<H>  12-21    PT/INR - ( 21 Dec 2022 07:12 )   PT: 16.2 sec;   INR: 1.36          PTT - ( 21 Dec 2022 07:12 )  PTT:32.6 sec    RADIOLOGY & ADDITIONAL TESTS: Reviewed.

## 2022-12-21 NOTE — PROGRESS NOTE ADULT - PROBLEM SELECTOR PLAN 9
Pt with history of COPD. On montelukast and spiriva. Follows with Dr. Derik pickard/[facility] home medications   - goal sat 88-92% Pt is on torsemide 40mg, isosorbide mononitrate, and toprol 25mg   - c/w home medications with hold parameters

## 2022-12-21 NOTE — PROGRESS NOTE ADULT - SUBJECTIVE AND OBJECTIVE BOX
SUBJECTIVE: Patient seen and examined at bedside.        MEDICATIONS (STANDING):  aMIOdarone    Tablet 200 milliGRAM(s) Oral daily  apixaban 2.5 milliGRAM(s) Oral every 12 hours  cefTRIAXone   IVPB 2000 milliGRAM(s) IV Intermittent every 24 hours  isosorbide   mononitrate ER Tablet (IMDUR) 30 milliGRAM(s) Oral every 24 hours  metoprolol succinate ER 25 milliGRAM(s) Oral daily  torsemide 40 milliGRAM(s) Oral every 24 hours    MEDICATIONS  (PRN):  acetaminophen     Tablet .. 650 milliGRAM(s) Oral every 6 hours PRN Temp greater or equal to 38C (100.4F), Mild Pain (1 - 3)  LORazepam     Tablet 0.5 milliGRAM(s) Oral daily PRN Anxiety  oxyCODONE    IR 5 milliGRAM(s) Oral every 6 hours PRN Severe Pain (7 - 10)  simethicone 80 milliGRAM(s) Chew two times a day PRN Gas  traMADol 50 milliGRAM(s) Oral every 6 hours PRN Moderate Pain (4 - 6)  trimethobenzamide Injectable 200 milliGRAM(s) IntraMuscular every 6 hours PRN Nausea and/or Vomiting      I&O's Detail    20 Dec 2022 07:01  -  21 Dec 2022 07:00  --------------------------------------------------------  IN:  Total IN: 0 mL    OUT:    Voided (mL): 1000 mL  Total OUT: 1000 mL    Total NET: -1000 mL          T(C): 36.7 (12-21-22 @ 05:59), Max: 37.8 (12-20-22 @ 17:23)  HR: 62 (12-21-22 @ 05:59) (60 - 62)  BP: 116/65 (12-21-22 @ 05:59) (116/65 - 147/70)  RR: 18 (12-21-22 @ 05:59) (18 - 18)  SpO2: 98% (12-21-22 @ 05:59) (95% - 98%)    GENERAL: NAD, Resting comfortably in bed, awake, opens eyes spontaneously  HEENT: NCAT, MMM, Normal conjunctiva, PERRL  RESP: Nonlabored breathing, No respiratory distress  CARD: Normal rate, Normal peripheral perfusion  GI: Soft, ND, NT, No guarding, No rebound tenderness  EXTREM: WWP, No edema, No gross deformity of extremities  SKIN: No rashes, no lesions  NEURO: AAOx3, No focal motor or sensory deficits  PSYCH: Affect and characteristics of appearance, verbalizations, and behaviors are appropriate    LABS:                        9.6    9.66  )-----------( 297      ( 21 Dec 2022 07:12 )             31.7     12-21    137  |  93<L>  |  28<H>  ----------------------------<  98  3.5   |  32<H>  |  1.34<H>    Ca    9.2      21 Dec 2022 07:12  Phos  3.9     12-21  Mg     2.0     12-21    TPro  7.8  /  Alb  3.1<L>  /  TBili  0.5  /  DBili  x   /  AST  32  /  ALT  34  /  AlkPhos  128<H>  12-21    PT/INR - ( 21 Dec 2022 07:12 )   PT: 16.2 sec;   INR: 1.36          PTT - ( 21 Dec 2022 07:12 )  PTT:32.6 sec      RADIOLOGY & ADDITIONAL STUDIES:      Culture - Blood (collected 12-20-22 @ 14:50)  Source: .Blood Blood-Peripheral  Preliminary Report (12-21-22 @ 04:00):    No growth at 12 hours    Culture - Blood (collected 12-20-22 @ 14:50)  Source: .Blood Blood-Peripheral  Preliminary Report (12-21-22 @ 04:00):    No growth at 12 hours    Culture - Blood (collected 12-19-22 @ 14:40)  Source: .Blood Blood-Peripheral  Preliminary Report (12-20-22 @ 16:00):    No growth at 1 day.    Culture - Blood (collected 12-19-22 @ 14:40)  Source: .Blood Blood-Peripheral  Preliminary Report (12-20-22 @ 16:00):    No growth at 1 day.    Culture - Blood (collected 12-17-22 @ 14:37)  Source: .Blood Blood-Venous  Preliminary Report (12-20-22 @ 15:00):    No growth at 3 days.     SUBJECTIVE: Patient seen and examined at bedside.  There were no acute overnight events and patient's vitals have remained with a mild fever (Tmax 100.6 F) yesterday morning.  She endorses passing a bowel movement (small), abdominal bloating, abdominal pain/pressure and nausea.  She denies any episodes of emesis and ambulating.        MEDICATIONS (STANDING):  aMIOdarone    Tablet 200 milliGRAM(s) Oral daily  apixaban 2.5 milliGRAM(s) Oral every 12 hours  cefTRIAXone   IVPB 2000 milliGRAM(s) IV Intermittent every 24 hours  isosorbide   mononitrate ER Tablet (IMDUR) 30 milliGRAM(s) Oral every 24 hours  metoprolol succinate ER 25 milliGRAM(s) Oral daily  torsemide 40 milliGRAM(s) Oral every 24 hours    MEDICATIONS  (PRN):  acetaminophen     Tablet .. 650 milliGRAM(s) Oral every 6 hours PRN Temp greater or equal to 38C (100.4F), Mild Pain (1 - 3)  LORazepam     Tablet 0.5 milliGRAM(s) Oral daily PRN Anxiety  oxyCODONE    IR 5 milliGRAM(s) Oral every 6 hours PRN Severe Pain (7 - 10)  simethicone 80 milliGRAM(s) Chew two times a day PRN Gas  traMADol 50 milliGRAM(s) Oral every 6 hours PRN Moderate Pain (4 - 6)  trimethobenzamide Injectable 200 milliGRAM(s) IntraMuscular every 6 hours PRN Nausea and/or Vomiting      I&O's Detail    20 Dec 2022 07:01  -  21 Dec 2022 07:00  --------------------------------------------------------  IN:  Total IN: 0 mL    OUT:    Voided (mL): 1000 mL  Total OUT: 1000 mL    Total NET: -1000 mL          T(C): 36.7 (12-21-22 @ 05:59), Max: 37.8 (12-20-22 @ 17:23)  HR: 62 (12-21-22 @ 05:59) (60 - 62)  BP: 116/65 (12-21-22 @ 05:59) (116/65 - 147/70)  RR: 18 (12-21-22 @ 05:59) (18 - 18)  SpO2: 98% (12-21-22 @ 05:59) (95% - 98%)    GENERAL: NAD, Resting comfortably in bed, awake, opens eyes spontaneously  HEENT: NCAT, MMM, Normal conjunctiva  RESP: Nonlabored breathing on NC, No respiratory distress  CARD: Normal rate, Normal peripheral perfusion  GI: Protuberant, Soft, Mildly distended, moderately diffusely tender abdomen (worst in R flank, RUQ and LLQ), Dilated superficial abdominal veins, No guarding, No rebound tenderness  EXTREM: WWP, No edema, No gross deformity of extremities  SKIN: No rashes, no lesions  NEURO: Awake and alert, No focal motor or sensory deficits  PSYCH: Affect and characteristics of appearance, verbalizations, and behaviors are appropriate    LABS:                        9.6    9.66  )-----------( 297      ( 21 Dec 2022 07:12 )             31.7     12-21    137  |  93<L>  |  28<H>  ----------------------------<  98  3.5   |  32<H>  |  1.34<H>    Ca    9.2      21 Dec 2022 07:12  Phos  3.9     12-21  Mg     2.0     12-21    TPro  7.8  /  Alb  3.1<L>  /  TBili  0.5  /  DBili  x   /  AST  32  /  ALT  34  /  AlkPhos  128<H>  12-21    PT/INR - ( 21 Dec 2022 07:12 )   PT: 16.2 sec;   INR: 1.36          PTT - ( 21 Dec 2022 07:12 )  PTT:32.6 sec      RADIOLOGY & ADDITIONAL STUDIES:      Culture - Blood (collected 12-20-22 @ 14:50)  Source: .Blood Blood-Peripheral  Preliminary Report (12-21-22 @ 04:00):    No growth at 12 hours    Culture - Blood (collected 12-20-22 @ 14:50)  Source: .Blood Blood-Peripheral  Preliminary Report (12-21-22 @ 04:00):    No growth at 12 hours    Culture - Blood (collected 12-19-22 @ 14:40)  Source: .Blood Blood-Peripheral  Preliminary Report (12-20-22 @ 16:00):    No growth at 1 day.    Culture - Blood (collected 12-19-22 @ 14:40)  Source: .Blood Blood-Peripheral  Preliminary Report (12-20-22 @ 16:00):    No growth at 1 day.    Culture - Blood (collected 12-17-22 @ 14:37)  Source: .Blood Blood-Venous  Preliminary Report (12-20-22 @ 15:00):    No growth at 3 days.

## 2022-12-21 NOTE — PRE-ANESTHESIA EVALUATION ADULT - NSRADCARDRESULTSFT_GEN_ALL_CORE
It is fine for her to be on GOLO diet  We have quite of few diabetic using it  As for the glimepiride, it looks like I forgot to take it off the after visit summery  She is not to be taking glimepiride  ECHO FINDINGS:    Left Ventricle:  There is mild concentric left ventricular hypertrophy. The left ventricle   is normal in size and systolic function with a calculated ejection   fraction of 65-70%. Probably normal left ventricular wall motion.   Analysis of mitral annular tissue Doppler, left atrial volume index, and   tricuspid regurgitant velocity reveals: grade II left ventricular   diastolic dysfunction with elevated filling pressure.    Right Ventricle:  The right ventricle is normal in size. Right ventricular systolic   function is normal. The tricuspid annular plane systolic excursion   (TAPSE) is 18.00 mm (normal >=17 mm). RV tissue Doppler S' is 11.00 cm/s   (normal >10 cm/s).    Left Atrium:  The left atrium is mildly dilated. Left atrial volume index (MARY) is   34.1 ml/m².    Right Atrium:  The right atrium is normal in size.    Aortic Valve:  A transcatheter aortic valve (TAVR) is noted in the aortic position. The   peak transvalvular velocity is 3.70 m/s, the mean transvalvular gradient   is 33.00 mmHg, and the LVOT/AV velocity ratio is 0.28. There is no   evidence of aortic regurgitation.    Mitral Valve:  The mitral valve is mildly thickened. Mitral annular calcification noted.   The mean transvalvular gradient is 4.00 mmHg at a heart rate of 65 bpm.   There is trace mitral regurgitation.    Tricuspid Valve:  Structurally normal tricuspid valve with normal leaflet excursion. There   is trace tricuspid regurgitation. Pulmonary artery systolic pressure   (estimated using the tricuspid regurgitant gradient and an estimate of   right atrial pressure) is 26 mmHg.    Inferior Vena Cava:  The inferior vena cava is normal in size (<2.1cm) with normal inspiratory   collapse (>50%) consistent with normal right atrial pressure (  3, range 0-5mmHg).    Pulmonic Valve:  The pulmonic valve is not well visualized. There istrace pulmonic   regurgitation.    Aorta:  The aortic root is normal in size.    Pericardium:  Pericardial fat pad is seen anterior to the right ventricle. Trivial   pericardial effusion.

## 2022-12-21 NOTE — PROGRESS NOTE ADULT - PROBLEM SELECTOR PLAN 5
Pt with history of diastolic HF, recently admitted here with CHF exacerbation and had medication adjustments. Home medications include: torsemide 40mg, toprol 25mg , and isosorbide mononitrate 30mg.   - c/w home torsemide 40mg, toprol 25mg and isosorbide mononitrate 30mg Baseline Cr. 1.2-1.3. Cr on admission 1.8.   - Trend sCr   - Avoid nephrotoxic agents.   - Renally dose medications

## 2022-12-21 NOTE — CONSULT NOTE ADULT - PROBLEM SELECTOR RECOMMENDATION 3
.  body habitus, pain, multiple comorbidities   - cw supportive care, skin care  - pain management recs as above, encourage movement  - PT eval

## 2022-12-21 NOTE — DIETITIAN INITIAL EVALUATION ADULT - OTHER CALCULATIONS
Based on Standards of Care pt >120% IBW (#, pt is 152% IBW), thus ideal body weight used for all calculations. Needs adjusted for advanced age, CHF and cancer status. Fluid recs per team.

## 2022-12-21 NOTE — PROGRESS NOTE ADULT - PROBLEM SELECTOR PLAN 7
Pt is on torsemide 40mg, isosorbide mononitrate, and toprol 25mg   - c/w home medications with hold parameters Pt with history of diastolic HF, recently admitted here with CHF exacerbation and had medication adjustments. Home medications include: torsemide 40mg, toprol 25mg , and isosorbide mononitrate 30mg.   - c/w home torsemide 40mg, toprol 25mg and isosorbide mononitrate 30mg

## 2022-12-21 NOTE — CONSULT NOTE ADULT - PROBLEM SELECTOR RECOMMENDATION 9
.  2/2 known lesion in acetabulum exacerbated by progressive degenerative changes to lumbar, sacral spine.  CT Pelvis Bony Only No Cont (12.16.22 Stable sclerotic lesion within the right posterior acetabulum,  7 mm.  Mild bilateral hip arthrosis. Degenerative changes within the lower lumbar spine, sacroiliac joints, pubic symphysis. Liberalize PRNs currently ordered. med education provided to patient. ortho, no intervention   - dc tramadol  - schedule tylenol 1g ATC   - oxy IR 5 mg PO q4 PRN moderate pain  -- hold for somnolence or RR depression  - rec dil 0.5 mg IV q6 PRN severe pain   - cw ana 300 mg po qHS -- renally dose   - lidocaine patch qD  - would benefit from corticosteroid injection as outpatient, referral to Dr. Benjamín Swann   - if within GOC, consider palliative XRT   - consider trial of dex 5 mg PO qAM for 5 days - monitor plt, wbc, sugars  - No MS No toradol given kidney dysfunction .  2/2 known lesion in acetabulum exacerbated by progressive degenerative changes to lumbar, sacral spine.  CT Pelvis Bony Only No Cont (12.16.22 Stable sclerotic lesion within the right posterior acetabulum,  7 mm.  Mild bilateral hip arthrosis. Degenerative changes within the lower lumbar spine, sacroiliac joints, pubic symphysis. Exacerbated iso infection/ bacteremia ortho, no intervention.  Liberalize PRNs currently ordered.   - dc tramadol  - schedule tylenol 1g ATC   - oxy IR 5 mg PO q4 PRN moderate pain  -- hold for somnolence or RR depression, cw BR  - rec dil 0.5 mg IV q6 PRN severe pain   - cw ana 300 mg po qHS -- renally dose   - lidocaine patch qD  - pending resolution of infection, consider trial of dex 5 mg PO qAM for 5 days - monitor plt, wbc, sugars  - would benefit from corticosteroid injection as outpatient, referral to Dr. Benjamín Swann   - if within GOC, consider palliative XRT eval   - No MS No toradol given kidney dysfunction

## 2022-12-21 NOTE — PROGRESS NOTE ADULT - SUBJECTIVE AND OBJECTIVE BOX
Ortho Note    Pt seen and examined on morning rounds. Pt comfortable. Endorsing R lumbar pain. Denies any hip pain.  Denies CP, SOB, N/V, numbness/tingling     Vital Signs Last 24 Hrs  T(C): 37.3 (12-19-22 @ 22:11), Max: 37.3 (12-19-22 @ 22:11)  T(F): 99.1 (12-19-22 @ 22:11), Max: 99.1 (12-19-22 @ 22:11)  HR: 63 (12-19-22 @ 22:11) (63 - 63)  BP: 117/67 (12-19-22 @ 22:11) (117/67 - 117/67)  BP(mean): --  RR: 18 (12-19-22 @ 22:11) (18 - 18)  SpO2: 98% (12-19-22 @ 22:11) (98% - 98%)  I&O's Summary    Physical Exam:  Gen: Lying in bed, non-toxic appearing, NAD, resting comfortably  Resp: No increased WOB  RLE:  TTP over R lumbar spine  Skin intact, no effusion or erythema, normothermic  NT over R hip,  compartments soft  R Hip full pain free ROM   Motor: TA/EHL/GS/FHL intact  Sensory: DP/SP/Tib/Ellen/Saph SILT  +DP pulse, WWP  Negative straight leg raise                          9.2    9.30  )-----------( 241      ( 19 Dec 2022 05:30 )             30.9     12-19    134<L>  |  93<L>  |  26<H>  ----------------------------<  102<H>  3.9   |  30  |  1.63<H>    Ca    9.0      19 Dec 2022 05:30  Phos  3.7     12-19  Mg     2.2     12-19    TPro  7.4  /  Alb  3.2<L>  /  TBili  0.5  /  DBili  x   /  AST  22  /  ALT  20  /  AlkPhos  81  12-19      MRI L spine: shows no acute neurocompressive lesions. Spinal metastases within the vertebral body (personal read)    82y Female w/ R low back pain. Very Low suspicion for septic arthritis.   -WBAT RLE  - Rec PT  -no acute orthopedic intervention indicated at this time  -pain control  -ice/cold compress  - Remainder of care per primary team    Ortho Pager [phone] Ortho Note    Pt seen and examined on morning rounds. Pt comfortable. Endorsing R lumbar pain. Denies any hip pain.  Denies CP, SOB, N/V, numbness/tingling     Vital Signs Last 24 Hrs  T(C): 37.3 (12-19-22 @ 22:11), Max: 37.3 (12-19-22 @ 22:11)  T(F): 99.1 (12-19-22 @ 22:11), Max: 99.1 (12-19-22 @ 22:11)  HR: 63 (12-19-22 @ 22:11) (63 - 63)  BP: 117/67 (12-19-22 @ 22:11) (117/67 - 117/67)  BP(mean): --  RR: 18 (12-19-22 @ 22:11) (18 - 18)  SpO2: 98% (12-19-22 @ 22:11) (98% - 98%)  I&O's Summary    Physical Exam:  Gen: Lying in bed, non-toxic appearing, NAD, resting comfortably  Resp: No increased WOB  RLE:  TTP over R lumbar spine  Skin intact, no effusion or erythema, normothermic  NT over R hip,  compartments soft  R Hip full pain free ROM   Motor: TA/EHL/GS/FHL intact  Sensory: DP/SP/Tib/Ellen/Saph SILT  +DP pulse, WWP  Negative straight leg raise                          9.2    9.30  )-----------( 241      ( 19 Dec 2022 05:30 )             30.9     12-19    134<L>  |  93<L>  |  26<H>  ----------------------------<  102<H>  3.9   |  30  |  1.63<H>    Ca    9.0      19 Dec 2022 05:30  Phos  3.7     12-19  Mg     2.2     12-19    TPro  7.4  /  Alb  3.2<L>  /  TBili  0.5  /  DBili  x   /  AST  22  /  ALT  20  /  AlkPhos  81  12-19      MRI L spine: shows no acute neurocompressive lesions.     82y Female w/ R low back pain. Very Low suspicion for septic arthritis.   -WBAT RLE  - Rec PT  -pain control  -ice/cold compress  - Remainder of care per primary team    Ortho Pager [phone]

## 2022-12-21 NOTE — PROGRESS NOTE ADULT - PROBLEM SELECTOR PLAN 4
Reproducible, umbilical hernia measuring 3.38 cm in size, containing a small bowel loop which demonstrates mild bowel wall thickening. Within the abdomen, the proximal portion of the small bowel loops slightly distended to 2.4 cm, suggesting a degree of entrapment within the hernia itself, where as the distal portion the small bowel loop is empty with a diameter of 10 mm. Additionally, a volume of edematous mesenteric fat is noted adjacent to the umbilical hernia measuring up to 5.34 cm.  - Surgery consult with no acute intervention. U/A consistent with LE, bacteria and WBC. Received 1 CTX in the ED. Not febrile, but is having chills. Does report intermittent burning with urination.   - C/w CTX 2g

## 2022-12-21 NOTE — PROGRESS NOTE ADULT - PROBLEM SELECTOR PLAN 11
Pt is on amiodarone and eliquis 5 BID.   - C/w Eliquis 5mg BID   - C.w amiodarone 200mg daily   - C/w toprol 25mg Pt with history of COPD. On montelukast and spiriva. Follows with Dr. Derik pickard/froylan home medications   - goal sat 88-92%

## 2022-12-21 NOTE — PROGRESS NOTE ADULT - PROBLEM SELECTOR PLAN 2
U/A consistent with LE, bacteria and WBC. Received 1 CTX in the ED. Not febrile, but is having chills. Does report intermittent burning with urination.   - C/w CTX 2g Found to have strep bacteremia likely 2/2 to hx of colon cancer. Patient currently afebrile, last febrile 12/20 AM (T 100.6)    - 12/15 BCx growing Strep mitis/oralis   - subsequent surveillance BCx neg (most recently 12/20), f/u final  - c/w CTX 2g q24h (12/16- )  - ID following, f/u recs  - TTE neg for vegetation, f/u IMRA (will get gallium if IRMA neg to r/o prosthetic infection)

## 2022-12-21 NOTE — DIETITIAN INITIAL EVALUATION ADULT - OTHER INFO
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  Active Issues: R hip/back pain + BIBIANA + UTI    Pt seen on 4UR at bedside. Pt was receiving care from nursing staff at time RD visited pt's room, did not wish to speak for long; RD contacted pt's daughter Jaelyn Vera ([phone]). Appetite currently fair to poor per pt's daughter; PTA, appetite was poor per pt's daughter. As per diet/24h recall, PTA pt consumed an inconsistent amount of meals per day, which consisted of applesauce, soft, "smooth" foods per pt's daughter, meats. During current admission, consumption of <50% meals on average as noted per pt's daughter; pt's daughter stated pt complains of food being stuck in pt's throat. Preferences: No cultural, ethnic, Protestant food preferences noted. GI: s/s within normal limits at this time today; recent emesis and constipation noted on 12/20/22. No c/o N/V/D/C currently per pt's RN. Most recent BM on 12/20/22. Quique: 17. Skin integrity: within normal limits at this time per chart. No edema noted. Denies pain/discomfort. NKFA. RD to recommend SLP eval r/t swallowing concerns by pt endorsed by pt's daughter. Pertinent labs: elevated BUN (28), Cr (1.34), ALP (128), low eGFR (39); will monitor trends. Pt is currently NPO status. Pertinent nutrition-related medications/supplements: pt is receiving MVI, folic acid, senna, Miralax, torsemide, tigan for s/s N/V. Pt stated UBW ~ pounds, which is/is not consistent with wt upon admission (~ pounds). Pt's IBW is , pt is % of IBW. Dietitian conducted nutrition focused physical exam: during NFPE, RD observed pt with no overt signs of muscle or fat wasting. Based on ASPEN guidelines, pt does not meet criteria for malnutrition at this time. Pt amenable to education; RD provided education in regards to the importance of adequate macro and micronutrients, as well as hydration to support ADLs, maintain energy levels and overall functional/nutritional status. Pt was receptive and verbalized understanding. No additional nutrition-related concerns. RD will remain available per protocol. Additional nutrition recommendations listed below to follow.

## 2022-12-21 NOTE — PROGRESS NOTE ADULT - PROBLEM SELECTOR PLAN 10
Pt s/p TAVR, however now with moderate tavr valve stenosis. Structural team has evaluated her on previous admission and pt not a candidate for rpt intervention. 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 Pt refused EBUS at that time.   - outpatient heme/onc follow up

## 2022-12-21 NOTE — CONSULT NOTE ADULT - PROVIDER SPECIALTY LIST ADULT
Cardiology
Infectious Disease
Surgery
Orthopedics
Rehab Medicine
Gastroenterology
Surgery
Gastroenterology
Palliative Care

## 2022-12-21 NOTE — PROGRESS NOTE ADULT - PROBLEM SELECTOR PLAN 6
Pt on protonix 40mg outpatient   - c/w home protonix Reproducible, umbilical hernia measuring 3.38 cm in size, containing a small bowel loop which demonstrates mild bowel wall thickening. Within the abdomen, the proximal portion of the small bowel loops slightly distended to 2.4 cm, suggesting a degree of entrapment within the hernia itself, where as the distal portion the small bowel loop is empty with a diameter of 10 mm. Additionally, a volume of edematous mesenteric fat is noted adjacent to the umbilical hernia measuring up to 5.34 cm.  - Surgery consult with no acute intervention.

## 2022-12-21 NOTE — CONSULT NOTE ADULT - PROBLEM SELECTOR RECOMMENDATION 6
.  dx 2019 sp resection. now w recurrence and metastatic dz, pain/symptom burden   - poor performance status and thus poor candidate for systemic treatment  - heme onc recs appreciated  - goc to continue, unable to initiate today dt pt discomfort   - If no further disease modifying treatments offered or patient/family declined further disease modifying treatments,  patient would qualify for hospice .  - see GOC note    In addition to the E/M visit, an advance care planning meeting was performed. Start time: 1030; End time:1115 ; Total time: 45 min. A face to face meeting to discuss advance care planning was held today regarding: BRITTANY DE LA CRUZ. Primary decision maker:  Patient is able to participate in decision making;  Alternate/surrogate:    Jaelyn Vera . Discussed advance directives including, but not limited to, healthcare proxy, patient's values/goals Decision regarding code status: FULL CODE; Documentation completed today: HCP GOC note

## 2022-12-21 NOTE — PRE-ANESTHESIA EVALUATION ADULT - NSANTHPMHFT_GEN_ALL_CORE
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

## 2022-12-21 NOTE — PROGRESS NOTE ADULT - ATTENDING COMMENTS
83yo Monegasque speaking F h/o severe AS s/p TAVR 2019 with mod valve stenosis and valve thrombosis in 2021 s/p AC (not seen on echo 2022), ascending thoracic aneurysm, colon ca s/p resection in 2019 with recurrence mets to lung (not on therapy), COPD on home 2L O2, MARK, CKD3, Afib p/w fatigue, weakness, acute on chronic R hip pain, inability to ambulate, found to have high grade Strep mitis oralis bacteremia.      #strep bacteremia   hx of TAVR   TTE nega for vegetations  pending gallium scan results  pending EGD to get optimized for IRMA to R/O endocarditis   repeat suvx cx NGTD   will cw Rocephin qd     #abdominal pain and distention   s/p CT scan, negative for acute abdomen   fu EGD results   cw serial abd exams   GI following     #CKD 3  cr 1.3   avoid nephrotoxins  #low back pain: R hip pain   s/p MRi pending official results, c/w bone Mets   ortho following, no acute intervention     #chronic AF continue with home medications    #dvt PPX eliquis

## 2022-12-21 NOTE — CONSULT NOTE ADULT - CONSULT REASON
PM&R evaluation
Dysphagia
EGD prior to IRMA
New onset abdominal pain/distension
Strep bacteremia
ventral hernia
Hip Pain
Chronic HF
symptom management, GOC

## 2022-12-22 ENCOUNTER — APPOINTMENT (OUTPATIENT)
Dept: PULMONOLOGY | Facility: CLINIC | Age: 82
End: 2022-12-22

## 2022-12-22 LAB
ALBUMIN SERPL ELPH-MCNC: 3.1 G/DL — LOW (ref 3.3–5)
ALP SERPL-CCNC: 128 U/L — HIGH (ref 40–120)
ALT FLD-CCNC: 31 U/L — SIGNIFICANT CHANGE UP (ref 10–45)
ANION GAP SERPL CALC-SCNC: 10 MMOL/L — SIGNIFICANT CHANGE UP (ref 5–17)
APTT BLD: 30.2 SEC — SIGNIFICANT CHANGE UP (ref 27.5–35.5)
AST SERPL-CCNC: 27 U/L — SIGNIFICANT CHANGE UP (ref 10–40)
BASOPHILS # BLD AUTO: 0 K/UL — SIGNIFICANT CHANGE UP (ref 0–0.2)
BASOPHILS NFR BLD AUTO: 0 % — SIGNIFICANT CHANGE UP (ref 0–2)
BILIRUB SERPL-MCNC: 0.3 MG/DL — SIGNIFICANT CHANGE UP (ref 0.2–1.2)
BLD GP AB SCN SERPL QL: POSITIVE — SIGNIFICANT CHANGE UP
BUN SERPL-MCNC: 32 MG/DL — HIGH (ref 7–23)
CALCIUM SERPL-MCNC: 10 MG/DL — SIGNIFICANT CHANGE UP (ref 8.4–10.5)
CHLORIDE SERPL-SCNC: 92 MMOL/L — LOW (ref 96–108)
CO2 SERPL-SCNC: 35 MMOL/L — HIGH (ref 22–31)
CREAT SERPL-MCNC: 1.29 MG/DL — SIGNIFICANT CHANGE UP (ref 0.5–1.3)
CULTURE RESULTS: SIGNIFICANT CHANGE UP
EGFR: 41 ML/MIN/1.73M2 — LOW
EOSINOPHIL # BLD AUTO: 0 K/UL — SIGNIFICANT CHANGE UP (ref 0–0.5)
EOSINOPHIL NFR BLD AUTO: 0 % — SIGNIFICANT CHANGE UP (ref 0–6)
GLUCOSE SERPL-MCNC: 131 MG/DL — HIGH (ref 70–99)
HCT VFR BLD CALC: 32.1 % — LOW (ref 34.5–45)
HGB BLD-MCNC: 9.6 G/DL — LOW (ref 11.5–15.5)
IMM GRANULOCYTES NFR BLD AUTO: 0.6 % — SIGNIFICANT CHANGE UP (ref 0–0.9)
INR BLD: 1.51 — HIGH (ref 0.88–1.16)
LYMPHOCYTES # BLD AUTO: 0.51 K/UL — LOW (ref 1–3.3)
LYMPHOCYTES # BLD AUTO: 7.1 % — LOW (ref 13–44)
MAGNESIUM SERPL-MCNC: 2.4 MG/DL — SIGNIFICANT CHANGE UP (ref 1.6–2.6)
MCHC RBC-ENTMCNC: 26.1 PG — LOW (ref 27–34)
MCHC RBC-ENTMCNC: 29.9 GM/DL — LOW (ref 32–36)
MCV RBC AUTO: 87.2 FL — SIGNIFICANT CHANGE UP (ref 80–100)
MONOCYTES # BLD AUTO: 0.39 K/UL — SIGNIFICANT CHANGE UP (ref 0–0.9)
MONOCYTES NFR BLD AUTO: 5.4 % — SIGNIFICANT CHANGE UP (ref 2–14)
NEUTROPHILS # BLD AUTO: 6.25 K/UL — SIGNIFICANT CHANGE UP (ref 1.8–7.4)
NEUTROPHILS NFR BLD AUTO: 86.9 % — HIGH (ref 43–77)
NRBC # BLD: 0 /100 WBCS — SIGNIFICANT CHANGE UP (ref 0–0)
PHOSPHATE SERPL-MCNC: 4.5 MG/DL — SIGNIFICANT CHANGE UP (ref 2.5–4.5)
PLATELET # BLD AUTO: 319 K/UL — SIGNIFICANT CHANGE UP (ref 150–400)
POTASSIUM SERPL-MCNC: 4.5 MMOL/L — SIGNIFICANT CHANGE UP (ref 3.5–5.3)
POTASSIUM SERPL-SCNC: 4.5 MMOL/L — SIGNIFICANT CHANGE UP (ref 3.5–5.3)
PROT SERPL-MCNC: 7.9 G/DL — SIGNIFICANT CHANGE UP (ref 6–8.3)
PROTHROM AB SERPL-ACNC: 18.1 SEC — HIGH (ref 10.5–13.4)
RBC # BLD: 3.68 M/UL — LOW (ref 3.8–5.2)
RBC # FLD: 19.8 % — HIGH (ref 10.3–14.5)
RH IG SCN BLD-IMP: POSITIVE — SIGNIFICANT CHANGE UP
SODIUM SERPL-SCNC: 137 MMOL/L — SIGNIFICANT CHANGE UP (ref 135–145)
SPECIMEN SOURCE: SIGNIFICANT CHANGE UP
WBC # BLD: 7.19 K/UL — SIGNIFICANT CHANGE UP (ref 3.8–10.5)
WBC # FLD AUTO: 7.19 K/UL — SIGNIFICANT CHANGE UP (ref 3.8–10.5)

## 2022-12-22 PROCEDURE — 99233 SBSQ HOSP IP/OBS HIGH 50: CPT

## 2022-12-22 PROCEDURE — 86077 PHYS BLOOD BANK SERV XMATCH: CPT

## 2022-12-22 PROCEDURE — 74018 RADEX ABDOMEN 1 VIEW: CPT | Mod: 26

## 2022-12-22 PROCEDURE — 76377 3D RENDER W/INTRP POSTPROCES: CPT | Mod: 26

## 2022-12-22 PROCEDURE — 93312 ECHO TRANSESOPHAGEAL: CPT | Mod: 26

## 2022-12-22 PROCEDURE — 99233 SBSQ HOSP IP/OBS HIGH 50: CPT | Mod: GC

## 2022-12-22 PROCEDURE — 99232 SBSQ HOSP IP/OBS MODERATE 35: CPT

## 2022-12-22 PROCEDURE — 78802 RP LOCLZJ TUM WHBDY 1 D IMG: CPT | Mod: 26

## 2022-12-22 RX ORDER — CASPOFUNGIN ACETATE 7 MG/ML
INJECTION, POWDER, LYOPHILIZED, FOR SOLUTION INTRAVENOUS
Refills: 0 | Status: DISCONTINUED | OUTPATIENT
Start: 2022-12-22 | End: 2022-12-27

## 2022-12-22 RX ORDER — ACETAMINOPHEN 500 MG
975 TABLET ORAL EVERY 6 HOURS
Refills: 0 | Status: DISCONTINUED | OUTPATIENT
Start: 2022-12-22 | End: 2022-12-27

## 2022-12-22 RX ORDER — CASPOFUNGIN ACETATE 7 MG/ML
70 INJECTION, POWDER, LYOPHILIZED, FOR SOLUTION INTRAVENOUS ONCE
Refills: 0 | Status: COMPLETED | OUTPATIENT
Start: 2022-12-22 | End: 2022-12-22

## 2022-12-22 RX ORDER — OXYCODONE HYDROCHLORIDE 5 MG/1
5 TABLET ORAL EVERY 4 HOURS
Refills: 0 | Status: DISCONTINUED | OUTPATIENT
Start: 2022-12-22 | End: 2022-12-26

## 2022-12-22 RX ORDER — SIMETHICONE 80 MG/1
40 TABLET, CHEWABLE ORAL EVERY 6 HOURS
Refills: 0 | Status: DISCONTINUED | OUTPATIENT
Start: 2022-12-22 | End: 2022-12-27

## 2022-12-22 RX ORDER — CASPOFUNGIN ACETATE 7 MG/ML
50 INJECTION, POWDER, LYOPHILIZED, FOR SOLUTION INTRAVENOUS EVERY 24 HOURS
Refills: 0 | Status: DISCONTINUED | OUTPATIENT
Start: 2022-12-23 | End: 2022-12-27

## 2022-12-22 RX ORDER — HYDROMORPHONE HYDROCHLORIDE 2 MG/ML
0.5 INJECTION INTRAMUSCULAR; INTRAVENOUS; SUBCUTANEOUS EVERY 6 HOURS
Refills: 0 | Status: DISCONTINUED | OUTPATIENT
Start: 2022-12-22 | End: 2022-12-22

## 2022-12-22 RX ORDER — OXYCODONE HYDROCHLORIDE 5 MG/1
5 TABLET ORAL EVERY 6 HOURS
Refills: 0 | Status: DISCONTINUED | OUTPATIENT
Start: 2022-12-22 | End: 2022-12-26

## 2022-12-22 RX ADMIN — Medication 1 TABLET(S): at 12:22

## 2022-12-22 RX ADMIN — ATORVASTATIN CALCIUM 20 MILLIGRAM(S): 80 TABLET, FILM COATED ORAL at 22:37

## 2022-12-22 RX ADMIN — SENNA PLUS 2 TABLET(S): 8.6 TABLET ORAL at 22:38

## 2022-12-22 RX ADMIN — AMIODARONE HYDROCHLORIDE 200 MILLIGRAM(S): 400 TABLET ORAL at 06:42

## 2022-12-22 RX ADMIN — MONTELUKAST 10 MILLIGRAM(S): 4 TABLET, CHEWABLE ORAL at 12:23

## 2022-12-22 RX ADMIN — GABAPENTIN 300 MILLIGRAM(S): 400 CAPSULE ORAL at 22:37

## 2022-12-22 RX ADMIN — OXYCODONE HYDROCHLORIDE 5 MILLIGRAM(S): 5 TABLET ORAL at 17:54

## 2022-12-22 RX ADMIN — OXYCODONE HYDROCHLORIDE 5 MILLIGRAM(S): 5 TABLET ORAL at 13:01

## 2022-12-22 RX ADMIN — CEFTRIAXONE 100 MILLIGRAM(S): 500 INJECTION, POWDER, FOR SOLUTION INTRAMUSCULAR; INTRAVENOUS at 22:38

## 2022-12-22 RX ADMIN — APIXABAN 2.5 MILLIGRAM(S): 2.5 TABLET, FILM COATED ORAL at 17:54

## 2022-12-22 RX ADMIN — PANTOPRAZOLE SODIUM 40 MILLIGRAM(S): 20 TABLET, DELAYED RELEASE ORAL at 06:42

## 2022-12-22 RX ADMIN — OXYCODONE HYDROCHLORIDE 5 MILLIGRAM(S): 5 TABLET ORAL at 12:31

## 2022-12-22 RX ADMIN — TIOTROPIUM BROMIDE 2 PUFF(S): 18 CAPSULE ORAL; RESPIRATORY (INHALATION) at 11:45

## 2022-12-22 RX ADMIN — CASPOFUNGIN ACETATE 70 MILLIGRAM(S): 7 INJECTION, POWDER, LYOPHILIZED, FOR SOLUTION INTRAVENOUS at 23:27

## 2022-12-22 RX ADMIN — Medication 25 MILLIGRAM(S): at 06:43

## 2022-12-22 RX ADMIN — Medication 1 MILLIGRAM(S): at 12:22

## 2022-12-22 RX ADMIN — APIXABAN 2.5 MILLIGRAM(S): 2.5 TABLET, FILM COATED ORAL at 06:44

## 2022-12-22 RX ADMIN — LIDOCAINE 1 PATCH: 4 CREAM TOPICAL at 06:44

## 2022-12-22 NOTE — PROGRESS NOTE ADULT - PROBLEM SELECTOR PLAN 2
appears to be gas related, but may be element of disease progression. further workup/imaging pending   - recommend simethicone 40 mg PO QID PRN   - standing BR, mLax, senna 2 tabs PO qHS.

## 2022-12-22 NOTE — PROGRESS NOTE ADULT - ATTENDING COMMENTS
81yo Albanian speaking F h/o severe AS s/p TAVR 2019 with mod valve stenosis and valve thrombosis in 2021 s/p AC (not seen on echo 2022), ascending thoracic aneurysm, colon ca s/p resection in 2019 with recurrence mets to lung, PET + in sept (not on therapy), bacteremia in 9/22 (presumed from oral source vs COPD on home 2L O2, MARK, CKD3, Afib p/w fatigue, weakness, acute on chronic R hip pain, inability to ambulate, found to have high grade Strep mitis oralis bacteremia.      #strep bacteremia   suspected source from colonic mass suspicious for recurrent colon cancer  hx of TAVR   TTE negative for vegetations  pending gallium scan   s/p EGD: showed candida esophagitis: QTC elevated for fluconazole, will fu ID for caspofungin approval vs other options    pending IRMA to R/O endocarditis   repeat suvx cx NGTD   will cw Rocephin qd     #colonic mass suspicious for recurrent colon cancer  #abdominal pain and distention   s/p CT  abdomen scan, showed sign stool burden: fu details as above in radiology   PET scan positive in sept with CT chest c/w mets to lungs.  cw serial abd exams, enema's   GI/surgery  following     #CKD 3  cr 1.3 -> 1.29  avoid nephrotoxins  #low back pain: R hip pain   s/p MRi pending official results, c/w bone Mets   ortho following, no acute intervention     #chronic AF continue with home medications    #dvt PPX eliquis . 81yo Anguillan speaking F h/o severe AS s/p TAVR 2019 with mod valve stenosis and valve thrombosis in 2021 s/p AC (not seen on echo 2022), ascending thoracic aneurysm, colon ca s/p resection in 2019 with recurrence mets to lung, PET + in sept (not on therapy), bacteremia in 9/22 (presumed from oral source vs COPD on home 2L O2, MARK, CKD3, Afib p/w fatigue, weakness, acute on chronic R hip pain, inability to ambulate, found to have high grade Strep mitis oralis bacteremia.      #strep bacteremia   suspected source from oral cavity vs colonic mass suspicious for recurrent colon cancer  hx of TAVR   TTE negative for vegetations  gallium scan showed uptake in oral cavity, transverse colon, likely the source of bacteremia   negative IRMA to R/O endocarditis, will get picc for 2 weeks of abx    repeat suvx cx NGTD     will cw Rocephin qd     #colon cancer: colonic mass suspicious for recurrent colon cancer with lung mets   #abdominal pain and distention   s/p CT  abdomen scan, showed sign stool burden: fu details as above in radiology   PET scan positive in sept with CT chest c/w mets to lungs.  cw serial abd exams, enema's   GI/surgery  following   will need outpt fu with     #candida esophagitis   s/p EGD: showed candida esophagitis: QTC elevated for fluconazole, will start on caspofungin x 14 days   ID following     # low back pain: R hip pain   # L4-5 endplate changes without definitive osteomyelitis/discitis.  ortho spine rec MRi w contrast for further evaluation  and IR for aspiration   no gallium uptake in the that area     #CKD 3  cr 1.3 -> 1.29  avoid nephrotoxins    #chronic AF continue with home medications    #dvt PPX eliquis .

## 2022-12-22 NOTE — PROGRESS NOTE ADULT - ASSESSMENT
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 General surgery consulted for new onset abdominal pain and evidence of colonic dilation to 11cm on abdominal x-ray. CT imaging (12/20/2022) demonstrates herniated loop of small bowel resolution in comparison with prior scan and periumbilical hernia currently filled with fat, patent anastomosis and copious stool burden in transverse colon.  EGD (12/21/2022) demonstrated candidiasis of the esophagus and gastritis.  Pt presented with mild generalized abdominal pain without nausea and vomiting.  There were no overnight events and she denies nausea, vomiting, ambulating and passing stool/flatus.  On physical examination the patient remains soft, improved distention, with tenderness to palpation of the abdomen and R flank. Serum laboratory results remain are unremarkable.        PLAN  - Recommend daily x-ray imaging to monitor for changes in distension and contrast transition  - Recommend serial abdominal examinations  - Recommend limit narcotics use  - Continue bowel regimen  - Surgery Team 1C will continue to follow.  - Please page Team 1 at [phone] with any questions and/or clinical changes

## 2022-12-22 NOTE — PROGRESS NOTE ADULT - ATTENDING COMMENTS
#Dysphagia  Patient presenting with complaints of malaise, chills, hip pain x 1 day, found to be bacteremic, suspected to be oropharyngeal in etiology, currently undergoing cardiology evaluation to rule out infective endocarditis; concurrently reporting new dysphagia to both solids and liquids since admission. EGD (as noted below) with findings suggestive of candida esophagitis, otherwise no gross pathology. Suspect dysphagia related to candidiasis, no appreciable structural explanation for dysphagia.    -EGD (12/21): Scattered white plaques distributed through esophagus, appears to be consistent with candida esophagitis. Diffuse erythema of the mucosa was noted in the antrum. These findings are compatible with non-erosive gastritis.  Normal duodenum.  -QTC prolonged 522 on admission, recommend repeat EKG to re-evaluate. If no longer prolonged, can initiate tx with Fluconazole 400 mg PO x1, followed by 200 mg PO daily x 20 days. If still prolonged, would discuss with ID regarding alternative non-QTC prolonging tx options  -Speech & swallow evaluation     #Dilated colon   Noted on Abdominal Xray to have markedly dilated colon with CT A/P noting stool burden. No obstruction appreciated. Repeat abdominal XRAY (12/22) with dilated loops of transverse colon, measuring up to 10.6 cm (similar to radiograph dated 12/20/2022)  -f/u final read for abdominal Xray (12/22)  -Repeat abdominal Xray in AM  -Appreciate surgery recs  -Bowel regimen, monitor stool counts   -Consider tap water enema x1 followed by daily bowl regimen (i.e. Miralax daily)

## 2022-12-22 NOTE — PROGRESS NOTE ADULT - PROBLEM SELECTOR PLAN 3
PPS 40%. Skin care PRN. Assist with ADL's PRN. Appreciate PT involvement once pain is better controlled.

## 2022-12-22 NOTE — PROGRESS NOTE ADULT - PROBLEM SELECTOR PLAN 6
Patient remains full code. Will continue to help with symtpomn management in the setting of worsening disease. Will continue GOC once oncology plan is established.   - Baptism/Spiritual practice:   - Coping: [ ] well [ ] with difficulty [ ] poor coping   - Support system: [ ] strong [ ] adequate [ ] inadequate  - All questions answered, emotional support provided  -  primary team   - Please contact Palliative Medicine 24/7 at [phone] for any acute symptoms or further questions  - Will continue to follow with you Patient remains full code. Will continue to help with symptom management in the setting of worsening disease. Will continue GOC once oncology plan is established.   [employer] interpre[employer] used to communicate with patient. ID#: [id number]  - Holiness/Spiritual practice:   - Coping: [ ] well [ ] with difficulty [ ] poor coping   - Support system: [ ] strong [ ] adequate [ ] inadequate  - All questions answered, emotional support provided  - dw primary team   - Please contact Palliative Medicine 24/7 at [phone] for any acute symptoms or further questions  - Will continue to follow with you

## 2022-12-22 NOTE — PROGRESS NOTE ADULT - ASSESSMENT
82F, American speaking, spoke with  Lulu #[id number], w/ PMHx HTN, HLD, severe AS s/p TAVR (2019) w/ mod valve stenosis and valve thrombosis 2021 s/p AC (not seen on echo 2022), Ascending Thoracic Aneurysm 4cm in 1/2022, pAFib (on Amiodarone/Eliquis), COPD (on 2L home O2), Colon CA s/p resection in 2019 (previously in remission now with recurrence and mets to lung), moderate MARK (non-compliant with CPAP 2/2 claustrophobia), CKD3 (baseline Cr 1.1-1.2) and FEROZ presents for malaise, chills, and hip pain for 1 day.

## 2022-12-22 NOTE — PROGRESS NOTE ADULT - SUBJECTIVE AND OBJECTIVE BOX
SUBJECTIVE: Patient seen and examined at bedside.  There were no acute events overnight.  Patient reports still having abdominal and back  pain, difficulty swallowing apple sauce and passing stool and flatus.  She denies nausea, vomiting and ambulating.      MEDICATIONS (STANDING):  aMIOdarone    Tablet 200 milliGRAM(s) Oral daily  apixaban 2.5 milliGRAM(s) Oral every 12 hours  cefTRIAXone   IVPB 2000 milliGRAM(s) IV Intermittent every 24 hours  isosorbide   mononitrate ER Tablet (IMDUR) 30 milliGRAM(s) Oral every 24 hours  metoprolol succinate ER 25 milliGRAM(s) Oral daily  torsemide 40 milliGRAM(s) Oral every 24 hours    MEDICATIONS  (PRN):  HYDROmorphone  Injectable 0.5 milliGRAM(s) IV Push every 6 hours PRN Severe Pain (7 - 10)  LORazepam     Tablet 0.5 milliGRAM(s) Oral daily PRN Anxiety  oxyCODONE    IR 5 milliGRAM(s) Oral every 4 hours PRN Moderate Pain (4 - 6)  simethicone 40 milliGRAM(s) Chew every 6 hours PRN Gas  trimethobenzamide Injectable 200 milliGRAM(s) IntraMuscular every 6 hours PRN Nausea and/or Vomiting      I&O's Detail    21 Dec 2022 07:01  -  22 Dec 2022 07:00  --------------------------------------------------------  IN:  Total IN: 0 mL    OUT:    Voided (mL): 1100 mL  Total OUT: 1100 mL    Total NET: -1100 mL          T(C): 36.8 (12-22-22 @ 07:14), Max: 36.8 (12-21-22 @ 22:30)  HR: 54 (12-22-22 @ 07:14) (54 - 60)  BP: 161/82 (12-22-22 @ 07:14) (141/69 - 161/82)  RR: 18 (12-22-22 @ 07:14) (18 - 19)  SpO2: 96% (12-22-22 @ 07:14) (96% - 99%)    GENERAL: NAD, Resting comfortably in bed, awake, opens eyes spontaneously  HEENT: NCAT, MMM, Normal conjunctiva  RESP: Nonlabored breathing on NC, No respiratory distress  CARD: Normal rate, Normal peripheral perfusion  GI: Protuberant, Soft, nondistended, mildly tender abdomen (worst in R flank), No guarding, No rebound tenderness  EXTREM: WWP, No edema, No gross deformity of extremities  SKIN: Dilated superficial abdominal veins, No rashes, no lesions  NEURO: Awake and alert, No focal motor or sensory deficits  PSYCH: Affect and characteristics of appearance, verbalizations, and behaviors are appropriate    LABS:                        9.6    7.19  )-----------( 319      ( 22 Dec 2022 05:30 )             32.1     12-22    137  |  92<L>  |  32<H>  ----------------------------<  131<H>  4.5   |  35<H>  |  1.29    Ca    10.0      22 Dec 2022 05:30  Phos  4.5     12-22  Mg     2.4     12-22    TPro  7.9  /  Alb  3.1<L>  /  TBili  0.3  /  DBili  x   /  AST  27  /  ALT  31  /  AlkPhos  128<H>  12-22    PT/INR - ( 22 Dec 2022 05:30 )   PT: 18.1 sec;   INR: 1.51          PTT - ( 22 Dec 2022 05:30 )  PTT:30.2 sec      RADIOLOGY & ADDITIONAL STUDIES:      Culture - Blood (collected 12-20-22 @ 14:50)  Source: .Blood Blood-Peripheral  Preliminary Report (12-21-22 @ 16:01):    No growth at 1 day.    Culture - Blood (collected 12-20-22 @ 14:50)  Source: .Blood Blood-Peripheral  Preliminary Report (12-21-22 @ 16:01):    No growth at 1 day.    Culture - Blood (collected 12-19-22 @ 14:40)  Source: .Blood Blood-Peripheral  Preliminary Report (12-21-22 @ 16:01):    No growth at 2 days.    Culture - Blood (collected 12-19-22 @ 14:40)  Source: .Blood Blood-Peripheral  Preliminary Report (12-21-22 @ 16:01):    No growth at 2 days.

## 2022-12-22 NOTE — PROGRESS NOTE ADULT - ASSESSMENT
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 Palliative care consulted for pain management and GOC.

## 2022-12-22 NOTE — PROGRESS NOTE ADULT - PROBLEM SELECTOR PLAN 4
- no indication for HD  - cw fluids, mgmt per primary team   - renally dose medications -- specifically ana as above.

## 2022-12-22 NOTE — PROGRESS NOTE ADULT - PROBLEM SELECTOR PLAN 5
dx 2019 sp resection. now w recurrence and metastatic dz, pain/symptom burden   - poor performance status and thus poor candidate for systemic treatment  - heme onc recs appreciated. Patient follows Dr. Ramirez   - awaiting input from Oncology pending further [facility] converastions.   - If no further disease modifying treatments offered or patient/family declined further disease modifying treatments,  patient would qualify for hospice.

## 2022-12-22 NOTE — PROGRESS NOTE ADULT - SUBJECTIVE AND OBJECTIVE BOX
** INCOMPLETE **    OVERNIGHT EVENTS:     SUBJECTIVE:    VITAL SIGNS:  Vital Signs Last 24 Hrs  T(C): 36.8 (22 Dec 2022 07:14), Max: 36.8 (21 Dec 2022 22:30)  T(F): 98.2 (22 Dec 2022 07:14), Max: 98.3 (21 Dec 2022 22:30)  HR: 54 (22 Dec 2022 07:14) (54 - 62)  BP: 161/82 (22 Dec 2022 07:14) (116/65 - 161/82)  BP(mean): --  RR: 18 (22 Dec 2022 07:14) (18 - 19)  SpO2: 96% (22 Dec 2022 07:14) (96% - 99%)    Parameters below as of 21 Dec 2022 22:30  Patient On (Oxygen Delivery Method): nasal cannula  O2 Flow (L/min): 3      PHYSICAL EXAM:  General: NAD; speaking in full sentences  HEENT: NC/AT; PERRL; EOMI; MMM  Neck: supple; no JVD  Cardiac: RRR; +S1/S2  Pulm: CTA B/L; no W/R/R  GI: soft, NT/ND, +BS  Extremities:  no edema, clubbing or cyanosis  Vasc: 2+ radial, DP pulses B/L  Neuro: AAOx3; no focal deficits    MEDICATIONS:  MEDICATIONS  (STANDING):  aMIOdarone    Tablet 200 milliGRAM(s) Oral daily  apixaban 2.5 milliGRAM(s) Oral every 12 hours  atorvastatin 20 milliGRAM(s) Oral at bedtime  cefTRIAXone   IVPB 2000 milliGRAM(s) IV Intermittent every 24 hours  folic acid 1 milliGRAM(s) Oral daily  gabapentin 300 milliGRAM(s) Oral at bedtime  influenza  Vaccine (HIGH DOSE) 0.7 milliLiter(s) IntraMuscular once  isosorbide   mononitrate ER Tablet (IMDUR) 30 milliGRAM(s) Oral every 24 hours  lidocaine   4% Patch 1 Patch Transdermal every 24 hours  metoprolol succinate ER 25 milliGRAM(s) Oral daily  montelukast 10 milliGRAM(s) Oral daily  multivitamin 1 Tablet(s) Oral daily  pantoprazole    Tablet 40 milliGRAM(s) Oral before breakfast  polyethylene glycol 3350 17 Gram(s) Oral daily  senna 2 Tablet(s) Oral at bedtime  tiotropium 2.5 MICROgram(s) Inhaler 2 Puff(s) Inhalation daily  torsemide 40 milliGRAM(s) Oral every 24 hours    MEDICATIONS  (PRN):  acetaminophen     Tablet .. 650 milliGRAM(s) Oral every 6 hours PRN Temp greater or equal to 38C (100.4F), Mild Pain (1 - 3)  LORazepam     Tablet 0.5 milliGRAM(s) Oral daily PRN Anxiety  oxyCODONE    IR 5 milliGRAM(s) Oral every 6 hours PRN Severe Pain (7 - 10)  simethicone 80 milliGRAM(s) Chew two times a day PRN Gas  traMADol 50 milliGRAM(s) Oral every 6 hours PRN Moderate Pain (4 - 6)  trimethobenzamide Injectable 200 milliGRAM(s) IntraMuscular every 6 hours PRN Nausea and/or Vomiting      ALLERGIES:  Allergies    Digox (Rash; Urticaria; Hives)  Plavix (Other (Mod to Severe))    Intolerances        LABS:                        9.6    9.66  )-----------( 297      ( 21 Dec 2022 07:12 )             31.7     12-21    137  |  93<L>  |  28<H>  ----------------------------<  98  3.5   |  32<H>  |  1.34<H>    Ca    9.2      21 Dec 2022 07:12  Phos  3.9     12-21  Mg     2.0     12-21    TPro  7.8  /  Alb  3.1<L>  /  TBili  0.5  /  DBili  x   /  AST  32  /  ALT  34  /  AlkPhos  128<H>  12-21    PT/INR - ( 21 Dec 2022 07:12 )   PT: 16.2 sec;   INR: 1.36          PTT - ( 21 Dec 2022 07:12 )  PTT:32.6 sec    RADIOLOGY & ADDITIONAL TESTS: Reviewed. OVERNIGHT EVENTS:   No acute events overnight.    SUBJECTIVE:  Pt seen and examined at bedside. Pt continues to have abd pain and back pain, unchanged from prior. Last BM was yesterday AM (12/21/22). Denies nausea/vomiting but continues to feel like pills are stuck in her throat despite EGD demonstrating no structural source for dysphagia.     VITAL SIGNS:  Vital Signs Last 24 Hrs  T(C): 36.8 (22 Dec 2022 07:14), Max: 36.8 (21 Dec 2022 22:30)  T(F): 98.2 (22 Dec 2022 07:14), Max: 98.3 (21 Dec 2022 22:30)  HR: 54 (22 Dec 2022 07:14) (54 - 62)  BP: 161/82 (22 Dec 2022 07:14) (116/65 - 161/82)  BP(mean): --  RR: 18 (22 Dec 2022 07:14) (18 - 19)  SpO2: 96% (22 Dec 2022 07:14) (96% - 99%)    Parameters below as of 21 Dec 2022 22:30  Patient On (Oxygen Delivery Method): nasal cannula  O2 Flow (L/min): 3      PHYSICAL EXAM:  General: NAD; speaking in full sentences  HEENT: NC/AT; PERRL; EOMI; MMM  Neck: supple; no JVD  Cardiac: RRR; +S1/S2  Pulm: CTA B/L; no W/R/R  GI: soft, mildly distended, tender to palpation to mid-abdomen, no rigidity or guarding  MSK: tenderness to lower spine and paraspinal regions  Extremities:  no edema, clubbing or cyanosis  Vasc: 2+ radial, DP pulses B/L  Neuro: AAOx3; no focal deficits; sensation intact to b/l LEs     MEDICATIONS:  MEDICATIONS  (STANDING):  aMIOdarone    Tablet 200 milliGRAM(s) Oral daily  apixaban 2.5 milliGRAM(s) Oral every 12 hours  atorvastatin 20 milliGRAM(s) Oral at bedtime  cefTRIAXone   IVPB 2000 milliGRAM(s) IV Intermittent every 24 hours  folic acid 1 milliGRAM(s) Oral daily  gabapentin 300 milliGRAM(s) Oral at bedtime  influenza  Vaccine (HIGH DOSE) 0.7 milliLiter(s) IntraMuscular once  isosorbide   mononitrate ER Tablet (IMDUR) 30 milliGRAM(s) Oral every 24 hours  lidocaine   4% Patch 1 Patch Transdermal every 24 hours  metoprolol succinate ER 25 milliGRAM(s) Oral daily  montelukast 10 milliGRAM(s) Oral daily  multivitamin 1 Tablet(s) Oral daily  pantoprazole    Tablet 40 milliGRAM(s) Oral before breakfast  polyethylene glycol 3350 17 Gram(s) Oral daily  senna 2 Tablet(s) Oral at bedtime  tiotropium 2.5 MICROgram(s) Inhaler 2 Puff(s) Inhalation daily  torsemide 40 milliGRAM(s) Oral every 24 hours    MEDICATIONS  (PRN):  acetaminophen     Tablet .. 650 milliGRAM(s) Oral every 6 hours PRN Temp greater or equal to 38C (100.4F), Mild Pain (1 - 3)  LORazepam     Tablet 0.5 milliGRAM(s) Oral daily PRN Anxiety  oxyCODONE    IR 5 milliGRAM(s) Oral every 6 hours PRN Severe Pain (7 - 10)  simethicone 80 milliGRAM(s) Chew two times a day PRN Gas  traMADol 50 milliGRAM(s) Oral every 6 hours PRN Moderate Pain (4 - 6)  trimethobenzamide Injectable 200 milliGRAM(s) IntraMuscular every 6 hours PRN Nausea and/or Vomiting      ALLERGIES:  Allergies    Digox (Rash; Urticaria; Hives)  Plavix (Other (Mod to Severe))    Intolerances        LABS:                        9.6    9.66  )-----------( 297      ( 21 Dec 2022 07:12 )             31.7     12-21    137  |  93<L>  |  28<H>  ----------------------------<  98  3.5   |  32<H>  |  1.34<H>    Ca    9.2      21 Dec 2022 07:12  Phos  3.9     12-21  Mg     2.0     12-21    TPro  7.8  /  Alb  3.1<L>  /  TBili  0.5  /  DBili  x   /  AST  32  /  ALT  34  /  AlkPhos  128<H>  12-21    PT/INR - ( 21 Dec 2022 07:12 )   PT: 16.2 sec;   INR: 1.36          PTT - ( 21 Dec 2022 07:12 )  PTT:32.6 sec    RADIOLOGY & ADDITIONAL TESTS: Reviewed. OVERNIGHT EVENTS:   No acute events overnight.    SUBJECTIVE:  Pt seen and examined at bedside. Pt continues to have abd pain and back pain, unchanged from prior. Last BM was yesterday AM (12/21/22). Denies nausea/vomiting but continues to feel like food gets stuck in her throat despite EGD demonstrating no structural source for dysphagia.     VITAL SIGNS:  Vital Signs Last 24 Hrs  T(C): 36.8 (22 Dec 2022 07:14), Max: 36.8 (21 Dec 2022 22:30)  T(F): 98.2 (22 Dec 2022 07:14), Max: 98.3 (21 Dec 2022 22:30)  HR: 54 (22 Dec 2022 07:14) (54 - 62)  BP: 161/82 (22 Dec 2022 07:14) (116/65 - 161/82)  BP(mean): --  RR: 18 (22 Dec 2022 07:14) (18 - 19)  SpO2: 96% (22 Dec 2022 07:14) (96% - 99%)    Parameters below as of 21 Dec 2022 22:30  Patient On (Oxygen Delivery Method): nasal cannula  O2 Flow (L/min): 3      PHYSICAL EXAM:  General: NAD; speaking in full sentences  HEENT: NC/AT; PERRL; EOMI; MMM  Neck: supple; no JVD  Cardiac: RRR; +S1/S2  Pulm: CTA B/L; no W/R/R  GI: soft, mildly distended, tender to palpation to mid-abdomen, no rigidity or guarding  MSK: tenderness to lower spine and paraspinal regions  Extremities:  no edema, clubbing or cyanosis  Vasc: 2+ radial, DP pulses B/L  Neuro: AAOx3; no focal deficits; sensation intact to b/l LEs     MEDICATIONS:  MEDICATIONS  (STANDING):  aMIOdarone    Tablet 200 milliGRAM(s) Oral daily  apixaban 2.5 milliGRAM(s) Oral every 12 hours  atorvastatin 20 milliGRAM(s) Oral at bedtime  cefTRIAXone   IVPB 2000 milliGRAM(s) IV Intermittent every 24 hours  folic acid 1 milliGRAM(s) Oral daily  gabapentin 300 milliGRAM(s) Oral at bedtime  influenza  Vaccine (HIGH DOSE) 0.7 milliLiter(s) IntraMuscular once  isosorbide   mononitrate ER Tablet (IMDUR) 30 milliGRAM(s) Oral every 24 hours  lidocaine   4% Patch 1 Patch Transdermal every 24 hours  metoprolol succinate ER 25 milliGRAM(s) Oral daily  montelukast 10 milliGRAM(s) Oral daily  multivitamin 1 Tablet(s) Oral daily  pantoprazole    Tablet 40 milliGRAM(s) Oral before breakfast  polyethylene glycol 3350 17 Gram(s) Oral daily  senna 2 Tablet(s) Oral at bedtime  tiotropium 2.5 MICROgram(s) Inhaler 2 Puff(s) Inhalation daily  torsemide 40 milliGRAM(s) Oral every 24 hours    MEDICATIONS  (PRN):  acetaminophen     Tablet .. 650 milliGRAM(s) Oral every 6 hours PRN Temp greater or equal to 38C (100.4F), Mild Pain (1 - 3)  LORazepam     Tablet 0.5 milliGRAM(s) Oral daily PRN Anxiety  oxyCODONE    IR 5 milliGRAM(s) Oral every 6 hours PRN Severe Pain (7 - 10)  simethicone 80 milliGRAM(s) Chew two times a day PRN Gas  traMADol 50 milliGRAM(s) Oral every 6 hours PRN Moderate Pain (4 - 6)  trimethobenzamide Injectable 200 milliGRAM(s) IntraMuscular every 6 hours PRN Nausea and/or Vomiting      ALLERGIES:  Allergies    Digox (Rash; Urticaria; Hives)  Plavix (Other (Mod to Severe))    Intolerances        LABS:                        9.6    9.66  )-----------( 297      ( 21 Dec 2022 07:12 )             31.7     12-21    137  |  93<L>  |  28<H>  ----------------------------<  98  3.5   |  32<H>  |  1.34<H>    Ca    9.2      21 Dec 2022 07:12  Phos  3.9     12-21  Mg     2.0     12-21    TPro  7.8  /  Alb  3.1<L>  /  TBili  0.5  /  DBili  x   /  AST  32  /  ALT  34  /  AlkPhos  128<H>  12-21    PT/INR - ( 21 Dec 2022 07:12 )   PT: 16.2 sec;   INR: 1.36          PTT - ( 21 Dec 2022 07:12 )  PTT:32.6 sec    RADIOLOGY & ADDITIONAL TESTS: Reviewed. OVERNIGHT EVENTS:   No acute events overnight.    SUBJECTIVE:  Pt seen and examined at bedside. Pt continues to have abd pain and back pain, unchanged from prior. Last BM was yesterday AM (12/21/22). Denies nausea/vomiting but continues to feel like food gets stuck in her throat despite EGD demonstrating no structural source for dysphagia.     VITAL SIGNS:  Vital Signs Last 24 Hrs  T(C): 36.8 (22 Dec 2022 07:14), Max: 36.8 (21 Dec 2022 22:30)  T(F): 98.2 (22 Dec 2022 07:14), Max: 98.3 (21 Dec 2022 22:30)  HR: 54 (22 Dec 2022 07:14) (54 - 62)  BP: 161/82 (22 Dec 2022 07:14) (116/65 - 161/82)  BP(mean): --  RR: 18 (22 Dec 2022 07:14) (18 - 19)  SpO2: 96% (22 Dec 2022 07:14) (96% - 99%)    Parameters below as of 21 Dec 2022 22:30  Patient On (Oxygen Delivery Method): nasal cannula  O2 Flow (L/min): 3      PHYSICAL EXAM:  General: NAD; speaking in full sentences  HEENT: NC/AT; PERRL; EOMI; MMM  Neck: supple; no JVD  Cardiac: RRR; +S1/S2  Pulm: CTA B/L; no W/R/R  GI: soft, mildly distended, tender to palpation to mid-abdomen, no rigidity or guarding  MSK: tenderness to lower spine and paraspinal regions  Extremities:  no edema, clubbing or cyanosis  Vasc: 2+ radial, DP pulses B/L  Neuro: AAOx3; no focal deficits; sensation intact to b/l LEs     MEDICATIONS:  MEDICATIONS  (STANDING):  aMIOdarone    Tablet 200 milliGRAM(s) Oral daily  apixaban 2.5 milliGRAM(s) Oral every 12 hours  atorvastatin 20 milliGRAM(s) Oral at bedtime  cefTRIAXone   IVPB 2000 milliGRAM(s) IV Intermittent every 24 hours  folic acid 1 milliGRAM(s) Oral daily  gabapentin 300 milliGRAM(s) Oral at bedtime  influenza  Vaccine (HIGH DOSE) 0.7 milliLiter(s) IntraMuscular once  isosorbide   mononitrate ER Tablet (IMDUR) 30 milliGRAM(s) Oral every 24 hours  lidocaine   4% Patch 1 Patch Transdermal every 24 hours  metoprolol succinate ER 25 milliGRAM(s) Oral daily  montelukast 10 milliGRAM(s) Oral daily  multivitamin 1 Tablet(s) Oral daily  pantoprazole    Tablet 40 milliGRAM(s) Oral before breakfast  polyethylene glycol 3350 17 Gram(s) Oral daily  senna 2 Tablet(s) Oral at bedtime  tiotropium 2.5 MICROgram(s) Inhaler 2 Puff(s) Inhalation daily  torsemide 40 milliGRAM(s) Oral every 24 hours    MEDICATIONS  (PRN):  acetaminophen     Tablet .. 650 milliGRAM(s) Oral every 6 hours PRN Temp greater or equal to 38C (100.4F), Mild Pain (1 - 3)  LORazepam     Tablet 0.5 milliGRAM(s) Oral daily PRN Anxiety  oxyCODONE    IR 5 milliGRAM(s) Oral every 6 hours PRN Severe Pain (7 - 10)  simethicone 80 milliGRAM(s) Chew two times a day PRN Gas  traMADol 50 milliGRAM(s) Oral every 6 hours PRN Moderate Pain (4 - 6)  trimethobenzamide Injectable 200 milliGRAM(s) IntraMuscular every 6 hours PRN Nausea and/or Vomiting      ALLERGIES:  Allergies    Digox (Rash; Urticaria; Hives)  Plavix (Other (Mod to Severe))    Intolerances        LABS:                        9.6    9.66  )-----------( 297      ( 21 Dec 2022 07:12 )             31.7     12-21    137  |  93<L>  |  28<H>  ----------------------------<  98  3.5   |  32<H>  |  1.34<H>    Ca    9.2      21 Dec 2022 07:12  Phos  3.9     12-21  Mg     2.0     12-21    TPro  7.8  /  Alb  3.1<L>  /  TBili  0.5  /  DBili  x   /  AST  32  /  ALT  34  /  AlkPhos  128<H>  12-21    PT/INR - ( 21 Dec 2022 07:12 )   PT: 16.2 sec;   INR: 1.36          PTT - ( 21 Dec 2022 07:12 )  PTT:32.6 sec    RADIOLOGY & ADDITIONAL TESTS:     < from: CT Abdomen and Pelvis w/ Oral Cont (12.20.22 @ 21:49) >    IMPRESSION:  1.  Resolution of previously noted herniated loop of small bowel within   the periumbilical hernia. The periumbilical hernia now contains fat.  2.  Copious stool noted in the mid transverse colon. No colonic wall   thickening. Post right hemicolectomy, with patent anastomosis.  3.   tract unremarkable.  4.  Indeterminate left adrenal gland nodule, unchanged compared to study   from 4 daysago.    < end of copied text >    < from: MR Lumbar Spine No Cont (12.20.22 @ 22:07) >  IMPRESSION:  Low T1 signal with corresponding high T2 signal in the L4-L5 endplates   with abnormal signal within the disc; findings likely represents Modic   type I endplate changes however superimposed discitis osteomyelitis   cannot be excluded. Additionally there is an approximately 2 cm cystic   lesion spanning from the superior endplate of L5 vertebral body to the   superior endplate of S1; findings may represent a synovial cyst versus   epidural fluid collection or disc extrusion. Further evaluation with the   use of intravenous contrast is recommended to exclude infectious etiology.    Multilevel degenerative disc disease as described above.    < end of copied text >  < from: CT Chest No Cont (12.02.22 @ 19:06) >  IMPRESSION:  Since September 16, 2022, unchanged pulmonary nodules and micronodules.  Unchanged few mildly enlarged mediastinal and hilar nodes.  Unchanged mosaic lung attenuation, can be seen with small vessel or small   airways disease.    < end of copied text >  < from: NM PET/CT Onc FDG Skull to Thigh, Subsq (09.27.22 @ 12:25) >    IMPRESSION:  1. Hypermetabolic mediastinal and bilateral hilar lymph nodes,SUV max   12.4 at an enlarged aortopulmonary node, most likely representing   metastatic disease.  2. Focal activity at the anterior mid transverse colon, SUV max 11.9,   most likely corresponding to the malignant lesion found on recent   colonoscopy. Status post right hemicolectomy.  3. 2.3 cm area of increased uptake in an area of groundglass opacity at   the posterior lingula, most likely representing inflammatory uptake,   although malignant involvement is difficult to exclude. Mosaic   attenuation of the lungs, most likely representing air trapping, edema,   or small airway disease.  4. Small bilateral pleural effusions with mild associated uptake.  5. FDG avid subcentimeter soft tissue nodule in the upper inner quadrant   of the right breast, possibly representing metastatic disease.  6. Wedge-shaped area of photopenia at the posterior aspect of L3-L4   without a CT correlate, possibly due to asymmetric degenerative changes.   Correlate clinically.    < end of copied text >

## 2022-12-22 NOTE — PROGRESS NOTE ADULT - ASSESSMENT
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 GI consulted for endoscopic evaluation for dysphagia.     #Dysphagia  Patient presenting with complaints of malaise, chills, hip pain x 1 day, found to be bacteremic, suspected to be oropharyngeal in etiology, currently undergoing cardiology evaluation to rule out infective endocarditis; concurrently reporting new dysphagia to both solids and liquids since admission. EGD (as noted below) with findings suggestive of candida esophagitis, otherwise no gross pathology. Suspect dysphagia related to candidiasis, no appreciable structural explanation for dysphagia.    -EGD (12/21): Scattered white plaques distributed through esophagus, appears to be consistent with candida esophagitis. Diffuse erythema of the mucosa was noted in the antrum. These findings are compatible with non-erosive gastritis.  Normal duodenum.  -QTC prolonged 522 on admission, recommend repeat EKG to re-evaluate. If no longer prolonged, can initiate tx with Fluconazole 400 mg PO x1, followed by 200 mg PO daily x 20 days. If still prolonged, would discuss with ID regarding alternative non-QTC prolonging tx options  -Speech & swallow evaluation     #Dilated colon   Noted on Abdominal Xray to have markedly dilated colon with CT A/P noting stool burden. No obstruction appreciated. Repeat abdominal XRAY (12/22) with dilated loops of transverse colon, measuring up to 10.6 cm (similar to radiograph dated 12/20/2022)  -f/u final read for abdominal Xray (12/22)  -Repeat abdominal Xray in AM  -Appreciate surgery recs  -Bowel regimen, monitor stool counts   -Consider tap water enema x1 followed by daily bowl regimen (i.e. Miralax daily)    Case discussed with svc attending and primary team.     Magali Alonso DO  Gastroenterology Fellow  Pager: [phone]

## 2022-12-22 NOTE — PROGRESS NOTE ADULT - SUBJECTIVE AND OBJECTIVE BOX
Ortho Note    Pt seen and examined on morning rounds. Pt comfortable. Endorsing R lumbar pain. Denies any hip pain.  Denies CP, SOB, N/V, numbness/tingling     Vital Signs Last 24 Hrs  T(C): 36.8 (21 Dec 2022 22:30), Max: 36.8 (21 Dec 2022 22:30)  T(F): 98.3 (21 Dec 2022 22:30), Max: 98.3 (21 Dec 2022 22:30)  HR: 60 (21 Dec 2022 22:30) (60 - 62)  BP: 141/69 (21 Dec 2022 22:30) (116/65 - 141/69)  BP(mean): --  RR: 19 (21 Dec 2022 22:30) (18 - 19)  SpO2: 99% (21 Dec 2022 22:30) (98% - 99%)    Parameters below as of 21 Dec 2022 22:30  Patient On (Oxygen Delivery Method): nasal cannula  O2 Flow (L/min): 3      Physical Exam:  Gen: Lying in bed, non-toxic appearing, NAD, resting comfortably  Resp: No increased WOB  RLE:  TTP over R lumbar spine  Skin intact, no effusion or erythema, normothermic  NT over R hip,  compartments soft  R Hip full pain free ROM   Motor: TA/EHL/GS/FHL intact  Sensory: DP/SP/Tib/Ellen/Saph SILT  +DP pulse, WWP  Negative straight leg raise                        LABS:                          9.6    9.66  )-----------( 297      ( 21 Dec 2022 07:12 )             31.7     12-21    137  |  93<L>  |  28<H>  ----------------------------<  98  3.5   |  32<H>  |  1.34<H>    Ca    9.2      21 Dec 2022 07:12  Phos  3.9     12-21  Mg     2.0     12-21    TPro  7.8  /  Alb  3.1<L>  /  TBili  0.5  /  DBili  x   /  AST  32  /  ALT  34  /  AlkPhos  128<H>  12-21    LIVER FUNCTIONS - ( 21 Dec 2022 07:12 )  Alb: 3.1 g/dL / Pro: 7.8 g/dL / ALK PHOS: 128 U/L / ALT: 34 U/L / AST: 32 U/L / GGT: x           PT/INR - ( 21 Dec 2022 07:12 )   PT: 16.2 sec;   INR: 1.36          PTT - ( 21 Dec 2022 07:12 )  PTT:32.6 sec      MRI L Spine:  - no acute neurocompressive lesions  - synovial cyst vs epidural fluid collection at L5  - Modic type 1 changes vs discitis osteomyeltisi    82y Female w/ R low back pain. Very Low suspicion for septic arthritis.   -WBAT RLE  - Rec PT  -pain control  -ice/cold compress  - Remainder of care per primary team    Ortho Pager [phone] Ortho Note    Pt seen and examined on morning rounds. Pt comfortable. Endorsing R lumbar pain. Denies any hip pain.  Denies CP, SOB, N/V, numbness/tingling     Vital Signs Last 24 Hrs  T(C): 36.8 (21 Dec 2022 22:30), Max: 36.8 (21 Dec 2022 22:30)  T(F): 98.3 (21 Dec 2022 22:30), Max: 98.3 (21 Dec 2022 22:30)  HR: 60 (21 Dec 2022 22:30) (60 - 62)  BP: 141/69 (21 Dec 2022 22:30) (116/65 - 141/69)  BP(mean): --  RR: 19 (21 Dec 2022 22:30) (18 - 19)  SpO2: 99% (21 Dec 2022 22:30) (98% - 99%)    Parameters below as of 21 Dec 2022 22:30  Patient On (Oxygen Delivery Method): nasal cannula  O2 Flow (L/min): 3      Physical Exam:  Gen: Lying in bed, non-toxic appearing, NAD, resting comfortably  Resp: No increased WOB  RLE:  TTP over R lumbar spine  Skin intact, no effusion or erythema, normothermic  NT over R hip,  compartments soft  R Hip full pain free ROM   Motor: TA/EHL/GS/FHL intact  Sensory: DP/SP/Tib/Ellen/Saph SILT  +DP pulse, WWP  Negative straight leg raise                        LABS:                          9.6    9.66  )-----------( 297      ( 21 Dec 2022 07:12 )             31.7     12-21    137  |  93<L>  |  28<H>  ----------------------------<  98  3.5   |  32<H>  |  1.34<H>    Ca    9.2      21 Dec 2022 07:12  Phos  3.9     12-21  Mg     2.0     12-21    TPro  7.8  /  Alb  3.1<L>  /  TBili  0.5  /  DBili  x   /  AST  32  /  ALT  34  /  AlkPhos  128<H>  12-21    LIVER FUNCTIONS - ( 21 Dec 2022 07:12 )  Alb: 3.1 g/dL / Pro: 7.8 g/dL / ALK PHOS: 128 U/L / ALT: 34 U/L / AST: 32 U/L / GGT: x           PT/INR - ( 21 Dec 2022 07:12 )   PT: 16.2 sec;   INR: 1.36          PTT - ( 21 Dec 2022 07:12 )  PTT:32.6 sec      MRI L Spine:  - no acute neurocompressive lesions  - synovial cyst vs epidural fluid collection at L5  - Modic type 1 changes vs discitis osteomyeltisi    82y Female w/ R low back pain.   - Will discuss imaging with spine attending and update plan  -WBAT RLE  - Rec PT  -pain control  -ice/cold compress  - Remainder of care per primary team    Ortho Pager [phone]

## 2022-12-22 NOTE — PROGRESS NOTE ADULT - SUBJECTIVE AND OBJECTIVE BOX
GASTROENTEROLOGY PROGRESS NOTE  Patient seen and examined at bedside.  -EGD (12/21/22): Scattered white plaques distributed through esophagus, appears to be consistent with candida esophagitis. Diffuse erythema of the mucosa was noted in the antrum. These findings are compatible with non-erosive gastritis. Normal duodenum.  -Pending gallium scan and IRMA    ROS: Patient notes tolerating liquids since yesterday. Still unable to tolerate solid foods. However unable to sit up straight 2/2 severe back pain with sitting upright. Patient also notes not being able to have a BM or pass flatus.     PERTINENT REVIEW OF SYSTEMS:  CONSTITUTIONAL: No weakness, fevers or chills  HEENT: No visual changes; No vertigo or throat pain   GASTROINTESTINAL: As above.  NEUROLOGICAL: No numbness or weakness  SKIN: No itching, burning, rashes, or lesions     Allergies    Digox (Rash; Urticaria; Hives)  Plavix (Other (Mod to Severe))    Intolerances      MEDICATIONS:  MEDICATIONS  (STANDING):  acetaminophen     Tablet .. 975 milliGRAM(s) Oral every 6 hours  aMIOdarone    Tablet 200 milliGRAM(s) Oral daily  apixaban 2.5 milliGRAM(s) Oral every 12 hours  atorvastatin 20 milliGRAM(s) Oral at bedtime  caspofungin IVPB      caspofungin IVPB 70 milliGRAM(s) IV Intermittent once  cefTRIAXone   IVPB 2000 milliGRAM(s) IV Intermittent every 24 hours  folic acid 1 milliGRAM(s) Oral daily  gabapentin 300 milliGRAM(s) Oral at bedtime  influenza  Vaccine (HIGH DOSE) 0.7 milliLiter(s) IntraMuscular once  isosorbide   mononitrate ER Tablet (IMDUR) 30 milliGRAM(s) Oral every 24 hours  lidocaine   4% Patch 1 Patch Transdermal every 24 hours  metoprolol succinate ER 25 milliGRAM(s) Oral daily  montelukast 10 milliGRAM(s) Oral daily  multivitamin 1 Tablet(s) Oral daily  oxyCODONE    IR 5 milliGRAM(s) Oral every 6 hours  pantoprazole    Tablet 40 milliGRAM(s) Oral before breakfast  polyethylene glycol 3350 17 Gram(s) Oral daily  senna 2 Tablet(s) Oral at bedtime  tiotropium 2.5 MICROgram(s) Inhaler 2 Puff(s) Inhalation daily  torsemide 40 milliGRAM(s) Oral every 24 hours    MEDICATIONS  (PRN):  LORazepam     Tablet 0.5 milliGRAM(s) Oral daily PRN Anxiety  oxyCODONE    IR 5 milliGRAM(s) Oral every 4 hours PRN Moderate Pain (4 - 6)  simethicone 40 milliGRAM(s) Chew every 6 hours PRN Gas  trimethobenzamide Injectable 200 milliGRAM(s) IntraMuscular every 6 hours PRN Nausea and/or Vomiting    Vital Signs Last 24 Hrs  T(C): 36.8 (22 Dec 2022 07:14), Max: 36.8 (21 Dec 2022 22:30)  T(F): 98.2 (22 Dec 2022 07:14), Max: 98.3 (21 Dec 2022 22:30)  HR: 54 (22 Dec 2022 07:14) (54 - 60)  BP: 161/82 (22 Dec 2022 07:14) (141/69 - 161/82)  BP(mean): --  RR: 18 (22 Dec 2022 07:14) (18 - 19)  SpO2: 96% (22 Dec 2022 07:14) (96% - 99%)    Parameters below as of 21 Dec 2022 22:30  Patient On (Oxygen Delivery Method): nasal cannula  O2 Flow (L/min): 3      12-21 @ 07:01  -  12-22 @ 07:00  --------------------------------------------------------  IN: 0 mL / OUT: 1100 mL / NET: -1100 mL      PHYSICAL EXAM:    General: Well developed; well nourished; in no acute distress  HEENT: MMM, conjunctiva and sclera clear  Gastrointestinal: Soft, non-tender non-distended; Normal bowel sounds; No rebound or guarding  Extremities: Normal range of motion, No clubbing, cyanosis or edema  Neurological: Alert and oriented x3  Skin: Warm and dry. No obvious rash    LABS:                        9.6    7.19  )-----------( 319      ( 22 Dec 2022 05:30 )             32.1     12-22    137  |  92<L>  |  32<H>  ----------------------------<  131<H>  4.5   |  35<H>  |  1.29    Ca    10.0      22 Dec 2022 05:30  Phos  4.5     12-22  Mg     2.4     12-22    TPro  7.9  /  Alb  3.1<L>  /  TBili  0.3  /  DBili  x   /  AST  27  /  ALT  31  /  AlkPhos  128<H>  12-22    PT/INR - ( 22 Dec 2022 05:30 )   PT: 18.1 sec;   INR: 1.51          PTT - ( 22 Dec 2022 05:30 )  PTT:30.2 sec      Culture - Blood (collected 20 Dec 2022 14:50)  Source: .Blood Blood-Peripheral  Preliminary Report (22 Dec 2022 16:00):    No growth at 2 days.    Culture - Blood (collected 20 Dec 2022 14:50)  Source: .Blood Blood-Peripheral  Preliminary Report (22 Dec 2022 16:00):    No growth at 2 days.      RADIOLOGY & ADDITIONAL STUDIES:  Reviewed

## 2022-12-22 NOTE — PROGRESS NOTE ADULT - SUBJECTIVE AND OBJECTIVE BOX
INTERVAL HPI/OVERNIGHT EVENTS: BERYL.    CONSTITUTIONAL:  Negative fever or chills, feels well, good appetite  EYES:  Negative  blurry vision or double vision  CARDIOVASCULAR:  Negative for chest pain or palpitations  RESPIRATORY:  Negative for cough, wheezing, or SOB   GASTROINTESTINAL:  Negative for nausea, vomiting, diarrhea, constipation, or abdominal pain  GENITOURINARY:  Negative frequency, urgency or dysuria  NEUROLOGIC:  No headache, confusion, dizziness, lightheadedness      ANTIBIOTICS/RELEVANT:    MEDICATIONS  (STANDING):  acetaminophen     Tablet .. 975 milliGRAM(s) Oral every 6 hours  aMIOdarone    Tablet 200 milliGRAM(s) Oral daily  apixaban 2.5 milliGRAM(s) Oral every 12 hours  atorvastatin 20 milliGRAM(s) Oral at bedtime  cefTRIAXone   IVPB 2000 milliGRAM(s) IV Intermittent every 24 hours  folic acid 1 milliGRAM(s) Oral daily  gabapentin 300 milliGRAM(s) Oral at bedtime  influenza  Vaccine (HIGH DOSE) 0.7 milliLiter(s) IntraMuscular once  isosorbide   mononitrate ER Tablet (IMDUR) 30 milliGRAM(s) Oral every 24 hours  lidocaine   4% Patch 1 Patch Transdermal every 24 hours  metoprolol succinate ER 25 milliGRAM(s) Oral daily  montelukast 10 milliGRAM(s) Oral daily  multivitamin 1 Tablet(s) Oral daily  oxyCODONE    IR 5 milliGRAM(s) Oral every 6 hours  pantoprazole    Tablet 40 milliGRAM(s) Oral before breakfast  polyethylene glycol 3350 17 Gram(s) Oral daily  senna 2 Tablet(s) Oral at bedtime  tiotropium 2.5 MICROgram(s) Inhaler 2 Puff(s) Inhalation daily  torsemide 40 milliGRAM(s) Oral every 24 hours    MEDICATIONS  (PRN):  HYDROmorphone  Injectable 0.5 milliGRAM(s) IV Push every 6 hours PRN Severe Pain (7 - 10)  LORazepam     Tablet 0.5 milliGRAM(s) Oral daily PRN Anxiety  oxyCODONE    IR 5 milliGRAM(s) Oral every 4 hours PRN Moderate Pain (4 - 6)  simethicone 40 milliGRAM(s) Chew every 6 hours PRN Gas  trimethobenzamide Injectable 200 milliGRAM(s) IntraMuscular every 6 hours PRN Nausea and/or Vomiting        Vital Signs Last 24 Hrs  T(C): 36.8 (22 Dec 2022 07:14), Max: 36.8 (21 Dec 2022 22:30)  T(F): 98.2 (22 Dec 2022 07:14), Max: 98.3 (21 Dec 2022 22:30)  HR: 54 (22 Dec 2022 07:14) (54 - 62)  BP: 161/82 (22 Dec 2022 07:14) (116/65 - 161/82)  BP(mean): --  RR: 18 (22 Dec 2022 07:14) (18 - 19)  SpO2: 96% (22 Dec 2022 07:14) (96% - 99%)    Parameters below as of 21 Dec 2022 22:30  Patient On (Oxygen Delivery Method): nasal cannula  O2 Flow (L/min): 3      PHYSICAL EXAM:  Constitutional: NAD  Eyes: TIMMY, EOMI  Ear/Nose/Throat: no oral lesion, no sinus tenderness on percussion	  Neck: no JVD, no lymphadenopathy, supple  Respiratory: CTA hannah  Cardiovascular: S1S2 RRR, no murmurs  Gastrointestinal:soft, (+) BS, no HSM  Extremities:no e/e/c  Vascular: DP Pulse:	right normal; left normal      LABS:                        9.6    7.19  )-----------( 319      ( 22 Dec 2022 05:30 )             32.1     12-22    137  |  92<L>  |  32<H>  ----------------------------<  131<H>  4.5   |  35<H>  |  1.29    Ca    10.0      22 Dec 2022 05:30  Phos  4.5     12-22  Mg     2.4     12-22    TPro  7.9  /  Alb  3.1<L>  /  TBili  0.3  /  DBili  x   /  AST  27  /  ALT  31  /  AlkPhos  128<H>  12-22    PT/INR - ( 22 Dec 2022 05:30 )   PT: 18.1 sec;   INR: 1.51          PTT - ( 22 Dec 2022 05:30 )  PTT:30.2 sec      MICROBIOLOGY: reviewed    RADIOLOGY & ADDITIONAL STUDIES: reviewed

## 2022-12-22 NOTE — PROGRESS NOTE ADULT - PROBLEM SELECTOR PLAN 1
12/20 Patient with distended bowel, however soft but tender to palpation likely 2/2 constipation as pt had not had a BM for 5 days. Had 1 large BM s/p Bowel reg, ducolex enema, fleet enema. Abdominal Xray Pre and post BM demonstrated appx 11cm dilation     - Surg following, no acute intervention, appreciate recs  - Planned for EGD with GI today (12/21)   - CT A/P with 1. resolution of previously noted herniated loop of small bowel within the periumbilical hernia. The periumbilical hernia now contains fat. 2.  Copious stool in mid transverse colon. Post right hemicolectomy, with patent anastomosis.  - C/w miralax, senna, and dulcolax suppository 12/20 Patient with distended bowel, however soft but tender to palpation likely 2/2 constipation as pt had not had a BM for 5 days. Had 1 large BM s/p Bowel reg, ducolex enema, fleet enema. Abdominal Xray Pre and post BM demonstrated appx 11cm dilation     - Repeat Abd XR (12/22) with again dilated loops of transverse colon, 10.6 cm (comparable to 12/20/2022) - no significant interval change  - s/p EGD (12/21) with Candida esophagitis   - CT A/P (12/20) with 1. resolution of previously noted herniated loop of small bowel within the periumbilical hernia. The periumbilical hernia now contains fat. 2.  Copious stool in mid transverse colon. Post right hemicolectomy, with patent anastomosis.  - C/w miralax, senna, and dulcolax suppository  - tapwater enema today   - Continue to monitor BMs  - Surg and GI following, appreciate recs

## 2022-12-22 NOTE — PROGRESS NOTE ADULT - ASSESSMENT
81 yo female hx TAVR, Gemella BSI 9/5/22 2/2 odontogenic source (abscesses not drained, extractions not performed), E. 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 mitis/oralis BSI--as species name implies, this organism originates from the oral cavity; she is likely to be at ongoing risk of bacteremia from oral mela in the absence of dental intervention. MR 12/20 with L4-5 endplate changes without definitive osteomyelitis/discitis.  - IRMA  - f/u gallium scan 12/22  - continue ceftriaxone 2g IV q24h

## 2022-12-22 NOTE — PROGRESS NOTE ADULT - PROBLEM SELECTOR PLAN 1
2/2 known lesion in acetabulum exacerbated by progressive degenerative changes to lumbar, sacral spine.  CT Pelvis Bony Only No Cont (12.16.22 Stable sclerotic lesion within the right posterior acetabulum,  7 mm.  Mild bilateral hip arthrosis. Degenerative changes within the lower lumbar spine, sacroiliac joints, pubic symphysis. Exacerbated iso infection/ bacteremia ortho, no intervention.  Liberalize PRNs currently ordered.   Patient stated Tylenol is failing to help pain, but oxycodone 5 mg gave relief.   - dc tramadol  - dc  tylenol 1g ATC   - Oxy IR 5mg q6h ATC -  hold for somnolence or RR depression  - oxy IR 5 mg PO q4 PRN moderate pain  -- hold for somnolence or RR depression, cw BR  - cw ana 300 mg po qHS -- renally dose   - lidocaine patch qD  - pending resolution of infection, consider trial of dex 5 mg PO qAM for 5 days - monitor plt, wbc, sugars  - would benefit from corticosteroid injection as outpatient, referral to Dr. Benjamín Swann   - if within GOC, consider palliative XRT eval   - No MS No toradol given kidney dysfunction.

## 2022-12-22 NOTE — PROGRESS NOTE ADULT - PROBLEM SELECTOR PLAN 3
Pt presenting with malaise, chills and acute on chronic R hip pain. Pt states she has had chronic R hip pain that shoots down her leg, but has not seen providers for it in the past. Day prior to admission she said the pain was severe and had pain with ambulation. Pain starts at posterior hip and radiates down R leg. CT scans and xrays without findings     - Ortho consulted, low concern for septic joint w supportive management focused of PT and pain control  - MRI lumbar spine performed, f/u read   - Pain control with lidocaine patch, tylenol, oxycodone 5mg q6 hours prn  - Gabapentin 600 mg PO daily.   - CT scans and xrays without findings.  - f/u PT recs Pt presenting with malaise, chills and acute on chronic R hip pain. Pt states she has had chronic R hip pain that shoots down her leg, but has not seen providers for it in the past. Day prior to admission she said the pain was severe and had pain with ambulation. Pain starts at posterior hip and radiates down R leg. CT scans and xrays without findings     - MRI lumbar spine (12/20) with no acute neurocompressive lesions, synovial cyst vs epidural fluid collection at L5, Modic type 1 changes vs disciits OM  - Pain control with lidocaine patch, tylenol, oxycodone 5mg q6 hours prn  - Gabapentin 600 mg PO daily.   - CT scans and xrays without findings.  - Ortho following, low concern for septic joint w supportive management focused of PT and pain control  - f/u PT recs

## 2022-12-22 NOTE — PROGRESS NOTE ADULT - PROBLEM SELECTOR PLAN 2
Found to have strep bacteremia likely 2/2 to hx of colon cancer. Patient currently afebrile, last febrile 12/20 AM (T 100.6)    - 12/15 BCx growing Strep mitis/oralis   - subsequent surveillance BCx neg (most recently 12/20), f/u final  - c/w CTX 2g q24h (12/16- )  - ID following, f/u recs  - TTE neg for vegetation, f/u IRMA (will get gallium if IRMA neg to r/o prosthetic infection)

## 2022-12-23 LAB
ANION GAP SERPL CALC-SCNC: 9 MMOL/L — SIGNIFICANT CHANGE UP (ref 5–17)
APTT BLD: 30.8 SEC — SIGNIFICANT CHANGE UP (ref 27.5–35.5)
BUN SERPL-MCNC: 39 MG/DL — HIGH (ref 7–23)
CALCIUM SERPL-MCNC: 9.4 MG/DL — SIGNIFICANT CHANGE UP (ref 8.4–10.5)
CHLORIDE SERPL-SCNC: 93 MMOL/L — LOW (ref 96–108)
CO2 SERPL-SCNC: 34 MMOL/L — HIGH (ref 22–31)
CREAT SERPL-MCNC: 1.41 MG/DL — HIGH (ref 0.5–1.3)
EGFR: 37 ML/MIN/1.73M2 — LOW
GLUCOSE SERPL-MCNC: 108 MG/DL — HIGH (ref 70–99)
HCT VFR BLD CALC: 29.9 % — LOW (ref 34.5–45)
HGB BLD-MCNC: 9 G/DL — LOW (ref 11.5–15.5)
INR BLD: 1.51 — HIGH (ref 0.88–1.16)
MAGNESIUM SERPL-MCNC: 2.3 MG/DL — SIGNIFICANT CHANGE UP (ref 1.6–2.6)
MCHC RBC-ENTMCNC: 26.2 PG — LOW (ref 27–34)
MCHC RBC-ENTMCNC: 30.1 GM/DL — LOW (ref 32–36)
MCV RBC AUTO: 87.2 FL — SIGNIFICANT CHANGE UP (ref 80–100)
NRBC # BLD: 0 /100 WBCS — SIGNIFICANT CHANGE UP (ref 0–0)
PHOSPHATE SERPL-MCNC: 4.6 MG/DL — HIGH (ref 2.5–4.5)
PLATELET # BLD AUTO: 300 K/UL — SIGNIFICANT CHANGE UP (ref 150–400)
POTASSIUM SERPL-MCNC: 3.5 MMOL/L — SIGNIFICANT CHANGE UP (ref 3.5–5.3)
POTASSIUM SERPL-SCNC: 3.5 MMOL/L — SIGNIFICANT CHANGE UP (ref 3.5–5.3)
PROTHROM AB SERPL-ACNC: 18.1 SEC — HIGH (ref 10.5–13.4)
RBC # BLD: 3.43 M/UL — LOW (ref 3.8–5.2)
RBC # FLD: 19.6 % — HIGH (ref 10.3–14.5)
SODIUM SERPL-SCNC: 136 MMOL/L — SIGNIFICANT CHANGE UP (ref 135–145)
WBC # BLD: 7.96 K/UL — SIGNIFICANT CHANGE UP (ref 3.8–10.5)
WBC # FLD AUTO: 7.96 K/UL — SIGNIFICANT CHANGE UP (ref 3.8–10.5)

## 2022-12-23 PROCEDURE — 99232 SBSQ HOSP IP/OBS MODERATE 35: CPT

## 2022-12-23 PROCEDURE — 99232 SBSQ HOSP IP/OBS MODERATE 35: CPT | Mod: GC

## 2022-12-23 PROCEDURE — 99233 SBSQ HOSP IP/OBS HIGH 50: CPT

## 2022-12-23 PROCEDURE — 99222 1ST HOSP IP/OBS MODERATE 55: CPT

## 2022-12-23 PROCEDURE — 74018 RADEX ABDOMEN 1 VIEW: CPT | Mod: 26

## 2022-12-23 PROCEDURE — 93010 ELECTROCARDIOGRAM REPORT: CPT

## 2022-12-23 RX ORDER — GABAPENTIN 400 MG/1
1 CAPSULE ORAL
Qty: 0 | Refills: 0 | DISCHARGE
Start: 2022-12-23

## 2022-12-23 RX ADMIN — Medication 10 MILLIGRAM(S): at 11:54

## 2022-12-23 RX ADMIN — APIXABAN 2.5 MILLIGRAM(S): 2.5 TABLET, FILM COATED ORAL at 17:47

## 2022-12-23 RX ADMIN — CASPOFUNGIN ACETATE 260 MILLIGRAM(S): 7 INJECTION, POWDER, LYOPHILIZED, FOR SOLUTION INTRAVENOUS at 15:15

## 2022-12-23 RX ADMIN — TIOTROPIUM BROMIDE 2 PUFF(S): 18 CAPSULE ORAL; RESPIRATORY (INHALATION) at 10:12

## 2022-12-23 RX ADMIN — OXYCODONE HYDROCHLORIDE 5 MILLIGRAM(S): 5 TABLET ORAL at 18:45

## 2022-12-23 RX ADMIN — PANTOPRAZOLE SODIUM 40 MILLIGRAM(S): 20 TABLET, DELAYED RELEASE ORAL at 06:41

## 2022-12-23 RX ADMIN — OXYCODONE HYDROCHLORIDE 5 MILLIGRAM(S): 5 TABLET ORAL at 06:41

## 2022-12-23 RX ADMIN — Medication 1 MILLIGRAM(S): at 11:54

## 2022-12-23 RX ADMIN — Medication 100 MILLIGRAM(S): at 11:55

## 2022-12-23 RX ADMIN — Medication 975 MILLIGRAM(S): at 00:26

## 2022-12-23 RX ADMIN — Medication 25 MILLIGRAM(S): at 06:40

## 2022-12-23 RX ADMIN — LIDOCAINE 1 PATCH: 4 CREAM TOPICAL at 18:42

## 2022-12-23 RX ADMIN — OXYCODONE HYDROCHLORIDE 5 MILLIGRAM(S): 5 TABLET ORAL at 00:27

## 2022-12-23 RX ADMIN — SENNA PLUS 2 TABLET(S): 8.6 TABLET ORAL at 22:35

## 2022-12-23 RX ADMIN — Medication 1 TABLET(S): at 11:54

## 2022-12-23 RX ADMIN — MONTELUKAST 10 MILLIGRAM(S): 4 TABLET, CHEWABLE ORAL at 11:55

## 2022-12-23 RX ADMIN — LIDOCAINE 1 PATCH: 4 CREAM TOPICAL at 06:41

## 2022-12-23 RX ADMIN — AMIODARONE HYDROCHLORIDE 200 MILLIGRAM(S): 400 TABLET ORAL at 06:41

## 2022-12-23 RX ADMIN — OXYCODONE HYDROCHLORIDE 5 MILLIGRAM(S): 5 TABLET ORAL at 12:44

## 2022-12-23 RX ADMIN — ATORVASTATIN CALCIUM 20 MILLIGRAM(S): 80 TABLET, FILM COATED ORAL at 22:35

## 2022-12-23 RX ADMIN — CEFTRIAXONE 100 MILLIGRAM(S): 500 INJECTION, POWDER, FOR SOLUTION INTRAMUSCULAR; INTRAVENOUS at 22:36

## 2022-12-23 RX ADMIN — GABAPENTIN 300 MILLIGRAM(S): 400 CAPSULE ORAL at 22:36

## 2022-12-23 RX ADMIN — Medication 975 MILLIGRAM(S): at 06:46

## 2022-12-23 RX ADMIN — OXYCODONE HYDROCHLORIDE 5 MILLIGRAM(S): 5 TABLET ORAL at 11:55

## 2022-12-23 RX ADMIN — Medication 200 MILLIGRAM(S): at 22:36

## 2022-12-23 RX ADMIN — POLYETHYLENE GLYCOL 3350 17 GRAM(S): 17 POWDER, FOR SOLUTION ORAL at 11:55

## 2022-12-23 RX ADMIN — APIXABAN 2.5 MILLIGRAM(S): 2.5 TABLET, FILM COATED ORAL at 07:06

## 2022-12-23 RX ADMIN — OXYCODONE HYDROCHLORIDE 5 MILLIGRAM(S): 5 TABLET ORAL at 17:47

## 2022-12-23 RX ADMIN — Medication 100 MILLIGRAM(S): at 23:16

## 2022-12-23 NOTE — PROGRESS NOTE ADULT - ASSESSMENT
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 Palliative care consulted for pain management and GOC.

## 2022-12-23 NOTE — PROGRESS NOTE ADULT - PROBLEM SELECTOR PLAN 1
12/20 Patient with distended bowel, however soft but tender to palpation likely 2/2 constipation as pt had not had a BM for 5 days. Had 1 large BM s/p Bowel reg, ducolex enema, fleet enema. Abdominal Xray Pre and post BM demonstrated appx 11cm dilation     - Repeat Abd XR (12/22) with again dilated loops of transverse colon, 10.6 cm (comparable to 12/20/2022) - no significant interval change  - s/p EGD (12/21) with Candida esophagitis   - CT A/P (12/20) with 1. resolution of previously noted herniated loop of small bowel within the periumbilical hernia. The periumbilical hernia now contains fat. 2.  Copious stool in mid transverse colon. Post right hemicolectomy, with patent anastomosis.  - C/w miralax, senna, and dulcolax suppository  - tapwater enema today   - Continue to monitor BMs  - Surg and GI following, appreciate recs 12/20 Patient with distended bowel, however soft but tender to palpation likely 2/2 constipation as pt had not had a BM for 5 days. Had 1 large BM s/p Bowel reg, ducolex enema, fleet enema. Abdominal Xray Pre and post BM demonstrated appx 11cm dilation     - Repeat Abd XR (12/22) with again dilated loops of transverse colon, 10.6 cm (comparable to 12/20/2022) - no significant interval change  - S/p EGD (12/21) with Candida esophagitis   - CT A/P (12/20) with 1. resolution of previously noted herniated loop of small bowel within the periumbilical hernia. The periumbilical hernia now contains fat. 2.Copious stool in mid transverse colon. Post right hemicolectomy, with patent anastomosis.  - C/w miralax, senna, and dulcolax suppository  - tapwater enema today   - Continue to monitor BMs  - Surg and GI following, appreciate recs

## 2022-12-23 NOTE — PROGRESS NOTE ADULT - PROBLEM SELECTOR PLAN 12
Pt s/p TAVR, however now with moderate tavr valve stenosis. Structural team has evaluated her on previous admission and pt not a candidate for rpt intervention. Pt s/p TAVR, however now with moderate tavr valve stenosis. Structural team has evaluated her on previous admission  -Pt not a candidate for RPT intervention.

## 2022-12-23 NOTE — PROGRESS NOTE ADULT - ASSESSMENT
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 GI consulted for endoscopic evaluation for dysphagia.     #Dysphagia  Patient presenting with complaints of malaise, chills, hip pain x 1 day, found to be bacteremic, suspected to be oropharyngeal in etiology, currently undergoing cardiology evaluation to rule out infective endocarditis; concurrently reporting new dysphagia to both solids and liquids since admission. EGD (as noted below) with findings suggestive of candida esophagitis, otherwise no gross pathology. Suspect dysphagia related to candidiasis, no appreciable structural explanation for dysphagia.    -EGD (12/21): Scattered white plaques distributed through esophagus, appears to be consistent with candida esophagitis. Diffuse erythema of the mucosa was noted in the antrum. These findings are compatible with non-erosive gastritis. Normal duodenum.  -QTC prolonged 522 on admission, Caspofungin as per ID for tx of esophageal candidiasis    #Dilated colon   Noted on Abdominal Xray to have markedly dilated colon with CT A/P noting stool burden. No obstruction appreciated. Repeat abdominal XRAY (12/22) with dilated loops of transverse colon, measuring up to 10.6 cm (similar to radiograph dated 12/20/2022)  -abdominal Xray (12/23) with transverse colon diameter similar to previous exams, Cecum measuring approx 7 cm, f/u final read   -Appreciate ongoing surgery recs  -Aggressive Bowel regimen, increase Miralax from 17g daily to BID  -Continue to monitor stool counts   -Consider PRN suppository vs enema     Case discussed with [facility] [place] attending and primary team.     Magali Alonso DO  Gastroenterology Fellow  Pager: [phone]

## 2022-12-23 NOTE — PROGRESS NOTE ADULT - PROBLEM SELECTOR PLAN 2
appears to be gas related, but may be element of disease progression.   - recommend simethicone 40 mg PO QID PRN   - standing BR, mLax, senna 2 tabs PO qHS.

## 2022-12-23 NOTE — PROGRESS NOTE ADULT - PROBLEM SELECTOR PLAN 5
dx 2019 sp resection. now w recurrence and metastatic dz, pain/symptom burden   - poor performance status and thus poor candidate for systemic treatment  - heme onc recs appreciated. Patient follows Dr. Ramirez   - awaiting input from Oncology pending further [facility] conversations.   - If no further disease modifying treatments offered or patient/family declined further disease modifying treatments,  patient would qualify for hospice.

## 2022-12-23 NOTE — SWALLOW BEDSIDE ASSESSMENT ADULT - SWALLOW EVAL: DIAGNOSIS
Pt p/w essentially functional oropharyngeal swallow characterized by functional oral acceptance/containment/manipulation and suspected timely swallow initiation with adequate laryngeal elevation. No overt s/s of airway protection deficits were observed on this exam. Given poor dentition and difficulty w mastication, pt would benefit from a modified diet of soft-and-bite-sized solids, which she expressed also preferring. Recommend general aspiration precautions.

## 2022-12-23 NOTE — PROGRESS NOTE ADULT - PROBLEM SELECTOR PLAN 2
Found to have strep bacteremia likely 2/2 to hx of colon cancer. Patient currently afebrile, last febrile 12/20 AM (T 100.6)    - 12/15 BCx growing Strep mitis/oralis   - subsequent surveillance BCx neg (most recently 12/20), f/u final  - c/w CTX 2g q24h (12/16- )  - ID following, f/u recs  - TTE neg for vegetation, f/u IRMA (will get gallium if IRMA neg to r/o prosthetic infection) Found to have strep bacteremia likely 2/2 to hx of colon cancer. Patient currently afebrile, last febrile 12/20 AM (T 100.6), IRMA neg for vegetation w f/u Gallium scan neg for cardiac etiology (endocarditis). 12/15 BCx growing Strep mitis/oralis, however now NGTD from (12/20)    - Bacterial Clx (NGTD) - 12/20   - c/w CTX 2g q24h (12/16- )  - ID following, f/u recs

## 2022-12-23 NOTE — PROGRESS NOTE ADULT - ATTENDING COMMENTS
82-year-old female with multiple medical and cardiac comorbidities recurrent colon cancer metastatic to the lung presents with acute on chronic right-sided lumbar radiculopathy which clinically represents an acute exacerbation of a chronic complaint.  MRI of the lumbar spine consistent with L5 retro-vertebral cyst versus collection.  In the setting of significantly elevated inflammatory markers, though without leukocytosis, if f there is clinical concern for infectious etiology without clear other source identified, MRI lumbar spine with contrast as well as considered IR aspiration for culture of this finding could be considered.  In the absence of neurologic deficits nor sepsis there is no acute surgical intervention indicated.  If her medical condition allows, IV / PO steroids may help with symptomatic relief.

## 2022-12-23 NOTE — PROGRESS NOTE ADULT - ATTENDING COMMENTS
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      #strep bacteremia   suspected source from oral cavity vs colonic mass suspicious for recurrent colon cancer  hx of TAVR   TTE negative for vegetations  gallium scan showed uptake in oral cavity, transverse colon, likely the source of bacteremia   negative IRMA to R/O endocarditis, will get picc for 2 weeks of abx    repeat suvx cx NGTD   will cw Rocephin qd     #colon cancer: colonic mass suspicious for recurrent colon cancer with lung mets   #abdominal pain and distention   s/p CT  abdomen scan, showed sign stool burden: fu details as above in radiology   09/22: PET scan positive in sept with CT chest c/w mets to lungs.  cw serial abd exams, and ABX w enema's: 12/23 continues to have distention, abd pain   GI/surgery  following and recs to cw aggressive BR and enema's     #candida esophagitis   s/p EGD: showed candida esophagitis: QTC elevated for fluconazole, will start on caspofungin x 14 days   ID following     # low back pain: R hip pain   # L4-5 endplate changes without definitive osteomyelitis/discitis.  ortho spine rec MRi w contrast for further evaluation  and IR for aspiration   Mild gallium uptake in Lumbar region, spoke w ID and recs to extend the abx to 6 weeks.     #Charley on CKD 3  cr 1.3 -> 1.29- > 1.41  avoid nephrotoxins    #chronic AF: continue with home medications    #dvt PPX eliquis . 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      #strep bacteremia   suspected source from oral cavity    hx of TAVR   TTE and IRMA neg for vegetations  gallium scan showed uptake in oral cavity, transverse colon, likely the source of bacteremia   will get picc for 6 weeks of abx  12/24  repeat suvx cx NGTD   will cw Rocephin x 6 weeks total course     #colon cancer: colonic mass suspicious for recurrent colon cancer with lung mets   #abdominal pain and distention   s/p CT  abdomen scan, showed sign stool burden: fu details as above in radiology   09/22: PET scan positive in sept with CT chest c/w mets to lungs.  cw serial abd exams, and ABX w enema's: 12/23 continues to have distention, abd pain   GI/surgery  following and recs to cw aggressive BR and enema's     #candida esophagitis   s/p EGD: showed candida esophagitis: QTC elevated for fluconazole, will start on caspofungin x 14 days   ID following     # low back pain: R hip pain   # L4-5 endplate changes without definitive osteomyelitis/discitis.  ortho following   Mild gallium uptake in Lumbar region, spoke w ID and recs to extend the abx to 6 weeks for suspected OM.     #Charley on CKD 3  cr 1.3 -> 1.29- > 1.41  avoid nephrotoxins    #chronic AF: continue with home medications    #dvt PPX eliquis .

## 2022-12-23 NOTE — PROGRESS NOTE ADULT - ASSESSMENT
82F, Cymraes speaking, spoke with  Lulu #[id number], w/ PMHx HTN, HLD, severe AS s/p TAVR (2019) w/ mod valve stenosis and valve thrombosis 2021 s/p AC (not seen on echo 2022), Ascending Thoracic Aneurysm 4cm in 1/2022, pAFib (on Amiodarone/Eliquis), COPD (on 2L home O2), Colon CA s/p resection in 2019 (previously in remission now with recurrence and mets to lung), moderate MARK (non-compliant with CPAP 2/2 claustrophobia), CKD3 (baseline Cr 1.1-1.2) and FEROZ presents for malaise, chills, and hip pain for 1 day. 82F, Danish speaking, spoke with  Lulu #[id number], w/ PMHx HTN, HLD, severe AS s/p TAVR (2019) w/ mod valve stenosis and valve thrombosis 2021 s/p AC (not seen on echo 2022), Ascending Thoracic Aneurysm 4cm in 1/2022, pAFib (on Amiodarone/Eliquis), COPD (on 2L home O2), Colon CA s/p resection in 2019 (previously in remission now with recurrence and mets to lung), moderate MARK (non-compliant with CPAP 2/2 claustrophobia), CKD3 (baseline Cr 1.1-1.2) and FEROZ presents for malaise, chills, and hip pain for 1 day.

## 2022-12-23 NOTE — PROGRESS NOTE ADULT - SUBJECTIVE AND OBJECTIVE BOX
**incomplete** **incomplete**  **incomplete**                          Radiology:   < from: CT Abdomen and Pelvis w/ Oral Cont (12.20.22 @ 21:49) >    IMPRESSION:  1.  Resolution of previously noted herniated loop of small bowel within   the periumbilical hernia. The periumbilical hernia now contains fat.  2.  Copious stool noted in the mid transverse colon. No colonic wall   thickening. Post right hemicolectomy, with patent anastomosis.  3.   tract unremarkable.  4.  Indeterminate left adrenal gland nodule, unchanged compared to study   from 4 daysago.    < end of copied text >    < from: MR Lumbar Spine No Cont (12.20.22 @ 22:07) >  IMPRESSION:  Low T1 signal with corresponding high T2 signal in the L4-L5 endplates   with abnormal signal within the disc; findings likely represents Modic   type I endplate changes however superimposed discitis osteomyelitis   cannot be excluded. Additionally there is an approximately 2 cm cystic   lesion spanning from the superior endplate of L5 vertebral body to the   superior endplate of S1; findings may represent a synovial cyst versus   epidural fluid collection or disc extrusion. Further evaluation with the   use of intravenous contrast is recommended to exclude infectious etiology.    Multilevel degenerative disc disease as described above.    < end of copied text >  < from: CT Chest No Cont (12.02.22 @ 19:06) >  IMPRESSION:  Since September 16, 2022, unchanged pulmonary nodules and micronodules.  Unchanged few mildly enlarged mediastinal and hilar nodes.  Unchanged mosaic lung attenuation, can be seen with small vessel or small   airways disease.    < end of copied text >  < from: NM PET/CT Onc FDG Skull to Thigh, Subsq (09.27.22 @ 12:25) >    IMPRESSION:  1. Hypermetabolic mediastinal and bilateral hilar lymph nodes,SUV max   12.4 at an enlarged aortopulmonary node, most likely representing   metastatic disease.  2. Focal activity at the anterior mid transverse colon, SUV max 11.9,   most likely corresponding to the malignant lesion found on recent   colonoscopy. Status post right hemicolectomy.  3. 2.3 cm area of increased uptake in an area of groundglass opacity at   the posterior lingula, most likely representing inflammatory uptake,   although malignant involvement is difficult to exclude. Mosaic   attenuation of the lungs, most likely representing air trapping, edema,   or small airway disease.  4. Small bilateral pleural effusions with mild associated uptake.  5. FDG avid subcentimeter soft tissue nodule in the upper inner quadrant   of the right breast, possibly representing metastatic disease.  6. Wedge-shaped area of photopenia at the posterior aspect of L3-L4   without a CT correlate, possibly due to asymmetric degenerative changes.   Correlate clinically.    < end of copied text >  Overnight: NAEON   Subjective: The patient reports that her appetite has improved. In addition she reports that she has had a reduction in hip pain on her R side with concurrent reduction in back pain. However, she does reports that it is still an issues, particularly when she moves in bed. The patient reports that she has not had a bowel movement since appx 3 days ago (when she had aggressive enema w bowel reg tx). She has reported that she would like to, once again, try to walk again with the assistance of PT.     T(C): 36.4 (12-23-22 @ 08:44), Max: 37 (12-23-22 @ 06:34)  HR: 48 (12-23-22 @ 13:43) (47 - 49)  BP: 125/65 (12-23-22 @ 13:43) (113/59 - 143/72)  RR: 16 (12-23-22 @ 08:44) (16 - 18)  SpO2: 99% (12-23-22 @ 08:44) (98% - 100%)  Wt(kg): --Vital Signs Last 24 Hrs  T(C): 36.4 (23 Dec 2022 08:44), Max: 37 (23 Dec 2022 06:34)  T(F): 97.6 (23 Dec 2022 08:44), Max: 98.6 (23 Dec 2022 06:34)  HR: 48 (23 Dec 2022 13:43) (47 - 49)  BP: 125/65 (23 Dec 2022 13:43) (113/59 - 143/72)  BP(mean): --  RR: 16 (23 Dec 2022 08:44) (16 - 18)  SpO2: 99% (23 Dec 2022 08:44) (98% - 100%)    Parameters below as of 23 Dec 2022 08:44  Patient On (Oxygen Delivery Method): nasal cannula  O2 Flow (L/min): 3      PHYSICAL EXAM:  GENERAL: NAD; well-groomed; well-developed; AAO x 3; good concentration   Neuro: CN2-12 grossly intact; speech clear (Sierra Leonean Speaking)  HEENT: NC/AT; MMM; neck supple; good dentition  Heart: RRR; +s1 and s2; no MRG   Lungs: CTA b/l; normal effort; no accesory muscle use  GI: nondistended; nontender; normal bowel sounds [id number]  Extremities: +2 pulses in UE and LE b/l; no clubbing, cyanosis, + some tenderness on the R hip with palpation and additional tenderness in her back (Center Back)  Skin: skin dry and warm; no skin tenting; no rashes or lesions  MSK: normal tone; +5/5 strength in upper and lower extremities b/l    LABS:                        9.0    7.96  )-----------( 300      ( 23 Dec 2022 05:30 )             29.9     12-23    136  |  93<L>  |  39<H>  ----------------------------<  108<H>  3.5   |  34<H>  |  1.41<H>    Ca    9.4      23 Dec 2022 05:30  Phos  4.6     12-23  Mg     2.3     12-23    TPro  7.9  /  Alb  3.1<L>  /  TBili  0.3  /  DBili  x   /  AST  27  /  ALT  31  /  AlkPhos  128<H>  12-22      PT/INR - ( 23 Dec 2022 05:30 )   PT: 18.1 sec;   INR: 1.51          PTT - ( 23 Dec 2022 05:30 )  PTT:30.8 sec    RADIOLOGY & ADDITIONAL TESTS:  Radiology:   < from: CT Abdomen and Pelvis w/ Oral Cont (12.20.22 @ 21:49) >    IMPRESSION:  1.  Resolution of previously noted herniated loop of small bowel within   the periumbilical hernia. The periumbilical hernia now contains fat.  2.  Copious stool noted in the mid transverse colon. No colonic wall   thickening. Post right hemicolectomy, with patent anastomosis.  3.   tract unremarkable.  4.  Indeterminate left adrenal gland nodule, unchanged compared to study   from 4 daysago.    < end of copied text >    < from: MR Lumbar Spine No Cont (12.20.22 @ 22:07) >  IMPRESSION:  Low T1 signal with corresponding high T2 signal in the L4-L5 endplates   with abnormal signal within the disc; findings likely represents Modic   type I endplate changes however superimposed discitis osteomyelitis   cannot be excluded. Additionally there is an approximately 2 cm cystic   lesion spanning from the superior endplate of L5 vertebral body to the   superior endplate of S1; findings may represent a synovial cyst versus   epidural fluid collection or disc extrusion. Further evaluation with the   use of intravenous contrast is recommended to exclude infectious etiology.    Multilevel degenerative disc disease as described above.    < end of copied text >  < from: CT Chest No Cont (12.02.22 @ 19:06) >  IMPRESSION:  Since September 16, 2022, unchanged pulmonary nodules and micronodules.  Unchanged few mildly enlarged mediastinal and hilar nodes.  Unchanged mosaic lung attenuation, can be seen with small vessel or small   airways disease.    < end of copied text >  < from: NM PET/CT Onc FDG Skull to Thigh, Subsq (09.27.22 @ 12:25) >    IMPRESSION:  1. Hypermetabolic mediastinal and bilateral hilar lymph nodes,SUV max   12.4 at an enlarged aortopulmonary node, most likely representing   metastatic disease.  2. Focal activity at the anterior mid transverse colon, SUV max 11.9,   most likely corresponding to the malignant lesion found on recent   colonoscopy. Status post right hemicolectomy.  3. 2.3 cm area of increased uptake in an area of groundglass opacity at   the posterior lingula, most likely representing inflammatory uptake,   although malignant involvement is difficult to exclude. Mosaic   attenuation of the lungs, most likely representing air trapping, edema,   or small airway disease.  4. Small bilateral pleural effusions with mild associated uptake.  5. FDG avid subcentimeter soft tissue nodule in the upper inner quadrant   of the right breast, possibly representing metastatic disease.  6. Wedge-shaped area of photopenia at the posterior aspect of L3-L4   without a CT correlate, possibly due to asymmetric degenerative changes.   Correlate clinically.    < end of copied text >

## 2022-12-23 NOTE — SWALLOW BEDSIDE ASSESSMENT ADULT - SLP GENERAL OBSERVATIONS
Pt was received sleeping in bed but woke easily. Translation line was used for Korean-English language services (#[id number]). Pt denied swallowing difficulty and stated that she has not been able to eat the solids on her trays d/t poor dentition, and then she showed me her teeth.

## 2022-12-23 NOTE — PROGRESS NOTE ADULT - SUBJECTIVE AND OBJECTIVE BOX
Ortho Note    Pt seen and examined on morning rounds. Pt comfortable. Endorsing R lumbar pain. Denies any hip pain.  Denies CP, SOB, N/V, numbness/tingling     Vital Signs Last 24 Hrs  T(C): 36.4 (23 Dec 2022 08:44), Max: 37 (23 Dec 2022 06:34)  T(F): 97.6 (23 Dec 2022 08:44), Max: 98.6 (23 Dec 2022 06:34)  HR: 49 (23 Dec 2022 08:44) (47 - 49)  BP: 143/72 (23 Dec 2022 08:44) (113/59 - 143/72)  BP(mean): --  RR: 16 (23 Dec 2022 08:44) (16 - 18)  SpO2: 99% (23 Dec 2022 08:44) (98% - 100%)    Parameters below as of 23 Dec 2022 08:44  Patient On (Oxygen Delivery Method): nasal cannula  O2 Flow (L/min): 3        Physical Exam:  Gen: Lying in bed, non-toxic appearing, NAD, resting comfortably  Resp: No increased WOB  RLE:  TTP over R lumbar spine  Skin intact, no effusion or erythema, normothermic  NT over R hip,  compartments soft  R Hip full pain free ROM   Motor: TA/EHL/GS/FHL intact  Sensory: DP/SP/Tib/Ellen/Saph SILT  +DP pulse, WWP  Negative straight leg raise                  LABS:                          9.0    7.96  )-----------( 300      ( 23 Dec 2022 05:30 )             29.9     12-23    136  |  93<L>  |  39<H>  ----------------------------<  108<H>  3.5   |  34<H>  |  1.41<H>    Ca    9.4      23 Dec 2022 05:30  Phos  4.6     12-23  Mg     2.3     12-23    TPro  7.9  /  Alb  3.1<L>  /  TBili  0.3  /  DBili  x   /  AST  27  /  ALT  31  /  AlkPhos  128<H>  12-22    LIVER FUNCTIONS - ( 22 Dec 2022 05:30 )  Alb: 3.1 g/dL / Pro: 7.9 g/dL / ALK PHOS: 128 U/L / ALT: 31 U/L / AST: 27 U/L / GGT: x           PT/INR - ( 23 Dec 2022 05:30 )   PT: 18.1 sec;   INR: 1.51          PTT - ( 23 Dec 2022 05:30 )  PTT:30.8 sec      82y Female w/ R low back pain.   - Rec MRI w/w.o contrast Lumbar spine and IR aspiration of retrovertebral cyst vs collection at L5  - IV steroids for radicular pain if cleared by primary team  -WBAT RLE  - Rec PT  -pain control  -ice/cold compress  - Remainder of care per primary team    Ortho Pager [phone]

## 2022-12-23 NOTE — PROGRESS NOTE ADULT - SUBJECTIVE AND OBJECTIVE BOX
BRITTANY DE LA CRUZ             [id number]    CC: Pain     HPI:  82F, Faroese speaking, spoke with  Lulu #[id number], w/ PMHx HTN, HLD, severe AS s/p TAVR (2019) w/ mod valve stenosis and valve thrombosis 2021 s/p AC (not seen on echo 2022), Ascending Thoracic Aneurysm 4cm in 1/2022, pAFib (on Amiodarone/Eliquis), COPD (on 2L home O2), Colon CA s/p resection in 2019 (previously in remission now with recurrence and mets to lung), moderate MARK (non-compliant with CPAP 2/2 claustrophobia), CKD3 (baseline Cr 1.1-1.2) and FEROZ presents for malaise, chills, and hip pain for 1 day. She states that earlier this week she saw her PCP was doing well, however yesterday had chills, rigors, malaise, and woke up with acute on chronic R hip pain. She states she has had hip pain in the past, never seen a doctor for it, and has just managed it with lidocaine patches. She states this is the worst it has been, it starts at her posterior hip and radiates down her R leg. She states it hurts with ambulation and describes it as a sharp shooting pain. Pt is tearful on exam, and concerned as she stats every time she comes here we find something new wrong with her. She denies chest pain, SOB, headaches, LOC, abdominal pain, nausea, vomiting, diarrhea. She does endorse some burning with urination.     Recent admissions 12/1-12/3 for CHF exacerbation under cardiology, had medications adjusted ands sent home. Other admission under medicine 9/21-9/29 sent in from [facility] for melena with colonoscopy c/f cancer with biopsy showing adenocarcinoma and PET scan with concerning MET disease to lung. Also admitted under cardiology service from 09/4-09/13 for chest pain and hedaches found to have e.coli bacteremia and gemella spp. w/ TTE/IRMA and gallium scan negative with source presumed to be infected tooth d/c'ed with IV Abx.    In the ED:   Vitals: T 100.1, 69. 190/72>157/73, 18 95% RA  Labs: Hgb 10.4, sCr 1.80, pBNP 1105. U/A:  small LE, WBC >10, +bacteria  CXR: L sided effusion/infiltrate  Interventions: tylenol 650, CTX, CTAP, CTL/CTT   (16 Dec 2022 02:50)    SUBJECTIVE: Patient pain is much improved since initiating Oxycodone 5mg q6h ATC. Patient stated that she was able to move a little more, but would still want to try to move more. Encouraged patient to as for PRNs to help dose find better.    ROS:  DYSPNEA (Catherine): 0  NAUS/VOM: N  SECRETIONS: N	  AGITATION: N  Pain (Y/N): Y      -Provocation/Palliation: Movement makes pain worse. Pain medication did help, patient does not remember name, but knows that Tylenol did nothing.  -Quality/Quantity: Aching throbbing pain in Right hip area as well as LUQ  -Radiating: Pain is right leg   -Severity: 10/10  -Timing/Frequency: constant but worse with movement.    -Impact on ADLs: Pain affects ability ot perform ADL'    OTHER REVIEW OF SYSTEMS: + weakness   UNABLE TO OBTAIN  due to: n/a     PEx:  T(C): 36.4 (12-23-22 @ 08:44), Max: 37 (12-23-22 @ 06:34)  HR: 49 (12-23-22 @ 08:44) (47 - 49)  BP: 143/72 (12-23-22 @ 08:44) (113/59 - 143/72)  RR: 16 (12-23-22 @ 08:44) (16 - 18)  SpO2: 99% (12-23-22 @ 08:44) (98% - 100%)  Wt(kg): 86.2kG    GENERAL:  [ x]Alert  [ x]Oriented x 4   [ ]Lethargic due to sedation  [ ]Cachexia [x ]Verbal  [ ]Non-Verbal  Behavioral:   [ ] Anxiety  [ ] Delirium [ ] Agitation [x ] Other - calm   HEENT:  [x ]Normal   [ ]Dry mouth   [ ]ET Tube  [ ]Oral lesions  PULMONARY:   [ x]Clear  [ ]Tachypnea  [ ]Audible excessive secretions   [ ]Rhonchi     [ ]Right [ ]Left [ ]Bilateral  [ ]Crackles        [ ]Right [ ]Left [ ]Bilateral  [ ]Wheezing     [ ]Right [ ]Left [ ]Bilateral  CARDIOVASCULAR:    [x ]Regular [ ]Irregular [ ]Tachy  [ ]Charlie [ ]Murmur [ ]Other  GASTROINTESTINAL:  [ x]Soft  [ ]Distended   [ ]+BS  [ ]Non tender [x ]Tender  [ ]PEG [ ]OGT/NGT  [] flexiseal with output  Last BM: 12/20/2022   GENITOURINARY:  [ ]Normal [ x] Incontinent   [ ]Oliguria/Anuria   [ ]Gonzalez  MUSCULOSKELETAL:   [ ]Normal   [ x]Weakness  [ ]Bed/Wheelchair bound [ ]Edema  NEUROLOGIC:   [x]No focal deficits  [ ] Cognitive impairment  [ ] Dysphagia [ ]Dysarthria [ ] Paresis [  ]Other   SKIN:   [x ]Normal   [ ]Pressure ulcer(s)  [ ]Rash       ALLERGIES: Digox (Rash; Urticaria; Hives)  Plavix (Other (Mod to Severe))      OPIATE NAÏVE (Y/N): Y    MEDICATIONS: REVIEWED  MEDICATIONS  (STANDING):  acetaminophen     Tablet .. 975 milliGRAM(s) Oral every 6 hours  aMIOdarone    Tablet 200 milliGRAM(s) Oral daily  apixaban 2.5 milliGRAM(s) Oral every 12 hours  atorvastatin 20 milliGRAM(s) Oral at bedtime  bisacodyl Suppository 10 milliGRAM(s) Rectal once  caspofungin IVPB      caspofungin IVPB 50 milliGRAM(s) IV Intermittent every 24 hours  cefTRIAXone   IVPB 2000 milliGRAM(s) IV Intermittent every 24 hours  folic acid 1 milliGRAM(s) Oral daily  gabapentin 300 milliGRAM(s) Oral at bedtime  influenza  Vaccine (HIGH DOSE) 0.7 milliLiter(s) IntraMuscular once  isosorbide   mononitrate ER Tablet (IMDUR) 30 milliGRAM(s) Oral every 24 hours  lidocaine   4% Patch 1 Patch Transdermal every 24 hours  metoprolol succinate ER 25 milliGRAM(s) Oral daily  montelukast 10 milliGRAM(s) Oral daily  multivitamin 1 Tablet(s) Oral daily  oxyCODONE    IR 5 milliGRAM(s) Oral every 6 hours  pantoprazole    Tablet 40 milliGRAM(s) Oral before breakfast  polyethylene glycol 3350 17 Gram(s) Oral daily  senna 2 Tablet(s) Oral at bedtime  tiotropium 2.5 MICROgram(s) Inhaler 2 Puff(s) Inhalation daily  torsemide 40 milliGRAM(s) Oral every 24 hours    MEDICATIONS  (PRN):  LORazepam     Tablet 0.5 milliGRAM(s) Oral daily PRN Anxiety  oxyCODONE    IR 5 milliGRAM(s) Oral every 4 hours PRN Moderate Pain (4 - 6)  simethicone 40 milliGRAM(s) Chew every 6 hours PRN Gas  trimethobenzamide Injectable 200 milliGRAM(s) IntraMuscular every 6 hours PRN Nausea and/or Vomiting    LABS: REVIEWED  CBC:                        9.0    7.96  )-----------( 300      ( 23 Dec 2022 05:30 )             29.9     CMP:    12-23    136  |  93<L>  |  39<H>  ----------------------------<  108<H>  3.5   |  34<H>  |  1.41<H>    Ca    9.4      23 Dec 2022 05:30  Phos  4.6     12-23  Mg     2.3     12-23    TPro  7.9  /  Alb  3.1<L>  /  TBili  0.3  /  DBili  x   /  AST  27  /  ALT  31  /  AlkPhos  128<H>  12-22      IMAGING: REVIEWED    ADVANCED DIRECTIVES:            FULL CODE            HCP    DECISION MAKER: Patient is able to make decisions for herself   LEGAL SURROGATE: Jaelyn Barrytyree [phone]    PSYCHOSOCIAL-SPIRITUAL ASSESSMENT:       Reviewed       Care plan unchanged    GOALS OF CARE DISCUSSION       Palliative care info/counseling provided	           Documentation of GOC: Full code   	      AGENCY CHOICE DISCUSSED:              [facility]         REFERRALS:	        Unit SW/Case Mgmt       PT/OT

## 2022-12-23 NOTE — PROGRESS NOTE ADULT - ATTENDING COMMENTS
#Dysphagia  Patient presenting with complaints of malaise, chills, hip pain x 1 day, found to be bacteremic, suspected to be oropharyngeal in etiology, currently undergoing cardiology evaluation to rule out infective endocarditis; concurrently reporting new dysphagia to both solids and liquids since admission. EGD (as noted below) with findings suggestive of candida esophagitis, otherwise no gross pathology. Suspect dysphagia related to candidiasis, no appreciable structural explanation for dysphagia.    -EGD (12/21): Scattered white plaques distributed through esophagus, appears to be consistent with candida esophagitis. Diffuse erythema of the mucosa was noted in the antrum. These findings are compatible with non-erosive gastritis. Normal duodenum.  -QTC prolonged 522 on admission, Caspofungin as per ID for tx of esophageal candidiasis    #Dilated colon   Noted on Abdominal Xray to have markedly dilated colon with CT A/P noting stool burden. No obstruction appreciated. Repeat abdominal XRAY (12/22) with dilated loops of transverse colon, measuring up to 10.6 cm (similar to radiograph dated 12/20/2022)  -abdominal Xray (12/23) with transverse colon diameter similar to previous exams, Cecum measuring approx 7 cm, f/u final read   -Appreciate ongoing surgery recs  -Aggressive Bowel regimen, increase Miralax from 17g daily to BID  -Continue to monitor stool counts   -Consider PRN suppository vs enema

## 2022-12-23 NOTE — PROGRESS NOTE ADULT - SUBJECTIVE AND OBJECTIVE BOX
INTERVAL HPI/OVERNIGHT EVENTS: BERYL. LBP.    CONSTITUTIONAL:  Negative fever or chills, feels well, good appetite  EYES:  Negative  blurry vision or double vision  CARDIOVASCULAR:  Negative for chest pain or palpitations  RESPIRATORY:  Negative for cough, wheezing, or SOB   GASTROINTESTINAL:  Negative for nausea, vomiting, diarrhea, constipation, or abdominal pain  GENITOURINARY:  Negative frequency, urgency or dysuria  NEUROLOGIC:  No headache, confusion, dizziness, lightheadedness      ANTIBIOTICS/RELEVANT:    MEDICATIONS  (STANDING):  acetaminophen     Tablet .. 975 milliGRAM(s) Oral every 6 hours  aMIOdarone    Tablet 200 milliGRAM(s) Oral daily  apixaban 2.5 milliGRAM(s) Oral every 12 hours  atorvastatin 20 milliGRAM(s) Oral at bedtime  caspofungin IVPB      caspofungin IVPB 50 milliGRAM(s) IV Intermittent every 24 hours  cefTRIAXone   IVPB 2000 milliGRAM(s) IV Intermittent every 24 hours  folic acid 1 milliGRAM(s) Oral daily  gabapentin 300 milliGRAM(s) Oral at bedtime  influenza  Vaccine (HIGH DOSE) 0.7 milliLiter(s) IntraMuscular once  isosorbide   mononitrate ER Tablet (IMDUR) 30 milliGRAM(s) Oral every 24 hours  lidocaine   4% Patch 1 Patch Transdermal every 24 hours  metoprolol succinate ER 25 milliGRAM(s) Oral daily  montelukast 10 milliGRAM(s) Oral daily  multivitamin 1 Tablet(s) Oral daily  oxyCODONE    IR 5 milliGRAM(s) Oral every 6 hours  pantoprazole    Tablet 40 milliGRAM(s) Oral before breakfast  polyethylene glycol 3350 17 Gram(s) Oral daily  senna 2 Tablet(s) Oral at bedtime  tiotropium 2.5 MICROgram(s) Inhaler 2 Puff(s) Inhalation daily  torsemide 40 milliGRAM(s) Oral every 24 hours    MEDICATIONS  (PRN):  guaiFENesin Oral Liquid (Sugar-Free) 100 milliGRAM(s) Oral every 6 hours PRN Cough  LORazepam     Tablet 0.5 milliGRAM(s) Oral daily PRN Anxiety  oxyCODONE    IR 5 milliGRAM(s) Oral every 4 hours PRN Moderate Pain (4 - 6)  simethicone 40 milliGRAM(s) Chew every 6 hours PRN Gas  trimethobenzamide Injectable 200 milliGRAM(s) IntraMuscular every 6 hours PRN Nausea and/or Vomiting        Vital Signs Last 24 Hrs  T(C): 36.4 (23 Dec 2022 08:44), Max: 37 (23 Dec 2022 06:34)  T(F): 97.6 (23 Dec 2022 08:44), Max: 98.6 (23 Dec 2022 06:34)  HR: 49 (23 Dec 2022 08:44) (47 - 49)  BP: 143/72 (23 Dec 2022 08:44) (113/59 - 143/72)  BP(mean): --  RR: 16 (23 Dec 2022 08:44) (16 - 18)  SpO2: 99% (23 Dec 2022 08:44) (98% - 100%)    Parameters below as of 23 Dec 2022 08:44  Patient On (Oxygen Delivery Method): nasal cannula  O2 Flow (L/min): 3      PHYSICAL EXAM:  Constitutional: NAD  Eyes: TIMMY, EOMI  Ear/Nose/Throat: no oral lesion, no sinus tenderness on percussion	  Neck: no JVD, no lymphadenopathy, supple  Respiratory: CTA hannah  Cardiovascular: S1S2 RRR, no murmurs  Gastrointestinal:soft, (+) BS, no HSM  Extremities:no e/e/c  Vascular: DP Pulse:	right normal; left normal      LABS:                        9.0    7.96  )-----------( 300      ( 23 Dec 2022 05:30 )             29.9     12-23    136  |  93<L>  |  39<H>  ----------------------------<  108<H>  3.5   |  34<H>  |  1.41<H>    Ca    9.4      23 Dec 2022 05:30  Phos  4.6     12-23  Mg     2.3     12-23    TPro  7.9  /  Alb  3.1<L>  /  TBili  0.3  /  DBili  x   /  AST  27  /  ALT  31  /  AlkPhos  128<H>  12-22    PT/INR - ( 23 Dec 2022 05:30 )   PT: 18.1 sec;   INR: 1.51          PTT - ( 23 Dec 2022 05:30 )  PTT:30.8 sec      MICROBIOLOGY: reviewed    RADIOLOGY & ADDITIONAL STUDIES: < from: NM Inflammatory Loc Wholebody Gallium, Single Day (12.22.22 @ 11:21) >  FINDINGS:  Persistent, diffuse uptake is noted in distended transverse colon. No   abnormal foci of uptake at the bilateral hips. Mild focal uptake at the   lower lumbar spine at approximately L5. Prominent, symmetric activity at   the oral cavity. Mild uptake in the bilateral kidneys persisting into the   48 hour scan. Physiologic uptake is noted in the bone marrow, liver,   lacrimal glands, and salivary glands.    IMPRESSION:  1. No abnormal foci of uptake at the bilateral hips.  2. Mild focal uptake at the lower lumbar spine at approximately L5. An   underlying infectious/inflammatory process cannot be excluded.  3. Prominent, symmetric activity at the oral cavity. Correlate for   pharyngitis, tonsillitis, or other diffuse infectious/inflammatory   process.  4. Persistent, diffuse uptake in distended transverse colon. Correlate   for colitis.  5. Mild uptake in the bilateral kidneys, most likely due to renal   failure. An infectious process is considered less likely.    < end of copied text >

## 2022-12-23 NOTE — PROGRESS NOTE ADULT - PROBLEM SELECTOR PLAN 6
Patient remains full code. Will continue to help with symptom management in the setting of worsening disease. Will continue GOC once oncology plan is established.   Patient currently getting IV antibiotics which is not in line with hospice plan of care, will continue to follow for symptom management   [employer] interpre[employer] used to communicate with patient. ID#: [id number]  - Yazidi/Spiritual practice:   - Coping: [ ] well [ ] with difficulty [ ] poor coping   - Support system: [ ] strong [ ] adequate [ ] inadequate  - All questions answered, emotional support provided  -  primary team   - Please contact Palliative Medicine 24/7 at [phone] for any acute symptoms or further questions  - Will continue to follow with you

## 2022-12-23 NOTE — PROGRESS NOTE ADULT - TIME BILLING
Management of bacteremia
Management of bacteremia and osteomyelitis
Education and Monitoring of Opiates including titration and management of high risk medications  Discharge Facilitation with ongoing coordination

## 2022-12-23 NOTE — PROGRESS NOTE ADULT - SUBJECTIVE AND OBJECTIVE BOX
GASTROENTEROLOGY PROGRESS NOTE  Patient seen and examined at bedside.    PERTINENT REVIEW OF SYSTEMS:  CONSTITUTIONAL: No weakness, fevers or chills  HEENT: No visual changes; No vertigo or throat pain   GASTROINTESTINAL: As above.  NEUROLOGICAL: No numbness or weakness  SKIN: No itching, burning, rashes, or lesions     Allergies    Digox (Rash; Urticaria; Hives)  Plavix (Other (Mod to Severe))    Intolerances      MEDICATIONS:  MEDICATIONS  (STANDING):  acetaminophen     Tablet .. 975 milliGRAM(s) Oral every 6 hours  aMIOdarone    Tablet 200 milliGRAM(s) Oral daily  apixaban 2.5 milliGRAM(s) Oral every 12 hours  atorvastatin 20 milliGRAM(s) Oral at bedtime  caspofungin IVPB      caspofungin IVPB 50 milliGRAM(s) IV Intermittent every 24 hours  cefTRIAXone   IVPB 2000 milliGRAM(s) IV Intermittent every 24 hours  folic acid 1 milliGRAM(s) Oral daily  gabapentin 300 milliGRAM(s) Oral at bedtime  influenza  Vaccine (HIGH DOSE) 0.7 milliLiter(s) IntraMuscular once  isosorbide   mononitrate ER Tablet (IMDUR) 30 milliGRAM(s) Oral every 24 hours  lidocaine   4% Patch 1 Patch Transdermal every 24 hours  metoprolol succinate ER 25 milliGRAM(s) Oral daily  montelukast 10 milliGRAM(s) Oral daily  multivitamin 1 Tablet(s) Oral daily  oxyCODONE    IR 5 milliGRAM(s) Oral every 6 hours  pantoprazole    Tablet 40 milliGRAM(s) Oral before breakfast  polyethylene glycol 3350 17 Gram(s) Oral daily  senna 2 Tablet(s) Oral at bedtime  tiotropium 2.5 MICROgram(s) Inhaler 2 Puff(s) Inhalation daily  torsemide 40 milliGRAM(s) Oral every 24 hours    MEDICATIONS  (PRN):  guaiFENesin Oral Liquid (Sugar-Free) 100 milliGRAM(s) Oral every 6 hours PRN Cough  LORazepam     Tablet 0.5 milliGRAM(s) Oral daily PRN Anxiety  oxyCODONE    IR 5 milliGRAM(s) Oral every 4 hours PRN Moderate Pain (4 - 6)  simethicone 40 milliGRAM(s) Chew every 6 hours PRN Gas  trimethobenzamide Injectable 200 milliGRAM(s) IntraMuscular every 6 hours PRN Nausea and/or Vomiting    Vital Signs Last 24 Hrs  T(C): 36.4 (23 Dec 2022 08:44), Max: 37 (23 Dec 2022 06:34)  T(F): 97.6 (23 Dec 2022 08:44), Max: 98.6 (23 Dec 2022 06:34)  HR: 48 (23 Dec 2022 13:43) (47 - 49)  BP: 125/65 (23 Dec 2022 13:43) (113/59 - 143/72)  BP(mean): --  RR: 16 (23 Dec 2022 08:44) (16 - 18)  SpO2: 99% (23 Dec 2022 08:44) (98% - 100%)    Parameters below as of 23 Dec 2022 08:44  Patient On (Oxygen Delivery Method): nasal cannula  O2 Flow (L/min): 3      PHYSICAL EXAM:    General: obese  female, tearful on exam  HEENT: MMM, conjunctiva and sclera clear  Gastrointestinal: obese abdomen; soft; non-distended but tender to palpation on left side; No rebound or guarding  Skin: Warm and dry. No obvious rash    LABS:                        9.0    7.96  )-----------( 300      ( 23 Dec 2022 05:30 )             29.9     12-23    136  |  93<L>  |  39<H>  ----------------------------<  108<H>  3.5   |  34<H>  |  1.41<H>    Ca    9.4      23 Dec 2022 05:30  Phos  4.6     12-23  Mg     2.3     12-23    TPro  7.9  /  Alb  3.1<L>  /  TBili  0.3  /  DBili  x   /  AST  27  /  ALT  31  /  AlkPhos  128<H>  12-22    PT/INR - ( 23 Dec 2022 05:30 )   PT: 18.1 sec;   INR: 1.51          PTT - ( 23 Dec 2022 05:30 )  PTT:30.8 sec                  RADIOLOGY & ADDITIONAL STUDIES:  Reviewed GASTROENTEROLOGY PROGRESS NOTE  Patient seen and examined at bedside.  -Repeat abdominal Xray 12/22 still noting dilated transverse colon, approx 10 cm  -Per nursing/team, continues to have BMs however patient states she isn't   -IRMA negative for vegetation  -Gallium scan with uptake in oropharynx, transverse colon and lower lumbar spine at approximately L5    ROS: Patient tearful at time of evaluation. Notes still having abdominal discomfort, mainly localized to left side. Doesn't believe she's had a BM. Denies any fevers, chills, NS.     PERTINENT REVIEW OF SYSTEMS:  CONSTITUTIONAL: No weakness, fevers or chills  HEENT: No visual changes; No vertigo or throat pain   GASTROINTESTINAL: As above.  NEUROLOGICAL: No numbness or weakness  SKIN: No itching, burning, rashes, or lesions     Allergies    Digox (Rash; Urticaria; Hives)  Plavix (Other (Mod to Severe))    Intolerances      MEDICATIONS:  MEDICATIONS  (STANDING):  acetaminophen     Tablet .. 975 milliGRAM(s) Oral every 6 hours  aMIOdarone    Tablet 200 milliGRAM(s) Oral daily  apixaban 2.5 milliGRAM(s) Oral every 12 hours  atorvastatin 20 milliGRAM(s) Oral at bedtime  caspofungin IVPB      caspofungin IVPB 50 milliGRAM(s) IV Intermittent every 24 hours  cefTRIAXone   IVPB 2000 milliGRAM(s) IV Intermittent every 24 hours  folic acid 1 milliGRAM(s) Oral daily  gabapentin 300 milliGRAM(s) Oral at bedtime  influenza  Vaccine (HIGH DOSE) 0.7 milliLiter(s) IntraMuscular once  isosorbide   mononitrate ER Tablet (IMDUR) 30 milliGRAM(s) Oral every 24 hours  lidocaine   4% Patch 1 Patch Transdermal every 24 hours  metoprolol succinate ER 25 milliGRAM(s) Oral daily  montelukast 10 milliGRAM(s) Oral daily  multivitamin 1 Tablet(s) Oral daily  oxyCODONE    IR 5 milliGRAM(s) Oral every 6 hours  pantoprazole    Tablet 40 milliGRAM(s) Oral before breakfast  polyethylene glycol 3350 17 Gram(s) Oral daily  senna 2 Tablet(s) Oral at bedtime  tiotropium 2.5 MICROgram(s) Inhaler 2 Puff(s) Inhalation daily  torsemide 40 milliGRAM(s) Oral every 24 hours    MEDICATIONS  (PRN):  guaiFENesin Oral Liquid (Sugar-Free) 100 milliGRAM(s) Oral every 6 hours PRN Cough  LORazepam     Tablet 0.5 milliGRAM(s) Oral daily PRN Anxiety  oxyCODONE    IR 5 milliGRAM(s) Oral every 4 hours PRN Moderate Pain (4 - 6)  simethicone 40 milliGRAM(s) Chew every 6 hours PRN Gas  trimethobenzamide Injectable 200 milliGRAM(s) IntraMuscular every 6 hours PRN Nausea and/or Vomiting    Vital Signs Last 24 Hrs  T(C): 36.4 (23 Dec 2022 08:44), Max: 37 (23 Dec 2022 06:34)  T(F): 97.6 (23 Dec 2022 08:44), Max: 98.6 (23 Dec 2022 06:34)  HR: 48 (23 Dec 2022 13:43) (47 - 49)  BP: 125/65 (23 Dec 2022 13:43) (113/59 - 143/72)  BP(mean): --  RR: 16 (23 Dec 2022 08:44) (16 - 18)  SpO2: 99% (23 Dec 2022 08:44) (98% - 100%)    Parameters below as of 23 Dec 2022 08:44  Patient On (Oxygen Delivery Method): nasal cannula  O2 Flow (L/min): 3      PHYSICAL EXAM:    General: obese  female, tearful on exam  HEENT: MMM, conjunctiva and sclera clear  Gastrointestinal: obese abdomen; soft; non-distended but tender to palpation on left side; No rebound or guarding  Skin: Warm and dry. No obvious rash    LABS:                        9.0    7.96  )-----------( 300      ( 23 Dec 2022 05:30 )             29.9     12-23    136  |  93<L>  |  39<H>  ----------------------------<  108<H>  3.5   |  34<H>  |  1.41<H>    Ca    9.4      23 Dec 2022 05:30  Phos  4.6     12-23  Mg     2.3     12-23    TPro  7.9  /  Alb  3.1<L>  /  TBili  0.3  /  DBili  x   /  AST  27  /  ALT  31  /  AlkPhos  128<H>  12-22    PT/INR - ( 23 Dec 2022 05:30 )   PT: 18.1 sec;   INR: 1.51          PTT - ( 23 Dec 2022 05:30 )  PTT:30.8 sec                  RADIOLOGY & ADDITIONAL STUDIES:  Reviewed

## 2022-12-23 NOTE — PROGRESS NOTE ADULT - PROBLEM SELECTOR PLAN 1
2/2 known lesion in acetabulum exacerbated by progressive degenerative changes to lumbar, sacral spine.  CT Pelvis Bony Only No Cont (12.16.22 Stable sclerotic lesion within the right posterior acetabulum,  7 mm.  Mild bilateral hip arthrosis. Degenerative changes within the lower lumbar spine, sacroiliac joints, pubic symphysis. Exacerbated iso infection/ bacteremia ortho, no intervention.  Liberalize PRNs currently ordered.   Patient stated Tylenol is failing to help pain, but oxycodone 5 mg gave relief.   - Oxy IR 5mg q6h ATC -  hold for somnolence or RR depression  - oxy IR 5 mg PO q4 PRN moderate pain  -- hold for somnolence or RR depression, cw BR  - cw ana 300 mg po qHS -- renally dose   - lidocaine patch qD  - pending resolution of infection, consider trial of dex 5 mg PO qAM for 5 days - monitor plt, wbc, sugars  - would benefit from corticosteroid injection as outpatient, referral to Dr. Benjamín Swann   - if within GOC, consider palliative XRT giselle   Spoke to patient about the importance of using PRNs to help her with the pain and allow us to get her the best medication to help her based on her needs and she understood.

## 2022-12-24 LAB
ALBUMIN SERPL ELPH-MCNC: 2.7 G/DL — LOW (ref 3.3–5)
ALP SERPL-CCNC: 81 U/L — SIGNIFICANT CHANGE UP (ref 40–120)
ALT FLD-CCNC: 20 U/L — SIGNIFICANT CHANGE UP (ref 10–45)
ANION GAP SERPL CALC-SCNC: 9 MMOL/L — SIGNIFICANT CHANGE UP (ref 5–17)
AST SERPL-CCNC: 21 U/L — SIGNIFICANT CHANGE UP (ref 10–40)
BASOPHILS # BLD AUTO: 0.04 K/UL — SIGNIFICANT CHANGE UP (ref 0–0.2)
BASOPHILS NFR BLD AUTO: 0.5 % — SIGNIFICANT CHANGE UP (ref 0–2)
BILIRUB SERPL-MCNC: 0.3 MG/DL — SIGNIFICANT CHANGE UP (ref 0.2–1.2)
BUN SERPL-MCNC: 23 MG/DL — SIGNIFICANT CHANGE UP (ref 7–23)
CALCIUM SERPL-MCNC: 9.3 MG/DL — SIGNIFICANT CHANGE UP (ref 8.4–10.5)
CHLORIDE SERPL-SCNC: 94 MMOL/L — LOW (ref 96–108)
CO2 SERPL-SCNC: 33 MMOL/L — HIGH (ref 22–31)
CREAT SERPL-MCNC: 1.04 MG/DL — SIGNIFICANT CHANGE UP (ref 0.5–1.3)
CULTURE RESULTS: SIGNIFICANT CHANGE UP
CULTURE RESULTS: SIGNIFICANT CHANGE UP
EGFR: 53 ML/MIN/1.73M2 — LOW
EOSINOPHIL # BLD AUTO: 0.06 K/UL — SIGNIFICANT CHANGE UP (ref 0–0.5)
EOSINOPHIL NFR BLD AUTO: 0.8 % — SIGNIFICANT CHANGE UP (ref 0–6)
GLUCOSE SERPL-MCNC: 112 MG/DL — HIGH (ref 70–99)
HCT VFR BLD CALC: 30.6 % — LOW (ref 34.5–45)
HGB BLD-MCNC: 9.1 G/DL — LOW (ref 11.5–15.5)
IMM GRANULOCYTES NFR BLD AUTO: 0.4 % — SIGNIFICANT CHANGE UP (ref 0–0.9)
LYMPHOCYTES # BLD AUTO: 0.65 K/UL — LOW (ref 1–3.3)
LYMPHOCYTES # BLD AUTO: 8.9 % — LOW (ref 13–44)
MAGNESIUM SERPL-MCNC: 2 MG/DL — SIGNIFICANT CHANGE UP (ref 1.6–2.6)
MCHC RBC-ENTMCNC: 26.4 PG — LOW (ref 27–34)
MCHC RBC-ENTMCNC: 29.7 GM/DL — LOW (ref 32–36)
MCV RBC AUTO: 88.7 FL — SIGNIFICANT CHANGE UP (ref 80–100)
MONOCYTES # BLD AUTO: 0.55 K/UL — SIGNIFICANT CHANGE UP (ref 0–0.9)
MONOCYTES NFR BLD AUTO: 7.5 % — SIGNIFICANT CHANGE UP (ref 2–14)
NEUTROPHILS # BLD AUTO: 5.99 K/UL — SIGNIFICANT CHANGE UP (ref 1.8–7.4)
NEUTROPHILS NFR BLD AUTO: 81.9 % — HIGH (ref 43–77)
NRBC # BLD: 0 /100 WBCS — SIGNIFICANT CHANGE UP (ref 0–0)
PHOSPHATE SERPL-MCNC: 3.5 MG/DL — SIGNIFICANT CHANGE UP (ref 2.5–4.5)
PLATELET # BLD AUTO: 307 K/UL — SIGNIFICANT CHANGE UP (ref 150–400)
POTASSIUM SERPL-MCNC: 3.8 MMOL/L — SIGNIFICANT CHANGE UP (ref 3.5–5.3)
POTASSIUM SERPL-SCNC: 3.8 MMOL/L — SIGNIFICANT CHANGE UP (ref 3.5–5.3)
PROT SERPL-MCNC: 6.8 G/DL — SIGNIFICANT CHANGE UP (ref 6–8.3)
RBC # BLD: 3.45 M/UL — LOW (ref 3.8–5.2)
RBC # FLD: 19.1 % — HIGH (ref 10.3–14.5)
SODIUM SERPL-SCNC: 136 MMOL/L — SIGNIFICANT CHANGE UP (ref 135–145)
SPECIMEN SOURCE: SIGNIFICANT CHANGE UP
SPECIMEN SOURCE: SIGNIFICANT CHANGE UP
WBC # BLD: 7.32 K/UL — SIGNIFICANT CHANGE UP (ref 3.8–10.5)
WBC # FLD AUTO: 7.32 K/UL — SIGNIFICANT CHANGE UP (ref 3.8–10.5)

## 2022-12-24 PROCEDURE — 99232 SBSQ HOSP IP/OBS MODERATE 35: CPT | Mod: GC

## 2022-12-24 PROCEDURE — 99232 SBSQ HOSP IP/OBS MODERATE 35: CPT

## 2022-12-24 PROCEDURE — 74018 RADEX ABDOMEN 1 VIEW: CPT | Mod: 26

## 2022-12-24 RX ORDER — LIDOCAINE 4 G/100G
1 CREAM TOPICAL DAILY
Refills: 0 | Status: DISCONTINUED | OUTPATIENT
Start: 2022-12-24 | End: 2022-12-27

## 2022-12-24 RX ADMIN — LIDOCAINE 1 PATCH: 4 CREAM TOPICAL at 08:09

## 2022-12-24 RX ADMIN — LIDOCAINE 1 PATCH: 4 CREAM TOPICAL at 19:03

## 2022-12-24 RX ADMIN — OXYCODONE HYDROCHLORIDE 5 MILLIGRAM(S): 5 TABLET ORAL at 06:57

## 2022-12-24 RX ADMIN — PANTOPRAZOLE SODIUM 40 MILLIGRAM(S): 20 TABLET, DELAYED RELEASE ORAL at 06:57

## 2022-12-24 RX ADMIN — OXYCODONE HYDROCHLORIDE 5 MILLIGRAM(S): 5 TABLET ORAL at 13:21

## 2022-12-24 RX ADMIN — APIXABAN 2.5 MILLIGRAM(S): 2.5 TABLET, FILM COATED ORAL at 06:57

## 2022-12-24 RX ADMIN — CEFTRIAXONE 100 MILLIGRAM(S): 500 INJECTION, POWDER, FOR SOLUTION INTRAMUSCULAR; INTRAVENOUS at 22:17

## 2022-12-24 RX ADMIN — OXYCODONE HYDROCHLORIDE 5 MILLIGRAM(S): 5 TABLET ORAL at 14:05

## 2022-12-24 RX ADMIN — ATORVASTATIN CALCIUM 20 MILLIGRAM(S): 80 TABLET, FILM COATED ORAL at 22:17

## 2022-12-24 RX ADMIN — OXYCODONE HYDROCHLORIDE 5 MILLIGRAM(S): 5 TABLET ORAL at 19:03

## 2022-12-24 RX ADMIN — OXYCODONE HYDROCHLORIDE 5 MILLIGRAM(S): 5 TABLET ORAL at 19:55

## 2022-12-24 RX ADMIN — CASPOFUNGIN ACETATE 260 MILLIGRAM(S): 7 INJECTION, POWDER, LYOPHILIZED, FOR SOLUTION INTRAVENOUS at 15:46

## 2022-12-24 RX ADMIN — Medication 1 TABLET(S): at 13:21

## 2022-12-24 RX ADMIN — LIDOCAINE 1 PATCH: 4 CREAM TOPICAL at 06:57

## 2022-12-24 RX ADMIN — TIOTROPIUM BROMIDE 2 PUFF(S): 18 CAPSULE ORAL; RESPIRATORY (INHALATION) at 12:03

## 2022-12-24 RX ADMIN — OXYCODONE HYDROCHLORIDE 5 MILLIGRAM(S): 5 TABLET ORAL at 00:30

## 2022-12-24 RX ADMIN — Medication 975 MILLIGRAM(S): at 00:30

## 2022-12-24 RX ADMIN — SENNA PLUS 2 TABLET(S): 8.6 TABLET ORAL at 22:17

## 2022-12-24 RX ADMIN — Medication 975 MILLIGRAM(S): at 18:10

## 2022-12-24 RX ADMIN — OXYCODONE HYDROCHLORIDE 5 MILLIGRAM(S): 5 TABLET ORAL at 08:00

## 2022-12-24 RX ADMIN — MONTELUKAST 10 MILLIGRAM(S): 4 TABLET, CHEWABLE ORAL at 13:21

## 2022-12-24 RX ADMIN — Medication 975 MILLIGRAM(S): at 00:13

## 2022-12-24 RX ADMIN — Medication 975 MILLIGRAM(S): at 08:00

## 2022-12-24 RX ADMIN — Medication 975 MILLIGRAM(S): at 19:00

## 2022-12-24 RX ADMIN — Medication 975 MILLIGRAM(S): at 06:57

## 2022-12-24 RX ADMIN — APIXABAN 2.5 MILLIGRAM(S): 2.5 TABLET, FILM COATED ORAL at 18:10

## 2022-12-24 RX ADMIN — AMIODARONE HYDROCHLORIDE 200 MILLIGRAM(S): 400 TABLET ORAL at 06:57

## 2022-12-24 RX ADMIN — OXYCODONE HYDROCHLORIDE 5 MILLIGRAM(S): 5 TABLET ORAL at 00:13

## 2022-12-24 RX ADMIN — GABAPENTIN 300 MILLIGRAM(S): 400 CAPSULE ORAL at 22:18

## 2022-12-24 RX ADMIN — Medication 1 MILLIGRAM(S): at 13:21

## 2022-12-24 NOTE — PROGRESS NOTE ADULT - PROBLEM SELECTOR PLAN 3
Pt presenting with malaise, chills and acute on chronic R hip pain. Pt states she has had chronic R hip pain that shoots down her leg, but has not seen providers for it in the past. Day prior to admission she said the pain was severe and had pain with ambulation. Pain starts at posterior hip and radiates down R leg. CT scans and xrays without findings. MRI lumbar spine (12/20) with no acute neurocompressive lesions, synovial cyst vs epidural fluid collection at L5, Modic type 1 changes vs discitis OM. CT Scan Neg     - Pain control with lidocaine patch, tylenol, oxycodone 5mg q6 hours prn, gabapentin 600 mg PO daily.   - Ortho consulted, low concern for septic joint w supportive management focused of PT and pain control  - f/u PT recs

## 2022-12-24 NOTE — PROGRESS NOTE ADULT - ATTENDING COMMENTS
Patient seen and examined at bedside on 12/24/2022 at 11 am. Pt feels well, had a large BM moments before our interview. Denies rash, SOB, CP, abdominal pain. ROS is otherwise negative. Vitals, labwork and pertinent imaging reviewed. Exam - NAD, AAO x 4, PERRLA, EOMI, MMM, supple neck, chest - CTA b/l, CV - rrr, s1s2, no m/r/g, abd - soft, NTND, + BS, ext - wwp, psych - normal affect    Plan:  -C/w Caspofungin (2 weeks) and CTX (6 weeks)  -BCX - NGTD  -PICC line placement needed - pt will likely require d/c notice

## 2022-12-24 NOTE — PROGRESS NOTE ADULT - ASSESSMENT
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

## 2022-12-24 NOTE — PROGRESS NOTE ADULT - SUBJECTIVE AND OBJECTIVE BOX
GASTROENTEROLOGY PROGRESS NOTE  Patient seen and examined at bedside.  Having bowel movements with enemas  Abd XR with mild improvement in colonic distention    PERTINENT REVIEW OF SYSTEMS:  CONSTITUTIONAL: No weakness, fevers or chills  HEENT: No visual changes; No vertigo or throat pain   GASTROINTESTINAL: As above.  NEUROLOGICAL: No numbness or weakness  SKIN: No itching, burning, rashes, or lesions     Allergies    Digox (Rash; Urticaria; Hives)  Plavix (Other (Mod to Severe))    Intolerances      MEDICATIONS:  MEDICATIONS  (STANDING):  acetaminophen     Tablet .. 975 milliGRAM(s) Oral every 6 hours  aMIOdarone    Tablet 200 milliGRAM(s) Oral daily  apixaban 2.5 milliGRAM(s) Oral every 12 hours  atorvastatin 20 milliGRAM(s) Oral at bedtime  caspofungin IVPB      caspofungin IVPB 50 milliGRAM(s) IV Intermittent every 24 hours  cefTRIAXone   IVPB 2000 milliGRAM(s) IV Intermittent every 24 hours  folic acid 1 milliGRAM(s) Oral daily  gabapentin 300 milliGRAM(s) Oral at bedtime  influenza  Vaccine (HIGH DOSE) 0.7 milliLiter(s) IntraMuscular once  isosorbide   mononitrate ER Tablet (IMDUR) 30 milliGRAM(s) Oral every 24 hours  lidocaine   4% Patch 1 Patch Transdermal every 24 hours  metoprolol succinate ER 25 milliGRAM(s) Oral daily  montelukast 10 milliGRAM(s) Oral daily  multivitamin 1 Tablet(s) Oral daily  oxyCODONE    IR 5 milliGRAM(s) Oral every 6 hours  pantoprazole    Tablet 40 milliGRAM(s) Oral before breakfast  polyethylene glycol 3350 17 Gram(s) Oral daily  senna 2 Tablet(s) Oral at bedtime  tiotropium 2.5 MICROgram(s) Inhaler 2 Puff(s) Inhalation daily  torsemide 40 milliGRAM(s) Oral every 24 hours    MEDICATIONS  (PRN):  guaiFENesin Oral Liquid (Sugar-Free) 100 milliGRAM(s) Oral every 6 hours PRN Cough  LORazepam     Tablet 0.5 milliGRAM(s) Oral daily PRN Anxiety  oxyCODONE    IR 5 milliGRAM(s) Oral every 4 hours PRN Moderate Pain (4 - 6)  simethicone 40 milliGRAM(s) Chew every 6 hours PRN Gas  trimethobenzamide Injectable 200 milliGRAM(s) IntraMuscular every 6 hours PRN Nausea and/or Vomiting    Vital Signs Last 24 Hrs  T(C): 37 (24 Dec 2022 11:52), Max: 37 (24 Dec 2022 11:52)  T(F): 98.6 (24 Dec 2022 11:52), Max: 98.6 (24 Dec 2022 11:52)  HR: 51 (24 Dec 2022 11:52) (48 - 51)  BP: 147/64 (24 Dec 2022 11:52) (125/65 - 147/73)  BP(mean): --  RR: 17 (24 Dec 2022 11:52) (17 - 18)  SpO2: 97% (24 Dec 2022 11:52) (97% - 100%)    Parameters below as of 24 Dec 2022 11:52  Patient On (Oxygen Delivery Method): nasal cannula  O2 Flow (L/min): 3      12-23 @ 07:01  -  12-24 @ 07:00  --------------------------------------------------------  IN: 0 mL / OUT: 800 mL / NET: -800 mL      PHYSICAL EXAM:    General: in no acute distress  HEENT: MMM, conjunctiva and sclera clear  Gastrointestinal: Soft mild tenderness but non-distended non tympanic; No rebound or guarding  Skin: Warm and dry. No obvious rash    LABS:                        9.0    7.96  )-----------( 300      ( 23 Dec 2022 05:30 )             29.9     12-23    136  |  93<L>  |  39<H>  ----------------------------<  108<H>  3.5   |  34<H>  |  1.41<H>    Ca    9.4      23 Dec 2022 05:30  Phos  4.6     12-23  Mg     2.3     12-23      PT/INR - ( 23 Dec 2022 05:30 )   PT: 18.1 sec;   INR: 1.51          PTT - ( 23 Dec 2022 05:30 )  PTT:30.8 sec                  RADIOLOGY & ADDITIONAL STUDIES:  Reviewed

## 2022-12-24 NOTE — PROGRESS NOTE ADULT - SUBJECTIVE AND OBJECTIVE BOX
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 She states that earlier this week she saw her PCP was doing well, however yesterday had chills, rigors, malaise, and woke up with acute on chronic R hip pain. She states she has had hip pain in the past, never seen a doctor for it, and has just managed it with lidocaine patches. She states this is the worst it has been, it starts at her posterior hip and radiates down her R leg. She states it hurts with ambulation and describes it as a sharp shooting pain. Pt is tearful on exam, and concerned as she stats every time she comes here we find something new wrong with her. She denies chest pain, SOB, headaches, LOC, abdominal pain, nausea, vomiting, diarrhea. She does endorse some burning with urination.     Recent admissions 12/1-12/3 for CHF exacerbation under cardiology, had medications adjusted ands sent home. Other admission under medicine 9/21-9/29 sent in from [facility] for melena with colonoscopy c/f cancer with biopsy showing adenocarcinoma and PET scan with concerning MET disease to lung. Also admitted under cardiology service from 09/4-09/13 for chest pain and hedaches found to have e.coli bacteremia and gemella spp. w/ TTE/IRMA and gallium scan negative with source presumed to be infected tooth d/c'ed with IV Abx.    In the ED:   Vitals: T 100.1, 69. 190/72>157/73, 18 95% RA  Labs: Hgb 10.4, sCr 1.80, pBNP 1105. U/A:  small LE, WBC >10, +bacteria  CXR: L sided effusion/infiltrate  Interventions: tylenol 650, CTX, CTAP, CTL/CTT   (16 Dec 2022 02:50)                            9.0    7.96  )-----------( 300      ( 23 Dec 2022 05:30 )             29.9       12-23    136  |  93<L>  |  39<H>  ----------------------------<  108<H>  3.5   |  34<H>  |  1.41<H>    Ca    9.4      23 Dec 2022 05:30  Phos  4.6     12-23  Mg     2.3     12-23      Vital Signs Last 24 Hrs  T(C): 37 (24 Dec 2022 11:52), Max: 37 (24 Dec 2022 11:52)  T(F): 98.6 (24 Dec 2022 11:52), Max: 98.6 (24 Dec 2022 11:52)  HR: 51 (24 Dec 2022 11:52) (48 - 51)  BP: 147/64 (24 Dec 2022 11:52) (125/65 - 147/73)  BP(mean): --  RR: 17 (24 Dec 2022 11:52) (17 - 18)  SpO2: 97% (24 Dec 2022 11:52) (97% - 100%)    Parameters below as of 24 Dec 2022 11:52  Patient On (Oxygen Delivery Method): nasal cannula  O2 Flow (L/min): 3      MEDICATIONS  (STANDING):  acetaminophen     Tablet .. 975 milliGRAM(s) Oral every 6 hours  aMIOdarone    Tablet 200 milliGRAM(s) Oral daily  apixaban 2.5 milliGRAM(s) Oral every 12 hours  atorvastatin 20 milliGRAM(s) Oral at bedtime  caspofungin IVPB      caspofungin IVPB 50 milliGRAM(s) IV Intermittent every 24 hours  cefTRIAXone   IVPB 2000 milliGRAM(s) IV Intermittent every 24 hours  folic acid 1 milliGRAM(s) Oral daily  gabapentin 300 milliGRAM(s) Oral at bedtime  influenza  Vaccine (HIGH DOSE) 0.7 milliLiter(s) IntraMuscular once  isosorbide   mononitrate ER Tablet (IMDUR) 30 milliGRAM(s) Oral every 24 hours  lidocaine   4% Patch 1 Patch Transdermal every 24 hours  metoprolol succinate ER 25 milliGRAM(s) Oral daily  montelukast 10 milliGRAM(s) Oral daily  multivitamin 1 Tablet(s) Oral daily  oxyCODONE    IR 5 milliGRAM(s) Oral every 6 hours  pantoprazole    Tablet 40 milliGRAM(s) Oral before breakfast  polyethylene glycol 3350 17 Gram(s) Oral daily  senna 2 Tablet(s) Oral at bedtime  tiotropium 2.5 MICROgram(s) Inhaler 2 Puff(s) Inhalation daily  torsemide 40 milliGRAM(s) Oral every 24 hours    MEDICATIONS  (PRN):  guaiFENesin Oral Liquid (Sugar-Free) 100 milliGRAM(s) Oral every 6 hours PRN Cough  LORazepam     Tablet 0.5 milliGRAM(s) Oral daily PRN Anxiety  oxyCODONE    IR 5 milliGRAM(s) Oral every 4 hours PRN Moderate Pain (4 - 6)  simethicone 40 milliGRAM(s) Chew every 6 hours PRN Gas  trimethobenzamide Injectable 200 milliGRAM(s) IntraMuscular every 6 hours PRN Nausea and/or Vomiting         Physical / Occupational Therapy Functional Status Assessment :   12/23/2022     Pain Assessment/Number Scale (0-10) Adult  Presence of Pain: complains of pain/discomfort  Body Location: Right:   hip  Pain Rating (0-10): Rest: 3   Pain Rating (0-10): Activity: 3     Cognitive/Neuro      Cognitive/Neuro/Behavioral  Cognitive/Neuro/Behavioral [WDL Definition: Alert; opens eyes spontaneously; arouses to voice or touch; oriented x 4; follows commands; speech spontaneous, logical; purposeful motor response; behavior appropriate to situation]: WDL    Language Assistance  Preferred Language to Address Healthcare Preferred Language to Address Healthcare: English    Therapeutic Interventions      Bed Mobility  Bed Mobility Training Rolling/Turning: minimum assist (75% patient effort);  nonverbal cues (demo/gestures);  verbal cues;  2 person assist;  bed rails  Bed Mobility Training Sit-to-Supine: minimum assist (75% patient effort);  2 person assist;  verbal cues;  nonverbal cues (demo/gestures);  bed rails  Bed Mobility Training Supine-to-Sit: minimum assist (75% patient effort);  2 person assist;  verbal cues;  nonverbal cues (demo/gestures);  bed rails  Bed Mobility Training Limitations: decreased ability to use arms for pushing/pulling;  decreased ability to use legs for bridging/pushing;  impaired ability to control trunk for mobility;  impaired balance;  decreased strength;  impaired postural control;  pain    Sit-Stand Transfer Training  Transfer Training Sit-to-Stand Transfer: contact guard;  minimum assist (75% patient effort);  2 person assist;  nonverbal cues (demo/gestures);  verbal cues;  rolling walker  Transfer Training Stand-to-Sit Transfer: contact guard;  2 person assist;  nonverbal cues (demo/gestures);  verbal cues;  rolling walker  Sit-to-Stand Transfer Training Transfer Safety Analysis: decreased balance;  decreased sequencing ability;  decreased step length;  decreased weight-shifting ability;  decreased strength;  impaired balance;  impaired postural control;  pain;  rolling walker    Gait Training  Gait Training: contact guard;  minimum assist (75% patient effort);  1 person assist;  verbal cues;  nonverbal cues (demo/gestures);  rolling walker;  10 feet  Gait Analysis: 3-point gait   decreased bhavya;  crouch;  increased time in double stance;  decreased hip/knee flexion;  shuffling;  decreased step length;  decreased trunk rotation;  decreased weight-shifting ability;  decreased strength;  impaired balance;  impaired postural control;  pain;  10 feet;  rolling walker    Therapeutic Exercise  Therapeutic Exercise Detail: BLE sitting on EOB x10: LAQ, ankle pumps       Therapeutic Exercise  Therapeutic Exercise Detail: functional mobility training with pt able to ambulate in room ~10ft using RW with CGAx1, demo impaired weight shift and postural control, however significantly improved overall balance and activity tolerance compared to previous session.     Upper Body Dressing Training  Upper Body Dressing Training Assistance: contact guard;  1 person assist;  verbal cues;  don gown sitting EOB;  impaired balance;  decreased strength;  decreased flexibility;  impaired postural control          PM&R Impression : as above    Current Disposition Plan Recommendations :    subacute rehab placement

## 2022-12-24 NOTE — PROGRESS NOTE ADULT - ATTENDING COMMENTS
#Dilated colon   Noted on Abdominal Xray to have markedly dilated colon with CT A/P noting stool burden. No obstruction appreciated. Repeat abdominal XRAY (12/22) with dilated loops of transverse colon, measuring up to 10.6 cm (similar to radiograph dated 12/20/2022)  -Serial abdominal exam and XR: abdominal Xray (12/23) with transverse colon diameter similar to previous exams, Cecum measuring approx 7 cm, f/u final read, repeat 12/24 with mild improvement  -Appreciate ongoing surgery recs  -Aggressive Bowel regimen, with Miralax from 17gm BID  -Continue to monitor stool counts   -PRN suppository vs enema

## 2022-12-24 NOTE — PROGRESS NOTE ADULT - ASSESSMENT
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 General surgery consulted for new onset abdominal pain and evidence of colonic dilation to 11cm on abdominal x-ray. CT imaging (12/20/2022) demonstrates herniated loop of small bowel resolution in comparison with prior scan and periumbilical hernia currently filled with fat, patent anastomosis and copious stool burden in transverse colon.  EGD (12/21/2022) demonstrated candidiasis of the esophagus and gastritis.    PLAN  - Recommend daily x-ray imaging to monitor for changes in distension and contrast transition  - Recommend serial abdominal examinations  - Recommend limit narcotics use  - Continue bowel regimen  - Surgery Team 1C will continue to follow.  - Please page Team 1 at [phone] with any questions and/or clinical changes

## 2022-12-24 NOTE — PROGRESS NOTE ADULT - PROBLEM SELECTOR PLAN 2
Found to have strep bacteremia likely 2/2 to hx of colon cancer. Patient currently afebrile, last febrile 12/20 AM (T 100.6), IRMA neg for vegetation w f/u Gallium scan neg for cardiac etiology (endocarditis). 12/15 BCx growing Strep mitis/oralis, however now NGTD from (12/20)    - Bacterial Clx (NGTD) - 12/20   - c/w CTX 2g q24h (12/16- )  - ID following, f/u recs

## 2022-12-24 NOTE — PROGRESS NOTE ADULT - SUBJECTIVE AND OBJECTIVE BOX
Orthopaedic Spine Resident Progress Note    S: 82y Female no acute overnight events. Endorses R lower back pain but states that this pain has been chronic.  No fevers/chills/shortness of breath/chest pain/new neurologic complaints.    O:  T(C): 36.7 (12-23-22 @ 22:10), Max: 37 (12-23-22 @ 06:34)  HR: 48 (12-23-22 @ 22:10) (48 - 49)  BP: 127/67 (12-23-22 @ 22:10) (113/59 - 147/73)  RR: 18 (12-23-22 @ 22:10) (16 - 18)  SpO2: 99% (12-23-22 @ 22:10) (99% - 100%)  Wt(kg): --    Physical Exam:    GEN: NAD, A and O x 3    Motor:  RUE:   Delt 5/5; bicep 5/5; tricep 5/5; WF 5/5; WE 5/5;  5/5; EPL 5/5; palmar intrinsics 5/5    RLE:  Q 5/5; HS 5/5; TA 5/5; GS 5/5; EHL 5/5; FHL 5/5    LUE:  Delt 5/5; bicep 5/5; tricep 5/5; WF 5/5; WE 5/5;  5/5; EPL 5/5; palmar intrinsics 5/5    LLE:  Q 5/5; HS 5/5; TA 5/5; GS 5/5; EHL 5/5; FHL 5/5    Sensory:  Sensation intact to light touch in C5-T1 dermatomes bilaterally    Sensation intact to light touch in L2-S1 dermatomes bilaterally    Vascular:  No edema, calf tenderness, wwp, peripherap pulses +2 bilaterally      Labs:                        9.0    7.96  )-----------( 300      ( 23 Dec 2022 05:30 )             29.9     23 Dec 2022 05:30    136    |  93     |  39     ----------------------------<  108    3.5     |  34     |  1.41     Ca    9.4        23 Dec 2022 05:30  Phos  4.6       23 Dec 2022 05:30  Mg     2.3       23 Dec 2022 05:30    TPro  7.9    /  Alb  3.1    /  TBili  0.3    /  DBili  x      /  AST  27     /  ALT  31     /  AlkPhos  128    22 Dec 2022 05:30    PT/INR - ( 23 Dec 2022 05:30 )   PT: 18.1 sec;   INR: 1.51          PTT - ( 23 Dec 2022 05:30 )  PTT:30.8 sec        82y Female w/ R low back pain, MR L spine without contrast demonstrates retrovertebral cyst vs collection @ L5   - if f there is clinical concern for infectious etiology without clear other source identified, MRI lumbar spine with contrast as well as considered IR aspiration for culture of this finding could be considered.  - no acute ortho intervention at this time   - IV steroids for radicular pain if cleared by primary team  -WBAT RLE  - Rec PT  - f/u ID recs   -pain control  -ice/cold compress  - Remainder of care per primary team  -Dispo planning    Ortho pager [phone]

## 2022-12-24 NOTE — PROGRESS NOTE ADULT - PROBLEM SELECTOR PLAN 12
Pt s/p TAVR, however now with moderate tavr valve stenosis. Structural team has evaluated her on previous admission  -Pt not a candidate for RPT intervention.

## 2022-12-24 NOTE — PROGRESS NOTE ADULT - SUBJECTIVE AND OBJECTIVE BOX
**incomplete** Overnight: NAEON    Subjective: Patient had large bowel movement s/p enema however which improved nausea patient had. No additional vomiting occurred. The patient had appropriate appetite without SOB, lightheadedness or CP.     T(C): 36.9 (12-24-22 @ 17:00), Max: 37 (12-24-22 @ 11:52)  HR: 50 (12-24-22 @ 17:00) (48 - 53)  BP: 139/72 (12-24-22 @ 17:00) (127/67 - 147/64)  RR: 16 (12-24-22 @ 17:00) (16 - 18)  SpO2: 98% (12-24-22 @ 17:00) (97% - 99%)  Wt(kg): --Vital Signs Last 24 Hrs  T(C): 36.9 (24 Dec 2022 17:00), Max: 37 (24 Dec 2022 11:52)  T(F): 98.5 (24 Dec 2022 17:00), Max: 98.6 (24 Dec 2022 11:52)  HR: 50 (24 Dec 2022 17:00) (48 - 53)  BP: 139/72 (24 Dec 2022 17:00) (127/67 - 147/64)  BP(mean): --  RR: 16 (24 Dec 2022 17:00) (16 - 18)  SpO2: 98% (24 Dec 2022 17:00) (97% - 99%)    Parameters below as of 24 Dec 2022 11:52  Patient On (Oxygen Delivery Method): nasal cannula  O2 Flow (L/min): 3      PHYSICAL EXAM:  GENERAL: NAD; well-groomed  Neuro: CN2-12 grossly intact; speech clear  HEENT: NC/AT; MMM; neck supple  Heart: RRR; +s1 and s2; no MRG  Lungs: CTA b/l; normal effort;  GI: + mildly distended nontender; normal bowel sounds [id number];e percussion typmanic; no organomegaly   Extremities: +2 pulses in UE and LE b/l; no clubbing, cyanosis, or edema b/l, capillary refill <2 sec b/l  Skin: skin dry and warm; no skin tenting; no rashes or lesions    Consultant(s) Notes Reviewed:  [x ] YES  [ ] NO  Care Discussed with Consultants/Other Providers [ x] YES  [ ] NO    LABS:                        9.1    7.32  )-----------( 307      ( 24 Dec 2022 15:27 )             30.6     12-24    136  |  94<L>  |  23  ----------------------------<  112<H>  3.8   |  33<H>  |  1.04    Ca    9.3      24 Dec 2022 15:27  Phos  3.5     12-24  Mg     2.0     12-24    TPro  6.8  /  Alb  2.7<L>  /  TBili  0.3  /  DBili  x   /  AST  21  /  ALT  20  /  AlkPhos  81  12-24      PT/INR - ( 23 Dec 2022 05:30 )   PT: 18.1 sec;   INR: 1.51          PTT - ( 23 Dec 2022 05:30 )  PTT:30.8 sec    CAPILLARY BLOOD GLUCOSE                RADIOLOGY & ADDITIONAL TESTS:    Imaging Personally Reviewed:  [ ] YES  [ ] NO

## 2022-12-24 NOTE — PROGRESS NOTE ADULT - SUBJECTIVE AND OBJECTIVE BOX
SUBJECTIVE: Patient seen and examined at bedside. She continues to c/o mild gas pain and bloating, limited PO intake 2/2 nausea however denies emesis. Denies sob, cp. Passing gas infrequently, had a bowel movement this AM after enema.      aMIOdarone    Tablet 200 milliGRAM(s) Oral daily  apixaban 2.5 milliGRAM(s) Oral every 12 hours  caspofungin IVPB      caspofungin IVPB 50 milliGRAM(s) IV Intermittent every 24 hours  cefTRIAXone   IVPB 2000 milliGRAM(s) IV Intermittent every 24 hours  isosorbide   mononitrate ER Tablet (IMDUR) 30 milliGRAM(s) Oral every 24 hours  metoprolol succinate ER 25 milliGRAM(s) Oral daily  torsemide 40 milliGRAM(s) Oral every 24 hours      Vital Signs Last 24 Hrs  T(C): 37 (24 Dec 2022 11:52), Max: 37 (24 Dec 2022 11:52)  T(F): 98.6 (24 Dec 2022 11:52), Max: 98.6 (24 Dec 2022 11:52)  HR: 53 (24 Dec 2022 13:12) (48 - 53)  BP: 136/71 (24 Dec 2022 13:12) (127/67 - 147/73)  BP(mean): --  RR: 17 (24 Dec 2022 11:52) (17 - 18)  SpO2: 97% (24 Dec 2022 11:52) (97% - 100%)    Parameters below as of 24 Dec 2022 11:52  Patient On (Oxygen Delivery Method): nasal cannula  O2 Flow (L/min): 3    I&O's Detail    23 Dec 2022 07:01  -  24 Dec 2022 07:00  --------------------------------------------------------  IN:  Total IN: 0 mL    OUT:    Voided (mL): 800 mL  Total OUT: 800 mL    Total NET: -800 mL          General: NAD, resting comfortably in bed  C/V: NSR  Pulm: Nonlabored breathing, no respiratory distress  Abd: soft, moderately distended, mild R sided ttp without rebound or guarding.  Extrem: WWP, no edema, SCDs in place        LABS:                        9.0    7.96  )-----------( 300      ( 23 Dec 2022 05:30 )             29.9     12-23    136  |  93<L>  |  39<H>  ----------------------------<  108<H>  3.5   |  34<H>  |  1.41<H>    Ca    9.4      23 Dec 2022 05:30  Phos  4.6     12-23  Mg     2.3     12-23      PT/INR - ( 23 Dec 2022 05:30 )   PT: 18.1 sec;   INR: 1.51          PTT - ( 23 Dec 2022 05:30 )  PTT:30.8 sec      RADIOLOGY & ADDITIONAL STUDIES:

## 2022-12-24 NOTE — PROGRESS NOTE ADULT - ASSESSMENT
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 GI consulted for endoscopic evaluation for dysphagia.     #Dysphagia  Patient presenting with complaints of malaise, chills, hip pain x 1 day, found to be bacteremic, suspected to be oropharyngeal in etiology, currently undergoing cardiology evaluation to rule out infective endocarditis; concurrently reporting new dysphagia to both solids and liquids since admission. EGD (as noted below) with findings suggestive of candida esophagitis, otherwise no gross pathology. Suspect dysphagia related to candidiasis, no appreciable structural explanation for dysphagia.    -EGD (12/21): Scattered white plaques distributed through esophagus, appears to be consistent with candida esophagitis. Diffuse erythema of the mucosa was noted in the antrum. These findings are compatible with non-erosive gastritis. Normal duodenum.  -QTC prolonged 522 on admission, Caspofungin as per ID for tx of esophageal candidiasis    #Dilated colon   Noted on Abdominal Xray to have markedly dilated colon with CT A/P noting stool burden. No obstruction appreciated. Repeat abdominal XRAY (12/22) with dilated loops of transverse colon, measuring up to 10.6 cm (similar to radiograph dated 12/20/2022)  -Serial abdominal exam and XR: abdominal Xray (12/23) with transverse colon diameter similar to previous exams, Cecum measuring approx 7 cm, f/u final read, repeat 12/24 with mild improvement  -Appreciate ongoing surgery recs  -Aggressive Bowel regimen, with Miralax from 17gm BID  -Continue to monitor stool counts   -PRN suppository vs enema     [place] [facility] [place].     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 GI consulted for endoscopic evaluation for dysphagia.     #Dysphagia  Patient presenting with complaints of malaise, chills, hip pain x 1 day, found to be bacteremic, suspected to be oropharyngeal in etiology, currently undergoing cardiology evaluation to rule out infective endocarditis; concurrently reporting new dysphagia to both solids and liquids since admission. EGD (as noted below) with findings suggestive of candida esophagitis, otherwise no gross pathology. Suspect dysphagia related to candidiasis, no appreciable structural explanation for dysphagia.    -EGD (12/21): Scattered white plaques distributed through esophagus, appears to be consistent with candida esophagitis. Diffuse erythema of the mucosa was noted in the antrum. These findings are compatible with non-erosive gastritis. Normal duodenum.  -QTC prolonged 522 on admission, Caspofungin as per ID for tx of esophageal candidiasis    #Dilated colon   Noted on Abdominal Xray to have markedly dilated colon with CT A/P noting stool burden. No obstruction appreciated. Repeat abdominal XRAY (12/22) with dilated loops of transverse colon, measuring up to 10.6 cm (similar to radiograph dated 12/20/2022)  -Serial abdominal exam and XR: abdominal Xray (12/23) with transverse colon diameter similar to previous exams, Cecum measuring approx 7 cm, f/u final read, repeat 12/24 with mild improvement  -Appreciate ongoing surgery recs  -Aggressive Bowel regimen, with Miralax from 17gm BID  -Continue to monitor stool counts   -PRN suppository vs enema     Case discussed with c attending and primary team.     Thank you for allowing us to participate in the care of this patient.  GI will sign off. Please call back with any questions or concerns.     Sarah Donohue MD  Gastroenterology Fellow, PGY -5   Weekday Pager [phone]

## 2022-12-24 NOTE — PROGRESS NOTE ADULT - ASSESSMENT
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    Problem/Plan - 1:  ·  Problem: Abdominal distention.   ·  Plan: 12/20 Patient with distended bowel, however soft but tender to palpation likely 2/2 constipation as pt had not had a BM for 5 days. Had 1 large BM s/p Bowel reg, ducolex enema, fleet enema. Abdominal Xray Pre and post BM demonstrated appx 11cm dilation     - Repeat Abd XR (12/22) with again dilated loops of transverse colon, 10.6 cm (comparable to 12/20/2022) - no significant interval change  - S/p EGD (12/21) with Candida esophagitis   - CT A/P (12/20) with 1. resolution of previously noted herniated loop of small bowel within the periumbilical hernia. The periumbilical hernia now contains fat. 2.Copious stool in mid transverse colon. Post right hemicolectomy, with patent anastomosis.  - C/w miralax, senna, and dulcolax suppository  - tapwater enema today   - Continue to monitor BMs  - Surg and GI following, appreciate recs.    Problem/Plan - 2:  ·  Problem: Bacteremia.   ·  Plan: Found to have strep bacteremia likely 2/2 to hx of colon cancer. Patient currently afebrile, last febrile 12/20 AM (T 100.6), IRMA neg for vegetation w f/u Gallium scan neg for cardiac etiology (endocarditis). 12/15 BCx growing Strep mitis/oralis, however now NGTD from (12/20)    - Bacterial Clx (NGTD) - 12/20   - c/w CTX 2g q24h (12/16- )  - ID following, f/u recs.    Problem/Plan - 3:  ·  Problem: Hip pain, right.   ·  Plan: Pt presenting with malaise, chills and acute on chronic R hip pain. Pt states she has had chronic R hip pain that shoots down her leg, but has not seen providers for it in the past. Day prior to admission she said the pain was severe and had pain with ambulation. Pain starts at posterior hip and radiates down R leg. CT scans and xrays without findings. MRI lumbar spine (12/20) with no acute neurocompressive lesions, synovial cyst vs epidural fluid collection at L5, Modic type 1 changes vs discitis OM. CT Scan Neg     - Pain control with lidocaine patch, tylenol, oxycodone 5mg q6 hours prn, gabapentin 600 mg PO daily.   - Ortho consulted, low concern for septic joint w supportive management focused of PT and pain control  - f/u PT recs.    Problem/Plan - 4:  ·  Problem: Acute UTI.   ·  Plan: U/A consistent with LE, bacteria and WBC. Received 1 CTX in the ED. Not febrile, but is having chills. Does report intermittent burning with urination.   - C/w CTX 2g.    Problem/Plan - 5:  ·  Problem: Acute kidney injury superimposed on CKD.   ·  Plan: Baseline Cr. 1.2-1.3. Cr on admission 1.8.   - Trend sCr   - Avoid nephrotoxic agents.   - Renally dose medications.    Problem/Plan - 6:  ·  Problem: Umbilical hernia.   ·  Plan: Reproducible, umbilical hernia measuring 3.38 cm in size, containing a small bowel loop which demonstrates mild bowel wall thickening. Within the abdomen, the proximal portion of the small bowel loops slightly distended to 2.4 cm, suggesting a degree of entrapment within the hernia itself, where as the distal portion the small bowel loop is empty with a diameter of 10 mm. Additionally, a volume of edematous mesenteric fat is noted adjacent to the umbilical hernia measuring up to 5.34 cm.  - Surgery consult with no acute intervention.    Problem/Plan - 7:  ·  Problem: Diastolic CHF, chronic.   ·  Plan: Pt with history of diastolic HF, recently admitted here with CHF exacerbation and had medication adjustments. Home medications include: torsemide 40mg, toprol 25mg , and isosorbide mononitrate 30mg.   - c/w home torsemide 40mg, toprol 25mg and isosorbide mononitrate 30mg.    Problem/Plan - 8:  ·  Problem: GERD (gastroesophageal reflux disease).   ·  Plan: Pt on protonix 40mg outpatient   - c/w home protonix.    Problem/Plan - 9:  ·  Problem: HTN (hypertension).   ·  Plan: Pt is on torsemide 40mg, isosorbide mononitrate, and toprol 25mg   - c/w home medications with hold parameters.    Problem/Plan - 10:  ·  Problem: Colon cancer.   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 Pt refused EBUS at that time.   - outpatient heme/onc follow up.    Problem/Plan - 11:  ·  Problem: COPD (chronic obstructive pulmonary disease).   ·  Plan: Pt with history of COPD. On montelukast and spiriva. Follows with Dr. More  - c/w home medications   - goal sat 88-92%.    Problem/Plan - 12:  ·  Problem: Aortic stenosis.   ·  Plan: Pt s/p TAVR, however now with moderate tavr valve stenosis. Structural team has evaluated her on previous admission  -Pt not a candidate for RPT intervention.

## 2022-12-24 NOTE — PROGRESS NOTE ADULT - PROBLEM SELECTOR PLAN 1
12/20 Patient with distended bowel, however soft but tender to palpation likely 2/2 constipation as pt had not had a BM for 5 days. Had 1 large BM s/p Bowel reg, ducolex enema, fleet enema. Abdominal Xray Pre and post BM demonstrated appx 11cm dilation. Repeat Abd XR (12/22) with again dilated loops of transverse colon, 10.6 cm (comparable to 12/20/2022) - no significant interval change    - S/p EGD (12/21) with Candida esophagitis   - CT A/P (12/20) with 1. resolution of previously noted herniated loop of small bowel within the periumbilical hernia. The periumbilical hernia now contains fat. 2.Copious stool in mid transverse colon. Post right hemicolectomy, with patent anastomosis.  - C/w miralax, senna, and dulcolax suppository  - tapwater enema today   - Continue to monitor BMs  - Surg and GI following, appreciate recs

## 2022-12-25 LAB
ALBUMIN SERPL ELPH-MCNC: 2.7 G/DL — LOW (ref 3.3–5)
ALP SERPL-CCNC: 72 U/L — SIGNIFICANT CHANGE UP (ref 40–120)
ALT FLD-CCNC: 17 U/L — SIGNIFICANT CHANGE UP (ref 10–45)
ANION GAP SERPL CALC-SCNC: 6 MMOL/L — SIGNIFICANT CHANGE UP (ref 5–17)
AST SERPL-CCNC: 19 U/L — SIGNIFICANT CHANGE UP (ref 10–40)
BASOPHILS # BLD AUTO: 0.03 K/UL — SIGNIFICANT CHANGE UP (ref 0–0.2)
BASOPHILS NFR BLD AUTO: 0.5 % — SIGNIFICANT CHANGE UP (ref 0–2)
BILIRUB SERPL-MCNC: 0.3 MG/DL — SIGNIFICANT CHANGE UP (ref 0.2–1.2)
BUN SERPL-MCNC: 20 MG/DL — SIGNIFICANT CHANGE UP (ref 7–23)
CALCIUM SERPL-MCNC: 9.2 MG/DL — SIGNIFICANT CHANGE UP (ref 8.4–10.5)
CHLORIDE SERPL-SCNC: 95 MMOL/L — LOW (ref 96–108)
CO2 SERPL-SCNC: 35 MMOL/L — HIGH (ref 22–31)
CREAT SERPL-MCNC: 0.99 MG/DL — SIGNIFICANT CHANGE UP (ref 0.5–1.3)
CULTURE RESULTS: SIGNIFICANT CHANGE UP
CULTURE RESULTS: SIGNIFICANT CHANGE UP
EGFR: 56 ML/MIN/1.73M2 — LOW
EOSINOPHIL # BLD AUTO: 0.15 K/UL — SIGNIFICANT CHANGE UP (ref 0–0.5)
EOSINOPHIL NFR BLD AUTO: 2.3 % — SIGNIFICANT CHANGE UP (ref 0–6)
GLUCOSE SERPL-MCNC: 101 MG/DL — HIGH (ref 70–99)
HCT VFR BLD CALC: 30.4 % — LOW (ref 34.5–45)
HGB BLD-MCNC: 9 G/DL — LOW (ref 11.5–15.5)
IMM GRANULOCYTES NFR BLD AUTO: 0.3 % — SIGNIFICANT CHANGE UP (ref 0–0.9)
LYMPHOCYTES # BLD AUTO: 0.63 K/UL — LOW (ref 1–3.3)
LYMPHOCYTES # BLD AUTO: 9.6 % — LOW (ref 13–44)
MAGNESIUM SERPL-MCNC: 1.9 MG/DL — SIGNIFICANT CHANGE UP (ref 1.6–2.6)
MCHC RBC-ENTMCNC: 26.2 PG — LOW (ref 27–34)
MCHC RBC-ENTMCNC: 29.6 GM/DL — LOW (ref 32–36)
MCV RBC AUTO: 88.6 FL — SIGNIFICANT CHANGE UP (ref 80–100)
MONOCYTES # BLD AUTO: 0.57 K/UL — SIGNIFICANT CHANGE UP (ref 0–0.9)
MONOCYTES NFR BLD AUTO: 8.7 % — SIGNIFICANT CHANGE UP (ref 2–14)
NEUTROPHILS # BLD AUTO: 5.17 K/UL — SIGNIFICANT CHANGE UP (ref 1.8–7.4)
NEUTROPHILS NFR BLD AUTO: 78.6 % — HIGH (ref 43–77)
NRBC # BLD: 0 /100 WBCS — SIGNIFICANT CHANGE UP (ref 0–0)
PHOSPHATE SERPL-MCNC: 3.9 MG/DL — SIGNIFICANT CHANGE UP (ref 2.5–4.5)
PLATELET # BLD AUTO: 255 K/UL — SIGNIFICANT CHANGE UP (ref 150–400)
POTASSIUM SERPL-MCNC: 3.8 MMOL/L — SIGNIFICANT CHANGE UP (ref 3.5–5.3)
POTASSIUM SERPL-SCNC: 3.8 MMOL/L — SIGNIFICANT CHANGE UP (ref 3.5–5.3)
PROT SERPL-MCNC: 6.5 G/DL — SIGNIFICANT CHANGE UP (ref 6–8.3)
RBC # BLD: 3.43 M/UL — LOW (ref 3.8–5.2)
RBC # FLD: 19 % — HIGH (ref 10.3–14.5)
SODIUM SERPL-SCNC: 136 MMOL/L — SIGNIFICANT CHANGE UP (ref 135–145)
SPECIMEN SOURCE: SIGNIFICANT CHANGE UP
SPECIMEN SOURCE: SIGNIFICANT CHANGE UP
WBC # BLD: 6.57 K/UL — SIGNIFICANT CHANGE UP (ref 3.8–10.5)
WBC # FLD AUTO: 6.57 K/UL — SIGNIFICANT CHANGE UP (ref 3.8–10.5)

## 2022-12-25 PROCEDURE — 99232 SBSQ HOSP IP/OBS MODERATE 35: CPT | Mod: GC

## 2022-12-25 PROCEDURE — 74018 RADEX ABDOMEN 1 VIEW: CPT | Mod: 26

## 2022-12-25 RX ORDER — MAGNESIUM SULFATE 500 MG/ML
1 VIAL (ML) INJECTION ONCE
Refills: 0 | Status: COMPLETED | OUTPATIENT
Start: 2022-12-25 | End: 2022-12-25

## 2022-12-25 RX ORDER — POLYETHYLENE GLYCOL 3350 17 G/17G
17 POWDER, FOR SOLUTION ORAL
Refills: 0 | Status: DISCONTINUED | OUTPATIENT
Start: 2022-12-25 | End: 2022-12-27

## 2022-12-25 RX ORDER — POTASSIUM CHLORIDE 20 MEQ
20 PACKET (EA) ORAL ONCE
Refills: 0 | Status: COMPLETED | OUTPATIENT
Start: 2022-12-25 | End: 2022-12-25

## 2022-12-25 RX ADMIN — Medication 200 MILLIGRAM(S): at 10:28

## 2022-12-25 RX ADMIN — CEFTRIAXONE 100 MILLIGRAM(S): 500 INJECTION, POWDER, FOR SOLUTION INTRAMUSCULAR; INTRAVENOUS at 21:21

## 2022-12-25 RX ADMIN — Medication 975 MILLIGRAM(S): at 23:22

## 2022-12-25 RX ADMIN — MONTELUKAST 10 MILLIGRAM(S): 4 TABLET, CHEWABLE ORAL at 09:30

## 2022-12-25 RX ADMIN — Medication 1 MILLIGRAM(S): at 09:38

## 2022-12-25 RX ADMIN — OXYCODONE HYDROCHLORIDE 5 MILLIGRAM(S): 5 TABLET ORAL at 23:55

## 2022-12-25 RX ADMIN — Medication 975 MILLIGRAM(S): at 01:28

## 2022-12-25 RX ADMIN — OXYCODONE HYDROCHLORIDE 5 MILLIGRAM(S): 5 TABLET ORAL at 18:33

## 2022-12-25 RX ADMIN — OXYCODONE HYDROCHLORIDE 5 MILLIGRAM(S): 5 TABLET ORAL at 00:27

## 2022-12-25 RX ADMIN — OXYCODONE HYDROCHLORIDE 5 MILLIGRAM(S): 5 TABLET ORAL at 17:49

## 2022-12-25 RX ADMIN — Medication 975 MILLIGRAM(S): at 00:28

## 2022-12-25 RX ADMIN — Medication 975 MILLIGRAM(S): at 06:07

## 2022-12-25 RX ADMIN — Medication 10 MILLIGRAM(S): at 17:37

## 2022-12-25 RX ADMIN — POLYETHYLENE GLYCOL 3350 17 GRAM(S): 17 POWDER, FOR SOLUTION ORAL at 17:37

## 2022-12-25 RX ADMIN — APIXABAN 2.5 MILLIGRAM(S): 2.5 TABLET, FILM COATED ORAL at 05:53

## 2022-12-25 RX ADMIN — AMIODARONE HYDROCHLORIDE 200 MILLIGRAM(S): 400 TABLET ORAL at 05:54

## 2022-12-25 RX ADMIN — PANTOPRAZOLE SODIUM 40 MILLIGRAM(S): 20 TABLET, DELAYED RELEASE ORAL at 06:08

## 2022-12-25 RX ADMIN — Medication 975 MILLIGRAM(S): at 05:53

## 2022-12-25 RX ADMIN — Medication 40 MILLIGRAM(S): at 13:43

## 2022-12-25 RX ADMIN — OXYCODONE HYDROCHLORIDE 5 MILLIGRAM(S): 5 TABLET ORAL at 01:27

## 2022-12-25 RX ADMIN — Medication 20 MILLIEQUIVALENT(S): at 09:38

## 2022-12-25 RX ADMIN — Medication 100 GRAM(S): at 09:38

## 2022-12-25 RX ADMIN — Medication 975 MILLIGRAM(S): at 18:37

## 2022-12-25 RX ADMIN — Medication 975 MILLIGRAM(S): at 13:40

## 2022-12-25 RX ADMIN — POLYETHYLENE GLYCOL 3350 17 GRAM(S): 17 POWDER, FOR SOLUTION ORAL at 09:30

## 2022-12-25 RX ADMIN — LIDOCAINE 1 PATCH: 4 CREAM TOPICAL at 05:54

## 2022-12-25 RX ADMIN — LIDOCAINE 1 PATCH: 4 CREAM TOPICAL at 06:08

## 2022-12-25 RX ADMIN — OXYCODONE HYDROCHLORIDE 5 MILLIGRAM(S): 5 TABLET ORAL at 06:08

## 2022-12-25 RX ADMIN — CASPOFUNGIN ACETATE 260 MILLIGRAM(S): 7 INJECTION, POWDER, LYOPHILIZED, FOR SOLUTION INTRAVENOUS at 15:06

## 2022-12-25 RX ADMIN — OXYCODONE HYDROCHLORIDE 5 MILLIGRAM(S): 5 TABLET ORAL at 13:41

## 2022-12-25 RX ADMIN — ISOSORBIDE MONONITRATE 30 MILLIGRAM(S): 60 TABLET, EXTENDED RELEASE ORAL at 13:43

## 2022-12-25 RX ADMIN — APIXABAN 2.5 MILLIGRAM(S): 2.5 TABLET, FILM COATED ORAL at 17:36

## 2022-12-25 RX ADMIN — TIOTROPIUM BROMIDE 2 PUFF(S): 18 CAPSULE ORAL; RESPIRATORY (INHALATION) at 09:41

## 2022-12-25 RX ADMIN — Medication 975 MILLIGRAM(S): at 23:55

## 2022-12-25 RX ADMIN — SIMETHICONE 40 MILLIGRAM(S): 80 TABLET, CHEWABLE ORAL at 05:53

## 2022-12-25 RX ADMIN — Medication 975 MILLIGRAM(S): at 17:37

## 2022-12-25 RX ADMIN — OXYCODONE HYDROCHLORIDE 5 MILLIGRAM(S): 5 TABLET ORAL at 05:53

## 2022-12-25 RX ADMIN — Medication 975 MILLIGRAM(S): at 12:41

## 2022-12-25 RX ADMIN — LIDOCAINE 1 PATCH: 4 CREAM TOPICAL at 06:07

## 2022-12-25 RX ADMIN — Medication 1 TABLET(S): at 09:31

## 2022-12-25 RX ADMIN — LIDOCAINE 1 PATCH: 4 CREAM TOPICAL at 18:36

## 2022-12-25 RX ADMIN — OXYCODONE HYDROCHLORIDE 5 MILLIGRAM(S): 5 TABLET ORAL at 23:22

## 2022-12-25 RX ADMIN — OXYCODONE HYDROCHLORIDE 5 MILLIGRAM(S): 5 TABLET ORAL at 12:41

## 2022-12-25 RX ADMIN — ATORVASTATIN CALCIUM 20 MILLIGRAM(S): 80 TABLET, FILM COATED ORAL at 21:21

## 2022-12-25 NOTE — PROGRESS NOTE ADULT - SUBJECTIVE AND OBJECTIVE BOX
Orthopaedic Spine Resident Progress Note    S: 82y Female no acute overnight events. Endorses R lower back pain but states that this pain has been chronic.  No fevers/chills/shortness of breath/chest pain/new neurologic complaints.    O:  Vital Signs Last 24 Hrs  T(C): 36.8 (25 Dec 2022 06:01), Max: 37 (24 Dec 2022 11:52)  T(F): 98.3 (25 Dec 2022 06:01), Max: 98.6 (24 Dec 2022 11:52)  HR: 50 (25 Dec 2022 06:01) (50 - 53)  BP: 125/72 (25 Dec 2022 06:01) (125/72 - 151/78)  BP(mean): 102 (24 Dec 2022 21:30) (102 - 102)  RR: 17 (25 Dec 2022 06:01) (16 - 17)  SpO2: 99% (25 Dec 2022 06:01) (97% - 100%)    Parameters below as of 25 Dec 2022 06:01  Patient On (Oxygen Delivery Method): nasal cannula        Physical Exam:    GEN: NAD, A and O x 3    Motor:  RUE:   Delt 5/5; bicep 5/5; tricep 5/5; WF 5/5; WE 5/5;  5/5; EPL 5/5; palmar intrinsics 5/5    RLE:  Q 5/5; HS 5/5; TA 5/5; GS 5/5; EHL 5/5; FHL 5/5    LUE:  Delt 5/5; bicep 5/5; tricep 5/5; WF 5/5; WE 5/5;  5/5; EPL 5/5; palmar intrinsics 5/5    LLE:  Q 5/5; HS 5/5; TA 5/5; GS 5/5; EHL 5/5; FHL 5/5    Sensory:  Sensation intact to light touch in C5-T1 dermatomes bilaterally    Sensation intact to light touch in L2-S1 dermatomes bilaterally    Vascular:  No edema, calf tenderness, wwp, peripherap pulses +2 bilaterally      Labs:  LABS/RADIOLOGY RESULTS:                          9.0    6.57  )-----------( 255      ( 25 Dec 2022 06:24 )             30.4   12-25    136  |  95<L>  |  20  ----------------------------<  101<H>  3.8   |  35<H>  |  0.99    Ca    9.2      25 Dec 2022 06:24  Phos  3.9     12-25  Mg     1.9     12-25    TPro  6.5  /  Alb  2.7<L>  /  TBili  0.3  /  DBili  x   /  AST  19  /  ALT  17  /  AlkPhos  72  12-25  Blood Cultures        82y Female w/ R low back pain, MR L spine without contrast demonstrates retrovertebral cyst vs collection @ L5   - if f there is clinical concern for infectious etiology without clear other source identified, MRI lumbar spine with contrast as well as considered IR aspiration for culture of this finding could be considered.  - no acute ortho intervention at this time   - IV steroids for radicular pain if cleared by primary team  -WBAT RLE  - Rec PT  - f/u ID recs   -pain control  -ice/cold compress  - Remainder of care per primary team  - ortho to sign off, please reconsult PRN   -Dispo planning    Ortho pager [phone]

## 2022-12-25 NOTE — PROGRESS NOTE ADULT - ASSESSMENT
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

## 2022-12-25 NOTE — PROGRESS NOTE ADULT - SUBJECTIVE AND OBJECTIVE BOX
SUBJECTIVE: Pt complaints of reflux symptoms. Tolerating diet without N/V. Denies fever, chills, abd pain. Last BF was 2 days ago    MEDICATIONS  (STANDING):  acetaminophen     Tablet .. 975 milliGRAM(s) Oral every 6 hours  aMIOdarone    Tablet 200 milliGRAM(s) Oral daily  apixaban 2.5 milliGRAM(s) Oral every 12 hours  atorvastatin 20 milliGRAM(s) Oral at bedtime  caspofungin IVPB      caspofungin IVPB 50 milliGRAM(s) IV Intermittent every 24 hours  cefTRIAXone   IVPB 2000 milliGRAM(s) IV Intermittent every 24 hours  folic acid 1 milliGRAM(s) Oral daily  influenza  Vaccine (HIGH DOSE) 0.7 milliLiter(s) IntraMuscular once  isosorbide   mononitrate ER Tablet (IMDUR) 30 milliGRAM(s) Oral every 24 hours  lidocaine   4% Patch 1 Patch Transdermal every 24 hours  metoprolol succinate ER 25 milliGRAM(s) Oral daily  montelukast 10 milliGRAM(s) Oral daily  multivitamin 1 Tablet(s) Oral daily  oxyCODONE    IR 5 milliGRAM(s) Oral every 6 hours  pantoprazole    Tablet 40 milliGRAM(s) Oral before breakfast  polyethylene glycol 3350 17 Gram(s) Oral two times a day  senna 2 Tablet(s) Oral at bedtime  tiotropium 2.5 MICROgram(s) Inhaler 2 Puff(s) Inhalation daily  torsemide 40 milliGRAM(s) Oral every 24 hours    MEDICATIONS  (PRN):  guaiFENesin Oral Liquid (Sugar-Free) 100 milliGRAM(s) Oral every 6 hours PRN Cough  lidocaine   4% Patch 1 Patch Transdermal daily PRN pain  LORazepam     Tablet 0.5 milliGRAM(s) Oral daily PRN Anxiety  oxyCODONE    IR 5 milliGRAM(s) Oral every 4 hours PRN Moderate Pain (4 - 6)  simethicone 40 milliGRAM(s) Chew every 6 hours PRN Gas  trimethobenzamide Injectable 200 milliGRAM(s) IntraMuscular every 6 hours PRN Nausea and/or Vomiting      Vital Signs Last 24 Hrs  T(C): 36.8 (25 Dec 2022 06:01), Max: 37 (24 Dec 2022 11:52)  T(F): 98.3 (25 Dec 2022 06:01), Max: 98.6 (24 Dec 2022 11:52)  HR: 50 (25 Dec 2022 06:01) (50 - 53)  BP: 125/72 (25 Dec 2022 06:01) (125/72 - 151/78)  BP(mean): 102 (24 Dec 2022 21:30) (102 - 102)  RR: 17 (25 Dec 2022 06:01) (16 - 17)  SpO2: 99% (25 Dec 2022 06:01) (97% - 100%)    Parameters below as of 25 Dec 2022 06:01  Patient On (Oxygen Delivery Method): nasal cannula        PHYSICAL EXAM:      Constitutional: A&Ox3    Respiratory: non labored breathing, no respiratory distress    Cardiovascular: NSR, RRR    Gastrointestinal: soft ND, mildly ttp in mid epigastric. No rebound or guarding    Genitourinary: voiding     Extremities: (-) edema                  I&O's Detail      LABS:                        9.0    6.57  )-----------( 255      ( 25 Dec 2022 06:24 )             30.4     12-25    136  |  95<L>  |  20  ----------------------------<  101<H>  3.8   |  35<H>  |  0.99    Ca    9.2      25 Dec 2022 06:24  Phos  3.9     12-25  Mg     1.9     12-25    TPro  6.5  /  Alb  2.7<L>  /  TBili  0.3  /  DBili  x   /  AST  19  /  ALT  17  /  AlkPhos  72  12-25          RADIOLOGY & ADDITIONAL STUDIES:

## 2022-12-25 NOTE — PROGRESS NOTE ADULT - PROBLEM SELECTOR PLAN 1
12/20 Patient with distended bowel, however soft but tender to palpation likely 2/2 constipation as pt had not had a BM for 5 days. Had 1 large BM s/p Bowel reg, ducolex enema, fleet enema. Abdominal Xray Pre and post BM demonstrated appx 11cm dilation. Repeat Abd XR (12/22) with again dilated loops of transverse colon, 10.6 cm (comparable to 12/20/2022) - no significant interval change    - S/p EGD (12/21) with Candida esophagitis   - CT A/P (12/20) with 1. resolution of previously noted herniated loop of small bowel within the periumbilical hernia. The periumbilical hernia now contains fat. 2.Copious stool in mid transverse colon. Post right hemicolectomy, with patent anastomosis.  - C/w miralax, senna, and dulcolax suppository  - tapwater enema today   - Continue to monitor BMs  - Surg and GI following, appreciate recs - pt w/ objective improvement on radiographic imaging

## 2022-12-25 NOTE — PROGRESS NOTE ADULT - SUBJECTIVE AND OBJECTIVE BOX
Overnight: NAEON    Subjective: Patient reports continued nausea but is able to tolerate PO. No additional vomiting occurred. The patient had appropriate appetite without SOB, lightheadedness or CP.       Vital Signs Last 24 Hrs  T(C): 36.8 (25 Dec 2022 06:01), Max: 36.9 (24 Dec 2022 17:00)  T(F): 98.3 (25 Dec 2022 06:01), Max: 98.5 (24 Dec 2022 17:00)  HR: 50 (25 Dec 2022 06:01) (50 - 53)  BP: 125/72 (25 Dec 2022 06:01) (125/72 - 151/78)  BP(mean): 102 (24 Dec 2022 21:30) (102 - 102)  RR: 17 (25 Dec 2022 06:01) (16 - 17)  SpO2: 99% (25 Dec 2022 06:01) (98% - 100%)    Parameters below as of 25 Dec 2022 06:01  Patient On (Oxygen Delivery Method): nasal cannula            PHYSICAL EXAM:  GENERAL: NAD; well-groomed  Neuro: CN2-12 grossly intact; speech clear  HEENT: NC/AT; MMM; neck supple  Heart: RRR; +s1 and s2; no MRG  Lungs: CTA b/l; normal effort;  GI: + mildly distended nontender; normal bowel sounds [id number];e percussion typmanic; no organomegaly   Extremities: +2 pulses in UE and LE b/l; no clubbing, cyanosis, or edema b/l, capillary refill <2 sec b/l  Skin: skin dry and warm; no skin tenting; no rashes or lesions  Psych: normal affect    Consultant(s) Notes Reviewed:  [x ] YES  [ ] NO  Care Discussed with Consultants/Other Providers [ x] YES  [ ] NO    LABS:                                   9.0    6.57  )-----------( 255      ( 25 Dec 2022 06:24 )             30.4   12-25    136  |  95<L>  |  20  ----------------------------<  101<H>  3.8   |  35<H>  |  0.99    Ca    9.2      25 Dec 2022 06:24  Phos  3.9     12-25  Mg     1.9     12-25    TPro  6.5  /  Alb  2.7<L>  /  TBili  0.3  /  DBili  x   /  AST  19  /  ALT  17  /  AlkPhos  72  12-25                  RADIOLOGY & ADDITIONAL TESTS:    Imaging Personally Reviewed:  [ ] YES  [ ] NO

## 2022-12-25 NOTE — PROGRESS NOTE ADULT - PROBLEM SELECTOR PLAN 2
Please contact the patient to have her follow with the [facility] or orthopedic specialist.     I do not treat hip fractures or determine recommendations for treatment/recovery time. Also, I have never seen Deepti Sousa as a patient so she would have to establish care in the future if needed. Found to have strep bacteremia likely 2/2 to hx of colon cancer. Patient currently afebrile, last febrile 12/20 AM (T 100.6), IRMA neg for vegetation w f/u Gallium scan neg for cardiac etiology (endocarditis). 12/15 BCx growing Strep mitis/oralis, however now NGTD from (12/20)    - Bacterial Clx (NGTD) - 12/20   - c/w CTX 2g q24h (12/16- )  - ID following, f/u recs

## 2022-12-25 NOTE — PROGRESS NOTE ADULT - ASSESSMENT
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 General surgery consulted for new onset abdominal pain and evidence of colonic dilation to 11cm on abdominal x-ray. CT imaging (12/20/2022) demonstrates herniated loop of small bowel resolution in comparison with prior scan and periumbilical hernia currently filled with fat, patent anastomosis and copious stool burden in transverse colon.  EGD (12/21/2022) demonstrated candidiasis of the esophagus and gastritis.    - Recommend daily x-ray imaging to monitor for changes in distension and contrast transition, 12/25 colon measuring 8.3cm improving from (9.0)  - Recommend serial abdominal examinations  - Recommend limit narcotics use  - Continue bowel regimen, miralax bid and senna  - Surgery Team 1C will continue to follow.  - Please page Team 1 at [phone] with any questions and/or clinical changes  -plans discussed with Dr. Chowdhury

## 2022-12-26 LAB
ANION GAP SERPL CALC-SCNC: 11 MMOL/L — SIGNIFICANT CHANGE UP (ref 5–17)
APTT BLD: 33 SEC — SIGNIFICANT CHANGE UP (ref 27.5–35.5)
BLD GP AB SCN SERPL QL: POSITIVE — SIGNIFICANT CHANGE UP
BUN SERPL-MCNC: 20 MG/DL — SIGNIFICANT CHANGE UP (ref 7–23)
CALCIUM SERPL-MCNC: 9.2 MG/DL — SIGNIFICANT CHANGE UP (ref 8.4–10.5)
CHLORIDE SERPL-SCNC: 95 MMOL/L — LOW (ref 96–108)
CO2 SERPL-SCNC: 33 MMOL/L — HIGH (ref 22–31)
CREAT SERPL-MCNC: 1.18 MG/DL — SIGNIFICANT CHANGE UP (ref 0.5–1.3)
EGFR: 46 ML/MIN/1.73M2 — LOW
GLUCOSE SERPL-MCNC: 107 MG/DL — HIGH (ref 70–99)
HCT VFR BLD CALC: 31.8 % — LOW (ref 34.5–45)
HGB BLD-MCNC: 9.5 G/DL — LOW (ref 11.5–15.5)
INR BLD: 1.61 — HIGH (ref 0.88–1.16)
MAGNESIUM SERPL-MCNC: 1.8 MG/DL — SIGNIFICANT CHANGE UP (ref 1.6–2.6)
MCHC RBC-ENTMCNC: 26.5 PG — LOW (ref 27–34)
MCHC RBC-ENTMCNC: 29.9 GM/DL — LOW (ref 32–36)
MCV RBC AUTO: 88.8 FL — SIGNIFICANT CHANGE UP (ref 80–100)
NRBC # BLD: 0 /100 WBCS — SIGNIFICANT CHANGE UP (ref 0–0)
PHOSPHATE SERPL-MCNC: 3.6 MG/DL — SIGNIFICANT CHANGE UP (ref 2.5–4.5)
PLATELET # BLD AUTO: 297 K/UL — SIGNIFICANT CHANGE UP (ref 150–400)
POTASSIUM SERPL-MCNC: 3.9 MMOL/L — SIGNIFICANT CHANGE UP (ref 3.5–5.3)
POTASSIUM SERPL-SCNC: 3.9 MMOL/L — SIGNIFICANT CHANGE UP (ref 3.5–5.3)
PROTHROM AB SERPL-ACNC: 19.3 SEC — HIGH (ref 10.5–13.4)
RBC # BLD: 3.58 M/UL — LOW (ref 3.8–5.2)
RBC # FLD: 19.1 % — HIGH (ref 10.3–14.5)
RH IG SCN BLD-IMP: POSITIVE — SIGNIFICANT CHANGE UP
SODIUM SERPL-SCNC: 139 MMOL/L — SIGNIFICANT CHANGE UP (ref 135–145)
WBC # BLD: 8.39 K/UL — SIGNIFICANT CHANGE UP (ref 3.8–10.5)
WBC # FLD AUTO: 8.39 K/UL — SIGNIFICANT CHANGE UP (ref 3.8–10.5)

## 2022-12-26 PROCEDURE — 99232 SBSQ HOSP IP/OBS MODERATE 35: CPT | Mod: GC

## 2022-12-26 PROCEDURE — 86077 PHYS BLOOD BANK SERV XMATCH: CPT

## 2022-12-26 PROCEDURE — 74018 RADEX ABDOMEN 1 VIEW: CPT | Mod: 26

## 2022-12-26 RX ADMIN — Medication 200 MILLIGRAM(S): at 07:39

## 2022-12-26 RX ADMIN — Medication 975 MILLIGRAM(S): at 23:30

## 2022-12-26 RX ADMIN — LIDOCAINE 1 PATCH: 4 CREAM TOPICAL at 07:01

## 2022-12-26 RX ADMIN — MONTELUKAST 10 MILLIGRAM(S): 4 TABLET, CHEWABLE ORAL at 12:37

## 2022-12-26 RX ADMIN — Medication 975 MILLIGRAM(S): at 06:53

## 2022-12-26 RX ADMIN — OXYCODONE HYDROCHLORIDE 5 MILLIGRAM(S): 5 TABLET ORAL at 06:53

## 2022-12-26 RX ADMIN — Medication 975 MILLIGRAM(S): at 12:37

## 2022-12-26 RX ADMIN — APIXABAN 2.5 MILLIGRAM(S): 2.5 TABLET, FILM COATED ORAL at 18:30

## 2022-12-26 RX ADMIN — TIOTROPIUM BROMIDE 2 PUFF(S): 18 CAPSULE ORAL; RESPIRATORY (INHALATION) at 10:02

## 2022-12-26 RX ADMIN — Medication 975 MILLIGRAM(S): at 13:37

## 2022-12-26 RX ADMIN — Medication 975 MILLIGRAM(S): at 18:30

## 2022-12-26 RX ADMIN — LIDOCAINE 1 PATCH: 4 CREAM TOPICAL at 06:28

## 2022-12-26 RX ADMIN — Medication 1 MILLIGRAM(S): at 12:37

## 2022-12-26 RX ADMIN — PANTOPRAZOLE SODIUM 40 MILLIGRAM(S): 20 TABLET, DELAYED RELEASE ORAL at 06:29

## 2022-12-26 RX ADMIN — Medication 0.5 MILLIGRAM(S): at 22:53

## 2022-12-26 RX ADMIN — SENNA PLUS 2 TABLET(S): 8.6 TABLET ORAL at 22:55

## 2022-12-26 RX ADMIN — Medication 200 MILLIGRAM(S): at 15:23

## 2022-12-26 RX ADMIN — ATORVASTATIN CALCIUM 20 MILLIGRAM(S): 80 TABLET, FILM COATED ORAL at 22:53

## 2022-12-26 RX ADMIN — LIDOCAINE 1 PATCH: 4 CREAM TOPICAL at 13:49

## 2022-12-26 RX ADMIN — Medication 975 MILLIGRAM(S): at 19:19

## 2022-12-26 RX ADMIN — Medication 1 TABLET(S): at 12:37

## 2022-12-26 RX ADMIN — CEFTRIAXONE 100 MILLIGRAM(S): 500 INJECTION, POWDER, FOR SOLUTION INTRAMUSCULAR; INTRAVENOUS at 22:55

## 2022-12-26 RX ADMIN — OXYCODONE HYDROCHLORIDE 5 MILLIGRAM(S): 5 TABLET ORAL at 06:29

## 2022-12-26 RX ADMIN — POLYETHYLENE GLYCOL 3350 17 GRAM(S): 17 POWDER, FOR SOLUTION ORAL at 06:29

## 2022-12-26 RX ADMIN — CASPOFUNGIN ACETATE 260 MILLIGRAM(S): 7 INJECTION, POWDER, LYOPHILIZED, FOR SOLUTION INTRAVENOUS at 15:11

## 2022-12-26 RX ADMIN — APIXABAN 2.5 MILLIGRAM(S): 2.5 TABLET, FILM COATED ORAL at 06:29

## 2022-12-26 RX ADMIN — ISOSORBIDE MONONITRATE 30 MILLIGRAM(S): 60 TABLET, EXTENDED RELEASE ORAL at 14:36

## 2022-12-26 RX ADMIN — Medication 40 MILLIGRAM(S): at 14:36

## 2022-12-26 RX ADMIN — AMIODARONE HYDROCHLORIDE 200 MILLIGRAM(S): 400 TABLET ORAL at 06:29

## 2022-12-26 RX ADMIN — Medication 975 MILLIGRAM(S): at 06:28

## 2022-12-26 NOTE — PROGRESS NOTE ADULT - SUBJECTIVE AND OBJECTIVE BOX
SUBJECTIVE: Pt seen and examined at bedside this am by surgery team. c/o nausea and small volume emesis this morning, clear in color. Reports passing gas yesterday evening and last BM yesterday. States when she drinks, it feels like it gets stuck in her throat. Also c/o mucus/phlegm. Requesting enema.   CXR improved.     Vital Signs Last 24 Hrs  T(C): 36.5 (26 Dec 2022 08:25), Max: 36.8 (25 Dec 2022 17:42)  T(F): 97.7 (26 Dec 2022 08:25), Max: 98.2 (25 Dec 2022 17:42)  HR: 55 (26 Dec 2022 08:25) (50 - 61)  BP: 132/56 (26 Dec 2022 08:25) (117/62 - 159/71)  BP(mean): --  RR: 17 (26 Dec 2022 08:25) (17 - 18)  SpO2: 94% (26 Dec 2022 08:25) (94% - 100%)    Parameters below as of 26 Dec 2022 08:25  Patient On (Oxygen Delivery Method): nasal cannula  O2 Flow (L/min): 2      PHYSICAL EXAM:   Gen: Awake, alert, NAD, resting comfortably   CV: RRR  Pulm: no respiratory distress   Abd: soft, mildly distended, tender to deep palpation in epigastric, no rebound, no guarding  Ext: [facility]    I&O's Detail    25 Dec 2022 07:01  -  26 Dec 2022 07:00  --------------------------------------------------------  IN:  Total IN: 0 mL    OUT:    Voided (mL): 1300 mL  Total OUT: 1300 mL    Total NET: -1300 mL          LABS:                        9.5    8.39  )-----------( 297      ( 26 Dec 2022 05:30 )             31.8     12-26    139  |  95<L>  |  20  ----------------------------<  107<H>  3.9   |  33<H>  |  1.18    Ca    9.2      26 Dec 2022 05:30  Phos  3.6     12-26  Mg     1.8     12-26    TPro  6.5  /  Alb  2.7<L>  /  TBili  0.3  /  DBili  x   /  AST  19  /  ALT  17  /  AlkPhos  72  12-25    LIVER FUNCTIONS - ( 25 Dec 2022 06:24 )  Alb: 2.7 g/dL / Pro: 6.5 g/dL / ALK PHOS: 72 U/L / ALT: 17 U/L / AST: 19 U/L / GGT: x           PT/INR - ( 26 Dec 2022 05:30 )   PT: 19.3 sec;   INR: 1.61          PTT - ( 26 Dec 2022 05:30 )  PTT:33.0 sec  CAPILLARY BLOOD GLUCOSE          RADIOLOGY & ADDITIONAL STUDIES:

## 2022-12-26 NOTE — PROGRESS NOTE ADULT - SUBJECTIVE AND OBJECTIVE BOX
**incomplete** Overnight: NAEON  subjective: Patient continues to complain of nausea w some spit up (from mucous, no vomiting), these complaints were pre-tigan administration - the patient was given tigan with some improvement. The patient continues to feel anxious about care and pain control. In addition, the patient has not had a BM in 2 days however this is with decreased appetite. Today d/c standing oxy, however need to balance pain control with BM. Continue with daily abdominal xrays.    T(C): 36.8 (12-26-22 @ 16:08), Max: 36.8 (12-26-22 @ 06:23)  HR: 60 (12-26-22 @ 16:08) (50 - 60)  BP: 166/74 (12-26-22 @ 16:08) (117/62 - 166/74)  RR: 18 (12-26-22 @ 16:08) (17 - 18)  SpO2: 97% (12-26-22 @ 16:08) (94% - 100%)  Wt(kg): --Vital Signs Last 24 Hrs  T(C): 36.8 (26 Dec 2022 16:08), Max: 36.8 (26 Dec 2022 06:23)  T(F): 98.3 (26 Dec 2022 16:08), Max: 98.3 (26 Dec 2022 16:08)  HR: 60 (26 Dec 2022 16:08) (50 - 60)  BP: 166/74 (26 Dec 2022 16:08) (117/62 - 166/74)  BP(mean): --  RR: 18 (26 Dec 2022 16:08) (17 - 18)  SpO2: 97% (26 Dec 2022 16:08) (94% - 100%)    Parameters below as of 26 Dec 2022 16:08  Patient On (Oxygen Delivery Method): nasal cannula  O2 Flow (L/min): 2      PHYSICAL EXAM:  GENERAL: + Anxious, AO3  Neuro: CN2-12 grossly intact  HEENT: NC/AT; MMM;   Heart: RRR; +s1 and s2; no MRG  Lungs: CTA b/l; normal effort;  Abdominal: Diffusely distended, umbilical hernia continues to be reproducible, nontender abdomen.   Extremities: +2 pulses in UE and LE b/l; no clubbing, cyanosis, or edema b/l, capillary refill <2 sec b/l  Skin: skin dry and warm; no skin tenting; no rashes or lesions  MSK: normal tone; +5/5 strength in upper and lower extremities b/l    LABS:                        9.5    8.39  )-----------( 297      ( 26 Dec 2022 05:30 )             31.8     12-26    139  |  95<L>  |  20  ----------------------------<  107<H>  3.9   |  33<H>  |  1.18    Ca    9.2      26 Dec 2022 05:30  Phos  3.6     12-26  Mg     1.8     12-26    TPro  6.5  /  Alb  2.7<L>  /  TBili  0.3  /  DBili  x   /  AST  19  /  ALT  17  /  AlkPhos  72  12-25      PT/INR - ( 26 Dec 2022 05:30 )   PT: 19.3 sec;   INR: 1.61          PTT - ( 26 Dec 2022 05:30 )  PTT:33.0 sec    CAPILLARY BLOOD GLUCOSE                RADIOLOGY & ADDITIONAL TESTS:    Imaging Personally Reviewed:  [ ] YES  [ ] NO

## 2022-12-26 NOTE — PROGRESS NOTE ADULT - ASSESSMENT
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 General surgery consulted for new onset abdominal pain and evidence of colonic dilation to 11cm on abdominal x-ray. CT imaging (12/20/2022) demonstrates herniated loop of small bowel resolution in comparison with prior scan and periumbilical hernia currently filled with fat, patent anastomosis and copious stool burden in transverse colon.  EGD (12/21/2022) demonstrated candidiasis of the esophagus and gastritis.    - Recommend daily x-ray imaging to monitor for changes in distension and contrast transition, 12/26 Xray shows improvement   - Recommend serial abdominal examinations  - Recommend limit narcotics use  - Continue bowel regimen, miralax bid and senna  - Fleet enema today   - Recommend Speech and Swallow evaluation   - Surgery Team 1C will continue to follow.  - Please page Team 1 at [phone] with any questions and/or clinical changes  -plans discussed with Dr. Chowdhury

## 2022-12-26 NOTE — PROGRESS NOTE ADULT - PROBLEM SELECTOR PLAN 2
Found to have strep bacteremia likely 2/2 to hx of colon cancer. Patient currently afebrile, last febrile 12/20 AM (T 100.6), RIMA neg for vegetation w f/u Gallium scan neg for cardiac etiology (endocarditis). 12/15 BCx growing Strep mitis/oralis, however now NGTD from (12/20)    - Bacterial Clx (NGTD) - 12/20   - c/w CTX 2g q24h (start 12/16)  - ID following, f/u recs

## 2022-12-26 NOTE — PROGRESS NOTE ADULT - PROBLEM SELECTOR PLAN 1
12/20 Patient with distended bowel, however soft but tender to palpation likely 2/2 constipation as pt had not had a BM for 5 days. Had 1 large BM s/p Bowel reg, ducolex enema, fleet enema. Abdominal Xray Pre and post BM demonstrated appx 11cm dilation. Repeat Abd XR (12/22) with again dilated loops of transverse colon, 10.6 cm (comparable to 12/20/2022) - no significant interval change. S/p EGD (12/21) with Candida esophagitis. CT A/P (12/20) with 1. resolution of previously noted herniated loop of small bowel within the periumbilical hernia. The periumbilical hernia now contains fat. 2.Copious stool in mid transverse colon. Post right hemicolectomy, with patent anastomosis.    Plan:  - D/C standing Oxy   - Daily Abd Xray - continues to show stable distension   - C/w miralax, senna  - Continue to monitor BMs  - Surg and GI following, appreciate recs

## 2022-12-26 NOTE — PROGRESS NOTE ADULT - ATTENDING COMMENTS
Patient seen and examined at bedside on 12/26/2022 at 11 am. Pt continues to have nausea/dyspepsia although had a large BM this AM. Denies rash, SOB, CP, abdominal pain. ROS is otherwise negative. Vitals, labwork and pertinent imaging reviewed. Exam - NAD, AAO x 4, PERRLA, EOMI, MMM, supple neck, chest - CTA b/l, CV - rrr, s1s2, no m/r/g, abd - soft, NTND, + BS, ext - wwp, psych - normal affect    Plan:  -C/w Caspofungin (2 weeks) and CTX (6 weeks)  -BCX - NGTD  -PICC line placement tomorrow - pt will likely require d/c notice

## 2022-12-26 NOTE — PROGRESS NOTE ADULT - ASSESSMENT
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

## 2022-12-27 ENCOUNTER — TRANSCRIPTION ENCOUNTER (OUTPATIENT)
Age: 82
End: 2022-12-27

## 2022-12-27 VITALS
SYSTOLIC BLOOD PRESSURE: 153 MMHG | HEART RATE: 59 BPM | RESPIRATION RATE: 18 BRPM | OXYGEN SATURATION: 99 % | DIASTOLIC BLOOD PRESSURE: 69 MMHG

## 2022-12-27 LAB
ALBUMIN SERPL ELPH-MCNC: 2.9 G/DL — LOW (ref 3.3–5)
ALP SERPL-CCNC: 72 U/L — SIGNIFICANT CHANGE UP (ref 40–120)
ALT FLD-CCNC: 16 U/L — SIGNIFICANT CHANGE UP (ref 10–45)
ANION GAP SERPL CALC-SCNC: 9 MMOL/L — SIGNIFICANT CHANGE UP (ref 5–17)
AST SERPL-CCNC: 21 U/L — SIGNIFICANT CHANGE UP (ref 10–40)
BASOPHILS # BLD AUTO: 0.02 K/UL — SIGNIFICANT CHANGE UP (ref 0–0.2)
BASOPHILS NFR BLD AUTO: 0.3 % — SIGNIFICANT CHANGE UP (ref 0–2)
BILIRUB SERPL-MCNC: 0.3 MG/DL — SIGNIFICANT CHANGE UP (ref 0.2–1.2)
BUN SERPL-MCNC: 16 MG/DL — SIGNIFICANT CHANGE UP (ref 7–23)
CALCIUM SERPL-MCNC: 9 MG/DL — SIGNIFICANT CHANGE UP (ref 8.4–10.5)
CHLORIDE SERPL-SCNC: 92 MMOL/L — LOW (ref 96–108)
CO2 SERPL-SCNC: 33 MMOL/L — HIGH (ref 22–31)
CREAT SERPL-MCNC: 1.19 MG/DL — SIGNIFICANT CHANGE UP (ref 0.5–1.3)
EGFR: 45 ML/MIN/1.73M2 — LOW
EOSINOPHIL # BLD AUTO: 0.06 K/UL — SIGNIFICANT CHANGE UP (ref 0–0.5)
EOSINOPHIL NFR BLD AUTO: 0.8 % — SIGNIFICANT CHANGE UP (ref 0–6)
GLUCOSE SERPL-MCNC: 112 MG/DL — HIGH (ref 70–99)
HCT VFR BLD CALC: 29.1 % — LOW (ref 34.5–45)
HGB BLD-MCNC: 8.8 G/DL — LOW (ref 11.5–15.5)
IMM GRANULOCYTES NFR BLD AUTO: 0.4 % — SIGNIFICANT CHANGE UP (ref 0–0.9)
LYMPHOCYTES # BLD AUTO: 0.57 K/UL — LOW (ref 1–3.3)
LYMPHOCYTES # BLD AUTO: 8 % — LOW (ref 13–44)
MAGNESIUM SERPL-MCNC: 1.6 MG/DL — SIGNIFICANT CHANGE UP (ref 1.6–2.6)
MCHC RBC-ENTMCNC: 26.3 PG — LOW (ref 27–34)
MCHC RBC-ENTMCNC: 30.2 GM/DL — LOW (ref 32–36)
MCV RBC AUTO: 86.9 FL — SIGNIFICANT CHANGE UP (ref 80–100)
MONOCYTES # BLD AUTO: 0.46 K/UL — SIGNIFICANT CHANGE UP (ref 0–0.9)
MONOCYTES NFR BLD AUTO: 6.5 % — SIGNIFICANT CHANGE UP (ref 2–14)
NEUTROPHILS # BLD AUTO: 5.98 K/UL — SIGNIFICANT CHANGE UP (ref 1.8–7.4)
NEUTROPHILS NFR BLD AUTO: 84 % — HIGH (ref 43–77)
NRBC # BLD: 0 /100 WBCS — SIGNIFICANT CHANGE UP (ref 0–0)
PHOSPHATE SERPL-MCNC: 3.7 MG/DL — SIGNIFICANT CHANGE UP (ref 2.5–4.5)
PLATELET # BLD AUTO: 274 K/UL — SIGNIFICANT CHANGE UP (ref 150–400)
POTASSIUM SERPL-MCNC: 3.2 MMOL/L — LOW (ref 3.5–5.3)
POTASSIUM SERPL-SCNC: 3.2 MMOL/L — LOW (ref 3.5–5.3)
PROT SERPL-MCNC: 6.8 G/DL — SIGNIFICANT CHANGE UP (ref 6–8.3)
RBC # BLD: 3.35 M/UL — LOW (ref 3.8–5.2)
RBC # FLD: 19.4 % — HIGH (ref 10.3–14.5)
SARS-COV-2 RNA SPEC QL NAA+PROBE: NEGATIVE — SIGNIFICANT CHANGE UP
SODIUM SERPL-SCNC: 134 MMOL/L — LOW (ref 135–145)
WBC # BLD: 7.12 K/UL — SIGNIFICANT CHANGE UP (ref 3.8–10.5)
WBC # FLD AUTO: 7.12 K/UL — SIGNIFICANT CHANGE UP (ref 3.8–10.5)

## 2022-12-27 PROCEDURE — 72131 CT LUMBAR SPINE W/O DYE: CPT | Mod: MA

## 2022-12-27 PROCEDURE — 84540 ASSAY OF URINE/UREA-N: CPT

## 2022-12-27 PROCEDURE — 85730 THROMBOPLASTIN TIME PARTIAL: CPT

## 2022-12-27 PROCEDURE — 82962 GLUCOSE BLOOD TEST: CPT

## 2022-12-27 PROCEDURE — 97110 THERAPEUTIC EXERCISES: CPT

## 2022-12-27 PROCEDURE — 85652 RBC SED RATE AUTOMATED: CPT

## 2022-12-27 PROCEDURE — 72192 CT PELVIS W/O DYE: CPT

## 2022-12-27 PROCEDURE — 97165 OT EVAL LOW COMPLEX 30 MIN: CPT

## 2022-12-27 PROCEDURE — 72148 MRI LUMBAR SPINE W/O DYE: CPT

## 2022-12-27 PROCEDURE — 97535 SELF CARE MNGMENT TRAINING: CPT

## 2022-12-27 PROCEDURE — 99285 EMERGENCY DEPT VISIT HI MDM: CPT

## 2022-12-27 PROCEDURE — 73521 X-RAY EXAM HIPS BI 2 VIEWS: CPT

## 2022-12-27 PROCEDURE — 87040 BLOOD CULTURE FOR BACTERIA: CPT

## 2022-12-27 PROCEDURE — 84484 ASSAY OF TROPONIN QUANT: CPT

## 2022-12-27 PROCEDURE — 82330 ASSAY OF CALCIUM: CPT

## 2022-12-27 PROCEDURE — 80048 BASIC METABOLIC PNL TOTAL CA: CPT

## 2022-12-27 PROCEDURE — 86140 C-REACTIVE PROTEIN: CPT

## 2022-12-27 PROCEDURE — 74018 RADEX ABDOMEN 1 VIEW: CPT | Mod: 26

## 2022-12-27 PROCEDURE — 78802 RP LOCLZJ TUM WHBDY 1 D IMG: CPT

## 2022-12-27 PROCEDURE — 86880 COOMBS TEST DIRECT: CPT

## 2022-12-27 PROCEDURE — A9556: CPT

## 2022-12-27 PROCEDURE — 99239 HOSP IP/OBS DSCHRG MGMT >30: CPT

## 2022-12-27 PROCEDURE — 93005 ELECTROCARDIOGRAM TRACING: CPT

## 2022-12-27 PROCEDURE — 0225U NFCT DS DNA&RNA 21 SARSCOV2: CPT

## 2022-12-27 PROCEDURE — 87181 SC STD AGAR DILUTION PER AGT: CPT

## 2022-12-27 PROCEDURE — 82550 ASSAY OF CK (CPK): CPT

## 2022-12-27 PROCEDURE — 87150 DNA/RNA AMPLIFIED PROBE: CPT

## 2022-12-27 PROCEDURE — 84156 ASSAY OF PROTEIN URINE: CPT

## 2022-12-27 PROCEDURE — 87086 URINE CULTURE/COLONY COUNT: CPT

## 2022-12-27 PROCEDURE — 82570 ASSAY OF URINE CREATININE: CPT

## 2022-12-27 PROCEDURE — 85025 COMPLETE CBC W/AUTO DIFF WBC: CPT

## 2022-12-27 PROCEDURE — 36569 INSJ PICC 5 YR+ W/O IMAGING: CPT

## 2022-12-27 PROCEDURE — 84132 ASSAY OF SERUM POTASSIUM: CPT

## 2022-12-27 PROCEDURE — 84100 ASSAY OF PHOSPHORUS: CPT

## 2022-12-27 PROCEDURE — 97161 PT EVAL LOW COMPLEX 20 MIN: CPT

## 2022-12-27 PROCEDURE — 87637 SARSCOV2&INF A&B&RSV AMP PRB: CPT

## 2022-12-27 PROCEDURE — 87635 SARS-COV-2 COVID-19 AMP PRB: CPT

## 2022-12-27 PROCEDURE — 87184 SC STD DISK METHOD PER PLATE: CPT

## 2022-12-27 PROCEDURE — 74176 CT ABD & PELVIS W/O CONTRAST: CPT | Mod: MA

## 2022-12-27 PROCEDURE — 36415 COLL VENOUS BLD VENIPUNCTURE: CPT

## 2022-12-27 PROCEDURE — 74018 RADEX ABDOMEN 1 VIEW: CPT

## 2022-12-27 PROCEDURE — 83735 ASSAY OF MAGNESIUM: CPT

## 2022-12-27 PROCEDURE — 92610 EVALUATE SWALLOWING FUNCTION: CPT

## 2022-12-27 PROCEDURE — U0005: CPT

## 2022-12-27 PROCEDURE — 86900 BLOOD TYPING SEROLOGIC ABO: CPT

## 2022-12-27 PROCEDURE — 94640 AIRWAY INHALATION TREATMENT: CPT

## 2022-12-27 PROCEDURE — 97116 GAIT TRAINING THERAPY: CPT

## 2022-12-27 PROCEDURE — 76937 US GUIDE VASCULAR ACCESS: CPT | Mod: 26,59

## 2022-12-27 PROCEDURE — 84300 ASSAY OF URINE SODIUM: CPT

## 2022-12-27 PROCEDURE — 99232 SBSQ HOSP IP/OBS MODERATE 35: CPT | Mod: GC

## 2022-12-27 PROCEDURE — 82803 BLOOD GASES ANY COMBINATION: CPT

## 2022-12-27 PROCEDURE — C8929: CPT

## 2022-12-27 PROCEDURE — U0003: CPT

## 2022-12-27 PROCEDURE — 85610 PROTHROMBIN TIME: CPT

## 2022-12-27 PROCEDURE — 86850 RBC ANTIBODY SCREEN: CPT

## 2022-12-27 PROCEDURE — 86870 RBC ANTIBODY IDENTIFICATION: CPT

## 2022-12-27 PROCEDURE — 36573 INSJ PICC RS&I 5 YR+: CPT

## 2022-12-27 PROCEDURE — 80053 COMPREHEN METABOLIC PANEL: CPT

## 2022-12-27 PROCEDURE — 81001 URINALYSIS AUTO W/SCOPE: CPT

## 2022-12-27 PROCEDURE — 83605 ASSAY OF LACTIC ACID: CPT

## 2022-12-27 PROCEDURE — 84133 ASSAY OF URINE POTASSIUM: CPT

## 2022-12-27 PROCEDURE — 86901 BLOOD TYPING SEROLOGIC RH(D): CPT

## 2022-12-27 PROCEDURE — 84295 ASSAY OF SERUM SODIUM: CPT

## 2022-12-27 PROCEDURE — 82553 CREATINE MB FRACTION: CPT

## 2022-12-27 PROCEDURE — 83880 ASSAY OF NATRIURETIC PEPTIDE: CPT

## 2022-12-27 PROCEDURE — 93312 ECHO TRANSESOPHAGEAL: CPT

## 2022-12-27 PROCEDURE — 71250 CT THORAX DX C-: CPT

## 2022-12-27 PROCEDURE — 85027 COMPLETE CBC AUTOMATED: CPT

## 2022-12-27 PROCEDURE — 72128 CT CHEST SPINE W/O DYE: CPT | Mod: MA

## 2022-12-27 PROCEDURE — 71045 X-RAY EXAM CHEST 1 VIEW: CPT

## 2022-12-27 RX ORDER — SENNA PLUS 8.6 MG/1
2 TABLET ORAL
Qty: 0 | Refills: 0 | DISCHARGE
Start: 2022-12-27

## 2022-12-27 RX ORDER — MAGNESIUM SULFATE 500 MG/ML
2 VIAL (ML) INJECTION ONCE
Refills: 0 | Status: COMPLETED | OUTPATIENT
Start: 2022-12-27 | End: 2022-12-27

## 2022-12-27 RX ORDER — SODIUM CHLORIDE 9 MG/ML
10 INJECTION INTRAMUSCULAR; INTRAVENOUS; SUBCUTANEOUS
Refills: 0 | Status: DISCONTINUED | OUTPATIENT
Start: 2022-12-27 | End: 2022-12-27

## 2022-12-27 RX ORDER — POLYETHYLENE GLYCOL 3350 17 G/17G
17 POWDER, FOR SOLUTION ORAL
Qty: 0 | Refills: 0 | DISCHARGE
Start: 2022-12-27

## 2022-12-27 RX ORDER — CHLORHEXIDINE GLUCONATE 213 G/1000ML
1 SOLUTION TOPICAL
Refills: 0 | Status: DISCONTINUED | OUTPATIENT
Start: 2022-12-27 | End: 2022-12-27

## 2022-12-27 RX ORDER — LIDOCAINE 4 G/100G
1 CREAM TOPICAL
Qty: 0 | Refills: 0 | DISCHARGE
Start: 2022-12-27

## 2022-12-27 RX ORDER — POTASSIUM CHLORIDE 20 MEQ
40 PACKET (EA) ORAL EVERY 4 HOURS
Refills: 0 | Status: COMPLETED | OUTPATIENT
Start: 2022-12-27 | End: 2022-12-27

## 2022-12-27 RX ADMIN — POLYETHYLENE GLYCOL 3350 17 GRAM(S): 17 POWDER, FOR SOLUTION ORAL at 06:31

## 2022-12-27 RX ADMIN — PANTOPRAZOLE SODIUM 40 MILLIGRAM(S): 20 TABLET, DELAYED RELEASE ORAL at 06:31

## 2022-12-27 RX ADMIN — ISOSORBIDE MONONITRATE 30 MILLIGRAM(S): 60 TABLET, EXTENDED RELEASE ORAL at 14:08

## 2022-12-27 RX ADMIN — LIDOCAINE 1 PATCH: 4 CREAM TOPICAL at 06:32

## 2022-12-27 RX ADMIN — LIDOCAINE 1 PATCH: 4 CREAM TOPICAL at 07:44

## 2022-12-27 RX ADMIN — Medication 975 MILLIGRAM(S): at 00:00

## 2022-12-27 RX ADMIN — Medication 25 GRAM(S): at 09:57

## 2022-12-27 RX ADMIN — Medication 40 MILLIEQUIVALENT(S): at 10:34

## 2022-12-27 RX ADMIN — TIOTROPIUM BROMIDE 2 PUFF(S): 18 CAPSULE ORAL; RESPIRATORY (INHALATION) at 09:58

## 2022-12-27 RX ADMIN — AMIODARONE HYDROCHLORIDE 200 MILLIGRAM(S): 400 TABLET ORAL at 06:31

## 2022-12-27 RX ADMIN — Medication 200 MILLIGRAM(S): at 09:57

## 2022-12-27 RX ADMIN — MONTELUKAST 10 MILLIGRAM(S): 4 TABLET, CHEWABLE ORAL at 11:54

## 2022-12-27 RX ADMIN — APIXABAN 2.5 MILLIGRAM(S): 2.5 TABLET, FILM COATED ORAL at 06:31

## 2022-12-27 RX ADMIN — Medication 975 MILLIGRAM(S): at 11:55

## 2022-12-27 RX ADMIN — Medication 25 MILLIGRAM(S): at 06:32

## 2022-12-27 RX ADMIN — Medication 1 MILLIGRAM(S): at 11:54

## 2022-12-27 RX ADMIN — Medication 40 MILLIEQUIVALENT(S): at 14:08

## 2022-12-27 RX ADMIN — CASPOFUNGIN ACETATE 260 MILLIGRAM(S): 7 INJECTION, POWDER, LYOPHILIZED, FOR SOLUTION INTRAVENOUS at 16:18

## 2022-12-27 RX ADMIN — Medication 975 MILLIGRAM(S): at 07:00

## 2022-12-27 RX ADMIN — Medication 40 MILLIGRAM(S): at 14:07

## 2022-12-27 RX ADMIN — Medication 975 MILLIGRAM(S): at 12:55

## 2022-12-27 RX ADMIN — Medication 975 MILLIGRAM(S): at 06:31

## 2022-12-27 RX ADMIN — Medication 1 TABLET(S): at 11:54

## 2022-12-27 NOTE — PROGRESS NOTE ADULT - SUBJECTIVE AND OBJECTIVE BOX
**incomplete** Overnight: NAEON  Subjective: Patient continues to have nausea unchanged from prior with some mucous spit up. The patient has no complaints of diarrhea or vomiting (aside from the sit up as presented)        T(C): 36.9 (12-27-22 @ 06:36), Max: 36.9 (12-27-22 @ 06:36)  HR: 59 (12-27-22 @ 10:14) (59 - 77)  BP: 153/69 (12-27-22 @ 10:14) (149/77 - 177/83)  RR: 18 (12-27-22 @ 10:14) (18 - 18)  SpO2: 99% (12-27-22 @ 10:14) (97% - 99%)  Wt(kg): --Vital Signs Last 24 Hrs  T(C): 36.9 (27 Dec 2022 06:36), Max: 36.9 (27 Dec 2022 06:36)  T(F): 98.5 (27 Dec 2022 06:36), Max: 98.5 (27 Dec 2022 06:36)  HR: 59 (27 Dec 2022 10:14) (59 - 77)  BP: 153/69 (27 Dec 2022 10:14) (149/77 - 177/83)  BP(mean): --  RR: 18 (27 Dec 2022 10:14) (18 - 18)  SpO2: 99% (27 Dec 2022 10:14) (97% - 99%)    Parameters below as of 27 Dec 2022 10:14  Patient On (Oxygen Delivery Method): nasal cannula  O2 Flow (L/min): 2      PHYSICAL EXAM:  GENERAL: NAD; well-groomed; AOx3  Neuro: CN2-12 grossly intact; speech clear  HEENT: NC/AT; MMM; neck supple; good dentition;  Heart: RRR; +s1 and s2; no MRG  Lungs: CTA b/l; normal effort; no accesory muscle use;  GI: nondistended; nontender;   Extremities: +2 pulses in UE and LE b/l; no clubbing,  Skin: skin dry and warm; no skin tenting; no rashes or lesions  MSK: normal tone; +5/5 strength in upper and lower extremities b/l    LABS:                        8.8    7.12  )-----------( 274      ( 27 Dec 2022 08:30 )             29.1     12-27    134<L>  |  92<L>  |  16  ----------------------------<  112<H>  3.2<L>   |  33<H>  |  1.19    Ca    9.0      27 Dec 2022 08:30  Phos  3.7     12-27  Mg     1.6     12-27    TPro  6.8  /  Alb  2.9<L>  /  TBili  0.3  /  DBili  x   /  AST  21  /  ALT  16  /  AlkPhos  72  12-27      PT/INR - ( 26 Dec 2022 05:30 )   PT: 19.3 sec;   INR: 1.61          PTT - ( 26 Dec 2022 05:30 )  PTT:33.0 sec    CAPILLARY BLOOD GLUCOSE

## 2022-12-27 NOTE — PROGRESS NOTE ADULT - ASSESSMENT
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

## 2022-12-27 NOTE — PROGRESS NOTE ADULT - PROBLEM SELECTOR PLAN 2
Found to have strep bacteremia likely 2/2 to hx of colon cancer. Patient currently afebrile, last febrile 12/20 AM (T 100.6), IRMA neg for vegetation w f/u Gallium scan neg for cardiac etiology (endocarditis). 12/15 BCx growing Strep mitis/oralis, however now NGTD from (12/20)    - Bacterial Clx (NGTD) - 12/20   - c/w CTX 2g q24h (start 12/16)  - ID following, f/u recs

## 2022-12-27 NOTE — PROGRESS NOTE ADULT - ATTENDING SUPERVISION STATEMENT
Resident
Fellow
Resident
Fellow
Fellow
Resident

## 2022-12-27 NOTE — PROGRESS NOTE ADULT - ASSESSMENT
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 General surgery consulted for new onset abdominal pain and evidence of colonic dilation to 11cm on abdominal x-ray. CT imaging (12/20/2022) demonstrates herniated loop of small bowel resolution in comparison with prior scan and periumbilical hernia currently filled with fat, patent anastomosis and copious stool burden in transverse colon.  EGD (12/21/2022) demonstrated candidiasis of the esophagus and gastritis.    - Continue daily abdominal x rays to monitor for changes in distension  - Recommend serial abdominal examinations  - Recommend limit narcotics use  - Continue bowel regimen, miralax bid and senna  - Recommend Speech and Swallow evaluation   - Surgery Team 1C will continue to follow.  - Please page Team 1 at [phone] with any questions and/or clinical changes  -plans discussed with Dr. Chowdhury

## 2022-12-27 NOTE — DISCHARGE NOTE NURSING/CASE MANAGEMENT/SOCIAL WORK - PATIENT PORTAL LINK FT
You can access the FollowMyHealth Patient Portal offered by [facility] by registering at the following website: [email or web address]. By joining [facility]’s FollowMyHealth portal, you will also be able to view your health information using other applications (apps) compatible with our system.

## 2022-12-27 NOTE — PROGRESS NOTE ADULT - PROVIDER SPECIALTY LIST ADULT
Internal Medicine
Orthopedics
Palliative Care
Rehab Medicine
Gastroenterology
Hospitalist
Infectious Disease
Internal Medicine
Internal Medicine
Surgery
Gastroenterology
Gastroenterology
Internal Medicine
Palliative Care
Rehab Medicine
Surgery
Hospitalist
Internal Medicine
Palliative Care
Internal Medicine
Internal Medicine

## 2022-12-27 NOTE — PROGRESS NOTE ADULT - ATTENDING COMMENTS
CRS Attending  Late note entry  Seen on morning rounds with Dr Hartley and patients daughter at bedside.  Discussed with senior surgical resident Dr Morgan  Abdominal Xrays reviewed, improved dilation.  Had large BM after fleet enema, witnessed by RN caring for patient.  Plan discussed with Dr Hartley and patients daughter  Continue supportive care  Continue bowel regimen - fleet enema daily, miralax if tolerating PO intact

## 2022-12-27 NOTE — DISCHARGE NOTE NURSING/CASE MANAGEMENT/SOCIAL WORK - NSDCFUADDAPPT_GEN_ALL_CORE_FT
Please bring your Insurance card, Photo ID and Discharge paperwork to the following appointment:    (1) Please follow up with your Cardiology Provider, Dr. Derek Marte at [address] [place] [address] on 12/20/2022 at 3:00pm.    Appointment was scheduled by Ms. SAMANTHA Reynolds, Referral Coordinator.

## 2022-12-27 NOTE — PROGRESS NOTE ADULT - SUBJECTIVE AND OBJECTIVE BOX
SUBJECTIVE: Pt seen and examined at bedside this am by surgery team. C/o nausea and spitting up.     Vital Signs Last 24 Hrs  T(C): 36.9 (27 Dec 2022 06:36), Max: 36.9 (27 Dec 2022 06:36)  T(F): 98.5 (27 Dec 2022 06:36), Max: 98.5 (27 Dec 2022 06:36)  HR: 59 (27 Dec 2022 10:14) (59 - 77)  BP: 153/69 (27 Dec 2022 10:14) (149/77 - 177/83)  BP(mean): --  RR: 18 (27 Dec 2022 10:14) (18 - 18)  SpO2: 99% (27 Dec 2022 10:14) (97% - 99%)    Parameters below as of 27 Dec 2022 10:14  Patient On (Oxygen Delivery Method): nasal cannula  O2 Flow (L/min): 2      PHYSICAL EXAM:   Gen: Awake, alert, NAD  CV: RRR  Pulm: no respiratory distress  Abd: soft, +LLQ mildly tender, no rebound or guarding   Ext: WWP    I&O's Detail    26 Dec 2022 07:01  -  27 Dec 2022 07:00  --------------------------------------------------------  IN:    IV PiggyBack: 250 mL  Total IN: 250 mL    OUT:    Voided (mL): 600 mL  Total OUT: 600 mL    Total NET: -350 mL      27 Dec 2022 07:01  -  27 Dec 2022 16:49  --------------------------------------------------------  IN:    IV PiggyBack: 300 mL  Total IN: 300 mL    OUT:  Total OUT: 0 mL    Total NET: 300 mL          LABS:                        8.8    7.12  )-----------( 274      ( 27 Dec 2022 08:30 )             29.1     12-27    134<L>  |  92<L>  |  16  ----------------------------<  112<H>  3.2<L>   |  33<H>  |  1.19    Ca    9.0      27 Dec 2022 08:30  Phos  3.7     12-27  Mg     1.6     12-27    TPro  6.8  /  Alb  2.9<L>  /  TBili  0.3  /  DBili  x   /  AST  21  /  ALT  16  /  AlkPhos  72  12-27    LIVER FUNCTIONS - ( 27 Dec 2022 08:30 )  Alb: 2.9 g/dL / Pro: 6.8 g/dL / ALK PHOS: 72 U/L / ALT: 16 U/L / AST: 21 U/L / GGT: x           PT/INR - ( 26 Dec 2022 05:30 )   PT: 19.3 sec;   INR: 1.61          PTT - ( 26 Dec 2022 05:30 )  PTT:33.0 sec  CAPILLARY BLOOD GLUCOSE          RADIOLOGY & ADDITIONAL STUDIES:

## 2023-01-09 DIAGNOSIS — C78.00 SECONDARY MALIGNANT NEOPLASM OF UNSPECIFIED LUNG: ICD-10-CM

## 2023-01-09 DIAGNOSIS — R78.81 BACTEREMIA: ICD-10-CM

## 2023-01-09 DIAGNOSIS — I48.0 PAROXYSMAL ATRIAL FIBRILLATION: ICD-10-CM

## 2023-01-09 DIAGNOSIS — E78.5 HYPERLIPIDEMIA, UNSPECIFIED: ICD-10-CM
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
## 2023-01-09 DIAGNOSIS — K59.00 CONSTIPATION, UNSPECIFIED: ICD-10-CM

## 2023-01-09 DIAGNOSIS — K42.0 UMBILICAL HERNIA WITH OBSTRUCTION, WITHOUT GANGRENE: ICD-10-CM

## 2023-01-09 DIAGNOSIS — G47.33 OBSTRUCTIVE SLEEP APNEA (ADULT) (PEDIATRIC): ICD-10-CM

## 2023-01-09 DIAGNOSIS — I50.32 CHRONIC DIASTOLIC (CONGESTIVE) HEART FAILURE: ICD-10-CM

## 2023-01-09 DIAGNOSIS — K21.9 GASTRO-ESOPHAGEAL REFLUX DISEASE WITHOUT ESOPHAGITIS: ICD-10-CM

## 2023-01-09 DIAGNOSIS — M25.551 PAIN IN RIGHT HIP: ICD-10-CM

## 2023-01-09 DIAGNOSIS — N18.30 CHRONIC KIDNEY DISEASE, STAGE 3 UNSPECIFIED: ICD-10-CM

## 2023-01-09 DIAGNOSIS — C18.9 MALIGNANT NEOPLASM OF COLON, UNSPECIFIED: ICD-10-CM

## 2023-01-09 DIAGNOSIS — B37.81 CANDIDAL ESOPHAGITIS: ICD-10-CM

## 2023-01-09 DIAGNOSIS — B95.4 OTHER STREPTOCOCCUS AS THE CAUSE OF DISEASES CLASSIFIED ELSEWHERE: ICD-10-CM

## 2023-01-09 DIAGNOSIS — T82.857A STENOSIS OF OTHER CARDIAC PROSTHETIC DEVICES, IMPLANTS AND GRAFTS, INITIAL ENCOUNTER: ICD-10-CM
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
## 2023-01-09 DIAGNOSIS — N39.0 URINARY TRACT INFECTION, SITE NOT SPECIFIED: ICD-10-CM

## 2023-01-09 DIAGNOSIS — D50.9 IRON DEFICIENCY ANEMIA, UNSPECIFIED: ICD-10-CM

## 2023-01-09 DIAGNOSIS — N17.9 ACUTE KIDNEY FAILURE, UNSPECIFIED: ICD-10-CM

## 2023-01-09 DIAGNOSIS — J44.9 CHRONIC OBSTRUCTIVE PULMONARY DISEASE, UNSPECIFIED: ICD-10-CM

## 2023-01-09 DIAGNOSIS — I13.0 HYPERTENSIVE HEART AND CHRONIC KIDNEY DISEASE WITH HEART FAILURE AND STAGE 1 THROUGH STAGE 4 CHRONIC KIDNEY DISEASE, OR UNSPECIFIED CHRONIC KIDNEY DISEASE: ICD-10-CM

## 2023-01-09 DIAGNOSIS — Z95.2 PRESENCE OF PROSTHETIC HEART VALVE: ICD-10-CM

## 2023-01-11 NOTE — DISCHARGE NOTE PROVIDER - NSDCHHATTENDCERT_GEN_ALL_CORE
No assistance needed
My signature below certifies that the above stated patient is homebound and upon completion of the Face-To-Face encounter, has the need for intermittent skilled nursing, physical therapy and/or speech or occupational therapy services in their home for their current diagnosis as outlined in their initial plan of care. These services will continue to be monitored by myself or another physician.

## 2023-01-20 NOTE — PROGRESS NOTE ADULT - NS_MD_PANP_GEN_ALL_CORE
Pt here today for OB follow up  Negative GBS, pt aware  Reports +FM, though this morning baby has slowed down the movement, pt states  WT: 152.0 lb   BP: 118/62  Preferred pharmacy verified with pt.  MFM Appointment: not at this time   Good # [phone]  Pt would like to have her cervix checked today    
S:  Melanie here for routine OB follow up.  Reports decreased FM today. Does not think baby is meeting 10 movements within 2 hours. Denies VB, LOF, RUCs, or vaginal DC. Feeling very tired of being pregnant. Has a hard time sleeping. Strongly desires IOL when available. Patient requests VE: Yes. Informed consent for vaginal exam. Patient agrees to vaginal exam: Yes    O:    Vitals:    01/20/23 1107   BP: 118/62   Weight: 152 lb     See flow sheet.  VE: very posterior, 1cm/thick/-3, soft, cephalic  Osorio = 3    Non Stress Test  NST for Decreased FM  NST Read by: Ann-Marie Cheatham C.N.M.  Baseline: 130  Variability: moderate  Accels (>2 in 20 min): yes  Decels: none  Notes: Uterine irritability present, ctx x2  Category: 1    A:    IUP at 38w0d  S=D  NST: Reactive Category 1  Patient Active Problem List    Diagnosis Date Noted    Cystitis 11/28/2022    Elevated 1hr  --> 3hr GTT wnl 11/04/2022    Anemia affecting pregnancy - 9.7/31.9 on 10/4 - stable 11/4 10/04/2022    Supervision of first pregnancy  08/29/2022       P:  1.  Continue FKCs.  Reviewed method of counting.        2.  Labor precautions given.  Instructions given on where to go.  Pt receptive to education.         3.  D/w pt induction of labor indications, risks/benefits, and recommendations.  After informed consent, patient elects IOL at 40wks despite not ripe at this time. Request for IOL sent today. Discussed elective IOL considerations.        4.  Questions answered.         5.  Encouraged adequate water intake       6.  GBS negative - reviewed w pt.       7.  F/u 1wk and PRN    Orders Placed This Encounter    INDUCTION OF LABOR    POCT Fetal Nonstress Test       Ann-Marie Cheatham C.N.M.     
Attending and PA/NP shared services statement (NON-critical care):

## 2023-01-23 NOTE — PROGRESS NOTE ADULT - PROBLEM SELECTOR PROBLEM 6
Umbilical hernia Non-Graft Cartilage Fenestration Text: The cartilage was fenestrated with a 2mm punch biopsy to help facilitate healing.

## 2023-02-17 NOTE — DIETITIAN INITIAL EVALUATION ADULT - PROBLEM SELECTOR PROBLEM 2
Acute UTI Cellcept Counseling:  I discussed with the patient the risks of mycophenolate mofetil including but not limited to infection/immunosuppression, GI upset, hypokalemia, hypercholesterolemia, bone marrow suppression, lymphoproliferative disorders, malignancy, GI ulceration/bleed/perforation, colitis, interstitial lung disease, kidney failure, progressive multifocal leukoencephalopathy, and birth defects.  The patient understands that monitoring is required including a baseline creatinine and regular CBC testing. In addition, patient must alert us immediately if symptoms of infection or other concerning signs are noted.

## 2023-02-28 NOTE — H&P ADULT - NSICDXPASTSURGICALHX_GEN_ALL_CORE_FT
PAST SURGICAL HISTORY:  S/P TAVR (transcatheter aortic valve replacement) 2/2019    
same name as above

## 2023-03-30 NOTE — PROGRESS NOTE ADULT - PROBLEM SELECTOR PLAN 7
[facility] - Pediatric Acute Care  Pediatric Infectious Disease  Consult Note    Patient Name: Malik Chaney  MRN: [id number]  Admission Date: 3/25/2023  Hospital Length of Stay: 4 days  Attending Physician: ELAINE Chao MD  Primary Care Provider: Rafael Dickens MD     Isolation Status: Contact    Patient information was obtained from parent and chart.      Consults  Assessment/Plan:     GI  Rectal abscess  Patient is a 14 year old male with Crohn's disease who presented with pelvic pain and pressure and underwent placement of a transanal drain with over a liter of purulent fluid drained. He was found to have ESBL E. Coli and Prevotella and was switched to meropenum. He is afebrile with a declining CRP. Patient with rectal pouchitis vs abscess with plans for scheduled for completion proctectomy/APR on 4/17 once infection is resolved.     Plan: continue Meropenum at 1 gram q 8 hours to cover both organisms  Will need a 21 day course of therapy with IV antibiotics as there is no oral option for his ESBL so PICC line should be placed.  Reviewed PICC line and home health option with mom but she declined due to her concerns for something likely to go wrong if antibiotics were given at home.  Would monitor CRP at least once weekly along with CBC  Encourage ambulation and up to chair, patient working with PT but otherwise laying in bed the remainder of the day. Will allow privileges off the floor to ambulate if desired.    Reviewed plan with mom and answered questions.   Updated primary team regarding recommendations           Thank you for your consult. I will follow-up with patient. Please contact us if you have any additional questions.    Subjective:     Principal Problem: Rectal pouchitis    HPI: Patient is a 14 year old male with Crohn's disease and complicated history leading to diagnosis which was orignially misdiagnosed as anal condyloma, his family sought a second opinion and he underwent an endoscopy er  8/22 with a diagnosis of Crohn's made at that time. He presented 3/25 as a transfer from [facility] for pelvic pain. He had significantly worsening pelvic pressure/pain, malaise, subjective fever beginning the day prior. An MRI showed  presacral edema and significant rectal pouch distension and imaging findings suggestive of a new perianal fistula. He underwent perianal drain placement with more than 1 l of malodorous rectal fluid was drained and sent for cultures. His cultures are growing ESBL E.Coli and Prevotella. He was briefly transferred to the PICU for respiratory distress and pulmonary edema post drainage but his oxygen has been weaned and he is now afebrile and taking po well. ID is consulted regarding IV antibiotics. He was initially cipro and metronidazole x 24 hours then Zosyn and Vanc x 48 hours. Once his E. Coli was ID as ESBL he was switched to meropenum.       Past Medical History:   Diagnosis Date    Condyloma 2021       Past Surgical History:   Procedure Laterality Date    COLONOSCOPY N/A 8/19/2022    Procedure: COLONOSCOPY;  Surgeon: Edenilson Eddy MD;  Location: [facility] ([address] [place] [address]);  Service: Endoscopy;  Laterality: N/A;    COLOSTOMY  2021    ESOPHAGOGASTRODUODENOSCOPY N/A 8/19/2022    Procedure: (EGD);  Surgeon: Edenilson Eddy MD;  [facility] ([facility]);  Service: Endoscopy;  Laterality: N/A;  pt is fully vaccinated    EXAMINATION UNDER ANESTHESIA N/A 3/25/2023    Procedure: Exam under anesthesia, transanal drain placement;  Surgeon: Kp Carmichael MD;  [facility];  Service: Colon and Rectal;  Laterality: N/A;  prone position, have Carlos pigtail catheter and ultrasound in room       Review of patient's allergies indicates:  No Known Allergies    Medications:  Medications Prior to Admission   Medication Sig    folic acid (FOLVITE) 1 MG tablet Take 1 tablet (1 mg total) by mouth once daily. (Patient not taking: Reported on 3/10/2023)    methotrexate 2.5 MG  Tab Take 6 tablets (15 mg total) by mouth every 7 days. (Patient not taking: Reported on 3/10/2023)    pantoprazole (PROTONIX) 40 MG tablet Take 1 tablet (40 mg total) by mouth once daily. (Patient not taking: Reported on 3/10/2023)     Antibiotics (From admission, onward)      Start     Stop Route Frequency Ordered    03/27/23 2100  meropenem (MERREM) 1 g in sodium chloride 0.9 % 100 mL IVPB (MB+)         -- IV Every 8 hours (non-standard times) 03/27/23 1950          Antifungals (From admission, onward)      None          Antivirals (From admission, onward)      None             Immunization History   Administered Date(s) Administered    COVID-19 Vaccine 08/10/2021, 08/31/2021, 03/16/2022       Family History       Problem Relation (Age of Onset)    Colon cancer Maternal Grandfather    No Known Problems Mother, Father    Osteoporosis Maternal Grandmother          Social History     Socioeconomic History    Marital status: Single   Tobacco Use    Smoking status: Never     Passive exposure: Yes    Smokeless tobacco: Never   Social History Narrative    2 pets, going into 10th grade.     Here today with mom. Lives at home with mom and stepfather     Travel History:   Has patient traveled outside of the [place]?  Not Relevant  Has patient traveled outside of [place]? Not Relevant      Review of Systems   Constitutional:  Negative for fever.   HENT: Negative.     Eyes: Negative.    Respiratory: Negative.     Cardiovascular: Negative.    Gastrointestinal:  Positive for diarrhea and rectal pain (improved).   Genitourinary: Negative.    Musculoskeletal: Negative.    Skin: Negative.    Allergic/Immunologic: Positive for immunocompromised state.   Neurological: Negative.    Hematological: Negative.    Objective:     Vital Signs (Most Recent):  Temp: 97.3 °F (36.3 °C) (03/29/23 2020)  Pulse: 89 (03/29/23 2020)  Resp: 20 (03/29/23 2020)  BP: 104/64 (03/29/23 2020)  SpO2: 98 % (03/29/23 2020) Vital Signs (24h  Range):  Temp:  [97.3 °F (36.3 °C)-98.6 °F (37 °C)] 97.3 °F (36.3 °C)  Pulse:  [] 89  Resp:  [16-20] 20  SpO2:  [95 %-99 %] 98 %  BP: (102-120)/(56-66) 104/64     Weight: 69 kg (152 lb 1.9 oz)  There is no height or weight on file to calculate BMI.    Estimated Creatinine Clearance: 154.4 mL/min/1.73m2 (based on SCr of 0.8 mg/dL).    Physical Exam  Constitutional:       Appearance: He is not toxic-appearing.      Comments: Thin   HENT:      Head: Normocephalic and atraumatic.      Right Ear: External ear normal.      Left Ear: External ear normal.      Nose: Nose normal. No congestion.      Mouth/Throat:      Mouth: Mucous membranes are moist.      Pharynx: Oropharynx is clear.   Eyes:      Conjunctiva/sclera: Conjunctivae normal.      Pupils: Pupils are equal, round, and reactive to light.   Cardiovascular:      Rate and Rhythm: Normal rate and regular rhythm.      Heart sounds: Normal heart sounds.   Pulmonary:      Effort: Pulmonary effort is normal.      Breath sounds: Normal breath sounds.   Abdominal:      General: Abdomen is flat. There is no distension.      Palpations: Abdomen is soft.      Tenderness: There is no abdominal tenderness.      Comments: LLQ end colostomy. Ostomy bag in place. AMY drain in place   Musculoskeletal:         General: No swelling. Normal range of motion.      Cervical back: Neck supple. No tenderness.   Skin:     General: Skin is warm.      Capillary Refill: Capillary refill takes less than 2 seconds.      Findings: No rash.   Neurological:      General: No focal deficit present.      Mental Status: He is alert and oriented to person, place, and time.   Psychiatric:         Mood and Affect: Mood normal.       Significant Labs:     Lab Results   Component Value Date    WBC 9.78 03/29/2023    RBC 4.65 03/29/2023    HGB 13.1 03/29/2023    HCT 39.5 03/29/2023    MCV 85 03/29/2023    MCH 28.2 03/29/2023    MCHC 33.2 03/29/2023    RDW 12.5 03/29/2023     03/29/2023    MPV 9.5  03/29/2023    GRAN 9.3 (H) 03/26/2023    GRAN 83.8 (H) 03/26/2023    LYMPH 0.9 (L) 03/26/2023    LYMPH 8.2 (L) 03/26/2023    MONO 0.8 03/26/2023    MONO 7.3 03/26/2023    EOS 0.0 03/26/2023    BASO 0.06 (H) 03/26/2023    EOSINOPHIL 0.0 03/26/2023    BASOPHIL 0.5 03/26/2023     CMP  Sodium   Date Value Ref Range Status   03/29/2023 138 136 - 145 mmol/L Final     Potassium   Date Value Ref Range Status   03/29/2023 4.1 3.5 - 5.1 mmol/L Final     Chloride   Date Value Ref Range Status   03/29/2023 102 95 - 110 mmol/L Final     CO2   Date Value Ref Range Status   03/29/2023 29 23 - 29 mmol/L Final     Glucose   Date Value Ref Range Status   03/29/2023 83 70 - 110 mg/dL Final     BUN   Date Value Ref Range Status   03/29/2023 12 5 - 18 mg/dL Final     Creatinine   Date Value Ref Range Status   03/29/2023 0.8 0.5 - 1.4 mg/dL Final     Calcium   Date Value Ref Range Status   03/29/2023 9.7 8.7 - 10.5 mg/dL Final     Total Protein   Date Value Ref Range Status   03/26/2023 6.6 6.0 - 8.4 g/dL Final     Albumin   Date Value Ref Range Status   03/26/2023 2.9 (L) 3.2 - 4.7 g/dL Final     Total Bilirubin   Date Value Ref Range Status   03/26/2023 1.0 0.1 - 1.0 mg/dL Final     Comment:     For infants and newborns, interpretation of results should be based  on gestational age, weight and in agreement with clinical  observations.    Premature Infant recommended reference ranges:  Up to 24 hours.............<8.0 mg/dL  Up to 48 hours............<12.0 mg/dL  3-5 days..................<15.0 mg/dL  6-29 days.................<15.0 mg/dL       Alkaline Phosphatase   Date Value Ref Range Status   03/26/2023 72 (L) 89 - 365 U/L Final     AST   Date Value Ref Range Status   03/26/2023 19 10 - 40 U/L Final     ALT   Date Value Ref Range Status   03/26/2023 14 10 - 44 U/L Final     Anion Gap   Date Value Ref Range Status   03/29/2023 7 (L) 8 - 16 mmol/L Final     eGFR   Date Value Ref Range Status   03/29/2023 SEE COMMENT >60 mL/min/1.73 m^2  Final     Comment:     Test not performed. GFR calculation is only valid for patients   19 and older.       C-reactive protein  73.7  Microbiology Results (last 7 days)       Procedure Component Value Units Date/Time    Blood culture [[id number]] Collected: 03/25/23 1511    Order Status: Completed Specimen: Blood from Midline, Basilic, Right Updated: 03/29/23 1612     Blood Culture, Routine No Growth to date      No Growth to date      No Growth to date      No Growth to date      No Growth to date    Blood culture [[id number]] Collected: 03/25/23 1511    Order Status: Completed Specimen: Blood from Midline, Basilic, Right Updated: 03/29/23 1612     Blood Culture, Routine No Growth to date      No Growth to date      No Growth to date      No Growth to date      No Growth to date    Culture, Anaerobic [[id number]]  (Abnormal) Collected: 03/25/23 1758    Order Status: Completed Specimen: Wound from Rectum Updated: 03/29/23 1024     Anaerobic Culture PREVOTELLA (B.) DISIENS  Many      Narrative:      Rectal fluid culture (aerobic, anaerobic, gram)    Aerobic culture [[id number]]  (Abnormal)  (Susceptibility) Collected: 03/25/23 1758    Order Status: Completed Specimen: Wound from Rectum Updated: 03/27/23 1253     Aerobic Bacterial Culture ESCHERICHIA COLI ESBL  Moderate      Narrative:      Rectal fluid culture (aerobic, anaerobic, gram)    Gram stain [[id number]] Collected: 03/25/23 1758    Order Status: Completed Specimen: Wound from Rectum Updated: 03/25/23 2032     Gram Stain Result Rare WBC's      Many Gram positive cocci      Few Gram positive rods    Narrative:      Rectal fluid culture (aerobic, anaerobic, gram)            Significant Imaging: I have reviewed all pertinent imaging results/findings within the past 24 hours.        Ct Duran MD  Pediatric Infectious Disease  [facility] - Pediatric Acute Care   Normocytic anemia. H/h 8.6/27.7 on admission. Hx of Hx Colon cancer s/p resection in 2019 (in remission) and FEROZ.  - hgb 7.7 today; no s/sx bleeding   - pending Stool OB   - Iron Panel shows AoCD. Would not give IV iron in setting of bacteremia  - maintain Active T+S, transfuse if hgb <7  - c/w Protonix 40mg PO qq Normocytic anemia. H/h 8.6/27.7 on admission. Hx of Hx Colon cancer s/p resection in 2019 (in remission) and FEROZ.  - hgb 7.7 today; no s/sx bleeding   - pending Stool OB   - Iron Panel shows AoCD. Would not give IV iron in setting of bacteremia  - maintain Active T+S, transfuse if hgb <7  - c/w Protonix 40mg PO qd

## 2023-06-18 NOTE — H&P ADULT - PROBLEM SELECTOR PROBLEM 4
HPI: This is a 15-year-old male who presents to immediate care with his mother for evaluation of left ear discomfort.  Patient states that its been going on for about the past 4 days where his ear is clogged and having trouble hearing.  He has been trying several different methods at home to clean the earwax out including Q-tips and irrigation.  States he thinks that there has been some dried blood as well.  Patient having tenderness to the ear and thinks that he has a pimple inside as he has noticed some drainage coming out.  Denies any fevers or chills.  Has been tolerating food and fluid without issue.      ROS as noted per HPI above      PAST MEDICAL HX:    ADHD                                                          Mood disorder (CMD)                                             Comment: unspecified    Immunizations: up to date    PAST SURGICAL HX:  There is no previous surgical history on file.    SOCIAL HISTORY:Patient lives with family.     Vitals:    06/18/23 1237   BP: 117/75   Pulse: 69   Resp: 16   Temp: 98.6 °F (37 °C)   TempSrc: Temporal   SpO2: 98%   Weight: 83.9 kg (185 lb)   Height: 5' 11\" (1.803 m)   PainSc: 5    PainLoc: Ear        Physical Exam  Constitutional:       General: He is not in acute distress.     Appearance: Normal appearance.   HENT:      Head: Normocephalic and atraumatic.      Right Ear: There is impacted cerumen.      Left Ear: Drainage, swelling and tenderness present.      Nose: Nose normal.      Mouth/Throat:      Mouth: Mucous membranes are moist.      Pharynx: Oropharynx is clear.   Eyes:      Conjunctiva/sclera: Conjunctivae normal.   Pulmonary:      Effort: Pulmonary effort is normal. No respiratory distress.   Musculoskeletal:         General: Normal range of motion.   Skin:     General: Skin is warm and dry.   Neurological:      General: No focal deficit present.      Mental Status: He is alert.   Psychiatric:         Mood and Affect: Mood normal.                        MDM:Patient presents with ear pain. Physical exam findings show tragus tenderness and exudate in the ear canal. No mastoid tenderness or deformity of the ear. Earwick placed during visit and patient placed on topical antibiotics. Advised to see their PMD for a recheck in 48 hours. Right ear was irrigated by the tech, TM visualized, non-erythematous, non-bulging.    Differential diagnosis include otitis media, otitis externa, impacted cerumen.  I personally reviewed the patients old records.      ED Diagnosis     Diagnosis Comment Associated Orders       Final diagnosis    Acute otitis externa of left ear, unspecified type --  CIPROFLOXACIN-DEXAMETHASONE 0.3-0.1 % OT SUSP             New Prescriptions    CIPROFLOXACIN-DEXAMETHASONE (CIPRODEX) OTIC SUSPENSION    Place 4 drops into left ear in the morning and 4 drops in the evening. Do all this for 7 days.            Disposition: Home   HTN (hypertension)

## 2023-07-06 NOTE — ED ADULT TRIAGE NOTE - HEIGHT IN FEET
Mid-Level Procedure Text (A): After obtaining clear surgical margins the patient was sent to a mid-level provider for surgical repair.  The patient understands they will receive post-surgical care and follow-up from the mid-level provider. 5

## 2023-07-20 NOTE — H&P ADULT - PROBLEM SELECTOR PROBLEM 2
Outreach attempt was made to schedule a Medicare Wellness Visit. This was the first attempt. Contact was made, MWV appointment refused.     R/O CHF exacerbation Acute kidney injury superimposed on CKD

## 2023-09-02 NOTE — PROGRESS NOTE ADULT - PROBLEM SELECTOR PLAN 5
TAVR w/Dr. Alejo 2/2019 2/2 severe AS and diastolic HF  - Valve in Valve aborted 5/2021 2/2 low gradient   - ECHO findings as above   - same plan as diastolic CHF    #?valve thrombus  -continue home Eliquis 5mg BID
normal...

## 2023-09-05 NOTE — PROGRESS NOTE ADULT - ASSESSMENT
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 Colorectal surgery consulted in setting of c/f GI bleed with known colorectal cancer history. Pt now s/p colonoscopy with GI on 9/22 with finding of a friable, centrally ulcerated, coarsened mass 5-7cm distal of previous ileo-colonic anastomosis that occupied 30% of the luminal circumference. Multiple cold forceps biopsies were obtained. Findings concerning for new primary colon cancer.    Recommendations:  Medical and cardiac risk stratification for potential future surgery  Oncology consult in the setting of new primary colon ca with h/o colon resection in 2019  F/u pathology results  Appreciate GI recs  Team 1C will continue to follow    Plan discussed with chief resident and Dr. Sanford Patient

## 2023-11-10 NOTE — OCCUPATIONAL THERAPY INITIAL EVALUATION ADULT - MODIFIED CLINICAL TEST OF SENSORY INTEGRATION IN BALANCE TEST
Pt took ~6 side steps EOB w/ Min Ax2, RW - +impaired balance, +R hip pain 4 = No assist / stand by assistance

## 2024-01-08 NOTE — PROGRESS NOTE ADULT - ASSESSMENT
83 yo female hx TAVR, Gemella BSI 9/5/22 2/2 odontogenic source (abscesses not drained, extractions not performed), E. 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 mitis/oralis BSI--as species name implies, this organism originates from the oral cavity; she is likely to be at ongoing risk of bacteremia from oral mela in the absence of dental intervention. MR 12/20 with L4-5 endplate changes without definitive osteomyelitis/discitis however gallium scan with uptake L5 c/f possible osteomyelitis.   - pending outpatient dental appointment for extractions/source control  - continue ceftriaxone 2g IV [id number] 6 week course thru 1/27/23  - continue caspofungin 50mg IV [id number] 2 week course for esophageal candidiasis thru 1/4/23  - PICC  - weekly CBC, CMP, ESR, CRP to be faxed to [phone]    Will arrange post hospital f/u in 2-3 weeks    Please reconsult with ? Number Of Freeze-Thaw Cycles: 2 freeze-thaw cycles Duration Of Freeze Thaw-Cycle (Seconds): 6 Post-Care Instructions: I reviewed with the patient in detail post-care instructions. Patient is to wear sunprotection, and avoid picking at any of the treated lesions. Pt may apply Vaseline to crusted or scabbing areas. Show Aperture Variable?: Yes Consent: The patient's consent was obtained including but not limited to risks of crusting, scabbing, blistering, scarring, darker or lighter pigmentary change, recurrence, incomplete removal and infection. Render Note In Bullet Format When Appropriate: No Detail Level: Detailed

## 2024-04-15 NOTE — H&P ADULT - BREASTS
Recent PHQ 2/9 Score    PHQ 2:  PHQ 2 Score Adult PHQ 2 Score Adult PHQ 2 Interpretation Little interest or pleasure in activity?   4/9/2024  12:33 PM 1 No further screening needed 1       PHQ 9:       Health Maintenance Due   Topic Date Due    Colorectal Cancer Risk - Colonoscopy  08/27/2023    COVID-19 Vaccine (7 - 2023-24 season) 09/01/2023    Medicare Advantage- Medicare Wellness Visit  01/01/2024       Patient is due for topics listed above, she wishes to proceed with Colorectal Cancer Screening: Colonoscopy and Mammogram, but is not proceeding with Immunization(s) COVID-19 at this time. The following has occurred:  Appt scheduled to perform Colorectal Cancer Screening: Colonoscopy. Orders placed for Mammogram.           not examined

## 2024-04-17 NOTE — PROGRESS NOTE ADULT - PROBLEM SELECTOR PLAN 3
Pt presenting with malaise, chills and acute on chronic R hip pain. Pt states she has had chronic R hip pain that shoots down her leg, but has not seen providers for it in the past. Day prior to admission she said the pain was severe and had pain with ambulation. Pain starts at posterior hip and radiates down R leg. CT scans and xrays without findings     - MRI lumbar spine (12/20) with no acute neurocompressive lesions, synovial cyst vs epidural fluid collection at L5, Modic type 1 changes vs disciits OM  - Pain control with lidocaine patch, tylenol, oxycodone 5mg q6 hours prn  - Gabapentin 600 mg PO daily.   - CT scans and xrays without findings.  - Ortho following, low concern for septic joint w supportive management focused of PT and pain control  - f/u PT recs Pt presenting with malaise, chills and acute on chronic R hip pain. Pt states she has had chronic R hip pain that shoots down her leg, but has not seen providers for it in the past. Day prior to admission she said the pain was severe and had pain with ambulation. Pain starts at posterior hip and radiates down R leg. CT scans and xrays without findings. MRI lumbar spine (12/20) with no acute neurocompressive lesions, synovial cyst vs epidural fluid collection at L5, Modic type 1 changes vs discitis OM. CT Scan Neg     - Pain control with lidocaine patch, tylenol, oxycodone 5mg q6 hours prn, gabapentin 600 mg PO daily.   - Ortho consulted, low concern for septic joint w supportive management focused of PT and pain control  - f/u PT recs Adult

## 2024-05-01 NOTE — ED PROVIDER NOTE - WR INTERPRETATION 1
CXR negative - No pneumothorax, No free air, + BL pulm vascular congestion, + BL pleural effusions Detail Level: Zone

## 2024-05-08 NOTE — DIETITIAN INITIAL EVALUATION ADULT. - OBTAIN DAILY WEIGHT
Schedule Lumbar Intra-articular Facet Steroid Injection Bilateral L4-L5 in procedure room for chronic lumbar pain (M54.5). Per Carolina, please try to schedule in place of currently scheduled MBB on 5/15/2024 d/t patient transportation issues.
pain pump refill on or before 6/28/2024 with Carolina
yes

## 2024-09-19 ENCOUNTER — NON-APPOINTMENT (OUTPATIENT)
Age: 84
End: 2024-09-19

## 2024-09-19 ENCOUNTER — APPOINTMENT (OUTPATIENT)
Dept: HEART AND VASCULAR | Facility: CLINIC | Age: 84
End: 2024-09-19
Payer: MEDICARE

## 2024-09-19 VITALS — SYSTOLIC BLOOD PRESSURE: 144 MMHG | DIASTOLIC BLOOD PRESSURE: 72 MMHG

## 2024-09-19 VITALS
TEMPERATURE: 98.4 F | HEIGHT: 62 IN | OXYGEN SATURATION: 96 % | DIASTOLIC BLOOD PRESSURE: 70 MMHG | SYSTOLIC BLOOD PRESSURE: 145 MMHG | HEART RATE: 61 BPM | BODY MASS INDEX: 37.91 KG/M2 | WEIGHT: 206 LBS

## 2024-09-19 DIAGNOSIS — R07.9 CHEST PAIN, UNSPECIFIED: ICD-10-CM

## 2024-09-19 DIAGNOSIS — I48.91 UNSPECIFIED ATRIAL FIBRILLATION: ICD-10-CM

## 2024-09-19 DIAGNOSIS — I50.32 CHRONIC DIASTOLIC (CONGESTIVE) HEART FAILURE: ICD-10-CM

## 2024-09-19 DIAGNOSIS — I34.0 NONRHEUMATIC MITRAL (VALVE) INSUFFICIENCY: ICD-10-CM

## 2024-09-19 DIAGNOSIS — I50.30 UNSPECIFIED DIASTOLIC (CONGESTIVE) HEART FAILURE: ICD-10-CM

## 2024-09-19 DIAGNOSIS — I35.0 NONRHEUMATIC AORTIC (VALVE) STENOSIS: ICD-10-CM

## 2024-09-19 PROCEDURE — 36415 COLL VENOUS BLD VENIPUNCTURE: CPT

## 2024-09-19 PROCEDURE — 99214 OFFICE O/P EST MOD 30 MIN: CPT

## 2024-09-19 PROCEDURE — 93000 ELECTROCARDIOGRAM COMPLETE: CPT

## 2024-09-19 RX ORDER — ISOSORBIDE MONONITRATE 60 MG/1
60 TABLET, EXTENDED RELEASE ORAL DAILY
Refills: 0 | Status: ACTIVE | COMMUNITY

## 2024-09-19 RX ORDER — IRBESARTAN 150 MG/1
150 TABLET ORAL
Qty: 90 | Refills: 3 | Status: ACTIVE | COMMUNITY
Start: 2024-09-19 | End: 1900-01-01

## 2024-09-19 RX ORDER — SIMETHICONE 80 MG/1
80 TABLET, CHEWABLE ORAL
Refills: 0 | Status: ACTIVE | COMMUNITY

## 2024-09-19 RX ORDER — TOPIRAMATE 50 MG/1
50 TABLET, FILM COATED ORAL DAILY
Refills: 0 | Status: ACTIVE | COMMUNITY

## 2024-09-19 RX ORDER — FAMOTIDINE 10 MG/1
10 TABLET, FILM COATED ORAL DAILY
Qty: 90 | Refills: 0 | Status: ACTIVE | COMMUNITY
Start: 2024-09-19 | End: 1900-01-01

## 2024-09-19 RX ORDER — AMLODIPINE BESYLATE 5 MG/1
5 TABLET ORAL
Qty: 90 | Refills: 3 | Status: ACTIVE | COMMUNITY
Start: 2024-09-19

## 2024-09-19 RX ORDER — SENNOSIDES 8.6 MG/1
8.6 TABLET, COATED ORAL
Refills: 0 | Status: ACTIVE | COMMUNITY

## 2024-09-19 RX ORDER — LOSARTAN POTASSIUM 50 MG/1
50 TABLET, FILM COATED ORAL
Qty: 90 | Refills: 1 | Status: ACTIVE | COMMUNITY

## 2024-09-19 RX ORDER — PREGABALIN 75 MG/1
75 CAPSULE ORAL TWICE DAILY
Refills: 0 | Status: ACTIVE | COMMUNITY

## 2024-09-19 RX ORDER — NITROGLYCERIN 0.4 MG/1
0.4 TABLET SUBLINGUAL
Refills: 0 | Status: ACTIVE | COMMUNITY

## 2024-09-19 NOTE — REASON FOR VISIT
[FreeTextEntry1] : 85 y/o F s/p TAVR Feb 2019.  Also has PAF.  She is very anxious, takes  a benzodiazepine.  Getting dizzy with head turning. Worse at 5 pm and 11 PM.  Daughter checks BPs which are not low.   No syncope or CP.   CTA Coronaries 1/18/22 revealed non-obstructive CAD mild stenosis of  pLAD mLAD and pRCA, <25% LM stenosis.    9/19/24 2nd TAVR Sept 2023 @ [facility].    Hemicolectomy Sept 12, 2023 for CA,    Having very high BPs at home.  She can feel it. It occurs at 8 PM, due for med at 10 PM.  Pt notes pain under left breast Onset: yesterdat Location: Under L breast Duration: 10 min Frequency:daily Character: "like a wound" Associated Symptoms:after her BP went high Severity: mod Modifying Factors: none, walking is OK  EKG: NSR, normal axis and intervals, no ST-Tw abnormalities. 7/6/22

## 2024-09-19 NOTE — PHYSICAL EXAM
[Well Developed] : well developed [Well Nourished] : well nourished [No Acute Distress] : no acute distress [Normal Conjunctiva] : normal conjunctiva [Normal Venous Pressure] : normal venous pressure [No Carotid Bruit] : no carotid bruit [Normal S1, S2] : normal S1, S2 [No Rub] : no rub [No Gallop] : no gallop [Clear Lung Fields] : clear lung fields [Good Air Entry] : good air entry [No Respiratory Distress] : no respiratory distress  [Soft] : abdomen soft [Non Tender] : non-tender [No Masses/organomegaly] : no masses/organomegaly [Normal Bowel Sounds] : normal bowel sounds [Normal Gait] : normal gait [No Edema] : no edema [No Cyanosis] : no cyanosis [No Clubbing] : no clubbing [No Varicosities] : no varicosities [No Rash] : no rash [No Skin Lesions] : no skin lesions [Moves all extremities] : moves all extremities [No Focal Deficits] : no focal deficits [Normal Speech] : normal speech [Alert and Oriented] : alert and oriented [Normal memory] : normal memory [de-identified] : teeth in poor repair, retained roots on uppers [de-identified] : YOVANA

## 2024-09-19 NOTE — ASSESSMENT
[FreeTextEntry1] : HFpEF- Continue Lasix and K, pt not compliant with K  Dizziness- ZIO monitor placed, need to r/o tachy and leobardo episodes, daughter to check BPs.    CP- 1-2 episodes.  EKG NL, trop sent  s/p TAVR  PAF- On eliquis, off Amio  HTN- Very labile, switching to a Longer acting ARB.  Told to use anlodiine or Irbesartan 1/2 tab PRN elevated BP.  ASHD- CTA Coronaries 1/18/22 revealed non-obstructive CAD mild stenosis of  pLAD mLAD and pRCA, <25% LM stenosis.

## 2024-09-21 LAB
ALBUMIN SERPL ELPH-MCNC: 4.3 G/DL
ALP BLD-CCNC: 81 U/L
ALT SERPL-CCNC: 12 U/L
ANION GAP SERPL CALC-SCNC: 16 MMOL/L
AST SERPL-CCNC: 17 U/L
BASOPHILS # BLD AUTO: 0.05 K/UL
BASOPHILS NFR BLD AUTO: 0.8 %
BILIRUB SERPL-MCNC: 0.5 MG/DL
BUN SERPL-MCNC: 26 MG/DL
CALCIUM SERPL-MCNC: 9.8 MG/DL
CHLORIDE SERPL-SCNC: 108 MMOL/L
CHOLEST SERPL-MCNC: 109 MG/DL
CO2 SERPL-SCNC: 19 MMOL/L
CREAT SERPL-MCNC: 1.1 MG/DL
EGFR: 49 ML/MIN/1.73M2
EOSINOPHIL # BLD AUTO: 0.14 K/UL
EOSINOPHIL NFR BLD AUTO: 2.2 %
GLUCOSE SERPL-MCNC: 88 MG/DL
HCT VFR BLD CALC: 39 %
HDLC SERPL-MCNC: 46 MG/DL
HGB BLD-MCNC: 12.7 G/DL
IMM GRANULOCYTES NFR BLD AUTO: 0.2 %
LDLC SERPL CALC-MCNC: 40 MG/DL
LYMPHOCYTES # BLD AUTO: 1.37 K/UL
LYMPHOCYTES NFR BLD AUTO: 21.1 %
MAN DIFF?: NORMAL
MCHC RBC-ENTMCNC: 30.7 PG
MCHC RBC-ENTMCNC: 32.6 GM/DL
MCV RBC AUTO: 94.2 FL
MONOCYTES # BLD AUTO: 0.62 K/UL
MONOCYTES NFR BLD AUTO: 9.6 %
NEUTROPHILS # BLD AUTO: 4.3 K/UL
NEUTROPHILS NFR BLD AUTO: 66.1 %
NONHDLC SERPL-MCNC: 62 MG/DL
PLATELET # BLD AUTO: 165 K/UL
POTASSIUM SERPL-SCNC: 4.8 MMOL/L
PROT SERPL-MCNC: 7 G/DL
RBC # BLD: 4.14 M/UL
RBC # FLD: 13.3 %
SODIUM SERPL-SCNC: 143 MMOL/L
TRIGL SERPL-MCNC: 127 MG/DL
TROPONIN-T, HIGH SENSITIVITY: 17 NG/L
WBC # FLD AUTO: 6.49 K/UL

## 2024-10-09 ENCOUNTER — APPOINTMENT (OUTPATIENT)
Dept: HEART AND VASCULAR | Facility: CLINIC | Age: 84
End: 2024-10-09

## 2024-11-11 NOTE — PROGRESS NOTE ADULT - PROBLEM/PLAN-3
DISPLAY PLAN FREE TEXT
0

## 2024-12-14 NOTE — PROGRESS NOTE ADULT - SUBJECTIVE AND OBJECTIVE BOX
SUBJECTIVE: Pt seen and examined at bedside with chief. Pt denies any complaints. Pain well controlled. Tolerating diet without N/V. Admits to small BM, but no flatus    MEDICATIONS  (STANDING):  acetaminophen   Tablet .. 1000 milliGRAM(s) Oral every 6 hours  ALBUTerol    90 MICROgram(s) HFA Inhaler 1 Puff(s) Inhalation every 4 hours  albuterol/ipratropium for Nebulization 3 milliLiter(s) Nebulizer every 6 hours  aspirin enteric coated 81 milliGRAM(s) Oral daily  atorvastatin 20 milliGRAM(s) Oral at bedtime  BUpivacaine liposome 1.3% Injectable (no eMAR) 20 milliLiter(s) Local Injection once  furosemide   Injectable 20 milliGRAM(s) IV Push daily  heparin   Injectable 5000 Unit(s) SubCutaneous every 8 hours  influenza  Vaccine (HIGH DOSE) 0.7 milliLiter(s) IntraMuscular once  metoprolol succinate ER 50 milliGRAM(s) Oral daily  pantoprazole  Injectable 40 milliGRAM(s) IV Push daily  tiotropium 18 MICROgram(s) Capsule 1 Capsule(s) Inhalation daily    MEDICATIONS  (PRN):  ondansetron Injectable 4 milliGRAM(s) IV Push every 6 hours PRN Nausea and/or Vomiting  oxyCODONE    IR 10 milliGRAM(s) Oral every 6 hours PRN Severe Pain (7 - 10)  oxyCODONE    IR 5 milliGRAM(s) Oral every 4 hours PRN Moderate Pain (4 - 6)      Vital Signs Last 24 Hrs  T(C): 36.8 (07 Dec 2020 09:03), Max: 37.2 (06 Dec 2020 14:02)  T(F): 98.3 (07 Dec 2020 09:03), Max: 98.9 (06 Dec 2020 14:02)  HR: 60 (07 Dec 2020 08:57) (60 - 70)  BP: 146/65 (07 Dec 2020 08:57) (127/60 - 164/71)  BP(mean): 94 (07 Dec 2020 08:57) (86 - 102)  RR: 18 (07 Dec 2020 08:57) (17 - 18)  SpO2: 100% (07 Dec 2020 08:57) (96% - 100%)    PHYSICAL EXAM:      Constitutional: A&Ox3    Respiratory: non labored breathing, no respiratory distress    Cardiovascular: NSR, RRR    Gastrointestinal: soft ND, appropriately tender.                 Incision: CDI    Genitourinary: voiding     Extremities: (-) edema                  I&O's Detail    06 Dec 2020 07:01  -  07 Dec 2020 07:00  --------------------------------------------------------  IN:    Oral Fluid: 700 mL  Total IN: 700 mL    OUT:    Voided (mL): 2125 mL  Total OUT: 2125 mL    Total NET: -1425 mL          LABS:                        7.2    5.28  )-----------( 147      ( 07 Dec 2020 05:53 )             23.2     12-07    138  |  104  |  5<L>  ----------------------------<  100<H>  3.6   |  24  |  0.83    Ca    8.3<L>      07 Dec 2020 05:53  Phos  3.1     12-07  Mg     1.9     12-07            RADIOLOGY & ADDITIONAL STUDIES: No

## 2025-01-15 ENCOUNTER — NON-APPOINTMENT (OUTPATIENT)
Age: 85
End: 2025-01-15

## 2025-01-20 NOTE — H&P ADULT - HISTORY OF PRESENT ILLNESS
Weightbearing as tolerated    Your child's weight today is: 51.7 kg.  Based on this your child can take Ibuprofen 2 1/2 tablets (500mg) every 6 hours with or without tylenol regular strength 2 tablets (650mg) every 4 hours as needed for pain   
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 She states that earlier this week she saw her PCP was doing well, however yesterday had chills, rigors, malaise, and woke up with acute on chronic R hip pain. She states she has had hip pain in the past, never seen a doctor for it, and has just managed it with lidocaine patches. She states this is the worst it has been, it starts at her posterior hip and radiates down her R leg. She states it hurts with ambulation and describes it as a sharp shooting pain. Pt is tearful on exam, and concerned as she stats every time she comes here we find something new wrong with her. She denies chest pain, SOB, headaches, LOC, abdominal pain, nausea, vomiting, diarrhea. She does endorse some burning with urination.     Recent admissions 12/1-12/3 for CHF exacerbation under cardiology, had medications adjusted ands sent home. Other admission under medicine 9/21-9/29 sent in from [facility] for melena with colonoscopy c/f cancer with biopsy showing adenocarcinoma and PET scan with concerning MET disease to lung. Also admitted under cardiology service from 09/4-09/13 for chest pain and hedaches found to have e.coli bacteremia and gemella spp. w/ TTE/IRMA and gallium scan negative with source presumed to be infected tooth d/c'ed with IV Abx.    In the ED:   Vitals: T 100.1, 69. 190/72>157/73, 18 95% RA  Labs: Hgb 10.4, sCr 1.80, pBNP 1105. U/A:  small LE, WBC >10, +bacteria  CXR: L sided effusion/infiltrate  Interventions: tylenol 650, CTX, CTAP, CTL/CTT

## 2025-01-21 ENCOUNTER — APPOINTMENT (OUTPATIENT)
Dept: HEART AND VASCULAR | Facility: CLINIC | Age: 85
End: 2025-01-21

## 2025-03-04 NOTE — DIETITIAN INITIAL EVALUATION ADULT. - WEIGHT IN KG
68
PAST MEDICAL HISTORY:  Abdominal hernia     Anxiety with depression     ESRD on dialysis [facility]    HTN (hypertension)     Metastasis to lung     Renal cancer     T2DM (type 2 diabetes mellitus)

## 2025-07-05 NOTE — H&P ADULT - PROBLEM SELECTOR PLAN 5
no Hgb 7.6 on admission, baseline ~7 from prior admissions   - s/p colectomy for colon cancer  - denies any BRBPR/melena   - monitor for signs and symptoms of bleeding   - monitor daily CBC   - maintain active type and screen

## 2025-07-07 NOTE — PROGRESS NOTE ADULT - NS ATTEND AMEND GEN_ALL_CORE FT
Medication:   Requested Prescriptions      No prescriptions requested or ordered in this encounter        Last Filled:      Patient Phone Number: [phone] (home)     Last appt: 1/14/2025   Next appt: Visit date not found    Last OARRS:       3/28/2017     3:11 PM   RX Monitoring   Attestation The Prescription Monitoring Report for this patient was reviewed today.   Periodic Controlled Substance Monitoring Possible medication side effects, risk of tolerance and/or dependence, and alternative treatments discussed;No signs of potential drug abuse or diversion identified.          -ID - Bacteremia w/ E. Coli and Gemela - likely source is infected molar - Galum scan and IRMA negative for endocarditis. Patient strongly advised for tooth extraction by all services including ID, OMFS and Cardiology given need for source control. Pt. was scheduled for OR today on 9/12 however pt. is now adamantly refusing and wishes to pursue tooth extraction with her outpatient Dentist despite discuss of risks. She will have a PICC line placed today  and will continue Ceftriaxone 2g IV daily for 2 weeks. She has been advised to have her tooth extraction within that two period as she runs the risk of bacteremia if extracted post completion of abx. Currently she remains afebrile, HD stable, no leukocytosis and surveillance cx's are negative  -pAF - Currently rate controlled on Toprol 50 daily and Amiodarone 200 daily, her Eliquis was held in preparation for tooth extract but will now be restarted today post-Picc line placement  -HTN - c/w Toprol and Amlodipine  -DASH diet  -OOB to chair  -Dispo: Cardiac Tele; Plan for BELKIS placement tmrw  -Full Code    Girma Jerez MD -ID - Bacteremia w/ E. Coli and Gemela - likely source is infected molar - Galum scan and IRMA negative for endocarditis. Patient strongly advised for tooth extraction by all services including ID, OMFS and Cardiology given need for source control. Pt. was scheduled for OR today on 9/12 however pt. is now adamantly refusing and wishes to pursue tooth extraction with her outpatient Dentist despite discuss of risks. She will have a PICC line placed today  and will continue Ceftriaxone 2g IV daily for 2 weeks. She has been advised to have her tooth extraction within that two period as she runs the risk of bacteremia if extracted post completion of abx. Currently she remains afebrile, HD stable, no leukocytosis and surveillance cx's are negative  -pAF - Currently rate controlled on Toprol 50 daily and Amiodarone 200 daily, her Eliquis was held in preparation for tooth extract but will now be restarted today post-Picc line placement  -HTN - c/w Toprol and Amlodipine  -DASH diet  -OOB to chair  -Dispo: Cardiac Tele; Plan for BELKIS placement tmrw  -Full Code    Girma Jerez MD      Update(3:52PM) - Patient is now refusing PICC line which is needed for IV Ceftriaxone to complete two-week course. She is aware of the risks of worsening infection. I will prescribe her a two-week course of oral antibiotics, though this is suboptimal compared to IV ceftriaxone x 2 weeks, and we continue to recommend that she have her tooth extracted within two weeks, as stated above. I will also discuss this with her daughter Jaelyn.